# Patient Record
Sex: MALE | Race: WHITE | NOT HISPANIC OR LATINO | Employment: FULL TIME | URBAN - METROPOLITAN AREA
[De-identification: names, ages, dates, MRNs, and addresses within clinical notes are randomized per-mention and may not be internally consistent; named-entity substitution may affect disease eponyms.]

---

## 2023-02-25 ENCOUNTER — APPOINTMENT (INPATIENT)
Dept: MRI IMAGING | Facility: HOSPITAL | Age: 63
End: 2023-02-25

## 2023-02-25 ENCOUNTER — APPOINTMENT (EMERGENCY)
Dept: CT IMAGING | Facility: HOSPITAL | Age: 63
End: 2023-02-25

## 2023-02-25 ENCOUNTER — HOSPITAL ENCOUNTER (INPATIENT)
Facility: HOSPITAL | Age: 63
LOS: 9 days | End: 2023-03-06
Attending: ANESTHESIOLOGY | Admitting: ANESTHESIOLOGY

## 2023-02-25 ENCOUNTER — APPOINTMENT (INPATIENT)
Dept: RADIOLOGY | Facility: HOSPITAL | Age: 63
End: 2023-02-25

## 2023-02-25 ENCOUNTER — HOSPITAL ENCOUNTER (INPATIENT)
Facility: HOSPITAL | Age: 63
LOS: 1 days | End: 2023-02-25
Attending: EMERGENCY MEDICINE | Admitting: INTERNAL MEDICINE

## 2023-02-25 ENCOUNTER — APPOINTMENT (EMERGENCY)
Dept: RADIOLOGY | Facility: HOSPITAL | Age: 63
End: 2023-02-25

## 2023-02-25 VITALS
TEMPERATURE: 98.2 F | SYSTOLIC BLOOD PRESSURE: 184 MMHG | HEIGHT: 69 IN | RESPIRATION RATE: 17 BRPM | BODY MASS INDEX: 25.18 KG/M2 | WEIGHT: 170 LBS | HEART RATE: 85 BPM | OXYGEN SATURATION: 98 % | DIASTOLIC BLOOD PRESSURE: 86 MMHG

## 2023-02-25 DIAGNOSIS — I77.74 VERTEBRAL ARTERY DISSECTION (HCC): ICD-10-CM

## 2023-02-25 DIAGNOSIS — R42 DIZZINESS: Primary | ICD-10-CM

## 2023-02-25 DIAGNOSIS — U07.1 COVID-19: ICD-10-CM

## 2023-02-25 DIAGNOSIS — I63.9 CVA (CEREBRAL VASCULAR ACCIDENT) (HCC): ICD-10-CM

## 2023-02-25 DIAGNOSIS — N39.0 UTI (URINARY TRACT INFECTION): ICD-10-CM

## 2023-02-25 DIAGNOSIS — I77.74 VERTEBRAL ARTERY DISSECTION (HCC): Primary | ICD-10-CM

## 2023-02-25 DIAGNOSIS — R42 DIZZINESS: ICD-10-CM

## 2023-02-25 PROBLEM — N17.9 AKI (ACUTE KIDNEY INJURY) (HCC): Status: ACTIVE | Noted: 2023-02-25

## 2023-02-25 PROBLEM — I16.1 HYPERTENSIVE EMERGENCY: Status: ACTIVE | Noted: 2023-02-25

## 2023-02-25 PROBLEM — R13.10 DYSPHAGIA: Status: ACTIVE | Noted: 2023-02-25

## 2023-02-25 PROBLEM — I10 ACCELERATED HYPERTENSION: Status: ACTIVE | Noted: 2023-02-25

## 2023-02-25 PROBLEM — I65.02 STENOSIS OF LEFT VERTEBRAL ARTERY: Status: ACTIVE | Noted: 2023-02-25

## 2023-02-25 LAB
2HR DELTA HS TROPONIN: 4 NG/L
4HR DELTA HS TROPONIN: 1 NG/L
ABO GROUP BLD: NORMAL
ALBUMIN SERPL BCP-MCNC: 3.8 G/DL (ref 3.5–5)
ALP SERPL-CCNC: 55 U/L (ref 34–104)
ALT SERPL W P-5'-P-CCNC: 19 U/L (ref 7–52)
ANION GAP SERPL CALCULATED.3IONS-SCNC: 10 MMOL/L (ref 4–13)
ANION GAP SERPL CALCULATED.3IONS-SCNC: 8 MMOL/L (ref 4–13)
APTT PPP: 29 SECONDS (ref 23–37)
AST SERPL W P-5'-P-CCNC: 16 U/L (ref 13–39)
ATRIAL RATE: 70 BPM
BACTERIA UR QL AUTO: ABNORMAL /HPF
BASOPHILS # BLD AUTO: 0.03 THOUSANDS/ÂΜL (ref 0–0.1)
BASOPHILS NFR BLD AUTO: 0 % (ref 0–1)
BILIRUB SERPL-MCNC: 0.6 MG/DL (ref 0.2–1)
BILIRUB UR QL STRIP: NEGATIVE
BNP SERPL-MCNC: 191 PG/ML (ref 0–100)
BUN SERPL-MCNC: 18 MG/DL (ref 5–25)
BUN SERPL-MCNC: 23 MG/DL (ref 5–25)
CALCIUM SERPL-MCNC: 8.4 MG/DL (ref 8.4–10.2)
CALCIUM SERPL-MCNC: 8.8 MG/DL (ref 8.4–10.2)
CARDIAC TROPONIN I PNL SERPL HS: 37 NG/L
CARDIAC TROPONIN I PNL SERPL HS: 38 NG/L
CARDIAC TROPONIN I PNL SERPL HS: 41 NG/L
CHLORIDE SERPL-SCNC: 108 MMOL/L (ref 96–108)
CHLORIDE SERPL-SCNC: 109 MMOL/L (ref 96–108)
CHOLEST SERPL-MCNC: 163 MG/DL
CK SERPL-CCNC: 41 U/L (ref 39–308)
CLARITY UR: ABNORMAL
CO2 SERPL-SCNC: 24 MMOL/L (ref 21–32)
CO2 SERPL-SCNC: 24 MMOL/L (ref 21–32)
COLOR UR: ABNORMAL
CREAT SERPL-MCNC: 1.15 MG/DL (ref 0.6–1.3)
CREAT SERPL-MCNC: 1.32 MG/DL (ref 0.6–1.3)
CRP SERPL QL: 5.3 MG/L
D DIMER PPP FEU-MCNC: 1.44 UG/ML FEU
EOSINOPHIL # BLD AUTO: 0.03 THOUSAND/ÂΜL (ref 0–0.61)
EOSINOPHIL NFR BLD AUTO: 0 % (ref 0–6)
ERYTHROCYTE [DISTWIDTH] IN BLOOD BY AUTOMATED COUNT: 12.3 % (ref 11.6–15.1)
FLUAV RNA RESP QL NAA+PROBE: NEGATIVE
FLUBV RNA RESP QL NAA+PROBE: NEGATIVE
GFR SERPL CREATININE-BSD FRML MDRD: 57 ML/MIN/1.73SQ M
GFR SERPL CREATININE-BSD FRML MDRD: 67 ML/MIN/1.73SQ M
GLUCOSE SERPL-MCNC: 110 MG/DL (ref 65–140)
GLUCOSE SERPL-MCNC: 124 MG/DL (ref 65–140)
GLUCOSE UR STRIP-MCNC: NEGATIVE MG/DL
HCT VFR BLD AUTO: 44 % (ref 36.5–49.3)
HDLC SERPL-MCNC: 27 MG/DL
HGB BLD-MCNC: 14.4 G/DL (ref 12–17)
HGB UR QL STRIP.AUTO: NEGATIVE
HIV 1+2 AB+HIV1 P24 AG SERPL QL IA: NORMAL
HIV1 P24 AG SER QL: NORMAL
IMM GRANULOCYTES # BLD AUTO: 0.03 THOUSAND/UL (ref 0–0.2)
IMM GRANULOCYTES NFR BLD AUTO: 0 % (ref 0–2)
INR PPP: 0.98 (ref 0.84–1.19)
KETONES UR STRIP-MCNC: ABNORMAL MG/DL
LACTATE SERPL-SCNC: 1.2 MMOL/L (ref 0.5–2)
LDLC SERPL CALC-MCNC: 114 MG/DL (ref 0–100)
LEUKOCYTE ESTERASE UR QL STRIP: ABNORMAL
LYMPHOCYTES # BLD AUTO: 0.79 THOUSANDS/ÂΜL (ref 0.6–4.47)
LYMPHOCYTES NFR BLD AUTO: 8 % (ref 14–44)
MCH RBC QN AUTO: 27.7 PG (ref 26.8–34.3)
MCHC RBC AUTO-ENTMCNC: 32.7 G/DL (ref 31.4–37.4)
MCV RBC AUTO: 85 FL (ref 82–98)
MONOCYTES # BLD AUTO: 0.42 THOUSAND/ÂΜL (ref 0.17–1.22)
MONOCYTES NFR BLD AUTO: 4 % (ref 4–12)
NEUTROPHILS # BLD AUTO: 8.69 THOUSANDS/ÂΜL (ref 1.85–7.62)
NEUTS SEG NFR BLD AUTO: 88 % (ref 43–75)
NITRITE UR QL STRIP: POSITIVE
NON-SQ EPI CELLS URNS QL MICRO: ABNORMAL /HPF
NONHDLC SERPL-MCNC: 136 MG/DL
NRBC BLD AUTO-RTO: 0 /100 WBCS
OTHER STN SPEC: ABNORMAL
P AXIS: 68 DEGREES
PH UR STRIP.AUTO: 6.5 [PH]
PLATELET # BLD AUTO: 326 THOUSANDS/UL (ref 149–390)
PMV BLD AUTO: 9.1 FL (ref 8.9–12.7)
POTASSIUM SERPL-SCNC: 3.3 MMOL/L (ref 3.5–5.3)
POTASSIUM SERPL-SCNC: 3.5 MMOL/L (ref 3.5–5.3)
PR INTERVAL: 162 MS
PROCALCITONIN SERPL-MCNC: <0.05 NG/ML
PROT SERPL-MCNC: 7.3 G/DL (ref 6.4–8.4)
PROT UR STRIP-MCNC: ABNORMAL MG/DL
PROTHROMBIN TIME: 13.7 SECONDS (ref 11.6–14.5)
QRS AXIS: 0 DEGREES
QRSD INTERVAL: 88 MS
QT INTERVAL: 464 MS
QTC INTERVAL: 501 MS
RBC # BLD AUTO: 5.19 MILLION/UL (ref 3.88–5.62)
RBC #/AREA URNS AUTO: ABNORMAL /HPF
RH BLD: POSITIVE
RSV RNA RESP QL NAA+PROBE: NEGATIVE
SARS-COV-2 RNA RESP QL NAA+PROBE: POSITIVE
SODIUM SERPL-SCNC: 141 MMOL/L (ref 135–147)
SODIUM SERPL-SCNC: 142 MMOL/L (ref 135–147)
SP GR UR STRIP.AUTO: <1.005 (ref 1–1.03)
T WAVE AXIS: -69 DEGREES
TRIGL SERPL-MCNC: 109 MG/DL
UROBILINOGEN UR STRIP-ACNC: <2 MG/DL
VENTRICULAR RATE: 70 BPM
WBC # BLD AUTO: 9.99 THOUSAND/UL (ref 4.31–10.16)
WBC #/AREA URNS AUTO: ABNORMAL /HPF

## 2023-02-25 PROCEDURE — 4A133J1 MONITORING OF ARTERIAL PULSE, PERIPHERAL, PERCUTANEOUS APPROACH: ICD-10-PCS | Performed by: INTERNAL MEDICINE

## 2023-02-25 PROCEDURE — 03HY32Z INSERTION OF MONITORING DEVICE INTO UPPER ARTERY, PERCUTANEOUS APPROACH: ICD-10-PCS | Performed by: INTERNAL MEDICINE

## 2023-02-25 PROCEDURE — 4A133B1 MONITORING OF ARTERIAL PRESSURE, PERIPHERAL, PERCUTANEOUS APPROACH: ICD-10-PCS | Performed by: INTERNAL MEDICINE

## 2023-02-25 RX ORDER — LORAZEPAM 2 MG/ML
0.5 INJECTION INTRAMUSCULAR ONCE
Status: COMPLETED | OUTPATIENT
Start: 2023-02-25 | End: 2023-02-25

## 2023-02-25 RX ORDER — SODIUM CHLORIDE 9 MG/ML
150 INJECTION, SOLUTION INTRAVENOUS CONTINUOUS
Status: CANCELLED | OUTPATIENT
Start: 2023-02-25

## 2023-02-25 RX ORDER — CEFTRIAXONE 1 G/50ML
1000 INJECTION, SOLUTION INTRAVENOUS EVERY 24 HOURS
Status: DISCONTINUED | OUTPATIENT
Start: 2023-02-26 | End: 2023-02-25 | Stop reason: HOSPADM

## 2023-02-25 RX ORDER — CHLORHEXIDINE GLUCONATE 0.12 MG/ML
15 RINSE ORAL EVERY 12 HOURS SCHEDULED
Status: DISCONTINUED | OUTPATIENT
Start: 2023-02-26 | End: 2023-03-03

## 2023-02-25 RX ORDER — ASPIRIN 81 MG/1
81 TABLET ORAL DAILY
Status: DISCONTINUED | OUTPATIENT
Start: 2023-02-26 | End: 2023-02-25 | Stop reason: HOSPADM

## 2023-02-25 RX ORDER — LIDOCAINE HYDROCHLORIDE 10 MG/ML
INJECTION, SOLUTION EPIDURAL; INFILTRATION; INTRACAUDAL; PERINEURAL
Status: COMPLETED
Start: 2023-02-25 | End: 2023-02-25

## 2023-02-25 RX ORDER — ASPIRIN 325 MG
325 TABLET ORAL ONCE
Status: COMPLETED | OUTPATIENT
Start: 2023-02-25 | End: 2023-02-25

## 2023-02-25 RX ORDER — CEFTRIAXONE 1 G/50ML
1000 INJECTION, SOLUTION INTRAVENOUS ONCE
Status: COMPLETED | OUTPATIENT
Start: 2023-02-25 | End: 2023-02-25

## 2023-02-25 RX ORDER — SODIUM CHLORIDE 9 MG/ML
150 INJECTION, SOLUTION INTRAVENOUS CONTINUOUS
Status: DISCONTINUED | OUTPATIENT
Start: 2023-02-25 | End: 2023-02-25 | Stop reason: HOSPADM

## 2023-02-25 RX ORDER — LABETALOL HYDROCHLORIDE 5 MG/ML
10 INJECTION, SOLUTION INTRAVENOUS ONCE
Status: COMPLETED | OUTPATIENT
Start: 2023-02-25 | End: 2023-02-25

## 2023-02-25 RX ORDER — ASPIRIN 81 MG/1
81 TABLET ORAL DAILY
Status: DISCONTINUED | OUTPATIENT
Start: 2023-02-26 | End: 2023-02-26

## 2023-02-25 RX ORDER — CLOPIDOGREL BISULFATE 75 MG/1
300 TABLET ORAL ONCE
Status: COMPLETED | OUTPATIENT
Start: 2023-02-25 | End: 2023-02-25

## 2023-02-25 RX ORDER — CHLORHEXIDINE GLUCONATE 0.12 MG/ML
15 RINSE ORAL EVERY 12 HOURS SCHEDULED
Status: DISCONTINUED | OUTPATIENT
Start: 2023-02-25 | End: 2023-02-25 | Stop reason: HOSPADM

## 2023-02-25 RX ORDER — CLOPIDOGREL BISULFATE 75 MG/1
75 TABLET ORAL DAILY
Status: CANCELLED | OUTPATIENT
Start: 2023-02-26

## 2023-02-25 RX ORDER — ASPIRIN 81 MG/1
81 TABLET ORAL DAILY
Status: CANCELLED | OUTPATIENT
Start: 2023-02-26

## 2023-02-25 RX ORDER — CLOPIDOGREL BISULFATE 75 MG/1
75 TABLET ORAL DAILY
Status: DISCONTINUED | OUTPATIENT
Start: 2023-02-26 | End: 2023-02-25 | Stop reason: HOSPADM

## 2023-02-25 RX ORDER — HEPARIN SODIUM 10000 [USP'U]/100ML
3-20 INJECTION, SOLUTION INTRAVENOUS
Status: DISCONTINUED | OUTPATIENT
Start: 2023-02-26 | End: 2023-02-26

## 2023-02-25 RX ORDER — CHLORHEXIDINE GLUCONATE 0.12 MG/ML
15 RINSE ORAL EVERY 12 HOURS SCHEDULED
Status: CANCELLED | OUTPATIENT
Start: 2023-02-26

## 2023-02-25 RX ORDER — CLOPIDOGREL BISULFATE 75 MG/1
75 TABLET ORAL DAILY
Status: DISCONTINUED | OUTPATIENT
Start: 2023-02-26 | End: 2023-02-26

## 2023-02-25 RX ORDER — POTASSIUM CHLORIDE 14.9 MG/ML
20 INJECTION INTRAVENOUS
Status: DISPENSED | OUTPATIENT
Start: 2023-02-25 | End: 2023-02-25

## 2023-02-25 RX ORDER — CEFTRIAXONE 1 G/50ML
1000 INJECTION, SOLUTION INTRAVENOUS EVERY 24 HOURS
Status: CANCELLED | OUTPATIENT
Start: 2023-02-26

## 2023-02-25 RX ORDER — LIDOCAINE HYDROCHLORIDE 10 MG/ML
10 INJECTION, SOLUTION EPIDURAL; INFILTRATION; INTRACAUDAL; PERINEURAL ONCE
Status: COMPLETED | OUTPATIENT
Start: 2023-02-25 | End: 2023-02-25

## 2023-02-25 RX ORDER — SODIUM CHLORIDE 9 MG/ML
75 INJECTION, SOLUTION INTRAVENOUS CONTINUOUS
Status: DISCONTINUED | OUTPATIENT
Start: 2023-02-26 | End: 2023-02-27

## 2023-02-25 RX ADMIN — LORAZEPAM 0.5 MG: 2 INJECTION INTRAMUSCULAR; INTRAVENOUS at 17:55

## 2023-02-25 RX ADMIN — LABETALOL HYDROCHLORIDE 10 MG: 5 INJECTION, SOLUTION INTRAVENOUS at 13:04

## 2023-02-25 RX ADMIN — LORAZEPAM 0.5 MG: 2 INJECTION INTRAMUSCULAR; INTRAVENOUS at 17:30

## 2023-02-25 RX ADMIN — CEFTRIAXONE 1000 MG: 1 INJECTION, SOLUTION INTRAVENOUS at 12:51

## 2023-02-25 RX ADMIN — SODIUM CHLORIDE 5 MG/HR: 0.9 INJECTION, SOLUTION INTRAVENOUS at 14:06

## 2023-02-25 RX ADMIN — LIDOCAINE HYDROCHLORIDE 10 ML: 10 INJECTION, SOLUTION EPIDURAL; INFILTRATION; INTRACAUDAL; PERINEURAL at 23:01

## 2023-02-25 RX ADMIN — SODIUM CHLORIDE 150 ML/HR: 0.9 INJECTION, SOLUTION INTRAVENOUS at 13:23

## 2023-02-25 RX ADMIN — LORAZEPAM 0.5 MG: 2 INJECTION INTRAMUSCULAR; INTRAVENOUS at 13:04

## 2023-02-25 RX ADMIN — POTASSIUM CHLORIDE 20 MEQ: 14.9 INJECTION, SOLUTION INTRAVENOUS at 20:44

## 2023-02-25 RX ADMIN — SODIUM CHLORIDE 1000 ML: 0.9 INJECTION, SOLUTION INTRAVENOUS at 09:42

## 2023-02-25 RX ADMIN — CHLORHEXIDINE GLUCONATE 15 ML: 1.2 RINSE ORAL at 20:43

## 2023-02-25 RX ADMIN — IOHEXOL 90 ML: 350 INJECTION, SOLUTION INTRAVENOUS at 10:50

## 2023-02-25 RX ADMIN — ASPIRIN 325 MG ORAL TABLET 325 MG: 325 PILL ORAL at 13:02

## 2023-02-25 RX ADMIN — CLOPIDOGREL BISULFATE 300 MG: 75 TABLET ORAL at 13:02

## 2023-02-25 NOTE — ASSESSMENT & PLAN NOTE
· Incidentally COVID + in ER 2/25  · On RA -- does not qualify for treatment protocol   · Send baseline COVID labs   · 2/25 CXR -- no large ptx, infiltrates or effusions   · Plan to initiate steroids if requires O2

## 2023-02-25 NOTE — ASSESSMENT & PLAN NOTE
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

## 2023-02-25 NOTE — ASSESSMENT & PLAN NOTE
· POA AEB cerebellar infarct, vertebral artery stenosis, vertebral artery dissection   · BP max 252/115 while in ER   · In the setting of cerebellar infarct, acute vs subacute vertebral artery dissection and stenosis   · Labetalol 10mg IV x1 in ER with minimal effect   · Initiate Cardene gtt   · Initial SBP goal 200 over the next 4-6 hours, then SBP goal 180-190 until tomorrow afternoon  · Unclear if baseline hypertension

## 2023-02-25 NOTE — ASSESSMENT & PLAN NOTE
· POA with UA + nitrites, + leuks and innumm bacteria   · No past culture data on record   · Ctx x1 in ER   · UC pending   · Noted accompanying BRAULIO, which is likely 2/2 accelerated HTN   · If worsening BRAULIO or new pain, will perform CT AP to r/o stone vs pyelo

## 2023-02-25 NOTE — QUICK NOTE
TT from medicine to see patient in consult for vertebral artery dissection and recommendations on need for transfer  Discussed CT findings with vascular fellow Dr Srinivasa Hinton, who stated neurosurgery more appropriate for consult/management  Message relayed to [facility] provider

## 2023-02-25 NOTE — ASSESSMENT & PLAN NOTE
· 2/25 CTH -- focal R posterior cerebellum infarct -- acute vs subacute   · CVA pathway   · BP control as below   · DAPT as per Neurology, loaded with ASA and plavix 2/25 in ER   · Will continue ASA 81mg qd, plavix 75mg qd   · Serial neuro exams   · Admit to ICU for BP control, serial neuro exams   · Appreciate neuro input

## 2023-02-25 NOTE — ASSESSMENT & PLAN NOTE
· Noted on CTA head/neck 2/25 2/2 dizziness, nausea x2-3 days   · Per Neurology Dr Milena Logan conversation with Dr Neal Alcantar, patient does not require transfer to facility with Vascular or Neurosurgery at this time  · Control of BP as above   · Serial neuro exams, CVA pathway   · With any worsening, plan for stat reimaging, contact to Vascular Surgery

## 2023-02-25 NOTE — ASSESSMENT & PLAN NOTE
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

## 2023-02-25 NOTE — ASSESSMENT & PLAN NOTE
· No baseline creat on file   · BRAULIO POA with creat 1 32  · Likely 2/2 accelerated HTN as above   · Noted concomitant UTI   · 2/25 dye load for CTA and MRI  · Gentle IVF   · Avoid hypotension, overcorrection of BP, nephrotoxic agents   · Trend UO and creat closely

## 2023-02-25 NOTE — ASSESSMENT & PLAN NOTE
· POA AEB cerebellar infarct, vertebral artery stenosis, vertebral artery dissection   · BP max 252/115 while in ER   · In the setting of cerebellar infarct, acute vs subacute vertebral artery dissection and stenosis   · Labetalol 10mg IV x1 in ER with minimal effect   · Continue Cardene gtt with goal -180  · Unclear if baseline hypertension

## 2023-02-25 NOTE — ED PROVIDER NOTES
History  Chief Complaint   Patient presents with   • Weakness - Generalized     Patient presents to the ED with c/o generalized weakness and not feeling well x2 days, states wife recently had noro virus  Patient is a 59 y/o M with unremarkable PMH that presents to the ED with weakness, dizziness, and nausea for 2 days  Patient states he came home from work 2 days ago and felt very dizzy, he vomited the next morning  Wife states patient has trouble ambulating due to the dizziness  Patient is not able to describe the dizziness further  He denies diarrhea  Wife and son were recently sick with N/V/D  He denies abdominal pain  No fevers, but has chills  Wife states patient does not see a doctor so she is unsure if his blood pressure is normally high  Patient has never been on medication for blood pressure  He does admit to headaches  No vision changes  History provided by:  Patient and spouse  History limited by: poor historian  Dizziness  Quality:  Unable to specify  Severity:  Moderate  Onset quality:  Gradual  Duration:  2 days  Timing:  Constant  Progression:  Worsening  Chronicity:  New  Relieved by:  Nothing  Exacerbated by: ambulation  Ineffective treatments:  None tried  Associated symptoms: headaches, nausea, vomiting and weakness    Associated symptoms: no blood in stool, no chest pain, no diarrhea, no palpitations, no shortness of breath, no tinnitus and no vision changes    Risk factors: no heart disease, no hx of stroke, no multiple medications and no new medications        None       History reviewed  No pertinent past medical history  History reviewed  No pertinent surgical history  History reviewed  No pertinent family history  I have reviewed and agree with the history as documented      E-Cigarette/Vaping     E-Cigarette/Vaping Substances     Social History     Tobacco Use   • Smoking status: Never   • Smokeless tobacco: Never   Substance Use Topics   • Alcohol use: Not Currently   • Drug use: Not Currently       Review of Systems   Constitutional: Positive for activity change, appetite change and chills  Negative for fever  HENT: Negative for congestion and tinnitus  Eyes: Negative for visual disturbance  Respiratory: Negative for cough and shortness of breath  Cardiovascular: Negative for chest pain, palpitations and leg swelling  Gastrointestinal: Positive for nausea and vomiting  Negative for abdominal pain, blood in stool and diarrhea  Genitourinary: Negative for dysuria and frequency  Musculoskeletal: Negative for back pain  Skin: Negative for color change, pallor and rash  Neurological: Positive for dizziness, weakness, light-headedness and headaches  Negative for syncope, facial asymmetry, speech difficulty and numbness  Psychiatric/Behavioral: Negative for confusion and decreased concentration  All other systems reviewed and are negative  Physical Exam  Physical Exam  Vitals and nursing note reviewed  Constitutional:       General: He is in acute distress  Appearance: Normal appearance  He is well-developed, well-groomed and normal weight  He is ill-appearing  He is not diaphoretic  HENT:      Head: Normocephalic and atraumatic  Right Ear: Hearing normal       Left Ear: Hearing normal       Nose: Nose normal       Mouth/Throat:      Mouth: Mucous membranes are dry  Pharynx: Oropharynx is clear  Eyes:      Extraocular Movements: Extraocular movements intact  Conjunctiva/sclera: Conjunctivae normal       Pupils: Pupils are equal, round, and reactive to light  Cardiovascular:      Rate and Rhythm: Normal rate and regular rhythm  Heart sounds: Normal heart sounds  Pulmonary:      Effort: Pulmonary effort is normal       Breath sounds: Normal breath sounds  No wheezing, rhonchi or rales  Abdominal:      General: Abdomen is flat  Bowel sounds are normal       Tenderness: There is no abdominal tenderness  Musculoskeletal:         General: No tenderness  Normal range of motion  Cervical back: Normal range of motion and neck supple  No spinous process tenderness or muscular tenderness  Right lower leg: No edema  Left lower leg: No edema  Skin:     General: Skin is warm and dry  Coloration: Skin is pale  Findings: No rash  Neurological:      Mental Status: He is alert and oriented to person, place, and time  GCS: GCS eye subscore is 4  GCS verbal subscore is 5  GCS motor subscore is 6  Cranial Nerves: Cranial nerves 2-12 are intact  No facial asymmetry  Sensory: Sensation is intact  Motor: Motor function is intact  No tremor, abnormal muscle tone or pronator drift  Coordination: Coordination is intact  Finger-Nose-Finger Test normal    Psychiatric:         Mood and Affect: Mood normal          Speech: Speech normal          Behavior: Behavior is cooperative  Vital Signs  ED Triage Vitals   Temperature Pulse Respirations Blood Pressure SpO2   02/25/23 0911 02/25/23 0918 02/25/23 0918 02/25/23 0918 02/25/23 0918   (!) 97 2 °F (36 2 °C) 64 16 (!) 232/102 99 %      Temp Source Heart Rate Source Patient Position - Orthostatic VS BP Location FiO2 (%)   02/25/23 0911 02/25/23 0918 02/25/23 0918 02/25/23 0918 --   Temporal Monitor Lying Left arm       Pain Score       02/25/23 0911       No Pain           Vitals:    02/25/23 1400 02/25/23 1430 02/25/23 1445 02/25/23 1450   BP: (!) 224/112 (!) 198/91 (!) 189/92 (!) 222/98   Pulse: 74 74 74 85   Patient Position - Orthostatic VS:  Lying           Visual Acuity  Visual Acuity    Flowsheet Row Most Recent Value   Visual acuity R eye is 20/50   Visual acuity Left eye is 20/50   Visual acuity in both eyes is 20/30   Visual acuity corrected normally uses glasses but did not have them on for test   Wearing corrective eyewear/lenses?  No          ED Medications  Medications   sodium chloride 0 9 % infusion (150 mL/hr Intravenous New Bag 2/25/23 1323)   clopidogrel (PLAVIX) tablet 75 mg (has no administration in time range)   chlorhexidine (PERIDEX) 0 12 % oral rinse 15 mL (has no administration in time range)   aspirin (ECOTRIN LOW STRENGTH) EC tablet 81 mg (has no administration in time range)   niCARdipine (CARDENE) 25 mg (STANDARD CONCENTRATION) in sodium chloride 0 9% 250 mL (3 mg/hr Intravenous Rate/Dose Change 2/25/23 1455)   cefTRIAXone (ROCEPHIN) IVPB (premix in dextrose) 1,000 mg 50 mL (has no administration in time range)   LORazepam (ATIVAN) injection 0 5 mg (has no administration in time range)   sodium chloride 0 9 % bolus 1,000 mL (0 mL Intravenous Stopped 2/25/23 1156)   iohexol (OMNIPAQUE) 350 MG/ML injection (SINGLE-DOSE) 90 mL (90 mL Intravenous Given 2/25/23 1050)   cefTRIAXone (ROCEPHIN) IVPB (premix in dextrose) 1,000 mg 50 mL (0 mg Intravenous Stopped 2/25/23 1310)   aspirin tablet 325 mg (325 mg Oral Given 2/25/23 1302)   clopidogrel (PLAVIX) tablet 300 mg (300 mg Oral Given 2/25/23 1302)   LORazepam (ATIVAN) injection 0 5 mg (0 5 mg Intravenous Given 2/25/23 1304)   labetalol (NORMODYNE) injection 10 mg (10 mg Intravenous Given 2/25/23 1304)       Diagnostic Studies  Results Reviewed     Procedure Component Value Units Date/Time    HS Troponin I 4hr [[id number]]  (Normal) Collected: 02/25/23 1323    Lab Status: Final result Specimen: Blood from Arm, Left Updated: 02/25/23 1356     hs TnI 4hr 38 ng/L      Delta 4hr hsTnI 1 ng/L     Procalcitonin [[id number]]     Lab Status: No result Specimen: Blood     C-reactive protein [[id number]]     Lab Status: No result Specimen: Blood     D-dimer, quantitative [[id number]]     Lab Status: No result Specimen: Blood     B-Type Natriuretic Peptide(BNP) [[id number]]     Lab Status: No result Specimen: Blood     CK (with reflex to MB) [[id number]]     Lab Status: No result Specimen: Blood     Chronic Hepatitis Panel [[id number]]     Lab Status: No result Specimen: Blood Rapid HIV 1/2 AB-AG combo for 15years old and above [[id number]]     Lab Status: No result Specimen: Blood     Lactic acid, plasma [[id number]]     Lab Status: No result Specimen: Blood     Blood culture #1 [[id number]]     Lab Status: No result Specimen: Blood     Blood culture #2 [[id number]]     Lab Status: No result Specimen: Blood     Lipid panel [[id number]]  (Abnormal) Collected: 02/25/23 1304    Lab Status: Final result Specimen: Blood from Arm, Left Updated: 02/25/23 1333     Cholesterol 163 mg/dL      Triglycerides 109 mg/dL      HDL, Direct 27 mg/dL      LDL Calculated 114 mg/dL      Non-HDL-Chol (CHOL-HDL) 136 mg/dl     Hemoglobin A1C [[id number]] Collected: 02/25/23 1304    Lab Status: In process Specimen: Blood from Arm, Left Updated: 02/25/23 1317    Urine Microscopic [[id number]]  (Abnormal) Collected: 02/25/23 1155    Lab Status: Final result Specimen: Urine, Clean Catch Updated: 02/25/23 1243     RBC, UA 0-1 /hpf      WBC, UA 20-30 /hpf      Epithelial Cells Occasional /hpf      Bacteria, UA Innumerable /hpf      OTHER OBSERVATIONS WBCs Clumped    Urine culture [[id number]] Collected: 02/25/23 1155    Lab Status:  In process Specimen: Urine, Clean Catch Updated: 02/25/23 1243    HS Troponin I 2hr [[id number]]  (Normal) Collected: 02/25/23 1156    Lab Status: Final result Specimen: Blood from Arm, Left Updated: 02/25/23 1231     hs TnI 2hr 41 ng/L      Delta 2hr hsTnI 4 ng/L     UA w Reflex to Microscopic w Reflex to Culture [[id number]]  (Abnormal) Collected: 02/25/23 1155    Lab Status: Final result Specimen: Urine, Clean Catch Updated: 02/25/23 1227     Color, UA Light Yellow     Clarity, UA Cloudy     Specific Gravity, UA <1 005     pH, UA 6 5     Leukocytes, UA Large     Nitrite, UA Positive     Protein, UA Trace mg/dl      Glucose, UA Negative mg/dl      Ketones, UA 10 (1+) mg/dl      Urobilinogen, UA <2 0 mg/dl      Bilirubin, UA Negative     Occult Blood, UA Negative    FLU/RSV/COVID - if FLU/RSV clinically relevant [[id number]]  (Abnormal) Collected: 02/25/23 0940    Lab Status: Final result Specimen: Nares from Nose Updated: 02/25/23 1048     SARS-CoV-2 Positive     INFLUENZA A PCR Negative     INFLUENZA B PCR Negative     RSV PCR Negative    Narrative:      FOR PEDIATRIC PATIENTS - copy/paste COVID Guidelines URL to browser: [email or web address]    SARS-CoV-2 assay is a Nucleic Acid Amplification assay intended for the  qualitative detection of nucleic acid from SARS-CoV-2 in nasopharyngeal  swabs  Results are for the presumptive identification of SARS-CoV-2 RNA  Positive results are indicative of infection with SARS-CoV-2, the virus  causing COVID-19, but do not rule out bacterial infection or co-infection  with other viruses  Laboratories within the [place] and its  territories are required to report all positive results to the appropriate  public health authorities  Negative results do not preclude SARS-CoV-2  infection and should not be used as the sole basis for treatment or other  patient management decisions  Negative results must be combined with  clinical observations, patient history, and epidemiological information  This test has not been FDA cleared or approved  This test has been authorized by FDA under an Emergency Use Authorization  (EUA)  This test is only authorized for the duration of time the  declaration that circumstances exist justifying the authorization of the  emergency use of an in vitro diagnostic tests for detection of SARS-CoV-2  virus and/or diagnosis of COVID-19 infection under section 564(b)(1) of  the Act, 21 U  S C  560HLU-7(Y)(8), unless the authorization is terminated  or revoked sooner  The test has been validated but independent review by FDA  and CLIA is pending  Test performed using [employer]: This RT-PCR assay targets N2,  a region unique to SARS-CoV-2   A conserved region in the E-gene was chosen  for pan-Sarbecovirus detection which includes SARS-CoV-2  According to CMS-2020-01-R, this platform meets the definition of high-throughput technology      Protime-INR [[id number]]  (Normal) Collected: 02/25/23 0940    Lab Status: Final result Specimen: Blood from Arm, Left Updated: 02/25/23 1040     Protime 13 7 seconds      INR 0 98    APTT [[id number]]  (Normal) Collected: 02/25/23 0940    Lab Status: Final result Specimen: Blood from Arm, Left Updated: 02/25/23 1040     PTT 29 seconds     HS Troponin 0hr (reflex protocol) [[id number]]  (Normal) Collected: 02/25/23 0940    Lab Status: Final result Specimen: Blood from Arm, Left Updated: 02/25/23 1034     hs TnI 0hr 37 ng/L     Comprehensive metabolic panel [[id number]]  (Abnormal) Collected: 02/25/23 0940    Lab Status: Final result Specimen: Blood from Arm, Left Updated: 02/25/23 1029     Sodium 142 mmol/L      Potassium 3 5 mmol/L      Chloride 108 mmol/L      CO2 24 mmol/L      ANION GAP 10 mmol/L      BUN 23 mg/dL      Creatinine 1 32 mg/dL      Glucose 124 mg/dL      Calcium 8 8 mg/dL      AST 16 U/L      ALT 19 U/L      Alkaline Phosphatase 55 U/L      Total Protein 7 3 g/dL      Albumin 3 8 g/dL      Total Bilirubin 0 60 mg/dL      eGFR 57 ml/min/1 73sq m     Narrative:      [facility] guidelines for Chronic Kidney Disease (CKD):   •  Stage 1 with normal or high GFR (GFR > 90 mL/min/1 73 square meters)  •  Stage 2 Mild CKD (GFR = 60-89 mL/min/1 73 square meters)  •  Stage 3A Moderate CKD (GFR = 45-59 mL/min/1 73 square meters)  •  Stage 3B Moderate CKD (GFR = 30-44 mL/min/1 73 square meters)  •  Stage 4 Severe CKD (GFR = 15-29 mL/min/1 73 square meters)  •  Stage 5 End Stage CKD (GFR <15 mL/min/1 73 square meters)  Note: GFR calculation is accurate only with a steady state creatinine    CBC and differential [[id number]]  (Abnormal) Collected: 02/25/23 0940    Lab Status: Final result Specimen: Blood from Arm, Left Updated: 02/25/23 1012     WBC 9 99 Thousand/uL      RBC 5 19 Million/uL      Hemoglobin 14 4 g/dL      Hematocrit 44 0 %      MCV 85 fL      MCH 27 7 pg      MCHC 32 7 g/dL      RDW 12 3 %      MPV 9 1 fL      Platelets 849 Thousands/uL      nRBC 0 /100 WBCs      Neutrophils Relative 88 %      Immat GRANS % 0 %      Lymphocytes Relative 8 %      Monocytes Relative 4 %      Eosinophils Relative 0 %      Basophils Relative 0 %      Neutrophils Absolute 8 69 Thousands/µL      Immature Grans Absolute 0 03 Thousand/uL      Lymphocytes Absolute 0 79 Thousands/µL      Monocytes Absolute 0 42 Thousand/µL      Eosinophils Absolute 0 03 Thousand/µL      Basophils Absolute 0 03 Thousands/µL                  CTA head and neck with and without contrast   Final Result by Johnathon De Dios MD (02/25 1236)      No acute intracranial hemorrhage  Focal infarcts in the right posterior cerebellum, possibly acute to subacute  Follow-up MRI imaging is recommended  Marked tapering and occlusion of the distal right cervical vertebral artery  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  Proximal right intradural vertebral artery is also occluded with faint visualization of the post-PICA segment, which may be    filling in a retrograde fashion  Severe left vertebral artery origin stenosis  Left vertebral artery may terminate in a posterior for cerebellar artery branch as the post-PICA segment is not identified  Small basilar artery with fetal right PCA circulation  Left proximal PCA is patent but small  Mid to distal left PCA is not well visualized               I personally discussed this study with Sherren Neri on 2/25/2023 at 12:21 PM                               Workstation performed: [id number]         XR chest 1 view portable    (Results Pending)   MRA head wo contrast    (Results Pending)   XR follow up    (Results Pending)              Procedures  ECG 12 Lead Documentation Only    Date/Time: 2/25/2023 9:13 AM  Performed by: Robert Carmona PA-C  Authorized by: Robert Carmona PA-C     Indications / Diagnosis:  Weakness  ECG reviewed by me, the ED Provider: yes    Patient location:  ED  Previous ECG:     Previous ECG:  Unavailable  Rate:     ECG rate:  70  Rhythm:     Rhythm: sinus rhythm    ST segments:     ST segments:  Abnormal    Depression:  II, III, aVF, V5 and V6  T waves:     T waves: inverted      Inverted:  II, III, aVF, V6 and V5  Other findings:     Other findings: prolonged qTc interval      CriticalCare Time  Performed by: Robert Carmona PA-C  Authorized by: Robert Carmona PA-C     Critical care provider statement:     Critical care time (minutes):  32    Critical care time was exclusive of:  Separately billable procedures and treating other patients    Critical care was necessary to treat or prevent imminent or life-threatening deterioration of the following conditions:  CNS failure or compromise    Critical care was time spent personally by me on the following activities:  Blood draw for specimens, obtaining history from patient or surrogate, development of treatment plan with patient or surrogate, discussions with consultants, evaluation of patient's response to treatment, examination of patient, ordering and performing treatments and interventions, ordering and review of laboratory studies, ordering and review of radiographic studies and re-evaluation of patient's condition    I assumed direction of critical care for this patient from another provider in my specialty: no               ED Course  ED Course as of 02/25/23 1632   Sat Feb 25, 2023   1249 Paged neuro and slim for admission  1307 Neuro would like patient to remain normotensive, give aspirin and plavix  1 Dr Anastasia Norris would like me to d/w critical care  4500 [facility] paged  1310 Patient's BP remains elevated, will give meds and reassess     1328 Critical care discussing case with neuro, awaiting response  [address] [facility] Dr Sandy Reynolds accepts patient on ICU  Will start cardene since patient remains Hypertensive  1345 BP did not improve with labetalol, will start cardene and monitor closely  Stroke Assessment     Row Name 02/25/23 1226             NIH Stroke Scale    Interval Baseline      Level of Consciousness (1a ) 0      LOC Questions (1b ) 0      LOC Commands (1c ) 0      Best Gaze (2 ) 0      Visual (3 ) 0      Facial Palsy (4 ) 0      Motor Arm, Left (5a ) 0      Motor Arm, Right (5b ) 0      Motor Leg, Left (6a ) 0      Motor Leg, Right (6b ) 0      Limb Ataxia (7 ) 0      Sensory (8 ) 0      Best Language (9 ) 0      Dysarthria (10 ) 0      Extinction and Inattention (11 ) (Formerly Neglect) 0      Total 0              Flowsheet Row Most Recent Value   Thrombolytic Decision Options    Thrombolytic Decision Patient not a candidate  Patient is not a candidate options Unclear time of onset outside appropriate time window  Medical Decision Making  Patient with vague symptoms, poor historian therefore additional historian obtained by wife  Patient with dizziness, concern for CVA, given symptoms over 2 days, no stroke alert called  Will check labs to r/o anemia, dehydration, electrolyte abnormality, covid/flu/rsv, UTI  Will order CTA to r/o infarct due to dizziness and feeling off balance  Patient found to have covid, and a UTI  CT showing infarcts, d/w neuro, SLIM and Critical care, will admit patient on aspirin and plavix, IV abx  Covid stable at this time, no SOB, pulse ox stable at 97-98% RA, no need for IV steroids  COVID-19: acute illness or injury  CVA (cerebral vascular accident) [facility]): acute illness or injury  UTI (urinary tract infection): self-limited or minor problem  Amount and/or Complexity of Data Reviewed  Independent Historian:      Details: wife supplied history due to patient being poor historian  Labs: ordered    Radiology: ordered  ECG/medicine tests: ordered and independent interpretation performed  Risk  OTC drugs  Prescription drug management  Decision regarding hospitalization  Disposition  Final diagnoses:   COVID-19   UTI (urinary tract infection)   [facility]     Time reflects when diagnosis was documented in both MDM as applicable and the Disposition within this note     Time User Action Codes Description Comment    2/25/2023 12:44 PM Reji  Add [U07 1] COVID-19     2/25/2023 12:44 PM Reji  Add [N39 0] UTI (urinary tract infection)     2/25/2023 12:44 PM Reji  Add [I63 9] CVA (cerebral vascular accident) ([facility] )     2/25/2023  1:37 PM Lindsay Sofia Add [R42] Dizziness       ED Disposition     ED Disposition   Admit    Condition   Stable    Date/Time   Sat Feb 25, 2023  1:32 PM    Comment   Case was discussed with Dewayne Rashid and the patient's admission status was agreed to be Admission Status: inpatient status to the service of Dr Raymon Tapia   Follow-up Information    None         There are no discharge medications for this patient  No discharge procedures on file      PDMP Review     None          ED Provider  Electronically Signed by           Perry Antonio PA-C  02/25/23 5391

## 2023-02-25 NOTE — Clinical Note
Case was discussed with Dr Juanita Angelucci and the patient's admission status was agreed to be Admission Status: inpatient status to the service of Dr Marlene High

## 2023-02-25 NOTE — ASSESSMENT & PLAN NOTE
· Pt with 2-day hx of dizziness  · 2/25 CTH -- No acute intracranial hemorrhage  Focal infarcts in the right posterior cerebellum, possibly acute to subacute  Marked tapering and occlusion of the distal right cervical vertebral artery, imaging findings are suggestive of dissection  Proximal right intradural vertebral artery is also occluded with faint visualization of the post-PICA segment  Severe left vertebral artery origin stenosis  Small basilar artery with fetal right PCA circulation    Left proximal PCA is patent but small  · Specific plans as below   · CVA pathway, BP control as below   · Serial neuro exams   · Fall precautions, PT/OT  · Appreciate Neuro guidance

## 2023-02-25 NOTE — SPEECH THERAPY NOTE
Speech Language/Pathology    Speech-Language Pathology Bedside Swallow Evaluation      Patient Name: Candace Rodriguez    APKPI'R Date: 2/25/2023     Problem List  Principal Problem:    Dizziness  Active Problems:    BRAULIO (acute kidney injury) ([facility] [address] )    COVID    Cerebellar infarct ([facility] [address] )    Right Vertebral artery dissection (HCC)    Stenosis of left vertebral artery    Hypertensive emergency    UTI (urinary tract infection)      Past Medical History  History reviewed  No pertinent past medical history  Past Surgical History  History reviewed  No pertinent surgical history  Summary   Pt presented with s/s suggestive of at least mild oropharyngeal dysphagia junior by mild prolonged mastication and oral organization, suspected delayed swallow initiation w/ reduced rise to palpation, and suspected reduced airway protection for the swallow  Pt able to effectively break down all consistencies administered, no significant oral residue upon transfer  Immediate and significant strong cough response to all trials of thin liquids associated w/ drop in SPO2  No overt s/s aspiration w/ solid material or nectar thick liquids  Education provided regarding current s/s oropharyngeal dysphagia/aspiration, risks of aspiration, and rationale for potential dysphagia diet modifications at this time  Pt and pt's wife demonstrated understanding, agreeable to modifications  Education provided on strategies to optimize swallow safety, including frequent/thorough oral care and s/s aspiration to notify medical team of should they arise, understanding demonstrated       Risk/s for Aspiration: Mild-mod     Recommended Diet: soft/level 3 diet and nectar thick liquids   Recommended Form of Meds: whole with puree   Aspiration precautions and swallowing strategies: upright posture, only feed when fully alert, slow rate of feeding and small bites/sips  Other Recommendations: Continue frequent oral care, likely MBS in coming days if s/s aspiration continue Current Medical Status  Pt is a 60 y o  male who presented to [address] [place] [address] with unremarkable PMH that presents to the ED with weakness, dizziness, and nausea for 2 days  Patient states he came home from work 2 days ago and felt very dizzy, he vomited the next morning  Wife states patient has trouble ambulating due to the dizziness  Patient is not able to describe the dizziness further  He denies diarrhea  Wife and son were recently sick with N/V/D  He denies abdominal pain  No fevers, but has chills  Wife states patient does not see a doctor so she is unsure if his blood pressure is normally high  Patient has never been on medication for blood pressure  He does admit to headaches  No vision changes       Current Precautions:  Fall    Contact  Airborne    Allergies:  No known food allergies    Past medical history:  Please see H&P for details    Special Studies:  CXR 2/25: pending read    CTA head and neck 2/25: No acute intracranial hemorrhage      Focal infarcts in the right posterior cerebellum, possibly acute to subacute  Follow-up MRI imaging is recommended      Marked tapering and occlusion of the distal right cervical vertebral artery  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  Proximal right intradural vertebral artery is also occluded with faint visualization of the post-PICA segment, which may be   filling in a retrograde fashion      Severe left vertebral artery origin stenosis  Left vertebral artery may terminate in a posterior for cerebellar artery branch as the post-PICA segment is not identified      Small basilar artery with fetal right PCA circulation  Left proximal PCA is patent but small    Mid to distal left PCA is not well visualized          Social/Education/Vocational Hx:  Pt lives home w/ spouse    Swallow Information   Current Risks for Dysphagia & Aspiration: CVA  Current Symptoms/Concerns: pt has been kept NPO  Current Diet: NPO   Baseline Diet: regular diet and thin liquids      Baseline Assessment   Behavior/Cognition: alert  Speech/Language Status: able to participate in conversation and able to follow commands  Patient Positioning: upright in bed  Pain Status/Interventions/Response to Interventions:   No report of or nonverbal indications of pain  Swallow Mechanism Exam  Facial: symmetrical  Labial: WFL  Lingual: WFL  Velum: symmetrical  Mandible: adequate ROM  Dentition: adequate  Vocal quality:clear/adequate   Volitional Cough: strong/productive   Respiratory Status: on RA     Consistencies Assessed and Performance   Consistencies Administered: thin liquids, nectar thick, puree, soft solids and hard solids    Oral Stage: mild  Mastication was mild prolonged/reduced  Bolus formation and transfer were functional with no significant oral residue noted  No overt s/s reduced oral control  Pharyngeal Stage: suspected at least mild  Swallow Mechanics:  Swallowing initiation appeared prompt  Laryngeal rise was palpated and judged to be reduced/ Immediate, significant coughing and drop in SPO2 w/ all trials of thin liquids  No overt s/s aspiration w/ solids or cup/straw sips of nectar thick liquids  Esophageal Concerns: none reported    Strategies and Efficacy: -     Summary and Recommendations (see above)    Results Reviewed with: patient, RN, CRNP and family     Treatment Recommended: Yes     Frequency of treatment: As able/appropriate     Patient Stated Goal: "It's just a lot"     Dysphagia LTG  -Patient will demonstrate safe and effective oral intake (without overt s/s significant oral/pharyngeal dysphagia including s/s penetration or aspiration) for the highest appropriate diet level       Short Term Goals:  -Pt will tolerate Dysphagia 3/advanced (dental soft) diet and nectar thick liquid with no significant s/s oral or pharyngeal dysphagia across 1-3 diagnostic session/s     -Patient will tolerate trials of upgraded food and/or liquid texture with no significant s/s of oral or pharyngeal dysphagia including aspiration across 1-3 diagnostic sessions     -Patient will comply with a Video/Modified Barium Swallow study for more complete assessment of swallowing anatomy/physiology/aspiration risk and to assess efficacy of treatment techniques so as to best guide treatment plan      Speech Therapy Prognosis   Prognosis: fair    Prognosis Considerations: age, medical status, prior medical history, cognitive status and therapeutic potential

## 2023-02-25 NOTE — QUICK NOTE
I was contacted about Mr  Layla Calle yesterday afternoon by the ED at [address]  He was not made a stroke alert (due to several days of sx and current NIHSS of 0, not candidate for thrombolysis)  He presented with sx of dizziness and ataxic gait that began on Thurs 2/23/23  It was reported to me by ED that he had NIHSS score of 0, able to ambulate, no focal deficit  NCHCT showed subacute appearing small R cerebellar strokes, as well as small L vermian stroke  Small age-indeterminate L thalamic hypodensity as well  CTA head and neck showed R vertebral artery occlusion vs dissection  L vertebral artery possibly terminating in PICA (nl variant) vs distal occlusion  Basilar artery and P1/2 segments open  Found to SARS-CoV2 infection with likely hypercoagulability  Also with mild BRAULIO and pyuria and bacteriuria on UA c/w UTI, started on ceftriaxone  REC:   - Neurochecks with vital signs  - Telemetry  - No need for permissive hypertension as strokes and sx (by clinical hx) already subacute  Would not lower BP suddenly and would avoid hypotension given known intracranial occlusions, can allow for SBPs 180s-200   - No hypotonic fluids     - Load with ASA 325mg x1 and clopidogrel 300mg x1 today, followed by ASA 81mg and clopidogrel 75mg daily starting tomorrow  Should be safe for DAPT given small volume of strokes on NCHCT performed several days after symptom onset  - Start atorvastatin    - Check HgbA1c and AM lipid panel   - MRI brain +Gd today - based upon 2-3 days of sx and only small hypodensities on NCHCT, do no expect large volume cerebellar stroke at risk for significant edema, also ordered for MRA head and neck and I spoke with radiology to perform with T1 fat sat sequences / dissection protocol (could help clarify dissection vs occlusion and if L vertebral artery has thrombus)   - TTE with bubble study (ordered)   - Vascular surgery was contacted by ED, but no role for vascular surgery intervention at present to an occluded vertebral artery x 2-3 days of symptoms  - PT/OT/ST  - DVT ppx  - Will need to establish care with a PCP    - F/u in neurology clinic after d/c  - D/w ED     Alfonso Marcelino    -----------------------------  ADDENDUM 2/26am:  MRI brain and MRA head and neck completed yesterday evening, with report of possible progression of the R vertebral artery thrombus, and possible involvement of the basilar artery as well, and neurology on-call and neurosurgery / [facility] were appropriately contacted and patient was transferred to [facility], for CTA/CTP, and eventually started on heparin gtt   Formal neurology consult per [facility] team    Alfonso Marcelino    CTA showing level of R VA occlusion:        NCT;

## 2023-02-25 NOTE — ASSESSMENT & PLAN NOTE
· Noted on CTA head/neck 2/25 2/2 dizziness, nausea x2-3 days   · Plan for DAPT, loaded with asa and plavix in ER 2/25  · Will continue with ASA 81 and plavix 75 daily tomorrow   · Neuro exams, BP control as above

## 2023-02-25 NOTE — H&P
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 MRN: [id number]  Unit/Bed#: -01 Encounter: [id number]  Primary Care Provider: No primary care provider on file  Date and time admitted to hospital: 2/25/2023  9:08 AM    * Dizziness  Assessment & Plan  · Pt with 2-day hx of dizziness  · 2/25 CTH -- No acute intracranial hemorrhage  Focal infarcts in the right posterior cerebellum, possibly acute to subacute  Marked tapering and occlusion of the distal right cervical vertebral artery, imaging findings are suggestive of dissection  Proximal right intradural vertebral artery is also occluded with faint visualization of the post-PICA segment  Severe left vertebral artery origin stenosis  Small basilar artery with fetal right PCA circulation    Left proximal PCA is patent but small  · Specific plans as below   · CVA pathway, BP control as below   · Serial neuro exams   · Fall precautions, PT/OT  · Appreciate Neuro guidance     Right Vertebral artery dissection [facility])  Assessment & Plan  · Noted on CTA head/neck 2/25 2/2 dizziness, nausea x2-3 days   · Per Neurology Dr Queen Javier conversation with Dr Ailyn Louis, patient does not require transfer to facility with Vascular or Neurosurgery at this time  · Control of BP as above   · Serial neuro exams, CVA pathway   · With any worsening, plan for stat reimaging, contact to Vascular Surgery     Hypertensive emergency  Assessment & Plan  · POA AEB cerebellar infarct, vertebral artery stenosis, vertebral artery dissection   · BP max 252/115 while in ER   · In the setting of cerebellar infarct, acute vs subacute vertebral artery dissection and stenosis   · Labetalol 10mg IV x1 in ER with minimal effect   · Initiate Cardene gtt   · Initial SBP goal 200 over the next 4-6 hours, then SBP goal 180-190 until tomorrow afternoon  · Unclear if baseline hypertension     Cerebellar infarct [facility])  Assessment & Plan  · 2/25 CTH -- focal R posterior cerebellum infarct -- acute vs subacute   · CVA pathway   · BP control as below   · DAPT as per Neurology, loaded with ASA and plavix 2/25 in ER   · Will continue ASA 81mg qd, plavix 75mg qd   · Serial neuro exams   · Admit to ICU for BP control, serial neuro exams   · Appreciate neuro input     Stenosis of left vertebral artery  Assessment & Plan  · Noted on CTA head/neck 2/25 2/2 dizziness, nausea x2-3 days   · Plan for DAPT, loaded with asa and plavix in ER 2/25  · Will continue with ASA 81 and plavix 75 daily tomorrow   · Neuro exams, BP control as above     UTI (urinary tract infection)  Assessment & Plan  · POA with UA + nitrites, + leuks and innumm bacteria   · No past culture data on record   · Ctx x1 in ER   · UC pending   · Noted accompanying BRAULIO, which is likely 2/2 accelerated HTN   · If worsening BRAULIO or new pain, will perform CT AP to r/o stone vs pyelo   · Abx D1: Ctx D1    COVID  Assessment & Plan  · Incidentally COVID + in ER 2/25  · On RA -- does not qualify for treatment protocol   · Send baseline COVID labs   · 2/25 CXR -- no large ptx, infiltrates or effusions   · Plan to initiate steroids if requires O2    BRAULIO (acute kidney injury) ([facility] [address] )  Assessment & Plan  · No baseline creat on file   · BRAULIO POA with creat 1 32  · Likely 2/2 accelerated HTN as above   · Noted concomitant UTI   · 2/25 dye load for CTA and MRI  · Gentle IVF   · Avoid hypotension, overcorrection of BP, nephrotoxic agents   · Trend UO and creat closely     -------------------------------------------------------------------------------------------------------------  Chief Complaint: Dizziness x2 days, nausea    History of Present Illness   HX and PE limited by: RABIA   Ryan Blue is a 60 y o  male with no significant past medical history as he does not follow with a PCP presents today to the ER with a chief complaint of dizziness for 2 days    He states that his dizziness is accompanied by nausea, and has been ongoing since the evening of 2/23  He also shares that he has had intermittent headaches for most of his life, is a previous smoker who quit 20+ years ago, continues to work as a   In the ER, he is markedly hypertensive with max blood pressure 252/115 not improved by IV labetalol  CTA head and neck shows right posterior cerebellum infarct, acute versus subacute, marked tapering and occlusion of the distal right cervical vertebral artery suggestive of dissection, proximal right intradural vertebral artery occlusion, severe left vertebral artery stenosis  Neurology was consulted by the ER, who suggests blood pressure control, aspirin and Plavix load, serial neuro exams, MRI soon as possible, echo  He is started on Cardene for blood pressure control  On exam, the patient is awake alert and oriented and in no acute distress  He admits to some numbness and tingling of his bilateral upper lip, denies headache, shortness of breath, cough, fevers at home, abdominal pain, flank pain  Incidentally in the ER, he is also noted to be COVID-positive, active UTI, and an BRAULIO on admission  He is admitted to the ICU for serial neuro exams, Cardene administration with strict blood pressure parameters  History obtained from spouse, chart review and the patient   -------------------------------------------------------------------------------------------------------------  Dispo: Admit to Critical Care     Code Status: Level 1 - Full Code  --------------------------------------------------------------------------------------------------------------  Review of Systems   Constitutional: Positive for appetite change (decreased)  Negative for activity change, fatigue and fever  HENT: Negative for sore throat and trouble swallowing  Eyes: Negative  Respiratory: Negative for cough, chest tightness, shortness of breath and wheezing  Cardiovascular: Negative for chest pain, palpitations and leg swelling     Gastrointestinal: Positive for nausea (x2 days)  Negative for abdominal pain, constipation, diarrhea and vomiting  Genitourinary: Positive for frequency  Negative for decreased urine volume, difficulty urinating, dysuria and flank pain  Musculoskeletal: Negative  Skin: Negative  Neurological: Positive for dizziness (x2 days)  A 12-point, complete review of systems was reviewed and negative except as stated above     Physical Exam  Vitals and nursing note reviewed  Constitutional:       General: He is awake  He is not in acute distress  Appearance: He is well-developed and normal weight  He is not ill-appearing  HENT:      Head: Normocephalic and atraumatic  Cardiovascular:      Rate and Rhythm: Normal rate and regular rhythm  Pulmonary:      Effort: Pulmonary effort is normal       Breath sounds: Normal breath sounds  Abdominal:      General: Abdomen is flat  There is no distension  Palpations: Abdomen is soft  Tenderness: There is no abdominal tenderness  Genitourinary:     Comments: Voiding independently   Musculoskeletal:      Right lower leg: No edema  Left lower leg: No edema  Skin:     General: Skin is warm  Capillary Refill: Capillary refill takes less than 2 seconds  Coloration: Skin is not pale  Neurological:      General: No focal deficit present  Mental Status: He is alert and oriented to person, place, and time  GCS: GCS eye subscore is 4  GCS verbal subscore is 5  GCS motor subscore is 6  Psychiatric:         Mood and Affect: Mood normal  Affect is flat  Speech: Speech normal          Behavior: Behavior is cooperative         Neurologic Physical Exam:    Mental Status  Orientation to: time, date, person, place  GCS: Eye Spontaneous = 4, Verbal Oriented = 5, Motor Obeys commands = 6, n/a  Follows commands x2: Follows commands in upper extremities, Follows commands in lower extremities and Can follow 2 step commands  Speech: minimally slurred and slowed    Cranial Nerves  Pupils: 2 mm bilaterally  EOM: full and intact  Facial Symmetry: facial symmetry equal  Visual fields: 0 = No visual field loss    Motor  RUE strength: 5/5: Full ROM against gravity and full resistance  RLE strength: 5/5: Full ROM against gravity and full resistance    LUE strength: 5/5: Full ROM against gravity and full resistance  LLE strength: 5/5: Full ROM against gravity and full resistance    Sensation  RUE sensation: normal  RLE sensation: normal    LUE sensation: normal  LLE sensation: normal    Coordination  Finger-to-nose: bilaterally intact    --------------------------------------------------------------------------------------------------------------  Vitals:   Vitals:    02/25/23 1200 02/25/23 1300 02/25/23 1330 02/25/23 1400   BP: (!) 252/115 (!) 229/110 (!) 216/105 (!) 224/112   BP Location:       Pulse: 85 71 69 74   Resp: 15 16 15 15   Temp:       TempSrc:       SpO2: 98% 98% 97% 98%   Weight:       Height:         Temp  Min: 97 2 °F (36 2 °C)  Max: 97 2 °F (36 2 °C)  IBW (Ideal Body Weight): 70 7 kg  Height: 5' 9" (175 3 cm)  Body mass index is 25 1 kg/m²  Laboratory and Diagnostics:  Results from last 7 days   Lab Units 02/25/23  0940   WBC Thousand/uL 9 99   HEMOGLOBIN g/dL 14 4   HEMATOCRIT % 44 0   PLATELETS Thousands/uL 326   NEUTROS PCT % 88*   MONOS PCT % 4     Results from last 7 days   Lab Units 02/25/23  0940   SODIUM mmol/L 142   POTASSIUM mmol/L 3 5   CHLORIDE mmol/L 108   CO2 mmol/L 24   ANION GAP mmol/L 10   BUN mg/dL 23   CREATININE mg/dL 1 32*   CALCIUM mg/dL 8 8   GLUCOSE RANDOM mg/dL 124   ALT U/L 19   AST U/L 16   ALK PHOS U/L 55   ALBUMIN g/dL 3 8   TOTAL BILIRUBIN mg/dL 0 60          Results from last 7 days   Lab Units 02/25/23  0940   INR  0 98   PTT seconds 29              ABG:    VBG:          Micro:        EKG: NSR on tele  Imaging: I have personally reviewed pertinent reports  Historical Information   History reviewed   No pertinent past medical history  History reviewed  No pertinent surgical history  Social History   Social History     Substance and Sexual Activity   Alcohol Use Not Currently     Social History     Substance and Sexual Activity   Drug Use Not Currently     Social History     Tobacco Use   Smoking Status Never   Smokeless Tobacco Never     Family History:   History reviewed  No pertinent family history  I have reviewed this patient's family history and commented on sigificant items within the HPI      Medications:  Current Facility-Administered Medications   Medication Dose Route Frequency   • [START ON 2/26/2023] aspirin (ECOTRIN LOW STRENGTH) EC tablet 81 mg  81 mg Oral Daily   • [START ON 2/26/2023] cefTRIAXone (ROCEPHIN) IVPB (premix in dextrose) 1,000 mg 50 mL  1,000 mg Intravenous Q24H   • chlorhexidine (PERIDEX) 0 12 % oral rinse 15 mL  15 mL Mouth/Throat Q12H [facility]   • [START ON 2/26/2023] clopidogrel (PLAVIX) tablet 75 mg  75 mg Oral Daily   • niCARdipine (CARDENE) 25 mg (STANDARD CONCENTRATION) in sodium chloride 0 9% 250 mL  1-15 mg/hr Intravenous Titrated   • sodium chloride 0 9 % infusion  150 mL/hr Intravenous Continuous     Home medications:  None     Allergies:  No Known Allergies    ------------------------------------------------------------------------------------------------------------  Advance Directive and Living Will:      Power of :    POLST:    ------------------------------------------------------------------------------------------------------------  Anticipated Length of Stay is > 2 midnights    Care Time Delivered:   No Critical Care time spent       Faby Au, CRNP        Portions of the record may have been created with voice recognition software  Occasional wrong word or "sound a like" substitutions may have occurred due to the inherent limitations of voice recognition software    Read the chart carefully and recognize, using context, where substitutions have occurred

## 2023-02-25 NOTE — ASSESSMENT & PLAN NOTE
· POA with UA + nitrites, + leuks and innumm bacteria   · No past culture data on record   · Ctx x1 in ER   · UC pending   · Noted accompanying BRAULIO, which is likely 2/2 accelerated HTN   · If worsening BRAULIO or new pain, will perform CT AP to r/o stone vs pyelo   · Abx D1: Ctx D1

## 2023-02-26 ENCOUNTER — APPOINTMENT (INPATIENT)
Dept: RADIOLOGY | Facility: HOSPITAL | Age: 63
End: 2023-02-26

## 2023-02-26 ENCOUNTER — ANESTHESIA EVENT (INPATIENT)
Dept: RADIOLOGY | Facility: HOSPITAL | Age: 63
End: 2023-02-26

## 2023-02-26 ENCOUNTER — APPOINTMENT (INPATIENT)
Dept: RADIOLOGY | Facility: HOSPITAL | Age: 63
End: 2023-02-26
Attending: NEUROLOGICAL SURGERY

## 2023-02-26 ENCOUNTER — APPOINTMENT (OUTPATIENT)
Dept: RADIOLOGY | Facility: HOSPITAL | Age: 63
End: 2023-02-26

## 2023-02-26 ENCOUNTER — ANESTHESIA (INPATIENT)
Dept: RADIOLOGY | Facility: HOSPITAL | Age: 63
End: 2023-02-26

## 2023-02-26 LAB
ALBUMIN SERPL BCP-MCNC: 3.2 G/DL (ref 3.5–5)
ALP SERPL-CCNC: 63 U/L (ref 46–116)
ALT SERPL W P-5'-P-CCNC: 24 U/L (ref 12–78)
ANION GAP SERPL CALCULATED.3IONS-SCNC: 7 MMOL/L (ref 4–13)
APTT PPP: 31 SECONDS (ref 23–37)
APTT PPP: 33 SECONDS (ref 23–37)
APTT PPP: 69 SECONDS (ref 23–37)
AST SERPL W P-5'-P-CCNC: 13 U/L (ref 5–45)
BASOPHILS # BLD AUTO: 0.02 THOUSANDS/ÂΜL (ref 0–0.1)
BASOPHILS NFR BLD AUTO: 0 % (ref 0–1)
BILIRUB SERPL-MCNC: 0.68 MG/DL (ref 0.2–1)
BUN SERPL-MCNC: 17 MG/DL (ref 5–25)
CALCIUM ALBUM COR SERPL-MCNC: 9.4 MG/DL (ref 8.3–10.1)
CALCIUM SERPL-MCNC: 8.8 MG/DL (ref 8.3–10.1)
CHLORIDE SERPL-SCNC: 115 MMOL/L (ref 96–108)
CHOLEST SERPL-MCNC: 163 MG/DL
CO2 SERPL-SCNC: 21 MMOL/L (ref 21–32)
CREAT SERPL-MCNC: 1.09 MG/DL (ref 0.6–1.3)
EOSINOPHIL # BLD AUTO: 0 THOUSAND/ÂΜL (ref 0–0.61)
EOSINOPHIL NFR BLD AUTO: 0 % (ref 0–6)
ERYTHROCYTE [DISTWIDTH] IN BLOOD BY AUTOMATED COUNT: 12.5 % (ref 11.6–15.1)
ERYTHROCYTE [DISTWIDTH] IN BLOOD BY AUTOMATED COUNT: 12.6 % (ref 11.6–15.1)
EST. AVERAGE GLUCOSE BLD GHB EST-MCNC: 117 MG/DL
GFR SERPL CREATININE-BSD FRML MDRD: 71 ML/MIN/1.73SQ M
GLUCOSE SERPL-MCNC: 127 MG/DL (ref 65–140)
HBA1C MFR BLD: 5.7 %
HBV CORE AB SER QL: NORMAL
HBV CORE IGM SER QL: NORMAL
HBV SURFACE AG SER QL: NORMAL
HCT VFR BLD AUTO: 40.2 % (ref 36.5–49.3)
HCT VFR BLD AUTO: 40.8 % (ref 36.5–49.3)
HCV AB SER QL: NORMAL
HDLC SERPL-MCNC: 32 MG/DL
HGB BLD-MCNC: 13.3 G/DL (ref 12–17)
HGB BLD-MCNC: 13.6 G/DL (ref 12–17)
IMM GRANULOCYTES # BLD AUTO: 0.06 THOUSAND/UL (ref 0–0.2)
IMM GRANULOCYTES NFR BLD AUTO: 1 % (ref 0–2)
INR PPP: 1.06 (ref 0.84–1.19)
LDLC SERPL CALC-MCNC: 112 MG/DL (ref 0–100)
LYMPHOCYTES # BLD AUTO: 0.81 THOUSANDS/ÂΜL (ref 0.6–4.47)
LYMPHOCYTES NFR BLD AUTO: 6 % (ref 14–44)
MCH RBC QN AUTO: 27.4 PG (ref 26.8–34.3)
MCH RBC QN AUTO: 27.9 PG (ref 26.8–34.3)
MCHC RBC AUTO-ENTMCNC: 33.1 G/DL (ref 31.4–37.4)
MCHC RBC AUTO-ENTMCNC: 33.3 G/DL (ref 31.4–37.4)
MCV RBC AUTO: 82 FL (ref 82–98)
MCV RBC AUTO: 85 FL (ref 82–98)
MONOCYTES # BLD AUTO: 0.46 THOUSAND/ÂΜL (ref 0.17–1.22)
MONOCYTES NFR BLD AUTO: 4 % (ref 4–12)
NEUTROPHILS # BLD AUTO: 11.92 THOUSANDS/ÂΜL (ref 1.85–7.62)
NEUTS SEG NFR BLD AUTO: 89 % (ref 43–75)
NRBC BLD AUTO-RTO: 0 /100 WBCS
PA ADP BLD-ACNC: 208 PRU (ref 194–418)
PLATELET # BLD AUTO: 344 THOUSANDS/UL (ref 149–390)
PLATELET # BLD AUTO: 349 THOUSANDS/UL (ref 149–390)
PMV BLD AUTO: 9.1 FL (ref 8.9–12.7)
PMV BLD AUTO: 9.6 FL (ref 8.9–12.7)
POTASSIUM SERPL-SCNC: 3.4 MMOL/L (ref 3.5–5.3)
PROCALCITONIN SERPL-MCNC: 0.08 NG/ML
PROT SERPL-MCNC: 7 G/DL (ref 6.4–8.4)
PROTHROMBIN TIME: 14 SECONDS (ref 11.6–14.5)
RBC # BLD AUTO: 4.76 MILLION/UL (ref 3.88–5.62)
RBC # BLD AUTO: 4.96 MILLION/UL (ref 3.88–5.62)
SODIUM SERPL-SCNC: 143 MMOL/L (ref 135–147)
TRIGL SERPL-MCNC: 94 MG/DL
WBC # BLD AUTO: 13.27 THOUSAND/UL (ref 4.31–10.16)
WBC # BLD AUTO: 17.07 THOUSAND/UL (ref 4.31–10.16)

## 2023-02-26 PROCEDURE — 03CP3Z7 EXTIRPATION OF MATTER FROM RIGHT VERTEBRAL ARTERY USING STENT RETRIEVER, PERCUTANEOUS APPROACH: ICD-10-PCS | Performed by: NEUROLOGICAL SURGERY

## 2023-02-26 PROCEDURE — B31HYZZ FLUOROSCOPY OF RIGHT UPPER EXTREMITY ARTERIES USING OTHER CONTRAST: ICD-10-PCS | Performed by: NEUROLOGICAL SURGERY

## 2023-02-26 PROCEDURE — B30DZZZ PLAIN RADIOGRAPHY OF RIGHT VERTEBRAL ARTERY: ICD-10-PCS | Performed by: NEUROLOGICAL SURGERY

## 2023-02-26 PROCEDURE — 037G3DZ DILATION OF INTRACRANIAL ARTERY WITH INTRALUMINAL DEVICE, PERCUTANEOUS APPROACH: ICD-10-PCS | Performed by: NEUROLOGICAL SURGERY

## 2023-02-26 PROCEDURE — B311YZZ FLUOROSCOPY OF RIGHT BRACHIOCEPHALIC-SUBCLAVIAN ARTERY USING OTHER CONTRAST: ICD-10-PCS | Performed by: NEUROLOGICAL SURGERY

## 2023-02-26 RX ORDER — EPTIFIBATIDE 2 MG/ML
INJECTION, SOLUTION INTRAVENOUS
Status: COMPLETED | OUTPATIENT
Start: 2023-02-26 | End: 2023-02-26

## 2023-02-26 RX ORDER — HEPARIN SODIUM 10000 [USP'U]/100ML
3-30 INJECTION, SOLUTION INTRAVENOUS
Status: DISCONTINUED | OUTPATIENT
Start: 2023-02-26 | End: 2023-02-26

## 2023-02-26 RX ORDER — HYDRALAZINE HYDROCHLORIDE 20 MG/ML
10 INJECTION INTRAMUSCULAR; INTRAVENOUS EVERY 6 HOURS PRN
Status: DISCONTINUED | OUTPATIENT
Start: 2023-02-26 | End: 2023-02-28

## 2023-02-26 RX ORDER — EPTIFIBATIDE 0.75 MG/ML
1 INJECTION, SOLUTION INTRAVENOUS CONTINUOUS
Status: DISCONTINUED | OUTPATIENT
Start: 2023-02-26 | End: 2023-02-27

## 2023-02-26 RX ORDER — HEPARIN SODIUM 1000 [USP'U]/ML
3000 INJECTION, SOLUTION INTRAVENOUS; SUBCUTANEOUS EVERY 6 HOURS PRN
Status: DISCONTINUED | OUTPATIENT
Start: 2023-02-26 | End: 2023-02-26

## 2023-02-26 RX ORDER — HEPARIN SODIUM 1000 [USP'U]/ML
6000 INJECTION, SOLUTION INTRAVENOUS; SUBCUTANEOUS ONCE
Status: COMPLETED | OUTPATIENT
Start: 2023-02-26 | End: 2023-02-26

## 2023-02-26 RX ORDER — IODIXANOL 320 MG/ML
300 INJECTION, SOLUTION INTRAVASCULAR
Status: COMPLETED | OUTPATIENT
Start: 2023-02-26 | End: 2023-02-26

## 2023-02-26 RX ORDER — ROCURONIUM BROMIDE 10 MG/ML
INJECTION, SOLUTION INTRAVENOUS AS NEEDED
Status: DISCONTINUED | OUTPATIENT
Start: 2023-02-26 | End: 2023-02-26

## 2023-02-26 RX ORDER — PROPOFOL 10 MG/ML
INJECTION, EMULSION INTRAVENOUS CONTINUOUS PRN
Status: DISCONTINUED | OUTPATIENT
Start: 2023-02-26 | End: 2023-02-26

## 2023-02-26 RX ORDER — HEPARIN SODIUM 1000 [USP'U]/ML
6000 INJECTION, SOLUTION INTRAVENOUS; SUBCUTANEOUS EVERY 6 HOURS PRN
Status: DISCONTINUED | OUTPATIENT
Start: 2023-02-26 | End: 2023-02-26

## 2023-02-26 RX ORDER — CEFUROXIME AXETIL 250 MG/1
500 TABLET ORAL EVERY 12 HOURS SCHEDULED
Status: DISCONTINUED | OUTPATIENT
Start: 2023-02-26 | End: 2023-02-26

## 2023-02-26 RX ORDER — FENTANYL CITRATE 50 UG/ML
INJECTION, SOLUTION INTRAMUSCULAR; INTRAVENOUS AS NEEDED
Status: DISCONTINUED | OUTPATIENT
Start: 2023-02-26 | End: 2023-02-26

## 2023-02-26 RX ORDER — SODIUM CHLORIDE, SODIUM LACTATE, POTASSIUM CHLORIDE, CALCIUM CHLORIDE 600; 310; 30; 20 MG/100ML; MG/100ML; MG/100ML; MG/100ML
INJECTION, SOLUTION INTRAVENOUS CONTINUOUS PRN
Status: DISCONTINUED | OUTPATIENT
Start: 2023-02-26 | End: 2023-02-26

## 2023-02-26 RX ORDER — HEPARIN SODIUM 1000 [USP'U]/ML
INJECTION, SOLUTION INTRAVENOUS; SUBCUTANEOUS AS NEEDED
Status: DISCONTINUED | OUTPATIENT
Start: 2023-02-26 | End: 2023-02-26

## 2023-02-26 RX ORDER — EPTIFIBATIDE 0.75 MG/ML
INJECTION, SOLUTION INTRAVENOUS
Status: COMPLETED | OUTPATIENT
Start: 2023-02-26 | End: 2023-02-26

## 2023-02-26 RX ORDER — SUCCINYLCHOLINE/SOD CL,ISO/PF 100 MG/5ML
SYRINGE (ML) INTRAVENOUS AS NEEDED
Status: DISCONTINUED | OUTPATIENT
Start: 2023-02-26 | End: 2023-02-26

## 2023-02-26 RX ORDER — ASPIRIN 325 MG
325 TABLET ORAL DAILY
Status: DISCONTINUED | OUTPATIENT
Start: 2023-02-26 | End: 2023-02-26

## 2023-02-26 RX ORDER — ASPIRIN 81 MG/1
81 TABLET ORAL DAILY
Status: DISCONTINUED | OUTPATIENT
Start: 2023-02-27 | End: 2023-02-27

## 2023-02-26 RX ORDER — IODIXANOL 320 MG/ML
100 INJECTION, SOLUTION INTRAVASCULAR
Status: COMPLETED | OUTPATIENT
Start: 2023-02-26 | End: 2023-02-26

## 2023-02-26 RX ORDER — VERAPAMIL HYDROCHLORIDE 2.5 MG/ML
INJECTION, SOLUTION INTRAVENOUS AS NEEDED
Status: COMPLETED | OUTPATIENT
Start: 2023-02-26 | End: 2023-02-26

## 2023-02-26 RX ORDER — HEPARIN SODIUM 10000 [USP'U]/100ML
3-20 INJECTION, SOLUTION INTRAVENOUS
Status: DISCONTINUED | OUTPATIENT
Start: 2023-02-26 | End: 2023-02-26

## 2023-02-26 RX ORDER — PROPOFOL 10 MG/ML
INJECTION, EMULSION INTRAVENOUS AS NEEDED
Status: DISCONTINUED | OUTPATIENT
Start: 2023-02-26 | End: 2023-02-26

## 2023-02-26 RX ORDER — NITROGLYCERIN 20 MG/100ML
INJECTION INTRAVENOUS AS NEEDED
Status: COMPLETED | OUTPATIENT
Start: 2023-02-26 | End: 2023-02-26

## 2023-02-26 RX ORDER — PROPOFOL 10 MG/ML
5-50 INJECTION, EMULSION INTRAVENOUS
Status: DISCONTINUED | OUTPATIENT
Start: 2023-02-26 | End: 2023-02-26

## 2023-02-26 RX ADMIN — HYDRALAZINE HYDROCHLORIDE 10 MG: 20 INJECTION, SOLUTION INTRAMUSCULAR; INTRAVENOUS at 08:06

## 2023-02-26 RX ADMIN — ROCURONIUM BROMIDE 20 MG: 50 INJECTION INTRAVENOUS at 15:52

## 2023-02-26 RX ADMIN — HEPARIN SODIUM 6000 UNITS: 1000 INJECTION INTRAVENOUS; SUBCUTANEOUS at 04:59

## 2023-02-26 RX ADMIN — Medication 100 MG: at 13:04

## 2023-02-26 RX ADMIN — PROPOFOL 150 MG: 10 INJECTION, EMULSION INTRAVENOUS at 13:04

## 2023-02-26 RX ADMIN — NICARDIPINE HYDROCHLORIDE 7.5 MG/HR: 2.5 INJECTION, SOLUTION INTRAVENOUS at 23:15

## 2023-02-26 RX ADMIN — FENTANYL CITRATE 50 MCG: 50 INJECTION INTRAMUSCULAR; INTRAVENOUS at 13:03

## 2023-02-26 RX ADMIN — ROCURONIUM BROMIDE 50 MG: 50 INJECTION INTRAVENOUS at 13:14

## 2023-02-26 RX ADMIN — SODIUM CHLORIDE 75 ML/HR: 0.9 INJECTION, SOLUTION INTRAVENOUS at 00:00

## 2023-02-26 RX ADMIN — CEFTRIAXONE SODIUM 1000 MG: 10 INJECTION, POWDER, FOR SOLUTION INTRAVENOUS at 15:21

## 2023-02-26 RX ADMIN — PHENYLEPHRINE HYDROCHLORIDE 50 MCG/MIN: 10 INJECTION INTRAVENOUS at 13:20

## 2023-02-26 RX ADMIN — CHLORHEXIDINE GLUCONATE 0.12% ORAL RINSE 15 ML: 1.2 LIQUID ORAL at 08:23

## 2023-02-26 RX ADMIN — EPTIFIBATIDE 6939 MCG: 2 INJECTION INTRAVENOUS at 13:55

## 2023-02-26 RX ADMIN — NICARDIPINE HYDROCHLORIDE 7.5 MG/HR: 2.5 INJECTION, SOLUTION INTRAVENOUS at 18:20

## 2023-02-26 RX ADMIN — VERAPAMIL HYDROCHLORIDE 5 MG: 2.5 INJECTION INTRAVENOUS at 13:18

## 2023-02-26 RX ADMIN — EPTIFIBATIDE 1 MCG/KG/MIN: 0.75 INJECTION INTRAVENOUS at 23:15

## 2023-02-26 RX ADMIN — ROCURONIUM BROMIDE 20 MG: 50 INJECTION INTRAVENOUS at 14:15

## 2023-02-26 RX ADMIN — FENTANYL CITRATE 50 MCG: 50 INJECTION INTRAMUSCULAR; INTRAVENOUS at 12:55

## 2023-02-26 RX ADMIN — IODIXANOL 165 ML: 320 INJECTION, SOLUTION INTRAVASCULAR at 00:31

## 2023-02-26 RX ADMIN — NICARDIPINE HYDROCHLORIDE 4 MG/HR: 2.5 INJECTION, SOLUTION INTRAVENOUS at 01:02

## 2023-02-26 RX ADMIN — NITROGLYCERIN 400 MCG: 20 INJECTION INTRAVENOUS at 13:18

## 2023-02-26 RX ADMIN — HEPARIN SODIUM 5000 UNITS: 1000 INJECTION INTRAVENOUS; SUBCUTANEOUS at 15:24

## 2023-02-26 RX ADMIN — SODIUM CHLORIDE, SODIUM LACTATE, POTASSIUM CHLORIDE, AND CALCIUM CHLORIDE: .6; .31; .03; .02 INJECTION, SOLUTION INTRAVENOUS at 13:08

## 2023-02-26 RX ADMIN — PROPOFOL 75 MCG/KG/MIN: 10 INJECTION, EMULSION INTRAVENOUS at 15:42

## 2023-02-26 RX ADMIN — EPTIFIBATIDE 1 MCG/KG/MIN: 0.75 INJECTION INTRAVENOUS at 14:01

## 2023-02-26 RX ADMIN — HEPARIN SODIUM 18 UNITS/KG/HR: 10000 INJECTION, SOLUTION INTRAVENOUS at 04:54

## 2023-02-26 RX ADMIN — HEPARIN SODIUM 12 UNITS/KG/HR: 10000 INJECTION, SOLUTION INTRAVENOUS at 01:02

## 2023-02-26 RX ADMIN — IODIXANOL 160 ML: 320 INJECTION, SOLUTION INTRAVASCULAR at 15:50

## 2023-02-26 RX ADMIN — ASPIRIN 325 MG ORAL TABLET 325 MG: 325 PILL ORAL at 08:23

## 2023-02-26 NOTE — SPEECH THERAPY NOTE
Speech-Language Pathology Bedside Swallow Evaluation    Patient Name: Nehal GHOSHOXY'X Date: 2/26/2023     Problem List  Principal Problem:    Right Vertebral artery dissection [facility])  Active Problems:    Cerebellar infarct [facility])    Stenosis of left vertebral artery    Hypertensive emergency    UTI (urinary tract infection)    Past Medical History  No past medical history on file  Past Surgical History  No past surgical history on file  Summary  Pt presented with s/s suggestive of moderate-severe oropharyngeal dysphagia  Symptoms or concerns included decreased bolus propulsion, decreased mastication, decreased bolus formation, suspected decreased control of all materials and oral residue with solids (R pocketing), as well as pharyngeal swallow delay, decreased hyolaryngeal elevation upon palpation and effortful swallow  Immediate cough response noted with trial of ice chips, delayed cough noted with NTL trials, and immediate cough occurred with trial of HTL via straw  Small sips of HTL via spoon and puree (apple sauce) were tolerated without s/s aspiration  Discussed with pt and spouse rec to initiate conservative diet at this time  ST will f/u for potential to advance  Risk/s for Aspiration: high     Recommended Diet: puree/level 1 diet and honey thick liquids via tsp  Recommended Form of Meds: crushed with puree   Aspiration precautions and swallowing strategies: upright posture, only feed when fully alert, slow rate of feeding, small bites/sips and alternating bites and sips  Other Recommendations: Continue frequent oral care, assist with feeding      Current Medical Status per H&P 2/26/23  62M with no known PMH initially presented to [address] [place] [address] with nausea and vomiting for 2 days  On morning of 2/25, developed slurred speech which prompted ED visit  NIH 1  Imaging revealed occlusion of right vertebral artery, poor basilar flow  Seen by neuro and NSGY   ASA/plavix load given, started on cardene for SBP goal 160-200, transferred to [facility] for further management  Special Studies:  MRI brain 2/25/23 IMPRESSION:  Acute early subacute infarcts throughout the right greater than left cerebellar hemispheres and anterior vermis, consistent with findings on the head CT  No hemorrhagic conversion or mass effect  CTA head and neck 2/26/23 IMPRESSION:  Overall, there is no significant interval change when comparing to the recent MRI brain, and CTA head neck study  Stable small cerebellar infarctions as noted on the recent MRI study  Stable findings of distal right cervical and proximal intradural vertebral artery occlusion with probable retrograde flow in its distal intradural segment  Stable left vertebral artery origin stenosis and presumed occlusion of the distal left intradural vertebral segment  Small basilar artery with focal atherosclerotic disease in its proximal segment  Patent fetal right PCA circulation  Left PCA branch is small but patent, and unchanged in appearance  Stable poor visualization of the proximal left superior cerebellar   artery  Above-mentioned findings are in keeping with the preliminary report provided by [facility] radiology services without significant discrepancy in the report  Social/Education/Vocational Hx:  Pt lives with spouse    Swallow Information   Current Risks for Dysphagia & Aspiration: CVA  Current Symptoms/Concerns: coughing with po  Current Diet: NPO   Baseline Diet: soft/level 3 diet and nectar thick liquids following yesterday's evaluation      Baseline Assessment   Behavior/Cognition: alert  Speech/Language Status: able to follow commands and limited verbal output  Patient Positioning: upright in bed  Pain Status/Interventions/Response to Interventions: No report of or nonverbal indications of pain         Swallow Mechanism Exam  Facial: right facial droop  Labial: decreased ROM right side  Lingual: right sided tongue deviation  Velum: unable to visualize  Mandible: adequate ROM  Dentition: adequate  Vocal quality:weak   Volitional Cough: weak   Respiratory Status: on RA      Consistencies Assessed and Performance   Consistencies Administered: ice chips, thin liquids, nectar thick, honey thick, puree and cailin cracker    Oral Stage: moderate-severe  Bolus retrieval was weak with incomplete labial seal  AP transfer was delayed  Oral control appeared reduced with suspected premature spillage over the base of the tongue  Pharyngeal Stage: moderate-severe  Swallow Mechanics: Swallowing initiation appeared delayed  Laryngeal rise was palpated and judged to be within reduced  Immediate cough response noted with trial of ice chips, delayed cough noted with NTL trials, and immediate cough occurred with trial of HTL via straw  Small sips of HTL via spoon and puree (apple sauce) were tolerated without s/s aspiration  Esophageal Concerns: none reported      Summary and Recommendations (see above)    Results Reviewed with: patient, RN, MD and family     Treatment Recommended: yes     Frequency of treatment: 2-3x/week    Dysphagia LTG  -Patient will demonstrate safe and effective oral intake (without overt s/s significant oral/pharyngeal dysphagia including s/s penetration or aspiration) for the highest appropriate diet level       Short Term Goals:  -Pt will tolerate Dysphagia 1/pureed diet and honey thick liquid with no significant s/s oral or pharyngeal dysphagia across 1-3 diagnostic sessions     -Patient will tolerate trials of upgraded food and/or liquid texture with no significant s/s of oral or pharyngeal dysphagia including aspiration across 1-3 diagnostic sessions     -Patient will comply with a Video/Modified Barium Swallow study for more complete assessment of swallowing anatomy/physiology/aspiration risk and to assess efficacy of treatment techniques so as to best guide treatment plan

## 2023-02-26 NOTE — ANESTHESIA POSTPROCEDURE EVALUATION
Post-Op Assessment Note    CV Status:  Stable    Pain management: adequate     Mental Status:  Unresponsive   Hydration Status:  Euvolemic and stable   PONV Controlled:  Controlled   Airway Patency:  Patent  Airway: intubated      Post Op Vitals Reviewed: Yes      Staff: Anesthesiologist, CRNA   Comments: vss, remains on a propofol infusion, report given to critical care team-all questiions answered        No notable events documented      BP  134/66   Temp      Pulse  62   Resp   10/10   SpO2   100

## 2023-02-26 NOTE — TELEMEDICINE
Called for concern of right v3/4 thrombus  Symptoms of dizziness and nausea 2 days  NIHSS currently 1  Per report wide awake and no focal deficits  CTA with right V3/4 thrombus and multifocal dwi changes on mri  mra with poor vert/basilar flow  -220  Was loaded with plavix 300 and [id number]  COVID+ and +UTI with higher risk of hypercoagulable state  Would recommend transfer to [facility] with repeat CTA and CTP  I suspect there will be a perfusion mismatch, however, in the context of nihss1 would cont medical management  Call if any neurological deterioration for plan to proceed to intervention  Cont sbp 160-200  Would start hep gtt with ptt goal 60-90 and cont asa  Obtain p2y12 to assess plavix load  Discussed with dr Ken Bateman  Spent 20 min in care coordination and chart review

## 2023-02-26 NOTE — DISCHARGE SUMMARY
[facility]  [facility]- Mayra Lewis 1960, 60 y o  male MRN: [id number]  Unit/Bed#: -01 Encounter: [id number]  Primary Care Provider: No primary care provider on file  Date and time admitted to hospital: 2/25/2023  9:08 AM    * Dizziness  Assessment & Plan  · Pt with 2-day hx of dizziness  · 2/25 CTH -- No acute intracranial hemorrhage  Focal infarcts in the right posterior cerebellum, possibly acute to subacute  Marked tapering and occlusion of the distal right cervical vertebral artery, imaging findings are suggestive of dissection  Proximal right intradural vertebral artery is also occluded with faint visualization of the post-PICA segment  Severe left vertebral artery origin stenosis  Small basilar artery with fetal right PCA circulation    Left proximal PCA is patent but small  · MRI/MRA with progression of R vertebral artery dissection and possible R vertebral artery thrombus  · Transfer to [facility] service  · CVA pathway, BP control as below   · Serial neuro exams   · Fall precautions, PT/OT  · Appreciate Neuro guidance     Right Vertebral artery dissection [facility])  Assessment & Plan  · Noted on CTA head/neck 2/25 2/2 dizziness, nausea x2-3 days  · MRI MRA with concern for progression of R vertebral artery dissection and possible R vertebral artery thrombus  · After discussion with Neurology and endovascular neurosurgery, the decision was made to transfer the patient to [facility] service at [facility]  · Start Heparin gtt with goal PTT 60-90  · SBP goal 160-180  · Continue ASA and Plavix  · Serial neuro exams, CVA pathway     Hypertensive emergency  Assessment & Plan  · POA AEB cerebellar infarct, vertebral artery stenosis, vertebral artery dissection   · BP max 252/115 while in ER   · In the setting of cerebellar infarct, acute vs subacute vertebral artery dissection and stenosis   · Labetalol 10mg IV x1 in ER with minimal effect   · Continue Cardene gtt with goal SBP 160-180  · Unclear if baseline hypertension     Cerebellar infarct [facility])  Assessment & Plan  · 2/25 CTH -- focal R posterior cerebellum infarct -- acute vs subacute   · CVA pathway   · BP control as below   · DAPT as per Neurology, loaded with ASA and plavix 2/25 in ER   · Will continue ASA 81mg qd, plavix 75mg qd   · Serial neuro exams   · Admit to ICU for BP control, serial neuro exams   · Appreciate neuro input     Stenosis of left vertebral artery  Assessment & Plan  · Noted on CTA head/neck 2/25 2/2 dizziness, nausea x2-3 days   · Plan for DAPT, loaded with asa and plavix in ER 2/25  · Will continue with ASA 81 and plavix 75 daily tomorrow   · Neuro exams, BP control as above     UTI (urinary tract infection)  Assessment & Plan  · POA with UA + nitrites, + leuks and innumm bacteria   · No past culture data on record   · Ctx x1 in ER   · UC pending   · Noted accompanying BRAULIO, which is likely 2/2 accelerated HTN   · If worsening BRAULIO or new pain, will perform CT AP to r/o stone vs pyelo   · Abx D1: Ctx D1    COVID  Assessment & Plan  · Incidentally COVID + in ER 2/25  · On RA -- does not qualify for treatment protocol   · Send baseline COVID labs   · 2/25 CXR -- no large ptx, infiltrates or effusions   · Plan to initiate steroids if requires O2    BRAULIO (acute kidney injury) ([facility] [address] )  Assessment & Plan  · No baseline creat on file   · BRAULIO POA with creat 1 32  · Likely 2/2 accelerated HTN as above   · Noted concomitant UTI   · 2/25 dye load for CTA and MRI  · Gentle IVF   · Avoid hypotension, overcorrection of BP, nephrotoxic agents   · Trend UO and creat closely               Medical Problems     Resolved Problems  Date Reviewed: 2/25/2023   None         Admission Date:   Admission Orders (From admission, onward)     Ordered        02/25/23 1333  INPATIENT ADMISSION  Once                        Admitting Diagnosis: Dizziness [R42]  UTI (urinary tract infection) [N39 0]  Weakness [R53 1]  CVA (cerebral vascular accident) [facility]) [I63 9]  COVID-19 [U07 1]    HPI:   As per YURI Elizabeth in H&P: "Nat Cardona is a 60 y o  male with no significant past medical history as he does not follow with a PCP presents today to the ER with a chief complaint of dizziness for 2 days  He states that his dizziness is accompanied by nausea, and has been ongoing since the evening of 2/23  He also shares that he has had intermittent headaches for most of his life, is a previous smoker who quit 20+ years ago, continues to work as a   In the ER, he is markedly hypertensive with max blood pressure 252/115 not improved by IV labetalol  CTA head and neck shows right posterior cerebellum infarct, acute versus subacute, marked tapering and occlusion of the distal right cervical vertebral artery suggestive of dissection, proximal right intradural vertebral artery occlusion, severe left vertebral artery stenosis  Neurology was consulted by the ER, who suggests blood pressure control, aspirin and Plavix load, serial neuro exams, MRI soon as possible, echo  He is started on Cardene for blood pressure control  On exam, the patient is awake alert and oriented and in no acute distress  He admits to some numbness and tingling of his bilateral upper lip, denies headache, shortness of breath, cough, fevers at home, abdominal pain, flank pain  Incidentally in the ER, he is also noted to be COVID-positive, active UTI, and an BRAULIO on admission  He is admitted to the ICU for serial neuro exams, Cardene administration with strict blood pressure parameters "    Procedures Performed:   Orders Placed This Encounter   Procedures   • Critical Care   • ED ECG Documentation Only       Summary of Hospital Course:   Patient was admitted to critical care service and maintained of Cardene infusion  He underwent MRI/MRA which was concerning for 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   After discussion with Neurology and NSGY team, the decision was made to transfer patient to [facility] service to monitor for worsening neuro exam and need for potential thrombectomy  Significant Findings, Care, Treatment and Services Provided:   CTA head and neck with and without contrast    Result Date: 2/25/2023  Impression: No acute intracranial hemorrhage  Focal infarcts in the right posterior cerebellum, possibly acute to subacute  Follow-up MRI imaging is recommended  Marked tapering and occlusion of the distal right cervical vertebral artery  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  Proximal right intradural vertebral artery is also occluded with faint visualization of the post-PICA segment, which may be filling in a retrograde fashion  Severe left vertebral artery origin stenosis  Left vertebral artery may terminate in a posterior for cerebellar artery branch as the post-PICA segment is not identified  Small basilar artery with fetal right PCA circulation  Left proximal PCA is patent but small  Mid to distal left PCA is not well visualized  I personally discussed this study with Rodriguez Cull on 2/25/2023 at 12:21 PM  Workstation performed: [id number]     XR chest 1 view portable    Result Date: 2/25/2023  Impression: No acute abnormality however there is an opacity in the left lung base that may represent focal pleural thickening  Neoplasm cannot be excluded  Nonemergent follow-up PA and lateral views of the chest or CT chest suggested unless there are prior studies available for comparison  Findings for this inpatient marked for immediate notification in Epic  Workstation performed: [id number]     MRA head wo contrast    Result Date: 2/25/2023  Impression: Abscess of flow related signal in the V4 segments of the bilateral vertebral arteries, throughout the basilar artery and left posterior cerebral artery, concerning for progression of dissection and/or thrombus    Recommend emergent interventional radiology consultation  I personally discussed this study with Richie Retana   on 2/25/2023 at 8:15 PM   Workstation performed: [id number]     MRA carotids wo contrast    Result Date: 2/25/2023  Impression: 1  There complete absence of flow related signal along the cervical segment of the right vertebral artery concerning for progression of dissection and/or retrograde flow  2   Absence of flow related signal in the V4 segments of the bilateral vertebral arteries and throughout the basilar artery, also concerning for progression of dissection  I personally discussed this study with Richie Retana on 2/25/2023 at 8:24 PM  Workstation performed: [id number]     MRI brain wo contrast    Result Date: 2/25/2023  Impression: Acute early subacute infarcts throughout the right greater than left cerebellar hemispheres and anterior vermis, consistent with findings on the head CT  No hemorrhagic conversion or mass effect  Arthrotomy 8 Workstation performed: [id number]     XR follow up    Result Date: 2/25/2023  Impression: No radiopaque orbital foreign body  Workstation performed: [id number]       Complications: N/A    Condition at Discharge: stable         Discharge instructions/Information to patient and family:   See after visit summary for information provided to patient and family  Provisions for Follow-Up Care:  See after visit summary for information related to follow-up care and any pertinent home health orders  PCP: No primary care provider on file  Disposition: Transfer to [facility]    Planned Readmission: Yes [facility] service    Discharge Statement   I spent 20 minutes discharging the patient  This time was spent on the day of discharge  I had direct contact with the patient on the day of discharge  Additional documentation is required if more than 30 minutes were spent on discharge  Discharge Medications:  See after visit summary for reconciled discharge medications provided to patient and family

## 2023-02-26 NOTE — PLAN OF CARE
Problem: Prexisting or High Potential for Compromised Skin Integrity  Goal: Skin integrity is maintained or improved  Description: INTERVENTIONS:  - Identify patients at risk for skin breakdown  - Assess and monitor skin integrity  - Assess and monitor nutrition and hydration status  - Monitor labs   - Assess for incontinence   - Turn and reposition patient  - Assist with mobility/ambulation  - Relieve pressure over bony prominences  - Avoid friction and shearing  - Provide appropriate hygiene as needed including keeping skin clean and dry  - Evaluate need for skin moisturizer/barrier cream  - Collaborate with interdisciplinary team   - Patient/family teaching  - Consider wound care consult   Outcome: Progressing     Problem: MOBILITY - ADULT  Goal: Maintain or return to baseline ADL function  Description: INTERVENTIONS:  -  Assess patient's ability to carry out ADLs; assess patient's baseline for ADL function and identify physical deficits which impact ability to perform ADLs (bathing, care of mouth/teeth, toileting, grooming, dressing, etc )  - Assess/evaluate cause of self-care deficits   - Assess range of motion  - Assess patient's mobility; develop plan if impaired  - Assess patient's need for assistive devices and provide as appropriate  - Encourage maximum independence but intervene and supervise when necessary  - Involve family in performance of ADLs  - Assess for home care needs following discharge   - Consider OT consult to assist with ADL evaluation and planning for discharge  - Provide patient education as appropriate  Outcome: Progressing  Goal: Maintains/Returns to pre admission functional level  Description: INTERVENTIONS:  - Perform BMAT or MOVE assessment daily    - Set and communicate daily mobility goal to care team and patient/family/caregiver  - Collaborate with rehabilitation services on mobility goals if consulted  - Perform Range of Motion  times a day    - Reposition patient every hours   - Dangle patient  times a day  - Stand patient  times a day  - Ambulate patient  times a day  - Out of bed to chair  times a day   - Out of bed for meals  times a day  - Out of bed for toileting  - Record patient progress and toleration of activity level   Outcome: Progressing     Problem: PAIN - ADULT  Goal: Verbalizes/displays adequate comfort level or baseline comfort level  Description: Interventions:  - Encourage patient to monitor pain and request assistance  - Assess pain using appropriate pain scale  - Administer analgesics based on type and severity of pain and evaluate response  - Implement non-pharmacological measures as appropriate and evaluate response  - Consider cultural and social influences on pain and pain management  - Notify physician/advanced practitioner if interventions unsuccessful or patient reports new pain  Outcome: Progressing     Problem: INFECTION - ADULT  Goal: Absence or prevention of progression during hospitalization  Description: INTERVENTIONS:  - Assess and monitor for signs and symptoms of infection  - Monitor lab/diagnostic results  - Monitor all insertion sites, i e  indwelling lines, tubes, and drains  - Monitor endotracheal if appropriate and nasal secretions for changes in amount and color  - [place] appropriate cooling/warming therapies per order  - Administer medications as ordered  - Instruct and encourage patient and family to use good hand hygiene technique  - Identify and instruct in appropriate isolation precautions for identified infection/condition  Outcome: Progressing  Goal: Absence of fever/infection during neutropenic period  Description: INTERVENTIONS:  - Monitor WBC    Outcome: Progressing     Problem: SAFETY ADULT  Goal: Maintain or return to baseline ADL function  Description: INTERVENTIONS:  -  Assess patient's ability to carry out ADLs; assess patient's baseline for ADL function and identify physical deficits which impact ability to perform ADLs (bathing, care of mouth/teeth, toileting, grooming, dressing, etc )  - Assess/evaluate cause of self-care deficits   - Assess range of motion  - Assess patient's mobility; develop plan if impaired  - Assess patient's need for assistive devices and provide as appropriate  - Encourage maximum independence but intervene and supervise when necessary  - Involve family in performance of ADLs  - Assess for home care needs following discharge   - Consider OT consult to assist with ADL evaluation and planning for discharge  - Provide patient education as appropriate  Outcome: Progressing  Goal: Maintains/Returns to pre admission functional level  Description: INTERVENTIONS:  - Perform BMAT or MOVE assessment daily    - Set and communicate daily mobility goal to care team and patient/family/caregiver  - Collaborate with rehabilitation services on mobility goals if consulted  - Perform Range of Motion  times a day  - Reposition patient every  hours    - Dangle patient  times a day  - Stand patient  times a day  - Ambulate patien times a day  - Out of bed to chair  times a day   - Out of bed for meals  times a day  - Out of bed for toileting  - Record patient progress and toleration of activity level   Outcome: Progressing  Goal: Patient will remain free of falls  Description: INTERVENTIONS:  - Educate patient/family on patient safety including physical limitations  - Instruct patient to call for assistance with activity   - Consult OT/PT to assist with strengthening/mobility   - Keep Call bell within reach  - Keep bed low and locked with side rails adjusted as appropriate  - Keep care items and personal belongings within reach  - Initiate and maintain comfort rounds  - Make Fall Risk Sign visible to staff  - Offer Toileting every  Hours, in advance of need  - Initiate/Maintain alarm  - Obtain necessary fall risk management equipment:   - Apply yellow socks and bracelet for high fall risk patients  - Consider moving patient to room near nurses station  Outcome: Progressing     Problem: DISCHARGE PLANNING  Goal: Discharge to home or other facility with appropriate resources  Description: INTERVENTIONS:  - Identify barriers to discharge w/patient and caregiver  - Arrange for needed discharge resources and transportation as appropriate  - Identify discharge learning needs (meds, wound care, etc )  - Arrange for interpretive services to assist at discharge as needed  - Refer to Case Management Department for coordinating discharge planning if the patient needs post-hospital services based on physician/advanced practitioner order or complex needs related to functional status, cognitive ability, or social support system  Outcome: Progressing     Problem: Knowledge Deficit  Goal: Patient/family/caregiver demonstrates understanding of disease process, treatment plan, medications, and discharge instructions  Description: Complete learning assessment and assess knowledge base    Interventions:  - Provide teaching at level of understanding  - Provide teaching via preferred learning methods  Outcome: Progressing     Problem: NEUROSENSORY - ADULT  Goal: Achieves stable or improved neurological status  Description: INTERVENTIONS  - Monitor and report changes in neurological status  - Monitor vital signs such as temperature, blood pressure, glucose, and any other labs ordered   - Initiate measures to prevent increased intracranial pressure  - Monitor for seizure activity and implement precautions if appropriate      Outcome: Progressing  Goal: Remains free of injury related to seizures activity  Description: INTERVENTIONS  - Maintain airway, patient safety  and administer oxygen as ordered  - Monitor patient for seizure activity, document and report duration and description of seizure to physician/advanced practitioner  - If seizure occurs,  ensure patient safety during seizure  - Reorient patient post seizure  - Seizure pads on all 4 side rails  - Instruct patient/family to notify RN of any seizure activity including if an aura is experienced  - Instruct patient/family to call for assistance with activity based on nursing assessment  - Administer anti-seizure medications if ordered    Outcome: Progressing  Goal: Achieves maximal functionality and self care  Description: INTERVENTIONS  - Monitor swallowing and airway patency with patient fatigue and changes in neurological status  - Encourage and assist patient to increase activity and self care     - Encourage visually impaired, hearing impaired and aphasic patients to use assistive/communication devices  Outcome: Progressing     Problem: CARDIOVASCULAR - ADULT  Goal: Maintains optimal cardiac output and hemodynamic stability  Description: INTERVENTIONS:  - Monitor I/O, vital signs and rhythm  - Monitor for S/S and trends of decreased cardiac output  - Administer and titrate ordered vasoactive medications to optimize hemodynamic stability  - Assess quality of pulses, skin color and temperature  - Assess for signs of decreased coronary artery perfusion  - Instruct patient to report change in severity of symptoms  Outcome: Progressing  Goal: Absence of cardiac dysrhythmias or at baseline rhythm  Description: INTERVENTIONS:  - Continuous cardiac monitoring, vital signs, obtain 12 lead EKG if ordered  - Administer antiarrhythmic and heart rate control medications as ordered  - Monitor electrolytes and administer replacement therapy as ordered  Outcome: Progressing     Problem: RESPIRATORY - ADULT  Goal: Achieves optimal ventilation and oxygenation  Description: INTERVENTIONS:  - Assess for changes in respiratory status  - Assess for changes in mentation and behavior  - Position to facilitate oxygenation and minimize respiratory effort  - Oxygen administered by appropriate delivery if ordered  - Initiate smoking cessation education as indicated  - Encourage broncho-pulmonary hygiene including cough, deep breathe, Incentive Spirometry  - Assess the need for suctioning and aspirate as needed  - Assess and instruct to report SOB or any respiratory difficulty  - Respiratory Therapy support as indicated  Outcome: Progressing     Problem: GENITOURINARY - ADULT  Goal: Maintains or returns to baseline urinary function  Description: INTERVENTIONS:  - Assess urinary function  - Encourage oral fluids to ensure adequate hydration if ordered  - Administer IV fluids as ordered to ensure adequate hydration  - Administer ordered medications as needed  - Offer frequent toileting  - Follow urinary retention protocol if ordered  Outcome: Progressing  Goal: Absence of urinary retention  Description: INTERVENTIONS:  - Assess patient’s ability to void and empty bladder  - Monitor I/O  - Bladder scan as needed  - Discuss with physician/AP medications to alleviate retention as needed  - Discuss catheterization for long term situations as appropriate  Outcome: Progressing  Goal: Urinary catheter remains patent  Description: INTERVENTIONS:  - Assess patency of urinary catheter  - If patient has a chronic barnett, consider changing catheter if non-functioning  - Follow guidelines for intermittent irrigation of non-functioning urinary catheter  Outcome: Progressing     Problem: METABOLIC, FLUID AND ELECTROLYTES - ADULT  Goal: Electrolytes maintained within normal limits  Description: INTERVENTIONS:  - Monitor labs and assess patient for signs and symptoms of electrolyte imbalances  - Administer electrolyte replacement as ordered  - Monitor response to electrolyte replacements, including repeat lab results as appropriate  - Instruct patient on fluid and nutrition as appropriate  Outcome: Progressing  Goal: Fluid balance maintained  Description: INTERVENTIONS:  - Monitor labs   - Monitor I/O and WT  - Instruct patient on fluid and nutrition as appropriate  - Assess for signs & symptoms of volume excess or deficit  Outcome: Progressing  Goal: Glucose maintained within target range  Description: INTERVENTIONS:  - Monitor Blood Glucose as ordered  - Assess for signs and symptoms of hyperglycemia and hypoglycemia  - Administer ordered medications to maintain glucose within target range  - Assess nutritional intake and initiate nutrition service referral as needed  Outcome: Progressing

## 2023-02-26 NOTE — SEDATION DOCUMENTATION
Cerebral angiogram completed by Dr Anny Garvey  Patient remained intubated and transferred to CT post procedure  Report given to Diamond Neal, stat RN  TR band in place maintaining patent hemostasis       In department 1250  In Room 1250  Access/Puncture 1317  1st Pass 1332  2nd Pass 1339  TICI post 2B

## 2023-02-26 NOTE — H&P
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  Patient presented to [address] [place] [address] ED due to 2 days of dizziness and nausea  Workup revealed hypertensive emergency and acute/subacute right posterior cerebellar stroke with right cervical vertebral artery dissection and left vertebral artery stenosis  NIHSS 1  Additionally, patient with UTI and incidental finding of Covid  Patient admitted to [address] [place] [address] ICU and initiated on Cardene infusion  Also started on ASA/Plavix  MRI obtained revealed concern for right V3/4 thrombus and multifocal DWI changes  MRA revealed poor vertebral and basilar flow  Patient transferred to [facility] for neurosurgical evaluation and CTP and repeat CTA H&N  History obtained from chart review and the patient   -------------------------------------------------------------------------------------------------------------  Assessment and Plan:    Neuro:   • Diagnosis: 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  o Plan:   - CTA H&N 2/25: No acute intracranial hemorrhage  Focal infarcts in the right posterior cerebellum, possibly acute to subacute  Marked tapering and occlusion of the distal right cervical vertebral artery  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  Proximal right intradural vertebral artery is also occluded with faint visualization of the post-PICA segment, which may be filling in a retrograde fashion  Severe left vertebral artery origin stenosis  Left vertebral artery may terminate in a posterior for cerebellar artery branch as the post-PICA segment is not identified  Small basilar artery with fetal right PCA circulation  Left proximal PCA is patent but small  Mid to distal left PCA is not well visualized  - MRI 2/25: Acute early subacute infarcts throughout the right greater than left cerebellar hemispheres and anterior vermis, consistent with findings on the head CT  No hemorrhagic conversion or mass effect   - MRA 2/25: Abscess of flow related signal in the V4 segments of the bilateral vertebral arteries, throughout the basilar artery and left posterior cerebral artery, concerning for progression of dissection and/or thrombus  - MRA carotids 2/25: There complete absence of flow related signal along the cervical segment of the right vertebral artery concerning for progression of dissection and/or retrograde flow  Absence of flow related signal in the V4 segments of the bilateral vertebral arteries and throughout the basilar artery, also concerning for progression of dissection   - Repeat CTA H&N pending   - CTP pending   - NIHSS 1 due to dysarthria   - Neurosurgery following   - Neurology consulted   - Continue stroke protocol   • Continue q1hr neuro exams   • Continue ASA  • Initiate heparin infusion   o Goal PTT 60-90   • P2Y12 level pending   • SBP goal 160-220   o Continue Cardene infusion   • Echo pending   • Lipid panel/HgbA1C pending   • PT/OT/PRM consulted  - STAT CTH if GCS drops>2pts in <1hr       CV:   • Diagnosis: HTN Emergency   o Plan:   - Continue Cardene infusion   - SBP goal 160-220   - Echo pending       Pulm:  • Diagnosis: No acute issues       GI:   • Diagnosis: Nausea, improved   o Plan:   - Zofran PRN       :   • Diagnosis: BRAULIO, resolved   o Plan:   - No baseline creatinine   - Creatinine on admission 1 32  - Continue to trend BUN/Cr  - Strict I/O  - Urinary retention protocol    F/E/N:   • Plan:   o Fluids: Continue maintenance fluids   o Electrolytes:  Will replete as necessary to maintain potassium > 4 0, magnesium > 2 0, and phosphrous > 3 0    o Nutrition: NPO       Heme/Onc:   • Diagnosis: Elevated D-dimer   o Plan:   - Due to above  - Continue heparin gtt   - SCDs      Endo:   • Diagnosis: No acute issues   o Plan:   - HgbA1C 5 7  - Continue to monitor blood glucose   • Goal 140-180   - Initiate SSI if indicated       ID:   • Diagnosis: Covid   o Plan:   - Stable on room air  - No covid medications ordered at this time   • Diagnosis: UTI   o Plan:   - Culture pending   - Continue ceftriaxone through 2/27   - Blood cultures pending       MSK/Skin:   • Diagnosis: No acute issues         Disposition: Admit to Critical Care   Code Status: Level 1 - Full Code  --------------------------------------------------------------------------------------------------------------  Review of Systems   Eyes: Negative for photophobia and visual disturbance  Gastrointestinal: Negative for nausea and vomiting  Neurological: Positive for speech difficulty  Negative for dizziness, weakness and headaches  Psychiatric/Behavioral: Negative for confusion  A 12-point, complete review of systems was reviewed and negative except as stated above     Physical Exam  Constitutional:       General: He is not in acute distress  Appearance: He is not ill-appearing  HENT:      Head: Normocephalic and atraumatic  Nose: Nose normal       Mouth/Throat:      Mouth: Mucous membranes are moist       Pharynx: Oropharynx is clear  Eyes:      General: No scleral icterus  Conjunctiva/sclera: Conjunctivae normal       Pupils: Pupils are equal, round, and reactive to light  Pulmonary:      Effort: Pulmonary effort is normal  No respiratory distress  Musculoskeletal:         General: Normal range of motion  Cervical back: Normal range of motion and neck supple  No rigidity  Right lower leg: No edema  Left lower leg: No edema  Skin:     General: Skin is warm and dry  Coloration: Skin is pale  Neurological:      General: No focal deficit present  Mental Status: He is alert and oriented to person, place, and time        Motor: No weakness  Neurologic Physical Exam:    Mental Status  Orientation to: time, date, person, place  GCS: Eye Spontaneous = 4, Verbal Oriented = 5, Motor Obeys commands = 6, n/a  Follows commands x2: Follows commands in upper extremities  Speech: coherent speech, slowed, minimal conversation and poor articulation    Cranial Nerves  Pupils: 2 mm bilaterally  EOM: full and intact  Facial Symmetry: facial symmetry equal  Visual fields: 0 = No visual field loss    Motor  RUE strength: 5/5: Full ROM against gravity and full resistance  RLE strength: 5/5: Full ROM against gravity and full resistance    LUE strength: 5/5: Full ROM against gravity and full resistance  LLE strength: 5/5: Full ROM against gravity and full resistance    --------------------------------------------------------------------------------------------------------------  Vitals: There were no vitals filed for this visit  Temp  Min: 97 2 °F (36 2 °C)  Max: 98 4 °F (36 9 °C)        There is no height or weight on file to calculate BMI    N/A    Laboratory and Diagnostics:  Results from last 7 days   Lab Units 02/25/23  0940   WBC Thousand/uL 9 99   HEMOGLOBIN g/dL 14 4   HEMATOCRIT % 44 0   PLATELETS Thousands/uL 326   NEUTROS PCT % 88*   MONOS PCT % 4     Results from last 7 days   Lab Units 02/25/23 2044 02/25/23  0940   SODIUM mmol/L 141 142   POTASSIUM mmol/L 3 3* 3 5   CHLORIDE mmol/L 109* 108   CO2 mmol/L 24 24   ANION GAP mmol/L 8 10   BUN mg/dL 18 23   CREATININE mg/dL 1 15 1 32*   CALCIUM mg/dL 8 4 8 8   GLUCOSE RANDOM mg/dL 110 124   ALT U/L  --  19   AST U/L  --  16   ALK PHOS U/L  --  55   ALBUMIN g/dL  --  3 8   TOTAL BILIRUBIN mg/dL  --  0 60          Results from last 7 days   Lab Units 02/25/23  0940   INR  0 98   PTT seconds 29          Results from last 7 days   Lab Units 02/25/23  2044   LACTIC ACID mmol/L 1 2     ABG:    VBG:    Results from last 7 days   Lab Units 02/25/23 2044   PROCALCITONIN ng/ml <0 05       Micro: EKG: Reviewed       Imaging: I have personally reviewed pertinent films in PACS    Historical Information   No past medical history on file  No past surgical history on file  Social History   Social History     Substance and Sexual Activity   Alcohol Use Not Currently     Social History     Substance and Sexual Activity   Drug Use Not Currently     Social History     Tobacco Use   Smoking Status Never   Smokeless Tobacco Never       Family History:   No family history on file    I have reviewed this patient's family history and commented on sigificant items within the HPI      Medications:  Current Facility-Administered Medications   Medication Dose Route Frequency   • aspirin (ECOTRIN LOW STRENGTH) EC tablet 81 mg  81 mg Oral Daily   • cefTRIAXone (ROCEPHIN) 1,000 mg in dextrose 5 % 50 mL IVPB  1,000 mg Intravenous Q24H   • chlorhexidine (PERIDEX) 0 12 % oral rinse 15 mL  15 mL Mouth/Throat Q12H KATIE   • clopidogrel (PLAVIX) tablet 75 mg  75 mg Oral Daily   • heparin (ACS LOW)   Intravenous Once   • heparin (porcine) 25,000 units in 0 45% NaCl 250 mL infusion (premix)  3-20 Units/kg/hr (Order-Specific) Intravenous Titrated   • niCARdipine (CARDENE) 25 mg (STANDARD CONCENTRATION) in sodium chloride 0 9% 250 mL  1-15 mg/hr Intravenous Titrated   • sodium chloride 0 9 % infusion  150 mL/hr Intravenous Continuous     Home medications:  None     Allergies:  No Known Allergies  ------------------------------------------------------------------------------------------------------------  Advance Directive and Living Will:      Power of :    POLST:    ------------------------------------------------------------------------------------------------------------  Anticipated Length of Stay is > 2 midnights    Care Time Delivered:   Upon my evaluation, this patient had a high probability of imminent or life-threatening deterioration due to vert artery dissection, which required my direct attention, intervention, and personal management  I have personally provided 35 minutes (0000 to 0035) of critical care time, exclusive of procedures, teaching, family meetings, and any prior time recorded by providers other than myself         Hailey Pedraza PA-C

## 2023-02-26 NOTE — CASE MANAGEMENT
Case Management Progress Note    Patient name Nehal Landeros  Location ICU 09/ICU 09 MRN [id number]  : 1960 Date 2023       LOS (days): 1  Geometric Mean LOS (GMLOS) (days):   Days to GMLOS:        OBJECTIVE:        Current admission status: Inpatient  Preferred Pharmacy: No Pharmacies Listed  Primary Care Provider: No primary care provider on file  Primary Insurance: [employer]  Secondary Insurance:     PROGRESS NOTE: TC to Candace rain to collect assessment information    Left  to call this CM back

## 2023-02-26 NOTE — PROCEDURES
Arterial Line Insertion    Date/Time: 2/25/2023 10:56 PM  Performed by: Monae Patel PA-C  Authorized by: Monae Patel PA-C     Patient location:  ICU  Other Assisting Provider: No    Consent:     Consent obtained:  Verbal    Consent given by:  Patient and spouse    Risks discussed:  Bleeding, infection, ischemia, pain and repeat procedure  Universal protocol:     Site/side marked: yes      Immediately prior to procedure a time out was called: yes      Patient identity confirmed:  Verbally with patient, arm band and hospital-assigned identification number  Indications:     Indications: continuous blood pressure monitoring    Pre-procedure details:     Skin preparation:  Chlorhexidine    Preparation: Patient was prepped and draped in sterile fashion    Anesthesia (see MAR for exact dosages): Anesthesia method:  Local infiltration    Local anesthetic:  Lidocaine 1% w/o epi  Procedure details:     Location / Tip of Catheter:  Radial    Laterality:  Left    Adarsh's test performed: yes      Adarsh's test abnormal: no      Needle gauge:  20 G    Placement technique:  Percutaneous    Number of attempts:  1    Successful placement: yes      Transducer: waveform confirmed    Post-procedure details:     Post-procedure:  Sterile dressing applied, sutured and wrist guard applied    CMS:  Unchanged    Patient tolerance of procedure:   Tolerated well, no immediate complications

## 2023-02-26 NOTE — TELEMEDICINE
CTA stable with r V3/4 occlusion  CTP as expected with whole posterior fossa showing decreased flow  Exam unchanged per report with stable dysarthria and dizziness  This has been stable for two days  -200, though on cardene  Heparin initiated  Will give bolus  Cont [id number]  If there is any change or concern for deterioration will proceed to intervention  In light of stable physical exam for 48 hours I am hesitant to proceed at this juncture given the risk of the procedure for neurological decline secondary to anesthetic induction, risk of showering emboli, or vessel injury

## 2023-02-26 NOTE — EMTALA/ACUTE CARE TRANSFER
[facility] INTENSIVE CARE UNIT  [address] [facility] [address]  Dept: [phone]      ACUTE CARE TRANSFER CONSENT    NAME Tammy SALCEDO 1960                              MRN [id number]    I have been informed of my rights regarding examination, treatment, and transfer   by Dr Jareth Solares MD    Benefits:      Risks:        Consent for Transfer:  I acknowledge that my medical condition has been evaluated and explained to me by the treating physician or other qualified medical person and/or my attending physician, who has recommended that I be transferred to the service of    at    The above potential benefits of such transfer, the potential risks associated with such transfer, and the probable risks of not being transferred have been explained to me, and I fully understand them  The doctor has explained that, in my case, the benefits of transfer outweigh the risks  I agree to be transferred  I authorize the performance of emergency medical procedures and treatments upon me in both transit and upon arrival at the receiving facility  Additionally, I authorize the release of any and all medical records to the receiving facility and request they be transported with me, if possible  I understand that the safest mode of transportation during a medical emergency is an ambulance and that the Hospital advocates the use of this mode of transport  Risks of traveling to the receiving facility by car, including absence of medical control, life sustaining equipment, such as oxygen, and medical personnel has been explained to me and I fully understand them  (WENDI CORRECT BOX BELOW)  [  ]  I consent to the stated transfer and to be transported by ambulance/helicopter  [  ]  I consent to the stated transfer, but refuse transportation by ambulance and accept full responsibility for my transportation by car    I understand the risks of non-ambulance transfers and I exonerate the Hospital and its staff from any deterioration in my condition that results from this refusal     X___________________________________________    DATE  23  TIME________  Signature of patient or legally responsible individual signing on patient behalf           RELATIONSHIP TO PATIENT_________________________          Provider Certification    NAME Ryan Blue                                         1960                              MRN [id number]    A medical screening exam was performed on the above named patient  Based on the examination:    Condition Necessitating Transfer R vertebral artery thrombosis    Patient Condition:      Reason for Transfer:      Transfer Requirements: Facility     · Space available and qualified personnel available for treatment as acknowledged by    · Agreed to accept transfer and to provide appropriate medical treatment as acknowledged by          · Appropriate medical records of the examination and treatment of the patient are provided at the time of transfer   [address] [facility] [address] _______  · Transfer will be performed by qualified personnel from    and appropriate transfer equipment as required, including the use of necessary and appropriate life support measures      Provider Certification: I have examined the patient and explained the following risks and benefits of being transferred/refusing transfer to the patient/family:         Based on these reasonable risks and benefits to the patient and/or the unborn child(naga), and based upon the information available at the time of the patient’s examination, I certify that the medical benefits reasonably to be expected from the provision of appropriate medical treatments at another medical facility outweigh the increasing risks, if any, to the individual’s medical condition, and in the case of labor to the unborn child, from effecting the transfer      X____________________________________________ DATE 02/25/23        TIME_______      ORIGINAL - SEND TO MEDICAL RECORDS   COPY - SEND WITH PATIENT DURING TRANSFER

## 2023-02-26 NOTE — CONSULTS
Consultation - Neurology   Kin Lockhart 60 y o  male MRN: [id number]  Unit/Bed#: ICU 09 Encounter: [id number]      Assessment/Plan     Cerebellar infarct [facility])  Assessment & Plan  23-year-old male with no reported significant past medical history (though patient does not follow with a physician) who presented to [address] [place] [address] on 2/25/2023 with complaint of several days of weakness, dizziness, and nausea/vomiting  Patient was transferred to [place] [address] for closer monitoring with consideration for endovascular intervention  Patient was found to have subacute appearing cerebellar infarcts on CT head on presentation and thus was not a TNK candidate  -CTA head/neck on presentation demonstrated right vertebral artery occlusion/dissection and left vertebral artery distal occlusion  -MRA intra/extracranial demonstrated possible extension of the vertebral artery dissection into the basilar artery  -Repeat CTA head/neck confirmed earlier CTA's findings with no significant change  -MRI brain demonstrated several small acute/subacute bicerebellar infarcts without evidence of hemorrhage  -, A1c 5 7   -COVID (+)  Patient deemed not to be an interventional candidate due to risk of procedure and clinical stability  He is dysarthric and has bilateral limb ataxia on exam     Plan:  -Continue frequent neurochecks   -Patient initially started on DAPT but as per Neurosurgery recommended to be on full dose aspirin and heparin drip (Plavix discontinued)  -Continue statin  -2D echocardiogram with bubble study   -Telemetry  -PT/OT/speech therapy   -Allow permissive hypertension, up to 554 systolic   -Continue to monitor and notify with changes  Recommendations for outpatient neurological follow up have yet to be determined  History of Present Illness     Reason for Consult / Principal Problem: Cerebellar strokes  Hx and PE limited by: N/A  HPI: Kin Lockhart is a 60 y o  male with no reported significant past medical history (though patient does not follow with a physician) who presented to [address] [place] [address] on 2/25/2023 with complaint of several days of weakness, dizziness, and nausea/vomiting  He was found to have subacute appearing small right cerebellar strokes as well as small left vermian stroke on CT head, and right vertebral artery occlusion/dissection, and left vertebral artery distal occlusion on CTA head/neck  He was also found to be COVID-positive  Blood pressure on presentation was 232/102  He underwent MRI brain, MRA intra and extracranial   The MRI brain demonstrated several small bicerebellar infarcts and the MRA raised question of the right vertebral artery dissection extending into the basilar artery  Neurosurgery was consulted and recommend patient be transferred to [facility] for repeat CTA and CT perfusion study  Ultimately, intervention was not pursued as patient remained clinically stable, alert and described as dysarthric with bilateral extremity ataxia  It was recommended that he be managed on full dose aspirin and heparin drip and permissive hypertension  On examination on 2/26/2023, patient notably dysarthric and with bilateral upper and lower extremity ataxia  Inpatient consult to Neurology  Consult performed by: Evgeny Roy PA-C  Consult ordered by: Fer Duque PA-C          Review of Systems   Constitutional: Negative  HENT: Negative  Eyes: Negative  Respiratory: Negative  Cardiovascular: Negative  Gastrointestinal: Negative  Endocrine: Negative  Genitourinary: Negative  Musculoskeletal: Positive for gait problem  Skin: Negative for rash  Allergic/Immunologic: Negative  Neurological: Positive for speech difficulty  As above  Hematological: Negative  Psychiatric/Behavioral: Negative  Historical Information   No past medical history on file    No past surgical history on file   Social History   Social History     Substance and Sexual Activity   Alcohol Use Not Currently     Social History     Substance and Sexual Activity   Drug Use Not Currently     E-Cigarette/Vaping     E-Cigarette/Vaping Substances     Social History     Tobacco Use   Smoking Status Never   Smokeless Tobacco Never     Family History: No family history on file  Review of previous medical records was completed  Meds/Allergies   all current active meds have been reviewed    No Known Allergies    Objective   Vitals:Blood pressure (!) 163/105, pulse 64, temperature 97 5 °F (36 4 °C), temperature source Oral, resp  rate 12, SpO2 98 %  ,There is no height or weight on file to calculate BMI  Intake/Output Summary (Last 24 hours) at 2/26/2023 1041  Last data filed at 2/26/2023 0800  Gross per 24 hour   Intake 720 32 ml   Output 1400 ml   Net -679 68 ml       Invasive Devices: Invasive Devices     Peripheral Intravenous Line  Duration           Peripheral IV Distal;Left;Upper;Ventral (anterior) Arm -- days    Peripheral IV 02/20/23 Dorsal (posterior); Right Forearm 5 days          Arterial Line  Duration           Arterial Line 02/25/23 Radial <1 day          Drain  Duration           External Urinary Catheter <1 day                Physical Exam PA acted as a scribe for attending physician  General:  Patient is well-developed, well-nourished, and in no acute distress  HEENT:  Head normocephalic  Eyes anicteric  Cardiovascular:  With regular rhythm  Lungs:  Normal effort  Nonlabored breathing  Extremities:  With no significant edema  Skin: No rashes  Neurologic Exam  Neurologic:  Patient is alert  Speech is dysarthric but patient not aphasic  Gait deferred for safety  Cranial Nerves:   II: Visual fields full to confrontation  Cannot visualize optic fundi  V: Sensation in the V1 through V3 distributions intact to light touch bilaterally  VII: Face is symmetric with no weakness noted     XII: Tongue midline with no atrophy or fasciculations with appropriate movement  Coordination: Ataxic with bilateral finger-to-nose and heel-to-shin maneuvers  Motor testing with no upper or lower extremity drift  Perhaps some subtle right upper extremity weakness reported  Sensory testing grossly intact to light touch throughout  Lab Results:   CBC:   Results from last 7 days   Lab Units 02/26/23  0506 02/26/23  0021 02/25/23  0940   WBC Thousand/uL 13 27* 17 07* 9 99   RBC Million/uL 4 76 4 96 5 19   HEMOGLOBIN g/dL 13 3 13 6 14 4   HEMATOCRIT % 40 2 40 8 44 0   MCV fL 85 82 85   PLATELETS Thousands/uL 349 344 326   , BMP/CMP:   Results from last 7 days   Lab Units 02/26/23  0506 02/25/23  2044 02/25/23  0940   SODIUM mmol/L 143 141 142   POTASSIUM mmol/L 3 4* 3 3* 3 5   CHLORIDE mmol/L 115* 109* 108   CO2 mmol/L 21 24 24   BUN mg/dL 17 18 23   CREATININE mg/dL 1 09 1 15 1 32*   CALCIUM mg/dL 8 8 8 4 8 8   AST U/L 13  --  16   ALT U/L 24  --  19   ALK PHOS U/L 63  --  55   EGFR ml/min/1 73sq m 71 67 57   , HgBA1C:   Results from last 7 days   Lab Units 02/25/23  1304   HEMOGLOBIN A1C % 5 7*   , Coagulation:   Results from last 7 days   Lab Units 02/26/23  0021   INR  1 06   , Lipid Profile:   Results from last 7 days   Lab Units 02/26/23  0506   HDL mg/dL 32*   LDL CALC mg/dL 112*   TRIGLYCERIDES mg/dL 94     Imaging Studies: I have personally reviewed pertinent reports  and I have personally reviewed pertinent films in PACS MRI brain, MRA head/neck, CTA head/neck x2, CT perfusion  EKG, Pathology, and Other Studies: I have personally reviewed pertinent reports      VTE Prophylaxis: Heparin    Code Status: Level 1 - Full Code  Advance Directive and Living Will:      Power of :    POLST:

## 2023-02-26 NOTE — QUICK NOTE
Notified by nursing staff that the patient seemed to have worsening dysarthria  On subsequent reexamination dysarthria appeared stable  But, he did have new right-sided weakness  Discussed with neurosurgery team at bedside  Stroke alert called and stat CT head without contrast ordered  Attempted to call wife Keila Ramirez without answer  Will attempt to call and update family      Karen Erickson MD

## 2023-02-26 NOTE — ASSESSMENT & PLAN NOTE
[facility] GEN & VASC SURGERY  [address]  [address]  [facility] [address]  [phone]    02/06/19    Regarding Patient:  Jerry Vilchis  [address] [place] [address]    To Whom It Concerns:    This is to certify Jerry Vilchis was evaluated at [facility] SURGERY on 1/9/19 and on 2/4/19.  He was provided with the following work recommendations:      He was to remain out of work due to continued medical issues, pain, drainage, and difficulty sitting for long periods of time until his appointment with Dr. Marcus Parker on 2/4/19. He is able to go back to work as of today. He will have a follow up procedure in four weeks for this same medical issue.     Please call us at [phone] or toll free at [phone] if you have any questions.  We will be happy to assist you.        CHRIS George     60 y o  male with no reported significant past medical history (although patient does not follow with a physician) who presented to [facility] on 2/25/2023 with complaint of several days of weakness, dizziness, and nausea/vomiting  · CT head revealed acute/subacute infarcts in the right posterior cerebellum  · CTA head/neck revealed 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 and left vertebral artery origin stenosis with presumed occlusion of the distal left intradural vertebral segment  · MRA head/carotids demonstrated possible extension of the vertebral artery dissection into the basilar artery  · MRI brain revealed several small acute/early subacute bi-cerebellar infarcts (R>L) without evidence of hemorrhage  · , Cholesterol 163, A1C 5 7  · COVID positive    Patient was transferred to [facility] for closer monitoring with consideration for endovascular intervention  Initially given stable exam, intervention was felt too risky and patient was on a heparin gtt with full dose aspirin  However on 2/26, patient had worsened dysarthria and right sided weakness  Stroke alert activated:  · CT head negative for intracranial hemorrhage and stable bilateral cerebellar infarcts  · Patient was taken to IR for emergent R vertebral/basilar thrombectomy with R V3/4 stenting with TICI 2B revascularization  · Patient was started on Integrilin gtt and repeat CT head negative for hemorrhage  · MRI brain 2/26: Increased infarct burden  Bilateral cerebellar infarcts with larger right cerebellar infarct and new acute infarcts in the right medulla, bilateral midbrain, and left occipital lobe  · Echo: EF 65%, normal wall motion, normal atria size, no PFO    On exam, patient has dysarthria, right facial droop, and right hemiparesis    Etiology for acute infarcts in the setting of bilateral vertebral artery occlusions remains unclear, however likely due to dissection vs chronic diffuse atherosclerotic disease  Possible contribution from underlying COVID infection and hypercoagulability  No recent head trauma, strenuous physical activity, neck manipulation, coughing/sneezing, etc     Plan:  - Stroke pathway  • Transitioned from Integrilin gtt to DAPT on 2/27 with aspirin 81 mg daily and Brilinta 90 mg BID (180 mg load once); will check P2Y12 on 3/3  • Heparin gtt discontinued and transitioned to Eliquis 2 5 mg BID on 2/28  • Atorvastatin 40 mg daily (statin naive)  • Normotensive goal per neurosurgery with SBP <160; avoid hypotension  • Euglycemic, normothermic goal  • Continue telemetry  • PT/OT/ST  • Stroke education  • Frequent neuro checks  Continue to monitor and notify neurology with any changes  • STAT CT head for any acute change in neuro exam  - Continue Baclofen 10 mg BID (started on 2/28 with improvement in right LE spasms/myoclonus; can only do BID dosing due to renal function)  - Patient is tearful throughout the exam, but does not want to start an antidepressant at this time  Continue to closely monitor mood, and if patient agreeable, would start SSRI  - Medical management and supportive care per primary team  Correction of any metabolic or infectious disturbances

## 2023-02-26 NOTE — ASSESSMENT & PLAN NOTE
Patient presented with dysarthria and dizziness, found to have bilateral cerebellar infarcts  · Imaging also significant for R V3/4 thrombus, L vertebral artery stenosis with possible occlusion within intradural segment  · On admission, NIHSS 0  Given low score and symptoms which started on 2/23/23, he was not a candidate for TNK  · Also found to be COVID + and have UTI  · Upon my exam at 11:30am, patient with dysarthria, left facial droop not corrected with smile, RUE/RLE flaccidity  LNK at least 1 hour prior upon discussion with his nurse, neurology, and [facility]    Imaging:  · CTA head and neck w/wo, 2/26/23: Stable small cerebellar infarctions as noted on the recent MRI study  Stable findings of distal right cervical and proximal intradural vertebral artery occlusion with probable retrograde flow in its distal intradural segment  Stable left vertebral artery origin stenosis and presumed occlusion of the distal left intradural vertebral segment  Small basilar artery with focal atherosclerotic disease in its proximal segment  Patent fetal right PCA circulation  Left PCA branch is small but patent, and unchanged in appearance      Plan:  · Given acute neuro deficits, stroke alert called  · Plan for [facility] to r/o hemorrhage then IR for angiogram and possible intervention  · Continue with permissive HTN, SBP < 220  · ASA 325mg and heparin gtt  · Loaded with Plavix 300mg, P2Y12 208  · Continue with frequent with neuro checks   · Neurosurgery will continue to follow

## 2023-02-26 NOTE — PROGRESS NOTES
Daily Progress Note - Critical Care   Christine Grissom 60 y o  male MRN: [id number]  Unit/Bed#: ICU 09 Encounter: [id number]        ----------------------------------------------------------------------------------------  HPI/24hr events:   Admission events noted with initial presentation to [address] on February 25 with several days of weakness dizziness and nausea vomiting  CT Imaging there was remarkable for subacute small right cerebellar stroke  CTA consistent with distal right cervical vertebral artery dissection and proximal right intradural vertebral artery occlusion  There is also severe left vertebral origin stenosis  MRI/MRA was obtained showing early subacute infarcts right greater than left cerebellar hemispheres and anterior vermis  Absence of flow in V4 segments of bilateral vertebral arteries, basilar artery and PCA concerning for progression of dissection and/or thrombus  The patient was transferred to [place] for CTP and consultation with the neuro interventional service  Given stable exam, intervention was felt to be risk prohibitive  Exam remained stable until around 12:30 PM at which point worsening dysarthria was identified with right-sided weakness  This was discussed with the neurosurgical team, neurology teams and a stroke alert was called  CT scan without contrast did not demonstrate any intraventricular hemorrhage and the patient was taken to IR for intervention  Stenting of v3 and V4 performed with TICI post 2B flow  Currently on Integrilin with goal blood pressure range 120-1 60  Repeat CT scan of the head did not demonstrate intracranial hemorrhage  He arrived to the ICU following the procedure intubated and sedated      Anesthesia record shows neuromuscular blockade was not reversed, last admin 1552         ---------------------------------------------------------------------------------------  SUBJECTIVE  Returns to the ICU intubated following neuro IR intervention  Review of Systems   Unable to perform ROS: Intubated       ---------------------------------------------------------------------------------------  Assessment and Plan:    Neuro:   • Diagnosis: 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 in context of worsening neurologic exam on 2/26  o Plan: Serial neurologic exams, continue Integrilin, maintain blood pressure 120-160, MRI,   o Expect transition to dual antiplatelet therapy tomorrow with aspirin and Brilinta  • Diagnosis: Sedation for ETT tolerance  o Plan: Wean propofol to off      CV:   o Plan: Maintain high normal blood pressure with blood pressure target range 120-1 60   • Diagnosis: Dyslipidemia  o Plan: High intensity statin      Pulm:  • Diagnosis: Intubation following procedure  o Plan: Continue lung protective ventilation, valve precautions, wean to extubate      GI:   o Diagnosis:no active issues      :   • Diagnosis: No active issues   Plan: monitor UOP     F/E/N:   • Plan: Continue IVF      Heme/Onc:   • Diagnosis: Leukocytosis  o Plan: Continue to monitor  Likely reactive in the setting of vertebral dissection and cystitis     Endo:   Diagnosis:no active issues    ID:   • Diagnosis: Incidentally COVID-positive  o Plan: Continue supportive care  If unable to liberate from mechanical ventilation can consider initiation of remdesivir  • Diagnosis: Cystitis  o Plan: De-escalate antibiotic regimen to cefazolin      Patient appropriate for transfer out of the ICU today?: No  Disposition: Continue Critical Care   Code Status: Level 1 - Full Code  ---------------------------------------------------------------------------------------  ICU CORE MEASURES    Prophylaxis   VTE Pharmacologic Prophylaxis: Heparin  VTE Mechanical Prophylaxis: sequential compression device  Stress Ulcer Prophylaxis: Famotidine IV     ABCDE Protocol (if indicated)  Plan to perform spontaneous awakening trial today? Yes  Plan to perform spontaneous breathing trial today? Yes  Obvious barriers to extubation? No  CAM-ICU: Negative    Invasive Devices Review  Invasive Devices     Peripheral Intravenous Line  Duration           Peripheral IV 23 Dorsal (posterior); Right Forearm 5 days    Peripheral IV 23 Left;Ventral (anterior) Forearm <1 day          Arterial Line  Duration           Arterial Line 23 Radial <1 day          Drain  Duration           External Urinary Catheter <1 day          Airway  Duration           ETT  Hi-Lo; Cuffed;Oral 8 mm <1 day              Can any invasive devices be discontinued today? No  ---------------------------------------------------------------------------------------  OBJECTIVE    Vitals   Vitals:    23 1743 23 1744 23 1745 23 1746   BP:       BP Location:       Pulse: 66 64 66 66   Resp: (!) 29 (!) 28 (!) 28 22   Temp:       TempSrc:       SpO2: 100% 100% 100% 100%     Temp (24hrs), Av 8 °F (36 6 °C), Min:97 5 °F (36 4 °C), Max:98 2 °F (36 8 °C)  Current: Temperature: 97 8 °F (36 6 °C)  HR: 65  BP: 160/90  RR: 18- PSV  SpO2: 100%    Respiratory:  SpO2: SpO2: 100 %, SpO2 Activity: SpO2 Activity: At Rest, SpO2 Device: O2 Device: None (Room air)       Invasive/non-invasive ventilation settings   Respiratory    Lab Data (Last 4 hours)    None         O2/Vent Data (Last 4 hours)    None                Physical Exam  Vitals and nursing note reviewed  Constitutional:       General: He is in acute distress  Appearance: He is obese  He is ill-appearing  HENT:      Mouth/Throat:      Mouth: Mucous membranes are dry  Eyes:      Pupils: Pupils are equal, round, and reactive to light  Cardiovascular:      Rate and Rhythm: Normal rate and regular rhythm  Pulses: Normal pulses  Heart sounds: Normal heart sounds  Pulmonary:      Breath sounds: Normal breath sounds  Abdominal:      General: Abdomen is flat  Palpations: Abdomen is soft  Skin:     General: Skin is warm and dry  Capillary Refill: Capillary refill takes less than 2 seconds         Neurologic Physical Exam:    Mental Status  Orientation to: unable to assess due to intubation  GCS: Eye To pain = 2, Verbal None = 1, Motor None = 1, Patient intubated  Follows commands x2: sedated  Speech: unable to assess due to intubation    Cranial Nerves  Pupils: direct pupil reaction to light bilateral  EOM: poor tracking  Facial Symmetry: facial symmetry equal  Visual fields: unable to assess    Motor  RUE strength: 0/5: No evidence of contraction (flaccid)  RLE strength: 0/5: No evidence of contraction (flaccid)    LUE strength: 0/5: No evidence of contraction (flaccid)  LLE strength: 0/5: No evidence of contraction (flaccid)    Sensation  RUE sensation: unable to assess  RLE sensation: unable to assess    LUE sensation: unable to assess  LLE sensation: unable to assess    Coordination  Finger-to-nose: not assessed    Propofol sedation being weaned         Laboratory and Diagnostics:  Results from last 7 days   Lab Units 02/26/23  0506 02/26/23  0021 02/25/23  0940   WBC Thousand/uL 13 27* 17 07* 9 99   HEMOGLOBIN g/dL 13 3 13 6 14 4   HEMATOCRIT % 40 2 40 8 44 0   PLATELETS Thousands/uL 349 344 326   NEUTROS PCT % 89*  --  88*   MONOS PCT % 4  --  4     Results from last 7 days   Lab Units 02/26/23  0506 02/25/23  2044 02/25/23  0940   SODIUM mmol/L 143 141 142   POTASSIUM mmol/L 3 4* 3 3* 3 5   CHLORIDE mmol/L 115* 109* 108   CO2 mmol/L 21 24 24   ANION GAP mmol/L 7 8 10   BUN mg/dL 17 18 23   CREATININE mg/dL 1 09 1 15 1 32*   CALCIUM mg/dL 8 8 8 4 8 8   GLUCOSE RANDOM mg/dL 127 110 124   ALT U/L 24  --  19   AST U/L 13  --  16   ALK PHOS U/L 63  --  55   ALBUMIN g/dL 3 2*  --  3 8   TOTAL BILIRUBIN mg/dL 0 68  --  0 60          Results from last 7 days   Lab Units 02/26/23  1133 02/26/23  0506 02/26/23  0021 02/25/23  0940   INR   --   --  1 06 0 98   PTT seconds 69* 33 31 29 Results from last 7 days   Lab Units 02/25/23 2044   LACTIC ACID mmol/L 1 2     ABG:    VBG:    Results from last 7 days   Lab Units 02/26/23  0506 02/25/23 2044   PROCALCITONIN ng/ml 0 08 <0 05       Micro  Results from last 7 days   Lab Units 02/25/23 2044   BLOOD CULTURE  Received in Microbiology Lab  Culture in Progress  Received in Microbiology Lab  Culture in Progress  EKG: sr  Imaging: AS ABOVE I have personally reviewed pertinent reports  and I have personally reviewed pertinent films in PACS    Intake and Output  I/O       02/24 0701 02/25 0700 02/25 0701 02/26 0700 02/26 0701 02/27 0700    I V   538 3 582    Total Intake  538 3 582    Urine  1050 800    Blood   450    Total Output  1050 1250    Net  -511 7 -668           Unmeasured Urine Occurrence  1 x         Height and Weights         There is no height or weight on file to calculate BMI  Weight (last 2 days)     None            Nutrition       Diet Orders   (From admission, onward)             Start     Ordered    02/25/23 2350  Diet NPO  Diet effective now        References:    Nutrtion Support Algorithm Enteral vs  Parenteral   Question Answer Comment   Diet Type NPO    RD to adjust diet per protocol?  Yes        02/25/23 2349                     Active Medications  Scheduled Meds:  Current Facility-Administered Medications   Medication Dose Route Frequency Provider Last Rate   • cefTRIAXone  1,000 mg Intravenous Q24H Debbe Nyhan, MD     • chlorhexidine  15 mL Mouth/Throat Q12H [facility] Gisel Cabrera PA-C     • heparin (porcine)  3-30 Units/kg/hr (Order-Specific) Intravenous Titrated Gisel Niin, PA-C 18 Units/kg/hr (02/26/23 0700)   • heparin (porcine)  3,000 Units Intravenous Q6H PRN Gisel Cabrera PA-C     • heparin (porcine)  6,000 Units Intravenous Q6H PRN Gisel Cabrera PA-C     • hydrALAZINE  10 mg Intravenous Q6H PRN Debbe Nyhan, MD     • niCARdipine  1-15 mg/hr Intravenous Titrated Maria D Wilkins PA-C Stopped (02/26/23 1558)   • sodium chloride  75 mL/hr Intravenous Continuous Maria D Wilkins PA-C 75 mL/hr (02/26/23 1256)     Continuous Infusions:  heparin (porcine), 3-30 Units/kg/hr (Order-Specific), Last Rate: 18 Units/kg/hr (02/26/23 0700)  niCARdipine, 1-15 mg/hr, Last Rate: Stopped (02/26/23 1558)  sodium chloride, 75 mL/hr, Last Rate: 75 mL/hr (02/26/23 1256)      PRN Meds:   heparin (porcine), 3,000 Units, Q6H PRN  heparin (porcine), 6,000 Units, Q6H PRN  hydrALAZINE, 10 mg, Q6H PRN        Allergies   No Known Allergies  ---------------------------------------------------------------------------------------  Advance Directive and Living Will:      Power of :    POLST:    ---------------------------------------------------------------------------------------  Care Time Delivered:   Upon my evaluation, this patient had a high probability of imminent or life-threatening deterioration due to Vertebral artery dissection, which required my direct attention, intervention, and personal management  I have personally provided 60 minutes (1230p to 1730p) of critical care time, exclusive of procedures, teaching, family meetings, and any prior time recorded by providers other than myself  Janey Fernandez DO      Portions of the record may have been created with voice recognition software  Occasional wrong word or "sound a like" substitutions may have occurred due to the inherent limitations of voice recognition software    Read the chart carefully and recognize, using context, where substitutions have occurred

## 2023-02-26 NOTE — ANESTHESIA PREPROCEDURE EVALUATION
Procedure:  IR STROKE ALERT    Relevant Problems   CARDIO   (+) Hypertensive emergency      GI/HEPATIC   (+) Dysphagia      /RENAL   (+) BRAULIO (acute kidney injury) ([facility] [address] )        Physical Exam    Airway    Mallampati score: unable to assess         Dental       Cardiovascular      Pulmonary      Other Findings        Anesthesia Plan  ASA Score- 4 Emergent    Anesthesia Type- general with ASA Monitors  Additional Monitors: arterial line  Airway Plan: ETT  Comment: Patient seen and examined  History reviewed  Patient to be done under general anesthesia with ETT and routine monitors  Risks discussed with the patient  Consent obtained          Plan Factors-Exercise tolerance (METS): >4 METS  Chart reviewed  Existing labs reviewed  Patient summary reviewed  Induction- intravenous  Postoperative Plan-     Informed Consent- Anesthetic plan and risks discussed with patient  I personally reviewed this patient with the CRNA  Discussed and agreed on the Anesthesia Plan with the CRNA  Keely Lee

## 2023-02-26 NOTE — QUICK NOTE
Due to miscommunication, patient's wife thought she would be permitted to stay over in patient's room 24/7  I reviewed with her our campus's visitation policy  She agreed to leave when her ride arrived  She has declined the overnight family room at this time  Due to policy, patient's wife is requesting transfer to a different facility  I informed her of the transfer process, in that it is not for a higher level of care, and an accepting provider would need to be located  She is aware of insurance risk and I informed her that this is unlikely to happen in the middle of the night  All questions answered

## 2023-02-26 NOTE — Clinical Note
Notified by neuroradiology of possible extension of right vertebral dissection through the basilar artery

## 2023-02-26 NOTE — PLAN OF CARE
Problem: MOBILITY - ADULT  Goal: Maintain or return to baseline ADL function  Description: INTERVENTIONS:  -  Assess patient's ability to carry out ADLs; assess patient's baseline for ADL function and identify physical deficits which impact ability to perform ADLs (bathing, care of mouth/teeth, toileting, grooming, dressing, etc )  - Assess/evaluate cause of self-care deficits   - Assess range of motion  - Assess patient's mobility; develop plan if impaired  - Assess patient's need for assistive devices and provide as appropriate  - Encourage maximum independence but intervene and supervise when necessary  - Involve family in performance of ADLs  - Assess for home care needs following discharge   - Consider OT consult to assist with ADL evaluation and planning for discharge  - Provide patient education as appropriate  Outcome: Progressing  Goal: Maintains/Returns to pre admission functional level  Description: INTERVENTIONS:  - Perform BMAT or MOVE assessment daily    - Set and communicate daily mobility goal to care team and patient/family/caregiver     - Collaborate with rehabilitation services on mobility goals if consulted  - Out of bed for toileting  - Record patient progress and toleration of activity level   Outcome: Progressing     Problem: PAIN - ADULT  Goal: Verbalizes/displays adequate comfort level or baseline comfort level  Description: Interventions:  - Encourage patient to monitor pain and request assistance  - Assess pain using appropriate pain scale  - Administer analgesics based on type and severity of pain and evaluate response  - Implement non-pharmacological measures as appropriate and evaluate response  - Consider cultural and social influences on pain and pain management  - Notify physician/advanced practitioner if interventions unsuccessful or patient reports new pain  Outcome: Progressing     Problem: INFECTION - ADULT  Goal: Absence or prevention of progression during hospitalization  Description: INTERVENTIONS:  - Assess and monitor for signs and symptoms of infection  - Monitor lab/diagnostic results  - Monitor all insertion sites, i e  indwelling lines, tubes, and drains  - Monitor endotracheal if appropriate and nasal secretions for changes in amount and color  - [place] appropriate cooling/warming therapies per order  - Administer medications as ordered  - Instruct and encourage patient and family to use good hand hygiene technique  - Identify and instruct in appropriate isolation precautions for identified infection/condition  Outcome: Progressing  Goal: Absence of fever/infection during neutropenic period  Description: INTERVENTIONS:  - Monitor WBC    Outcome: Progressing     Problem: SAFETY ADULT  Goal: Maintain or return to baseline ADL function  Description: INTERVENTIONS:  -  Assess patient's ability to carry out ADLs; assess patient's baseline for ADL function and identify physical deficits which impact ability to perform ADLs (bathing, care of mouth/teeth, toileting, grooming, dressing, etc )  - Assess/evaluate cause of self-care deficits   - Assess range of motion  - Assess patient's mobility; develop plan if impaired  - Assess patient's need for assistive devices and provide as appropriate  - Encourage maximum independence but intervene and supervise when necessary  - Involve family in performance of ADLs  - Assess for home care needs following discharge   - Consider OT consult to assist with ADL evaluation and planning for discharge  - Provide patient education as appropriate  Outcome: Progressing  Goal: Maintains/Returns to pre admission functional level  Description: INTERVENTIONS:  - Perform BMAT or MOVE assessment daily    - Set and communicate daily mobility goal to care team and patient/family/caregiver     - Collaborate with rehabilitation services on mobility goals if consulted  - Out of bed for toileting  - Record patient progress and toleration of activity level   Outcome: Progressing  Goal: Patient will remain free of falls  Description: INTERVENTIONS:  - Educate patient/family on patient safety including physical limitations  - Instruct patient to call for assistance with activity   - Consult OT/PT to assist with strengthening/mobility   - Keep Call bell within reach  - Keep bed low and locked with side rails adjusted as appropriate  - Keep care items and personal belongings within reach  - Initiate and maintain comfort rounds  - Make Fall Risk Sign visible to staff  - Apply yellow socks and bracelet for high fall risk patients  - Consider moving patient to room near nurses station  Outcome: Progressing     Problem: DISCHARGE PLANNING  Goal: Discharge to home or other facility with appropriate resources  Description: INTERVENTIONS:  - Identify barriers to discharge w/patient and caregiver  - Arrange for needed discharge resources and transportation as appropriate  - Identify discharge learning needs (meds, wound care, etc )  - Arrange for interpretive services to assist at discharge as needed  - Refer to Case Management Department for coordinating discharge planning if the patient needs post-hospital services based on physician/advanced practitioner order or complex needs related to functional status, cognitive ability, or social support system  Outcome: Progressing     Problem: Knowledge Deficit  Goal: Patient/family/caregiver demonstrates understanding of disease process, treatment plan, medications, and discharge instructions  Description: Complete learning assessment and assess knowledge base    Interventions:  - Provide teaching at level of understanding  - Provide teaching via preferred learning methods  Outcome: Progressing     Problem: NEUROSENSORY - ADULT  Goal: Achieves stable or improved neurological status  Description: INTERVENTIONS  - Monitor and report changes in neurological status  - Monitor vital signs such as temperature, blood pressure, glucose, and any other labs ordered   - Initiate measures to prevent increased intracranial pressure  - Monitor for seizure activity and implement precautions if appropriate      Outcome: Progressing  Goal: Achieves maximal functionality and self care  Description: INTERVENTIONS  - Monitor swallowing and airway patency with patient fatigue and changes in neurological status  - Encourage and assist patient to increase activity and self care     - Encourage visually impaired, hearing impaired and aphasic patients to use assistive/communication devices  Outcome: Progressing     Problem: CARDIOVASCULAR - ADULT  Goal: Maintains optimal cardiac output and hemodynamic stability  Description: INTERVENTIONS:  - Monitor I/O, vital signs and rhythm  - Monitor for S/S and trends of decreased cardiac output  - Administer and titrate ordered vasoactive medications to optimize hemodynamic stability  - Assess quality of pulses, skin color and temperature  - Assess for signs of decreased coronary artery perfusion  - Instruct patient to report change in severity of symptoms  Outcome: Progressing     Problem: METABOLIC, FLUID AND ELECTROLYTES - ADULT  Goal: Electrolytes maintained within normal limits  Description: INTERVENTIONS:  - Monitor labs and assess patient for signs and symptoms of electrolyte imbalances  - Administer electrolyte replacement as ordered  - Monitor response to electrolyte replacements, including repeat lab results as appropriate  - Instruct patient on fluid and nutrition as appropriate  Outcome: Progressing     Problem: HEMATOLOGIC - ADULT  Goal: Maintains hematologic stability  Description: INTERVENTIONS  - Assess for signs and symptoms of bleeding or hemorrhage  - Monitor labs  - Administer supportive blood products/factors as ordered and appropriate  Outcome: Progressing     Problem: MUSCULOSKELETAL - ADULT  Goal: Maintain or return mobility to safest level of function  Description: INTERVENTIONS:  - Assess patient's ability to carry out ADLs; assess patient's baseline for ADL function and identify physical deficits which impact ability to perform ADLs (bathing, care of mouth/teeth, toileting, grooming, dressing, etc )  - Assess/evaluate cause of self-care deficits   - Assess range of motion  - Assess patient's mobility  - Assess patient's need for assistive devices and provide as appropriate  - Encourage maximum independence but intervene and supervise when necessary  - Involve family in performance of ADLs  - Assess for home care needs following discharge   - Consider OT consult to assist with ADL evaluation and planning for discharge  - Provide patient education as appropriate  Outcome: Progressing

## 2023-02-26 NOTE — NURSING NOTE
Nurse attempted to attain repeat Blood cultures ordered, patient's wife refused to let the nurse draw blood for them because blood cultures had been obtained at previous facility  Nurse tried to explain that it was hospital protocol and patient has been showing signs of infection, but they were still refused

## 2023-02-27 ENCOUNTER — APPOINTMENT (INPATIENT)
Dept: NON INVASIVE DIAGNOSTICS | Facility: HOSPITAL | Age: 63
End: 2023-02-27

## 2023-02-27 LAB
ANION GAP SERPL CALCULATED.3IONS-SCNC: 6 MMOL/L (ref 4–13)
APICAL FOUR CHAMBER EJECTION FRACTION: 63 %
APTT PPP: 31 SECONDS (ref 23–37)
APTT PPP: 32 SECONDS (ref 23–37)
APTT PPP: 40 SECONDS (ref 23–37)
BUN SERPL-MCNC: 15 MG/DL (ref 5–25)
CA-I BLD-SCNC: 1.02 MMOL/L (ref 1.12–1.32)
CALCIUM SERPL-MCNC: 8.1 MG/DL (ref 8.3–10.1)
CHLORIDE SERPL-SCNC: 120 MMOL/L (ref 96–108)
CO2 SERPL-SCNC: 21 MMOL/L (ref 21–32)
CREAT SERPL-MCNC: 1.05 MG/DL (ref 0.6–1.3)
E WAVE DECELERATION TIME: 198 MS
ERYTHROCYTE [DISTWIDTH] IN BLOOD BY AUTOMATED COUNT: 13 % (ref 11.6–15.1)
GFR SERPL CREATININE-BSD FRML MDRD: 75 ML/MIN/1.73SQ M
GLUCOSE SERPL-MCNC: 114 MG/DL (ref 65–140)
HCT VFR BLD AUTO: 33.3 % (ref 36.5–49.3)
HGB BLD-MCNC: 10.8 G/DL (ref 12–17)
INR PPP: 1.05 (ref 0.84–1.19)
MAGNESIUM SERPL-MCNC: 2.1 MG/DL (ref 1.6–2.6)
MCH RBC QN AUTO: 28 PG (ref 26.8–34.3)
MCHC RBC AUTO-ENTMCNC: 32.4 G/DL (ref 31.4–37.4)
MCV RBC AUTO: 86 FL (ref 82–98)
MV E'TISSUE VEL-SEP: 7 CM/S
MV PEAK A VEL: 0.92 M/S
MV PEAK E VEL: 74 CM/S
MV STENOSIS PRESSURE HALF TIME: 57 MS
MV VALVE AREA P 1/2 METHOD: 3.86
PLATELET # BLD AUTO: 268 THOUSANDS/UL (ref 149–390)
PMV BLD AUTO: 9.4 FL (ref 8.9–12.7)
POTASSIUM SERPL-SCNC: 3.1 MMOL/L (ref 3.5–5.3)
PROTHROMBIN TIME: 14 SECONDS (ref 11.6–14.5)
RBC # BLD AUTO: 3.86 MILLION/UL (ref 3.88–5.62)
SL CV LV EF: 65
SODIUM SERPL-SCNC: 147 MMOL/L (ref 135–147)
TRICUSPID ANNULAR PLANE SYSTOLIC EXCURSION: 2.5 CM
WBC # BLD AUTO: 10.26 THOUSAND/UL (ref 4.31–10.16)

## 2023-02-27 RX ORDER — POTASSIUM CHLORIDE 14.9 MG/ML
20 INJECTION INTRAVENOUS 2 TIMES DAILY
Status: COMPLETED | OUTPATIENT
Start: 2023-02-27 | End: 2023-02-27

## 2023-02-27 RX ORDER — ASPIRIN 81 MG/1
81 TABLET, CHEWABLE ORAL DAILY
Status: DISCONTINUED | OUTPATIENT
Start: 2023-02-27 | End: 2023-03-06 | Stop reason: HOSPADM

## 2023-02-27 RX ORDER — POTASSIUM CHLORIDE 20 MEQ/1
60 TABLET, EXTENDED RELEASE ORAL ONCE
Status: COMPLETED | OUTPATIENT
Start: 2023-02-27 | End: 2023-02-27

## 2023-02-27 RX ORDER — HEPARIN SODIUM 10000 [USP'U]/100ML
3-24 INJECTION, SOLUTION INTRAVENOUS
Status: DISCONTINUED | OUTPATIENT
Start: 2023-02-27 | End: 2023-02-28

## 2023-02-27 RX ORDER — NICARDIPINE HYDROCHLORIDE 2.5 MG/ML
INJECTION INTRAVENOUS
Status: COMPLETED
Start: 2023-02-27 | End: 2023-02-27

## 2023-02-27 RX ORDER — AMLODIPINE BESYLATE 5 MG/1
5 TABLET ORAL DAILY
Status: DISCONTINUED | OUTPATIENT
Start: 2023-02-27 | End: 2023-02-28

## 2023-02-27 RX ORDER — POTASSIUM CHLORIDE 14.9 MG/ML
20 INJECTION INTRAVENOUS 2 TIMES DAILY
Status: DISCONTINUED | OUTPATIENT
Start: 2023-02-27 | End: 2023-02-27

## 2023-02-27 RX ORDER — ATORVASTATIN CALCIUM 40 MG/1
40 TABLET, FILM COATED ORAL
Status: DISCONTINUED | OUTPATIENT
Start: 2023-02-27 | End: 2023-03-06 | Stop reason: HOSPADM

## 2023-02-27 RX ADMIN — CHLORHEXIDINE GLUCONATE 0.12% ORAL RINSE 15 ML: 1.2 LIQUID ORAL at 09:23

## 2023-02-27 RX ADMIN — NICARDIPINE HYDROCHLORIDE 25 MG: 2.5 INJECTION, SOLUTION INTRAVENOUS at 16:46

## 2023-02-27 RX ADMIN — HEPARIN SODIUM 15 UNITS/KG/HR: 10000 INJECTION, SOLUTION INTRAVENOUS at 09:29

## 2023-02-27 RX ADMIN — CEFTRIAXONE SODIUM 1000 MG: 10 INJECTION, POWDER, FOR SOLUTION INTRAVENOUS at 13:25

## 2023-02-27 RX ADMIN — TICAGRELOR 90 MG: 90 TABLET ORAL at 20:52

## 2023-02-27 RX ADMIN — NICARDIPINE HYDROCHLORIDE 5 MG/HR: 2.5 INJECTION, SOLUTION INTRAVENOUS at 22:18

## 2023-02-27 RX ADMIN — ATORVASTATIN CALCIUM 40 MG: 40 TABLET, FILM COATED ORAL at 15:49

## 2023-02-27 RX ADMIN — AMLODIPINE BESYLATE 5 MG: 5 TABLET ORAL at 11:54

## 2023-02-27 RX ADMIN — NICARDIPINE HYDROCHLORIDE 7.5 MG/HR: 2.5 INJECTION, SOLUTION INTRAVENOUS at 18:39

## 2023-02-27 RX ADMIN — TICAGRELOR 180 MG: 90 TABLET ORAL at 10:28

## 2023-02-27 RX ADMIN — HYDRALAZINE HYDROCHLORIDE 10 MG: 20 INJECTION, SOLUTION INTRAMUSCULAR; INTRAVENOUS at 21:08

## 2023-02-27 RX ADMIN — POTASSIUM CHLORIDE 60 MEQ: 1500 TABLET, EXTENDED RELEASE ORAL at 11:54

## 2023-02-27 RX ADMIN — EPTIFIBATIDE 1 MCG/KG/MIN: 0.75 INJECTION INTRAVENOUS at 08:15

## 2023-02-27 RX ADMIN — CHLORHEXIDINE GLUCONATE 0.12% ORAL RINSE 15 ML: 1.2 LIQUID ORAL at 21:00

## 2023-02-27 RX ADMIN — POTASSIUM CHLORIDE 20 MEQ: 14.9 INJECTION, SOLUTION INTRAVENOUS at 06:56

## 2023-02-27 RX ADMIN — NICARDIPINE HYDROCHLORIDE 7.5 MG/HR: 2.5 INJECTION, SOLUTION INTRAVENOUS at 04:30

## 2023-02-27 RX ADMIN — NICARDIPINE HYDROCHLORIDE 7.5 MG/HR: 2.5 INJECTION, SOLUTION INTRAVENOUS at 08:15

## 2023-02-27 RX ADMIN — POTASSIUM CHLORIDE 20 MEQ: 14.9 INJECTION, SOLUTION INTRAVENOUS at 20:51

## 2023-02-27 RX ADMIN — NICARDIPINE HYDROCHLORIDE 25 MG: 2.5 INJECTION, SOLUTION INTRAVENOUS at 11:54

## 2023-02-27 RX ADMIN — ASPIRIN 81 MG CHEWABLE TABLET 81 MG: 81 TABLET CHEWABLE at 10:28

## 2023-02-27 NOTE — PROGRESS NOTES
Patient was successfully extubated earlier this evening  Patient maintained on Integrilin infusion at 1 mg with plan for transition to dual antiplatelet therapy in the morning  Goal blood pressures to be maintained at systolic [phone]  Cardene infusion for blood pressure control  On 2/27/2023 at 10:55 PM, on exam patient has flattening of the right base with attempted smile but does have movement of his right face, no hand  elicited with the right upper extremity, patient does have proximal movements with the right lower extremity he is able to internally and externally rotate his leg he is not wiggling his toes to verbal request   Left upper and lower extremity 5 out of 5 strength  He is able to gaze in all directions and tracks easily  He does not his head appropriately to questions and does verbalize simple 1-2 word responses  MRI completed tonight which shows cerebellar strokes with right greater than left stroke burden  Additional hypodensities on DWI appreciated left midbrain/pontine area and right lower medullary/spinal cord junction  Awaiting final MRI read  Continue with current supportive care  Maintain on Integrilin  Assess for initiation of dual antiplatelet therapy in a m  Critical care time 43 minutes, critical care time does not include procedures or family update

## 2023-02-27 NOTE — UTILIZATION REVIEW
NOTIFICATION OF ADMISSION DISCHARGE   This is a Notification of Discharge from [address]  Please be advised that this patient has been discharge from our facility  Below you will find the admission and discharge date and time including the patient’s disposition  UTILIZATION REVIEW CONTACT:  Altagracia Ward  Utilization   Network Utilization Review Department  Phone: [phone] carefully listen to the prompts  All voicemails are confidential   Email: [email or web address]     ADMISSION INFORMATION  PRESENTATION DATE: 2/25/2023  9:08 AM  OBERVATION ADMISSION DATE:   INPATIENT ADMISSION DATE: 2/25/23  1:33 PM   DISCHARGE DATE: 2/25/2023 11:17 PM   DISPOSITION:PRA - Acute Care    IMPORTANT INFORMATION:  Send all requests for admission clinical reviews, approved or denied determinations and any other requests to dedicated fax number below belonging to the campus where the patient is receiving treatment   List of dedicated fax numbers:  [address] DENIALS (Administrative/Medical Necessity) [phone]   [address] (Maternity/NICU/Pediatrics) [phone]   [facility] [phone]   [facility] [phone]   [address] [phone]   220 [facility] [phone]   90 [facility] [phone]   [address] [place] [address] 119 [phone]   [facility]  [phone]   405 [facility] [phone]   [address] [place] [address] [address] [facility] [phone]

## 2023-02-27 NOTE — PLAN OF CARE
Problem: PHYSICAL THERAPY ADULT  Goal: Performs mobility at highest level of function for planned discharge setting  See evaluation for individualized goals  Description: Treatment/Interventions: ADL retraining, Functional transfer training, LE strengthening/ROM, Elevations, Therapeutic exercise, Endurance training, Cognitive reorientation, Patient/family training, Equipment eval/education, Bed mobility, Gait training, Compensatory technique education, Continued evaluation, Spoke to nursing, Spoke to case management, Spoke to advanced practitioner, OT, Family  Equipment Recommended:  (TBD)       See flowsheet documentation for full assessment, interventions and recommendations  Note: Prognosis: Fair  Problem List: Decreased strength, Decreased range of motion, Decreased endurance, Impaired balance, Decreased mobility, Decreased coordination, Impaired judgement, Decreased safety awareness, Impaired sensation, Impaired tone  Assessment: Pt is 60 y o  male seen for PT evaluation s/p admit to [facility] on 2/25/2023  Pt presenting w/ dizziness and nausea and ultimately dx of Vertebral artery dissection ([facility] [address] )  Pt presents to ED with c/o generalized weakness, dizziness and nausea for 2 days, pt was found to have a cerebellar infarct and is now PPD 1 from intracranial and extracranial vertebral artery stenting  Pt now with dysarthria, right facial droop, and right hemiplegia  Pt was also found to be COVID +  Comorbidities affecting pt's functional performance at time of assessment include: UTI, Hypertensive emergency, stenosis of left vertebral artery       Due to acute medical issues, ongoing medical management in ICU; R hemiparesis; , fall risk, decreased activity tolerance compared to baseline, increased assistance needed from caregiver at current time, continuous telemetry/ O2  monitoring, multiple lines, decline in overall functional mobility status; health management issues; note unstable clinical picture (high complexity)   Prior to admission, pt was fully indep; working FT and not using AD for mobility  Lives w/ supportive spouse and adult kids in a [facility] ER w/ [facility]    Currently pt  is requiring modAx A for bed skills; maxAx2 for functional transfers; mod> max to maintain seated balance at EOB 2* R hemiparesis   Pt presents functioning below baseline and currently w/ overall mobility deficits 2* to: R sided strength AROM + coordination deficits;  limited flexibility;  generalized weakness/ deconditioning; decreased endurance; decreased activity tolerance; decreased coordination; impaired balance; sensory deficits; decreased safety awareness; SOB/POLANCO; fatigue; impaired safety and judgement; limited insight into current deficits; bed/ chair alarms; multiple lines; Pt currently at risk for falls  (Please find additional objective findings from PT assessment regarding body systems outlined above ) Pt will continue to benefit from skilled PT interventions to address stated impairments; to maximize functional potential; for ongoing pt/ family training; and DME needs  PT is currently recommending PMR consult and d/c to rehab to address deficits and maximize functional recovery  Pt/ family agreeable to plan and goals as stated on evaluation  PT Discharge Recommendation: Post acute rehabilitation services (PMR consult)    See flowsheet documentation for full assessment

## 2023-02-27 NOTE — NURSING NOTE
Around 1030 pt had increased dysarthria, became emotional and was hard to understand, face appeared asymmetrical but seemed to have corrected when asked to smile, tongue was midline, CCM resident notified, came to bedside to evaluate pt  Pt wife was at bedside, according to wife pt speech did not seem to have change  I asked the pt's wife to step out, I bathed the pt without assistance, the pt was able to help turn in bed  During this time pt was able to lift BLE off bed and sensation remained intact to all extremities, pt was able to stand with x1 assistance to transfer to the recliner, during the 1100 hr I was getting PTT lab for pt and Neuro PA was at bedside performing neuro assessment when new onset of RLE/RUE flaccidity and facial assymetry was appreciated  Stroke alert called by neuro pa   Pt transferred for Stat CT and to IR

## 2023-02-27 NOTE — CASE MANAGEMENT
Case Management Assessment    Patient name Mayito Del Castillo  Location ICU 09/ICU 09 MRN [id number]  : 1960 Date 2023       Current Admission Date: 2023  Current Admission Diagnosis:Right Vertebral artery dissection [facility])   Patient Active Problem List    Diagnosis Date Noted   • BRAULIO (acute kidney injury) ([facility] [address] ) 2023   • COVID 2023   • Cerebrovascular accident (CVA) ([facility] [address] ) 2023   • [facility]   • Stenosis of left vertebral artery 2023   • Hypertensive emergency 2023   • Dizziness 2023   • UTI (urinary tract infection) 2023   • Dysphagia 2023      LOS (days): 2  Geometric Mean LOS (GMLOS) (days):   Days to GMLOS:     OBJECTIVE:    Risk of Unplanned Readmission Score: 11         Current admission status: Inpatient       Preferred Pharmacy: No Pharmacies Listed  Primary Care Provider: No primary care provider on file  Primary Insurance: [employer]  Secondary Insurance:     ASSESSMENT:  Active Health Care Proxies    There are no active Health Care Proxies on file  Patient Information  Admitted from[de-identified] Home  Mental Status: Alert  During Assessment patient was accompanied by: Not accompanied during assessment  Assessment information provided by[de-identified] Spouse (pt is covid +)  Primary Caregiver: Self  Support Systems: Spouse/significant other  Home entry access options   Select all that apply : Stairs  Number of steps to enter home : 1  Type of Current Residence: [address] [place] home  Upon entering residence, is there a bedroom on the main floor (no further steps)?: Yes  Upon entering residence, is there a bathroom on the main floor (no further steps)?: Yes  Homeless/housing insecurity resource given?: N/A  Living Arrangements: Lives w/ Spouse/significant other  Activities of Daily Living Prior to Admission  Functional Status: Independent  Completes ADLs independently?: Yes  Ambulates independently?: Yes  Does patient use assisted devices?: No  Does patient currently own DME?: No (wife owns a sc,commode, walker, cane)  Does patient have a history of Outpatient Therapy (PT/OT)?: No  Does the patient have a history of Short-Term Rehab?: No  Does patient have a history of HHC?: No  Does patient currently have [place] [address]?: No  Patient Information Continued  Income Source: Employed  Food insecurity resource given?: N/A  Does patient receive dialysis treatments?: No  Does patient have a history of substance abuse?: No  Does patient have a history of Mental Health Diagnosis?: No  Means of Transportation  Means of Transport to [facility] [place][de-identified] Drives Self  Was application for public transport provided?: N/A        Pt is covid +

## 2023-02-27 NOTE — ASSESSMENT & PLAN NOTE
PPD 1 from intracranial and extracranial vertebral artery stenting (Dr Adelina Pickens 2/26/2023)   · TICI 2B revascularization   · Patient presented with dysarthria and dizziness  · found to have bilateral cerebellar infarct with significant R V3/4 thrombus, L vertebral artery stenosis with possible occlusion within intradural segment  · NIHSS 0 with symptom onset 2/23 progressed to dysarthria, left facial droop not corrected with smile, R side flaccid    Imaging:  · MRI brain 2/26/2023: Acute posterior circulation infarcts within the right cerebellar hemisphere with new brainstem infarcts  Overall infarct burden slightly increased from prior study although new infarcts noted in posterior medulla on the right right midbrain and left occipital lobe  No hydrocephalus or parenchymal hemorrhage  Abnormal left vertebral artery flow void at the lateral mass of C1 related to slow flow or vascular occlusion  Plan:  · Continue to closely monitor symptoms   · Integrilin 1mcg/kg/hr  · Plan to transition to DAPT once cleared by speech therapy   · ASA 81mg   · Brilinta 180mg load with 90mg BID starting with evening dose  · Plan to staop Integrilin 1 hour after brilinta load  · Hep gtt  · Stopped at 1630 2/26  · Restarted today- neuro protocol without bolus  · Plan to transition to eliquis 2 5mg BID tomorrow   · STAT CTH for decline or change in neurological status  · Ongoing medical management   · Continue BP goal as ordered   · Neurology following for ongoing stroke care  · Neurosurgery will continue to follow closely

## 2023-02-27 NOTE — SPEECH THERAPY NOTE
Speech Language/Pathology    Speech/Language Pathology Progress Note    Patient Name: Silver Carrillo  NZGVM'U Date: 2/27/2023     Problem List  Principal Problem:    Right Vertebral artery dissection [facility])  Active Problems:    Cerebellar infarct [facility])    Stenosis of left vertebral artery    Hypertensive emergency    UTI (urinary tract infection)       Past Medical History  History reviewed  No pertinent past medical history  Past Surgical History  History reviewed  No pertinent surgical history  Subjective:  "Better than yesterday"  Pt upright in bed, states he is dry  Current Diet: NPO   Objective:  Patient  underwent a right vertebral/basilar thrombectomy with stenting 2/26/23, intubated for procedure and extubated shortly after  Today his speech is mild dysarthric and slow to respond but he is able to follow commands and answer ?s  Pt seen for ongoing dx dysphagia tx, trialed w/ ice,  puree, soft solids, nectar thick and thin liquids (4 oz of each)  Improved retrieval, adequate containment of all material w/o anterior loss  Some mild oral holding of the puree/liquids, solids  P        Assessment:  Pt w/ improved mentation, still w/ mild dysarthria  Improved oral manipulation and transfers w/ mild/mod suspected delay in swallow and reduced airway protection during the swallow w/ thin  Noted mild oral holding and piecemeal transfers at times  Plan/Recommendations:  Begin level 3 w/ nectar thick  Make sure oral cavity is clear following po   Assist w/ feeding  Will follow

## 2023-02-27 NOTE — UTILIZATION REVIEW
Initial Clinical Review    Admission: Date/Time/Statement:   Admission Orders (From admission, onward)     Ordered        02/25/23 1333  INPATIENT ADMISSION  Once                      Orders Placed This Encounter   Procedures   • INPATIENT ADMISSION     Standing Status:   Standing     Number of Occurrences:   1     Order Specific Question:   Level of Care     Answer:   Critical Care [15]     Order Specific Question:   Estimated length of stay     Answer:   More than 2 Midnights     Order Specific Question:   Certification     Answer:   I certify that inpatient services are medically necessary for this patient for a duration of greater than two midnights  See H&P and MD Progress Notes for additional information about the patient's course of treatment  ED Arrival Information     Expected   -    Arrival   2/25/2023 09:03    Acuity   Urgent            Means of arrival   Wheelchair    Escorted by   Family Member    Service   Critical Care/ICU    Admission type   Emergency            Arrival complaint   weakness            Chief Complaint   Patient presents with   • Weakness - Generalized     Patient presents to the ED with c/o generalized weakness and not feeling well x2 days, states wife recently had noro virus  Initial Presentation: 60 y o  male from home to ED admitted inpatient due to hypertensive emergency/acute, subacute right posterior cerebellar stroke with possible dissection of right cervical vertebral artery/Left vertebral artery stenosis/Pyria, ? UTI/BRAULIO/COVID 19  Presented due to weakness, dizziness and nausea starting 2 days prior to arrival, difficulty with ambulation, episode of vomiting   + headaches  On exam appears ill  Mucous membranes dry  Hypertensive  Visual acuity 20/50, did not have glasses  NIHSS 0  SARS-COV-2 positive  Bun 23, creatinine 1 32    Cta head and neck showed infarcts right posterior cerebellum  Tapering and occlusion of distal right cervical vertebral artery  Severe left vertebral artery stenosis  Not a thrombolytic candidate  After discussion with neurology, started on asa and Plavix  In the ED given IVF bolus, started on ceftriaxone, Labetalol IV given, started on IVF and Cardene gtt  Plan is  Continue Cardene gtt with goal sbp decrease to 200 in 6 hours  Neuro checks every hour  MRI brain  Continue asa and Plavix  Continued ceftriaxone and follow cultures  IVF and trend BMP  No treatment COVID 19, monitor for hypoxia  Neurology consulted  2/25/23 per vascular - Patient with possible vertebral artery dissection and recommend neurosurgery consult  2/25/23 per neurology - patient with "NCHCT showed subacute appearing small R cerebellar strokes, as well as small L vermian stroke  Small age-indeterminate L thalamic hypodensity as well  CTA head and neck showed R vertebral artery occlusion vs dissection  L vertebral artery possibly terminating in PICA (nl variant) vs distal occlusion  Basilar artery and P1/2 segments open  "  plan is neuro checks, telemetry, avoid hypotension,  - 200  Load with asa and Plavix, then daily  Start atrovastatin  MRI brain  Echo       2/25/23 per neurosurgery - patient with concern of right v3/4 thrombus  Recommend transfer to B with repeat Cta and ctp  Cont sbp 160-200  Would start hep gtt with ptt goal 60-90 and cont asa  Obtain p2y12 to assess plavix load  2/25/23 discharged 2317 to [facility] as higher level of care due to need neurosurgery evaluation   MRI brain and MRA with possible progression of the R vertebral artery thrombus, and possible involvement of the basilar artery       ED Triage Vitals   Temperature Pulse Respirations Blood Pressure SpO2   02/25/23 0911 02/25/23 0918 02/25/23 0918 02/25/23 0918 02/25/23 0918   (!) 97 2 °F (36 2 °C) 64 16 (!) 232/102 99 %      Temp Source Heart Rate Source Patient Position - Orthostatic VS BP Location FiO2 (%)   02/25/23 0911 02/25/23 0918 02/25/23 7755 02/25/23 0918 --   Temporal Monitor Lying Left arm       Pain Score       02/25/23 0911       No Pain          Wt Readings from Last 1 Encounters:   02/27/23 78 9 kg (173 lb 15 1 oz)     Additional Vital Signs:   02/25/23 2300 -- 85 17 184/86 Abnormal  123 203/71 109 mmHg -- -- --   02/25/23 2200 -- 81 19 199/93 Abnormal  134 -- -- 99 % -- --   02/25/23 2100 -- 79 17 201/94 Abnormal  135 -- -- 99 % -- Lying   02/25/23 2000 98 2 °F (36 8 °C) 70 13 177/84 Abnormal  120 -- -- 95 % -- --   02/25/23 1900 -- 78 18 217/103 Abnormal  148 -- -- 99 % -- --   02/25/23 1800 -- 79 -- 198/89 Abnormal  115 -- -- 99 % None (Room air) Lying   02/25/23 1700 -- 75 20 196/89 Abnormal  128 -- -- 97 % -- --   02/25/23 1600 -- 78 24 Abnormal  187/88 Abnormal  126 -- -- -- -- --   02/25/23 1500 -- 79 24 Abnormal  218/136 Abnormal  165 -- -- 97 % -- --   02/25/23 1430 98 4 °F (36 9 °C) 74 16 198/91 Abnormal  136 -- -- 98 % None (Room air) Lying   02/25/23 1400 -- 74 15 224/112 Abnormal  160 -- -- 98 % -- --   02/25/23 1300 -- 71 16 229/110 Abnormal  158 -- -- 98 % -- --   02/25/23 1200 -- 85 15 252/115 Abnormal  165 -- -- 98 % -- --   02/25/23 1100 -- 68 19 226/108 Abnormal  155 -- -- 99 % None (Room air) --   02/25/23 1000 -- 63 22 218/103 Abnormal  148 -- -- 96 %       Pertinent Labs/Diagnostic Test Results:   MRA head wo contrast   Final Result by Aby Hassan MD (02/25 2025)      Abscess of flow related signal in the V4 segments of the bilateral vertebral arteries, throughout the basilar artery and left posterior cerebral artery, concerning for progression of dissection and/or thrombus  Recommend emergent interventional    radiology consultation               I personally discussed this study with Jaye Go   on 2/25/2023 at 8:15 PM                    Workstation performed: [id number]         MRI brain wo contrast   Final Result by Aby Hassan MD (02/25 1957)      Acute early subacute infarcts throughout the right greater than left cerebellar hemispheres and anterior vermis, consistent with findings on the head CT  No hemorrhagic conversion or mass effect  Arthrotomy 8      Workstation performed: [id number]         MRA carotids wo contrast   Final Result by Adilene Montenegro MD (02/25 2025)         1  There complete absence of flow related signal along the cervical segment of the right vertebral artery concerning for progression of dissection and/or retrograde flow  2   Absence of flow related signal in the V4 segments of the bilateral vertebral arteries and throughout the basilar artery, also concerning for progression of dissection  I personally discussed this study with Kameron Giordano on 2/25/2023 at 8:24 PM                Workstation performed: [id number]         XR follow up   Final Result by Brandon Cullen MD (02/25 2064)      No radiopaque orbital foreign body  Workstation performed: [id number]         CTA head and neck with and without contrast   Final Result by Gwenn Gottron, MD (02/25 1236)      No acute intracranial hemorrhage  Focal infarcts in the right posterior cerebellum, possibly acute to subacute  Follow-up MRI imaging is recommended  Marked tapering and occlusion of the distal right cervical vertebral artery  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  Proximal right intradural vertebral artery is also occluded with faint visualization of the post-PICA segment, which may be    filling in a retrograde fashion  Severe left vertebral artery origin stenosis  Left vertebral artery may terminate in a posterior for cerebellar artery branch as the post-PICA segment is not identified  Small basilar artery with fetal right PCA circulation  Left proximal PCA is patent but small  Mid to distal left PCA is not well visualized               I personally discussed this study with Anna Winn on 2/25/2023 at 12:21 PM  Workstation performed: [id number]         XR chest 1 view portable   Final Result by Edin Bhat MD (02/25 1956)      No acute abnormality however there is an opacity in the left lung base that may represent focal pleural thickening  Neoplasm cannot be excluded  Nonemergent follow-up PA and lateral views of the chest or CT chest suggested unless there are prior    studies available for comparison  Findings for this inpatient marked for immediate notification in Epic                    Workstation performed: [id number]           2/25/23 ecg Previous ECG:  Unavailable   Rate:     ECG rate:  70   Rhythm:     Rhythm: sinus rhythm     ST segments:     ST segments:  Abnormal     Depression:  II, III, aVF, V5 and V6   T waves:     T waves: inverted       Inverted:  II, III, aVF, V6 and V5   Other findings:     Other findings: prolonged qTc interval   Results from last 7 days   Lab Units 02/25/23  0940   SARS-COV-2  Positive*     Results from last 7 days   Lab Units 02/26/23  0506 02/26/23  0021 02/25/23  0940   WBC Thousand/uL 13 27* 17 07* 9 99   HEMOGLOBIN g/dL 13 3 13 6 14 4   HEMATOCRIT % 40 2 40 8 44 0   PLATELETS Thousands/uL 349 344 326   NEUTROS ABS Thousands/µL 11 92*  --  8 69*     Results from last 7 days   Lab Units 02/27/23  0429 02/26/23  0506 02/25/23  2044 02/25/23  0940   SODIUM mmol/L 147 143 141 142   POTASSIUM mmol/L 3 1* 3 4* 3 3* 3 5   CHLORIDE mmol/L 120* 115* 109* 108   CO2 mmol/L 21 21 24 24   ANION GAP mmol/L 6 7 8 10   BUN mg/dL 15 17 18 23   CREATININE mg/dL 1 05 1 09 1 15 1 32*   EGFR ml/min/1 73sq m 75 71 67 57   CALCIUM mg/dL 8 1* 8 8 8 4 8 8   CALCIUM, IONIZED mmol/L 1 02*  --   --   --    MAGNESIUM mg/dL 2 1  --   --   --      Results from last 7 days   Lab Units 02/26/23  0506 02/25/23  0940   AST U/L 13 16   ALT U/L 24 19   ALK PHOS U/L 63 55   TOTAL PROTEIN g/dL 7 0 7 3   ALBUMIN g/dL 3 2* 3 8   TOTAL BILIRUBIN mg/dL 0 68 0 60     Results from last 7 days Lab Units 02/27/23  0429 02/26/23  0506 02/25/23  2044 02/25/23  0940   GLUCOSE RANDOM mg/dL 114 127 110 124     Results from last 7 days   Lab Units 02/25/23  1304   HEMOGLOBIN A1C % 5 7*   EAG mg/dl 117     Results from last 7 days   Lab Units 02/25/23  2044   CK TOTAL U/L 41     Results from last 7 days   Lab Units 02/25/23  1323 02/25/23  1156 02/25/23  0940   HS TNI 0HR ng/L  --   --  37   HS TNI 2HR ng/L  --  41  --    HSTNI D2 ng/L  --  4  --    HS TNI 4HR ng/L 38  --   --    HSTNI D4 ng/L 1  --   --      Results from last 7 days   Lab Units 02/25/23  2044   D-DIMER QUANTITATIVE ug/ml FEU 1 44*     Results from last 7 days   Lab Units 02/26/23  1133 02/26/23  0506 02/26/23  0021 02/25/23  0940   PROTIME seconds  --   --  14 0 13 7   INR   --   --  1 06 0 98   PTT seconds 69* 33 31 29     Results from last 7 days   Lab Units 02/26/23  0506 02/25/23  2044   PROCALCITONIN ng/ml 0 08 <0 05     Results from last 7 days   Lab Units 02/25/23  2044   LACTIC ACID mmol/L 1 2     Results from last 7 days   Lab Units 02/25/23  2044   BNP pg/mL 191*     Results from last 7 days   Lab Units 02/25/23  2044   HEP B S AG  Non-reactive   HEP C AB  Non-reactive   HEP B C IGM  Non-reactive   HEP B C TOTAL AB  Non-reactive     Results from last 7 days   Lab Units 02/25/23  2044   CRP mg/L 5 3*     Results from last 7 days   Lab Units 02/25/23  1155   CLARITY UA  Cloudy   COLOR UA  Light Yellow   SPEC GRAV UA  <1 005*   PH UA  6 5   GLUCOSE UA mg/dl Negative   KETONES UA mg/dl 10 (1+)*   BLOOD UA  Negative   PROTEIN UA mg/dl Trace*   NITRITE UA  Positive*   BILIRUBIN UA  Negative   UROBILINOGEN UA (BE) mg/dl <2 0   LEUKOCYTES UA  Large*   WBC UA /hpf 20-30*   RBC UA /hpf 0-1   BACTERIA UA /hpf Innumerable*   EPITHELIAL CELLS WET PREP /hpf Occasional     Results from last 7 days   Lab Units 02/25/23  0940   INFLUENZA A PCR  Negative   INFLUENZA B PCR  Negative   RSV PCR  Negative     Results from last 7 days   Lab Units 02/25/23 2044   BLOOD CULTURE  Received in Microbiology Lab  Culture in Progress  Received in Microbiology Lab  Culture in Progress  ED Treatment:   Medication Administration from 02/25/2023 0903 to 02/25/2023 1423       Date/Time Order Dose Route Action Comments     02/25/2023 0942 EST sodium chloride 0 9 % bolus 1,000 mL 1,000 mL Intravenous New Bag --     02/25/2023 1251 EST cefTRIAXone (ROCEPHIN) IVPB (premix in dextrose) 1,000 mg 50 mL 1,000 mg Intravenous New Bag --     02/25/2023 1302 EST aspirin tablet 325 mg 325 mg Oral Given --     02/25/2023 1302 EST clopidogrel (PLAVIX) tablet 300 mg 300 mg Oral Given --     02/25/2023 1304 EST LORazepam (ATIVAN) injection 0 5 mg 0 5 mg Intravenous Given --     02/25/2023 1304 EST labetalol (NORMODYNE) injection 10 mg 10 mg Intravenous Given --     02/25/2023 1323 EST sodium chloride 0 9 % infusion 150 mL/hr Intravenous New Bag --     02/25/2023 1406 EST niCARdipine (CARDENE) 25 mg (STANDARD CONCENTRATION) in sodium chloride 0 9% 250 mL 5 mg/hr Intravenous New Bag --        History reviewed  No pertinent past medical history    Present on Admission:  • BRAULIO (acute kidney injury) ([facility] )  • COVID  • Cerebellar infarct [facility])  • Right Vertebral artery dissection (HCC)  • Stenosis of left vertebral artery  • Hypertensive emergency  • Dizziness  • UTI (urinary tract infection)      Admitting Diagnosis: Dizziness [R42]  UTI (urinary tract infection) [N39 0]  Weakness [R53 1]  CVA (cerebral vascular accident) ([facility] ) [I63 9]  COVID-19 [U07 1]  Age/Sex: 60 y o  male  Admission Orders: 2/25/23 1333 inpatient   Scheduled Medications:  Medication   • niCARdipine (CARDENE) 25 mg (STANDARD CONCENTRATION) in sodium chloride 0 9% 250 mL Rate:  mL/hr Dose: 1-15 mg/hr  Freq: Titrated Route: IV   • aspirin (ECOTRIN LOW STRENGTH) EC tablet 81 mg po daily    • chlorhexidine (PERIDEX) 0 12 % oral rinse 15 mL Freq: Every 12 hours scheduled Route: MT   • clopidogrel (PLAVIX) tablet 75 mg  Freq: Daily Route: PO   • sodium chloride 0 9 % infusion Rate: 150 mL/hr Dose: 150 mL/hr  Freq: Continuous Route: IV     LORazepam (ATIVAN) injection 0 5 mg at 1304, 730, 1755  Dose: 0 5 mg  Freq: Once Route: IV    potassium chloride 20 mEq IVPB (premix)  Dose: 20 mEq  Freq: Every 2 hours Route: IV  Last Dose: 20 mEq (02/25/23 2044)  Start: 02/25/23 1830 End: 02/25/23 2244    Network Utilization Review Department  ATTENTION: Please call with any questions or concerns to [phone] and carefully listen to the prompts so that you are directed to the right person  All voicemails are confidential   Cal Queen all requests for admission clinical reviews, approved or denied determinations and any other requests to dedicated fax number below belonging to the campus where the patient is receiving treatment   List of dedicated fax numbers for the Facilities:  [address] DENIALS (Administrative/Medical Necessity) [phone]   [address] (Maternity/NICU/Pediatrics) [phone]   915 Kathia Brink [phone]   [facility] [phone]   [address] Abhis[place]k [address] Corwin Thomas 28 [phone]   [address] [place] Haider Golden [place] 134 815 [facility] [phone]

## 2023-02-27 NOTE — OCCUPATIONAL THERAPY NOTE
Occupational Therapy Evaluation     Patient Name: Jeancarlos Jarrell  QLXLV'L Date: 2/27/2023  Problem List  Principal Problem:    Right Vertebral artery dissection [facility])  Active Problems:    Cerebrovascular accident (CVA) ([address] )    Stenosis of left vertebral artery    Hypertensive emergency    UTI (urinary tract infection)    Past Medical History  History reviewed  No pertinent past medical history  Past Surgical History  History reviewed  No pertinent surgical history  02/27/23 1025   OT Last Visit   OT Visit Date 02/27/23   Note Type   Note type Evaluation   Pain Assessment   Pain Score No Pain   Restrictions/Precautions   Weight Bearing Precautions Per Order No   Other Precautions Contact/isolation; Airborne/isolation; Chair Alarm;Multiple lines;Telemetry; Fall Risk   Home Living   Type of [address] [place] [address] Two level;1/2 bath on main level;Bed/bath upstairs;Stairs to enter with rails   Bathroom Shower/Tub Tub/shower unit   Bathroom Toilet Standard   Bathroom Accessibility Accessible   Additional Comments Pt lives in a 2 story home with 1 SHANNON, FFOS to second floor where bed/bath are located however does have 1/2 bath on main level  Denies DME PTA   Prior Function   Level of [place] Independent with ADLs; Independent with functional mobility; Independent with IADLS   Lives With Spouse; Family   Receives Help From Family   IADLs Independent with driving; Independent with meal prep; Independent with medication management   Falls in the last 6 months 0   Vocational Full time employment   Lifestyle   Autonomy I with ADLS and IADLS +    Reciprocal Relationships supportive wife and 2 adult children   Service to Others Works full time as an    [employer] [address] "I work 6 days a week, I dont have time for anyhting else"   Subjective   Subjective "I can't move that side"   ADL   Where Assessed Chair   Eating Assistance 4  Minimal Assistance   Grooming Assistance 4  Minimal Assistance   UB Bathing Assistance 3  Moderate Assistance   LB Bathing Assistance 2  Maximal Assistance   UB Dressing Assistance 3  Moderate Assistance   LB Dressing Assistance 2  Maximal Assistance   Toileting Assistance  2  Maximal Assistance   Bed Mobility   Supine to Sit 3  Moderate assistance   Additional items Assist x 1   Additional Comments Sat EOB with overall Mod A- OOB at end of sesssion   Transfers   Sit to Stand 2  Maximal assistance   Additional items Assist x 2   Stand to Sit 2  Maximal assistance   Additional items Assist x 2   Stand pivot 2  Maximal assistance   Additional items Assist x 2   Additional Comments HHA   Functional Mobility   Functional Mobility 2  Maximal assistance   Additional Comments Ax2 step toward chair   Additional items Hand hold assistance   Balance   Static Sitting Poor   Dynamic Sitting Poor -   Static Standing Poor -   Dynamic Standing Poor -   Ambulatory Zero   Activity Tolerance   Activity Tolerance Patient limited by fatigue   Medical Staff Made Aware DPT, NSG aware   Nurse Made Aware yes   RUE Assessment   RUE Assessment X  (0/5 shoulder, 1/5 elbow, unable to grasp)   LUE Assessment   LUE Assessment WFL   Hand Function   Gross Motor Coordination Impaired   Fine Motor Coordination Impaired   Hand Function Comments RUE hemiplegia   Sensation   Light Touch Partial deficits in the RUE   Sharp/Dull Partial deficits in the RUE   Additional Comments reports "tingling" in RUE, RLU and R side of face   Proprioception   Proprioception Partial deficits in the RLE   Vision-Basic Assessment   Current Vision Wears glasses all the time   Vision - Complex Assessment   Ocular Range of Motion Intact   Tracking Intact   Psychosocial   Psychosocial (WDL) X   Patient Behaviors/Mood Flat affect; Sad   Cognition   Overall Cognitive Status WFL   Arousal/Participation Alert; Responsive; Cooperative   Attention Attends with cues to redirect   Orientation Level Oriented X4   Memory Within functional limits   Following Commands Follows one step commands without difficulty   Comments Overall pleasant and cooperative, flat affect and emotionally labile at times, becomes tearful with increased emotional support provided   Assessment   Limitation Decreased ADL status; Decreased UE ROM; Decreased UE strength;Decreased Safe judgement during ADL;Decreased endurance;Decreased sensation;Decreased fine motor control;Decreased self-care trans;Decreased high-level ADLs   Prognosis Good   Assessment Pt is a 60 y o  male seen for OT evaluation s/p admit to [facility] on 2/25/2023 w/ Vertebral artery dissection ([facility] )  Pt presents to ED with c/o generalized weakness, dizziness and nausea for 2 days, pt was found to have a cerebellar infarct and is now PPD 1 from intracranial and extracranial vertebral artery stenting  Pt now with dysarthria, right facial droop, and right hemiplegia  Pt was also found to be COVID +  Comorbidities affecting pt's functional performance at time of assessment include: UTI, Hypertensive emergency, stenosis of left vertebral artery  Personal factors affecting pt at time of IE include:steps to enter environment, limited home support, difficulty performing ADLS, difficulty performing IADLS , decreased initiation and engagement  and health management   Prior to admission, pt was I with all ADLS and IADLS  Upon evaluation: Pt presents supine and is agreeable to OTIE  All vitals WNL  Pt requires overall Max A x 2, 2* the following deficits impacting occupational performance: weakness, decreased ROM, decreased strength, decreased balance, decreased tolerance, impaired GMC, impaired [address], impaired sensation, impaired initiation, impaired problem solving, decreased safety awareness and decreased coping skills  Pt resting in chair at end of session with all needs in reach, alarm on, all lines in place and SCD's on   Pt to benefit from continued skilled OT tx while in the hospital to address deficits as defined above and maximize level of functional independence w ADL's and functional mobility  Occupational Performance areas to address include: eating, grooming, bathing/shower, toilet hygiene, dressing, health maintenance, functional mobility, community mobility, clothing management and social participation  The patient's raw score on the AM-PAC Daily Activity inpatient short form is 13, standardized score is 32 03, less than 39 4  Patients at this level are likely to benefit from discharge to post-acute rehabilitation services  Please refer to the recommendation of the Occupational Therapist for safe discharge planning  Goals   Patient Goals To get better   Plan   Treatment Interventions ADL retraining;Functional transfer training;UE strengthening/ROM; Endurance training;Patient/family training;Neuromuscular reeducation; Fine motor coordination activities; Compensatory technique education;Continued evaluation; Energy conservation; Activityengagement   Goal Expiration Date 03/13/23   OT Frequency 3-5x/wk   Recommendation   Recommendation (S)    (PM&R)   OT Discharge Recommendation Post acute rehabilitation services   [facility] Daily Activity Inpatient   Lower Body Dressing 2   Bathing 2   Toileting 2   Upper Body Dressing 2   Grooming 2   Eating 3   Daily Activity Raw Score 13   Daily Activity Standardized Score (Calc for Raw Score >=11) 32 03   AM-PAC Applied Cognition Inpatient   Following a Speech/Presentation 3   Understanding Ordinary Conversation 4   Taking Medications 4   Remembering Where Things Are Placed or Put Away 4   Remembering List of 4-5 Errands 3   Taking Care of Complicated Tasks 3   Applied Cognition Raw Score 21   Applied Cognition Standardized Score 44 3     OT goals to be addressed in the next 14 days:    Pt will increase activity tolerance to G for 30 min txment sessions    Pt will complete UB/LB self care w/ mod I using adaptive device and DME as needed    Pt will complete toileting w/ mod I w/ G hygiene/thoroughness using DME as needed    Pt will improve functional transfers to Mod I on/off all surfaces using DME as needed w/ G balance/safety     Pt will improve functional mobility during ADL/IADL/leisure tasks to Mod I using DME as needed w/ G balance/safety     Pt will participate in simulated IADL management task with DME as needed to increase independence to  w/ G safety and endurance    Pt will demonstrate G carryover of pt/caregiver education and training as appropriate  Pt will independently identify and utilize 2-3 coping strategies to increase positive affect and promote overall well-being

## 2023-02-27 NOTE — OP NOTE
OPERATIVE REPORT  PATIENT NAME: Kin Lockhart    :  1960  MRN:  [id number]   Pt Location: Interventional radiology     SURGERY DATE: 23      Preop Diagnosis:  1  Stroke, and v3/4 large Vessel Occlusion  2  Hypertension        Postop Diagnosis  1  Stroke, and v3/4 large Vessel Occlusion  2  Hypertension        Procedure:  1  Right vertebral artery arteriogram   2  Right vertebral/basilar thrombectomy with stent retriever and aspiration catheter   3  Right vertebral artery and basilar angioplasty with 2 25 mm balloon   4  Right V4 angioplasty and stenting with drug-eluting Michael [place] balloon 2 5 mm x 18 mm   5  V3 stenting of dissection with a 4 mm by enterprises stent   6  Intraprocedural and postprocedural arteriography   7   right subclavian arteriogram   8 Limited Right radial arteriogram       Surgeon:   Nikki Vasques MD     Specimen(s):  None     Estimated Blood Loss:   None     Drains:  None     Anesthesia Type:   General anesthesia     Complications:  None     Operative Indications:  Kin Lockhart is a very unfortunate male who presented with 2 days of progressive dysarthria, dizziness, and imbalance  He was found to have a right vertebral artery dissection and occlusion in the context of incidental COVID  Initially his NIHSS was 1  However over the course of the day he was found to have a deteriorating exam with new right-sided weakness  After extensive conversation with his wife and family regarding the risks associated with attempted revascularization including bleeding, worsening stroke symptoms and death they like to proceed  Procedure Details:  After obtaining written informed consent, the patient was brought into the operating room and moved to the OR table in supine fashion  Right radial artery was prepped in the usual sterile fashion  Using ultrasound to guide and gain access to the right radial artery needle was placed and a 6 [place] introducer sheath was placed  Next a cocktail of verapamil and nitroglycerin was administered into the right radial artery  The short 6 [place] sheath was then exchanged for an 80 cm ballast sheath  Next, the Zoom 071 aspiration catheter was advanced through the sheath and over a marksman with a Synchro 2 wire into the right vertebral artery  Right vertebral artery was then catheterized and arteriogram was performed  This demonstrated complete occlusion of the right vertebral artery at the level of C1  I then attempted to cross the occlusion which I was successfully able to do with the microcatheter  I was unable to pass the intermediate aspiration catheter and distal to this occlusion however an arteriogram through the microcatheter was performed  Next a 6 x 40 mm Solitaire stent retriever was placed and allowed to sit in place for 5 minutes  Under constant aspiration this was then removed and repeat arteriogram was performed    There remains continued occlusion in the vertebral basilar circulation  This process was then repeated and while the stent retriever was up arteriogram was performed  This demonstrated some opening of the proximal vessel and mild distal flow  There were significant areas of stenosis and likely dissection along the distal V3 and proximal V4 segment  Under constant aspiration the stent retriever was removed  Repeat arteriogram was performed and once again demonstrated complete occlusion  At this juncture I elected to attempt an angioplasty  A 2 25 [place] balloon was then advanced  I then sequentially performed angioplasty of the V4 and V3 segments  Described to bradycardia without any evidence of hypotension  After completing this a repeat arteriogram was performed  This was unsatisfactory for restoration of flow  Given multiple failures of being able to establish continued flow I elected to proceed with placement of intracranial stenting    The patient was then bolused Integrilin of 90 mcg/kg and started on the drip of 1 mcg/kg/min  Next a Michael frontier 2 5 x 18 mm balloon mounted stent was advanced into the V4 segment proximal to PICA  This was inflated to nominal pressure  Once deployed the delivery catheter was removed  An arteriogram was performed  This juncture there was evidence of restoration of flow however the V3 flap was still stenotic and flow-limiting  Given the ability of the prior stent retriever to restore flow I elected to place a 4 mm x 23 mm [place] stent along this area of dissection/occlusion  This was successfully placed  Arteriography was performed which demonstrated restoration of flow with a clear area of stenosis  Though I entertained repeat balloon mounted stenting given the amount of metal/stent in the segment I elected to continue antiplatelet therapy and monitor him at this juncture  Delayed arteriography was performed and showed continued flow  The sheath was then withdrawn under fluoroscopy and a right subclavian arteriogram was performed  The sheath was then removed and a TR band was placed for hemostasis  The patient was then brought to CT scan in stable but critical condition  INTERPRETATION OF ANGIOGRAPHIC FINDINGS:   1 right vertebral artery and cessation of flow along the horizontal segment of C1  There is no evidence of intracranial flow  2   First-pass thrombectomy of the V3 and V4 segments demonstrate no restoration of flow  Continued TICI 0    3  Arteriogram performed prior to removal of stent retriever with a stent retriever opened along the V3 and V4 segments demonstrates restoration of flow with an area of significant stenosis along the V4 segment, proximal to PICA  There also appears to be a flap/dissection in the horizontal segment of V3     4   Post second pass thrombectomy demonstrates continue cessation of flow along the V3 segment  There is no evidence of intracranial flow      5   Post [place] angioplasty arteriogram demonstrates continued occlusion at the V3 segment without any intracranial flow  6   Post placement of [place] frontier drug-eluting stent in the V4 segment proximal to PICA demonstrates restoration of flow with significant stenosis and flow limitation along the area of V3     7   Postplacement of enterprises stent in the midportion of V3 and V4 demonstrates restoration of flow along the area of maximal stenosis in the horizontal segment of C1  There is still significant waist/stenosis in the midportion of the stent and greater than 50% of the diameter  However significant restoration of intracranial flow is noted  8   Delayed angiogram of 10 minutes demonstrates continued patency of stents and intracranial flow  9   Right subclavian arteriogram demonstrates no injury to the V1 or V2 segments of the right     Impression:  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  Recan 2b 1513    Patient Disposition:  Critical Care Unit     SIGNATURE: Oren Stapleton MD  DATE: 02/26/23    TIME: 8:23 PM

## 2023-02-27 NOTE — UTILIZATION REVIEW
Initial Clinical Review    Pt initially presented to [facility]  Pt was transferred by EMS to [address] [place] [address] for a higher level of care  Admission: Date/Time/Statement:   Admission Orders (From admission, onward)     Ordered        02/26/23 0226  Inpatient Admission  Once                      Orders Placed This Encounter   Procedures   • Inpatient Admission     Standing Status:   Standing     Number of Occurrences:   1     Order Specific Question:   Level of Care     Answer:   Critical Care [15]     Order Specific Question:   Estimated length of stay     Answer:   More than 2 Midnights     Order Specific Question:   Certification     Answer:   I certify that inpatient services are medically necessary for this patient for a duration of greater than two midnights  See H&P and MD Progress Notes for additional information about the patient's course of treatment  Initial Presentation: 60 y o  male who presented from [facility] by EMS to [address] [place] [address] ED  Inpatient admission for evaluation and treatment of R vertebral artery dissection  Presented w/ Right vertebral art dissection with possible thrombus seen on imaging at prior campus  On exam, GCS 15, pallor  COVID-19 positive  Plan: q1h neuro checks, ASA, heparin gtt, check P2Y12, -220, cardene gtt, Trend labs, replete electrolytes as needed; NPO, IV ABX, follow blood cultures  Neurology, Neurosurgery consulted  Neurology: Pt w/ cerebellar infarcts in setting of b/l vertebral artery occlusions  Permissive HTN, heparin gtt, ASA, echo  Neurosurgery: Pt developed worsening symptoms w/ R facial droop and R sided flaccidity in late morning  Neuro checks, repeat CTH, then IR for angiogram and possible intervention  Permissive HTN  ASA, heparin gtt  IV ABX      2/26 Procedure  Procedure:  1  Right vertebral artery arteriogram   2    Right vertebral/basilar thrombectomy with stent retriever and aspiration catheter   3  Right vertebral artery and basilar angioplasty with 2 25 mm balloon   4  Right V4 angioplasty and stenting with drug-eluting Michael [place]hinny balloon 2 5 mm x 18 mm   5  V3 stenting of dissection with a 4 mm [employer] stent   6  Intraprocedural and postprocedural arteriography   7   right subclavian arteriogram   8 Limited Right radial arteriogram  Indication: Stroke, and v3/4 large Vessel Occlusion  Anesthesia: General  ASA Score: 4 Emergent  Findings: Likely dissection compounded by significant intracranial atherosclerotic disease of the V3 and V4 segments treated with intracranial stenting and extracranial stenting  Restoration of flow consistent with TICI 2B revascularization  Date: 02/27/23   Day 2: Pt extubated post-operatively  Remains w/ R sided weakness but does have some subtle movements  Slowed slurred speech without expressive difficulties  Continue Integrilin gtt w/ plan for dual APT of ASA/[place]il[place], goal -160, cardene gtt  Resume heparin gtt w/ plan for transition to [facility] tomorrow  IV ABX for UTI  Supportive care  Neuro checks          Wt Readings from Last 1 Encounters:   02/27/23 78 5 kg (173 lb)     Additional Vital Signs:   Date/Time Temp Pulse Resp BP MAP (mmHg) Arterial Line BP MAP SpO2 O2 Device Patient Position - Orthostatic VS   02/27/23 1154 -- -- -- 163/66 -- -- -- -- -- --   02/27/23 1000 -- 68 15 154/68 99 124/88 104 mmHg 99 % -- --   02/27/23 0904 -- 73 -- 148/78 -- -- -- -- -- --   02/27/23 0900 -- 74 18 148/78 104 126/88 104 mmHg 100 % -- --   02/27/23 0800 98 6 °F (37 °C) 70 14 148/78 111 106/76 90 mmHg 99 % None (Room air) Lying   02/27/23 0700 -- 70 13 -- -- 112/74 88 mmHg 99 % -- --   02/27/23 0605 -- 68 25 Abnormal  148/64 93 160/128 136 mmHg 99 % -- --   02/27/23 0600 -- 70 23 Abnormal  -- -- 168/136 144 mmHg -- -- --   02/27/23 0501 -- 72 15 152/64 90 -- -- 100 % -- --   02/27/23 0500 -- 72 27 Abnormal  -- -- 138/58 86 mmHg -- -- --   02/27/23 0400 -- 72 19 -- -- 136/64 94 mmHg -- -- --   02/27/23 0300 -- 66 12 -- -- 134/62 86 mmHg 99 % -- --   02/27/23 0200 -- 82 18 -- -- 116/54 78 mmHg 99 % -- --   02/27/23 0100 -- 78 17 -- -- 158/52 84 mmHg 100 % -- --   02/27/23 0015 -- 76 16 -- -- 166/56 88 mmHg 99 % -- --   02/26/23 2300 97 9 °F (36 6 °C) 76 16 -- -- 146/48 80 mmHg -- -- --   02/26/23 2230 97 9 °F (36 6 °C) 76 17 -- -- 130/46 76 mmHg -- -- --   02/26/23 2218 98 2 °F (36 8 °C) 78 20 -- -- 132/50 80 mmHg 98 % -- --   02/26/23 2200 98 2 °F (36 8 °C) 78 18 -- -- 150/60 88 mmHg -- -- --   02/26/23 2130 98 6 °F (37 °C) 74 20 -- -- 156/54 84 mmHg -- -- --   02/26/23 2119 98 6 °F (37 °C) 72 20 -- -- 168/64 98 mmHg 100 % -- --   02/26/23 2100 -- 78 19 -- -- 160/60 94 mmHg -- -- --   02/26/23 2030 -- 72 23 Abnormal  -- -- 162/62 96 mmHg -- -- --   02/26/23 2000 -- 70 18 -- -- 156/58 92 mmHg -- -- --   02/26/23 1940 -- -- -- -- -- -- -- 100 % -- --   02/26/23 1919 -- 64 23 Abnormal  -- -- 140/50 78 mmHg 100 % -- --   02/26/23 1900 -- 70 23 Abnormal  -- -- 150/54 86 mmHg 100 % -- --   02/26/23 1850 -- 70 29 Abnormal  -- -- 160/60 94 mmHg 100 % -- --   02/26/23 1835 -- 72 26 Abnormal  -- -- 184/72 112 mmHg 100 % -- --   02/26/23 1820 -- 70 17 -- -- 188/76 118 mmHg 100 % -- --   02/26/23 1805 -- 68 32 Abnormal  -- -- 182/72 112 mmHg 100 % -- --   02/26/23 1746 -- 66 22 -- -- 190/74 116 mmHg 100 % -- --   02/26/23 1745 -- 66 28 Abnormal  -- -- 192/76 118 mmHg 100 % -- --   02/26/23 1744 -- 64 28 Abnormal  -- -- 194/76 118 mmHg 100 % -- --   02/26/23 1743 -- 66 29 Abnormal  -- -- 200/80 124 mmHg 100 % -- --   02/26/23 1742 -- 66 26 Abnormal  -- -- 200/80 124 mmHg 100 % -- --   02/26/23 1741 -- 66 23 Abnormal  -- -- 202/80 124 mmHg 100 % -- --   02/26/23 1740 -- 68 20 -- -- 206/82 128 mmHg 100 % -- --   02/26/23 1739 -- 68 22 -- -- 208/80 130 mmHg 100 % -- --   02/26/23 1738 -- 70 20 -- -- 208/84 132 mmHg 100 % -- --   02/26/23 1737 -- 66 19 -- -- 206/84 130 mmHg 100 % -- --   02/26/23 1736 -- 64 20 -- -- 204/80 128 mmHg 100 % -- --   02/26/23 1735 -- 64 19 -- -- 204/80 126 mmHg 100 % -- --   02/26/23 1734 -- 64 20 -- -- 202/82 128 mmHg 100 % -- --   02/26/23 1733 -- 64 18 -- -- 200/78 126 mmHg 100 % -- --   02/26/23 1732 -- 64 17 -- -- 200/80 126 mmHg 100 % -- --   02/26/23 1731 -- 64 15 -- -- 194/82 124 mmHg 100 % -- --   02/26/23 1730 -- 62 16 -- -- 192/78 122 mmHg 100 % -- --   02/26/23 1729 -- 62 16 -- -- 190/80 122 mmHg 100 % -- --   02/26/23 1728 -- 64 16 -- -- 192/72 120 mmHg 100 % -- --   02/26/23 1727 -- 64 15 -- -- 190/80 122 mmHg 100 % -- --   02/26/23 1726 -- 64 16 -- -- 188/78 120 mmHg 100 % -- --   02/26/23 1725 -- 64 15 -- -- 186/78 120 mmHg 100 % -- --   02/26/23 1724 -- 64 16 -- -- -- -- 100 % -- --   02/26/23 1723 -- 70 16 -- -- 186/80 120 mmHg 100 % -- --   02/26/23 1722 -- 64 16 -- -- 184/80 120 mmHg 100 % -- --   02/26/23 1721 -- 64 16 -- -- 184/80 120 mmHg 100 % -- --   02/26/23 1720 -- 64 16 -- -- 190/80 122 mmHg 100 % -- --   02/26/23 1719 -- 64 15 -- -- 190/82 122 mmHg 100 % -- --   02/26/23 1718 -- 66 17 -- -- 190/88 126 mmHg 100 % -- --   02/26/23 1717 -- 64 19 -- -- 180/82 120 mmHg 100 % -- --   02/26/23 1716 -- 58 18 -- -- 156/70 98 mmHg 100 % -- --   02/26/23 1715 -- 58 15 -- -- 154/66 94 mmHg 100 % -- --   02/26/23 1714 -- 60 16 -- -- 150/64 92 mmHg 100 % -- --   02/26/23 1713 -- 58 15 -- -- 144/62 88 mmHg 100 % -- --   02/26/23 1712 -- 60 16 -- -- 142/62 88 mmHg 100 % -- --   02/26/23 1711 -- 56 16 -- -- 142/62 86 mmHg 100 % -- --   02/26/23 1710 -- 56 16 -- -- 140/60 86 mmHg 100 % -- --   02/26/23 1709 -- 58 15 -- -- 140/62 86 mmHg 100 % -- --   02/26/23 1708 -- 60 16 -- -- 128/60 80 mmHg 100 % -- --   02/26/23 1707 -- 60 16 -- -- 128/60 80 mmHg 100 % -- --   02/26/23 1706 -- 60 15 -- -- 120/58 78 mmHg 100 % -- --   02/26/23 1705 -- 60 15 -- -- 120/58 76 mmHg 100 % -- --   02/26/23 1704 -- 58 16 -- -- 116/56 74 mmHg 100 % -- --   02/26/23 1703 -- 56 16 -- -- 120/58 76 mmHg 100 % -- --   02/26/23 1702 -- 58 15 -- -- 122/58 78 mmHg 100 % -- --   02/26/23 1701 -- 62 15 -- -- 122/60 78 mmHg 100 % -- --   02/26/23 1700 -- 66 16 -- -- 116/60 76 mmHg 100 % -- --   02/26/23 1659 -- 62 15 -- -- 104/52 68 mmHg 100 % -- --   02/26/23 1658 -- 62 15 -- -- 100/52 66 mmHg 100 % -- --   02/26/23 1657 -- 62 15 -- -- 100/50 66 mmHg 100 % -- --   02/26/23 1656 -- 62 15 -- -- 100/52 66 mmHg 100 % -- --   02/26/23 1655 -- 60 16 -- -- 100/50 64 mmHg Abnormal  100 % -- --   02/26/23 1654 -- 60 16 -- -- 100/50 66 mmHg 100 % -- --   02/26/23 1653 -- 62 15 -- -- 100/50 66 mmHg 100 % -- --   02/26/23 1652 -- 58 15 -- -- 102/50 66 mmHg 100 % -- --   02/26/23 1651 -- 58 16 -- -- 104/52 68 mmHg 100 % -- --   02/26/23 1650 -- 56 16 -- -- 98/48 62 mmHg Abnormal  100 % -- --   02/26/23 1649 -- 58 15 -- -- 100/50 64 mmHg Abnormal  100 % -- --   02/26/23 1648 -- 60 16 -- -- 102/50 66 mmHg 100 % -- --   02/26/23 1647 -- 62 15 -- -- 106/52 68 mmHg 100 % -- --   02/26/23 1646 -- 64 16 -- -- 110/52 70 mmHg 100 % -- --   02/26/23 1645 -- 62 16 -- -- 110/54 70 mmHg 100 % -- --   02/26/23 1644 -- 64 20 -- -- 116/54 74 mmHg 100 % -- --   02/26/23 1643 -- 64 16 -- -- 116/56 74 mmHg 100 % -- --   02/26/23 1642 -- 64 16 -- -- 124/58 78 mmHg 100 % -- --   02/26/23 1641 -- 66 16 -- -- 132/60 84 mmHg 100 % -- --   02/26/23 1640 -- 68 16 -- -- -- -- -- -- --   02/26/23 1639 -- 70 22 -- -- -- -- -- -- --   02/26/23 1638 -- 66 17 -- -- -- -- -- -- --   02/26/23 1637 -- 66 16 -- -- -- -- 100 % -- --   02/26/23 1636 -- 66 16 -- -- -- -- 100 % -- --   02/26/23 1635 -- 66 20 -- -- 154/72 98 mmHg 100 % -- --   02/26/23 1634 -- 66 -- -- -- 150/68 94 mmHg -- -- --   02/26/23 1633 -- 68 -- -- -- 152/70 96 mmHg 100 % -- --   02/26/23 1632 -- 66 -- -- -- 146/68 92 mmHg 100 % -- --   02/26/23 1631 -- 64 -- -- -- 140/64 88 mmHg 100 % -- --   02/26/23 1630 98 6 °F (37 °C) 66 20 -- -- 136/62 84 mmHg 100 % -- --   02/26/23 1230 -- 82 17 -- -- 170/72 106 mmHg -- -- --   02/26/23 1218 -- 80 21 180/90 Abnormal  142 162/68 100 mmHg -- -- --   02/26/23 1200 -- 84 22 -- -- 168/68 102 mmHg -- -- --   02/26/23 1100 -- 88 23 Abnormal  172/85 Abnormal  108 144/58 90 mmHg 100 % -- --   02/26/23 1000 -- 84 19 181/81 Abnormal  120 164/66 100 mmHg 100 % None (Room air) --   02/26/23 0900 -- 66 13 175/78 Abnormal  110 156/60 92 mmHg 100 % -- --   02/26/23 0800 97 8 °F (36 6 °C) 76 18 -- -- 214/88 136 mmHg 99 % -- --   02/26/23 0700 -- 94 42 Abnormal  -- -- 230/112 162 mmHg 99 % -- --   02/26/23 0600 -- 64 12 -- -- 178/68 102 mmHg 98 % -- --   02/26/23 0500 -- 68 17 163/105 Abnormal  127 172/74 106 mmHg 97 % -- --   02/26/23 0400 97 5 °F (36 4 °C) 66 26 Abnormal  162/77 105 148/58 88 mmHg 94 % None (Room air) Lying   02/26/23 0300 -- 74 18 173/83 Abnormal  114 158/64 94 mmHg 99 % -- --   02/26/23 0200 -- 80 20 173/80 Abnormal  102 150/64 94 mmHg 99 % -- --   02/26/23 0100 -- 88 -- 166/83 118 170/64 94 mmHg 100 % -- --   02/26/23 0000 -- 92 17 159/85 112 -- -- 99 % -- --     Goodwin Coma Scale  Date and Time Eye Opening Best Verbal Response Best Motor Response Goodwin Coma Scale Score   02/27/23 1000 4 5 6 15   02/27/23 0900 4 5 6 15   02/27/23 0800 4 5 6 15   02/27/23 0700 4 5 6 15   02/27/23 0600 4 5 6 15   02/27/23 0500 4 5 6 15   02/27/23 0400 4 5 6 15   02/27/23 0300 4 5 6 15   02/27/23 0200 4 5 6 15   02/27/23 0100 4 5 6 15   02/27/23 0015 4 5 6 15   02/26/23 2300 4 5 6 15   02/26/23 2230 4 5 6 15   02/26/23 2200 4 5 6 15   02/26/23 2130 4 5 6 15   02/26/23 2100 4 5 6 15   02/26/23 2030 4 5 6 15   02/26/23 2000 4 5 6 15   02/26/23 1930 3 1 6 10   02/26/23 1900 3 1 6 10   02/26/23 1830 3 1 1 5   02/26/23 1815 2 1 1 4   02/26/23 1800 2 1 1 4   02/26/23 1745 1 1 1 3   02/26/23 1730 1 1 1 3   02/26/23 1715 1 1 1 3   02/26/23 1700 1 1 1 3   02/26/23 1645 1 1 1 3   02/26/23 1630 1 1 1 3   02/26/23 1200 4 5 6 15   02/26/23 1100 4 5 6 15   02/26/23 1000 4 5 6 15   02/26/23 0900 4 5 6 15   02/26/23 0800 4 5 6 15   02/26/23 0700 4 5 6 15   02/26/23 0600 4 5 6 15   02/26/23 0400 4 5 6 15   02/26/23 0300 4 5 6 15   02/26/23 0200 4 5 6 15   02/26/23 0100 4 5 6 15   02/25/23 2345 4 5 6 15       Pertinent Labs/Diagnostic Test Results:   MRI brain wo contrast   Final Result by Norris Willson MD (02/27 0820)      1  Crescending, acute/recent posterior circulation infarctions with increasing right cerebellar and new brainstem infarctions as described  Overall infarct burden is slightly increased from the prior study one day earlier although new small infarcts    are noted in the posterior medulla on the right, right midbrain and left occipital lobe  No hydrocephalus or large parenchymal hemorrhage  2   Abnormal left vertebral artery flow void at the lateral mass of C1, possibly related to slow flow and/or vascular occlusion  3   Very mild, chronic microangiopathy and chronic left thalamic lacunar infarction         Workstation performed: [id number]         CT head wo contrast   Final Result by Sharri Plummer MD (02/26 1920)      Stable evolving bilateral cerebellar infarctions without significant interval change  Workstation performed: [id number]         CT head wo contrast   Final Result by Dante Sarabia MD (02/26 1329)      No new parenchymal findings when comparing to the recent CT and MRI brain studies  Stable late acute to early subacute bilateral cerebellar infarctions without hemorrhagic transformation  Workstation performed: [id number]         CTA head and neck w wo contrast   Final Result by Dante Sarabia MD (02/26 0715)      Overall, there is no significant interval change when comparing to the recent MRI brain, and CTA head neck study  Stable small cerebellar infarctions as noted on the recent MRI study        Stable findings of distal right cervical and proximal intradural vertebral artery occlusion with probable retrograde flow in its distal intradural segment  Stable left vertebral artery origin stenosis and presumed occlusion of the distal left intradural vertebral segment  Small basilar artery with focal atherosclerotic disease in its proximal segment  Patent fetal right PCA circulation  Left PCA branch is small but patent, and unchanged in appearance  Stable poor visualization of the proximal left superior cerebellar    artery  Above-mentioned findings are in keeping with the preliminary report provided by [facility] radiology services without significant discrepancy in the report  Workstation performed: [id number]         CT cerebral perfusion   Final Result by Gwenn Gottron, MD (02/26 8903)      CT perfusion performed  Data available on PACS              Workstation performed: [id number]           Results from last 7 days   Lab Units 02/25/23  0940   SARS-COV-2  Positive*     Results from last 7 days   Lab Units 02/27/23  1003 02/26/23  0506 02/26/23  0021 02/25/23  0940   WBC Thousand/uL 10 26* 13 27* 17 07* 9 99   HEMOGLOBIN g/dL 10 8* 13 3 13 6 14 4   HEMATOCRIT % 33 3* 40 2 40 8 44 0   PLATELETS Thousands/uL 268 349 344 326   NEUTROS ABS Thousands/µL  --  11 92*  --  8 69*         Results from last 7 days   Lab Units 02/27/23  0429 02/26/23  0506 02/25/23  2044 02/25/23  0940   SODIUM mmol/L 147 143 141 142   POTASSIUM mmol/L 3 1* 3 4* 3 3* 3 5   CHLORIDE mmol/L 120* 115* 109* 108   CO2 mmol/L 21 21 24 24   ANION GAP mmol/L 6 7 8 10   BUN mg/dL 15 17 18 23   CREATININE mg/dL 1 05 1 09 1 15 1 32*   EGFR ml/min/1 73sq m 75 71 67 57   CALCIUM mg/dL 8 1* 8 8 8 4 8 8   CALCIUM, IONIZED mmol/L 1 02*  --   --   --    MAGNESIUM mg/dL 2 1  --   --   --      Results from last 7 days   Lab Units 02/26/23  0506 02/25/23  0940   AST U/L 13 16   ALT U/L 24 19   ALK PHOS U/L 63 55   TOTAL PROTEIN g/dL 7 0 7 3   ALBUMIN g/dL 3 2* 3 8   TOTAL BILIRUBIN mg/dL 0 68 0 60         Results from last 7 days   Lab Units 02/27/23  0429 02/26/23  0506 02/25/23  2044 02/25/23  0940   GLUCOSE RANDOM mg/dL 114 127 110 124         Results from last 7 days   Lab Units 02/25/23  1304   HEMOGLOBIN A1C % 5 7*   EAG mg/dl 117       Results from last 7 days   Lab Units 02/25/23  2044   CK TOTAL U/L 41     Results from last 7 days   Lab Units 02/25/23  1323 02/25/23  1156 02/25/23  0940   HS TNI 0HR ng/L  --   --  37   HS TNI 2HR ng/L  --  41  --    HSTNI D2 ng/L  --  4  --    HS TNI 4HR ng/L 38  --   --    HSTNI D4 ng/L 1  --   --      Results from last 7 days   Lab Units 02/25/23  2044   D-DIMER QUANTITATIVE ug/ml FEU 1 44*     Results from last 7 days   Lab Units 02/27/23  1003 02/26/23  1133 02/26/23  0506 02/26/23  0021 02/25/23  0940   PROTIME seconds 14 0  --   --  14 0 13 7   INR  1 05  --   --  1 06 0 98   PTT seconds 31 69* 33 31 29         Results from last 7 days   Lab Units 02/26/23  0506 02/25/23  2044   PROCALCITONIN ng/ml 0 08 <0 05     Results from last 7 days   Lab Units 02/25/23  2044   LACTIC ACID mmol/L 1 2             Results from last 7 days   Lab Units 02/25/23  2044   BNP pg/mL 191*         Results from last 7 days   Lab Units 02/25/23  2044   HEP B S AG  Non-reactive   HEP C AB  Non-reactive   HEP B C IGM  Non-reactive   HEP B C TOTAL AB  Non-reactive         Results from last 7 days   Lab Units 02/25/23  2044   CRP mg/L 5 3*             Results from last 7 days   Lab Units 02/25/23  1155   CLARITY UA  Cloudy   COLOR UA  Light Yellow   SPEC GRAV UA  <1 005*   PH UA  6 5   GLUCOSE UA mg/dl Negative   KETONES UA mg/dl 10 (1+)*   BLOOD UA  Negative   PROTEIN UA mg/dl Trace*   NITRITE UA  Positive*   BILIRUBIN UA  Negative   UROBILINOGEN UA (BE) mg/dl <2 0   LEUKOCYTES UA  Large*   WBC UA /hpf 20-30*   RBC UA /hpf 0-1   BACTERIA UA /hpf Innumerable*   EPITHELIAL CELLS WET PREP /hpf Occasional     Results from last 7 days   Lab Units 02/25/23  0940   INFLUENZA A PCR Negative   INFLUENZA B PCR  Negative   RSV PCR  Negative       Results from last 7 days   Lab Units 02/25/23 2044 02/25/23  1155   BLOOD CULTURE  No Growth at 24 hrs  No Growth at 24 hrs   --    URINE CULTURE   --  >100,000 cfu/ml Gram Negative Luis Enrique Enteric Like*         Present on Admission:  • Right Vertebral artery dissection (HCC)  • Hypertensive emergency  • Cerebellar infarct [facility])  • UTI (urinary tract infection)  • Stenosis of left vertebral artery      Admitting Diagnosis: Dizziness [R42]  Age/Sex: 60 y o  male  Admission Orders:  NPO  Cardio-Pulm monitoring  DW  I&O  SCDs  Neuro checks  Scheduled Medications:  amLODIPine, 5 mg, Oral, Daily  aspirin, 81 mg, Oral, Daily  atorvastatin, 40 mg, Oral, Daily With Dinner  cefTRIAXone, 1,000 mg, Intravenous, Q24H  chlorhexidine, 15 mL, Mouth/Throat, Q12H KATIE  potassium chloride, 60 mEq, Oral, Once  potassium chloride, 20 mEq, Intravenous, BID  ticagrelor, 90 mg, Oral, Q12H KATIE    Continuous IV Infusions:  eptifibatide, 1 mcg/kg/min, Intravenous, Continuous; Start: 02/26/23 1715 End: 02/27/23 1112  heparin (porcine), 3-24 Units/kg/hr (Order-Specific), Intravenous, Titrated  niCARdipine, 1-15 mg/hr, Intravenous, Titrated  sodium chloride, 75 mL/hr, Intravenous, Continuous    PRN Meds:  hydrALAZINE, 10 mg, Intravenous, Q6H PRN; 2/26 x1        IP CONSULT TO NEUROLOGY  IP CONSULT TO CASE MANAGEMENT  IP CONSULT TO NUTRITION SERVICES  IP CONSULT TO NEUROSURGERY    Network Utilization Review Department  ATTENTION: Please call with any questions or concerns to [phone] and carefully listen to the prompts so that you are directed to the right person  All voicemails are confidential   Andrea Thibodeaux all requests for admission clinical reviews, approved or denied determinations and any other requests to dedicated fax number below belonging to the campus where the patient is receiving treatment   List of dedicated fax numbers for the Facilities:  [facility] [place] ADMISSION DENIALS (Administrative/Medical Necessity) [phone]   [address] (Maternity/NICU/Pediatrics) Mayra Kennedy [address] Keena Escobar  [phone]   [address] [facility] [address] [place] [address] [facility] [address] [facility] [place] [address] 8[address] [facility] [phone]

## 2023-02-27 NOTE — PROGRESS NOTES
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 and left vertebral artery origin stenosis with presumed occlusion of the distal left intradural vertebral segment  · MRA head/carotids demonstrated possible extension of the vertebral artery dissection into the basilar artery  · MRI brain revealed several small acute/early subacute bi-cerebellar infarcts (R>L) without evidence of hemorrhage  · , Cholesterol 163, A1C 5 7  · COVID positive    Patient was transferred to [facility] for closer monitoring with consideration for endovascular intervention  Initially given stable exam, intervention was felt too risky and patient was a heparin gtt with full dose aspirin  However on 2/26, patient had worsened dysarthria and right sided weakness  Stroke alert activated:  · CT head negative for intracranial hemorrhage and stable bilateral cerebellar infarcts  · Patient was taken to IR for emergent R vertebral/basilar thrombectomy with R V3/4 stenting with TICI 2B revascularization  · Patient was started on Integrilin gtt and repeat CT head negative for hemorrhage  · MRI brain 2/26: Increased infarct burden    Bilateral cerebellar infarcts with larger right cerebellar infarct and new acute infarcts in the right medulla, bilateral midbrain, and left occipital lobe    On exam, patient has dysarthria, right facial droop, and right hemiplegia except is able to internally/externally rotate the proximal right LE  Etiology for acute infarcts in the setting of bilateral vertebral artery occlusions remain unclear, however likely due to dissection vs chronic diffuse atherosclerotic disease  Possible contribution from underlying COVID infection  No recent head trauma, strenuous physical activity, neck manipulation, coughing/sneezing, etc     Plan:  - Stroke pathway  • Echo pending  • Transitioned from Integrilin gtt to DAPT today (2/27) with aspirin 81 mg daily and Brilinta 90 mg BID (180 mg load once)  • Heparin gtt per Neurosurgery recommendations; plan to transition to Eliquis 2 5 mg BID tomorrow (2/28)  • Recommend starting Atorvastatin 40 mg daily  • SBP goal 120-160 per neurosurgery   o Currently on Cardene infusion  • Euglycemic, normothermic goal  • Continue telemetry  • PT/OT/ST  • Stroke education  • Frequent neuro checks  Continue to monitor and notify neurology with any changes  • STAT CT head for any acute change in neuro exam  - Medical management and supportive care per primary team  Correction of any metabolic or infectious disturbances  Kin Lockhart will need follow up in in 6 weeks with neurovascular attending or AP  He will not require outpatient neurological testing  Subjective:   Patient seen and examined at bedside  Patient continues to have weakness in the right side of his body and dysarthria  Also has numbness on the right side  He no longer has any dizziness  History reviewed  No pertinent past medical history  History reviewed  No pertinent surgical history  History reviewed  No pertinent family history    Social History     Socioeconomic History   • Marital status: /Civil Union     Spouse name: None   • Number of children: None   • Years of education: None   • Highest education level: None   Occupational History   • None   Tobacco Use   • Smoking status: Never   • Smokeless tobacco: Never   Substance and Sexual Activity   • Alcohol use: Not Currently   • Drug use: Not Currently   • Sexual activity: None   Other Topics Concern   • None   Social History Narrative   • None     Social Determinants of Health     Financial Resource Strain: Not on file   Food Insecurity: Not on file   Transportation Needs: Not on file   Physical Activity: Not on file   Stress: Not on file   Social Connections: Not on file   Intimate Partner Violence: Not on file   Housing Stability: Not on file     E-Cigarette/Vaping     E-Cigarette/Vaping Substances         Medications: All current active meds have been reviewed and current meds:  Scheduled Meds:  Current Facility-Administered Medications   Medication Dose Route Frequency Provider Last Rate   • cefTRIAXone  1,000 mg Intravenous Q24H Amaris Flores MD     • chlorhexidine  15 mL Mouth/Throat Q12H [address], PA-C     • eptifibatide  1 mcg/kg/min Intravenous Continuous Amaris Florse MD 1 mcg/kg/min (02/26/23 2315)   • hydrALAZINE  10 mg Intravenous Q6H PRN Amaris Flores MD     • niCARdipine  1-15 mg/hr Intravenous Titrated Skippy ELVI Looney 7 5 mg/hr (02/27/23 0430)   • potassium chloride  20 mEq Intravenous BID Agapito Duque MD 20 mEq (02/27/23 2152)   • sodium chloride  75 mL/hr Intravenous Continuous Skieloy ELVI Looney 75 mL/hr (02/26/23 1256)     Continuous Infusions:eptifibatide, 1 mcg/kg/min, Last Rate: 1 mcg/kg/min (02/26/23 2315)  niCARdipine, 1-15 mg/hr, Last Rate: 7 5 mg/hr (02/27/23 0430)  sodium chloride, 75 mL/hr, Last Rate: 75 mL/hr (02/26/23 1256)      PRN Meds: •  hydrALAZINE       ROS:   Review of Systems   Neurological: Positive for weakness and numbness  Negative for dizziness and headaches  Vitals:   /64   Pulse 70   Temp 97 9 °F (36 6 °C) (Bladder)   Resp 13   Wt 78 9 kg (173 lb 15 1 oz)   SpO2 99%   BMI 25 69 kg/m²     Physical Exam:   Physical Exam  Vitals and nursing note reviewed  Constitutional:       Appearance: Normal appearance  Comments: Patient sitting comfortably in bedside chair in no acute distress   HENT:      Head: Normocephalic and atraumatic  Mouth/Throat:      Mouth: Mucous membranes are dry  Comments: Tongue coated  Eyes:      Extraocular Movements: Extraocular movements intact  Conjunctiva/sclera: Conjunctivae normal       Pupils: Pupils are equal, round, and reactive to light  Pulmonary:      Effort: Pulmonary effort is normal    Musculoskeletal:      Comments: Decreased strength in right UE/LE   Skin:     General: Skin is warm and dry  Neurological:      Mental Status: He is alert and oriented to person, place, and time  Comments: See full neuro exam below       Neurologic Exam     Mental Status   Oriented to person, place, and time  Patient awake and alert  Oriented to person, place, month, and year  Mild to moderate dysarthria, but no aphasia  Able to follow simple midline and appendicular commands  Slight difficulty with crossover commands with left right confusion  Cranial Nerves     CN III, IV, VI   Pupils are equal, round, and reactive to light  Pupils 3 mm, round, reactive to light bilaterally  EOMs intact without nystagmus  No visual field deficits  Facial sensation to light touch diminished in the right V1 to V3 distribution  Right central facial weakness  Tongue midline  Soft palate raises symmetrically  Difficulty shrugging the right shoulder  Able to turn head left and right  Motor Exam   Decreased tone in right UE/LE   0/5 strength right upper extremity  Able to slightly internally and externally rotate the right hip  1/5 strength right iliopsoas muscle, 1/5 right knee extension, 0/5 right knee flexion, 0/5 right dorsiflexion/plantarflexion  Unable to wiggle toes on the right   5/5 strength left UE/LE  Sensory Exam   Sensation to light touch diminished in the right arm and right leg       Gait, Coordination, and Reflexes     Gait  Gait: (Deferred for patient safety)  No ataxia with left finger-to-nose  Unable to assess coordination on the right due to weakness  No resting tremor  No clinical seizure on exam   A few beats of nonsustained clonus on the right ankle  Labs: I have personally reviewed pertinent reports  Recent Results (from the past 24 hour(s))   APTT    Collection Time: 02/26/23 11:33 AM   Result Value Ref Range    PTT 69 (H) 23 - 37 seconds   Basic metabolic panel    Collection Time: 02/27/23  4:29 AM   Result Value Ref Range    Sodium 147 135 - 147 mmol/L    Potassium 3 1 (L) 3 5 - 5 3 mmol/L    Chloride 120 (H) 96 - 108 mmol/L    CO2 21 21 - 32 mmol/L    ANION GAP 6 4 - 13 mmol/L    BUN 15 5 - 25 mg/dL    Creatinine 1 05 0 60 - 1 30 mg/dL    Glucose 114 65 - 140 mg/dL    Calcium 8 1 (L) 8 3 - 10 1 mg/dL    eGFR 75 ml/min/1 73sq m   Magnesium    Collection Time: 02/27/23  4:29 AM   Result Value Ref Range    Magnesium 2 1 1 6 - 2 6 mg/dL   Calcium, ionized    Collection Time: 02/27/23  4:29 AM   Result Value Ref Range    Calcium, Ionized 1 02 (L) 1 12 - 1 32 mmol/L       Imaging: I have personally reviewed pertinent imaging in PACS, including CTA head/neck, repeat CT head, MRI brain, repeat MRI brain,  and I have personally reviewed PACS reports  EKG, Pathology, and Other Studies: I have personally reviewed pertinent reports  VTE Prophylaxis: Sequential compression device (Venodyne)  and Heparin      Counseling / Coordination of Care  I have spent a total time of 35 minutes on 02/27/23 in caring for this patient including Diagnostic results, Risks and benefits of tx options, Instructions for management, Patient and family education, Importance of tx compliance, Risk factor reductions, Impressions, Counseling / Coordination of care, Documenting in the medical record, Reviewing / ordering tests, medicine, procedures  , Obtaining or reviewing history   and Communicating with other healthcare professionals

## 2023-02-27 NOTE — PHYSICAL THERAPY NOTE
PHYSICAL THERAPY EVALUATION  NAME:  Kong Hathaway  DATE: 02/27/23    AGE:   60 y o  Mrn:   [id number]  ADMIT DX:  Dizziness [R42]    History reviewed  No pertinent past medical history  History reviewed  No pertinent surgical history  Length Of Stay: 2  PHYSICAL THERAPY EVALUATION :    02/27/23 1026   Note Type   Note type Evaluation   Pain Assessment   Pain Assessment Tool 0-10   Pain Score No Pain   Restrictions/Precautions   Weight Bearing Precautions Per Order No   Other Precautions Airborne/isolation;Droplet precautions;Contact/isolation; Chair Alarm; Bed Alarm;Cognitive; Impulsive;Multiple lines;Telemetry; Fall Risk  (COVID 19+)   Home Living   Type of [address] [place] [address] Two level;Stairs to enter with rails;1/2 bath on main level;Bed/bath upstairs   Bathroom Accessibility Accessible   Home Equipment   (NONE pta)   Additional Comments pt lives w/ spouse and 2 adult kids in a [facility] w/ 1 SHANNON and FF to main bed and bath located on 2nd floor- pt was I w/o AD PTA; drives and was working [employer] as a   Prior Function   Level of [place] Independent with ADLs; Independent with functional mobility; Independent with IADLS   Lives With Spouse; Family   Receives Help From Family   IADLs Independent with driving; Independent with meal prep; Independent with medication management   Falls in the last 6 months 0   Vocational Full time employment  ()   Cognition   Overall Cognitive Status WFL   Attention Attends with cues to redirect   Orientation Level Oriented X4   Memory Within functional limits   Following Commands Follows one step commands without difficulty   Comments pleasant and cooperative- emotionally labile when speaking of his current condition     RUE Assessment   RUE Assessment X  (0/5 shoulder, 1/5 elbow, unable to grasp)   LUE Assessment   LUE Assessment WFL   RLE Assessment   RLE Assessment X   LLE Assessment   LLE Assessment   (0/5 throughout)   Vision-Basic Assessment   Current Vision Wears glasses all the time   Coordination   Movements are Fluid and Coordinated 0   Coordination and Movement Description R deficits   Sensation X  (R sided numbness and tinglingin reported in R UE and LE + face  )   Light Touch   RLE Light Touch Impaired   LLE Light Touch Grossly intact   Bed Mobility   Supine to Sit 3  Moderate assistance   Additional items Assist x 1   Additional Comments sits EOB w/ modA x1 and VC + TC for upright posture and to maintain midline   Transfers   Sit to Stand 2  Maximal assistance   Additional items Assist x 2   Stand to Sit 2  Maximal assistance   Additional items Assist x 2   Stand pivot 2  Maximal assistance   Additional items Assist x 2  (w/ K knee block )   Additional Comments SPV to R drop arm chair w/ R knee block - pt able to shift weight R<>L w/ R knee block + cues for upright   Ambulation/Elevation   Gait pattern Not appropriate  (NT)   Balance   Static Sitting Poor   Dynamic Sitting Poor -   Static Standing Poor -   Dynamic Standing Poor -   Ambulatory Zero   Activity Tolerance   Activity Tolerance Patient limited by fatigue   Medical Staff Made Aware OT Nsg + care coordination w/ CM   Nurse Made Aware yes   Assessment   Prognosis Fair   Problem List Decreased strength;Decreased range of motion;Decreased endurance; Impaired balance;Decreased mobility; Decreased coordination; Impaired judgement;Decreased safety awareness; Impaired sensation; Impaired tone   Assessment Pt is 60 y o  male seen for PT evaluation s/p admit to [facility] on 2/25/2023  Pt presenting w/ dizziness and nausea and ultimately dx of Vertebral artery dissection ([facility] [address] )  Pt presents to ED with c/o generalized weakness, dizziness and nausea for 2 days, pt was found to have a cerebellar infarct and is now PPD 1 from intracranial and extracranial vertebral artery stenting  Pt now with dysarthria, right facial droop, and right hemiplegia  Pt was also found to be COVID +   Comorbidities affecting pt's functional performance at time of assessment include: UTI, Hypertensive emergency, stenosis of left vertebral artery       Due to acute medical issues, ongoing medical management in ICU; R hemiparesis; , fall risk, decreased activity tolerance compared to baseline, increased assistance needed from caregiver at current time, continuous telemetry/ O2  monitoring, multiple lines, decline in overall functional mobility status; health management issues; note unstable clinical picture (high complexity)   Prior to admission, pt was fully indep; working FT and not using AD for mobility  Lives w/ supportive spouse and adult kids in a [facility] w/ SHANNON    Currently pt  is requiring modAx A for bed skills; maxAx2 for functional transfers; mod> max to maintain seated balance at EOB 2* R hemiparesis   Pt presents functioning below baseline and currently w/ overall mobility deficits 2* to: R sided strength AROM + coordination deficits;  limited flexibility;  generalized weakness/ deconditioning; decreased endurance; decreased activity tolerance; decreased coordination; impaired balance; sensory deficits; decreased safety awareness; SOB/POLANCO; fatigue; impaired safety and judgement; limited insight into current deficits; bed/ chair alarms; multiple lines; Pt currently at risk for falls  (Please find additional objective findings from PT assessment regarding body systems outlined above ) Pt will continue to benefit from skilled PT interventions to address stated impairments; to maximize functional potential; for ongoing pt/ family training; and DME needs  PT is currently recommending PMR consult and d/c to rehab to address deficits and maximize functional recovery  Pt/ family agreeable to plan and goals as stated on evaluation     Goals   Patient Goals to get better and go home to family   STG Expiration Date 03/13/23   Short Term Goal #1 In 14 days pt will complete: 1) Bed mobility skills with minAx1 to facilitate safe return to previous living environment and decrease burden on caregivers  2) Functional transfers with minax1  to facilitate safe return to previous living environment  3) PT to see for gait assessment and STG updates w/ progress'  4)  Improve seated static + dyn balance by 1 grade in order to decrease fall risk  5 ) Improve RLE strength grades by 1 to increase independence w/ all functional mobility, transfers and gait  6) PT for ongoing pt and family education; DME needs and D/C planning to promote highest level of function in least restrictive environment  PT Treatment Day 0   Plan   Treatment/Interventions ADL retraining;Functional transfer training;LE strengthening/ROM; Elevations; Therapeutic exercise; Endurance training;Cognitive reorientation;Patient/family training;Equipment eval/education; Bed mobility;Gait training; Compensatory technique education;Continued evaluation;Spoke to nursing;Spoke to case management;Spoke to advanced practitioner;OT;Family   PT Frequency 3-5x/wk   Recommendation   PT Discharge Recommendation Post acute rehabilitation services  (PMR consult)   Equipment Recommended   (TBD)   AM-PAC Basic Mobility Inpatient   Turning in Flat Bed Without Bedrails 2   Lying on Back to Sitting on Edge of Flat Bed Without Bedrails 2   Moving Bed to Chair 2   Standing Up From Chair Using Arms 2   Walk in Room 1   Climb 3-5 Stairs With Railing 1   Basic Mobility Inpatient Raw Score 10   Turning Head Towards Sound 4   Follow Simple Instructions 3   Low Function Basic Mobility Raw Score 17   Low Function Basic Mobility Standardized Score 27 46   Highest Level Of Mobility   JH-HLM Goal 4: Move to chair/commode   JH-HLM Achieved 4: Move to chair/commode   End of Consult   Patient Position at End of Consult Bedside chair;Bed/Chair alarm activated; All needs within reach   End of Consult Comments spouse coming in to visit     The patient's AM-PAC Basic Mobility Inpatient Short Form Raw Score is 10   A Raw score of less than or equal to 16 suggests the patient may benefit from discharge to post-acute rehabilitation services  Please also refer to the recommendation of the Physical Therapist for safe discharge planning

## 2023-02-27 NOTE — PLAN OF CARE
Problem: OCCUPATIONAL THERAPY ADULT  Goal: Performs self-care activities at highest level of function for planned discharge setting  See evaluation for individualized goals  Description: Treatment Interventions: ADL retraining, Functional transfer training, UE strengthening/ROM, Endurance training, Patient/family training, Neuromuscular reeducation, Fine motor coordination activities, Compensatory technique education, Continued evaluation, Energy conservation, Activityengagement          See flowsheet documentation for full assessment, interventions and recommendations  Note: Limitation: Decreased ADL status, Decreased UE ROM, Decreased UE strength, Decreased Safe judgement during ADL, Decreased endurance, Decreased sensation, Decreased fine motor control, Decreased self-care trans, Decreased high-level ADLs  Prognosis: Good  Assessment: Pt is a 60 y o  male seen for OT evaluation s/p admit to [facility] on 2/25/2023 w/ Vertebral artery dissection ([facility] [address] )  Pt presents to ED with c/o generalized weakness, dizziness and nausea for 2 days, pt was found to have a cerebellar infarct and is now PPD 1 from intracranial and extracranial vertebral artery stenting  Pt now with dysarthria, right facial droop, and right hemiplegia  Pt was also found to be COVID +  Comorbidities affecting pt's functional performance at time of assessment include: UTI, Hypertensive emergency, stenosis of left vertebral artery  Personal factors affecting pt at time of IE include:steps to enter environment, limited home support, difficulty performing ADLS, difficulty performing IADLS , decreased initiation and engagement  and health management   Prior to admission, pt was I with all ADLS and IADLS  Upon evaluation: Pt presents supine and is agreeable to OTIE  All vitals WNL   Pt requires overall Max A x 2, 2* the following deficits impacting occupational performance: weakness, decreased ROM, decreased strength, decreased balance, decreased tolerance, impaired [address], impaired [address], impaired sensation, impaired initiation, impaired problem solving, decreased safety awareness and decreased coping skills  Pt resting in chair at end of session with all needs in reach, alarm on, all lines in place and SCD's on  Pt to benefit from continued skilled OT tx while in the hospital to address deficits as defined above and maximize level of functional independence w ADL's and functional mobility  Occupational Performance areas to address include: eating, grooming, bathing/shower, toilet hygiene, dressing, health maintenance, functional mobility, community mobility, clothing management and social participation  The patient's raw score on the AM-PAC Daily Activity inpatient short form is 13, standardized score is 32 03, less than 39 4  Patients at this level are likely to benefit from discharge to post-acute rehabilitation services  Please refer to the recommendation of the Occupational Therapist for safe discharge planning    Recommendation: (S)  (PM&R)  OT Discharge Recommendation: Post acute rehabilitation services

## 2023-02-27 NOTE — PLAN OF CARE
Problem: Prexisting or High Potential for Compromised Skin Integrity  Goal: Skin integrity is maintained or improved  Description: INTERVENTIONS:  - Identify patients at risk for skin breakdown  - Assess and monitor skin integrity  - Assess and monitor nutrition and hydration status  - Monitor labs   - Assess for incontinence   - Turn and reposition patient  - Assist with mobility/ambulation  - Relieve pressure over bony prominences  - Avoid friction and shearing  - Provide appropriate hygiene as needed including keeping skin clean and dry  - Evaluate need for skin moisturizer/barrier cream  - Collaborate with interdisciplinary team   - Patient/family teaching  - Consider wound care consult   Outcome: Progressing     Problem: MOBILITY - ADULT  Goal: Maintain or return to baseline ADL function  Description: INTERVENTIONS:  -  Assess patient's ability to carry out ADLs; assess patient's baseline for ADL function and identify physical deficits which impact ability to perform ADLs (bathing, care of mouth/teeth, toileting, grooming, dressing, etc )  - Assess/evaluate cause of self-care deficits   - Assess range of motion  - Assess patient's mobility; develop plan if impaired  - Assess patient's need for assistive devices and provide as appropriate  - Encourage maximum independence but intervene and supervise when necessary  - Involve family in performance of ADLs  - Assess for home care needs following discharge   - Consider OT consult to assist with ADL evaluation and planning for discharge  - Provide patient education as appropriate  Outcome: Progressing  Goal: Maintains/Returns to pre admission functional level  Description: INTERVENTIONS:  - Perform BMAT or MOVE assessment daily    - Set and communicate daily mobility goal to care team and patient/family/caregiver  - Collaborate with rehabilitation services on mobility goals if consulted  - Perform Range of Motion  times a day    - Reposition patient every hours   - Dangle patient  times a day  - Stand patient  times a day  - Ambulate dexter times a day  - Out of bed to chair  times a day   - Out of bed for meals  times a day  - Out of bed for toileting  - Record patient progress and toleration of activity level   Outcome: Progressing     Problem: PAIN - ADULT  Goal: Verbalizes/displays adequate comfort level or baseline comfort level  Description: Interventions:  - Encourage patient to monitor pain and request assistance  - Assess pain using appropriate pain scale  - Administer analgesics based on type and severity of pain and evaluate response  - Implement non-pharmacological measures as appropriate and evaluate response  - Consider cultural and social influences on pain and pain management  - Notify physician/advanced practitioner if interventions unsuccessful or patient reports new pain  Outcome: Progressing     Problem: INFECTION - ADULT  Goal: Absence or prevention of progression during hospitalization  Description: INTERVENTIONS:  - Assess and monitor for signs and symptoms of infection  - Monitor lab/diagnostic results  - Monitor all insertion sites, i e  indwelling lines, tubes, and drains  - Monitor endotracheal if appropriate and nasal secretions for changes in amount and color  - [place] appropriate cooling/warming therapies per order  - Administer medications as ordered  - Instruct and encourage patient and family to use good hand hygiene technique  - Identify and instruct in appropriate isolation precautions for identified infection/condition  Outcome: Progressing  Goal: Absence of fever/infection during neutropenic period  Description: INTERVENTIONS:  - Monitor WBC    Outcome: Progressing     Problem: SAFETY ADULT  Goal: Maintain or return to baseline ADL function  Description: INTERVENTIONS:  -  Assess patient's ability to carry out ADLs; assess patient's baseline for ADL function and identify physical deficits which impact ability to perform ADLs (bathing, care of mouth/teeth, toileting, grooming, dressing, etc )  - Assess/evaluate cause of self-care deficits   - Assess range of motion  - Assess patient's mobility; develop plan if impaired  - Assess patient's need for assistive devices and provide as appropriate  - Encourage maximum independence but intervene and supervise when necessary  - Involve family in performance of ADLs  - Assess for home care needs following discharge   - Consider OT consult to assist with ADL evaluation and planning for discharge  - Provide patient education as appropriate  Outcome: Progressing  Goal: Maintains/Returns to pre admission functional level  Description: INTERVENTIONS:  - Perform BMAT or MOVE assessment daily    - Set and communicate daily mobility goal to care team and patient/family/caregiver  - Collaborate with rehabilitation services on mobility goals if consulted  - Perform Range of Motion  times a day  - Reposition patient every  hours    - Dangle patient  times a day  - Stand patient  times a day  - Ambulate patient  times a day  - Out of bed to chair  times a day   - Out of bed for meals  times a day  - Out of bed for toileting  - Record patient progress and toleration of activity level   Outcome: Progressing  Goal: Patient will remain free of falls  Description: INTERVENTIONS:  - Educate patient/family on patient safety including physical limitations  - Instruct patient to call for assistance with activity   - Consult OT/PT to assist with strengthening/mobility   - Keep Call bell within reach  - Keep bed low and locked with side rails adjusted as appropriate  - Keep care items and personal belongings within reach  - Initiate and maintain comfort rounds  - Make Fall Risk Sign visible to staff  - Offer Toileting every  Hours, in advance of need  - Initiate/Maintain alarm  - Obtain necessary fall risk management equipment:   - Apply yellow socks and bracelet for high fall risk patients  - Consider moving patient to room near nurses station  Outcome: Progressing     Problem: DISCHARGE PLANNING  Goal: Discharge to home or other facility with appropriate resources  Description: INTERVENTIONS:  - Identify barriers to discharge w/patient and caregiver  - Arrange for needed discharge resources and transportation as appropriate  - Identify discharge learning needs (meds, wound care, etc )  - Arrange for interpretive services to assist at discharge as needed  - Refer to Case Management Department for coordinating discharge planning if the patient needs post-hospital services based on physician/advanced practitioner order or complex needs related to functional status, cognitive ability, or social support system  Outcome: Progressing     Problem: Knowledge Deficit  Goal: Patient/family/caregiver demonstrates understanding of disease process, treatment plan, medications, and discharge instructions  Description: Complete learning assessment and assess knowledge base    Interventions:  - Provide teaching at level of understanding  - Provide teaching via preferred learning methods  Outcome: Progressing     Problem: NEUROSENSORY - ADULT  Goal: Achieves stable or improved neurological status  Description: INTERVENTIONS  - Monitor and report changes in neurological status  - Monitor vital signs such as temperature, blood pressure, glucose, and any other labs ordered   - Initiate measures to prevent increased intracranial pressure  - Monitor for seizure activity and implement precautions if appropriate      Outcome: Progressing  Goal: Remains free of injury related to seizures activity  Description: INTERVENTIONS  - Maintain airway, patient safety  and administer oxygen as ordered  - Monitor patient for seizure activity, document and report duration and description of seizure to physician/advanced practitioner  - If seizure occurs,  ensure patient safety during seizure  - Reorient patient post seizure  - Seizure pads on all 4 side rails  - Instruct patient/family to notify RN of any seizure activity including if an aura is experienced  - Instruct patient/family to call for assistance with activity based on nursing assessment  - Administer anti-seizure medications if ordered    Outcome: Progressing  Goal: Achieves maximal functionality and self care  Description: INTERVENTIONS  - Monitor swallowing and airway patency with patient fatigue and changes in neurological status  - Encourage and assist patient to increase activity and self care     - Encourage visually impaired, hearing impaired and aphasic patients to use assistive/communication devices  Outcome: Progressing     Problem: CARDIOVASCULAR - ADULT  Goal: Maintains optimal cardiac output and hemodynamic stability  Description: INTERVENTIONS:  - Monitor I/O, vital signs and rhythm  - Monitor for S/S and trends of decreased cardiac output  - Administer and titrate ordered vasoactive medications to optimize hemodynamic stability  - Assess quality of pulses, skin color and temperature  - Assess for signs of decreased coronary artery perfusion  - Instruct patient to report change in severity of symptoms  Outcome: Progressing  Goal: Absence of cardiac dysrhythmias or at baseline rhythm  Description: INTERVENTIONS:  - Continuous cardiac monitoring, vital signs, obtain 12 lead EKG if ordered  - Administer antiarrhythmic and heart rate control medications as ordered  - Monitor electrolytes and administer replacement therapy as ordered  Outcome: Progressing     Problem: RESPIRATORY - ADULT  Goal: Achieves optimal ventilation and oxygenation  Description: INTERVENTIONS:  - Assess for changes in respiratory status  - Assess for changes in mentation and behavior  - Position to facilitate oxygenation and minimize respiratory effort  - Oxygen administered by appropriate delivery if ordered  - Initiate smoking cessation education as indicated  - Encourage broncho-pulmonary hygiene including cough, deep breathe, Incentive Spirometry  - Assess the need for suctioning and aspirate as needed  - Assess and instruct to report SOB or any respiratory difficulty  - Respiratory Therapy support as indicated  Outcome: Progressing     Problem: GENITOURINARY - ADULT  Goal: Maintains or returns to baseline urinary function  Description: INTERVENTIONS:  - Assess urinary function  - Encourage oral fluids to ensure adequate hydration if ordered  - Administer IV fluids as ordered to ensure adequate hydration  - Administer ordered medications as needed  - Offer frequent toileting  - Follow urinary retention protocol if ordered  Outcome: Progressing  Goal: Absence of urinary retention  Description: INTERVENTIONS:  - Assess patient’s ability to void and empty bladder  - Monitor I/O  - Bladder scan as needed  - Discuss with physician/AP medications to alleviate retention as needed  - Discuss catheterization for long term situations as appropriate  Outcome: Progressing  Goal: Urinary catheter remains patent  Description: INTERVENTIONS:  - Assess patency of urinary catheter  - If patient has a chronic barnett, consider changing catheter if non-functioning  - Follow guidelines for intermittent irrigation of non-functioning urinary catheter  Outcome: Progressing     Problem: METABOLIC, FLUID AND ELECTROLYTES - ADULT  Goal: Electrolytes maintained within normal limits  Description: INTERVENTIONS:  - Monitor labs and assess patient for signs and symptoms of electrolyte imbalances  - Administer electrolyte replacement as ordered  - Monitor response to electrolyte replacements, including repeat lab results as appropriate  - Instruct patient on fluid and nutrition as appropriate  Outcome: Progressing  Goal: Fluid balance maintained  Description: INTERVENTIONS:  - Monitor labs   - Monitor I/O and WT  - Instruct patient on fluid and nutrition as appropriate  - Assess for signs & symptoms of volume excess or deficit  Outcome: Progressing  Goal: Glucose maintained within target range  Description: INTERVENTIONS:  - Monitor Blood Glucose as ordered  - Assess for signs and symptoms of hyperglycemia and hypoglycemia  - Administer ordered medications to maintain glucose within target range  - Assess nutritional intake and initiate nutrition service referral as needed  Outcome: Progressing     Problem: Nutrition/Hydration-ADULT  Goal: Nutrient/Hydration intake appropriate for improving, restoring or maintaining nutritional needs  Description: Monitor and assess patient's nutrition/hydration status for malnutrition  Collaborate with interdisciplinary team and initiate plan and interventions as ordered  Monitor patient's weight and dietary intake as ordered or per policy  Utilize nutrition screening tool and intervene as necessary  Determine patient's food preferences and provide high-protein, high-caloric foods as appropriate       INTERVENTIONS:  - Monitor oral intake, urinary output, labs, and treatment plans  - Assess nutrition and hydration status and recommend course of action  - Evaluate amount of meals eaten  - Assist patient with eating if necessary   - Allow adequate time for meals  - Recommend/ encourage appropriate diets, oral nutritional supplements, and vitamin/mineral supplements  - Order, calculate, and assess calorie counts as needed  - Recommend, monitor, and adjust tube feedings and TPN/PPN based on assessed needs  - Assess need for intravenous fluids  - Provide specific nutrition/hydration education as appropriate  - Include patient/family/caregiver in decisions related to nutrition  Outcome: Progressing

## 2023-02-27 NOTE — PLAN OF CARE
Problem: Prexisting or High Potential for Compromised Skin Integrity  Goal: Skin integrity is maintained or improved  Description: INTERVENTIONS:  - Identify patients at risk for skin breakdown  - Assess and monitor skin integrity  - Assess and monitor nutrition and hydration status  - Monitor labs   - Assess for incontinence   - Turn and reposition patient  - Assist with mobility/ambulation  - Relieve pressure over bony prominences  - Avoid friction and shearing  - Provide appropriate hygiene as needed including keeping skin clean and dry  - Evaluate need for skin moisturizer/barrier cream  - Collaborate with interdisciplinary team   - Patient/family teaching  - Consider wound care consult   Outcome: Progressing     Problem: MOBILITY - ADULT  Goal: Maintain or return to baseline ADL function  Description: INTERVENTIONS:  -  Assess patient's ability to carry out ADLs; assess patient's baseline for ADL function and identify physical deficits which impact ability to perform ADLs (bathing, care of mouth/teeth, toileting, grooming, dressing, etc )  - Assess/evaluate cause of self-care deficits   - Assess range of motion  - Assess patient's mobility; develop plan if impaired  - Assess patient's need for assistive devices and provide as appropriate  - Encourage maximum independence but intervene and supervise when necessary  - Involve family in performance of ADLs  - Assess for home care needs following discharge   - Consider OT consult to assist with ADL evaluation and planning for discharge  - Provide patient education as appropriate  Outcome: Progressing  Goal: Maintains/Returns to pre admission functional level  Description: INTERVENTIONS:  - Perform BMAT or MOVE assessment daily    - Set and communicate daily mobility goal to care team and patient/family/caregiver  - Collaborate with rehabilitation services on mobility goals if consulted  - Perform Range of Motion  times a day    - Reposition patient every hours   - Dangle patient  times a day  - Stand patient  times a day  - Ambulate patient  times a day  - Out of bed to chair  times a day   - Out of bed for meals  times a day  - Out of bed for toileting  - Record patient progress and toleration of activity level   Outcome: Progressing     Problem: PAIN - ADULT  Goal: Verbalizes/displays adequate comfort level or baseline comfort level  Description: Interventions:  - Encourage patient to monitor pain and request assistance  - Assess pain using appropriate pain scale  - Administer analgesics based on type and severity of pain and evaluate response  - Implement non-pharmacological measures as appropriate and evaluate response  - Consider cultural and social influences on pain and pain management  - Notify physician/advanced practitioner if interventions unsuccessful or patient reports new pain  Outcome: Progressing     Problem: INFECTION - ADULT  Goal: Absence or prevention of progression during hospitalization  Description: INTERVENTIONS:  - Assess and monitor for signs and symptoms of infection  - Monitor lab/diagnostic results  - Monitor all insertion sites, i e  indwelling lines, tubes, and drains  - Monitor endotracheal if appropriate and nasal secretions for changes in amount and color  - Mikana appropriate cooling/warming therapies per order  - Administer medications as ordered  - Instruct and encourage patient and family to use good hand hygiene technique  - Identify and instruct in appropriate isolation precautions for identified infection/condition  Outcome: Progressing  Goal: Absence of fever/infection during neutropenic period  Description: INTERVENTIONS:  - Monitor WBC    Outcome: Progressing     Problem: SAFETY ADULT  Goal: Maintain or return to baseline ADL function  Description: INTERVENTIONS:  -  Assess patient's ability to carry out ADLs; assess patient's baseline for ADL function and identify physical deficits which impact ability to perform ADLs (bathing, care of mouth/teeth, toileting, grooming, dressing, etc )  - Assess/evaluate cause of self-care deficits   - Assess range of motion  - Assess patient's mobility; develop plan if impaired  - Assess patient's need for assistive devices and provide as appropriate  - Encourage maximum independence but intervene and supervise when necessary  - Involve family in performance of ADLs  - Assess for home care needs following discharge   - Consider OT consult to assist with ADL evaluation and planning for discharge  - Provide patient education as appropriate  Outcome: Progressing  Goal: Maintains/Returns to pre admission functional level  Description: INTERVENTIONS:  - Perform BMAT or MOVE assessment daily    - Set and communicate daily mobility goal to care team and patient/family/caregiver  - Collaborate with rehabilitation services on mobility goals if consulted  - Perform Range of Motion  times a day  - Reposition patient every  hours    - Dangle patient  times a day  - Stand patient  times a day  - Ambulate patient  times a day  - Out of bed to chair  times a day   - Out of bed for meals  times a day  - Out of bed for toileting  - Record patient progress and toleration of activity level   Outcome: Progressing  Goal: Patient will remain free of falls  Description: INTERVENTIONS:  - Educate patient/family on patient safety including physical limitations  - Instruct patient to call for assistance with activity   - Consult OT/PT to assist with strengthening/mobility   - Keep Call bell within reach  - Keep bed low and locked with side rails adjusted as appropriate  - Keep care items and personal belongings within reach  - Initiate and maintain comfort rounds  - Make Fall Risk Sign visible to staff  - Offer Toileting every  Hours, in advance of need  - Initiate/Maintain alarm  - Obtain necessary fall risk management equipment:   - Apply yellow socks and bracelet for high fall risk patients  - Consider moving patient to room near nurses station  Outcome: Progressing     Problem: DISCHARGE PLANNING  Goal: Discharge to home or other facility with appropriate resources  Description: INTERVENTIONS:  - Identify barriers to discharge w/patient and caregiver  - Arrange for needed discharge resources and transportation as appropriate  - Identify discharge learning needs (meds, wound care, etc )  - Arrange for interpretive services to assist at discharge as needed  - Refer to Case Management Department for coordinating discharge planning if the patient needs post-hospital services based on physician/advanced practitioner order or complex needs related to functional status, cognitive ability, or social support system  Outcome: Progressing     Problem: Knowledge Deficit  Goal: Patient/family/caregiver demonstrates understanding of disease process, treatment plan, medications, and discharge instructions  Description: Complete learning assessment and assess knowledge base    Interventions:  - Provide teaching at level of understanding  - Provide teaching via preferred learning methods  Outcome: Progressing     Problem: NEUROSENSORY - ADULT  Goal: Achieves stable or improved neurological status  Description: INTERVENTIONS  - Monitor and report changes in neurological status  - Monitor vital signs such as temperature, blood pressure, glucose, and any other labs ordered   - Initiate measures to prevent increased intracranial pressure  - Monitor for seizure activity and implement precautions if appropriate      Outcome: Progressing  Goal: Remains free of injury related to seizures activity  Description: INTERVENTIONS  - Maintain airway, patient safety  and administer oxygen as ordered  - Monitor patient for seizure activity, document and report duration and description of seizure to physician/advanced practitioner  - If seizure occurs,  ensure patient safety during seizure  - Reorient patient post seizure  - Seizure pads on all 4 side rails  - Instruct patient/family to notify RN of any seizure activity including if an aura is experienced  - Instruct patient/family to call for assistance with activity based on nursing assessment  - Administer anti-seizure medications if ordered    Outcome: Progressing  Goal: Achieves maximal functionality and self care  Description: INTERVENTIONS  - Monitor swallowing and airway patency with patient fatigue and changes in neurological status  - Encourage and assist patient to increase activity and self care     - Encourage visually impaired, hearing impaired and aphasic patients to use assistive/communication devices  Outcome: Progressing     Problem: CARDIOVASCULAR - ADULT  Goal: Maintains optimal cardiac output and hemodynamic stability  Description: INTERVENTIONS:  - Monitor I/O, vital signs and rhythm  - Monitor for S/S and trends of decreased cardiac output  - Administer and titrate ordered vasoactive medications to optimize hemodynamic stability  - Assess quality of pulses, skin color and temperature  - Assess for signs of decreased coronary artery perfusion  - Instruct patient to report change in severity of symptoms  Outcome: Progressing  Goal: Absence of cardiac dysrhythmias or at baseline rhythm  Description: INTERVENTIONS:  - Continuous cardiac monitoring, vital signs, obtain 12 lead EKG if ordered  - Administer antiarrhythmic and heart rate control medications as ordered  - Monitor electrolytes and administer replacement therapy as ordered  Outcome: Progressing     Problem: GENITOURINARY - ADULT  Goal: Maintains or returns to baseline urinary function  Description: INTERVENTIONS:  - Assess urinary function  - Encourage oral fluids to ensure adequate hydration if ordered  - Administer IV fluids as ordered to ensure adequate hydration  - Administer ordered medications as needed  - Offer frequent toileting  - Follow urinary retention protocol if ordered  Outcome: Progressing  Goal: Absence of urinary retention  Description: INTERVENTIONS:  - Assess patient’s ability to void and empty bladder  - Monitor I/O  - Bladder scan as needed  - Discuss with physician/AP medications to alleviate retention as needed  - Discuss catheterization for long term situations as appropriate  Outcome: Progressing  Goal: Urinary catheter remains patent  Description: INTERVENTIONS:  - Assess patency of urinary catheter  - If patient has a chronic barnett, consider changing catheter if non-functioning  - Follow guidelines for intermittent irrigation of non-functioning urinary catheter  Outcome: Progressing     Problem: METABOLIC, FLUID AND ELECTROLYTES - ADULT  Goal: Electrolytes maintained within normal limits  Description: INTERVENTIONS:  - Monitor labs and assess patient for signs and symptoms of electrolyte imbalances  - Administer electrolyte replacement as ordered  - Monitor response to electrolyte replacements, including repeat lab results as appropriate  - Instruct patient on fluid and nutrition as appropriate  Outcome: Progressing  Goal: Fluid balance maintained  Description: INTERVENTIONS:  - Monitor labs   - Monitor I/O and WT  - Instruct patient on fluid and nutrition as appropriate  - Assess for signs & symptoms of volume excess or deficit  Outcome: Progressing  Goal: Glucose maintained within target range  Description: INTERVENTIONS:  - Monitor Blood Glucose as ordered  - Assess for signs and symptoms of hyperglycemia and hypoglycemia  - Administer ordered medications to maintain glucose within target range  - Assess nutritional intake and initiate nutrition service referral as needed  Outcome: Progressing     Problem: Nutrition/Hydration-ADULT  Goal: Nutrient/Hydration intake appropriate for improving, restoring or maintaining nutritional needs  Description: Monitor and assess patient's nutrition/hydration status for malnutrition  Collaborate with interdisciplinary team and initiate plan and interventions as ordered  Monitor patient's weight and dietary intake as ordered or per policy  Utilize nutrition screening tool and intervene as necessary  Determine patient's food preferences and provide high-protein, high-caloric foods as appropriate  INTERVENTIONS:  - Monitor oral intake, urinary output, labs, and treatment plans  - Assess nutrition and hydration status and recommend course of action  - Evaluate amount of meals eaten  - Assist patient with eating if necessary   - Allow adequate time for meals  - Recommend/ encourage appropriate diets, oral nutritional supplements, and vitamin/mineral supplements  - Order, calculate, and assess calorie counts as needed  - Recommend, monitor, and adjust tube feedings and TPN/PPN based on assessed needs  - Assess need for intravenous fluids  - Provide specific nutrition/hydration education as appropriate  - Include patient/family/caregiver in decisions related to nutrition  Outcome: Progressing     Problem: Neurological Deficit  Goal: Neurological status is stable or improving  Description: Interventions:  - Monitor and assess patient's level of consciousness, motor function, sensory function, and level of assistance needed for ADLs  - Monitor and report changes from baseline  Collaborate with interdisciplinary team to initiate plan and implement interventions as ordered  - Provide and maintain a safe environment  - Consider seizure precautions  - Consider fall precautions  - Consider aspiration precautions  - Consider bleeding precautions  Outcome: Progressing     Problem: Activity Intolerance/Impaired Mobility  Goal: Mobility/activity is maintained at optimum level for patient  Description: Interventions:  - Assess and monitor patient  barriers to mobility and need for assistive/adaptive devices  - Assess patient's emotional response to limitations  - Collaborate with interdisciplinary team and initiate plans and interventions as ordered    - Encourage independent activity per ability   - Maintain proper body alignment  - Perform active/passive rom as tolerated/ordered  - Plan activities to conserve energy   - Turn patient as appropriate  Outcome: Progressing     Problem: Communication Impairment  Goal: Ability to express needs and understand communication  Description: Assess patient's communication skills and ability to understand information  Patient will demonstrate use of effective communication techniques, alternative methods of communication and understanding even if not able to speak  - Encourage communication and provide alternate methods of communication as needed  - Collaborate with case management/ for discharge needs  - Include patient/family/caregiver in decisions related to communication  Outcome: Progressing     Problem: Potential for Aspiration  Goal: Non-ventilated patient's risk of aspiration is minimized  Description: Assess and monitor vital signs, respiratory status, and labs (WBC)  Monitor for signs of aspiration (tachypnea, cough, rales, wheezing, cyanosis, fever)  - Assess and monitor patient's ability to swallow  - Place patient up in chair to eat if possible  - HOB up at 90 degrees to eat if unable to get patient up into chair   - Supervise patient during oral intake  - Instruct patient/ family to take small bites  - Instruct patient/ family to take small single sips when taking liquids  - Follow patient-specific strategies generated by speech pathologist   Outcome: Progressing  Goal: Ventilated patient's risk of aspiration is minimized  Description: Assess and monitor vital signs, respiratory status, airway cuff pressure, and labs (WBC)  Monitor for signs of aspiration (tachypnea, cough, rales, wheezing, cyanosis, fever)  - Elevate head of bed 30 degrees if patient has tube feeding   - Monitor tube feeding    Outcome: Progressing     Problem: Nutrition  Goal: Nutrition/Hydration status is improving  Description: Monitor and assess patient's nutrition/hydration status for malnutrition (ex- brittle hair, bruises, dry skin, pale skin and conjunctiva, muscle wasting, smooth red tongue, and disorientation)  Collaborate with interdisciplinary team and initiate plan and interventions as ordered  Monitor patient's weight and dietary intake as ordered or per policy  Utilize nutrition screening tool and intervene per policy  Determine patient's food preferences and provide high-protein, high-caloric foods as appropriate  - Assist patient with eating   - Allow adequate time for meals   - Encourage patient to take dietary supplement as ordered  - Collaborate with clinical nutritionist   - Include patient/family/caregiver in decisions related to nutrition    Outcome: Progressing

## 2023-02-27 NOTE — PROGRESS NOTES
[address] [facility]  Progress Note - Cameron Goods 1960, 60 y o  male MRN: [id number]  Unit/Bed#: ICU 09 Encounter: [id number]  Primary Care Provider: No primary care provider on file  Date and time admitted to hospital: 2/25/2023 11:47 PM    Cerebellar infarct [facility])  Assessment & Plan  PPD 1 from intracranial and extracranial vertebral artery stenting (Dr Plascencia Learn 2/26/2023)   · TICI 2B revascularization   · Patient presented with dysarthria and dizziness  · found to have bilateral cerebellar infarct with significant R V3/4 thrombus, L vertebral artery stenosis with possible occlusion within intradural segment  · NIHSS 0 with symptom onset 2/23 progressed to dysarthria, left facial droop not corrected with smile, R side flaccid    Imaging:  · MRI brain 2/26/2023: Acute posterior circulation infarcts within the right cerebellar hemisphere with new brainstem infarcts  Overall infarct burden slightly increased from prior study although new infarcts noted in posterior medulla on the right right midbrain and left occipital lobe  No hydrocephalus or parenchymal hemorrhage  Abnormal left vertebral artery flow void at the lateral mass of C1 related to slow flow or vascular occlusion  Plan:  · Continue to closely monitor symptoms   · Integrilin 1mcg/kg/hr  · Plan to transition to DAPT once cleared by speech therapy   · ASA 81mg   · Brilinta 180mg load with 90mg BID starting with evening dose  · Plan to staop Integrilin 1 hour after brilinta load  · Hep gtt  · Stopped at 1630 2/26  · Restarted today- neuro protocol without bolus  · Plan to transition to eliquis 2 5mg BID tomorrow   · STAT CTH for decline or change in neurological status  · Ongoing medical management   · Continue BP goal as ordered   · Neurology following for ongoing stroke care  · Neurosurgery will continue to follow closely       UTI (urinary tract infection)  Assessment & Plan  · Noted on admission · Continue rocephin  · Care per medical team     Hypertensive emergency  Assessment & Plan  · BP >271 systolic   · On Cardene for control    Stenosis of left vertebral artery  Assessment & Plan  · Plan as above    * Right Vertebral artery dissection (HCC)  Assessment & Plan  · Plan as above      Subjective/Objective   Chief Complaint: None     Subjective: Patient is appropriately emotional  Still with R sided weakness although patient with some subtle movements  Patients wife at bedside  Patient appears to comprehend  Slowed slurred speech without expressive difficulties  Objective: laying in bed in NAD    I/O       02/25 0701 02/26 0700 02/26 0701 02/27 0700 02/27 0701 02/28 0700    I V  (mL/kg) 538 3 3426 2 (43 4)     IV Piggyback  50     Total Intake(mL/kg) 538 3 3476 2 (44 1)     Urine (mL/kg/hr) 1050 3050 (1 6) 250 (1 3)    Blood  450     Total Output 1050 3500 250    Net -511 7 -23 8 -250           Unmeasured Urine Occurrence 1 x            Invasive Devices     Peripheral Intravenous Line  Duration           Peripheral IV 02/20/23 Dorsal (posterior); Right Forearm 6 days    Peripheral IV 02/26/23 Left;Ventral (anterior) Forearm <1 day          Arterial Line  Duration           Arterial Line 02/25/23 Radial 1 day          Drain  Duration           Urethral Catheter Temperature probe <1 day          Airway  Duration           ETT  Hi-Lo; Cuffed;Oral 8 mm <1 day                Physical Exam:  Vitals: Blood pressure 148/78, pulse 73, temperature 97 9 °F (36 6 °C), temperature source Bladder, resp  rate 14, height 5' 9" (1 753 m), weight 78 5 kg (173 lb), SpO2 99 %  ,Body mass index is 25 55 kg/m²      Hemodynamic Monitoring: MAP: Arterial Line MAP (mmHg): 90 mmHg     General appearance: alert, appears stated age, cooperative   Head: Normocephalic, without obvious abnormality  Eyes: EOMI, PERRL  Neck: supple, symmetrical, trachea midline  Back: no kyphosis present  Lungs: non labored breathing  Heart: regular heart rate  Neurologic:   Mental status: Alert, oriented x3, thought content appropriate  Cranial nerves: grossly intact (Cranial nerves II-XII) except mild L facial droop  Sensory: normal to light touch  Subjective tingling to LT on the right   Motor: 5/5 on the left  No  or finger wiggle  R sided weakness  Some trace EF when supported 2/5  KF 3/5, KE 4/5  Reflexes: no castañeda's     Lab Results:  Results from last 7 days   Lab Units 02/26/23  0506 02/26/23  0021 02/25/23  0940   WBC Thousand/uL 13 27* 17 07* 9 99   HEMOGLOBIN g/dL 13 3 13 6 14 4   HEMATOCRIT % 40 2 40 8 44 0   PLATELETS Thousands/uL 349 344 326   NEUTROS PCT % 89*  --  88*   MONOS PCT % 4  --  4     Results from last 7 days   Lab Units 02/27/23  0429 02/26/23  0506 02/25/23  2044 02/25/23  0940   POTASSIUM mmol/L 3 1* 3 4* 3 3* 3 5   CHLORIDE mmol/L 120* 115* 109* 108   CO2 mmol/L 21 21 24 24   BUN mg/dL 15 17 18 23   CREATININE mg/dL 1 05 1 09 1 15 1 32*   CALCIUM mg/dL 8 1* 8 8 8 4 8 8   ALK PHOS U/L  --  63  --  55   ALT U/L  --  24  --  19   AST U/L  --  13  --  16     Results from last 7 days   Lab Units 02/27/23  0429   MAGNESIUM mg/dL 2 1         Results from last 7 days   Lab Units 02/26/23  1133 02/26/23  0506 02/26/23  0021 02/25/23  0940   INR   --   --  1 06 0 98   PTT seconds 69* 33 31 29     No results found for: TROPONINT  ABG:No results found for: PHART, MPE8HTR, PO2ART, XOA4LAN, C8KNTPWQ, BEART, SOURCE    Imaging Studies: I have personally reviewed pertinent reports  and I have personally reviewed pertinent films in PACS  CTA head and neck w wo contrast    Result Date: 2/26/2023  Impression: Overall, there is no significant interval change when comparing to the recent MRI brain, and CTA head neck study  Stable small cerebellar infarctions as noted on the recent MRI study   Stable findings of distal right cervical and proximal intradural vertebral artery occlusion with probable retrograde flow in its distal intradural segment  Stable left vertebral artery origin stenosis and presumed occlusion of the distal left intradural vertebral segment  Small basilar artery with focal atherosclerotic disease in its proximal segment  Patent fetal right PCA circulation  Left PCA branch is small but patent, and unchanged in appearance  Stable poor visualization of the proximal left superior cerebellar artery  Above-mentioned findings are in keeping with the preliminary report provided by [facility] radiology services without significant discrepancy in the report  Workstation performed: [id number]     CTA head and neck with and without contrast    Result Date: 2/25/2023  Impression: No acute intracranial hemorrhage  Focal infarcts in the right posterior cerebellum, possibly acute to subacute  Follow-up MRI imaging is recommended  Marked tapering and occlusion of the distal right cervical vertebral artery  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  Proximal right intradural vertebral artery is also occluded with faint visualization of the post-PICA segment, which may be filling in a retrograde fashion  Severe left vertebral artery origin stenosis  Left vertebral artery may terminate in a posterior for cerebellar artery branch as the post-PICA segment is not identified  Small basilar artery with fetal right PCA circulation  Left proximal PCA is patent but small  Mid to distal left PCA is not well visualized  I personally discussed this study with Patricio Lacey on 2/25/2023 at 12:21 PM  Workstation performed: [id number]     XR chest 1 view portable    Result Date: 2/25/2023  Impression: No acute abnormality however there is an opacity in the left lung base that may represent focal pleural thickening  Neoplasm cannot be excluded  Nonemergent follow-up PA and lateral views of the chest or CT chest suggested unless there are prior studies available for comparison   Findings for this inpatient marked for immediate notification in Epic  Workstation performed: [id number]     CT head wo contrast    Result Date: 2/26/2023  Impression: Stable evolving bilateral cerebellar infarctions without significant interval change  Workstation performed: [id number]     CT head wo contrast    Result Date: 2/26/2023  Impression: No new parenchymal findings when comparing to the recent CT and MRI brain studies  Stable late acute to early subacute bilateral cerebellar infarctions without hemorrhagic transformation  Workstation performed: [id number]     MRA head wo contrast    Result Date: 2/25/2023  Impression: Abscess of flow related signal in the V4 segments of the bilateral vertebral arteries, throughout the basilar artery and left posterior cerebral artery, concerning for progression of dissection and/or thrombus  Recommend emergent interventional radiology consultation  I personally discussed this study with Aspen Cronin   on 2/25/2023 at 8:15 PM   Workstation performed: [id number]     MRA carotids wo contrast    Result Date: 2/25/2023  Impression: 1  There complete absence of flow related signal along the cervical segment of the right vertebral artery concerning for progression of dissection and/or retrograde flow  2   Absence of flow related signal in the V4 segments of the bilateral vertebral arteries and throughout the basilar artery, also concerning for progression of dissection  I personally discussed this study with Aspen Cronin on 2/25/2023 at 8:24 PM  Workstation performed: [id number]     MRI brain wo contrast    Result Date: 2/27/2023  Impression: 1  Crescending, acute/recent posterior circulation infarctions with increasing right cerebellar and new brainstem infarctions as described  Overall infarct burden is slightly increased from the prior study one day earlier although new small infarcts are noted in the posterior medulla on the right, right midbrain and left occipital lobe  No hydrocephalus or large parenchymal hemorrhage   2  Abnormal left vertebral artery flow void at the lateral mass of C1, possibly related to slow flow and/or vascular occlusion  3   Very mild, chronic microangiopathy and chronic left thalamic lacunar infarction    Workstation performed: [id number]     MRI brain wo contrast    Result Date: 2/25/2023  Impression: Acute early subacute infarcts throughout the right greater than left cerebellar hemispheres and anterior vermis, consistent with findings on the head CT  No hemorrhagic conversion or mass effect  Arthrotomy 8 Workstation performed: [id number]     XR follow up    Result Date: 2/25/2023  Impression: No radiopaque orbital foreign body  Workstation performed: [id number]     CT cerebral perfusion    Result Date: 2/26/2023  Impression: CT perfusion performed  Data available on PACS  Workstation performed: [id number]       EKG, Pathology, and Other Studies: I have personally reviewed pertinent reports        VTE Pharmacologic Prophylaxis: Heparin gtt to be restarted this morning     VTE Mechanical Prophylaxis: sequential compression device

## 2023-02-27 NOTE — PROGRESS NOTES
Daily Progress Note - Critical Care   Annia oCats 60 y o  male MRN: [id number]  Unit/Bed#: ICU 09 Encounter: [id number]    ----------------------------------------------------------------------------------------  HPI: Annia Coats is a 60 y o  male with no known PMH who presented to [facility] ED 2/25 with 2-day history of dizziness and nausea  Patient found to have hypertensive emergency, CT Imaging there was remarkable for subacute small right cerebellar stroke  CTA consistent with distal right cervical vertebral artery dissection and proximal right intradural vertebral artery occlusion  There is also severe left vertebral origin stenosis  MRI/MRA was obtained showing early subacute infarcts right greater than left cerebellar hemispheres and anterior vermis  Absence of flow in V4 segments of bilateral vertebral arteries, basilar artery and PCA concerning for progression of dissection and/or thrombus  Initial NIHSS 1  Pt also with UTI, COVID positive although asx  Pt initially admitted to [address] [place] [address] ICU and initiated on cardene infusion, loaded with ASA and plavix  Pt transferred to [facility] for further neurosurgical evaluation  Exam was stable until around 12:30 on 2/26 at which time pt developed worsening dysarthria and R-sided weakness  A stroke alert was called  Repeat CT without any intraventricular hemorrhage  Patient subsequently underwent a right vertebral/basilar thrombectomy with stenting of the V3 and V4 with TICI post 2B flow  Pt subsequently placed on Integrilin with goal -169  Follow up CT without evidence of hemorrhage  24h events: Pt returned from OR intubated and sedated  Pt  Subsequently extubated yesterday evening, maintained on Integrilin infusion  Repeat MRI demonstrated cerebellar strokes with R greater than L stroke burden  Additional hypodensities on DWI left midbrain/pontine area and right lower medullary/spinal cord junction on wet read per Dr Kimani Field      Patient appropriate for transfer out of the ICU today?: No  Disposition: Continue Critical Care   Code Status: Level 1 - Full Code  ---------------------------------------------------------------------------------------  SUBJECTIVE  Pt continues to have R sided weakness, no change since yesterday  He denies any SOB, pain, fevers, chills or any other new acute concerns  He is tearful  Drips: eptifibatide, 1 mcg/kg/min, Last Rate: 1 mcg/kg/min (02/27/23 0815)  heparin (porcine), 3-24 Units/kg/hr (Order-Specific), Last Rate: 15 Units/kg/hr (02/27/23 0929)  niCARdipine, 1-15 mg/hr, Last Rate: 10 mg/hr (02/27/23 0934)  sodium chloride, 75 mL/hr, Last Rate: Stopped (02/27/23 1000)      Lines:   Arterial line, 1 day  2 peripheral IVs    Review of systems was reviewed and negative unless stated above in HPI/24-hour events   ---------------------------------------------------------------------------------------  OBJECTIVE    Vitals   Temp:  [97 9 °F (36 6 °C)-98 6 °F (37 °C)] 98 6 °F (37 °C)  HR:  [56-84] 68  Resp:  [12-32] 15  BP: (148-180)/(64-90) 154/68  SpO2:  [98 %-100 %] 99 %      Drips  eptifibatide, 1 mcg/kg/min, Last Rate: 1 mcg/kg/min (02/27/23 0815)  heparin (porcine), 3-24 Units/kg/hr (Order-Specific), Last Rate: 15 Units/kg/hr (02/27/23 0929)  niCARdipine, 1-15 mg/hr, Last Rate: 10 mg/hr (02/27/23 0934)  sodium chloride, 75 mL/hr, Last Rate: Stopped (02/27/23 1000)        In/Out:   I/O last 24 hours: In: 4035 1 [I V :3885 1; IV Piggyback:150]  Out: 4807 [Urine:3600; Blood:450]      Respiratory:    Invasive/non-invasive ventilation settings   Respiratory    Lab Data (Last 4 hours)    None         O2/Vent Data (Last 4 hours)    None                Physical Exam  Vitals and nursing note reviewed  Constitutional:       General: He is not in acute distress  Appearance: He is well-developed  HENT:      Head: Normocephalic and atraumatic     Eyes:      Conjunctiva/sclera: Conjunctivae normal    Cardiovascular:      Rate and Rhythm: Normal rate and regular rhythm  Pulmonary:      Effort: Pulmonary effort is normal  No respiratory distress  Breath sounds: Normal breath sounds  Abdominal:      General: Abdomen is flat  There is no distension  Musculoskeletal:         General: No swelling  Cervical back: Neck supple  Right lower leg: No edema  Left lower leg: No edema  Skin:     General: Skin is warm and dry  Capillary Refill: Capillary refill takes less than 2 seconds  Neurological:      Mental Status: He is alert and oriented to person, place, and time  Comments: Weakness and decreased sensation RUE, RLE  L facial droop  Answers questions and follows all commands appropriately      Psychiatric:      Comments: tearful       Laboratory and Diagnostics:  Results from last 7 days   Lab Units 02/27/23  1003 02/26/23  0506 02/26/23  0021 02/25/23  0940   WBC Thousand/uL 10 26* 13 27* 17 07* 9 99   HEMOGLOBIN g/dL 10 8* 13 3 13 6 14 4   HEMATOCRIT % 33 3* 40 2 40 8 44 0   PLATELETS Thousands/uL 268 349 344 326   NEUTROS PCT %  --  89*  --  88*   MONOS PCT %  --  4  --  4     Results from last 7 days   Lab Units 02/27/23  0429 02/26/23  0506 02/25/23  2044 02/25/23  0940   SODIUM mmol/L 147 143 141 142   POTASSIUM mmol/L 3 1* 3 4* 3 3* 3 5   CHLORIDE mmol/L 120* 115* 109* 108   CO2 mmol/L 21 21 24 24   ANION GAP mmol/L 6 7 8 10   BUN mg/dL 15 17 18 23   CREATININE mg/dL 1 05 1 09 1 15 1 32*   CALCIUM mg/dL 8 1* 8 8 8 4 8 8   GLUCOSE RANDOM mg/dL 114 127 110 124   ALT U/L  --  24  --  19   AST U/L  --  13  --  16   ALK PHOS U/L  --  63  --  55   ALBUMIN g/dL  --  3 2*  --  3 8   TOTAL BILIRUBIN mg/dL  --  0 68  --  0 60     Results from last 7 days   Lab Units 02/27/23  0429   MAGNESIUM mg/dL 2 1      Results from last 7 days   Lab Units 02/26/23  1133 02/26/23  0506 02/26/23  0021 02/25/23  0940   INR   --   --  1 06 0 98   PTT seconds 69* 33 31 29          Results from last 7 days   Lab Units 02/25/23 2044   LACTIC ACID mmol/L 1 2     ABG:    VBG:    Results from last 7 days   Lab Units 02/26/23  0506 02/25/23 2044   PROCALCITONIN ng/ml 0 08 <0 05       Micro:   Results from last 7 days   Lab Units 02/25/23 2044 02/25/23  1155   BLOOD CULTURE  No Growth at 24 hrs  No Growth at 24 hrs   --    URINE CULTURE   --  >100,000 cfu/ml Gram Negative Luis Enrique Enteric Like*       EKG: no EKG in the past 24 hours  Imaging: I have personally reviewed pertinent reports  Invasive Devices     Peripheral Intravenous Line  Duration           Peripheral IV 02/20/23 Dorsal (posterior); Right Forearm 6 days    Peripheral IV 02/26/23 Left;Ventral (anterior) Forearm <1 day          Arterial Line  Duration           Arterial Line 02/25/23 Radial 1 day          Drain  Duration           Urethral Catheter Temperature probe <1 day          Airway  Duration           ETT  Hi-Lo; Cuffed;Oral 8 mm <1 day                Height and Weights:    Height: 5' 9" (175 3 cm)  IBW (Ideal Body Weight): 70 7 kg  Body mass index is 25 55 kg/m²  Weight (last 2 days)     Date/Time Weight    02/27/23 0904 78 5 (173)    02/27/23 0600 78 9 (173 94)            Nutrition:        Diet Orders   (From admission, onward)             Start     Ordered    02/25/23 2350  Diet NPO  Diet effective now        References:    Nutrtion Support Algorithm Enteral vs  Parenteral   Question Answer Comment   Diet Type NPO    RD to adjust diet per protocol?  Yes        02/25/23 2349                Active Medications:  Scheduled Meds:  Current Facility-Administered Medications   Medication Dose Route Frequency Provider Last Rate   • aspirin  81 mg Oral Daily Pervis Boxer, MD     • cefTRIAXone  1,000 mg Intravenous Q24H Curtis Gtz MD     • chlorhexidine  15 mL Mouth/Throat Q12H [address] Pippa Branham PA-C     • eptifibatide  1 mcg/kg/min Intravenous Continuous Curtis Gtz MD 1 mcg/kg/min (02/27/23 0815)   • heparin (porcine)  3-24 Units/kg/hr (Order-Specific) Intravenous Titrated Joseph Sotelo MD 15 Units/kg/hr (02/27/23 1288)   • hydrALAZINE  10 mg Intravenous Q6H PRN Saba Salazar MD     • niCARdipine  1-15 mg/hr Intravenous Titrated Babara Ryan, PA-C 10 mg/hr (02/27/23 0934)   • potassium chloride  20 mEq Intravenous BID Hayde Mujica MD Stopped (02/27/23 0900)   • sodium chloride  75 mL/hr Intravenous Continuous Babara Ryan, PA-C Stopped (02/27/23 1000)   • ticagrelor  90 mg Oral Q12H Allen Miguel MD       PRN Meds:   hydrALAZINE, 10 mg, Q6H PRN          Problem List:   Patient Active Problem List   Diagnosis   • BRAULIO (acute kidney injury) ([facility] [address] )   • COVID   • Cerebellar infarct [facility])   • Right Vertebral artery dissection ([facility] [address] )   • Stenosis of left vertebral artery   • Hypertensive emergency   • Dizziness   • UTI (urinary tract infection)   • Dysphagia       Assessment/Plan:     Neuro:   • Diagnosis: 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 in context of worsening neurologic exam on 2/26   ? Plan:  ? Pt s/p thrombectomy and stent placement V3 and V4 on 2/26  ? Continue q2hr neuro exams   ? discontinue Integrilin  ? Start ASA 81 mg qd, Brilinta 180 mg this AM followed by 90 mg BID starting this evening  ? Start heparin  ? Repeat CT once ptt at goal to assess for hemorrhage  ? Follow up MRI 2/26 final read  ?  Start DAPT with ASA and Brilinta today        CV:   • Diagnosis: BP management, recent hypertensive emergency  o Plan: maintain systolic blood pressure [phone]  o Start norvasc 5 today in attempt to wean off cardene drip    o Continue cardene to maintain BP goal   • Diagnosis: Hyperlipidemia  o Plan: Lipid panel , , HDL 27,   o Start atorvastatin 40 mg qd given recent stroke  Diagnosis: Sinus tachycardia  · Pt with episode of sinus tachycardia on tele  · K 3 1, replete to goal K >4  · Continue tele  · Low threshold for beta blocker if ST persists    Pulm:  o Diagnosis: Pt saturating well on RA, no acute concerns      :   • Diagnosis: UTI  o Plan: noted on admission  o Urine cx with enteric like GNR, follow up urine cx sensitivities  o Continue CTX for a total of 5 days    F/E/N:   • Plan:   o F: continue IVF 75 cc/hr  Discontinue once start diet, pending speech recs today    o E: replete electrolytes as necessary  K today 3 1  Will replete with IV potassium 20 meq x2 today and kdur 60 this AM    o N: start diet per speech recommendations today      Heme/Onc:   • Diagnosis: leukocytosis  o Plan: improved today, likely in setting of recent dissection and UTI  o Continue to follow WBC count, monitor fever curve    Endo:   o Diagnosis: elevated hgb a1c 5 7  o Follow BG  o Diabetic diet  o Recommend lifestyle and diet modifications      ID:   • Diagnosis: Incidentally noted COVID+ status  o Plan: pt remains asx  o Continue supportive care  o Currently tolerating RA   • Diagnosis: cystitis   o Plan: continue CTX for a total 5 day course      MSK/Skin:   o PT/OT consult      Disposition: Continue Critical Care   Code Status: Level 1 - Full Code  ---------------------------------------------------------------------------------------  Advance Directive and Living Will:      Power of :    POLST:    ---------------------------------------------------------------------------------------  Care Time Delivered:   Please see attending's attestation  Naomi Mantilla MD      Portions of the record may have been created with voice recognition software  Occasional wrong word or "sound a like" substitutions may have occurred due to the inherent limitations of voice recognition software    Read the chart carefully and recognize, using context, where substitutions have occurred

## 2023-02-27 NOTE — QUICK NOTE
Patient's wife, Dionicio Beltran, at bedside  Updated regarding ongoing treatment plan and any patient updates  All questions answered  Con Lacy MD  Internal Medicine Residency PGY-1  [place]  Available on [email or web address]  [email or web address]

## 2023-02-28 ENCOUNTER — APPOINTMENT (INPATIENT)
Dept: RADIOLOGY | Facility: HOSPITAL | Age: 63
End: 2023-02-28

## 2023-02-28 LAB
ANION GAP SERPL CALCULATED.3IONS-SCNC: 7 MMOL/L (ref 4–13)
APTT PPP: 78 SECONDS (ref 23–37)
ATRIAL RATE: 70 BPM
BACTERIA UR CULT: ABNORMAL
BUN SERPL-MCNC: 18 MG/DL (ref 5–25)
CA-I BLD-SCNC: 1.14 MMOL/L (ref 1.12–1.32)
CALCIUM SERPL-MCNC: 8.7 MG/DL (ref 8.3–10.1)
CHLORIDE SERPL-SCNC: 118 MMOL/L (ref 96–108)
CO2 SERPL-SCNC: 22 MMOL/L (ref 21–32)
CREAT SERPL-MCNC: 1.27 MG/DL (ref 0.6–1.3)
ERYTHROCYTE [DISTWIDTH] IN BLOOD BY AUTOMATED COUNT: 13.2 % (ref 11.6–15.1)
GFR SERPL CREATININE-BSD FRML MDRD: 59 ML/MIN/1.73SQ M
GLUCOSE SERPL-MCNC: 124 MG/DL (ref 65–140)
HCT VFR BLD AUTO: 35.1 % (ref 36.5–49.3)
HGB BLD-MCNC: 11 G/DL (ref 12–17)
MAGNESIUM SERPL-MCNC: 2.3 MG/DL (ref 1.6–2.6)
MCH RBC QN AUTO: 27.5 PG (ref 26.8–34.3)
MCHC RBC AUTO-ENTMCNC: 31.3 G/DL (ref 31.4–37.4)
MCV RBC AUTO: 88 FL (ref 82–98)
P AXIS: 68 DEGREES
PHOSPHATE SERPL-MCNC: 2.3 MG/DL (ref 2.3–4.1)
PLATELET # BLD AUTO: 263 THOUSANDS/UL (ref 149–390)
PMV BLD AUTO: 9.5 FL (ref 8.9–12.7)
POTASSIUM SERPL-SCNC: 3.5 MMOL/L (ref 3.5–5.3)
PR INTERVAL: 162 MS
QRS AXIS: 0 DEGREES
QRSD INTERVAL: 88 MS
QT INTERVAL: 464 MS
QTC INTERVAL: 501 MS
RBC # BLD AUTO: 4 MILLION/UL (ref 3.88–5.62)
SODIUM SERPL-SCNC: 147 MMOL/L (ref 135–147)
T WAVE AXIS: -69 DEGREES
VENTRICULAR RATE: 70 BPM
WBC # BLD AUTO: 12.5 THOUSAND/UL (ref 4.31–10.16)

## 2023-02-28 RX ORDER — HYDRALAZINE HYDROCHLORIDE 20 MG/ML
10 INJECTION INTRAMUSCULAR; INTRAVENOUS EVERY 6 HOURS PRN
Status: DISCONTINUED | OUTPATIENT
Start: 2023-02-28 | End: 2023-02-28

## 2023-02-28 RX ORDER — CARVEDILOL 6.25 MG/1
6.25 TABLET ORAL 2 TIMES DAILY WITH MEALS
Status: DISCONTINUED | OUTPATIENT
Start: 2023-02-28 | End: 2023-03-06 | Stop reason: HOSPADM

## 2023-02-28 RX ORDER — BACLOFEN 10 MG/1
10 TABLET ORAL 3 TIMES DAILY
Status: DISCONTINUED | OUTPATIENT
Start: 2023-02-28 | End: 2023-02-28

## 2023-02-28 RX ORDER — LISINOPRIL 5 MG/1
5 TABLET ORAL DAILY
Status: DISCONTINUED | OUTPATIENT
Start: 2023-02-28 | End: 2023-03-01

## 2023-02-28 RX ORDER — BACLOFEN 10 MG/1
10 TABLET ORAL 3 TIMES DAILY
Status: DISCONTINUED | OUTPATIENT
Start: 2023-02-28 | End: 2023-03-01

## 2023-02-28 RX ORDER — HYDRALAZINE HYDROCHLORIDE 20 MG/ML
10 INJECTION INTRAMUSCULAR; INTRAVENOUS ONCE
Status: COMPLETED | OUTPATIENT
Start: 2023-02-28 | End: 2023-02-28

## 2023-02-28 RX ORDER — BACLOFEN 10 MG/1
10 TABLET ORAL 2 TIMES DAILY
Status: DISCONTINUED | OUTPATIENT
Start: 2023-02-28 | End: 2023-02-28

## 2023-02-28 RX ORDER — AMLODIPINE BESYLATE 10 MG/1
10 TABLET ORAL DAILY
Status: DISCONTINUED | OUTPATIENT
Start: 2023-02-28 | End: 2023-03-06 | Stop reason: HOSPADM

## 2023-02-28 RX ORDER — POTASSIUM CHLORIDE 20 MEQ/1
40 TABLET, EXTENDED RELEASE ORAL 2 TIMES DAILY
Status: COMPLETED | OUTPATIENT
Start: 2023-02-28 | End: 2023-02-28

## 2023-02-28 RX ORDER — HYDRALAZINE HYDROCHLORIDE 20 MG/ML
10 INJECTION INTRAMUSCULAR; INTRAVENOUS EVERY 4 HOURS PRN
Status: DISCONTINUED | OUTPATIENT
Start: 2023-02-28 | End: 2023-03-06 | Stop reason: HOSPADM

## 2023-02-28 RX ADMIN — CHLORHEXIDINE GLUCONATE 0.12% ORAL RINSE 15 ML: 1.2 LIQUID ORAL at 08:18

## 2023-02-28 RX ADMIN — HYDRALAZINE HYDROCHLORIDE 10 MG: 20 INJECTION, SOLUTION INTRAMUSCULAR; INTRAVENOUS at 07:56

## 2023-02-28 RX ADMIN — APIXABAN 2.5 MG: 2.5 TABLET, FILM COATED ORAL at 12:46

## 2023-02-28 RX ADMIN — NICARDIPINE HYDROCHLORIDE 5 MG/HR: 2.5 INJECTION, SOLUTION INTRAVENOUS at 13:28

## 2023-02-28 RX ADMIN — HYDRALAZINE HYDROCHLORIDE 10 MG: 20 INJECTION, SOLUTION INTRAMUSCULAR; INTRAVENOUS at 05:04

## 2023-02-28 RX ADMIN — HEPARIN SODIUM 19 UNITS/KG/HR: 10000 INJECTION, SOLUTION INTRAVENOUS at 04:33

## 2023-02-28 RX ADMIN — BACLOFEN 10 MG: 10 TABLET ORAL at 21:07

## 2023-02-28 RX ADMIN — ASPIRIN 81 MG CHEWABLE TABLET 81 MG: 81 TABLET CHEWABLE at 08:17

## 2023-02-28 RX ADMIN — NICARDIPINE HYDROCHLORIDE 10 MG/HR: 2.5 INJECTION, SOLUTION INTRAVENOUS at 19:23

## 2023-02-28 RX ADMIN — TICAGRELOR 90 MG: 90 TABLET ORAL at 21:07

## 2023-02-28 RX ADMIN — POTASSIUM CHLORIDE 40 MEQ: 1500 TABLET, EXTENDED RELEASE ORAL at 17:11

## 2023-02-28 RX ADMIN — CHLORHEXIDINE GLUCONATE 0.12% ORAL RINSE 15 ML: 1.2 LIQUID ORAL at 21:08

## 2023-02-28 RX ADMIN — POTASSIUM CHLORIDE 40 MEQ: 1500 TABLET, EXTENDED RELEASE ORAL at 08:17

## 2023-02-28 RX ADMIN — TICAGRELOR 90 MG: 90 TABLET ORAL at 08:18

## 2023-02-28 RX ADMIN — APIXABAN 2.5 MG: 2.5 TABLET, FILM COATED ORAL at 17:11

## 2023-02-28 RX ADMIN — BACLOFEN 10 MG: 10 TABLET ORAL at 11:37

## 2023-02-28 RX ADMIN — ATORVASTATIN CALCIUM 40 MG: 40 TABLET, FILM COATED ORAL at 16:01

## 2023-02-28 RX ADMIN — HYDRALAZINE HYDROCHLORIDE 10 MG: 20 INJECTION, SOLUTION INTRAMUSCULAR; INTRAVENOUS at 16:01

## 2023-02-28 RX ADMIN — NICARDIPINE HYDROCHLORIDE 7.5 MG/HR: 2.5 INJECTION, SOLUTION INTRAVENOUS at 22:02

## 2023-02-28 RX ADMIN — BACLOFEN 10 MG: 10 TABLET ORAL at 16:01

## 2023-02-28 RX ADMIN — CARVEDILOL 6.25 MG: 6.25 TABLET, FILM COATED ORAL at 18:09

## 2023-02-28 RX ADMIN — LISINOPRIL 5 MG: 5 TABLET ORAL at 11:37

## 2023-02-28 RX ADMIN — AMLODIPINE BESYLATE 10 MG: 10 TABLET ORAL at 08:17

## 2023-02-28 NOTE — CASE MANAGEMENT
Case Management Discharge Planning Note    Patient name Brian Koenig  Location ICU 09/ICU 09 MRN [id number]  : 1960 Date 2023       Current Admission Date: 2023  Current Admission Diagnosis:Right Vertebral artery dissection [facility])   Patient Active Problem List    Diagnosis Date Noted   • BRAULIO (acute kidney injury) ([facility] [address] ) 2023   • COVID 2023   • Cerebrovascular accident (CVA) ([facility] [address] ) 2023   • Right Vertebral artery dissection ([facility] [address] ) 2023   • Stenosis of left vertebral artery 2023   • Hypertensive emergency 2023   • Dizziness 2023   • UTI (urinary tract infection) 2023   • Dysphagia 2023      LOS (days): 3  Geometric Mean LOS (GMLOS) (days):   Days to GMLOS:     OBJECTIVE:  Risk of Unplanned Readmission Score: 10 56         Current admission status: Inpatient   Preferred Pharmacy: No Pharmacies Listed  Primary Care Provider: No primary care provider on file  Primary Insurance: Anna Marie Maurer  Secondary Insurance:     DISCHARGE DETAILS:    Discharge planning discussed with[de-identified] TC to patient's wifeSrinivasa of Choice: Yes  Comments - Freedom of Choice: therapy is recommending STR  Discussed acute vs subacute rehab with patient's wife  She would like referral sent to [address] [place] [address] and [facility]  She wants to discuss with adult children her options but she has a preference for [facility] for continuity of care  Referral sent via Aiding  CM contacted family/caregiver?: Yes  Were Treatment Team discharge recommendations reviewed with patient/caregiver?: Yes  Did patient/caregiver verbalize understanding of patient care needs?: Yes  Were patient/caregiver advised of the risks associated with not following Treatment Team discharge recommendations?: Yes      Treatment Team Recommendation: Short Term Rehab  Discharge Destination Plan[de-identified] Acute Rehab        **addendum [id number]  Received message from wifeMike   Pt/family's first choice for acute rehab is [facility]

## 2023-02-28 NOTE — UTILIZATION REVIEW
Continued Stay Review    Date: 02/28/23                          Current Patient Class: IP  Current Level of Care: Critical Care    HPI:62 y o  male initially admitted on 2/25 for R vertebral artery dissection  Assessment/Plan: Reports feeling overall okay; denies change in R-sided weakness  On exam, L sided facial droop, decreased sensation in R face and R extremities, weakness 0/5 in RUE & R ankle flexion, 3/5 in R hip, 2/5 in R ankle extension  Plan: dental soft diet, q2h neuro checks, continue ASA/Brinlinta, transition heparin gtt to Eliquis, increase Norvasc to 10 mg, wean off cardene gtt, continue statin, telemetry, IV ABX, nectar thick liquids, supportive care, Trend labs, replete electrolytes as needed  PT/OT recommending post acute rehab once medically cleared       Vital Signs:   Date/Time Temp Pulse Resp BP MAP (mmHg) Arterial Line BP MAP SpO2 O2 Device   02/28/23 1200 98 °F (36 7 °C) 80 20 159/65 92 -- -- 98 % None (Room air)   02/28/23 1137 -- -- -- 159/65 -- -- -- -- --   02/28/23 1100 -- 86 25 Abnormal  159/61 87 -- -- 98 % --   02/28/23 1000 -- 78 18 164/70 110 -- -- 99 % --   02/28/23 0900 -- 78 21 186/69 Abnormal  102 -- -- 99 % --   02/28/23 0817 -- -- -- 188/73 Abnormal  -- -- -- -- --   02/28/23 0800 97 6 °F (36 4 °C) 72 23 Abnormal  188/73 Abnormal  112 -- -- 99 % None (Room air)   02/28/23 0756 -- -- -- 170/71 -- -- -- -- --   02/28/23 0600 -- 64 20 156/76 97 -- -- 100 % --   02/28/23 0500 -- 66 22 180/74 Abnormal  115 -- -- 99 % --   02/28/23 0400 -- 60 18 161/68 95 -- -- 99 % --   02/28/23 0321 -- 60 14 155/69 101 -- -- 100 % --   02/28/23 0300 -- 60 15 169/67 105 -- -- 100 % --   02/28/23 0230 -- 62 15 139/65 94 -- -- 100 % --   02/28/23 0200 -- 64 13 154/64 92 -- -- 100 % --   02/28/23 0100 -- 62 15 158/65 95 -- -- 100 % --   02/28/23 0000 -- 64 20 137/62 87 -- -- 100 % None (Room air)   02/27/23 2300 -- 72 13 159/66 100 -- -- 99 % --   02/27/23 2200 -- 80 16 149/62 81 -- -- 99 % -- 02/27/23 2100 -- 82 23 Abnormal  157/67 107 -- -- 99 % --   02/27/23 2000 98 4 °F (36 9 °C) 76 18 158/63 93 -- -- 99 % None (Room air)   02/27/23 1900 -- 74 27 Abnormal  -- -- -- -- 99 % --   02/27/23 1800 -- 76 25 Abnormal  151/68 97 -- -- 99 % --   02/27/23 1700 -- 80 20 151/82 97 -- -- 99 % --   02/27/23 1600 98 3 °F (36 8 °C) 74 15 155/71 107 -- -- 99 % None (Room air)   02/27/23 1400 -- 72 20 139/63 90 -- -- 99 % --   02/27/23 1300 -- 74 19 149/66 104 126/74 94 mmHg 99 % --   02/27/23 1200 98 5 °F (36 9 °C) 72 20 152/75 109 138/76 100 mmHg 99 % None (Room air)   02/27/23 1154 -- -- -- 163/66 -- -- -- -- --   02/27/23 1100 -- 66 23 Abnormal  149/66 89 104/72 86 mmHg 99 % --       Pertinent Labs/Diagnostic Results:   Results from last 7 days   Lab Units 02/25/23  0940   SARS-COV-2  Positive*     Results from last 7 days   Lab Units 02/28/23  0209 02/27/23  1003 02/26/23  0506 02/26/23  0021 02/25/23  0940   WBC Thousand/uL 12 50* 10 26* 13 27* 17 07* 9 99   HEMOGLOBIN g/dL 11 0* 10 8* 13 3 13 6 14 4   HEMATOCRIT % 35 1* 33 3* 40 2 40 8 44 0   PLATELETS Thousands/uL 263 268 349 344 326   NEUTROS ABS Thousands/µL  --   --  11 92*  --  8 69*         Results from last 7 days   Lab Units 02/28/23  0209 02/27/23  0429 02/26/23  0506 02/25/23  2044 02/25/23  0940   SODIUM mmol/L 147 147 143 141 142   POTASSIUM mmol/L 3 5 3 1* 3 4* 3 3* 3 5   CHLORIDE mmol/L 118* 120* 115* 109* 108   CO2 mmol/L 22 21 21 24 24   ANION GAP mmol/L 7 6 7 8 10   BUN mg/dL 18 15 17 18 23   CREATININE mg/dL 1 27 1 05 1 09 1 15 1 32*   EGFR ml/min/1 73sq m 59 75 71 67 57   CALCIUM mg/dL 8 7 8 1* 8 8 8 4 8 8   CALCIUM, IONIZED mmol/L 1 14 1 02*  --   --   --    MAGNESIUM mg/dL 2 3 2 1  --   --   --    PHOSPHORUS mg/dL 2 3  --   --   --   --      Results from last 7 days   Lab Units 02/26/23  0506 02/25/23  0940   AST U/L 13 16   ALT U/L 24 19   ALK PHOS U/L 63 55   TOTAL PROTEIN g/dL 7 0 7 3   ALBUMIN g/dL 3 2* 3 8   TOTAL BILIRUBIN mg/dL 0 68 0 60         Results from last 7 days   Lab Units 02/28/23  0209 02/27/23  0429 02/26/23  0506 02/25/23  2044 02/25/23  0940   GLUCOSE RANDOM mg/dL 124 114 127 110 124         Results from last 7 days   Lab Units 02/25/23  1304   HEMOGLOBIN A1C % 5 7*   EAG mg/dl 117     Results from last 7 days   Lab Units 02/25/23  2044   CK TOTAL U/L 41     Results from last 7 days   Lab Units 02/25/23  1323 02/25/23  1156 02/25/23  0940   HS TNI 0HR ng/L  --   --  37   HS TNI 2HR ng/L  --  41  --    HSTNI D2 ng/L  --  4  --    HS TNI 4HR ng/L 38  --   --    HSTNI D4 ng/L 1  --   --      Results from last 7 days   Lab Units 02/25/23  2044   D-DIMER QUANTITATIVE ug/ml FEU 1 44*     Results from last 7 days   Lab Units 02/28/23  0208 02/27/23  2100 02/27/23  1548 02/27/23  1003 02/26/23  0506 02/26/23  0021 02/25/23  0940   PROTIME seconds  --   --   --  14 0  --  14 0 13 7   INR   --   --   --  1 05  --  1 06 0 98   PTT seconds 78* 40* 32 31   < > 31 29    < > = values in this interval not displayed           Results from last 7 days   Lab Units 02/26/23  0506 02/25/23  2044   PROCALCITONIN ng/ml 0 08 <0 05     Results from last 7 days   Lab Units 02/25/23  2044   LACTIC ACID mmol/L 1 2             Results from last 7 days   Lab Units 02/25/23  2044   BNP pg/mL 191*         Results from last 7 days   Lab Units 02/25/23  2044   HEP B S AG  Non-reactive   HEP C AB  Non-reactive   HEP B C IGM  Non-reactive   HEP B C TOTAL AB  Non-reactive         Results from last 7 days   Lab Units 02/25/23  2044   CRP mg/L 5 3*             Results from last 7 days   Lab Units 02/25/23  1155   CLARITY UA  Cloudy   COLOR UA  Light Yellow   SPEC GRAV UA  <1 005*   PH UA  6 5   GLUCOSE UA mg/dl Negative   KETONES UA mg/dl 10 (1+)*   BLOOD UA  Negative   PROTEIN UA mg/dl Trace*   NITRITE UA  Positive*   BILIRUBIN UA  Negative   UROBILINOGEN UA (BE) mg/dl <2 0   LEUKOCYTES UA  Large*   WBC UA /hpf 20-30*   RBC UA /hpf 0-1   BACTERIA UA /hpf Innumerable* EPITHELIAL CELLS WET PREP /hpf Occasional     Results from last 7 days   Lab Units 02/25/23  0940   INFLUENZA A PCR  Negative   INFLUENZA B PCR  Negative   RSV PCR  Negative       Results from last 7 days   Lab Units 02/27/23  1002 02/25/23  2044 02/25/23  1155   BLOOD CULTURE  No Growth at 24 hrs  No Growth at 24 hrs  No Growth at 48 hrs  No Growth at 48 hrs  --    URINE CULTURE   --   --  >100,000 cfu/ml Escherichia coli*       Medications:   Scheduled Medications:  amLODIPine, 10 mg, Oral, Daily  apixaban, 2 5 mg, Oral, BID  aspirin, 81 mg, Oral, Daily  atorvastatin, 40 mg, Oral, Daily With Dinner  baclofen, 10 mg, Oral, BID  chlorhexidine, 15 mL, Mouth/Throat, Q12H KATIE  lisinopril, 5 mg, Oral, Daily  potassium chloride, 40 mEq, Oral, BID  ticagrelor, 90 mg, Oral, Q12H KATIE      Continuous IV Infusions:  niCARdipine, 1-15 mg/hr, Intravenous, Titrated      PRN Meds:  hydrALAZINE, 10 mg, Intravenous, Q4H PRN; 2/28 x2        Discharge Plan: D    Network Utilization Review Department  ATTENTION: Please call with any questions or concerns to [phone] and carefully listen to the prompts so that you are directed to the right person  All voicemails are confidential   Beka Solomon all requests for admission clinical reviews, approved or denied determinations and any other requests to dedicated fax number below belonging to the campus where the patient is receiving treatment   List of dedicated fax numbers for the Facilities:  [address] DENIALS (Administrative/Medical Necessity) [phone]   [address] (Maternity/NICU/Pediatrics) [phone]   [address] [phone]   [facility] [phone]   [facility] [phone]   [address] Kevin  41  [phone]   [address] [place] [address] [phone]   [address] [phone]

## 2023-02-28 NOTE — ASSESSMENT & PLAN NOTE
PPD 2 from intracranial and extracranial vertebral artery stenting (Dr Guzman Jointer 2/26/2023)   · TICI 2B revascularization   · Patient presented with dysarthria and dizziness  · found to have bilateral cerebellar infarct with significant R V3/4 thrombus, L vertebral artery stenosis with possible occlusion within intradural segment  · NIHSS 0 with symptom onset 2/23 progressed to dysarthria, left facial droop not corrected with smile, R side flaccid    Imaging:  · MRI brain 2/26/2023: Acute posterior circulation infarcts within the right cerebellar hemisphere with new brainstem infarcts  Overall infarct burden slightly increased from prior study although new infarcts noted in posterior medulla on the right right midbrain and left occipital lobe  No hydrocephalus or parenchymal hemorrhage  Abnormal left vertebral artery flow void at the lateral mass of C1 related to slow flow or vascular occlusion  Plan:  · Continue to closely monitor symptoms   · DAPT   · ASA 81mg   · Brilinta 90mg BID  · P2Y12 Friday 3/3  Order placed  · Hep gtt dc'ed   · Starting Eliquis 2 5mg BID   · STAT CTH for decline or change in neurological status  · Ongoing medical management   · Continue BP goal as ordered   · Neurology following for ongoing stroke care  · Neurosurgery will follow from the periphery and await P2Y12 Friday   Call with questions or concerns

## 2023-02-28 NOTE — QUICK NOTE
Spoke with patient's wife, Dionicio Beltran, updating them about ongoing treatment plan and any patient updates  All questions answered  Con Lacy MD  Internal Medicine Residency PGY-1  [facility]  Available on [email or web address]  [email or web address]

## 2023-02-28 NOTE — PROGRESS NOTES
[facility]  Progress Note - Chinedu Sitter 1960, 60 y o  male MRN: [id number]  Unit/Bed#: ICU 09 Encounter: [id number]  Primary Care Provider: No primary care provider on file  Date and time admitted to hospital: 2/25/2023 11:47 PM    Cerebrovascular accident (CVA) [facility])  Assessment & Plan  PPD 2 from intracranial and extracranial vertebral artery stenting (Dr Katherine Orr 2/26/2023)   · TICI 2B revascularization   · Patient presented with dysarthria and dizziness  · found to have bilateral cerebellar infarct with significant R V3/4 thrombus, L vertebral artery stenosis with possible occlusion within intradural segment  · NIHSS 0 with symptom onset 2/23 progressed to dysarthria, left facial droop not corrected with smile, R side flaccid    Imaging:  · MRI brain 2/26/2023: Acute posterior circulation infarcts within the right cerebellar hemisphere with new brainstem infarcts  Overall infarct burden slightly increased from prior study although new infarcts noted in posterior medulla on the right right midbrain and left occipital lobe  No hydrocephalus or parenchymal hemorrhage  Abnormal left vertebral artery flow void at the lateral mass of C1 related to slow flow or vascular occlusion  Plan:  · Continue to closely monitor symptoms   · DAPT   · ASA 81mg   · Brilinta 90mg BID  · P2Y12 Friday 3/3  Order placed  · Hep gtt dc'ed   · Starting Eliquis 2 5mg BID   · STAT CTH for decline or change in neurological status  · Ongoing medical management   · Continue BP goal as ordered   · Neurology following for ongoing stroke care  · Neurosurgery will follow from the periphery and await P2Y12 Friday   Call with questions or concerns     UTI (urinary tract infection)  Assessment & Plan  · Noted on admission   · Continue rocephin  · Care per medical team     Hypertensive emergency  Assessment & Plan  · BP >001 systolic   · On Cardene for control  · Continue to uptitrate oral therapy to wean IV medication as tolerated     Stenosis of left vertebral artery  Assessment & Plan  · Plan as above    * Right Vertebral artery dissection [facility])  Assessment & Plan  · Plan as above      Subjective/Objective   Chief Complaint: None     Subjective: Patient seen today  He is in better spirits joking with nursing staff at bedside  Family updated  Stop heparin and will transition to oral eliquis     Objective: laying in bed     I/O       02/26 0701 02/27 0700 02/27 0701 02/28 0700 02/28 0701 03/01 0700    P  O   240     I V  (mL/kg) 3426 2 (43 4) 1998 3 (25 5)     IV Piggyback 50 250     Total Intake(mL/kg) 3476 2 (44 1) 2488 3 (31 7)     Urine (mL/kg/hr) 3050 (1 6) 2575 (1 4)     Blood 450      Total Output 3500 2575     Net -23 8 -86 7                  Invasive Devices     Peripheral Intravenous Line  Duration           Peripheral IV 02/26/23 Left;Ventral (anterior) Forearm 1 day    Peripheral IV 02/27/23 Right;Upper;Ventral (anterior) Arm <1 day          Drain  Duration           Urethral Catheter Temperature probe 1 day                Physical Exam:  Vitals: Blood pressure (!) 188/73, pulse 64, temperature 98 4 °F (36 9 °C), temperature source Oral, resp  rate 20, height 5' 9" (1 753 m), weight 78 5 kg (173 lb), SpO2 100 %  ,Body mass index is 25 55 kg/m²  General appearance: alert, appears stated age, cooperative  Head: Normocephalic, without obvious abnormality  Eyes: EOMI  Neck: supple, symmetrical, trachea midline   Back: no kyphosis present  Lungs: non labored breathing  Heart: regular heart rate  Neurologic:   Mental status: Alert, oriented x3, thought content appropriate  Grossly moving L side   R sided hemiparesis     Lab Results:  Results from last 7 days   Lab Units 02/28/23  0209 02/27/23  1003 02/26/23  0506 02/26/23  0021 02/25/23  0940   WBC Thousand/uL 12 50* 10 26* 13 27*   < > 9 99   HEMOGLOBIN g/dL 11 0* 10 8* 13 3   < > 14 4   HEMATOCRIT % 35 1* 33 3* 40 2   < > 44 0   PLATELETS Thousands/uL 263 268 349   < > 326   NEUTROS PCT %  --   --  89*  --  88*   MONOS PCT %  --   --  4  --  4    < > = values in this interval not displayed  Results from last 7 days   Lab Units 02/28/23  0209 02/27/23  0429 02/26/23  0506 02/25/23  2044 02/25/23  0940   POTASSIUM mmol/L 3 5 3 1* 3 4*   < > 3 5   CHLORIDE mmol/L 118* 120* 115*   < > 108   CO2 mmol/L 22 21 21   < > 24   BUN mg/dL 18 15 17   < > 23   CREATININE mg/dL 1 27 1 05 1 09   < > 1 32*   CALCIUM mg/dL 8 7 8 1* 8 8   < > 8 8   ALK PHOS U/L  --   --  63  --  55   ALT U/L  --   --  24  --  19   AST U/L  --   --  13  --  16    < > = values in this interval not displayed  Results from last 7 days   Lab Units 02/28/23  0209 02/27/23  0429   MAGNESIUM mg/dL 2 3 2 1     Results from last 7 days   Lab Units 02/28/23  0209   PHOSPHORUS mg/dL 2 3     Results from last 7 days   Lab Units 02/28/23  0208 02/27/23  2100 02/27/23  1548 02/27/23  1003 02/26/23  0506 02/26/23  0021 02/25/23  0940   INR   --   --   --  1 05  --  1 06 0 98   PTT seconds 78* 40* 32 31   < > 31 29    < > = values in this interval not displayed  No results found for: TROPONINT  ABG:No results found for: PHART, MMJ1SOE, PO2ART, HMQ8TKL, A3SJGLJO, BEART, SOURCE    Imaging Studies: I have personally reviewed pertinent reports  and I have personally reviewed pertinent films in PACS  CTA head and neck w wo contrast    Result Date: 2/26/2023  Impression: Overall, there is no significant interval change when comparing to the recent MRI brain, and CTA head neck study  Stable small cerebellar infarctions as noted on the recent MRI study  Stable findings of distal right cervical and proximal intradural vertebral artery occlusion with probable retrograde flow in its distal intradural segment  Stable left vertebral artery origin stenosis and presumed occlusion of the distal left intradural vertebral segment   Small basilar artery with focal atherosclerotic disease in its proximal segment  Patent fetal right PCA circulation  Left PCA branch is small but patent, and unchanged in appearance  Stable poor visualization of the proximal left superior cerebellar artery  Above-mentioned findings are in keeping with the preliminary report provided by [facility] radiology services without significant discrepancy in the report  Workstation performed: [id number]     CTA head and neck with and without contrast    Result Date: 2/25/2023  Impression: No acute intracranial hemorrhage  Focal infarcts in the right posterior cerebellum, possibly acute to subacute  Follow-up MRI imaging is recommended  Marked tapering and occlusion of the distal right cervical vertebral artery  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  Proximal right intradural vertebral artery is also occluded with faint visualization of the post-PICA segment, which may be filling in a retrograde fashion  Severe left vertebral artery origin stenosis  Left vertebral artery may terminate in a posterior for cerebellar artery branch as the post-PICA segment is not identified  Small basilar artery with fetal right PCA circulation  Left proximal PCA is patent but small  Mid to distal left PCA is not well visualized  I personally discussed this study with Marcella Tucker on 2/25/2023 at 12:21 PM  Workstation performed: [id number]     XR chest 1 view portable    Result Date: 2/25/2023  Impression: No acute abnormality however there is an opacity in the left lung base that may represent focal pleural thickening  Neoplasm cannot be excluded  Nonemergent follow-up PA and lateral views of the chest or CT chest suggested unless there are prior studies available for comparison  Findings for this inpatient marked for immediate notification in Epic   Workstation performed: [id number]     CT head wo contrast    Result Date: 2/28/2023  Impression: Evolving acute infarcts in the brainstem (worse in left midbrain/left upper tatiana) and bilateral cerebellum (right worse than left) status post right distal vertebral artery stenting  No acute intracranial hemorrhage  Workstation performed: [id number]     CT head wo contrast    Result Date: 2/26/2023  Impression: Stable evolving bilateral cerebellar infarctions without significant interval change  Workstation performed: [id number]     CT head wo contrast    Result Date: 2/26/2023  Impression: No new parenchymal findings when comparing to the recent CT and MRI brain studies  Stable late acute to early subacute bilateral cerebellar infarctions without hemorrhagic transformation  Workstation performed: [id number]     MRA head wo contrast    Result Date: 2/25/2023  Impression: Abscess of flow related signal in the V4 segments of the bilateral vertebral arteries, throughout the basilar artery and left posterior cerebral artery, concerning for progression of dissection and/or thrombus  Recommend emergent interventional radiology consultation  I personally discussed this study with Fernando Schroeder   on 2/25/2023 at 8:15 PM   Workstation performed: [id number]     MRA carotids wo contrast    Result Date: 2/25/2023  Impression: 1  There complete absence of flow related signal along the cervical segment of the right vertebral artery concerning for progression of dissection and/or retrograde flow  2   Absence of flow related signal in the V4 segments of the bilateral vertebral arteries and throughout the basilar artery, also concerning for progression of dissection  I personally discussed this study with Fernando Schroeder on 2/25/2023 at 8:24 PM  Workstation performed: [id number]     MRI brain wo contrast    Result Date: 2/27/2023  Impression: 1  Crescending, acute/recent posterior circulation infarctions with increasing right cerebellar and new brainstem infarctions as described    Overall infarct burden is slightly increased from the prior study one day earlier although new small infarcts are noted in the posterior medulla on the right, right midbrain and left occipital lobe  No hydrocephalus or large parenchymal hemorrhage  2   Abnormal left vertebral artery flow void at the lateral mass of C1, possibly related to slow flow and/or vascular occlusion  3   Very mild, chronic microangiopathy and chronic left thalamic lacunar infarction    Workstation performed: [id number]     MRI brain wo contrast    Result Date: 2/25/2023  Impression: Acute early subacute infarcts throughout the right greater than left cerebellar hemispheres and anterior vermis, consistent with findings on the head CT  No hemorrhagic conversion or mass effect  Arthrotomy 8 Workstation performed: [id number]     XR follow up    Result Date: 2/25/2023  Impression: No radiopaque orbital foreign body  Workstation performed: [id number]     CT cerebral perfusion    Result Date: 2/26/2023  Impression: CT perfusion performed  Data available on PACS  Workstation performed: [id number]       EKG, Pathology, and Other Studies: I have personally reviewed pertinent reports        VTE Pharmacologic Prophylaxis: transition from hep gtt to eliquis     VTE Mechanical Prophylaxis: sequential compression device

## 2023-02-28 NOTE — PROGRESS NOTES
Progress Note - Neurology   Dominguez Javier 60 y o  male [id number]  Unit/Bed#: ICU 09/ICU 09    Assessment/Plan:    Cerebrovascular accident (CVA) 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 and left vertebral artery origin stenosis with presumed occlusion of the distal left intradural vertebral segment  · MRA head/carotids demonstrated possible extension of the vertebral artery dissection into the basilar artery  · MRI brain revealed several small acute/early subacute bi-cerebellar infarcts (R>L) without evidence of hemorrhage  · , Cholesterol 163, A1C 5 7  · COVID positive    Patient was transferred to [facility] for closer monitoring with consideration for endovascular intervention  Initially given stable exam, intervention was felt too risky and patient was a heparin gtt with full dose aspirin  However on 2/26, patient had worsened dysarthria and right sided weakness  Stroke alert activated:  · CT head negative for intracranial hemorrhage and stable bilateral cerebellar infarcts  · Patient was taken to IR for emergent R vertebral/basilar thrombectomy with R V3/4 stenting with TICI 2B revascularization  · Patient was started on Integrilin gtt and repeat CT head negative for hemorrhage  · MRI brain 2/26: Increased infarct burden    Bilateral cerebellar infarcts with larger right cerebellar infarct and new acute infarcts in the right medulla, bilateral midbrain, and left occipital lobe  · Echo: EF 65%, normal wall motion, normal atria size, no PFO    On exam, patient has dysarthria, right facial droop, and right hemiplegia except is able to internally/externally rotate the proximal right LE  Etiology for acute infarcts in the setting of bilateral vertebral artery occlusions remain unclear, however likely due to dissection vs chronic diffuse atherosclerotic disease  Possible contribution from underlying COVID infection  No recent head trauma, strenuous physical activity, neck manipulation, coughing/sneezing, etc     Plan:  - Stroke pathway  • Transitioned from Integrilin gtt to DAPT on 2/27 with aspirin 81 mg daily and Brilinta 90 mg BID (180 mg load once)  • Heparin gtt per Neurosurgery recommendations; plan to transition to Eliquis 2 5 mg BID today (2/28)  • Atorvastatin 40 mg daily (statin naive)  • Normotensive goal per neurosurgery; avoid hypotension  • Euglycemic, normothermic goal  • Continue telemetry  • PT/OT/ST  • Stroke education  • Frequent neuro checks  Continue to monitor and notify neurology with any changes  • STAT CT head for any acute change in neuro exam  - Given spacticity and concern for myoclonus/muscle spasm in the proximal right LE, will start the patient on Baclofen 10 mg TID  - Patient is tearful throughout the exam, but does not want to start an antidepressant at this time  Continue to monitor mood closely  - Medical management and supportive care per primary team  Correction of any metabolic or infectious disturbances  Nehal Landeros will need follow up in in 6 weeks with neurovascular attending or AP  He will not require outpatient neurological testing  Subjective:   Patient is off of Cardene, but has required a few doses of PRN hydralazine  On exam, patient is lying comfortably in bed  He is tearful throughout the exam, but states he does not want to start an antidepressant yet  This morning he started noticing abnormal involuntary movements in the right quadriceps (the quadriceps will contract for a few seconds every 15-30 seconds)  Denies any leg pain    Continues to have dysarthria and weakness on the right side (face, arm, and leg)  Also has numbness in the right side  History reviewed  No pertinent past medical history  History reviewed  No pertinent surgical history  History reviewed  No pertinent family history  Social History     Socioeconomic History   • Marital status: /Civil Union     Spouse name: None   • Number of children: None   • Years of education: None   • Highest education level: None   Occupational History   • None   Tobacco Use   • Smoking status: Never   • Smokeless tobacco: Never   Substance and Sexual Activity   • Alcohol use: Not Currently   • Drug use: Not Currently   • Sexual activity: None   Other Topics Concern   • None   Social History Narrative   • None     Social Determinants of Health     Financial Resource Strain: Not on file   Food Insecurity: Not on file   Transportation Needs: Not on file   Physical Activity: Not on file   Stress: Not on file   Social Connections: Not on file   Intimate Partner Violence: Not on file   Housing Stability: Not on file     E-Cigarette/Vaping     E-Cigarette/Vaping Substances         Medications:   All current active meds have been reviewed and current meds:  Scheduled Meds:  Current Facility-Administered Medications   Medication Dose Route Frequency Provider Last Rate   • amLODIPine  10 mg Oral Daily Ag Terry MD     • aspirin  81 mg Oral Daily Breezy Carrillo MD     • atorvastatin  40 mg Oral Daily With DO Efrem     • cefTRIAXone  1,000 mg Intravenous Q24H Stacy Armenta DO Stopped (02/27/23 1400)   • chlorhexidine  15 mL Mouth/Throat Q12H [address] [place] [address]     • heparin (porcine)  3-24 Units/kg/hr (Order-Specific) Intravenous Titrated Joseph Faye MD 19 Units/kg/hr (02/28/23 9550)   • hydrALAZINE  10 mg Intravenous Q4H PRN Ag Terry MD     • hydrALAZINE  10 mg Intravenous Once Ag Terry MD     • niCARdipine  1-15 mg/hr Intravenous Titrated Victoriano Isidro Natty De La Paz PA-C Stopped (02/27/23 2249)   • potassium chloride  40 mEq Oral BID Trey Mcardle, MD     • ticagrelor  90 mg Oral Q12H Doris Loza MD       Continuous Infusions:heparin (porcine), 3-24 Units/kg/hr (Order-Specific), Last Rate: 19 Units/kg/hr (02/28/23 0433)  niCARdipine, 1-15 mg/hr, Last Rate: Stopped (02/27/23 2249)      PRN Meds: •  hydrALAZINE       ROS:   Review of Systems   Neurological: Positive for speech difficulty, weakness and numbness  Abnormal muscle contraction of the proximal right LE             Vitals:   /76   Pulse 64   Temp 98 4 °F (36 9 °C) (Oral)   Resp 20   Ht 5' 9" (1 753 m)   Wt 78 5 kg (173 lb)   SpO2 100%   BMI 25 55 kg/m²     Physical Exam:   Physical Exam  Vitals and nursing note reviewed  Constitutional:       Appearance: Normal appearance  Comments: Patient lying in bed  Frequently tearful throughout the exam   HENT:      Head: Normocephalic and atraumatic  Mouth/Throat:      Mouth: Mucous membranes are moist       Pharynx: Oropharynx is clear  Eyes:      Extraocular Movements: Extraocular movements intact  Conjunctiva/sclera: Conjunctivae normal       Pupils: Pupils are equal, round, and reactive to light  Pulmonary:      Effort: Pulmonary effort is normal    Musculoskeletal:      Comments: Right sided weakness   Skin:     General: Skin is warm and dry  Neurological:      Mental Status: He is alert and oriented to person, place, and time  Cranial Nerves: Cranial nerve deficit present  Sensory: Sensory deficit present  Motor: Weakness present  Deep Tendon Reflexes: Reflexes abnormal       Reflex Scores:       Bicep reflexes are 3+ on the right side and 3+ on the left side  Brachioradialis reflexes are 3+ on the right side and 3+ on the left side  Patellar reflexes are 3+ (cross adductor reflex present) on the right side and 3+ (cross adductor reflex present) on the left side  Achilles reflexes are 2+ on the right side and 2+ on the left side  Comments: See full neuro exam below       Neurologic Exam     Mental Status   Oriented to person, place, and time  Patient awake and alert  Oriented to person, place, month, and year  Mild dysarthria, but speech is intelligible   No aphasia  Able to follow simple midline and appendicular commands  Cranial Nerves     CN III, IV, VI   Pupils are equal, round, and reactive to light  Pupils 3 mm, round, reactive to light bilaterally  EOMs intact without nystagmus  No visual field deficits  Facial sensation to light touch and temperature diminished on the right V1-V3  Right central facial weakness     Motor Exam   Muscle bulk: normal  Increased tone in the right UE/LE  0/5 right deltoid  1/5 right biceps/triceps  Unable to wiggle fingers on the right  Able to lift the right leg antigravity (3/5 right iliopsoas muscle)  2/5 right knee flexion  Able to slightly wiggle the toes on the right foot  5/5 strength left UE/LE     Sensory Exam   Sensation to light touch diminished in the right UE/LE  Sensation to temperature decreased in the left UE/LE  Gait, Coordination, and Reflexes     Gait  Gait: (deferred for patient's safety)    Reflexes   Right brachioradialis: 3+  Left brachioradialis: 3+  Right biceps: 3+  Left biceps: 3+  Right patellar: 3+ (cross adductor reflex present)  Left patellar: 3+ (cross adductor reflex present)  Right achilles: 2+  Left achilles: 2+  Deferred coordination testing today due to patient becoming tearful  Every 15-30 seconds, the right quadriceps muscle contracts for a few seconds and then relaxes  No tremulous movements noted and no other abnormal limb movements  No ankle clonus bilaterally, but slightly difficult to assess the right ankle due to increased tone  Upgoing right toe  Downgoing left toe  Brisk reflexes throughout           Labs: I have personally reviewed pertinent reports     Recent Results (from the past 24 hour(s))   Echo follow up/limited w/ contrast if indicated    Collection Time: 02/27/23  9:04 AM   Result Value Ref Range    A4C EF 63 %    MV E' Tissue Velocity Septal 7 cm/s    E wave deceleration time 198 ms    MV Peak E Vargas 74 cm/s    MV Peak A Vargas 0 92 m/s    Tricuspid annular plane systolic excursion 7 39 cm    MV stenosis pressure 1/2 time 57 ms    MV valve area p 1/2 method 3 86     LV EF 65    Blood culture    Collection Time: 02/27/23 10:02 AM    Specimen: Arm, Right; Blood   Result Value Ref Range    Blood Culture Received in Microbiology Lab  Culture in Progress  Blood culture    Collection Time: 02/27/23 10:02 AM    Specimen: Hand, Right; Blood   Result Value Ref Range    Blood Culture Received in Microbiology Lab  Culture in Progress      APTT    Collection Time: 02/27/23 10:03 AM   Result Value Ref Range    PTT 31 23 - 37 seconds   Protime-INR    Collection Time: 02/27/23 10:03 AM   Result Value Ref Range    Protime 14 0 11 6 - 14 5 seconds    INR 1 05 0 84 - 1 19   CBC    Collection Time: 02/27/23 10:03 AM   Result Value Ref Range    WBC 10 26 (H) 4 31 - 10 16 Thousand/uL    RBC 3 86 (L) 3 88 - 5 62 Million/uL    Hemoglobin 10 8 (L) 12 0 - 17 0 g/dL    Hematocrit 33 3 (L) 36 5 - 49 3 %    MCV 86 82 - 98 fL    MCH 28 0 26 8 - 34 3 pg    MCHC 32 4 31 4 - 37 4 g/dL    RDW 13 0 11 6 - 15 1 %    Platelets 604 021 - 635 Thousands/uL    MPV 9 4 8 9 - 12 7 fL   APTT    Collection Time: 02/27/23  3:48 PM   Result Value Ref Range    PTT 32 23 - 37 seconds   APTT    Collection Time: 02/27/23  9:00 PM   Result Value Ref Range    PTT 40 (H) 23 - 37 seconds   APTT    Collection Time: 02/28/23  2:08 AM   Result Value Ref Range    PTT 78 (H) 23 - 37 seconds   Basic metabolic panel    Collection Time: 02/28/23  2:09 AM   Result Value Ref Range    Sodium 147 135 - 147 mmol/L    Potassium 3 5 3 5 - 5 3 mmol/L    Chloride 118 (H) 96 - 108 mmol/L    CO2 22 21 - 32 mmol/L    ANION GAP 7 4 - 13 mmol/L    BUN 18 5 - 25 mg/dL    Creatinine 1 27 0 60 - 1 30 mg/dL    Glucose 124 65 - 140 mg/dL    Calcium 8 7 8 3 - 10 1 mg/dL    eGFR 59 ml/min/1 73sq m   Calcium, ionized    Collection Time: 02/28/23  2:09 AM   Result Value Ref Range    Calcium, Ionized 1 14 1 12 - 1 32 mmol/L   CBC    Collection Time: 02/28/23  2:09 AM   Result Value Ref Range    WBC 12 50 (H) 4 31 - 10 16 Thousand/uL    RBC 4 00 3 88 - 5 62 Million/uL    Hemoglobin 11 0 (L) 12 0 - 17 0 g/dL    Hematocrit 35 1 (L) 36 5 - 49 3 %    MCV 88 82 - 98 fL    MCH 27 5 26 8 - 34 3 pg    MCHC 31 3 (L) 31 4 - 37 4 g/dL    RDW 13 2 11 6 - 15 1 %    Platelets 909 452 - 814 Thousands/uL    MPV 9 5 8 9 - 12 7 fL   Magnesium    Collection Time: 02/28/23  2:09 AM   Result Value Ref Range    Magnesium 2 3 1 6 - 2 6 mg/dL   Phosphorus    Collection Time: 02/28/23  2:09 AM   Result Value Ref Range    Phosphorus 2 3 2 3 - 4 1 mg/dL       Imaging: I have personally reviewed pertinent imaging in PACS, including CT head, CTA head/neck, MRI brain, repeat MRI brain, Echo, repeat CT head,  and I have personally reviewed PACS reports  EKG, Pathology, and Other Studies: I have personally reviewed pertinent reports  VTE Prophylaxis: Sequential compression device (Venodyne)  and Heparin      Counseling / Coordination of Care  I have spent a total time of 45 minutes on 02/28/23 in caring for this patient including Diagnostic results, Prognosis, Risks and benefits of tx options, Instructions for management, Patient and family education, Importance of tx compliance, Counseling / Coordination of care, Documenting in the medical record, Reviewing / ordering tests, medicine, procedures  , Obtaining or reviewing history   and Communicating with other healthcare professionals

## 2023-02-28 NOTE — QUICK NOTE
Called patient's wife, unable to reach them, left a voice message with call back information  Neal Olson MD  Internal Medicine Residency PGY-1  [facility]  Available on [email or web address]  [email or web address]

## 2023-02-28 NOTE — ARC ADMISSION
Referral received for ARC  Pending review with [facility] physician

## 2023-02-28 NOTE — PROGRESS NOTES
Daily Progress Note - Critical Care   Cipriano Davis 60 y o  male MRN: [id number]  Unit/Bed#: ICU 09 Encounter: [id number]    ----------------------------------------------------------------------------------------  HPI: Cipriano Davis is a 60 y o  male with no known past medical history, originally presented to [address] ED on 2/25 with 2-day history of dizziness and nausea, found to have small right cerebellar stroke, right cervical vertebral artery dissection and proximal right intradural vertebral artery occlusion  Patient also noted to have UTI and asymptomatic COVID  Symptoms worsened in ICU after admission with new right-sided weakness and worsening dysarthria  He was taken emergently to IR and underwent a right vertebral/basilar thrombectomy  Patient had a right V4 angioplasty and stenting with WILLIAM and V3 stenting of dissection on 2/26/2023     24h events: Patient now off Cardene, required as needed hydralazine 3x total since cardene discontinued  Pt went for CT head this morning  No acute events overnight  Patient appropriate for transfer out of the ICU today?: No  Disposition: Continue Critical Care   Code Status: Level 1 - Full Code  ---------------------------------------------------------------------------------------  SUBJECTIVE  Patient states he feels okay overall  He denies any change in his right-sided weakness or any change in sensation  He ate and drink without difficulty last night  He denies any fevers, chills, abdominal pain, pain, nausea, vomiting, chest pain or any other concerns      Drips: heparin (porcine), 3-24 Units/kg/hr (Order-Specific), Last Rate: 19 Units/kg/hr (02/28/23 0433)  niCARdipine, 1-15 mg/hr, Last Rate: Stopped (02/27/23 1355)      Lines: Peripheral IV x2    Review of systems was reviewed and negative unless stated above in HPI/24-hour events   ---------------------------------------------------------------------------------------  OBJECTIVE    Vitals Temp:  [98 3 °F (36 8 °C)-98 6 °F (37 °C)] 98 4 °F (36 9 °C)  HR:  [60-82] 64  Resp:  [13-27] 20  BP: (137-180)/(62-82) 156/76  SpO2:  [99 %-100 %] 100 %      Drips  heparin (porcine), 3-24 Units/kg/hr (Order-Specific), Last Rate: 19 Units/kg/hr (02/28/23 0433)  niCARdipine, 1-15 mg/hr, Last Rate: Stopped (02/27/23 2249)        In/Out:   I/O last 24 hours: In: 2488 3 [P O :240; I V :1998 3; IV Piggyback:250]  Out: 9308 [Urine:2575]      Intake/Output Summary (Last 24 hours) at 2/28/2023 0748  Last data filed at 2/28/2023 0617  Gross per 24 hour   Intake 2488  32 ml   Output 2325 ml   Net 163 32 ml       Respiratory:    Invasive/non-invasive ventilation settings   Respiratory    Lab Data (Last 4 hours)    None         O2/Vent Data (Last 4 hours)    None                Physical Exam  Vitals and nursing note reviewed  Constitutional:       General: He is not in acute distress  Appearance: He is well-developed  HENT:      Head: Normocephalic and atraumatic  Eyes:      General: No scleral icterus  Extraocular Movements: Extraocular movements intact  Conjunctiva/sclera: Conjunctivae normal       Pupils: Pupils are equal, round, and reactive to light  Cardiovascular:      Rate and Rhythm: Normal rate and regular rhythm  Pulmonary:      Effort: Pulmonary effort is normal  No respiratory distress  Breath sounds: Normal breath sounds  Abdominal:      General: Abdomen is flat  There is no distension  Musculoskeletal:         General: No swelling  Cervical back: Neck supple  Right lower leg: No edema  Left lower leg: No edema  Skin:     General: Skin is warm and dry  Capillary Refill: Capillary refill takes less than 2 seconds  Neurological:      Mental Status: He is alert and oriented to person, place, and time  GCS: GCS eye subscore is 4  GCS verbal subscore is 5  GCS motor subscore is 6  Cranial Nerves: Facial asymmetry (L sided facial droop) present  Sensory: Sensory deficit (decreased sensation R V2-V3 face, RUE, RLE) present  Motor: Weakness (RUE 0/5, R hip flexion 3/5, R knee extension/flexion 2/5, R ankle extension and flexion 0/5  Full stregnth L side) present  Comments: Answers questions and follows all commands appropriately      Psychiatric:         Mood and Affect: Mood and affect normal          Laboratory and Diagnostics:  Results from last 7 days   Lab Units 02/28/23  0209 02/27/23  1003 02/26/23  0506 02/26/23  0021 02/25/23  0940   WBC Thousand/uL 12 50* 10 26* 13 27* 17 07* 9 99   HEMOGLOBIN g/dL 11 0* 10 8* 13 3 13 6 14 4   HEMATOCRIT % 35 1* 33 3* 40 2 40 8 44 0   PLATELETS Thousands/uL 263 268 349 344 326   NEUTROS PCT %  --   --  89*  --  88*   MONOS PCT %  --   --  4  --  4     Results from last 7 days   Lab Units 02/28/23  0209 02/27/23  0429 02/26/23  0506 02/25/23  2044 02/25/23  0940   SODIUM mmol/L 147 147 143 141 142   POTASSIUM mmol/L 3 5 3 1* 3 4* 3 3* 3 5   CHLORIDE mmol/L 118* 120* 115* 109* 108   CO2 mmol/L 22 21 21 24 24   ANION GAP mmol/L 7 6 7 8 10   BUN mg/dL 18 15 17 18 23   CREATININE mg/dL 1 27 1 05 1 09 1 15 1 32*   CALCIUM mg/dL 8 7 8 1* 8 8 8 4 8 8   GLUCOSE RANDOM mg/dL 124 114 127 110 124   ALT U/L  --   --  24  --  19   AST U/L  --   --  13  --  16   ALK PHOS U/L  --   --  63  --  55   ALBUMIN g/dL  --   --  3 2*  --  3 8   TOTAL BILIRUBIN mg/dL  --   --  0 68  --  0 60     Results from last 7 days   Lab Units 02/28/23  0209 02/27/23  0429   MAGNESIUM mg/dL 2 3 2 1   PHOSPHORUS mg/dL 2 3  --       Results from last 7 days   Lab Units 02/28/23  0208 02/27/23  2100 02/27/23  1548 02/27/23  1003 02/26/23  1133 02/26/23  0506 02/26/23  0021 02/25/23  0940   INR   --   --   --  1 05  --   --  1 06 0 98   PTT seconds 78* 40* 32 31 69* 33 31 29          Results from last 7 days   Lab Units 02/25/23 2044   LACTIC ACID mmol/L 1 2     ABG:    VBG:    Results from last 7 days   Lab Units 02/26/23  0506 02/25/23 2044 PROCALCITONIN ng/ml 0 08 <0 05       Micro:   Results from last 7 days   Lab Units 02/27/23  1002 02/25/23 2044 02/25/23  1155   BLOOD CULTURE  Received in Microbiology Lab  Culture in Progress  Received in Microbiology Lab  Culture in Progress  No Growth at 24 hrs  No Growth at 24 hrs   --    URINE CULTURE   --   --  >100,000 cfu/ml Escherichia coli*       EKG: No new EKG in the past 24 hours  Imaging: I have personally reviewed pertinent reports  TTE 2/27: LVEF 21%, normal systolic function, normal diastolic function  No PFO, no intracardiac thrombus  [facility] 2/28    Invasive Devices     Peripheral Intravenous Line  Duration           Peripheral IV 02/26/23 Left;Ventral (anterior) Forearm 1 day    Peripheral IV 02/27/23 Right;Upper;Ventral (anterior) Arm <1 day          Drain  Duration           Urethral Catheter Temperature probe 1 day                Height and Weights:    Height: 5' 9" (175 3 cm)  IBW (Ideal Body Weight): 70 7 kg  Body mass index is 25 55 kg/m²  Weight (last 2 days)     Date/Time Weight    02/27/23 0904 78 5 (173)    02/27/23 0600 78 9 (173 94)            Nutrition:        Diet Orders   (From admission, onward)             Start     Ordered    02/27/23 1310  Diet Dysphagia/Modified Consistency; Dysphagia 3-Dental Soft; Nectar Thick Liquid  Diet effective now        References:    Nutrtion Support Algorithm Enteral vs  Parenteral   Question Answer Comment   Diet Type Dysphagia/Modified Consistency    Dysphagia/Modified Consistency Dysphagia 3-Dental Soft    Liquid Modifier Nectar Thick Liquid    RD to adjust diet per protocol?  Yes        02/27/23 1310                Active Medications:  Scheduled Meds:  Current Facility-Administered Medications   Medication Dose Route Frequency Provider Last Rate   • amLODIPine  10 mg Oral Daily Ag Terry MD     • aspirin  81 mg Oral Daily Breezy Carrillo MD     • atorvastatin  40 mg Oral Daily With DO Efrem     • cefTRIAXone 1,000 mg Intravenous Q24H Stacy GreenwoodjinDO Stopped (02/27/23 1400)   • chlorhexidine  15 mL Mouth/Throat Q12H [facility] Kathy Leach PA-C     • heparin (porcine)  3-24 Units/kg/hr (Order-Specific) Intravenous Titrated Joseph Sotelo MD 19 Units/kg/hr (02/28/23 6621)   • hydrALAZINE  10 mg Intravenous Q4H PRN Yolanda Jeffers MD     • hydrALAZINE  10 mg Intravenous Once Yolanda Jeffers MD     • niCARdipine  1-15 mg/hr Intravenous Titrated Kathy Leach PA-C Stopped (02/27/23 6123)   • potassium chloride  40 mEq Oral BID Yolanda Jeffers MD     • ticagrelor  90 mg Oral Q12H Galina Morales MD       PRN Meds:   hydrALAZINE, 10 mg, Q4H PRN          Problem List:   Patient Active Problem List   Diagnosis   • BRAULIO (acute kidney injury) ([facility] [address] )   • COVID   • Cerebrovascular accident (CVA) ([facility] [address] )   • Right Vertebral artery dissection ([facility] [address] )   • Stenosis of left vertebral artery   • Hypertensive emergency   • Dizziness   • UTI (urinary tract infection)   • Dysphagia       Assessment/Plan:     Neuro:   • Diagnosis: 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 in context of worsening neurologic exam on 2/26   ? Plan:  ? MRI 2/26-  acute/recent posterior circulation infarctions with increasing right cerebellar and new brainstem infarctions  ? 2/26 right vertebral/basilar thrombectomy with stenting of the V3 and V4 with TICI post 2B flow  ? Continue q2hr neuro exams    ? Continue ASA 81 mg qd, Brilinta 90 mg BID  ? Follow up CT this AM  ? Transition heparin to Eliquis 2 5 BID today pending CT, ptt at goal        CV:   • Diagnosis: BP management, recent hypertensive emergency  ? Plan: maintain systolic blood pressure [id number]  ? Norvasc 5 started yesterday, BP remains elevated  Increase Norvasc to 10 mg qd  ? cardene now off   ? Continue PRN hydralazine  • Diagnosis: Hyperlipidemia  ? Plan: Lipid panel , , HDL 27,   ?  Continue atorvastatin 40 mg qd  Diagnosis: Sinus tachycardia  ? Pt with episode of sinus tachycardia on tele overnight 2/26-2/27  ? Has not recurred since  ? Continue tele     Pulm:  ? Diagnosis: Pt saturating well on RA, no acute concerns        :   • Diagnosis: UTI  ? Plan: noted on admission  ? Urine cx e coli, follow up urine cx sensitivities  ? Continue CTX   Day 4/5     F/E/N:   • Plan:   ? F: no IVF  ? E: replete electrolytes as necessary to maintain K >4, Mg >2, phos >3   K today 3 5  Will replete with Kdur 40 meq x2 today  ? N:  Continue dysphagia 3, nectar thick liquid diet        Heme/Onc:   • Diagnosis: leukocytosis  ? Plan: slightly elevated, increased to 12 5 from 10 26 day prior  Afebrile, no worsening s/sxs of infection at this time  Suspect likely reactive in setting of recent stroke  ? Continue to follow WBC count, monitor fever curve     Endo:   ? Diagnosis: elevated hgb a1c 5 7, preDM  ? Follow BG  ? Recommend lifestyle and diet modifications        ID:   • Diagnosis: Incidentally noted COVID+ status  ? Plan: pt remains asx  ? Continue supportive care  ? No tx indicated  ? Currently tolerating RA   ? Contact and airborne precautions  • Diagnosis: E Coli UTI   ? Plan: continue CTX for a total 5 day course  Day 4/5  ? Follow up urine cx susceptibilities        MSK/Skin:   ? PT/OT consult- post acute rehab recommended with PMR consult  Disposition: Continue Critical Care   Code Status: Level 1 - Full Code  ---------------------------------------------------------------------------------------  Advance Directive and Living Will:      Power of :    POLST:    ---------------------------------------------------------------------------------------  Care Time Delivered:   Please see attending's attestation  Emelina Roberto MD      Portions of the record may have been created with voice recognition software    Occasional wrong word or "sound a like" substitutions may have occurred due to the inherent limitations of voice recognition software    Read the chart carefully and recognize, using context, where substitutions have occurred

## 2023-02-28 NOTE — CONSULTS
PHYSICAL MEDICINE AND REHABILITATION CONSULT NOTE  Cheli Dudley 60 y o  male MRN: [id number]  Unit/Bed#: ICU 09 Encounter: [id number]    Requested by (Physician/Service): Liberty Cheek MD  Reason for Consultation:  Assessment of rehabilitation needs    Assessment:  Rehabilitation Diagnosis:   • Bilateral cerebellar infarcts   • Right cervical vertebral artery dissection  • Proximal right intradural vertebral artery occlusion   • S/p right vertebral/basilar thrombectomy with V4 angioplasty and stenting with WILLIAM and V3 stenting of dissection   • Right hemiparesis   • Impaired mobility and self care     Recommendations:  Rehabilitation Plan:  • Continue PT/OT (SLP) while on acute care  • The patient will likely be a candidate for acute inpatient rehabilitation once medically stable  He currently remains in the ICU  Will follow at this time  • Covid-19 Testing: [facility] inpatient rehabilitation units require testing within 48 hours of all potential admissions at this time  *Re-testing is NOT required for patients recovering from COVID-19 infection if isolation has been discontinued per CDC criteria  Medical Co-morbidities Plan:  · Hypertension now off of Cardene  · COVID 19 infection   · UTI  · BRAULIO  · Leukocytosis   · Sinus tachycardia   · DVT ppx: Eliquis and SCD    Thank you for this consultation  Do not hesitate to contact service with further questions  YURI Braxton  PM&R    I have spent a total time of 30 minutes on 02/28/23 in caring for this patient including Prognosis, Patient and family education, Impressions, Counseling / Coordination of care, Documenting in the medical record and Obtaining or reviewing history    History of Present Illness:  Cheli Dudley is a 60 y o  male with no know PMH who presented to the [address] on 2/25/23 with a two day history of dizziness and nausea  He was hypertensive with /115   CTA showed a right posterior cerebellum infarct, acute vs subacute, marked tapering and occlusion of the distal right cervical vertebral artery suggestive of dissection, proximal right intradural vertebral artery occlusion, severe left vertebral artery stenosis  He was placed on Cardene  Additionally he was found to be positive for COVID and UTI with BRAULIO  He has worsening right sided weakness and dysarthria in the ICU and he was taken for right vertebral/basilar thrombectomy with right V4 angioplasty and stenting with WILLIAM and V3 stenting of dissection on 2/26/2023  He has been weaned off of cardene  MRI showed crescending, acute/recent posterior circulation infarctions with increasing right cerebellar and new brainstem infarctions  Overall infarct burden is slightly increased from the prior study one day earlier although new small infarcts are noted in the posterior medulla on the right, right midbrain and left occipital lobe  No hydrocephalus or large parenchymal hemorrhage  Abnormal left vertebral artery flow void at the lateral mass of C1, possibly related to slow flow and/or vascular occlusion  Very mild, chronic microangiopathy and chronic left thalamic lacunar infarction  PM&R are consulted for rehabilitation recommendations  The patient was seen in the ICU with spouse at bedside  Per patient and spouse they have noted improvement in his right sided weakness  He is very tearful and emotional at times  He currently denies any COVID symptoms such as fever, SOB, cough or chest pain  Per spouse the family did all have GI symptoms recently  He does have decreased sensation to touch RUE/RLE  Review of Systems: 10 point ROS negative except for what is noted in HPI    Function:  Prior level of function and living situation: The patient lives with his spouse and two adult kids in a 2 story home with 1 SHANNON and FF to main bed/bath  He was independent  Family is very supportive and can assist as needed   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      Current level of function:  Physical Therapy: Moderate assist for bed mobility, maximal assist for transfers   Occupational Therapy: Minimal assist for eating, grooming, moderate assist for UB bathing/dressing and maximal assist for LB bathing/dressing     Physical Exam:  /65 (BP Location: Left arm)   Pulse 80   Temp 97 5 °F (36 4 °C) (Oral)   Resp 20   Ht 5' 9" (1 753 m)   Wt 78 5 kg (173 lb)   SpO2 98%   BMI 25 55 kg/m²        Intake/Output Summary (Last 24 hours) at 2/28/2023 1245  Last data filed at 2/28/2023 1200  Gross per 24 hour   Intake 1964 43 ml   Output 2825 ml   Net -860 57 ml       Body mass index is 25 55 kg/m²  Physical Exam  Constitutional:       General: He is not in acute distress  Appearance: He is not toxic-appearing  HENT:      Head: Normocephalic and atraumatic  Right Ear: External ear normal       Left Ear: External ear normal       Nose: Nose normal       Mouth/Throat:      Mouth: Mucous membranes are moist       Pharynx: Oropharynx is clear  Cardiovascular:      Pulses: Normal pulses  Pulmonary:      Effort: Pulmonary effort is normal  No respiratory distress  Musculoskeletal:      Comments: RUE: SAB 1/5, EF 2/5, EE 2-/5, FF 1/5  RLE: HF 3+/5, KF 3/5, DF/PF 4-/5   Skin:     General: Skin is warm and dry  Neurological:      Mental Status: He is alert and oriented to person, place, and time  Comments: Expressive aphasia and dysarthria, right hemiparesis, decreased sensation to tough RUE/RLE   Psychiatric:         Mood and Affect: Affect is tearful          Social History:    Social History     Socioeconomic History   • Marital status: /Civil Union     Spouse name: None   • Number of children: None   • Years of education: None   • Highest education level: None   Occupational History   • None   Tobacco Use   • Smoking status: Never   • Smokeless tobacco: Never   Substance and Sexual Activity   • Alcohol use: Not Currently   • Drug use: Not Currently   • Sexual activity: None   Other Topics Concern   • None   Social History Narrative   • None     Social Determinants of Health     Financial Resource Strain: Not on file   Food Insecurity: Not on file   Transportation Needs: Not on file   Physical Activity: Not on file   Stress: Not on file   Social Connections: Not on file   Intimate Partner Violence: Not on file   Housing Stability: Not on file        Family History:    History reviewed  No pertinent family history  Medications:     Current Facility-Administered Medications:   •  amLODIPine (NORVASC) tablet 10 mg, 10 mg, Oral, Daily, Chapito Chavez MD, 10 mg at 02/28/23 7466  •  apixaban (ELIQUIS) tablet 2 5 mg, 2 5 mg, Oral, BID, Herberth Mtz PA-C  •  aspirin chewable tablet 81 mg, 81 mg, Oral, Daily, Mame Burrows MD, 81 mg at 02/28/23 6116  •  atorvastatin (LIPITOR) tablet 40 mg, 40 mg, Oral, Daily With Dinner, [facility], , 40 mg at 02/27/23 1549  •  baclofen tablet 10 mg, 10 mg, Oral, BID, Joseph Sotelo MD, 10 mg at 02/28/23 1137  •  chlorhexidine (PERIDEX) 0 12 % oral rinse 15 mL, 15 mL, Mouth/Throat, Q12H [address], Peter Mcclain PA-C, 15 mL at 02/28/23 0818  •  hydrALAZINE (APRESOLINE) injection 10 mg, 10 mg, Intravenous, Q4H PRN, Chapito Chavez MD  •  lisinopril (ZESTRIL) tablet 5 mg, 5 mg, Oral, Daily, Joseph Sotelo MD, 5 mg at 02/28/23 1137  •  niCARdipine (CARDENE) 25 mg (STANDARD CONCENTRATION) in sodium chloride 0 9% 250 mL, 1-15 mg/hr, Intravenous, Titrated, Peter Mcclain PA-C, Last Rate: 50 mL/hr at 02/28/23 0914, 5 mg/hr at 02/28/23 0914  •  potassium chloride (K-DUR,KLOR-CON) CR tablet 40 mEq, 40 mEq, Oral, BID, Chapito Chavez MD, 40 mEq at 02/28/23 0775  •  ticagrelor (BRILINTA) tablet 90 mg, 90 mg, Oral, Q12H [address], Mame Burrows MD, 90 mg at 02/28/23 0818    Past Medical History:     History reviewed  No pertinent past medical history  Past Surgical History:     History reviewed   No pertinent surgical history  Allergies:     No Known Allergies        LABORATORY RESULTS:      Lab Results   Component Value Date    HGB 11 0 (L) 02/28/2023    HCT 35 1 (L) 02/28/2023    WBC 12 50 (H) 02/28/2023     Lab Results   Component Value Date    BUN 18 02/28/2023    K 3 5 02/28/2023     (H) 02/28/2023    CREATININE 1 27 02/28/2023     Lab Results   Component Value Date    PROTIME 14 0 02/27/2023    INR 1 05 02/27/2023        DIAGNOSTIC STUDIES: Reviewed  CTA head and neck w wo contrast    Result Date: 2/26/2023  Impression: Overall, there is no significant interval change when comparing to the recent MRI brain, and CTA head neck study  Stable small cerebellar infarctions as noted on the recent MRI study  Stable findings of distal right cervical and proximal intradural vertebral artery occlusion with probable retrograde flow in its distal intradural segment  Stable left vertebral artery origin stenosis and presumed occlusion of the distal left intradural vertebral segment  Small basilar artery with focal atherosclerotic disease in its proximal segment  Patent fetal right PCA circulation  Left PCA branch is small but patent, and unchanged in appearance  Stable poor visualization of the proximal left superior cerebellar artery  Above-mentioned findings are in keeping with the preliminary report provided by [facility] radiology services without significant discrepancy in the report  Workstation performed: [id number]     CTA head and neck with and without contrast    Result Date: 2/25/2023  Impression: No acute intracranial hemorrhage  Focal infarcts in the right posterior cerebellum, possibly acute to subacute  Follow-up MRI imaging is recommended  Marked tapering and occlusion of the distal right cervical vertebral artery  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    Proximal right intradural vertebral artery is also occluded with faint visualization of the post-PICA segment, which may be filling in a retrograde fashion  Severe left vertebral artery origin stenosis  Left vertebral artery may terminate in a posterior for cerebellar artery branch as the post-PICA segment is not identified  Small basilar artery with fetal right PCA circulation  Left proximal PCA is patent but small  Mid to distal left PCA is not well visualized  I personally discussed this study with Bhargav Beckford on 2/25/2023 at 12:21 PM  Workstation performed: [id number]     XR chest 1 view portable    Result Date: 2/25/2023  Impression: No acute abnormality however there is an opacity in the left lung base that may represent focal pleural thickening  Neoplasm cannot be excluded  Nonemergent follow-up PA and lateral views of the chest or CT chest suggested unless there are prior studies available for comparison  Findings for this inpatient marked for immediate notification in Epic  Workstation performed: [id number]     CT head wo contrast    Result Date: 2/28/2023  Impression: Evolving acute infarcts in the brainstem (worse in left midbrain/left upper tatiana) and bilateral cerebellum (right worse than left) status post right distal vertebral artery stenting  No acute intracranial hemorrhage  Workstation performed: [id number]     CT head wo contrast    Result Date: 2/26/2023  Impression: Stable evolving bilateral cerebellar infarctions without significant interval change  Workstation performed: [id number]     CT head wo contrast    Result Date: 2/26/2023  Impression: No new parenchymal findings when comparing to the recent CT and MRI brain studies  Stable late acute to early subacute bilateral cerebellar infarctions without hemorrhagic transformation    Workstation performed: [id number]     MRA head wo contrast    Result Date: 2/25/2023  Impression: Abscess of flow related signal in the V4 segments of the bilateral vertebral arteries, throughout the basilar artery and left posterior cerebral artery, concerning for progression of dissection and/or thrombus  Recommend emergent interventional radiology consultation  I personally discussed this study with Phoenix Kaur   on 2/25/2023 at 8:15 PM   Workstation performed: [id number]     MRA carotids wo contrast    Result Date: 2/25/2023  Impression: 1  There complete absence of flow related signal along the cervical segment of the right vertebral artery concerning for progression of dissection and/or retrograde flow  2   Absence of flow related signal in the V4 segments of the bilateral vertebral arteries and throughout the basilar artery, also concerning for progression of dissection  I personally discussed this study with Phoenix Kaur on 2/25/2023 at 8:24 PM  Workstation performed: [id number]     MRI brain wo contrast    Result Date: 2/27/2023  Impression: 1  Crescending, acute/recent posterior circulation infarctions with increasing right cerebellar and new brainstem infarctions as described  Overall infarct burden is slightly increased from the prior study one day earlier although new small infarcts are noted in the posterior medulla on the right, right midbrain and left occipital lobe  No hydrocephalus or large parenchymal hemorrhage  2   Abnormal left vertebral artery flow void at the lateral mass of C1, possibly related to slow flow and/or vascular occlusion  3   Very mild, chronic microangiopathy and chronic left thalamic lacunar infarction    Workstation performed: [id number]     MRI brain wo contrast    Result Date: 2/25/2023  Impression: Acute early subacute infarcts throughout the right greater than left cerebellar hemispheres and anterior vermis, consistent with findings on the head CT  No hemorrhagic conversion or mass effect  Arthrotomy 8 Workstation performed: [id number]     XR follow up    Result Date: 2/25/2023  Impression: No radiopaque orbital foreign body   Workstation performed: [id number]     CT cerebral perfusion    Result Date: 2/26/2023  Impression: CT perfusion performed  Data available on PACS   Workstation performed: [id number]

## 2023-02-28 NOTE — ASSESSMENT & PLAN NOTE
· BP >822 systolic   · On Cardene for control  · Continue to uptitrate oral therapy to wean IV medication as tolerated

## 2023-02-28 NOTE — PLAN OF CARE
Problem: Prexisting or High Potential for Compromised Skin Integrity  Goal: Skin integrity is maintained or improved  Description: INTERVENTIONS:  - Identify patients at risk for skin breakdown  - Assess and monitor skin integrity  - Assess and monitor nutrition and hydration status  - Monitor labs   - Assess for incontinence   - Turn and reposition patient  - Assist with mobility/ambulation  - Relieve pressure over bony prominences  - Avoid friction and shearing  - Provide appropriate hygiene as needed including keeping skin clean and dry  - Evaluate need for skin moisturizer/barrier cream  - Collaborate with interdisciplinary team   - Patient/family teaching  - Consider wound care consult   Outcome: Progressing     Problem: MOBILITY - ADULT  Goal: Maintain or return to baseline ADL function  Description: INTERVENTIONS:  -  Assess patient's ability to carry out ADLs; assess patient's baseline for ADL function and identify physical deficits which impact ability to perform ADLs (bathing, care of mouth/teeth, toileting, grooming, dressing, etc )  - Assess/evaluate cause of self-care deficits   - Assess range of motion  - Assess patient's mobility; develop plan if impaired  - Assess patient's need for assistive devices and provide as appropriate  - Encourage maximum independence but intervene and supervise when necessary  - Involve family in performance of ADLs  - Assess for home care needs following discharge   - Consider OT consult to assist with ADL evaluation and planning for discharge  - Provide patient education as appropriate  Outcome: Progressing  Goal: Maintains/Returns to pre admission functional level  Description: INTERVENTIONS:  - Perform BMAT or MOVE assessment daily    - Set and communicate daily mobility goal to care team and patient/family/caregiver     - Collaborate with rehabilitation services on mobility goals if consulted  - Record patient progress and toleration of activity level   Outcome: Progressing     Problem: PAIN - ADULT  Goal: Verbalizes/displays adequate comfort level or baseline comfort level  Description: Interventions:  - Encourage patient to monitor pain and request assistance  - Assess pain using appropriate pain scale  - Administer analgesics based on type and severity of pain and evaluate response  - Implement non-pharmacological measures as appropriate and evaluate response  - Consider cultural and social influences on pain and pain management  - Notify physician/advanced practitioner if interventions unsuccessful or patient reports new pain  Outcome: Progressing     Problem: INFECTION - ADULT  Goal: Absence or prevention of progression during hospitalization  Description: INTERVENTIONS:  - Assess and monitor for signs and symptoms of infection  - Monitor lab/diagnostic results  - Monitor all insertion sites, i e  indwelling lines, tubes, and drains  - Monitor endotracheal if appropriate and nasal secretions for changes in amount and color  - [place] appropriate cooling/warming therapies per order  - Administer medications as ordered  - Instruct and encourage patient and family to use good hand hygiene technique  - Identify and instruct in appropriate isolation precautions for identified infection/condition  Outcome: Progressing  Goal: Absence of fever/infection during neutropenic period  Description: INTERVENTIONS:  - Monitor WBC    Outcome: Progressing     Problem: SAFETY ADULT  Goal: Maintain or return to baseline ADL function  Description: INTERVENTIONS:  -  Assess patient's ability to carry out ADLs; assess patient's baseline for ADL function and identify physical deficits which impact ability to perform ADLs (bathing, care of mouth/teeth, toileting, grooming, dressing, etc )  - Assess/evaluate cause of self-care deficits   - Assess range of motion  - Assess patient's mobility; develop plan if impaired  - Assess patient's need for assistive devices and provide as appropriate  - Encourage maximum independence but intervene and supervise when necessary  - Involve family in performance of ADLs  - Assess for home care needs following discharge   - Consider OT consult to assist with ADL evaluation and planning for discharge  - Provide patient education as appropriate  Outcome: Progressing  Goal: Maintains/Returns to pre admission functional level  Description: INTERVENTIONS:  - Perform BMAT or MOVE assessment daily    - Set and communicate daily mobility goal to care team and patient/family/caregiver     - Collaborate with rehabilitation services on mobility goals if consulte  - Out of bed for toileting  - Record patient progress and toleration of activity level   Outcome: Progressing  Goal: Patient will remain free of falls  Description: INTERVENTIONS:  - Educate patient/family on patient safety including physical limitations  - Instruct patient to call for assistance with activity   - Consult OT/PT to assist with strengthening/mobility   - Keep Call bell within reach  - Keep bed low and locked with side rails adjusted as appropriate  - Keep care items and personal belongings within reach  - Initiate and maintain comfort rounds  - Make Fall Risk Sign visible to staff  - Apply yellow socks and bracelet for high fall risk patients  - Consider moving patient to room near nurses station  Outcome: Progressing     Problem: DISCHARGE PLANNING  Goal: Discharge to home or other facility with appropriate resources  Description: INTERVENTIONS:  - Identify barriers to discharge w/patient and caregiver  - Arrange for needed discharge resources and transportation as appropriate  - Identify discharge learning needs (meds, wound care, etc )  - Arrange for interpretive services to assist at discharge as needed  - Refer to Case Management Department for coordinating discharge planning if the patient needs post-hospital services based on physician/advanced practitioner order or complex needs related to functional status, cognitive ability, or social support system  Outcome: Progressing     Problem: Knowledge Deficit  Goal: Patient/family/caregiver demonstrates understanding of disease process, treatment plan, medications, and discharge instructions  Description: Complete learning assessment and assess knowledge base  Interventions:  - Provide teaching at level of understanding  - Provide teaching via preferred learning methods  Outcome: Progressing     Problem: NEUROSENSORY - ADULT  Goal: Achieves stable or improved neurological status  Description: INTERVENTIONS  - Monitor and report changes in neurological status  - Monitor vital signs such as temperature, blood pressure, glucose, and any other labs ordered   - Initiate measures to prevent increased intracranial pressure  - Monitor for seizure activity and implement precautions if appropriate      Outcome: Progressing  Goal: Remains free of injury related to seizures activity  Description: INTERVENTIONS  - Maintain airway, patient safety  and administer oxygen as ordered  - Monitor patient for seizure activity, document and report duration and description of seizure to physician/advanced practitioner  - If seizure occurs,  ensure patient safety during seizure  - Reorient patient post seizure  - Seizure pads on all 4 side rails  - Instruct patient/family to notify RN of any seizure activity including if an aura is experienced  - Instruct patient/family to call for assistance with activity based on nursing assessment  - Administer anti-seizure medications if ordered    Outcome: Progressing  Goal: Achieves maximal functionality and self care  Description: INTERVENTIONS  - Monitor swallowing and airway patency with patient fatigue and changes in neurological status  - Encourage and assist patient to increase activity and self care     - Encourage visually impaired, hearing impaired and aphasic patients to use assistive/communication devices  Outcome: Progressing     Problem: CARDIOVASCULAR - ADULT  Goal: Maintains optimal cardiac output and hemodynamic stability  Description: INTERVENTIONS:  - Monitor I/O, vital signs and rhythm  - Monitor for S/S and trends of decreased cardiac output  - Administer and titrate ordered vasoactive medications to optimize hemodynamic stability  - Assess quality of pulses, skin color and temperature  - Assess for signs of decreased coronary artery perfusion  - Instruct patient to report change in severity of symptoms  Outcome: Progressing  Goal: Absence of cardiac dysrhythmias or at baseline rhythm  Description: INTERVENTIONS:  - Continuous cardiac monitoring, vital signs, obtain 12 lead EKG if ordered  - Administer antiarrhythmic and heart rate control medications as ordered  - Monitor electrolytes and administer replacement therapy as ordered  Outcome: Progressing     Problem: RESPIRATORY - ADULT  Goal: Achieves optimal ventilation and oxygenation  Description: INTERVENTIONS:  - Assess for changes in respiratory status  - Assess for changes in mentation and behavior  - Position to facilitate oxygenation and minimize respiratory effort  - Oxygen administered by appropriate delivery if ordered  - Initiate smoking cessation education as indicated  - Encourage broncho-pulmonary hygiene including cough, deep breathe, Incentive Spirometry  - Assess the need for suctioning and aspirate as needed  - Assess and instruct to report SOB or any respiratory difficulty  - Respiratory Therapy support as indicated  Outcome: Progressing     Problem: GENITOURINARY - ADULT  Goal: Maintains or returns to baseline urinary function  Description: INTERVENTIONS:  - Assess urinary function  - Encourage oral fluids to ensure adequate hydration if ordered  - Administer IV fluids as ordered to ensure adequate hydration  - Administer ordered medications as needed  - Offer frequent toileting  - Follow urinary retention protocol if ordered  Outcome: Progressing  Goal: Absence of urinary retention  Description: INTERVENTIONS:  - Assess patient’s ability to void and empty bladder  - Monitor I/O  - Bladder scan as needed  - Discuss with physician/AP medications to alleviate retention as needed  - Discuss catheterization for long term situations as appropriate  Outcome: Progressing  Goal: Urinary catheter remains patent  Description: INTERVENTIONS:  - Assess patency of urinary catheter  - If patient has a chronic barnett, consider changing catheter if non-functioning  - Follow guidelines for intermittent irrigation of non-functioning urinary catheter  Outcome: Progressing     Problem: METABOLIC, FLUID AND ELECTROLYTES - ADULT  Goal: Electrolytes maintained within normal limits  Description: INTERVENTIONS:  - Monitor labs and assess patient for signs and symptoms of electrolyte imbalances  - Administer electrolyte replacement as ordered  - Monitor response to electrolyte replacements, including repeat lab results as appropriate  - Instruct patient on fluid and nutrition as appropriate  Outcome: Progressing  Goal: Fluid balance maintained  Description: INTERVENTIONS:  - Monitor labs   - Monitor I/O and WT  - Instruct patient on fluid and nutrition as appropriate  - Assess for signs & symptoms of volume excess or deficit  Outcome: Progressing  Goal: Glucose maintained within target range  Description: INTERVENTIONS:  - Monitor Blood Glucose as ordered  - Assess for signs and symptoms of hyperglycemia and hypoglycemia  - Administer ordered medications to maintain glucose within target range  - Assess nutritional intake and initiate nutrition service referral as needed  Outcome: Progressing     Problem: Nutrition/Hydration-ADULT  Goal: Nutrient/Hydration intake appropriate for improving, restoring or maintaining nutritional needs  Description: Monitor and assess patient's nutrition/hydration status for malnutrition   Collaborate with interdisciplinary team and initiate plan and interventions as ordered  Monitor patient's weight and dietary intake as ordered or per policy  Utilize nutrition screening tool and intervene as necessary  Determine patient's food preferences and provide high-protein, high-caloric foods as appropriate  INTERVENTIONS:  - Monitor oral intake, urinary output, labs, and treatment plans  - Assess nutrition and hydration status and recommend course of action  - Evaluate amount of meals eaten  - Assist patient with eating if necessary   - Allow adequate time for meals  - Recommend/ encourage appropriate diets, oral nutritional supplements, and vitamin/mineral supplements  - Order, calculate, and assess calorie counts as needed  - Recommend, monitor, and adjust tube feedings and TPN/PPN based on assessed needs  - Assess need for intravenous fluids  - Provide specific nutrition/hydration education as appropriate  - Include patient/family/caregiver in decisions related to nutrition  Outcome: Progressing     Problem: Neurological Deficit  Goal: Neurological status is stable or improving  Description: Interventions:  - Monitor and assess patient's level of consciousness, motor function, sensory function, and level of assistance needed for ADLs  - Monitor and report changes from baseline  Collaborate with interdisciplinary team to initiate plan and implement interventions as ordered  - Provide and maintain a safe environment  - Consider seizure precautions  - Consider fall precautions  - Consider aspiration precautions  - Consider bleeding precautions  Outcome: Progressing     Problem: Activity Intolerance/Impaired Mobility  Goal: Mobility/activity is maintained at optimum level for patient  Description: Interventions:  - Assess and monitor patient  barriers to mobility and need for assistive/adaptive devices  - Assess patient's emotional response to limitations  - Collaborate with interdisciplinary team and initiate plans and interventions as ordered    - Encourage independent activity per ability   - Maintain proper body alignment  - Perform active/passive rom as tolerated/ordered  - Plan activities to conserve energy   - Turn patient as appropriate  Outcome: Progressing     Problem: Communication Impairment  Goal: Ability to express needs and understand communication  Description: Assess patient's communication skills and ability to understand information  Patient will demonstrate use of effective communication techniques, alternative methods of communication and understanding even if not able to speak  - Encourage communication and provide alternate methods of communication as needed  - Collaborate with case management/ for discharge needs  - Include patient/family/caregiver in decisions related to communication  Outcome: Progressing     Problem: Potential for Aspiration  Goal: Non-ventilated patient's risk of aspiration is minimized  Description: Assess and monitor vital signs, respiratory status, and labs (WBC)  Monitor for signs of aspiration (tachypnea, cough, rales, wheezing, cyanosis, fever)  - Assess and monitor patient's ability to swallow  - Place patient up in chair to eat if possible  - HOB up at 90 degrees to eat if unable to get patient up into chair   - Supervise patient during oral intake  - Instruct patient/ family to take small bites  - Instruct patient/ family to take small single sips when taking liquids  - Follow patient-specific strategies generated by speech pathologist   Outcome: Progressing  Goal: Ventilated patient's risk of aspiration is minimized  Description: Assess and monitor vital signs, respiratory status, airway cuff pressure, and labs (WBC)  Monitor for signs of aspiration (tachypnea, cough, rales, wheezing, cyanosis, fever)  - Elevate head of bed 30 degrees if patient has tube feeding   - Monitor tube feeding    Outcome: Progressing     Problem: Nutrition  Goal: Nutrition/Hydration status is improving  Description: Monitor and assess patient's nutrition/hydration status for malnutrition (ex- brittle hair, bruises, dry skin, pale skin and conjunctiva, muscle wasting, smooth red tongue, and disorientation)  Collaborate with interdisciplinary team and initiate plan and interventions as ordered  Monitor patient's weight and dietary intake as ordered or per policy  Utilize nutrition screening tool and intervene per policy  Determine patient's food preferences and provide high-protein, high-caloric foods as appropriate  - Assist patient with eating   - Allow adequate time for meals   - Encourage patient to take dietary supplement as ordered  - Collaborate with clinical nutritionist   - Include patient/family/caregiver in decisions related to nutrition    Outcome: Progressing

## 2023-03-01 PROBLEM — R25.2 SPASTICITY: Status: ACTIVE | Noted: 2023-03-01

## 2023-03-01 PROBLEM — R73.03 PREDIABETES: Status: ACTIVE | Noted: 2023-03-01

## 2023-03-01 PROBLEM — D72.829 LEUKOCYTOSIS: Status: ACTIVE | Noted: 2023-03-01

## 2023-03-01 PROBLEM — R79.89 ELEVATED SERUM CREATININE: Status: ACTIVE | Noted: 2023-03-01

## 2023-03-01 PROBLEM — R00.0 SINUS TACHYCARDIA: Status: ACTIVE | Noted: 2023-03-01

## 2023-03-01 PROBLEM — R45.89 TEARFULNESS: Status: ACTIVE | Noted: 2023-03-01

## 2023-03-01 LAB
ANION GAP SERPL CALCULATED.3IONS-SCNC: 6 MMOL/L (ref 4–13)
BUN SERPL-MCNC: 23 MG/DL (ref 5–25)
CALCIUM SERPL-MCNC: 8.5 MG/DL (ref 8.3–10.1)
CHLORIDE SERPL-SCNC: 116 MMOL/L (ref 96–108)
CO2 SERPL-SCNC: 23 MMOL/L (ref 21–32)
CREAT SERPL-MCNC: 1.23 MG/DL (ref 0.6–1.3)
ERYTHROCYTE [DISTWIDTH] IN BLOOD BY AUTOMATED COUNT: 13 % (ref 11.6–15.1)
GFR SERPL CREATININE-BSD FRML MDRD: 62 ML/MIN/1.73SQ M
GLUCOSE SERPL-MCNC: 111 MG/DL (ref 65–140)
HCT VFR BLD AUTO: 32.4 % (ref 36.5–49.3)
HGB BLD-MCNC: 10.5 G/DL (ref 12–17)
MCH RBC QN AUTO: 28.4 PG (ref 26.8–34.3)
MCHC RBC AUTO-ENTMCNC: 32.4 G/DL (ref 31.4–37.4)
MCV RBC AUTO: 88 FL (ref 82–98)
PLATELET # BLD AUTO: 277 THOUSANDS/UL (ref 149–390)
PMV BLD AUTO: 9.5 FL (ref 8.9–12.7)
POTASSIUM SERPL-SCNC: 3.7 MMOL/L (ref 3.5–5.3)
RBC # BLD AUTO: 3.7 MILLION/UL (ref 3.88–5.62)
SODIUM SERPL-SCNC: 145 MMOL/L (ref 135–147)
WBC # BLD AUTO: 12.16 THOUSAND/UL (ref 4.31–10.16)

## 2023-03-01 RX ORDER — BACLOFEN 10 MG/1
10 TABLET ORAL 2 TIMES DAILY
Status: DISCONTINUED | OUTPATIENT
Start: 2023-03-01 | End: 2023-03-06 | Stop reason: HOSPADM

## 2023-03-01 RX ORDER — CARVEDILOL 6.25 MG/1
6.25 TABLET ORAL ONCE
Status: COMPLETED | OUTPATIENT
Start: 2023-03-01 | End: 2023-03-01

## 2023-03-01 RX ORDER — OLANZAPINE 10 MG/1
5 INJECTION, POWDER, LYOPHILIZED, FOR SOLUTION INTRAMUSCULAR EVERY 4 HOURS PRN
Status: DISCONTINUED | OUTPATIENT
Start: 2023-03-01 | End: 2023-03-01

## 2023-03-01 RX ORDER — OLANZAPINE 10 MG/1
5 INJECTION, POWDER, LYOPHILIZED, FOR SOLUTION INTRAMUSCULAR
Status: DISCONTINUED | OUTPATIENT
Start: 2023-03-01 | End: 2023-03-01

## 2023-03-01 RX ORDER — AMOXICILLIN 250 MG
1 CAPSULE ORAL
Status: DISCONTINUED | OUTPATIENT
Start: 2023-03-01 | End: 2023-03-06 | Stop reason: HOSPADM

## 2023-03-01 RX ORDER — ATORVASTATIN CALCIUM 40 MG/1
40 TABLET, FILM COATED ORAL ONCE
Status: COMPLETED | OUTPATIENT
Start: 2023-03-01 | End: 2023-03-01

## 2023-03-01 RX ORDER — POLYETHYLENE GLYCOL 3350 17 G/17G
17 POWDER, FOR SOLUTION ORAL DAILY
Status: DISCONTINUED | OUTPATIENT
Start: 2023-03-01 | End: 2023-03-06 | Stop reason: HOSPADM

## 2023-03-01 RX ORDER — LISINOPRIL 10 MG/1
10 TABLET ORAL DAILY
Status: DISCONTINUED | OUTPATIENT
Start: 2023-03-02 | End: 2023-03-02

## 2023-03-01 RX ORDER — POTASSIUM CHLORIDE 20 MEQ/1
40 TABLET, EXTENDED RELEASE ORAL ONCE
Status: COMPLETED | OUTPATIENT
Start: 2023-03-01 | End: 2023-03-01

## 2023-03-01 RX ORDER — LISINOPRIL 5 MG/1
5 TABLET ORAL ONCE
Status: COMPLETED | OUTPATIENT
Start: 2023-03-01 | End: 2023-03-01

## 2023-03-01 RX ORDER — LIDOCAINE HYDROCHLORIDE 20 MG/ML
15 SOLUTION OROPHARYNGEAL ONCE
Status: COMPLETED | OUTPATIENT
Start: 2023-03-01 | End: 2023-03-01

## 2023-03-01 RX ADMIN — CARVEDILOL 6.25 MG: 6.25 TABLET, FILM COATED ORAL at 08:34

## 2023-03-01 RX ADMIN — NICARDIPINE HYDROCHLORIDE 5 MG/HR: 2.5 INJECTION, SOLUTION INTRAVENOUS at 08:41

## 2023-03-01 RX ADMIN — TICAGRELOR 90 MG: 90 TABLET ORAL at 08:34

## 2023-03-01 RX ADMIN — LISINOPRIL 5 MG: 5 TABLET ORAL at 08:34

## 2023-03-01 RX ADMIN — LISINOPRIL 5 MG: 5 TABLET ORAL at 13:05

## 2023-03-01 RX ADMIN — SENNOSIDES AND DOCUSATE SODIUM 1 TABLET: 8.6; 5 TABLET ORAL at 22:47

## 2023-03-01 RX ADMIN — APIXABAN 2.5 MG: 2.5 TABLET, FILM COATED ORAL at 08:38

## 2023-03-01 RX ADMIN — POTASSIUM CHLORIDE 40 MEQ: 1500 TABLET, EXTENDED RELEASE ORAL at 06:41

## 2023-03-01 RX ADMIN — BACLOFEN 10 MG: 10 TABLET ORAL at 17:57

## 2023-03-01 RX ADMIN — NICARDIPINE HYDROCHLORIDE 7.5 MG/HR: 2.5 INJECTION, SOLUTION INTRAVENOUS at 04:57

## 2023-03-01 RX ADMIN — TICAGRELOR 90 MG: 90 TABLET ORAL at 22:47

## 2023-03-01 RX ADMIN — APIXABAN 2.5 MG: 2.5 TABLET, FILM COATED ORAL at 17:57

## 2023-03-01 RX ADMIN — HYDRALAZINE HYDROCHLORIDE 10 MG: 20 INJECTION, SOLUTION INTRAMUSCULAR; INTRAVENOUS at 15:10

## 2023-03-01 RX ADMIN — ATORVASTATIN CALCIUM 40 MG: 40 TABLET, FILM COATED ORAL at 17:56

## 2023-03-01 RX ADMIN — CHLORHEXIDINE GLUCONATE 0.12% ORAL RINSE 15 ML: 1.2 LIQUID ORAL at 22:47

## 2023-03-01 RX ADMIN — CHLORHEXIDINE GLUCONATE 0.12% ORAL RINSE 15 ML: 1.2 LIQUID ORAL at 08:42

## 2023-03-01 RX ADMIN — ASPIRIN 81 MG CHEWABLE TABLET 81 MG: 81 TABLET CHEWABLE at 08:34

## 2023-03-01 RX ADMIN — BACLOFEN 10 MG: 10 TABLET ORAL at 08:34

## 2023-03-01 RX ADMIN — LIDOCAINE HYDROCHLORIDE 15 ML: 20 SOLUTION ORAL; TOPICAL at 22:47

## 2023-03-01 RX ADMIN — CARVEDILOL 6.25 MG: 6.25 TABLET, FILM COATED ORAL at 17:57

## 2023-03-01 RX ADMIN — NICARDIPINE HYDROCHLORIDE 7.5 MG/HR: 2.5 INJECTION, SOLUTION INTRAVENOUS at 01:32

## 2023-03-01 RX ADMIN — AMLODIPINE BESYLATE 10 MG: 10 TABLET ORAL at 08:34

## 2023-03-01 NOTE — ASSESSMENT & PLAN NOTE
Pt presented with elevated Cr 1 32 on arrival  No known baseline  Stage 2 CKD  Suspect secondary to hypertensive nephrosclerosis      · BMP in 1 week  · Avoid nephrotoxic agents  · Avoid relative hypotension

## 2023-03-01 NOTE — ASSESSMENT & PLAN NOTE
Pt with slightly elevated WBC, stable from day prior  Afebrile, no worsening s/sxs of infection at this time  Suspect likely reactive in setting of recent stroke vs UTI vs COVID  ?  Continue to follow WBC count, monitor fever curve

## 2023-03-01 NOTE — ASSESSMENT & PLAN NOTE
Pt with slightly elevated WBC, stable from day prior  Afebrile, no worsening s/sxs of infection at this time  Suspect likely reactive in setting of recent stroke vs UTI vs COVID  ?  Resolved

## 2023-03-01 NOTE — ASSESSMENT & PLAN NOTE
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 and left vertebral artery origin stenosis with presumed occlusion of the distal left intradural vertebral segment  • MRA head/carotids demonstrated possible extension of the vertebral artery dissection into the basilar artery  • MRI brain revealed several small acute/early subacute bi-cerebellar infarcts (R>L) without evidence of hemorrhage     Patient was transferred to [facility] for closer monitoring with consideration for endovascular intervention  Initially given stable exam, intervention was felt too risky and patient was a heparin gtt with full dose aspirin  However, pt developed worsened dysarthria on 2/26 and R sided weakness  · Repeat CT 2/26 stable, however patient was taken to IR for emergent R vertebral/basilar thrombectomy with R V3/4 stenting with TICI 2B revascularization  • MRI brain 2/26: Increased infarct burden  Bilateral cerebellar infarcts with larger right cerebellar infarct and new acute infarcts in the right medulla, bilateral midbrain, and left occipital lobe      Plan:   · Neurosurgery consulted, recs appreciated  · Neurology consulted, recs appreciated  · Echo: EF 65%, normal wall motion, normal atria size, no PFO   · Lipid panel , , HDL 27,   · A1c 5 7  · q4hr neuro checks at night, q2hr during the day  · Pt transitioned from Integrilin to DAPT on 2/27 with ASA and Brilinta 180 mg load   Continue ASA and Brilinta 90 mg BID  · Check P2Y12 3/3 AM  · Repeat CT following initiation of heparin stable, pt transitioned to Eliquis 2 5 BID on 2/28  · Continue atorvastatin  · SBP goal <160

## 2023-03-01 NOTE — ASSESSMENT & PLAN NOTE
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  • MRI brain revealed several small acute/early subacute bi-cerebellar infarcts (R>L) without evidence of hemorrhage     Patient was transferred to [facility] for closer monitoring with consideration for endovascular intervention   Initially given stable exam, intervention was felt too risky and patient was a heparin gtt with full dose aspirin  However, pt developed worsened dysarthria on 2/26 and R sided weakness     • Repeat CT 2/26 stable, however patient was taken to IR for emergent R vertebral/basilar thrombectomy with R V3/4 stenting with TICI 2B revascularization  • MRI brain 2/26: Increased infarct burden   Bilateral cerebellar infarcts with larger right cerebellar infarct and new acute infarcts in the right medulla, bilateral midbrain, and left occipital lobe    Lipid panel , , HDL 27,   A1c 5 7  3/3: P2y12 102  Echo: EF 65%, normal wall motion, normal atria size, no PFO     Plan:   • Follow-up with neurosurgery and neurology outpatient  • Recommend repeat CTA H/N 2 weeks around 3/17  • Continue ASA and Brilinta 90 mg BID  • Continue eliquis 2 5 mg bid  • Continue atorvastatin  • Continue antihypertensives with goal of normotension

## 2023-03-01 NOTE — PROGRESS NOTES
Progress Note - Neurology   Candace Rodriguez 60 y o  male [id number]  Unit/Bed#: ICU 09/ICU 09    Assessment/Plan:    Acute Posterior Circulation Stroke  Assessment & Plan  60 y o  male with no reported significant past medical history (although patient does not follow with a physician) who presented to [facility] on 2/25/2023 with complaint of several days of weakness, dizziness, and nausea/vomiting  · CT head revealed acute/subacute infarcts in the right posterior cerebellum  · CTA head/neck revealed 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 and left vertebral artery origin stenosis with presumed occlusion of the distal left intradural vertebral segment  · MRA head/carotids demonstrated possible extension of the vertebral artery dissection into the basilar artery  · MRI brain revealed several small acute/early subacute bi-cerebellar infarcts (R>L) without evidence of hemorrhage  · , Cholesterol 163, A1C 5 7  · COVID positive    Patient was transferred to [facility] for closer monitoring with consideration for endovascular intervention  Initially given stable exam, intervention was felt too risky and patient was on a heparin gtt with full dose aspirin  However on 2/26, patient had worsened dysarthria and right sided weakness  Stroke alert activated:  · CT head negative for intracranial hemorrhage and stable bilateral cerebellar infarcts  · Patient was taken to IR for emergent R vertebral/basilar thrombectomy with R V3/4 stenting with TICI 2B revascularization  · Patient was started on Integrilin gtt and repeat CT head negative for hemorrhage  · MRI brain 2/26: Increased infarct burden    Bilateral cerebellar infarcts with larger right cerebellar infarct and new acute infarcts in the right medulla, bilateral midbrain, and left occipital lobe  · Echo: EF 65%, normal wall motion, normal atria size, no PFO    On exam, patient has dysarthria, right facial droop, and right hemiparesis  Etiology for acute infarcts in the setting of bilateral vertebral artery occlusions remain unclear, however likely due to dissection vs chronic diffuse atherosclerotic disease  Possible contribution from underlying COVID infection and hypercoagulability  No recent head trauma, strenuous physical activity, neck manipulation, coughing/sneezing, etc     Plan:  - Stroke pathway  • Transitioned from Integrilin gtt to DAPT on 2/27 with aspirin 81 mg daily and Brilinta 90 mg BID (180 mg load once)  • Heparin gtt discontinued and transitioned to Eliquis 2 5 mg BID on 2/28  • Atorvastatin 40 mg daily (statin naive)  • Normotensive goal per neurosurgery with SBP <160; avoid hypotension  o Currently on Cardene gtt  • Euglycemic, normothermic goal  • Continue telemetry  • PT/OT/ST  • Stroke education  • Frequent neuro checks  Continue to monitor and notify neurology with any changes  • STAT CT head for any acute change in neuro exam  - Continue Baclofen 10 mg BID (started on 2/28 with improvement in right LE spasms/myoclonus; can only do BID dosing due to renal function)  - Patient is tearful throughout the exam, but does not want to start an antidepressant at this time  Continue to closely monitor mood  - Medical management and supportive care per primary team  Correction of any metabolic or infectious disturbances  Janeencandida Dudley will need follow up in in 6 weeks with neurovascular attending or AP  He will not require outpatient neurological testing  Subjective:   Patient seen and examined at bedside  Patient continues to be tearful throughout the exam   He is upset regarding his deficits, but states that he does not want to try and medications at this time  Continues to have intermittent muscle spasms in the proximal right LE, but significantly better since starting baclofen  History reviewed  No pertinent past medical history  History reviewed  No pertinent surgical history    History reviewed  No pertinent family history  Social History     Socioeconomic History   • Marital status: /Civil Union     Spouse name: None   • Number of children: None   • Years of education: None   • Highest education level: None   Occupational History   • None   Tobacco Use   • Smoking status: Never   • Smokeless tobacco: Never   Substance and Sexual Activity   • Alcohol use: Not Currently   • Drug use: Not Currently   • Sexual activity: None   Other Topics Concern   • None   Social History Narrative   • None     Social Determinants of Health     Financial Resource Strain: Not on file   Food Insecurity: Not on file   Transportation Needs: Not on file   Physical Activity: Not on file   Stress: Not on file   Social Connections: Not on file   Intimate Partner Violence: Not on file   Housing Stability: Not on file     E-Cigarette/Vaping     E-Cigarette/Vaping Substances         Medications:   All current active meds have been reviewed and current meds:  Scheduled Meds:  Current Facility-Administered Medications   Medication Dose Route Frequency Provider Last Rate   • amLODIPine  10 mg Oral Daily Ag Terry MD     • apixaban  2 5 mg Oral BID Luan Herrera PA-C     • aspirin  81 mg Oral Daily Breezy Carrillo MD     • atorvastatin  40 mg Oral Daily With DO Efrem     • baclofen  10 mg Oral TID Kade Caruso PA-C     • carvedilol  6 25 mg Oral BID With Meals Ag Terry MD     • chlorhexidine  15 mL Mouth/Throat Q12H 10 [facility] PADavidaC     • hydrALAZINE  10 mg Intravenous Q4H PRN Ag Terry MD     • lisinopril  5 mg Oral Daily Joseph Sotelo MD     • niCARdipine  1-15 mg/hr Intravenous Titrated Addie Mera PA-C 7 5 mg/hr (03/01/23 4226)   • polyethylene glycol  17 g Oral Daily Ag Terry MD     • senna-docusate sodium  1 tablet Oral HS Ag Terry MD     • ticagrelor  90 mg Oral Q12H Jennifer Dugan MD       Continuous Infusions:niCARdipine, 1-15 mg/hr, Last Rate: 7 5 mg/hr (03/01/23 7361)      PRN Meds: •  hydrALAZINE       ROS:   Review of Systems   Neurological: Positive for speech difficulty, weakness and numbness  Negative for headaches  Psychiatric/Behavioral: Positive for dysphoric mood  Vitals:   /62   Pulse 66   Temp 97 6 °F (36 4 °C) (Oral)   Resp 19   Ht 5' 9" (1 753 m)   Wt 86 7 kg (191 lb 2 2 oz)   SpO2 98%   BMI 28 23 kg/m²     Physical Exam:   Physical Exam  Vitals and nursing note reviewed  Constitutional:       Appearance: Normal appearance  Comments: Patient lying comfortably in bed  Frequently tearful throughout the exam   HENT:      Head: Normocephalic and atraumatic  Mouth/Throat:      Mouth: Mucous membranes are moist       Pharynx: Oropharynx is clear  Eyes:      Extraocular Movements: Extraocular movements intact  Conjunctiva/sclera: Conjunctivae normal       Pupils: Pupils are equal, round, and reactive to light  Pulmonary:      Effort: Pulmonary effort is normal    Musculoskeletal:      Comments: Right sided hemiparesis (UE is weaker compared to LE)   Skin:     General: Skin is warm and dry  Neurological:      Mental Status: He is alert and oriented to person, place, and time  Deep Tendon Reflexes:      Reflex Scores:       Bicep reflexes are 3+ on the right side and 3+ on the left side  Brachioradialis reflexes are 3+ on the right side and 3+ on the left side  Patellar reflexes are 3+ (cross adductor and suprapatellar reflex present) on the right side and 3+ (cross adductor and suprapatellar reflex present) on the left side  Achilles reflexes are 4+ on the right side and 3+ on the left side  Comments: See full neuro exam below       Neurologic Exam     Mental Status   Oriented to person, place, and time  Patient awake and alert  Oriented to person, place, month, and year  Mild dysarthria, but continues to improve    Speech is intelligible  No aphasia  Able to follow simple midline and appendicular commands  Cranial Nerves     CN III, IV, VI   Pupils are equal, round, and reactive to light  Pupils 3 mm, round, reactive to light bilaterally  EOMs intact without nystagmus  No visual field deficits  Facial sensation to light touch diminished in the right V1-V3 distribution  Right central facial weakness     Motor Exam   Muscle bulk: normal  Low tone in the right UE  Increased tone/spasticity in the right LE  0/5 right deltoid  2/5 right biceps  0/5 right triceps  Flicker of movement in the right thumb  Antigravity in the right LE (3/5 right iliopsoas)  2-3/5 right knee flexion  2/5 right knee extension  Flicker of movement in the right great toe   0/5 right dorsiflexion/plantarflexion  5/5 left UE/LE     Sensory Exam   Sensation to light touch decreased in the right UE/LE  Sensation to temperature diminished in the left UE/LE     Gait, Coordination, and Reflexes     Gait  Gait: (deferred for patient's safety)    Reflexes   Right brachioradialis: 3+  Left brachioradialis: 3+  Right biceps: 3+  Left biceps: 3+  Right patellar: 3+ (cross adductor and suprapatellar reflex present)  Left patellar: 3+ (cross adductor and suprapatellar reflex present)  Right achilles: 4+  Left achilles: 3+  Unable to assess coordination in the right UE due to weakness  No resting or action tremor  Several beats of clonus in the right ankle  No clonus in the left ankle  Upgoing right toe  Downgoing left toe  Labs: I have personally reviewed pertinent reports     Recent Results (from the past 24 hour(s))   CBC and Platelet    Collection Time: 03/01/23  4:40 AM   Result Value Ref Range    WBC 12 16 (H) 4 31 - 10 16 Thousand/uL    RBC 3 70 (L) 3 88 - 5 62 Million/uL    Hemoglobin 10 5 (L) 12 0 - 17 0 g/dL    Hematocrit 32 4 (L) 36 5 - 49 3 %    MCV 88 82 - 98 fL    MCH 28 4 26 8 - 34 3 pg    MCHC 32 4 31 4 - 37 4 g/dL    RDW 13 0 11 6 - 15 1 % Platelets 758 028 - 538 Thousands/uL    MPV 9 5 8 9 - 12 7 fL   Basic metabolic panel    Collection Time: 03/01/23  4:40 AM   Result Value Ref Range    Sodium 145 135 - 147 mmol/L    Potassium 3 7 3 5 - 5 3 mmol/L    Chloride 116 (H) 96 - 108 mmol/L    CO2 23 21 - 32 mmol/L    ANION GAP 6 4 - 13 mmol/L    BUN 23 5 - 25 mg/dL    Creatinine 1 23 0 60 - 1 30 mg/dL    Glucose 111 65 - 140 mg/dL    Calcium 8 5 8 3 - 10 1 mg/dL    eGFR 62 ml/min/1 73sq m       Imaging: I have personally reviewed pertinent imaging in PACS, including MRI brain, CT head, Echo, CTA head/neck,  and I have personally reviewed PACS reports  EKG, Pathology, and Other Studies: I have personally reviewed pertinent reports  VTE Prophylaxis: Sequential compression device (Venodyne) and Eliquis      Counseling / Coordination of Care  I have spent a total time of 38 minutes on 03/01/23 in caring for this patient including Diagnostic results, Prognosis, Risks and benefits of tx options, Instructions for management, Patient and family education, Importance of tx compliance, Risk factor reductions, Impressions, Counseling / Coordination of care, Documenting in the medical record, Reviewing / ordering tests, medicine, procedures  , Obtaining or reviewing history   and Communicating with other healthcare professionals

## 2023-03-01 NOTE — ASSESSMENT & PLAN NOTE
Pt with new RLE spasticity and myoclonus   Unlikely focal seizures per neurology       • Continue baclofen 10 mg BID, frequency adjusted per pharmacy given renal function

## 2023-03-01 NOTE — ASSESSMENT & PLAN NOTE
Urine cx with pan-susceptible E coli  Pt completed 4-day course of CTX as of 2/28       Plan:  · Monitor off abx

## 2023-03-01 NOTE — ASSESSMENT & PLAN NOTE
Pt with persistent tearfulness, depressed mood following recent stroke  Pt does not want any intervention for this at this time  Continue to monitor mood     Consider outpatient counseling, SSRI if pt willing

## 2023-03-01 NOTE — SPEECH THERAPY NOTE
Speech Language/Pathology  Doctors speaking w/ pt in his room, will return as able  Ideally will follow w/ VBS when COVID precautions lifted  Continue diet for now

## 2023-03-01 NOTE — PROGRESS NOTES
Transfer Note - Critical Care   David Lugo 60 y o  male MRN: [id number]  Unit/Bed#: ICU 09 Encounter: [id number]    Code Status: Level 1 - Full Code  POA:    POLST:      Reason for ICU admission:   Acute posterior circulation stroke with R cervical vertebral occlusion/dissection    Active problems:   Principal Problem:    Right Vertebral artery dissection (HCC)  Active Problems:    Acute Posterior Circulation Stroke    Stenosis of left vertebral artery    Hypertensive emergency    COVID    UTI (urinary tract infection)    Dysphagia    Spasticity    Sinus tachycardia    Elevated serum creatinine    Leukocytosis    Prediabetes    Tearfulness  Resolved Problems:    * No resolved hospital problems  *    Acute Posterior Circulation Stroke  Assessment & Plan  60 y o  male with no known prior hx, did not follow with a physician  Presented to [address] [place] [address] Road 2/25/2023 of several days of weakness, dizziness, and nausea/vomiting  CT head revealed acute/subacute infarcts in the right posterior cerebellum with follow up CTA head/neck demonstrating 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 and left vertebral artery origin stenosis with presumed occlusion of the distal left intradural vertebral segment  • MRA head/carotids demonstrated possible extension of the vertebral artery dissection into the basilar artery  • MRI brain revealed several small acute/early subacute bi-cerebellar infarcts (R>L) without evidence of hemorrhage     [facility] closer monitoring with consideration for endovascular intervention  Initially given stable exam, intervention was felt too risky and patient was a heparin gtt with full dose aspirin  However, pt developed worsened dysarthria on 2/26 and R sided weakness     · Repeat CT 2/26 stable, however patient was taken to IR for emergent R vertebral/basilar thrombectomy with R V3/4 stenting with TICI 2B revascularization  • MRI brain 2/26: Increased infarct burden  Bilateral cerebellar infarcts with larger right cerebellar infarct and new acute infarcts in the right medulla, bilateral midbrain, and left occipital lobe      Plan:   · Neurosurgery consulted, recs appreciated  · Neurology consulted, recs appreciated  · Echo: EF 65%, normal wall motion, normal atria size, no PFO   · Lipid panel , , HDL 27,   · A1c 5 7  · q4hr neuro checks at night, q2hr during the day  · Pt transitioned from Integrilin to DAPT on 2/27 with ASA and Brilinta 180 mg load  Continue ASA and Brilinta 90 mg BID  · Check P2Y12 3/3 AM  · Repeat CT following initiation of heparin stable, pt transitioned to Eliquis 2 5 BID on 2/28  · Continue atorvastatin  · SBP goal <160    * Right Vertebral artery dissection [facility])  Assessment & Plan  See acute posterior circulation plan    Stenosis of left vertebral artery  Assessment & Plan  See acute posterior circulation stroke plan    Hypertensive emergency  Assessment & Plan  Pt initially presented with hypertensive emergency in setting of stroke as mentioned above  Plan:  · Pt on cardene drip in the ICU  · Continue Amlodipine 10 mg qd, Coreg 6 25 BID  Increase lisinopril today from 5 mg to 10 mg now off cardene  · SBP goal <160  · Continue PRNs antihypertensive with PRN labetalol    COVID  Assessment & Plan  Pt asx, remained on room air during hospitalization  · Continue airborne and contact precautions    UTI (urinary tract infection)  Assessment & Plan  Urine cx with pan-susceptible E coli  Pt completed 4-day course of CTX as of 2/28  Plan:  · Monitor off abx    Tearfulness  Assessment & Plan  Pt with persistent tearfulness, depressed mood following recent stroke  Pt does not want any intervention for this at this time  Continue to monitor mood  Consider outpatient counseling, SSRI if pt willing    Prediabetes  Assessment & Plan  ? a1c 5 7  ? Glucose 110-127  ?  Diet and lifestyle modifications    Leukocytosis  Assessment & Plan  Pt with slightly elevated WBC, stable from day prior  Afebrile, no worsening s/sxs of infection at this time  Suspect likely reactive in setting of recent stroke vs UTI vs COVID  ? Continue to follow WBC count, monitor fever curve    Elevated serum creatinine  Assessment & Plan  Pt presented with elevated Cr 1 32 on arrival, now improved  No known baseline  ? Continue to follow BMP  ? No significant increase in Cr with lisinopril  ? Avoid nephrotoxic agents    Sinus tachycardia  Assessment & Plan   pt with intermittent episodes of brief sinus tachycardia  ? Continue potassium goal >4  ? Continue tele    Spasticity  Assessment & Plan  Pt with new RLE spasticity and myoclonus  Unlikely focal seizures per neurology  · Continue baclofen 10 mg BID, frequency adjusted per pharmacy given renal function    Dysphagia  Assessment & Plan  Pt with new dysphagia s/p stroke  ? Continue dysphagia 3 diet, nectar thick liquids per speech eval          Consultants:   Neurology, neurosurgery, PMR    HPI and clinical course:   Michelle Raza is a 60 y o  male with no known PMH who presented to [facility] ED 2/25 with 2-day history of dizziness and nausea  Patient found to have hypertensive emergency, CT subacute small right cerebellar stroke  CTA consistent with distal right cervical vertebral artery dissection and proximal right intradural vertebral artery occlusion  Pt also noted to have pansusceptible E coli UTI now s/p abx and asymptomatic COVID  Pt initially transferred to [facility] ICU for potential endovascular intervention, however given risk of procedure pt was instead placed on a heparin drip with full dose aspirin  Pt subsequently developed worsening R sided weakness and dysarthria   A stroke alert was called, repeat CTH stable, however given new sxs pt was taken to the OR for emergenct right vertebral/basilar thrombectomy with stenting of the V3 and V4 with TICI post 2B revascularization  Pt required cardene for BP management while in the ICU  Pt was resumed on heparin and subsequently transitioned to Eliquis 2 5 BID on 2/28 following stable head imaging  Pt was evaluated by PT/OT who recommended post acute rehab, PMR consult  Referrals placed to [facility] per CM  Of note, pt noted to be particular tearful regarding his new deficits, currently denying any intervention for depressed mood  Please see above assessment and plan for further information  Recent or scheduled procedures:   none     Outstanding/pending diagnostics:   AM labs, P2Y12 on 3/3     Cultures:   Blood cx with NGTD  Urine cx with pansusceptible UTI       Mobilization Plan:   Post acute rehab, OOB as tolerated    Nutrition Plan:   Dysphagia 3 dental soft diet     VTE Pharmacologic Prophylaxis: Apixaban (Eliquis)  VTE Mechanical Prophylaxis: sequential compression device    Discharge Plan:   Patient should be ready for discharge to post acute rehab once medically stable, pending continued BP control on oral regimen    Initial Physical Therapy Recommendations: post acute rehab, PMR consult  Initial Occupational Therapy Recommendations: post acute rehab  Initial /Plan: post acute rehab, referrals placed to [address] [place] [address] Drive medications that are not reordered and reason why:   None      Spoke with [facility] admitting resident on call, Dr Reich  regarding transfer  Please TT ICU resident on call with any questions or concerns  Portions of the record may have been created with voice recognition software  Occasional wrong word or "sound a like" substitutions may have occurred due to the inherent limitations of voice recognition software  Read the chart carefully and recognize, using context, where substitutions have occurred

## 2023-03-01 NOTE — ASSESSMENT & PLAN NOTE
pt with intermittent episodes of brief sinus tachycardia  ? Continue potassium goal >4  ?  Continue tele

## 2023-03-01 NOTE — ASSESSMENT & PLAN NOTE
Pt initially presented with hypertensive emergency in setting of stroke as mentioned above  Pt on cardene drip in the ICU, now discontinued on 3/1      Blood Pressure: 155/71      Plan:  • Continue Amlodipine 10 mg qd  • Continue Coreg 6 25 BID  • Continue Lisinopril 20 mg daily  • Start chlorthalidone 12 5 mg daily  • SBP goal normotension   • Continue PRNs antihypertensive with PRN hydralazine   • BMP in 1 week

## 2023-03-01 NOTE — ASSESSMENT & PLAN NOTE
Urine cx with pan-susceptible E coli   Pt completed 4-day course of CTX as of 2/28       Plan:  • S/p treatment

## 2023-03-01 NOTE — ASSESSMENT & PLAN NOTE
Pt initially presented with hypertensive emergency in setting of stroke as mentioned above  Plan:  · Pt on cardene drip in the ICU  · Continue Amlodipine 10 mg qd, Coreg 6 25 BID  Increase lisinopril today from 5 mg to 10 mg now off cardene     · SBP goal <160  · Continue PRNs antihypertensive with PRN labetalol

## 2023-03-01 NOTE — ASSESSMENT & PLAN NOTE
Pt with persistent tearfulness, depressed mood following recent stroke  3/3 discussed starting SSRI with patient, he is agreeable to trying  Expressed understanding this may take several weeks to notice any benefit  Continue Prozac 20 mg daily - discussed with neurology  Continue to monitor mood  Will need to establish with PCP at discharge

## 2023-03-01 NOTE — PLAN OF CARE
Problem: Prexisting or High Potential for Compromised Skin Integrity  Goal: Skin integrity is maintained or improved  Description: INTERVENTIONS:  - Identify patients at risk for skin breakdown  - Assess and monitor skin integrity  - Assess and monitor nutrition and hydration status  - Monitor labs   - Assess for incontinence   - Turn and reposition patient  - Assist with mobility/ambulation  - Relieve pressure over bony prominences  - Avoid friction and shearing  - Provide appropriate hygiene as needed including keeping skin clean and dry  - Evaluate need for skin moisturizer/barrier cream  - Collaborate with interdisciplinary team   - Patient/family teaching  - Consider wound care consult   Outcome: Progressing     Problem: MOBILITY - ADULT  Goal: Maintain or return to baseline ADL function  Description: INTERVENTIONS:  -  Assess patient's ability to carry out ADLs; assess patient's baseline for ADL function and identify physical deficits which impact ability to perform ADLs (bathing, care of mouth/teeth, toileting, grooming, dressing, etc )  - Assess/evaluate cause of self-care deficits   - Assess range of motion  - Assess patient's mobility; develop plan if impaired  - Assess patient's need for assistive devices and provide as appropriate  - Encourage maximum independence but intervene and supervise when necessary  - Involve family in performance of ADLs  - Assess for home care needs following discharge   - Consider OT consult to assist with ADL evaluation and planning for discharge  - Provide patient education as appropriate  Outcome: Progressing  Goal: Maintains/Returns to pre admission functional level  Description: INTERVENTIONS:  - Perform BMAT or MOVE assessment daily    - Set and communicate daily mobility goal to care team and patient/family/caregiver     - Collaborate with rehabilitation services on mobility goals if consulted  Problem: SAFETY ADULT  Goal: Maintain or return to baseline ADL function  Description: INTERVENTIONS:  -  Assess patient's ability to carry out ADLs; assess patient's baseline for ADL function and identify physical deficits which impact ability to perform ADLs (bathing, care of mouth/teeth, toileting, grooming, dressing, etc )  - Assess/evaluate cause of self-care deficits   - Assess range of motion  - Assess patient's mobility; develop plan if impaired  - Assess patient's need for assistive devices and provide as appropriate  - Encourage maximum independence but intervene and supervise when necessary  - Involve family in performance of ADLs  - Assess for home care needs following discharge   - Consider OT consult to assist with ADL evaluation and planning for discharge  - Provide patient education as appropriate  Outcome: Progressing  Goal: Maintains/Returns to pre admission functional level  Description: INTERVENTIONS:  - Perform BMAT or MOVE assessment daily    - Set and communicate daily mobility goal to care team and patient/family/caregiver     - Collaborate with rehabilitation services on mobility goals if consulted  - Out of bed for toileting  - Record patient progress and toleration of activity level   Outcome: Progressing     - Out of bed for toileting  - Record patient progress and toleration of activity level   Outcome: Progressing

## 2023-03-01 NOTE — PROGRESS NOTES
Daily Progress Note - Critical Care   Kong Hathaway 60 y o  male MRN: [id number]  Unit/Bed#: ICU 09 Encounter: [id number]    ----------------------------------------------------------------------------------------  HPI: Kong Hathaway is a 60 y o  male with no known past medical history, originally presented to [address] ED on 2/25 with 2-day history of dizziness and nausea, found to have small right cerebellar stroke, right cervical vertebral artery dissection and proximal right intradural vertebral artery occlusion  Patient also noted to have UTI and asymptomatic COVID  Symptoms worsened in ICU after admission with new right-sided weakness and worsening dysarthria  He was taken emergently to  and underwent a right vertebral/basilar thrombectomy  Patient had a right V4 angioplasty and stenting with WILLIAM and V3 stenting of dissection on 2/26/2023     24h events: Pt was on cardene 7 5 throughout the night with SBPs largely in the 120s-130s  Patient appropriate for transfer out of the ICU today?: No Anticipate pt to be appropriate once cardene drip discontinued  Disposition: Continue Critical Care   Code Status: Level 1 - Full Code  ---------------------------------------------------------------------------------------  SUBJECTIVE  Pt states he feels as though his speech might be a little better  He states R leg spasticity and jerking movement improved with baclofen  He denies any change in his weakness or sensation  He states he has been eating and drinking more since yesterday  He has not had a BM since being hospitalized  He denies any headache, change in vision, abdominal pain, abd bloating, N/V, f/c, pain or any other concerns  Drips: niCARdipine, 1-15 mg/hr, Last Rate: 7 5 mg/hr (03/01/23 0457)      Lines: peripheral IV x2, barnett catheter x2 days    Review of Systems   Constitutional: Negative for chills and fever  Eyes: Negative for pain and visual disturbance     Respiratory: Negative for cough and shortness of breath  Cardiovascular: Negative for chest pain and palpitations  Gastrointestinal: Negative for abdominal pain, nausea and vomiting  Musculoskeletal: Negative for arthralgias and back pain  Skin: Negative for color change and rash  Neurological: Positive for speech difficulty, weakness and numbness  Negative for headaches  All other systems reviewed and are negative  Review of systems was reviewed and negative unless stated above in HPI/24-hour events   ---------------------------------------------------------------------------------------  OBJECTIVE    Vitals   Temp:  [97 5 °F (36 4 °C)-98 6 °F (37 °C)] 97 6 °F (36 4 °C)  HR:  [62-86] 66  Resp:  [10-29] 19  BP: (117-188)/(52-73) 145/62  SpO2:  [98 %-100 %] 98 %      Drips  niCARdipine, 1-15 mg/hr, Last Rate: 7 5 mg/hr (03/01/23 0457)        In/Out:   I/O last 24 hours: In: 2368 [P O :920; I V :1448]  Out: 2540 [Urine:2540]      Intake/Output Summary (Last 24 hours) at 3/1/2023 0714  Last data filed at 3/1/2023 0600  Gross per 24 hour   Intake 2368 01 ml   Output 2540 ml   Net -171 99 ml         Respiratory:    Invasive/non-invasive ventilation settings   Respiratory    Lab Data (Last 4 hours)    None         O2/Vent Data (Last 4 hours)    None                Physical Exam  Vitals and nursing note reviewed  Constitutional:       General: He is not in acute distress  Appearance: He is well-developed  HENT:      Head: Normocephalic and atraumatic  Eyes:      General: No scleral icterus  Extraocular Movements: Extraocular movements intact  Conjunctiva/sclera: Conjunctivae normal       Pupils: Pupils are equal, round, and reactive to light  Cardiovascular:      Rate and Rhythm: Normal rate and regular rhythm  Pulmonary:      Effort: Pulmonary effort is normal  No respiratory distress  Breath sounds: Normal breath sounds  Abdominal:      General: Abdomen is flat  There is no distension  Musculoskeletal:         General: No swelling  Cervical back: Neck supple  Right lower leg: No edema  Left lower leg: No edema  Skin:     General: Skin is warm and dry  Capillary Refill: Capillary refill takes less than 2 seconds  Neurological:      Mental Status: He is alert and oriented to person, place, and time  GCS: GCS eye subscore is 4  GCS verbal subscore is 5  GCS motor subscore is 6  Cranial Nerves: Dysarthria (mild) and facial asymmetry (L sided facial droop) present  Sensory: Sensory deficit (R face, RUE, RLE) present  Motor: Weakness (RUE 0/5, R hip flexion 2/5, R knee extension/flexion 2/5, R ankle extension and flexion 0/5  Full stregnth L side) present  Comments: Answers questions and follows all commands appropriately      Psychiatric:         Mood and Affect: Mood and affect normal          Laboratory and Diagnostics:  Results from last 7 days   Lab Units 03/01/23  0440 02/28/23  0209 02/27/23  1003 02/26/23  0506 02/26/23  0021 02/25/23  0940   WBC Thousand/uL 12 16* 12 50* 10 26* 13 27* 17 07* 9 99   HEMOGLOBIN g/dL 10 5* 11 0* 10 8* 13 3 13 6 14 4   HEMATOCRIT % 32 4* 35 1* 33 3* 40 2 40 8 44 0   PLATELETS Thousands/uL 277 263 268 349 344 326   NEUTROS PCT %  --   --   --  89*  --  88*   MONOS PCT %  --   --   --  4  --  4     Results from last 7 days   Lab Units 03/01/23  0440 02/28/23  0209 02/27/23  0429 02/26/23  0506 02/25/23  2044 02/25/23  0940   SODIUM mmol/L 145 147 147 143 141 142   POTASSIUM mmol/L 3 7 3 5 3 1* 3 4* 3 3* 3 5   CHLORIDE mmol/L 116* 118* 120* 115* 109* 108   CO2 mmol/L 23 22 21 21 24 24   ANION GAP mmol/L 6 7 6 7 8 10   BUN mg/dL 23 18 15 17 18 23   CREATININE mg/dL 1 23 1 27 1 05 1 09 1 15 1 32*   CALCIUM mg/dL 8 5 8 7 8 1* 8 8 8 4 8 8   GLUCOSE RANDOM mg/dL 111 124 114 127 110 124   ALT U/L  --   --   --  24  --  19   AST U/L  --   --   --  13  --  16   ALK PHOS U/L  --   --   --  63  --  55   ALBUMIN g/dL  --   -- --  3 2*  --  3 8   TOTAL BILIRUBIN mg/dL  --   --   --  0 68  --  0 60     Results from last 7 days   Lab Units 02/28/23  0209 02/27/23  0429   MAGNESIUM mg/dL 2 3 2 1   PHOSPHORUS mg/dL 2 3  --       Results from last 7 days   Lab Units 02/28/23  0208 02/27/23  2100 02/27/23  1548 02/27/23  1003 02/26/23  1133 02/26/23  0506 02/26/23  0021 02/25/23  0940   INR   --   --   --  1 05  --   --  1 06 0 98   PTT seconds 78* 40* 32 31 69* 33 31 29          Results from last 7 days   Lab Units 02/25/23  2044   LACTIC ACID mmol/L 1 2     ABG:    VBG:    Results from last 7 days   Lab Units 02/26/23  0506 02/25/23  2044   PROCALCITONIN ng/ml 0 08 <0 05       Micro:   Results from last 7 days   Lab Units 02/27/23  1002 02/25/23  2044 02/25/23  1155   BLOOD CULTURE  No Growth at 24 hrs  No Growth at 24 hrs  No Growth at 48 hrs  No Growth at 48 hrs  --    URINE CULTURE   --   --  >100,000 cfu/ml Escherichia coli*       EKG: no new EKG in the past 24 hours  Imaging: I have personally reviewed pertinent reports  Invasive Devices     Peripheral Intravenous Line  Duration           Peripheral IV 02/26/23 Left;Ventral (anterior) Forearm 2 days    Peripheral IV 02/27/23 Right;Upper;Ventral (anterior) Arm 1 day          Drain  Duration           Urethral Catheter Temperature probe 2 days                Height and Weights:    Height: 5' 9" (175 3 cm)  IBW (Ideal Body Weight): 70 7 kg  Body mass index is 28 23 kg/m²  Weight (last 2 days)     Date/Time Weight    03/01/23 0600 86 7 (191 14)    02/27/23 0904 78 5 (173)    02/27/23 0600 78 9 (173 94)            Nutrition:        Diet Orders   (From admission, onward)             Start     Ordered    02/27/23 1310  Diet Dysphagia/Modified Consistency;  Dysphagia 3-Dental Soft; Nectar Thick Liquid  Diet effective now        References:    Nutrtion Support Algorithm Enteral vs  Parenteral   Question Answer Comment   Diet Type Dysphagia/Modified Consistency    Dysphagia/Modified Consistency Dysphagia 3-Dental Soft    Liquid Modifier Nectar Thick Liquid    RD to adjust diet per protocol? Yes        02/27/23 1310                Active Medications:  Scheduled Meds:  Current Facility-Administered Medications   Medication Dose Route Frequency Provider Last Rate   • amLODIPine  10 mg Oral Daily Jenifer Juan MD     • apixaban  2 5 mg Oral BID Bebe Chery PA-C     • aspirin  81 mg Oral Daily Bo Seaman MD     • atorvastatin  40 mg Oral Daily With DO Efrem     • baclofen  10 mg Oral TID Kiana Giron PA-C     • carvedilol  6 25 mg Oral BID With Meals Jenifer Juan MD     • chlorhexidine  15 mL Mouth/Throat Q12H [address]     • hydrALAZINE  10 mg Intravenous Q4H PRN Jenifer Juan MD     • lisinopril  5 mg Oral Daily Joseph Sotelo MD     • niCARdipine  1-15 mg/hr Intravenous Titrated He Patel PA-C 7 5 mg/hr (03/01/23 1856)   • polyethylene glycol  17 g Oral Daily Jenifer Juan MD     • senna-docusate sodium  1 tablet Oral HS Jenifer Juan MD     • ticagrelor  90 mg Oral Q12H Mariely Franklin MD       PRN Meds:   hydrALAZINE, 10 mg, Q4H PRN          Problem List:   Patient Active Problem List   Diagnosis   • BRAULIO (acute kidney injury) ([facility] [address] )   • COVID   • Acute Posterior Circulation Stroke   • Right Vertebral artery dissection ([facility] [address] )   • Stenosis of left vertebral artery   • Hypertensive emergency   • Dizziness   • UTI (urinary tract infection)   • Dysphagia   • Spasticity       Assessment/Plan:     Neuro:   • Diagnosis: Acute posterior circulation stroke R/L cerebellum/braintem/left occipital lobe in setting of vertebral artery dissection  o Plan:  ? Neurosurgery and neurology consulted, recs appreciated  ? MRI 2/26-  acute/recent posterior circulation infarctions with increasing right cerebellar and new brainstem infarctions  ?  2/26 pt underwent right vertebral/basilar thrombectomy with stenting of the V3 and V4 with TICI post 2B flow  ? Echo: EF 65%, normal wall motion, normal atria size, no PFO   ? Lipid panel , , HDL 27,   ? q2hr neuro exams during the day, q4hr at night  ? Continue ASA 81 mg qd, Brilinta 90 mg BID  ? Check P2Y12 3/3 AM  · Repeat CT following initiation of heparin stable, pt transitioned from heparin gtt to Eliquis 2 5 BID on 2/28  · Continue atorvastatin  · SBP goal <160  • Diagnosis: R vertebral artery dissection, stenosis of left vertebral artery  o Plan: NRS consulted, recs appreciated  See above plan  CV:   • Diagnosis: Hypertensive emergency, HTN  o Plan:   · Pt continues on cardene drip, attempt to wean today  · Continue Amlodipine 10 mg qd, Lisinopril 5 mg qd, Coreg 6 25 BID  · SBP goal <160  · Continue PRN hydralazine, add PRN labetalol if HR permits  • Diagnosis: Hyperlipidemia  ? Plan: Lipid panel , , HDL 27,   ? Continue atorvastatin 40 mg qd  • Diagnosis: sinus tachycardia  o Plan: pt with intermittent episodes of brief sinus tachycardia  o Continue potassium goal >4  o Continue tele      Pulm:  • Diagnosis: saturating well on RA, see COVID plan below  No other acute concerns  GI:   • Dysphagia  o Continue dysphagia 3 diet, nectar thick liquids per speech eval  • Bowel regimen  o Plan: pt without BM since admission  Start bowel regimen with senokot, miralax      :   • Diagnosis: UTI  o Plan: Urine cx with pan-susceptible E coli  Pt completed 4-day course of CTX as of 2/28    o Monitor off abx  • Diagnosis: Elevated Cr in setting of CKD2  o Plan: pt presented with elevated Cr 1 32 on arrival, now improved  No known baseline  o Continue to follow BMP  · Cornelius catheter in place    F/E/N:   • Plan:   ? F: not on IVF  ? E: replete electrolytes as necessary to maintain K >4, Mg >2, phos >3   K today 3 7   Will replete with Kdur 40 meq x1 today     ? N:  Continue dysphagia 3, nectar thick liquid diet       Heme/Onc: • Diagnosis: leukocytosis  ? Plan: slightly elevated, stable from day prior  Afebrile, no worsening s/sxs of infection at this time  Suspect likely reactive in setting of recent stroke vs UTI vs COVID  ? Continue to follow WBC count, monitor fever curve      Endo:   • Diagnosis: Prediabetes  o Plan: a1c 5 7  o Glucose 110-127  o Diet and lifestyle modifications      ID:   • Diagnosis: Incidentally noted COVID+ status  ? Plan: pt remains asx  ? Continue supportive care  ? No tx indicated  ? Currently tolerating RA   ? Contact and airborne precautions  • Diagnosis: E Coli UTI   ? Plan: completed CTX as of 2/28  ? Monitor off abx      MSK/Skin:   • Diagnosis: spasticity, RLE myoclonus  o Plan:   - Continue baclofen 10 BID, renal adjusted dose  • Diagnosis: ambulatory dysfunction 2/2 recent stroke  o Plan: PT/OT recommended post acute rehab  o PMR consulted, pt likely a candidate for acute inpatient rehab once medically stable  o CM sent referrals, family's first choice is [facility], currently pending review by [facility] physician      Disposition: Continue Critical Care   Code Status: Level 1 - Full Code  ---------------------------------------------------------------------------------------  Advance Directive and Living Will:      Power of :    POLST:    ---------------------------------------------------------------------------------------  Care Time Delivered:   Please see attending's attestation  Naomi Mantilla MD      Portions of the record may have been created with voice recognition software  Occasional wrong word or "sound a like" substitutions may have occurred due to the inherent limitations of voice recognition software    Read the chart carefully and recognize, using context, where substitutions have occurred

## 2023-03-01 NOTE — PROGRESS NOTES
INTERNAL MEDICINE RESIDENCY TRANSFER ACCEPTANCE NOTE     Name: Samson Ramirez   Age & Sex: 60 y o  male   MRN: [id number]  Unit/Bed#: ICU 09   Encounter: [id number]  Hospital Stay Days: 4    Accepting team: SOD Team B   Code Status: Level 1 - Full Code  Disposition: Patient requires Level 2 Step Down     ASSESSMENT/PLAN     Principal Problem:    Right Vertebral artery dissection (HCC)  Active Problems:    Acute Posterior Circulation Stroke    COVID    Stenosis of left vertebral artery    Hypertensive emergency    UTI (urinary tract infection)    Dysphagia    Spasticity    Sinus tachycardia    Elevated serum creatinine    Leukocytosis    Prediabetes    Tearfulness      Acute Posterior Circulation Stroke  Assessment & Plan  60 y o  male with no known prior hx, did not follow with a physician  Presented to [address] [place] [address] Road 2/25/2023 of several days of weakness, dizziness, and nausea/vomiting  CT head revealed acute/subacute infarcts in the right posterior cerebellum with follow up CTA head/neck demonstrating 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 and left vertebral artery origin stenosis with presumed occlusion of the distal left intradural vertebral segment  • MRA head/carotids demonstrated possible extension of the vertebral artery dissection into the basilar artery  • MRI brain revealed several small acute/early subacute bi-cerebellar infarcts (R>L) without evidence of hemorrhage     Patient was transferred to \[facility]\"" for closer monitoring with consideration for endovascular intervention   Initially given stable exam, intervention was felt too risky and patient was a heparin gtt with full dose aspirin  However, pt developed worsened dysarthria on 2/26 and R sided weakness     • Repeat CT 2/26 stable, however patient was taken to IR for emergent R vertebral/basilar thrombectomy with R V3/4 stenting with TICI 2B revascularization  • MRI brain 2/26: Increased infarct burden   Bilateral cerebellar infarcts with larger right cerebellar infarct and new acute infarcts in the right medulla, bilateral midbrain, and left occipital lobe        Plan:   • Neurosurgery consulted, recs appreciated  • Neurology consulted, recs appreciated  • Echo: EF 65%, normal wall motion, normal atria size, no PFO   • Lipid panel , , HDL 27,   • A1c 5 7  • q4hr neuro checks at night, q2hr during the day  • Pt transitioned from Integrilin to DAPT on 2/27 with ASA and Brilinta 180 mg load  Continue ASA and Brilinta 90 mg BID  • Check P2Y12 3/3 AM  • Repeat CT following initiation of heparin stable, pt transitioned to Eliquis 2 5 BID on 2/28  • Continue atorvastatin  • SBP goal <160    Tearfulness  Assessment & Plan  Pt with persistent tearfulness, depressed mood following recent stroke  Pt does not want any intervention for this at this time       Continue to monitor mood  Consider outpatient counseling, SSRI if pt willing    Prediabetes  Assessment & Plan  ? a1c 5 7  ? Glucose 110-127  ? Diet and lifestyle modifications    Leukocytosis  Assessment & Plan  Pt with slightly elevated WBC, stable from day prior  Afebrile, no worsening s/sxs of infection at this time  Suspect likely reactive in setting of recent stroke vs UTI vs COVID  ? Continue to follow WBC count, monitor fever curve    Elevated serum creatinine  Assessment & Plan  Pt presented with elevated Cr 1 32 on arrival, now improved  No known baseline  ? Continue to follow BMP  ? No significant increase in Cr with lisinopril  ? Avoid nephrotoxic agents    Sinus tachycardia  Assessment & Plan   pt with intermittent episodes of brief sinus tachycardia  ? Continue potassium goal >4  ? Continue tele    Spasticity  Assessment & Plan  Pt with new RLE spasticity and myoclonus   Unlikely focal seizures per neurology       • Continue baclofen 10 mg BID, frequency adjusted per pharmacy given renal function    Dysphagia  Assessment & Plan  Pt with new dysphagia s/p stroke  ? Continue dysphagia 3 diet, nectar thick liquids per speech eval    UTI (urinary tract infection)  Assessment & Plan  Urine cx with pan-susceptible E coli  Pt completed 4-day course of CTX as of 2/28       Plan:  • Monitor off abx    Hypertensive emergency  Assessment & Plan  Pt initially presented with hypertensive emergency in setting of stroke as mentioned above       Plan:  • Pt on cardene drip in the ICU, now discontinued today  • Continue Amlodipine 10 mg qd, Coreg 6 25 BID  Increase lisinopril today from 5 mg to 10 mg now off cardene  • SBP goal <160  • Continue PRNs antihypertensive with PRN labetalol    Stenosis of left vertebral artery  Assessment & Plan  See acute posterior circulation stroke plan    COVID  Assessment & Plan  Pt asx, remained on room air during hospitalization       • Continue airborne and contact precautions    * Right Vertebral artery dissection [facility])  Assessment & Plan  See acute posterior circulation plan      VTE Pharmacologic Prophylaxis: Reason for no pharmacologic prophylaxis Eliquis  VTE Mechanical Prophylaxis: sequential compression device    HOSPITAL COURSE     Per Dr Carmelita Chatterjee,   " Tiana Lopez a 60 y  o  male with no known past medical history, originally presented to [address] ED on 2/25 with 2-day history of dizziness and nausea, found to have small right cerebellar stroke, right cervical vertebral artery dissection and proximal right intradural vertebral artery occlusion   Patient also noted to have UTI and asymptomatic COVID   Symptoms worsened in ICU after admission with new right-sided weakness and worsening dysarthria   He was taken emergently to IR and underwent a right vertebral/basilar thrombectomy   Patient had a right V4 angioplasty and stenting with WILLIAM and V3 stenting of dissection on 2/26/2023 "     Patient was on cardene drip and now off  SUBJECTIVE     Patient seen and examined at bedside   Admits to tingling sensation on entire R side of body but no pain, reduced sensation on R side compared to L  Passing gas but has not had bowel movement since admission, tolerating oral  diet well  Denies headache, dizziness, vision changes, fever or chills, N/V, abd pain, urinary issues  OBJECTIVE     Vitals:    03/01/23 0834 03/01/23 0900 03/01/23 1000 03/01/23 1305   BP: 148/67 148/62 135/58 144/67   BP Location:       Pulse: 74 70 64    Resp:  20 16    Temp:       TempSrc:       SpO2:  98% 98%    Weight:       Height:         I/O last 24 hours: In: 2626 [P O :920; I V :1706]  Out: 3040 [Urine:3040]    Physical Exam  Vitals reviewed  Constitutional:       General: He is not in acute distress  HENT:      Head: Normocephalic and atraumatic  Eyes:      Extraocular Movements: Extraocular movements intact  Cardiovascular:      Rate and Rhythm: Normal rate and regular rhythm  Pulses: Normal pulses  Heart sounds: Normal heart sounds  Pulmonary:      Effort: Pulmonary effort is normal       Breath sounds: Normal breath sounds  Abdominal:      Palpations: Abdomen is soft  Tenderness: There is no abdominal tenderness  Musculoskeletal:      Right lower leg: No edema  Left lower leg: No edema  Neurological:      Mental Status: He is alert and oriented to person, place, and time  Sensory: Sensory deficit (intact, decreased R side) present  Motor: Weakness (RUE 1/5, RLE 3/5) present  Comments: Mild dysarthria and facial droop       LABORATORY DATA     Labs: I have personally reviewed pertinent reports      Results from last 7 days   Lab Units 03/01/23  0440 02/28/23  0209 02/27/23  1003 02/26/23  0506 02/26/23  0021 02/25/23  0940   WBC Thousand/uL 12 16* 12 50* 10 26* 13 27*   < > 9 99   HEMOGLOBIN g/dL 10 5* 11 0* 10 8* 13 3   < > 14 4   HEMATOCRIT % 32 4* 35 1* 33 3* 40 2   < > 44 0   PLATELETS Thousands/uL 277 263 268 349   < > 326   NEUTROS PCT %  --   --   --  89*  --  88*   MONOS PCT %  --   --   --  4 --  4    < > = values in this interval not displayed  Results from last 7 days   Lab Units 03/01/23  0440 02/28/23  0209 02/27/23  0429 02/26/23  0506 02/25/23  2044 02/25/23  0940   POTASSIUM mmol/L 3 7 3 5 3 1* 3 4*   < > 3 5   CHLORIDE mmol/L 116* 118* 120* 115*   < > 108   CO2 mmol/L 23 22 21 21   < > 24   BUN mg/dL 23 18 15 17   < > 23   CREATININE mg/dL 1 23 1 27 1 05 1 09   < > 1 32*   CALCIUM mg/dL 8 5 8 7 8 1* 8 8   < > 8 8   ALK PHOS U/L  --   --   --  63  --  55   ALT U/L  --   --   --  24  --  19   AST U/L  --   --   --  13  --  16    < > = values in this interval not displayed  Results from last 7 days   Lab Units 02/28/23  0209 02/27/23  0429   MAGNESIUM mg/dL 2 3 2 1     Results from last 7 days   Lab Units 02/28/23  0209   PHOSPHORUS mg/dL 2 3      Results from last 7 days   Lab Units 02/28/23  0208 02/27/23  2100 02/27/23  1548 02/27/23  1003 02/26/23  0506 02/26/23  0021 02/25/23  0940   INR   --   --   --  1 05  --  1 06 0 98   PTT seconds 78* 40* 32 31   < > 31 29    < > = values in this interval not displayed  Results from last 7 days   Lab Units 02/25/23  2044   LACTIC ACID mmol/L 1 2         Micro:  Lab Results   Component Value Date    BLOODCX No Growth at 48 hrs  02/27/2023    BLOODCX No Growth at 48 hrs  02/27/2023    BLOODCX No Growth at 72 hrs  02/25/2023    BLOODCX No Growth at 72 hrs  02/25/2023    URINECX >100,000 cfu/ml Escherichia coli (A) 02/25/2023     IMAGING & DIAGNOSTIC TESTING     Imaging: I have personally reviewed pertinent reports  CTA head and neck w wo contrast    Result Date: 2/26/2023  Impression: Overall, there is no significant interval change when comparing to the recent MRI brain, and CTA head neck study  Stable small cerebellar infarctions as noted on the recent MRI study  Stable findings of distal right cervical and proximal intradural vertebral artery occlusion with probable retrograde flow in its distal intradural segment   Stable left vertebral artery origin stenosis and presumed occlusion of the distal left intradural vertebral segment  Small basilar artery with focal atherosclerotic disease in its proximal segment  Patent fetal right PCA circulation  Left PCA branch is small but patent, and unchanged in appearance  Stable poor visualization of the proximal left superior cerebellar artery  Above-mentioned findings are in keeping with the preliminary report provided by [facility] radiology services without significant discrepancy in the report  Workstation performed: [id number]     CTA head and neck with and without contrast    Result Date: 2/25/2023  Impression: No acute intracranial hemorrhage  Focal infarcts in the right posterior cerebellum, possibly acute to subacute  Follow-up MRI imaging is recommended  Marked tapering and occlusion of the distal right cervical vertebral artery  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  Proximal right intradural vertebral artery is also occluded with faint visualization of the post-PICA segment, which may be filling in a retrograde fashion  Severe left vertebral artery origin stenosis  Left vertebral artery may terminate in a posterior for cerebellar artery branch as the post-PICA segment is not identified  Small basilar artery with fetal right PCA circulation  Left proximal PCA is patent but small  Mid to distal left PCA is not well visualized  I personally discussed this study with Ronald Kitchen on 2/25/2023 at 12:21 PM  Workstation performed: [id number]     XR chest 1 view portable    Result Date: 2/25/2023  Impression: No acute abnormality however there is an opacity in the left lung base that may represent focal pleural thickening  Neoplasm cannot be excluded  Nonemergent follow-up PA and lateral views of the chest or CT chest suggested unless there are prior studies available for comparison  Findings for this inpatient marked for immediate notification in Epic   Workstation performed: [id number]     CT head wo contrast    Result Date: 2/28/2023  Impression: Evolving acute infarcts in the brainstem (worse in left midbrain/left upper tatinaa) and bilateral cerebellum (right worse than left) status post right distal vertebral artery stenting  No acute intracranial hemorrhage  Workstation performed: [id number]     CT head wo contrast    Result Date: 2/26/2023  Impression: Stable evolving bilateral cerebellar infarctions without significant interval change  Workstation performed: [id number]     CT head wo contrast    Result Date: 2/26/2023  Impression: No new parenchymal findings when comparing to the recent CT and MRI brain studies  Stable late acute to early subacute bilateral cerebellar infarctions without hemorrhagic transformation  Workstation performed: [id number]     MRA head wo contrast    Result Date: 2/25/2023  Impression: Abscess of flow related signal in the V4 segments of the bilateral vertebral arteries, throughout the basilar artery and left posterior cerebral artery, concerning for progression of dissection and/or thrombus  Recommend emergent interventional radiology consultation  I personally discussed this study with George Ford   on 2/25/2023 at 8:15 PM   Workstation performed: [id number]     MRA carotids wo contrast    Result Date: 2/25/2023  Impression: 1  There complete absence of flow related signal along the cervical segment of the right vertebral artery concerning for progression of dissection and/or retrograde flow  2   Absence of flow related signal in the V4 segments of the bilateral vertebral arteries and throughout the basilar artery, also concerning for progression of dissection  I personally discussed this study with George Ford on 2/25/2023 at 8:24 PM  Workstation performed: [id number]     MRI brain wo contrast    Result Date: 2/27/2023  Impression: 1    Crescending, acute/recent posterior circulation infarctions with increasing right cerebellar and new brainstem infarctions as described  Overall infarct burden is slightly increased from the prior study one day earlier although new small infarcts are noted in the posterior medulla on the right, right midbrain and left occipital lobe  No hydrocephalus or large parenchymal hemorrhage  2   Abnormal left vertebral artery flow void at the lateral mass of C1, possibly related to slow flow and/or vascular occlusion  3   Very mild, chronic microangiopathy and chronic left thalamic lacunar infarction    Workstation performed: [id number]     MRI brain wo contrast    Result Date: 2/25/2023  Impression: Acute early subacute infarcts throughout the right greater than left cerebellar hemispheres and anterior vermis, consistent with findings on the head CT  No hemorrhagic conversion or mass effect  Arthrotomy 8 Workstation performed: [id number]     XR follow up    Result Date: 2/25/2023  Impression: No radiopaque orbital foreign body  Workstation performed: [id number]     CT cerebral perfusion    Result Date: 2/26/2023  Impression: CT perfusion performed  Data available on PACS  Workstation performed: [id number]     EKG, Pathology, and Other Studies: I have personally reviewed pertinent reports       ALLERGIES   No Known Allergies  MEDICATIONS     Current Facility-Administered Medications   Medication Dose Route Frequency Provider Last Rate   • amLODIPine  10 mg Oral Daily Tracey Rivera MD     • apixaban  2 5 mg Oral BID Jackson Lee PA-C     • aspirin  81 mg Oral Daily Rigo Campbell MD     • atorvastatin  40 mg Oral Daily With DO Efrem     • baclofen  10 mg Oral BID Rigo Campbell MD     • carvedilol  6 25 mg Oral BID With Meals Tracey Rivera MD     • chlorhexidine  15 mL Mouth/Throat Q12H [facility] Brendan Frances PA-C     • hydrALAZINE  10 mg Intravenous Q4H PRN Tracey Rivera MD     • [START ON 3/2/2023] lisinopril  10 mg Oral Daily Rigo Campbell MD     • polyethylene glycol  17 g Oral Daily Everett Alvarez MD     • senna-docusate sodium  1 tablet Oral HS Everett Alvarez MD     • ticagrelor  90 mg Oral Q12H [place] PitchMD heydi          hydrALAZINE, 10 mg, Q4H PRN        Portions of the record may have been created with voice recognition software  Occasional wrong word or "sound a like" substitutions may have occurred due to the inherent limitations of voice recognition software    Read the chart carefully and recognize, using context, where substitutions have occurred     ==  Clau Chavez MD  [facility]  Internal Medicine Residency PGY-1

## 2023-03-01 NOTE — ASSESSMENT & PLAN NOTE
Pt asx, remained on room air during hospitalization    No shortness of breath      • Off precautions on 3/7/2023

## 2023-03-01 NOTE — PLAN OF CARE
Problem: Prexisting or High Potential for Compromised Skin Integrity  Goal: Skin integrity is maintained or improved  Description: INTERVENTIONS:  - Identify patients at risk for skin breakdown  - Assess and monitor skin integrity  - Assess and monitor nutrition and hydration status  - Monitor labs   - Assess for incontinence   - Turn and reposition patient  - Assist with mobility/ambulation  - Relieve pressure over bony prominences  - Avoid friction and shearing  - Provide appropriate hygiene as needed including keeping skin clean and dry  - Evaluate need for skin moisturizer/barrier cream  - Collaborate with interdisciplinary team   - Patient/family teaching  - Consider wound care consult   Outcome: Progressing     Problem: MOBILITY - ADULT  Goal: Maintain or return to baseline ADL function  Description: INTERVENTIONS:  -  Assess patient's ability to carry out ADLs; assess patient's baseline for ADL function and identify physical deficits which impact ability to perform ADLs (bathing, care of mouth/teeth, toileting, grooming, dressing, etc )  - Assess/evaluate cause of self-care deficits   - Assess range of motion  - Assess patient's mobility; develop plan if impaired  - Assess patient's need for assistive devices and provide as appropriate  - Encourage maximum independence but intervene and supervise when necessary  - Involve family in performance of ADLs  - Assess for home care needs following discharge   - Consider OT consult to assist with ADL evaluation and planning for discharge  - Provide patient education as appropriate  Outcome: Progressing  Goal: Maintains/Returns to pre admission functional level  Description: INTERVENTIONS:  - Perform BMAT or MOVE assessment daily    - Set and communicate daily mobility goal to care team and patient/family/caregiver  - Collaborate with rehabilitation services on mobility goals if consulted  - Perform Range of Motion  times a day    - Reposition patient every hours   - Dangle patient  times a day  - Stand patient  times a day  - Ambulate patient  times a day  - Out of bed to chair  times a day   - Out of bed for meals  times a day  - Out of bed for toileting  - Record patient progress and toleration of activity level   Outcome: Progressing     Problem: PAIN - ADULT  Goal: Verbalizes/displays adequate comfort level or baseline comfort level  Description: Interventions:  - Encourage patient to monitor pain and request assistance  - Assess pain using appropriate pain scale  - Administer analgesics based on type and severity of pain and evaluate response  - Implement non-pharmacological measures as appropriate and evaluate response  - Consider cultural and social influences on pain and pain management  - Notify physician/advanced practitioner if interventions unsuccessful or patient reports new pain  Outcome: Progressing     Problem: INFECTION - ADULT  Goal: Absence or prevention of progression during hospitalization  Description: INTERVENTIONS:  - Assess and monitor for signs and symptoms of infection  - Monitor lab/diagnostic results  - Monitor all insertion sites, i e  indwelling lines, tubes, and drains  - Monitor endotracheal if appropriate and nasal secretions for changes in amount and color  - [place] appropriate cooling/warming therapies per order  - Administer medications as ordered  - Instruct and encourage patient and family to use good hand hygiene technique  - Identify and instruct in appropriate isolation precautions for identified infection/condition  Outcome: Progressing  Goal: Absence of fever/infection during neutropenic period  Description: INTERVENTIONS:  - Monitor WBC    Outcome: Progressing     Problem: SAFETY ADULT  Goal: Maintain or return to baseline ADL function  Description: INTERVENTIONS:  -  Assess patient's ability to carry out ADLs; assess patient's baseline for ADL function and identify physical deficits which impact ability to perform ADLs (bathing, care of mouth/teeth, toileting, grooming, dressing, etc )  - Assess/evaluate cause of self-care deficits   - Assess range of motion  - Assess patient's mobility; develop plan if impaired  - Assess patient's need for assistive devices and provide as appropriate  - Encourage maximum independence but intervene and supervise when necessary  - Involve family in performance of ADLs  - Assess for home care needs following discharge   - Consider OT consult to assist with ADL evaluation and planning for discharge  - Provide patient education as appropriate  Outcome: Progressing  Goal: Maintains/Returns to pre admission functional level  Description: INTERVENTIONS:  - Perform BMAT or MOVE assessment daily    - Set and communicate daily mobility goal to care team and patient/family/caregiver  - Collaborate with rehabilitation services on mobility goals if consulted  - Perform Range of Motion  times a day  - Reposition patient every  hours    - Dangle patient  times a day  - Stand patient  times a day  - Ambulate patient  times a day  - Out of bed to chair  times a day   - Out of bed for meals  times a day  - Out of bed for toileting  - Record patient progress and toleration of activity level   Outcome: Progressing  Goal: Patient will remain free of falls  Description: INTERVENTIONS:  - Educate patient/family on patient safety including physical limitations  - Instruct patient to call for assistance with activity   - Consult OT/PT to assist with strengthening/mobility   - Keep Call bell within reach  - Keep bed low and locked with side rails adjusted as appropriate  - Keep care items and personal belongings within reach  - Initiate and maintain comfort rounds  - Make Fall Risk Sign visible to staff  - Offer Toileting every 2 Hours, in advance of need  - Initiate/Maintain bed alarm  - Obtain necessary fall risk management equipment: bed alarm  - Apply yellow socks and bracelet for high fall risk patients  - Consider moving patient to room near nurses station  Outcome: Progressing     Problem: DISCHARGE PLANNING  Goal: Discharge to home or other facility with appropriate resources  Description: INTERVENTIONS:  - Identify barriers to discharge w/patient and caregiver  - Arrange for needed discharge resources and transportation as appropriate  - Identify discharge learning needs (meds, wound care, etc )  - Arrange for interpretive services to assist at discharge as needed  - Refer to Case Management Department for coordinating discharge planning if the patient needs post-hospital services based on physician/advanced practitioner order or complex needs related to functional status, cognitive ability, or social support system  Outcome: Progressing     Problem: Knowledge Deficit  Goal: Patient/family/caregiver demonstrates understanding of disease process, treatment plan, medications, and discharge instructions  Description: Complete learning assessment and assess knowledge base    Interventions:  - Provide teaching at level of understanding  - Provide teaching via preferred learning methods  Outcome: Progressing     Problem: NEUROSENSORY - ADULT  Goal: Achieves stable or improved neurological status  Description: INTERVENTIONS  - Monitor and report changes in neurological status  - Monitor vital signs such as temperature, blood pressure, glucose, and any other labs ordered   - Initiate measures to prevent increased intracranial pressure  - Monitor for seizure activity and implement precautions if appropriate      Outcome: Progressing  Goal: Remains free of injury related to seizures activity  Description: INTERVENTIONS  - Maintain airway, patient safety  and administer oxygen as ordered  - Monitor patient for seizure activity, document and report duration and description of seizure to physician/advanced practitioner  - If seizure occurs,  ensure patient safety during seizure  - Reorient patient post seizure  - Seizure pads on all 4 side rails  - Instruct patient/family to notify RN of any seizure activity including if an aura is experienced  - Instruct patient/family to call for assistance with activity based on nursing assessment  - Administer anti-seizure medications if ordered    Outcome: Progressing  Goal: Achieves maximal functionality and self care  Description: INTERVENTIONS  - Monitor swallowing and airway patency with patient fatigue and changes in neurological status  - Encourage and assist patient to increase activity and self care     - Encourage visually impaired, hearing impaired and aphasic patients to use assistive/communication devices  Outcome: Progressing     Problem: CARDIOVASCULAR - ADULT  Goal: Maintains optimal cardiac output and hemodynamic stability  Description: INTERVENTIONS:  - Monitor I/O, vital signs and rhythm  - Monitor for S/S and trends of decreased cardiac output  - Administer and titrate ordered vasoactive medications to optimize hemodynamic stability  - Assess quality of pulses, skin color and temperature  - Assess for signs of decreased coronary artery perfusion  - Instruct patient to report change in severity of symptoms  Outcome: Progressing  Goal: Absence of cardiac dysrhythmias or at baseline rhythm  Description: INTERVENTIONS:  - Continuous cardiac monitoring, vital signs, obtain 12 lead EKG if ordered  - Administer antiarrhythmic and heart rate control medications as ordered  - Monitor electrolytes and administer replacement therapy as ordered  Outcome: Progressing     Problem: RESPIRATORY - ADULT  Goal: Achieves optimal ventilation and oxygenation  Description: INTERVENTIONS:  - Assess for changes in respiratory status  - Assess for changes in mentation and behavior  - Position to facilitate oxygenation and minimize respiratory effort  - Oxygen administered by appropriate delivery if ordered  - Initiate smoking cessation education as indicated  - Encourage broncho-pulmonary hygiene including cough, deep breathe, Incentive Spirometry  - Assess the need for suctioning and aspirate as needed  - Assess and instruct to report SOB or any respiratory difficulty  - Respiratory Therapy support as indicated  Outcome: Progressing     Problem: GENITOURINARY - ADULT  Goal: Maintains or returns to baseline urinary function  Description: INTERVENTIONS:  - Assess urinary function  - Encourage oral fluids to ensure adequate hydration if ordered  - Administer IV fluids as ordered to ensure adequate hydration  - Administer ordered medications as needed  - Offer frequent toileting  - Follow urinary retention protocol if ordered  Outcome: Progressing  Goal: Absence of urinary retention  Description: INTERVENTIONS:  - Assess patient’s ability to void and empty bladder  - Monitor I/O  - Bladder scan as needed  - Discuss with physician/AP medications to alleviate retention as needed  - Discuss catheterization for long term situations as appropriate  Outcome: Progressing  Goal: Urinary catheter remains patent  Description: INTERVENTIONS:  - Assess patency of urinary catheter  - If patient has a chronic barnett, consider changing catheter if non-functioning  - Follow guidelines for intermittent irrigation of non-functioning urinary catheter  Outcome: Progressing     Problem: METABOLIC, FLUID AND ELECTROLYTES - ADULT  Goal: Electrolytes maintained within normal limits  Description: INTERVENTIONS:  - Monitor labs and assess patient for signs and symptoms of electrolyte imbalances  - Administer electrolyte replacement as ordered  - Monitor response to electrolyte replacements, including repeat lab results as appropriate  - Instruct patient on fluid and nutrition as appropriate  Outcome: Progressing  Goal: Fluid balance maintained  Description: INTERVENTIONS:  - Monitor labs   - Monitor I/O and WT  - Instruct patient on fluid and nutrition as appropriate  - Assess for signs & symptoms of volume excess or deficit  Outcome: Progressing  Goal: Glucose maintained within target range  Description: INTERVENTIONS:  - Monitor Blood Glucose as ordered  - Assess for signs and symptoms of hyperglycemia and hypoglycemia  - Administer ordered medications to maintain glucose within target range  - Assess nutritional intake and initiate nutrition service referral as needed  Outcome: Progressing     Problem: Nutrition/Hydration-ADULT  Goal: Nutrient/Hydration intake appropriate for improving, restoring or maintaining nutritional needs  Description: Monitor and assess patient's nutrition/hydration status for malnutrition  Collaborate with interdisciplinary team and initiate plan and interventions as ordered  Monitor patient's weight and dietary intake as ordered or per policy  Utilize nutrition screening tool and intervene as necessary  Determine patient's food preferences and provide high-protein, high-caloric foods as appropriate  INTERVENTIONS:  - Monitor oral intake, urinary output, labs, and treatment plans  - Assess nutrition and hydration status and recommend course of action  - Evaluate amount of meals eaten  - Assist patient with eating if necessary   - Allow adequate time for meals  - Recommend/ encourage appropriate diets, oral nutritional supplements, and vitamin/mineral supplements  - Order, calculate, and assess calorie counts as needed  - Recommend, monitor, and adjust tube feedings and TPN/PPN based on assessed needs  - Assess need for intravenous fluids  - Provide specific nutrition/hydration education as appropriate  - Include patient/family/caregiver in decisions related to nutrition  Outcome: Progressing     Problem: Neurological Deficit  Goal: Neurological status is stable or improving  Description: Interventions:  - Monitor and assess patient's level of consciousness, motor function, sensory function, and level of assistance needed for ADLs  - Monitor and report changes from baseline   Collaborate with interdisciplinary team to initiate plan and implement interventions as ordered  - Provide and maintain a safe environment  - Consider seizure precautions  - Consider fall precautions  - Consider aspiration precautions  - Consider bleeding precautions  Outcome: Progressing     Problem: Activity Intolerance/Impaired Mobility  Goal: Mobility/activity is maintained at optimum level for patient  Description: Interventions:  - Assess and monitor patient  barriers to mobility and need for assistive/adaptive devices  - Assess patient's emotional response to limitations  - Collaborate with interdisciplinary team and initiate plans and interventions as ordered  - Encourage independent activity per ability   - Maintain proper body alignment  - Perform active/passive rom as tolerated/ordered  - Plan activities to conserve energy   - Turn patient as appropriate  Outcome: Progressing     Problem: Communication Impairment  Goal: Ability to express needs and understand communication  Description: Assess patient's communication skills and ability to understand information  Patient will demonstrate use of effective communication techniques, alternative methods of communication and understanding even if not able to speak  - Encourage communication and provide alternate methods of communication as needed  - Collaborate with case management/ for discharge needs  - Include patient/family/caregiver in decisions related to communication  Outcome: Progressing     Problem: Potential for Aspiration  Goal: Non-ventilated patient's risk of aspiration is minimized  Description: Assess and monitor vital signs, respiratory status, and labs (WBC)  Monitor for signs of aspiration (tachypnea, cough, rales, wheezing, cyanosis, fever)  - Assess and monitor patient's ability to swallow  - Place patient up in chair to eat if possible  - HOB up at 90 degrees to eat if unable to get patient up into chair   - Supervise patient during oral intake     - Instruct patient/ family to take small bites  - Instruct patient/ family to take small single sips when taking liquids  - Follow patient-specific strategies generated by speech pathologist   Outcome: Progressing  Goal: Ventilated patient's risk of aspiration is minimized  Description: Assess and monitor vital signs, respiratory status, airway cuff pressure, and labs (WBC)  Monitor for signs of aspiration (tachypnea, cough, rales, wheezing, cyanosis, fever)  - Elevate head of bed 30 degrees if patient has tube feeding   - Monitor tube feeding  Outcome: Progressing     Problem: Nutrition  Goal: Nutrition/Hydration status is improving  Description: Monitor and assess patient's nutrition/hydration status for malnutrition (ex- brittle hair, bruises, dry skin, pale skin and conjunctiva, muscle wasting, smooth red tongue, and disorientation)  Collaborate with interdisciplinary team and initiate plan and interventions as ordered  Monitor patient's weight and dietary intake as ordered or per policy  Utilize nutrition screening tool and intervene per policy  Determine patient's food preferences and provide high-protein, high-caloric foods as appropriate  - Assist patient with eating   - Allow adequate time for meals   - Encourage patient to take dietary supplement as ordered  - Collaborate with clinical nutritionist   - Include patient/family/caregiver in decisions related to nutrition    Outcome: Progressing

## 2023-03-01 NOTE — ASSESSMENT & PLAN NOTE
Pt asx, remained on room air during hospitalization       · Continue airborne and contact precautions

## 2023-03-01 NOTE — ASSESSMENT & PLAN NOTE
Pt with new dysphagia s/p stroke  ?  Continue dysphagia 3 diet, nectar thick liquids per speech eval

## 2023-03-01 NOTE — ASSESSMENT & PLAN NOTE
Pt with new RLE spasticity and myoclonus  Unlikely focal seizures per neurology       · Continue baclofen 10 mg BID, frequency adjusted per pharmacy given renal function

## 2023-03-01 NOTE — ASSESSMENT & PLAN NOTE
Pt presented with elevated Cr 1 32 on arrival, now improved  No known baseline  ? Continue to follow BMP  ? No significant increase in Cr with lisinopril  ?  Avoid nephrotoxic agents

## 2023-03-02 LAB
ANION GAP SERPL CALCULATED.3IONS-SCNC: 2 MMOL/L (ref 4–13)
BUN SERPL-MCNC: 29 MG/DL (ref 5–25)
CALCIUM SERPL-MCNC: 8.7 MG/DL (ref 8.3–10.1)
CHLORIDE SERPL-SCNC: 115 MMOL/L (ref 96–108)
CO2 SERPL-SCNC: 25 MMOL/L (ref 21–32)
CREAT SERPL-MCNC: 1.37 MG/DL (ref 0.6–1.3)
ERYTHROCYTE [DISTWIDTH] IN BLOOD BY AUTOMATED COUNT: 13 % (ref 11.6–15.1)
GFR SERPL CREATININE-BSD FRML MDRD: 54 ML/MIN/1.73SQ M
GLUCOSE SERPL-MCNC: 111 MG/DL (ref 65–140)
HCT VFR BLD AUTO: 32.3 % (ref 36.5–49.3)
HGB BLD-MCNC: 10.4 G/DL (ref 12–17)
MAGNESIUM SERPL-MCNC: 2.5 MG/DL (ref 1.6–2.6)
MCH RBC QN AUTO: 28.5 PG (ref 26.8–34.3)
MCHC RBC AUTO-ENTMCNC: 32.2 G/DL (ref 31.4–37.4)
MCV RBC AUTO: 89 FL (ref 82–98)
PHOSPHATE SERPL-MCNC: 4.7 MG/DL (ref 2.3–4.1)
PLATELET # BLD AUTO: 309 THOUSANDS/UL (ref 149–390)
PMV BLD AUTO: 10.1 FL (ref 8.9–12.7)
POTASSIUM SERPL-SCNC: 4 MMOL/L (ref 3.5–5.3)
RBC # BLD AUTO: 3.65 MILLION/UL (ref 3.88–5.62)
SODIUM SERPL-SCNC: 142 MMOL/L (ref 135–147)
WBC # BLD AUTO: 11.85 THOUSAND/UL (ref 4.31–10.16)

## 2023-03-02 RX ORDER — LISINOPRIL 10 MG/1
10 TABLET ORAL ONCE
Status: COMPLETED | OUTPATIENT
Start: 2023-03-02 | End: 2023-03-02

## 2023-03-02 RX ORDER — LISINOPRIL 20 MG/1
20 TABLET ORAL DAILY
Status: DISCONTINUED | OUTPATIENT
Start: 2023-03-03 | End: 2023-03-06 | Stop reason: HOSPADM

## 2023-03-02 RX ADMIN — APIXABAN 2.5 MG: 2.5 TABLET, FILM COATED ORAL at 08:25

## 2023-03-02 RX ADMIN — BACLOFEN 10 MG: 10 TABLET ORAL at 08:26

## 2023-03-02 RX ADMIN — HYDRALAZINE HYDROCHLORIDE 10 MG: 20 INJECTION, SOLUTION INTRAMUSCULAR; INTRAVENOUS at 12:40

## 2023-03-02 RX ADMIN — TICAGRELOR 90 MG: 90 TABLET ORAL at 21:18

## 2023-03-02 RX ADMIN — POLYETHYLENE GLYCOL 3350 17 G: 17 POWDER, FOR SOLUTION ORAL at 08:26

## 2023-03-02 RX ADMIN — TICAGRELOR 90 MG: 90 TABLET ORAL at 08:25

## 2023-03-02 RX ADMIN — HYDRALAZINE HYDROCHLORIDE 10 MG: 20 INJECTION, SOLUTION INTRAMUSCULAR; INTRAVENOUS at 21:26

## 2023-03-02 RX ADMIN — ASPIRIN 81 MG CHEWABLE TABLET 81 MG: 81 TABLET CHEWABLE at 08:25

## 2023-03-02 RX ADMIN — AMLODIPINE BESYLATE 10 MG: 10 TABLET ORAL at 08:25

## 2023-03-02 RX ADMIN — BACLOFEN 10 MG: 10 TABLET ORAL at 17:25

## 2023-03-02 RX ADMIN — LISINOPRIL 10 MG: 10 TABLET ORAL at 17:25

## 2023-03-02 RX ADMIN — ATORVASTATIN CALCIUM 40 MG: 40 TABLET, FILM COATED ORAL at 17:25

## 2023-03-02 RX ADMIN — CHLORHEXIDINE GLUCONATE 0.12% ORAL RINSE 15 ML: 1.2 LIQUID ORAL at 08:25

## 2023-03-02 RX ADMIN — CARVEDILOL 6.25 MG: 6.25 TABLET, FILM COATED ORAL at 17:25

## 2023-03-02 RX ADMIN — CARVEDILOL 6.25 MG: 6.25 TABLET, FILM COATED ORAL at 08:25

## 2023-03-02 RX ADMIN — LISINOPRIL 10 MG: 10 TABLET ORAL at 08:25

## 2023-03-02 RX ADMIN — APIXABAN 2.5 MG: 2.5 TABLET, FILM COATED ORAL at 17:25

## 2023-03-02 RX ADMIN — CHLORHEXIDINE GLUCONATE 0.12% ORAL RINSE 15 ML: 1.2 LIQUID ORAL at 21:17

## 2023-03-02 NOTE — PROGRESS NOTES
23 1900   Clinical Encounter Type   Visited With Health care provider   Routine Visit Introduction   Referral From Nurse      Pastoral Care Progress Note    3/1/2023  Patient: Dore Buerger : 1960  Admission Date & Time: 2023  MRN: [id number] CSN: [id number]     response to a referral  Patient and family made aware I was calling in but he did not have a phone in his room  Patient said home  will be contacting him tomorrow and they were fine with that  If anything urgent arises, please contact spiritual care

## 2023-03-02 NOTE — OCCUPATIONAL THERAPY NOTE
Occupational Therapy Progress Note     Patient Name: Cipriano Davis  GIVHP'E Date: 3/2/2023  Problem List  Principal Problem:    Acute Posterior Circulation Stroke  Active Problems:    COVID    Right Vertebral artery dissection ([address] )    Stenosis of left vertebral artery    Hypertensive emergency    UTI (urinary tract infection)    Dysphagia    Spasticity    Sinus tachycardia    Elevated serum creatinine    Leukocytosis    Prediabetes    Tearfulness     03/02/23 1000   OT Last Visit   OT Visit Date 03/02/23   Note Type   Note Type Treatment for insurance authorization   Pain Assessment   Pain Assessment Tool 0-10   Pain Score No Pain   Restrictions/Precautions   Weight Bearing Precautions Per Order No   Other Precautions Contact/isolation; Airborne/isolation;Telemetry;Cognitive; Chair Alarm; Bed Alarm  (+right hemiparesis UE>LE, +COVID-19 positive, RUE sling with mobility tasks )   Lifestyle   Autonomy I with ADLS and IADLS +    Reciprocal Relationships supportive wife and 2 adult children   Service to Others Works full time as an    Intrinsic Gratification States "I work 6 days a week, I dont have time for anything else"   ADL   Eating Assistance 5  Supervision/Setup   Eating Deficit Beverage management;Setup   Grooming Assistance 3  Moderate Assistance   Grooming Deficit Wash/dry hands; Wash/dry face  (pt able to wash face with LUE with set-up, mod a to wash hands 2* right UE hemiparesis)   UB Bathing Assistance 3  Moderate Assistance   UB Bathing Deficit Increased time to complete;Left arm;Setup;Steadying   LB Bathing Assistance 2  Maximal Assistance   LB Bathing Deficit Right lower leg including foot; Left lower leg including foot; Buttocks; Perineal area   UB Dressing Assistance 2  Maximal Assistance   UB Dressing Deficit (to don hospital gown s/p education on compensatory strategies)   LB Dressing Assistance 2  Maximal Assistance   LB Dressing Deficit Don/doff R sock; Don/doff L sock  (educated pt on one handed techniques able to assist with pulling up left sock over heel with forward functional reach and CG/min a for dynamic sitting balance  Opal Diaz )   Toileting Assistance  2  Maximal Assistance   Bed Mobility   Supine to Sit 3  Moderate assistance   Additional items Assist x 1   Sit to Supine Unable to assess   Additional Comments pt remained seated in chair with all needs met and alarm engaged   Transfers   Sit to Stand 3  Moderate assistance   Additional items Assist x 2   Stand to Sit 3  Moderate assistance   Additional items Assist x 2   Additional Comments +B/L HHA and sling donned to right UE for right shoulder protection strategy, pt progressing throughout session to mod a x 1 with pulling up on sink with left UE  Functional Mobility   Functional Mobility 3  Moderate assistance   Additional Comments mod a x 2 with right anterior approach and sling donned and left HHA 2 steps to chair  Additional items Hand hold assistance   Therapeutic Exercise - ROM   UE-ROM Yes   ROM- Right Upper Extremities   R Shoulder AAROM; Flexion   R Elbow PROM;Elbow flexion;Elbow extension   R Wrist Wrist flexion;Wrist extension;PROM   R Position Seated   R Weight/Reps/Sets 3x   RUE ROM Comment Initated SROM education with pt/spouse present, issued handout to reference exercises  pt with trace shoulder, elbow and thumb movement  No subluxation noted to RUE during session , however does have low tone and  mild winging at scapula (superior angle)  Pt and family educated on protecting glenohumeral joint from subluxation, with bed mobility/transfers/mobility tasks, placed hand out on white board for proper positioning for family/staff for increased Right UE attention, scapular protraction, symmetrical sitting and edema management     Neuromuscular Education   Weight Bearing Technique Yes   RUE Weight Bearing Forearm seated  (min a to maintain left UE position on bed with functional tasks )   Cognition   Arousal/Participation Alert; Responsive   Attention Attends with cues to redirect   Orientation Level Oriented X4   Memory Within functional limits   Following Commands Follows one step commands with increased time or repetition   Comments pt pleasant and coopearative, mild dysarthria, cues for multi-step commands, appeared to comprehend all education provided in session with good recall and carryover  Continue to assess  Vision   Vision Comments pt vision appeared [facility] , able to track therapists within room  Activity Tolerance   Activity Tolerance Patient limited by fatigue;Patient tolerated treatment well   Medical Staff Made Aware PT Lenard Sides   Assessment   Assessment Patient participated in Skilled OT session this date with interventions consisting of self care tasks, functional transfers/functional mobility, right UE SROM HEP with spouse present and participating in education   Patient agreeable to OT treatment session, upon arrival patient was found supine in bed  In comparison to previous session, patient with improvements in sit to stand transfers  Patient requiring verbal cues for safety, verbal cues for correct technique, one step directives and frequent rest periods  Patient continues to be functioning below baseline level, occupational performance remains limited secondary to factors listed above and increased risk for falls and injury  From OT standpoint, recommendation at time of d/c would be Short Term Rehab  The patient's raw score on the AM-PAC Daily Activity Inpatient Short Form is 13  A raw score of less than 19 suggests the patient may benefit from discharge to post-acute rehabilitation services  Please refer to the recommendation of the Occupational Therapist for safe discharge planning  Patient to benefit from continued Occupational Therapy treatment while in the hospital to address deficits as defined above and maximize level of functional independence with ADLs and functional mobility     Plan Treatment Interventions ADL retraining;Functional transfer training;UE strengthening/ROM; Endurance training;Cognitive reorientation;Patient/family training;Equipment evaluation/education; Compensatory technique education; Energy conservation; Activityengagement   Goal Expiration Date 03/13/23   OT Treatment Day 1   OT Frequency 3-5x/wk   Recommendation   OT Discharge Recommendation Post acute rehabilitation services   AM-PAC Daily Activity Inpatient   Lower Body Dressing 2   Bathing 2   Toileting 2   Upper Body Dressing 2   Grooming 2   Eating 3   Daily Activity Raw Score 13   Daily Activity Standardized Score (Calc for Raw Score >=11) 32 03   AM-PAC Applied Cognition Inpatient   Following a Speech/Presentation 3   Understanding Ordinary Conversation 4   Taking Medications 4   Remembering Where Things Are Placed or Put Away 4   Remembering List of 4-5 Errands 3   Taking Care of Complicated Tasks 3   Applied Cognition Raw Score 21   Applied Cognition Standardized Score 44 3   End of Consult   Patient Position at End of Consult Bedside chair;Bed/Chair alarm activated; All needs within reach   Nurse Communication Nurse aware of consult      Tisha Jones MOT, OTR/L

## 2023-03-02 NOTE — PLAN OF CARE
Problem: OCCUPATIONAL THERAPY ADULT  Goal: Performs self-care activities at highest level of function for planned discharge setting  See evaluation for individualized goals  Description: Treatment Interventions: ADL retraining, Functional transfer training, UE strengthening/ROM, Endurance training, Patient/family training, Neuromuscular reeducation, Fine motor coordination activities, Compensatory technique education, Continued evaluation, Energy conservation, Activityengagement          See flowsheet documentation for full assessment, interventions and recommendations  Note: Limitation: Decreased ADL status, Decreased UE ROM, Decreased UE strength, Decreased Safe judgement during ADL, Decreased endurance, Decreased sensation, Decreased fine motor control, Decreased self-care trans, Decreased high-level ADLs  Prognosis: Good  Assessment: Patient participated in Skilled OT session this date with interventions consisting of self care tasks, functional transfers/functional mobility, right UE SROM HEP with spouse present and participating in education   Patient agreeable to OT treatment session, upon arrival patient was found supine in bed  In comparison to previous session, patient with improvements in sit to stand transfers  Patient requiring verbal cues for safety, verbal cues for correct technique, one step directives and frequent rest periods  Patient continues to be functioning below baseline level, occupational performance remains limited secondary to factors listed above and increased risk for falls and injury  From OT standpoint, recommendation at time of d/c would be Short Term Rehab  The patient's raw score on the AM-PAC Daily Activity Inpatient Short Form is 13  A raw score of less than 19 suggests the patient may benefit from discharge to post-acute rehabilitation services  Please refer to the recommendation of the Occupational Therapist for safe discharge planning     Patient to benefit from continued Occupational Therapy treatment while in the hospital to address deficits as defined above and maximize level of functional independence with ADLs and functional mobility    Recommendation: (S)  (PM&R)  OT Discharge Recommendation: Post acute rehabilitation services

## 2023-03-02 NOTE — PLAN OF CARE
Problem: Prexisting or High Potential for Compromised Skin Integrity  Goal: Skin integrity is maintained or improved  Description: INTERVENTIONS:  - Identify patients at risk for skin breakdown  - Assess and monitor skin integrity  - Assess and monitor nutrition and hydration status  - Monitor labs   - Assess for incontinence   - Turn and reposition patient  - Assist with mobility/ambulation  - Relieve pressure over bony prominences  - Avoid friction and shearing  - Provide appropriate hygiene as needed including keeping skin clean and dry  - Evaluate need for skin moisturizer/barrier cream  - Collaborate with interdisciplinary team   - Patient/family teaching  - Consider wound care consult   Outcome: Progressing     Problem: MOBILITY - ADULT  Goal: Maintain or return to baseline ADL function  Description: INTERVENTIONS:  -  Assess patient's ability to carry out ADLs; assess patient's baseline for ADL function and identify physical deficits which impact ability to perform ADLs (bathing, care of mouth/teeth, toileting, grooming, dressing, etc )  - Assess/evaluate cause of self-care deficits   - Assess range of motion  - Assess patient's mobility; develop plan if impaired  - Assess patient's need for assistive devices and provide as appropriate  - Encourage maximum independence but intervene and supervise when necessary  - Involve family in performance of ADLs  - Assess for home care needs following discharge   - Consider OT consult to assist with ADL evaluation and planning for discharge  - Provide patient education as appropriate  Outcome: Progressing  Goal: Maintains/Returns to pre admission functional level  Description: INTERVENTIONS:  - Perform BMAT or MOVE assessment daily    - Set and communicate daily mobility goal to care team and patient/family/caregiver  - Collaborate with rehabilitation services on mobility goals if consulted  - Perform Range of Motion 2 times a day    - Reposition patient every 2 hours   - Dangle patient 2 times a day  - Stand patient 2 times a day  - Ambulate patient 2 times a day  - Out of bed to chair 2 times a day   - Out of bed for meals 2 times a day  - Out of bed for toileting  - Record patient progress and toleration of activity level   Outcome: Progressing     Problem: PAIN - ADULT  Goal: Verbalizes/displays adequate comfort level or baseline comfort level  Description: Interventions:  - Encourage patient to monitor pain and request assistance  - Assess pain using appropriate pain scale  - Administer analgesics based on type and severity of pain and evaluate response  - Implement non-pharmacological measures as appropriate and evaluate response  - Consider cultural and social influences on pain and pain management  - Notify physician/advanced practitioner if interventions unsuccessful or patient reports new pain  Outcome: Progressing     Problem: INFECTION - ADULT  Goal: Absence or prevention of progression during hospitalization  Description: INTERVENTIONS:  - Assess and monitor for signs and symptoms of infection  - Monitor lab/diagnostic results  - Monitor all insertion sites, i e  indwelling lines, tubes, and drains  - Monitor endotracheal if appropriate and nasal secretions for changes in amount and color  - [place] appropriate cooling/warming therapies per order  - Administer medications as ordered  - Instruct and encourage patient and family to use good hand hygiene technique  - Identify and instruct in appropriate isolation precautions for identified infection/condition  Outcome: Progressing  Goal: Absence of fever/infection during neutropenic period  Description: INTERVENTIONS:  - Monitor WBC    Outcome: Progressing     Problem: SAFETY ADULT  Goal: Maintain or return to baseline ADL function  Description: INTERVENTIONS:  -  Assess patient's ability to carry out ADLs; assess patient's baseline for ADL function and identify physical deficits which impact ability to perform ADLs (bathing, care of mouth/teeth, toileting, grooming, dressing, etc )  - Assess/evaluate cause of self-care deficits   - Assess range of motion  - Assess patient's mobility; develop plan if impaired  - Assess patient's need for assistive devices and provide as appropriate  - Encourage maximum independence but intervene and supervise when necessary  - Involve family in performance of ADLs  - Assess for home care needs following discharge   - Consider OT consult to assist with ADL evaluation and planning for discharge  - Provide patient education as appropriate  Outcome: Progressing  Goal: Maintains/Returns to pre admission functional level  Description: INTERVENTIONS:  - Perform BMAT or MOVE assessment daily    - Set and communicate daily mobility goal to care team and patient/family/caregiver  - Collaborate with rehabilitation services on mobility goals if consulted  - Perform Range of Motion 2 times a day  - Reposition patient every 2 hours    - Dangle patient 2 times a day  - Stand patient 2 times a day  - Ambulate patient 2 times a day  - Out of bed to chair 2 times a day   - Out of bed for meals 2 times a day  - Out of bed for toileting  - Record patient progress and toleration of activity level   Outcome: Progressing  Goal: Patient will remain free of falls  Description: INTERVENTIONS:  - Educate patient/family on patient safety including physical limitations  - Instruct patient to call for assistance with activity   - Consult OT/PT to assist with strengthening/mobility   - Keep Call bell within reach  - Keep bed low and locked with side rails adjusted as appropriate  - Keep care items and personal belongings within reach  - Initiate and maintain comfort rounds  - Make Fall Risk Sign visible to staff  - Offer Toileting every 2 Hours, in advance of need  - Apply yellow socks and bracelet for high fall risk patients  - Consider moving patient to room near nurses station  Outcome: Progressing     Problem: DISCHARGE PLANNING  Goal: Discharge to home or other facility with appropriate resources  Description: INTERVENTIONS:  - Identify barriers to discharge w/patient and caregiver  - Arrange for needed discharge resources and transportation as appropriate  - Identify discharge learning needs (meds, wound care, etc )  - Arrange for interpretive services to assist at discharge as needed  - Refer to Case Management Department for coordinating discharge planning if the patient needs post-hospital services based on physician/advanced practitioner order or complex needs related to functional status, cognitive ability, or social support system  Outcome: Progressing     Problem: Knowledge Deficit  Goal: Patient/family/caregiver demonstrates understanding of disease process, treatment plan, medications, and discharge instructions  Description: Complete learning assessment and assess knowledge base    Interventions:  - Provide teaching at level of understanding  - Provide teaching via preferred learning methods  Outcome: Progressing     Problem: NEUROSENSORY - ADULT  Goal: Achieves stable or improved neurological status  Description: INTERVENTIONS  - Monitor and report changes in neurological status  - Monitor vital signs such as temperature, blood pressure, glucose, and any other labs ordered   - Initiate measures to prevent increased intracranial pressure  - Monitor for seizure activity and implement precautions if appropriate      Outcome: Progressing  Goal: Remains free of injury related to seizures activity  Description: INTERVENTIONS  - Maintain airway, patient safety  and administer oxygen as ordered  - Monitor patient for seizure activity, document and report duration and description of seizure to physician/advanced practitioner  - If seizure occurs,  ensure patient safety during seizure  - Reorient patient post seizure  - Seizure pads on all 4 side rails  - Instruct patient/family to notify RN of any seizure activity including if an aura is experienced  - Instruct patient/family to call for assistance with activity based on nursing assessment  - Administer anti-seizure medications if ordered    Outcome: Progressing  Goal: Achieves maximal functionality and self care  Description: INTERVENTIONS  - Monitor swallowing and airway patency with patient fatigue and changes in neurological status  - Encourage and assist patient to increase activity and self care     - Encourage visually impaired, hearing impaired and aphasic patients to use assistive/communication devices  Outcome: Progressing

## 2023-03-02 NOTE — SPEECH THERAPY NOTE
Speech Language/Pathology    Speech/Language Pathology Progress Note    Patient Name: Tammy Washington  KQPTM'Z Date: 3/2/2023     Problem List  Principal Problem:    Acute Posterior Circulation Stroke  Active Problems:    COVID    Right Vertebral artery dissection ([address] )    Stenosis of left vertebral artery    Hypertensive emergency    UTI (urinary tract infection)    Dysphagia    Spasticity    Sinus tachycardia    Elevated serum creatinine    Leukocytosis    Prediabetes    Tearfulness       Past Medical History  History reviewed  No pertinent past medical history  Past Surgical History  History reviewed  No pertinent surgical history  Subjective:  Pt awake and alert  Pt has been requesting ice chips   "It's hard to drink that thick stuff "    Current Diet:  Dysphagia 3/nectar thick liquids    Objective:  Pt seen for dx dysphagia f/u tx  Pt seen w/ dysphagia 3 materials (chopped crab cakes, carrots), nectar thick liquids, and trials for upgrade of hard solids (cookies) and thin liquids (water and ice chips)  Complete labial seal for retrieval and containment across all materials assessed  Min-mid prolonged mastication though bolus formation appears cohesive w/ timely transfer  Suspected fair hyolaryngeal elevation to palpation  S/s suggestive of aspiration including cough x2 w/ thin liquids via straw, x1 via cup sip  No overt s/s of aspiration w/ solids, nectar or ice chips  Education provided to pt on options, risks/benefits of diet/liquid modifications and recommendation for VBS prior to upgrade, pt expressed preference for continuation of softer/chopped diet w/ nectar thick liquids  Education provided on option for intermittent ice chips following oral hygiene, pt eager for allowance of ice chips   Education reviewed on strategies to optimize swallow safety including frequent/throough oral care and to notify medical staff if s/s of aspiration arise - pt demonstrated understanding and denied questions at this time      Assessment:  Pt able to tolerate current diet and trials of ice chips  Ongoing s/s oropharyngeal dysphagia/aspiration w/ upgraded material (hard solids/thin liquids)       Plan/Recommendations:  Dysphagia 3/nectar thick liquids  Frequent/thorough oral care  ST to f/u as able and appropriate    MBS when able/appropriate ((?COVID precautions/as schedule allows)

## 2023-03-02 NOTE — PROGRESS NOTES
Progress Note - Neurology   Ryan Blue 60 y o  male [id number]  Unit/Bed#: [facility] 713/[facility] 0-36    Assessment/Plan:    * Acute Posterior Circulation Stroke  Assessment & Plan  60 y o  male with no reported significant past medical history (although patient does not follow with a physician) who presented to [facility] on 2/25/2023 with complaint of several days of weakness, dizziness, and nausea/vomiting  · CT head revealed acute/subacute infarcts in the right posterior cerebellum  · CTA head/neck revealed 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 and left vertebral artery origin stenosis with presumed occlusion of the distal left intradural vertebral segment  · MRA head/carotids demonstrated possible extension of the vertebral artery dissection into the basilar artery  · MRI brain revealed several small acute/early subacute bi-cerebellar infarcts (R>L) without evidence of hemorrhage  · , Cholesterol 163, A1C 5 7  · COVID positive    Patient was transferred to [facility] for closer monitoring with consideration for endovascular intervention  Initially given stable exam, intervention was felt too risky and patient was on a heparin gtt with full dose aspirin  However on 2/26, patient had worsened dysarthria and right sided weakness  Stroke alert activated:  · CT head negative for intracranial hemorrhage and stable bilateral cerebellar infarcts  · Patient was taken to IR for emergent R vertebral/basilar thrombectomy with R V3/4 stenting with TICI 2B revascularization  · Patient was started on Integrilin gtt and repeat CT head negative for hemorrhage  · MRI brain 2/26: Increased infarct burden    Bilateral cerebellar infarcts with larger right cerebellar infarct and new acute infarcts in the right medulla, bilateral midbrain, and left occipital lobe  · Echo: EF 65%, normal wall motion, normal atria size, no PFO    On exam, patient has dysarthria, right facial droop, and right hemiparesis  Etiology for acute infarcts in the setting of bilateral vertebral artery occlusions remains unclear, however likely due to dissection vs chronic diffuse atherosclerotic disease  Possible contribution from underlying COVID infection and hypercoagulability  No recent head trauma, strenuous physical activity, neck manipulation, coughing/sneezing, etc     Plan:  - Stroke pathway  • Transitioned from Integrilin gtt to DAPT on 2/27 with aspirin 81 mg daily and Brilinta 90 mg BID (180 mg load once); will check P2Y12 on 3/3  • Heparin gtt discontinued and transitioned to Eliquis 2 5 mg BID on 2/28  • Atorvastatin 40 mg daily (statin naive)  • Normotensive goal per neurosurgery with SBP <160; avoid hypotension  • Euglycemic, normothermic goal  • Continue telemetry  • PT/OT/ST  • Stroke education  • Frequent neuro checks  Continue to monitor and notify neurology with any changes  • STAT CT head for any acute change in neuro exam  - Continue Baclofen 10 mg BID (started on 2/28 with improvement in right LE spasms/myoclonus; can only do BID dosing due to renal function)  - Patient is tearful throughout the exam, but does not want to start an antidepressant at this time  Continue to closely monitor mood, and if patient agreeable, would start SSRI  - Medical management and supportive care per primary team  Correction of any metabolic or infectious disturbances  Cipriano Davis will need follow up in in 6 weeks with neurovascular attending or AP  He will not require outpatient neurological testing  Subjective:   Patient seen and examined at bedside  Patient continues to be tearful throughout the exam   He is frustrated that he cannot move his right arm  He continues to have sensory deficits on the right side as well as mild dysarthria  Denies any headache, new visual disturbances, chest pain, shortness of breath, or abdominal pain      Discussed the plan moving forward with the patient and his wife at bedside  All questions and concerns answered to their satisfaction  History reviewed  No pertinent past medical history  History reviewed  No pertinent surgical history  History reviewed  No pertinent family history  Social History     Socioeconomic History   • Marital status: /Civil Union     Spouse name: None   • Number of children: None   • Years of education: None   • Highest education level: None   Occupational History   • None   Tobacco Use   • Smoking status: Never   • Smokeless tobacco: Never   Substance and Sexual Activity   • Alcohol use: Not Currently   • Drug use: Not Currently   • Sexual activity: None   Other Topics Concern   • None   Social History Narrative   • None     Social Determinants of Health     Financial Resource Strain: Not on file   Food Insecurity: Not on file   Transportation Needs: Not on file   Physical Activity: Not on file   Stress: Not on file   Social Connections: Not on file   Intimate Partner Violence: Not on file   Housing Stability: Not on file     E-Cigarette/Vaping     E-Cigarette/Vaping Substances         Medications:   All current active meds have been reviewed and current meds:  Scheduled Meds:  Current Facility-Administered Medications   Medication Dose Route Frequency Provider Last Rate   • amLODIPine  10 mg Oral Daily Jones Tejada MD     • apixaban  2 5 mg Oral BID Jones Tejada MD     • aspirin  81 mg Oral Daily Jones Tejada MD     • atorvastatin  40 mg Oral Daily With Mare Robertson MD     • baclofen  10 mg Oral BID Jones Tejada MD     • carvedilol  6 25 mg Oral BID With Meals Jones Tejada MD     • chlorhexidine  15 mL Mouth/Throat Q12H [facility] Jones Tejada MD     • hydrALAZINE  10 mg Intravenous Q4H PRN Jones Tejada MD     • lisinopril  10 mg Oral Daily Jones Tejada MD     • polyethylene glycol  17 g Oral Daily Jones Tejada MD     • senna-docusate sodium  1 tablet Oral HS Jones Tejada MD     • ticagrelor  90 mg Oral Q12H [address] Ag Terry MD       Continuous Infusions:   PRN Meds: •  hydrALAZINE       ROS:   Review of Systems   Neurological: Positive for speech difficulty, weakness and numbness  Vitals:   /79   Pulse 68   Temp (!) 97 4 °F (36 3 °C)   Resp 18   Ht 5' 9" (1 753 m)   Wt 86 5 kg (190 lb 11 2 oz)   SpO2 98%   BMI 28 16 kg/m²     Physical Exam:   Physical Exam  Vitals and nursing note reviewed  Constitutional:       Appearance: Normal appearance  Comments: Patient lying comfortably in bed in no acute distress  Occasionally tearful throughout the exam, especially when he is having difficulty with moving his right upper extremity   HENT:      Head: Normocephalic and atraumatic  Mouth/Throat:      Mouth: Mucous membranes are moist       Pharynx: Oropharynx is clear  Eyes:      Extraocular Movements: Extraocular movements intact  Conjunctiva/sclera: Conjunctivae normal       Pupils: Pupils are equal, round, and reactive to light  Pulmonary:      Effort: Pulmonary effort is normal    Musculoskeletal:      Comments: Right hemiparesis   Skin:     General: Skin is warm and dry  Neurological:      Mental Status: He is alert and oriented to person, place, and time  Deep Tendon Reflexes:      Reflex Scores:       Bicep reflexes are 3+ on the right side and 3+ on the left side  Brachioradialis reflexes are 3+ on the right side and 3+ on the left side  Patellar reflexes are 3+ on the right side and 3+ on the left side  Achilles reflexes are 4+ on the right side and 2+ on the left side  Comments: See full neuro exam below       Neurologic Exam     Mental Status   Oriented to person, place, and time  Patient awake and alert  Oriented to person, place, month, and year  Mild dysarthria, but speech is intelligible  No aphasia  Able to follow simple midline and appendicular commands       Cranial Nerves     CN III, IV, VI   Pupils are equal, round, and reactive to light  Pupils 3 mm and round  EOMs intact without nystagmus  No visual field deficits  Facial sensation to light touch diminished in the right V2 and V3 distribution  Right central facial weakness  Tongue midline  Motor Exam   Decreased tone in right upper extremity  Increased tone in right lower extremity  0/5 right deltoid, 2/5 right biceps, 0/5 right triceps  Flicker of movement in the right thumb, but no other movement noted in the fingers on the right hand  3/5 right iliopsoas, 3/5 right knee flexion, 0/5 right dorsiflexion and plantarflexion  Unable to wiggle toes on the right   5/5 strength left UE/LE     Sensory Exam   Sensation to light touch diminished in the right UE/LE  Decree sensation to temperature in the left UE/LE  Gait, Coordination, and Reflexes     Gait  Gait: (Deferred for patient's safety)    Reflexes   Right brachioradialis: 3+  Left brachioradialis: 3+  Right biceps: 3+  Left biceps: 3+  Right patellar: 3+  Left patellar: 3+  Right achilles: 4+  Left achilles: 2+  No resting or action tremor  No abnormal muscle spasms on the right leg noted today  Clonus in the right ankle with upgoing right toe  No clonus in the left ankle and downgoing left toe  Labs: I have personally reviewed pertinent reports     Recent Results (from the past 24 hour(s))   Magnesium    Collection Time: 03/02/23  4:55 AM   Result Value Ref Range    Magnesium 2 5 1 6 - 2 6 mg/dL   Basic metabolic panel    Collection Time: 03/02/23  4:55 AM   Result Value Ref Range    Sodium 142 135 - 147 mmol/L    Potassium 4 0 3 5 - 5 3 mmol/L    Chloride 115 (H) 96 - 108 mmol/L    CO2 25 21 - 32 mmol/L    ANION GAP 2 (L) 4 - 13 mmol/L    BUN 29 (H) 5 - 25 mg/dL    Creatinine 1 37 (H) 0 60 - 1 30 mg/dL    Glucose 111 65 - 140 mg/dL    Calcium 8 7 8 3 - 10 1 mg/dL    eGFR 54 ml/min/1 73sq m   Phosphorus    Collection Time: 03/02/23  4:55 AM   Result Value Ref Range Phosphorus 4 7 (H) 2 3 - 4 1 mg/dL   CBC and Platelet    Collection Time: 03/02/23  4:55 AM   Result Value Ref Range    WBC 11 85 (H) 4 31 - 10 16 Thousand/uL    RBC 3 65 (L) 3 88 - 5 62 Million/uL    Hemoglobin 10 4 (L) 12 0 - 17 0 g/dL    Hematocrit 32 3 (L) 36 5 - 49 3 %    MCV 89 82 - 98 fL    MCH 28 5 26 8 - 34 3 pg    MCHC 32 2 31 4 - 37 4 g/dL    RDW 13 0 11 6 - 15 1 %    Platelets 832 758 - 652 Thousands/uL    MPV 10 1 8 9 - 12 7 fL       Imaging: I have personally reviewed pertinent imaging in PACS, including MRI brain, CT head, CTA head/neck, echo,  and I have personally reviewed PACS reports  EKG, Pathology, and Other Studies: I have personally reviewed pertinent reports  VTE Prophylaxis: Sequential compression device (Venodyne) and Eliquis      Counseling / Coordination of Care  I have spent a total time of 36 minutes on 03/02/23 in caring for this patient including Diagnostic results, Prognosis, Risks and benefits of tx options, Instructions for management, Patient and family education, Importance of tx compliance, Risk factor reductions, Counseling / Coordination of care, Documenting in the medical record, Reviewing / ordering tests, medicine, procedures  , Obtaining or reviewing history   and Communicating with other healthcare professionals

## 2023-03-02 NOTE — PLAN OF CARE
Problem: Prexisting or High Potential for Compromised Skin Integrity  Goal: Skin integrity is maintained or improved  Description: INTERVENTIONS:  - Identify patients at risk for skin breakdown  - Assess and monitor skin integrity  - Assess and monitor nutrition and hydration status  - Monitor labs   - Assess for incontinence   - Turn and reposition patient  - Assist with mobility/ambulation  - Relieve pressure over bony prominences  - Avoid friction and shearing  - Provide appropriate hygiene as needed including keeping skin clean and dry  - Evaluate need for skin moisturizer/barrier cream  - Collaborate with interdisciplinary team   - Patient/family teaching  - Consider wound care consult   Outcome: Progressing     Problem: MOBILITY - ADULT  Goal: Maintain or return to baseline ADL function  Description: INTERVENTIONS:  -  Assess patient's ability to carry out ADLs; assess patient's baseline for ADL function and identify physical deficits which impact ability to perform ADLs (bathing, care of mouth/teeth, toileting, grooming, dressing, etc )  - Assess/evaluate cause of self-care deficits   - Assess range of motion  - Assess patient's mobility; develop plan if impaired  - Assess patient's need for assistive devices and provide as appropriate  - Encourage maximum independence but intervene and supervise when necessary  - Involve family in performance of ADLs  - Assess for home care needs following discharge   - Consider OT consult to assist with ADL evaluation and planning for discharge  - Provide patient education as appropriate  Outcome: Progressing  Goal: Maintains/Returns to pre admission functional level  Description: INTERVENTIONS:  - Perform BMAT or MOVE assessment daily    - Set and communicate daily mobility goal to care team and patient/family/caregiver  - Collaborate with rehabilitation services on mobility goals if consulted  - Perform Range of Motion 3 times a day    - Reposition patient every 2 hours   - Dangle patient 3 times a day  - Stand patient 3 times a day  - Out of bed to chair 3 times a day   - Out of bed for meals 3 times a day  - Out of bed for toileting  - Record patient progress and toleration of activity level   Outcome: Progressing     Problem: PAIN - ADULT  Goal: Verbalizes/displays adequate comfort level or baseline comfort level  Description: Interventions:  - Encourage patient to monitor pain and request assistance  - Assess pain using appropriate pain scale  - Administer analgesics based on type and severity of pain and evaluate response  - Implement non-pharmacological measures as appropriate and evaluate response  - Consider cultural and social influences on pain and pain management  - Notify physician/advanced practitioner if interventions unsuccessful or patient reports new pain  Outcome: Progressing     Problem: INFECTION - ADULT  Goal: Absence or prevention of progression during hospitalization  Description: INTERVENTIONS:  - Assess and monitor for signs and symptoms of infection  - Monitor lab/diagnostic results  - Monitor all insertion sites, i e  indwelling lines, tubes, and drains  - Monitor endotracheal if appropriate and nasal secretions for changes in amount and color  - [place] appropriate cooling/warming therapies per order  - Administer medications as ordered  - Instruct and encourage patient and family to use good hand hygiene technique  - Identify and instruct in appropriate isolation precautions for identified infection/condition  Outcome: Progressing  Goal: Absence of fever/infection during neutropenic period  Description: INTERVENTIONS:  - Monitor WBC    Outcome: Progressing     Problem: SAFETY ADULT  Goal: Maintain or return to baseline ADL function  Description: INTERVENTIONS:  -  Assess patient's ability to carry out ADLs; assess patient's baseline for ADL function and identify physical deficits which impact ability to perform ADLs (bathing, care of mouth/teeth, toileting, grooming, dressing, etc )  - Assess/evaluate cause of self-care deficits   - Assess range of motion  - Assess patient's mobility; develop plan if impaired  - Assess patient's need for assistive devices and provide as appropriate  - Encourage maximum independence but intervene and supervise when necessary  - Involve family in performance of ADLs  - Assess for home care needs following discharge   - Consider OT consult to assist with ADL evaluation and planning for discharge  - Provide patient education as appropriate  Outcome: Progressing  Goal: Maintains/Returns to pre admission functional level  Description: INTERVENTIONS:  - Perform BMAT or MOVE assessment daily    - Set and communicate daily mobility goal to care team and patient/family/caregiver  - Collaborate with rehabilitation services on mobility goals if consulted  - Perform Range of Motion 3 times a day  - Reposition patient every 2 hours    - Dangle patient 3 times a day  - Stand patient 3 times a day  - Out of bed to chair 3 times a day   - Out of bed for meals 3 times a day  - Out of bed for toileting  - Record patient progress and toleration of activity level   Outcome: Progressing  Goal: Patient will remain free of falls  Description: INTERVENTIONS:  - Educate patient/family on patient safety including physical limitations  - Instruct patient to call for assistance with activity   - Consult OT/PT to assist with strengthening/mobility   - Keep Call bell within reach  - Keep bed low and locked with side rails adjusted as appropriate  - Keep care items and personal belongings within reach  - Initiate and maintain comfort rounds  - Make Fall Risk Sign visible to staff    Problem: DISCHARGE PLANNING  Goal: Discharge to home or other facility with appropriate resources  Description: INTERVENTIONS:  - Identify barriers to discharge w/patient and caregiver  - Arrange for needed discharge resources and transportation as appropriate  - Identify discharge learning needs (meds, wound care, etc )  - Arrange for interpretive services to assist at discharge as needed  - Refer to Case Management Department for coordinating discharge planning if the patient needs post-hospital services based on physician/advanced practitioner order or complex needs related to functional status, cognitive ability, or social support system  Outcome: Progressing     Problem: Knowledge Deficit  Goal: Patient/family/caregiver demonstrates understanding of disease process, treatment plan, medications, and discharge instructions  Description: Complete learning assessment and assess knowledge base  Interventions:  - Provide teaching at level of understanding  - Provide teaching via preferred learning methods  Outcome: Progressing     Problem: NEUROSENSORY - ADULT  Goal: Achieves stable or improved neurological status  Description: INTERVENTIONS  - Monitor and report changes in neurological status  - Monitor vital signs such as temperature, blood pressure, glucose, and any other labs ordered   - Initiate measures to prevent increased intracranial pressure  - Monitor for seizure activity and implement precautions if appropriate      Outcome: Progressing  Goal: Achieves maximal functionality and self care  Description: INTERVENTIONS  - Monitor swallowing and airway patency with patient fatigue and changes in neurological status  - Encourage and assist patient to increase activity and self care     - Encourage visually impaired, hearing impaired and aphasic patients to use assistive/communication devices  Outcome: Progressing     Problem: CARDIOVASCULAR - ADULT  Goal: Maintains optimal cardiac output and hemodynamic stability  Description: INTERVENTIONS:  - Monitor I/O, vital signs and rhythm  - Monitor for S/S and trends of decreased cardiac output  - Administer and titrate ordered vasoactive medications to optimize hemodynamic stability  - Assess quality of pulses, skin color and temperature  - Assess for signs of decreased coronary artery perfusion  - Instruct patient to report change in severity of symptoms  Outcome: Progressing  Goal: Absence of cardiac dysrhythmias or at baseline rhythm  Description: INTERVENTIONS:  - Continuous cardiac monitoring, vital signs, obtain 12 lead EKG if ordered  - Administer antiarrhythmic and heart rate control medications as ordered  - Monitor electrolytes and administer replacement therapy as ordered  Outcome: Progressing     Problem: RESPIRATORY - ADULT  Goal: Achieves optimal ventilation and oxygenation  Description: INTERVENTIONS:  - Assess for changes in respiratory status  - Assess for changes in mentation and behavior  - Position to facilitate oxygenation and minimize respiratory effort  - Oxygen administered by appropriate delivery if ordered  - Encourage broncho-pulmonary hygiene including cough, deep breathe, Incentive Spirometry  - Assess the need for suctioning and aspirate as needed  - Assess and instruct to report SOB or any respiratory difficulty  - Respiratory Therapy support as indicated  Outcome: Progressing     Problem: GENITOURINARY - ADULT  Goal: Maintains or returns to baseline urinary function  Description: INTERVENTIONS:  - Assess urinary function  - Encourage oral fluids to ensure adequate hydration if ordered  - Administer IV fluids as ordered to ensure adequate hydration  - Administer ordered medications as needed  - Offer frequent toileting  - Follow urinary retention protocol if ordered  Outcome: Progressing  Goal: Absence of urinary retention  Description: INTERVENTIONS:  - Assess patient's ability to void and empty bladder  - Monitor I/O  - Bladder scan as needed  - Discuss with physician/AP medications to alleviate retention as needed    Outcome: Progressing  Goal: Urinary catheter remains patent  Description: INTERVENTIONS:  - Assess patency of urinary catheter  - If patient has a chronic barnett, consider changing catheter if non-functioning  - Follow guidelines for intermittent irrigation of non-functioning urinary catheter  Outcome: Progressing     Problem: METABOLIC, FLUID AND ELECTROLYTES - ADULT  Goal: Electrolytes maintained within normal limits  Description: INTERVENTIONS:  - Monitor labs and assess patient for signs and symptoms of electrolyte imbalances  - Administer electrolyte replacement as ordered  - Monitor response to electrolyte replacements, including repeat lab results as appropriate  - Instruct patient on fluid and nutrition as appropriate  Outcome: Progressing  Goal: Fluid balance maintained  Description: INTERVENTIONS:  - Monitor labs   - Monitor I/O and WT  - Instruct patient on fluid and nutrition as appropriate  - Assess for signs & symptoms of volume excess or deficit  Outcome: Progressing  Goal: Glucose maintained within target range  Description: INTERVENTIONS:  - Monitor Blood Glucose as ordered  - Assess for signs and symptoms of hyperglycemia and hypoglycemia  - Administer ordered medications to maintain glucose within target range  - Assess nutritional intake and initiate nutrition service referral as needed  Outcome: Progressing     Problem: Nutrition/Hydration-ADULT  Goal: Nutrient/Hydration intake appropriate for improving, restoring or maintaining nutritional needs  Description: Monitor and assess patient's nutrition/hydration status for malnutrition  Collaborate with interdisciplinary team and initiate plan and interventions as ordered  Monitor patient's weight and dietary intake as ordered or per policy  Utilize nutrition screening tool and intervene as necessary  Determine patient's food preferences and provide high-protein, high-caloric foods as appropriate       INTERVENTIONS:  - Monitor oral intake, urinary output, labs, and treatment plans  - Assess nutrition and hydration status and recommend course of action  - Evaluate amount of meals eaten  - Assist patient with eating if necessary   - Allow adequate time for meals  - Recommend/ encourage appropriate diets, oral nutritional supplements, and vitamin/mineral supplements  - Order, calculate, and assess calorie counts as needed  - Recommend, monitor, and adjust tube feedings and TPN/PPN based on assessed needs  - Assess need for intravenous fluids  - Provide specific nutrition/hydration education as appropriate  - Include patient/family/caregiver in decisions related to nutrition  Outcome: Progressing     Problem: Neurological Deficit  Goal: Neurological status is stable or improving  Description: Interventions:  - Monitor and assess patient's level of consciousness, motor function, sensory function, and level of assistance needed for ADLs  - Monitor and report changes from baseline  Collaborate with interdisciplinary team to initiate plan and implement interventions as ordered  - Provide and maintain a safe environment  - Consider seizure precautions  - Consider fall precautions  - Consider aspiration precautions  - Consider bleeding precautions  Outcome: Progressing     Problem: Activity Intolerance/Impaired Mobility  Goal: Mobility/activity is maintained at optimum level for patient  Description: Interventions:  - Assess and monitor patient  barriers to mobility and need for assistive/adaptive devices  - Assess patient's emotional response to limitations  - Collaborate with interdisciplinary team and initiate plans and interventions as ordered  - Encourage independent activity per ability   - Maintain proper body alignment  - Perform active/passive rom as tolerated/ordered  - Plan activities to conserve energy   - Turn patient as appropriate  Outcome: Progressing     Problem: Communication Impairment  Goal: Ability to express needs and understand communication  Description: Assess patient's communication skills and ability to understand information    Patient will demonstrate use of effective communication techniques, alternative methods of communication and understanding even if not able to speak  - Encourage communication and provide alternate methods of communication as needed  - Collaborate with case management/ for discharge needs  - Include patient/family/caregiver in decisions related to communication  Outcome: Progressing     Problem: Potential for Aspiration  Goal: Non-ventilated patient's risk of aspiration is minimized  Description: Assess and monitor vital signs, respiratory status, and labs (WBC)  Monitor for signs of aspiration (tachypnea, cough, rales, wheezing, cyanosis, fever)  - Assess and monitor patient's ability to swallow  - Place patient up in chair to eat if possible  - HOB up at 90 degrees to eat if unable to get patient up into chair   - Supervise patient during oral intake  - Instruct patient/ family to take small bites  - Instruct patient/ family to take small single sips when taking liquids    - Follow patient-specific strategies generated by speech pathologist   Outcome: Progressing   - Apply yellow socks and bracelet for high fall risk patients  - Consider moving patient to room near nurses station  Outcome: Progressing

## 2023-03-02 NOTE — PROGRESS NOTES
INTERNAL MEDICINE RESIDENCY PROGRESS NOTE     Name: Rupesh Xiao   Age & Sex: 60 y o  male   MRN: [id number]  Unit/Bed#: [facility]PHP 0-36   Encounter: [id number]  Team: SOD Team B     PATIENT INFORMATION     Name: Rupesh Xiao   Age & Sex: 60 y o  male   MRN: [id number]  Hospital Stay Days: 5    ASSESSMENT/PLAN     Principal Problem:    Acute Posterior Circulation Stroke  Active Problems:    COVID    Right Vertebral artery dissection (HCC)    Stenosis of left vertebral artery    Hypertensive emergency    UTI (urinary tract infection)    Dysphagia    Spasticity    Sinus tachycardia    Elevated serum creatinine    Leukocytosis    Prediabetes    Tearfulness      * Acute Posterior Circulation Stroke  Assessment & Plan  60 y o  male with no known prior hx, did not follow with a physician  Presented to [address] [place] [address] 2/25/2023 of several days of weakness, dizziness, and nausea/vomiting  CT head revealed acute/subacute infarcts in the right posterior cerebellum with follow up CTA head/neck demonstrating 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 and left vertebral artery origin stenosis with presumed occlusion of the distal left intradural vertebral segment  • MRA head/carotids demonstrated possible extension of the vertebral artery dissection into the basilar artery  • MRI brain revealed several small acute/early subacute bi-cerebellar infarcts (R>L) without evidence of hemorrhage     Patient was transferred to [facility] for closer monitoring with consideration for endovascular intervention   Initially given stable exam, intervention was felt too risky and patient was a heparin gtt with full dose aspirin  However, pt developed worsened dysarthria on 2/26 and R sided weakness     • Repeat CT 2/26 stable, however patient was taken to IR for emergent R vertebral/basilar thrombectomy with R V3/4 stenting with TICI 2B revascularization  • MRI brain 2/26: Increased infarct burden   Bilateral cerebellar infarcts with larger right cerebellar infarct and new acute infarcts in the right medulla, bilateral midbrain, and left occipital lobe        Plan:   • Neurosurgery consulted, recs appreciated  • Neurology consulted, recs appreciated  • Echo: EF 65%, normal wall motion, normal atria size, no PFO   • Lipid panel , , HDL 27,   • A1c 5 7  • q4hr neuro checks at night, q2hr during the day  • Pt transitioned from Integrilin to DAPT on 2/27 with ASA and Brilinta 180 mg load  Continue ASA and Brilinta 90 mg BID  • Check P2Y12 3/3 AM  •  transitioned to Eliquis 2 5 BID from heparin on 2/28  • Continue atorvastatin  • SBP goal <160    Tearfulness  Assessment & Plan  Pt with persistent tearfulness, depressed mood following recent stroke  Pt does not want any intervention for this at this time       Continue to monitor mood  Consider outpatient counseling, SSRI if pt willing    Prediabetes  Assessment & Plan  ? a1c 5 7  ? Glucose 110-127  ? Diet and lifestyle modifications    Leukocytosis  Assessment & Plan  Pt with slightly elevated WBC, stable from day prior  Afebrile, no worsening s/sxs of infection at this time  Suspect likely reactive in setting of recent stroke vs UTI vs COVID  ? Continue to follow WBC count, monitor fever curve    Elevated serum creatinine  Assessment & Plan  Pt presented with elevated Cr 1 32 on arrival, now improved  No known baseline  ? Continue to follow BMP  ? No significant increase in Cr with lisinopril  ? Avoid nephrotoxic agents    Sinus tachycardia  Assessment & Plan   pt with intermittent episodes of brief sinus tachycardia  ? Continue potassium goal >4  ? Continue tele    Spasticity  Assessment & Plan  Pt with new RLE spasticity and myoclonus  Unlikely focal seizures per neurology       • Continue baclofen 10 mg BID, frequency adjusted per pharmacy given renal function    Dysphagia  Assessment & Plan  Pt with new dysphagia s/p stroke  ?  Continue dysphagia 3 diet, nectar thick liquids per speech eval    UTI (urinary tract infection)  Assessment & Plan  Urine cx with pan-susceptible E coli  Pt completed 4-day course of CTX as of        Plan:  • Monitor off abx    Hypertensive emergency  Assessment & Plan  Pt initially presented with hypertensive emergency in setting of stroke as mentioned above       Plan:  • Pt on cardene drip in the ICU, now discontinued today  • Continue Amlodipine 10 mg qd, Coreg 6 25 BID  Increase lisinopril today from 5 mg to 10 mg now off cardene  • SBP goal <160  • Continue PRNs antihypertensive with PRN labetalol    Stenosis of left vertebral artery  Assessment & Plan  See acute posterior circulation stroke plan    Right Vertebral artery dissection [facility])  Assessment & Plan  See acute posterior circulation plan    COVID  Assessment & Plan  Pt asx, remained on room air during hospitalization       • Continue airborne and contact precautions      Disposition: remain inpatient for medical management and evaluation for post acute rehab    SUBJECTIVE     Patient seen and examined  No acute events overnight  Patient states that he is doing okay besides having the hiccups that are annoying to him  Patient declined to have me call and update any of his family because he said he is updating them    OBJECTIVE     Vitals:    23 1543 23 1757 23 2256 23 0600   BP: 141/67 152/70 151/71    BP Location: Left arm  Left arm    Pulse: 66 72 61    Resp: 12  15    Temp: 98 °F (36 7 °C) 98 2 °F (36 8 °C) 97 8 °F (36 6 °C)    TempSrc: Oral  Oral    SpO2: 98%  97%    Weight:    86 5 kg (190 lb 11 2 oz)   Height:          Temperature:   Temp (24hrs), Av 9 °F (36 6 °C), Min:97 6 °F (36 4 °C), Max:98 2 °F (36 8 °C)    Temperature: 97 8 °F (36 6 °C)  Intake & Output:  I/O        0701   0700  0701   0700  0701   0700    P  O  920 600     I V  (mL/kg) 1448 (16 7) 258 (3)     IV Piggyback       Total Intake(mL/kg) 2368 (27 3) 858 (9 9)     Urine (mL/kg/hr) 2540 (1 2) 500 (0 2)     Total Output 2540 500     Net -172 +358                Weights:   IBW (Ideal Body Weight): 70 7 kg    Body mass index is 28 16 kg/m²  Weight (last 2 days)     Date/Time Weight    03/02/23 0600 86 5 (190 7)    03/01/23 0600 86 7 (191 14)        Physical Exam  Vitals and nursing note reviewed  Constitutional:       General: He is not in acute distress  Appearance: He is well-developed  HENT:      Head: Normocephalic and atraumatic  Right Ear: External ear normal       Left Ear: External ear normal       Nose: Congestion present  No rhinorrhea  Mouth/Throat:      Mouth: Mucous membranes are moist       Pharynx: Oropharynx is clear  Eyes:      Conjunctiva/sclera: Conjunctivae normal    Cardiovascular:      Rate and Rhythm: Normal rate and regular rhythm  Heart sounds: No murmur heard  Pulmonary:      Effort: Pulmonary effort is normal  No respiratory distress  Breath sounds: Normal breath sounds  Abdominal:      Palpations: Abdomen is soft  Tenderness: There is no abdominal tenderness  Musculoskeletal:         General: No swelling  Cervical back: Neck supple  Skin:     General: Skin is warm and dry  Capillary Refill: Capillary refill takes less than 2 seconds  Neurological:      Mental Status: He is alert and oriented to person, place, and time  Cranial Nerves: Dysarthria and facial asymmetry present  Sensory: Sensory deficit (right sided decreased light touch sensation ) present  Motor: Weakness (right sided hemiparesis ) present  Psychiatric:         Mood and Affect: Mood normal        LABORATORY DATA     Labs: I have personally reviewed pertinent reports    Results from last 7 days   Lab Units 03/02/23  0455 03/01/23  0440 02/28/23  0209 02/27/23  1003 02/26/23  0506 02/26/23  0021 02/25/23  0940   WBC Thousand/uL 11 85* 12 16* 12 50*   < > 13 27*   < > 9 99   HEMOGLOBIN g/dL 10 4* 10 5* 11 0* < > 13 3   < > 14 4   HEMATOCRIT % 32 3* 32 4* 35 1*   < > 40 2   < > 44 0   PLATELETS Thousands/uL 309 277 263   < > 349   < > 326   NEUTROS PCT %  --   --   --   --  89*  --  88*   MONOS PCT %  --   --   --   --  4  --  4    < > = values in this interval not displayed  Results from last 7 days   Lab Units 03/02/23 0455 03/01/23  0440 02/28/23  0209 02/27/23  0429 02/26/23  0506 02/25/23 2044 02/25/23  0940   POTASSIUM mmol/L 4 0 3 7 3 5   < > 3 4*   < > 3 5   CHLORIDE mmol/L 115* 116* 118*   < > 115*   < > 108   CO2 mmol/L 25 23 22   < > 21   < > 24   BUN mg/dL 29* 23 18   < > 17   < > 23   CREATININE mg/dL 1 37* 1 23 1 27   < > 1 09   < > 1 32*   CALCIUM mg/dL 8 7 8 5 8 7   < > 8 8   < > 8 8   ALK PHOS U/L  --   --   --   --  63  --  55   ALT U/L  --   --   --   --  24  --  19   AST U/L  --   --   --   --  13  --  16    < > = values in this interval not displayed  Results from last 7 days   Lab Units 03/02/23 0455 02/28/23 0209 02/27/23  0429   MAGNESIUM mg/dL 2 5 2 3 2 1     Results from last 7 days   Lab Units 03/02/23 0455 02/28/23  0209   PHOSPHORUS mg/dL 4 7* 2 3      Results from last 7 days   Lab Units 02/28/23  0208 02/27/23  2100 02/27/23  1548 02/27/23  1003 02/26/23  0506 02/26/23  0021 02/25/23  0940   INR   --   --   --  1 05  --  1 06 0 98   PTT seconds 78* 40* 32 31   < > 31 29    < > = values in this interval not displayed  Results from last 7 days   Lab Units 02/25/23  2044   LACTIC ACID mmol/L 1 2           IMAGING & DIAGNOSTIC TESTING     Radiology Results: I have personally reviewed pertinent reports  CTA head and neck w wo contrast    Result Date: 2/26/2023  Impression: Overall, there is no significant interval change when comparing to the recent MRI brain, and CTA head neck study  Stable small cerebellar infarctions as noted on the recent MRI study   Stable findings of distal right cervical and proximal intradural vertebral artery occlusion with probable retrograde flow in its distal intradural segment  Stable left vertebral artery origin stenosis and presumed occlusion of the distal left intradural vertebral segment  Small basilar artery with focal atherosclerotic disease in its proximal segment  Patent fetal right PCA circulation  Left PCA branch is small but patent, and unchanged in appearance  Stable poor visualization of the proximal left superior cerebellar artery  Above-mentioned findings are in keeping with the preliminary report provided by [facility] radiology services without significant discrepancy in the report  Workstation performed: [id number]     CTA head and neck with and without contrast    Result Date: 2/25/2023  Impression: No acute intracranial hemorrhage  Focal infarcts in the right posterior cerebellum, possibly acute to subacute  Follow-up MRI imaging is recommended  Marked tapering and occlusion of the distal right cervical vertebral artery  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  Proximal right intradural vertebral artery is also occluded with faint visualization of the post-PICA segment, which may be filling in a retrograde fashion  Severe left vertebral artery origin stenosis  Left vertebral artery may terminate in a posterior for cerebellar artery branch as the post-PICA segment is not identified  Small basilar artery with fetal right PCA circulation  Left proximal PCA is patent but small  Mid to distal left PCA is not well visualized  I personally discussed this study with Winnie Aldridge on 2/25/2023 at 12:21 PM  Workstation performed: [id number]     XR chest 1 view portable    Result Date: 2/25/2023  Impression: No acute abnormality however there is an opacity in the left lung base that may represent focal pleural thickening  Neoplasm cannot be excluded  Nonemergent follow-up PA and lateral views of the chest or CT chest suggested unless there are prior studies available for comparison   Findings for this inpatient marked for immediate notification in Epic  Workstation performed: [id number]     CT head wo contrast    Result Date: 2/28/2023  Impression: Evolving acute infarcts in the brainstem (worse in left midbrain/left upper tatiana) and bilateral cerebellum (right worse than left) status post right distal vertebral artery stenting  No acute intracranial hemorrhage  Workstation performed: [id number]     CT head wo contrast    Result Date: 2/26/2023  Impression: Stable evolving bilateral cerebellar infarctions without significant interval change  Workstation performed: [id number]     CT head wo contrast    Result Date: 2/26/2023  Impression: No new parenchymal findings when comparing to the recent CT and MRI brain studies  Stable late acute to early subacute bilateral cerebellar infarctions without hemorrhagic transformation  Workstation performed: [id number]     MRA head wo contrast    Result Date: 2/25/2023  Impression: Abscess of flow related signal in the V4 segments of the bilateral vertebral arteries, throughout the basilar artery and left posterior cerebral artery, concerning for progression of dissection and/or thrombus  Recommend emergent interventional radiology consultation  I personally discussed this study with Sadia Thomas   on 2/25/2023 at 8:15 PM   Workstation performed: [id number]     MRA carotids wo contrast    Result Date: 2/25/2023  Impression: 1  There complete absence of flow related signal along the cervical segment of the right vertebral artery concerning for progression of dissection and/or retrograde flow  2   Absence of flow related signal in the V4 segments of the bilateral vertebral arteries and throughout the basilar artery, also concerning for progression of dissection  I personally discussed this study with Sadia Thomas on 2/25/2023 at 8:24 PM  Workstation performed: [id number]     MRI brain wo contrast    Result Date: 2/27/2023  Impression: 1    Crescending, acute/recent posterior circulation infarctions with increasing right cerebellar and new brainstem infarctions as described  Overall infarct burden is slightly increased from the prior study one day earlier although new small infarcts are noted in the posterior medulla on the right, right midbrain and left occipital lobe  No hydrocephalus or large parenchymal hemorrhage  2   Abnormal left vertebral artery flow void at the lateral mass of C1, possibly related to slow flow and/or vascular occlusion  3   Very mild, chronic microangiopathy and chronic left thalamic lacunar infarction    Workstation performed: [id number]     MRI brain wo contrast    Result Date: 2/25/2023  Impression: Acute early subacute infarcts throughout the right greater than left cerebellar hemispheres and anterior vermis, consistent with findings on the head CT  No hemorrhagic conversion or mass effect  Arthrotomy 8 Workstation performed: [id number]     XR follow up    Result Date: 2/25/2023  Impression: No radiopaque orbital foreign body  Workstation performed: [id number]     CT cerebral perfusion    Result Date: 2/26/2023  Impression: CT perfusion performed  Data available on PACS  Workstation performed: [id number]     Other Diagnostic Testing: I have personally reviewed pertinent reports      ACTIVE MEDICATIONS     Current Facility-Administered Medications   Medication Dose Route Frequency   • amLODIPine (NORVASC) tablet 10 mg  10 mg Oral Daily   • apixaban (ELIQUIS) tablet 2 5 mg  2 5 mg Oral BID   • aspirin chewable tablet 81 mg  81 mg Oral Daily   • atorvastatin (LIPITOR) tablet 40 mg  40 mg Oral Daily With Dinner   • baclofen tablet 10 mg  10 mg Oral BID   • carvedilol (COREG) tablet 6 25 mg  6 25 mg Oral BID With Meals   • chlorhexidine (PERIDEX) 0 12 % oral rinse 15 mL  15 mL Mouth/Throat Q12H KATIE   • hydrALAZINE (APRESOLINE) injection 10 mg  10 mg Intravenous Q4H PRN   • lisinopril (ZESTRIL) tablet 10 mg  10 mg Oral Daily   • polyethylene glycol (MIRALAX) packet 17 g  17 g Oral Daily   • senna-docusate sodium (SENOKOT S) 8 6-50 mg per tablet 1 tablet  1 tablet Oral HS   • ticagrelor (BRILINTA) tablet 90 mg  90 mg Oral Q12H [facility]       VTE Pharmacologic Prophylaxis: Sequential compression device (Venodyne)   VTE Mechanical Prophylaxis: sequential compression device    Portions of the record may have been created with voice recognition software  Occasional wrong word or "sound a like" substitutions may have occurred due to the inherent limitations of voice recognition software    Read the chart carefully and recognize, using context, where substitutions have occurred   ==  Margy Alexander, [address]  Internal Medicine Residency PGY-1

## 2023-03-02 NOTE — PHYSICAL THERAPY NOTE
Physical Therapy Treatment Note    Patient's Name: Faby Johnston  : 23 1030   PT Last Visit   PT Visit Date 23   Note Type   Note Type Treatment for insurance authorization   Pain Assessment   Pain Assessment Tool 0-10   Pain Score No Pain   Restrictions/Precautions   Weight Bearing Precautions Per Order No   Other Precautions Airborne/isolation; Chair Alarm; Bed Alarm;Telemetry; Fall Risk  (R hemiparesis)   General   Chart Reviewed Yes   Additional Pertinent History /66  Improving strength in RLE noted (2-/5 quads, hamstrings, hip flexors + anterior tibialis)   Response to Previous Treatment Patient with no complaints from previous session  Family/Caregiver Present Yes  (spouse)   Cognition   Overall Cognitive Status WFL   Arousal/Participation Alert   Attention Attends with cues to redirect   Orientation Level Oriented X4   Memory Within functional limits   Following Commands Follows one step commands with increased time or repetition   Comments pleasant + cooperative   Subjective   Subjective Pt agreeable to mobilize  Bed Mobility   Supine to Sit 3  Moderate assistance   Additional items Assist x 1; Increased time required;Verbal cues;LE management   Sit to Supine Unable to assess   Additional Comments Pt greeted in supine  Transfers   Sit to Stand 3  Moderate assistance   Additional items Assist x 2; Increased time required;Verbal cues   Stand to Sit 3  Moderate assistance   Additional items Assist x 2; Increased time required;Verbal cues   Stand pivot 3  Moderate assistance   Additional items Increased time required;Assist x 2;Verbal cues   Additional Comments sling donned to RUE prior to transfers for shoulder protection strategy, pt able to pull up on sink w/ ModAx1   Ambulation/Elevation   Gait pattern Not appropriate; Not tested   Balance   Static Sitting Fair -   Dynamic Sitting Poor +   Static Standing Poor   Dynamic Standing Poor -   Endurance Deficit   Endurance Deficit Yes   Activity Tolerance   Activity Tolerance Patient limited by fatigue   Medical Staff Made Aware OT Kya   Nurse Made Aware yes - cleared for therapy   Exercises   Heel Cord Stretch Supine;Right  (13 SH - educated pt's spouse in stretching gastroc for 15-30 31 Curtisana LujanBetsey)   Neuro re-ed Dynamic sitting balance reaching forward, laterally, + diagonally, w/ facilitation of crossing midline  Assessment   Prognosis Fair   Problem List Decreased strength;Decreased range of motion;Decreased endurance; Impaired balance;Decreased mobility; Decreased coordination; Impaired sensation; Impaired tone   Assessment Pt seen for PT treatment session w/ focus on HEP instruction, bed mobility training, t/f training, + static standing training  Pt demonstrated improvements in RLE strength, sitting balance, activity tolerance, + bed mobility/transfers  From a PT standpoint recommend acute medical rehab  Goals   Patient Goals get stronger   PT Treatment Day 1   Plan   Treatment/Interventions Functional transfer training;LE strengthening/ROM; Therapeutic exercise; Endurance training;Patient/family training;Equipment eval/education; Bed mobility; Compensatory technique education;Spoke to nursing;OT;Family  (balance training)   Progress Progressing toward goals   PT Frequency 3-5x/wk   Recommendation   PT Discharge Recommendation Post acute rehabilitation services  (acute medical rehab)   AM-PAC Basic Mobility Inpatient   Turning in Flat Bed Without Bedrails 3   Lying on Back to Sitting on Edge of Flat Bed Without Bedrails 2   Moving Bed to Chair 2   Standing Up From Chair Using Arms 2   Walk in Room 1   Climb 3-5 Stairs With Railing 1   Basic Mobility Inpatient Raw Score 11   Basic Mobility Standardized Score 30 25   Highest Level Of Mobility   JH-HLM Goal 4: Move to chair/commode   JH-HLM Achieved 5: Stand (1 or more minutes)   Education   Education Provided Mobility training;Home exercise program   Patient Demonstrates acceptance/verbal understanding;Reinforcement needed   End of Consult   Patient Position at End of Consult Bedside chair;Bed/Chair alarm activated; All needs within reach  (on waffle cushion, RUE elevated on pillow, BLE elevated)     Dimitrios Ny, PT, DPT

## 2023-03-02 NOTE — PLAN OF CARE
Problem: PHYSICAL THERAPY ADULT  Goal: Performs mobility at highest level of function for planned discharge setting  See evaluation for individualized goals  Description: Treatment/Interventions: ADL retraining, Functional transfer training, LE strengthening/ROM, Elevations, Therapeutic exercise, Endurance training, Cognitive reorientation, Patient/family training, Equipment eval/education, Bed mobility, Gait training, Compensatory technique education, Continued evaluation, Spoke to nursing, Spoke to case management, Spoke to advanced practitioner, OT, Family  Equipment Recommended:  (TBD)       See flowsheet documentation for full assessment, interventions and recommendations  Outcome: Progressing  Note: Prognosis: Fair  Problem List: Decreased strength, Decreased range of motion, Decreased endurance, Impaired balance, Decreased mobility, Decreased coordination, Impaired sensation, Impaired tone  Assessment: Pt seen for PT treatment session w/ focus on HEP instruction, bed mobility training, t/f training, + static standing training  Pt demonstrated improvements in RLE strength, sitting balance, activity tolerance, + bed mobility/transfers  From a PT standpoint recommend acute medical rehab  PT Discharge Recommendation: Post acute rehabilitation services (acute medical rehab)    See flowsheet documentation for full assessment

## 2023-03-03 ENCOUNTER — TELEPHONE (OUTPATIENT)
Dept: NEUROSURGERY | Facility: CLINIC | Age: 63
End: 2023-03-03

## 2023-03-03 LAB
ANION GAP SERPL CALCULATED.3IONS-SCNC: 4 MMOL/L (ref 4–13)
BACTERIA BLD CULT: NORMAL
BACTERIA BLD CULT: NORMAL
BUN SERPL-MCNC: 31 MG/DL (ref 5–25)
CALCIUM SERPL-MCNC: 8.8 MG/DL (ref 8.3–10.1)
CHLORIDE SERPL-SCNC: 109 MMOL/L (ref 96–108)
CO2 SERPL-SCNC: 27 MMOL/L (ref 21–32)
CREAT SERPL-MCNC: 1.29 MG/DL (ref 0.6–1.3)
ERYTHROCYTE [DISTWIDTH] IN BLOOD BY AUTOMATED COUNT: 13 % (ref 11.6–15.1)
GFR SERPL CREATININE-BSD FRML MDRD: 58 ML/MIN/1.73SQ M
GLUCOSE SERPL-MCNC: 97 MG/DL (ref 65–140)
HCT VFR BLD AUTO: 33.3 % (ref 36.5–49.3)
HGB BLD-MCNC: 11.6 G/DL (ref 12–17)
MCH RBC QN AUTO: 30.6 PG (ref 26.8–34.3)
MCHC RBC AUTO-ENTMCNC: 34.8 G/DL (ref 31.4–37.4)
MCV RBC AUTO: 88 FL (ref 82–98)
PA ADP BLD-ACNC: 102 PRU (ref 194–418)
PLATELET # BLD AUTO: 347 THOUSANDS/UL (ref 149–390)
PMV BLD AUTO: 10.1 FL (ref 8.9–12.7)
POTASSIUM SERPL-SCNC: 4.2 MMOL/L (ref 3.5–5.3)
RBC # BLD AUTO: 3.79 MILLION/UL (ref 3.88–5.62)
SODIUM SERPL-SCNC: 140 MMOL/L (ref 135–147)
WBC # BLD AUTO: 12.09 THOUSAND/UL (ref 4.31–10.16)

## 2023-03-03 RX ORDER — FLUOXETINE HYDROCHLORIDE 20 MG/1
20 CAPSULE ORAL DAILY
Status: DISCONTINUED | OUTPATIENT
Start: 2023-03-03 | End: 2023-03-06 | Stop reason: HOSPADM

## 2023-03-03 RX ADMIN — AMLODIPINE BESYLATE 10 MG: 10 TABLET ORAL at 08:26

## 2023-03-03 RX ADMIN — BACLOFEN 10 MG: 10 TABLET ORAL at 17:08

## 2023-03-03 RX ADMIN — FLUOXETINE 20 MG: 20 CAPSULE ORAL at 12:44

## 2023-03-03 RX ADMIN — ASPIRIN 81 MG CHEWABLE TABLET 81 MG: 81 TABLET CHEWABLE at 08:22

## 2023-03-03 RX ADMIN — LISINOPRIL 20 MG: 20 TABLET ORAL at 08:22

## 2023-03-03 RX ADMIN — POLYETHYLENE GLYCOL 3350 17 G: 17 POWDER, FOR SOLUTION ORAL at 08:23

## 2023-03-03 RX ADMIN — TICAGRELOR 90 MG: 90 TABLET ORAL at 08:26

## 2023-03-03 RX ADMIN — CARVEDILOL 6.25 MG: 6.25 TABLET, FILM COATED ORAL at 17:08

## 2023-03-03 RX ADMIN — CARVEDILOL 6.25 MG: 6.25 TABLET, FILM COATED ORAL at 08:22

## 2023-03-03 RX ADMIN — ATORVASTATIN CALCIUM 40 MG: 40 TABLET, FILM COATED ORAL at 17:08

## 2023-03-03 RX ADMIN — CHLORHEXIDINE GLUCONATE 0.12% ORAL RINSE 15 ML: 1.2 LIQUID ORAL at 08:23

## 2023-03-03 RX ADMIN — APIXABAN 2.5 MG: 2.5 TABLET, FILM COATED ORAL at 08:22

## 2023-03-03 RX ADMIN — APIXABAN 2.5 MG: 2.5 TABLET, FILM COATED ORAL at 17:08

## 2023-03-03 RX ADMIN — BACLOFEN 10 MG: 10 TABLET ORAL at 08:22

## 2023-03-03 RX ADMIN — TICAGRELOR 90 MG: 90 TABLET ORAL at 21:28

## 2023-03-03 NOTE — PROGRESS NOTES
[address] [facility]  Progress Note - Kayce Leigh 1960, 60 y o  male MRN: [id number]  Unit/Bed#: [facility] 713-01 Encounter: [id number]  Primary Care Provider: No primary care provider on file  Date and time admitted to hospital: 2/25/2023 11:47 PM    * Acute Posterior Circulation Stroke  Assessment & Plan  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 (Dr Pedro Jaquez 2/26/2023)   · TICI 2B revascularization   · Patient presented with dysarthria and dizziness  · found to have bilateral cerebellar infarct with significant R V3/4 thrombus, L vertebral artery stenosis with possible occlusion within intradural segment  · NIHSS 0 with symptom onset 2/23 progressed to dysarthria, left facial droop not corrected with smile, R side flaccid    Imaging:  · MRI brain 2/26/2023: Acute posterior circulation infarcts within the right cerebellar hemisphere with new brainstem infarcts  Overall infarct burden slightly increased from prior study although new infarcts noted in posterior medulla on the right right midbrain and left occipital lobe  No hydrocephalus or parenchymal hemorrhage  Abnormal left vertebral artery flow void at the lateral mass of C1 related to slow flow or vascular occlusion  · CT head w/o, 2/28/23: Evolving acute infarcts in the brainstem (worse in left midbrain/left upper tatiana) and bilateral cerebellum (right worse than left) status post right distal vertebral artery stenting    Plan:  · Continue to closely monitor symptoms   · DAPT / AC  · ASA 81mg   · Brilinta 90mg BID  · Eliquis 2 5mg BID  · P2Y12 today 102  · STAT CTH for decline or change in neurological status  · Ongoing medical management   · Continue BP goal as ordered   · Neurology following for ongoing stroke care  · PT/OT, patient accepted to [facility]    Neurosurgery will sign off at this time  Patient will follow up in 2 weeks with repeat CTA H/N  Please call with questions or concerns  Hypertensive emergency  Assessment & Plan  · BP >640 systolic   · On PO medications at this time  · Continue to uptitrate oral therapy given increase in SBP p24h    Stenosis of left vertebral artery  Assessment & Plan  · Plan as above    Right Vertebral artery dissection (HCC)  Assessment & Plan  · Plan as above                Subjective/Objective   Chief Complaint: none    Subjective: Patient with NAEO although SBP is slowly creeping above 170 on oral medication  Very tearful in the room  He is excited to start intensive rehab in [facility]  No other complaints at this time  Objective: Patient sitting in bed in NAD, tearful  Wife at bedside  Invasive Devices     Peripheral Intravenous Line  Duration           Peripheral IV 03/03/23 Left;Upper;Ventral (anterior) Arm <1 day                Vitals: Blood pressure 162/75, pulse 58, temperature 98 °F (36 7 °C), resp  rate 18, height 5' 9" (1 753 m), weight 86 5 kg (190 lb 11 2 oz), SpO2 98 %  ,Body mass index is 28 16 kg/m²  General appearance: alert, appears stated age, cooperative and no distress  Head: Normocephalic, without obvious abnormality, atraumatic  Eyes: EOMI, PERRL, conjugate gaze, no VF deficits  Neck: supple, symmetrical, trachea midline   Lungs: non labored breathing  Heart: regular heart rate  Neurologic:   Mental status: Alert, oriented x4, thought content appropriate, able to calculate change, able to repeat sentence and read, able to make jokes  Mild dysarthria  Cranial nerves: grossly intact (Cranial nerves II-XII) except mild left facial droop corrected with smile  Sensory: normal to LT x4  Motor: 5/5 left side  0/5 RUE  2/5 right KF and PF, 0 5 right DF and HF    Lab Results: I have personally reviewed pertinent results        Results from last 7 days   Lab Units 03/03/23  0000 03/02/23  0455 03/01/23  0440 02/27/23  1003 02/26/23  0506 02/26/23  0021 02/25/23  0940   WBC Thousand/uL 12 09* 11 85* 12 16*   < > 13 27*   < > 9 99 HEMOGLOBIN g/dL 11 6* 10 4* 10 5*   < > 13 3   < > 14 4   HEMATOCRIT % 33 3* 32 3* 32 4*   < > 40 2   < > 44 0   PLATELETS Thousands/uL 347 309 277   < > 349   < > 326   NEUTROS PCT %  --   --   --   --  89*  --  88*   MONOS PCT %  --   --   --   --  4  --  4    < > = values in this interval not displayed  Results from last 7 days   Lab Units 03/03/23  0000 03/02/23  0455 03/01/23  0440 02/27/23  0429 02/26/23  0506 02/25/23  2044 02/25/23  0940   POTASSIUM mmol/L 4 2 4 0 3 7   < > 3 4*   < > 3 5   CHLORIDE mmol/L 109* 115* 116*   < > 115*   < > 108   CO2 mmol/L 27 25 23   < > 21   < > 24   BUN mg/dL 31* 29* 23   < > 17   < > 23   CREATININE mg/dL 1 29 1 37* 1 23   < > 1 09   < > 1 32*   CALCIUM mg/dL 8 8 8 7 8 5   < > 8 8   < > 8 8   ALK PHOS U/L  --   --   --   --  63  --  55   ALT U/L  --   --   --   --  24  --  19   AST U/L  --   --   --   --  13  --  16    < > = values in this interval not displayed  Results from last 7 days   Lab Units 03/02/23  0455 02/28/23  0209 02/27/23  0429   MAGNESIUM mg/dL 2 5 2 3 2 1     Results from last 7 days   Lab Units 03/02/23  0455 02/28/23  0209   PHOSPHORUS mg/dL 4 7* 2 3     Results from last 7 days   Lab Units 02/28/23  0208 02/27/23  2100 02/27/23  1548 02/27/23  1003 02/26/23  0506 02/26/23  0021 02/25/23  0940   INR   --   --   --  1 05  --  1 06 0 98   PTT seconds 78* 40* 32 31   < > 31 29    < > = values in this interval not displayed  No results found for: TROPONINT  ABG:No results found for: PHART, BNZ7UAS, PO2ART, KHA4NSQ, X4ZTPIMT, BEART, SOURCE    Imaging Studies: I have personally reviewed pertinent reports  and I have personally reviewed pertinent films in PACS     CTA head and neck w wo contrast    Result Date: 2/26/2023  Narrative: CTA NECK AND BRAIN WITH AND WITHOUT CONTRAST INDICATION: Vertebral artery dissection, right V3/4 thrombus and multifocal diffusion-weighted imaging changes on recent MRI   COMPARISON:   Head CT and CT angiography of the head and neck from February 25, 2023 as well as follow-up MRI of the brain, MRA of the head, and MRA of the neck  TECHNIQUE:  Routine CT imaging of the Brain without contrast   Post contrast imaging was performed after administration of iodinated contrast through the neck and brain  Post contrast axial 0 625 mm images timed to opacify the arterial system  3D rendering was performed on an independent workstation  MIP reconstructions performed  Coronal reconstructions were performed of the noncontrast portion of the brain  Radiation dose length product (DLP) for this visit:  1490 67 mGy-cm   This examination, like all CT scans performed in the [facility], was performed utilizing techniques to minimize radiation dose exposure, including the use of iterative reconstruction and automated exposure control  IV Contrast:  165 mL of iodixanol (VISIPAQUE)  IMAGE QUALITY:   Diagnostic FINDINGS: NONCONTRAST BRAIN PARENCHYMA:  Multiple focal acute to early subacute cerebellar infarctions as noted on the recent MRI study  No hemorrhagic transformation or mass effect on the 4th ventricle  VENTRICLES AND EXTRA-AXIAL SPACES:  Normal for the patient's age  VISUALIZED ORBITS: Normal visualized orbits  PARANASAL SINUSES: Moderate mucosal thickening of the visualized the paranasal sinuses  CERVICAL VASCULATURE AORTIC ARCH AND GREAT VESSELS:  Normal aortic arch and great vessel origins  Normal visualized subclavian vessels  RIGHT VERTEBRAL ARTERY CERVICAL SEGMENT:  Normal origin  The vessel is normal in its proximal and mid cervical segments  There is persistent abrupt tapering and occlusion of the distal right V3 segment and stable occlusion of the right V4 segment  The right posterior inferior cerebellar artery branch is again not well visualized  Caliber throughout the neck   LEFT VERTEBRAL ARTERY CERVICAL SEGMENT:  Left vertebral artery origin is not well visualized and the previously noted for origin stenosis is suspected  The remaining left cervical vertebral artery is patent with a normal caliber  The left posterior inferior cerebellar artery branch is patent  RIGHT EXTRACRANIAL CAROTID SEGMENT:  Stable mild atherosclerotic stenosis at the right carotid bifurcation and ICA origin /bulb  No post bulbar tandem stenosis  There is less than 50% ICA origin stenosis by facet criteria  LEFT EXTRACRANIAL CAROTID SEGMENT:  Mild stenosis in the mid left common carotid artery  Left carotid bifurcation is unremarkable  NASCET criteria was used to determine the degree of internal carotid artery diameter stenosis  INTRACRANIAL VASCULATURE INTERNAL CAROTID ARTERIES:  Moderate stenosis within the cavernous segments of both internal carotid arteries  Supraclinoid ICA segments are patent bilaterally  Ophthalmic artery origins are grossly unremarkable  Carotid termini remain patent  ANTERIOR CIRCULATION:  Symmetric A1 segments and anterior cerebral arteries with normal enhancement  Normal anterior communicating artery  MIDDLE CEREBRAL ARTERY CIRCULATION:  M1 segment and middle cerebral artery branches demonstrate normal enhancement bilaterally  DISTAL VERTEBRAL ARTERIES:  Occluded right intradural vertebral artery in its proximal and mid segment  The right posterior inferior cerebellar artery branch is well seen  Patent left intradural vertebral artery with moderate to severe stenosis in its distal pre-PICA segment  The left posterior inferior cerebellar artery branch is patent  The post-PICA segment is not visualized and presumably occluded  Overall findings have not significantly changed in the interval  BASILAR ARTERY:  Basilar artery remains small in caliber with suggestion of underlying atherosclerotic disease in its proximal segment  Fetal right PCA circulation is again noted  The basilar artery appears to terminate into a small left posterior cerebral artery branch    The left superior cerebellar artery branch is not well defined on this examination and origin stenosis/occlusion is not excluded  POSTERIOR CEREBRAL ARTERIES: See above  Appearance is unchanged when compared to the recent CTA study  VENOUS STRUCTURES:  Normal  NON VASCULAR ANATOMY BONY STRUCTURES:  No acute osseous abnormality  Extensive periodontal disease, most notably within the mandible where there are large periapical lucencies associated with the left more than right posterior molars  SOFT TISSUES OF THE NECK:  Unremarkable  THORACIC INLET:  Normal      Impression: Overall, there is no significant interval change when comparing to the recent MRI brain, and CTA head neck study  Stable small cerebellar infarctions as noted on the recent MRI study  Stable findings of distal right cervical and proximal intradural vertebral artery occlusion with probable retrograde flow in its distal intradural segment  Stable left vertebral artery origin stenosis and presumed occlusion of the distal left intradural vertebral segment  Small basilar artery with focal atherosclerotic disease in its proximal segment  Patent fetal right PCA circulation  Left PCA branch is small but patent, and unchanged in appearance  Stable poor visualization of the proximal left superior cerebellar artery  Above-mentioned findings are in keeping with the preliminary report provided by [facility] radiology services without significant discrepancy in the report  Workstation performed: [id number]     CTA head and neck with and without contrast    Result Date: 2/25/2023  Narrative: CTA NECK AND BRAIN WITH AND WITHOUT CONTRAST INDICATION: Dizziness, off-balance  COMPARISON:   None  TECHNIQUE:  Routine CT imaging of the Brain without contrast   Post contrast imaging was performed after administration of iodinated contrast through the neck and brain  Post contrast axial 0 625 mm images timed to opacify the arterial system  3D rendering was performed on an independent workstation  MIP reconstructions performed  Coronal reconstructions were performed of the noncontrast portion of the brain  Radiation dose length product (DLP) for this visit:  1267 03 mGy-cm   This examination, like all CT scans performed in the [facility], was performed utilizing techniques to minimize radiation dose exposure, including the use of iterative reconstruction and automated exposure control  IV Contrast:  90 mL of iohexol (OMNIPAQUE)  IMAGE QUALITY:   Diagnostic FINDINGS: NONCONTRAST BRAIN PARENCHYMA: Multiple small age indeterminate infarctions within the right posterior cerebellum  No parenchymal hemorrhage  No supratentorial infarction  VENTRICLES AND EXTRA-AXIAL SPACES:  Normal for the patient's age  VISUALIZED ORBITS: Normal visualized orbits  PARANASAL SINUSES: Moderate mucosal thickening of the visualized the paranasal sinuses  CERVICAL VASCULATURE AORTIC ARCH AND GREAT VESSELS:  Normal aortic arch and great vessel origins  Normal visualized subclavian vessels  RIGHT VERTEBRAL ARTERY CERVICAL SEGMENT:  Normal origin  The vessel is normal in caliber throughout the proximal and mid neck  There is marked tapering and eventual occlusion of the distal V3 segment of the right vertebral artery suggestive of dissection (5:274)  An intimal flap is not identified  LEFT VERTEBRAL ARTERY CERVICAL SEGMENT:  Severe focal stenosis at the origin of the left vertebral artery without calcific plaque  Noncalcified plaque is suspected  The remaining left cervical vertebral artery is patent without tandem stenosis  RIGHT EXTRACRANIAL CAROTID SEGMENT:  Mild atherosclerotic disease of the distal common carotid artery and proximal cervical internal carotid artery without significant stenosis compared to the more distal ICA  Less than 50% ICA origin or carotid bulb stenosis  LEFT EXTRACRANIAL CAROTID SEGMENT:  Normal caliber common carotid artery    Normal bifurcation and cervical internal carotid artery  No stenosis or dissection  NASCET criteria was used to determine the degree of internal carotid artery diameter stenosis  INTRACRANIAL VASCULATURE INTERNAL CAROTID ARTERIES:  Focal moderate stenosis in the distal right petrous segment  Mild stenosis at the left duran cavernous ICA junction  Additional moderate stenosis in the bilateral cavernous ICA segments due to calcified and noncalcified plaque  Supraclinoid ICA segments are patent bilaterally  Ophthalmic artery origins are unremarkable  Carotid termini are also patent  ANTERIOR CIRCULATION:  Symmetric A1 segments and anterior cerebral arteries with normal enhancement  Normal anterior communicating artery  MIDDLE CEREBRAL ARTERY CIRCULATION:  M1 segment and middle cerebral artery branches demonstrate normal enhancement bilaterally  Early bifurcation of the left M1 segment on the left  The proximal M2 branches are patent  DISTAL VERTEBRAL ARTERIES:  The proximal right intradural vertebral artery segment is occluded  There is suggestion of a small opacified post-PICA right vertebral artery segment which may be retrograde filling  The right posterior inferior cerebellar artery branch is patent but small and only faintly visualized  The left intradural vertebral artery segment is patent in its pre-PICA segment  The post-PICA segment is not visualized  It is uncertain whether this is a developmental variation  BASILAR ARTERY:  Small but patent  POSTERIOR CEREBRAL ARTERIES: Left PCA originates from the basilar artery and is patent in its proximal segment  The vessel is small and difficult to define in its mid to distal segment  The right PCA appears to be fetal-type  VENOUS STRUCTURES:  Normal  NON VASCULAR ANATOMY BONY STRUCTURES:  No acute osseous abnormality  SOFT TISSUES OF THE NECK:  Unremarkable  THORACIC INLET:  Normal      Impression: No acute intracranial hemorrhage   Focal infarcts in the right posterior cerebellum, possibly acute to subacute  Follow-up MRI imaging is recommended  Marked tapering and occlusion of the distal right cervical vertebral artery  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  Proximal right intradural vertebral artery is also occluded with faint visualization of the post-PICA segment, which may be filling in a retrograde fashion  Severe left vertebral artery origin stenosis  Left vertebral artery may terminate in a posterior for cerebellar artery branch as the post-PICA segment is not identified  Small basilar artery with fetal right PCA circulation  Left proximal PCA is patent but small  Mid to distal left PCA is not well visualized  I personally discussed this study with Patricio Lacey on 2/25/2023 at 12:21 PM  Workstation performed: [id number]     XR chest 1 view portable    Result Date: 2/25/2023  Narrative: CHEST INDICATION:   dizzy  COMPARISON:  None EXAM PERFORMED/VIEWS:  XR CHEST PORTABLE FINDINGS: Cardiomediastinal silhouette appears unremarkable  There is a well-defined opacity in the periphery of the left lung base likely representing focal pleural thickening  The lungs otherwise clear  Multiple bilateral rib deformities consistent with old healed fractures  S-type scoliosis thoracic spine  Impression: No acute abnormality however there is an opacity in the left lung base that may represent focal pleural thickening  Neoplasm cannot be excluded  Nonemergent follow-up PA and lateral views of the chest or CT chest suggested unless there are prior studies available for comparison  Findings for this inpatient marked for immediate notification in Epic  Workstation performed: [id number]     CT head wo contrast    Result Date: 2/28/2023  Narrative: CT BRAIN - WITHOUT CONTRAST INDICATION:   Stroke, follow up recent stroke, follow up after starting heparin  COMPARISON: MRI brain without contrast February 26, 2023  IR stroke alert February 26, 2023  CT head without contrast February 26, 2023  TECHNIQUE:  CT examination of the brain was performed  In addition to axial images, sagittal and coronal 2D reformatted images were created and submitted for interpretation  Radiation dose length product (DLP) for this visit:  921 43 mGy-cm   This examination, like all CT scans performed in the [facility], was performed utilizing techniques to minimize radiation dose exposure, including the use of iterative  reconstruction and automated exposure control  IMAGE QUALITY:  Diagnostic  FINDINGS: PARENCHYMA: Postsurgical changes of right distal vertebral artery stenting from distal V3 to mid V4 segment Evolving acute infarcts in the brainstem (worse in left midbrain/left upper tatiana) and bilateral cerebellum (right worse than left)  Decreased attenuation is noted in periventricular and subcortical white matter demonstrating an appearance that is statistically most likely to represent mild microangiopathic change  No intracranial mass, mass effect or midline shift  No acute parenchymal hemorrhage  Arterial calcifications of carotid siphons  VENTRICLES AND EXTRA-AXIAL SPACES:  Normal for the patient's age  VISUALIZED ORBITS: Normal visualized orbits  PARANASAL SINUSES: Moderate-to-severe mucosal thickening of paranasal sinuses, worse in bilateral ethmoid sinuses  CALVARIUM AND EXTRACRANIAL SOFT TISSUES:  Normal      Impression: Evolving acute infarcts in the brainstem (worse in left midbrain/left upper tatiana) and bilateral cerebellum (right worse than left) status post right distal vertebral artery stenting  No acute intracranial hemorrhage  Workstation performed: [id number]     CT head wo contrast    Result Date: 2/26/2023  Narrative: CT BRAIN - WITHOUT CONTRAST INDICATION:   post IR cerebral angiogram  COMPARISON:  CT 2/26/2023, MRI 2/25/2023 TECHNIQUE:  CT examination of the brain was performed    In addition to axial images, sagittal and coronal 2D reformatted images were created and submitted for interpretation  Radiation dose length product (DLP) for this visit:  951 mGy-cm   This examination, like all CT scans performed in the [facility], was performed utilizing techniques to minimize radiation dose exposure, including the use of iterative reconstruction and automated exposure control  IMAGE QUALITY:  Diagnostic  FINDINGS: PARENCHYMA: Decreased attenuation is noted in periventricular and subcortical white matter demonstrating an appearance that is statistically most likely to represent mild microangiopathic change  Evolving bilateral cerebellar hemisphere infarcts are again seen without hemorrhage present     No intracranial mass, mass effect or midline shift  No acute parenchymal hemorrhage  VENTRICLES AND EXTRA-AXIAL SPACES:  Normal for the patient's age  VISUALIZED ORBITS: Normal visualized orbits  PARANASAL SINUSES: Pansinus disease is present  CALVARIUM AND EXTRACRANIAL SOFT TISSUES:  Normal      Impression: Stable evolving bilateral cerebellar infarctions without significant interval change  Workstation performed: [id number]     CT head wo contrast    Result Date: 2/26/2023  Narrative: CT BRAIN - WITHOUT CONTRAST INDICATION: New neurological deficits, suspected stroke  History of vertebral artery occlusion and acute bilateral cerebellar infarctions  COMPARISON:  Head CT and CT angiography of the head and neck from earlier the same day  CT perfusion imaging of the brain from earlier the same day  Head CT, MRI brain, CTA of the head and neck and MRI of the head and neck from February 25, 2023  TECHNIQUE:  CT examination of the brain was performed  In addition to axial images, sagittal and coronal 2D reformatted images were created and submitted for interpretation  Radiation dose length product (DLP) for this visit:  895 mGy-cm     This examination, like all CT scans performed in the [facility], was performed utilizing techniques to minimize radiation dose exposure, including the use of iterative reconstruction and automated exposure control  IMAGE QUALITY:  Diagnostic  FINDINGS: PARENCHYMA:  No interval change  Stable focal hypodensities within the right more than left cerebellum consistent with late acute to early subacute infarction  No hemorrhagic transformation  No new areas of cerebral or cerebellar infarction  VENTRICLES AND EXTRA-AXIAL SPACES:  Normal for the patient's age  VISUALIZED ORBITS: Normal visualized orbits  PARANASAL SINUSES: Moderate ethmoid and maxillary paranasal sinus disease  Mild frontal and sphenoid inflammatory sinus disease  CALVARIUM AND EXTRACRANIAL SOFT TISSUES:  Normal      Impression: No new parenchymal findings when comparing to the recent CT and MRI brain studies  Stable late acute to early subacute bilateral cerebellar infarctions without hemorrhagic transformation  Workstation performed: [id number]     MRA head wo contrast    Result Date: 2/25/2023  Narrative: MRA BRAIN WITHOUT CONTRAST INDICATION: Vertebral artery dissection  COMPARISON:  2/25/2023  TECHNIQUE:  Axial 3-D time-of-flight imaging with 3-D reconstructions performed without contrast    IV Contrast:  Not administered  FINDINGS: IMAGE QUALITY:  Diagnostic  ANATOMY INTERNAL CAROTID ARTERIES:  Mild to moderate stenosis in the distal left cavernous segment  ANTERIOR CIRCULATION:  Normal A1 segments  Normal anterior communicating artery  Normal flow-related signal of the anterior cerebral arteries  MIDDLE CEREBRAL ARTERY CIRCULATION:  Proximal middle cerebral arteries are patent and normal in caliber  DISTAL VERTEBRAL ARTERIES:  No flow related enhancement in the bilateral V4 segments  BASILAR ARTERY:  No flow related signal in the basilar artery  POSTERIOR CEREBRAL ARTERIES: Flow related signal in the fetal type right posterior cerebral artery without stenosis  No signal indicating flow in the left posterior cerebral artery    Flow related signal in the left posterior inferior cerebellar artery  Impression: Abscess of flow related signal in the V4 segments of the bilateral vertebral arteries, throughout the basilar artery and left posterior cerebral artery, concerning for progression of dissection and/or thrombus  Recommend emergent interventional radiology consultation  I personally discussed this study with Phoenix Kaur   on 2/25/2023 at 8:15 PM   Workstation performed: [id number]     MRA carotids wo contrast    Result Date: 2/25/2023  Narrative: MRA NECK WITHOUT CONTRAST INDICATION: cerebellar infarct, vertebral artery dissection, cerebellar infarct, vertebral artery dissection COMPARISON:  None  TECHNIQUE:  Time of Flight angiography with 3D reconstructions  IMAGE QUALITY:  Diagnostic  FINDINGS: AORTIC ARCH:  Normal  VISUALIZED SUBCLAVIAN ARTERIES:  Not well-visualized  VERTEBRAL ARTERIES:  Flow-related signal is present in the cervical segment of the left vertebral artery to the C2 level  Loss of signal at the C1 level may be due to in plane signal suppression artifact  Signal present at the craniocervical junction to the level of the left PICA takeoff, which also demonstrates flow related signal   No signal in the left V4 segment  Minimal severely attenuated signal in the cervical segment of the right vertebral artery  No signal along the intracranial segment  No signal in the visualized portions of the basilar artery  Zahira Cortes RIGHT CAROTID ARTERY:  Normal common carotid artery, cervical internal carotid artery and external carotid artery  No stenosis at the bifurcation  LEFT CAROTID ARTERY:  Normal common carotid artery, cervical internal carotid artery and external carotid artery  No stenosis at the bifurcation  VISUALIZED INTRACRANIAL VASCULATURE:  As described NASCET criteria was used to determine the degree of internal carotid artery diameter stenosis  Impression: 1    There complete absence of flow related signal along the cervical segment of the right vertebral artery concerning for progression of dissection and/or retrograde flow  2   Absence of flow related signal in the V4 segments of the bilateral vertebral arteries and throughout the basilar artery, also concerning for progression of dissection  I personally discussed this study with Kaylan Hardwick on 2/25/2023 at 8:24 PM  Workstation performed: [id number]     MRI brain wo contrast    Result Date: 2/27/2023  Narrative: MRI BRAIN WITHOUT CONTRAST INDICATION: mri brain stroke  COMPARISON:   2/26/2023 TECHNIQUE:  Multiplanar, multisequence imaging of the brain was performed  IMAGE QUALITY:  Diagnostic  FINDINGS: BRAIN PARENCHYMA:  Coalescent, increasing diffusion restriction in the right cerebellum noted indicative of progressive posterior circulation infarction  Additionally there is a new focus of diffusion restriction in the posterior aspect of the medulla to the right of midline series 3 image 5  Elsewhere multifocal bilateral cerebellar foci of diffusion restriction are increased slightly as well  A new small punctate focus of diffusion restriction is noted in the right cerebral peduncle on series 3 image 13  Left cerebral peduncle diffusion abnormality on the same image is stable from the MRI performed the day earlier  A few foci of diffusion restriction in the left occipital lobe noted as well, one of which is new on series 3 image 12  Some of the diffusion abnormalities have associated FLAIR hyperintensities  Chronic lacunar infarction in the left thalamus noted  No acute intracranial hemorrhage  No extra-axial fluid collection  Cerebellar tonsils  a few scattered periventricular FLAIR hyperintensities noted  VENTRICLES:  Normal for the patient's age  SELLA AND PITUITARY GLAND:  Normal  ORBITS:  Normal  PARANASAL SINUSES:  Moderate to advanced bilateral ethmoidal sinus signal abnormality  Moderate bilateral maxillary sinus signal abnormality  Moderate left frontal sinus signal abnormality    Right frontal sinus is hypoplastic  Mild bilateral sphenoid sinus signal abnormality  VASCULATURE:  The V3 segment of the left vertebral artery demonstrates high signal on series 6, image 2 suggestive of diminished or absent flow  The right vertebral artery appears dominant  CALVARIUM AND SKULL BASE:  Normal  EXTRACRANIAL SOFT TISSUES:  Normal      Impression: 1  Crescending, acute/recent posterior circulation infarctions with increasing right cerebellar and new brainstem infarctions as described  Overall infarct burden is slightly increased from the prior study one day earlier although new small infarcts are noted in the posterior medulla on the right, right midbrain and left occipital lobe  No hydrocephalus or large parenchymal hemorrhage  2   Abnormal left vertebral artery flow void at the lateral mass of C1, possibly related to slow flow and/or vascular occlusion  3   Very mild, chronic microangiopathy and chronic left thalamic lacunar infarction    Workstation performed: [id number]     MRI brain wo contrast    Result Date: 2/25/2023  Narrative: MRI BRAIN WITHOUT CONTRAST INDICATION: cerebellar infarct, vertebral artery dissection  COMPARISON:   2/25/2023  TECHNIQUE:  Multiplanar, multisequence imaging of the brain was performed  IMAGE QUALITY:  Diagnostic  FINDINGS: BRAIN PARENCHYMA: Punctate foci of restricted effusion and T-2/flair hyperintensity in the right cerebellar hemisphere  Few small foci in the superior aspect of the left cerebellar hemisphere and single focus in the anterior vermis  No clear evidence of hemorrhagic conversion  No mass effect  Few periventricular and subcortical T-2/FLAIR hyperintense foci suggesting microangiopathic disease  VENTRICLES:  No hydrocephalus or extra-axial collection  SELLA AND PITUITARY GLAND:  No sellar enlargement  ORBITS:  Intact globes and orbits  PARANASAL SINUSES:  Mucosal thickening throughout the paranasal sinuses   VASCULATURE:  Hyperintensity in the V4 segment of the right artery and possibly in the left vertebral artery at the vertebrobasilar junction, compatible with thrombus  CALVARIUM AND SKULL BASE:  No osseous lesion  EXTRACRANIAL SOFT TISSUES:  No soft tissue mass  Impression: Acute early subacute infarcts throughout the right greater than left cerebellar hemispheres and anterior vermis, consistent with findings on the head CT  No hemorrhagic conversion or mass effect  Arthrotomy 8 Workstation performed: [id number]     XR follow up    Result Date: 2/25/2023  Narrative: ORBITS INDICATION:   pre-mri orbits cerebellar infarct, vertebral artery dissection  COMPARISON:  None VIEWS:  XR FOLLOW UP (NO CHARGE) FINDINGS: There is no evidence of radiopaque orbital foreign body  The paranasal sinuses are clear  Impression: No radiopaque orbital foreign body  Workstation performed: [id number]     CT cerebral perfusion    Result Date: 2/26/2023  Narrative: CT PERFUSION INDICATION:  right V3/4 thrombus and multifocal acute or early subacute infarctions noted on mri  COMPARISON:  Head CT, CT angiography of the head and neck, MRI of the brain and MRA of the head and neck performed on February 25, 2023  TECHNIQUE: CT perfusion study was performed  A total of 8 cm of brain tissue was evaluated in two different volumetric acquisitions  [employer] RAPID software was utilized to calculate cerebral blood flow, cerebral blood volume, mean transit time, and  Tmax  Radiation dose length product (DLP) for this visit:  1931  95 mGy-cm   This examination, like all CT scans performed in the [facility], was performed utilizing techniques to minimize radiation dose exposure, including the use of iterative reconstruction and automated exposure control  IV Contrast:  165 mL of iodixanol (VISIPAQUE)  IMAGE QUALITY:  Diagnostic  FINDINGS: Rapid data processing failed  Automated software was unable to locate the arterial input function   Hemisphere affected:  Bilateral cerebellar hemispheres  Total CBF<30% volume:  0 mL Total Tmax>6 0s volume:  70 mL Total Mismatch difference:  70 mL Total Mismatch ratio:  Infinite Hypoperfusion Index (Tmax>10s/Tmax  6s): [HIR] Additional comments:  Provided color maps suggest variable degrees of increased prolongation in Tmax within the right more than the left cerebellar hemispheres as well as the left PCA territory  Overall findings again demonstrate abnormality in the posterior intracranial circulation, in keeping with the recent CT, MRI, and CTA/MRA studies  However, the reliability of the qualitative data is suspect given the lack of proper arterial input function  Impression: CT perfusion performed  Data available on PACS  Workstation performed: [id number]     Echo follow up/limited w/ contrast if indicated    Result Date: 2/27/2023  Narrative: •  Left Ventricle: Left ventricular cavity size is normal  Wall thickness is mildly increased  There is mild concentric hypertrophy  The left ventricular ejection fraction is 65%  Systolic function is normal  Wall motion is normal  Diastolic function is normal  •  Atrial Septum: No patent foramen ovale detected using Doppler and saline contrast injection at rest and with provocation  No diagnostic evidence of intracardiac thrombus or patent foramen ovale  Note: Transthoracic echocardiogram has limited sensitivity for PFO / LA appendage thrombus, and if there is high clinical suspicion for the same, transesophageal echocardiogram may still provide additional information  Clinical correlation is suggested  EKG, Pathology, and Other Studies: I have personally reviewed pertinent reports        VTE Pharmacologic Prophylaxis: Eliquis    VTE Mechanical Prophylaxis: sequential compression device

## 2023-03-03 NOTE — PROGRESS NOTES
INTERNAL MEDICINE RESIDENCY PROGRESS NOTE     Name: Kayce Leigh   Age & Sex: 60 y o  male   MRN: [id number]  Unit/Bed#: [facility] 0-36   Encounter: [id number]  Team: SOD Team B     PATIENT INFORMATION     Name: Kayce Leigh   Age & Sex: 60 y o  male   MRN: [id number]  Hospital Stay Days: 6    ASSESSMENT/PLAN     Principal Problem:    Acute Posterior Circulation Stroke  Active Problems:    COVID    Right Vertebral artery dissection (HCC)    Stenosis of left vertebral artery    Hypertensive emergency    UTI (urinary tract infection)    Dysphagia    Spasticity    Sinus tachycardia    Elevated serum creatinine    Leukocytosis    Prediabetes    Tearfulness      Tearfulness  Assessment & Plan  Pt with persistent tearfulness, depressed mood following recent stroke  3/3 discussed starting SSRI with patient, he is agreeable to trying  Expressed understanding this may take several weeks to notice any benefit  Start Prozac 20 mg daily - discussed with neurology  Continue to monitor mood  Will need to establish with PCP at discharge  Prediabetes  Assessment & Plan  ? a1c 5 7  ? Glucose 110-127  ? Diet and lifestyle modifications    Leukocytosis  Assessment & Plan  Pt with slightly elevated WBC, stable from day prior  Afebrile, no worsening s/sxs of infection at this time  Suspect likely reactive in setting of recent stroke vs UTI vs COVID  ? Continue to follow WBC count, monitor fever curve    Elevated serum creatinine  Assessment & Plan  Pt presented with elevated Cr 1 32 on arrival, now improved  No known baseline  ? Continue to follow BMP  ? No significant increase in Cr with lisinopril  ? Avoid nephrotoxic agents    Sinus tachycardia  Assessment & Plan   pt with intermittent episodes of brief sinus tachycardia  ? Continue potassium goal >4    Spasticity  Assessment & Plan  Pt with new RLE spasticity and myoclonus   Unlikely focal seizures per neurology       • Continue baclofen 10 mg BID, frequency adjusted per pharmacy given renal function    Dysphagia  Assessment & Plan  Pt with new dysphagia s/p stroke  ? Continue dysphagia 3 diet, nectar thick liquids per speech eval    UTI (urinary tract infection)  Assessment & Plan  Urine cx with pan-susceptible E coli  Pt completed 4-day course of CTX as of 2/28       Plan:  • Monitor off abx    Hypertensive emergency  Assessment & Plan  Pt initially presented with hypertensive emergency in setting of stroke as mentioned above  Pt on cardene drip in the ICU, now discontinued on 3/1  Blood Pressure: 162/75      Plan:  • Continue Amlodipine 10 mg qd  • Continue Coreg 6 25 BID  • Increased Lisinopril 20 mg daily  • Will consider addition of hydralazine if BP continues to be elevated   • SBP goal <160  • Continue PRNs antihypertensive with PRN labetalol    Stenosis of left vertebral artery  Assessment & Plan  See acute posterior circulation stroke plan    Right Vertebral artery dissection [facility])  Assessment & Plan  See acute posterior circulation plan    COVID  Assessment & Plan  Pt asx, remained on room air during hospitalization       • Continue airborne and contact precautions    * Acute Posterior Circulation Stroke  Assessment & Plan  60 y o  male with no known prior hx, did not follow with a physician  Presented to [address] [place] [address] 2/25/2023 of several days of weakness, dizziness, and nausea/vomiting  CT head revealed acute/subacute infarcts in the right posterior cerebellum with follow up CTA head/neck demonstrating 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 and left vertebral artery origin stenosis with presumed occlusion of the distal left intradural vertebral segment    • MRA head/carotids demonstrated possible extension of the vertebral artery dissection into the basilar artery  • MRI brain revealed several small acute/early subacute bi-cerebellar infarcts (R>L) without evidence of hemorrhage     Patient was transferred to [facility] for closer monitoring with consideration for endovascular intervention   Initially given stable exam, intervention was felt too risky and patient was a heparin gtt with full dose aspirin  However, pt developed worsened dysarthria on 2/26 and R sided weakness  • Repeat CT 2/26 stable, however patient was taken to IR for emergent R vertebral/basilar thrombectomy with R V3/4 stenting with TICI 2B revascularization  • MRI brain 2/26: Increased infarct burden   Bilateral cerebellar infarcts with larger right cerebellar infarct and new acute infarcts in the right medulla, bilateral midbrain, and left occipital lobe        Plan:   • Neurosurgery consulted, recs appreciated -- 3/3 sign off  • Recommend repeat CTA H/N 2 weeks   • Neurology consulted, recs appreciated  • Echo: EF 65%, normal wall motion, normal atria size, no PFO   • Lipid panel , , HDL 27,   • A1c 5 7  • q4hr neuro checks at night, q2hr during the day  • Continue ASA and Brilinta 90 mg BID  • 3/3: P2y12 102  • Continue eliquis 2 5 mg bid  • Continue atorvastatin  • SBP goal <160  • ARC eval pending      Disposition: Remain inpatient for medical management and evaluation for post acute rehab placement     SUBJECTIVE     Patient seen and examined  No acute events overnight  Mr Layla Calle is unchanged today from yesterday  He continues to be asymptomatic with covid  He denies noticing any new symptoms, including new weakness, chest pain, shortness of breath, headache, visual changes, leg pain, or leg swelling  We discussed starting an antidepressant (SSRI) during the visit today to improve mood and help with his motivation for rehab therapy  He became tearful during this discussion, expressing frustration with his situation, but was agreeable to trying this medication to see if it would help  It was explained that SSRI medications generally take several weeks to provide benefit and he was encouraged to give the medication a fair shot at helping him   He expressed understanding and was agreeable to this plan  OBJECTIVE     Vitals:    23 1728 23 1826 23 2130 23 0821   BP: (!) 171/74 163/74  162/75   Pulse: 64 65 65 58   Resp:       Temp:    98 °F (36 7 °C)   TempSrc:       SpO2: 98% 98% 97% 98%   Weight:       Height:          Temperature:   Temp (24hrs), Av °F (36 7 °C), Min:97 9 °F (36 6 °C), Max:98 2 °F (36 8 °C)    Temperature: 98 °F (36 7 °C)  Intake & Output:  I/O        07 07 07 07 07 0700    P  O  600      I V  (mL/kg) 258 (3)      Total Intake(mL/kg) 858 (9 9)      Urine (mL/kg/hr) 825 (0 4)      Total Output 825      Net +33             Unmeasured Stool Occurrence  2 x         Weights:   IBW (Ideal Body Weight): 70 7 kg    Body mass index is 28 16 kg/m²  Weight (last 2 days)     Date/Time Weight    23 06 86 5 (190 7)    23 0600 86 7 (191 14)        Physical Exam  Vitals reviewed  Constitutional:       General: He is not in acute distress  Appearance: Normal appearance  He is not ill-appearing  Comments: Body mass index is 28 16 kg/m²  HENT:      Head: Normocephalic and atraumatic  Right Ear: External ear normal       Left Ear: External ear normal       Nose: Nose normal       Mouth/Throat:      Mouth: Mucous membranes are moist       Pharynx: Oropharynx is clear  Eyes:      Extraocular Movements: Extraocular movements intact  Conjunctiva/sclera: Conjunctivae normal       Pupils: Pupils are equal, round, and reactive to light  Cardiovascular:      Rate and Rhythm: Normal rate and regular rhythm  Pulses: Normal pulses  Heart sounds: Normal heart sounds  Pulmonary:      Effort: Pulmonary effort is normal  No respiratory distress  Breath sounds: Normal breath sounds  No wheezing  Abdominal:      General: There is no distension  Palpations: Abdomen is soft  Musculoskeletal:      Right lower leg: No edema        Left lower leg: No edema  Skin:     General: Skin is warm and dry  Capillary Refill: Capillary refill takes less than 2 seconds  Neurological:      Mental Status: He is alert and oriented to person, place, and time  Sensory: Sensory deficit (decreased sensation right face, right upper extremity, right lower extremity) present  Motor: Weakness (mild facial weakness, 1/5 right upper extremity, 1-2/5 right lower extremity ) present  Comments: CN 2, 3, 4, 6, 8, 10-12 intact  Psychiatric:         Thought Content: Thought content normal       Comments: Tearful        LABORATORY DATA     Labs: I have personally reviewed pertinent reports  Results from last 7 days   Lab Units 03/03/23  0000 03/02/23  0455 03/01/23  0440 02/27/23  1003 02/26/23  0506 02/26/23  0021 02/25/23  0940   WBC Thousand/uL 12 09* 11 85* 12 16*   < > 13 27*   < > 9 99   HEMOGLOBIN g/dL 11 6* 10 4* 10 5*   < > 13 3   < > 14 4   HEMATOCRIT % 33 3* 32 3* 32 4*   < > 40 2   < > 44 0   PLATELETS Thousands/uL 347 309 277   < > 349   < > 326   NEUTROS PCT %  --   --   --   --  89*  --  88*   MONOS PCT %  --   --   --   --  4  --  4    < > = values in this interval not displayed  Results from last 7 days   Lab Units 03/03/23  0000 03/02/23  0455 03/01/23  0440 02/27/23  0429 02/26/23  0506 02/25/23  2044 02/25/23  0940   POTASSIUM mmol/L 4 2 4 0 3 7   < > 3 4*   < > 3 5   CHLORIDE mmol/L 109* 115* 116*   < > 115*   < > 108   CO2 mmol/L 27 25 23   < > 21   < > 24   BUN mg/dL 31* 29* 23   < > 17   < > 23   CREATININE mg/dL 1 29 1 37* 1 23   < > 1 09   < > 1 32*   CALCIUM mg/dL 8 8 8 7 8 5   < > 8 8   < > 8 8   ALK PHOS U/L  --   --   --   --  63  --  55   ALT U/L  --   --   --   --  24  --  19   AST U/L  --   --   --   --  13  --  16    < > = values in this interval not displayed       Results from last 7 days   Lab Units 03/02/23  0455 02/28/23  0209 02/27/23  0429   MAGNESIUM mg/dL 2 5 2 3 2 1     Results from last 7 days   Lab Units 03/02/23  0455 02/28/23  0209   PHOSPHORUS mg/dL 4 7* 2 3      Results from last 7 days   Lab Units 02/28/23  0208 02/27/23  2100 02/27/23  1548 02/27/23  1003 02/26/23  0506 02/26/23  0021 02/25/23  0940   INR   --   --   --  1 05  --  1 06 0 98   PTT seconds 78* 40* 32 31   < > 31 29    < > = values in this interval not displayed  Results from last 7 days   Lab Units 02/25/23  2044   LACTIC ACID mmol/L 1 2           IMAGING & DIAGNOSTIC TESTING     Radiology Results: I have personally reviewed pertinent reports  CTA head and neck w wo contrast    Result Date: 2/26/2023  Impression: Overall, there is no significant interval change when comparing to the recent MRI brain, and CTA head neck study  Stable small cerebellar infarctions as noted on the recent MRI study  Stable findings of distal right cervical and proximal intradural vertebral artery occlusion with probable retrograde flow in its distal intradural segment  Stable left vertebral artery origin stenosis and presumed occlusion of the distal left intradural vertebral segment  Small basilar artery with focal atherosclerotic disease in its proximal segment  Patent fetal right PCA circulation  Left PCA branch is small but patent, and unchanged in appearance  Stable poor visualization of the proximal left superior cerebellar artery  Above-mentioned findings are in keeping with the preliminary report provided by [facility] radiology services without significant discrepancy in the report  Workstation performed: [id number]     CTA head and neck with and without contrast    Result Date: 2/25/2023  Impression: No acute intracranial hemorrhage  Focal infarcts in the right posterior cerebellum, possibly acute to subacute  Follow-up MRI imaging is recommended  Marked tapering and occlusion of the distal right cervical vertebral artery  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    Proximal right intradural vertebral artery is also occluded with faint visualization of the post-PICA segment, which may be filling in a retrograde fashion  Severe left vertebral artery origin stenosis  Left vertebral artery may terminate in a posterior for cerebellar artery branch as the post-PICA segment is not identified  Small basilar artery with fetal right PCA circulation  Left proximal PCA is patent but small  Mid to distal left PCA is not well visualized  I personally discussed this study with Patricio Lacey on 2/25/2023 at 12:21 PM  Workstation performed: [id number]     XR chest 1 view portable    Result Date: 2/25/2023  Impression: No acute abnormality however there is an opacity in the left lung base that may represent focal pleural thickening  Neoplasm cannot be excluded  Nonemergent follow-up PA and lateral views of the chest or CT chest suggested unless there are prior studies available for comparison  Findings for this inpatient marked for immediate notification in Epic  Workstation performed: [id number]     CT head wo contrast    Result Date: 2/28/2023  Impression: Evolving acute infarcts in the brainstem (worse in left midbrain/left upper tatiana) and bilateral cerebellum (right worse than left) status post right distal vertebral artery stenting  No acute intracranial hemorrhage  Workstation performed: [id number]     CT head wo contrast    Result Date: 2/26/2023  Impression: Stable evolving bilateral cerebellar infarctions without significant interval change  Workstation performed: [id number]     CT head wo contrast    Result Date: 2/26/2023  Impression: No new parenchymal findings when comparing to the recent CT and MRI brain studies  Stable late acute to early subacute bilateral cerebellar infarctions without hemorrhagic transformation    Workstation performed: [id number]     MRA head wo contrast    Result Date: 2/25/2023  Impression: Abscess of flow related signal in the V4 segments of the bilateral vertebral arteries, throughout the basilar artery and left posterior cerebral artery, concerning for progression of dissection and/or thrombus  Recommend emergent interventional radiology consultation  I personally discussed this study with Christiana Pennington   on 2/25/2023 at 8:15 PM   Workstation performed: [id number]     MRA carotids wo contrast    Result Date: 2/25/2023  Impression: 1  There complete absence of flow related signal along the cervical segment of the right vertebral artery concerning for progression of dissection and/or retrograde flow  2   Absence of flow related signal in the V4 segments of the bilateral vertebral arteries and throughout the basilar artery, also concerning for progression of dissection  I personally discussed this study with Christiana Pennington on 2/25/2023 at 8:24 PM  Workstation performed: [id number]     MRI brain wo contrast    Result Date: 2/27/2023  Impression: 1  Crescending, acute/recent posterior circulation infarctions with increasing right cerebellar and new brainstem infarctions as described  Overall infarct burden is slightly increased from the prior study one day earlier although new small infarcts are noted in the posterior medulla on the right, right midbrain and left occipital lobe  No hydrocephalus or large parenchymal hemorrhage  2   Abnormal left vertebral artery flow void at the lateral mass of C1, possibly related to slow flow and/or vascular occlusion  3   Very mild, chronic microangiopathy and chronic left thalamic lacunar infarction    Workstation performed: [id number]     MRI brain wo contrast    Result Date: 2/25/2023  Impression: Acute early subacute infarcts throughout the right greater than left cerebellar hemispheres and anterior vermis, consistent with findings on the head CT  No hemorrhagic conversion or mass effect  Arthrotomy 8 Workstation performed: [id number]     XR follow up    Result Date: 2/25/2023  Impression: No radiopaque orbital foreign body   Workstation performed: [id number]     CT cerebral perfusion    Result Date: 2/26/2023  Impression: CT perfusion performed  Data available on PACS  Workstation performed: [id number]     Other Diagnostic Testing: I have personally reviewed pertinent reports  ACTIVE MEDICATIONS     Current Facility-Administered Medications   Medication Dose Route Frequency   • amLODIPine (NORVASC) tablet 10 mg  10 mg Oral Daily   • apixaban (ELIQUIS) tablet 2 5 mg  2 5 mg Oral BID   • aspirin chewable tablet 81 mg  81 mg Oral Daily   • atorvastatin (LIPITOR) tablet 40 mg  40 mg Oral Daily With Dinner   • baclofen tablet 10 mg  10 mg Oral BID   • carvedilol (COREG) tablet 6 25 mg  6 25 mg Oral BID With Meals   • chlorhexidine (PERIDEX) 0 12 % oral rinse 15 mL  15 mL Mouth/Throat Q12H KATIE   • hydrALAZINE (APRESOLINE) injection 10 mg  10 mg Intravenous Q4H PRN   • lisinopril (ZESTRIL) tablet 20 mg  20 mg Oral Daily   • polyethylene glycol (MIRALAX) packet 17 g  17 g Oral Daily   • senna-docusate sodium (SENOKOT S) 8 6-50 mg per tablet 1 tablet  1 tablet Oral HS   • ticagrelor (BRILINTA) tablet 90 mg  90 mg Oral Q12H [address]       VTE Pharmacologic Prophylaxis: Eliquis   VTE Mechanical Prophylaxis: sequential compression device    Portions of the record may have been created with voice recognition software  Occasional wrong word or "sound a like" substitutions may have occurred due to the inherent limitations of voice recognition software    Read the chart carefully and recognize, using context, where substitutions have occurred   ==      Patience Cuadra, [address] [facility]  Internal Medicine Residency PGY-3

## 2023-03-03 NOTE — UTILIZATION REVIEW
Continued Stay Review    Date: 3-3-23                          Current Patient Class:  Inpatient  Current Level of Care: med surg    HPI:62 y o  male initially admitted on 2-25-23   Acute Posterior Circulation Stroke    Assessment/Plan:     Lisinopril increased to 20 mg daily to manage persistent hypertension  Today blood pressure at SBP goal < 160  Accepted at  inpatient rehab pending authorization  Vital Signs:       Date/Time Temp Pulse Resp BP MAP (mmHg) SpO2   03/03/23 14:50:25 -- 64 -- 136/67 90 98 %   03/03/23 08:21:09 98 °F (36 7 °C) 58 -- 162/75 104 98 %   03/02/23 21:30:29 -- 65 -- -- -- 97 %   03/02/23 18:26:43 -- 65 -- 163/74 104 98 %   03/02/23 17:28:20 -- 64 -- 171/74 Abnormal  106 98 %   03/02/23 1725 -- 64 -- 171/74 Abnormal  -- --   03/02/23 15:00:44 98 2 °F (36 8 °C) 62 -- 178/73 Abnormal  108 98 %   03/02/23 13:29:53 -- 63 -- 127/66 86 98 %   03/02/23 12:37:11 -- 67 -- 163/81  108 99 %   03/02/23 12:09:54 97 9 °F (36 6 °C) 51   Abnormal  -- 175/78 Abnormal  110 99 %   03/02/23 08:33:04 97 4 °F (36 3 °C)   Abnormal  68 -- -- -- 98 %   03/02/23 0759 -- 65 18 165/79 113 --             Pertinent Labs/Diagnostic Results:   Results from last 7 days   Lab Units 02/25/23  0940   SARS-COV-2  Positive*     Results from last 7 days   Lab Units 03/03/23  0000 03/02/23  0455 03/01/23  0440 02/28/23  0209 02/27/23  1003 02/26/23  0506 02/26/23  0021 02/25/23  0940   WBC Thousand/uL 12 09* 11 85* 12 16* 12 50* 10 26* 13 27*   < > 9 99   HEMOGLOBIN g/dL 11 6* 10 4* 10 5* 11 0* 10 8* 13 3   < > 14 4   HEMATOCRIT % 33 3* 32 3* 32 4* 35 1* 33 3* 40 2   < > 44 0   PLATELETS Thousands/uL 347 309 277 263 268 349   < > 326   NEUTROS ABS Thousands/µL  --   --   --   --   --  11 92*  --  8 69*    < > = values in this interval not displayed           Results from last 7 days   Lab Units 03/03/23  0000 03/02/23  0455 03/01/23  0440 02/28/23  0209 02/27/23  0429   SODIUM mmol/L 140 142 145 147 147   POTASSIUM mmol/L 4 2 4 0 3 7 3 5 3 1*   CHLORIDE mmol/L 109* 115* 116* 118* 120*   CO2 mmol/L 27 25 23 22 21   ANION GAP mmol/L 4 2* 6 7 6   BUN mg/dL 31* 29* 23 18 15   CREATININE mg/dL 1 29 1 37* 1 23 1 27 1 05   EGFR ml/min/1 73sq m 58 54 62 59 75   CALCIUM mg/dL 8 8 8 7 8 5 8 7 8 1*   CALCIUM, IONIZED mmol/L  --   --   --  1 14 1 02*   MAGNESIUM mg/dL  --  2 5  --  2 3 2 1   PHOSPHORUS mg/dL  --  4 7*  --  2 3  --      Results from last 7 days   Lab Units 02/26/23  0506 02/25/23  0940   AST U/L 13 16   ALT U/L 24 19   ALK PHOS U/L 63 55   TOTAL PROTEIN g/dL 7 0 7 3   ALBUMIN g/dL 3 2* 3 8   TOTAL BILIRUBIN mg/dL 0 68 0 60         Results from last 7 days   Lab Units 03/03/23  0000 03/02/23  0455 03/01/23  0440 02/28/23  0209 02/27/23  0429 02/26/23  0506 02/25/23  2044 02/25/23  0940   GLUCOSE RANDOM mg/dL 97 111 111 124 114 127 110 124         Results from last 7 days   Lab Units 02/25/23  1304   HEMOGLOBIN A1C % 5 7*   EAG mg/dl 117       Results from last 7 days   Lab Units 02/25/23  2044   CK TOTAL U/L 41     Results from last 7 days   Lab Units 02/25/23  1323 02/25/23  1156 02/25/23  0940   HS TNI 0HR ng/L  --   --  37   HS TNI 2HR ng/L  --  41  --    HSTNI D2 ng/L  --  4  --    HS TNI 4HR ng/L 38  --   --    HSTNI D4 ng/L 1  --   --      Results from last 7 days   Lab Units 02/25/23  2044   D-DIMER QUANTITATIVE ug/ml FEU 1 44*     Results from last 7 days   Lab Units 02/28/23  0208 02/27/23  2100 02/27/23  1548 02/27/23  1003 02/26/23  0506 02/26/23  0021 02/25/23  0940   PROTIME seconds  --   --   --  14 0  --  14 0 13 7   INR   --   --   --  1 05  --  1 06 0 98   PTT seconds 78* 40* 32 31   < > 31 29    < > = values in this interval not displayed           Results from last 7 days   Lab Units 02/26/23  0506 02/25/23  2044   PROCALCITONIN ng/ml 0 08 <0 05     Results from last 7 days   Lab Units 02/25/23  2044   LACTIC ACID mmol/L 1 2             Results from last 7 days   Lab Units 02/25/23  2044   BNP pg/mL 191* Results from last 7 days   Lab Units 02/25/23 2044   HEP B S AG  Non-reactive   HEP C AB  Non-reactive   HEP B C IGM  Non-reactive   HEP B C TOTAL AB  Non-reactive         Results from last 7 days   Lab Units 02/25/23  2044   CRP mg/L 5 3*             Results from last 7 days   Lab Units 02/25/23  1155   CLARITY UA  Cloudy   COLOR UA  Light Yellow   SPEC GRAV UA  <1 005*   PH UA  6 5   GLUCOSE UA mg/dl Negative   KETONES UA mg/dl 10 (1+)*   BLOOD UA  Negative   PROTEIN UA mg/dl Trace*   NITRITE UA  Positive*   BILIRUBIN UA  Negative   UROBILINOGEN UA (BE) mg/dl <2 0   LEUKOCYTES UA  Large*   WBC UA /hpf 20-30*   RBC UA /hpf 0-1   BACTERIA UA /hpf Innumerable*   EPITHELIAL CELLS WET PREP /hpf Occasional     Results from last 7 days   Lab Units 02/25/23  0940   INFLUENZA A PCR  Negative   INFLUENZA B PCR  Negative   RSV PCR  Negative       Results from last 7 days   Lab Units 02/27/23  1002 02/25/23 2044 02/25/23  1155   BLOOD CULTURE  No Growth After 4 Days  No Growth After 4 Days  No Growth After 5 Days  No Growth After 5 Days  --    URINE CULTURE   --   --  >100,000 cfu/ml Escherichia coli*       Scheduled Medications:      amLODIPine, 10 mg, Oral, Daily  apixaban, 2 5 mg, Oral, BID  aspirin, 81 mg, Oral, Daily  atorvastatin, 40 mg, Oral, Daily With Dinner  baclofen, 10 mg, Oral, BID  carvedilol, 6 25 mg, Oral, BID With Meals  chlorhexidine, 15 mL, Mouth/Throat, Q12H KATIE  FLUoxetine, 20 mg, Oral, Daily  lisinopril, 20 mg, Oral, Daily  polyethylene glycol, 17 g, Oral, Daily  senna-docusate sodium, 1 tablet, Oral, HS  ticagrelor, 90 mg, Oral, Q12H [address]      Continuous IV Infusions:     PRN Meds:  hydrALAZINE, 10 mg, Intravenous, Q4H PRN        Discharge Plan: to be determined     Network Utilization Review Department  ATTENTION: Please call with any questions or concerns to [phone] and carefully listen to the prompts so that you are directed to the right person   All voicemails are aicha Dolan all requests for admission clinical reviews, approved or denied determinations and any other requests to dedicated fax number below belonging to the campus where the patient is receiving treatment   List of dedicated fax numbers for the Facilities:  [address] DENIALS (Administrative/Medical Necessity) [phone]   [address] (Maternity/NICU/Pediatrics) [phone]   911 Kathia Brink [phone]   [facility] 77 [phone]   [id number] [place] [address] 28 [phone]   1557 [facility] 134 815 [facility] [phone]

## 2023-03-03 NOTE — TELEPHONE ENCOUNTER
03/03/2023-PT Mike 25 03/22/2023 APT Marlin Watts W/CTA HEAD & NECK    Dessiree Neil Anders PA-C  Please schedule 2 week HFU with KIERRA Handy H/N prior

## 2023-03-03 NOTE — ASSESSMENT & PLAN NOTE
· BP >637 systolic   · On PO medications at this time  · Continue to uptitrate oral therapy given increase in SBP p24h

## 2023-03-03 NOTE — PROGRESS NOTES
PHYSICAL MEDICINE AND REHABILITATION   PREADMISSION ASSESSMENT     Projected [facility] Diagnoses:  Impairment of mobility, safety and Activities of Daily Living (ADLs) due to Stroke:  01 2  Right Body Involvement (Left Brain)  Etiologic: Acute Posterior Circulation Stroke  Date of Onset: 2/25/23   Date of surgery: 2/26/23 R vertebral/basilar thrombectomy with R V3/4 stenting with TICI 2B revascularization    PATIENT INFORMATION  Name: Michelle Raza Phone #: There are no phone numbers on file  Address: [address] [address]  YOB: 1960 Age: 60 y o  SS#   Marital Status: /Civil Union  Ethnicity: White  Employment Status: Works full time as an   Extended Emergency Contact Information  Primary Emergency Contact: Tim Janie Barrigaana  Mobile Phone: [phone]  Relation: Spouse  Secondary Emergency Contact: Queta Mello  Mobile Phone: [phone]  Relation: Daughter  Advance Directive:level 1 - full code (no ACP docs)    INSURANCE/COVERAGE:     Primary Payor: Gregorio Trevino / Plan: Gregorio Trevino PPO / Product Type: PPO /  [id number] Secondary Payer: Self Pay   Payer Contact: Nasim Santana RN Payer Contact:   Contact Phone: [phone] Contact Phone:     Authorization #: [id number]  Coverage Dates:03/03/3023 - 03/07/2023  LCD: 3/7/23 Updates sent ti fax [phone]  MEDICARE #: Medicare Days:   Medical Record #: [id number]    REFERRAL SOURCE:   Referring provider: Mateo Oliver MD  Referring facility: [facility]   Room: [address] [place] [address] 71/[facility] [id number]  PCP: No primary care provider on file  PCP phone number: None    MEDICAL INFORMATION  HPI:  Michelle Raza is a 69-year-old male who originally presented to [address] [place] [address] on 2/25/23 with chief c/o dizziness accompanied with nausea x 2 days  In the ER, he was hypertensive with max blood pressure 252/115 which was  not improved by IV labetalol   CTA head and neck showed right posterior cerebellum infarct, acute versus subacute, marked tapering and occlusion of the distal right cervical vertebral artery suggestive of dissection, proximal right intradural vertebral artery occlusion, severe left vertebral artery stenosis  MRI and MRA were also performed, which confirmed bilateral cerebellar infarcts and concern for intracranial extension of R vert dissection  He was started on Cardene for blood pressure control  Incidentally in the ER, he was also noted to be COVID-positive, had an active UTI, and BRAULIO  He was transferred to [facility] for potential neurointerventional support should he worsen given his underlying vasculature and placed on ASA/heparin  On 2/26, patient had worsened dysarthria and right sided weakness  CT head negative for intracranial hemorrhage and stable bilateral cerebellar infarcts  Was taken for emergent R vertebral/basilar thrombectomy with R V3/4 stenting with TICI 2B revascularization  Patient was started on Integrilin gtt and repeat CT head negative for hemorrhage  MRI brain 2/26: Increased infarct burden  Bilateral cerebellar infarcts with larger right cerebellar infarct and new acute infarcts in the right medulla, bilateral midbrain, and left occipital lobe  Echo: EF 65%, normal wall motion, normal atria size, no PFO    Etiology for acute infarcts in the setting of bilateral vertebral artery occlusions remain unclear, however likely due to dissection vs chronic diffuse atherosclerotic disease  Possible contribution from underlying COVID infection and hypercoagulability  He was transitioned from Integrilin gtt to DAPT on 2/27 with aspirin 81 mg daily and Brilinta 90 mg BID (180 mg load once)  Heparin gtt discontinued and transitioned to Eliquis 2 5 mg BID on 2/28    Given spasticity and concern for myoclonus/muscle spasm in the proximal right LE, he was started on Baclofen 10 mg TID      Patient also with tearfulness throughout his hospitalization and was started on Prozac 20 mg daily  Patient with leukocytosis, however, afebrile and no s/s of infection  Suspect likely reactive the the setting of recent CVA vs  UTI vs COVID  Patient with dysarthria and dysphagia as per Speech Therapy progress note  ST plan is to nectar thick liquids, frequent thorough oral care and when appropriate a MBS test to be conducted  Upon reviewing patient's case and correspondences with PT/OT/ST and [facility] physician, it is felt that patient will benefit and is a good candidate for inpatient acute rehab at this time  He is now medically stable and ready for discharge to the [facility]  Past Medical History:   Past Surgical History: Allergies:     History reviewed  No pertinent past medical history  History reviewed  No pertinent surgical history  No Known Allergies      Medical/functional conditions requiring inpatient rehabilitation: Acute posterior Circulation Stroke , R sided weakness, Dizziness, Right Vertebral artery dissection, Cerebellar infarct, stenosis of Left vertebral artery, UTI, COVID, BRAULIO, dysphagia, dysarthria, tearfulness, leukocytosis, hypertensive urgency  Risk for medical/clinical complications: Risk for falls and injury related to falls, risk for extension of CVA, risk for bleeding, risk for skin breakdown, risk for DVT/PE, risk for aspiration        Comorbidities/Surgeries in the last 100 days: 2/26/23 R vertebral/basilar thrombectomy with R V3/4 stenting with TICI 2B revascularization    CURRENT VITAL SIGNS:   Temp:  [98 °F (36 7 °C)] 98 °F (36 7 °C)  HR:  [58-65] 64  BP: (136-171)/(67-75) 136/67 No intake or output data in the 24 hours ending 03/03/23 1647     LABORATORY RESULTS:      Lab Results   Component Value Date    HGB 11 6 (L) 03/03/2023    HCT 33 3 (L) 03/03/2023    WBC 12 09 (H) 03/03/2023     Lab Results   Component Value Date    BUN 31 (H) 03/03/2023    K 4 2 03/03/2023     (H) 03/03/2023    CREATININE 1 29 03/03/2023     Lab Results Component Value Date    PROTIME 14 0 02/27/2023    INR 1 05 02/27/2023        DIAGNOSTIC STUDIES:  CTA head and neck w wo contrast    Result Date: 2/26/2023  Impression: Overall, there is no significant interval change when comparing to the recent MRI brain, and CTA head neck study  Stable small cerebellar infarctions as noted on the recent MRI study  Stable findings of distal right cervical and proximal intradural vertebral artery occlusion with probable retrograde flow in its distal intradural segment  Stable left vertebral artery origin stenosis and presumed occlusion of the distal left intradural vertebral segment  Small basilar artery with focal atherosclerotic disease in its proximal segment  Patent fetal right PCA circulation  Left PCA branch is small but patent, and unchanged in appearance  Stable poor visualization of the proximal left superior cerebellar artery  Above-mentioned findings are in keeping with the preliminary report provided by [facility] radiology services without significant discrepancy in the report  Workstation performed: [id number]     CTA head and neck with and without contrast    Result Date: 2/25/2023  Impression: No acute intracranial hemorrhage  Focal infarcts in the right posterior cerebellum, possibly acute to subacute  Follow-up MRI imaging is recommended  Marked tapering and occlusion of the distal right cervical vertebral artery  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  Proximal right intradural vertebral artery is also occluded with faint visualization of the post-PICA segment, which may be filling in a retrograde fashion  Severe left vertebral artery origin stenosis  Left vertebral artery may terminate in a posterior for cerebellar artery branch as the post-PICA segment is not identified  Small basilar artery with fetal right PCA circulation  Left proximal PCA is patent but small  Mid to distal left PCA is not well visualized    I personally discussed this study with Zoraida Spaulding on 2/25/2023 at 12:21 PM  Workstation performed: [id number]     XR chest 1 view portable    Result Date: 2/25/2023  Impression: No acute abnormality however there is an opacity in the left lung base that may represent focal pleural thickening  Neoplasm cannot be excluded  Nonemergent follow-up PA and lateral views of the chest or CT chest suggested unless there are prior studies available for comparison  Findings for this inpatient marked for immediate notification in Epic  Workstation performed: [id number]     CT head wo contrast    Result Date: 2/28/2023  Impression: Evolving acute infarcts in the brainstem (worse in left midbrain/left upper tatiana) and bilateral cerebellum (right worse than left) status post right distal vertebral artery stenting  No acute intracranial hemorrhage  Workstation performed: [id number]     CT head wo contrast    Result Date: 2/26/2023  Impression: Stable evolving bilateral cerebellar infarctions without significant interval change  Workstation performed: [id number]     CT head wo contrast    Result Date: 2/26/2023  Impression: No new parenchymal findings when comparing to the recent CT and MRI brain studies  Stable late acute to early subacute bilateral cerebellar infarctions without hemorrhagic transformation  Workstation performed: [id number]     MRA head wo contrast    Result Date: 2/25/2023  Impression: Abscess of flow related signal in the V4 segments of the bilateral vertebral arteries, throughout the basilar artery and left posterior cerebral artery, concerning for progression of dissection and/or thrombus  Recommend emergent interventional radiology consultation  I personally discussed this study with Daily Mg   on 2/25/2023 at 8:15 PM   Workstation performed: [id number]     MRA carotids wo contrast    Result Date: 2/25/2023  Impression: 1    There complete absence of flow related signal along the cervical segment of the right vertebral artery concerning for progression of dissection and/or retrograde flow  2   Absence of flow related signal in the V4 segments of the bilateral vertebral arteries and throughout the basilar artery, also concerning for progression of dissection  I personally discussed this study with Gigi Talley on 2/25/2023 at 8:24 PM  Workstation performed: [id number]     MRI brain wo contrast    Result Date: 2/27/2023  Impression: 1  Crescending, acute/recent posterior circulation infarctions with increasing right cerebellar and new brainstem infarctions as described  Overall infarct burden is slightly increased from the prior study one day earlier although new small infarcts are noted in the posterior medulla on the right, right midbrain and left occipital lobe  No hydrocephalus or large parenchymal hemorrhage  2   Abnormal left vertebral artery flow void at the lateral mass of C1, possibly related to slow flow and/or vascular occlusion  3   Very mild, chronic microangiopathy and chronic left thalamic lacunar infarction    Workstation performed: [id number]     MRI brain wo contrast    Result Date: 2/25/2023  Impression: Acute early subacute infarcts throughout the right greater than left cerebellar hemispheres and anterior vermis, consistent with findings on the head CT  No hemorrhagic conversion or mass effect  Arthrotomy 8 Workstation performed: [id number]     XR follow up    Result Date: 2/25/2023  Impression: No radiopaque orbital foreign body  Workstation performed: [id number]     CT cerebral perfusion    Result Date: 2/26/2023  Impression: CT perfusion performed  Data available on PACS   Workstation performed: [id number]       PRECAUTIONS/SPECIAL NEEDS:  Anticoagulation:  {anticoagulants:15222}, Pain Management, Bladder Incontinence: # of accidents ***, Aspiration Risk/Precautions, Dietary Restrictions: ***, Visually Impaired, Language Preference: *** and ***    MEDICATIONS:     Current Facility-Administered Medications:   •  amLODIPine (NORVASC) tablet 10 mg, 10 mg, Oral, Daily, Ivana Abad MD, 10 mg at 03/03/23 8154  •  apixaban (ELIQUIS) tablet 2 5 mg, 2 5 mg, Oral, BID, Ivana Abad MD, 2 5 mg at 03/03/23 5447  •  aspirin chewable tablet 81 mg, 81 mg, Oral, Daily, Ivana Abad MD, 81 mg at 03/03/23 5194  •  atorvastatin (LIPITOR) tablet 40 mg, 40 mg, Oral, Daily With Jasper Martin MD, 40 mg at 03/02/23 1725  •  baclofen tablet 10 mg, 10 mg, Oral, BID, Ivana Abad MD, 10 mg at 03/03/23 5054  •  carvedilol (COREG) tablet 6 25 mg, 6 25 mg, Oral, BID With Meals, Ivana Abad MD, 6 25 mg at 03/03/23 7941  •  FLUoxetine (PROzac) capsule 20 mg, 20 mg, Oral, Daily, Ghada Castillo DO, 20 mg at 03/03/23 1244  •  hydrALAZINE (APRESOLINE) injection 10 mg, 10 mg, Intravenous, Q4H PRN, Ivana Abad MD, 10 mg at 03/02/23 2126  •  lisinopril (ZESTRIL) tablet 20 mg, 20 mg, Oral, Daily, Ghada Castillo DO, 20 mg at 03/03/23 8293  •  polyethylene glycol (MIRALAX) packet 17 g, 17 g, Oral, Daily, Ivana Abad MD, 17 g at 03/03/23 4019  •  senna-docusate sodium (SENOKOT S) 8 6-50 mg per tablet 1 tablet, 1 tablet, Oral, HS, Ivana Abad MD, 1 tablet at 03/01/23 2247  •  ticagrelor (BRILINTA) tablet 90 mg, 90 mg, Oral, Q12H [facility], Ivana Abad MD, 90 mg at 03/03/23 5334    SKIN INTEGRITY:   Skin intact    PRIOR LEVEL OF FUNCTION:  He lives in a(n) single family home  Brian Koenig is  and and two adult children     Self Care: Independent, Indoor Mobility: Independent, Stairs (in/outdoor): Independent and Cognition: Independent    FALLS IN THE LAST 6 MONTHS: 0    HOME ENVIRONMENT:  The living area: can live on one level  There are 1 step to enter the home  The patient will not have 24 hour supervision/physical assistance available upon discharge      PREVIOUS DME:  Equipment in home (previous DME): None    FUNCTIONAL STATUS:***  Physical Therapy Occupational Therapy Speech Therapy          CARE SCORES:  Self Care:  Eating: {ARC Pre Admission Screenin}  Oral hygiene: {ARC Pre Admission Screenin}  Toilet hygiene: {ARC Pre Admission Screenin}  Shower/bathing self: {ARC Pre Admission Screenin}  Upper body dressing: {ARC Pre Admission Screenin}  Lower body dressing: {ARC Pre Admission Screenin}  Putting on/taking off footwear: {ARC Pre Admission Screenin}  Transfers:  Roll left and right: {ARC Pre Admission Screenin}  Sit to lying: {ARC Pre Admission Screenin}  Lying to sitting on side of bed: {ARC Pre Admission Screenin}  Sit to stand: {ARC Pre Admission Screenin}  Chair/bed to chair transfer: {ARC Pre Admission Screenin}  Toilet transfer: {ARC Pre Admission Screenin}  Mobility:  Walk 10 ft: {ARC Pre Admission Screenin}  Walk 50 ft with two turns: {ARC Pre Admission Screenin}  Walk 150ft: {ARC Pre Admission Screenin}    CURRENT GAP IN FUNCTION  Prior to Admission: Functional Status: functional status: Patient was independent with mobility/ambulation, transfers,ADL's and IAD's  Estimated length of stay: 10 to 14 days    Anticipated Post-Discharge Disposition/Treatment  Disposition: Return to previous home  Outpatient Services: Physical Therapy and Occupational therapy  BARRIERS TO DISCHARGE  Decreased strength;range of motion; endurance; Impaired balance;Decreased mobility; coordination; Impaired sensation; Impaired tone  Decreased ADL status; Decreased UE ROM; UE strength;Decreased Safe judgement during ADL;Decreased fine motor control;Decreased self-care trans;Decreased high-level ADLs    INTERVENTIONS FOR DISCHARGE   ADL retraining;Functional transfer training;UE strengthening/ROM; Endurance training;Patient/family training;Neuromuscular reeducation; Fine motor coordination activities; Compensatory technique education; Energy conservation; Activity engagement  Functional transfer training;LE strengthening/ROM; Therapeutic exercise;Patient/family training;Equipment eval/education; Bed mobility; Compensatory technique education and balance training  REQUIRED THERAPY:  Patient will require PT and OT and ST 60 minutes per day, five days per week to achieve rehab goals  REQUIRED FUNCTIONAL AND MEDICAL MANAGEMENT FOR INPATIENT REHABILITATION:  He will need close monitoring of skin secondary to decreased mobility, Deep Vein Thrombosis (DVT) Prophylaxis, SCD's while in bed,PT,OT and ST interventions,patient/family education and training  Any Labs consults imaging and medication adjustments patient may need while in [facility]  RECOMMENDED LEVEL OF CARE:   Christine Grissom is a 76-year-old male who originally presented to [address] [place] [address] on 2/25/23 with chief c/o dizziness accompanied with nausea x 2 days  In the ER, he was hypertensive with max blood pressure 252/115 which was  not improved by IV labetalol  CTA head and neck showed right posterior cerebellum infarct, acute versus subacute, marked tapering and occlusion of the distal right cervical vertebral artery suggestive of dissection, proximal right intradural vertebral artery occlusion, severe left vertebral artery stenosis  MRI and MRA were also performed, which confirmed bilateral cerebellar infarcts and concern for intracranial extension of R vert dissection  He was started on Cardene for blood pressure control  Incidentally in the ER, he was also noted to be COVID-positive, had an active UTI, and BRAULIO  He was transferred to [facility] for potential neurointerventional support should he worsen given his underlying vasculature and placed on ASA/heparin  On 2/26, patient had worsened dysarthria and right sided weakness  CT head negative for intracranial hemorrhage and stable bilateral cerebellar infarcts    Was taken for emergent R vertebral/basilar thrombectomy with R V3/4 stenting with TICI 2B revascularization  Patient was started on Integrilin gtt and repeat CT head negative for hemorrhage  MRI brain 2/26: Increased infarct burden  Bilateral cerebellar infarcts with larger right cerebellar infarct and new acute infarcts in the right medulla, bilateral midbrain, and left occipital lobe  Echo: EF 65%, normal wall motion, normal atria size, no PFO    Etiology for acute infarcts in the setting of bilateral vertebral artery occlusions remain unclear, however likely due to dissection vs chronic diffuse atherosclerotic disease  Possible contribution from underlying COVID infection and hypercoagulability  He was transitioned from Integrilin gtt to DAPT on 2/27 with aspirin 81 mg daily and Brilinta 90 mg BID (180 mg load once)  Heparin gtt discontinued and transitioned to Eliquis 2 5 mg BID on 2/28    Given spasticity and concern for myoclonus/muscle spasm in the proximal right LE, he was started on Baclofen 10 mg TID  Patient also with tearfulness throughout his hospitalization and was started on Prozac 20 mg daily  Patient with leukocytosis, however, afebrile and no s/s of infection  Suspect likely reactive the the setting of recent CVA vs  UTI vs COVID  Patient with dysarthria and dysphagia as per Speech Therapy progress note  ST plan is to nectar thick liquids, frequent thorough oral care and when appropriate a MBS test to be conducted  Upon reviewing patient's case and correspondences with PT/OT/ST and [facility] physician, it is felt that patient will benefit and is a good candidate for inpatient acute rehab at this time  He is now medically stable and ready for discharge to the [facility]  Prior to admission patient was Independent with his ADL's,IADL's, Ambulation,transfers,bed mobility and  Driving  He currently is Mod A with his bed mobility, Max A with transfers and Bathing ,dressing,toileting  He is not ambulating at this time due to increased fatigue level and poor balance   He requires Min A for grooming and eating  Patient requires close oversight by a physician, PM&R management,24/7 rehabilitation nursing care and specialized interdisciplinary therapy services in preparation for discharge which can only be provided in the inpatient acute rehabilitation setting  Patient requires routine neuro checks as well as close monitoring of his VS,I/O,skin and his overall condition  Inpatient acute rehabilitation is recommended for this patient to maximize his overall strength,endurance,self care and mobility upon discharge to home with the support of his wife and his two adult children

## 2023-03-03 NOTE — PLAN OF CARE
Problem: Prexisting or High Potential for Compromised Skin Integrity  Goal: Skin integrity is maintained or improved  Description: INTERVENTIONS:  - Identify patients at risk for skin breakdown  - Assess and monitor skin integrity  - Assess and monitor nutrition and hydration status  - Monitor labs   - Assess for incontinence   - Turn and reposition patient  - Assist with mobility/ambulation  - Relieve pressure over bony prominences  - Avoid friction and shearing  - Provide appropriate hygiene as needed including keeping skin clean and dry  - Evaluate need for skin moisturizer/barrier cream  - Collaborate with interdisciplinary team   - Patient/family teaching  - Consider wound care consult   Outcome: Progressing     Problem: MOBILITY - ADULT  Goal: Maintain or return to baseline ADL function  Description: INTERVENTIONS:  -  Assess patient's ability to carry out ADLs; assess patient's baseline for ADL function and identify physical deficits which impact ability to perform ADLs (bathing, care of mouth/teeth, toileting, grooming, dressing, etc )  - Assess/evaluate cause of self-care deficits   - Assess range of motion  - Assess patient's mobility; develop plan if impaired  - Assess patient's need for assistive devices and provide as appropriate  - Encourage maximum independence but intervene and supervise when necessary  - Involve family in performance of ADLs  - Assess for home care needs following discharge   - Consider OT consult to assist with ADL evaluation and planning for discharge  - Provide patient education as appropriate  Outcome: Progressing  Goal: Maintains/Returns to pre admission functional level  Description: INTERVENTIONS:  - Perform BMAT or MOVE assessment daily    - Set and communicate daily mobility goal to care team and patient/family/caregiver  - Collaborate with rehabilitation services on mobility goals if consulted  - Perform Range of Motion 3 times a day    - Reposition patient every 2 hours   - Dangle patient 3 times a day  - Stand patient 3 times a day  - Out of bed to chair 3 times a day   - Out of bed for meals 3 times a day  - Out of bed for toileting  - Record patient progress and toleration of activity level   Outcome: Progressing     Problem: PAIN - ADULT  Goal: Verbalizes/displays adequate comfort level or baseline comfort level  Description: Interventions:  - Encourage patient to monitor pain and request assistance  - Assess pain using appropriate pain scale  - Administer analgesics based on type and severity of pain and evaluate response  - Implement non-pharmacological measures as appropriate and evaluate response  - Consider cultural and social influences on pain and pain management  - Notify physician/advanced practitioner if interventions unsuccessful or patient reports new pain  Outcome: Progressing     Problem: INFECTION - ADULT  Goal: Absence or prevention of progression during hospitalization  Description: INTERVENTIONS:  - Assess and monitor for signs and symptoms of infection  - Monitor lab/diagnostic results  - Monitor all insertion sites, i e  indwelling lines, tubes, and drains  - Monitor endotracheal if appropriate and nasal secretions for changes in amount and color  - Howe appropriate cooling/warming therapies per order  - Administer medications as ordered  - Instruct and encourage patient and family to use good hand hygiene technique  - Identify and instruct in appropriate isolation precautions for identified infection/condition  Outcome: Progressing  Goal: Absence of fever/infection during neutropenic period  Description: INTERVENTIONS:  - Monitor WBC    Outcome: Progressing     Problem: SAFETY ADULT  Goal: Maintain or return to baseline ADL function  Description: INTERVENTIONS:  -  Assess patient's ability to carry out ADLs; assess patient's baseline for ADL function and identify physical deficits which impact ability to perform ADLs (bathing, care of mouth/teeth, toileting, grooming, dressing, etc )  - Assess/evaluate cause of self-care deficits   - Assess range of motion  - Assess patient's mobility; develop plan if impaired  - Assess patient's need for assistive devices and provide as appropriate  - Encourage maximum independence but intervene and supervise when necessary  - Involve family in performance of ADLs  - Assess for home care needs following discharge   - Consider OT consult to assist with ADL evaluation and planning for discharge  - Provide patient education as appropriate  Outcome: Progressing  Goal: Maintains/Returns to pre admission functional level  Description: INTERVENTIONS:  - Perform BMAT or MOVE assessment daily    - Set and communicate daily mobility goal to care team and patient/family/caregiver  - Collaborate with rehabilitation services on mobility goals if consulted  - Perform Range of Motion 3 times a day  - Reposition patient every 2 hours    - Dangle patient 3 times a day  - Stand patient 3 times a day  - Out of bed to chair 3 times a day   - Out of bed for meals 3 times a day  - Out of bed for toileting  - Record patient progress and toleration of activity level   Outcome: Progressing  Goal: Patient will remain free of falls  Description: INTERVENTIONS:  - Educate patient/family on patient safety including physical limitations  - Instruct patient to call for assistance with activity   - Consult OT/PT to assist with strengthening/mobility   - Keep Call bell within reach  - Keep bed low and locked with side rails adjusted as appropriate  - Keep care items and personal belongings within reach  - Initiate and maintain comfort rounds  - Make Fall Risk Sign visible to staff  - Offer Toileting every Hours, in advance of need  - Initiate/Maintain alarm  - Obtain necessary fall risk management equipment  - Apply yellow socks and bracelet for high fall risk patients  - Consider moving patient to room near nurses station  Outcome: Progressing     Problem: DISCHARGE PLANNING  Goal: Discharge to home or other facility with appropriate resources  Description: INTERVENTIONS:  - Identify barriers to discharge w/patient and caregiver  - Arrange for needed discharge resources and transportation as appropriate  - Identify discharge learning needs (meds, wound care, etc )  - Arrange for interpretive services to assist at discharge as needed  - Refer to Case Management Department for coordinating discharge planning if the patient needs post-hospital services based on physician/advanced practitioner order or complex needs related to functional status, cognitive ability, or social support system  Outcome: Progressing     Problem: Knowledge Deficit  Goal: Patient/family/caregiver demonstrates understanding of disease process, treatment plan, medications, and discharge instructions  Description: Complete learning assessment and assess knowledge base  Interventions:  - Provide teaching at level of understanding  - Provide teaching via preferred learning methods  Outcome: Progressing     Problem: NEUROSENSORY - ADULT  Goal: Achieves stable or improved neurological status  Description: INTERVENTIONS  - Monitor and report changes in neurological status  - Monitor vital signs such as temperature, blood pressure, glucose, and any other labs ordered   - Initiate measures to prevent increased intracranial pressure  - Monitor for seizure activity and implement precautions if appropriate      Outcome: Progressing  Goal: Achieves maximal functionality and self care  Description: INTERVENTIONS  - Monitor swallowing and airway patency with patient fatigue and changes in neurological status  - Encourage and assist patient to increase activity and self care     - Encourage visually impaired, hearing impaired and aphasic patients to use assistive/communication devices  Outcome: Progressing     Problem: CARDIOVASCULAR - ADULT  Goal: Maintains optimal cardiac output and hemodynamic stability  Description: INTERVENTIONS:  - Monitor I/O, vital signs and rhythm  - Monitor for S/S and trends of decreased cardiac output  - Administer and titrate ordered vasoactive medications to optimize hemodynamic stability  - Assess quality of pulses, skin color and temperature  - Assess for signs of decreased coronary artery perfusion  - Instruct patient to report change in severity of symptoms  Outcome: Progressing  Goal: Absence of cardiac dysrhythmias or at baseline rhythm  Description: INTERVENTIONS:  - Continuous cardiac monitoring, vital signs, obtain 12 lead EKG if ordered  - Administer antiarrhythmic and heart rate control medications as ordered  - Monitor electrolytes and administer replacement therapy as ordered  Outcome: Progressing     Problem: RESPIRATORY - ADULT  Goal: Achieves optimal ventilation and oxygenation  Description: INTERVENTIONS:  - Assess for changes in respiratory status  - Assess for changes in mentation and behavior  - Position to facilitate oxygenation and minimize respiratory effort  - Oxygen administered by appropriate delivery if ordered  - Encourage broncho-pulmonary hygiene including cough, deep breathe, Incentive Spirometry  - Assess the need for suctioning and aspirate as needed  - Assess and instruct to report SOB or any respiratory difficulty  - Respiratory Therapy support as indicated  Outcome: Progressing     Problem: GENITOURINARY - ADULT  Goal: Maintains or returns to baseline urinary function  Description: INTERVENTIONS:  - Assess urinary function  - Encourage oral fluids to ensure adequate hydration if ordered  - Administer IV fluids as ordered to ensure adequate hydration  - Administer ordered medications as needed  - Offer frequent toileting  - Follow urinary retention protocol if ordered  Outcome: Progressing  Goal: Absence of urinary retention  Description: INTERVENTIONS:  - Assess patient's ability to void and empty bladder  - Monitor I/O  - Bladder scan as needed  - Discuss with physician/AP medications to alleviate retention as needed    Outcome: Progressing  Goal: Urinary catheter remains patent  Description: INTERVENTIONS:  - Assess patency of urinary catheter  - If patient has a chronic barnett, consider changing catheter if non-functioning  - Follow guidelines for intermittent irrigation of non-functioning urinary catheter  Outcome: Progressing     Problem: METABOLIC, FLUID AND ELECTROLYTES - ADULT  Goal: Electrolytes maintained within normal limits  Description: INTERVENTIONS:  - Monitor labs and assess patient for signs and symptoms of electrolyte imbalances  - Administer electrolyte replacement as ordered  - Monitor response to electrolyte replacements, including repeat lab results as appropriate  - Instruct patient on fluid and nutrition as appropriate  Outcome: Progressing  Goal: Fluid balance maintained  Description: INTERVENTIONS:  - Monitor labs   - Monitor I/O and WT  - Instruct patient on fluid and nutrition as appropriate  - Assess for signs & symptoms of volume excess or deficit  Outcome: Progressing  Goal: Glucose maintained within target range  Description: INTERVENTIONS:  - Monitor Blood Glucose as ordered  - Assess for signs and symptoms of hyperglycemia and hypoglycemia  - Administer ordered medications to maintain glucose within target range  - Assess nutritional intake and initiate nutrition service referral as needed  Outcome: Progressing     Problem: Nutrition/Hydration-ADULT  Goal: Nutrient/Hydration intake appropriate for improving, restoring or maintaining nutritional needs  Description: Monitor and assess patient's nutrition/hydration status for malnutrition  Collaborate with interdisciplinary team and initiate plan and interventions as ordered  Monitor patient's weight and dietary intake as ordered or per policy  Utilize nutrition screening tool and intervene as necessary   Determine patient's food preferences and provide high-protein, high-caloric foods as appropriate  INTERVENTIONS:  - Monitor oral intake, urinary output, labs, and treatment plans  - Assess nutrition and hydration status and recommend course of action  - Evaluate amount of meals eaten  - Assist patient with eating if necessary   - Allow adequate time for meals  - Recommend/ encourage appropriate diets, oral nutritional supplements, and vitamin/mineral supplements  - Order, calculate, and assess calorie counts as needed  - Recommend, monitor, and adjust tube feedings and TPN/PPN based on assessed needs  - Assess need for intravenous fluids  - Provide specific nutrition/hydration education as appropriate  - Include patient/family/caregiver in decisions related to nutrition  Outcome: Progressing     Problem: Neurological Deficit  Goal: Neurological status is stable or improving  Description: Interventions:  - Monitor and assess patient's level of consciousness, motor function, sensory function, and level of assistance needed for ADLs  - Monitor and report changes from baseline  Collaborate with interdisciplinary team to initiate plan and implement interventions as ordered  - Provide and maintain a safe environment  - Consider seizure precautions  - Consider fall precautions  - Consider aspiration precautions  - Consider bleeding precautions  Outcome: Progressing     Problem: Activity Intolerance/Impaired Mobility  Goal: Mobility/activity is maintained at optimum level for patient  Description: Interventions:  - Assess and monitor patient  barriers to mobility and need for assistive/adaptive devices  - Assess patient's emotional response to limitations  - Collaborate with interdisciplinary team and initiate plans and interventions as ordered  - Encourage independent activity per ability   - Maintain proper body alignment  - Perform active/passive rom as tolerated/ordered    - Plan activities to conserve energy   - Turn patient as appropriate  Outcome: Progressing     Problem: Communication Impairment  Goal: Ability to express needs and understand communication  Description: Assess patient's communication skills and ability to understand information  Patient will demonstrate use of effective communication techniques, alternative methods of communication and understanding even if not able to speak  - Encourage communication and provide alternate methods of communication as needed  - Collaborate with case management/ for discharge needs  - Include patient/family/caregiver in decisions related to communication  Outcome: Progressing     Problem: Potential for Aspiration  Goal: Non-ventilated patient's risk of aspiration is minimized  Description: Assess and monitor vital signs, respiratory status, and labs (WBC)  Monitor for signs of aspiration (tachypnea, cough, rales, wheezing, cyanosis, fever)  - Assess and monitor patient's ability to swallow  - Place patient up in chair to eat if possible  - HOB up at 90 degrees to eat if unable to get patient up into chair   - Supervise patient during oral intake  - Instruct patient/ family to take small bites  - Instruct patient/ family to take small single sips when taking liquids  - Follow patient-specific strategies generated by speech pathologist   Outcome: Progressing     Problem: Nutrition  Goal: Nutrition/Hydration status is improving  Description: Monitor and assess patient's nutrition/hydration status for malnutrition (ex- brittle hair, bruises, dry skin, pale skin and conjunctiva, muscle wasting, smooth red tongue, and disorientation)  Collaborate with interdisciplinary team and initiate plan and interventions as ordered  Monitor patient's weight and dietary intake as ordered or per policy  Utilize nutrition screening tool and intervene per policy  Determine patient's food preferences and provide high-protein, high-caloric foods as appropriate       - Assist patient with eating   - Allow adequate time for meals   - Encourage patient to take dietary supplement as ordered  - Collaborate with clinical nutritionist   - Include patient/family/caregiver in decisions related to nutrition    Outcome: Progressing

## 2023-03-03 NOTE — ASSESSMENT & PLAN NOTE
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, L vertebral artery stenosis with possible occlusion within intradural segment  · NIHSS 0 with symptom onset 2/23 progressed to dysarthria, left facial droop not corrected with smile, R side flaccid    Imaging:  · MRI brain 2/26/2023: Acute posterior circulation infarcts within the right cerebellar hemisphere with new brainstem infarcts  Overall infarct burden slightly increased from prior study although new infarcts noted in posterior medulla on the right right midbrain and left occipital lobe  No hydrocephalus or parenchymal hemorrhage  Abnormal left vertebral artery flow void at the lateral mass of C1 related to slow flow or vascular occlusion  · CT head w/o, 2/28/23: Evolving acute infarcts in the brainstem (worse in left midbrain/left upper tatiana) and bilateral cerebellum (right worse than left) status post right distal vertebral artery stenting    Plan:  · Continue to closely monitor symptoms   · DAPT / AC  · ASA 81mg   · Brilinta 90mg BID  · Eliquis 2 5mg BID  · P2Y12 today 102  · STAT CTH for decline or change in neurological status  · Ongoing medical management   · Continue BP goal as ordered   · Neurology following for ongoing stroke care  · PT/OT, patient accepted to [facility]    Neurosurgery will sign off at this time  Patient will follow up in 2 weeks with repeat CTA H/N  Please call with questions or concerns

## 2023-03-04 PROBLEM — R00.0 SINUS TACHYCARDIA: Status: RESOLVED | Noted: 2023-03-01 | Resolved: 2023-03-04

## 2023-03-04 LAB
ANION GAP SERPL CALCULATED.3IONS-SCNC: 4 MMOL/L (ref 4–13)
BACTERIA BLD CULT: NORMAL
BACTERIA BLD CULT: NORMAL
BASOPHILS # BLD AUTO: 0.06 THOUSANDS/ÂΜL (ref 0–0.1)
BASOPHILS NFR BLD AUTO: 1 % (ref 0–1)
BUN SERPL-MCNC: 37 MG/DL (ref 5–25)
CALCIUM SERPL-MCNC: 8.6 MG/DL (ref 8.3–10.1)
CHLORIDE SERPL-SCNC: 109 MMOL/L (ref 96–108)
CO2 SERPL-SCNC: 27 MMOL/L (ref 21–32)
CREAT SERPL-MCNC: 1.31 MG/DL (ref 0.6–1.3)
EOSINOPHIL # BLD AUTO: 0.27 THOUSAND/ÂΜL (ref 0–0.61)
EOSINOPHIL NFR BLD AUTO: 3 % (ref 0–6)
ERYTHROCYTE [DISTWIDTH] IN BLOOD BY AUTOMATED COUNT: 12.6 % (ref 11.6–15.1)
GFR SERPL CREATININE-BSD FRML MDRD: 57 ML/MIN/1.73SQ M
GLUCOSE SERPL-MCNC: 104 MG/DL (ref 65–140)
HCT VFR BLD AUTO: 32.8 % (ref 36.5–49.3)
HGB BLD-MCNC: 10.4 G/DL (ref 12–17)
IMM GRANULOCYTES # BLD AUTO: 0.08 THOUSAND/UL (ref 0–0.2)
IMM GRANULOCYTES NFR BLD AUTO: 1 % (ref 0–2)
LYMPHOCYTES # BLD AUTO: 1.58 THOUSANDS/ÂΜL (ref 0.6–4.47)
LYMPHOCYTES NFR BLD AUTO: 15 % (ref 14–44)
MCH RBC QN AUTO: 27.8 PG (ref 26.8–34.3)
MCHC RBC AUTO-ENTMCNC: 31.7 G/DL (ref 31.4–37.4)
MCV RBC AUTO: 88 FL (ref 82–98)
MONOCYTES # BLD AUTO: 0.67 THOUSAND/ÂΜL (ref 0.17–1.22)
MONOCYTES NFR BLD AUTO: 6 % (ref 4–12)
NEUTROPHILS # BLD AUTO: 7.89 THOUSANDS/ÂΜL (ref 1.85–7.62)
NEUTS SEG NFR BLD AUTO: 74 % (ref 43–75)
NRBC BLD AUTO-RTO: 0 /100 WBCS
PLATELET # BLD AUTO: 307 THOUSANDS/UL (ref 149–390)
PMV BLD AUTO: 10.2 FL (ref 8.9–12.7)
POTASSIUM SERPL-SCNC: 4.1 MMOL/L (ref 3.5–5.3)
RBC # BLD AUTO: 3.74 MILLION/UL (ref 3.88–5.62)
SODIUM SERPL-SCNC: 140 MMOL/L (ref 135–147)
WBC # BLD AUTO: 10.55 THOUSAND/UL (ref 4.31–10.16)

## 2023-03-04 RX ADMIN — ASPIRIN 81 MG CHEWABLE TABLET 81 MG: 81 TABLET CHEWABLE at 08:23

## 2023-03-04 RX ADMIN — APIXABAN 2.5 MG: 2.5 TABLET, FILM COATED ORAL at 17:50

## 2023-03-04 RX ADMIN — TICAGRELOR 90 MG: 90 TABLET ORAL at 23:13

## 2023-03-04 RX ADMIN — LISINOPRIL 20 MG: 20 TABLET ORAL at 08:23

## 2023-03-04 RX ADMIN — FLUOXETINE 20 MG: 20 CAPSULE ORAL at 08:23

## 2023-03-04 RX ADMIN — ATORVASTATIN CALCIUM 40 MG: 40 TABLET, FILM COATED ORAL at 16:23

## 2023-03-04 RX ADMIN — BACLOFEN 10 MG: 10 TABLET ORAL at 17:51

## 2023-03-04 RX ADMIN — CARVEDILOL 6.25 MG: 6.25 TABLET, FILM COATED ORAL at 08:23

## 2023-03-04 RX ADMIN — POLYETHYLENE GLYCOL 3350 17 G: 17 POWDER, FOR SOLUTION ORAL at 08:24

## 2023-03-04 RX ADMIN — AMLODIPINE BESYLATE 10 MG: 10 TABLET ORAL at 08:22

## 2023-03-04 RX ADMIN — SENNOSIDES AND DOCUSATE SODIUM 1 TABLET: 8.6; 5 TABLET ORAL at 23:13

## 2023-03-04 RX ADMIN — CARVEDILOL 6.25 MG: 6.25 TABLET, FILM COATED ORAL at 16:24

## 2023-03-04 RX ADMIN — HYDRALAZINE HYDROCHLORIDE 10 MG: 20 INJECTION, SOLUTION INTRAMUSCULAR; INTRAVENOUS at 05:43

## 2023-03-04 RX ADMIN — BACLOFEN 10 MG: 10 TABLET ORAL at 08:23

## 2023-03-04 RX ADMIN — APIXABAN 2.5 MG: 2.5 TABLET, FILM COATED ORAL at 08:23

## 2023-03-04 RX ADMIN — TICAGRELOR 90 MG: 90 TABLET ORAL at 08:24

## 2023-03-04 NOTE — PLAN OF CARE
Problem: OCCUPATIONAL THERAPY ADULT  Goal: Performs self-care activities at highest level of function for planned discharge setting  See evaluation for individualized goals  Description: Treatment Interventions: ADL retraining, Functional transfer training, UE strengthening/ROM, Endurance training, Patient/family training, Neuromuscular reeducation, Fine motor coordination activities, Compensatory technique education, Continued evaluation, Energy conservation, Activityengagement          See flowsheet documentation for full assessment, interventions and recommendations  Outcome: Progressing  Note: Limitation: Decreased ADL status, Decreased UE ROM, Decreased UE strength, Decreased Safe judgement during ADL, Decreased endurance, Decreased sensation, Decreased fine motor control, Decreased self-care trans, Decreased high-level ADLs  Prognosis: Good  Assessment: Pt seen on this date for OT session focusing on ADL retraining, body mechanics, transfer retraining, increasing activity tolerance/endurance and EOB sitting to increase ability to participate in ADL/functional tasks  Pt was found in bed and was left in chair w/ all needs within reach, chair alarm on  Pt completed bed mob w mod Ax1, sat EOB w S/min A  STS w mod Ax2 and stand pivot with max Ax2, b/l HHA and knee blocking  Pt stood at sink w mod Ax1 while engaging in grooming tasks, which he required mod A to complete  UB/LB bathing completed while pt sitting in chair w mod A/max A, respectively  Please see flowsheet for further detail  Provided edu to spouse regarding UE ROM engagement and when to cindy/doff sling, as well as proper positioning while in bed  Pt w/ improvements in activity tolerance, transfer ability, ADL Task completion and cognition, however is still limited 2* decreased ADL/High-level ADL status, decreased activity tolerance/endurance,  decreased self-care trans, decreased safety awareness and insight to condition   The patient's raw score on the AM-PAC Daily Activity Inpatient Short Form is 13  A raw score of less than 19 suggests the patient may benefit from discharge to post-acute rehabilitation services  Please refer to the recommendation of the Occupational Therapist for safe discharge planning  Recommending pt D/C to STR when medically stable  Pt will continue to benefit from acute OT services to meet goals    Recommendation: (S)  (PM&R)  OT Discharge Recommendation: Post acute rehabilitation services

## 2023-03-04 NOTE — PROGRESS NOTES
INTERNAL MEDICINE RESIDENCY PROGRESS NOTE     Name: Silver Carrillo   Age & Sex: 60 y o  male   MRN: [id number]  Unit/Bed#: [facility] 0-36   Encounter: [id number]  Team: SOD Team B     PATIENT INFORMATION     Name: Silver Carrillo   Age & Sex: 60 y o  male   MRN: [id number]  Hospital Stay Days: 7    ASSESSMENT/PLAN     Principal Problem:    Acute Posterior Circulation Stroke  Active Problems:    COVID    Right Vertebral artery dissection (HCC)    Stenosis of left vertebral artery    Hypertensive emergency    UTI (urinary tract infection)    Dysphagia    Spasticity    Elevated serum creatinine    Leukocytosis    Prediabetes    Tearfulness      Tearfulness  Assessment & Plan  Pt with persistent tearfulness, depressed mood following recent stroke  3/3 discussed starting SSRI with patient, he is agreeable to trying  Expressed understanding this may take several weeks to notice any benefit  Continue Prozac 20 mg daily - discussed with neurology  Continue to monitor mood  Will need to establish with PCP at discharge  Prediabetes  Assessment & Plan  ? a1c 5 7  ? Glucose 110-127  ? Diet and lifestyle modifications    Leukocytosis  Assessment & Plan  Pt with slightly elevated WBC, stable from day prior  Afebrile, no worsening s/sxs of infection at this time  Suspect likely reactive in setting of recent stroke vs UTI vs COVID  ? Continue to follow WBC count, monitor fever curve    Elevated serum creatinine  Assessment & Plan  Pt presented with elevated Cr 1 32 on arrival, now improved  No known baseline  ? Continue to follow BMP  ? No significant increase in Cr with lisinopril  ? Avoid nephrotoxic agents    Spasticity  Assessment & Plan  Pt with new RLE spasticity and myoclonus  Unlikely focal seizures per neurology       • Continue baclofen 10 mg BID, frequency adjusted per pharmacy given renal function    Dysphagia  Assessment & Plan  Pt with new dysphagia s/p stroke  ?  Continue dysphagia 3 diet, nectar thick liquids per speech eval    UTI (urinary tract infection)  Assessment & Plan  Urine cx with pan-susceptible E coli  Pt completed 4-day course of CTX as of 2/28       Plan:  • Monitor off abx    Hypertensive emergency  Assessment & Plan  Pt initially presented with hypertensive emergency in setting of stroke as mentioned above  Pt on cardene drip in the ICU, now discontinued on 3/1  Blood Pressure: 162/82      Plan:  • Continue Amlodipine 10 mg qd  • Continue Coreg 6 25 BID  • Continue Lisinopril 20 mg daily  • Will consider addition of hydralazine or thiazide if BP continues to be elevated   • SBP goal <160  • Continue PRNs antihypertensive with PRN hydralazine     Stenosis of left vertebral artery  Assessment & Plan  See acute posterior circulation stroke plan    Right Vertebral artery dissection [facility])  Assessment & Plan  See acute posterior circulation plan    COVID  Assessment & Plan  Pt asx, remained on room air during hospitalization       • Continue airborne and contact precautions    * Acute Posterior Circulation Stroke  Assessment & Plan  60 y o  male with no known prior hx, did not follow with a physician  Presented to [address] [place] [address] Road 2/25/2023 of several days of weakness, dizziness, and nausea/vomiting  CT head revealed acute/subacute infarcts in the right posterior cerebellum with follow up CTA head/neck demonstrating 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 and left vertebral artery origin stenosis with presumed occlusion of the distal left intradural vertebral segment    • MRA head/carotids demonstrated possible extension of the vertebral artery dissection into the basilar artery  • MRI brain revealed several small acute/early subacute bi-cerebellar infarcts (R>L) without evidence of hemorrhage     Patient was transferred to [facility] [place] for closer monitoring with consideration for endovascular intervention   Initially given stable exam, intervention was felt too risky and patient was a heparin gtt with full dose aspirin  However, pt developed worsened dysarthria on  and R sided weakness  • Repeat CT  stable, however patient was taken to IR for emergent R vertebral/basilar thrombectomy with R V3/4 stenting with TICI 2B revascularization  • MRI brain : Increased infarct burden   Bilateral cerebellar infarcts with larger right cerebellar infarct and new acute infarcts in the right medulla, bilateral midbrain, and left occipital lobe        Plan:   • Neurosurgery consulted, recs appreciated -- 3/3 sign off  • Recommend repeat CTA H/N 2 weeks   • Neurology consulted, recs appreciated  • Echo: EF 65%, normal wall motion, normal atria size, no PFO   • Lipid panel , , HDL 27,   • A1c 5 7  • q4hr neuro checks at night, q2hr during the day  • Continue ASA and Brilinta 90 mg BID  • 3/3: P2y12 102  • Continue eliquis 2 5 mg bid  • Continue atorvastatin  • SBP goal <160  • ARC dispo pending insurance     Sinus tachycardia-resolved as of 3/4/2023  Assessment & Plan   pt with intermittent episodes of brief sinus tachycardia  ? Continue potassium goal >4      Disposition: pending ARC auth     SUBJECTIVE     Patient seen and examined  No acute events overnight  No complaints  Reports he is able to move his right lower extremity slightly better  Denies any acute side effects from Prozac  Tolerating diet well  OBJECTIVE     Vitals:    23 1707 23 1759 23 0545 23 0821   BP: 157/73 105/71  162/82   Pulse: 67 70 56 64   Resp:       Temp: 99 8 °F (37 7 °C)   98 2 °F (36 8 °C)   TempSrc:       SpO2: 97% 96% 97% 98%   Weight:       Height:          Temperature:   Temp (24hrs), Av °F (37 2 °C), Min:98 2 °F (36 8 °C), Max:99 8 °F (37 7 °C)    Temperature: 98 2 °F (36 8 °C)  Intake & Output:  I/O       / 0701  / 0700 / 0701  / 0700 / 0701  03/ 0700    P  O   120     I V  (mL/kg)       Total Intake(mL/kg)  120 (1 4)     Urine (mL/kg/hr)  600 (0 3) Total Output  600     Net  -480            Unmeasured Stool Occurrence 2 x          Weights:   IBW (Ideal Body Weight): 70 7 kg    Body mass index is 28 16 kg/m²  Weight (last 2 days)     Date/Time Weight    03/02/23 0600 86 5 (190 7)        Physical Exam  Constitutional:       General: He is not in acute distress  Appearance: Normal appearance  He is not ill-appearing  HENT:      Head: Normocephalic and atraumatic  Eyes:      General: No scleral icterus  Extraocular Movements: Extraocular movements intact  Conjunctiva/sclera: Conjunctivae normal    Cardiovascular:      Rate and Rhythm: Normal rate and regular rhythm  Heart sounds: Normal heart sounds  No murmur heard  Pulmonary:      Effort: Pulmonary effort is normal  No respiratory distress  Breath sounds: Normal breath sounds  No stridor  No wheezing, rhonchi or rales  Abdominal:      General: Abdomen is flat  Bowel sounds are normal  There is no distension  Palpations: Abdomen is soft  There is no mass  Tenderness: There is no abdominal tenderness  There is no guarding  Hernia: No hernia is present  Musculoskeletal:      Cervical back: No muscular tenderness  Right lower leg: Edema (Trace, nonpitting) present  Left lower leg: No edema  Skin:     Capillary Refill: Capillary refill takes less than 2 seconds  Neurological:      General: No focal deficit present  Mental Status: He is alert and oriented to person, place, and time  Sensory: Sensory deficit (Decrease right face, right upper extremity and right lower extremity) present  Motor: Weakness (Right facial droop, left side 5/5, RUE 0/5, R hip 3/5, R knee flex 3/5, R ankle plantar 3+/5 dorsi 0/5) present  Psychiatric:         Mood and Affect: Mood normal          Thought Content: Thought content normal        LABORATORY DATA     Labs: I have personally reviewed pertinent reports    Results from last 7 days   Lab Units 03/04/23  0538 03/03/23  0000 03/02/23  0455 02/27/23  1003 02/26/23  0506   WBC Thousand/uL 10 55* 12 09* 11 85*   < > 13 27*   HEMOGLOBIN g/dL 10 4* 11 6* 10 4*   < > 13 3   HEMATOCRIT % 32 8* 33 3* 32 3*   < > 40 2   PLATELETS Thousands/uL 307 347 309   < > 349   NEUTROS PCT % 74  --   --   --  89*   MONOS PCT % 6  --   --   --  4    < > = values in this interval not displayed  Results from last 7 days   Lab Units 03/04/23  0538 03/03/23  0000 03/02/23  0455 02/27/23  0429 02/26/23  0506   POTASSIUM mmol/L 4 1 4 2 4 0   < > 3 4*   CHLORIDE mmol/L 109* 109* 115*   < > 115*   CO2 mmol/L 27 27 25   < > 21   BUN mg/dL 37* 31* 29*   < > 17   CREATININE mg/dL 1 31* 1 29 1 37*   < > 1 09   CALCIUM mg/dL 8 6 8 8 8 7   < > 8 8   ALK PHOS U/L  --   --   --   --  63   ALT U/L  --   --   --   --  24   AST U/L  --   --   --   --  13    < > = values in this interval not displayed  Results from last 7 days   Lab Units 03/02/23  0455 02/28/23  0209 02/27/23  0429   MAGNESIUM mg/dL 2 5 2 3 2 1     Results from last 7 days   Lab Units 03/02/23  0455 02/28/23  0209   PHOSPHORUS mg/dL 4 7* 2 3      Results from last 7 days   Lab Units 02/28/23  0208 02/27/23  2100 02/27/23  1548 02/27/23  1003 02/26/23  0506 02/26/23  0021   INR   --   --   --  1 05  --  1 06   PTT seconds 78* 40* 32 31   < > 31    < > = values in this interval not displayed  Results from last 7 days   Lab Units 02/25/23  2044   LACTIC ACID mmol/L 1 2           IMAGING & DIAGNOSTIC TESTING     Radiology Results: I have personally reviewed pertinent reports  CTA head and neck w wo contrast    Result Date: 2/26/2023  Impression: Overall, there is no significant interval change when comparing to the recent MRI brain, and CTA head neck study  Stable small cerebellar infarctions as noted on the recent MRI study   Stable findings of distal right cervical and proximal intradural vertebral artery occlusion with probable retrograde flow in its distal intradural segment  Stable left vertebral artery origin stenosis and presumed occlusion of the distal left intradural vertebral segment  Small basilar artery with focal atherosclerotic disease in its proximal segment  Patent fetal right PCA circulation  Left PCA branch is small but patent, and unchanged in appearance  Stable poor visualization of the proximal left superior cerebellar artery  Above-mentioned findings are in keeping with the preliminary report provided by [facility] radiology services without significant discrepancy in the report  Workstation performed: [id number]     CTA head and neck with and without contrast    Result Date: 2/25/2023  Impression: No acute intracranial hemorrhage  Focal infarcts in the right posterior cerebellum, possibly acute to subacute  Follow-up MRI imaging is recommended  Marked tapering and occlusion of the distal right cervical vertebral artery  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  Proximal right intradural vertebral artery is also occluded with faint visualization of the post-PICA segment, which may be filling in a retrograde fashion  Severe left vertebral artery origin stenosis  Left vertebral artery may terminate in a posterior for cerebellar artery branch as the post-PICA segment is not identified  Small basilar artery with fetal right PCA circulation  Left proximal PCA is patent but small  Mid to distal left PCA is not well visualized  I personally discussed this study with Anna Winn on 2/25/2023 at 12:21 PM  Workstation performed: [id number]     XR chest 1 view portable    Result Date: 2/25/2023  Impression: No acute abnormality however there is an opacity in the left lung base that may represent focal pleural thickening  Neoplasm cannot be excluded  Nonemergent follow-up PA and lateral views of the chest or CT chest suggested unless there are prior studies available for comparison   Findings for this inpatient marked for immediate notification in [address] [place] [address] Rd  Workstation performed: [id number]     CT head wo contrast    Result Date: 2/28/2023  Impression: Evolving acute infarcts in the brainstem (worse in left midbrain/left upper tatiana) and bilateral cerebellum (right worse than left) status post right distal vertebral artery stenting  No acute intracranial hemorrhage  Workstation performed: [id number]     CT head wo contrast    Result Date: 2/26/2023  Impression: Stable evolving bilateral cerebellar infarctions without significant interval change  Workstation performed: [id number]     CT head wo contrast    Result Date: 2/26/2023  Impression: No new parenchymal findings when comparing to the recent CT and MRI brain studies  Stable late acute to early subacute bilateral cerebellar infarctions without hemorrhagic transformation  Workstation performed: [id number]     MRA head wo contrast    Result Date: 2/25/2023  Impression: Abscess of flow related signal in the V4 segments of the bilateral vertebral arteries, throughout the basilar artery and left posterior cerebral artery, concerning for progression of dissection and/or thrombus  Recommend emergent interventional radiology consultation  I personally discussed this study with George Ford   on 2/25/2023 at 8:15 PM   Workstation performed: [id number]     MRA carotids wo contrast    Result Date: 2/25/2023  Impression: 1  There complete absence of flow related signal along the cervical segment of the right vertebral artery concerning for progression of dissection and/or retrograde flow  2   Absence of flow related signal in the V4 segments of the bilateral vertebral arteries and throughout the basilar artery, also concerning for progression of dissection  I personally discussed this study with George Ford on 2/25/2023 at 8:24 PM  Workstation performed: [id number]     MRI brain wo contrast    Result Date: 2/27/2023  Impression: 1    Crescending, acute/recent posterior circulation infarctions with increasing right cerebellar and new brainstem infarctions as described  Overall infarct burden is slightly increased from the prior study one day earlier although new small infarcts are noted in the posterior medulla on the right, right midbrain and left occipital lobe  No hydrocephalus or large parenchymal hemorrhage  2   Abnormal left vertebral artery flow void at the lateral mass of C1, possibly related to slow flow and/or vascular occlusion  3   Very mild, chronic microangiopathy and chronic left thalamic lacunar infarction    Workstation performed: [id number]     MRI brain wo contrast    Result Date: 2/25/2023  Impression: Acute early subacute infarcts throughout the right greater than left cerebellar hemispheres and anterior vermis, consistent with findings on the head CT  No hemorrhagic conversion or mass effect  Arthrotomy 8 Workstation performed: [id number]     XR follow up    Result Date: 2/25/2023  Impression: No radiopaque orbital foreign body  Workstation performed: [id number]     CT cerebral perfusion    Result Date: 2/26/2023  Impression: CT perfusion performed  Data available on PACS  Workstation performed: [id number]     Other Diagnostic Testing: I have personally reviewed pertinent reports      ACTIVE MEDICATIONS     Current Facility-Administered Medications   Medication Dose Route Frequency   • amLODIPine (NORVASC) tablet 10 mg  10 mg Oral Daily   • apixaban (ELIQUIS) tablet 2 5 mg  2 5 mg Oral BID   • aspirin chewable tablet 81 mg  81 mg Oral Daily   • atorvastatin (LIPITOR) tablet 40 mg  40 mg Oral Daily With Dinner   • baclofen tablet 10 mg  10 mg Oral BID   • carvedilol (COREG) tablet 6 25 mg  6 25 mg Oral BID With Meals   • FLUoxetine (PROzac) capsule 20 mg  20 mg Oral Daily   • hydrALAZINE (APRESOLINE) injection 10 mg  10 mg Intravenous Q4H PRN   • lisinopril (ZESTRIL) tablet 20 mg  20 mg Oral Daily   • polyethylene glycol (MIRALAX) packet 17 g  17 g Oral Daily   • senna-docusate sodium (SENOKOT S) 8 6-50 mg per tablet 1 tablet  1 tablet Oral HS   • ticagrelor (BRILINTA) tablet 90 mg  90 mg Oral Q12H [address]       VTE Pharmacologic Prophylaxis: Reason for no pharmacologic prophylaxis eliquis  VTE Mechanical Prophylaxis: sequential compression device    Portions of the record may have been created with voice recognition software  Occasional wrong word or "sound a like" substitutions may have occurred due to the inherent limitations of voice recognition software    Read the chart carefully and recognize, using context, where substitutions have occurred   ==  Delbert Tran, [address] [facility]  Internal Medicine Residency PGY-3

## 2023-03-04 NOTE — PHYSICAL THERAPY NOTE
PHYSICAL THERAPY NOTE          Patient Name: Jesse Lovett  PYDCB'G Date: 3/4/2023       03/04/23 2140   PT Last Visit   PT Visit Date 03/04/23   Note Type   Note Type Treatment for insurance authorization   Pain Assessment   Pain Assessment Tool 0-10   Pain Score No Pain   Restrictions/Precautions   Weight Bearing Precautions Per Order No   Braces or Orthoses Sling  (for transfers/mobility due to R hemparesis)   Other Precautions Contact/isolation; Airborne/isolation; Bed Alarm; Chair Alarm;Multiple lines;Telemetry; Fall Risk  (R hemipareis, SBP <160, +COVID)   General   Chart Reviewed Yes   Response to Previous Treatment Patient with no complaints from previous session  Family/Caregiver Present Yes  (wife)   Cognition   Overall Cognitive Status WFL   Arousal/Participation Cooperative   Attention Within functional limits   Orientation Level Oriented X4   Memory Within functional limits   Following Commands Follows one step commands without difficulty   Comments very pleasant   Subjective   Subjective agreeable to participate   Bed Mobility   Supine to Sit 3  Moderate assistance   Additional items Assist x 1;HOB elevated; Increased time required;Verbal cues;LE management   Sit to Supine Unable to assess   Additional Comments remained seated in chair with alarm upon conclusion   Transfers   Sit to Stand 3  Moderate assistance   Additional items Assist x 2; Increased time required;Verbal cues  (modAx1 at sink with R knee block and LUE pulling up on sink)   Stand to Sit 3  Moderate assistance   Additional items Assist x 1; Increased time required;Verbal cues   Stand pivot 2  Maximal assistance   Additional items Assist x 2; Increased time required;Verbal cues   Additional Comments L HHA + 2nd anterior with R knee block and RUE supported in sling   x4 STS throughout session   Ambulation/Elevation   Gait pattern Not appropriate   Balance   Static Sitting Poor +   Dynamic Sitting Poor +   Static Standing Poor   Dynamic Standing Poor -   Ambulatory Zero   Endurance Deficit   Endurance Deficit Yes   Endurance Deficit Description fatigue, R hemiparesis   Activity Tolerance   Activity Tolerance Patient tolerated treatment well   Medical Staff Made Aware co-tx with Ailyn THOMSON due to medical complexity   Nurse Made Aware yes-cleared to mobilize   Exercises   Balance training  static standing at sink x30 sec and x45 sec with modAx1   Assessment   Prognosis Good   Problem List Decreased strength;Decreased endurance; Impaired balance;Decreased mobility; Decreased coordination; Impaired tone   Assessment Pt agreeable to participate in PT session  Pt performed functional mobility as outlined above  Hip flexion and knee extension on RLE: 2/5, anterior tibialis 0/5  Able to stand at sink pulling up with LUE with modAx1 (R knee block and A to keep R hand on sink)  Able to activate R quads and glutes with VC for upright posture  Would benefit from pregait training in future sessions  Pt and wife educated on recovery process, mental imagery, and level so rehab  Pt left seated in chair with chair alarm, call bell, phone, and all personal needs within reach  Pt will continue to benefit from skilled acute care PT to further address their functional mobility limitations  D/C recommendations remain rehab  Believe pt would be a good acute rehab candidate as he is highly motivated, young, demonstrating progress during hospital stay, and has appropriate activity tolerance  Barriers to Discharge Decreased caregiver support; Inaccessible home environment   Goals   Patient Goals to improve   STG Expiration Date 03/13/23   PT Treatment Day 2   Plan   Treatment/Interventions LE strengthening/ROM; Functional transfer training; Therapeutic exercise; Endurance training;Cognitive reorientation;Patient/family training;Equipment eval/education; Bed mobility;Gait training;Spoke to nursing;Spoke to case management;OT   Progress Progressing toward goals   PT Frequency 3-5x/wk   Recommendation   PT Discharge Recommendation (S)  Post acute rehabilitation services  (PMR)   Equipment Recommended   (TBD)   AM-PAC Basic Mobility Inpatient   Turning in Flat Bed Without Bedrails 3   Lying on Back to Sitting on Edge of Flat Bed Without Bedrails 2   Moving Bed to Chair 1   Standing Up From Chair Using Arms 2   Walk in Room 1   Climb 3-5 Stairs With Railing 1   Basic Mobility Inpatient Raw Score 10   Turning Head Towards Sound 4   Follow Simple Instructions 4   Low Function Basic Mobility Raw Score 18   Low Function Basic Mobility Standardized Score 29 25   Highest Level Of Mobility   JH-HLM Goal 4: Move to chair/commode   JH-HLM Achieved 4: Move to chair/commode   José Seo, PT, DPT

## 2023-03-04 NOTE — PLAN OF CARE
Problem: PHYSICAL THERAPY ADULT  Goal: Performs mobility at highest level of function for planned discharge setting  See evaluation for individualized goals  Description: Treatment/Interventions: ADL retraining, Functional transfer training, LE strengthening/ROM, Elevations, Therapeutic exercise, Endurance training, Cognitive reorientation, Patient/family training, Equipment eval/education, Bed mobility, Gait training, Compensatory technique education, Continued evaluation, Spoke to nursing, Spoke to case management, Spoke to advanced practitioner, OT, Family  Equipment Recommended:  (TBD)       See flowsheet documentation for full assessment, interventions and recommendations  Outcome: Progressing  Note: Prognosis: Good  Problem List: Decreased strength, Decreased endurance, Impaired balance, Decreased mobility, Decreased coordination, Impaired tone  Assessment: Pt agreeable to participate in PT session  Pt performed functional mobility as outlined above  Hip flexion and knee extension on RLE: 2/5, anterior tibialis 0/5  Able to stand at sink pulling up with LUE with modAx1 (R knee block and A to keep R hand on sink)  Able to activate R quads and glutes with VC for upright posture  Would benefit from pregait training in future sessions  Pt and wife educated on recovery process, mental imagery, and level so rehab  Pt left seated in chair with chair alarm, call bell, phone, and all personal needs within reach  Pt will continue to benefit from skilled acute care PT to further address their functional mobility limitations  D/C recommendations remain rehab  Believe pt would be a good acute rehab candidate as he is highly motivated, young, demonstrating progress during hospital stay, and has appropriate activity tolerance    Barriers to Discharge: Decreased caregiver support, Inaccessible home environment     PT Discharge Recommendation: (S) Post acute rehabilitation services (PMR)    See flowsheet documentation for full assessment

## 2023-03-05 LAB
ANION GAP SERPL CALCULATED.3IONS-SCNC: 2 MMOL/L (ref 4–13)
BASOPHILS # BLD AUTO: 0.04 THOUSANDS/ÂΜL (ref 0–0.1)
BASOPHILS NFR BLD AUTO: 0 % (ref 0–1)
BUN SERPL-MCNC: 38 MG/DL (ref 5–25)
CALCIUM SERPL-MCNC: 8.6 MG/DL (ref 8.3–10.1)
CHLORIDE SERPL-SCNC: 107 MMOL/L (ref 96–108)
CO2 SERPL-SCNC: 27 MMOL/L (ref 21–32)
CREAT SERPL-MCNC: 1.29 MG/DL (ref 0.6–1.3)
EOSINOPHIL # BLD AUTO: 0.2 THOUSAND/ÂΜL (ref 0–0.61)
EOSINOPHIL NFR BLD AUTO: 2 % (ref 0–6)
ERYTHROCYTE [DISTWIDTH] IN BLOOD BY AUTOMATED COUNT: 12.7 % (ref 11.6–15.1)
GFR SERPL CREATININE-BSD FRML MDRD: 58 ML/MIN/1.73SQ M
GLUCOSE SERPL-MCNC: 103 MG/DL (ref 65–140)
HCT VFR BLD AUTO: 30.6 % (ref 36.5–49.3)
HGB BLD-MCNC: 10 G/DL (ref 12–17)
IMM GRANULOCYTES # BLD AUTO: 0.07 THOUSAND/UL (ref 0–0.2)
IMM GRANULOCYTES NFR BLD AUTO: 1 % (ref 0–2)
LYMPHOCYTES # BLD AUTO: 1.21 THOUSANDS/ÂΜL (ref 0.6–4.47)
LYMPHOCYTES NFR BLD AUTO: 12 % (ref 14–44)
MCH RBC QN AUTO: 28.3 PG (ref 26.8–34.3)
MCHC RBC AUTO-ENTMCNC: 32.7 G/DL (ref 31.4–37.4)
MCV RBC AUTO: 87 FL (ref 82–98)
MONOCYTES # BLD AUTO: 0.69 THOUSAND/ÂΜL (ref 0.17–1.22)
MONOCYTES NFR BLD AUTO: 7 % (ref 4–12)
NEUTROPHILS # BLD AUTO: 8.24 THOUSANDS/ÂΜL (ref 1.85–7.62)
NEUTS SEG NFR BLD AUTO: 78 % (ref 43–75)
NRBC BLD AUTO-RTO: 0 /100 WBCS
PLATELET # BLD AUTO: 323 THOUSANDS/UL (ref 149–390)
PMV BLD AUTO: 9.9 FL (ref 8.9–12.7)
POTASSIUM SERPL-SCNC: 4 MMOL/L (ref 3.5–5.3)
RBC # BLD AUTO: 3.53 MILLION/UL (ref 3.88–5.62)
SODIUM SERPL-SCNC: 136 MMOL/L (ref 135–147)
WBC # BLD AUTO: 10.45 THOUSAND/UL (ref 4.31–10.16)

## 2023-03-05 RX ADMIN — ATORVASTATIN CALCIUM 40 MG: 40 TABLET, FILM COATED ORAL at 17:20

## 2023-03-05 RX ADMIN — APIXABAN 2.5 MG: 2.5 TABLET, FILM COATED ORAL at 08:26

## 2023-03-05 RX ADMIN — CARVEDILOL 6.25 MG: 6.25 TABLET, FILM COATED ORAL at 08:25

## 2023-03-05 RX ADMIN — ASPIRIN 81 MG CHEWABLE TABLET 81 MG: 81 TABLET CHEWABLE at 08:26

## 2023-03-05 RX ADMIN — BACLOFEN 10 MG: 10 TABLET ORAL at 08:26

## 2023-03-05 RX ADMIN — TICAGRELOR 90 MG: 90 TABLET ORAL at 21:27

## 2023-03-05 RX ADMIN — FLUOXETINE 20 MG: 20 CAPSULE ORAL at 08:26

## 2023-03-05 RX ADMIN — AMLODIPINE BESYLATE 10 MG: 10 TABLET ORAL at 08:26

## 2023-03-05 RX ADMIN — APIXABAN 2.5 MG: 2.5 TABLET, FILM COATED ORAL at 17:20

## 2023-03-05 RX ADMIN — CARVEDILOL 6.25 MG: 6.25 TABLET, FILM COATED ORAL at 17:21

## 2023-03-05 RX ADMIN — LISINOPRIL 20 MG: 20 TABLET ORAL at 08:26

## 2023-03-05 RX ADMIN — TICAGRELOR 90 MG: 90 TABLET ORAL at 08:27

## 2023-03-05 RX ADMIN — BACLOFEN 10 MG: 10 TABLET ORAL at 17:21

## 2023-03-05 NOTE — PLAN OF CARE
Problem: MOBILITY - ADULT  Goal: Maintain or return to baseline ADL function  Description: INTERVENTIONS:  -  Assess patient's ability to carry out ADLs; assess patient's baseline for ADL function and identify physical deficits which impact ability to perform ADLs (bathing, care of mouth/teeth, toileting, grooming, dressing, etc )  - Assess/evaluate cause of self-care deficits   - Assess range of motion  - Assess patient's mobility; develop plan if impaired  - Assess patient's need for assistive devices and provide as appropriate  - Encourage maximum independence but intervene and supervise when necessary  - Involve family in performance of ADLs  - Assess for home care needs following discharge   - Consider OT consult to assist with ADL evaluation and planning for discharge  - Provide patient education as appropriate  Outcome: Progressing  Goal: Maintains/Returns to pre admission functional level  Description: INTERVENTIONS:  - Perform BMAT or MOVE assessment daily    - Set and communicate daily mobility goal to care team and patient/family/caregiver  - Collaborate with rehabilitation services on mobility goals if consulted  - Perform Range of Motion 3 times a day  - Reposition patient every 2 hours    - Dangle patient 3 times a day  - Stand patient 3 times a day  - Out of bed to chair 3 times a day   - Out of bed for meals 3 times a day  - Out of bed for toileting  - Record patient progress and toleration of activity level   Outcome: Progressing     Problem: SAFETY ADULT  Goal: Maintain or return to baseline ADL function  Description: INTERVENTIONS:  -  Assess patient's ability to carry out ADLs; assess patient's baseline for ADL function and identify physical deficits which impact ability to perform ADLs (bathing, care of mouth/teeth, toileting, grooming, dressing, etc )  - Assess/evaluate cause of self-care deficits   - Assess range of motion  - Assess patient's mobility; develop plan if impaired  - Assess patient's need for assistive devices and provide as appropriate  - Encourage maximum independence but intervene and supervise when necessary  - Involve family in performance of ADLs  - Assess for home care needs following discharge   - Consider OT consult to assist with ADL evaluation and planning for discharge  - Provide patient education as appropriate  Outcome: Progressing  Goal: Maintains/Returns to pre admission functional level  Description: INTERVENTIONS:  - Perform BMAT or MOVE assessment daily    - Set and communicate daily mobility goal to care team and patient/family/caregiver  - Collaborate with rehabilitation services on mobility goals if consulted  - Perform Range of Motion 3 times a day  - Reposition patient every 2 hours    - Dangle patient 3 times a day  - Stand patient 3 times a day  - Out of bed to chair 3 times a day   - Out of bed for meals 3 times a day  - Out of bed for toileting  - Record patient progress and toleration of activity level   Outcome: Progressing  Goal: Patient will remain free of falls  Description: INTERVENTIONS:  - Educate patient/family on patient safety including physical limitations  - Instruct patient to call for assistance with activity   - Consult OT/PT to assist with strengthening/mobility   - Keep Call bell within reach  - Keep bed low and locked with side rails adjusted as appropriate  - Keep care items and personal belongings within reach  - Initiate and maintain comfort rounds  - Make Fall Risk Sign visible to staff  - Apply yellow socks and bracelet for high fall risk patients  - Consider moving patient to room near nurses station  Outcome: Progressing     Problem: NEUROSENSORY - ADULT  Goal: Achieves stable or improved neurological status  Description: INTERVENTIONS  - Monitor and report changes in neurological status  - Monitor vital signs such as temperature, blood pressure, glucose, and any other labs ordered   - Initiate measures to prevent increased intracranial pressure  - Monitor for seizure activity and implement precautions if appropriate      Outcome: Progressing  Goal: Achieves maximal functionality and self care  Description: INTERVENTIONS  - Monitor swallowing and airway patency with patient fatigue and changes in neurological status  - Encourage and assist patient to increase activity and self care     - Encourage visually impaired, hearing impaired and aphasic patients to use assistive/communication devices  Outcome: Progressing

## 2023-03-05 NOTE — PLAN OF CARE
Problem: Prexisting or High Potential for Compromised Skin Integrity  Goal: Skin integrity is maintained or improved  Description: INTERVENTIONS:  - Identify patients at risk for skin breakdown  - Assess and monitor skin integrity  - Assess and monitor nutrition and hydration status  - Monitor labs   - Assess for incontinence   - Turn and reposition patient  - Assist with mobility/ambulation  - Relieve pressure over bony prominences  - Avoid friction and shearing  - Provide appropriate hygiene as needed including keeping skin clean and dry  - Evaluate need for skin moisturizer/barrier cream  - Collaborate with interdisciplinary team   - Patient/family teaching  - Consider wound care consult   Outcome: Progressing     Problem: MOBILITY - ADULT  Goal: Maintain or return to baseline ADL function  Description: INTERVENTIONS:  -  Assess patient's ability to carry out ADLs; assess patient's baseline for ADL function and identify physical deficits which impact ability to perform ADLs (bathing, care of mouth/teeth, toileting, grooming, dressing, etc )  - Assess/evaluate cause of self-care deficits   - Assess range of motion  - Assess patient's mobility; develop plan if impaired  - Assess patient's need for assistive devices and provide as appropriate  - Encourage maximum independence but intervene and supervise when necessary  - Involve family in performance of ADLs  - Assess for home care needs following discharge   - Consider OT consult to assist with ADL evaluation and planning for discharge  - Provide patient education as appropriate  Outcome: Progressing  Goal: Maintains/Returns to pre admission functional level  Description: INTERVENTIONS:  - Perform BMAT or MOVE assessment daily    - Set and communicate daily mobility goal to care team and patient/family/caregiver  - Collaborate with rehabilitation services on mobility goals if consulted  - Perform Range of Motion 3 times a day    - Reposition patient every 2 hours   - Dangle patient 3 times a day  - Stand patient 3 times a day  - Out of bed to chair 3 times a day   - Out of bed for meals 3 times a day  - Out of bed for toileting  - Record patient progress and toleration of activity level   Outcome: Progressing     Problem: PAIN - ADULT  Goal: Verbalizes/displays adequate comfort level or baseline comfort level  Description: Interventions:  - Encourage patient to monitor pain and request assistance  - Assess pain using appropriate pain scale  - Administer analgesics based on type and severity of pain and evaluate response  - Implement non-pharmacological measures as appropriate and evaluate response  - Consider cultural and social influences on pain and pain management  - Notify physician/advanced practitioner if interventions unsuccessful or patient reports new pain  Outcome: Progressing     Problem: INFECTION - ADULT  Goal: Absence or prevention of progression during hospitalization  Description: INTERVENTIONS:  - Assess and monitor for signs and symptoms of infection  - Monitor lab/diagnostic results  - Monitor all insertion sites, i e  indwelling lines, tubes, and drains  - Monitor endotracheal if appropriate and nasal secretions for changes in amount and color  - Center appropriate cooling/warming therapies per order  - Administer medications as ordered  - Instruct and encourage patient and family to use good hand hygiene technique  - Identify and instruct in appropriate isolation precautions for identified infection/condition  Outcome: Progressing  Goal: Absence of fever/infection during neutropenic period  Description: INTERVENTIONS:  - Monitor WBC    Outcome: Progressing     Problem: SAFETY ADULT  Goal: Maintain or return to baseline ADL function  Description: INTERVENTIONS:  -  Assess patient's ability to carry out ADLs; assess patient's baseline for ADL function and identify physical deficits which impact ability to perform ADLs (bathing, care of mouth/teeth, toileting, grooming, dressing, etc )  - Assess/evaluate cause of self-care deficits   - Assess range of motion  - Assess patient's mobility; develop plan if impaired  - Assess patient's need for assistive devices and provide as appropriate  - Encourage maximum independence but intervene and supervise when necessary  - Involve family in performance of ADLs  - Assess for home care needs following discharge   - Consider OT consult to assist with ADL evaluation and planning for discharge  - Provide patient education as appropriate  Outcome: Progressing  Goal: Maintains/Returns to pre admission functional level  Description: INTERVENTIONS:  - Perform BMAT or MOVE assessment daily    - Set and communicate daily mobility goal to care team and patient/family/caregiver  - Collaborate with rehabilitation services on mobility goals if consulted  - Perform Range of Motion 3 times a day  - Reposition patient every 2 hours    - Dangle patient 3 times a day  - Stand patient 3 times a day  - Out of bed to chair 3 times a day   - Out of bed for meals 3 times a day  - Out of bed for toileting  - Record patient progress and toleration of activity level   Outcome: Progressing  Goal: Patient will remain free of falls  Description: INTERVENTIONS:  - Educate patient/family on patient safety including physical limitations  - Instruct patient to call for assistance with activity   - Consult OT/PT to assist with strengthening/mobility   - Keep Call bell within reach  - Keep bed low and locked with side rails adjusted as appropriate  - Keep care items and personal belongings within reach  - Initiate and maintain comfort rounds  - Make Fall Risk Sign visible to staff  - Offer Toileting every Hours, in advance of need  - Initiate/Maintain alarm  - Obtain necessary fall risk management equipment  - Apply yellow socks and bracelet for high fall risk patients  - Consider moving patient to room near nurses station  Outcome: Progressing     Problem: DISCHARGE PLANNING  Goal: Discharge to home or other facility with appropriate resources  Description: INTERVENTIONS:  - Identify barriers to discharge w/patient and caregiver  - Arrange for needed discharge resources and transportation as appropriate  - Identify discharge learning needs (meds, wound care, etc )  - Arrange for interpretive services to assist at discharge as needed  - Refer to Case Management Department for coordinating discharge planning if the patient needs post-hospital services based on physician/advanced practitioner order or complex needs related to functional status, cognitive ability, or social support system  Outcome: Progressing     Problem: Knowledge Deficit  Goal: Patient/family/caregiver demonstrates understanding of disease process, treatment plan, medications, and discharge instructions  Description: Complete learning assessment and assess knowledge base  Interventions:  - Provide teaching at level of understanding  - Provide teaching via preferred learning methods  Outcome: Progressing     Problem: NEUROSENSORY - ADULT  Goal: Achieves stable or improved neurological status  Description: INTERVENTIONS  - Monitor and report changes in neurological status  - Monitor vital signs such as temperature, blood pressure, glucose, and any other labs ordered   - Initiate measures to prevent increased intracranial pressure  - Monitor for seizure activity and implement precautions if appropriate      Outcome: Progressing  Goal: Achieves maximal functionality and self care  Description: INTERVENTIONS  - Monitor swallowing and airway patency with patient fatigue and changes in neurological status  - Encourage and assist patient to increase activity and self care     - Encourage visually impaired, hearing impaired and aphasic patients to use assistive/communication devices  Outcome: Progressing     Problem: CARDIOVASCULAR - ADULT  Goal: Maintains optimal cardiac output and hemodynamic stability  Description: INTERVENTIONS:  - Monitor I/O, vital signs and rhythm  - Monitor for S/S and trends of decreased cardiac output  - Administer and titrate ordered vasoactive medications to optimize hemodynamic stability  - Assess quality of pulses, skin color and temperature  - Assess for signs of decreased coronary artery perfusion  - Instruct patient to report change in severity of symptoms  Outcome: Progressing  Goal: Absence of cardiac dysrhythmias or at baseline rhythm  Description: INTERVENTIONS:  - Continuous cardiac monitoring, vital signs, obtain 12 lead EKG if ordered  - Administer antiarrhythmic and heart rate control medications as ordered  - Monitor electrolytes and administer replacement therapy as ordered  Outcome: Progressing     Problem: RESPIRATORY - ADULT  Goal: Achieves optimal ventilation and oxygenation  Description: INTERVENTIONS:  - Assess for changes in respiratory status  - Assess for changes in mentation and behavior  - Position to facilitate oxygenation and minimize respiratory effort  - Oxygen administered by appropriate delivery if ordered  - Encourage broncho-pulmonary hygiene including cough, deep breathe, Incentive Spirometry  - Assess the need for suctioning and aspirate as needed  - Assess and instruct to report SOB or any respiratory difficulty  - Respiratory Therapy support as indicated  Outcome: Progressing     Problem: GENITOURINARY - ADULT  Goal: Maintains or returns to baseline urinary function  Description: INTERVENTIONS:  - Assess urinary function  - Encourage oral fluids to ensure adequate hydration if ordered  - Administer IV fluids as ordered to ensure adequate hydration  - Administer ordered medications as needed  - Offer frequent toileting  - Follow urinary retention protocol if ordered  Outcome: Progressing  Goal: Absence of urinary retention  Description: INTERVENTIONS:  - Assess patient's ability to void and empty bladder  - Monitor I/O  - Bladder scan as needed  - Discuss with physician/AP medications to alleviate retention as needed    Outcome: Progressing  Goal: Urinary catheter remains patent  Description: INTERVENTIONS:  - Assess patency of urinary catheter  - If patient has a chronic barnett, consider changing catheter if non-functioning  - Follow guidelines for intermittent irrigation of non-functioning urinary catheter  Outcome: Progressing     Problem: METABOLIC, FLUID AND ELECTROLYTES - ADULT  Goal: Electrolytes maintained within normal limits  Description: INTERVENTIONS:  - Monitor labs and assess patient for signs and symptoms of electrolyte imbalances  - Administer electrolyte replacement as ordered  - Monitor response to electrolyte replacements, including repeat lab results as appropriate  - Instruct patient on fluid and nutrition as appropriate  Outcome: Progressing  Goal: Fluid balance maintained  Description: INTERVENTIONS:  - Monitor labs   - Monitor I/O and WT  - Instruct patient on fluid and nutrition as appropriate  - Assess for signs & symptoms of volume excess or deficit  Outcome: Progressing  Goal: Glucose maintained within target range  Description: INTERVENTIONS:  - Monitor Blood Glucose as ordered  - Assess for signs and symptoms of hyperglycemia and hypoglycemia  - Administer ordered medications to maintain glucose within target range  - Assess nutritional intake and initiate nutrition service referral as needed  Outcome: Progressing     Problem: Nutrition/Hydration-ADULT  Goal: Nutrient/Hydration intake appropriate for improving, restoring or maintaining nutritional needs  Description: Monitor and assess patient's nutrition/hydration status for malnutrition  Collaborate with interdisciplinary team and initiate plan and interventions as ordered  Monitor patient's weight and dietary intake as ordered or per policy  Utilize nutrition screening tool and intervene as necessary   Determine patient's food preferences and provide high-protein, high-caloric foods as appropriate  INTERVENTIONS:  - Monitor oral intake, urinary output, labs, and treatment plans  - Assess nutrition and hydration status and recommend course of action  - Evaluate amount of meals eaten  - Assist patient with eating if necessary   - Allow adequate time for meals  - Recommend/ encourage appropriate diets, oral nutritional supplements, and vitamin/mineral supplements  - Order, calculate, and assess calorie counts as needed  - Recommend, monitor, and adjust tube feedings and TPN/PPN based on assessed needs  - Assess need for intravenous fluids  - Provide specific nutrition/hydration education as appropriate  - Include patient/family/caregiver in decisions related to nutrition  Outcome: Progressing     Problem: Neurological Deficit  Goal: Neurological status is stable or improving  Description: Interventions:  - Monitor and assess patient's level of consciousness, motor function, sensory function, and level of assistance needed for ADLs  - Monitor and report changes from baseline  Collaborate with interdisciplinary team to initiate plan and implement interventions as ordered  - Provide and maintain a safe environment  - Consider seizure precautions  - Consider fall precautions  - Consider aspiration precautions  - Consider bleeding precautions  Outcome: Progressing     Problem: Activity Intolerance/Impaired Mobility  Goal: Mobility/activity is maintained at optimum level for patient  Description: Interventions:  - Assess and monitor patient  barriers to mobility and need for assistive/adaptive devices  - Assess patient's emotional response to limitations  - Collaborate with interdisciplinary team and initiate plans and interventions as ordered  - Encourage independent activity per ability   - Maintain proper body alignment  - Perform active/passive rom as tolerated/ordered    - Plan activities to conserve energy   - Turn patient as appropriate  Outcome: Progressing     Problem: Communication Impairment  Goal: Ability to express needs and understand communication  Description: Assess patient's communication skills and ability to understand information  Patient will demonstrate use of effective communication techniques, alternative methods of communication and understanding even if not able to speak  - Encourage communication and provide alternate methods of communication as needed  - Collaborate with case management/ for discharge needs  - Include patient/family/caregiver in decisions related to communication  Outcome: Progressing     Problem: Potential for Aspiration  Goal: Non-ventilated patient's risk of aspiration is minimized  Description: Assess and monitor vital signs, respiratory status, and labs (WBC)  Monitor for signs of aspiration (tachypnea, cough, rales, wheezing, cyanosis, fever)  - Assess and monitor patient's ability to swallow  - Place patient up in chair to eat if possible  - HOB up at 90 degrees to eat if unable to get patient up into chair   - Supervise patient during oral intake  - Instruct patient/ family to take small bites  - Instruct patient/ family to take small single sips when taking liquids  - Follow patient-specific strategies generated by speech pathologist   Outcome: Progressing     Problem: Nutrition  Goal: Nutrition/Hydration status is improving  Description: Monitor and assess patient's nutrition/hydration status for malnutrition (ex- brittle hair, bruises, dry skin, pale skin and conjunctiva, muscle wasting, smooth red tongue, and disorientation)  Collaborate with interdisciplinary team and initiate plan and interventions as ordered  Monitor patient's weight and dietary intake as ordered or per policy  Utilize nutrition screening tool and intervene per policy  Determine patient's food preferences and provide high-protein, high-caloric foods as appropriate       - Assist patient with eating   - Allow adequate time for meals   - Encourage patient to take dietary supplement as ordered  - Collaborate with clinical nutritionist   - Include patient/family/caregiver in decisions related to nutrition    Outcome: Progressing

## 2023-03-05 NOTE — PROGRESS NOTES
INTERNAL MEDICINE RESIDENCY PROGRESS NOTE     Name: Owen Dougherty   Age & Sex: 60 y o  male   MRN: [id number]  Unit/Bed#: [facility] 0-36   Encounter: [id number]  Team: SOD Team B     PATIENT INFORMATION     Name: Owen Dougherty   Age & Sex: 60 y o  male   MRN: [id number]  Hospital Stay Days: 8    ASSESSMENT/PLAN     Principal Problem:    Acute Posterior Circulation Stroke  Active Problems:    COVID    Right Vertebral artery dissection (HCC)    Stenosis of left vertebral artery    Hypertensive emergency    UTI (urinary tract infection)    Dysphagia    Spasticity    Elevated serum creatinine    Leukocytosis    Prediabetes    Tearfulness      Tearfulness  Assessment & Plan  Pt with persistent tearfulness, depressed mood following recent stroke  3/3 discussed starting SSRI with patient, he is agreeable to trying  Expressed understanding this may take several weeks to notice any benefit  Continue Prozac 20 mg daily - discussed with neurology  Continue to monitor mood  Will need to establish with PCP at discharge  Prediabetes  Assessment & Plan  ? a1c 5 7  ? Glucose 110-127  ? Diet and lifestyle modifications    Leukocytosis  Assessment & Plan  Pt with slightly elevated WBC, stable from day prior  Afebrile, no worsening s/sxs of infection at this time  Suspect likely reactive in setting of recent stroke vs UTI vs COVID  ? Continue to follow WBC count, monitor fever curve    Elevated serum creatinine  Assessment & Plan  Pt presented with elevated Cr 1 32 on arrival, now improved  No known baseline  ? Continue to follow BMP  ? No significant increase in Cr with lisinopril  ? Avoid nephrotoxic agents    Spasticity  Assessment & Plan  Pt with new RLE spasticity and myoclonus  Unlikely focal seizures per neurology       • Continue baclofen 10 mg BID, frequency adjusted per pharmacy given renal function    Dysphagia  Assessment & Plan  Pt with new dysphagia s/p stroke  ?  Continue dysphagia 3 diet, nectar thick liquids per speech eval    UTI (urinary tract infection)  Assessment & Plan  Urine cx with pan-susceptible E coli  Pt completed 4-day course of CTX as of 2/28       Plan:  • Monitor off abx    Hypertensive emergency  Assessment & Plan  Pt initially presented with hypertensive emergency in setting of stroke as mentioned above  Pt on cardene drip in the ICU, now discontinued on 3/1  Blood Pressure: 162/82      Plan:  • Continue Amlodipine 10 mg qd  • Continue Coreg 6 25 BID  • Continue Lisinopril 20 mg daily  • Will consider addition of hydralazine or thiazide if BP continues to be elevated   • SBP goal <160  • Continue PRNs antihypertensive with PRN hydralazine     Stenosis of left vertebral artery  Assessment & Plan  See acute posterior circulation stroke plan    Right Vertebral artery dissection [facility])  Assessment & Plan  See acute posterior circulation plan    COVID  Assessment & Plan  Pt asx, remained on room air during hospitalization       • Continue airborne and contact precautions    * Acute Posterior Circulation Stroke  Assessment & Plan  60 y o  male with no known prior hx, did not follow with a physician  Presented to [address] [place] [address] Road 2/25/2023 of several days of weakness, dizziness, and nausea/vomiting  CT head revealed acute/subacute infarcts in the right posterior cerebellum with follow up CTA head/neck demonstrating 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 and left vertebral artery origin stenosis with presumed occlusion of the distal left intradural vertebral segment    • MRA head/carotids demonstrated possible extension of the vertebral artery dissection into the basilar artery  • MRI brain revealed several small acute/early subacute bi-cerebellar infarcts (R>L) without evidence of hemorrhage     Patient was transferred to [facility] for closer monitoring with consideration for endovascular intervention   Initially given stable exam, intervention was felt too risky and patient was a heparin gtt with full dose aspirin  However, pt developed worsened dysarthria on  and R sided weakness  • Repeat CT  stable, however patient was taken to IR for emergent R vertebral/basilar thrombectomy with R V3/4 stenting with TICI 2B revascularization  • MRI brain : Increased infarct burden   Bilateral cerebellar infarcts with larger right cerebellar infarct and new acute infarcts in the right medulla, bilateral midbrain, and left occipital lobe        Plan:   • Neurosurgery consulted, recs appreciated -- 3/3 sign off  • Recommend repeat CTA H/N 2 weeks   • Neurology consulted, recs appreciated  • Echo: EF 65%, normal wall motion, normal atria size, no PFO   • Lipid panel , , HDL 27,   • A1c 5 7  • q4hr neuro checks at night, q2hr during the day  • Continue ASA and Brilinta 90 mg BID  • 3/3: P2y12 102  • Continue eliquis 2 5 mg bid  • Continue atorvastatin  • SBP goal <160  • ARC dispo pending insurance     Sinus tachycardia-resolved as of 3/4/2023  Assessment & Plan   pt with intermittent episodes of brief sinus tachycardia  ? Continue potassium goal >4        Disposition: Pending authorization to [address] [place] [address]     Patient seen and examined  No acute events overnight  No acute complaints  In good spirits and all questions to satisfaction    OBJECTIVE     Vitals:    23 1623 23 2319 23 0600 23 0800   BP: 150/77 146/77  148/78   Pulse: 68 65  64   Resp:  18     Temp:  98 1 °F (36 7 °C)     TempSrc:  Oral     SpO2: 98% 97%  98%   Weight:   86 8 kg (191 lb 5 8 oz)    Height:          Temperature:   Temp (24hrs), Av 3 °F (36 8 °C), Min:98 1 °F (36 7 °C), Max:98 4 °F (36 9 °C)    Temperature: 98 1 °F (36 7 °C)  Intake & Output:  I/O        0701  / 07/ 0701   07/ 07/ 0700    P  O  120      Total Intake(mL/kg) 120 (1 4)      Urine (mL/kg/hr) 600 (0 3)  600 (1 9)    Total Output 600  600    Net -480  -600           Unmeasured Stool Occurrence  1 x         Weights:   IBW (Ideal Body Weight): 70 7 kg    Body mass index is 28 26 kg/m²  Weight (last 2 days)     Date/Time Weight    03/05/23 0600 86 8 (191 36)        Physical Exam  HENT:      Mouth/Throat:      Mouth: Mucous membranes are moist    Cardiovascular:      Rate and Rhythm: Normal rate  Pulmonary:      Effort: Pulmonary effort is normal    Musculoskeletal:      Right lower leg: No edema  Left lower leg: No edema  Skin:     General: Skin is warm  Neurological:      Mental Status: He is alert and oriented to person, place, and time  Psychiatric:         Mood and Affect: Mood normal          Behavior: Behavior normal        LABORATORY DATA     Labs: I have personally reviewed pertinent reports  Results from last 7 days   Lab Units 03/05/23  0501 03/04/23  0538 03/03/23  0000   WBC Thousand/uL 10 45* 10 55* 12 09*   HEMOGLOBIN g/dL 10 0* 10 4* 11 6*   HEMATOCRIT % 30 6* 32 8* 33 3*   PLATELETS Thousands/uL 323 307 347   NEUTROS PCT % 78* 74  --    MONOS PCT % 7 6  --       Results from last 7 days   Lab Units 03/05/23  0501 03/04/23  0538 03/03/23  0000   POTASSIUM mmol/L 4 0 4 1 4 2   CHLORIDE mmol/L 107 109* 109*   CO2 mmol/L 27 27 27   BUN mg/dL 38* 37* 31*   CREATININE mg/dL 1 29 1 31* 1 29   CALCIUM mg/dL 8 6 8 6 8 8     Results from last 7 days   Lab Units 03/02/23  0455 02/28/23  0209 02/27/23  0429   MAGNESIUM mg/dL 2 5 2 3 2 1     Results from last 7 days   Lab Units 03/02/23  0455 02/28/23  0209   PHOSPHORUS mg/dL 4 7* 2 3      Results from last 7 days   Lab Units 02/28/23  0208 02/27/23  2100 02/27/23  1548 02/27/23  1003   INR   --   --   --  1 05   PTT seconds 78* 40* 32 31               IMAGING & DIAGNOSTIC TESTING     Radiology Results: I have personally reviewed pertinent reports    CTA head and neck w wo contrast    Result Date: 2/26/2023  Impression: Overall, there is no significant interval change when comparing to the recent MRI brain, and CTA head neck study    Stable small cerebellar infarctions as noted on the recent MRI study  Stable findings of distal right cervical and proximal intradural vertebral artery occlusion with probable retrograde flow in its distal intradural segment  Stable left vertebral artery origin stenosis and presumed occlusion of the distal left intradural vertebral segment  Small basilar artery with focal atherosclerotic disease in its proximal segment  Patent fetal right PCA circulation  Left PCA branch is small but patent, and unchanged in appearance  Stable poor visualization of the proximal left superior cerebellar artery  Above-mentioned findings are in keeping with the preliminary report provided by [facility] radiology services without significant discrepancy in the report  Workstation performed: [id number]     CTA head and neck with and without contrast    Result Date: 2/25/2023  Impression: No acute intracranial hemorrhage  Focal infarcts in the right posterior cerebellum, possibly acute to subacute  Follow-up MRI imaging is recommended  Marked tapering and occlusion of the distal right cervical vertebral artery  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  Proximal right intradural vertebral artery is also occluded with faint visualization of the post-PICA segment, which may be filling in a retrograde fashion  Severe left vertebral artery origin stenosis  Left vertebral artery may terminate in a posterior for cerebellar artery branch as the post-PICA segment is not identified  Small basilar artery with fetal right PCA circulation  Left proximal PCA is patent but small  Mid to distal left PCA is not well visualized  I personally discussed this study with Robert Arndt on 2/25/2023 at 12:21 PM  Workstation performed: [id number]     XR chest 1 view portable    Result Date: 2/25/2023  Impression: No acute abnormality however there is an opacity in the left lung base that may represent focal pleural thickening  Neoplasm cannot be excluded  Nonemergent follow-up PA and lateral views of the chest or CT chest suggested unless there are prior studies available for comparison  Findings for this inpatient marked for immediate notification in Epic  Workstation performed: [id number]     CT head wo contrast    Result Date: 2/28/2023  Impression: Evolving acute infarcts in the brainstem (worse in left midbrain/left upper tatiana) and bilateral cerebellum (right worse than left) status post right distal vertebral artery stenting  No acute intracranial hemorrhage  Workstation performed: [id number]     CT head wo contrast    Result Date: 2/26/2023  Impression: Stable evolving bilateral cerebellar infarctions without significant interval change  Workstation performed: [id number]     CT head wo contrast    Result Date: 2/26/2023  Impression: No new parenchymal findings when comparing to the recent CT and MRI brain studies  Stable late acute to early subacute bilateral cerebellar infarctions without hemorrhagic transformation  Workstation performed: [id number]     MRA head wo contrast    Result Date: 2/25/2023  Impression: Abscess of flow related signal in the V4 segments of the bilateral vertebral arteries, throughout the basilar artery and left posterior cerebral artery, concerning for progression of dissection and/or thrombus  Recommend emergent interventional radiology consultation  I personally discussed this study with Phoenix Kaur   on 2/25/2023 at 8:15 PM   Workstation performed: [id number]     MRA carotids wo contrast    Result Date: 2/25/2023  Impression: 1  There complete absence of flow related signal along the cervical segment of the right vertebral artery concerning for progression of dissection and/or retrograde flow  2   Absence of flow related signal in the V4 segments of the bilateral vertebral arteries and throughout the basilar artery, also concerning for progression of dissection    I personally discussed this study with Fernando Schroeder on 2/25/2023 at 8:24 PM  Workstation performed: [id number]     MRI brain wo contrast    Result Date: 2/27/2023  Impression: 1  Crescending, acute/recent posterior circulation infarctions with increasing right cerebellar and new brainstem infarctions as described  Overall infarct burden is slightly increased from the prior study one day earlier although new small infarcts are noted in the posterior medulla on the right, right midbrain and left occipital lobe  No hydrocephalus or large parenchymal hemorrhage  2   Abnormal left vertebral artery flow void at the lateral mass of C1, possibly related to slow flow and/or vascular occlusion  3   Very mild, chronic microangiopathy and chronic left thalamic lacunar infarction    Workstation performed: [id number]     MRI brain wo contrast    Result Date: 2/25/2023  Impression: Acute early subacute infarcts throughout the right greater than left cerebellar hemispheres and anterior vermis, consistent with findings on the head CT  No hemorrhagic conversion or mass effect  Arthrotomy 8 Workstation performed: [id number]     XR follow up    Result Date: 2/25/2023  Impression: No radiopaque orbital foreign body  Workstation performed: [id number]     CT cerebral perfusion    Result Date: 2/26/2023  Impression: CT perfusion performed  Data available on PACS  Workstation performed: [id number]     Other Diagnostic Testing: I have personally reviewed pertinent reports      ACTIVE MEDICATIONS     Current Facility-Administered Medications   Medication Dose Route Frequency   • amLODIPine (NORVASC) tablet 10 mg  10 mg Oral Daily   • apixaban (ELIQUIS) tablet 2 5 mg  2 5 mg Oral BID   • aspirin chewable tablet 81 mg  81 mg Oral Daily   • atorvastatin (LIPITOR) tablet 40 mg  40 mg Oral Daily With Dinner   • baclofen tablet 10 mg  10 mg Oral BID   • carvedilol (COREG) tablet 6 25 mg  6 25 mg Oral BID With Meals   • FLUoxetine (PROzac) capsule 20 mg  20 mg Oral Daily   • hydrALAZINE (APRESOLINE) injection 10 mg  10 mg Intravenous Q4H PRN   • lisinopril (ZESTRIL) tablet 20 mg  20 mg Oral Daily   • polyethylene glycol (MIRALAX) packet 17 g  17 g Oral Daily   • senna-docusate sodium (SENOKOT S) 8 6-50 mg per tablet 1 tablet  1 tablet Oral HS   • ticagrelor (BRILINTA) tablet 90 mg  90 mg Oral Q12H [facility]       VTE Pharmacologic Prophylaxis: Reason for no pharmacologic prophylaxis Systemically anticoagulated with apixaban  VTE Mechanical Prophylaxis: sequential compression device    Portions of the record may have been created with voice recognition software  Occasional wrong word or "sound a like" substitutions may have occurred due to the inherent limitations of voice recognition software    Read the chart carefully and recognize, using context, where substitutions have occurred   ==  Guillaume Rutherford, [facility]  Internal Medicine Residency PGY-3

## 2023-03-06 ENCOUNTER — HOSPITAL ENCOUNTER (INPATIENT)
Facility: HOSPITAL | Age: 63
LOS: 25 days | Discharge: HOME | End: 2023-03-31
Attending: PHYSICAL MEDICINE & REHABILITATION | Admitting: PHYSICAL MEDICINE & REHABILITATION

## 2023-03-06 VITALS
BODY MASS INDEX: 27 KG/M2 | HEIGHT: 69 IN | RESPIRATION RATE: 17 BRPM | SYSTOLIC BLOOD PRESSURE: 152 MMHG | OXYGEN SATURATION: 98 % | WEIGHT: 182.32 LBS | TEMPERATURE: 98.3 F | DIASTOLIC BLOOD PRESSURE: 65 MMHG | HEART RATE: 64 BPM

## 2023-03-06 DIAGNOSIS — I63.9 CEREBROVASCULAR ACCIDENT (CVA) (HCC): Primary | ICD-10-CM

## 2023-03-06 DIAGNOSIS — N17.9 AKI (ACUTE KIDNEY INJURY) (HCC): ICD-10-CM

## 2023-03-06 DIAGNOSIS — I10 PRIMARY HYPERTENSION: ICD-10-CM

## 2023-03-06 DIAGNOSIS — I77.74 VERTEBRAL ARTERY DISSECTION (HCC): ICD-10-CM

## 2023-03-06 DIAGNOSIS — F43.21 ADJUSTMENT DISORDER WITH DEPRESSED MOOD: ICD-10-CM

## 2023-03-06 DIAGNOSIS — D64.9 ANEMIA, UNSPECIFIED TYPE: ICD-10-CM

## 2023-03-06 DIAGNOSIS — R25.2 SPASTICITY: ICD-10-CM

## 2023-03-06 DIAGNOSIS — I65.02 STENOSIS OF LEFT VERTEBRAL ARTERY: ICD-10-CM

## 2023-03-06 DIAGNOSIS — I77.1 CELIAC ARTERY STENOSIS (HCC): ICD-10-CM

## 2023-03-06 DIAGNOSIS — K59.2 NEUROGENIC BOWEL: ICD-10-CM

## 2023-03-06 PROBLEM — D72.829 LEUKOCYTOSIS: Status: RESOLVED | Noted: 2023-03-01 | Resolved: 2023-03-06

## 2023-03-06 PROBLEM — N39.0 UTI (URINARY TRACT INFECTION): Status: RESOLVED | Noted: 2023-02-25 | Resolved: 2023-03-06

## 2023-03-06 PROBLEM — N18.9 CKD (CHRONIC KIDNEY DISEASE): Status: ACTIVE | Noted: 2023-03-01

## 2023-03-06 LAB
ANION GAP SERPL CALCULATED.3IONS-SCNC: 4 MMOL/L (ref 4–13)
BASOPHILS # BLD AUTO: 0.03 THOUSANDS/ÂΜL (ref 0–0.1)
BASOPHILS NFR BLD AUTO: 0 % (ref 0–1)
BASOPHILS NFR BLD MANUAL: 1 % (ref 0–1)
BUN SERPL-MCNC: 34 MG/DL (ref 5–25)
CALCIUM SERPL-MCNC: 8.7 MG/DL (ref 8.3–10.1)
CHLORIDE SERPL-SCNC: 108 MMOL/L (ref 96–108)
CO2 SERPL-SCNC: 26 MMOL/L (ref 21–32)
CREAT SERPL-MCNC: 1.26 MG/DL (ref 0.6–1.3)
EOSINOPHIL # BLD AUTO: 0.16 THOUSAND/ÂΜL (ref 0–0.61)
EOSINOPHIL NFR BLD AUTO: 2 % (ref 0–6)
ERYTHROCYTE [DISTWIDTH] IN BLOOD BY AUTOMATED COUNT: 12.5 % (ref 11.6–15.1)
FERRITIN SERPL-MCNC: 232 NG/ML (ref 8–388)
FOLATE SERPL-MCNC: 6.5 NG/ML (ref 3.1–17.5)
GFR SERPL CREATININE-BSD FRML MDRD: 60 ML/MIN/1.73SQ M
GLUCOSE SERPL-MCNC: 108 MG/DL (ref 65–140)
HCT VFR BLD AUTO: 31.4 % (ref 36.5–49.3)
HGB BLD-MCNC: 10.1 G/DL (ref 12–17)
IMM EOSINOPHIL NFR BLD MANUAL: 1 % (ref 0–6)
IMM GRANULOCYTES # BLD AUTO: 0.05 THOUSAND/UL (ref 0–0.2)
IMM GRANULOCYTES NFR BLD AUTO: 1 % (ref 0–2)
IRON SATN MFR SERPL: 10 % (ref 20–50)
IRON SERPL-MCNC: 26 UG/DL (ref 65–175)
LG PLATELETS BLD QL SMEAR: PRESENT
LYMPHOCYTES # BLD AUTO: 1.15 THOUSANDS/ÂΜL (ref 0.6–4.47)
LYMPHOCYTES NFR BLD AUTO: 13 % (ref 14–44)
LYMPHOCYTES NFR BLD: 12 % (ref 14–44)
MCH RBC QN AUTO: 28.1 PG (ref 26.8–34.3)
MCHC RBC AUTO-ENTMCNC: 32.2 G/DL (ref 31.4–37.4)
MCV RBC AUTO: 88 FL (ref 82–98)
MICROCYTES BLD QL AUTO: PRESENT
MONOCYTES # BLD AUTO: 0.68 THOUSAND/ÂΜL (ref 0.17–1.22)
MONOCYTES NFR BLD AUTO: 2 % (ref 4–12)
MONOCYTES NFR BLD AUTO: 8 % (ref 4–12)
NEUTROPHILS # BLD AUTO: 6.75 THOUSANDS/ÂΜL (ref 1.85–7.62)
NEUTS BAND NFR BLD MANUAL: 7 THOUSAND/UL
NEUTS SEG NFR BLD AUTO: 72 % (ref 45–77)
NEUTS SEG NFR BLD AUTO: 76 % (ref 43–75)
NRBC BLD AUTO-RTO: 0 /100 WBCS
PLATELET # BLD AUTO: 302 THOUSANDS/UL (ref 149–390)
PLATELET BLD QL SMEAR: ADEQUATE
PMV BLD AUTO: 9.9 FL (ref 8.9–12.7)
POTASSIUM SERPL-SCNC: 4.1 MMOL/L (ref 3.5–5.3)
RBC # BLD AUTO: 3.59 MILLION/UL (ref 3.88–5.62)
RETICS # AUTO: NORMAL 10*3/UL (ref 14356–105094)
RETICS # CALC: 1.26 % (ref 0.37–1.87)
SODIUM SERPL-SCNC: 138 MMOL/L (ref 135–147)
TIBC SERPL-MCNC: 264 UG/DL (ref 250–450)
TOTAL CELLS COUNTED SPEC: 100
VARIANT LYMPHS BLD QL SMEAR: 5 % (ref 0–0)
VIT B12 SERPL-MCNC: 317 PG/ML (ref 100–900)
WBC # BLD AUTO: 8.82 THOUSAND/UL (ref 4.31–10.16)

## 2023-03-06 RX ORDER — POLYETHYLENE GLYCOL 3350 17 G/17G
17 POWDER, FOR SOLUTION ORAL DAILY
Status: CANCELLED | OUTPATIENT
Start: 2023-03-07

## 2023-03-06 RX ORDER — BACLOFEN 10 MG/1
10 TABLET ORAL 2 TIMES DAILY
Status: CANCELLED | OUTPATIENT
Start: 2023-03-06

## 2023-03-06 RX ORDER — LISINOPRIL 20 MG/1
20 TABLET ORAL DAILY
Status: DISCONTINUED | OUTPATIENT
Start: 2023-03-07 | End: 2023-03-15

## 2023-03-06 RX ORDER — ASPIRIN 81 MG/1
81 TABLET, CHEWABLE ORAL DAILY
Status: CANCELLED | OUTPATIENT
Start: 2023-03-07

## 2023-03-06 RX ORDER — AMLODIPINE BESYLATE 10 MG/1
10 TABLET ORAL DAILY
Status: DISCONTINUED | OUTPATIENT
Start: 2023-03-07 | End: 2023-03-31 | Stop reason: HOSPADM

## 2023-03-06 RX ORDER — CARVEDILOL 6.25 MG/1
6.25 TABLET ORAL 2 TIMES DAILY WITH MEALS
Status: CANCELLED | OUTPATIENT
Start: 2023-03-06

## 2023-03-06 RX ORDER — FLUOXETINE HYDROCHLORIDE 20 MG/1
20 CAPSULE ORAL DAILY
Status: CANCELLED | OUTPATIENT
Start: 2023-03-07

## 2023-03-06 RX ORDER — CARVEDILOL 6.25 MG/1
6.25 TABLET ORAL 2 TIMES DAILY WITH MEALS
Status: DISCONTINUED | OUTPATIENT
Start: 2023-03-06 | End: 2023-03-07

## 2023-03-06 RX ORDER — ATORVASTATIN CALCIUM 40 MG/1
40 TABLET, FILM COATED ORAL
Status: CANCELLED | OUTPATIENT
Start: 2023-03-06

## 2023-03-06 RX ORDER — AMOXICILLIN 250 MG
1 CAPSULE ORAL
Status: CANCELLED | OUTPATIENT
Start: 2023-03-06

## 2023-03-06 RX ORDER — HYDRALAZINE HYDROCHLORIDE 20 MG/ML
10 INJECTION INTRAMUSCULAR; INTRAVENOUS EVERY 4 HOURS PRN
Status: CANCELLED | OUTPATIENT
Start: 2023-03-06

## 2023-03-06 RX ORDER — POLYETHYLENE GLYCOL 3350 17 G/17G
17 POWDER, FOR SOLUTION ORAL DAILY
Status: DISCONTINUED | OUTPATIENT
Start: 2023-03-07 | End: 2023-03-07

## 2023-03-06 RX ORDER — BACLOFEN 10 MG/1
10 TABLET ORAL 2 TIMES DAILY
Status: DISCONTINUED | OUTPATIENT
Start: 2023-03-06 | End: 2023-03-08

## 2023-03-06 RX ORDER — ATORVASTATIN CALCIUM 40 MG/1
40 TABLET, FILM COATED ORAL
Status: DISCONTINUED | OUTPATIENT
Start: 2023-03-06 | End: 2023-03-31 | Stop reason: HOSPADM

## 2023-03-06 RX ORDER — FLUOXETINE HYDROCHLORIDE 20 MG/1
20 CAPSULE ORAL DAILY
Status: DISCONTINUED | OUTPATIENT
Start: 2023-03-07 | End: 2023-03-31 | Stop reason: HOSPADM

## 2023-03-06 RX ORDER — CHLORTHALIDONE 25 MG/1
12.5 TABLET ORAL DAILY
Status: DISCONTINUED | OUTPATIENT
Start: 2023-03-07 | End: 2023-03-07

## 2023-03-06 RX ORDER — AMOXICILLIN 250 MG
1 CAPSULE ORAL
Status: DISCONTINUED | OUTPATIENT
Start: 2023-03-06 | End: 2023-03-07

## 2023-03-06 RX ORDER — LISINOPRIL 20 MG/1
20 TABLET ORAL DAILY
Status: CANCELLED | OUTPATIENT
Start: 2023-03-07

## 2023-03-06 RX ORDER — CHLORTHALIDONE 25 MG/1
12.5 TABLET ORAL DAILY
Status: CANCELLED | OUTPATIENT
Start: 2023-03-07

## 2023-03-06 RX ORDER — CHLORTHALIDONE 25 MG/1
12.5 TABLET ORAL DAILY
Status: DISCONTINUED | OUTPATIENT
Start: 2023-03-06 | End: 2023-03-06 | Stop reason: HOSPADM

## 2023-03-06 RX ORDER — ASPIRIN 81 MG/1
81 TABLET, CHEWABLE ORAL DAILY
Status: DISCONTINUED | OUTPATIENT
Start: 2023-03-07 | End: 2023-03-31 | Stop reason: HOSPADM

## 2023-03-06 RX ORDER — AMLODIPINE BESYLATE 10 MG/1
10 TABLET ORAL DAILY
Status: CANCELLED | OUTPATIENT
Start: 2023-03-07

## 2023-03-06 RX ADMIN — APIXABAN 2.5 MG: 2.5 TABLET, FILM COATED ORAL at 19:13

## 2023-03-06 RX ADMIN — CARVEDILOL 6.25 MG: 6.25 TABLET, FILM COATED ORAL at 09:56

## 2023-03-06 RX ADMIN — TICAGRELOR 90 MG: 90 TABLET ORAL at 09:53

## 2023-03-06 RX ADMIN — BACLOFEN 10 MG: 10 TABLET ORAL at 19:13

## 2023-03-06 RX ADMIN — FLUOXETINE 20 MG: 20 CAPSULE ORAL at 09:50

## 2023-03-06 RX ADMIN — LISINOPRIL 20 MG: 20 TABLET ORAL at 09:50

## 2023-03-06 RX ADMIN — BACLOFEN 10 MG: 10 TABLET ORAL at 09:50

## 2023-03-06 RX ADMIN — CARVEDILOL 6.25 MG: 6.25 TABLET, FILM COATED ORAL at 19:12

## 2023-03-06 RX ADMIN — CHLORTHALIDONE 12.5 MG: 25 TABLET ORAL at 14:15

## 2023-03-06 RX ADMIN — TICAGRELOR 90 MG: 90 TABLET ORAL at 21:56

## 2023-03-06 RX ADMIN — AMLODIPINE BESYLATE 10 MG: 10 TABLET ORAL at 09:50

## 2023-03-06 RX ADMIN — ASPIRIN 81 MG CHEWABLE TABLET 81 MG: 81 TABLET CHEWABLE at 09:50

## 2023-03-06 RX ADMIN — ATORVASTATIN CALCIUM 40 MG: 40 TABLET, FILM COATED ORAL at 19:11

## 2023-03-06 RX ADMIN — CYANOCOBALAMIN TAB 500 MCG 1000 MCG: 500 TAB at 14:15

## 2023-03-06 RX ADMIN — APIXABAN 2.5 MG: 2.5 TABLET, FILM COATED ORAL at 09:50

## 2023-03-06 NOTE — DISCHARGE SUMMARY
INTERNAL MEDICINE RESIDENCY DISCHARGE SUMMARY     Edward Daniels   60 y o  male  MRN: [id number]  Room/Bed: [facility] 713/[facility] 0-36     [address] [place] [address]   Encounter: [id number]    Principal Problem:    Acute Posterior Circulation Stroke  Active Problems:    Hypertensive emergency    COVID    Right Vertebral artery dissection (HCC)    Stenosis of left vertebral artery    Dysphagia    Spasticity    CKD (chronic kidney disease)    Prediabetes    Tearfulness    Anemia      * Acute Posterior Circulation Stroke  Assessment & Plan  60 y o  male with no known prior hx, did not follow with a physician  Presented to [address] [place] [address] 2/25/2023 of several days of weakness, dizziness, and nausea/vomiting  CT head revealed acute/subacute infarcts in the right posterior cerebellum with follow up CTA head/neck demonstrating 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 and left vertebral artery origin stenosis with presumed occlusion of the distal left intradural vertebral segment  • MRA head/carotids demonstrated possible extension of the vertebral artery dissection into the basilar artery  • MRI brain revealed several small acute/early subacute bi-cerebellar infarcts (R>L) without evidence of hemorrhage     Patient was transferred to [facility] for closer monitoring with consideration for endovascular intervention   Initially given stable exam, intervention was felt too risky and patient was a heparin gtt with full dose aspirin  However, pt developed worsened dysarthria on 2/26 and R sided weakness     • Repeat CT 2/26 stable, however patient was taken to IR for emergent R vertebral/basilar thrombectomy with R V3/4 stenting with TICI 2B revascularization  • MRI brain 2/26: Increased infarct burden   Bilateral cerebellar infarcts with larger right cerebellar infarct and new acute infarcts in the right medulla, bilateral midbrain, and left occipital lobe    Lipid panel , , HDL 27,   A1c 5 7  3/3: P2y12 102  Echo: EF 65%, normal wall motion, normal atria size, no PFO     Plan:   • Follow-up with neurosurgery and neurology outpatient  • Recommend repeat CTA H/N 2 weeks around 3/17  • Continue ASA and Brilinta 90 mg BID  • Continue eliquis 2 5 mg bid  • Continue atorvastatin  • Continue antihypertensives with goal of normotension    Hypertensive emergency  Assessment & Plan  Pt initially presented with hypertensive emergency in setting of stroke as mentioned above  Pt on cardene drip in the ICU, now discontinued on 3/1  Blood Pressure: 155/71      Plan:  • Continue Amlodipine 10 mg qd  • Continue Coreg 6 25 BID  • Continue Lisinopril 20 mg daily  • Start chlorthalidone 12 5 mg daily  • SBP goal normotension   • Continue PRNs antihypertensive with PRN hydralazine   • BMP in 1 week    Tearfulness  Assessment & Plan  Pt with persistent tearfulness, depressed mood following recent stroke  3/3 discussed starting SSRI with patient, he is agreeable to trying  Expressed understanding this may take several weeks to notice any benefit  Continue Prozac 20 mg daily - discussed with neurology  Continue to monitor mood  Will need to establish with PCP at discharge  Prediabetes  Assessment & Plan  ? a1c 5 7  ? Glucose 110-127  ? Diet and lifestyle modifications    CKD (chronic kidney disease)  Assessment & Plan  Pt presented with elevated Cr 1 32 on arrival  No known baseline  Stage 2 CKD  Suspect secondary to hypertensive nephrosclerosis  · BMP in 1 week  · Avoid nephrotoxic agents  · Avoid relative hypotension    Spasticity  Assessment & Plan  Pt with new RLE spasticity and myoclonus  Unlikely focal seizures per neurology       • Continue baclofen 10 mg BID, frequency adjusted per pharmacy given renal function    Dysphagia  Assessment & Plan  Pt with new dysphagia s/p stroke  ?  Continue dysphagia 3 diet, nectar thick liquids per speech eval    Stenosis of left vertebral artery  Assessment & Plan  See acute posterior circulation stroke plan    Right Vertebral artery dissection [facility])  Assessment & Plan  See acute posterior circulation plan    COVID  Assessment & Plan  Pt asx, remained on room air during hospitalization  No shortness of breath      • Off precautions on 3/7/2023    Leukocytosis-resolved as of 3/6/2023  Assessment & Plan  Pt with slightly elevated WBC, stable from day prior  Afebrile, no worsening s/sxs of infection at this time  Suspect likely reactive in setting of recent stroke vs UTI vs COVID  ? Resolved    Sinus tachycardia-resolved as of 3/4/2023  Assessment & Plan   pt with intermittent episodes of brief sinus tachycardia  ? Continue potassium goal >4    UTI (urinary tract infection)-resolved as of 3/6/2023  Assessment & Plan  Urine cx with pan-susceptible E coli  Pt completed 4-day course of CTX as of 2/28       Plan:  • S/p treatment      [address] Keena Koenig is a 43-year-old male with no past medical history who [facility] with 2-day history of dizziness, nausea, vomiting and slurred speech  Patient was found to be in hypertensive emergency with CT showing subacute small right cerebellar stroke  CTA consistent with distal right cervical vertebral artery dissection and proximal right intradural vertebral artery occlusion  Patient was initially transferred to [facility] for potential endovascular intervention however given risk of procedure patient was instead placed on heparin drip with full dose aspirin  Patient subsequently developed worsening right-sided weakness and dysarthria  Stroke alert was called with repeat CTH/CTA stable however given new symptoms, patient was taken to the OR for emergent right vertebral/basilar thrombectomy with stenting of the V3 and V4  Patient was monitored in the ICU requiring IV Cardene for blood pressure management    Patient was started on aspirin, Brilinta and Eliquis 2 5 mg twice daily  Patient was transitioned off Cardene and started on amlodipine, Coreg, chlorthalidone and lisinopril for blood pressure management  Recommend repeat BMP 1 week after discharge  Recommend follow-up with PCP for further titration  Recommend follow-up with neurosurgery and neurology outpatient  PT/OT recommends postacute rehab and patient will be discharged to [facility]  Patient tested positive for COVID-19 on presentation however remained on room air and asymptomatic during hospital stay  On presentation, patient also tested positive for E  coli UTI status post IV antibiotics  Patient was noted to have anemia with stable hemoglobin's with blood work showing borderline low B12 levels; supplement started  She was also started on Prozac given depressed mood secondary to current hospitalization  Recommend close follow-up with PCP after discharge for further adjustments to his antidepressants      Discharge instructions:  · Follow-up with neurosurgery, neurology and PCP after discharge  · Repeat CTA H/N around 3/17/2023  · BMP in 1 week follow-up on kidney function with recent addition of lisinopril and chlorthalidone    DISCHARGE INFORMATION     PCP at Discharge: Huy Genao, plans to follow-up with Dr Michael Leigh    Admitting Provider: Mckinley Duron MD  Admission Date: 2/25/2023    Discharge Provider: Vahid Bender MD  Discharge Date: 3/6/2023    Discharge Disposition: [address]  Discharge Condition: stable  Discharge with Lines: no    Discharge Diet: dysphagia 3 dental soft with nectar thick liquis  Activity Restrictions: none  Test Results Pending at Discharge: peripheral smear, CTA H/N around 3/17    Discharge Diagnoses:  Principal Problem:    Acute Posterior Circulation Stroke  Active Problems:    Hypertensive emergency    COVID    Right Vertebral artery dissection ([address] )    Stenosis of left vertebral artery    Dysphagia    Spasticity    CKD (chronic kidney disease)    Prediabetes    Tearfulness    Anemia  Resolved Problems:    UTI (urinary tract infection)    Sinus tachycardia    Leukocytosis      Consulting Providers:      Diagnostic & Therapeutic Procedures Performed:  CTA head and neck w wo contrast    Result Date: 2/26/2023  Impression: Overall, there is no significant interval change when comparing to the recent MRI brain, and CTA head neck study  Stable small cerebellar infarctions as noted on the recent MRI study  Stable findings of distal right cervical and proximal intradural vertebral artery occlusion with probable retrograde flow in its distal intradural segment  Stable left vertebral artery origin stenosis and presumed occlusion of the distal left intradural vertebral segment  Small basilar artery with focal atherosclerotic disease in its proximal segment  Patent fetal right PCA circulation  Left PCA branch is small but patent, and unchanged in appearance  Stable poor visualization of the proximal left superior cerebellar artery  Above-mentioned findings are in keeping with the preliminary report provided by [facility] radiology services without significant discrepancy in the report  Workstation performed: [id number]     CTA head and neck with and without contrast    Result Date: 2/25/2023  Impression: No acute intracranial hemorrhage  Focal infarcts in the right posterior cerebellum, possibly acute to subacute  Follow-up MRI imaging is recommended  Marked tapering and occlusion of the distal right cervical vertebral artery  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  Proximal right intradural vertebral artery is also occluded with faint visualization of the post-PICA segment, which may be filling in a retrograde fashion  Severe left vertebral artery origin stenosis  Left vertebral artery may terminate in a posterior for cerebellar artery branch as the post-PICA segment is not identified   Small basilar artery with fetal right PCA circulation  Left proximal PCA is patent but small  Mid to distal left PCA is not well visualized  I personally discussed this study with Shawnee Buerger on 2/25/2023 at 12:21 PM  Workstation performed: [id number]     XR chest 1 view portable    Result Date: 2/25/2023  Impression: No acute abnormality however there is an opacity in the left lung base that may represent focal pleural thickening  Neoplasm cannot be excluded  Nonemergent follow-up PA and lateral views of the chest or CT chest suggested unless there are prior studies available for comparison  Findings for this inpatient marked for immediate notification in Epic  Workstation performed: [id number]     CT head wo contrast    Result Date: 2/28/2023  Impression: Evolving acute infarcts in the brainstem (worse in left midbrain/left upper tatiana) and bilateral cerebellum (right worse than left) status post right distal vertebral artery stenting  No acute intracranial hemorrhage  Workstation performed: [id number]     CT head wo contrast    Result Date: 2/26/2023  Impression: Stable evolving bilateral cerebellar infarctions without significant interval change  Workstation performed: [id number]     CT head wo contrast    Result Date: 2/26/2023  Impression: No new parenchymal findings when comparing to the recent CT and MRI brain studies  Stable late acute to early subacute bilateral cerebellar infarctions without hemorrhagic transformation  Workstation performed: [id number]     MRA head wo contrast    Result Date: 2/25/2023  Impression: Abscess of flow related signal in the V4 segments of the bilateral vertebral arteries, throughout the basilar artery and left posterior cerebral artery, concerning for progression of dissection and/or thrombus  Recommend emergent interventional radiology consultation    I personally discussed this study with Clarisse Albarado   on 2/25/2023 at 8:15 PM   Workstation performed: [id number]     MRA carotids wo contrast    Result Date: 2/25/2023  Impression: 1  There complete absence of flow related signal along the cervical segment of the right vertebral artery concerning for progression of dissection and/or retrograde flow  2   Absence of flow related signal in the V4 segments of the bilateral vertebral arteries and throughout the basilar artery, also concerning for progression of dissection  I personally discussed this study with Chantelana Lalito on 2/25/2023 at 8:24 PM  Workstation performed: [id number]     MRI brain wo contrast    Result Date: 2/27/2023  Impression: 1  Crescending, acute/recent posterior circulation infarctions with increasing right cerebellar and new brainstem infarctions as described  Overall infarct burden is slightly increased from the prior study one day earlier although new small infarcts are noted in the posterior medulla on the right, right midbrain and left occipital lobe  No hydrocephalus or large parenchymal hemorrhage  2   Abnormal left vertebral artery flow void at the lateral mass of C1, possibly related to slow flow and/or vascular occlusion  3   Very mild, chronic microangiopathy and chronic left thalamic lacunar infarction    Workstation performed: [id number]     MRI brain wo contrast    Result Date: 2/25/2023  Impression: Acute early subacute infarcts throughout the right greater than left cerebellar hemispheres and anterior vermis, consistent with findings on the head CT  No hemorrhagic conversion or mass effect  Arthrotomy 8 Workstation performed: [id number]     XR follow up    Result Date: 2/25/2023  Impression: No radiopaque orbital foreign body  Workstation performed: [id number]     CT cerebral perfusion    Result Date: 2/26/2023  Impression: CT perfusion performed  Data available on PACS  Workstation performed: [id number]       Code Status: Level 1 - Full Code  Advance Directive & Living Will: <no information>  Power of :    POLST:      Medications:   There are no discharge medications for this patient  There are no discharge medications for this patient  There are no discharge medications for this patient  Allergies:  No Known Allergies    FOLLOW-UP     PCP Outpatient Follow-up:  Plans to follow-up with Dr Magdaleno Bermeo Providers Follow-up:  Neurology and neurosurgery outpatient     Active Issues Requiring Follow-up:   will need follow-up CTA around 3/17, will need follow-up with PCP for BP follow-up    Discharge Statement:   I spent 30 minutes minutes discharging the patient  This time was spent on the day of discharge  I had direct contact with the patient on the day of discharge  Additional documentation is required if more than 30 minutes were spent on discharge  Portions of the record may have been created with voice recognition software  Occasional wrong word or "sound a like" substitutions may have occurred due to the inherent limitations of voice recognition software    Read the chart carefully and recognize, using context, where substitutions have occurred     ==  Carlos Bellamy, [facility]  Internal Medicine Resident PGY-3

## 2023-03-06 NOTE — ASSESSMENT & PLAN NOTE
Now s/p R V3/4 stenting with TICI 2B revascularization on 2/26 for R Vert stenosis  Continue ASA/Brilinta  Statin  CTA HEAD AND NECK 3/17  - Dissection flap in right vertebral artery V3 segment which is patent and improved in caliber status post thrombectomy       Outpatient f/u with Neurosurgery/Dr Neptali Lester

## 2023-03-06 NOTE — H&P
PHYSICAL MEDICINE AND REHABILITATION H&P/ADMISSION NOTE  Juan Ramos 60 y o  male MRN: [id number]  Unit/Bed#: -01 Encounter: [id number]      Rehab Diagnosis: Impairment of mobility, safety, Activities of Daily Living (ADLs), and cognitive/communication skills due to Stroke:  01 3  Bilateral involvement    History of Present Illness:   Juan Ramos is a 60 y o  right handed male with medical history of HTN who presented to [address] [place] [address] Road on 2/25 with nausea/dizziness  Found to have hypertensive emergency with acute/subcaute R posterior cerebellar CVA with 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, mild BRAULIO, and incidentally found + COVID (without evidence of respiratory illness/hypoxia/CXR findings)  Placed on cardene gtt, stroke pathway, ASA/Plavix, IV hydration for BRAULIO, and CTX for UTI  NIHSS 2  Symptoms began 2 days prior  Not tPA/TNK candidate  MRI/MRA with 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  NSx recommended transfer to [facility] and starting on heparin gtt and stopped Plavix  Repeat CTA on 2/26 stable with with R V3/4 occlusion  Not a candidate for interventional procedure due to clinical stability and risk  Speech consulted and noted mod-severe oropharyngeal dysphagia recommended pureed/HTL  Unfortunately later on 2/26, he clinically deteriorated with increased R sided weakness, and was taken to IR for stenting of V3 and V4 with TICI 2B revascularization  CTH without ICH  He was admitted back to the ICU intubated - extubated 2/27  MRI showed cerebellar CVA and R > L stroke burden, with additional hypodensities on DWI appreciated L midbrain, pontine area and R lower medullary/spinal cord junction  He was transitioned to triple therapy with DAPT (given recent stent) and A/C with eliquis 2 5mg BID due to COVID  Diet advanced to Level 3/NTL on 2/27  Noted to have spasticity in RLE - started on baclofen by Neurology   He was able to have cardene discontinued on 3/2, and they have been making adjustments to his oral regimen  He was started on Prozac for his mood  NSx has signed off  CTA H/N recommended around 3/17  Length of time on eliquis unclear  Admitted to [facility] on 3/6  Subjective:    He has been very tearful and depressed  Denies any new CP, SOB, fevers, chills, N/V, abdominal pain  He is incontinent of bowel on admission  He has some word finding difficulties and dysphagia  He has aphasia, but speech is improving  He feels the strength in his RLE is improving, but remains with decreased tone and strength in his right upper extremity  He denies any pain anywhere  Review of Systems: A 10-point review of systems was performed  Negative except as listed above  Plan:     * Acute Posterior Circulation Stroke  Assessment & Plan  Presented with dizziness for 2 days and nausea  - Now with R sided weakness, aphasia, dysphagia, R mild spasticity  Several small acute/early subacute bi-cerebellar infarcts without hemorrhage  Multiple infarcts involving R cerebellum and brainstem in particular (posterior medulla on the R, R midbrain, and L occipital lobe)  L vertebral artery severe stenosis and R vertebral artery occlusion and dissection    - Now s/p stenting on 2/26 with TICI 2b revascularization    PPx: ASA/brilinta/Eliquis (length of time on eliquis as per Neurology), Statin  Maintain normotension  Education on prediabetes and diet  Follow-up with Neurovascular/Neurosurgery  PT/OT/SLP - for swallow, speech, R sided weakness/tone, will need a good visual exam      Anemia  Assessment & Plan  Normocytic anemia noted through stay  B12 borderline low  Folate normal  Iron Panel: Low iron saturation and iron with normal TIBC and Ferritin  Was started in the hospital on B12, but not iron  Monitor Hgb, transfuse as appropriate  Will discuss with IM  Consulted       Adjustment disorder with depressed mood  Assessment & Plan  Has been tearful and labile during hospitalization  Started on Prozac 20mgdaily  Consulted rehab psychology for support  Prediabetes  Assessment & Plan  A1C 5 7  Consult nutrition for education on diabetic diet  Glucose 110-127  Diet/lifestyle modifications  Follow-up with PCP  CKD (chronic kidney disease)  Assessment & Plan  Lab Results   Component Value Date    EGFR 60 03/06/2023    EGFR 58 03/05/2023    EGFR 57 03/04/2023    CREATININE 1 26 03/06/2023    CREATININE 1 29 03/05/2023    CREATININE 1 31 (H) 03/04/2023     Presented with Crea 1 32  Given gentle IVF  Unclear baseline  Per hospital team - suspect CKD Stage 2 2/2 hypertension  Will monitor BMP while here  Avoid nephrotoxic meds and relative hypotension  Recommend outpatient f/u with PCP    Spasticity  Assessment & Plan  Intrinsic tonic tone in R quad which is mild  Has some hiccups too  Would opt to avoid treating R quad for now, as tone there may help stabilize at the knee  Can continue Baclofen 10mg BID  Range of motion  Monitor as tone evolves  Outpatient f/u with PMR  Dysphagia  Assessment & Plan  Mod-sever oropharyngeal  On Level 3/NTL  Aspiration precautions  Good oral hygiene   SLP consulted    Primary hypertension  Assessment & Plan  Home: None  Here: Amlodipine 10mg daily, Coreg 6 25mg BID, Chlorthalidone 12 5mg daily, Lisinopril 20mg daily    Had hypertensive urgency in hospital requiring cardene gtt  Monitor and adjust as appropriate  IM consulted to assist with management  Stenosis of left vertebral artery  Assessment & Plan  Severe L vertebral artery origin stenosis  Repeat CTA H/N 2 weeks around 3/17  ASA/Brilinta  Atorvastatin  SBP goals 120-160  Follow-up with Neurovascular  Right Vertebral artery dissection [facility])  Assessment & Plan  Now s/p R V3/4 stenting with TICI 2B revascularization on 2/26  Continue ASA/Brilinta  Statin  Outpatient f/u with Neurovascular      COVID  Assessment & Plan  Incidentally found to have COVID on 2/25  Asymptomatic from respiratory standpoint  Per Neuro concern for hypercoagulability related to COVID  Currently on Eliquis 2 5mg BID - will see how long they would like him to be on this regimen  Continue precautions through 3/7, then discontinue  Health Maintenance  #Delirium/Sleep: At risk  Optimize bowel/bladder, sleep-wake cycle, mood and pain management  #Pain: Baclofen 10mg BID, Tylenol PRN  #Bowel: Last BM 3/5, on miralax daily and senna-colace 1 tab QHS  Added PRN bisacodyl suppository  #Bladder: Voiding and continent  #Skin/Pressure Injury Prevention: Turn Q2hr in bed, with weight shifts [id number] in wheelchair  Float heels in bed  #DVT Prophylaxis: On triple therapy, SCDs  #GI Prophylaxis: None  #Code Status:  Full Code  #FEN: Level 3/NTL  #Dispo:  ELOS 3-4 weeks  May require more assistance at home and primary wheelchair level mobility given his ataxia  75 minutes or greater spent for this encounter which included a combination of face-to-face time with the patient and non-face-to-face time which in part specifically includes management of his current neurologic deficits, rehab course, spasticity, mood, and comorbidities as documented above     Face-to-face time included extended discussion with patient regarding current condition, medical history, mood, medical/rehabilitation management, and disposition  Non face-to-face time included coordination of care with patient's co-managing AP and/or physician(s) thru communication and review of their recent documentation as well as reviewing vitals, bowel/bladder function, recent labs, diagnostic imaging, and notes from therapy, CM, and nursing       Drug regimen reviewed, all potential adverse effects identified and addressed:    Scheduled Meds:  Current Facility-Administered Medications   Medication Dose Route Frequency Provider Last Rate   • [START ON 3/7/2023] amLODIPine  10 mg Oral Daily Michael Nguyen MD     • apixaban  2 5 mg Oral BID Michael Nguyen MD     • [START ON 3/7/2023] aspirin  81 mg Oral Daily Stephanie Madden MD     • atorvastatin  40 mg Oral Daily With Shara Manjarrez MD     • baclofen  10 mg Oral BID Stephanie Madden MD     • carvedilol  6 25 mg Oral BID With Meals Stephanie Madden MD     • [START ON 3/7/2023] chlorthalidone  12 5 mg Oral Daily Stephanie Madden MD     • [START ON 3/7/2023] vitamin B-12  1,000 mcg Oral Daily Stephanie Madden MD     • [START ON 3/7/2023] FLUoxetine  20 mg Oral Daily Stephanie Mdaden MD     • [START ON 3/7/2023] lisinopril  20 mg Oral Daily Stephanie Madden MD     • [START ON 3/7/2023] polyethylene glycol  17 g Oral Daily Stephanie Madden MD     • senna-docusate sodium  1 tablet Oral HS Stephanie Madden MD     • ticagrelor  90 mg Oral Q12H [address] Stephanie Madden MD          Restrictions include: Fall precautions      Functional History/Home Set-up - Prior to Admission:    Lives with his spouse  Has 2 supportive adult children  [facility] with 1/2 bath on main level  Bed/bath upstairs  1 SHANNON with rails  No DME PTA  Previously independent with ADLs, functional mobility, and IADLs  Worked full time as an   Functional Status Upon Admission to [facility]:  Mobility: modA bed mobiity, not appropriate for ambulation  Transfers: modA x2  ADLs: modA grooming, modA UB bathing, maxA LB bathing, modA UB dressing, total A LB dressing  Physical Exam:  Temp:  [97 2 °F (36 2 °C)-98 3 °F (36 8 °C)] 98 3 °F (36 8 °C)  HR:  [61-70] 64  Resp:  [16-17] 17  BP: (142-159)/(65-93) 152/65  SpO2:  [95 %-98 %] 98 %    Gen: No acute distress, Well-nourished, well-appearing  HEENT: Moist mucus membranes, Normocephalic/Atraumatic  Cardiovascular: Regular rate, rhythm, S1/S2  Distal pulses palpable  Heme/Extr: No edema  Pulmonary: Non-labored breathing  Lungs CTAB  : No barnett  GI: Soft, non-tender, non-distended  BS+  MSK: PROM is WFL in all extremities  No effusions or deformities  Bulk is symmetric  See below for MMT scores  "  Integumentary: Skin is warm, dry  Heels intact, toes intact, and sacrum intact - evidence of previous pilonidal cyst    Neuro: AAOx3, CN 2-12 intact - except for R facial sensory impairment, R central facial weakness, tongu midline, SCM fine, Trap weak on the R  Sensation diminished light touch on the R  Speech with expressive aphasia, word finding, some paraphasias, some neologisms  Appropriate to questioning  Tone with MAS 1+ in ankle and MAS 1 in R Quad  He has sustained clonus in RLE (ankle)  Babinski present on the R       MMT:   Strength:   Right  Left  Site  Right  Left  Site    1 5  S Ab: Shoulder Abductors  3  5  HF: Hip Flexors    1 5  EF: Elbow Flexors  3  5 KF: Knee Flexors    1  5  EE: Elbow Extensors  4  5  KE: Knee Extensors    1  5  WE: Wrist Extensors  0  5  DR: Dorsi Flexors    1  5  FF: Finger Flexors  1  5  PF: Plantar Flexors    0  5  HI: Hand Intrinsics  1  5  EHL: Extensor Hallucis Longus   Psych: Congruent mood and affect  Cooperative  Tearful at times  Laboratory:    Results from last 7 days   Lab Units 03/06/23  0627 03/05/23  0501 03/04/23  0538   HEMOGLOBIN g/dL 10 1* 10 0* 10 4*   HEMATOCRIT % 31 4* 30 6* 32 8*   WBC Thousand/uL 8 82 10 45* 10 55*     Results from last 7 days   Lab Units 03/06/23  0627 03/05/23  0501 03/04/23  0538   BUN mg/dL 34* 38* 37*   SODIUM mmol/L 138 136 140   POTASSIUM mmol/L 4 1 4 0 4 1   CHLORIDE mmol/L 108 107 109*   CREATININE mg/dL 1 26 1 29 1 31*            Wt Readings from Last 1 Encounters:   03/06/23 82 7 kg (182 lb 5 1 oz)     Estimated body mass index is 26 92 kg/m² as calculated from the following:    Height as of 2/27/23: 5' 9\" (1 753 m)  Weight as of an earlier encounter on 3/6/23: 82 7 kg (182 lb 5 1 oz)  Imaging: reviewed   Imaging: reviewed   2/25 CXR:  No acute abnormality however there is an opacity in the left lung base that may represent focal pleural thickening  Neoplasm cannot be excluded    Nonemergent follow-up PA and " lateral views of the chest or CT chest suggested unless there are prior   studies available for comparison  2/25 CTA H/N:  No acute intracranial hemorrhage  Focal infarcts in the right posterior cerebellum, possibly acute to subacute  Follow-up MRI imaging is recommended  Marked tapering and occlusion of the distal right cervical vertebral artery  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  Proximal right intradural vertebral artery is also occluded with faint visualization of the post-PICA segment, which may be   filling in a retrograde fashion  Severe left vertebral artery origin stenosis  Left vertebral artery may terminate in a posterior for cerebellar artery branch as the post-PICA segment is not identified  Small basilar artery with fetal right PCA circulation  Left proximal PCA is patent but small  Mid to distal left PCA is not well visualized  2/25 MRA Head:  Abscence of flow related signal in the V4 segments of the bilateral vertebral arteries, throughout the basilar artery and left posterior cerebral artery, concerning for progression of dissection and/or thrombus  Recommend emergent interventional   radiology consultation  2/25 MRI Brain:  Acute early subacute infarcts throughout the right greater than left cerebellar hemispheres and anterior vermis, consistent with findings on the head CT  No hemorrhagic conversion or mass effect  2/25 MRA Carotids:  1  There complete absence of flow related signal along the cervical segment of the right vertebral artery concerning for progression of dissection and/or retrograde flow  2   Absence of flow related signal in the V4 segments of the bilateral vertebral arteries and throughout the basilar artery, also concerning for progression of dissection  2/26 CTA H/n:  Overall, there is no significant interval change when comparing to the recent MRI brain, and CTA head neck study          Stable small cerebellar infarctions as noted on the recent MRI study  Stable findings of distal right cervical and proximal intradural vertebral artery occlusion with probable retrograde flow in its distal intradural segment  Stable left vertebral artery origin stenosis and presumed occlusion of the distal left intradural vertebral segment  Small basilar artery with focal atherosclerotic disease in its proximal segment  Patent fetal right PCA circulation  Left PCA branch is small but patent, and unchanged in appearance  Stable poor visualization of the proximal left superior cerebellar   artery  2/26 CTH:  No new parenchymal findings when comparing to the recent CT and MRI brain studies  Stable late acute to early subacute bilateral cerebellar infarctions without hemorrhagic transformation  2/25 CTH:  Stable evolving bilateral cerebellar infarctions without significant interval change  2/26 MRI Brain:  1  Crescending, acute/recent posterior circulation infarctions with increasing right cerebellar and new brainstem infarctions as described  Overall infarct burden is slightly increased from the prior study one day earlier although new small infarcts   are noted in the posterior medulla on the right, right midbrain and left occipital lobe  No hydrocephalus or large parenchymal hemorrhage  2   Abnormal left vertebral artery flow void at the lateral mass of C1, possibly related to slow flow and/or vascular occlusion  3   Very mild, chronic microangiopathy and chronic left thalamic lacunar infarction       2/28 CTH:  Evolving acute infarcts in the brainstem (worse in left midbrain/left upper tatiana) and bilateral cerebellum (right worse than left) status post right distal vertebral artery stenting  No acute intracranial hemorrhage  Rehabilitation Prognosis: good     Tolerance for three hours of therapy a day: good     Family/Patient Goals:  Patient/family's goals: Return to previous home/apartment      Patient will receive PT, OT, and ST 60 minutes each per day, five days per week to achieve rehab goals or participate in 900 minutes of therapy within a 7 day week period  Mobility Goals: Depends on his symptoms  Possibly primary wheelchair mobility with propulsion in julieta wheelchair using hands/feet  Can try some ambulation, but would likely require assistance  Transfer Goals: Sup-Eusebio  Activities of Daily Living (ADLs) Goals: Sup-Eusebio    Discharge Planning:  Rehabilitation and discharge goals discussed with the patient and/or family  Case Managment and Social Work to review patient/family resources and to coordinate Discharge Planning  Estimated length of stay: 3 - 4 weeks    Patient and Family Education and Training:  Rehabilitation and discharge goals discussed with the patient and/or family  Patient/family education/training needs to be discussed in weekly team meeting  Equipment/DME needs: Therapy teams to assess and evaluate for additional equipment/DME needs throughout rehabilitation stay  Past Medical History:   Past Surgical History:   Family History:   Social history:   No past medical history on file  No past surgical history on file  No family history on file     Social History     Socioeconomic History   • Marital status: /Civil Union     Spouse name: Not on file   • Number of children: Not on file   • Years of education: Not on file   • Highest education level: Not on file   Occupational History   • Not on file   Tobacco Use   • Smoking status: Never   • Smokeless tobacco: Never   Substance and Sexual Activity   • Alcohol use: Not Currently   • Drug use: Not Currently   • Sexual activity: Not on file   Other Topics Concern   • Not on file   Social History Narrative   • Not on file     Social Determinants of Health     Financial Resource Strain: Not on file   Food Insecurity: Not on file   Transportation Needs: Not on file   Physical Activity: Not on file   Stress: Not on file   Social Connections: Not on file   Intimate Partner Violence: Not on file   Housing Stability: Not on file          Current Medical Diagnosis Allergies   Patient Active Problem List   Diagnosis   • BRAULIO (acute kidney injury) ([facility] [address] )   • COVID   • Acute Posterior Circulation Stroke   • Right Vertebral artery dissection (HCC)   • Stenosis of left vertebral artery   • Hypertensive emergency   • Dizziness   • Dysphagia   • Spasticity   • CKD (chronic kidney disease)   • Prediabetes   • Tearfulness   • Anemia    No Known Allergies        Medical Necessity Criteria for [facility] Admission: He is of High Risk due to the following comorbid conditions: R sided weakness, dizziness, bilateral ataxia, R vertebral artery dissection, L vertebral artery stenosis, UTI, COVID, BRAULIO, Dysphagia, Dysarthria, Mood lability, leukocytosis, hypertension, prediabetes, spasticity, sinus tachycardia, increased risk of falls, delirium, VTE, skin breakdown, bowel/bladder dysfunction  In addition, the preadmission screen, post-admission physical evaluation, overall plan of care and admissions order demonstrate a reasonable expectation that the following criteria were met at the time of admission to the [facility]  1  The patient requires active and ongoing therapeutic intervention of multiple therapy disciplines (physical therapy, occupational therapy, speech-language pathology, or prosthetics/orthotics), one of which is physical or occupational therapy  2  Patient requires an intensive rehabilitation therapy program, as defined in Chapter 1, section 110 2 2 of the CMS Medicare Policy Manual  This intensive rehabilitation therapy program will consist of at least 3 hours of therapy per day at least 5 days per week or at least 15 hours of intensive rehabilitation therapy within a 7 consecutive day period, beginning with the date of admission to the [facility]      3  The patient is reasonably expected to actively participate in, and benefit significantly from, the intensive rehabilitation therapy program as defined in Chapter 1, section 110 2 2 of the CMS Medicare Policy Manual at this time of admission to the [facility]  He can reasonably be expected to make measurable improvement (that will be of practical value to improve the patient’s functional capacity or adaptation to impairments) as a result of the rehabilitation treatment, as defined in section 110 3, and such improvement can be expected to be made within the prescribed period of time  As noted in the CMS Medicare Policy Manual, the patient need not be expected to achieve complete independence in the domain of self-care nor be expected to return to his or her prior level of functioning in order to meet this standard  4  The patient must require physician supervision by a rehabilitation physician  As such, a rehabilitation physician will conduct face-to-face visits with the patient at least 3 days per week throughout the patient’s stay in the [facility] to assess the patient both medically and functionally, as well as to modify the course of treatment as needed to maximize the patient’s capacity to benefit from the rehabilitation process  5  The patient requires an intensive and coordinated interdisciplinary approach to providing rehabilitation, as defined in Chapter 1, section 110 2 5 of the CMS Medicare Policy Manual  This will be achieved through periodic team conferences, conducted at least once in a 7-day period, and comprising of an interdisciplinary team of medical professionals consisting of: a rehabilitation physician, registered nurse,  and/or , and a licensed/certified therapist from each therapy discipline involved in treating the patient  Kaelyn Campos MD  Physical Medicine and Rehabilitation    ** Please Note: Fluency Direct voice to text software may have been used in the creation of this document   **

## 2023-03-06 NOTE — TREATMENT PLAN
Individualized Plan of [address] [facility] [address] 60 y o  male MRN: [id number]  Unit/Bed#: -01 Encounter: [id number]     PATIENT INFORMATION  ADMISSION DATE: 3/6/2023  4:26 PM ALEE CATEGORY:Stroke:  01 3  Bilateral involvement   ADMISSION DIAGNOSIS: Cerebellar infarct ([facility] [address] ) [I63 [id number]]  EXPECTED LOS: 3 - 4 weeks     MEDICAL/FUNCTIONAL PROGNOSIS  Based on my assessment of the patient's medical conditions and current functional status, the prognosis for attaining medical and functional goals or the IRF stay is:  Fair    Medical Goals: Patient will be medically stable for discharge to [facility] restrictive envrionment upon completion of rehab program       1  Adequate pain control  2  Prevention of VTE  3  Adequate secretion management, and monitoring of respiratory status  4  Bowel/bladder management  5  Maintain appropriate nutrition and hydration for healing and hemodynamic stability  6  Emotional adaptation to impairment  7  Monitor for polypharmacy  8  Fall prevention  9  Patient and family caregiver training/education  10  Skin protection and prevention of pressure injury  11  Appropriate BP management   12  Prevention of delirium  13  Appropriate management of sequelae of his bilateral posterior circulation CVA  ANTICIPATED DISCHARGE DISPOSITION AND SERVICES  COMMUNITY SETTING: Home - Assistance/Sup  Wife not currently working, can potentially provide assistance  Has two adult daughters, both working  May need to be alone intermittently  ANTICIPATED FOLLOW-UP SERVICE:   Outpatient Therapy Services: PT, OT and SLP      - possible Neuro Day Program candidate       DISCIPLINE SPECIFIC PLANS:  Required Disciplines & Services: Rehabillitation Nursing and Psychology    REQUIRED THERAPY:  Therapy Hours per Day Days per Week Total Days   Physical Therapy 1 5-6 5-6   Occupational Therapy 1 5-6 5-6   Speech/Language Therapy 1 3-5 3-5   NOTE: Additional therapy time(s) or changes to allocation of therapies as appropriate to meet patient needs and to achieve functional goals  Patient will participate in above therapy regimen consisting of PT, OT and SLP due to the following medical procedure/condition:Stroke:  01 3  Bilateral involvement    ANTICIPATED FUNCTIONAL OUTCOMES:  ADL:   Sup-Eusebio with some ADLs   Bladder/Bowel:  premorbid  Transfers:  Sup-Eusebio   Locomotion:  either modified Ind at wheelchair level to some assist with wheelchair/ambulation  Cognitive:  eval his current cognition  May need assistance with management of his care       DISCHARGE PLANNING NEEDS  Equipment needs: Discharge needs to be reviewed with team      REHAB ANTICIPATED PARTICIPATION RESTRICTIONS:  None

## 2023-03-06 NOTE — ASSESSMENT & PLAN NOTE
Lab Results   Component Value Date    EGFR 61 03/31/2023    EGFR 57 03/29/2023    EGFR 43 03/28/2023    CREATININE 1 24 03/31/2023    CREATININE 1 31 (H) 03/29/2023    CREATININE 1 66 (H) 03/28/2023     Per hospital team - suspect CKD Stage 2 2/2 hypertension  3/27 Crea bumped to 1 49   - Patient was to increase his PO intake prior to trying IVF   - IM holding LIsinopril  3/28 Crea up to 1 66   - Giving IVF   - Check PVR - these were fine  3/29 Crea down to 1 31 after 1L over 10 hours  - Encourage adequate hydration while here  3/31 Crea back to 1 24  Ordered recheck BMP next week     Avoid nephrotoxic meds and relative hypotension  Will need to hydrate well prior to repeat CTA H/N  Recommend outpatient f/u with PCP

## 2023-03-06 NOTE — ASSESSMENT & PLAN NOTE
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    - Now s/p stenting on 2/26 with TICI 2b revascularization    PPx: ASA/brilinta/Eliquis, Statin   - Discussed with Neurology, they defer to NSx on length of time on eliquis  - Reached out to Neurosurgery - they would like it on for another 6 weeks and will f/u on 5/1    - Repeat CTA H/N -  No new intracranial abnormalities, expected evolution of known CVA  See dissection and stenosis for more details  3/9 Had nocturnal oximetry to screen for nocturnal hypoxia - only 26 seconds total of mild desaturation  Maintain normotension  Education on prediabetes and diet  Follow-up with Neurovascular/Neurosurgery    - Dr Yeni Greene arranged f/u for 5/1  He will get repeat CTA prior to that appointment     - I reached out to Neuro clerical team to arrange f/u with me and Neurology    Function improving slowly   Continue PT/OT/SLP - for swallow, speech, R sided weakness/tone, will need a good visual exam

## 2023-03-06 NOTE — PROGRESS NOTES
PHYSICAL MEDICINE AND REHABILITATION   PREADMISSION ASSESSMENT     Projected [facility] Diagnoses:  Impairment of mobility, safety and Activities of Daily Living (ADLs) due to Stroke:  01 2  Right Body Involvement (Left Brain)  Etiologic Dx: bilateral cerebellar infarcts  Date of Onset: 2/25/23   Date of surgery: 2/26/23 R vertebral/basilar thrombectomy with R V3/4 stenting with TICI 2B revascularization    PATIENT INFORMATION  Name: Ketan Curtis Phone #: There are no phone numbers on file  Address: [address] [address]  YOB: 1960 Age: 60 y o  SS#   Marital Status: /Civil Union  Ethnicity: White  Employment Status: Works full time as an   Extended Emergency Contact Information  Primary Emergency Contact: Tim Columbus Linusana  Mobile Phone: [phone]  Relation: Spouse  Secondary Emergency Contact: Queta Mello  Mobile Phone: [phone]  Relation: Daughter  Advance Directive:level 1 - full code (no ACP docs)    INSURANCE/COVERAGE:     Primary Payor: Brent Kaur / Plan: Brent Kaur PPO / Product Type: PPO /  [id number] Secondary Payer: Self Pay   Payer Contact: Fabi Ag RN Payer Contact:   Contact Phone: [phone] Contact Phone:     Authorization #: [id number]  Coverage Dates:3/6/23 to 3/8/23  LCD: 3/8/23 Updates sent to fax [phone]  MEDICARE #: Medicare Days:   Medical Record #: [id number]    REFERRAL SOURCE:   Referring provider: Yogesh Mann MD  Referring facility: [facility]   Room: [address] [place] [address] 71/[facility] [id number]  PCP: No primary care provider on file  PCP phone number: None    MEDICAL INFORMATION  HPI: Ketan Curtis is a 54-year-old male who originally presented to [address] [place] [address] on 2/25/23 with chief c/o dizziness accompanied with nausea x 2 days  In the ER, he was hypertensive with max blood pressure 252/115 which was not improved by IV labetalol    CTA head and neck showed right posterior cerebellum infarct, acute versus subacute, marked tapering and occlusion of the distal right cervical vertebral artery suggestive of dissection, proximal right intradural vertebral artery occlusion and severe left vertebral artery stenosis  MRI and MRA were also performed, which confirmed bilateral cerebellar infarcts and concern for intracranial extension of R vertebral dissection  He was started on Cardene for blood pressure control  Incidentally in the ER, he was also noted to be COVID-positive, had an active UTI, and BRAULIO  He was transferred to [facility] for potential neurointerventional support should he worsen given his underlying vasculature and placed on ASA/heparin  On 2/26, patient had worsened dysarthria and right sided weakness  CT head negative for intracranial hemorrhage and stable bilateral cerebellar infarcts  He was taken for emergent R vertebral/basilar thrombectomy with R V3/4 stenting with TICI 2B revascularization  He was started on Integrilin gtt and repeat CT head negative for hemorrhage  MRI brain 2/26: Increased infarct burden  Bilateral cerebellar infarcts with larger right cerebellar infarct and new acute infarcts in the right medulla, bilateral midbrain, and left occipital lobe  Echo: EF 65%, normal wall motion, normal atria size, no PFO  Etiology for acute infarcts in the setting of bilateral vertebral artery occlusions remain unclear, however likely due to dissection vs chronic diffuse atherosclerotic disease  Possible contribution from underlying COVID infection and hypercoagulability  He was transitioned from Integrilin gtt to DAPT on 2/27 with aspirin 81 mg daily and Brilinta 90 mg BID (180 mg load once)  Heparin gtt discontinued and transitioned to Eliquis 2 5 mg BID on 2/28  Given spasticity and concern for myoclonus/muscle spasm in the proximal right LE, he was started on Baclofen 10 mg TID    Patient also with tearfulness throughout his hospitalization and was started on Prozac 20 mg daily  Patient with leukocytosis, however, afebrile  Suspect likely reactive the setting of recent CVA vs  UTI vs COVID  Leukocytosis has now resolved  Patient's urine culture with pan-susceptible E  Coil and he completed a 4 day course of antibiotics as of 2/28  For his HTN, he will be continued on amlodipine, coreg, lisinopril  Patient with dysarthria and dysphagia  He is ordered a dental soft diet with nectar thickened liquids  Patient worked with therapy and recommended for rehab following his hospital stay  He was evaluated by PM&R and found to be an appropriate ARC candidate  He is now medically cleared for discharge to the [facility]  Past Medical History:   Past Surgical History: Allergies:     History reviewed  No pertinent past medical history  History reviewed  No pertinent surgical history    No Known Allergies      Medical/functional conditions requiring inpatient rehabilitation: bilateral cerebellar infarcts, R sided weakness, Dizziness, Right Vertebral artery dissection, stenosis of left vertebral artery, UTI, COVID, BRAULIO, dysphagia, dysarthria, tearfulness, leukocytosis, hypertensive urgency, prediabetes, spasticity, sinus tachycardia    Risk for medical/clinical complications: Risk for falls and injury related to falls, risk for extension of CVA, risk for bleeding, risk for skin breakdown, risk for DVT/PE, risk for aspiration    Comorbidities/Surgeries in the last 100 days: 2/26/23 R vertebral/basilar thrombectomy with R V3/4 stenting with TICI 2B revascularization    CURRENT VITAL SIGNS:   Temp:  [97 2 °F (36 2 °C)-97 4 °F (36 3 °C)] 97 4 °F (36 3 °C)  HR:  [61-70] 62  Resp:  [16-17] 17  BP: (142-159)/(71-93) 155/71   Intake/Output Summary (Last 24 hours) at 3/6/2023 1311  Last data filed at 3/5/2023 1401  Gross per 24 hour   Intake --   Output 200 ml   Net -200 ml        LABORATORY RESULTS:      Lab Results   Component Value Date    HGB 10 1 (L) 03/06/2023    HCT 31 4 (L) 03/06/2023    WBC 8 82 03/06/2023     Lab Results   Component Value Date    BUN 34 (H) 03/06/2023    K 4 1 03/06/2023     03/06/2023    CREATININE 1 26 03/06/2023     Lab Results   Component Value Date    PROTIME 14 0 02/27/2023    INR 1 05 02/27/2023        DIAGNOSTIC STUDIES:  CTA head and neck w wo contrast    Result Date: 2/26/2023  Impression: Overall, there is no significant interval change when comparing to the recent MRI brain, and CTA head neck study  Stable small cerebellar infarctions as noted on the recent MRI study  Stable findings of distal right cervical and proximal intradural vertebral artery occlusion with probable retrograde flow in its distal intradural segment  Stable left vertebral artery origin stenosis and presumed occlusion of the distal left intradural vertebral segment  Small basilar artery with focal atherosclerotic disease in its proximal segment  Patent fetal right PCA circulation  Left PCA branch is small but patent, and unchanged in appearance  Stable poor visualization of the proximal left superior cerebellar artery  Above-mentioned findings are in keeping with the preliminary report provided by [facility] radiology services without significant discrepancy in the report  Workstation performed: [id number]     CTA head and neck with and without contrast    Result Date: 2/25/2023  Impression: No acute intracranial hemorrhage  Focal infarcts in the right posterior cerebellum, possibly acute to subacute  Follow-up MRI imaging is recommended  Marked tapering and occlusion of the distal right cervical vertebral artery  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  Proximal right intradural vertebral artery is also occluded with faint visualization of the post-PICA segment, which may be filling in a retrograde fashion  Severe left vertebral artery origin stenosis    Left vertebral artery may terminate in a posterior for cerebellar artery branch as the post-PICA segment is not identified  Small basilar artery with fetal right PCA circulation  Left proximal PCA is patent but small  Mid to distal left PCA is not well visualized  I personally discussed this study with Bhargav Beckford on 2/25/2023 at 12:21 PM  Workstation performed: [id number]     XR chest 1 view portable    Result Date: 2/25/2023  Impression: No acute abnormality however there is an opacity in the left lung base that may represent focal pleural thickening  Neoplasm cannot be excluded  Nonemergent follow-up PA and lateral views of the chest or CT chest suggested unless there are prior studies available for comparison  Findings for this inpatient marked for immediate notification in Epic  Workstation performed: [id number]     CT head wo contrast    Result Date: 2/28/2023  Impression: Evolving acute infarcts in the brainstem (worse in left midbrain/left upper tatiana) and bilateral cerebellum (right worse than left) status post right distal vertebral artery stenting  No acute intracranial hemorrhage  Workstation performed: [id number]     CT head wo contrast    Result Date: 2/26/2023  Impression: Stable evolving bilateral cerebellar infarctions without significant interval change  Workstation performed: [id number]     CT head wo contrast    Result Date: 2/26/2023  Impression: No new parenchymal findings when comparing to the recent CT and MRI brain studies  Stable late acute to early subacute bilateral cerebellar infarctions without hemorrhagic transformation  Workstation performed: [id number]     MRA head wo contrast    Result Date: 2/25/2023  Impression: Abscess of flow related signal in the V4 segments of the bilateral vertebral arteries, throughout the basilar artery and left posterior cerebral artery, concerning for progression of dissection and/or thrombus  Recommend emergent interventional radiology consultation    I personally discussed this study with Gracy Gonzalez   on 2/25/2023 at 8:15 PM   Workstation performed: [id number]     MRA carotids wo contrast    Result Date: 2/25/2023  Impression: 1  There complete absence of flow related signal along the cervical segment of the right vertebral artery concerning for progression of dissection and/or retrograde flow  2   Absence of flow related signal in the V4 segments of the bilateral vertebral arteries and throughout the basilar artery, also concerning for progression of dissection  I personally discussed this study with Kaur Jelani on 2/25/2023 at 8:24 PM  Workstation performed: [id number]     MRI brain wo contrast    Result Date: 2/27/2023  Impression: 1  Crescending, acute/recent posterior circulation infarctions with increasing right cerebellar and new brainstem infarctions as described  Overall infarct burden is slightly increased from the prior study one day earlier although new small infarcts are noted in the posterior medulla on the right, right midbrain and left occipital lobe  No hydrocephalus or large parenchymal hemorrhage  2   Abnormal left vertebral artery flow void at the lateral mass of C1, possibly related to slow flow and/or vascular occlusion  3   Very mild, chronic microangiopathy and chronic left thalamic lacunar infarction    Workstation performed: [id number]     MRI brain wo contrast    Result Date: 2/25/2023  Impression: Acute early subacute infarcts throughout the right greater than left cerebellar hemispheres and anterior vermis, consistent with findings on the head CT  No hemorrhagic conversion or mass effect  Arthrotomy 8 Workstation performed: [id number]     XR follow up    Result Date: 2/25/2023  Impression: No radiopaque orbital foreign body  Workstation performed: [id number]     CT cerebral perfusion    Result Date: 2/26/2023  Impression: CT perfusion performed  Data available on PACS   Workstation performed: [id number]       PRECAUTIONS/SPECIAL NEEDS:  Isolation Precautions:  Contact and Airborne, Anticoagulation:  aspirin 81 mg orally every day and Eliquis and Brilinta, Pain Management, Bladder Incontinence: # of accidents 1, Aspiration Risk/Precautions, Dietary Restrictions: Dental soft diet with nectar thickened liquids, Visually Impaired, Language Preference: English and fall precautions, sling for RUE    MEDICATIONS:     Current Facility-Administered Medications:   •  amLODIPine (NORVASC) tablet 10 mg, 10 mg, Oral, Daily, Bg Lowe MD, 10 mg at 03/06/23 5905  •  apixaban (ELIQUIS) tablet 2 5 mg, 2 5 mg, Oral, BID, Bg Lowe MD, 2 5 mg at 03/06/23 5627  •  aspirin chewable tablet 81 mg, 81 mg, Oral, Daily, Bg Lowe MD, 81 mg at 03/06/23 9467  •  atorvastatin (LIPITOR) tablet 40 mg, 40 mg, Oral, Daily With Jovana Gupta MD, 40 mg at 03/05/23 1720  •  baclofen tablet 10 mg, 10 mg, Oral, BID, Bg Lowe MD, 10 mg at 03/06/23 7627  •  carvedilol (COREG) tablet 6 25 mg, 6 25 mg, Oral, BID With Meals, Bg Lowe MD, 6 25 mg at 03/06/23 8108  •  chlorthalidone tablet 12 5 mg, 12 5 mg, Oral, Daily, Ghada Castillo DO  •  cyanocobalamin (VITAMIN B-12) tablet 1,000 mcg, 1,000 mcg, Oral, Daily, Ghada Castillo DO  •  FLUoxetine (PROzac) capsule 20 mg, 20 mg, Oral, Daily, Ghada Castillo DO, 20 mg at 03/06/23 0950  •  hydrALAZINE (APRESOLINE) injection 10 mg, 10 mg, Intravenous, Q4H PRN, Bg Lowe MD, 10 mg at 03/04/23 0543  •  lisinopril (ZESTRIL) tablet 20 mg, 20 mg, Oral, Daily, Ghada Castillo DO, 20 mg at 03/06/23 0950  •  polyethylene glycol (MIRALAX) packet 17 g, 17 g, Oral, Daily, Bg Lowe MD, 17 g at 03/04/23 2113  •  senna-docusate sodium (SENOKOT S) 8 6-50 mg per tablet 1 tablet, 1 tablet, Oral, HS, Bg Lowe MD, 1 tablet at 03/04/23 5615  •  ticagrelor (BRILINTA) tablet 90 mg, 90 mg, Oral, Q12H [address], Bg Lowe MD, 90 mg at 03/06/23 0904    SKIN INTEGRITY:   Skin intact    PRIOR LEVEL OF FUNCTION:  He lives in a(n) single family home  Tavo De Santiago is  and and two adult children     Self Care: Independent, Indoor Mobility: Independent, Stairs (in/outdoor): Independent and Cognition: Independent    FALLS IN THE LAST 6 MONTHS: 0    HOME ENVIRONMENT:  The living area: can live on one level  There are 1 step to enter the home  The patient will not have 24 hour supervision/physical assistance available upon discharge  PREVIOUS DME:  Equipment in home (previous DME): None    FUNCTIONAL STATUS:  Physical Therapy Occupational Therapy Speech Therapy   03/04/23 0927    PT Last Visit   PT Visit Date 03/04/23   Note Type   Note Type Treatment for insurance authorization   Pain Assessment   Pain Assessment Tool 0-10   Pain Score No Pain   Restrictions/Precautions   Weight Bearing Precautions Per Order No   Braces or Orthoses Sling  (for transfers/mobility due to R hemparesis)   Other Precautions Contact/isolation; Airborne/isolation; Bed Alarm; Chair Alarm;Multiple lines;Telemetry; Fall Risk  (R hemipareis, SBP <160, +COVID)   General   Chart Reviewed Yes   Response to Previous Treatment Patient with no complaints from previous session  Family/Caregiver Present Yes  (wife)   Cognition   Overall Cognitive Status WFL   Arousal/Participation Cooperative   Attention Within functional limits   Orientation Level Oriented X4   Memory Within functional limits   Following Commands Follows one step commands without difficulty   Comments very pleasant   Subjective   Subjective agreeable to participate   Bed Mobility   Supine to Sit 3  Moderate assistance   Additional items Assist x 1;HOB elevated; Increased time required;Verbal cues;LE management   Sit to Supine Unable to assess   Additional Comments remained seated in chair with alarm upon conclusion   Transfers   Sit to Stand 3  Moderate assistance   Additional items Assist x 2; Increased time required;Verbal cues  (modAx1 at sink with R knee block and LUE pulling up on sink)   Stand to Sit 3  Moderate assistance   Additional items Assist x 1; Increased time required;Verbal cues   Stand pivot 2  Maximal assistance   Additional items Assist x 2; Increased time required;Verbal cues   Additional Comments L HHA + 2nd anterior with R knee block and RUE supported in sling  x4 STS throughout session   Ambulation/Elevation   Gait pattern Not appropriate   Balance   Static Sitting Poor +   Dynamic Sitting Poor +   Static Standing Poor   Dynamic Standing Poor -   Ambulatory Zero   Endurance Deficit   Endurance Deficit Yes   Endurance Deficit Description fatigue, R hemiparesis   Activity Tolerance   Activity Tolerance Patient tolerated treatment well   Medical Staff Made Aware co-tx with Nasir THOMSON due to medical complexity   Nurse Made Aware yes-cleared to mobilize   Exercises   Balance training  static standing at sink x30 sec and x45 sec with modAx1   Assessment   Prognosis Good   Problem List Decreased strength;Decreased endurance; Impaired balance;Decreased mobility; Decreased coordination; Impaired tone   Assessment Pt agreeable to participate in PT session  Pt performed functional mobility as outlined above  Hip flexion and knee extension on RLE: 2/5, anterior tibialis 0/5  Able to stand at sink pulling up with LUE with modAx1 (R knee block and A to keep R hand on sink)  Able to activate R quads and glutes with VC for upright posture  Would benefit from pregait training in future sessions  Pt and wife educated on recovery process, mental imagery, and level so rehab  Pt left seated in chair with chair alarm, call bell, phone, and all personal needs within reach  Pt will continue to benefit from skilled acute care PT to further address their functional mobility limitations  D/C recommendations remain rehab  Believe pt would be a good acute rehab candidate as he is highly motivated, young, demonstrating progress during hospital stay, and has appropriate activity tolerance      03/04/23 8018    OT Last Visit   OT Visit Date 03/04/23   Note Type   Note Type Treatment for insurance authorization   Pain Assessment   Pain Assessment Tool 0-10   Pain Score No Pain   Restrictions/Precautions   Weight Bearing Precautions Per Order No   Braces or Orthoses Sling  (RUE for mobility )   Other Precautions Contact/isolation; Airborne/isolation;Droplet precautions; Fall Risk; Chair Alarm; Bed Alarm  (RUE flaccid )   ADL   Where Assessed Standing at sink   Grooming Assistance 3  Moderate Assistance   Grooming Deficit Teeth care; Increased time to complete;Supervision/safety   Grooming Comments pt stood at sink with mod Ax1, with RUE support on sinktop (stabilization at elbow and Inupiat placement), knee blocking on R, while engaging in grooming task of brushing teeth  Inupiat A used to assist pt w holding toothpaste, while using LUE to manipulate top  Pt reached across midline to cindy toothpaste onto toothbrush, used LUE to brush teeth  UB Bathing Assistance 3  Moderate Assistance   UB Bathing Deficit Increased time to complete; Chest;Right arm; Abdomen;Left arm;Verbal cueing;Supervision/safety   UB Bathing Comments Inupiat on R for washing L side of body  A from therapist for lifting RUE for washing R armpit  LB Bathing Assistance 2  Maximal Assistance   LB Bathing Deficit Perineal area; Buttocks;Right upper leg;Left upper leg;Right lower leg including foot; Left lower leg including foot   LB Bathing Comments pt able to manager upper LE's with L hand, requires a For lower LE and perineal area  UB Dressing Assistance 3  Moderate Assistance   UB Dressing Deficit Increased time to complete; Thread RUE; Thread LUE;Pull down in back; Setup;Supervision/safety   UB Dressing Comments edu on one handed dressing techniques, pt w G carryover and execution  requires A for lifting over L shoulder and situating in back  LB Dressing Assistance 1  Total Assistance   LB Dressing Deficit Don/doff R sock; Don/doff L sock   Functional Standing Tolerance   Time 4 standing trials, tolerated stance for about 45 seconds each time  These occured while pt was standing at sink, RUE support on sinktop  Bed Mobility   Supine to Sit 3  Moderate assistance   Additional items Assist x 1; Increased time required;Verbal cues;LE management   Additional Comments found supine in bed, left in chair w all needs in reach and alarm on  Transfers   Sit to Stand 3  Moderate assistance   Additional items Assist x 2; Increased time required;Verbal cues   Stand to Sit 3  Moderate assistance   Additional items Assist x 2; Increased time required;Verbal cues   Stand pivot 2  Maximal assistance   Additional items Assist x 2; Increased time required;Verbal cues   Additional Comments sling donned for transfers  Inital STS mod Ax2 w b/l HHA and knee blocking  stand pivot w same support  subsequent STS trails were completed at sink w mod Ax1  Therapeutic Exercise - ROM   UE-ROM Yes   ROM- Right Upper Extremities   R Shoulder AAROM; Flexion;ABduction   R Elbow PROM;Elbow flexion;Elbow extension   R Wrist Wrist flexion;Wrist extension;PROM   R Hand PROM; Thumb; Index finger; Long finger;Ring finger;Little finger   R Position Seated   R Weight/Reps/Sets 2x10   RUE ROM Comment provided edu on importance of engaging in ROM, use of LUE to assist  pt's wife present, also provvided ROM edu to her to execute when she visits  Neuromuscular Education   Weight Bearing Technique Yes   RUE Weight Bearing Extended arm standing  (while standing at sink; stabilization at elbow and [employer] on R hand)   Subjective   Subjective "I think I'm getting a little something back"   Cognition   Overall Cognitive Status [facility]   Arousal/Participation Alert; Responsive; Cooperative   Attention Within functional limits   Orientation Level Oriented X4   Memory Within functional limits   Following Commands Follows one step commands without difficulty   Comments very pleasant, cooperative and motivated at this time   follows commands well and does not appear to have dec processing speeds at this time  Activity Tolerance   Activity Tolerance Patient tolerated treatment well   Medical Staff Made Aware DPT Veda Rodriguez   Assessment   Assessment Pt seen on this date for OT session focusing on ADL retraining, body mechanics, transfer retraining, increasing activity tolerance/endurance and EOB sitting to increase ability to participate in ADL/functional tasks  Pt was found in bed and was left in chair w/ all needs within reach, chair alarm on  Pt completed bed mob w mod Ax1, sat EOB w S/min A  STS w mod Ax2 and stand pivot with max Ax2, b/l HHA and knee blocking  Pt stood at sink w mod Ax1 while engaging in grooming tasks, which he required mod A to complete  UB/LB bathing completed while pt sitting in chair w mod A/max A, respectively  Please see flowsheet for further detail  Provided edu to spouse regarding UE ROM engagement and when to cindy/doff sling, as well as proper positioning while in bed  Pt w/ improvements in activity tolerance, transfer ability, ADL Task completion and cognition, however is still limited 2* decreased ADL/High-level ADL status, decreased activity tolerance/endurance,  decreased self-care trans, decreased safety awareness and insight to condition  The patient's raw score on the AM-PAC Daily Activity Inpatient Short Form is 13  A raw score of less than 19 suggests the patient may benefit from discharge to post-acute rehabilitation services  Please refer to the recommendation of the Occupational Therapist for safe discharge planning  Recommending pt D/C to [facility] when medically stable  Pt will continue to benefit from acute OT services to meet goals      Speech Language/Pathology     Speech/Language Pathology Progress Note     Patient Name: David Lugo  YZCII'O Date: 3/2/2023     Problem List  Principal Problem:    Acute Posterior Circulation Stroke  Active Problems:    COVID    Right Vertebral artery dissection ([address] )    Stenosis of left vertebral artery Hypertensive emergency    UTI (urinary tract infection)    Dysphagia    Spasticity    Sinus tachycardia    Elevated serum creatinine    Leukocytosis    Prediabetes    Tearfulness        Past Medical History  Medical History   History reviewed  No pertinent past medical history         Past Surgical History  Surgical History   History reviewed  No pertinent surgical history            Subjective:  Pt awake and alert  Pt has been requesting ice chips   "It's hard to drink that thick stuff "     Current Diet:  Dysphagia 3/nectar thick liquids     Objective:  Pt seen for dx dysphagia f/u tx  Pt seen w/ dysphagia 3 materials (chopped crab cakes, carrots), nectar thick liquids, and trials for upgrade of hard solids (cookies) and thin liquids (water and ice chips)  Complete labial seal for retrieval and containment across all materials assessed  Min-mid prolonged mastication though bolus formation appears cohesive w/ timely transfer  Suspected fair hyolaryngeal elevation to palpation  S/s suggestive of aspiration including cough x2 w/ thin liquids via straw, x1 via cup sip  No overt s/s of aspiration w/ solids, nectar or ice chips  Education provided to pt on options, risks/benefits of diet/liquid modifications and recommendation for VBS prior to upgrade, pt expressed preference for continuation of softer/chopped diet w/ nectar thick liquids  Education provided on option for intermittent ice chips following oral hygiene, pt eager for allowance of ice chips  Education reviewed on strategies to optimize swallow safety including frequent/throough oral care and to notify medical staff if s/s of aspiration arise - pt demonstrated understanding and denied questions at this time       Assessment:  Pt able to tolerate current diet and trials of ice chips   Ongoing s/s oropharyngeal dysphagia/aspiration w/ upgraded material (hard solids/thin liquids)       Plan/Recommendations:  Dysphagia 3/nectar thick liquids  Frequent/thorough oral care  ST to f/u as able and appropriate    MBS when able/appropriate ((?COVID precautions/as schedule allows)      CARE SCORES:  Self Care:  Eatin: Supervision or touching  assistance  Oral hygiene: 03: Partial/moderate assistance  Toilet hygiene: 02: Substantial/maximal assistance  Shower/bathing self: 02: Substantial/maximal assistance  Upper body dressin: Partial/moderate assistance  Lower body dressin: Dependent  Putting on/taking off footwear: 01: Dependent  Transfers:  Roll left and right: 09: Not applicable  Sit to lyin: Not applicable  Lying to sitting on side of bed: 03: Partial/moderate assistance  Sit to stand: 03: Partial/moderate assistance (mod x 2)  Chair/bed to chair transfer: 02: Substantial/maximal assistance (max x 2)  Toilet transfer: 09: Not applicable  Mobility:  Walk 10 ft: 09: Not applicable  Walk 50 ft with two turns: 09: Not applicable  Walk 043KT: 09: Not applicable    CURRENT GAP IN FUNCTION  Prior to Admission: Functional Status: functional status: Patient was independent with mobility/ambulation, transfers,ADL's and IAD's  Estimated length of stay: 10 to 14 days    Anticipated Post-Discharge Disposition/Treatment  Disposition: Return to previous home  Outpatient Services: Physical Therapy, Occupational Therapy and Speech Therapy  BARRIERS TO DISCHARGE  Decreased strength; Decreased range of motion; Decreased endurance; Impaired balance; Decreased mobility; Impaired coordination; Impaired sensation; Impaired tone; Decreased ADL status; Decreased Safe judgement during ADL; Decreased fine motor control; Decreased self-care trans; Decreased high-level ADLs    INTERVENTIONS FOR DISCHARGE   ADL retraining; Functional transfer training; UE strengthening/ROM; Endurance training; Patient/family training; Neuromuscular reeducation; Fine motor coordination activities; Compensatory technique education; Energy conservation;  Activity engagement; LE strengthening/ROM; Therapeutic exercise; Equipment eval/education; Bed mobility; Balance training    REQUIRED THERAPY:  Patient will require PT and OT and ST 60 minutes per day, five days per week to achieve rehab goals  REQUIRED FUNCTIONAL AND MEDICAL MANAGEMENT FOR INPATIENT REHABILITATION:  He will need close monitoring of skin for any breakdown secondary to decreased mobility, Deep Vein Thrombosis (DVT) Prophylaxis, SCD's while in bed, PT,OT and ST interventions, patient/family education and training and zny labs, consults, imaging and medication adjustments patient may need while in [address] [place] [address] [place]:   Kong Hathaway is a 69-year-old male who originally presented to [address] [place] [address] on 2/25/23 with chief c/o dizziness accompanied with nausea x 2 days  In the ER, he was hypertensive with max blood pressure 252/115 which was not improved by IV labetalol  CTA head and neck showed right posterior cerebellum infarct, acute versus subacute, marked tapering and occlusion of the distal right cervical vertebral artery suggestive of dissection, proximal right intradural vertebral artery occlusion and severe left vertebral artery stenosis  MRI and MRA were also performed, which confirmed bilateral cerebellar infarcts and concern for intracranial extension of R vertebral dissection  He was started on Cardene for blood pressure control  Incidentally in the ER, he was also noted to be COVID-positive, had an active UTI, and BRAULIO  He was transferred to [facility] for potential neurointerventional support should he worsen given his underlying vasculature and placed on ASA/heparin  On 2/26, patient had worsened dysarthria and right sided weakness  CT head negative for intracranial hemorrhage and stable bilateral cerebellar infarcts  He was taken for emergent R vertebral/basilar thrombectomy with R V3/4 stenting with TICI 2B revascularization  He was started on Integrilin gtt and repeat CT head negative for hemorrhage  MRI brain 2/26: Increased infarct burden  Bilateral cerebellar infarcts with larger right cerebellar infarct and new acute infarcts in the right medulla, bilateral midbrain, and left occipital lobe  Echo: EF 65%, normal wall motion, normal atria size, no PFO  Etiology for acute infarcts in the setting of bilateral vertebral artery occlusions remain unclear, however likely due to dissection vs chronic diffuse atherosclerotic disease  Possible contribution from underlying COVID infection and hypercoagulability  He was transitioned from Integrilin gtt to DAPT on 2/27 with aspirin 81 mg daily and Brilinta 90 mg BID (180 mg load once)  Heparin gtt discontinued and transitioned to Eliquis 2 5 mg BID on 2/28  Given spasticity and concern for myoclonus/muscle spasm in the proximal right LE, he was started on Baclofen 10 mg TID  Patient also with tearfulness throughout his hospitalization and was started on Prozac 20 mg daily  Patient with leukocytosis, however, afebrile  Suspect likely reactive the setting of recent CVA vs  UTI vs COVID  Leukocytosis has now resolved  Patient's urine culture with pan-susceptible E  Coil and he completed a 4 day course of antibiotics as of 2/28  For his HTN, he will be continued on amlodipine, coreg, lisinopril  Patient with dysarthria and dysphagia  He is ordered a dental soft diet with nectar thickened liquids  Patient worked with therapy and recommended for rehab following his hospital stay  He was evaluated by PM&R and found to be an appropriate ARC candidate  He is now medically cleared for discharge to the [facility]  Prior to admission patient was independent with his ADLs, IADLs, and functional mobility  Patient was driving and working full time  He is now functioning below his baseline, currently requiring mod to total assistance for his ADLs  With PT, he is currently requiring mod x 1 assistance for supine to sit bed mobility    For transfers, patient is requiring mod x 2 assistance for sit to stand transfers, mod x 1 assistance for stand to sit transfers and max x 2 assistance for stand pivot transfers  He required left HHA and 2nd person anterior with right knee block and RUE supported in sling  Patient requires close oversight by a physician, PM&R management, 24/7 rehabilitation nursing care and specialized interdisciplinary therapy services in preparation for discharge which can only be provided in the inpatient acute rehabilitation setting  Patient requires routine neuro checks as well as close monitoring for any s/s of aspiration and close monitoring of his VS, I/O, skin and his overall condition  Inpatient acute rehabilitation is recommended for this patient to maximize his overall strength, endurance, self care and mobility with a goal of returning to his baseline and discharging home with the support of his wife and his two adult children

## 2023-03-06 NOTE — ASSESSMENT & PLAN NOTE
Tearful at times, but coping  Mood has been improving  Has been tearful and labile during hospitalization    Started on Prozac 20mg daily  Consulted rehab psychology for support during stay  Outpatient f/u with PCP

## 2023-03-06 NOTE — ASSESSMENT & PLAN NOTE
Improved  Admitted with mod-severe oropharyngeal  Has massively improved    3/8 Advanced to Level 3/Thins  3/10 Advanced to Reg/Thins  Aspiration precautions  Good oral hygiene   SLP consulted declines

## 2023-03-06 NOTE — ASSESSMENT & PLAN NOTE
Intrinsic tonic tone in R quad which is MAS 2, Mas 1 in his ankle with intrinsic phasic tone in the form of clonus  RUE with tone in SA, EF, WF (MAS 1) and pronator (MAS 1+)  Hiccups resolved  Would opt to avoid treating R quad for now, as tone there may help stabilize at the knee  R ankle multipodus, stretches  Baclofen 5mg TID - monitor and adjust as appropriate  Range of motion  Monitor as tone evolves  May benefit from bracing in the future  Outpatient f/u with PMR

## 2023-03-06 NOTE — ASSESSMENT & PLAN NOTE
A1C 5 7  Consult nutrition for education on diabetic diet  Diet/lifestyle modifications  Follow-up with PCP

## 2023-03-06 NOTE — ASSESSMENT & PLAN NOTE
Stable 3/27 at 10 2   Normocytic anemia noted through stay  B12 borderline low - started on daily oral supplement  Folate normal  Iron Panel: Low iron saturation and iron with normal TIBC and Ferritin  Was started in the hospital on B12, but not iron  Per IM - 2 days of IV Venofer (last day 3/8)  Then started oral iron    Outpatient f/u with PCP

## 2023-03-06 NOTE — ASSESSMENT & PLAN NOTE
Resolved  Incidentally found to have COVID on 2/25  Asymptomatic from respiratory standpoint  Per Neurosurgery concern for hypercoagulability related to COVID  Currently on Eliquis 2 5mg BID - per Neuro 3-4 weeks probably reasonable, but for them ultimately up to NSx as this was their initial recommendation to start this medication  3/8 Discontinued precautions after 10 days isolation

## 2023-03-06 NOTE — CONSULTS
Internal Medicine Consultation Note    Patient: Marta Adrian  Age/sex: 60 y o  male  Medical Record #: [id number]      ASSESSMENT PLAN    Posterior circulation stroke  · S/p intracranial and extracranial vertebral artery stenting/TICI 2B revascularization  · Continue ASA, Brilinta, Eliquis and atorvastatin  · Repeat CTA head and neck around 3/17/23  · Prozac for depressed mood following CVA  Recommend Neuropsych consult  · Dysphagia 3 diet with nectar thick liquids  · Continue Baclofen 10mg 2x daily  · Therapy per primary service  · Outpt follow-up with Neurology and Neurosurgery  HTN  · Home: No medications  · Here: amlodipine 10mg daily/Coreg 6 25mg 2x daily/lisinopril 20mg daily/chlorthalidone 12 5mg daily  · Goal normotension  · Monitor BP and adjust medications as needed  CKD stage 2  · Baseline Cr 1 2 - 1 3   · Monitor with the addition of chlorthalidone  COVID  · Continue precautions through 3/7/23  · Pt has not had respiratory symptoms  Prediabetes  · HA1C 5 7  · Recommend dietary modifications  Subjective/ HPI: Patient seen and examined  Marta Adrian is a 60 y o  male with no known medical history who presented to the [address] on 2/25/23 with a two day history of dizziness and nausea  He was hypertensive with /115  CTA showed a right posterior cerebellum infarct, acute vs subacute, marked tapering and occlusion of the distal right cervical vertebral artery suggestive of dissection, proximal right intradural vertebral artery occlusion, severe left vertebral artery stenosis  MRA head revealed abcense of flow related signal in the V4 segments of the bilateral vertebral arteries, throughout the basilar artery and left posterior cerebral artery, concerning for progression of dissection and/or thrombus   MRI brain revealed acute early subacute infarcts throughout the right greater than left cerebellar hemispheres and anterior vermis, consistent with findings on the head CT  No hemorrhagic conversion or mass effect  MRA carotids revealed complete absence of flow related signal along the cervical segment of the right vertebral artery concerning for progression of dissection and/or retrograde flow  Absence of flow related signal in the V4 segments of the bilateral vertebral arteries and throughout the basilar artery, also concerning for progression of dissection  Additionally he was found to be positive for COVID as well as a UTI  He was transferred [place]  to [facility] as he was at high risk for decompensation  He was loaded with ASA and Plavix  He developed worsening right sided weakness and dysarthria in the ICU and he was taken for right vertebral/basilar thrombectomy with right V4 angioplasty and stenting with WILLIAM and V3 stenting of dissection on 2/26/2023  MRI showed crescending, acute/recent posterior circulation infarctions with increasing right cerebellar and new brainstem infarctions  Overall infarct burden is slightly increased from the prior study one day earlier although new small infarcts are noted in the posterior medulla on the right, right midbrain and left occipital lobe  No hydrocephalus or large parenchymal hemorrhage  Abnormal left vertebral artery flow void at the lateral mass of C1, possibly related to slow flow and/or vascular occlusion  Very mild, chronic microangiopathy and chronic left thalamic lacunar infarction  He was started on ASA, Brillinta, Eliquis and atorvastatin  Pt was tearful with depressed mood and Prozac was started  He was determined to be stable for transfer to the [facility] 3/6/23  IM consulted for management of HTN     ROS:   A 10 point ROS was performed; negative except as noted above       Social History:    Substance Use History:   Social History     Substance and Sexual Activity   Alcohol Use Not Currently     Social History     Tobacco Use   Smoking Status Never   Smokeless Tobacco Never     Social History     Substance and Sexual Activity   Drug Use Not Currently       Family History:    No family history on file  Review of Scheduled Meds:  Current Facility-Administered Medications   Medication Dose Route Frequency Provider Last Rate   • [START ON 3/7/2023] amLODIPine  10 mg Oral Daily Tisha Ramachandran MD     • apixaban  2 5 mg Oral BID Tisha Ramachandran MD     • [START ON 3/7/2023] aspirin  81 mg Oral Daily Tisha Ramachandran MD     • atorvastatin  40 mg Oral Daily With Parish Fenton MD     • baclofen  10 mg Oral BID Tisha Ramachandran MD     • carvedilol  6 25 mg Oral BID With Meals Tisha Ramachandran MD     • [START ON 3/7/2023] chlorthalidone  12 5 mg Oral Daily Tisha Ramachandran MD     • [START ON 3/7/2023] vitamin B-12  1,000 mcg Oral Daily Tisha Ramachandran MD     • [START ON 3/7/2023] FLUoxetine  20 mg Oral Daily Tisha Ramachandran MD     • [START ON 3/7/2023] lisinopril  20 mg Oral Daily Tisha Ramachandran MD     • [START ON 3/7/2023] polyethylene glycol  17 g Oral Daily Tisha Ramachandran MD     • senna-docusate sodium  1 tablet Oral HS Tisha Ramachandran MD     • ticagrelor  90 mg Oral Q12H Ramos Colby MD         Labs:     Results from last 7 days   Lab Units 03/06/23  0627 03/05/23  0501   WBC Thousand/uL 8 82 10 45*   HEMOGLOBIN g/dL 10 1* 10 0*   HEMATOCRIT % 31 4* 30 6*   PLATELETS Thousands/uL 302 323     Results from last 7 days   Lab Units 03/06/23  0627 03/05/23  0501   SODIUM mmol/L 138 136   POTASSIUM mmol/L 4 1 4 0   CHLORIDE mmol/L 108 107   CO2 mmol/L 26 27   BUN mg/dL 34* 38*   CREATININE mg/dL 1 26 1 29   CALCIUM mg/dL 8 7 8 6                      Lab Results   Component Value Date    BLOODCX No Growth After 5 Days  02/27/2023    BLOODCX No Growth After 5 Days  02/27/2023    BLOODCX No Growth After 5 Days  02/25/2023    BLOODCX No Growth After 5 Days   02/25/2023    URINECX >100,000 cfu/ml Escherichia coli (A) 02/25/2023       Input and Output Summary (last 24 hours):     No intake or output data in the 24 hours ending 23 1748    Imaging:     No orders to display       *Labs /Radiology studiesLabs reviewed  *Medications reviewed and reconciled as needed  *Please refer to order section for additional ordered labs studies  *Case discussed with primary attending during morning huddle case rounds    Vitals:   Temp (24hrs), Av 9 °F (36 6 °C), Min:97 4 °F (36 3 °C), Max:98 5 °F (36 9 °C)    Temp:  [97 4 °F (36 3 °C)-98 5 °F (36 9 °C)] 98 5 °F (36 9 °C)  HR:  [61-66] 64  Resp:  [17] 17  BP: (142-179)/(64-93) 179/64  SpO2:  [95 %-98 %] 98 %  Body mass index is 30 28 kg/m²  Physical Exam:   HEENT:  Head: Normocephalic, no lesions, without obvious abnormality  CARDIAC:  regular rate and rhythm, S1, S2 normal, no murmur, click, rub or gallop  LUNGS:  normal air entry, lungs clear to auscultation  ABDOMEN:  soft, non-tender  Bowel sounds normal  No masses, no organomegaly  EXTREMITIES:  extremities normal, warm and well-perfused; no cyanosis, clubbing, or edema  NEURO:   Alert  Oriented  Rt UE > LE weakness  PSYCH:  Alert and oriented, appropriate affect  Invasive Devices     Peripheral Intravenous Line  Duration           Peripheral IV 23 Left;Upper;Ventral (anterior) Arm 3 days                 VTE Pharmacologic Prophylaxis: Sequential compression device (Venodyne) Eliquis  Code Status: Level 1 - Full Code  Current Length of Stay: 0 day(s)    Total floor / unit time spent today 1 hour with more than 50% spent counseling/coordinating care  Counseling includes discussion with patient re: progress  and discussion with patient of his/her current medical state/information  Coordination of patient's care was performed in conjunction with primary service  Time invested included review of patient's labs, vitals, and management of their comorbidities with continued monitoring  In addition, this patient was discussed with medical team including physician and advanced extenders   The care of the patient was extensively discussed and appropriate treatment plan was formulated unique for this patient by supervising physician unless stated otherwise in their attestation statement  ** Please Note: voice to text software may have been used in the creation of this document   Audio transcription errors may occur**

## 2023-03-06 NOTE — ASSESSMENT & PLAN NOTE
Severe L vertebral artery origin stenosis  3/17 CTA H/N:   CTA HEAD AND NECK  - Right vertebral artery V4 stent appears to have in-stent occlusion  Right vertebral artery V4 segment is patent before and after the V4 stent  - Unchanged left vertebral artery post-PICA V4 segment occlusion   - Dissection flap in right vertebral artery V3 segment which is patent and improved in caliber status post thrombectomy  - Moderate stenosis in proximal-to- mid basilar artery due to atherosclerotic disease status post mechanical thrombectomy  - Per Neurosurgery unlikely in stent occlusion  No changes  Plan for outpatient f/u 5/1 with repeat CTA H/N completed   ASA/Brilinta  Atorvastatin  SBP goals 120-160  Follow-up with Neurovascular

## 2023-03-06 NOTE — PROGRESS NOTES
INTERNAL MEDICINE RESIDENCY PROGRESS NOTE     Name: oKng Hathaway   Age & Sex: 60 y o  male   MRN: [id number]  Unit/Bed#: [facility] 0-36   Encounter: [id number]  Team: SOD Team B     PATIENT INFORMATION     Name: Kong Hathaway   Age & Sex: 60 y o  male   MRN: [id number]  Hospital Stay Days: 9    ASSESSMENT/PLAN     Principal Problem:    Acute Posterior Circulation Stroke  Active Problems:    Hypertensive emergency    COVID    Right Vertebral artery dissection (HCC)    Stenosis of left vertebral artery    Dysphagia    Spasticity    CKD (chronic kidney disease)    Leukocytosis    Prediabetes    Tearfulness      * Acute Posterior Circulation Stroke  Assessment & Plan  60 y o  male with no known prior hx, did not follow with a physician  Presented to [address] [place] [address] Road 2/25/2023 of several days of weakness, dizziness, and nausea/vomiting  CT head revealed acute/subacute infarcts in the right posterior cerebellum with follow up CTA head/neck demonstrating 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 and left vertebral artery origin stenosis with presumed occlusion of the distal left intradural vertebral segment  • MRA head/carotids demonstrated possible extension of the vertebral artery dissection into the basilar artery  • MRI brain revealed several small acute/early subacute bi-cerebellar infarcts (R>L) without evidence of hemorrhage     Patient was transferred to \[facility]"" for closer monitoring with consideration for endovascular intervention   Initially given stable exam, intervention was felt too risky and patient was a heparin gtt with full dose aspirin  However, pt developed worsened dysarthria on 2/26 and R sided weakness     • Repeat CT 2/26 stable, however patient was taken to IR for emergent R vertebral/basilar thrombectomy with R V3/4 stenting with TICI 2B revascularization  • MRI brain 2/26: Increased infarct burden   Bilateral cerebellar infarcts with larger right cerebellar infarct and new acute infarcts in the right medulla, bilateral midbrain, and left occipital lobe        Plan:   • Neurosurgery consulted, recs appreciated -- 3/3 sign off  • Recommend repeat CTA H/N 2 weeks around 3/17  • Neurology consulted, recs appreciated  • Echo: EF 65%, normal wall motion, normal atria size, no PFO   • Lipid panel , , HDL 27,   • A1c 5 7  • q4hr neuro checks at night, q2hr during the day  • Continue ASA and Brilinta 90 mg BID  • 3/3: P2y12 102  • Continue eliquis 2 5 mg bid  • Continue atorvastatin  • SBP goal <160  • ARC dispo pending insurance     Hypertensive emergency  Assessment & Plan  Pt initially presented with hypertensive emergency in setting of stroke as mentioned above  Pt on cardene drip in the ICU, now discontinued on 3/1  Blood Pressure: 155/71      Plan:  • Continue Amlodipine 10 mg qd  • Continue Coreg 6 25 BID  • Continue Lisinopril 20 mg daily  • Start chlorthalidone 12 5 mg daily  • SBP goal normotension   • Continue PRNs antihypertensive with PRN hydralazine     Tearfulness  Assessment & Plan  Pt with persistent tearfulness, depressed mood following recent stroke  3/3 discussed starting SSRI with patient, he is agreeable to trying  Expressed understanding this may take several weeks to notice any benefit  Continue Prozac 20 mg daily - discussed with neurology  Continue to monitor mood  Will need to establish with PCP at discharge  Prediabetes  Assessment & Plan  ? a1c 5 7  ? Glucose 110-127  ? Diet and lifestyle modifications    Leukocytosis  Assessment & Plan  Pt with slightly elevated WBC, stable from day prior  Afebrile, no worsening s/sxs of infection at this time  Suspect likely reactive in setting of recent stroke vs UTI vs COVID  ? Continue to follow WBC count, monitor fever curve    CKD (chronic kidney disease)  Assessment & Plan  Pt presented with elevated Cr 1 32 on arrival  No known baseline  Stage 2 CKD   Suspect secondary to hypertensive nephrosclerosis  · Continue to follow BMP  · Avoid nephrotoxic agents  · Avoid relative hypotension    Spasticity  Assessment & Plan  Pt with new RLE spasticity and myoclonus  Unlikely focal seizures per neurology       • Continue baclofen 10 mg BID, frequency adjusted per pharmacy given renal function    Dysphagia  Assessment & Plan  Pt with new dysphagia s/p stroke  ? Continue dysphagia 3 diet, nectar thick liquids per speech eval    Stenosis of left vertebral artery  Assessment & Plan  See acute posterior circulation stroke plan    Right Vertebral artery dissection [facility])  Assessment & Plan  See acute posterior circulation plan    COVID  Assessment & Plan  Pt asx, remained on room air during hospitalization       • Continue airborne and contact precautions    Sinus tachycardia-resolved as of 3/4/2023  Assessment & Plan   pt with intermittent episodes of brief sinus tachycardia  ? Continue potassium goal >4    UTI (urinary tract infection)-resolved as of 3/6/2023  Assessment & Plan  Urine cx with pan-susceptible E coli  Pt completed 4-day course of CTX as of        Plan:  • S/p treatment      Disposition: pending insurance auth for [facility]     SUBJECTIVE     Patient seen and examined  No acute events overnight  No complaints  Denies any chest pain, shortness of breath, abdominal pain, nausea, vomiting, fevers, chills, new weakness  Reports right lower extremity strength is improving  Able to contract some muscles in the right upper extremity       OBJECTIVE     Vitals:    23 2131 23 0600 23 0923 23 0924   BP: 142/74  159/93 155/71   Pulse: 66  61 62   Resp:   17 17   Temp:   (!) 97 4 °F (36 3 °C) (!) 97 4 °F (36 3 °C)   TempSrc:       SpO2: 95%  98% 97%   Weight:  82 7 kg (182 lb 5 1 oz)     Height:          Temperature:   Temp (24hrs), Av 3 °F (36 3 °C), Min:97 2 °F (36 2 °C), Max:97 4 °F (36 3 °C)    Temperature: (!) 97 4 °F (36 3 °C)  Intake & Output:  I/O        0701  03/05 0700 03/05 0701  03/06 0700 03/06 0701  03/07 0700    P  O  Total Intake(mL/kg)       Urine (mL/kg/hr)  800 (0 4)     Stool  0     Total Output  800     Net  -800            Unmeasured Stool Occurrence 1 x 1 x         Weights:   IBW (Ideal Body Weight): 70 7 kg    Body mass index is 26 92 kg/m²  Weight (last 2 days)     Date/Time Weight    03/06/23 0600 82 7 (182 32)    03/05/23 0600 86 8 (191 36)        Physical Exam  Vitals reviewed  Constitutional:       Appearance: Normal appearance  HENT:      Head: Normocephalic and atraumatic  Eyes:      General: No scleral icterus  Conjunctiva/sclera: Conjunctivae normal    Cardiovascular:      Rate and Rhythm: Normal rate and regular rhythm  Heart sounds: Normal heart sounds  No murmur heard  Pulmonary:      Effort: Pulmonary effort is normal  No respiratory distress  Breath sounds: Normal breath sounds  No wheezing or rales  Abdominal:      General: Bowel sounds are normal       Palpations: Abdomen is soft  Tenderness: There is no abdominal tenderness  Musculoskeletal:      Right lower leg: No edema  Left lower leg: No edema  Skin:     General: Skin is warm and dry  Neurological:      Mental Status: He is alert and oriented to person, place, and time  Mental status is at baseline  Cranial Nerves: Cranial nerve deficit (mild left facial droop) present  Motor: Weakness (RUE 1/5, R hip F 3/5, R knee E 3/5 F 2/5, R ankle DF 0/5 PF 3/5, Left side 5/5 throughout ) present  Psychiatric:         Mood and Affect: Mood normal          Behavior: Behavior normal        LABORATORY DATA     Labs: I have personally reviewed pertinent reports    Results from last 7 days   Lab Units 03/06/23  0627 03/05/23  0501 03/04/23  0538   WBC Thousand/uL 8 82 10 45* 10 55*   HEMOGLOBIN g/dL 10 1* 10 0* 10 4*   HEMATOCRIT % 31 4* 30 6* 32 8*   PLATELETS Thousands/uL 302 323 307   NEUTROS PCT % 76* 78* 74   MONOS PCT % 8 7 6 Results from last 7 days   Lab Units 03/06/23  0627 03/05/23  0501 03/04/23  0538   POTASSIUM mmol/L 4 1 4 0 4 1   CHLORIDE mmol/L 108 107 109*   CO2 mmol/L 26 27 27   BUN mg/dL 34* 38* 37*   CREATININE mg/dL 1 26 1 29 1 31*   CALCIUM mg/dL 8 7 8 6 8 6     Results from last 7 days   Lab Units 03/02/23  0455 02/28/23  0209   MAGNESIUM mg/dL 2 5 2 3     Results from last 7 days   Lab Units 03/02/23  0455 02/28/23  0209   PHOSPHORUS mg/dL 4 7* 2 3      Results from last 7 days   Lab Units 02/28/23  0208 02/27/23  2100 02/27/23  1548   PTT seconds 78* 40* 32               IMAGING & DIAGNOSTIC TESTING     Radiology Results: I have personally reviewed pertinent reports  CTA head and neck w wo contrast    Result Date: 2/26/2023  Impression: Overall, there is no significant interval change when comparing to the recent MRI brain, and CTA head neck study  Stable small cerebellar infarctions as noted on the recent MRI study  Stable findings of distal right cervical and proximal intradural vertebral artery occlusion with probable retrograde flow in its distal intradural segment  Stable left vertebral artery origin stenosis and presumed occlusion of the distal left intradural vertebral segment  Small basilar artery with focal atherosclerotic disease in its proximal segment  Patent fetal right PCA circulation  Left PCA branch is small but patent, and unchanged in appearance  Stable poor visualization of the proximal left superior cerebellar artery  Above-mentioned findings are in keeping with the preliminary report provided by [facility] radiology services without significant discrepancy in the report  Workstation performed: [id number]     CTA head and neck with and without contrast    Result Date: 2/25/2023  Impression: No acute intracranial hemorrhage  Focal infarcts in the right posterior cerebellum, possibly acute to subacute  Follow-up MRI imaging is recommended   Marked tapering and occlusion of the distal right cervical vertebral artery  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  Proximal right intradural vertebral artery is also occluded with faint visualization of the post-PICA segment, which may be filling in a retrograde fashion  Severe left vertebral artery origin stenosis  Left vertebral artery may terminate in a posterior for cerebellar artery branch as the post-PICA segment is not identified  Small basilar artery with fetal right PCA circulation  Left proximal PCA is patent but small  Mid to distal left PCA is not well visualized  I personally discussed this study with Bella Torrez on 2/25/2023 at 12:21 PM  Workstation performed: [id number]     XR chest 1 view portable    Result Date: 2/25/2023  Impression: No acute abnormality however there is an opacity in the left lung base that may represent focal pleural thickening  Neoplasm cannot be excluded  Nonemergent follow-up PA and lateral views of the chest or CT chest suggested unless there are prior studies available for comparison  Findings for this inpatient marked for immediate notification in Epic  Workstation performed: [id number]     CT head wo contrast    Result Date: 2/28/2023  Impression: Evolving acute infarcts in the brainstem (worse in left midbrain/left upper tatiana) and bilateral cerebellum (right worse than left) status post right distal vertebral artery stenting  No acute intracranial hemorrhage  Workstation performed: [id number]     CT head wo contrast    Result Date: 2/26/2023  Impression: Stable evolving bilateral cerebellar infarctions without significant interval change  Workstation performed: [id number]     CT head wo contrast    Result Date: 2/26/2023  Impression: No new parenchymal findings when comparing to the recent CT and MRI brain studies  Stable late acute to early subacute bilateral cerebellar infarctions without hemorrhagic transformation    Workstation performed: [id number]     MRA head wo contrast    Result Date: 2/25/2023  Impression: Abscess of flow related signal in the V4 segments of the bilateral vertebral arteries, throughout the basilar artery and left posterior cerebral artery, concerning for progression of dissection and/or thrombus  Recommend emergent interventional radiology consultation  I personally discussed this study with [facility]   on 2/25/2023 at 8:15 PM   Workstation performed: [id number]     MRA carotids wo contrast    Result Date: 2/25/2023  Impression: 1  There complete absence of flow related signal along the cervical segment of the right vertebral artery concerning for progression of dissection and/or retrograde flow  2   Absence of flow related signal in the V4 segments of the bilateral vertebral arteries and throughout the basilar artery, also concerning for progression of dissection  I personally discussed this study with [facility] on 2/25/2023 at 8:24 PM  Workstation performed: [id number]     MRI brain wo contrast    Result Date: 2/27/2023  Impression: 1  Crescending, acute/recent posterior circulation infarctions with increasing right cerebellar and new brainstem infarctions as described  Overall infarct burden is slightly increased from the prior study one day earlier although new small infarcts are noted in the posterior medulla on the right, right midbrain and left occipital lobe  No hydrocephalus or large parenchymal hemorrhage  2   Abnormal left vertebral artery flow void at the lateral mass of C1, possibly related to slow flow and/or vascular occlusion  3   Very mild, chronic microangiopathy and chronic left thalamic lacunar infarction    Workstation performed: [id number]     MRI brain wo contrast    Result Date: 2/25/2023  Impression: Acute early subacute infarcts throughout the right greater than left cerebellar hemispheres and anterior vermis, consistent with findings on the head CT  No hemorrhagic conversion or mass effect   Arthrotomy 8 Workstation performed: [id number] XR follow up    Result Date: 2/25/2023  Impression: No radiopaque orbital foreign body  Workstation performed: [id number]     CT cerebral perfusion    Result Date: 2/26/2023  Impression: CT perfusion performed  Data available on PACS  Workstation performed: [id number]     Other Diagnostic Testing: I have personally reviewed pertinent reports  ACTIVE MEDICATIONS     Current Facility-Administered Medications   Medication Dose Route Frequency   • amLODIPine (NORVASC) tablet 10 mg  10 mg Oral Daily   • apixaban (ELIQUIS) tablet 2 5 mg  2 5 mg Oral BID   • aspirin chewable tablet 81 mg  81 mg Oral Daily   • atorvastatin (LIPITOR) tablet 40 mg  40 mg Oral Daily With Dinner   • baclofen tablet 10 mg  10 mg Oral BID   • carvedilol (COREG) tablet 6 25 mg  6 25 mg Oral BID With Meals   • chlorthalidone tablet 12 5 mg  12 5 mg Oral Daily   • cyanocobalamin (VITAMIN B-12) tablet 1,000 mcg  1,000 mcg Oral Daily   • FLUoxetine (PROzac) capsule 20 mg  20 mg Oral Daily   • hydrALAZINE (APRESOLINE) injection 10 mg  10 mg Intravenous Q4H PRN   • lisinopril (ZESTRIL) tablet 20 mg  20 mg Oral Daily   • polyethylene glycol (MIRALAX) packet 17 g  17 g Oral Daily   • senna-docusate sodium (SENOKOT S) 8 6-50 mg per tablet 1 tablet  1 tablet Oral HS   • ticagrelor (BRILINTA) tablet 90 mg  90 mg Oral Q12H [address]       VTE Pharmacologic Prophylaxis: Reason for no pharmacologic prophylaxis on eliquis  VTE Mechanical Prophylaxis: sequential compression device    Portions of the record may have been created with voice recognition software  Occasional wrong word or "sound a like" substitutions may have occurred due to the inherent limitations of voice recognition software    Read the chart carefully and recognize, using context, where substitutions have occurred   ==  Shital Carrero, [address] [facility]  Internal Medicine Residency PGY-3

## 2023-03-06 NOTE — PLAN OF CARE
Problem: Prexisting or High Potential for Compromised Skin Integrity  Goal: Skin integrity is maintained or improved  Description: INTERVENTIONS:  - Identify patients at risk for skin breakdown  - Assess and monitor skin integrity  - Assess and monitor nutrition and hydration status  - Monitor labs   - Assess for incontinence   - Turn and reposition patient  - Assist with mobility/ambulation  - Relieve pressure over bony prominences  - Avoid friction and shearing  - Provide appropriate hygiene as needed including keeping skin clean and dry  - Evaluate need for skin moisturizer/barrier cream  - Collaborate with interdisciplinary team   - Patient/family teaching  - Consider wound care consult   Outcome: Progressing     Problem: MOBILITY - ADULT  Goal: Maintain or return to baseline ADL function  Description: INTERVENTIONS:  -  Assess patient's ability to carry out ADLs; assess patient's baseline for ADL function and identify physical deficits which impact ability to perform ADLs (bathing, care of mouth/teeth, toileting, grooming, dressing, etc )  - Assess/evaluate cause of self-care deficits   - Assess range of motion  - Assess patient's mobility; develop plan if impaired  - Assess patient's need for assistive devices and provide as appropriate  - Encourage maximum independence but intervene and supervise when necessary  - Involve family in performance of ADLs  - Assess for home care needs following discharge   - Consider OT consult to assist with ADL evaluation and planning for discharge  - Provide patient education as appropriate  Outcome: Progressing  Goal: Maintains/Returns to pre admission functional level  Description: INTERVENTIONS:  - Perform BMAT or MOVE assessment daily    - Set and communicate daily mobility goal to care team and patient/family/caregiver  - Collaborate with rehabilitation services on mobility goals if consulted  - Perform Range of Motion 3 times a day    - Reposition patient every 2 hours   - Dangle patient 3 times a day  - Stand patient 3 times a day  - Out of bed to chair 3 times a day   - Out of bed for meals 3 times a day  - Out of bed for toileting  - Record patient progress and toleration of activity level   Outcome: Progressing     Problem: PAIN - ADULT  Goal: Verbalizes/displays adequate comfort level or baseline comfort level  Description: Interventions:  - Encourage patient to monitor pain and request assistance  - Assess pain using appropriate pain scale  - Administer analgesics based on type and severity of pain and evaluate response  - Implement non-pharmacological measures as appropriate and evaluate response  - Consider cultural and social influences on pain and pain management  - Notify physician/advanced practitioner if interventions unsuccessful or patient reports new pain  Outcome: Progressing     Problem: INFECTION - ADULT  Goal: Absence or prevention of progression during hospitalization  Description: INTERVENTIONS:  - Assess and monitor for signs and symptoms of infection  - Monitor lab/diagnostic results  - Monitor all insertion sites, i e  indwelling lines, tubes, and drains  - Monitor endotracheal if appropriate and nasal secretions for changes in amount and color  - [place] appropriate cooling/warming therapies per order  - Administer medications as ordered  - Instruct and encourage patient and family to use good hand hygiene technique  - Identify and instruct in appropriate isolation precautions for identified infection/condition  Outcome: Progressing  Goal: Absence of fever/infection during neutropenic period  Description: INTERVENTIONS:  - Monitor WBC    Outcome: Progressing     Problem: SAFETY ADULT  Goal: Maintain or return to baseline ADL function  Description: INTERVENTIONS:  -  Assess patient's ability to carry out ADLs; assess patient's baseline for ADL function and identify physical deficits which impact ability to perform ADLs (bathing, care of mouth/teeth, toileting, grooming, dressing, etc )  - Assess/evaluate cause of self-care deficits   - Assess range of motion  - Assess patient's mobility; develop plan if impaired  - Assess patient's need for assistive devices and provide as appropriate  - Encourage maximum independence but intervene and supervise when necessary  - Involve family in performance of ADLs  - Assess for home care needs following discharge   - Consider OT consult to assist with ADL evaluation and planning for discharge  - Provide patient education as appropriate  Outcome: Progressing  Goal: Maintains/Returns to pre admission functional level  Description: INTERVENTIONS:  - Perform BMAT or MOVE assessment daily    - Set and communicate daily mobility goal to care team and patient/family/caregiver  - Collaborate with rehabilitation services on mobility goals if consulted  - Perform Range of Motion 3 times a day  - Reposition patient every 2 hours    - Dangle patient 3 times a day  - Stand patient 3 times a day  - Out of bed to chair 3 times a day   - Out of bed for meals 3 times a day  - Out of bed for toileting  - Record patient progress and toleration of activity level   Outcome: Progressing  Goal: Patient will remain free of falls  Description: INTERVENTIONS:  - Educate patient/family on patient safety including physical limitations  - Instruct patient to call for assistance with activity   - Consult OT/PT to assist with strengthening/mobility   - Keep Call bell within reach  - Keep bed low and locked with side rails adjusted as appropriate  - Keep care items and personal belongings within reach  - Initiate and maintain comfort rounds  - Make Fall Risk Sign visible to staff  - Offer Toileting every 2 Hours, in advance of need  - Initiate/Maintain bed alarm  - Obtain necessary fall risk management equipment:   - Apply yellow socks and bracelet for high fall risk patients  - Consider moving patient to room near nurses station  Outcome: Progressing     Problem: DISCHARGE PLANNING  Goal: Discharge to home or other facility with appropriate resources  Description: INTERVENTIONS:  - Identify barriers to discharge w/patient and caregiver  - Arrange for needed discharge resources and transportation as appropriate  - Identify discharge learning needs (meds, wound care, etc )  - Arrange for interpretive services to assist at discharge as needed  - Refer to Case Management Department for coordinating discharge planning if the patient needs post-hospital services based on physician/advanced practitioner order or complex needs related to functional status, cognitive ability, or social support system  Outcome: Progressing     Problem: Knowledge Deficit  Goal: Patient/family/caregiver demonstrates understanding of disease process, treatment plan, medications, and discharge instructions  Description: Complete learning assessment and assess knowledge base  Interventions:  - Provide teaching at level of understanding  - Provide teaching via preferred learning methods  Outcome: Progressing     Problem: NEUROSENSORY - ADULT  Goal: Achieves stable or improved neurological status  Description: INTERVENTIONS  - Monitor and report changes in neurological status  - Monitor vital signs such as temperature, blood pressure, glucose, and any other labs ordered   - Initiate measures to prevent increased intracranial pressure  - Monitor for seizure activity and implement precautions if appropriate      Outcome: Progressing  Goal: Achieves maximal functionality and self care  Description: INTERVENTIONS  - Monitor swallowing and airway patency with patient fatigue and changes in neurological status  - Encourage and assist patient to increase activity and self care     - Encourage visually impaired, hearing impaired and aphasic patients to use assistive/communication devices  Outcome: Progressing     Problem: CARDIOVASCULAR - ADULT  Goal: Maintains optimal cardiac output and hemodynamic stability  Description: INTERVENTIONS:  - Monitor I/O, vital signs and rhythm  - Monitor for S/S and trends of decreased cardiac output  - Administer and titrate ordered vasoactive medications to optimize hemodynamic stability  - Assess quality of pulses, skin color and temperature  - Assess for signs of decreased coronary artery perfusion  - Instruct patient to report change in severity of symptoms  Outcome: Progressing  Goal: Absence of cardiac dysrhythmias or at baseline rhythm  Description: INTERVENTIONS:  - Continuous cardiac monitoring, vital signs, obtain 12 lead EKG if ordered  - Administer antiarrhythmic and heart rate control medications as ordered  - Monitor electrolytes and administer replacement therapy as ordered  Outcome: Progressing     Problem: RESPIRATORY - ADULT  Goal: Achieves optimal ventilation and oxygenation  Description: INTERVENTIONS:  - Assess for changes in respiratory status  - Assess for changes in mentation and behavior  - Position to facilitate oxygenation and minimize respiratory effort  - Oxygen administered by appropriate delivery if ordered  - Encourage broncho-pulmonary hygiene including cough, deep breathe, Incentive Spirometry  - Assess the need for suctioning and aspirate as needed  - Assess and instruct to report SOB or any respiratory difficulty  - Respiratory Therapy support as indicated  Outcome: Progressing     Problem: GENITOURINARY - ADULT  Goal: Maintains or returns to baseline urinary function  Description: INTERVENTIONS:  - Assess urinary function  - Encourage oral fluids to ensure adequate hydration if ordered  - Administer IV fluids as ordered to ensure adequate hydration  - Administer ordered medications as needed  - Offer frequent toileting  - Follow urinary retention protocol if ordered  Outcome: Progressing  Goal: Absence of urinary retention  Description: INTERVENTIONS:  - Assess patient's ability to void and empty bladder  - Monitor I/O  - Bladder scan as needed  - Discuss with physician/AP medications to alleviate retention as needed    Outcome: Progressing  Goal: Urinary catheter remains patent  Description: INTERVENTIONS:  - Assess patency of urinary catheter  - If patient has a chronic barnett, consider changing catheter if non-functioning  - Follow guidelines for intermittent irrigation of non-functioning urinary catheter  Outcome: Progressing     Problem: METABOLIC, FLUID AND ELECTROLYTES - ADULT  Goal: Electrolytes maintained within normal limits  Description: INTERVENTIONS:  - Monitor labs and assess patient for signs and symptoms of electrolyte imbalances  - Administer electrolyte replacement as ordered  - Monitor response to electrolyte replacements, including repeat lab results as appropriate  - Instruct patient on fluid and nutrition as appropriate  Outcome: Progressing  Goal: Fluid balance maintained  Description: INTERVENTIONS:  - Monitor labs   - Monitor I/O and WT  - Instruct patient on fluid and nutrition as appropriate  - Assess for signs & symptoms of volume excess or deficit  Outcome: Progressing  Goal: Glucose maintained within target range  Description: INTERVENTIONS:  - Monitor Blood Glucose as ordered  - Assess for signs and symptoms of hyperglycemia and hypoglycemia  - Administer ordered medications to maintain glucose within target range  - Assess nutritional intake and initiate nutrition service referral as needed  Outcome: Progressing     Problem: Nutrition/Hydration-ADULT  Goal: Nutrient/Hydration intake appropriate for improving, restoring or maintaining nutritional needs  Description: Monitor and assess patient's nutrition/hydration status for malnutrition  Collaborate with interdisciplinary team and initiate plan and interventions as ordered  Monitor patient's weight and dietary intake as ordered or per policy  Utilize nutrition screening tool and intervene as necessary   Determine patient's food preferences and provide high-protein, high-caloric foods as appropriate  INTERVENTIONS:  - Monitor oral intake, urinary output, labs, and treatment plans  - Assess nutrition and hydration status and recommend course of action  - Evaluate amount of meals eaten  - Assist patient with eating if necessary   - Allow adequate time for meals  - Recommend/ encourage appropriate diets, oral nutritional supplements, and vitamin/mineral supplements  - Order, calculate, and assess calorie counts as needed  - Recommend, monitor, and adjust tube feedings and TPN/PPN based on assessed needs  - Assess need for intravenous fluids  - Provide specific nutrition/hydration education as appropriate  - Include patient/family/caregiver in decisions related to nutrition  Outcome: Progressing     Problem: Neurological Deficit  Goal: Neurological status is stable or improving  Description: Interventions:  - Monitor and assess patient's level of consciousness, motor function, sensory function, and level of assistance needed for ADLs  - Monitor and report changes from baseline  Collaborate with interdisciplinary team to initiate plan and implement interventions as ordered  - Provide and maintain a safe environment  - Consider seizure precautions  - Consider fall precautions  - Consider aspiration precautions  - Consider bleeding precautions  Outcome: Progressing     Problem: Activity Intolerance/Impaired Mobility  Goal: Mobility/activity is maintained at optimum level for patient  Description: Interventions:  - Assess and monitor patient  barriers to mobility and need for assistive/adaptive devices  - Assess patient's emotional response to limitations  - Collaborate with interdisciplinary team and initiate plans and interventions as ordered  - Encourage independent activity per ability   - Maintain proper body alignment  - Perform active/passive rom as tolerated/ordered    - Plan activities to conserve energy   - Turn patient as appropriate  Outcome: Progressing     Problem: Communication Impairment  Goal: Ability to express needs and understand communication  Description: Assess patient's communication skills and ability to understand information  Patient will demonstrate use of effective communication techniques, alternative methods of communication and understanding even if not able to speak  - Encourage communication and provide alternate methods of communication as needed  - Collaborate with case management/ for discharge needs  - Include patient/family/caregiver in decisions related to communication  Outcome: Progressing     Problem: Potential for Aspiration  Goal: Non-ventilated patient's risk of aspiration is minimized  Description: Assess and monitor vital signs, respiratory status, and labs (WBC)  Monitor for signs of aspiration (tachypnea, cough, rales, wheezing, cyanosis, fever)  - Assess and monitor patient's ability to swallow  - Place patient up in chair to eat if possible  - HOB up at 90 degrees to eat if unable to get patient up into chair   - Supervise patient during oral intake  - Instruct patient/ family to take small bites  - Instruct patient/ family to take small single sips when taking liquids  - Follow patient-specific strategies generated by speech pathologist   Outcome: Progressing     Problem: Nutrition  Goal: Nutrition/Hydration status is improving  Description: Monitor and assess patient's nutrition/hydration status for malnutrition (ex- brittle hair, bruises, dry skin, pale skin and conjunctiva, muscle wasting, smooth red tongue, and disorientation)  Collaborate with interdisciplinary team and initiate plan and interventions as ordered  Monitor patient's weight and dietary intake as ordered or per policy  Utilize nutrition screening tool and intervene per policy  Determine patient's food preferences and provide high-protein, high-caloric foods as appropriate       - Assist patient with eating   - Allow adequate time for meals   - Encourage patient to take dietary supplement as ordered  - Collaborate with clinical nutritionist   - Include patient/family/caregiver in decisions related to nutrition    Outcome: Progressing

## 2023-03-07 RX ORDER — CARVEDILOL 12.5 MG/1
12.5 TABLET ORAL 2 TIMES DAILY WITH MEALS
Status: DISCONTINUED | OUTPATIENT
Start: 2023-03-07 | End: 2023-03-11

## 2023-03-07 RX ORDER — POLYETHYLENE GLYCOL 3350 17 G/17G
17 POWDER, FOR SOLUTION ORAL DAILY PRN
Status: DISCONTINUED | OUTPATIENT
Start: 2023-03-07 | End: 2023-03-31

## 2023-03-07 RX ORDER — FERROUS SULFATE 325(65) MG
325 TABLET ORAL
Status: DISCONTINUED | OUTPATIENT
Start: 2023-03-10 | End: 2023-03-31 | Stop reason: HOSPADM

## 2023-03-07 RX ADMIN — TICAGRELOR 90 MG: 90 TABLET ORAL at 21:53

## 2023-03-07 RX ADMIN — CHLORTHALIDONE 12.5 MG: 25 TABLET ORAL at 09:07

## 2023-03-07 RX ADMIN — AMLODIPINE BESYLATE 10 MG: 10 TABLET ORAL at 09:16

## 2023-03-07 RX ADMIN — CARVEDILOL 6.25 MG: 6.25 TABLET, FILM COATED ORAL at 09:16

## 2023-03-07 RX ADMIN — BACLOFEN 10 MG: 10 TABLET ORAL at 09:06

## 2023-03-07 RX ADMIN — APIXABAN 2.5 MG: 2.5 TABLET, FILM COATED ORAL at 17:24

## 2023-03-07 RX ADMIN — IRON SUCROSE 200 MG: 20 INJECTION, SOLUTION INTRAVENOUS at 12:00

## 2023-03-07 RX ADMIN — FLUOXETINE 20 MG: 20 CAPSULE ORAL at 09:06

## 2023-03-07 RX ADMIN — CYANOCOBALAMIN TAB 500 MCG 1000 MCG: 500 TAB at 09:06

## 2023-03-07 RX ADMIN — LISINOPRIL 20 MG: 20 TABLET ORAL at 09:16

## 2023-03-07 RX ADMIN — APIXABAN 2.5 MG: 2.5 TABLET, FILM COATED ORAL at 09:06

## 2023-03-07 RX ADMIN — ATORVASTATIN CALCIUM 40 MG: 40 TABLET, FILM COATED ORAL at 16:26

## 2023-03-07 RX ADMIN — CARVEDILOL 12.5 MG: 12.5 TABLET, FILM COATED ORAL at 16:26

## 2023-03-07 RX ADMIN — ASPIRIN 81 MG CHEWABLE TABLET 81 MG: 81 TABLET CHEWABLE at 09:06

## 2023-03-07 RX ADMIN — BACLOFEN 10 MG: 10 TABLET ORAL at 17:24

## 2023-03-07 RX ADMIN — TICAGRELOR 90 MG: 90 TABLET ORAL at 09:07

## 2023-03-07 NOTE — PCC SPEECH THERAPY
Orders received for skilled SLP assessment  Results and recommendations pending evaluation completion on 3/7/2023  Update from week 3/14/2023: Pt currently being followed for cognitive/language tx sessions now and was also briefly seen for dysphagia tx sessions  In regards to dysphagia, pt was initially on dysphagia level 3 diet w/ NT liquids  However, able to advance liquids quickly to thins, but still continued to remain on level 3 diet due to increased R sided pocketing/residual, initially requiring verbal prompts/cues to clear  As dysphagia tx sessions progressed in addition to completing trials of regular texture items, which pt had demonstrated more independence in use of swallow strategies, primarily clearing R sided residual/pocketing via lingual/finger sweeps and increased time  Diet was able to be advanced to regular w/ thin liquids to where brief f/u continued to where pt did not present w/ increased or overt signs/sxs of aspiration during meals, to where ongoing recommendations remain for continued diet, regular/thins at this time, continuing aspiration precautions  No further dysphagia tx sessions warranted at this time, but reconsult if any deficits arise  As for cognitive linguistic skills, pt did complete CLQT+ on initial evaluation with a Composite Severity Rating score of 3 4 out of 4 0, correlating to overall MILD cognitive linguistic impairments at time of evaluation and in comparison to age matched peers ranging from 22-74 y/o  It is noted that pt does have a higher level of independence prior to admission, but also there is new learning given medications due to stroke, as well as new learning given tasks/mobility due to stroke currently  As for language skills, pt completed the Bedside WAB, in which overall bedside aphasia score deems pt to demonstrate mildly impaired language deficits   Additionally, pt also demonstrates Anomic type aphasia as per Bedside Aphasia Classification Criteria, when comparing the pt's Fluency, Auditory Verbal Comprehension, Repetition scores  Pt's expressive language deficits mild junior by mild word finding difficulty, intermittent vague speech, concise/ often incomplete descriptions and dec fluency  Pt expressive deficits more prevalent during structured tx tasks and reading aloud, spontaneous speech is more fluent w/ only min instances of word retrieval difficulties and min amount of slurring in longer utterances  Of note, pt's spouse, Ruth Da Silva has been present for sessions, in which increased review was already presented given medications, reasons for taking medications and determining a plan for management moving forward, which spouse stated that a pill box would be most feasible moving forward  Further education had been provided to spouse about swallow function and language skills at this time  Barrier which present at this time include expressive> receptive language deficits, increased processing time, decreased ST/working memory, decreased executive function skills (problem solving, reasoning, organization, sequencing) which impacts cognitive linguistic and functional mobility  Pt is consistently functioning at min A level for comprehension, expression, executive functions, memory and mod I level for social interaction  Currently, pt will benefit from ongoing skilled SLP services targeting language and cognitive skills in attempts to decrease overall caregiver burden over time  Update from week of 3/20/2023: Pt conts to be followed for skilled SLP services targeting speech/cognitive skills  Pt is making good progress towards his goals, currently functioning at min A problem solving and memory, and supervision comprehension and expression  Pt conts with deficits in executive function skills, ST/working memory, planning, reasoning, problem solving, safety, and insight to deficits   Pt's overall word finding and thought organization skills cont to improve and per family report, are nearly baseline at this time, however are present as pt fatigues and with higher level tasks  Plan is for discharge home with 24h S/A and cont'd rehab services  Pt will cont to benefit from skilled SLP services targeting cognitive and speech needs for increased independence and decreased burden of care upon safe discharge home with family support/assistance  Update 3/28/23: Pt continues to be followed for skilled SLP services targeting cognitive linguistic and communication skills  Pt is making good and steady progress towards goals and is currently functioning at supervision level for expression, comprehension and social interaction, min assist to supervision for memory and min assist for problem solving  While pt is working towards goals, he remains with overall cognitive linguistic deficits, characterized by barriers in ST/working memory, executive functions, higher level reasoning/planning, higher level comprehension and expression skills, processing, and safety/reasoning  Pt's auditory comprehension and verbal expression appear greater than pt's reading comprehension and written expression at this time  Pt is making notable progress across all areas, but does at times still fatigue, which impacts comprehension, verbal expression and recall  Family education has been completed with pt's wife with discharge planned for 3/31/2023  At this time, pt is recommended for further skilled SLP services targeting overall cognitive linguistic and higher level language skills in order to improve level of independence on discharge  Additionally, pt is recommended for outpatient skilled SLP services on discharge

## 2023-03-07 NOTE — UTILIZATION REVIEW
NOTIFICATION OF ADMISSION DISCHARGE   This is a Notification of Discharge from [address]  Please be advised that this patient has been discharge from our facility  Below you will find the admission and discharge date and time including the patient’s disposition  UTILIZATION REVIEW CONTACT:  Amanuel Ruggiero  Utilization   Network Utilization Review Department  Phone: [phone] x carefully listen to the prompts  All voicemails are confidential   Email: [email or web address]     ADMISSION INFORMATION  PRESENTATION DATE: 2/25/2023 11:47 PM  OBERVATION ADMISSION DATE:  INPATIENT ADMISSION DATE: 2/25/23 11:47 PM   DISCHARGE DATE: 3/6/2023  4:25 PM   [facility]    IMPORTANT INFORMATION:  Send all requests for admission clinical reviews, approved or denied determinations and any other requests to dedicated fax number below belonging to the campus where the patient is receiving treatment   List of dedicated fax numbers:  [address] DENIALS (Administrative/Medical Necessity) [phone]   [address] (Maternity/NICU/Pediatrics) [phone]   [facility] [phone]   [facility] [phone]   [address] [phone]   [address] [facility] [phone] 90 [facility] [phone]   [address] [place], [place] [address] 119 [phone]   [facility]  [phone]   [address] [facility] [phone]   [address] [facility] [address] [facility] [phone]

## 2023-03-07 NOTE — PROGRESS NOTES
Physical Medicine and Rehabilitation Progress Note  Angella Collins 60 y o  male MRN: [id number]  Unit/Bed#: -17 Encounter: [id number]    HPI: Angella Collins is a 60 y o  right handed male with medical history of HTN who presented to [address] [place] [address] Road on 2/25 with nausea/dizziness  Found to have hypertensive emergency with acute/subcaute R posterior cerebellar CVA with 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, mild BRAULIO, and incidentally found + COVID (without evidence of respiratory illness/hypoxia/CXR findings)  Placed on cardene gtt, stroke pathway, ASA/Plavix, IV hydration for BRAULIO, and CTX for UTI  NIHSS 2  Symptoms began 2 days prior  Not tPA/TNK candidate  MRI/MRA with 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  NSx recommended transfer to [facility] and starting on heparin gtt and stopped Plavix  Repeat CTA on 2/26 stable with with R V3/4 occlusion  Not a candidate for interventional procedure due to clinical stability and risk  Speech consulted and noted mod-severe oropharyngeal dysphagia recommended pureed/HTL  Unfortunately later on 2/26, he clinically deteriorated with increased R sided weakness, and was taken to IR for stenting of V3 and V4 with TICI 2B revascularization  CTH without ICH  He was admitted back to the ICU intubated - extubated 2/27  MRI showed cerebellar CVA and R > L stroke burden, with additional hypodensities on DWI appreciated L midbrain, pontine area and R lower medullary/spinal cord junction  He was transitioned to triple therapy with DAPT (given recent stent) and A/C with eliquis 2 5mg BID due to COVID  Diet advanced to Level 3/NTL on 2/27  Noted to have spasticity in RLE - started on baclofen by Neurology  He was able to have cardene discontinued on 3/2, and they have been making adjustments to his oral regimen  He was started on Prozac for his mood  NSx has signed off  CTA H/N recommended around 3/17  Length of time on eliquis unclear   Admitted to [facility] on 3/6  Chief Complaint: No new issues today  Interval History/Subjective:  Sleep was fine  R side weak, incontinent of bowels  He feels he may be able to tell when it's coming, but has struggled so far  Stools have been loose  Denies any N/V, abdominal pain, fevers, chills  Last BM 3/6  ROS:  A 10 point review of systems was negative except for what is noted in the HPI  Today's Changes:  1  Unclear time on eliquis, presumably for hypercoag related to COVID - Discussed with Dr Sally Prasad, he would say maybe 3-4 weeks, but that it is up to Neurosurgery - who will follow-up with him on 3/17 with repeat imaging  Can have them clarify at that point how much longer he should be on eliquis  2  IM discontinuing chlorthalidone and increasing coreg  3  May need timed bowel/bladder regimen  4  Team Conference, see separate note  Total time spent:  50 minutes, with more than 50% spent counseling/coordinating care  Counseling includes discussion with patient re: progress in therapies, functional issues observed by therapy staff, and discussion with patient his/her current medical state/wellbeing  Coordination of patient's care was performed in conjunction with Internal Medicine service to monitor patient's labs, vitals, and management of their comorbidities  In addition, this patient was discussed by the interdisciplinary team in weekly case conference today  The care of the patient was extensively discussed with all care providers and an appropriate rehabilitation plan was formulated unique for this patient  Barriers were identified preventing progression of therapy and appropriate interventions were discussed with each discipline  Please see the team note for input from all disciplines regarding barriers, intervention, and discharge planning  Assessment/Plan:    * Acute Posterior Circulation Stroke  Assessment & Plan  Presented with dizziness for 2 days and nausea     - Now with R sided weakness, aphasia, dysphagia, R mild spasticity  Several small acute/early subacute bi-cerebellar infarcts without hemorrhage  Multiple infarcts involving R cerebellum and brainstem in particular (posterior medulla on the R, R midbrain, and L occipital lobe)  L vertebral artery severe stenosis and R vertebral artery occlusion and dissection    - Now s/p stenting on 2/26 with TICI 2b revascularization    PPx: ASA/brilinta/Eliquis, Statin   - Discussed with Neurology, they defer to NSx on length of time on eliquis  Potentially 3-4 weeks if felt to be related to COVID  Maintain normotension  Education on prediabetes and diet  Follow-up with Neurovascular/Neurosurgery  PT/OT/SLP - for swallow, speech, R sided weakness/tone, will need a good visual exam      Anemia  Assessment & Plan  Normocytic anemia noted through stay  B12 borderline low  Folate normal  Iron Panel: Low iron saturation and iron with normal TIBC and Ferritin  Was started in the hospital on B12, but not iron  Monitor Hgb, transfuse as appropriate  Will discuss with IM  Consulted  Adjustment disorder with depressed mood  Assessment & Plan  Has been tearful and labile during hospitalization  Started on Prozac 20mgdaily  Consulted rehab psychology for support  Prediabetes  Assessment & Plan  A1C 5 7  Consult nutrition for education on diabetic diet  Glucose 110-127  Diet/lifestyle modifications  Follow-up with PCP  CKD (chronic kidney disease)  Assessment & Plan  Lab Results   Component Value Date    EGFR 60 03/06/2023    EGFR 58 03/05/2023    EGFR 57 03/04/2023    CREATININE 1 26 03/06/2023    CREATININE 1 29 03/05/2023    CREATININE 1 31 (H) 03/04/2023     Presented with Crea 1 32  Given gentle IVF  Unclear baseline    Per hospital team - suspect CKD Stage 2 2/2 hypertension  Will monitor BMP while here  Avoid nephrotoxic meds and relative hypotension  Recommend outpatient f/u with PCP    Spasticity  Assessment & Plan  Intrinsic tonic tone in R quad which is mild  Has some hiccups too  Would opt to avoid treating R quad for now, as tone there may help stabilize at the knee  Can continue Baclofen 10mg BID  Range of motion  Monitor as tone evolves  Outpatient f/u with PMR  Dysphagia  Assessment & Plan  Mod-sever oropharyngeal  On Level 3/NTL  Aspiration precautions  Good oral hygiene   SLP consulted    Primary hypertension  Assessment & Plan  Home: None  Here: Amlodipine 10mg daily, Coreg 12 5mg BID, Lisinopril 20mg daily   - IM stopped chlorthalidone and increased coreg  Had hypertensive urgency in hospital requiring cardene gtt  Monitor and adjust as appropriate  IM consulted to assist with management  Stenosis of left vertebral artery  Assessment & Plan  Severe L vertebral artery origin stenosis  Repeat CTA H/N 2 weeks around 3/17  ASA/Brilinta  Atorvastatin  SBP goals 120-160  Follow-up with Neurovascular  Right Vertebral artery dissection [facility])  Assessment & Plan  Now s/p R V3/4 stenting with TICI 2B revascularization on 2/26 for R Vert stenosis  Continue ASA/Brilinta  Statin  Neurosurg f/u 3/17 with repeat CTA H/N        COVID  Assessment & Plan  Incidentally found to have COVID on 2/25  Asymptomatic from respiratory standpoint  Per Neuro concern for hypercoagulability related to COVID  Currently on Eliquis 2 5mg BID - will see how long they would like him to be on this regimen  Continue precautions through 3/7, then discontinue  Health Maintenance  #Delirium/Sleep: At risk  Optimize bowel/bladder, sleep-wake cycle, mood and pain management  #Pain: Baclofen 10mg BID, Tylenol PRN  #Bowel: Last BM 3/6, on miralax daily and senna-colace 1 tab QHS  Added PRN bisacodyl suppository  #Bladder: Voiding and continent  #Skin/Pressure Injury Prevention: Turn Q2hr in bed, with weight shifts [id number] in wheelchair  Float heels in bed  #DVT Prophylaxis: On triple therapy, SCDs  #GI Prophylaxis: None     #Code Status:  Full Code  #FEN: Level 3/NTL  #Dispo:  Team 3/7  ELOS 4-6 weeks  May require more assistance at home and primary wheelchair level mobility given his ataxia  Barrier: R sided weakness, ataxia, truncal weakness, dysphagia, aphasia  Goals: See above       Objective:    Functional Update: pending  PT  OT  SLP    Allergies per EMR    Physical Exam:  Temp:  [97 4 °F (36 3 °C)-98 6 °F (37 °C)] 97 8 °F (36 6 °C)  HR:  [58-64] 58  Resp:  [17-18] 18  BP: (135-179)/(60-93) 150/60  Oxygen Therapy  SpO2: 97 %    Gen: No acute distress, Well-nourished, well-appearing  HEENT: Moist mucus membranes  Cardiovascular: Regular rate, rhythm, S1/S2  Distal pulses palpable  Heme/Extr: No edema  Pulmonary: Non-labored breathing  : No barnett  GI: Soft, non-tender, non-distended  MSK: PROM is full in all extremities  No effusions or deformities  Bulk is symmetric  Integumentary: Skin is warm, dry  Neuro: AAOx3, Aphasic but intelligible speech  Appropriately answering questions  R sided weakness UE > LE  Decreased tone RUE, mildly increased tone RLE  Psych: Normal mood and affect       Diagnostic Studies: Reviewed, no new imaging    Laboratory:  Reviewed   Results from last 7 days   Lab Units 03/06/23 0627 03/05/23  0501 03/04/23  0538   HEMOGLOBIN g/dL 10 1* 10 0* 10 4*   HEMATOCRIT % 31 4* 30 6* 32 8*   WBC Thousand/uL 8 82 10 45* 10 55*     Results from last 7 days   Lab Units 03/06/23  0627 03/05/23  0501 03/04/23  0538   BUN mg/dL 34* 38* 37*   POTASSIUM mmol/L 4 1 4 0 4 1   CHLORIDE mmol/L 108 107 109*   CREATININE mg/dL 1 26 1 29 1 31*            Patient Active Problem List   Diagnosis   • BRAULIO (acute kidney injury) ([facility] [address] )   • COVID   • Acute Posterior Circulation Stroke   • Right Vertebral artery dissection (HCC)   • Stenosis of left vertebral artery   • Primary hypertension   • Dizziness   • Dysphagia   • Spasticity   • CKD (chronic kidney disease)   • Prediabetes   • Adjustment disorder with depressed mood   • Anemia Medications  Current Facility-Administered Medications   Medication Dose Route Frequency Provider Last Rate   • amLODIPine  10 mg Oral Daily Kaelyn Campos MD     • apixaban  2 5 mg Oral BID Kaelyn Campos MD     • aspirin  81 mg Oral Daily Kaelyn Campos MD     • atorvastatin  40 mg Oral Daily With Everardo Forde MD     • baclofen  10 mg Oral BID Kaelyn Campos MD     • carvedilol  6 25 mg Oral BID With Meals Kaelyn Campos MD     • chlorthalidone  12 5 mg Oral Daily Kaelyn Campos MD     • vitamin B-12  1,000 mcg Oral Daily Kaelyn Campos MD     • FLUoxetine  20 mg Oral Daily Kaelyn Campos MD     • lisinopril  20 mg Oral Daily Kaelyn Campos MD     • polyethylene glycol  17 g Oral Daily PRN Kaelyn Campos MD     • ticagrelor  90 mg Oral Q12H [address] Kaelyn Campos MD            ** Please Note: Fluency Direct voice to text software may have been used in the creation of this document   **

## 2023-03-07 NOTE — PROGRESS NOTES
OT LTGs     03/07/23 0700   Rehab Team Goals   ADL Team Goal Patient will require assist with ADLs with least restrictive device upon completion of rehab program   Rehab Team Interventions   OT Interventions Self Care;Home Management; Therapeutic Exercise;Cognitive Reintegration;Cognitive Retraining;Energy Conservation;Patient/Family Education   Eating Goal   Eating Goal 05  Setup or clean-up assistance - [place] SETS UP or CLEANS UP, patient completes activity  [place] assists only prior to or following the activity  Meal Complete All meals   Status Target goal - four weeks   Interventions Optimal Position; Neuromuscular Education;Dysphagia Education; Compensation Strategies;Assistive Device   Grooming Goal   Oral Hygiene Goal 04  Supervision or touching assistance- [place] provides VERBAL CUES or supervision throughout activity  Task Wash/Dry Face;Wash/Dry Hands;Brush Teeth;Comb Hair; Shave; Initiate Task;Complete Groom   Environment Seated in Chair;Seated at [employer] sit   Status Target goal - four weeks   Intervention Assistive Device;Balance Work;Neuromuscular Education; Therapeutic Exercise; Tolerance Work   Tub/Shower Transfer Goal   Method Tub Shower  (GOAL: Min A)   Assist Device Seat with Back;Tub Bench;Grab Bar;Hand Held Shower   Status Target goal - four weeks   Interventions ADL Training;Assistive Device; Neuromuscular Education   Bathing Goal   Shower/bathe self Goal 03  Partial/moderate assistance - [place] does less than half the effort  [place] lifts or holds trunk or limbs and provides more than half the effort  Environment Seated;Standing;Sponge [employer] Equipment Hand Held Shower;Grab Bar;Transfer bench;Seat with back; Long Handle Sponge   Status Target goal - four weeks   Intervention ADL Training;Assistive Device; Therapeutic Exercise;Neuromuscular Education   Upper Body Dressing Goal   Upper body dressing Goal 05   Setup or clean-up assistance - [place] SETS UP or CLEANS UP, patient completes activity  [place] assists only prior to or following the activity  Task Upper Body;Arms in/out; Over Head   Environment Seated   Status Target goal - four weeks   Intervention Assistive Device;Balance Work;Neuromuscular Education; Therapeutic Exercise; Tolerance Work   Lower Body Dressing Goal   Lower body dressing Goal 04  TOUCHING/ STEADYING assistance as patient completes activity  Putting on/taking off footwear Goal 03  Partial/moderate assistance - [place] does less than half the effort  [place] lifts or holds trunk or limbs and provides more than half the effort  Task Lower Body;Socks;Shoe/Slipper;Pants; Undergarment; Fasteners   Adaptive Equipment [place] Laces;Dressing Stick; [place];Sock Aide;Reacher   Environment Seated;Standing   Status Target goal - four weeks   Intervention Assistive Device;Balance Work;Neuromuscular Education; Therapeutic Exercise; Tolerance Work   Toileting Transfer Goal   Toilet transfer Goal 04  Supervision or touching assistance- [place] provides VERBAL CUES or supervision throughout activity  Assistive Device Grab Bar;Single [place]; Wheelchair;Bedside Commode;Drop Arm Commode;Raised Toilet Seat;Emiliano W  R  [place]   Status Target goal - four weeks   Intervention ADL Training;Balance Work;Assistive Device   Toileting Goal   Toileting hygiene Goal 04  TOUCHING/ STEADYING assistance as patient completes activity     Task Pants Up;Pants Down;Hygiene   Safety Use a Bedside Commode during day;Balance;Use a Bedside Commode at Night   Status Target goal - four weeks   Intervention ADL Training;Assistive Device;Balance Work   Meal Prep and Kitchen Mobility   Assist Level Minimum Assist   Status Target goal - four weeks   Medication Management   Assist Level Minimum Assist   Status Target goal - four weeks     Stacy Rios MS, OTR/L

## 2023-03-07 NOTE — PCC NURSING
Pt admitted S/P Posterior circulation stroke S/p intracranial and extracranial vertebral artery stenting/TICI 2B revascularization Continue ASA, Brilinta, Eliquis and atorvastatin  Repeat CTA head and neck around 3/17/23  Prozac for depressed mood following CVA  Recommend Neuropsych consult  Dysphagia 3 diet with nectar thick liquids  Continue Baclofen 10mg 2x daily  HTN-  Home: No medications  Here: amlodipine 10mg daily/Coreg 6 25mg 2x daily/lisinopril 20mg daily/chlorthalidone 12 5mg daily  Goal normotension  Monitor BP and adjust medications as needed  CKD stage 2 Baseline Cr 1 2 - 1 3  Monitor with the addition of chlorthalidone  COVID- Continue precautions through 3/7/23  Pt has not had respiratory symptoms  Prediabetes-HA1C 5  7  Pt is inc at times of bowel and bladder  Requires alarms for safety  This week we will encourage independence with ADLs  We will monitor labs and vital signs  We will educate pt  about repositioning to prevent skin breakdown  We will perform routine skin checks  We will monitor for constipation and medicate as ordered  We will monitor for adequate pain control  We will increase safety awareness with transfers and keep pt free from falls

## 2023-03-07 NOTE — PROGRESS NOTES
[facility] PT Initial Evaluation   03/07/23 1310   Patient Data   Rehab Impairment Impairment of mobility, safety and Activities of Daily Living (ADLs) due to stroke: 01 2 Right Body Involvement (Left Brain)   Etiologic Diagnosis bilateral cerebellar infarcts   Date of Onset 02/25/23   Home Setup   Type of Home Multi Level   Method of Entry Curb   Number of Stairs 2  (1+1 curb type SHANNON)   Number of Stairs in Home 15   In [facility] Full   First Floor Setup Available Yes   Home Modifications Necessary?   (TBD)   Available Equipment Roller Walker; Shower Chair;Bedside Commode  (from wife's previous hospitalization)   Baseline Information   Transportation    Prior Device(s) Used   (no AD)   Prior Level of Function   Indoor-Mobility (Ambulation) 3  Independent - Patient completed the activities by him/herself, with or without an assistive device, with no assistance from a helper  Stairs 3  Independent - Patient completed the activities by him/herself, with or without an assistive device, with no assistance from a helper  Prior Device Used Z  None of the above   Falls in the Last Year   Number of falls in the past 12 months 0   Patient Preference   Nickname (Patient Preference) Franca Colón   Psychosocial   Psychosocial (WDL) WDL   Patient Behaviors/Mood Cooperative;Calm;Pleasant   Restrictions/Precautions   Precautions Bed/chair alarms; Aphasia; Fall Risk;Contact/isolation;Supervision on toilet/commode; Airborne/isolation;Cognitive, Aspiration   Pain Assessment   Pain Assessment Tool 0-10   Pain Score No Pain   Transfer Bed/Chair/Wheelchair   Positioning Concerns Hemiplegia  (slight R side inattention - educated pt and wife in positioning in room)   Limitations Noted In Balance; Endurance;Problem Solving;Sensation;UE Strength;LE Strength   Adaptive Equipment None   Findings sit pivots x 5 reps with mod-max of 1 with CGA of 2nd person , blocking R knee   Type of Assistance Needed Physical assistance;Verbal cues   Physical Assistance Level Total assistance   Chair/Bed-to-Chair Transfer CARE Score 1   Roll Left and Right   Type of Assistance Needed Physical assistance;Verbal cues   Physical Assistance Level 26%-50%   Comment HOB flat without rail, assist to flex R knee in position  rolling to L side   Roll Left and Right CARE Score 3   Sit to Lying   Type of Assistance Needed Physical assistance;Verbal cues   Physical Assistance Level 26%-50%   Comment assist with R LE only, provided external cues   Sit to Lying CARE Score 3   Lying to Sitting on Side of Bed   Type of Assistance Needed Physical assistance;Verbal cues   Physical Assistance Level 26%-50%   Comment HOB flat, no rail, external cues and extra time to complete , min A on R LE and guarding as he scooted fwd till feet flat on floor   Lying to Sitting on Side of Bed CARE Score 3   Sit to Stand   Type of Assistance Needed Physical assistance;Verbal cues   Physical Assistance Level Total assistance   Comment mod-max of 1 with CG/min of 2nd person using window sill x 2 reps with tolerance ~ 2-3 mins, R knee blocking with VC to tuck buttocks in, engage R quads, stand upright   also assist to keep R hand in place   Sit to Stand CARE Score 1   Picking Up Object   Reason if not Attempted Safety concerns   Picking Up Object CARE Score 88   Car Transfer   Comment airborne/isolation due to covid   Reason if not Attempted Medical concerns   Car Transfer CARE Score 88   Ambulation   Primary Mode of Locomotion Prior to Admission Walk   Walk 10 Feet   Comment ongoing weakness, fatigue - will assess tomorrow with rail or // bar but anticipate will be a good BWSS candidate   Reason if not Attempted Safety concerns   Walk 10 Feet CARE Score 88   Walk 50 Feet with Two Turns   Reason if not Attempted Safety concerns   Walk 50 Feet with Two Turns CARE Score 88   Walk 150 Feet   Reason if not Attempted Safety concerns   Walk 150 Feet CARE Score 88   Walking 10 Feet on Uneven Surfaces   Comment isolation/ airborne precaution due to covid   Reason if not Attempted Medical concerns   Walking 10 Feet on Uneven Surfaces CARE Score 88   Wheel 50 Feet with Two Turns   Comment will assess next session  possible alternative mode of mobility at d/c   Reason if not Attempted Safety concerns   Wheel 50 Feet with Two Turns CARE Score 88   Wheel 150 Feet   Reason if not Attempted Safety concerns   Wheel 150 Feet CARE Score 88   Curb or Single Stair   Reason if not Attempted Safety concerns   1 Step (Curb) CARE Score 88   4 Steps   Comment R hemiparesis, fatigue , dec standing balance , R side inattention   Reason if not Attempted Safety concerns   4 Steps CARE Score 88   12 Steps   Reason if not Attempted Safety concerns   12 Steps CARE Score 88   Comprehension   QI: Comprehension 4  Undestands: Clear comprehension without cues or repetitions   Expression   QI: Expression 4  Express complex messages without difficulty and with speech that is clear and easy to [place]   RLE Assessment   RLE Assessment X  (tight gastroc no clonus demonstrated  wife previous educated on stretching technique by acute PT and have been doing it with pt)   Strength RLE   R Hip Flexion 2+/5   R Hip ABduction 2-/5   R Hip ADduction 2+/5   R Knee Flexion 2-/5   R Knee Extension 3-/5   R Ankle Dorsiflexion 1/5   R Ankle Plantar Flexion 1/5   LLE Assessment   LLE Assessment WFL   Strength LLE   LLE Overall Strength 5/5   Coordination   Movements are Fluid and Coordinated 0   Sensation   Light Touch Partial deficits in the RLE   Propioception Partial deficits in the RLE   Additional Comments VC at times to maintain sitting balance   Cognition   Overall Cognitive Status [facility]   Arousal/Participation Alert; Cooperative   Attention Attends with cues to redirect   Orientation Level Oriented X4   Memory Decreased short term memory;Decreased recall of recent events;Decreased recall of "precautions   Following Commands Follows one step commands with increased time or repetition   Perception   Inattention/Neglect Cues to attend to right side of body   Objective Measure   PT Measure(s) Initiated pt and wife Edu on stroke recovery with basic HEP including performing R hand active movement ( thumb abduction,extension, R DF/PF)  Also educated both on ELOS and anticipated goals at this time  Therapeutic Exercise   Therapeutic Exercise/Activity provided wife with home set up form for measurement and photos   Discharge Information   Vocational Plan Return to work  ()   Barriers to Return to [employer] Access;Skill Set Limitation;Transportation Issues;Strength; Endurance   Patient's Discharge Plan return home with family support   Patient's Rehab Expectations \" to move my hand and leg so I can walk\"   Barriers to Discharge Home Limited Family Support; Unsafe Home Setup; Decreased Cognitive Function;Decreased Strength;Decreased Endurance; Safety Considerations   Impressions Pt is a 60year old male s/p Acute Posterior Circulation Stroke  Pt is seen for high complexity eval with medical co-morbidities affecting functional progress include anemia, adjustment disorder with depressed mood, prediabetes, CKD, spasticity, dysphagia, primary hypertension,stenosis of left vertebral artery,right vertebral artery dissection, COVID and bowel incontinence  Please see medical chart for more comprehensive list  Personal factors affecting Pt at time of IE include environmental barrier of 2 SHANNON and FF to access full bed/bath, limited family availability to provide physical assistance at d/c, inability to complete functional household and community distance mobilities  Pt lives at home with family and was fully indep at baseline without AD on even and uneven surfaces including stairs negotiation   On eval pt presentation impacting functional performance include R hemiparesis, impaired standing " balance/tolerance, poor righting reaction, impaired coordination, impaired sensation/proprioception/kinesthesia, dec endurance, gait dysfunction, dec cognition  Due to above mentioned medical co-morbidities and impairments/deficits pt is at high risk for falls, inc risk for hypo/hypertensive episode, at risk for dec skin integrity, at risk for infection, at risk for PE & DVT and increase caregiver burden so unsafe to return home at this time  Pt requires assist to complete functional activities safely requiring use of AD, see above info for details of PT evaluation  Cont to demo intermittent hiccups but was not tearful this session  Pt is a good rehab candidate with anticipated S-min goals using Least restrictive AD with ELOS of 4 weeks  Skilled PT will work on therapeutic exercises, therapeutic activities, NMR, w/c mobility and gait training to improve overall functional indep in order for pt to return home safely with reduce risk for falls     PT Therapy Minutes   PT Time In 1310   PT Time Out 1440   PT Total Time (minutes) 90   PT Mode of treatment - Individual (minutes) 90   PT Mode of treatment - Concurrent (minutes) 0   PT Mode of treatment - Group (minutes) 0   PT Mode of treatment - Co-treat (minutes) 0   PT Mode of Treatment - Total time(minutes) 90 minutes   PT Cumulative Minutes 90   Cumulative Minutes   Cumulative therapy minutes 180

## 2023-03-07 NOTE — TELEPHONE ENCOUNTER
3/10/23- [facility]    3/9/23- [facility]    3/8/23- [facility]    3/7/23- [facility]

## 2023-03-07 NOTE — TEAM CONFERENCE
Acute RehabilitationTeam Conference Note  Date: 3/7/2023   Time: 11:11 AM       Patient Name:  Jennifer Calle Record Number: [id number]   YOB: 1960  Sex: Male          Room/Bed:  [facility] 459/[facility] 459-01  Payor Info:  Payor: Colie Pill / Plan: Colie Pill PPO / Product Type: PPO /      Admitting Diagnosis: Cerebellar infarct ([facility] ) [I63 9]   Admit Date/Time:  3/6/2023  4:26 PM  Admission Comments: No comment available     Primary Diagnosis:  Cerebrovascular accident (CVA) ([facility] )  Principal Problem: Cerebrovascular accident (CVA) ([facility] )    Patient Active Problem List    Diagnosis Date Noted   • Anemia 03/06/2023   • Spasticity 03/01/2023   • CKD (chronic kidney disease) 03/01/2023   • Prediabetes 03/01/2023   • Adjustment disorder with depressed mood 03/01/2023   • BRAULIO (acute kidney injury) ([facility] ) 02/25/2023   • COVID 02/25/2023   • Acute Posterior Circulation Stroke 02/25/2023   • Right Vertebral artery dissection ([facility] ) 02/25/2023   • Stenosis of left vertebral artery 02/25/2023   • Primary hypertension 02/25/2023   • Dizziness 02/25/2023   • Dysphagia 02/25/2023       Physical Therapy:         PT eval to be completed at 1pm        Occupational Therapy:          OT eval to be completed  Speech Therapy:           Orders received for skilled SLP assessment  Results and recommendations pending evaluation completion on 3/7/2023  Nursing Notes:     Diet Type: Dysphagia III, Nectar thick liquids                                                                        Pain Score: 0                                  Pt admitted S/P Posterior circulation stroke S/p intracranial and extracranial vertebral artery stenting/TICI 2B revascularization Continue ASA, Brilinta, Eliquis and atorvastatin  Repeat CTA head and neck around 3/17/23  Prozac for depressed mood following CVA  Recommend Neuropsych consult  Dysphagia 3 diet with nectar thick liquids  Continue Baclofen 10mg 2x daily  HTN-  Home: No medications  Here: amlodipine 10mg daily/Coreg 6 25mg 2x daily/lisinopril 20mg daily/chlorthalidone 12 5mg daily  Goal normotension  Monitor BP and adjust medications as needed  CKD stage 2 Baseline Cr 1 2 - 1 3  Monitor with the addition of chlorthalidone  COVID- Continue precautions through 3/7/23  Pt has not had respiratory symptoms  Prediabetes-HA1C 5  7  Pt is inc at times of bowel and bladder  Requires alarms for safety  This week we will encourage independence with ADLs  We will monitor labs and vital signs  We will educate pt about repositioning to prevent skin breakdown  We will perform routine skin checks  We will monitor for constipation and medicate as ordered  We will monitor for adequate pain control  We will increase safety awareness with transfers and keep pt free from falls  Case Management:     Discharge Planning  Living Arrangements: Lives w/ Spouse/significant other  Support Systems: Spouse/significant other  Assistance Needed: NA  Type of Current Residence: Private residence (2 story home)  Current Home Care Services: No  3/7- Cm to assess  Is the patient actively participating in therapies? yes  List any modifications to the treatment plan: None    Barriers Interventions   Right side weakness/ hemiperesis  Compensatory strategies, neuro siddhartha   Aphagia/dysphagia SLP Services   Mood Med management   Bowel incontinence Toileting program   Impaired cog eval/bed side swallow SLP Services to evaluate   Decreased coping Neuro psych consult   Standing balance Strengthening          Is the patient making expected progress toward goals?  yes  List any update or changes to goals: None    Medical Goals: Patient will be medically stable for discharge to [facility] upon completion of rehab program and Patient will be able to manage medical conditions and comorbid conditions with medications and follow up upon completion of rehab program    Weekly Team Goals:        Discussion: Pt participating in rehab program and PT OT to evaluate  OT to max to total assist for ADL IADLs  Team recommending further work up and participating in therapies to focus on functional and ambulation goals  Anticipated Discharge Date:  reteam [facility] Team Members Present: The following team members are supervising care for this patient and were present during this Weekly Team Conference      Physician: Dr Neisha Obrien MD   : SOFÍA Barajas  Registered Nurse: Esther Covington RN  Physical Therapist: Samantha Perera DPT  Occupational Therapist: Abby Duffy MS, OTR/L, CBIS  Speech Therapist: Mari Kanner, [address], [address] [facility]

## 2023-03-07 NOTE — CASE MANAGEMENT
Case Management/Team Update:  Cm met with pt and pt's wife Sheyla Salinas in pt's room to introduce role and complete cm open:    Pt lives with spouse Sheyla Salinas in 2 story home 1 [address]  Prior to admission, pt was independent and works as an automechanic  Currently, pt's children live with pt and spouse but one is moving out shortly to house they are building [place] pt's [place]  Pt reports no DME in home and no hx of HHC, OP, or STR  Pt reports not having PCP established but has been looking into PCPs around this area and will choose one prior to dc and schedule  Preferred pharmacy is [facility] in [place]  Cm reviewed team schedule and cm to update pt and family as team recommendations are made  Cm also educated pt and family on  due 3/8 to pt's [employer]  The patient was educated on the rehabilitation process including therapy program, the interdisciplinary team, and weekly team meetings  Estimated length of stay was reviewed with the patient as well as expectations of discussions of discharge planning  The role of the  was reviewed including providing care coordination, discharge planning and discharge facilitation  IMM was reviewed with the patient and a copy was provided for their reference  The patient verbalized understanding of the information provided and denied any further questions at this time  CM will continue to follow and assist the patient throughout their rehabilitation stay

## 2023-03-07 NOTE — PROGRESS NOTES
03/07/23 1140   Pain Assessment   Pain Assessment Tool 0-10   Pain Score No Pain   Restrictions/Precautions   Precautions Aspiration;Bed/chair alarms;Cognitive; Fall Risk; Airborne/isolation;Contact/isolation;Supervision on toilet/commode   Cognitive Linguisitic Assessments   Cognitive Linquistic Quick Test (CLQT) mpleted the CLQT+ on initial evaluation with a Composite Severity Rating score of 3 4 out of 4 0, correlating to overall MILD cognitive linguistic impairments at time of evaluation and in comparison to age matched peers ranging from 22-74 y/o  Pt scored at or above criterion cut score for 7 out of 10 tasks completed  Pt's Cognitive Domain Scores are as follows:      Cognitive Domain: Score:  Severity Rating:   Attention   81 MODERATE   Memory 163 WNL       Executive Functions 26 WNL       Language 31 WNL      Visuospatial Skills  65 MILD      Clock Drawing Screen    13 WNL   Overall Composite Severity Rating Score 3 4 out of 4 0 MILD      Pt completing all subtest promptly, not requiring full time limit on any of the subtest administered  He does present with errors of commission on visual scanning tasks, indicative of generalized inattention to detail  Further, visual tracking, self-monitoring, and planning present as mildly impaired as pt could not successfully complete symbol trails subtest  Of note, pt utilized his non-dominant hand for all motor tasks as pt's R hand presents with weakness and reduced control needed for writing  Thus, small details that are seemingly impaired, such as on clock drawing and mazes subtest should be monitored in subsequent sessions and judged clinically as impaired v  due to use of non-dominant hand  Pt and spouse were educated on results of assessment with review of overall results and performance on each cognitive domain, as well as specific focus on weaker areas, which will primarily be targeted during therapy sessions   Pt and spouse were receptive to all information provided, along with recommendations for skilled SLP services during acute rehab stay to maximize overall cognitive linguistic skills  Comprehension   Assist Devices Glasses   Auditory Complex   Visual Basic   Findings Pt completing Cognitive and Bedside Dysphagia assessments  Will benefit from skilled ST  Refer to SLP Rehab note for full details  QI: Comprehension 4  Undestands: Clear comprehension without cues or repetitions   Comprehension (FIM) 6 - Has only MILD difficulty with complex/abstract info   Expression   Verbal Complex   Non-Verbal Complex   Intelligibility Sentence   Findings Pt completing Cognitive and Bedside Dysphagia assessments  Will benefit from skilled ST  Refer to SLP Rehab note for full details  QI: Expression 4  Express complex messages without difficulty and with speech that is clear and easy to [place]   Expression (FIM) 6 - Has only MILD difficulty with complex/abstract info   Social Interaction   Cooperation with staff;with family;with others   Participation Individual   Medications needed to control mood/behavior? No   Behaviors observed Appropriate   Findings Pt completing Cognitive and Bedside Dysphagia assessments  Will benefit from skilled ST  Refer to SLP Rehab note for full details  Social Interaction (FIM) 7 - Interacts appropriately without assistive device, medication or helper   Problem Solving   Complex Other;Manages medications;Manages return to work  (wife to manage finances, d/c planning)   Routine Manages call bell;Manges precautions   Findings Pt completing Cognitive and Bedside Dysphagia assessments  Will benefit from skilled ST  Refer to SLP Rehab note for full details  Problem solving (FIM) 6 - Solves complex problems BUT requires extra time   Memory   Recognize People Yes   Remember Routine Yes   Initiates Tasks Yes   Short-Term Intact   Long Term Intact   Recalls Precaution Yes   Findings Pt completing Cognitive and Bedside Dysphagia assessments   Will benefit from skilled ST  Refer to SLP Rehab note for full details  Memory (FIM) 7 - Remembers daily routines   Eating   Type of Assistance Needed Supervision   Physical Assistance Level 25% or less   Eating CARE Score 3   Swallow Assessment   Swallow Treatment Assessment Bedside Dysphagia Evaluation      Patient Name: Jefferson RIVERA Date: 3/7/2023     Problem List  Principal Problem:    Acute Posterior Circulation Stroke  Active Problems:    COVID    Right Vertebral artery dissection ([address] )    Stenosis of left vertebral artery    Primary hypertension    Dysphagia    Spasticity    CKD (chronic kidney disease)    Prediabetes    Adjustment disorder with depressed mood    Anemia      Past Medical History  No past medical history on file  Past Surgical History  No past surgical history on file  Summary   Pt presented with s/s suggestive of mild oral and suspected mild pharyngeal dysphagia  Symptoms or concerns included mild and decreased mastication mild and suspected pharyngeal swallow delay  Pt completing lunch meal level 3 diet with NT liquids, and trial regular items and thins  Pt pleasant and in agreement to eat lunch  SLP discussed prior SLP therapy in acute care, to which pt recalled and demonstrated understanding of purposes of continuing mod diet after SLP educated him and his spouse  OME unremarkable  Of note, however, food trials completed w/ prolonged mastication indicative of lingual musculature weakness  Pt warmed up his oral musculature with some ice chips, which were managed well  Pt opening containers and feeding self  For chopped and puree, pt with adequate oral initiation, lip seal around fork, and oral control and containment  No suction needed this meal or recommended for subsequent meals  He was observed to have min-mild prolonged mastication and transfer  For NT by cup sips and thins by consecutive straw sips and for cup sips WFL at oral stage   No anterior spillage noted across boluses  When prompted to phonate to assess for wet or gravely vocal quality, unremarkable across boluses  Regarding pharyngeal stage, pt with mildly delayed swallow promptness for chopped and NT, but prompt for thins  HLE adequate across boluses and there was no coughing, throat clearing, or changes in respiratory status  Belching noted x1 with chopped  Post meal trace residue observed  Oral care completed w/ swab followed by and mouthwash swish & spit  Recommendations:  Recommended Diet: soft/level 3 diet   Recommended Form of Meds: whole with puree   Aspiration precautions and swallowing strategies: upright posture, quiet environment (tv off, limit talking, door closed, etc ) and frequent oral care-swish and spit, swab as necessary; suction not necessary      DISTANT supervision w/ meals  Results reviewed with:  patient and family   Aspiration precautions posted  F/u ST tx: Pt will continue to benefit from ongoing skilled dysphagia tx sessions to establish safest least restrictive diet w/o increased oropharyngeal or aspiration sxs as well as monitor ability to carryover swallow strategies independently  Current Medical Status  Pt is a 60 y o  male who presented to [address] [place] [address] on 2/25/23 with chief c/o dizziness accompanied with nausea x 2 days  In the ER, he was hypertensive with max blood pressure 252/115 which was  not improved by IV labetalol  CTA head and neck showed right posterior cerebellum infarct, acute versus subacute, marked tapering and occlusion of the distal right cervical vertebral artery suggestive of dissection, proximal right intradural vertebral artery occlusion, severe left vertebral artery stenosis  MRI and MRA were also performed, which confirmed bilateral cerebellar infarcts and concern for intracranial extension of R vert dissection  He was started on Cardene for blood pressure control    Incidentally in the ER, he was also noted to be COVID-positive, had an active UTI, and BRAULIO  He was transferred [facility] for potential neurointerventional support should he worsen given his underlying vasculature and placed on ASA/heparin        On 2/26, patient had worsened dysarthria and right sided weakness  CT head negative for intracranial hemorrhage and stable bilateral cerebellar infarcts  Was taken for emergent R vertebral/basilar thrombectomy with R V3/4 stenting with TICI 2B revascularization  Patient was started on Integrilin gtt and repeat CT head negative for hemorrhage      MRI brain 2/26: Increased infarct burden  Bilateral cerebellar infarcts with larger right cerebellar infarct and new acute infarcts in the right medulla, bilateral midbrain, and left occipital lobe  Echo: EF 65%, normal wall motion, normal atria size, no PFO     Etiology for acute infarcts in the setting of bilateral vertebral artery occlusions remain unclear, however likely due to dissection vs chronic diffuse atherosclerotic disease  Possible contribution from underlying COVID infection and hypercoagulability  He was transitioned from Integrilin gtt to DAPT on 2/27 with aspirin 81 mg daily and Brilinta 90 mg BID (180 mg load once)  Heparin gtt discontinued and transitioned to Eliquis 2 5 mg BID on 2/28     Given spasticity and concern for myoclonus/muscle spasm in the proximal right LE, he was started on Baclofen 10 mg TID      Patient also with tearfulness throughout his hospitalization and was started on Prozac 20 mg daily      Patient with leukocytosis, however, afebrile and no s/s of infection  Suspect likely reactive the the setting of recent CVA vs  UTI vs COVID      Patient with dysarthria and dysphagia as per Speech Therapy progress note   ST plan is to nectar thick liquids, frequent thorough oral care and when appropriate a MBS test to be conducted       Upon reviewing patient's case and correspondences with PT/OT/ST and [facility] physician, it is felt that patient will benefit and is a good candidate for inpatient acute rehab at this time  He is now medically stable and ready for discharge to the [facility]  Allergies:  No known food allergies    Social/Education/Vocational Hx:  Pt lives with family and is an     Swallow Information   Current Risks for Dysphagia & Aspiration: CVA and dysarthria  Current Symptoms/Concerns: prolonged mastication and delayed swallow promptness  Current Diet: soft/level 3 diet and nectar thick liquids   Baseline Diet: regular diet and thin liquids    Baseline Assessment   Behavior/Cognition: alert  Speech/Language Status: able to participate in conversation and able to follow commands  Patient Positioning: upright in bed  Pain Status/Interventions/Response to Interventions: No report of or nonverbal indications of pain  Swallow Mechanism Exam  Facial: symmetrical  Labial: WFL  Lingual: bilateral decreased strength  Velum: symmetrical  Mandible: adequate ROM  Dentition: poor oral hygiene  Vocal quality:clear/adequate   Volitional Cough: weak   Respiratory Status: on RA  Tracheostomy: n/a      Consistencies Assessed and Total Amount Consumed:  Consistencies Administered: ice chips, thin liquids, nectar thick, puree and mechanical soft solids, hard solids  Materials administered included:  chopped pork and gravy (90%)  mashed potatoes (90%)  chopped broccoli (100%)  vanilla pudding (50%)  applesauce (50%)  cailin crackers (25%)  NT cranberry (60cc)  water (60cc)      Oral Stage: mild  Mastication was mildly prolonged with the materials administered today  Bolus formation and transfer were functional with trace oral residue noted  No overt s/s reduced oral control  Pharyngeal Stage: mild  Swallow Mechanics:  Swallowing initiation appeared mildly delayed  Laryngeal rise was palpated and judged to be within functional limits across boluses  No coughing, throat clearing, change in vocal quality or respiratory status noted today       Esophageal Concerns: belching x1 with chopped      Summary and Recommendations (see above)   SLP Therapy Minutes   SLP Time In 1140   SLP Time Out 1310   SLP Total Time (minutes) 90   SLP Mode of treatment - Individual (minutes) 90   SLP Mode of treatment - Concurrent (minutes) 0   SLP Mode of treatment - Group (minutes) 0   SLP Mode of treatment - Co-treat (minutes) 0   SLP Mode of Treatment - Total time(minutes) 90 minutes   SLP Cumulative Minutes 90   Therapy Time missed   Time missed?  No   Daily FIM Score   Eating (FIM) 6 - Patient required modified diet/thickened liquids

## 2023-03-07 NOTE — PLAN OF CARE
Problem: Prexisting or High Potential for Compromised Skin Integrity  Goal: Skin integrity is maintained or improved  Description: INTERVENTIONS:  - Identify patients at risk for skin breakdown  - Assess and monitor skin integrity  - Assess and monitor nutrition and hydration status  - Monitor labs   - Assess for incontinence   - Turn and reposition patient  - Assist with mobility/ambulation  - Relieve pressure over bony prominences  - Avoid friction and shearing  - Provide appropriate hygiene as needed including keeping skin clean and dry  - Evaluate need for skin moisturizer/barrier cream  - Collaborate with interdisciplinary team   - Patient/family teaching  - Consider wound care consult   Outcome: Progressing     Problem: MOBILITY - ADULT  Goal: Maintain or return to baseline ADL function  Description: INTERVENTIONS:  -  Assess patient's ability to carry out ADLs; assess patient's baseline for ADL function and identify physical deficits which impact ability to perform ADLs (bathing, care of mouth/teeth, toileting, grooming, dressing, etc )  - Assess/evaluate cause of self-care deficits   - Assess range of motion  - Assess patient's mobility; develop plan if impaired  - Assess patient's need for assistive devices and provide as appropriate  - Encourage maximum independence but intervene and supervise when necessary  - Involve family in performance of ADLs  - Assess for home care needs following discharge   - Consider OT consult to assist with ADL evaluation and planning for discharge  - Provide patient education as appropriate  Outcome: Progressing  Goal: Maintains/Returns to pre admission functional level  Description: INTERVENTIONS:  - Perform BMAT or MOVE assessment daily    - Set and communicate daily mobility goal to care team and patient/family/caregiver  - Collaborate with rehabilitation services on mobility goals if consulted  - Perform Range of Motion 3 times a day    - Reposition patient every 2 hours   - Dangle patient 3 times a day  - Stand patient 3 times a day  - Ambulate patient 3 times a day  - Out of bed to chair 3 times a day   - Out of bed for meals 3 times a day  - Out of bed for toileting  - Record patient progress and toleration of activity level   Outcome: Progressing     Problem: PAIN - ADULT  Goal: Verbalizes/displays adequate comfort level or baseline comfort level  Description: Interventions:  - Encourage patient to monitor pain and request assistance  - Assess pain using appropriate pain scale  - Administer analgesics based on type and severity of pain and evaluate response  - Implement non-pharmacological measures as appropriate and evaluate response  - Consider cultural and social influences on pain and pain management  - Notify physician/advanced practitioner if interventions unsuccessful or patient reports new pain  Outcome: Progressing     Problem: INFECTION - ADULT  Goal: Absence or prevention of progression during hospitalization  Description: INTERVENTIONS:  - Assess and monitor for signs and symptoms of infection  - Monitor lab/diagnostic results  - Monitor all insertion sites, i e  indwelling lines, tubes, and drains  - Monitor endotracheal if appropriate and nasal secretions for changes in amount and color  - Labadie appropriate cooling/warming therapies per order  - Administer medications as ordered  - Instruct and encourage patient and family to use good hand hygiene technique  - Identify and instruct in appropriate isolation precautions for identified infection/condition  Outcome: Progressing  Goal: Absence of fever/infection during neutropenic period  Description: INTERVENTIONS:  - Monitor WBC    Outcome: Progressing     Problem: SAFETY ADULT  Goal: Patient will remain free of falls  Description: INTERVENTIONS:  - Educate patient/family on patient safety including physical limitations  - Instruct patient to call for assistance with activity   - Consult OT/PT to assist with strengthening/mobility   - Keep Call bell within reach  - Keep bed low and locked with side rails adjusted as appropriate  - Keep care items and personal belongings within reach  - Initiate and maintain comfort rounds  - Make Fall Risk Sign visible to staff  - Offer Toileting every 2 Hours, in advance of need  - Initiate/Maintain bed/chair alarm  - Obtain necessary fall risk management equipment: nonskid socks  - Apply yellow socks and bracelet for high fall risk patients  - Consider moving patient to room near nurses station  Outcome: Progressing  Goal: Maintain or return to baseline ADL function  Description: INTERVENTIONS:  -  Assess patient's ability to carry out ADLs; assess patient's baseline for ADL function and identify physical deficits which impact ability to perform ADLs (bathing, care of mouth/teeth, toileting, grooming, dressing, etc )  - Assess/evaluate cause of self-care deficits   - Assess range of motion  - Assess patient's mobility; develop plan if impaired  - Assess patient's need for assistive devices and provide as appropriate  - Encourage maximum independence but intervene and supervise when necessary  - Involve family in performance of ADLs  - Assess for home care needs following discharge   - Consider OT consult to assist with ADL evaluation and planning for discharge  - Provide patient education as appropriate  Outcome: Progressing  Goal: Maintains/Returns to pre admission functional level  Description: INTERVENTIONS:  - Perform BMAT or MOVE assessment daily    - Set and communicate daily mobility goal to care team and patient/family/caregiver  - Collaborate with rehabilitation services on mobility goals if consulted  - Perform Range of Motion 3 times a day  - Reposition patient every 2 hours    - Dangle patient 3 times a day  - Stand patient 3 times a day  - Ambulate patient 3 times a day  - Out of bed to chair 3 times a day   - Out of bed for meals 3 times a day  - Out of bed for toileting  - Record patient progress and toleration of activity level   Outcome: Progressing     Problem: DISCHARGE PLANNING  Goal: Discharge to home or other facility with appropriate resources  Description: INTERVENTIONS:  - Identify barriers to discharge w/patient and caregiver  - Arrange for needed discharge resources and transportation as appropriate  - Identify discharge learning needs (meds, wound care, etc )  - Arrange for interpretive services to assist at discharge as needed  - Refer to Case Management Department for coordinating discharge planning if the patient needs post-hospital services based on physician/advanced practitioner order or complex needs related to functional status, cognitive ability, or social support system  Outcome: Progressing

## 2023-03-07 NOTE — PCC PHYSICAL THERAPY
Pt continues to demo consistent functional gains with w/c level mobilities completing transfers at CS level and mod indep for w/c propulsion including w/c brake management  Also improving household distance mobilities when using a HW 10-20' at a time completing task at [facility]  Also improving with functional mobility retraining without using an AD but due to ongoing R hemiparesis abhishek with distal LE/UE musculature, impaired LT, proprioception and kinesthetic awareness on R UE/LE, impaired standing balance with delayed righting reaction and gait dysfunction he still requires min-max A of 1 to complete  so pt will greatly benefit from additional skilled PT intervention with focus this week on improving indep with w/c level mobilities while PT to cont with NMR/NPP gait/balance /stair training to improve overall functions in order for pt to be safe ambulator outside of therapy and improve quality of life at d/c with reduce risk for falls  Family training completed over the weekend

## 2023-03-07 NOTE — PLAN OF CARE
Problem: Prexisting or High Potential for Compromised Skin Integrity  Goal: Skin integrity is maintained or improved  Description: INTERVENTIONS:  - Identify patients at risk for skin breakdown  - Assess and monitor skin integrity  - Assess and monitor nutrition and hydration status  - Monitor labs   - Assess for incontinence   - Turn and reposition patient  - Assist with mobility/ambulation  - Relieve pressure over bony prominences  - Avoid friction and shearing  - Provide appropriate hygiene as needed including keeping skin clean and dry  - Evaluate need for skin moisturizer/barrier cream  - Collaborate with interdisciplinary team   - Patient/family teaching  - Consider wound care consult   Outcome: Progressing     Problem: MOBILITY - ADULT  Goal: Maintain or return to baseline ADL function  Description: INTERVENTIONS:  -  Assess patient's ability to carry out ADLs; assess patient's baseline for ADL function and identify physical deficits which impact ability to perform ADLs (bathing, care of mouth/teeth, toileting, grooming, dressing, etc )  - Assess/evaluate cause of self-care deficits   - Assess range of motion  - Assess patient's mobility; develop plan if impaired  - Assess patient's need for assistive devices and provide as appropriate  - Encourage maximum independence but intervene and supervise when necessary  - Involve family in performance of ADLs  - Assess for home care needs following discharge   - Consider OT consult to assist with ADL evaluation and planning for discharge  - Provide patient education as appropriate  Outcome: Progressing  Goal: Maintains/Returns to pre admission functional level  Description: INTERVENTIONS:  - Perform BMAT or MOVE assessment daily    - Set and communicate daily mobility goal to care team and patient/family/caregiver  - Collaborate with rehabilitation services on mobility goals if consulted  - Perform Range of Motion 3 times a day    - Reposition patient every Q2 hours   - Dangle patient 3 times a day  - Stand patient 3 times a day  - Ambulate patient 3 times a day  - Out of bed to chair 3 times a day   - Out of bed for meals 3 times a day  - Out of bed for toileting  - Record patient progress and toleration of activity level   Outcome: Progressing     Problem: PAIN - ADULT  Goal: Verbalizes/displays adequate comfort level or baseline comfort level  Description: Interventions:  - Encourage patient to monitor pain and request assistance  - Assess pain using appropriate pain scale  - Administer analgesics based on type and severity of pain and evaluate response  - Implement non-pharmacological measures as appropriate and evaluate response  - Consider cultural and social influences on pain and pain management  - Notify physician/advanced practitioner if interventions unsuccessful or patient reports new pain  Outcome: Progressing     Problem: INFECTION - ADULT  Goal: Absence or prevention of progression during hospitalization  Description: INTERVENTIONS:  - Assess and monitor for signs and symptoms of infection  - Monitor lab/diagnostic results  - Monitor all insertion sites, i e  indwelling lines, tubes, and drains  - Monitor endotracheal if appropriate and nasal secretions for changes in amount and color  - [place] appropriate cooling/warming therapies per order  - Administer medications as ordered  - Instruct and encourage patient and family to use good hand hygiene technique  - Identify and instruct in appropriate isolation precautions for identified infection/condition  Outcome: Progressing  Goal: Absence of fever/infection during neutropenic period  Description: INTERVENTIONS:  - Monitor WBC    Outcome: Progressing     Problem: SAFETY ADULT  Goal: Patient will remain free of falls  Description: INTERVENTIONS:  - Educate patient/family on patient safety including physical limitations  - Instruct patient to call for assistance with activity   - Consult OT/PT to assist with strengthening/mobility   - Keep Call bell within reach  - Keep bed low and locked with side rails adjusted as appropriate  - Keep care items and personal belongings within reach  - Initiate and maintain comfort rounds  - Make Fall Risk Sign visible to staff  - Offer Toileting every Q2 Hours, in advance of need  - Initiate/Maintain alarm  - Obtain necessary fall risk management equipment:   - Apply yellow socks and bracelet for high fall risk patients  - Consider moving patient to room near nurses station  Outcome: Progressing  Goal: Maintain or return to baseline ADL function  Description: INTERVENTIONS:  -  Assess patient's ability to carry out ADLs; assess patient's baseline for ADL function and identify physical deficits which impact ability to perform ADLs (bathing, care of mouth/teeth, toileting, grooming, dressing, etc )  - Assess/evaluate cause of self-care deficits   - Assess range of motion  - Assess patient's mobility; develop plan if impaired  - Assess patient's need for assistive devices and provide as appropriate  - Encourage maximum independence but intervene and supervise when necessary  - Involve family in performance of ADLs  - Assess for home care needs following discharge   - Consider OT consult to assist with ADL evaluation and planning for discharge  - Provide patient education as appropriate  Outcome: Progressing  Goal: Maintains/Returns to pre admission functional level  Description: INTERVENTIONS:  - Perform BMAT or MOVE assessment daily    - Set and communicate daily mobility goal to care team and patient/family/caregiver  - Collaborate with rehabilitation services on mobility goals if consulted  - Perform Range of Motion 3 times a day  - Reposition patient every Q2 hours    - Dangle patient 3 times a day  - Stand patient 3 times a day  - Ambulate patient 3 times a day  - Out of bed to chair 3 times a day   - Out of bed for meals 3 times a day  - Out of bed for toileting  - Record patient progress and toleration of activity level   Outcome: Progressing     Problem: DISCHARGE PLANNING  Goal: Discharge to home or other facility with appropriate resources  Description: INTERVENTIONS:  - Identify barriers to discharge w/patient and caregiver  - Arrange for needed discharge resources and transportation as appropriate  - Identify discharge learning needs (meds, wound care, etc )  - Arrange for interpretive services to assist at discharge as needed  - Refer to Case Management Department for coordinating discharge planning if the patient needs post-hospital services based on physician/advanced practitioner order or complex needs related to functional status, cognitive ability, or social support system  Outcome: Progressing

## 2023-03-07 NOTE — PROGRESS NOTES
Internal Medicine Progress Note  Patient: Rakesh Barger  Age/sex: 60 y o  male  Medical Record #: [id number]      ASSESSMENT/PLAN: (Interval History)  Rakesh Barger is seen and examined and management for following issues:    Posterior circulation stroke  • S/p intracranial and extracranial vertebral artery stenting/TICI 2B revascularization  • Continue ASA, Brilinta, Eliquis and atorvastatin  • Repeat CTA head and neck around 3/17/23  • Prozac for depressed mood following CVA   • Recommend Neuropsych consult  • Dysphagia 3 diet with nectar thick liquids  • Continue Baclofen 10mg 2x daily  • Therapy per primary service  • Outpt follow-up with Neurology and Neurosurgery     HTN  • Home: No medications  • Here: amlodipine 10mg daily/Coreg 12 5mg 2x daily/lisinopril 20mg daily  • Goal normotension  • dc'd chlorthalidone d/t elevated BUN  • Increased coreg to 12 5mg bid as above  • Stable    Fe deficiency anemia  · Likely multifactoral  · Will give IV venofer x 2 doses  · Then add daily ferrous sulfate  · Cbc mondays     CKD stage 2  • Baseline Cr 1 2 - 1 3  • Chlorthalidone dc'd  • Repeat bmp monday     COVID  • Continue precautions through 3/7/23  • Pt has not had respiratory symptoms  • Remains asymptomatic     Prediabetes  • HA1C 5 7  • Recommend dietary modifications     DC planning:  TBD  The above assessment and plan was reviewed and updated as determined by my evaluation of the patient on 3/7/2023      Labs:   Results from last 7 days   Lab Units 03/06/23  0627 03/05/23  0501   WBC Thousand/uL 8 82 10 45*   HEMOGLOBIN g/dL 10 1* 10 0*   HEMATOCRIT % 31 4* 30 6*   PLATELETS Thousands/uL 302 323     Results from last 7 days   Lab Units 03/06/23  0627 03/05/23  0501   SODIUM mmol/L 138 136   POTASSIUM mmol/L 4 1 4 0   CHLORIDE mmol/L 108 107   CO2 mmol/L 26 27   BUN mg/dL 34* 38*   CREATININE mg/dL 1 26 1 29   CALCIUM mg/dL 8 7 8 6                   Review of Scheduled Meds:  Current Facility-Administered Medications   Medication Dose Route Frequency Provider Last Rate   • amLODIPine  10 mg Oral Daily Makenna Huerta MD     • apixaban  2 5 mg Oral BID Makenna Huerta MD     • aspirin  81 mg Oral Daily Makenna Huerta MD     • atorvastatin  40 mg Oral Daily With Babita Peterson MD     • baclofen  10 mg Oral BID Makenna Huerta MD     • carvedilol  12 5 mg Oral BID With Meals YURI Pineda     • vitamin B-12  1,000 mcg Oral Daily Makenna Huerta MD     • [START ON 3/10/2023] ferrous sulfate  325 mg Oral Daily With Breakfast YURI Gallagher     • FLUoxetine  20 mg Oral Daily Makenna Huerta MD     • iron sucrose  200 mg Intravenous Daily YURI Pineda     • lisinopril  20 mg Oral Daily Makenna Huerta MD     • polyethylene glycol  17 g Oral Daily PRN Makenna Huerta MD     • ticagrelor  90 mg Oral Q12H Leilani Vinson MD         Subjective/ HPI: Patient seen and examined  Patients overnight issues or events were reviewed with nursing or staff during rounds or morning huddle session  New or overnight issues include the following:     Pt seen and examined  No complaints overnight, still having some hiccups on and off  Tolerating the thickened liquids but not his favorite  ROS:   A 10 point ROS was performed; negative except as noted above         Imaging:     No orders to display       *Labs /Radiology studies Reviewed  *Medications  reviewed and reconciled as needed  *Please refer to order section for additional ordered labs studies  *Case discussed with primary attending during morning huddle case rounds    Physical Examination:  Vitals:   Vitals:    03/06/23 2100 03/07/23 0526 03/07/23 0600 03/07/23 0916   BP: 150/60   147/73   BP Location: Left arm   Right arm   Pulse: 61 58  67   Resp: 17 18  18   Temp: 98 6 °F (37 °C) 97 8 °F (36 6 °C)     TempSrc: Oral Oral     SpO2: 97%      Weight:   91 1 kg (200 lb 13 4 oz)    Height:           GEN: No apparent distress, interactive, pleasant  NEURO: Alert and oriented x3; dysarthria  HEENT: Pupils are equal and reactive, EOMI, mucous membranes are moist, face asymmetrical  CV: S1 S2 regular, no MRG, no peripheral edema noted  RESP: Lungs are clear bilaterally, no wheezes, rales or rhonchi noted, on room air, respirations easy and non labored  GI: Flat, soft non tender, non distended; +BS x4  : Voiding without difficulty  MUSC: Moves all extremities; right hemiparesis RUE>RLE  SKIN: pink, warm and dry, normal turgor, no rashes, lesions      The above physical exam was reviewed and updated as determined by my evaluation of the patient on 3/7/2023  Invasive Devices     Peripheral Intravenous Line  Duration           Peripheral IV 03/03/23 Left;Upper;Ventral (anterior) Arm 4 days                   VTE Pharmacologic Prophylaxis: Eliquis  Code Status: Level 1 - Full Code  Current Length of Stay: 1 day(s)      Total time spent:  30 minutes with more than 50% spent counseling/coordinating care  Counseling includes discussion with patient re: progress  and discussion with patient of his/her current medical state/information  Coordination of patient's care was performed in conjunction with primary service  Time invested included review of patient's labs, vitals, and management of their comorbidities with continued monitoring  In addition, this patient was discussed with medical team including physician and advanced extenders  The care of the patient was extensively discussed and appropriate treatment plan was formulated unique for this patient  Medical decision making for the day was made by supervising physician unless otherwise noted in their attestation statement  ** Please Note:  voice to text software may have been used in the creation of this document   Although proof errors in transcription or interpretation are a potential of such software**

## 2023-03-07 NOTE — PCC OCCUPATIONAL THERAPY
Sally Jasso is making good progress towards goals  He has gone from total assist to min assist for overall ADL routine  With goals by end of this week to be min assist/supervision level  Multiple family training sessions have occurred  Continue to benefit from skilled inpatient OT services to maximize pt's independence and safety with transfers and ADL routine  On track for d/c Friday with assist/supervision for ADLS/transfers and continued outpatient neuro OT

## 2023-03-07 NOTE — PROGRESS NOTES
Occupational Therapy Initial Evaluation     03/07/23 0700   Patient Data   Rehab Impairment Impairment of mobility, safety and Activities of Daily Living (ADLs) due to stroke: 01 2 Right Body Involvement (Left Brain)   Etiologic Diagnosis bilateral cerebellar infarcts   Date of Onset 02/25/23   Support System   Name Pt w/ supportive wife, Eduardo Mayorga, who is able to provide 24 hour S/A at d/c  Add'lly w/ 2 supportive adult children who can A at times  Able to provide 24 hour supervision Yes   Able to provide physical help? Yes   Home Setup   Type of Home Multi Level   Method of Entry Curb   Number of Stairs 2  (1+1)   Number of Stairs in Home 15   In Home Hand Rail Right   First Floor Bathroom Half   Second Floor Bathroom Full;Tub;Combo   First Floor Setup Available Yes   Home Modifications Necessary?   (TBD)   Available Equipment Roller Walker; Shower Chair;Bedside Commode   Baseline Information   Vocation Work Full Time  ()   Transportation    Prior Device(s) Used   (none)   Prior IADL Participation   Money Management Identify Money;Estimate Costs;Estimate Change;Combine Bills   Meal Preparation Partial Participation  (shares w/ wife)   Laundry   (shares w/ wife)   Home Cleaning   (shares w/ wife)   Prior Level of Function   Self-Care 3  Independent - Patient completed the activities by him/herself, with or without an assistive device, with no assistance from a helper  Indoor-Mobility (Ambulation) 3  Independent - Patient completed the activities by him/herself, with or without an assistive device, with no assistance from a helper  Stairs 3  Independent - Patient completed the activities by him/herself, with or without an assistive device, with no assistance from a helper  Functional Cognition 3  Independent - Patient completed the activities by him/herself, with or without an assistive device, with no assistance from a helper  Prior Device Used Z   None of the above   Falls in the Last Year   Number of falls in the past 12 months 0   Patient Preference   Nickname (Patient Preference) Sharlene Perry   Psychosocial   Psychosocial (WDL) X   Patient Behaviors/Mood Appropriate for age; Appropriate for situation;Brightens with approach;Calm; Cooperative;Labile;Pleasant; Tearful   Restrictions/Precautions   Precautions Aspiration;Bed/chair alarms;Cognitive; Fall Risk;Contact/isolation; Airborne/isolation;Supervision on toilet/commode   Weight Bearing Restrictions No   ROM Restrictions No   Braces or Orthoses Sling  (for RUE during mobility)   Pain Assessment   Pain Assessment Tool 0-10   Pain Score No Pain   Eating Assessment   Type of Assistance Needed Set-up / clean-up   Physical Assistance Level No physical assistance   Eating CARE Score 5   Oral Hygiene   Type of Assistance Needed Physical assistance   Physical Assistance Level Total assistance   Comment would req Ax2 if completed in stance to simulate baseline  while seated req Mod A, R [place] to open toothpaste  With L hand, set-up and S  Oral Hygiene CARE Score 1   Tub/Shower Transfer   Reason Not Assessed Sponge Bath;Safety; Endurance;Balance   Shower/Bathe Self   Type of Assistance Needed Physical assistance   Physical Assistance Level Total assistance   Comment pt reports standing for shower at baseline, would req Ax2 to simulate baseline  Due to R hemiparesis and fatigue, LB bathing while supine w/ A to bathe lower legs, pt able to bathe sadiq and upper legs  sidelying w/ A to bathe buttocks  While seated EOB w/ S and set-up, pt able to bathe ABD and RUE, [place] to bathe LUE A to thoroughly bathe LUE  Shower/Bathe Self CARE Score 1   Dressing/Undressing Clothing   Type of Assistance Needed Physical assistance   Physical Assistance Level Total assistance   Comment reports completing in stance at baseline, would req Ax2 in stance  While seated EOB w/ S and set-up   Edu provided on julieta-dressing and A to fully thread RUE, pt able to partially pull OH and pt able to thread LUE  A to fully manage down back  Upper Body Dressing CARE Score 1   Type of Assistance Needed Physical assistance;Verbal cues; Set-up / clean-up   Physical Assistance Level Total assistance   Comment A to thread BLE while seated EOB, Mod A to steady in stance w/ anterior guard and R knee block, 2nd person for clothing management  Lower Body Dressing CARE Score 1   Putting On/Taking Off Footwear   Type of Assistance Needed Physical assistance   Physical Assistance Level Total assistance   Comment A to don/doff socks  Putting On/Taking Off Footwear CARE Score 1   Toileting Hygiene   Type of Assistance Needed Physical assistance   Physical Assistance Level Total assistance   Comment utilized urinal 2* urgency to void  Mod A to seady in stance w/ anterior guard and R knee block, 2nd person for hygiene and clothing management  Toileting Hygiene CARE Score 1   Toilet Transfer   Comment limited by time constraints, plan to assess next session  Plan to trial Ax2 swap out while in stance at bed rail vs Ax2 sit pivot to drop-arm BSC  Reason if not Attempted Safety concerns   Toilet Transfer CARE Score 88   Transfer Bed/Chair/Wheelchair   Type of Assistance Needed Physical assistance; Adaptive equipment;Set-up / clean-up; Verbal cues   Physical Assistance Level Total assistance   Comment Mod Ax2 slideboard xfer EOB<> drop-arm recliner  With repetition x3, pt then Mod Ax1, SBA of 2nd person for safety  Chair/Bed-to-Chair Transfer CARE Score 1   Lying to Sitting on Side of Bed   Type of Assistance Needed Physical assistance   Physical Assistance Level 26%-50%   Lying to Sitting on Side of Bed CARE Score 3   Sit to Stand   Type of Assistance Needed Physical assistance   Physical Assistance Level Total assistance   Comment Mod-Max Ax1 w/ anterior guard and R knee block  SBA of 2nd person for safety on pt's R  Noted w/ R retrolean, pt able to correct w/ cues     Sit to Stand CARE Score 1   Comprehension QI: Comprehension 3  Usually Understands: Understands most conversations, but misses some part/intent of message  Requires cues at times to understand  Expression   QI: Expression 3  Exhibits some difficulty with expressing needs and ideas (e g , some words or finishing thoughts) or speech is not clear   RUE Assessment   RUE Assessment X  (R-hand dominant, no h/o of injury or sx)   Strength - RUE   R Shoulder Flexion 1/5   R Shoulder Extension 1/5   R Shoulder ABduction 1/5   R Elbow Flexion 1/5   R Elbow Extension 1/5   R Wrist Flexion 1/5   R Wrist Extension 1/5   R Wrist ABduction 1/5   R Wrist ADduction 1/5   R Digit Flexion 1/5   R Digit Extension 1/5   LUE Assessment   LUE Assessment WFL   Coordination   Movements are Fluid and Coordinated 0   Coordination and Movement Description R hemiparesis, R retrolean   Sensation   Light Touch Partial deficits in the RUE;Partial deficits in the RLE   Propioception Partial deficits in the RUE;Partial deficits in the RLE   Cognition   Overall Cognitive Status WFL  (basic)   Arousal/Participation Alert; Responsive; Cooperative   Attention Attends with cues to redirect   Orientation Level Oriented X4;Oriented to person;Oriented to place;Oriented to time;Oriented to situation   Memory Decreased short term memory;Decreased recall of precautions   Following Commands Follows one step commands without difficulty   Comments Noted w/ emotional lability, tearful several times t/o session  Will benefit from formalized cognitive assessment later in rehab course, will cont to assess fxnl cognition during OT  Vision   Vision Comments Pt reports glasses at baseline, pt denied visual change since admission  Perception   Inattention/Neglect Cues to maintain midline in sitting;Cues to maintain midline in standing;Cues to attend to right side of body   Discharge Information   Vocational Plan Return to work; Full Time   Barriers to Return to [place] "Limitation;Transportation Issues;Strength; Endurance;Communication   Patient's Discharge Plan home w/ family support to A w/ ADL/IADLs   Patient's Rehab Expectations \"To do more on my own  Get better  \"   Barriers to Discharge Home Limited Family Support; Unsafe Home Setup; Decreased Strength;Decreased Endurance; Safety Considerations   Impressions Pt is a 61 y/o male presenting to 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  Noted w/ clinical deterioration and req stenting of V3 and V4 with TICI 2B revascularization on 2/26  MRI showed cerebellar CVA and R > L stroke burden, w/ additional hypodensities on DWI appreciated L midbrain, pontine area and R lower medullary/spinal cord junction  PMH includes HTN  Pt reports completing ADLs/IADLs and fxnl mobility Independently without AD, + working full-time, (+) driving  Pt is currently functioning at overall TA for ADLs and RA for fxnl xfers  Pt is limited by dec strength, dec endurance, impaired balance, ataxia, impaired coordination, dec core strength, R hemiparesis, impaired sensation to R hemibody, expressive aphasia, unsafe home set-up w/ SHANNON, and ADL dysfunction  Pt will benefit from skilled OT services w/ focus on above barriers, assess need for DME, provide pt/family edu, and maximize fxnl indep to return to [facility] in 4 week [facility] to meet S to Min A ADL goals     OT Therapy Minutes   OT Time In 0700   OT Time Out 0840   OT Total Time (minutes) 100   OT Mode of treatment - Individual (minutes) 100   OT Mode of treatment - Concurrent (minutes) 0   OT Mode of treatment - Group (minutes) 0   OT Mode of treatment - Co-treat (minutes) 0   OT Mode of Treatment - Total time(minutes) 100 minutes   OT " Cumulative Minutes 100   Cumulative Minutes   Cumulative therapy minutes 100     Stacy Rios MS, OTR/L

## 2023-03-07 NOTE — PROGRESS NOTES
TAA     03/07/23 1140   Patient Data   Rehab Impairment Impairment of mobility, safety and Activities of Daily Living (ADLs) due to stroke: 01 2 Right Body Involvement (Left Brain)   Etiologic Diagnosis bilateral cerebellar infarcts   Date of Onset 02/25/23   Prior Level of Function   Self-Care 3  Independent - Patient completed the activities by him/herself, with or without an assistive device, with no assistance from a helper  Indoor-Mobility (Ambulation) 3  Independent - Patient completed the activities by him/herself, with or without an assistive device, with no assistance from a helper  Stairs 3  Independent - Patient completed the activities by him/herself, with or without an assistive device, with no assistance from a helper  Functional Cognition 3  Independent - Patient completed the activities by him/herself, with or without an assistive device, with no assistance from a helper  Prior Device Used Z  None of the above   Restrictions/Precautions   Precautions Aspiration;Bed/chair alarms;Cognitive; Fall Risk; Airborne/isolation;Contact/isolation;Supervision on toilet/commode   Pain Assessment   Pain Assessment Tool 0-10   Pain Score No Pain   Eating Assessment   Swallow Precautions Yes   Bedside Swallow Results Yes   VBS Study Results No   Food To Mouth Yes   Able To Cut Other  (not assessed)   Positioning Upright   Meal Assessed Lunch   QI: Swallowing/Nutritional Status Mechanical Diet   Current Diet Dysphia III;Nectar Thick   Intake Mode PO;Self   Finishes Timely Yes   Opens Packages Yes   Findings Pt presenting w/ mild oropharyngeal dyspahgia at this time  Refer to SLP Rehb note for full details     Type of Assistance Needed Supervision   Physical Assistance Level 25% or less   Eating CARE Score 3   Oral Hygiene   Type of Assistance Needed Independent   Physical Assistance Level 25% or less   Oral Hygiene CARE Score -   Comprehension   Assist Devices Glasses   Auditory Complex   Visual Basic   Findings Pt completing Cognitive and Bedside Dysphagia assessments  Will benefit from skilled ST  Refer to SLP Rehab note for full details  QI: Comprehension 4  Undestands: Clear comprehension without cues or repetitions   Comprehension (FIM) 6 - Has only MILD difficulty with complex/abstract info   Expression   Verbal Complex   Non-Verbal Complex   Intelligibility Sentence   Findings Pt completing Cognitive and Bedside Dysphagia assessments  Will benefit from skilled ST  Refer to SLP Rehab note for full details  QI: Expression 4  Express complex messages without difficulty and with speech that is clear and easy to [place]   Expression (FIM) 6 - Has only MILD difficulty with complex/abstract info   Social Interaction   Cooperation with staff;with family;with others   Participation Individual   Medications needed to control mood/behavior? No   Behaviors observed Appropriate   Findings Pt completing Cognitive and Bedside Dysphagia assessments  Will benefit from skilled ST  Refer to SLP Rehab note for full details  Social Interaction (FIM) 7 - Interacts appropriately without assistive device, medication or helper   Problem Solving   Complex Other;Manages medications;Manages return to work  (wife to manage finances, d/c planning)   Routine Manages call bell;Manges precautions   Findings Pt completing Cognitive and Bedside Dysphagia assessments  Will benefit from skilled ST  Refer to SLP Rehab note for full details  Problem solving (FIM) 6 - Solves complex problems BUT requires extra time   Memory   Recognize People Yes   Remember Routine Yes   Initiates Tasks Yes   Short-Term Intact   Long Term Intact   Recalls Precaution Yes   Findings Pt completing Cognitive and Bedside Dysphagia assessments  Will benefit from skilled ST  Refer to SLP Rehab note for full details  Memory (FIM) 7 - Remembers daily routines   Cognition   Overall Cognitive Status WFL   Arousal/Participation Alert; Responsive; Cooperative   Attention "Difficulty attending to directions   Orientation Level Oriented X4   Memory Within functional limits   Following Commands Follows multistep commands without difficulty   Discharge Information   Vocational Plan Return to work   Barriers to Return to [employer] Access;Skill Set Limitation;Transportation Issues;Strength; Endurance   Patient's Discharge Plan return home with family support   Patient's Rehab Expectations \"eat normal food\"   Barriers to Discharge [place]; Decreased Strength;Decreased Endurance; Safety Considerations   Impressions Pt is a 60year old male s/p Acute Posterior Circulation Stroke  Pt is seen for high complexity eval with medical co-morbidities affecting functional progress include anemia, adjustment disorder with depressed mood, prediabetes, CKD, spasticity, dysphagia, primary hypertension,stenosis of left vertebral artery,right vertebral artery dissection, COVID and bowel incontinence  Pt is a good rehab candidate to achieve supervision level cognitive linguistic skills and independence for establishing safest least restrictive diet while on the acute rehab center w/ anticipated discharge home w/ family support/supervision  Current barriers which present include, mild oropharyngeal dysphagia, aspiration risk, mild dysarthria, decreased attention, and decreased visuospatial skills  At this time, pt will benefit from skilled SLP services targeting cognitive linguistic skills as well as establishment of least restricitive diet w/o increasing pt's overall risk of aspiration in attempts to decrease overall caregiver burden at time of anticipated discharge home w/ family support/supervision     SLP Therapy Minutes   SLP Time In 1140   SLP Time Out 1310   SLP Total Time (minutes) 90   SLP Mode of treatment - Individual (minutes) 90   SLP Mode of treatment - Concurrent (minutes) 0   SLP Mode of treatment - Group (minutes) 0   SLP Mode of treatment - Co-treat (minutes) 0   SLP " Mode of Treatment - Total time(minutes) 90 minutes   SLP Cumulative Minutes 90   Cumulative Minutes   Cumulative therapy minutes 90

## 2023-03-07 NOTE — PROGRESS NOTES
03/07/23 1140   Patient Data   Rehab Impairment Impairment of mobility, safety and Activities of Daily Living (ADLs) due to stroke: 01 2 Right Body Involvement (Left Brain)      Etiologic Diagnosis bilateral cerebellar infarcts      Date of Onset 02/25/23   Prior Level of Function   Self-Care 3  Independent - Patient completed the activities by him/herself, with or without an assistive device, with no assistance from a helper  Indoor-Mobility (Ambulation) 3  Independent - Patient completed the activities by him/herself, with or without an assistive device, with no assistance from a helper  Stairs 3  Independent - Patient completed the activities by him/herself, with or without an assistive device, with no assistance from a helper  Functional Cognition 3  Independent - Patient completed the activities by him/herself, with or without an assistive device, with no assistance from a helper  Prior Device Used Z  None of the above      Restrictions/Precautions   Precautions Aspiration;Bed/chair alarms;Cognitive; Fall Risk; Airborne/isolation;Contact/isolation;Supervision on toilet/commode      Pain Assessment   Pain Assessment Tool 0-10   Pain Score No Pain   Eating Assessment   Swallow Precautions Yes   Bedside Swallow Results Yes   VBS Study Results No   Food To Mouth Yes   Able To Cut Other  (not assessed)   Positioning Upright   Meal Assessed Lunch   QI: Swallowing/Nutritional Status Mechanical Diet   Current Diet Dysphia III;Nectar Thick   Intake Mode PO;Self   Finishes Timely Yes   Opens Packages Yes   Findings Pt presenting w/ mild oropharyngeal dyspahgia at this time  Refer to SLP Rehb note for full details     Type of Assistance Needed Supervision   Physical Assistance Level 25% or less   Eating CARE Score 3   Oral Hygiene   Type of Assistance Needed Independent   Physical Assistance Level 25% or less   Oral Hygiene CARE Score    Comprehension   Assist Devices Glasses   Auditory Complex   Visual Basic Findings Pt completing Cognitive and Bedside Dysphagia assessments  Will benefit from skilled ST  Refer to SLP Rehab note for full details  QI: Comprehension 4  Undestands: Clear comprehension without cues or repetitions   Comprehension (FIM) 6 - Has only MILD difficulty with complex/abstract info   Expression   Verbal Complex   Non-Verbal Complex   Intelligibility Sentence   Findings Pt completing Cognitive and Bedside Dysphagia assessments  Will benefit from skilled ST  Refer to SLP Rehab note for full details  QI: Expression 4  Express complex messages without difficulty and with speech that is clear and easy to understand   Expression (FIM) 6 - Has only MILD difficulty with complex/abstract info   Social Interaction   Cooperation with staff;with family;with others   Participation Individual   Medications needed to control mood/behavior? No   Behaviors observed Appropriate   Findings Pt completing Cognitive and Bedside Dysphagia assessments  Will benefit from skilled ST  Refer to SLP Rehab note for full details  Social Interaction (FIM) 7 - Interacts appropriately without assistive device, medication or helper   Problem Solving   Complex Other;Manages medications;Manages return to work  (wife to manage finances, d/c planning)   Routine Manages call bell;Manges precautions   Findings Pt completing Cognitive and Bedside Dysphagia assessments  Will benefit from skilled ST  Refer to SLP Rehab note for full details  Problem solving (FIM) 6 - Solves complex problems BUT requires extra time   Memory   Recognize People Yes   Remember Routine Yes   Initiates Tasks Yes   Short-Term Intact   Long Term Intact   Recalls Precaution Yes   Findings Pt completing Cognitive and Bedside Dysphagia assessments  Will benefit from skilled ST  Refer to SLP Rehab note for full details     Memory (FIM) 7 - Remembers daily routines   SLP Therapy Minutes   SLP Time In 1140   SLP Time Out 1310   SLP Total Time (minutes) 90   SLP Mode of treatment - Individual (minutes) 90   SLP Mode of treatment - Concurrent (minutes) 0   SLP Mode of treatment - Group (minutes) 0   SLP Mode of treatment - Co-treat (minutes) 0   SLP Mode of Treatment - Total time(minutes) 90 minutes   SLP Cumulative Minutes 90 minutes   Cumulative Minutes   Cumulative therapy minutes 90

## 2023-03-08 PROBLEM — K59.2 NEUROGENIC BOWEL: Status: ACTIVE | Noted: 2023-03-08

## 2023-03-08 RX ORDER — BACLOFEN 10 MG/1
5 TABLET ORAL 4 TIMES DAILY
Status: DISCONTINUED | OUTPATIENT
Start: 2023-03-08 | End: 2023-03-20

## 2023-03-08 RX ORDER — HYDRALAZINE HYDROCHLORIDE 25 MG/1
25 TABLET, FILM COATED ORAL EVERY 8 HOURS PRN
Status: DISCONTINUED | OUTPATIENT
Start: 2023-03-08 | End: 2023-03-31

## 2023-03-08 RX ADMIN — APIXABAN 2.5 MG: 2.5 TABLET, FILM COATED ORAL at 17:06

## 2023-03-08 RX ADMIN — AMLODIPINE BESYLATE 10 MG: 10 TABLET ORAL at 08:02

## 2023-03-08 RX ADMIN — TICAGRELOR 90 MG: 90 TABLET ORAL at 08:02

## 2023-03-08 RX ADMIN — BACLOFEN 10 MG: 10 TABLET ORAL at 08:02

## 2023-03-08 RX ADMIN — BACLOFEN 5 MG: 10 TABLET ORAL at 12:19

## 2023-03-08 RX ADMIN — APIXABAN 2.5 MG: 2.5 TABLET, FILM COATED ORAL at 08:01

## 2023-03-08 RX ADMIN — CARVEDILOL 12.5 MG: 12.5 TABLET, FILM COATED ORAL at 17:06

## 2023-03-08 RX ADMIN — CARVEDILOL 12.5 MG: 12.5 TABLET, FILM COATED ORAL at 07:55

## 2023-03-08 RX ADMIN — BACLOFEN 5 MG: 10 TABLET ORAL at 17:06

## 2023-03-08 RX ADMIN — LISINOPRIL 20 MG: 20 TABLET ORAL at 08:01

## 2023-03-08 RX ADMIN — CYANOCOBALAMIN TAB 500 MCG 1000 MCG: 500 TAB at 08:01

## 2023-03-08 RX ADMIN — ASPIRIN 81 MG CHEWABLE TABLET 81 MG: 81 TABLET CHEWABLE at 08:02

## 2023-03-08 RX ADMIN — FLUOXETINE 20 MG: 20 CAPSULE ORAL at 08:02

## 2023-03-08 RX ADMIN — TICAGRELOR 90 MG: 90 TABLET ORAL at 21:20

## 2023-03-08 RX ADMIN — ATORVASTATIN CALCIUM 40 MG: 40 TABLET, FILM COATED ORAL at 17:06

## 2023-03-08 RX ADMIN — IRON SUCROSE 200 MG: 20 INJECTION, SOLUTION INTRAVENOUS at 08:05

## 2023-03-08 RX ADMIN — BACLOFEN 5 MG: 10 TABLET ORAL at 21:19

## 2023-03-08 NOTE — RESTORATIVE TECHNICIAN NOTE
"       Restorative Technician Note      Patient Name: Luna Velasquez     Note Type: Bracing, Initial consult  Patient Position Upon Consult: Supine  Brace Applied: Multipodous Boot (RLE)  Additional Brace Ordered: No  Patient Position When Brace Applied: Supine  Bracing Recommendations: None  Education Provided: Yes  Patient Position at End of Consult: Supine; All needs within reach  Nurse Communication: Nurse aware of consult, application of brace      Please call Mobility Coordinator at ext  [phone] or on Tiger text \" [id number]-Restorative Tech\" role in regards to bracing instruction and/or adjustment    Javier Ann Restorative Technician, [facility]      "

## 2023-03-08 NOTE — PROGRESS NOTES
NEUROPSYCHOLOGY INITIAL CONSULT  NOTE  Jose L Richards 60 y o  :1960 male MRN: [id number]  DOS:23  Unit/Bed#: -01 Encounter: [id number]      Requested by (Physician/Service): Paulo Bhakta MD  Reason for Consultation: Evaluate and treat impact of mood and coping on progress in rehabilitation  HPI: Jose L Richards is a 60 y o  right handed male with medical history of HTN who presented to [address] [place] [address] on 23 with nausea/dizziness  Found to have hypertensive emergency with acute/subcaute R posterior cerebellar CVA with 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, mild BRAULIO, and incidentally found + COVID (without evidence of respiratory illness/hypoxia/CXR findings)  Placed on cardene gtt, stroke pathway, ASA/Plavix, IV hydration for BRAULIO, and CTX for UTI  NIHSS 2  Symptoms began 2 days prior  Not tPA/TNK candidate  MRI/MRA with 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  NSx recommended transfer to [facility] and starting on heparin gtt and stopped Plavix  Repeat CTA on  stable with with R V3/4 occlusion  Not a candidate for interventional procedure due to clinical stability and risk  Speech consulted and noted mod-severe oropharyngeal dysphagia recommended pureed/HTL  Unfortunately later on , he clinically deteriorated with increased R sided weakness, and was taken to IR for stenting of V3 and V4 with TICI 2B revascularization  CTH without ICH  He was admitted back to the ICU intubated - extubated   MRI showed cerebellar CVA and R > L stroke burden, with additional hypodensities on DWI appreciated L midbrain, pontine area and R lower medullary/spinal cord junction  He was transitioned to triple therapy with DAPT (given recent stent) and A/C with eliquis 2 5mg BID due to COVID  Diet advanced to Level 3/NTL on   Noted to have spasticity in RLE - started on baclofen by Neurology   He was able to have cardene discontinued on 3/2, and they have been making adjustments to his oral regimen  He was started on Prozac for his mood  NSx has signed off  CTA H/N recommended around 3/17  Length of time on eliquis unclear  He was [facility] on 3/6/[facility]  Since admission, pt has been very tearful and depressed  Denies any new CP, SOB, fevers, chills, N/V, abdominal pain  He is incontinent of bowel on admission  He has some word finding difficulties and dysphagia  He has aphasia, but speech is improving  He feels the strength in his RLE is improving, but remains with decreased tone and strength in his right upper extremity  He denies any pain anywhere         HISTORY:     Patient Active Problem List    Diagnosis Date Noted   • Neurogenic bowel 03/08/2023   • Anemia 03/06/2023   • Spasticity 03/01/2023   • CKD (chronic kidney disease) 03/01/2023   • Prediabetes 03/01/2023   • Adjustment disorder with depressed mood 03/01/2023   • BRAULIO (acute kidney injury) ([facility] [address] ) 02/25/2023   • COVID 02/25/2023   • Acute Posterior Circulation Stroke 02/25/2023   • Right Vertebral artery dissection ([facility] [address] ) 02/25/2023   • Stenosis of left vertebral artery 02/25/2023   • Primary hypertension 02/25/2023   • Dizziness 02/25/2023   • Dysphagia 02/25/2023       Body mass index is 27 8 kg/m²  Past Medical History:     No past medical history on file  Past Surgical History:     No past surgical history on file        Allergies:     No Known Allergies      Social History:    Social History     Socioeconomic History   • Marital status: /Civil Union     Spouse name: Not on file   • Number of children: Not on file   • Years of education: Not on file   • Highest education level: Not on file   Occupational History   • Not on file   Tobacco Use   • Smoking status: Never   • Smokeless tobacco: Never   Substance and Sexual Activity   • Alcohol use: Not Currently   • Drug use: Not Currently   • Sexual activity: Not on file   Other Topics Concern   • Not on file   Social History Narrative   • Not on file     Social Determinants of Health     Financial Resource Strain: Not on file   Food Insecurity: Not on file   Transportation Needs: Not on file   Physical Activity: Not on file   Stress: Not on file   Social Connections: Not on file   Intimate Partner Violence: Not on file   Housing Stability: Not on file        Family History:    No family history on file      Medications:     Current Facility-Administered Medications:   •  amLODIPine (NORVASC) tablet 10 mg, 10 mg, Oral, Daily, Leonarda Case MD, 10 mg at 03/08/23 0802  •  apixaban (ELIQUIS) tablet 2 5 mg, 2 5 mg, Oral, BID, Leonarda Case MD, 2 5 mg at 03/08/23 1706  •  aspirin chewable tablet 81 mg, 81 mg, Oral, Daily, Leonarda Case MD, 81 mg at 03/08/23 0802  •  atorvastatin (LIPITOR) tablet 40 mg, 40 mg, Oral, Daily With Elvia Devine MD, 40 mg at 03/08/23 1706  •  baclofen tablet 5 mg, 5 mg, Oral, 4x Daily, Leonarda Case MD, 5 mg at 03/08/23 1706  •  carvedilol (COREG) tablet 12 5 mg, 12 5 mg, Oral, BID With Meals, Pinkie Manges, CRNP, 12 5 mg at 03/08/23 1706  •  cyanocobalamin (VITAMIN B-12) tablet 1,000 mcg, 1,000 mcg, Oral, Daily, Leonarda Case MD, 1,000 mcg at 03/08/23 0801  •  [START ON 3/10/2023] ferrous sulfate tablet 325 mg, 325 mg, Oral, Daily With Breakfast, Pinkie Manges, CRNP  •  FLUoxetine (PROzac) capsule 20 mg, 20 mg, Oral, Daily, Leonarda Case MD, 20 mg at 03/08/23 0802  •  hydrALAZINE (APRESOLINE) tablet 25 mg, 25 mg, Oral, Q8H PRN, Pinkie Manges, CRNP  •  lisinopril (ZESTRIL) tablet 20 mg, 20 mg, Oral, Daily, Leonarda Case MD, 20 mg at 03/08/23 0801  •  polyethylene glycol (MIRALAX) packet 17 g, 17 g, Oral, Daily PRN, Leonarda Case MD  •  ticagrelor (BRILINTA) tablet 90 mg, 90 mg, Oral, Q12H [address], Cyscarlett Case MD, 90 mg at 03/08/23 0802    ASSESSMENT:   Alda Fonseca is a very pleasant 60year old  male who was admitted to [facility] 3/6/23 to regain strength and functioning s/p acute posterior circulation stroke  Pt reports to prior history of stroke or stroke risk, to his awareness  Pt and spouse report their family had Norovirus  Pt felt nauseated and sleepy and they presumed he also contracted Norovirus, as he slept and stayed in bed for 2 full days  By day 3 he was not easily awakened and the family brought him to the ED  Pt's spouse was present during interview and contributed to history  They have been  for 34 years and have 2 children  Pt works 3 jobs on a regular basis  He works full-time as a   He works as a farmer, 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  He also works as the Assistant  for the [employer]  He typically comes home late in the evening and eats dinner by 10:00 pm   He survives on 4 - 5 hours of sleep nightly  During this 4 - 5 hours, he reports he does not sleep well and usually wakes up feeling tired  He gets up to urinate frequently and also is reported to snore  Pt's brothers have all been diagnosed with sleep apnea  Pt is a very strong person who self identifies as a provider and is self sufficient  It is very difficult for him to even acknowledge that he feels tired or needs help and therefore he does not ask for assistance  He is very stoic  Pt does not use tobacco and consumes alcohol moderately  Stress, lifestyle, work schedules, sleep deprivation and possible apnea, and learning to ask for help are all risk factors to be addressed  Pt  maintained good eye contact and engaged appropriately throughout the interview  Pt was seen in his room at bedside, presented as pleasant,  cooperative and established rapport without difficulty  Mood ranged from tearful to positive and was appropriate to context  Pt is having difficulty adjusting to the fact that he had a stroke and is not functioning the way he previously did    Paying attention to to his body signals is a not something he is accustomed to doing and he is in the process of learning how to listen to himself and ask for help -  although this is not comfortable for him  Support and encouragement in this area will help him to normalize  There is no psychiatric history  Pt denies suicidal/homicidal ideation, intent or plan  There is no evidence of  euphoria or emotional lability  Pt denies disturbances in appetite  No evidence of poor boundaries was present  Pt  denies incidents of losing time or flash backs  No pressured speech, repetitive or perseverative behavior is present  No obsessive-compulsive or associated rituals  Pt's conversational speech was fluent but slowed with no evidence of word finding difficulty  Pt may benefit by supportive psychotherapy during rehab to help adjust to the stroke and begin to make changes to his lifestyle upon discharge, as well as improve mood and coping  CBT and DBT techniques as well as involving spouse in sessions will be utilized  DIAGNOSIS:  Adjustment Disorder with Depression      RECOMMENDATIONS:   I will follow Mr Bisi Boudreaux during his stay to provide the following interventions:    · Supportive psychotherapy, utilizing CBT and mindfulness strategies  · DBT distress tolerance techniques  to improve coping and mood  · Meditation and relaxation training as tolerated          Thank you for the opportunity to participate in Mr Ling Lowry silverio Marlow, Ph D   Licensed Psychologist

## 2023-03-08 NOTE — QUICK NOTE
Contacted by IM, patient was in body weight support system and developed lightheadedness/dizziness  He didn't tell the team, and then had a syncopal event  No injuries  VS stable and BP normal  Afterward some staring episode, but therapy notes they were able to sternal rub him and get him to attend to them before dropping back into a staring episode  No post-ictal residual symptoms or weakness  No convulsive activity  He feels back to his baseline now  Neuro exam is at baseline without new or worsening focality  Strokes in brainstem and bi-cerebellar - unlikely seizure  Most likely vasovagal  I discussed case with Neuro and they are in agreement  No repeat CTH or EEG for now  Monitor  Ok to participate in therapy as tolerated  Check orthostatics, TEDS/Abdominal binder PRN      Kumar Reich MD  Physical Medicine and Rehabilitation

## 2023-03-08 NOTE — PLAN OF CARE
Problem: Prexisting or High Potential for Compromised Skin Integrity  Goal: Skin integrity is maintained or improved  Description: INTERVENTIONS:  - Identify patients at risk for skin breakdown  - Assess and monitor skin integrity  - Assess and monitor nutrition and hydration status  - Monitor labs   - Assess for incontinence   - Turn and reposition patient  - Assist with mobility/ambulation  - Relieve pressure over bony prominences  - Avoid friction and shearing  - Provide appropriate hygiene as needed including keeping skin clean and dry  - Evaluate need for skin moisturizer/barrier cream  - Collaborate with interdisciplinary team   - Patient/family teaching  - Consider wound care consult   Outcome: Progressing     Problem: MOBILITY - ADULT  Goal: Maintain or return to baseline ADL function  Description: INTERVENTIONS:  -  Assess patient's ability to carry out ADLs; assess patient's baseline for ADL function and identify physical deficits which impact ability to perform ADLs (bathing, care of mouth/teeth, toileting, grooming, dressing, etc )  - Assess/evaluate cause of self-care deficits   - Assess range of motion  - Assess patient's mobility; develop plan if impaired  - Assess patient's need for assistive devices and provide as appropriate  - Encourage maximum independence but intervene and supervise when necessary  - Involve family in performance of ADLs  - Assess for home care needs following discharge   - Consider OT consult to assist with ADL evaluation and planning for discharge  - Provide patient education as appropriate  Outcome: Progressing  Goal: Maintains/Returns to pre admission functional level  Description: INTERVENTIONS:  - Perform BMAT or MOVE assessment daily    - Set and communicate daily mobility goal to care team and patient/family/caregiver  - Collaborate with rehabilitation services on mobility goals if consulted  - Perform Range of Motion 3 times a day    - Reposition patient every 2 hours   - Dangle patient 3 times a day  - Stand patient 3 times a day  - Ambulate patient 3 times a day  - Out of bed to chair 3 times a day   - Out of bed for meals 3 times a day  - Out of bed for toileting  - Record patient progress and toleration of activity level   Outcome: Progressing     Problem: PAIN - ADULT  Goal: Verbalizes/displays adequate comfort level or baseline comfort level  Description: Interventions:  - Encourage patient to monitor pain and request assistance  - Assess pain using appropriate pain scale  - Administer analgesics based on type and severity of pain and evaluate response  - Implement non-pharmacological measures as appropriate and evaluate response  - Consider cultural and social influences on pain and pain management  - Notify physician/advanced practitioner if interventions unsuccessful or patient reports new pain  Outcome: Progressing     Problem: INFECTION - ADULT  Goal: Absence or prevention of progression during hospitalization  Description: INTERVENTIONS:  - Assess and monitor for signs and symptoms of infection  - Monitor lab/diagnostic results  - Monitor all insertion sites, i e  indwelling lines, tubes, and drains  - Monitor endotracheal if appropriate and nasal secretions for changes in amount and color  - Bard appropriate cooling/warming therapies per order  - Administer medications as ordered  - Instruct and encourage patient and family to use good hand hygiene technique  - Identify and instruct in appropriate isolation precautions for identified infection/condition  Outcome: Progressing  Goal: Absence of fever/infection during neutropenic period  Description: INTERVENTIONS:  - Monitor WBC    Outcome: Progressing     Problem: SAFETY ADULT  Goal: Patient will remain free of falls  Description: INTERVENTIONS:  - Educate patient/family on patient safety including physical limitations  - Instruct patient to call for assistance with activity   - Consult OT/PT to assist with strengthening/mobility   - Keep Call bell within reach  - Keep bed low and locked with side rails adjusted as appropriate  - Keep care items and personal belongings within reach  - Initiate and maintain comfort rounds  - Make Fall Risk Sign visible to staff  - Offer Toileting every 2 Hours, in advance of need  - Initiate/Maintain bed  chair alarm  - Obtain necessary fall risk management equipment: nonskid socks  - Apply yellow socks and bracelet for high fall risk patients  - Consider moving patient to room near nurses station  Outcome: Progressing  Goal: Maintain or return to baseline ADL function  Description: INTERVENTIONS:  -  Assess patient's ability to carry out ADLs; assess patient's baseline for ADL function and identify physical deficits which impact ability to perform ADLs (bathing, care of mouth/teeth, toileting, grooming, dressing, etc )  - Assess/evaluate cause of self-care deficits   - Assess range of motion  - Assess patient's mobility; develop plan if impaired  - Assess patient's need for assistive devices and provide as appropriate  - Encourage maximum independence but intervene and supervise when necessary  - Involve family in performance of ADLs  - Assess for home care needs following discharge   - Consider OT consult to assist with ADL evaluation and planning for discharge  - Provide patient education as appropriate  Outcome: Progressing  Goal: Maintains/Returns to pre admission functional level  Description: INTERVENTIONS:  - Perform BMAT or MOVE assessment daily    - Set and communicate daily mobility goal to care team and patient/family/caregiver  - Collaborate with rehabilitation services on mobility goals if consulted  - Perform Range of Motion 3 times a day  - Reposition patient every 2 hours    - Dangle patient 3 times a day  - Stand patient 3 times a day  - Ambulate patient 3 times a day  - Out of bed to chair 3 times a day   - Out of bed for meals 3 times a day  - Out of bed for toileting  - Record patient progress and toleration of activity level   Outcome: Progressing     Problem: DISCHARGE PLANNING  Goal: Discharge to home or other facility with appropriate resources  Description: INTERVENTIONS:  - Identify barriers to discharge w/patient and caregiver  - Arrange for needed discharge resources and transportation as appropriate  - Identify discharge learning needs (meds, wound care, etc )  - Arrange for interpretive services to assist at discharge as needed  - Refer to Case Management Department for coordinating discharge planning if the patient needs post-hospital services based on physician/advanced practitioner order or complex needs related to functional status, cognitive ability, or social support system  Outcome: Progressing     Problem: Nutrition/Hydration-ADULT  Goal: Nutrient/Hydration intake appropriate for improving, restoring or maintaining nutritional needs  Description: Monitor and assess patient's nutrition/hydration status for malnutrition  Collaborate with interdisciplinary team and initiate plan and interventions as ordered  Monitor patient's weight and dietary intake as ordered or per policy  Utilize nutrition screening tool and intervene as necessary  Determine patient's food preferences and provide high-protein, high-caloric foods as appropriate       INTERVENTIONS:  - Monitor oral intake, urinary output, labs, and treatment plans  - Assess nutrition and hydration status and recommend course of action  - Evaluate amount of meals eaten  - Assist patient with eating if necessary   - Allow adequate time for meals  - Recommend/ encourage appropriate diets, oral nutritional supplements, and vitamin/mineral supplements  - Order, calculate, and assess calorie counts as needed  - Recommend, monitor, and adjust tube feedings and TPN/PPN based on assessed needs  - Assess need for intravenous fluids  - Provide specific nutrition/hydration education as appropriate  - Include patient/family/caregiver in decisions related to nutrition  Outcome: Progressing

## 2023-03-08 NOTE — PROGRESS NOTES
03/08/23 0900   Pain Assessment   Pain Assessment Tool 0-10   Pain Score No Pain   Restrictions/Precautions   Precautions Fall Risk;Bed/chair alarms; Aphasia; Aspiration;Supervision on toilet/commode   Cognition   Overall Cognitive Status WFL   Arousal/Participation Alert; Cooperative   Attention Attends with cues to redirect   Orientation Level Oriented X4   Subjective   Subjective pt had no c/o and ready for PT  manual BP taken at 124/72 on R UE   Roll Left and Right   Type of Assistance Needed Physical assistance;Verbal cues   Physical Assistance Level 26%-50%   Roll Left and Right CARE Score 3   Sit to Lying   Type of Assistance Needed Verbal cues   Physical Assistance Level 26%-50%   Sit to Lying CARE Score 3   Lying to Sitting on Side of Bed   Type of Assistance Needed Physical assistance;Verbal cues   Physical Assistance Level 26%-50%   Lying to Sitting on Side of Bed CARE Score 3   Sit to Stand   Type of Assistance Needed Physical assistance;Verbal cues   Physical Assistance Level Total assistance   Sit to Stand CARE Score 1   Bed-Chair Transfer   Type of Assistance Needed Physical assistance;Verbal cues   Physical Assistance Level Total assistance   Comment max A x 2 SPT without AD, mod A with CGA of 2nd person sit pivot   Chair/Bed-to-Chair Transfer CARE Score 1   Car Transfer   Comment to assess next session   Walk 10 Feet   Type of Assistance Needed Physical assistance;Verbal cues; Adaptive equipment   Physical Assistance Level Total assistance   Walk 10 Feet CARE Score 1   Walk 50 Feet with Two Turns   Type of Assistance Needed Physical assistance;Verbal cues; Adaptive equipment   Physical Assistance Level Total assistance   Walk 50 Feet with Two Turns CARE Score 1   Walk 150 Feet   Type of Assistance Needed Physical assistance;Verbal cues; Adaptive equipment   Physical Assistance Level Total assistance   Walk 150 Feet CARE Score 1   Walking 10 Feet on Uneven Surfaces   Comment to assess when appropriate Reason if not Attempted Safety concerns   Walking 10 Feet on Uneven Surfaces CARE Score 88   Therapeutic Interventions   Neuromuscular Re-Education BWSS with bilat HHA + mirror 90', 160' with PT on a stool assisting pt advance R LE with noted quads hypertonicity while 2nd person providing L HHA and 3rd person at the back to steady BWSS , SLS with mirror x 5SH   Assessment   Treatment Assessment skilled PT focused on OOB activities including BWSS for gait and balance training  Rapid response was called due to pt being unresponsive (staring episode) as he was being assisted to sit back down on the w/c  pt became responsive for a few seconds with sternal rub before reverting back to staring episode again  RR was cancelled eventually for pt recovered after w/c tipped back + sternal rub once we got pt back down on the w/c with leg rest on  Pt then assisted back to bed and was found to have bowel incontinence so participated with repeated bed rolling to facilitate hygiene and CM to complete task  Rehab MD and CORINA WELCH came into pt's room to assess after RR  During BWSS training pt demo'd significant R side leaning but due to impaired sensation/proprioception/kinesthesia and R hemiparesis unable to self correct even with use of mirror  PT to assess w/c propulsion, car transfer training tomorrow as well as continue standing/gait/balance training as tolerated  per MD Depadua to use TEDS during OOB therapy training at this time  Family/Caregiver Present no   Barriers to Discharge Inaccessible home environment;Decreased caregiver support   PT Barriers   Functional Limitation Car transfers; Ramp negotiation;Stair negotiation;Standing;Transfers; Walking   Plan   Treatment/Interventions Functional transfer training; Therapeutic exercise; Endurance training;Gait training;Bed mobility;Spoke to MD;Spoke to nursing;Spoke to advanced practitioner;Spoke to case management;OT   Progress Progressing toward goals   Recommendation   PT Discharge Recommendation   (TBD)   PT Therapy Minutes   PT Time In 0900   PT Time Out 1023   PT Total Time (minutes) 83   PT Mode of treatment - Individual (minutes) 83   PT Mode of treatment - Concurrent (minutes) 0   PT Mode of treatment - Group (minutes) 0   PT Mode of treatment - Co-treat (minutes) 0   PT Mode of Treatment - Total time(minutes) 83 minutes   PT Cumulative Minutes 173

## 2023-03-08 NOTE — PROGRESS NOTES
Internal Medicine Progress Note  Patient: Tano Daniel  Age/sex: 60 y o  male  Medical Record #: [id number]      ASSESSMENT/PLAN: (Interval History)  Tano Daniel is seen and examined and management for following issues:    Posterior circulation stroke  • S/p intracranial and extracranial vertebral artery stenting/TICI 2B revascularization  • Continue ASA, Brilinta, Eliquis and atorvastatin  • Repeat CTA head and neck around 3/17/23  • Prozac for depressed mood following CVA   • Recommend Neuropsych consult  • Dysphagia 3 diet with nectar thick liquids  • Continue Baclofen 10mg 2x daily  • Therapy per primary service  • Outpt follow-up with Neurology and Neurosurgery    Rapid response  · Likely d/t vasovagal syncope  · Felt dizzy/weak prior to episode then was staring blankly initially when he came back around  · Eventually returned to his baseline  · PMR to reach out to neurology as well     HTN  • Home: No medications  • Here: amlodipine 10mg daily/Coreg 12 5mg 2x daily/lisinopril 20mg daily  • Goal normotension  • dc'd chlorthalidone d/t elevated BUN  • Stable    Fe deficiency anemia  · Likely multifactoral  · s/p IV venofer x 2 doses  · Cont daily ferrous sulfate  · Cbc mondays     CKD stage 2  • Baseline Cr 1 2 - 1 3  • Chlorthalidone dc'd  • Repeat bmp monday     COVID  • Continue precautions through 3/7/23  • Pt has not had respiratory symptoms  • Remains asymptomatic     Prediabetes  • HA1C 5 7  • Recommend dietary modifications     DC planning:  TBD  The above assessment and plan was reviewed and updated as determined by my evaluation of the patient on 3/8/2023      Labs:   Results from last 7 days   Lab Units 03/06/23  0627 03/05/23  0501   WBC Thousand/uL 8 82 10 45*   HEMOGLOBIN g/dL 10 1* 10 0*   HEMATOCRIT % 31 4* 30 6*   PLATELETS Thousands/uL 302 323     Results from last 7 days   Lab Units 03/06/23  0627 03/05/23  0501   SODIUM mmol/L 138 136   POTASSIUM mmol/L 4 1 4 0   CHLORIDE mmol/L 108 107 CO2 mmol/L 26 27   BUN mg/dL 34* 38*   CREATININE mg/dL 1 26 1 29   CALCIUM mg/dL 8 7 8 6                   Review of Scheduled Meds:  Current Facility-Administered Medications   Medication Dose Route Frequency Provider Last Rate   • amLODIPine  10 mg Oral Daily Nazia Edward MD     • apixaban  2 5 mg Oral BID Nazia Edward MD     • aspirin  81 mg Oral Daily Nazia Edward MD     • atorvastatin  40 mg Oral Daily With Anthony Wellington MD     • baclofen  5 mg Oral 4x Daily Nazia Edward MD     • carvedilol  12 5 mg Oral BID With Meals YURI Souza     • vitamin B-12  1,000 mcg Oral Daily Nazia Edward MD     • [START ON 3/10/2023] ferrous sulfate  325 mg Oral Daily With Breakfast YURI Gallagher     • FLUoxetine  20 mg Oral Daily Nazia Edward MD     • hydrALAZINE  25 mg Oral Q8H PRN YURI Souza     • lisinopril  20 mg Oral Daily Nazia Edward MD     • polyethylene glycol  17 g Oral Daily PRN Nazia Edward MD     • ticagrelor  90 mg Oral Q12H [facility] Nazia Edward MD         Subjective/ HPI: Patient seen and examined  Patients overnight issues or events were reviewed with nursing or staff during rounds or morning huddle session  New or overnight issues include the following:     Pt seen and examined in his room then during and after rapid response  Slept well overnight was anxious to start therapy  During therapy he had a syncopal episode as above  ROS:   A 10 point ROS was performed; negative except as noted above         Imaging:     No orders to display       *Labs /Radiology studies Reviewed  *Medications  reviewed and reconciled as needed  *Please refer to order section for additional ordered labs studies  *Case discussed with primary attending during morning huddle case rounds    Physical Examination:  Vitals:   Vitals:    03/08/23 0755 03/08/23 0801 03/08/23 0922 03/08/23 1006   BP: (!) 180/92 (!) 176/86 124/72 119/86   BP Location: Right arm Right arm Right arm Left arm Pulse: 61   55   Resp: 18      Temp:       TempSrc:       SpO2:    98%   Weight:       Height:           GEN: No apparent distress, pleasant  NEURO: Alert and oriented x3; mild dysarthria  HEENT: Pupils are equal and reactive, EOMI, mucous membranes are moist, face asymmetrical  CV: S1 S2 regular, no MRG, no peripheral edema noted  RESP: Lungs are clear bilaterally, no wheezes, rales or rhonchi noted, on room air, respirations easy and non labored  GI: Flat, soft non tender, non distended; +BS x4  : Voiding without difficulty  MUSC: Moves all extremities; right hemiparesis RUE>RLE  SKIN: pink, warm and dry, normal turgor, no rashes, lesions      The above physical exam was reviewed and updated as determined by my evaluation of the patient on 3/8/2023  Invasive Devices     None                    VTE Pharmacologic Prophylaxis: Eliquis  Code Status: Level 1 - Full Code  Current Length of Stay: 2 day(s)      Total time spent:  30 minutes with more than 50% spent counseling/coordinating care  Counseling includes discussion with patient re: progress  and discussion with patient of his/her current medical state/information  Coordination of patient's care was performed in conjunction with primary service  Time invested included review of patient's labs, vitals, and management of their comorbidities with continued monitoring  In addition, this patient was discussed with medical team including physician and advanced extenders  The care of the patient was extensively discussed and appropriate treatment plan was formulated unique for this patient  Medical decision making for the day was made by supervising physician unless otherwise noted in their attestation statement  ** Please Note:  voice to text software may have been used in the creation of this document   Although proof errors in transcription or interpretation are a potential of such software**

## 2023-03-08 NOTE — PROGRESS NOTES
Pastoral Care Progress Note    3/8/2023  Patient: Robby Reardon : 1960  Admission Date & Time: 3/6/2023 1626  MRN: [id number] CSN: [id number]        Made pastoral introductory care visit with PT, who said he was good, no need for a

## 2023-03-08 NOTE — PROGRESS NOTES
Physical Medicine and Rehabilitation Progress Note  Jim Ortiz 60 y o  male MRN: [id number]  Unit/Bed#: -29 Encounter: [id number]    HPI: Jim Ortiz is a 60 y o  right handed male with medical history of HTN who presented to [address] [place] [address] Road on 2/25 with nausea/dizziness  Found to have hypertensive emergency with acute/subcaute R posterior cerebellar CVA with 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, mild BRAULIO, and incidentally found + COVID (without evidence of respiratory illness/hypoxia/CXR findings)  Placed on cardene gtt, stroke pathway, ASA/Plavix, IV hydration for BRAULIO, and CTX for UTI  NIHSS 2  Symptoms began 2 days prior  Not tPA/TNK candidate  MRI/MRA with 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  NSx recommended transfer to [facility] and starting on heparin gtt and stopped Plavix  Repeat CTA on 2/26 stable with with R V3/4 occlusion  Not a candidate for interventional procedure due to clinical stability and risk  Speech consulted and noted mod-severe oropharyngeal dysphagia recommended pureed/HTL  Unfortunately later on 2/26, he clinically deteriorated with increased R sided weakness, and was taken to IR for stenting of V3 and V4 with TICI 2B revascularization  CTH without ICH  He was admitted back to the ICU intubated - extubated 2/27  MRI showed cerebellar CVA and R > L stroke burden, with additional hypodensities on DWI appreciated L midbrain, pontine area and R lower medullary/spinal cord junction  He was transitioned to triple therapy with DAPT (given recent stent) and A/C with eliquis 2 5mg BID due to COVID  Diet advanced to Level 3/NTL on 2/27  Noted to have spasticity in RLE - started on baclofen by Neurology  He was able to have cardene discontinued on 3/2, and they have been making adjustments to his oral regimen  He was started on Prozac for his mood  NSx has signed off  CTA H/N recommended around 3/17  Length of time on eliquis unclear   Admitted to [facility] on 3/6  Chief Complaint: Bowel incontinence     Interval History/Subjective:  No acute events overnight  Sleep was fine  Denies any new CP, SOB, fevers, chills, N/V, abdominal pain  Last BM was 3/8 and incontinent  Discussed with nursing - true incontinence, patient was not aware he had a BM  He has no other new concerns today  ROS:  A 10 point review of systems was negative except for what is noted in the HPI  Today's Changes:  1  Likely disinhibited bowel + mobility  He is not on any bowel meds at this time  Depending on how he progresses, may consider timing BMs with suppository  May also benefit from something to help bulk stool  For now monitor and ensure good continence care  2  Continent of bladder  3  Discussed with therapies, advancing to Thin Liquids  4  Hiccups today  Adjusted dosing of Baclofen to 5mg QID  Monitoring kidney function  5  Discontinue COVID precautions  6  Last day of IV venofer - needed new IV  7  Multipodus for R ankle  Total visit time: 35 minutes, with more than 50% spent counseling/coordinating care  Counseling includes discussion with patient re: progress in therapies, functional issues observed by therapy staff, and discussion with patient regarding their current medical state and wellbeing  Coordination of patient's care was performed in conjunction with Internal Medicine service to monitor patient's labs, vitals, and management of their comorbidities  Assessment/Plan:    * Acute Posterior Circulation Stroke  Assessment & Plan  Presented with dizziness for 2 days and nausea  - Now with R sided weakness, aphasia, dysphagia, R mild spasticity  Several small acute/early subacute bi-cerebellar infarcts without hemorrhage   Multiple infarcts involving R cerebellum and brainstem in particular (posterior medulla on the R, R midbrain, and L occipital lobe)  L vertebral artery severe stenosis and R vertebral artery occlusion and dissection    - Now s/p stenting on 2/26 with TICI 2b revascularization    PPx: ASA/brilinta/Eliquis, Statin   - Discussed with Neurology, they defer to NSx on length of time on eliquis  Potentially 3-4 weeks if felt to be related to COVID  Maintain normotension  Education on prediabetes and diet  Follow-up with Neurovascular/Neurosurgery  PT/OT/SLP - for swallow, speech, R sided weakness/tone, will need a good visual exam      Neurogenic bowel  Assessment & Plan  Disinhibited bowel + mobility difficulties  Not on bowel meds right now  May need regimen with timed bowel movements with suppository to train  For now monitor, close continence care especially    Anemia  Assessment & Plan  Normocytic anemia noted through stay  B12 borderline low  Folate normal  Iron Panel: Low iron saturation and iron with normal TIBC and Ferritin  Was started in the hospital on B12, but not iron  Per IM - 2 days of IV Venofer (last day 3/8)  Then start oral iron  Monitor Hgb, transfuse as appropriate  Will discuss with IM  Consulted  Adjustment disorder with depressed mood  Assessment & Plan  Has been tearful and labile during hospitalization  Started on Prozac 20mgdaily  Consulted rehab psychology for support  Prediabetes  Assessment & Plan  A1C 5 7  Consult nutrition for education on diabetic diet  Glucose 110-127  Diet/lifestyle modifications  Follow-up with PCP  CKD (chronic kidney disease)  Assessment & Plan  Lab Results   Component Value Date    EGFR 60 03/06/2023    EGFR 58 03/05/2023    EGFR 57 03/04/2023    CREATININE 1 26 03/06/2023    CREATININE 1 29 03/05/2023    CREATININE 1 31 (H) 03/04/2023     Presented with Crea 1 32  Given gentle IVF  Unclear baseline    Per hospital team - suspect CKD Stage 2 2/2 hypertension  Will monitor BMP while here  Avoid nephrotoxic meds and relative hypotension  Recommend outpatient f/u with PCP    Spasticity  Assessment & Plan  Intrinsic tonic tone in R quad which is mild  Has some hiccups too  Would opt to avoid treating R quad for now, as tone there may help stabilize at the knee  3/8 Baclofen to 5mg QID  Range of motion  Monitor as tone evolves  Outpatient f/u with PMR  Dysphagia  Assessment & Plan  Mod-sever oropharyngeal  3/8 Advanced to Level 3/Thins  Most likely will need VFSS at some point during this stay  Aspiration precautions  Good oral hygiene   SLP consulted    Primary hypertension  Assessment & Plan  Home: None  Here: Amlodipine 10mg daily, Coreg 12 5mg BID, Lisinopril 20mg daily   - IM stopped chlorthalidone and increased coreg  Had hypertensive urgency in hospital requiring cardene gtt  Monitor and adjust as appropriate  IM consulted to assist with management  Stenosis of left vertebral artery  Assessment & Plan  Severe L vertebral artery origin stenosis  Repeat CTA H/N 2 weeks around 3/17  ASA/Brilinta  Atorvastatin  SBP goals 120-160  Follow-up with Neurovascular  Right Vertebral artery dissection [facility])  Assessment & Plan  Now s/p R V3/4 stenting with TICI 2B revascularization on 2/26 for R Vert stenosis  Continue ASA/Brilinta  Statin  Neurosurg f/u 3/17 with repeat CTA H/N        COVID  Assessment & Plan  Resolved  Incidentally found to have COVID on 2/25  Asymptomatic from respiratory standpoint  Per Neurosurgery concern for hypercoagulability related to COVID  Currently on Eliquis 2 5mg BID - per Neuro 3-4 weeks probably reasonable, but for them ultimately up to NSx as this was their initial recommendation to start this medication  3/8 Discontinued precautions after 10 days isolation  Health Maintenance  #Delirium/Sleep: At risk  Optimize bowel/bladder, sleep-wake cycle, mood and pain management  #Pain: Baclofen 10mg BID, Tylenol PRN  #Bowel: Last BM 3/8 Only PRN miralax  See Neurogenic bowel above  #Bladder: Voiding and continent  #Skin/Pressure Injury Prevention: Turn Q2hr in bed, with weight shifts [id number] in wheelchair  Float heels in bed    #DVT Prophylaxis: On triple therapy, SCDs  #GI Prophylaxis: None  #Code Status:  Full Code  #FEN: Level 3/NTL  #Dispo:  Team 3/7  ELOS 4-6 weeks  May require more assistance at home and primary wheelchair level mobility given his ataxia  Barrier: R sided weakness, ataxia, truncal weakness, dysphagia, aphasia  Goals: See above       Objective:    Functional Update:  PT:  Eusebio with bed mobility, mod-max A x2 with sit to stand  OT: total A with ADLs  SLP:  CLQT with mild cognitive linguistic impairments  Mildoropharyngeal dysphagia    Allergies per EMR    Physical Exam:  Temp:  [97 7 °F (36 5 °C)-98 4 °F (36 9 °C)] 97 9 °F (36 6 °C)  HR:  [59-67] 60  Resp:  [18] 18  BP: (134-158)/(59-74) 158/74  Oxygen Therapy  SpO2: 94 %    Gen: No acute distress, Well-nourished, well-appearing  HEENT: Moist mucus membranes, Normocephalic/Atraumatic  Cardiovascular: Regular rate, rhythm, S1/S2  Distal pulses palpable  Heme/Extr: No edema  Pulmonary: Non-labored breathing  Lungs CTAB  : No barnett  GI: Soft, non-tender, non-distended  BS+  MSK: PROM is WFL in all extremities  No effusions or deformities  Bulk is symmetric  See below for MMT scores  Integumentary: Skin is warm, dry  Neuro: AAOx3, Aphasia improved  Speech is appropriate and intelligible  R sided decreased tone weakness with 1/5 strength in the UE  3-4/5 in proximal RLE, tone is MAS 1+ in Quad  Ankle weaker        Diagnostic Studies: Reviewed, no new imaging    Laboratory:  Reviewed   Results from last 7 days   Lab Units 03/06/23 0627 03/05/23  0501 03/04/23  0538   HEMOGLOBIN g/dL 10 1* 10 0* 10 4*   HEMATOCRIT % 31 4* 30 6* 32 8*   WBC Thousand/uL 8 82 10 45* 10 55*     Results from last 7 days   Lab Units 03/06/23  0627 03/05/23  0501 03/04/23  0538   BUN mg/dL 34* 38* 37*   POTASSIUM mmol/L 4 1 4 0 4 1   CHLORIDE mmol/L 108 107 109*   CREATININE mg/dL 1 26 1 29 1 31*            Patient Active Problem List   Diagnosis   • BRAULIO (acute kidney injury) ([facility] [address] )   • COVID   • Acute Posterior Circulation Stroke   • Right Vertebral artery dissection (HCC)   • Stenosis of left vertebral artery   • Primary hypertension   • Dizziness   • Dysphagia   • Spasticity   • CKD (chronic kidney disease)   • Prediabetes   • Adjustment disorder with depressed mood   • Anemia         Medications  Current Facility-Administered Medications   Medication Dose Route Frequency Provider Last Rate   • amLODIPine  10 mg Oral Daily Brayan Constantino MD     • apixaban  2 5 mg Oral BID Brayan Constantino MD     • aspirin  81 mg Oral Daily Brayan Constantino MD     • atorvastatin  40 mg Oral Daily With Ericka Martinez MD     • baclofen  10 mg Oral BID Brayan Constantino MD     • carvedilol  12 5 mg Oral BID With Meals YURI Evans     • vitamin B-12  1,000 mcg Oral Daily Brayan Constantino MD     • [START ON 3/10/2023] ferrous sulfate  325 mg Oral Daily With Breakfast YURI Evans     • FLUoxetine  20 mg Oral Daily Brayan Constantino MD     • iron sucrose  200 mg Intravenous Daily YURI Evans 200 mg (03/07/23 1200)   • lisinopril  20 mg Oral Daily Brayan Constantino MD     • polyethylene glycol  17 g Oral Daily PRN Brayan Constantino MD     • ticagrelor  90 mg Oral Q12H [facility] Brayan Constantino MD            ** Please Note: Fluency Direct voice to text software may have been used in the creation of this document   **

## 2023-03-08 NOTE — ASSESSMENT & PLAN NOTE
Disinhibited bowel + mobility difficulties -somewhat loose as well  3/27 - has been improving, but maybe slightly constipated  - Gave miralax with BM    3/28 - with incontinent BM  Will add metamucil back but only 3x weekly  - Encouraged adequate hydration   Not on bowel meds right now  [facility] Bowel Training:   - 30-45 min after each meal will get patient onto commode to attempt bowel movement  - He wants to hold off on scheduled suppository for now (would schedule in AM to align with his previous bowel schedule at home)     For now monitor, close continence care especially    Outpatient f/u with PMR

## 2023-03-08 NOTE — PROGRESS NOTES
"   03/08/23 0800   Pain Assessment   Pain Assessment Tool 0-10   Pain Score No Pain   Restrictions/Precautions   Precautions Aphasia; Aspiration;Bed/chair alarms;Cognitive; Fall Risk;Supervision on toilet/commode   Comprehension   Comprehension (FIM) 4 - Understands basic info/conversation 75-90% of time   Expression   Expression (FIM) 4 - Expresses basic info/needs 75-90% of time   Social Interaction   Social Interaction (FIM) 5 - Interacts appropriately with others 90% of time   Problem Solving   Problem solving (FIM) 4 - Solves basic problems 75-89% of time   Memory   Memory (FIM) 4 - Recognizes/recalls/performs 75-89%   Speech/Language/Cognition Assessmetn   Treatment Assessment   Pt seen for cognitive linguistic tx session immediately following dysphagia breakfast tx session  Pt oriented x4 and w/ intact remote memory for recent events  Pt speech is mildly dysarthric, slurring at times, w/ vagueness of speech in unstructured contexts req natural cues to clarify intended meaning (bedside WAB to be completed in future ST session to further assess)  Pt stated prior to hospitalization he was working full time as a  (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 Fitz Carmen and Mason) whom all live w/ him  His wife managed the finances and they handled household tasks together  Pt rx he did not take any medication previously however is receptive to medication education and management tasks  D/t prior level of independence and results of cognitive assessment, session focused on introduction to medication management and functional money management  Pt completed medication label comprehension task w/ accuracy of 6/7 increasing to 7/7 w/ verbal cue and prescription label comprehension task w/ accuracy of 4/5 increasing to 5/5 w/ verbal cue  Pt errors consisted of method of administration in both tasks ( ie \"take medication w/ food\" and \"by tsp\")   Pt also " "engaged in money management task where pt provided w/ monetary amount then asked to determine how many of each denomination could be used to equal the amount ( ie \"10, 5, and 1 dollar bills and quarters, dimes,nickels and pennies)  Pt completed this task orally d/t pt c/o difficulty writing w/ non dominant hand w/ accuracy of 12/15 improving to 15/15 w/ verbal cues  Of note, pt initially perseverating on using only dimes and pennies to make up change amount w/ verbal cue x1 pt varied the remainder of his responses  At this time pt continues to benefit from skilled ST services targeting cognitive linguistic skills to minimize caregiver burden upon d/c      Eating   Type of Assistance Needed Set-up / clean-up;Supervision;Verbal cues   Physical Assistance Level No physical assistance   Eating CARE Score 4   Swallow Assessment   Swallow Treatment Assessment   Daily Dysphagia Tx Note     Patient Name: Trish Savage    Today's Date: 3/8/2023      Current Risks for Dysphagia & Aspiration: new nuero event and dysarthria     Current Symptoms/Concerns: pocketing food    Current diet:soft/level 3 diet and nectar thick liquids     Premorbid diet::regular diet and thin liquids     Voice/Speech: mild dysarthria, intelligible     Follows commands: +    Cognitive Status: awake, alert and participatory     Positioning: upright in bed    Items administered:Consistencies Administered: thin liquids and hard solids  Materials administered included scrambled eggs w/ cheese, sausage susana,diced fruit, coffee (180 cc), apple juice (120 cc), water (480 cc) and yogurt      Total amount of meal consumed:   75% of meal ( declined yogurt)   Approximately 250 cc of thin via cup/ straw       Oral stage:mild  Lip closure: wfl   Anterior spillage: none noted   Mastication: min prolonged w/ harder consistencies   Bolus formation: wfl   Bolus control: wfl   Transfer: wfl   Oral residue: none noted    Pocketing: min-mod R sided pocketing w/ solids, " effectively cleared w/ lingual sweep          Pharyngeal stage:WFL  Swallow promptness: prompt   Hyolaryngeal elevation:  wfl   Wet voice: none noted   Throat clear: none noted   Cough: none noted   Secondary swallows: none noted    Audible swallows: none noted        Esophageal stage:No s/s present         Summary:     Pt presenting with mild oral and WFL pharyngeal dysphagia today  Symptoms or concerns included decreased mastication and min-mod R-sided pocketing w/ solids, effectively cleared w/ lingual sweep No pharyngeal s/s present today  Pt seen for f/u dysphagia tx session w/ breakfast tray of regular and thin  Pt required tray set up and assist w/ opening containers as needed then able to self feed w/ L hand  Upon arrival nrsing present and administered medications to pt, where pt swallowed multiple pills whole at the same time w/ cup sip thin  Pt w/ adequate oral containment and manipulation of all consistencies  Pt rx loss of sensation of posterior R sided buccal cavity and min-mod pocketing observed in R sided buccal cavity throughout meal  Pt aware and able to effectively clear w/ lingual sweep, req verbal cue x1 then independently initiated lingual sweep to clear for remainder of meal  Mastication is min prolonged only w/ harder consistencies (sauage susana), but effective in breakdown  Swallow initiation was prompt and hyolaryngeal excursion wfl upon palpation  No coughing, throat clearing or pharyngeal s/s concerning of aspiration observed today  Pt education provided on rationale for recommended diet and strategies ie checking R cheek for pocketing throughout meal, lingual or finger sweep to clear R cheek, alternating bites/ sips, slow rate and small bites, pt agreeable to all recommendations at this time  ST to follow briefly to assess pt tolerance and use of strategies w/ upgraded diet of level 3/ thin and continue regular trials as appropriate         Recommendations:  Diet: Dysphagia level 3 and thin liquids   Meds: whole with liquid  Strategies: upright posture, only feed when fully alert, slow rate of feeding, small bites/sips, quiet environment (tv off, limit talking, door closed, etc ), alternating bites and sips and lingual or finger sweep to clear R cheek     DISTANT supervision w/ meals  Tray set up and assist w/ opening containers  Results reviewed with:  Patient, RN and MD   Aspiration precautions posted  F/u ST tx: Pt will continue to benefit from ongoing skilled dysphagia tx sessions to establish safest least restrictive diet w/o increased oropharyngeal or aspiration sxs as well as monitor ability to carryover swallow strategies independently  Plan: ST to f/u to assess continued tolerance of thin liquids/ use of strategies and continue regular trials w/ ST only  Swallow Assessment Prognosis   Prognosis Good   Prognosis Considerations Previous level of function; Cooperation; Age   SLP Therapy Minutes   SLP Time In 0800   SLP Time Out 0900   SLP Total Time (minutes) 60   SLP Mode of treatment - Individual (minutes) 60   SLP Mode of treatment - Concurrent (minutes) 0   SLP Mode of treatment - Group (minutes) 0   SLP Mode of treatment - Co-treat (minutes) 0   SLP Mode of Treatment - Total time(minutes) 60 minutes   SLP Cumulative Minutes 150   Therapy Time missed   Time missed?  No

## 2023-03-08 NOTE — PROGRESS NOTES
"OT Treatment Note       03/08/23 5870   Pain Assessment   Pain Assessment Tool 0-10   Pain Score No Pain   Restrictions/Precautions   Precautions Bed/chair alarms; Fall Risk;Aspiration;Supervision on toilet/commode   Braces or Orthoses Sling  (RUE during transfers/mobility)   Lifestyle   Autonomy \"I feel fine right now\"   Putting On/Taking Off Footwear   Type of Assistance Needed Physical assistance   Physical Assistance Level Total assistance   Comment OT donned b/l TEDs/ socks  Per PT Dr Cristal Maurice to put in order for TONY during day for BP  Putting On/Taking Off Footwear CARE Score 1   Sit to Lying   Type of Assistance Needed Physical assistance   Physical Assistance Level 26%-50%   Sit to Lying CARE Score 3   Sit to Stand   Type of Assistance Needed Physical assistance   Physical Assistance Level Total assistance   Comment Mod-Max A x1 in front no AD with R knee block  Min/Mod A of second in back with verbal and tactile cues to facilitate appropriate stance   Sit to Stand CARE Score 1   Toileting Hygiene   Type of Assistance Needed Physical assistance   Physical Assistance Level Total assistance   Comment Mod A x2 on drop arm platform commode by weight shifting, 1 in front with R knee block to A with trunk stability during weight shift  A of second for hygiene and clothing management  Toileting Hygiene CARE Score 1   Toilet Transfer   Type of Assistance Needed Physical assistance   Physical Assistance Level Total assistance   Comment Ax2 Slide board transfer from bed to platform drop arm commode  Mod A x1 in front with R knee block  A of second in back for equipment stabilization  Mod vc for hand placement on slide board and tactile cues to move RLE  Toilet Transfer CARE Score 1   Neuromuscular Education   Comments Pt engaged in RUE NMR to improve ROM and strength  Seated on EOB, pt performed R scapula elevation/depression, and protraction/retraction for 10 reps each   OT provided point of control at inferior " angle of scapula and distal UE to allow for isolated scapular movement without full weight of UE  Mirror utilized for visual feedback  Pt stated he could feel muscles trying to work but it was difficult  Supine in bed with RUE elevated on pillows, pt performed R digit extension for 10 repetitions  OT educated pt on performing digit flexion/extension for 10 reps each hours in room  Cognition   Arousal/Participation Alert; Cooperative   Attention Within functional limits   Orientation Level Oriented X4   Memory Decreased recall of precautions   Following Commands Follows one step commands with increased time or repetition   Comments cognition WFL for basic commands, will need to assess further for higher level cognition   Activity Tolerance   Activity Tolerance Patient tolerated treatment well   Assessment   Treatment Assessment Pt engaged in skilled OT tx session focused on functional transfers and RUE NRM  See above for further details on functional performance  Of note pts vitals were: BP: supine 98/55 O2 97 HR 60, sitting EOB with TEDs /64 O2 98 HR 59, seated s/p transfers /70, and seated s/p 1 min standing /72  Pt reported lightheadedness from supine to sit but resolved s/p 2-3 minutes  Pt tolerated RUE scapular mobilization well and would be a good candidate for e-stim and reogo  OT educated pt on stroke recovery and purpose of intensive rehab  Cont OT POC with focus on RUE NMR, standing tolerance/endurance, core/trunk control, functional transfers, ADL retraining, and activity tolerance  Pt could benefit from moist heat on upper trapezius to A with muscle tightness  Pt left supine in bed with alarm activated and all needs in reach  Prognosis Good   Problem List Decreased strength;Decreased range of motion;Decreased endurance; Impaired balance;Decreased mobility; Decreased coordination; Impaired sensation;Decreased cognition;Decreased safety awareness; Impaired tone   Barriers to Discharge Inaccessible home environment;Decreased caregiver support   Plan   Treatment/Interventions ADL retraining;Functional transfer training; Therapeutic exercise; Endurance training;Patient/family training;Equipment eval/education; Compensatory technique education;Cognitive reorientation   Progress Progressing toward goals   Recommendation   OT Discharge Recommendation   (pending progress)   OT Therapy Minutes   OT Time In 1230   OT Time Out 1330   OT Total Time (minutes) 60   OT Mode of treatment - Individual (minutes) 60   OT Mode of treatment - Concurrent (minutes) 0   OT Mode of treatment - Group (minutes) 0   OT Mode of treatment - Co-treat (minutes) 0   OT Mode of Treatment - Total time(minutes) 60 minutes   OT Cumulative Minutes 160   Therapy Time missed   Time missed?  No

## 2023-03-09 PROBLEM — R55 VASOVAGAL SYNCOPE: Status: ACTIVE | Noted: 2023-03-09

## 2023-03-09 RX ADMIN — BACLOFEN 5 MG: 10 TABLET ORAL at 21:21

## 2023-03-09 RX ADMIN — TICAGRELOR 90 MG: 90 TABLET ORAL at 21:21

## 2023-03-09 RX ADMIN — APIXABAN 2.5 MG: 2.5 TABLET, FILM COATED ORAL at 08:25

## 2023-03-09 RX ADMIN — ATORVASTATIN CALCIUM 40 MG: 40 TABLET, FILM COATED ORAL at 17:30

## 2023-03-09 RX ADMIN — TICAGRELOR 90 MG: 90 TABLET ORAL at 08:26

## 2023-03-09 RX ADMIN — FLUOXETINE 20 MG: 20 CAPSULE ORAL at 08:25

## 2023-03-09 RX ADMIN — APIXABAN 2.5 MG: 2.5 TABLET, FILM COATED ORAL at 17:30

## 2023-03-09 RX ADMIN — LISINOPRIL 20 MG: 20 TABLET ORAL at 08:25

## 2023-03-09 RX ADMIN — CYANOCOBALAMIN TAB 500 MCG 1000 MCG: 500 TAB at 08:25

## 2023-03-09 RX ADMIN — BACLOFEN 5 MG: 10 TABLET ORAL at 11:31

## 2023-03-09 RX ADMIN — AMLODIPINE BESYLATE 10 MG: 10 TABLET ORAL at 08:26

## 2023-03-09 RX ADMIN — CARVEDILOL 12.5 MG: 12.5 TABLET, FILM COATED ORAL at 07:02

## 2023-03-09 RX ADMIN — ASPIRIN 81 MG CHEWABLE TABLET 81 MG: 81 TABLET CHEWABLE at 08:25

## 2023-03-09 RX ADMIN — BACLOFEN 5 MG: 10 TABLET ORAL at 17:30

## 2023-03-09 RX ADMIN — CARVEDILOL 12.5 MG: 12.5 TABLET, FILM COATED ORAL at 17:30

## 2023-03-09 RX ADMIN — BACLOFEN 5 MG: 10 TABLET ORAL at 08:25

## 2023-03-09 NOTE — PROGRESS NOTES
03/09/23 1230   Pain Assessment   Pain Assessment Tool 0-10   Pain Score No Pain   Restrictions/Precautions   Precautions Aspiration; Aphasia;Bed/chair alarms;Cognitive; Fall Risk;Supervision on toilet/commode   Comprehension   Comprehension (FIM) 4 - Understands basic info/conversation 75-90% of time   Expression   Expression (FIM) 5 - Needs help/cues only RARELY (< 10% of the time)   Social Interaction   Social Interaction (FIM) 5 - Interacts appropriately with others 90% of time   Problem Solving   Problem solving (FIM) 4 - Solves basic problems 75-89% of time   Memory   Memory (FIM) 5 - Needs cueing reminders <10%   Speech/Language/Cognition Assessmetn   Treatment Assessment   Bedside Western Aphasia Battery        Subtest  Score   Spontaneous Speech: Content 9/10   Spontaneous Speech: Fluency 9/10   Auditory Verbal Comprehension: Yes/No Questions 10/10   Sequential Commands 10/10   Repetition 10/10   Object Naming 10/10   Bedside Aphasia Score mildly impaired    Bedside Aphasia Classification Anomic       Pt alert, awake and participatory in all tx activities, w/ pleasant disposition  Pt completed the Bedside WAB (refer to scores listed above) in which overall bedside aphasia score deems pt to demonstrate mildly impaired language deficits  Additionally, pt also demonstrates Anomic type aphasia as per Bedside Aphasia Classification Criteria, when comparing the pt's Fluency, Auditory Verbal Comprehension, Repetition scores  Pt's expressive language deficits mild junior by mild word finding difficulty, intermittent vague speech, concise/ often incomplete descriptions and dec fluency  Pt expressive deficits more prevalent during structured tx tasks and reading aloud, spontaneous speech is more fluent w/ only min instances of word retrieval difficulties and min amount of slurring in longer utterances   Following completion of the WAB pt engaged in orientation review, oriented x4 and w/ intact remote memory recalling events from yesterday and this AM  Pt also w/ awareness of current abilities, becoming tearful at times during pt interview when discussing his life prior to admission and plans after d/c  Pt completed verbal 4-step sequencing activity given a functional task w/ accuracy of 9/11 increasing to 11/11 w/ verbal cues  Pt demo retrospection and initiative during this task, self correcting and recounting the sequence of steps he took when he completed similar tasks previously  During higher level word deduction task pt w/ accuracy of 4/6 increasing to 6/6 w/ support ( increased wait time, encouragement and repetition)  In functional money management task, pt exhibited higher level problem solving and mental manipulation skills completing task w/ accuracy of 100% using mental math and self correcting errored responses  Of note, pt did require increased wait time and encouragement throughout task as pt displayed signs of frustration at times  At this time, pt continues to benefit from cognitive linguistic tx targeting higher level cognitive skills and IADLs to maximize pt independence upon d/c      Eating   Type of Assistance Needed Set-up / clean-up;Supervision   Physical Assistance Level No physical assistance   Eating CARE Score 4   Swallow Assessment   Swallow Treatment Assessment Daily Dysphagia Tx Note     Patient Name: Naima Peguero    Today's Date: 3/9/2023      Current Risks for Dysphagia & Aspiration: new nuero event and dysarthria      Current Symptoms/Concerns: pocketing food     Current diet:soft/level 3 diet and nectar thick liquids      Premorbid diet::regular diet and thin liquids      Voice/Speech: mild dysarthria, intelligible      Follows commands: +     Cognitive Status: awake, alert and participatory      Positioning: upright in recliner     Items administered:Consistencies Administered: thin liquids, soft solids and hard solids     Materials administered included crab cakes, cooked carrots, pasta salad, carrot cake, milk (120 cc) and apple juice (240 cc)    Total amount of meal consumed:   75% of meal   Approximately 300 cc of thin     Oral stage:minimal   Lip closure: wfl   Anterior spillage: none noted    Mastication: wfl (improved from yesterday w/ softer consistencies)  Bolus formation: wfl  Bolus control: wfl  Transfer: wfl  Oral residue: none noted   Pocketing: Min R sided pocketing    Pharyngeal stage:WFL   Swallow promptness:  Prompt   Hyolaryngeal elevation:  wfl   Wet voice:  None noted   Throat clear: none noted   Cough: none noted   Secondary swallows: none noted    Audible swallows:  None noted     Esophageal stage:No s/s present today  Summary   Pt presenting with minimal oral and WFL pharyngeal dysphagia today  Symptoms or concerns included min R sided pocketing w/ solids, not of concern as pt independently clears w/ lingual sweep No pharyngeal s/s present  Pt seen for f/u dysphagia tx session w/ lunch tray of regular and thin  Pt required tray set up and assist opening containers then able to self feed w/ L hand  Pt w/ improved mastication today, although of note consistency of solids (cooked carrots, carrot cake, pasta salad, crab cakes) provided was softer and more easily broken down  Pt also w/ improved R sided pocketing and use of strategies during meal  R sided pocketing decreased, only min amount w/ solids and pt independently using lingual sweep to clear when occurred  Pt oral containment and manipulation remains appropriate and swallow initiation was prompt w/ all consistencies  Pt hyolaryngeal excursion judged to be functional, with no coughing, throat clearing or pharyngeal s/s concerning of aspiration present   Student SLP reviewed the strategies previously introduced ( ie checking R cheek for pocketing throughout meal, lingual or finger sweep to clear R cheek, alternating bites/ sips, slow rate and small bites) and encouraged pt to continue to utilize these strategies during PO intake, pt receptive and agreeable to all information at this time  ST to follow briefly to assess pt tolerance and use of strategies w/ upgraded diet of level 3/ thin and continue regular trials as appropriate  Recommendations:  Diet: Dysphagia level 3 and thin liquids   Meds: whole with liquid  Strategies: upright posture, only feed when fully alert, slow rate of feeding, small bites/sips, quiet environment (tv off, limit talking, door closed, etc ), alternating bites and sips and lingual or finger sweep to clear R cheek      DISTANT supervision w/ meals  Tray set up and assist w/ opening containers  Results reviewed with:  Patient, RN and MD   Aspiration precautions posted  F/u ST tx: Pt will continue to benefit from ongoing skilled dysphagia tx sessions to establish safest least restrictive diet w/o increased oropharyngeal or aspiration sxs as well as monitor ability to carryover swallow strategies independently  Plan: ST to f/u to assess continued tolerance of thin liquids/ use of strategies and continue regular trials w/ ST only  Swallow Assessment Prognosis   Prognosis Good   Prognosis Considerations Age; Potential;Previous level of function;Ability to carry over; Cooperation   SLP Therapy Minutes   SLP Time In 5039   SLP Time Out 1688   SLP Total Time (minutes) 75   SLP Mode of treatment - Individual (minutes) 75   SLP Mode of treatment - Concurrent (minutes) 0   SLP Mode of treatment - Group (minutes) 0   SLP Mode of treatment - Co-treat (minutes) 0   SLP Mode of Treatment - Total time(minutes) 75 minutes   SLP Cumulative Minutes 225   Therapy Time missed   Time missed?  No

## 2023-03-09 NOTE — PROGRESS NOTES
Physical Medicine and Rehabilitation Progress Note  Rivera Feliciano 60 y o  male MRN: [id number]  Unit/Bed#: [facility] 433-40 Encounter: [id number]    HPI: Rivera Feliciano is a 60 y o  right handed male with medical history of HTN who presented to [address] [place] [address] Road on 2/25 with nausea/dizziness  Found to have hypertensive emergency with acute/subcaute R posterior cerebellar CVA with 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, mild BRAULIO, and incidentally found + COVID (without evidence of respiratory illness/hypoxia/CXR findings)  Placed on cardene gtt, stroke pathway, ASA/Plavix, IV hydration for BRAULIO, and CTX for UTI  NIHSS 2  Symptoms began 2 days prior  Not tPA/TNK candidate  MRI/MRA with 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  NSx recommended transfer to [facility] and starting on heparin gtt and stopped Plavix  Repeat CTA on 2/26 stable with with R V3/4 occlusion  Not a candidate for interventional procedure due to clinical stability and risk  Speech consulted and noted mod-severe oropharyngeal dysphagia recommended pureed/HTL  Unfortunately later on 2/26, he clinically deteriorated with increased R sided weakness, and was taken to IR for stenting of V3 and V4 with TICI 2B revascularization  CTH without ICH  He was admitted back to the ICU intubated - extubated 2/27  MRI showed cerebellar CVA and R > L stroke burden, with additional hypodensities on DWI appreciated L midbrain, pontine area and R lower medullary/spinal cord junction  He was transitioned to triple therapy with DAPT (given recent stent) and A/C with eliquis 2 5mg BID due to COVID  Diet advanced to Level 3/NTL on 2/27  Noted to have spasticity in RLE - started on baclofen by Neurology  He was able to have cardene discontinued on 3/2, and they have been making adjustments to his oral regimen  He was started on Prozac for his mood  NSx has signed off  CTA H/N recommended around 3/17  Length of time on eliquis unclear   Admitted to [facility] on 3/6  Chief Complaint: No new issues today  Interval History/Subjective:  No acute events overnight  Last BM 3/8 and still incontinent  No further episodes  No new weakness/numbness/tingling  Denies any new CP, SOB, fevers, chills, N/V, abdominal pain  In his discussion last night with Dr Marlene Farley, they talked about some things that may be concerning for sleep apnea  He works hard and is very motivated  ROS:  A 10 point review of systems was negative except for what is noted in the HPI  Today's Changes:  1  Continence care  2  Ordered nocturnal pulse ox to screen for possible nocturnal hypoxia  3  Of note, orthostatics negative  4  Last day of venofer  Now on oral iron  Total visit time: 35 minutes, with more than 50% spent counseling/coordinating care  Counseling includes discussion with patient re: progress in therapies, functional issues observed by therapy staff, and discussion with patient regarding their current medical state and wellbeing  Coordination of patient's care was performed in conjunction with Internal Medicine service to monitor patient's labs, vitals, and management of their comorbidities  Assessment/Plan:    * Acute Posterior Circulation Stroke  Assessment & Plan  Presented with dizziness for 2 days and nausea  - Now with R sided weakness, aphasia, dysphagia, R mild spasticity  Several small acute/early subacute bi-cerebellar infarcts without hemorrhage  Multiple infarcts involving R cerebellum and brainstem in particular (posterior medulla on the R, R midbrain, and L occipital lobe)  L vertebral artery severe stenosis and R vertebral artery occlusion and dissection    - Now s/p stenting on 2/26 with TICI 2b revascularization    PPx: ASA/brilinta/Eliquis, Statin   - Discussed with Neurology, they defer to NSx on length of time on eliquis  Potentially 3-4 weeks if felt to be related to COVID  Maintain normotension  Education on prediabetes and diet     Follow-up with Neurovascular/Neurosurgery  PT/OT/SLP - for swallow, speech, R sided weakness/tone, will need a good visual exam      Vasovagal syncope  Assessment & Plan  3/8 Had episode of vasovagal syncope  - No convulsions  On body weight support system, started to feel dizzy  - Staring episode after, but able to sternal rubbed out of it  Very brief   - No post-ictal weakness   - Neurologically stable and back to baseline   - Orthostatics negative  Discussed with Neurology - unlikely seizure given distribution of stroke and presentation  No further testing recommended at this time  Monitor  Neurogenic bowel  Assessment & Plan  Disinhibited bowel + mobility difficulties  Not on bowel meds right now  May need regimen with timed bowel movements with suppository to train  For now monitor, close continence care especially    Anemia  Assessment & Plan  Normocytic anemia noted through stay  B12 borderline low  Folate normal  Iron Panel: Low iron saturation and iron with normal TIBC and Ferritin  Was started in the hospital on B12, but not iron  Per IM - 2 days of IV Venofer (last day 3/8)  Then start oral iron  Monitor Hgb, transfuse as appropriate  Will discuss with IM  Consulted  Adjustment disorder with depressed mood  Assessment & Plan  Has been tearful and labile during hospitalization  Started on Prozac 20mgdaily  Consulted rehab psychology for support  Prediabetes  Assessment & Plan  A1C 5 7  Consult nutrition for education on diabetic diet  Glucose 110-127  Diet/lifestyle modifications  Follow-up with PCP  CKD (chronic kidney disease)  Assessment & Plan  Lab Results   Component Value Date    EGFR 60 03/06/2023    EGFR 58 03/05/2023    EGFR 57 03/04/2023    CREATININE 1 26 03/06/2023    CREATININE 1 29 03/05/2023    CREATININE 1 31 (H) 03/04/2023     Presented with Crea 1 32  Given gentle IVF  Unclear baseline    Per hospital team - suspect CKD Stage 2 2/2 hypertension  Will monitor BMP while here  Avoid nephrotoxic meds and relative hypotension  Recommend outpatient f/u with PCP    Spasticity  Assessment & Plan  Intrinsic tonic tone in R quad which is mild  RUE still decreased tone  Has some hiccups too  Would opt to avoid treating R quad for now, as tone there may help stabilize at the knee  R ankle multipodus  3/8 Baclofen to 5mg QID  Range of motion  Monitor as tone evolves  Outpatient f/u with PMR  Dysphagia  Assessment & Plan  Mod-sever oropharyngeal  3/8 Advanced to Level 3/Thins  Most likely will need VFSS at some point during this stay  Aspiration precautions  Good oral hygiene   SLP consulted    Primary hypertension  Assessment & Plan  Home: None  Here: Amlodipine 10mg daily, Coreg 12 5mg BID, Lisinopril 20mg daily   - IM stopped chlorthalidone and increased coreg  Had hypertensive urgency in hospital requiring cardene gtt  Monitor and adjust as appropriate  IM consulted to assist with management  Stenosis of left vertebral artery  Assessment & Plan  Severe L vertebral artery origin stenosis  Repeat CTA H/N 2 weeks around 3/17  ASA/Brilinta  Atorvastatin  SBP goals 120-160  Follow-up with Neurovascular  Right Vertebral artery dissection [facility])  Assessment & Plan  Now s/p R V3/4 stenting with TICI 2B revascularization on 2/26 for R Vert stenosis  Continue ASA/Brilinta  Statin  Neurosurg f/u 3/17 with repeat CTA H/N        COVID  Assessment & Plan  Resolved  Incidentally found to have COVID on 2/25  Asymptomatic from respiratory standpoint  Per Neurosurgery concern for hypercoagulability related to COVID  Currently on Eliquis 2 5mg BID - per Neuro 3-4 weeks probably reasonable, but for them ultimately up to NSx as this was their initial recommendation to start this medication  3/8 Discontinued precautions after 10 days isolation  Health Maintenance  #Delirium/Sleep: At risk  Optimize bowel/bladder, sleep-wake cycle, mood and pain management     #Pain: Baclofen 10mg BID, Tylenol PRN  #Bowel: Last BM 3/8 Only PRN miralax  See Neurogenic bowel above  #Bladder: Voiding and continent  #Skin/Pressure Injury Prevention: Turn Q2hr in bed, with weight shifts [id number] in wheelchair  Float heels in bed  #DVT Prophylaxis: On triple therapy, SCDs  #GI Prophylaxis: None  #Code Status:  Full Code  #FEN: Level 3/NTL  #Dispo:  Team 3/7  ELOS 4-6 weeks  May require more assistance at home and primary wheelchair level mobility given his ataxia  Barrier: R sided weakness, ataxia, truncal weakness, dysphagia, aphasia  Goals: See above       Objective:    Functional Update:  PT:  Eusebio with bed mobility, mod-max A x2 with sit to stand  OT: total A with ADLs  SLP:  CLQT with mild cognitive linguistic impairments  Mildoropharyngeal dysphagia    Allergies per EMR    Physical Exam:  Temp:  [97 5 °F (36 4 °C)-98 4 °F (36 9 °C)] 97 7 °F (36 5 °C)  HR:  [53-60] 60  Resp:  [18-19] 18  BP: ()/(52-87) 160/87  Oxygen Therapy  SpO2: 97 %    Gen: No acute distress, Well-nourished, well-appearing  HEENT: Moist mucus membranes, Normocephalic/Atraumatic  Cardiovascular: Regular rate, rhythm, S1/S2  Distal pulses palpable  Heme/Extr: No edema  Pulmonary: Non-labored breathing  Lungs CTAB  : No barnett  GI: Soft, non-tender, non-distended  BS+  MSK: PROM is WFL in all extremities  No effusions or deformities  Bulk is symmetric  See below for MMT scores  Integumentary: Skin is warm, dry  Neuro: AAOx3, Speech with some word finding difficulties but appropriate  R decreased tone  RLE with very mild quad tone     MMT:   Strength:   Right  Left  Site  Right  Left  Site    1 5  S Ab: Shoulder Abductors  3 5  HF: Hip Flexors    1+ 5  EF: Elbow Flexors  3  5 KF: Knee Flexors     1+ 5  EE: Elbow Extensors  3  5  KE: Knee Extensors    1+ 5  WE: Wrist Extensors  0  5  DR: Dorsi Flexors    1+ 5  FF: Finger Flexors  1  5  PF: Plantar Flexors    0 5  HI: Hand Intrinsics  1  5  EHL: Extensor Hallucis Longus Psych: Congruent mood and affect  Diagnostic Studies: Reviewed, no new imaging  Laboratory:  Reviewed   Results from last 7 days   Lab Units 03/06/23  0627 03/05/23  0501 03/04/23  0538   HEMOGLOBIN g/dL 10 1* 10 0* 10 4*   HEMATOCRIT % 31 4* 30 6* 32 8*   WBC Thousand/uL 8 82 10 45* 10 55*     Results from last 7 days   Lab Units 03/06/23  0627 03/05/23  0501 03/04/23  0538   BUN mg/dL 34* 38* 37*   POTASSIUM mmol/L 4 1 4 0 4 1   CHLORIDE mmol/L 108 107 109*   CREATININE mg/dL 1 26 1 29 1 31*            Patient Active Problem List   Diagnosis   • BRAULIO (acute kidney injury) ([facility] [address] )   • COVID   • Acute Posterior Circulation Stroke   • Right Vertebral artery dissection (HCC)   • Stenosis of left vertebral artery   • Primary hypertension   • Dizziness   • Dysphagia   • Spasticity   • CKD (chronic kidney disease)   • Prediabetes   • Adjustment disorder with depressed mood   • Anemia   • Neurogenic bowel         Medications  Current Facility-Administered Medications   Medication Dose Route Frequency Provider Last Rate   • amLODIPine  10 mg Oral Daily Jenny Rashid MD     • apixaban  2 5 mg Oral BID Jenny Rashid MD     • aspirin  81 mg Oral Daily Jenny Rashid MD     • atorvastatin  40 mg Oral Daily With Ludie Jeans, MD     • baclofen  5 mg Oral 4x Daily Jenny aRshid MD     • carvedilol  12 5 mg Oral BID With Meals YURI Jalloh     • vitamin B-12  1,000 mcg Oral Daily Jenny Rashid MD     • [START ON 3/10/2023] ferrous sulfate  325 mg Oral Daily With Breakfast YURI Gallagher     • FLUoxetine  20 mg Oral Daily Jenny Rashid MD     • hydrALAZINE  25 mg Oral Q8H PRN YURI Jalloh     • lisinopril  20 mg Oral Daily Jenny Rashid MD     • polyethylene glycol  17 g Oral Daily PRN Jenny Rashid MD     • ticagrelor  90 mg Oral Q12H [address] Jenny Rashid MD            ** Please Note: Fluency Direct voice to text software may have been used in the creation of this document   **

## 2023-03-09 NOTE — PROGRESS NOTES
"Occupational Therapy Treatment Note         03/09/23 2458   Pain Assessment   Pain Assessment Tool 0-10   Pain Score No Pain   Restrictions/Precautions   Precautions Aspiration;Bed/chair alarms;Cognitive; Fall Risk;Supervision on toilet/commode   Braces or Orthoses   (R UE sling for transfers/mobility  TEDs donned per verbally was to complete yesterday )   Lifestyle   Autonomy \"this is hard\"   Putting On/Taking Off Footwear   Type of Assistance Needed Physical assistance   Physical Assistance Level Total assistance   Putting On/Taking Off Footwear CARE Score 1   Lying to Sitting on Side of Bed   Type of Assistance Needed Physical assistance   Physical Assistance Level 25% or less   Comment + bed rail   Lying to Sitting on Side of Bed CARE Score 3   Sit to Stand   Type of Assistance Needed Physical assistance   Physical Assistance Level Total assistance   Comment mod assist x2, 1 in front and 1 in back, R knee block   Sit to Stand CARE Score 1   Bed-Chair Transfer   Type of Assistance Needed Physical assistance; Adaptive equipment   Physical Assistance Level Total assistance   Comment Sliding board transfer to R - mod assist x1 in front and mod assist x1 in back, pt leaned backwards and began to slip off sliding board, requiring assist to correct  Educated pt on need for head to lean forward during sliding board transfers  Going to L side, pt was mod assist x1 back to bed while using L UE support on bed rail and assist of 2nd to stabilize DME  Trialed stand pivot and sit pivot transfers to/from reogo chair, do not recommend stand pivots at this time as pt requires max assist x2  Sit pivot to L is mod assist x1 in front and mod assist x1 in back  Continue to recommend sliding board transfers in room with all staff  In therapy, can begin to focus on sit pivot transfers over the next week, this goes well going to L side but not R at this time     Chair/Bed-to-Chair Transfer CARE Score 1   Neuromuscular Education " "  Functional Movement Patterns To promote R UE neuromuscular re-education and increase functional use during ADLs/functional transfers, OT applied NMES anibal stim unit - channel 1 -  to pt's R UE while pt seated  Applied to: wrist/digit extensors and triceps while OT provided AAROM to facilitate R wrist/digit extension and tricep extension  Pt tolerates 10 minutes each  Pt tolerated well, will continue to benefit from NMES to promote R UE functional use/ROM  Moist heat applied to R shoulder/neck x10 minutes during distal UE neuromuscular re-education as pt reports tightness, skin checks performed and no redness noted  Next, pt engaged in ReoGo-Assisted Therapy for motor learning and mass repetition to increase (R) UE functional use during ADLs/IADLS/functional transfers, as well as to facilitate and promote normal movement patterns  Pt assisted into chair with trunk and shoulder supports in place to decrease compensatory trunk movement patterns  Pt engaged in fit process using arm trough  Pt fit under \"[address]\"  Educated pt on visual imagery and guided thought to increase neuromuscular recovery and promote muscle facilitation while engaging in Reo-Go exercises  Completed the following: R forward thrust - 10 guided; 10 initiated; 20 initiated  Pt initially required tactile cues at triceps to initiate movement for 5 reps, but then he was able to carryover and complete initiated motion without tactile cues  Cognition   Arousal/Participation Alert   Attention Within functional limits   Orientation Level Oriented to person;Oriented to place;Oriented to situation   Following Commands Follows one step commands without difficulty   Comments [facility] for basic tasks   will benefit from [facility] using motor deficit conversion next week to assess higher level cognition   Activity Tolerance   Activity Tolerance Patient tolerated treatment well   Medical Staff Made Aware MOISÉS Piedra present, IV of L wrist bumped during transfer, but " nursing removed  Assessment   Treatment Assessment Pt participated in skilled OT tx session  See above for further details on functional performance  Pt demonstrates good potential for R UE neuromuscular recovery - plan to continue use of Reogo and NMES  Continue to recommend sliding board transfers be used outside of therapy  Within next week, will focus in therapy on sit pivot transfers versus sliding board transfers  Pt will continue to benefit from skilled OT intervention to address to maximize functional independence in ADLS, functional mobility/transfers and IADLS, while decreasing burden of care  Pt left positioned in bed with call bell in reach and bed alarm on  Next week, will plan for [facility] using motor deficit score conversion  BP taken manually L UE supine: 124/70 no teds, sitting at EOB with teds 122/78  Initial dizziness with supine to sit but resolves within 1-2 minutes  Prognosis Good   Problem List Decreased strength;Decreased range of motion;Decreased endurance; Impaired balance;Decreased mobility; Decreased cognition;Decreased coordination; Impaired sensation; Impaired tone   Barriers to Discharge Decreased caregiver support; Inaccessible home environment   Plan   Treatment/Interventions Functional transfer training;ADL retraining; Therapeutic exercise; Endurance training;Cognitive reorientation;Patient/family training;Equipment eval/education; Bed mobility; Compensatory technique education   Progress Progressing toward goals   Recommendation   OT Discharge Recommendation   (pending progress)   OT Therapy Minutes   OT Time In 0830   OT Time Out 1000   OT Total Time (minutes) 90   OT Mode of treatment - Individual (minutes) 90   OT Mode of treatment - Concurrent (minutes) 0   OT Mode of treatment - Group (minutes) 0   OT Mode of treatment - Co-treat (minutes) 0   OT Mode of Treatment - Total time(minutes) 90 minutes   OT Cumulative Minutes 250   Therapy Time missed   Time missed?  No

## 2023-03-09 NOTE — PLAN OF CARE
Problem: Prexisting or High Potential for Compromised Skin Integrity  Goal: Skin integrity is maintained or improved  Description: INTERVENTIONS:  - Identify patients at risk for skin breakdown  - Assess and monitor skin integrity  - Assess and monitor nutrition and hydration status  - Monitor labs   - Assess for incontinence   - Turn and reposition patient  - Assist with mobility/ambulation  - Relieve pressure over bony prominences  - Avoid friction and shearing  - Provide appropriate hygiene as needed including keeping skin clean and dry  - Evaluate need for skin moisturizer/barrier cream  - Collaborate with interdisciplinary team   - Patient/family teaching  - Consider wound care consult   Outcome: Progressing     Problem: MOBILITY - ADULT  Goal: Maintain or return to baseline ADL function  Description: INTERVENTIONS:  -  Assess patient's ability to carry out ADLs; assess patient's baseline for ADL function and identify physical deficits which impact ability to perform ADLs (bathing, care of mouth/teeth, toileting, grooming, dressing, etc )  - Assess/evaluate cause of self-care deficits   - Assess range of motion  - Assess patient's mobility; develop plan if impaired  - Assess patient's need for assistive devices and provide as appropriate  - Encourage maximum independence but intervene and supervise when necessary  - Involve family in performance of ADLs  - Assess for home care needs following discharge   - Consider OT consult to assist with ADL evaluation and planning for discharge  - Provide patient education as appropriate  Outcome: Progressing  Goal: Maintains/Returns to pre admission functional level  Description: INTERVENTIONS:  - Perform BMAT or MOVE assessment daily    - Set and communicate daily mobility goal to care team and patient/family/caregiver  - Collaborate with rehabilitation services on mobility goals if consulted  - Perform Range of Motion 3 times a day    - Reposition patient every 2 hours   - Dangle patient 3 times a day  - Stand patient 3 times a day  - Ambulate patient 3 times a day  - Out of bed to chair 3 times a day   - Out of bed for meals 3 times a day  - Out of bed for toileting  - Record patient progress and toleration of activity level   Outcome: Progressing     Problem: PAIN - ADULT  Goal: Verbalizes/displays adequate comfort level or baseline comfort level  Description: Interventions:  - Encourage patient to monitor pain and request assistance  - Assess pain using appropriate pain scale  - Administer analgesics based on type and severity of pain and evaluate response  - Implement non-pharmacological measures as appropriate and evaluate response  - Consider cultural and social influences on pain and pain management  - Notify physician/advanced practitioner if interventions unsuccessful or patient reports new pain  Outcome: Progressing     Problem: INFECTION - ADULT  Goal: Absence or prevention of progression during hospitalization  Description: INTERVENTIONS:  - Assess and monitor for signs and symptoms of infection  - Monitor lab/diagnostic results  - Monitor all insertion sites, i e  indwelling lines, tubes, and drains  - Monitor endotracheal if appropriate and nasal secretions for changes in amount and color  - [place] appropriate cooling/warming therapies per order  - Administer medications as ordered  - Instruct and encourage patient and family to use good hand hygiene technique  - Identify and instruct in appropriate isolation precautions for identified infection/condition  Outcome: Progressing  Goal: Absence of fever/infection during neutropenic period  Description: INTERVENTIONS:  - Monitor WBC    Outcome: Progressing     Problem: SAFETY ADULT  Goal: Patient will remain free of falls  Description: INTERVENTIONS:  - Educate patient/family on patient safety including physical limitations  - Instruct patient to call for assistance with activity   - Consult OT/PT to assist with strengthening/mobility   - Keep Call bell within reach  - Keep bed low and locked with side rails adjusted as appropriate  - Keep care items and personal belongings within reach  - Initiate and maintain comfort rounds  - Make Fall Risk Sign visible to staff  - Offer Toileting every 2 Hours, in advance of need  - Initiate/Maintain bed  chair alarm  - Obtain necessary fall risk management equipment: nonskid socks  - Apply yellow socks and bracelet for high fall risk patients  - Consider moving patient to room near nurses station  Outcome: Progressing  Goal: Maintain or return to baseline ADL function  Description: INTERVENTIONS:  -  Assess patient's ability to carry out ADLs; assess patient's baseline for ADL function and identify physical deficits which impact ability to perform ADLs (bathing, care of mouth/teeth, toileting, grooming, dressing, etc )  - Assess/evaluate cause of self-care deficits   - Assess range of motion  - Assess patient's mobility; develop plan if impaired  - Assess patient's need for assistive devices and provide as appropriate  - Encourage maximum independence but intervene and supervise when necessary  - Involve family in performance of ADLs  - Assess for home care needs following discharge   - Consider OT consult to assist with ADL evaluation and planning for discharge  - Provide patient education as appropriate  Outcome: Progressing  Goal: Maintains/Returns to pre admission functional level  Description: INTERVENTIONS:  - Perform BMAT or MOVE assessment daily    - Set and communicate daily mobility goal to care team and patient/family/caregiver  - Collaborate with rehabilitation services on mobility goals if consulted  - Perform Range of Motion 3 times a day  - Reposition patient every 2 hours    - Dangle patient 3 times a day  - Stand patient 3 times a day  - Ambulate patient 3 times a day  - Out of bed to chair 3 times a day   - Out of bed for meals 3 times a day  - Out of bed for toileting  - Record patient progress and toleration of activity level   Outcome: Progressing     Problem: DISCHARGE PLANNING  Goal: Discharge to home or other facility with appropriate resources  Description: INTERVENTIONS:  - Identify barriers to discharge w/patient and caregiver  - Arrange for needed discharge resources and transportation as appropriate  - Identify discharge learning needs (meds, wound care, etc )  - Arrange for interpretive services to assist at discharge as needed  - Refer to Case Management Department for coordinating discharge planning if the patient needs post-hospital services based on physician/advanced practitioner order or complex needs related to functional status, cognitive ability, or social support system  Outcome: Progressing     Problem: Nutrition/Hydration-ADULT  Goal: Nutrient/Hydration intake appropriate for improving, restoring or maintaining nutritional needs  Description: Monitor and assess patient's nutrition/hydration status for malnutrition  Collaborate with interdisciplinary team and initiate plan and interventions as ordered  Monitor patient's weight and dietary intake as ordered or per policy  Utilize nutrition screening tool and intervene as necessary  Determine patient's food preferences and provide high-protein, high-caloric foods as appropriate       INTERVENTIONS:  - Monitor oral intake, urinary output, labs, and treatment plans  - Assess nutrition and hydration status and recommend course of action  - Evaluate amount of meals eaten  - Assist patient with eating if necessary   - Allow adequate time for meals  - Recommend/ encourage appropriate diets, oral nutritional supplements, and vitamin/mineral supplements  - Order, calculate, and assess calorie counts as needed  - Recommend, monitor, and adjust tube feedings and TPN/PPN based on assessed needs  - Assess need for intravenous fluids  - Provide specific nutrition/hydration education as appropriate  - Include patient/family/caregiver in decisions related to nutrition  Outcome: Progressing

## 2023-03-09 NOTE — PLAN OF CARE
Problem: Prexisting or High Potential for Compromised Skin Integrity  Goal: Skin integrity is maintained or improved  Description: INTERVENTIONS:  - Identify patients at risk for skin breakdown  - Assess and monitor skin integrity  - Assess and monitor nutrition and hydration status  - Monitor labs   - Assess for incontinence   - Turn and reposition patient  - Assist with mobility/ambulation  - Relieve pressure over bony prominences  - Avoid friction and shearing  - Provide appropriate hygiene as needed including keeping skin clean and dry  - Evaluate need for skin moisturizer/barrier cream  - Collaborate with interdisciplinary team   - Patient/family teaching  - Consider wound care consult   Outcome: Progressing     Problem: MOBILITY - ADULT  Goal: Maintain or return to baseline ADL function  Description: INTERVENTIONS:  -  Assess patient's ability to carry out ADLs; assess patient's baseline for ADL function and identify physical deficits which impact ability to perform ADLs (bathing, care of mouth/teeth, toileting, grooming, dressing, etc )  - Assess/evaluate cause of self-care deficits   - Assess range of motion  - Assess patient's mobility; develop plan if impaired  - Assess patient's need for assistive devices and provide as appropriate  - Encourage maximum independence but intervene and supervise when necessary  - Involve family in performance of ADLs  - Assess for home care needs following discharge   - Consider OT consult to assist with ADL evaluation and planning for discharge  - Provide patient education as appropriate  Outcome: Progressing  Goal: Maintains/Returns to pre admission functional level  Description: INTERVENTIONS:  - Perform BMAT or MOVE assessment daily    - Set and communicate daily mobility goal to care team and patient/family/caregiver  - Collaborate with rehabilitation services on mobility goals if consulted  - Perform Range of Motion  times a day    - Reposition patient every hours   - Dangle patient times a day  - Stand patient  times a day  - Ambulate patient  times a day  - Out of bed to chair  times a day   - Out of bed for meals  times a day  - Out of bed for toileting  - Record patient progress and toleration of activity level   Outcome: Progressing     Problem: PAIN - ADULT  Goal: Verbalizes/displays adequate comfort level or baseline comfort level  Description: Interventions:  - Encourage patient to monitor pain and request assistance  - Assess pain using appropriate pain scale  - Administer analgesics based on type and severity of pain and evaluate response  - Implement non-pharmacological measures as appropriate and evaluate response  - Consider cultural and social influences on pain and pain management  - Notify physician/advanced practitioner if interventions unsuccessful or patient reports new pain  Outcome: Progressing     Problem: INFECTION - ADULT  Goal: Absence or prevention of progression during hospitalization  Description: INTERVENTIONS:  - Assess and monitor for signs and symptoms of infection  - Monitor lab/diagnostic results  - Monitor all insertion sites, i e  indwelling lines, tubes, and drains  - Monitor endotracheal if appropriate and nasal secretions for changes in amount and color  - [place] appropriate cooling/warming therapies per order  - Administer medications as ordered  - Instruct and encourage patient and family to use good hand hygiene technique  - Identify and instruct in appropriate isolation precautions for identified infection/condition  Outcome: Progressing  Goal: Absence of fever/infection during neutropenic period  Description: INTERVENTIONS:  - Monitor WBC    Outcome: Progressing     Problem: SAFETY ADULT  Goal: Patient will remain free of falls  Description: INTERVENTIONS:  - Educate patient/family on patient safety including physical limitations  - Instruct patient to call for assistance with activity   - Consult OT/PT to assist with strengthening/mobility   - Keep Call bell within reach  - Keep bed low and locked with side rails adjusted as appropriate  - Keep care items and personal belongings within reach  - Initiate and maintain comfort rounds  - Make Fall Risk Sign visible to staff  - Offer Toileting every  Hours, in advance of need  - Initiate/Maintain alarm  - Obtain necessary fall risk management equipment:   - Apply yellow socks and bracelet for high fall risk patients  - Consider moving patient to room near nurses station  Outcome: Progressing  Goal: Maintain or return to baseline ADL function  Description: INTERVENTIONS:  -  Assess patient's ability to carry out ADLs; assess patient's baseline for ADL function and identify physical deficits which impact ability to perform ADLs (bathing, care of mouth/teeth, toileting, grooming, dressing, etc )  - Assess/evaluate cause of self-care deficits   - Assess range of motion  - Assess patient's mobility; develop plan if impaired  - Assess patient's need for assistive devices and provide as appropriate  - Encourage maximum independence but intervene and supervise when necessary  - Involve family in performance of ADLs  - Assess for home care needs following discharge   - Consider OT consult to assist with ADL evaluation and planning for discharge  - Provide patient education as appropriate  Outcome: Progressing  Goal: Maintains/Returns to pre admission functional level  Description: INTERVENTIONS:  - Perform BMAT or MOVE assessment daily    - Set and communicate daily mobility goal to care team and patient/family/caregiver  - Collaborate with rehabilitation services on mobility goals if consulted  - Perform Range of Motion  times a day  - Reposition patient every hours    - Dangle patient  times a day  - Stand patient  times a day  - Ambulate patient  times a day  - Out of bed to chair  times a day   - Out of bed for meals  times a day  - Out of bed for toileting  - Record patient progress and toleration of activity level   Outcome: Progressing     Problem: DISCHARGE PLANNING  Goal: Discharge to home or other facility with appropriate resources  Description: INTERVENTIONS:  - Identify barriers to discharge w/patient and caregiver  - Arrange for needed discharge resources and transportation as appropriate  - Identify discharge learning needs (meds, wound care, etc )  - Arrange for interpretive services to assist at discharge as needed  - Refer to Case Management Department for coordinating discharge planning if the patient needs post-hospital services based on physician/advanced practitioner order or complex needs related to functional status, cognitive ability, or social support system  Outcome: Progressing     Problem: Nutrition/Hydration-ADULT  Goal: Nutrient/Hydration intake appropriate for improving, restoring or maintaining nutritional needs  Description: Monitor and assess patient's nutrition/hydration status for malnutrition  Collaborate with interdisciplinary team and initiate plan and interventions as ordered  Monitor patient's weight and dietary intake as ordered or per policy  Utilize nutrition screening tool and intervene as necessary  Determine patient's food preferences and provide high-protein, high-caloric foods as appropriate       INTERVENTIONS:  - Monitor oral intake, urinary output, labs, and treatment plans  - Assess nutrition and hydration status and recommend course of action  - Evaluate amount of meals eaten  - Assist patient with eating if necessary   - Allow adequate time for meals  - Recommend/ encourage appropriate diets, oral nutritional supplements, and vitamin/mineral supplements  - Order, calculate, and assess calorie counts as needed  - Recommend, monitor, and adjust tube feedings and TPN/PPN based on assessed needs  - Assess need for intravenous fluids  - Provide specific nutrition/hydration education as appropriate  - Include patient/family/caregiver in decisions related to nutrition  Outcome: Progressing

## 2023-03-09 NOTE — CASE MANAGEMENT
Case Management Update:  Faxed clinicals to [facility] [phone] for continued stay review, requesting additional week of coverage to focus on functional and mobility goals  Awaiting determination

## 2023-03-09 NOTE — PROGRESS NOTES
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, mild BRAULIO, and incidentally found + COVID (without evidence of respiratory illness/hypoxia/CXR findings)  Placed on cardene gtt, stroke pathway, ASA/Plavix, IV hydration for BRAULIO, and CTX for UTI  NIHSS 2  Symptoms began 2 days prior  Not tPA/TNK candidate  MRI/MRA with 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  NSx recommended transfer to [facility] and starting on heparin gtt and stopped Plavix  Repeat CTA on  stable with with R V3/4 occlusion  Not a candidate for interventional procedure due to clinical stability and risk  Speech consulted and noted mod-severe oropharyngeal dysphagia recommended pureed/HTL  Unfortunately later on , he clinically deteriorated with increased R sided weakness, and was taken to IR for stenting of V3 and V4 with TICI 2B revascularization  CTH without ICH  He was admitted back to the ICU intubated - extubated   MRI showed cerebellar CVA and R > L stroke burden, with additional hypodensities on DWI appreciated L midbrain, pontine area and R lower medullary/spinal cord junction  He was transitioned to triple therapy with DAPT (given recent stent) and A/C with eliquis 2 5mg BID due to COVID  Diet advanced to Level 3/NTL on   Noted to have spasticity in RLE - started on baclofen by Neurology  He was able to have cardene discontinued on 3/2, and they have been making adjustments to his oral regimen  He was started on Prozac for his mood   NSx has signed off  CTA H/N recommended around 3/17  Length of time on eliquis unclear  He was admitted to [facility] on 3/6/23  Since admission, pt has been very tearful and depressed  Denies any new CP, SOB, fevers, chills, N/V, abdominal pain  He is incontinent of bowel on admission  He has some word finding difficulties and dysphagia  He has aphasia, but speech is improving  He feels the strength in his RLE is improving, but remains with decreased tone and strength in his right upper extremity  He denies any pain anywhere  No past medical history on file  Patient Active Problem List    Diagnosis Date Noted   • Neurogenic bowel 03/08/2023   • Anemia 03/06/2023   • Spasticity 03/01/2023   • CKD (chronic kidney disease) 03/01/2023   • Prediabetes 03/01/2023   • Adjustment disorder with depressed mood 03/01/2023   • BRAULIO (acute kidney injury) ([facility] [address] ) 02/25/2023   • COVID 02/25/2023   • Acute Posterior Circulation Stroke 02/25/2023   • Right Vertebral artery dissection ([facility] [address] ) 02/25/2023   • Stenosis of left vertebral artery 02/25/2023   • Primary hypertension 02/25/2023   • Dizziness 02/25/2023   • Dysphagia 02/25/2023       Body mass index is 27 87 kg/m²  Past Medical History:     No past medical history on file  Past Surgical History:     No past surgical history on file        Allergies:     No Known Allergies      Family and Social Support:   No data recorded    Social History:    Social History     Socioeconomic History   • Marital status: /Civil Union     Spouse name: Not on file   • Number of children: Not on file   • Years of education: Not on file   • Highest education level: Not on file   Occupational History   • Not on file   Tobacco Use   • Smoking status: Never   • Smokeless tobacco: Never   Substance and Sexual Activity   • Alcohol use: Not Currently   • Drug use: Not Currently   • Sexual activity: Not on file   Other Topics Concern   • Not on file   Social History Narrative   • Not on file     Social Determinants of Health     Financial Resource Strain: Not on file   Food Insecurity: Not on file   Transportation Needs: Not on file   Physical Activity: Not on file   Stress: Not on file   Social Connections: Not on file   Intimate Partner Violence: Not on file   Housing Stability: Not on file        Family History:    No family history on file      Medications:     Current Facility-Administered Medications:   •  amLODIPine (NORVASC) tablet 10 mg, 10 mg, Oral, Daily, Mimi Gonzalez MD, 10 mg at 03/09/23 5178  •  apixaban (ELIQUIS) tablet 2 5 mg, 2 5 mg, Oral, BID, Mimi Gonzalez MD, 2 5 mg at 03/09/23 0825  •  aspirin chewable tablet 81 mg, 81 mg, Oral, Daily, Mimi Gonzalez MD, 81 mg at 03/09/23 0825  •  atorvastatin (LIPITOR) tablet 40 mg, 40 mg, Oral, Daily With Sarah Beth Manning MD, 40 mg at 03/08/23 1706  •  baclofen tablet 5 mg, 5 mg, Oral, 4x Daily, Mimi Gonzalez MD, 5 mg at 03/09/23 1131  •  carvedilol (COREG) tablet 12 5 mg, 12 5 mg, Oral, BID With Meals, YURI Valverde, 12 5 mg at 03/09/23 0702  •  cyanocobalamin (VITAMIN B-12) tablet 1,000 mcg, 1,000 mcg, Oral, Daily, Mimi Gonzalez MD, 1,000 mcg at 03/09/23 0825  •  [START ON 3/10/2023] ferrous sulfate tablet 325 mg, 325 mg, Oral, Daily With Breakfast, YURI Valverde  •  FLUoxetine (PROzac) capsule 20 mg, 20 mg, Oral, Daily, Mimi Gonzalez MD, 20 mg at 03/09/23 0825  •  hydrALAZINE (APRESOLINE) tablet 25 mg, 25 mg, Oral, Q8H PRN, YURI Valverde  •  lisinopril (ZESTRIL) tablet 20 mg, 20 mg, Oral, Daily, Mimi Gonzalez MD, 20 mg at 03/09/23 0825  •  polyethylene glycol (MIRALAX) packet 17 g, 17 g, Oral, Daily PRN, Mimi Gonzalez MD  •  ticagrelor (BRILINTA) tablet 90 mg, 90 mg, Oral, Q12H [address], Mimi Gonzalez MD, 90 mg at 03/09/23 1208        OBSERVATIONS:     Appearance  __x__Neat ____Disheveled ____Overweight ____Underweight ____Frail    Speech  __x__Fluid, Articulate ___x_Spontaneous ____Circumlocutions ____Word Finding difficulties ____Dysarthria ____Circumstantial ____Paucity ____Perseverative ____Pressured ____ Tangential     Thought Process/Content  __x__Coherent  ____Preoccupations____Ruminations____Obsessions____Hallucinations ____Delusions ____Flight of Ideas ____Distortions ____Distracted ____Suicidal Ideation ____Homicidal Ideation ____ Memory Issues ____ Perseveration ____ Tangential    Interaction  __x__Engaged__x__Cooperative____Superficial____Detached____Fearful____Guarded____Suspicious ____Poor Boundaries ____Aggressive    Affect  ____Neutral___x_Positive_x___Anxiety__x__Worried____Irritable____Angry____Depressed/Dysphoric ____Euthymic __x___ Celeste Bergamo ____ Fatigued     Behavior  _x__Spontaneous __x__Purposeful ____Slowed Initiation ____Apathetic ____Impulsive ____Dyscontrolled ____Hypoactive ____Hyperactive ____Repetitive/Perseverative      Overall Progress:    ____Very Declined ____Declined __x__Stable ____Improved ____Very Improved    Motivation and Participation:    ____Very Poor ____Poor ____Fair __x__Good ____Very Good                    ASSESSMENT & RECOMMENDATIONS:   Pt reports therapy today went well and he was grateful to take a break  States his sleep last night was better than previously as well  Affect is tearful and pt is struggling to reconcile his image of self as provider and strong with current functional and medical deficits  He feels guilty and defeated and worries about his family  Problem solved options for how to make lifestyle changes upon discharge  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  Currently, 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  It appears that in addition to the adjustment disorder pt also exhibits post stroke depression  Appropriate medication to improve and stablize mood may be helpful  He may benefit by medication to improve restorative sleep  In addition, sleep study to rule out potential apnea should be considered at some point  Pt  is making good progress in psychotherapy and  reports feeling  motivated to continue working towards rehab goals  Provided CBT and mindfulness based interventions, as well as supportive psychotherapy  Addressed coping, adjustment, and motivation  I will continue to evaluate pt's emotional functioning and status and provide supportive and mindfulness interventions during pt's stay  Effective coping strategies, distress tolerance, breathing exercises will be key interventions  Total face to face time with pt - 25 minutes  Continued Psychological intervention is medically necessary to:  __x__ Maintain current progress  __X_   Achieve Therapy Goals   __X__Prevent relapse       DIAGNOSIS:  Adjustment Disorder with Anxiety and Depression  Post Stroke Depression      RECOMMENDATIONS:   I will follow Mr Bisi Boudreaux during his stay to provide the following interventions:    · Supportive psychotherapy, utilizing CBT and mindfulness strategies  · DBT distress tolerance techniques  to improve coping and mood  · Meditation and relaxation training as tolerated          Thank you for the opportunity to participate in Mr Ling Lowry care  Gibran Marlow, Ph D   Licensed Psychologist

## 2023-03-09 NOTE — PROGRESS NOTES
03/09/23 1030   Pain Assessment   Pain Assessment Tool 0-10   Pain Score No Pain   Restrictions/Precautions   Precautions Bed/chair alarms;Cognitive; Fall Risk;Aspiration;Supervision on toilet/commode   Braces or Orthoses   (julieta sling in PT, regular sling during transfers)   Cognition   Overall Cognitive Status [facility]   Arousal/Participation Alert; Cooperative   Subjective   Subjective pt reported fatigue from lack of sleep but was agreeable to have PT   Roll Left and Right   Type of Assistance Needed Verbal cues; Physical assistance   Physical Assistance Level 26%-50%   Comment HOB flat no rail   Roll Left and Right CARE Score 3   Sit to Lying   Type of Assistance Needed Physical assistance;Verbal cues   Physical Assistance Level 26%-50%   Comment HOB flat no rail on left side of the bed to simulate home set up   Sit to Lying CARE Score 3   Lying to Sitting on Side of Bed   Type of Assistance Needed Physical assistance;Verbal cues   Physical Assistance Level 26%-50%   Comment HOB flat no rail , VC to avoid using bedrail   Lying to Sitting on Side of Bed CARE Score 3   Sit to Stand   Type of Assistance Needed Physical assistance;Verbal cues   Physical Assistance Level Total assistance   Comment mod of 2 no AD, mod-max of 1 inside // bars   Sit to Stand CARE Score 1   Bed-Chair Transfer   Type of Assistance Needed Physical assistance;Verbal cues   Physical Assistance Level Total assistance   Comment repeated sit/stand pivot transfer without AD mod -min of 2 person   Chair/Bed-to-Chair Transfer CARE Score 1   Car Transfer   Type of Assistance Needed Physical assistance;Verbal cues   Physical Assistance Level Total assistance   Comment mod -min of 2 Stand/sit pivot   Car Transfer CARE Score 1   Walk 10 Feet   Type of Assistance Needed Physical assistance;Verbal cues; Adaptive equipment   Physical Assistance Level Total assistance   Walk 10 Feet CARE Score 1   Walk 50 Feet with Two Turns   Reason if not Attempted Safety "concerns   Walk 50 Feet with Two Turns CARE Score 88   Walk 150 Feet   Reason if not Attempted Safety concerns   Walk 150 Feet CARE Score 88   Walking 10 Feet on Uneven Surfaces   Reason if not Attempted Safety concerns   Walking 10 Feet on Uneven Surfaces CARE Score 88   Wheel 50 Feet with Two Turns   Type of Assistance Needed Physical assistance;Verbal cues   Physical Assistance Level 26%-50%   Comment L UE/LE only, VC for technique and w/c brake management x 50'  - will replace pt w/c tomorrow with full arm rest  PT also arrange furniture in room and provided pt with regular size recliner for better comfort and to facilitate staff transfer with the pt    Wheel 50 Feet with Two Turns CARE Score 3   Wheel 150 Feet   Comment fatigue   Reason if not Attempted Safety concerns   Wheel 150 Feet CARE Score 88   Curb or Single Stair   Reason if not Attempted Safety concerns   1 Step (Curb) CARE Score 88   4 Steps   Reason if not Attempted Safety concerns   4 Steps CARE Score 88   12 Steps   Reason if not Attempted Safety concerns   12 Steps CARE Score 88   Therapeutic Interventions   Neuromuscular Re-Education gait training fwd/bwd x  3 reps standing balance training inside the // bars with emphasis on inc R UE/LE weightbearing/shifting training without-min use of L UE with feet on floor then progressed to L LE on 4\" block to facilitate inc Wbing on R LE, mirror for feedback to facilitate self correction of posture  repeated step off/on recliner foot rest with R LE, active DF seated in recliner, R toe taps on small foam roller   Assessment   Treatment Assessment Pt tolerated skilled PT without any issues, see vitals for ortho BPs taken at start of tx session  Pt limited by fatigue but actively participated in therapy tasks with inc focus on NPP/NMR training to improve propriceptive input and motor output on R UE/LE   Pt able to advance R LE better even during retrowalking with assist for placement due to inc tone with " tendency to adduct  PT will cont to focus on NPP/NMR, w/c propulsion training and functional mobility training to inc overall indep and dec caregiver burden  Family/Caregiver Present no   Problem List Decreased strength;Decreased endurance;Decreased mobility; Impaired balance;Decreased coordination;Decreased safety awareness; Impaired sensation; Impaired tone   Barriers to Discharge Decreased caregiver support; Inaccessible home environment   Plan   Treatment/Interventions Functional transfer training; Therapeutic exercise;LE strengthening/ROM; Endurance training;Bed mobility;Gait training;Spoke to nursing;OT   Progress Progressing toward goals   PT Therapy Minutes   PT Time In 1030   PT Time Out 1200   PT Total Time (minutes) 90   PT Mode of treatment - Individual (minutes) 90   PT Mode of treatment - Concurrent (minutes) 0   PT Mode of treatment - Group (minutes) 0   PT Mode of treatment - Co-treat (minutes) 0   PT Mode of Treatment - Total time(minutes) 90 minutes   PT Cumulative Minutes 263

## 2023-03-09 NOTE — PROGRESS NOTES
Internal Medicine Progress Note  Patient: Yeimi Garay  Age/sex: 60 y o  male  Medical Record #: [id number]      ASSESSMENT/PLAN: (Interval History)  Yeimi Garay is seen and examined and management for following issues:    Posterior circulation stroke  • S/p intracranial and extracranial vertebral artery stenting/TICI 2B revascularization  • Continue ASA, Brilinta, Eliquis and atorvastatin  • Repeat CTA head and neck around 3/17/23  • Prozac for depressed mood following CVA   • Recommend Neuropsych consult  • Dysphagia 3 diet with nectar thick liquids  • Continue Baclofen 10mg 2x daily  • Therapy per primary service  • Outpt follow-up with Neurology and Neurosurgery    Rapid response  · Likely d/t vasovagal syncope  Neurology agrees that event was unlikely seizure  · Felt dizzy/weak prior to episode then was staring blankly initially when he came back around  · Eventually returned to his baseline  · No further events      HTN  • Home: No medications  • Here: amlodipine 10mg daily/Coreg 12 5mg 2x daily/lisinopril 20mg daily  • Goal normotension  • dc'd chlorthalidone d/t elevated BUN  • Stable    Fe deficiency anemia  · Likely multifactoral  · s/p IV venofer x 2 doses  · Cont daily ferrous sulfate  · Cbc mondays     CKD stage 2  • Baseline Cr 1 2 - 1 3  • Chlorthalidone dc'd  • Repeat bmp monday     COVID  • Continue precautions through 3/7/23  • Pt has not had respiratory symptoms  • Remains asymptomatic     Prediabetes  • HA1C 5 7  • Recommend dietary modifications     DC planning:  TBD  The above assessment and plan was reviewed and updated as determined by my evaluation of the patient on 3/9/2023      Labs:   Results from last 7 days   Lab Units 03/06/23  0627 03/05/23  0501   WBC Thousand/uL 8 82 10 45*   HEMOGLOBIN g/dL 10 1* 10 0*   HEMATOCRIT % 31 4* 30 6*   PLATELETS Thousands/uL 302 323     Results from last 7 days   Lab Units 03/06/23  0627 03/05/23  0501   SODIUM mmol/L 138 136   POTASSIUM mmol/L 4 1 4  0   CHLORIDE mmol/L 108 107   CO2 mmol/L 26 27   BUN mg/dL 34* 38*   CREATININE mg/dL 1 26 1 29   CALCIUM mg/dL 8 7 8 6                   Review of Scheduled Meds:  Current Facility-Administered Medications   Medication Dose Route Frequency Provider Last Rate   • amLODIPine  10 mg Oral Daily Toribio Woodard MD     • apixaban  2 5 mg Oral BID Toribio Woodard MD     • aspirin  81 mg Oral Daily Toribio Woodard MD     • atorvastatin  40 mg Oral Daily With Roosevelt Mcintosh MD     • baclofen  5 mg Oral 4x Daily Toribio Woodard MD     • carvedilol  12 5 mg Oral BID With Meals YURI Vazquez     • vitamin B-12  1,000 mcg Oral Daily Toribio Woodard MD     • [START ON 3/10/2023] ferrous sulfate  325 mg Oral Daily With Breakfast YURI Gallagher     • FLUoxetine  20 mg Oral Daily Toribio Woodard MD     • hydrALAZINE  25 mg Oral Q8H PRN YURI Vazquez     • lisinopril  20 mg Oral Daily Toribio Woodard MD     • polyethylene glycol  17 g Oral Daily PRN Toribio Woodard MD     • ticagrelor  90 mg Oral Q12H [facility] Toribio Woodard MD         Subjective/ HPI: Patient seen and examined  Patients overnight issues or events were reviewed with nursing or staff during rounds or morning huddle session  New or overnight issues include the following:     Pt seen in his room  He denies any dizziness today  He slept well overnight  He denies any current complaints  ROS:   A 10 point ROS was performed; negative except as noted above         Imaging:     No orders to display       *Labs /Radiology studies Reviewed  *Medications  reviewed and reconciled as needed  *Please refer to order section for additional ordered labs studies  *Case discussed with primary attending during morning huddle case rounds    Physical Examination:  Vitals:   Vitals:    03/08/23 1937 03/09/23 0548 03/09/23 0702 03/09/23 0825   BP: 100/52 150/73 160/87 111/57   BP Location: Right arm Right arm     Pulse: 60 (!) 53 60    Resp: 18 18     Temp: 98 4 °F (36 9 °C) 97 7 °F (36 5 °C)     TempSrc: Oral Oral     SpO2: 93% 97%     Weight:  85 6 kg (188 lb 11 4 oz)     Height:           GEN: No apparent distress, pleasant  NEURO: Alert and oriented x3; mild dysarthria  HEENT: Pupils are equal and reactive, EOMI, mucous membranes are moist, face asymmetrical  CV: S1 S2 regular, no MRG, no peripheral edema noted  RESP: Lungs are clear bilaterally, no wheezes, rales or rhonchi noted, on room air, respirations easy and non labored  GI: Flat, soft non tender, non distended; +BS x4  : Voiding without difficulty  MUSC: Moves all extremities; Rt hemiparesis UE > LE  SKIN: pink, warm and dry, normal turgor, no rashes, lesions      The above physical exam was reviewed and updated as determined by my evaluation of the patient on 3/9/2023  Invasive Devices     Peripheral Intravenous Line  Duration           Peripheral IV Left;Ventral (anterior) Forearm -- days                   VTE Pharmacologic Prophylaxis: Eliquis  Code Status: Level 1 - Full Code  Current Length of Stay: 3 day(s)      Total time spent:  30 minutes with more than 50% spent counseling/coordinating care  Counseling includes discussion with patient re: progress  and discussion with patient of his/her current medical state/information  Coordination of patient's care was performed in conjunction with primary service  Time invested included review of patient's labs, vitals, and management of their comorbidities with continued monitoring  In addition, this patient was discussed with medical team including physician and advanced extenders  The care of the patient was extensively discussed and appropriate treatment plan was formulated unique for this patient  Medical decision making for the day was made by supervising physician unless otherwise noted in their attestation statement  ** Please Note:  voice to text software may have been used in the creation of this document   Although proof errors in transcription or interpretation are a potential of such software**

## 2023-03-10 PROBLEM — U07.1 COVID: Status: RESOLVED | Noted: 2023-02-25 | Resolved: 2023-03-10

## 2023-03-10 RX ADMIN — AMLODIPINE BESYLATE 10 MG: 10 TABLET ORAL at 09:26

## 2023-03-10 RX ADMIN — ASPIRIN 81 MG CHEWABLE TABLET 81 MG: 81 TABLET CHEWABLE at 09:26

## 2023-03-10 RX ADMIN — ATORVASTATIN CALCIUM 40 MG: 40 TABLET, FILM COATED ORAL at 18:03

## 2023-03-10 RX ADMIN — APIXABAN 2.5 MG: 2.5 TABLET, FILM COATED ORAL at 09:26

## 2023-03-10 RX ADMIN — LISINOPRIL 20 MG: 20 TABLET ORAL at 09:26

## 2023-03-10 RX ADMIN — BACLOFEN 5 MG: 10 TABLET ORAL at 18:02

## 2023-03-10 RX ADMIN — BACLOFEN 5 MG: 10 TABLET ORAL at 09:26

## 2023-03-10 RX ADMIN — CARVEDILOL 12.5 MG: 12.5 TABLET, FILM COATED ORAL at 18:03

## 2023-03-10 RX ADMIN — BACLOFEN 5 MG: 10 TABLET ORAL at 11:46

## 2023-03-10 RX ADMIN — CYANOCOBALAMIN TAB 500 MCG 1000 MCG: 500 TAB at 09:26

## 2023-03-10 RX ADMIN — BACLOFEN 5 MG: 10 TABLET ORAL at 22:07

## 2023-03-10 RX ADMIN — CARVEDILOL 12.5 MG: 12.5 TABLET, FILM COATED ORAL at 09:26

## 2023-03-10 RX ADMIN — TICAGRELOR 90 MG: 90 TABLET ORAL at 09:30

## 2023-03-10 RX ADMIN — APIXABAN 2.5 MG: 2.5 TABLET, FILM COATED ORAL at 18:03

## 2023-03-10 RX ADMIN — TICAGRELOR 90 MG: 90 TABLET ORAL at 22:07

## 2023-03-10 RX ADMIN — FLUOXETINE 20 MG: 20 CAPSULE ORAL at 09:26

## 2023-03-10 RX ADMIN — FERROUS SULFATE TAB 325 MG (65 MG ELEMENTAL FE) 325 MG: 325 (65 FE) TAB at 09:26

## 2023-03-10 NOTE — ASSESSMENT & PLAN NOTE
No further episodes  3/8 Had episode of vasovagal syncope  - No convulsions  On body weight support system, started to feel dizzy  - Staring episode after, but able to sternal rubbed out of it  Very brief   - No post-ictal weakness   - Neurologically stable and back to baseline   - Orthostatics negative  Discussed with Neurology - unlikely seizure given distribution of stroke and presentation  No further testing recommended at this time  Monitor

## 2023-03-10 NOTE — PROGRESS NOTES
Occupational Therapy Treatment Note         03/10/23 1340   Pain Assessment   Pain Assessment Tool 0-10   Pain Score No Pain   Restrictions/Precautions   Precautions Aspiration;Bed/chair alarms;Cognitive; Fall Risk;Supervision on toilet/commode   Braces or Orthoses   (R UE sling transfers/mobility with PT)   Assessment   Treatment Assessment 25 minute skilled OT session focused on beginning of stroke education - stroke 101  See below for details  Plan   Treatment/Interventions ADL retraining;Functional transfer training; Therapeutic exercise; Endurance training;Cognitive reorientation;Patient/family training;Equipment eval/education; Bed mobility; Compensatory technique education   Progress Progressing toward goals   OT Therapy Minutes   OT Time In 1340   OT Time Out 1405   OT Total Time (minutes) 25   OT Mode of treatment - Individual (minutes) 25   OT Mode of treatment - Concurrent (minutes) 0   OT Mode of treatment - Group (minutes) 0   OT Mode of treatment - Co-treat (minutes) 0   OT Mode of Treatment - Total time(minutes) 25 minutes   OT Cumulative Minutes 365   Therapy Time missed   Time missed? No         Stroke Education Series    Pt participated in skilled Stroke Education Series in an individual setting to address the topic of Stroke 101: Understanding the Basics of Stroke in both verbal and written formats  Education within this session reviewed the basic structural and functional components of the brain and included information on the causes of stroke, related signs/symptoms, risk factors, and the process of stroke rehabilitation  The goal of this education was to provide the patient with general understanding of how the brain functions and how a stroke can impact his/her functional mobility and independence  In addition, the intention of this education is to provide the patient with the information to reduce the risk of a second stroke   Following education, pt's response to education is: verbalizes understanding  To individualize the education, the following topics were included based upon the patients' past and current medical history: hyperlipidemia, hypertension, and prediabietes   Additional topics that were covered include decreased coordination, cognitive changes, depression, and prevention of new conditions and/or worsening condition

## 2023-03-10 NOTE — PROGRESS NOTES
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, mild BRAULIO, and incidentally found + COVID (without evidence of respiratory illness/hypoxia/CXR findings)  Placed on cardene gtt, stroke pathway, ASA/Plavix, IV hydration for BRAULIO, and CTX for UTI  NIHSS 2  Symptoms began 2 days prior  Not tPA/TNK candidate  MRI/MRA with 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  NSx recommended transfer to hospitals and starting on heparin gtt and stopped Plavix  Repeat CTA on 2/26 stable with with R V3/4 occlusion  Not a candidate for interventional procedure due to clinical stability and risk  Speech consulted and noted mod-severe oropharyngeal dysphagia recommended pureed/HTL  Unfortunately later on 2/26, he clinically deteriorated with increased R sided weakness, and was taken to IR for stenting of V3 and V4 with TICI 2B revascularization  CTH without ICH  He was admitted back to the ICU intubated - extubated 2/27  MRI showed cerebellar CVA and R > L stroke burden, with additional hypodensities on DWI appreciated L midbrain, pontine area and R lower medullary/spinal cord junction  He was transitioned to triple therapy with DAPT (given recent stent) and A/C with eliquis 2 5mg BID due to COVID  Diet advanced to Level 3/NTL on 2/27  Noted to have spasticity in RLE - started on baclofen by Neurology  He was able to have cardene discontinued on 3/2, and they have been making adjustments to his oral regimen  He was started on Prozac for his mood  NSx has signed off  CTA H/N recommended around 3/17  Length of time on eliquis unclear   Admitted to [facility] on 3/6  Chief Complaint: Improved strength in RUE  Bowel incontinence     Interval History/Subjective:  No acute events overnight  Sleep is ok  Still gets tearful sometimes  OT has noticed improved activation in RUE  He continues to have bowel incontinence  States at home he usually goes in the AM  Last BM 3/10  Nocturnal ox completed and with only seconds of desaturation  No new CP, SOB, fevers, chills, N/V, abdominal pain  Continent of bladder  No further syncopal episodes  ROS:  A 10 point review of systems was negative except for what is noted in the HPI  Today's Changes:  1  Reviewed with team bowel regimen: Will get patient up 30-45 minutes after every meal to use gastrocolic reflex and put on commode (is a slide board transfer) to try to have a BM  - I discussed timing a BM 30 min after breakfast with a suppository, but he wants to hold off for now and revisit this discussion on Monday  2  Nocturnal ox reviewed - 26 seconds of desaturation only  Nothing to do at this time  3  Discussed with SLP - ok to advance to reg/thins  Total visit time: 35 minutes, with more than 50% spent counseling/coordinating care  Counseling includes discussion with patient re: progress in therapies, functional issues observed by therapy staff, and discussion with patient regarding their current medical state and wellbeing  Coordination of patient's care was performed in conjunction with Internal Medicine service to monitor patient's labs, vitals, and management of their comorbidities  Assessment/Plan:    * Acute Posterior Circulation Stroke  Assessment & Plan  Presented with dizziness for 2 days and nausea  - Now with R sided weakness, aphasia, dysphagia, R mild spasticity  Several small acute/early subacute bi-cerebellar infarcts without hemorrhage   Multiple infarcts involving R cerebellum and brainstem in particular (posterior medulla on the R, R midbrain, and L occipital lobe)  L vertebral artery severe stenosis and R vertebral artery occlusion and dissection    - Now s/p stenting on 2/26 with TICI 2b revascularization    PPx: ASA/brilinta/Eliquis, Statin   - Discussed with Neurology, they defer to NSx on length of time on eliquis  Potentially 3-4 weeks if felt to be related to COVID   3/9 Had nocturnal oximetry to screen for nocturnal hypoxia - only 26 seconds total of mild desaturation  Maintain normotension  Education on prediabetes and diet  Follow-up with Neurovascular/Neurosurgery  PT/OT/SLP - for swallow, speech, R sided weakness/tone, will need a good visual exam      Vasovagal syncope  Assessment & Plan  3/8 Had episode of vasovagal syncope  - No convulsions  On body weight support system, started to feel dizzy  - Staring episode after, but able to sternal rubbed out of it  Very brief   - No post-ictal weakness   - Neurologically stable and back to baseline   - Orthostatics negative  Discussed with Neurology - unlikely seizure given distribution of stroke and presentation  No further testing recommended at this time  Monitor  Neurogenic bowel  Assessment & Plan  Disinhibited bowel + mobility difficulties  Not on bowel meds right now  [facility] Bladder Training:   - 30-45 min after each meal will get patient onto commode to attempt bowel movement  - He wants to hold off on scheduled suppository for now (would schedule in AM to align with his previous bowel schedule at home), but can discuss on Monday depending on how we do with this intervention first    For now monitor, close continence care especially    Anemia  Assessment & Plan  Normocytic anemia noted through stay  B12 borderline low  Folate normal  Iron Panel: Low iron saturation and iron with normal TIBC and Ferritin  Was started in the hospital on B12, but not iron  Per IM - 2 days of IV Venofer (last day 3/8)  Then start oral iron  Monitor Hgb, transfuse as appropriate  Will discuss with IM  Consulted       Adjustment disorder with depressed mood  Assessment & Plan  Has been tearful and labile during hospitalization  Started on Prozac 20mgdaily  Consulted rehab psychology for support  Prediabetes  Assessment & Plan  A1C 5 7  Consult nutrition for education on diabetic diet  Diet/lifestyle modifications  Follow-up with PCP  CKD (chronic kidney disease)  Assessment & Plan  Lab Results   Component Value Date    EGFR 60 03/06/2023    EGFR 58 03/05/2023    EGFR 57 03/04/2023    CREATININE 1 26 03/06/2023    CREATININE 1 29 03/05/2023    CREATININE 1 31 (H) 03/04/2023     Presented with Crea 1 32  Given gentle IVF  Unclear baseline  Per hospital team - suspect CKD Stage 2 2/2 hypertension  Will monitor BMP while here  Avoid nephrotoxic meds and relative hypotension  Recommend outpatient f/u with PCP    Spasticity  Assessment & Plan  Intrinsic tonic tone in R quad which is mild  RUE still overall decreased tone, but starting to develop very mild tone in elbow flexor  Has some hiccups too  Would opt to avoid treating R quad for now, as tone there may help stabilize at the knee  R ankle multipodus  3/8 Baclofen to 5mg QID to help with hiccups  Range of motion  Monitor as tone evolves  Outpatient f/u with PMR  Dysphagia  Assessment & Plan  Admitted with mod-severe oropharyngeal  Has massively improved  3/8 Advanced to Level 3/Thins  3/10 Advanced to Reg/Thins  Aspiration precautions  Good oral hygiene   SLP consulted    Primary hypertension  Assessment & Plan  Home: None  Here: Amlodipine 10mg daily, Coreg 12 5mg BID, Lisinopril 20mg daily, PRN hydralazine   - IM stopped chlorthalidone and increased coreg  Had hypertensive urgency in hospital requiring cardene gtt  Monitor and adjust as appropriate  IM consulted to assist with management  Stenosis of left vertebral artery  Assessment & Plan  Severe L vertebral artery origin stenosis    Repeat CTA H/N 2 weeks around 3/17  ASA/Brilinta  Atorvastatin  SBP goals 120-160  Follow-up with Neurovascular  Right Vertebral artery dissection [facility])  Assessment & Plan  Now s/p R V3/4 stenting with TICI 2B revascularization on 2/26 for R Vert stenosis  Continue ASA/Brilinta  Statin  Neurosurg f/u 3/17 with repeat CTA H/N        COVID-resolved as of 3/10/2023  Assessment & Plan  Resolved  Incidentally found to have COVID on 2/25  Asymptomatic from respiratory standpoint  Per Neurosurgery concern for hypercoagulability related to COVID  Currently on Eliquis 2 5mg BID - per Neuro 3-4 weeks probably reasonable, but for them ultimately up to NSx as this was their initial recommendation to start this medication  3/8 Discontinued precautions after 10 days isolation  Health Maintenance  #Delirium/Sleep: At risk  Optimize bowel/bladder, sleep-wake cycle, mood and pain management  #Pain: Baclofen 10mg BID, Tylenol PRN  #Bowel: Last BM 3/10  Only PRN miralax  See Neurogenic bowel above  #Bladder: Voiding and continent  #Skin/Pressure Injury Prevention: Turn Q2hr in bed, with weight shifts [id number] in wheelchair  Float heels in bed  #DVT Prophylaxis: On triple therapy, SCDs  #GI Prophylaxis: None  #Code Status:  Full Code  #FEN: Level 3/NTL  #Dispo:  Team 3/7  ELOS 4-6 weeks  May require more assistance at home and primary wheelchair level mobility given his ataxia  Barrier: R sided weakness, ataxia, truncal weakness, dysphagia, aphasia  Goals: See above       Objective:    Functional Update:  PT:  Eusebio with bed mobility, mod-max A x2 with sit to stand  OT: total A with ADLs  SLP:  CLQT with mild cognitive linguistic impairments  Mildoropharyngeal dysphagia    Allergies per EMR    Physical Exam:  Temp:  [97 6 °F (36 4 °C)-98 3 °F (36 8 °C)] 98 3 °F (36 8 °C)  HR:  [53-87] 87  Resp:  [16-18] 16  BP: (102-143)/(57-72) 102/58  Oxygen Therapy  SpO2: 97 %    Gen: No acute distress, Well-nourished, well-appearing    HEENT: Moist mucus membranes, Normocephalic/Atraumatic  Cardiovascular: Regular rate, rhythm, S1/S2  Distal pulses palpable  Heme/Extr: No edema  Pulmonary: Non-labored breathing  Lungs CTAB  : No barnett  GI: Soft, non-tender, non-distended  BS+  MSK: PROM is WFL in all extremities  No effusions or deformities  Bulk is symmetric  See below for MMT scores  Integumentary: Skin is warm, dry  Neuro: AAOx3, Speech mildly aphasic, but intelligible and answering questions appropriately  R arm with increasing, but not full AROM with gravity eliminated in EF/EE/WE  FF at least a 3, now with HI - 1/5  Has some mild, easily ranged MAS 1 tone in elbow flexors  But overall still with decreased tone in that RUE  Mild tone persists in RLE  Psych: Congruent mood and affect  At times tearful       Diagnostic Studies: Reviewed, no new imaging    Laboratory:  Reviewed   Results from last 7 days   Lab Units 03/06/23  0627 03/05/23  0501 03/04/23  0538   HEMOGLOBIN g/dL 10 1* 10 0* 10 4*   HEMATOCRIT % 31 4* 30 6* 32 8*   WBC Thousand/uL 8 82 10 45* 10 55*     Results from last 7 days   Lab Units 03/06/23  0627 03/05/23  0501 03/04/23  0538   BUN mg/dL 34* 38* 37*   POTASSIUM mmol/L 4 1 4 0 4 1   CHLORIDE mmol/L 108 107 109*   CREATININE mg/dL 1 26 1 29 1 31*            Patient Active Problem List   Diagnosis   • BRAULIO (acute kidney injury) ([facility] [address] )   • COVID   • Acute Posterior Circulation Stroke   • Right Vertebral artery dissection (HCC)   • Stenosis of left vertebral artery   • Primary hypertension   • Dizziness   • Dysphagia   • Spasticity   • CKD (chronic kidney disease)   • Prediabetes   • Adjustment disorder with depressed mood   • Anemia   • Neurogenic bowel   • Vasovagal syncope         Medications  Current Facility-Administered Medications   Medication Dose Route Frequency Provider Last Rate   • amLODIPine  10 mg Oral Daily Toribio Woodard MD     • apixaban  2 5 mg Oral BID Toribio Woodard MD     • aspirin  81 mg Oral Daily Toribio Woodard MD     • atorvastatin  40 mg Oral Daily With Roosevelt Mcintosh MD     • baclofen  5 mg Oral 4x Daily Toribio Woodard MD     • carvedilol  12 5 mg Oral BID With Meals YURI Vazquez     • vitamin B-12  1,000 mcg Oral Daily Toribio Woodard MD     • ferrous sulfate  325 mg Oral Daily With Breakfast YURI Vazquez     • FLUoxetine  20 mg Oral Daily Toribio Woodard MD     • hydrALAZINE  25 mg Oral Q8H PRN YURI Vazquez     • lisinopril  20 mg Oral Daily Toribio Woodard MD     • polyethylene glycol  17 g Oral Daily PRN Toribio Woodard MD     • ticagrelor  90 mg Oral Q12H [address] Toribio Woodadr MD            ** Please Note: Fluency Direct voice to text software may have been used in the creation of this document   **

## 2023-03-10 NOTE — PLAN OF CARE
Problem: Prexisting or High Potential for Compromised Skin Integrity  Goal: Skin integrity is maintained or improved  Description: INTERVENTIONS:  - Identify patients at risk for skin breakdown  - Assess and monitor skin integrity  - Assess and monitor nutrition and hydration status  - Monitor labs   - Assess for incontinence   - Turn and reposition patient  - Assist with mobility/ambulation  - Relieve pressure over bony prominences  - Avoid friction and shearing  - Provide appropriate hygiene as needed including keeping skin clean and dry  - Evaluate need for skin moisturizer/barrier cream  - Collaborate with interdisciplinary team   - Patient/family teaching  - Consider wound care consult   Outcome: Progressing     Problem: MOBILITY - ADULT  Goal: Maintain or return to baseline ADL function  Description: INTERVENTIONS:  -  Assess patient's ability to carry out ADLs; assess patient's baseline for ADL function and identify physical deficits which impact ability to perform ADLs (bathing, care of mouth/teeth, toileting, grooming, dressing, etc )  - Assess/evaluate cause of self-care deficits   - Assess range of motion  - Assess patient's mobility; develop plan if impaired  - Assess patient's need for assistive devices and provide as appropriate  - Encourage maximum independence but intervene and supervise when necessary  - Involve family in performance of ADLs  - Assess for home care needs following discharge   - Consider OT consult to assist with ADL evaluation and planning for discharge  - Provide patient education as appropriate  Outcome: Progressing  Goal: Maintains/Returns to pre admission functional level  Description: INTERVENTIONS:  - Perform BMAT or MOVE assessment daily    - Set and communicate daily mobility goal to care team and patient/family/caregiver  - Collaborate with rehabilitation services on mobility goals if consulted  - Perform Range of Motion 3 times a day    - Reposition patient every 2 hours   - Dangle patient 3 times a day  - Stand patient 3 times a day  - Ambulate patient 3 times a day  - Out of bed to chair 3 times a day   - Out of bed for meals 3 times a day  - Out of bed for toileting  - Record patient progress and toleration of activity level   Outcome: Progressing     Problem: PAIN - ADULT  Goal: Verbalizes/displays adequate comfort level or baseline comfort level  Description: Interventions:  - Encourage patient to monitor pain and request assistance  - Assess pain using appropriate pain scale  - Administer analgesics based on type and severity of pain and evaluate response  - Implement non-pharmacological measures as appropriate and evaluate response  - Consider cultural and social influences on pain and pain management  - Notify physician/advanced practitioner if interventions unsuccessful or patient reports new pain  Outcome: Progressing

## 2023-03-10 NOTE — PROGRESS NOTES
03/10/23 1430   Pain Assessment   Pain Assessment Tool 0-10   Pain Score No Pain   Restrictions/Precautions   Precautions Aspiration;Bed/chair alarms; Fall Risk;Supervision on toilet/commode;Cognitive   General   Change In Medical/Functional Status see vitals for manual ortho BPs without TEDS  ok to resume therapies without wearing TEDS   Cognition   Arousal/Participation Alert; Cooperative   Subjective   Subjective pt had no c/o and ready for PT   Sit to Lying   Comment stayed in recliner with alarm on and all needs within reach at end of tx   Lying to Sitting on Side of Bed   Type of Assistance Needed Physical assistance;Verbal cues   Physical Assistance Level 26%-50%   Lying to Sitting on Side of Bed CARE Score 3   Sit to Stand   Type of Assistance Needed Physical assistance;Verbal cues   Physical Assistance Level 51%-75%   Comment max to min depending on surface height during repeated training on hi-lo mat without UE support   Sit to Stand CARE Score 2   Bed-Chair Transfer   Type of Assistance Needed Physical assistance;Verbal cues   Physical Assistance Level 51%-75%   Comment mod of 1 repeated sit pivot with L UE support   Chair/Bed-to-Chair Transfer CARE Score 2   Walk 10 Feet   Type of Assistance Needed Physical assistance;Verbal cues; Adaptive equipment   Physical Assistance Level Total assistance   Walk 10 Feet CARE Score 1   Ambulation   Primary Mode of Locomotion Prior to Admission Walk   Distance Walked (feet) 10 ft  (x 2 fwd/bwd with mirror in front)   Assist Device Parallel Bars   Gait Pattern Inconsistant Mae; Slow Mae;Decreased foot clearance;R foot drag;Improper weight shift;Decreased R stance   Limitations Noted In Endurance;Balance; Heel Strike;Posture; Safety; Sensation;Speed;Strength; Sequencing;Swing   Provided Assistance with: Balance;Limb Stabilization;Limb Advancement;Trunk Support;Weight Shift   Walk Assist Level Moderate Assist;Chair Follow   Findings demo inc difficulty advancing R LE due to added shoe weight, assist provided more during retro stepping, 2nd person assist in advancing /stabilizing R hand on // bar   Wheel 50 Feet with Two Turns   Type of Assistance Needed Supervision;Verbal cues   Wheel 50 Feet with Two Turns CARE Score 4   Wheel 150 Feet   Type of Assistance Needed Supervision;Verbal cues   Comment using L UE/LE only wearing sneakers on   Wheel 150 Feet CARE Score 4   Therapeutic Interventions   Neuromuscular Re-Education hi-lo mat repeated sit to stand transfer training with L LE fwd and block by 2nd person with L UE over R hand on R thigh to prevent compensatory movement then progress task with reaching task using L UE with cones place on top of mirror (use to facilitate self correction of posture), static standing inside // bar with R UE and bilat LE to inc proprioceptive input to R UE/LE, facilitate lateral weight shifting to promote midline upright posture, assisted reaching with R UE as active rest break exercise   Assessment   Treatment Assessment manual ortho BPs taken initially without TEDS yielding WNL results, see vitals section  MD cleared for pt to proceed with therapy without wearing TEDS stocking  Pt tolerated tx with less R lateral leaning noted in standing and during gait training although still needs VC/TC to engage R quads/glutes in stance  improving sit pivot and sit to stand transfers as well requiring less assistance  pt will benefit from continued skilled PT intervention with focus on NMR/NPP training to promote motor and functional gains with R UE/LE  Family/Caregiver Present no   Barriers to Discharge Inaccessible home environment;Decreased caregiver support   Plan   Treatment/Interventions Functional transfer training; Therapeutic exercise; Endurance training;Bed mobility;Gait training;Equipment eval/education;Spoke to MD;Spoke to nursing;Continued evaluation;Spoke to case management;Spoke to advanced practitioner;OT;ST   Progress Progressing toward goals   PT Therapy Minutes   PT Time In 1430   PT Time Out 1530   PT Total Time (minutes) 60   PT Mode of treatment - Individual (minutes) 60   PT Mode of treatment - Concurrent (minutes) 0   PT Mode of treatment - Group (minutes) 0   PT Mode of treatment - Co-treat (minutes) 0   PT Mode of Treatment - Total time(minutes) 60 minutes   PT Cumulative Minutes 323

## 2023-03-10 NOTE — PROGRESS NOTES
Internal Medicine Progress Note  Patient: Sofy Aragon  Age/sex: 60 y o  male  Medical Record #: [id number]      ASSESSMENT/PLAN: (Interval History)  Sofy Aragon is seen and examined and management for following issues:    Posterior circulation stroke  • S/p intracranial and extracranial vertebral artery stenting/TICI 2B revascularization  • Continue ASA, Brilinta, Eliquis and atorvastatin  • Repeat CTA head and neck around 3/17/23  • Prozac for depressed mood following CVA   • Recommend Neuropsych consult  • Dysphagia 3 diet with nectar thick liquids  • Continue Baclofen 10mg 2x daily  • Therapy per primary service  • Outpt follow-up with Neurology and Neurosurgery    Rapid response  · Likely d/t vasovagal syncope  Neurology agrees that event was unlikely seizure  · Felt dizzy/weak prior to episode then was staring blankly initially when he came back around  · Eventually returned to his baseline  · No further events      HTN  • Home: No medications  • Here: amlodipine 10mg daily/Coreg 12 5mg 2x daily/lisinopril 20mg daily  • Goal normotension  • dc'd chlorthalidone d/t elevated BUN  • Stable    Fe deficiency anemia  · Likely multifactoral  · s/p IV venofer x 2 doses  · Cont daily ferrous sulfate  · Cbc mondays     CKD stage 2  • Baseline Cr 1 2 - 1 3  • Chlorthalidone dc'd  • Repeat bmp monday     COVID  • Precautions lifted  • Pt was asymptomatic     Prediabetes  • HA1C 5 7  • Recommend dietary modifications     DC planning:  TBD  The above assessment and plan was reviewed and updated as determined by my evaluation of the patient on 3/10/2023      Labs:   Results from last 7 days   Lab Units 03/06/23  0627 03/05/23  0501   WBC Thousand/uL 8 82 10 45*   HEMOGLOBIN g/dL 10 1* 10 0*   HEMATOCRIT % 31 4* 30 6*   PLATELETS Thousands/uL 302 323     Results from last 7 days   Lab Units 03/06/23  0627 03/05/23  0501   SODIUM mmol/L 138 136   POTASSIUM mmol/L 4 1 4 0   CHLORIDE mmol/L 108 107   CO2 mmol/L 26 27   BUN mg/dL 34* 38*   CREATININE mg/dL 1 26 1 29   CALCIUM mg/dL 8 7 8 6                   Review of Scheduled Meds:  Current Facility-Administered Medications   Medication Dose Route Frequency Provider Last Rate   • amLODIPine  10 mg Oral Daily Nimo Wanye MD     • apixaban  2 5 mg Oral BID Nimo Wayne MD     • aspirin  81 mg Oral Daily Nimo Wayne MD     • atorvastatin  40 mg Oral Daily With Rachel Vega MD     • baclofen  5 mg Oral 4x Daily Nimo Wayne MD     • carvedilol  12 5 mg Oral BID With Meals YURI Lim     • vitamin B-12  1,000 mcg Oral Daily Nimo Wayne MD     • ferrous sulfate  325 mg Oral Daily With Breakfast YURI Lim     • FLUoxetine  20 mg Oral Daily Nimo Wayne MD     • hydrALAZINE  25 mg Oral Q8H PRN YURI Lim     • lisinopril  20 mg Oral Daily Nimo Wayne MD     • polyethylene glycol  17 g Oral Daily PRN Nimo Wayne MD     • ticagrelor  90 mg Oral Q12H [facility] Nimo Wayne MD         Subjective/ HPI: Patient seen and examined  Patients overnight issues or events were reviewed with nursing or staff during rounds or morning huddle session  New or overnight issues include the following:     Pt seen in his room  He denies any current complaints  ROS:   A 10 point ROS was performed; negative except as noted above         Imaging:     No orders to display       *Labs /Radiology studies Reviewed  *Medications  reviewed and reconciled as needed  *Please refer to order section for additional ordered labs studies  *Case discussed with primary attending during morning huddle case rounds    Physical Examination:  Vitals:   Vitals:    03/09/23 2145 03/09/23 2207 03/10/23 0500 03/10/23 0600   BP: 140/63  102/58    BP Location: Left arm  Left arm    Pulse: (!) 53  87    Resp: 16  16    Temp: 97 6 °F (36 4 °C)  98 3 °F (36 8 °C)    TempSrc: Oral  Oral    SpO2: 97% 97% 97%    Weight:    84 5 kg (186 lb 4 6 oz)   Height:           GEN: No apparent distress, pleasant  NEURO: Alert and oriented x3; Mild dysarthria  HEENT: Pupils are equal and reactive, EOMI, mucous membranes are moist, face asymmetrical  CV: S1 S2 regular, no MRG, no peripheral edema noted  RESP: Lungs are clear bilaterally, no wheezes, rales or rhonchi noted, on room air, respirations easy and non labored  GI: Flat, soft non tender, non distended; +BS x4  : Voiding without difficulty  MUSC: Moves all extremities; Rt hemiparesis UE > LE  SKIN: pink, warm and dry, normal turgor, no rashes, lesions      The above physical exam was reviewed and updated as determined by my evaluation of the patient on 3/10/2023  Invasive Devices     None                    VTE Pharmacologic Prophylaxis: Eliquis  Code Status: Level 1 - Full Code  Current Length of Stay: 4 day(s)      Total time spent:  30 minutes with more than 50% spent counseling/coordinating care  Counseling includes discussion with patient re: progress  and discussion with patient of his/her current medical state/information  Coordination of patient's care was performed in conjunction with primary service  Time invested included review of patient's labs, vitals, and management of their comorbidities with continued monitoring  In addition, this patient was discussed with medical team including physician and advanced extenders  The care of the patient was extensively discussed and appropriate treatment plan was formulated unique for this patient  Medical decision making for the day was made by supervising physician unless otherwise noted in their attestation statement  ** Please Note:  voice to text software may have been used in the creation of this document   Although proof errors in transcription or interpretation are a potential of such software**

## 2023-03-10 NOTE — PROGRESS NOTES
"Occupational Therapy Treatment Note       03/10/23 5100   Pain Assessment   Pain Assessment Tool 0-10   Pain Score No Pain   Restrictions/Precautions   Precautions Aspiration;Bed/chair alarms;Cognitive; Fall Risk;Supervision on toilet/commode   Braces or Orthoses   (R UE sling for transfers/mobility with PT)   Lifestyle   Autonomy \"I will keep trying\"   Sit to Lying   Type of Assistance Needed Physical assistance   Physical Assistance Level 26%-50%   Sit to Lying CARE Score 3   Bed-Chair Transfer   Type of Assistance Needed Physical assistance; Adaptive equipment   Physical Assistance Level Total assistance   Comment mod assist x1 sliding board transfer with min assist in back for stabilization of DME/balance   Chair/Bed-to-Chair Transfer CARE Score 1   Neuromuscular Education   Functional Movement Patterns To promote R UE neuromuscular re-education and increase functional use during ADLs/functional transfers, OT applied NMES anibal stim unit using trigger mode - channel 1 -  to pt's R UE while pt seated  Applied to: wrist/digit extensors x10 minutes, triceps x5 minutes, biceps x5 minutes  During digit extension, provided pink foam block and had pt complete digit flexion with AAROM and then release foam block while using trigger mode on NMES x10 reps  Provided resistance at triceps initially to facilitate increased contraction and then AAROM to achieve full extension  Today after NMES pt able to initiate tricep extension and bicep flexion actively without assist  Pt reporting \"this is working\"  Pt tolerated well, will continue to benefit from NMES to promote R UE functional use/ROM  Moist heat applied to R shoulder/neck x10 minutes during distal UE neuromuscular re-education as pt reports tightness, skin checks performed and no redness noted   Next, pt engaged in ReoGo-Assisted Therapy for motor learning and mass repetition to increase (R) UE functional use during ADLs/IADLS/functional transfers, as well as to " "facilitate and promote normal movement patterns  Pt assisted into chair with trunk and shoulder supports in place to decrease compensatory trunk movement patterns  \"[address]\"  Educated pt on visual imagery and guided thought to increase neuromuscular recovery and promote muscle facilitation while engaging in Reo-Go exercises  Completed the following: R forward thrust - 20 guided, 10 initiated, 10 step initiated, 10 guided, 10 step initiated, 10 initiated, 10 step initiated  Pt progressed from yesterday, as today pt was able to complete in \"step initiated\" form where as yesterday pt could only complete in \"initiated\" form  Pt did not require any tactile cues to initiate movement and pt progressed to tolerate x80 total reps on reogo  Afterward pt reporting \"my arm is tired\" but no pain  Cognition   Arousal/Participation Alert; Cooperative   Following Commands Follows one step commands without difficulty   Comments WFL for basic tasks  will plan for motor deficit MOCA next week to assess higher level cognition   Activity Tolerance   Activity Tolerance Patient tolerated treatment well   Assessment   Treatment Assessment Pt participated in skilled OT tx session  See above for further details on functional performance  Pt presenting with improved R UE muscle contraction and participation during [address]  Pt tearful during session, provided with emotional support and encouragement as pt is demonstrating progress in R UE each day  TEDs applied prior to OT session  But pt has not reported dizziness yesterday or today  Dr Anirudh Morris had verbally stated to use TEDS a few days ago, however no order present  OT/PT to assess ortho BPs today without teds to assess - pt may only need for PT/with mobility at this time   Pt will continue to benefit from skilled OT intervention to address plan for shower using sit pivot or swap out technique; continued use of NMES on R UE and Reogo assisted therapy in order to maximize functional " independence in ADLS, functional mobility/transfers, while decreasing burden of care  Pt left positioned in bed with call bell in reach and bed alarm on  Prognosis Good   Problem List Decreased strength;Decreased range of motion;Decreased endurance; Impaired balance;Decreased mobility; Decreased coordination;Decreased cognition; Impaired sensation; Impaired tone   Barriers to Discharge Decreased caregiver support; Inaccessible home environment   Plan   Treatment/Interventions ADL retraining;Functional transfer training; Therapeutic exercise; Endurance training;Cognitive reorientation;Patient/family training;Equipment eval/education; Bed mobility; Compensatory technique education   Progress Progressing toward goals   Recommendation   OT Discharge Recommendation   (pending progress)   OT Therapy Minutes   OT Time In 0830   OT Time Out 1000   OT Total Time (minutes) 90   OT Mode of treatment - Individual (minutes) 90   OT Mode of treatment - Concurrent (minutes) 0   OT Mode of treatment - Group (minutes) 0   OT Mode of treatment - Co-treat (minutes) 0   OT Mode of Treatment - Total time(minutes) 90 minutes   OT Cumulative Minutes 340   Therapy Time missed   Time missed?  No

## 2023-03-10 NOTE — PROGRESS NOTES
03/10/23 1200   Pain Assessment   Pain Assessment Tool 0-10   Pain Score No Pain   Restrictions/Precautions   Precautions Aphasia; Aspiration;Bed/chair alarms;Cognitive; Fall Risk;Supervision on toilet/commode   Comprehension   Comprehension (FIM) 4 - Understands basic info/conversation 75-90% of time   Expression   Expression (FIM) 4 - Expresses basic info/needs 75-90% of time   Social Interaction   Social Interaction (FIM) 6 - Interacts appropriately with others BUT requires extra  time   Problem Solving   Problem solving (FIM) 3 - Solves basic problmes 50-74% of time   Memory   Memory (FIM) 3 - Recognizes, recalls/performs 50-74%   Speech/Language/Cognition Assessmetn   Treatment Assessment The other portion of the pt's tx session focused on initial review given medication  Per pt, he was not taking any medications prior to admission, to where now, all medications are new for pt  Pt's spouse was present for this portion of session, to which SLP encouraging spouse to engage in this education/initial review  When approaching this education, SLP asking pt if he has ANY knowledge of what medications would be for currently, which pt did state that he does not have any current knowledge given medications  SLP reviewing each medication, frequency and reason for taking the medication at length w/ pt and pt's spouse  There is a total of 11 oral medications which pt is prescribed  SLP reviewing the importance of 3 BP medications (amlodipine, coreg, lisinopril), 3 blood thinner medications (aspirin, eliquis, brilinta), 1 cholesterol medication (lipitor), vitamin B12, iron tablet, baclofin and prozac  Of note, SLP did state to both pt and pt's spouse about how the medications related to stroke prevention (BP, cholesterol and blood thinner) will likely be warranted at d/c  However, there is the potential for changes in dosage/frequency given these medications   SLP probing pt and spouse about how medications would be managed for pt at time of discharge, which spouse was willing to assist w/ this upon d/c as well as inquiring to SLP about if use of a pill box would be beneficial  SLP did state that a pill box will definitely be of benefit for pt at d/c and throughout pt's stay on the [facility], besides ongoing review of current medications will occur, but also completing tangible medication management task will occur in the future to determine the best type of pill box, plan for administration of medications via frequency, etc  Both pt and spouse highly agreeable and interested given this information and education  At this time, pt will continue to benefit from skilled SLP services at this time to maximize overall functional independence given cognitive linguistic skills to decrease caregiver burden over time     Eating   Type of Assistance Needed Set-up / clean-up;Supervision   Physical Assistance Level No physical assistance   Eating CARE Score 4   Swallow Assessment   Swallow Treatment Assessment   Daily Dysphagia Tx Note     Patient Name: Boogie Torres    Today's Date: 3/10/2023       Current Risks for Dysphagia & Aspiration: New neuro event; general debilitation; brain injury; positioning issues; mild word finding    Current Symptoms/Concerns: Cough; throat clear; w/ meals; pocketing food; difficulty chewing    Current diet:soft/level 3 diet and nectar thick liquids      Premorbid diet::regular diet and thin liquids     Positioning: pt in bed which was placed in chair mode for meal    Items administered:Consistencies Administered: thin liquids, hard solids and mixed consistency  Materials administered included : flatbread pizza, side salad, fresh fruit cup, juice by straw and coffee by cup    Total amount of meal consumed:   75% of meal and 300cc of thin liquids by cup/straw      Oral stage:WFL to minimal  Lip closure: fairly functional around fork, cup, straw  Anterior spillage: none  Mastication: minimally prolonged given solids during "meal but overall breakdown was effective and functional  Bolus formation: fairly functional given textures observed during meal  Bolus control: prompt and timely   Transfer: overall was noted to be timely given solids and thins  Oral residue: minimal to trace which pt independently cleared on own using lingual sweeps, increased time or use of liquid wash  Pocketing: no overt pocketing noted throughout meal         Pharyngeal stage:WFL to minimal  Swallow promptness: overall was observed to be prompt given solids/liquids  Hyolaryngeal elevation: present and functional upon palpation  Wet voice: none  Throat clear: x1 w/ fresh fruit cup  Cough: none  Secondary swallows: none   Audible swallows: none       Esophageal stage:No deficits observed given meal today        Summary:     Pt presenting with WFL to minimal oral and pharyngeal dysphagia today  Symptoms or concerns included mildly prolonged mastication given solids, minimal to trace R sided residual w/ solids, but pt able to clear indepenendent w/ strategies and throat clear x1 after fresh fruit but no further aspiration sxs observed           Today pt was in bed which was placed in chair mode for meal, which wife was initially present but did step out during dysphagia session to \"not interfere\" with session  SLP did state that she was more than welcome to stay for session, but she would be back later  SLP did require to setup tray for pt, but pt remains able to feed self w/o difficulty  Pt's lip seal around utensils and cup/straw was functional in addition to bolus retrieval given items and no anterior loss was observed  While mastication given solids today was minimally prolonged, overall effectiveness in breakdown was functional to where minimal to trace R sided residual was present  However, pt was independent in ability to use strategies to clear residual (lingual sweeps during meal, alternating solids/liquids, increased time to clear)   Overall " control/transfer of boluses was timely as well as initiation of swallow across meal  Hyolayngeal excursion was noted to be functional upon palpation  While pt had one occurrence of throat clearing given mixed consistency (fresh fruit cup), no further overt cough, throat clearing, no changes in voicing nor respiratory status have been observed throughout meal           Recommendations:  Diet: Upgrade diet to regular diet and thin liquids  Meds: as best tolerates  Strategies: upright posture, only feed when fully alert, slow rate of feeding, small bites/sips, quiet environment (tv off, limit talking, door closed, etc ), alternating bites and sips and lingual sweep/finger sweep to R buccal cavity to clear any residual/pocketing    DISTANT supervision w/ meals  Tray set up and assist w/ opening containers  Results reviewed with:  patient, pt's wife- Dayanna Landrum MD- Dr Randy Ramirez   Aspiration precautions posted  F/u ST tx: Pt will continue to benefit from ongoing skilled dysphagia tx sessions to establish safest least restrictive diet w/o increased oropharyngeal or aspiration sxs as well as monitor ability to carryover swallow strategies independently  Plan: Monitor diet upgrade to regular w/ thin liquids            Monitor independence given swallow strategies during meals           Likely brief f/u for monitoring diet at this time  Swallow Assessment Prognosis   Prognosis Good   Prognosis Considerations Age; Co-morbidities; Medical diagnosis; Medical prognosis; New learning ability   SLP Therapy Minutes   SLP Time In 1200   SLP Time Out 1300   SLP Total Time (minutes) 60   SLP Mode of treatment - Individual (minutes) 60   SLP Mode of treatment - Concurrent (minutes) 0   SLP Mode of treatment - Group (minutes) 0   SLP Mode of treatment - Co-treat (minutes) 0   SLP Mode of Treatment - Total time(minutes) 60 minutes   SLP Cumulative Minutes 285   Therapy Time missed   Time missed?  No

## 2023-03-11 RX ORDER — CARVEDILOL 25 MG/1
25 TABLET ORAL 2 TIMES DAILY WITH MEALS
Status: DISCONTINUED | OUTPATIENT
Start: 2023-03-11 | End: 2023-03-31 | Stop reason: HOSPADM

## 2023-03-11 RX ADMIN — BACLOFEN 5 MG: 10 TABLET ORAL at 12:03

## 2023-03-11 RX ADMIN — TICAGRELOR 90 MG: 90 TABLET ORAL at 09:46

## 2023-03-11 RX ADMIN — BACLOFEN 5 MG: 10 TABLET ORAL at 17:31

## 2023-03-11 RX ADMIN — AMLODIPINE BESYLATE 10 MG: 10 TABLET ORAL at 09:46

## 2023-03-11 RX ADMIN — CYANOCOBALAMIN TAB 500 MCG 1000 MCG: 500 TAB at 09:46

## 2023-03-11 RX ADMIN — LISINOPRIL 20 MG: 20 TABLET ORAL at 09:46

## 2023-03-11 RX ADMIN — APIXABAN 2.5 MG: 2.5 TABLET, FILM COATED ORAL at 09:46

## 2023-03-11 RX ADMIN — CARVEDILOL 12.5 MG: 12.5 TABLET, FILM COATED ORAL at 09:47

## 2023-03-11 RX ADMIN — ATORVASTATIN CALCIUM 40 MG: 40 TABLET, FILM COATED ORAL at 17:31

## 2023-03-11 RX ADMIN — FERROUS SULFATE TAB 325 MG (65 MG ELEMENTAL FE) 325 MG: 325 (65 FE) TAB at 09:47

## 2023-03-11 RX ADMIN — CARVEDILOL 25 MG: 25 TABLET, FILM COATED ORAL at 17:31

## 2023-03-11 RX ADMIN — APIXABAN 2.5 MG: 2.5 TABLET, FILM COATED ORAL at 17:31

## 2023-03-11 RX ADMIN — ASPIRIN 81 MG CHEWABLE TABLET 81 MG: 81 TABLET CHEWABLE at 09:46

## 2023-03-11 RX ADMIN — BACLOFEN 5 MG: 10 TABLET ORAL at 09:46

## 2023-03-11 RX ADMIN — FLUOXETINE 20 MG: 20 CAPSULE ORAL at 09:46

## 2023-03-11 RX ADMIN — TICAGRELOR 90 MG: 90 TABLET ORAL at 21:02

## 2023-03-11 RX ADMIN — BACLOFEN 5 MG: 10 TABLET ORAL at 21:02

## 2023-03-11 NOTE — PROGRESS NOTES
"   03/11/23 1300   Pain Assessment   Pain Assessment Tool 0-10   Pain Score No Pain   Restrictions/Precautions   Precautions Aspiration;Bed/chair alarms;Cognitive; Fall Risk;Supervision on toilet/commode   Comprehension   Comprehension (FIM) 4 - Understands basic info/conversation 75-90% of time   Expression   Expression (FIM) 4 - Expresses basic info/needs 75-90% of time   Social Interaction   Social Interaction (FIM) 6 - Interacts appropriately with others BUT requires extra  time   Problem Solving   Problem solving (FIM) 3 - Solves basic problmes 50-74% of time   Memory   Memory (FIM) 3 - Recognizes, recalls/performs 50-74%   Speech/Language/Cognition Assessment   Treatment Assessment Pt participated in skilled ST session focusing on cognitive linguistic and language skills  Began session reviewing medication management as pt is new to medications this admission  Pt spontaneously recalled the names of two medications that he currently takes, but did state that he knows there are many more  Presented with the name of each medication, pt recalled the reason/use for 6/11 medications and was provided with re-education on the use of remaining meds  Discussed creating a medication \"cheat sheet\" for pt to utilize and review in his own time  During discussion of meds, questions presented about multiple BP meds and blood thinners  SLP spoke with RN and relayed info regarding these topics to pt-pt without any additional questions  Pt's wife was also then present for end of session to which she demonstrated good awareness and recall of medications that were reviewed as she initiated discussion  Discussion also held regarding finding a PCP to which pt's wife is working on this and has some physicians in mind-plans to reach out to physicians   Lastly, when presented with a mock medication label simulating a scheduled medication, pt accurately answered 7/7 total questions, noting only one instance of rewording a question paired " with min verbal cues to provide a more complete/thorough answer, however, content of responses were overall accurate/correct  Lastly, to incorporate language, pt completed a word recognition task  When presented with [facility] written targets containing two mis-spelled words and one word spelled correctly, pt accurately identified words in 15/19 trials, benefiting from min to times of moderate verbal cuing to improve accuracy with task  Plan to cont to target language tasks in future sessions  At this time, pt is recommended for further skilled SLP services to maximize overall functional independence with cognitive linguistic skills, as well as to improve functional language/communication abilities in order to decrease caregiver burden over time     Eating   Type of Assistance Needed Set-up / clean-up   Physical Assistance Level No physical assistance   Eating CARE Score 5   Swallow Assessment   Swallow Treatment Assessment Daily Dysphagia Tx Note      Patient Name: Juan Ramos     Today's Date: 3/11/2023        Current Risks for Dysphagia & Aspiration: New neuro event; general debilitation; brain injury; positioning issues;      Current Symptoms/Concerns: Cough; throat clear; w/ meals; pocketing food; difficulty chewing     Current diet: regular diet and thin liquids      Premorbid diet::regular diet and thin liquids      Positioning: upright in recliner chair     Items administered:  Consistencies Administered: thin liquids, regular diet tray  Materials administered included: chicken pot pie, biscuit, green beans, fresh fruit cup and thin liquids by cup/straw     Total amount of meal consumed:   50% of meal and 240cc of thin liquids by cup/straw        Oral stage: WFL   Lip closure: complete; functional bolus retrieval via utensil, cup and straw  Anterior spillage: none  Mastication: minimally prolonged to timely with overall effective/complete bolus breakdown   Bolus formation: appeared functional, complete  Bolus "control: appeared WFL-no overt s/s of poor control  Transfer: prompt with both solids and liquids  Oral residue: trace to minimal but pt independently cleared using increased time, liquid wash and lingual sweeps  Pocketing: none observed        Pharyngeal stage: WFL  Swallow promptness: suspected to be overall prompt  Hyolaryngeal elevation: present to palpation   Wet voice: none  Throat clear: none  Cough: none  Secondary swallows: none   Audible swallows: very occasional        Esophageal stage:  No overt s/s observed by SLP or reported by patient  Summary:     Pt presenting with overall functional oral and pharyngeal swallow skills this meal  Pt with minimally prolonged mastication and trace to minimal oral retention between bites, specifically on his R side, however, pt is aware and is able to independently clear utilizing strategies of lingual sweeps and liquid wash  Transfers and swallows appear overall timely  No overt s/s aspiration observed this meal      Pt demonstrates appropriate rate of intake and bite sizes throughout meal  When reviewing tolerance of recent diet upgrade, pt denies any coughing, throat clearing, choking, wet vocal quality or items feeling \"stuck  \"       Recommendations:  Diet: regular diet and thin liquids  Meds: as best tolerates  Strategies: upright posture, only feed when fully alert, slow rate of feeding, small bites/sips, quiet environment (tv off, limit talking, door closed, etc ), alternating bites and sips and lingual sweep/finger sweep to R buccal cavity to clear any residual/pocketing     DISTANT supervision w/ meals  Tray set up and assist w/ opening containers  Results reviewed with:  patient, pt's wife-Romina, RN- Marisela   Aspiration precautions posted  F/u ST tx: At this time, pt is presenting with overall functional oral and pharyngeal swallow skills across baseline diet of regular solids and thin liquids, without overt s/s aspiration   Therefore, further " skilled SLP services targeting dysphagia therapy are not warranted to continue, however, should new concerns or difficulties arise, please re-consult SLP team        SLP Therapy Minutes   SLP Time In 1300   SLP Time Out 1400   SLP Total Time (minutes) 60   SLP Mode of treatment - Individual (minutes) 60   SLP Mode of treatment - Concurrent (minutes) 0   SLP Mode of treatment - Group (minutes) 0   SLP Mode of treatment - Co-treat (minutes) 0   SLP Mode of Treatment - Total time(minutes) 60 minutes   SLP Cumulative Minutes 345   Therapy Time missed   Time missed?  No

## 2023-03-11 NOTE — PROGRESS NOTES
"   03/11/23 0845   Pain Assessment   Pain Assessment Tool 0-10   Pain Score No Pain   Restrictions/Precautions   Precautions Aspiration;Bed/chair alarms;Cognitive; Fall Risk;Supervision on toilet/commode   Weight Bearing Restrictions No   ROM Restrictions No   Braces or Orthoses Sling  (R multipodus boot, RUE sling w/ mobility)   Lifestyle   Autonomy \"The progress is slower than I want  \"   Oral Hygiene   Comment declined despite encouragement and edu  Reason if not Attempted Refused to perform   Oral Hygiene CARE Score 7   Shower/Bathe Self   Type of Assistance Needed Physical assistance;Verbal cues; [place]lan [place] / Flor Alexis; Adaptive equipment   Physical Assistance Level Total assistance   Comment while seated in w/c, able to bathe RUE w/ cues for R shoulder ABD  [place] to sustain gross grasp of wash cloth to bathe LUE, A req for thoroughness  Mod A to steady w/ guard on pt's R while in stance at bed rail to bathe groin and buttocks  With L unilateral release req Min-Mod of 2nd person 2* retropulsion w/ improvement noted when provided w/ multimodal cues  While seated, pt able to bathe upper legs  A to throughly bathe distal LE  Shower/Bathe Self CARE Score 1   Tub/Shower Transfer   Reason Not Assessed Sponge Bath;Safety;Balance   Upper Body Dressing   Type of Assistance Needed Physical assistance   Physical Assistance Level 51%-75%   Comment while seated in w/c, reviewed hemidressing  Partial A to thread RUE w/ multimodal cues to maximize use of RUE  cues for sequencing w/ pt able to pull OH  Partial A to thread LUE  Pt able to fully pull down back w/ inc time and vc's  will benefit from repetition to maximize technique  Upper Body Dressing CARE Score 2   Lower Body Dressing   Type of Assistance Needed Physical assistance;Verbal cues; Adaptive equipment;Set-up / clean-up   Physical Assistance Level Total assistance   Comment While seated in w/c pt able to partially thread RLE, able to thread LLE   Mod A to steady in stance " at bed rail w/ guard on pt's R, Min A of 2nd person for safety w/ L unilateral release for clothing management  Encouraged R unilateral release req [place] to sustain gross grasp of pants ultimately req A for clothing management over R hip  Lower Body Dressing CARE Score 1   Putting On/Taking Off Footwear   Type of Assistance Needed Physical assistance   Physical Assistance Level 76% or more   Comment while seated in w/c, able to partially doff socks w/ dynamic fxnl reach to feet  OTR threaded socks over toes, pt then able to manage over L heel  OTR provided A to sustain RLE crossed leg technique, pt then able to fully pull sock over heel  Pt able to don L shoe w/ dynamic fxnl reach to feet when provided w/ set-up  A to don R shoe  Putting On/Taking Off Footwear CARE Score 2   Lying to Sitting on Side of Bed   Type of Assistance Needed Physical assistance   Physical Assistance Level 26%-50%   Comment inc time   Lying to Sitting on Side of Bed CARE Score 3   Sit to Stand   Type of Assistance Needed Physical assistance;Verbal cues; Set-up / clean-up   Physical Assistance Level 51%-75%   Sit to Stand CARE Score 2   Bed-Chair Transfer   Type of Assistance Needed Physical assistance;Verbal cues; [place]; Adaptive equipment   Physical Assistance Level 51%-75%   Comment Mod Ax1 sit pivot vs slideboard xfer w/c>recliner  req Mod vc's for sequencing and pacing  Chair/Bed-to-Chair Transfer CARE Score 2   Toileting Hygiene   Type of Assistance Needed Physical assistance;Verbal cues; [place] / [place]; Adaptive equipment   Physical Assistance Level Total assistance   Comment Mod Ax1 to steady in stance w/ unilateral support of bed rail w/ cues req to correct retropulsion and R lateral lean, SBA of 2nd person who A w/ clothing management  Pt unable to toilet despite sitting on BSC for several minutes  Toileting Hygiene CARE Score 1   Toilet Transfer   Type of Assistance Needed Physical assistance;Verbal cues; Set-up / clean-up; Adaptive equipment   Physical Assistance Level Total assistance   Comment Mod Ax1 slideboard xfer EOB> drop-arm BSC w/ cues for sequencing and pacing  SBA of 2nd person for safety and to steady DME  Provided w/ dycem, Would benefit from trial of sit pivots to drop-arm BSC w/ Ax2  Toilet Transfer CARE Score 1   Neuromuscular Education   Functional Movement Patterns Focused on facilitation of R cylidrical grasp/release using paper cup  OTR provided point of control and elbow and wrist to maximize facilitation of fxnl digit flexion/extension  Req edu and cues to dec compensatory strategies and improved noted w/ technique, req inc time for task and frequent rest breaks to manage RUE fatigue  With inc repetition, utilized target to facilitate horizontal shoulder ADD/ABD w/ sustained cylindrical grasp  x12 reps total    Cognition   Overall Cognitive Status WFL  (basic)   Assessment   Treatment Assessment Pt seen for skilled OT session focusing on self-care management and RUE NMR  Details on sponge bath ADL noted above, completed in w/c and fxnl standing at bed rail for LB ADLs  Pt cont to req multimodal cues to correct R lateral lean and retropulsion, especially w/ fatigue  Add'lly req ongoing encouragement and stroke edu to maximize participation, at times flat however w/ time and rapport building pt engages in activity  Would benefit from trialing Ax2 sit pivot to drop-arm BSC and standing at bed rail for hygiene/clothing management  Cont OT POC: endurance work, RUE NMR, core stability, dry swap-out shower xfer, fxnl standing tolerance, ADL retraining, toileting, ongoing stroke edu, and repetitive safety training  Pt agreeable to rest in recliner, all needs within reach and alarm activated  Prognosis Good   Problem List Decreased strength;Decreased range of motion;Decreased endurance; Impaired balance;Decreased mobility; Decreased coordination;Decreased cognition; Impaired sensation; Impaired tone   Plan Treatment/Interventions ADL retraining;Functional transfer training; Therapeutic exercise; Endurance training;Patient/family training   Progress Progressing toward goals   Recommendation   OT Discharge Recommendation   (pending progress)   OT Therapy Minutes   OT Time In 0845   OT Time Out 1015   OT Total Time (minutes) 90   OT Mode of treatment - Individual (minutes) 90   OT Mode of treatment - Concurrent (minutes) 0   OT Mode of treatment - Group (minutes) 0   OT Mode of treatment - Co-treat (minutes) 0   OT Mode of Treatment - Total time(minutes) 90 minutes   OT Cumulative Minutes 455   Therapy Time missed   Time missed?  No

## 2023-03-11 NOTE — PROGRESS NOTES
03/11/23 1400   Pain Assessment   Pain Assessment Tool 0-10   Pain Score No Pain   Restrictions/Precautions   Precautions Aspiration;Bed/chair alarms;Cognitive; Fall Risk;Supervision on toilet/commode   Weight Bearing Restrictions No   ROM Restrictions No   Braces or Orthoses Sling  (RUE sling for mobility, R multipodus boot for when in bed)   Cognition   Overall Cognitive Status [facility]   Arousal/Participation Alert; Cooperative   Attention Within functional limits   Orientation Level Oriented X4   Following Commands Follows one step commands without difficulty   Subjective   Subjective Patient reports he has had a busy day   Agreeable to PT session   Sit to Lying   Type of Assistance Needed Incidental touching;Verbal cues   Physical Assistance Level No physical assistance   Comment VC for positioning,   Sit to Lying CARE Score 4   Lying to Sitting on Side of Bed   Comment Patient seated in recliner upon PT arrival   Sit to Stand   Type of Assistance Needed Physical assistance   Physical Assistance Level 51%-75%   Comment Varied between [address] depending of fatigue level   Sit to Stand CARE Score 2   Bed-Chair Transfer   Type of Assistance Needed Physical assistance   Physical Assistance Level 51%-75%   Comment ModA of sit pivot and stand pivot transfer   Chair/Bed-to-Chair Transfer CARE Score 2   Walk 10 Feet   Type of Assistance Needed Physical assistance   Physical Assistance Level Total assistance   Walk 10 Feet CARE Score 1   Walk 50 Feet with Two Turns   Comment Fatigues easily and quickly   Reason if not Attempted Safety concerns   Walk 50 Feet with Two Turns CARE Score 88   Walk 150 Feet   Comment Fatigues easily and quickly   Reason if not Attempted Safety concerns   Walk 150 Feet CARE Score 88   Ambulation   Primary Mode of Locomotion Prior to Admission Walk   Distance Walked (feet) 10 ft  (x3 trails, 2x 30 ft at Handrail in hallway)   Assist Device Parallel Bars  (Left Handrail in hallway)   Gait Pattern Inconsistant Mae; Slow Mae;Decreased foot clearance; Improper weight shift; Ataxic;Decreased R stance   Limitations Noted In Balance; Endurance; Heel Strike;Midline Orientation;Posture; Sequencing;Speed;Strength;Swing   Provided Assistance with: Balance;Weight Shift;Limb Stabilization;Limb Advancement;Trunk Support   Walk Assist Level Moderate Assist;Chair Follow   Findings Patient ambulated 10 ft x 3 trials in parallel bar with ModA via physical assist from therapist for proper R foot placement and blocking of R knee as needed  Progressed to ambulating in hallway with support via handrail and WC follow for safety  Patient ambulated 2x 30 ft with L handrail, gait belt donned, and therapist providing assist for RLE placement and R knee block  Trialed 3 lb weight on RLE to see if it helped improve patient's placement of RLE  Improved midline posture/less R lateral lean   Wheel 50 Feet with Two Turns   Comment only propelled himself from the therapy gym back to his room   Wheelchair mobility   Does the patient use a wheelchair? 1  Yes   Type of Wheelchair Used 1  Manual   Method Left upper extremity; Left lower extremity   Distance Level Surface (feet) 35 ft   Therapeutic Interventions   Strengthening Instructed patient in the following therapeutic exercises to improve LE strength: seated R knee extension 5 lb 3x6 repetitions, Seated R hamstring curl 2x6, seated hip abduction with resistance band (light) 2x10   Neuromuscular Re-Education Static Forward Step with LLE to focus on engaging R quad/maintaining R knee extension at L handrail, Min-ModA from therapist   Equipment Use   [place] NuStep Level 3 for 5 minutes   Assessment   Treatment Assessment Patient seen for 90 minute skilled physical therapy session focusing on transfer training, gait training, and LE strengthening  Patient required Min-ModA for sit pivot, stand pivot, and sit to stand transfers depending on fatigue level   Patient was able to ambulate two bouts of 30ft with L handrail, ModA from therapist and WC for safety  He demonstrates improve midline orientation today and less R lateral lean noted  He tolerated the session well with no compliants of dizziness  Patient will continue to benefit from skilled PT services to address R side hemiparesis and impaired proprioception to improve safety and independence with all functional mobility   Problem List Decreased strength;Decreased endurance; Impaired balance;Decreased mobility; Decreased coordination; Impaired sensation; Impaired tone;Decreased cognition   Barriers to Discharge Inaccessible home environment;Decreased caregiver support   PT Barriers   Functional Limitation Car transfers; Ramp negotiation;Stair negotiation;Standing;Transfers; Walking   Plan   Treatment/Interventions ADL retraining;Functional transfer training;LE strengthening/ROM; Therapeutic exercise; Endurance training;Patient/family training;Bed mobility;Gait training   Progress Progressing toward goals   PT Therapy Minutes   PT Time In 1400   PT Time Out 1530   PT Total Time (minutes) 90   PT Mode of treatment - Individual (minutes) 90   PT Mode of treatment - Concurrent (minutes) 0   PT Mode of treatment - Group (minutes) 0   PT Mode of treatment - Co-treat (minutes) 0   PT Mode of Treatment - Total time(minutes) 90 minutes   PT Cumulative Minutes 413   Therapy Time missed   Time missed?  Nayeli Crespo, PT, DPT

## 2023-03-11 NOTE — PROGRESS NOTES
Internal Medicine Progress Note  Patient: Li Tam  Age/sex: 60 y o  male  Medical Record #: [id number]      ASSESSMENT/PLAN: (Interval History)  Li Tam is seen and examined and management for following issues:    Posterior circulation stroke  • S/p intracranial and extracranial vertebral artery stenting/TICI 2B revascularization  • Continue ASA, Brilinta, Eliquis and atorvastatin  • Repeat CTA head and neck around 3/17/23  • Prozac for depressed mood following CVA   • Recommend Neuropsych consult  • Dysphagia 3 diet with nectar thick liquids  • Continue Baclofen 10mg 2x daily  • Therapy per primary service  • Outpt follow-up with Neurology and Neurosurgery    Rapid response  · Likely d/t vasovagal syncope  Neurology agrees that event was unlikely seizure  · Felt dizzy/weak prior to episode then was staring blankly initially when he came back around  · Eventually returned to his baseline  · No further events      HTN  • Home: No medications  • Here: amlodipine 10mg daily/Coreg 25mg 2x daily/lisinopril 20mg daily  • Goal normotension  • Increase coreg to 25mg bid    Fe deficiency anemia  · Likely multifactoral  · s/p IV venofer x 2 doses  · Cont daily ferrous sulfate  · Cbc mondays     CKD stage 2  • Baseline Cr 1 2 - 1 3  • Chlorthalidone dc'd  • Repeat bmp monday     COVID  • Precautions lifted  • Pt was asymptomatic     Prediabetes  • HA1C 5 7  • Recommend dietary modifications     DC planning:  TBD  The above assessment and plan was reviewed and updated as determined by my evaluation of the patient on 3/11/2023      Labs:   Results from last 7 days   Lab Units 03/06/23  0627 03/05/23  0501   WBC Thousand/uL 8 82 10 45*   HEMOGLOBIN g/dL 10 1* 10 0*   HEMATOCRIT % 31 4* 30 6*   PLATELETS Thousands/uL 302 323     Results from last 7 days   Lab Units 03/06/23  0627 03/05/23  0501   SODIUM mmol/L 138 136   POTASSIUM mmol/L 4 1 4 0   CHLORIDE mmol/L 108 107   CO2 mmol/L 26 27   BUN mg/dL 34* 38* CREATININE mg/dL 1 26 1 29   CALCIUM mg/dL 8 7 8 6                   Review of Scheduled Meds:  Current Facility-Administered Medications   Medication Dose Route Frequency Provider Last Rate   • amLODIPine  10 mg Oral Daily Toribio Woodard MD     • apixaban  2 5 mg Oral BID Toribio Woodard MD     • aspirin  81 mg Oral Daily Toribio Woodard MD     • atorvastatin  40 mg Oral Daily With Roosevelt Mcintosh MD     • baclofen  5 mg Oral 4x Daily Toribio Woodard MD     • carvedilol  12 5 mg Oral BID With Meals YURI Vazquez     • vitamin B-12  1,000 mcg Oral Daily Toribio Woodard MD     • ferrous sulfate  325 mg Oral Daily With Breakfast YURI Vazquez     • FLUoxetine  20 mg Oral Daily Toribio Woodard MD     • hydrALAZINE  25 mg Oral Q8H PRN YURI Vazquez     • lisinopril  20 mg Oral Daily Toribio Woodard MD     • polyethylene glycol  17 g Oral Daily PRN Toribio Woodard MD     • ticagrelor  90 mg Oral Q12H [address] Toribio Woodard MD         Subjective/ HPI: Patient seen and examined  Patients overnight issues or events were reviewed with nursing or staff during rounds or morning huddle session  New or overnight issues include the following:     Pt seen in his room  He denies any current complaints  ROS:   A 10 point ROS was performed; negative except as noted above         Imaging:     No orders to display       *Labs /Radiology studies Reviewed  *Medications  reviewed and reconciled as needed  *Please refer to order section for additional ordered labs studies  *Case discussed with primary attending during morning huddle case rounds    Physical Examination:  Vitals:   Vitals:    03/10/23 1559 03/10/23 1803 03/10/23 2011 03/11/23 0612   BP: 114/52 163/71 141/67 155/73   BP Location: Left arm  Left arm Left arm   Pulse: 55 55 60 58   Resp: 16  20 18   Temp: 98 °F (36 7 °C)  98 °F (36 7 °C) 98 4 °F (36 9 °C)   TempSrc: Oral  Oral Oral   SpO2: 97%  97% 97%   Weight:    85 7 kg (188 lb 15 oz)   Height: GEN: No apparent distress, interactive  NEURO: Alert and oriented x3; Mild dysarthria  HEENT: Pupils are equal and reactive, EOMI, mucous membranes are moist, face asymmetrical  CV: S1 S2 regular, no MRG, no peripheral edema noted  RESP: Lungs are clear bilaterally, no wheezes, rales or rhonchi noted, on room air, respirations easy and non labored  GI: Flat, soft non tender, non distended; +BS x4  : Voiding without difficulty  MUSC: Moves all extremities; Rt hemiparesis UE > LE  SKIN: pink, warm and dry, normal turgor, no rashes, lesions      The above physical exam was reviewed and updated as determined by my evaluation of the patient on 3/11/2023  Invasive Devices     None                    VTE Pharmacologic Prophylaxis: Eliquis  Code Status: Level 1 - Full Code  Current Length of Stay: 5 day(s)      Total time spent:  30 minutes with more than 50% spent counseling/coordinating care  Counseling includes discussion with patient re: progress  and discussion with patient of his/her current medical state/information  Coordination of patient's care was performed in conjunction with primary service  Time invested included review of patient's labs, vitals, and management of their comorbidities with continued monitoring  In addition, this patient was discussed with medical team including physician and advanced extenders  The care of the patient was extensively discussed and appropriate treatment plan was formulated unique for this patient  Medical decision making for the day was made by supervising physician unless otherwise noted in their attestation statement  ** Please Note:  voice to text software may have been used in the creation of this document   Although proof errors in transcription or interpretation are a potential of such software**

## 2023-03-12 RX ORDER — ACETAMINOPHEN 325 MG/1
650 TABLET ORAL EVERY 6 HOURS PRN
Status: DISCONTINUED | OUTPATIENT
Start: 2023-03-12 | End: 2023-03-31

## 2023-03-12 RX ADMIN — TICAGRELOR 90 MG: 90 TABLET ORAL at 08:57

## 2023-03-12 RX ADMIN — AMLODIPINE BESYLATE 10 MG: 10 TABLET ORAL at 08:54

## 2023-03-12 RX ADMIN — APIXABAN 2.5 MG: 2.5 TABLET, FILM COATED ORAL at 08:54

## 2023-03-12 RX ADMIN — BACLOFEN 5 MG: 10 TABLET ORAL at 11:49

## 2023-03-12 RX ADMIN — CARVEDILOL 25 MG: 25 TABLET, FILM COATED ORAL at 16:30

## 2023-03-12 RX ADMIN — BACLOFEN 5 MG: 10 TABLET ORAL at 21:41

## 2023-03-12 RX ADMIN — BACLOFEN 5 MG: 10 TABLET ORAL at 08:54

## 2023-03-12 RX ADMIN — CYANOCOBALAMIN TAB 500 MCG 1000 MCG: 500 TAB at 08:54

## 2023-03-12 RX ADMIN — FLUOXETINE 20 MG: 20 CAPSULE ORAL at 08:54

## 2023-03-12 RX ADMIN — CARVEDILOL 25 MG: 25 TABLET, FILM COATED ORAL at 09:01

## 2023-03-12 RX ADMIN — TICAGRELOR 90 MG: 90 TABLET ORAL at 21:41

## 2023-03-12 RX ADMIN — BACLOFEN 5 MG: 10 TABLET ORAL at 18:48

## 2023-03-12 RX ADMIN — APIXABAN 2.5 MG: 2.5 TABLET, FILM COATED ORAL at 18:47

## 2023-03-12 RX ADMIN — ASPIRIN 81 MG CHEWABLE TABLET 81 MG: 81 TABLET CHEWABLE at 08:53

## 2023-03-12 RX ADMIN — FERROUS SULFATE TAB 325 MG (65 MG ELEMENTAL FE) 325 MG: 325 (65 FE) TAB at 09:01

## 2023-03-12 RX ADMIN — ACETAMINOPHEN 650 MG: 325 TABLET ORAL at 14:30

## 2023-03-12 RX ADMIN — ATORVASTATIN CALCIUM 40 MG: 40 TABLET, FILM COATED ORAL at 16:30

## 2023-03-12 RX ADMIN — LISINOPRIL 20 MG: 20 TABLET ORAL at 08:54

## 2023-03-12 NOTE — PROGRESS NOTES
03/12/23 1000   Pain Assessment   Pain Assessment Tool 0-10   Pain Score No Pain   Restrictions/Precautions   Precautions Aspiration;Bed/chair alarms;Cognitive; Fall Risk;Supervision on toilet/commode   Weight Bearing Restrictions No   ROM Restrictions No   Braces or Orthoses Sling  (RUE sling for mobility and R multipodus boot when in bed )   Cognition   Overall Cognitive Status [facility]   Arousal/Participation Alert; Cooperative   Attention Within functional limits   Orientation Level Oriented X4   Memory Decreased recall of precautions   Following Commands Follows one step commands without difficulty   Subjective   Subjective No c/p pain  Sit to Stand   Type of Assistance Needed Physical assistance   Physical Assistance Level 26%-50%   Comment Min Assist   Sit to Stand CARE Score 3   Bed-Chair Transfer   Type of Assistance Needed Physical assistance   Physical Assistance Level 51%-75%   Comment Mod Assist of 1 with sit/stand pivot transfers   Chair/Bed-to-Chair Transfer CARE Score 2   Walk 10 Feet   Type of Assistance Needed Physical assistance   Physical Assistance Level Total assistance   Walk 10 Feet CARE Score 1   Walk 50 Feet with Two Turns   Reason if not Attempted Safety concerns   Walk 50 Feet with Two Turns CARE Score 88   Walk 150 Feet   Reason if not Attempted Safety concerns   Walk 150 Feet CARE Score 88   Walking 10 Feet on Uneven Surfaces   Reason if not Attempted Safety concerns   Walking 10 Feet on Uneven Surfaces CARE Score 88   Ambulation   Primary Mode of Locomotion Prior to Admission Walk   Distance Walked (feet) 20 ft  (20 feet x 2 with Mod Assist of 1 to position RLE on the ground and to lock R knee using rail on hallway and with wheelchair follow )   Assist Device Other  (left handrail on hallway)   Gait Pattern Inconsistant Mae;Decreased foot clearance;R knee boone;Narrow GAYLA; Improper weight shift   Limitations Noted In Balance; Coordination; Endurance; Heel Strike;Midline "Orientation;Posture; Sequencing;Speed;Strength;Swing   Provided Assistance with: Balance;Limb Advancement;Limb Stabilization;Trunk Support;Weight Shift   Walk Assist Level Moderate Assist;Chair Follow   Does the patient walk? 2  Yes   Wheel 50 Feet with Two Turns   Type of Assistance Needed Supervision;Verbal cues   Physical Assistance Level No physical assistance   Wheel 50 Feet with Two Turns CARE Score 4   Wheelchair mobility   Does the patient use a wheelchair? 1  Yes   Type of Wheelchair Used 1  Manual   Method Left upper extremity; Left lower extremity   Distance Level Surface (feet) 50 ft   Curb or Single Stair   Reason if not Attempted Safety concerns   1 Step (Curb) CARE Score 88   4 Steps   Reason if not Attempted Safety concerns   4 Steps CARE Score 88   12 Steps   Reason if not Attempted Safety concerns   12 Steps CARE Score 88   Picking Up Object   Reason if not Attempted Safety concerns   Picking Up Object CARE Score 88   Therapeutic Interventions   Strengthening Seated ther ex using 3# ankle weights on LLE and 2# ankle weights on LLE for LAQ, hip flexion, hip abduction/adduction with knees extended, hip adduction with ball squeezes, ankle DF and hip abduction/adduction with black theraband for 3 x 10 reps each  Standing ther ex with the support of handrail in the hallway for RLE hip flexion, hip abduction and partial squats for 3 x 10 reps each  Seated rest breaks in between  Neuromuscular Re-Education Practiced standing R hip/knee flexion using 8\" foot stool with assistance from therapist to fully bring his RLE up and down the foot stool  Assessment   Treatment Assessment Patient was very pleasant and very cooperative during therapy session  He needed rest breaks in between due to fatigue  He ambulated 20 feet x 2 with Mod Assist of 1 to reposition RLE on the ground/floor and with wheelchair follow of another person  Min Assist with sit to stand transfers   Mod Assist of 1 with sit/stand pivot " transfers  He was able to self propel wheelchair for 50 feet using LUE and LLE with Supervision  He will benefit from continued skilled PT services to maximize level of function, to be able to return to his PLOF and assist with safe d/c planning  Family/Caregiver Present no   Problem List Decreased strength;Decreased range of motion;Decreased endurance; Impaired balance;Decreased mobility; Decreased coordination; Impaired sensation; Impaired tone   Barriers to Discharge Inaccessible home environment;Decreased caregiver support   PT Barriers   Functional Limitation Car transfers; Ramp negotiation;Stair negotiation;Standing;Transfers; Walking; Wheelchair management   Plan   Treatment/Interventions Functional transfer training;LE strengthening/ROM; Therapeutic exercise; Endurance training;Bed mobility;Gait training;OT   Progress Progressing toward goals   Recommendation   PT Discharge Recommendation   (TBD, pending progress)   PT Therapy Minutes   PT Time In 1000   PT Time Out 1130   PT Total Time (minutes) 90   PT Mode of treatment - Individual (minutes) 90   PT Mode of treatment - Concurrent (minutes) 0   PT Mode of treatment - Group (minutes) 0   PT Mode of treatment - Co-treat (minutes) 0   PT Mode of Treatment - Total time(minutes) 90 minutes   PT Cumulative Minutes 503   Therapy Time missed   Time missed?  No

## 2023-03-12 NOTE — PROGRESS NOTES
Internal Medicine Progress Note  Patient: Brielle Kowalski  Age/sex: 60 y o  male  Medical Record #: [id number]      ASSESSMENT/PLAN: (Interval History)  Brielle Kowalski is seen and examined and management for following issues:    Posterior circulation stroke  • S/p intracranial and extracranial vertebral artery stenting/TICI 2B revascularization  • Continue ASA, Brilinta, Eliquis and atorvastatin  • Repeat CTA head and neck around 3/17/23  • Prozac for depressed mood following CVA   • Neuropsych pending  • Dysphagia 3 diet with nectar thick liquids  • Continue Baclofen 10mg 2x daily  • Therapy per primary service  • Outpt follow-up with Neurology and Neurosurgery    Rapid response  · Likely d/t vasovagal syncope  Neurology agrees that event was unlikely seizure  · Felt dizzy/weak prior to episode then was staring blankly initially when he came back around  · Eventually returned to his baseline  · No further events      HTN  • Home: No medications  • Here: amlodipine 10mg daily/Coreg 25mg 2x daily/lisinopril 20mg daily  • Goal normotension    Fe deficiency anemia  · Likely multifactoral  · s/p IV venofer x 2 doses  · Cont daily ferrous sulfate  · Cbc mondays     CKD stage 2  • Baseline Cr 1 2 - 1 3  • Chlorthalidone dc'd  • Repeat bmp monday     COVID  • Precautions lifted  • Pt was asymptomatic     Prediabetes  • HA1C 5 7  • Recommend dietary modifications     DC planning:  TBD  The above assessment and plan was reviewed and updated as determined by my evaluation of the patient on 3/12/2023      Labs:   Results from last 7 days   Lab Units 03/06/23  0627   WBC Thousand/uL 8 82   HEMOGLOBIN g/dL 10 1*   HEMATOCRIT % 31 4*   PLATELETS Thousands/uL 302     Results from last 7 days   Lab Units 03/06/23  0627   SODIUM mmol/L 138   POTASSIUM mmol/L 4 1   CHLORIDE mmol/L 108   CO2 mmol/L 26   BUN mg/dL 34*   CREATININE mg/dL 1 26   CALCIUM mg/dL 8 7                   Review of Scheduled Meds:  Current Facility-Administered Medications   Medication Dose Route Frequency Provider Last Rate   • amLODIPine  10 mg Oral Daily Makenna Huerta MD     • apixaban  2 5 mg Oral BID Makenna Huerta MD     • aspirin  81 mg Oral Daily Makenna Huerta MD     • atorvastatin  40 mg Oral Daily With Babita Peterson MD     • baclofen  5 mg Oral 4x Daily Makenna Huerta MD     • carvedilol  25 mg Oral BID With Meals YURI Pineda     • vitamin B-12  1,000 mcg Oral Daily Makenna Huerta MD     • ferrous sulfate  325 mg Oral Daily With Breakfast YURI Pineda     • FLUoxetine  20 mg Oral Daily Makenna Huerta MD     • hydrALAZINE  25 mg Oral Q8H PRN YURI Pineda     • lisinopril  20 mg Oral Daily Makenna Huerta MD     • polyethylene glycol  17 g Oral Daily PRN Makenna Huerta MD     • ticagrelor  90 mg Oral Q12H Leilani Vinson MD         Subjective/ HPI: Patient seen and examined  Patients overnight issues or events were reviewed with nursing or staff during rounds or morning huddle session  New or overnight issues include the following:     Pt seen in his room  He denies any current complaints  ROS:   A 10 point ROS was performed; negative except as noted above         Imaging:     No orders to display       *Labs /Radiology studies Reviewed  *Medications  reviewed and reconciled as needed  *Please refer to order section for additional ordered labs studies  *Case discussed with primary attending during morning huddle case rounds    Physical Examination:  Vitals:   Vitals:    03/11/23 1415 03/11/23 2049 03/12/23 0555 03/12/23 0619   BP: 139/64 122/51  143/74   BP Location: Left arm Left arm  Right arm   Pulse: (!) 54 (!) 53  57   Resp: 17 17  17   Temp: 98 3 °F (36 8 °C) 98 3 °F (36 8 °C)  97 8 °F (36 6 °C)   TempSrc: Oral Oral  Oral   SpO2: 100% 96%  97%   Weight:   85 8 kg (189 lb 2 5 oz)    Height:           GEN: No apparent distress, pleasant  NEURO: Alert and oriented x3; Mild dysarthria  HEENT: Pupils are equal and reactive, EOMI, mucous membranes are moist, face asymmetrical  CV: S1 S2 regular, no MRG, no peripheral edema noted  RESP: Lungs are clear bilaterally, no wheezes, rales or rhonchi noted, on room air, respirations easy and non labored  GI: Flat, soft non tender, non distended; +BS x4  : Voiding without difficulty  MUSC: Moves all extremities; Rt hemiparesis UE > LE improving  SKIN: pink, warm and dry, normal turgor, no rashes, lesions      The above physical exam was reviewed and updated as determined by my evaluation of the patient on 3/12/2023  Invasive Devices     None                    VTE Pharmacologic Prophylaxis: Eliquis  Code Status: Level 1 - Full Code  Current Length of Stay: 6 day(s)      Total time spent:  30 minutes with more than 50% spent counseling/coordinating care  Counseling includes discussion with patient re: progress  and discussion with patient of his/her current medical state/information  Coordination of patient's care was performed in conjunction with primary service  Time invested included review of patient's labs, vitals, and management of their comorbidities with continued monitoring  In addition, this patient was discussed with medical team including physician and advanced extenders  The care of the patient was extensively discussed and appropriate treatment plan was formulated unique for this patient  Medical decision making for the day was made by supervising physician unless otherwise noted in their attestation statement  ** Please Note:  voice to text software may have been used in the creation of this document   Although proof errors in transcription or interpretation are a potential of such software**

## 2023-03-12 NOTE — PLAN OF CARE
Problem: Prexisting or High Potential for Compromised Skin Integrity  Goal: Skin integrity is maintained or improved  Description: INTERVENTIONS:  - Identify patients at risk for skin breakdown  - Assess and monitor skin integrity  - Assess and monitor nutrition and hydration status  - Monitor labs   - Assess for incontinence   - Turn and reposition patient  - Assist with mobility/ambulation  - Relieve pressure over bony prominences  - Avoid friction and shearing  - Provide appropriate hygiene as needed including keeping skin clean and dry  - Evaluate need for skin moisturizer/barrier cream  - Collaborate with interdisciplinary team   - Patient/family teaching  - Consider wound care consult   Outcome: Progressing     Problem: MOBILITY - ADULT  Goal: Maintain or return to baseline ADL function  Description: INTERVENTIONS:  -  Assess patient's ability to carry out ADLs; assess patient's baseline for ADL function and identify physical deficits which impact ability to perform ADLs (bathing, care of mouth/teeth, toileting, grooming, dressing, etc )  - Assess/evaluate cause of self-care deficits   - Assess range of motion  - Assess patient's mobility; develop plan if impaired  - Assess patient's need for assistive devices and provide as appropriate  - Encourage maximum independence but intervene and supervise when necessary  - Involve family in performance of ADLs  - Assess for home care needs following discharge   - Consider OT consult to assist with ADL evaluation and planning for discharge  - Provide patient education as appropriate  Outcome: Progressing  Goal: Maintains/Returns to pre admission functional level  Description: INTERVENTIONS:  - Perform BMAT or MOVE assessment daily    - Set and communicate daily mobility goal to care team and patient/family/caregiver  - Collaborate with rehabilitation services on mobility goals if consulted  - Perform Range of Motion times a day    - Reposition patient every hours   - Dangle patient  times a day  - Stand patient  times a day  - Ambulate patient  times a day  - Out of bed to chair times a day   - Out of bed for meals  times a day  - Out of bed for toileting  - Record patient progress and toleration of activity level   Outcome: Progressing     Problem: PAIN - ADULT  Goal: Verbalizes/displays adequate comfort level or baseline comfort level  Description: Interventions:  - Encourage patient to monitor pain and request assistance  - Assess pain using appropriate pain scale  - Administer analgesics based on type and severity of pain and evaluate response  - Implement non-pharmacological measures as appropriate and evaluate response  - Consider cultural and social influences on pain and pain management  - Notify physician/advanced practitioner if interventions unsuccessful or patient reports new pain  Outcome: Progressing     Problem: INFECTION - ADULT  Goal: Absence or prevention of progression during hospitalization  Description: INTERVENTIONS:  - Assess and monitor for signs and symptoms of infection  - Monitor lab/diagnostic results  - Monitor all insertion sites, i e  indwelling lines, tubes, and drains  - Monitor endotracheal if appropriate and nasal secretions for changes in amount and color  - [place] appropriate cooling/warming therapies per order  - Administer medications as ordered  - Instruct and encourage patient and family to use good hand hygiene technique  - Identify and instruct in appropriate isolation precautions for identified infection/condition  Outcome: Progressing  Goal: Absence of fever/infection during neutropenic period  Description: INTERVENTIONS:  - Monitor WBC    Outcome: Progressing     Problem: SAFETY ADULT  Goal: Patient will remain free of falls  Description: INTERVENTIONS:  - Educate patient/family on patient safety including physical limitations  - Instruct patient to call for assistance with activity   - Consult OT/PT to assist with strengthening/mobility   - Keep Call bell within reach  - Keep bed low and locked with side rails adjusted as appropriate  - Keep care items and personal belongings within reach  - Initiate and maintain comfort rounds  - Make Fall Risk Sign visible to staff  - Offer Toileting every  Hours, in advance of need  - Initiate/Maintain alarm  - Obtain necessary fall risk management equipment:   - Apply yellow socks and bracelet for high fall risk patients  - Consider moving patient to room near nurses station  Outcome: Progressing  Goal: Maintain or return to baseline ADL function  Description: INTERVENTIONS:  -  Assess patient's ability to carry out ADLs; assess patient's baseline for ADL function and identify physical deficits which impact ability to perform ADLs (bathing, care of mouth/teeth, toileting, grooming, dressing, etc )  - Assess/evaluate cause of self-care deficits   - Assess range of motion  - Assess patient's mobility; develop plan if impaired  - Assess patient's need for assistive devices and provide as appropriate  - Encourage maximum independence but intervene and supervise when necessary  - Involve family in performance of ADLs  - Assess for home care needs following discharge   - Consider OT consult to assist with ADL evaluation and planning for discharge  - Provide patient education as appropriate  Outcome: Progressing  Goal: Maintains/Returns to pre admission functional level  Description: INTERVENTIONS:  - Perform BMAT or MOVE assessment daily    - Set and communicate daily mobility goal to care team and patient/family/caregiver  - Collaborate with rehabilitation services on mobility goals if consulted  - Perform Range of Motion times a day  - Reposition patient every  hours    - Dangle patient  times a day  - Stand patient  times a day  - Ambulate patient  times a day  - Out of bed to chair  times a day   - Out of bed for meals  times a day  - Out of bed for toileting  - Record patient progress and toleration of activity level   Outcome: Progressing     Problem: DISCHARGE PLANNING  Goal: Discharge to home or other facility with appropriate resources  Description: INTERVENTIONS:  - Identify barriers to discharge w/patient and caregiver  - Arrange for needed discharge resources and transportation as appropriate  - Identify discharge learning needs (meds, wound care, etc )  - Arrange for interpretive services to assist at discharge as needed  - Refer to Case Management Department for coordinating discharge planning if the patient needs post-hospital services based on physician/advanced practitioner order or complex needs related to functional status, cognitive ability, or social support system  Outcome: Progressing     Problem: Nutrition/Hydration-ADULT  Goal: Nutrient/Hydration intake appropriate for improving, restoring or maintaining nutritional needs  Description: Monitor and assess patient's nutrition/hydration status for malnutrition  Collaborate with interdisciplinary team and initiate plan and interventions as ordered  Monitor patient's weight and dietary intake as ordered or per policy  Utilize nutrition screening tool and intervene as necessary  Determine patient's food preferences and provide high-protein, high-caloric foods as appropriate       INTERVENTIONS:  - Monitor oral intake, urinary output, labs, and treatment plans  - Assess nutrition and hydration status and recommend course of action  - Evaluate amount of meals eaten  - Assist patient with eating if necessary   - Allow adequate time for meals  - Recommend/ encourage appropriate diets, oral nutritional supplements, and vitamin/mineral supplements  - Order, calculate, and assess calorie counts as needed  - Recommend, monitor, and adjust tube feedings and TPN/PPN based on assessed needs  - Assess need for intravenous fluids  - Provide specific nutrition/hydration education as appropriate  - Include patient/family/caregiver in decisions related to nutrition  Outcome: Progressing

## 2023-03-12 NOTE — PROGRESS NOTES
03/12/23 0800   Pain Assessment   Pain Assessment Tool 0-10   Pain Score 2   Pain Location/Orientation Location: Head   Pain Onset/Description Descriptor: Headache   Hospital Pain Intervention(s) Rest  (pt declined SLP to notify RN at this time)   Restrictions/Precautions   Precautions Aspiration; Aphasia;Bed/chair alarms;Cognitive; Fall Risk;Supervision on toilet/commode   Comprehension   Comprehension (FIM) 4 - Understands basic info/conversation 75-90% of time   Expression   Expression (FIM) 4 - Expresses basic info/needs 75-90% of time   Social Interaction   Social Interaction (FIM) 6 - Interacts appropriately with others BUT requires extra  time   Problem Solving   Problem solving (FIM) 4 - Solves basic problems 75-89% of time   Memory   Memory (FIM) 3 - Recognizes, recalls/performs 50-74%   Speech/Language/Cognition Assessment   Treatment Assessment Pt participated in skilled ST session focusing on both cognitive linguistic skills and language skills  Focusing on language skills, pt completed a word identification task where he was presented with written Fo3, containing two miss spelled words and one word spelled correctly  Pt identified the correctly spelled word in 17/19 trials, demonstrating ability to correct errors without actual cuing once SLP prompted pt to review his responses  Pt also demo improvement in this task in comparison to yesterday's session  Additionally, targeting word retrieval, pt independently provided the name of a category of items when verbally presented with [facility] words for 17/20 items  While these three remaining trials were not necessarily errored, pt's responses were vague as he benefited from verbal prompting and follow up questions to provide more complete and descriptive responses   Medication management was then targeted again this session where pt engaged in a review of his current medication list  During this time, SLP created a spreadsheet as a medication cheat sheet, which contains the name of the medication, its use/reason for taking, dosage, frequency and time of day  At end of session, pt was provided with a paper copy of this handout to reference in his spare time  Throughout the review, pt accurately recalled the reason/use for 5/11 medications  Of note, while pt was off in recall of specific reasons, he did often provide rationales related to stroke prevention such as blood pressure, blood thinner and cholesterol  Of note, SLP then set up patient with his breakfast where he was observed to consume ~60cc thin liquids by straw/cup during this time and throughout session-no overt s/s aspiration noted  At this time, pt is recommended for further skilled SLP services to maximize overall functional independence with cognitive linguistic skills, as well as to improve functional language/communication abilities in order to decrease caregiver burden over time  SLP Therapy Minutes   SLP Time In 0800   SLP Time Out 0840   SLP Total Time (minutes) 40   SLP Mode of treatment - Individual (minutes) 40   SLP Mode of treatment - Concurrent (minutes) 0   SLP Mode of treatment - Group (minutes) 0   SLP Mode of treatment - Co-treat (minutes) 0   SLP Mode of Treatment - Total time(minutes) 40 minutes   SLP Cumulative Minutes 385   Therapy Time missed   Time missed?  No

## 2023-03-13 LAB
ANION GAP SERPL CALCULATED.3IONS-SCNC: 4 MMOL/L (ref 4–13)
BASOPHILS # BLD AUTO: 0.04 THOUSANDS/ÂΜL (ref 0–0.1)
BASOPHILS NFR BLD AUTO: 1 % (ref 0–1)
BUN SERPL-MCNC: 34 MG/DL (ref 5–25)
CALCIUM SERPL-MCNC: 9 MG/DL (ref 8.3–10.1)
CHLORIDE SERPL-SCNC: 109 MMOL/L (ref 96–108)
CO2 SERPL-SCNC: 25 MMOL/L (ref 21–32)
CREAT SERPL-MCNC: 1.35 MG/DL (ref 0.6–1.3)
EOSINOPHIL # BLD AUTO: 0.22 THOUSAND/ÂΜL (ref 0–0.61)
EOSINOPHIL NFR BLD AUTO: 3 % (ref 0–6)
ERYTHROCYTE [DISTWIDTH] IN BLOOD BY AUTOMATED COUNT: 12.8 % (ref 11.6–15.1)
GFR SERPL CREATININE-BSD FRML MDRD: 55 ML/MIN/1.73SQ M
GLUCOSE P FAST SERPL-MCNC: 107 MG/DL (ref 65–99)
GLUCOSE SERPL-MCNC: 107 MG/DL (ref 65–140)
HCT VFR BLD AUTO: 32.3 % (ref 36.5–49.3)
HGB BLD-MCNC: 10.1 G/DL (ref 12–17)
IMM GRANULOCYTES # BLD AUTO: 0.04 THOUSAND/UL (ref 0–0.2)
IMM GRANULOCYTES NFR BLD AUTO: 1 % (ref 0–2)
LYMPHOCYTES # BLD AUTO: 1.16 THOUSANDS/ÂΜL (ref 0.6–4.47)
LYMPHOCYTES NFR BLD AUTO: 15 % (ref 14–44)
MCH RBC QN AUTO: 27.7 PG (ref 26.8–34.3)
MCHC RBC AUTO-ENTMCNC: 31.3 G/DL (ref 31.4–37.4)
MCV RBC AUTO: 89 FL (ref 82–98)
MONOCYTES # BLD AUTO: 0.53 THOUSAND/ÂΜL (ref 0.17–1.22)
MONOCYTES NFR BLD AUTO: 7 % (ref 4–12)
NEUTROPHILS # BLD AUTO: 5.79 THOUSANDS/ÂΜL (ref 1.85–7.62)
NEUTS SEG NFR BLD AUTO: 73 % (ref 43–75)
NRBC BLD AUTO-RTO: 0 /100 WBCS
PLATELET # BLD AUTO: 288 THOUSANDS/UL (ref 149–390)
PMV BLD AUTO: 9.7 FL (ref 8.9–12.7)
POTASSIUM SERPL-SCNC: 3.9 MMOL/L (ref 3.5–5.3)
RBC # BLD AUTO: 3.64 MILLION/UL (ref 3.88–5.62)
SODIUM SERPL-SCNC: 138 MMOL/L (ref 135–147)
WBC # BLD AUTO: 7.78 THOUSAND/UL (ref 4.31–10.16)

## 2023-03-13 RX ADMIN — AMLODIPINE BESYLATE 10 MG: 10 TABLET ORAL at 09:57

## 2023-03-13 RX ADMIN — TICAGRELOR 90 MG: 90 TABLET ORAL at 09:59

## 2023-03-13 RX ADMIN — CYANOCOBALAMIN TAB 500 MCG 1000 MCG: 500 TAB at 09:57

## 2023-03-13 RX ADMIN — FERROUS SULFATE TAB 325 MG (65 MG ELEMENTAL FE) 325 MG: 325 (65 FE) TAB at 08:46

## 2023-03-13 RX ADMIN — ATORVASTATIN CALCIUM 40 MG: 40 TABLET, FILM COATED ORAL at 17:42

## 2023-03-13 RX ADMIN — LISINOPRIL 20 MG: 20 TABLET ORAL at 09:57

## 2023-03-13 RX ADMIN — BACLOFEN 5 MG: 10 TABLET ORAL at 21:00

## 2023-03-13 RX ADMIN — APIXABAN 2.5 MG: 2.5 TABLET, FILM COATED ORAL at 09:58

## 2023-03-13 RX ADMIN — ASPIRIN 81 MG CHEWABLE TABLET 81 MG: 81 TABLET CHEWABLE at 09:57

## 2023-03-13 RX ADMIN — FLUOXETINE 20 MG: 20 CAPSULE ORAL at 09:58

## 2023-03-13 RX ADMIN — APIXABAN 2.5 MG: 2.5 TABLET, FILM COATED ORAL at 17:43

## 2023-03-13 RX ADMIN — TICAGRELOR 90 MG: 90 TABLET ORAL at 20:59

## 2023-03-13 RX ADMIN — CARVEDILOL 25 MG: 25 TABLET, FILM COATED ORAL at 08:46

## 2023-03-13 RX ADMIN — BACLOFEN 5 MG: 10 TABLET ORAL at 09:58

## 2023-03-13 RX ADMIN — CARVEDILOL 25 MG: 25 TABLET, FILM COATED ORAL at 17:42

## 2023-03-13 RX ADMIN — BACLOFEN 5 MG: 10 TABLET ORAL at 12:40

## 2023-03-13 RX ADMIN — BACLOFEN 5 MG: 10 TABLET ORAL at 17:43

## 2023-03-13 NOTE — PROGRESS NOTES
03/13/23 0800   Pain Assessment   Pain Assessment Tool 0-10   Pain Score No Pain   Restrictions/Precautions   Precautions Aphasia; Aspiration;Bed/chair alarms;Cognitive; Fall Risk;Supervision on toilet/commode   Comprehension   Comprehension (FIM) 4 - Understands basic info/conversation 75-90% of time   Expression   Expression (FIM) 4 - Expresses basic info/needs 75-90% of time   Social Interaction   Social Interaction (FIM) 6 - Interacts appropriately with others BUT requires extra  time   Problem Solving   Problem solving (FIM) 4 - Solves basic problems 75-89% of time   Memory   Memory (FIM) 4 - Recognizes/recalls/performs 75-89%   Speech/Language/Cognition Assessment   Treatment Assessment Pt participated in skilled ST session focusing on cognitive linguistic skills  Pt awake, alert at beginning of session and recalled SLP from most recent sessions, along with some tasks completed  Targeting short term and working memory, pt completed word list retention tasks  Prior to tasks, pt was educated on different methods of information input, along with memory strategies to include verbal rehearsal, repetition, making associations and visualization  Verbally presented with Fo4 words and after ~5 second pause, pt then recalled words in order to answer follow up questions related to category inclusion with 14/18 accuracy  Provided with verbal cues, pt improved to 15/18 and then ultimately required an additional repetition of stimuli to achieve 18/18 accuracy  Similarly, when verbally presented with Fo4 words, pt then answered follow up questions related to category exclusion with 13/16 accuracy, again improving slightly when given verbal cues but mainly benefiting from single repetition of stimuli per trial to achieve 16/16 accuracy in recall  Lastly, pt engaged in a word knowledge and mental flexibility task in which he named an item which would fit into given attributes in 13/16 opportunities   Pt at times with more vague responses to which he benefited from follow up questions to clarify responses, as well as times of verbal cues to achieve 16/16 accuracy with task  At end of session, reviewed continued treatment plan to focus on both cognitive linguistic and language skills  SLP also mentioned review of his current med list to which pt reports that he has bedside and will share with his wife  At this time, pt is recommended for further skilled SLP services to maximize overall functional independence with cognitive linguistic skills, as well as to improve functional language/communication abilities in order to decrease caregiver burden over time  SLP Therapy Minutes   SLP Time In 0800   SLP Time Out 0830   SLP Total Time (minutes) 30   SLP Mode of treatment - Individual (minutes) 30   SLP Mode of treatment - Concurrent (minutes) 0   SLP Mode of treatment - Group (minutes) 0   SLP Mode of treatment - Co-treat (minutes) 0   SLP Mode of Treatment - Total time(minutes) 30 minutes   SLP Cumulative Minutes 415   Therapy Time missed   Time missed?  No

## 2023-03-13 NOTE — PROGRESS NOTES
03/13/23 1045   Assessment   Treatment Assessment Seen for OT session w/ focus on stroke edu: reviewed stroke edu and Purpose of rehab  Cont OT POC  Recommendation   OT Discharge Recommendation   (pending progress)   OT Therapy Minutes   OT Time In 1045   OT Time Out 1110   OT Total Time (minutes) 25   OT Mode of treatment - Individual (minutes) 25   OT Mode of treatment - Concurrent (minutes) 0   OT Mode of treatment - Group (minutes) 0   OT Mode of treatment - Co-treat (minutes) 0   OT Mode of Treatment - Total time(minutes) 25 minutes   OT Cumulative Minutes 480   Therapy Time missed   Time missed? No   Stroke Education Series    Pt participated in skilled Stroke Education Series in an individual setting to address the topic of Stroke 101: Understanding the Basics of Stroke in both verbal and written formats  Education within this session reviewed the basic structural and functional components of the brain and included information on the causes of stroke, related signs/symptoms, risk factors, and the process of stroke rehabilitation  The goal of this education was to provide the patient with general understanding of how the brain functions and how a stroke can impact his/her functional mobility and independence  In addition, the intention of this education is to provide the patient with the information to reduce the risk of a second stroke  Following education, pt's response to education is: verbalizes understanding and demonstrates understanding  To individualize the education, the following topics were included based upon the patients' past and current medical history: prediabetes, family history, and smoking  Additional topics that were covered include weakness, decreased coordination, depression, anxiety, nutrition, fall prevention, and prevention of new conditions and/or worsening condition       Start Time: 1045  End Time: 1055    Stroke Education Series    Pt participated in skilled Stroke Education Series in an individual setting to address the topic of Purpose of Rehab post stroke in both verbal and written formats  Education within this session included a review of the individual roles of the rehab team, functions of the acute rehab center, and neuro rehabilitation treatment strategies  The goal of this education session was to provide the patient with an understanding of the overall importance of the therapy process  This section reviews neuroplasticity principles as well that includes how patiens can incorporate specificity, intensity, repetition and salience into their home exercise program  This allows the patient to connect neuro rehabilitation treatment strategies to his or her individual therapy process  The patients are engaged in conversation to incorporate activities they like to do into therapy sessions  Following education, the patient's response to education is: verbalizes understanding and demonstrates understanding  Additional topics to individualize this section of stroke education include: adapting to stroke , cardiovascular components of rehab, involvement of therapy reducing risk of another stroke and how family can help in the rehab process       Start Time: 1055  End Time: 1110

## 2023-03-13 NOTE — PROGRESS NOTES
Physical Medicine and Rehabilitation Progress Note  Dora Roth 60 y o  male MRN: [id number]  Unit/Bed#: [facility] 266-42 Encounter: [id number]    HPI: Dora Roth is a 60 y o  right handed male with medical history of HTN who presented to [address] [place] [address] Road on 2/25 with nausea/dizziness  Found to have hypertensive emergency with acute/subcaute R posterior cerebellar CVA with 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, mild BRAULIO, and incidentally found + COVID (without evidence of respiratory illness/hypoxia/CXR findings)  Placed on cardene gtt, stroke pathway, ASA/Plavix, IV hydration for BRAULIO, and CTX for UTI  NIHSS 2  Symptoms began 2 days prior  Not tPA/TNK candidate  MRI/MRA with 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  NSx recommended transfer to [facility] and starting on heparin gtt and stopped Plavix  Repeat CTA on 2/26 stable with with R V3/4 occlusion  Not a candidate for interventional procedure due to clinical stability and risk  Speech consulted and noted mod-severe oropharyngeal dysphagia recommended pureed/HTL  Unfortunately later on 2/26, he clinically deteriorated with increased R sided weakness, and was taken to IR for stenting of V3 and V4 with TICI 2B revascularization  CTH without ICH  He was admitted back to the ICU intubated - extubated 2/27  MRI showed cerebellar CVA and R > L stroke burden, with additional hypodensities on DWI appreciated L midbrain, pontine area and R lower medullary/spinal cord junction  He was transitioned to triple therapy with DAPT (given recent stent) and A/C with eliquis 2 5mg BID due to COVID  Diet advanced to Level 3/NTL on 2/27  Noted to have spasticity in RLE - started on baclofen by Neurology  He was able to have cardene discontinued on 3/2, and they have been making adjustments to his oral regimen  He was started on Prozac for his mood  NSx has signed off  CTA H/N recommended around 3/17  Length of time on eliquis unclear   Admitted to [facility] on 3/6  Chief Complaint:Some continent bowel movements, Improved RUE strength  Interval History/Subjective:  No acute events over the weekend  Therapy is going well - no further dizzy spills  No new pain  Denies any new CP, SOB, fevers, chills, N/V, abdominal pain  Last BM was 3/12 and was continent and today had an incontinent bowel movement  Has noticed improving strength in his RUE      ROS:  A 10 point review of systems was negative except for what is noted in the HPI  Today's Changes:  1  Continue bowel regimen, consider adding suppository to help time  Patient is hesitant at this time  2  Reviewed labs, discussed crea with IM, suspect this may be near his baseline  Encouraged hydration, recheck later this week - 3/16  3  Hiccups have improved  Total visit time: 35 minutes, with more than 50% spent counseling/coordinating care  Counseling includes discussion with patient re: progress in therapies, functional issues observed by therapy staff, and discussion with patient regarding their current medical state and wellbeing  Coordination of patient's care was performed in conjunction with Internal Medicine service to monitor patient's labs, vitals, and management of their comorbidities  Assessment/Plan:    * Acute Posterior Circulation Stroke  Assessment & Plan  Presented with dizziness for 2 days and nausea  - Now with R sided weakness, aphasia, dysphagia, R mild spasticity  Several small acute/early subacute bi-cerebellar infarcts without hemorrhage  Multiple infarcts involving R cerebellum and brainstem in particular (posterior medulla on the R, R midbrain, and L occipital lobe)  L vertebral artery severe stenosis and R vertebral artery occlusion and dissection    - Now s/p stenting on 2/26 with TICI 2b revascularization    PPx: ASA/brilinta/Eliquis, Statin   - Discussed with Neurology, they defer to NSx on length of time on eliquis   Potentially 3-4 weeks if felt to be related to COVID   3/9 Had nocturnal oximetry to screen for nocturnal hypoxia - only 26 seconds total of mild desaturation  Maintain normotension  Education on prediabetes and diet  Follow-up with Neurovascular/Neurosurgery  PT/OT/SLP - for swallow, speech, R sided weakness/tone, will need a good visual exam      Vasovagal syncope  Assessment & Plan  No further episodes  3/8 Had episode of vasovagal syncope  - No convulsions  On body weight support system, started to feel dizzy  - Staring episode after, but able to sternal rubbed out of it  Very brief   - No post-ictal weakness   - Neurologically stable and back to baseline   - Orthostatics negative  Discussed with Neurology - unlikely seizure given distribution of stroke and presentation  No further testing recommended at this time  Monitor  Neurogenic bowel  Assessment & Plan  Disinhibited bowel + mobility difficulties  Not on bowel meds right now  ARC Bladder Training:   - 30-45 min after each meal will get patient onto commode to attempt bowel movement  - He wants to hold off on scheduled suppository for now (would schedule in AM to align with his previous bowel schedule at home)  For now monitor, close continence care especially    Anemia  Assessment & Plan  Normocytic anemia noted through stay  B12 borderline low  Folate normal  Iron Panel: Low iron saturation and iron with normal TIBC and Ferritin  Was started in the hospital on B12, but not iron  Per IM - 2 days of IV Venofer (last day 3/8)  Then start oral iron  Monitor Hgb, transfuse as appropriate  Will discuss with IM  Consulted  Adjustment disorder with depressed mood  Assessment & Plan  Has been tearful and labile during hospitalization  Started on Prozac 20mgdaily  Consulted rehab psychology for support  Prediabetes  Assessment & Plan  A1C 5 7  Consult nutrition for education on diabetic diet  Diet/lifestyle modifications  Follow-up with PCP      CKD (chronic kidney disease)  Assessment & Plan  Lab Results   Component Value Date    EGFR 55 03/13/2023    EGFR 60 03/06/2023    EGFR 58 03/05/2023    CREATININE 1 35 (H) 03/13/2023    CREATININE 1 26 03/06/2023    CREATININE 1 29 03/05/2023     Presented with Crea 1 32  Given gentle IVF  Unclear baseline  Per hospital team - suspect CKD Stage 2 2/2 hypertension  Will monitor BMP while here  Avoid nephrotoxic meds and relative hypotension  Recommend outpatient f/u with PCP    Spasticity  Assessment & Plan  Intrinsic tonic tone in R quad which is mild  RUE still overall decreased tone, but starting to develop very mild tone in elbow flexor  Has some hiccups too - this is improving  Would opt to avoid treating R quad for now, as tone there may help stabilize at the knee  R ankle multipodus  3/8 Baclofen to 5mg QID to help with hiccups  Range of motion  Monitor as tone evolves  Outpatient f/u with PMR  Dysphagia  Assessment & Plan  Admitted with mod-severe oropharyngeal  Has massively improved  3/8 Advanced to Level 3/Thins  3/10 Advanced to Reg/Thins  Aspiration precautions  Good oral hygiene   SLP consulted    Primary hypertension  Assessment & Plan  Home: None  Here: Amlodipine 10mg daily, Coreg 25mg BID, Lisinopril 20mg daily, PRN hydralazine   - IM stopped chlorthalidone and increased coreg  Had hypertensive urgency in hospital requiring cardene gtt  Monitor and adjust as appropriate  IM consulted to assist with management  Stenosis of left vertebral artery  Assessment & Plan  Severe L vertebral artery origin stenosis  Repeat CTA H/N 2 weeks around 3/17  ASA/Brilinta  Atorvastatin  SBP goals 120-160  Follow-up with Neurovascular      Right Vertebral artery dissection [facility])  Assessment & Plan  Now s/p R V3/4 stenting with TICI 2B revascularization on 2/26 for R Vert stenosis  Continue ASA/Brilinta  Statin  Neurosurg f/u 3/17 with repeat CTA H/N        COVID-resolved as of 3/10/2023  Assessment & Plan  Resolved  Incidentally found to have COVID on 2/25  Asymptomatic from respiratory standpoint  Per Neurosurgery concern for hypercoagulability related to COVID  Currently on Eliquis 2 5mg BID - per Neuro 3-4 weeks probably reasonable, but for them ultimately up to NSx as this was their initial recommendation to start this medication  3/8 Discontinued precautions after 10 days isolation  Health Maintenance  #Delirium/Sleep: At risk  Optimize bowel/bladder, sleep-wake cycle, mood and pain management  #Pain: Baclofen 10mg BID, Tylenol PRN  #Bowel: Last BM 3/12  Only PRN miralax  See Neurogenic bowel above  #Bladder: Voiding and continent  #Skin/Pressure Injury Prevention: Turn Q2hr in bed, with weight shifts [id number] in wheelchair  Float heels in bed  #DVT Prophylaxis: On triple therapy, SCDs  #GI Prophylaxis: None  #Code Status:  Full Code  #FEN: Level 3/NTL  #Dispo:  Team 3/7  ELOS 4-6 weeks  May require more assistance at home and primary wheelchair level mobility given his ataxia  Barrier: R sided weakness, ataxia, truncal weakness, dysphagia, aphasia  Goals: See above       Objective:    Functional Update:  PT:  Eusebio-modA with transfers, total A ambulation  OT: total A shower/bathe, modA UB dressing, total A LB dressing, fottwear max-total A, total A toileting hygiene and transfer with slide board to drop arm BSC  SLP:  CLCAROLAT with mild cognitive linguistic impairments  Mildoropharyngeal dysphagia  Eusebio comprehension, expression, problem solving, modA memory, supervision social interaction    Allergies per EMR    Physical Exam:  Temp:  [97 7 °F (36 5 °C)-98 3 °F (36 8 °C)] 98 3 °F (36 8 °C)  HR:  [56-60] 60  Resp:  [16-20] 16  BP: (115-153)/(53-72) 153/72  Oxygen Therapy  SpO2: 98 %    Gen: No acute distress, Well-nourished, well-appearing  HEENT: Moist mucus membranes, Normocephalic/Atraumatic  Cardiovascular: Regular rate, rhythm, S1/S2   Distal pulses palpable  Heme/Extr: No edema  Pulmonary: Non-labored breathing  Lungs CTAB  : No barnett  GI: Soft, non-tender, non-distended  BS+  Integumentary: Skin is warm, dry  Neuro: AAOx3,Speech is anomic  Appropriate to questioning  R EF with MAS 1 easily ranged out, and R quad with MAS 2-3  MMT:   Strength:   Right  Site  Right  Site    1 S Ab: Shoulder Abductors  3  HF: Hip Flexors    3- EF: Elbow Flexors  NT KF: Knee Flexors    2+/3-  EE: Elbow Extensors  4  KE: Knee Extensors    3- WE: Wrist Extensors  3-  DR: Dorsi Flexors    3  FF: Finger Flexors  3  PF: Plantar Flexors    2  HI: Hand Intrinsics  NT  EHL: Extensor Hallucis Longus   Psych: Normal mood and affect         Diagnostic Studies: Reviewed, no new imaging    Laboratory:  Reviewed   Results from last 7 days   Lab Units 03/13/23  0637   HEMOGLOBIN g/dL 10 1*   HEMATOCRIT % 32 3*   WBC Thousand/uL 7 78     Results from last 7 days   Lab Units 03/13/23  0637   BUN mg/dL 34*   POTASSIUM mmol/L 3 9   CHLORIDE mmol/L 109*   CREATININE mg/dL 1 35*            Patient Active Problem List   Diagnosis   • BRAULIO (acute kidney injury) ([facility] [address] )   • Acute Posterior Circulation Stroke   • Right Vertebral artery dissection (HCC)   • Stenosis of left vertebral artery   • Primary hypertension   • Dizziness   • Dysphagia   • Spasticity   • CKD (chronic kidney disease)   • Prediabetes   • Adjustment disorder with depressed mood   • Anemia   • Neurogenic bowel   • Vasovagal syncope         Medications  Current Facility-Administered Medications   Medication Dose Route Frequency Provider Last Rate   • acetaminophen  650 mg Oral Q6H PRN Maldonado [place], CRNP     • amLODIPine  10 mg Oral Daily Nory Degroot MD     • apixaban  2 5 mg Oral BID Nory Degroot MD     • aspirin  81 mg Oral Daily Nory Degroot MD     • atorvastatin  40 mg Oral Daily With Sarah Barron MD     • baclofen  5 mg Oral 4x Daily Noyr Degroot MD     • carvedilol  25 mg Oral BID With Meals Maldonado [place], CRNP     • vitamin B-12 1,000 mcg Oral Daily Massimo Zhang MD     • ferrous sulfate  325 mg Oral Daily With Breakfast YURI Stanford     • FLUoxetine  20 mg Oral Daily Massimo Zhang MD     • hydrALAZINE  25 mg Oral Q8H PRN YURI Stanford     • lisinopril  20 mg Oral Daily Massimo Zhang MD     • polyethylene glycol  17 g Oral Daily PRN Massimo Zhang MD     • ticagrelor  90 mg Oral Q12H [address] Massimo Zhang MD            ** Please Note: Fluency Direct voice to text software may have been used in the creation of this document   **

## 2023-03-13 NOTE — PROGRESS NOTES
Internal Medicine Progress Note  Patient: Angella Collins  Age/sex: 60 y o  male  Medical Record #: [id number]      ASSESSMENT/PLAN: (Interval History)  Angella Collins is seen and examined and management for following issues:    Posterior circulation stroke  • S/p intracranial and extracranial vertebral artery stenting/TICI 2B revascularization  • Continue ASA, Brilinta, Eliquis and atorvastatin  • Repeat CTA head and neck around 3/17/23  • Prozac for depressed mood following CVA   • Neuropsych pending  • Dysphagia 3 diet with nectar thick liquids  • Continue Baclofen 5mg 4x daily  • Therapy per primary service  • Outpt follow-up with Neurology and Neurosurgery    Rapid response  · Likely d/t vasovagal syncope  Neurology agrees that event was unlikely seizure  · Felt dizzy/weak prior to episode then was staring blankly initially when he came back around  · Eventually returned to his baseline  · No further events      HTN  • Home: No medications  • Here: amlodipine 10mg daily/Coreg 25mg 2x daily/lisinopril 20mg daily  • Goal normotension    Fe deficiency anemia  · Likely multifactoral  · s/p IV venofer x 2 doses  · Cont daily ferrous sulfate  · Cbc mondays     CKD stage 2  • Baseline Cr 1 2 - 1 3  • Chlorthalidone dc'd  • BMP stable  Encourage fluids  • Repeat BMP Thursday      COVID  • Precautions lifted  • Pt was asymptomatic     Prediabetes  • HA1C 5 7  • Recommend dietary modifications     DC planning:  TBD  The above assessment and plan was reviewed and updated as determined by my evaluation of the patient on 3/13/2023      Labs:   Results from last 7 days   Lab Units 03/13/23  0637   WBC Thousand/uL 7 78   HEMOGLOBIN g/dL 10 1*   HEMATOCRIT % 32 3*   PLATELETS Thousands/uL 288     Results from last 7 days   Lab Units 03/13/23  0637   SODIUM mmol/L 138   POTASSIUM mmol/L 3 9   CHLORIDE mmol/L 109*   CO2 mmol/L 25   BUN mg/dL 34*   CREATININE mg/dL 1 35*   CALCIUM mg/dL 9 0                   Review of Scheduled Meds:  Current Facility-Administered Medications   Medication Dose Route Frequency Provider Last Rate   • acetaminophen  650 mg Oral Q6H PRN YURI Portillo     • amLODIPine  10 mg Oral Daily David Arenas MD     • apixaban  2 5 mg Oral BID David Arenas MD     • aspirin  81 mg Oral Daily David Arenas MD     • atorvastatin  40 mg Oral Daily With Breonna Feliz MD     • baclofen  5 mg Oral 4x Daily David Arenas MD     • carvedilol  25 mg Oral BID With Meals YURI Portillo     • vitamin B-12  1,000 mcg Oral Daily David Arenas MD     • ferrous sulfate  325 mg Oral Daily With Breakfast YURI Portillo     • FLUoxetine  20 mg Oral Daily David Arenas MD     • hydrALAZINE  25 mg Oral Q8H PRN YURI Portillo     • lisinopril  20 mg Oral Daily David Arenas MD     • polyethylene glycol  17 g Oral Daily PRN David Arenas MD     • ticagrelor  90 mg Oral Q12H Ernestine Villa MD         Subjective/ HPI: Patient seen and examined  Patients overnight issues or events were reviewed with nursing or staff during rounds or morning huddle session  New or overnight issues include the following:     Pt seen in his room  He is able to wave with the Rt hand now  He denies any current complaints  ROS:   A 10 point ROS was performed; negative except as noted above         Imaging:     No orders to display       *Labs /Radiology studies Reviewed  *Medications  reviewed and reconciled as needed  *Please refer to order section for additional ordered labs studies  *Case discussed with primary attending during morning huddle case rounds    Physical Examination:  Vitals:   Vitals:    03/12/23 0619 03/12/23 1352 03/12/23 1946 03/13/23 0527   BP: 143/74 115/53 123/54 153/72   BP Location: Right arm Left arm Left arm Left arm   Pulse: 57 56 56 60   Resp: 17 17 20 16   Temp: 97 8 °F (36 6 °C) 97 7 °F (36 5 °C) 98 °F (36 7 °C) 98 3 °F (36 8 °C)   TempSrc: Oral Oral Oral Oral   SpO2: 97% 99% 96% 98% Weight:       Height:           GEN: No apparent distress, pleasant  NEURO: Alert and oriented x3; Mild dysarthria  HEENT: Pupils are equal and reactive, EOMI, mucous membranes are moist, face asymmetrical  CV: S1 S2 regular, no MRG, no peripheral edema noted  RESP: Lungs are clear bilaterally, no wheezes, rales or rhonchi noted, on room air, respirations easy and non labored  GI: Flat, soft non tender, non distended; +BS x4  : Voiding without difficulty   MUSC: Moves all extremities; Rt hemiparesis UE > LE improving  Able to wave with the Rt hand  SKIN: pink, warm and dry, normal turgor, no rashes, lesions      The above physical exam was reviewed and updated as determined by my evaluation of the patient on 3/13/2023  Invasive Devices     None                    VTE Pharmacologic Prophylaxis: Eliquis  Code Status: Level 1 - Full Code  Current Length of Stay: 7 day(s)      Total time spent:  30 minutes with more than 50% spent counseling/coordinating care  Counseling includes discussion with patient re: progress  and discussion with patient of his/her current medical state/information  Coordination of patient's care was performed in conjunction with primary service  Time invested included review of patient's labs, vitals, and management of their comorbidities with continued monitoring  In addition, this patient was discussed with medical team including physician and advanced extenders  The care of the patient was extensively discussed and appropriate treatment plan was formulated unique for this patient  Medical decision making for the day was made by supervising physician unless otherwise noted in their attestation statement  ** Please Note:  voice to text software may have been used in the creation of this document   Although proof errors in transcription or interpretation are a potential of such software**

## 2023-03-13 NOTE — PROGRESS NOTES
03/13/23 0830   Pain Assessment   Pain Assessment Tool 0-10   Restrictions/Precautions   Precautions Bed/chair alarms;Aspiration;Supervision on toilet/commode; Fall Risk   Braces or Orthoses   (julieta sling during PT)   General   Change In Medical/Functional Status see vitals for manual ortho BPs   Cognition   Overall Cognitive Status WFL   Arousal/Participation Alert; Cooperative   Subjective   Subjective pt in bed awake and ready for PT  2 meds given by RN at start of tx  Lying to Sitting on Side of Bed   Type of Assistance Needed Incidental touching;Verbal cues   Comment HOB flat without rail   Lying to Sitting on Side of Bed CARE Score 4   Sit to Stand   Type of Assistance Needed Physical assistance;Verbal cues; Adaptive equipment   Physical Assistance Level 26%-50%   Sit to Stand CARE Score 3   Bed-Chair Transfer   Type of Assistance Needed Physical assistance;Verbal cues   Physical Assistance Level 51%-75%   Comment mod of 1 sit pivot no AD   Chair/Bed-to-Chair Transfer CARE Score 2   Walk 10 Feet   Type of Assistance Needed Physical assistance;Verbal cues; Adaptive equipment   Physical Assistance Level Total assistance   Walk 10 Feet CARE Score 1   Walk 50 Feet with Two Turns   Type of Assistance Needed Physical assistance;Verbal cues; Adaptive equipment   Physical Assistance Level Total assistance   Walk 50 Feet with Two Turns CARE Score 1   Walk 150 Feet   Type of Assistance Needed Physical assistance;Verbal cues; Adaptive equipment   Physical Assistance Level Total assistance   Walk 150 Feet CARE Score 1   Wheel 50 Feet with Two Turns   Type of Assistance Needed Supervision;Verbal cues   Wheel 50 Feet with Two Turns CARE Score 4   Wheel 150 Feet   Type of Assistance Needed Supervision;Verbal cues   Comment using L UE/L LE only   Wheel 150 Feet CARE Score 4   Therapeutic Interventions   Flexibility bilat gastroc stretch x 20 SH x 5 reps   Neuromuscular Re-Education LR  LE SLS with L UE support initially then progress to R LE SLS with R UE support to [employer] and [address] x 10 SH with pt counting out loud x 10 SH with reps till fatigue x 2 bouts, static standing with R UE/LE weightbearing using hallway rail mirror in front to facilitate self correction of midline posture and R quads/glutes engagement till fatigue  repeated seated R foot taps on/off recliner leg rest  additionally A/A use of R UE to remove bedsheets or hold styrofoam cup, during active rest breaks had pt do seated R hip flexion, LAQ, R DF    Equipment Use   Body Weight Support System NPP gait training initially with mirror but pt does not utilize effectively so discontinued x 90 x 4 , 180' x 1 with rest breaks in between and encourage fluids   Other Comments   Comments due to higher crea also encouraged inc fluid intake and was able to finished a 12 oz water this session   Assessment   Treatment Assessment pt tolerated skilled PT focusing on NMR/NPP balance/gait taining although required frequent seated rest breaks abhishek during BWSS gait training, reported fatigue at end of tx but agreed to stay on the recliner vs bed  noted improving R UE active movement as well  Pt able to advance R LE for the most part during BWSS gait training however requires assistance for R LE placement due to hip adduction tendencies  R lateral lean more pronounced during gait training vs static standing which pt is able to correct better, provided using mirror for feedback  PT to cont NPP/NMR using BWSS in the next few days to promote gait automaticity and balance  Family/Caregiver Present no   Barriers to Discharge Inaccessible home environment;Decreased caregiver support   Plan   Treatment/Interventions Functional transfer training;LE strengthening/ROM; Therapeutic exercise; Endurance training;Bed mobility;Gait training;Spoke to nursing;Spoke to advanced practitioner;OT   Progress Progressing toward goals   PT Therapy Minutes   PT Time In 0830   PT Time Out 1000   PT Total Time (minutes) 90   PT Mode of treatment - Individual (minutes) 90   PT Mode of treatment - Concurrent (minutes) 0   PT Mode of treatment - Group (minutes) 0   PT Mode of treatment - Co-treat (minutes) 0   PT Mode of Treatment - Total time(minutes) 90 minutes   PT Cumulative Minutes 593

## 2023-03-13 NOTE — PLAN OF CARE
Problem: Prexisting or High Potential for Compromised Skin Integrity  Goal: Skin integrity is maintained or improved  Description: INTERVENTIONS:  - Identify patients at risk for skin breakdown  - Assess and monitor skin integrity  - Assess and monitor nutrition and hydration status  - Monitor labs   - Assess for incontinence   - Turn and reposition patient  - Assist with mobility/ambulation  - Relieve pressure over bony prominences  - Avoid friction and shearing  - Provide appropriate hygiene as needed including keeping skin clean and dry  - Evaluate need for skin moisturizer/barrier cream  - Collaborate with interdisciplinary team   - Patient/family teaching  - Consider wound care consult   Outcome: Progressing     Problem: MOBILITY - ADULT  Goal: Maintain or return to baseline ADL function  Description: INTERVENTIONS:  -  Assess patient's ability to carry out ADLs; assess patient's baseline for ADL function and identify physical deficits which impact ability to perform ADLs (bathing, care of mouth/teeth, toileting, grooming, dressing, etc )  - Assess/evaluate cause of self-care deficits   - Assess range of motion  - Assess patient's mobility; develop plan if impaired  - Assess patient's need for assistive devices and provide as appropriate  - Encourage maximum independence but intervene and supervise when necessary  - Involve family in performance of ADLs  - Assess for home care needs following discharge   - Consider OT consult to assist with ADL evaluation and planning for discharge  - Provide patient education as appropriate  Outcome: Progressing  Goal: Maintains/Returns to pre admission functional level  Description: INTERVENTIONS:  - Perform BMAT or MOVE assessment daily    - Set and communicate daily mobility goal to care team and patient/family/caregiver  - Collaborate with rehabilitation services on mobility goals if consulted  - Perform Range of Motion 3 times a day    - Reposition patient every 2 hours   - Dangle patient 3 times a day  - Stand patient 3 times a day  - Ambulate patient 3 times a day  - Out of bed to chair 3 times a day   - Out of bed for meals 3 times a day  - Out of bed for toileting  - Record patient progress and toleration of activity level   Outcome: Progressing     Problem: PAIN - ADULT  Goal: Verbalizes/displays adequate comfort level or baseline comfort level  Description: Interventions:  - Encourage patient to monitor pain and request assistance  - Assess pain using appropriate pain scale  - Administer analgesics based on type and severity of pain and evaluate response  - Implement non-pharmacological measures as appropriate and evaluate response  - Consider cultural and social influences on pain and pain management  - Notify physician/advanced practitioner if interventions unsuccessful or patient reports new pain  Outcome: Progressing     Problem: INFECTION - ADULT  Goal: Absence or prevention of progression during hospitalization  Description: INTERVENTIONS:  - Assess and monitor for signs and symptoms of infection  - Monitor lab/diagnostic results  - Monitor all insertion sites, i e  indwelling lines, tubes, and drains  - Monitor endotracheal if appropriate and nasal secretions for changes in amount and color  - [place] appropriate cooling/warming therapies per order  - Administer medications as ordered  - Instruct and encourage patient and family to use good hand hygiene technique  - Identify and instruct in appropriate isolation precautions for identified infection/condition  Outcome: Progressing  Goal: Absence of fever/infection during neutropenic period  Description: INTERVENTIONS:  - Monitor WBC    Outcome: Progressing     Problem: SAFETY ADULT  Goal: Patient will remain free of falls  Description: INTERVENTIONS:  - Educate patient/family on patient safety including physical limitations  - Instruct patient to call for assistance with activity   - Consult OT/PT to assist with strengthening/mobility   - Keep Call bell within reach  - Keep bed low and locked with side rails adjusted as appropriate  - Keep care items and personal belongings within reach  - Initiate and maintain comfort rounds  - Make Fall Risk Sign visible to staff  - Offer Toileting every 2 Hours, in advance of need  - Initiate/Maintain bed/chair alarm  - Obtain necessary fall risk management equipment: alarms  - Apply yellow socks and bracelet for high fall risk patients  - Consider moving patient to room near nurses station  Outcome: Progressing  Goal: Maintain or return to baseline ADL function  Description: INTERVENTIONS:  -  Assess patient's ability to carry out ADLs; assess patient's baseline for ADL function and identify physical deficits which impact ability to perform ADLs (bathing, care of mouth/teeth, toileting, grooming, dressing, etc )  - Assess/evaluate cause of self-care deficits   - Assess range of motion  - Assess patient's mobility; develop plan if impaired  - Assess patient's need for assistive devices and provide as appropriate  - Encourage maximum independence but intervene and supervise when necessary  - Involve family in performance of ADLs  - Assess for home care needs following discharge   - Consider OT consult to assist with ADL evaluation and planning for discharge  - Provide patient education as appropriate  Outcome: Progressing     Problem: DISCHARGE PLANNING  Goal: Discharge to home or other facility with appropriate resources  Description: INTERVENTIONS:  - Identify barriers to discharge w/patient and caregiver  - Arrange for needed discharge resources and transportation as appropriate  - Identify discharge learning needs (meds, wound care, etc )  - Arrange for interpretive services to assist at discharge as needed  - Refer to Case Management Department for coordinating discharge planning if the patient needs post-hospital services based on physician/advanced practitioner order or complex needs related to functional status, cognitive ability, or social support system  Outcome: Progressing     Problem: Nutrition/Hydration-ADULT  Goal: Nutrient/Hydration intake appropriate for improving, restoring or maintaining nutritional needs  Description: Monitor and assess patient's nutrition/hydration status for malnutrition  Collaborate with interdisciplinary team and initiate plan and interventions as ordered  Monitor patient's weight and dietary intake as ordered or per policy  Utilize nutrition screening tool and intervene as necessary  Determine patient's food preferences and provide high-protein, high-caloric foods as appropriate       INTERVENTIONS:  - Monitor oral intake, urinary output, labs, and treatment plans  - Assess nutrition and hydration status and recommend course of action  - Evaluate amount of meals eaten  - Assist patient with eating if necessary   - Allow adequate time for meals  - Recommend/ encourage appropriate diets, oral nutritional supplements, and vitamin/mineral supplements  - Order, calculate, and assess calorie counts as needed  - Recommend, monitor, and adjust tube feedings and TPN/PPN based on assessed needs  - Assess need for intravenous fluids  - Provide specific nutrition/hydration education as appropriate  - Include patient/family/caregiver in decisions related to nutrition  Outcome: Progressing

## 2023-03-13 NOTE — PROGRESS NOTES
"OT Treatment Note        03/13/23 3170   Pain Assessment   Pain Assessment Tool 0-10   Pain Score No Pain   Restrictions/Precautions   Precautions Bed/chair alarms;Cognitive; Fall Risk;Aspiration;Supervision on toilet/commode   Lifestyle   Autonomy \"I think its getting better\" regarding R UE   Sit to Stand   Type of Assistance Needed Physical assistance   Physical Assistance Level 26%-50%   Comment Mod A with gait belt and R knee block and holding onto bed rail with b/l UE in stance   Sit to Stand CARE Score 3   Bed-Chair Transfer   Type of Assistance Needed Physical assistance   Physical Assistance Level Total assistance   Comment Mod Ax1 Sit pivot no AD with R knee block to wheelchair  A of 2nd for equipment stabilization  Min vc to not stand during  transfer   Chair/Bed-to-Chair Transfer CARE Score 1   Toileting Hygiene   Type of Assistance Needed Physical assistance   Physical Assistance Level Total assistance   Comment Mod A x1 in stance with R knee block with b/l UE holding onto bed rail, A of second for hygiene and clothing management  Pt was incontinent of bowel  Toileting Hygiene CARE Score 1   Toilet Transfer   Type of Assistance Needed Physical assistance   Physical Assistance Level Total assistance   Comment Mod Ax1 sit pivot with R knee block to drop arm BSC  A of second for safety and equipment stabilization  Toilet Transfer CARE Score 1   Neuromuscular Education   Comments Seated, pt engaged in AAROM flexion and extension of RUE digits with therapist providing point of control at forearm and digits for 2 sets of 10 reps  On Reo-go to increase shoulder flexion/extension pt completed R UE forward thrust 20 step initiated; 20 initiated for break; 20 step initiated  Then to increase shoulder horizontal abduction, R forward reach 2D4 10 guided; 10 initiated; 4 step initiated (only completed 4 reps 2* difficulty getting to end range of functional reach for shoulder abduction with OT A); 10 initiated   " Then seated in w/c OT applied moist heat on R shoulder to decrease tightness for 10 minutes with skin check while working on distal UE  NMES AVIA STIM XP unit channel 1 was applied to pts R wrist/ digit extensors while OT provided AAOM to faciliate wrist digit extension for 30 reps  Point of control at forearm and digits  Then pink foam block added to for pt to increase digit flexion and with OT using trigger mode to facilitate digit extension to release the foam block  Point of control at forearm and wrist and second person A with block  Cognition   Arousal/Participation Alert; Cooperative   Attention Within functional limits   Orientation Level Oriented X4   Memory Decreased recall of precautions   Following Commands Follows one step commands without difficulty   Comments WFL for basic tasks  Plan for motor deficit MOCA this week to assess higher level cognition   Activity Tolerance   Activity Tolerance Patient tolerated treatment well   Assessment   Treatment Assessment Pt engaged in skilled OT tx session focused on toileting and RUE NMR  See above for further details on functional performance  Pt tolerated session well but was tearful at times regarding RUE, OT provided emotional support  Pt stated forward reach is more difficult to complete compared to forward thrust on Reo-Go, however pt is motivated to complete difficult tasks  OT educated pt and pts wife about rehab process and expected length of stay  Family training set for this Thursday at 1230 to go over initial home set up/planning, and also Monday 3/20  Tomorrow plan to trial Ax1 slide board transfer to [facility] for nursing  Cont OT POC with focus on sit pivot transfers, RUE NMR, activity tolerance, stroke education, ADL retraining, and functional standing tolerance  Plan to shower Wednesday  Pt left supine in bed with alarm activated and all needs in reach     Prognosis Good   Problem List Decreased strength;Decreased range of motion;Decreased endurance; Impaired balance;Decreased mobility; Decreased coordination; Impaired tone   Barriers to Discharge Inaccessible home environment;Decreased caregiver support   Plan   Treatment/Interventions ADL retraining;Functional transfer training; Therapeutic exercise; Endurance training;Cognitive reorientation;Patient/family training;Equipment eval/education; Compensatory technique education   Progress Progressing toward goals   Recommendation   OT Discharge Recommendation   (pending progress)   OT Therapy Minutes   OT Time In 1230   OT Time Out 1410   OT Total Time (minutes) 100   OT Mode of treatment - Individual (minutes) 100   OT Mode of treatment - Concurrent (minutes) 0   OT Mode of treatment - Group (minutes) 0   OT Mode of treatment - Co-treat (minutes) 0   OT Mode of Treatment - Total time(minutes) 100 minutes   OT Cumulative Minutes 580   Therapy Time missed   Time missed?  No

## 2023-03-14 RX ADMIN — BACLOFEN 5 MG: 10 TABLET ORAL at 21:11

## 2023-03-14 RX ADMIN — CYANOCOBALAMIN TAB 500 MCG 1000 MCG: 500 TAB at 08:37

## 2023-03-14 RX ADMIN — FLUOXETINE 20 MG: 20 CAPSULE ORAL at 08:37

## 2023-03-14 RX ADMIN — FERROUS SULFATE TAB 325 MG (65 MG ELEMENTAL FE) 325 MG: 325 (65 FE) TAB at 07:27

## 2023-03-14 RX ADMIN — CARVEDILOL 25 MG: 25 TABLET, FILM COATED ORAL at 17:11

## 2023-03-14 RX ADMIN — BACLOFEN 5 MG: 10 TABLET ORAL at 12:50

## 2023-03-14 RX ADMIN — APIXABAN 2.5 MG: 2.5 TABLET, FILM COATED ORAL at 08:37

## 2023-03-14 RX ADMIN — ASPIRIN 81 MG CHEWABLE TABLET 81 MG: 81 TABLET CHEWABLE at 08:37

## 2023-03-14 RX ADMIN — APIXABAN 2.5 MG: 2.5 TABLET, FILM COATED ORAL at 17:11

## 2023-03-14 RX ADMIN — LISINOPRIL 20 MG: 20 TABLET ORAL at 08:37

## 2023-03-14 RX ADMIN — ATORVASTATIN CALCIUM 40 MG: 40 TABLET, FILM COATED ORAL at 17:11

## 2023-03-14 RX ADMIN — BACLOFEN 5 MG: 10 TABLET ORAL at 08:37

## 2023-03-14 RX ADMIN — TICAGRELOR 90 MG: 90 TABLET ORAL at 08:38

## 2023-03-14 RX ADMIN — TICAGRELOR 90 MG: 90 TABLET ORAL at 21:11

## 2023-03-14 RX ADMIN — CARVEDILOL 25 MG: 25 TABLET, FILM COATED ORAL at 07:27

## 2023-03-14 RX ADMIN — AMLODIPINE BESYLATE 10 MG: 10 TABLET ORAL at 08:37

## 2023-03-14 RX ADMIN — BACLOFEN 5 MG: 10 TABLET ORAL at 17:11

## 2023-03-14 NOTE — PROGRESS NOTES
"OT Treatment Note       03/14/23 4210   Pain Assessment   Pain Assessment Tool 0-10   Pain Score No Pain   Restrictions/Precautions   Precautions Bed/chair alarms;Cognitive; Fall Risk;Supervision on toilet/commode;Aphasia; Aspiration   Lifestyle   Autonomy \"this is the first time I've been in this room\" regard the bathroom   Sit to Lying   Type of Assistance Needed Supervision   Physical Assistance Level No physical assistance   Sit to Lying CARE Score 4   Sit to Stand   Type of Assistance Needed Physical assistance   Physical Assistance Level 26%-50%   Comment Pt able to use L grab bar to assist with sit to stand with R knee block  Min vc to straighten R LE  Sit to Stand CARE Score 3   Bed-Chair Transfer   Type of Assistance Needed Physical assistance   Physical Assistance Level 26%-50%   Comment Sit pivot to bed with R knee block   Chair/Bed-to-Chair Transfer CARE Score 3   Toileting Hygiene   Type of Assistance Needed Physical assistance   Physical Assistance Level Total assistance   Comment Mod Ax1 In stance with R knee block at drop arm commode over toilet, A of second for rear hygiene and clothing management  Pt was incontinent of bowel  Toileting Hygiene CARE Score 1   Toilet Transfer   Type of Assistance Needed Physical assistance   Physical Assistance Level Total assistance   Comment Mod Ax1 sit pivot with R knee block to drop arm commode over toilet  Pt able to utilize L grab bar to A  A of second for safety and equipment stabilization  Toilet Transfer CARE Score 1   Neuromuscular Education   Comments Seated pt engaged in [address] digit flexion and extension with OT providing point of control at forearm and digits for 2 sets of 10 reps  Then AAOM of elbow flexion and extension with point of control at R shoulder and forearm for 15 reps  OT applied moist heat of R shoulder to decrease tightness for 10 minutes with skin check while working on distal UE   NMES AVIA STIM XP unit channel 1 was applied to " pts R wrist/digit extensors while OT provided AAROM with point of control at forearm and digits to facilitate wrist and digit extension for 30 reps  Then utilizing [facility] Arm support at tension 5 along with NMES applied to wrist/digit extensors, pt completed functional reach with OT providing point of control at forearm and shoulder  A of second controlling NMES trigger activation during extension and release of object and A with object placement  Pt reached for various cylindrical objects, pink foam sponge, and remote with focus on flexing shoulder forward, extending wrist, flexing digits to grasp object, and controlled extension to release object using NMES for extension  Pt then focused on AROM digit flexion to maintain grasp on items (dropped <50% of the time)  This was completed for about 30 reps  Cognition   Overall Cognitive Status Impaired   Arousal/Participation Alert; Cooperative   Attention Within functional limits   Orientation Level Oriented X4   Memory Decreased short term memory   Following Commands Follows one step commands without difficulty   Comments Completed MOCA version 8 1 with motor deficit conversion  scored 24/30  See below for details  Activity Tolerance   Activity Tolerance Patient tolerated treatment well   Assessment   Treatment Assessment Pt engaged in skilled OT tx session focused on toileting, MOCA administration and RUE NMR  See above for further details on functional performance  Pt continues to be functioning at Ax2 for toileting, however pt progressed to sit pivot transfers to drop arm commode over toilet  Pt scored 24/30 on motor deficit conversion MOCA - indicating at mild cognitive impairment  Trail making portion completed verbally following test  Pt unable to complete trail making portion correctly which can be from impaired executive functioning/difficulty following  multi step directions   Pt stated he has difficulty with short term memory, OT recommends for repetition of tasks/educaton to compensate  Pt continues to be motivated to engage in [id number] [facility]  Would benefit from Aqua K in room to decrease shoulder tightness - message sent to Dr Samira Tavarez for aqua K pad order  OT provided pts wife with list of items to take pictures of in house, pts wife stated that pt would possibly be able to shower at dtrs house upon d/c because it will be on the first level  OT requested pictures of [facility] first floor set up and entrance as well  Pt would benefit for Cont OT POC with focus on RUE NMR, ADL retraining, sit pivot transfers, activity tolerance, stroke education, and functional standing tolerance  Pt left supine in bed with alarm activated and call bell in reach  FT set to begin Thursday at 200 with pts wife  Prognosis Good   Problem List Decreased strength;Decreased range of motion;Decreased endurance; Impaired balance;Decreased mobility; Decreased coordination;Decreased cognition; Impaired sensation; Impaired tone   Barriers to Discharge Inaccessible home environment;Decreased caregiver support   Plan   Treatment/Interventions ADL retraining;Functional transfer training; Therapeutic exercise;Cognitive reorientation; Endurance training;Patient/family training;Equipment eval/education; Compensatory technique education  (c)   Progress Progressing toward goals  (x)   Recommendation   OT Discharge Recommendation   (pending progress)   OT Therapy Minutes   OT Time In 1230   OT Time Out 1430   OT Total Time (minutes) 120   OT Mode of treatment - Individual (minutes) 120   OT Mode of treatment - Concurrent (minutes) 0   OT Mode of treatment - Group (minutes) 0   OT Mode of treatment - Co-treat (minutes) 0   OT Mode of Treatment - Total time(minutes) 120 minutes   OT Cumulative Minutes 700   Therapy Time missed   Time missed? No       Pt completed [place] Cognitive Assessment (MOCA) version 8 1 with motor deficit  Pt scored overall 24/ 30  indicating a mild cognitive deficit  Visuospatial/executive: Not counted towards score 2* motor deficit version  However, trail making portion completed verbally afterwards  [place]/3 - Pt unable to name rhino  Memory:    (worth no points) - Pt unable to recall 1/5 words in first trial, was able to recall 5/5 words second trial    Attention:  6/ 6 -Pt able to repeat numbers in forward and backwards order, complete 5 correct serial 7 subtractions and only 1 error when asked to auditorily identify letter 'A' amongst a list of letter  Language: 2/ 3 - Pt unable to state more than 8 words beginning with 'F'  Abstraction: 1/ 2 - Pt unable to identify correct category for train and bicycle  Delayed recall: 3/ 5 - Pt able to recall 3/5 words s/p 5min delay  Pt able to  Recall 2/2 words with multiple choice cue  Memory index score 11/15  Orientation: 5/ 6 - Pt unable to accurately recall name of hospital      +1- point for only/less than high school education       Using Motor deficit conversion 2* R hemiparesis pts score of 20 converts to a total score 24/30, indicating a mild cognitive deficit

## 2023-03-14 NOTE — TEAM CONFERENCE
Acute RehabilitationTeam Conference Note  Date: 3/14/2023   Time: 10:59 AM       Patient Name:  Brielle Kowalski       Medical Record Number: [id number]   YOB: 1960  Sex: Male          Room/Bed:  [facility] 459/[facility] 459-01  Payor Info:  Payor: Tono Velazco / Plan: Tono Velazco PPO / Product Type: PPO /      Admitting Diagnosis: Cerebellar infarct ([place] [address] ) [I63 9]   Admit Date/Time:  3/6/2023  4:26 PM  Admission Comments: No comment available     Primary Diagnosis:  Cerebrovascular accident (CVA) ([place] [address] )  Principal Problem: Cerebrovascular accident (CVA) [facility])    Patient Active Problem List    Diagnosis Date Noted   • Vasovagal syncope 03/09/2023   • Neurogenic bowel 03/08/2023   • Anemia 03/06/2023   • Spasticity 03/01/2023   • CKD (chronic kidney disease) 03/01/2023   • Prediabetes 03/01/2023   • Adjustment disorder with depressed mood 03/01/2023   • BRAULIO (acute kidney injury) ([place] [address] ) 02/25/2023   • Acute Posterior Circulation Stroke 02/25/2023   • Right Vertebral artery dissection ([place] [address] ) 02/25/2023   • Stenosis of left vertebral artery 02/25/2023   • Primary hypertension 02/25/2023   • Dizziness 02/25/2023   • Dysphagia 02/25/2023       Physical Therapy:    Weight Bearing Status: Full Weight Bearing  Transfers: Moderate Assistance  Bed Mobility: Minimal Assistance  Amulation Distance (ft): 10 feet  Ambulation: Assist of 2  Wheelchair Mobility Distance: 150 ft  Wheelchair Mobility: Supervision  Stair Assistance:  (unsafe at this time)  Discharge Recommendations:  (TBD pending progress home vs SNF)    3/13/23  Pt is a 60year old male admitted to [facility] on 3/10/23 due to significant functional decline s/p acute posterior circulation stroke   Barriers to functional indep, home d/c and overall safety Include R hemiparesis with inattention, impaired LT, proprioception and kinesthetic awareness on R UE/LE, impaired standing balance with poor self correction of R lateral lean, gait dysfunction requiring mod A of 1-2 for transfers but 2 person assist for non functional amb at this time  Pt will greatly benefit from continued skilled PT intervention with goals for this week include to improve transfers with consistent mod A of 1 person assist, perform functional amb with 1 person assist using LRAD x at least 10', inc static standing tolerance x 5 mins and initiate stair training  Occupational Therapy:  Eating: Supervision  Grooming: Supervision  Bathing: Total Assistance, Assist of 2  Bathing: Total Assistance, Assist of 2  Upper Body Dressing: Maximum Assistance  Lower Body Dressing: Total Assistance, Assist of 2  Toileting: Total Assistance, Assist of 2  Toilet Transfer: Total Assistance, Assist of 2  Cognition: Exceptions to WNL  Cognition: Decreased Executive Functions  Orientation: Person, Place, Time, Situation  Discharge Recommendations: Other       Pt continues to present with impairments in activity tolerance, endurance, standing balance/tolerance, coping skills , and R UE ROM/strength/coordination/sensation   Additional functional barriers include fatigue, (R) hemiparesis, decreased caregiver support, risk for falls, and home environment  Pt demonstrates improvements in transfers and R UE ROM in response to neuromuscular rehabilitation  Pt will continue to benefit from skilled OT services to address above mentioned barriers and maximize functional independence in baseline areas of occupation  OT D/C recommendation is for reteam  Pt currently on week 1/4 CLAUDIA  Pt has made progress in being able to complete sit pivot transfers instead of only sliding board transfers  Goal for the next week: mod assist x1 sit pivot transfers; complete shower transfer with assist x2; utilize R UE as gross grasp to stabilize during transfers/for ADL supplies               Speech Therapy:  Speech/Language: Expressive Aphasia  Cognition: Exceptions to WNL  Cognition: Decreased Memory, Decreased Executive Functions, Decreased Attention, Decreased Comprehension  Orientation: Person, Place, Time, Situation  Swallowing: Within Defined Limits  Diet Recommendations: Regular Diet, Thin  Discharge Recommendations: Home with:  [address] with[de-identified] 24 Hour Supervision, 24 Hour Assisteance, Family Support, Home Speech Therapy, Outpatient Speech Therapy (pedning pt progress)  Orders received for skilled SLP assessment  Results and recommendations pending evaluation completion on 3/7/2023  Update from week 3/14/2023: Pt currently being followed for cognitive/language tx sessions now and was also briefly seen for dysphagia tx sessions  In regards to dysphagia, pt was initially on dysphagia level 3 diet w/ NT liquids  However, able to advance liquids quickly to thins, but still continued to remain on level 3 diet due to increased R sided pocketing/residual, initially requiring verbal prompts/cues to clear  As dysphagia tx sessions progressed in addition to completing trials of regular texture items, which pt had demonstrated more independence in use of swallow strategies, primarily clearing R sided residual/pocketing via lingual/finger sweeps and increased time  Diet was able to be advanced to regular w/ thin liquids to where brief f/u continued to where pt did not present w/ increased or overt signs/sxs of aspiration during meals, to where ongoing recommendations remain for continued diet, regular/thins at this time, continuing aspiration precautions  No further dysphagia tx sessions warranted at this time, but reconsult if any deficits arise  As for cognitive linguistic skills, pt did complete CLQT+ on initial evaluation with a Composite Severity Rating score of 3 4 out of 4 0, correlating to overall MILD cognitive linguistic impairments at time of evaluation and in comparison to age matched peers ranging from 22-74 y/o   It is noted that pt does have a higher level of independence prior to admission, but also there is new learning given medications due to stroke, as well as new learning given tasks/mobility due to stroke currently  As for language skills, pt completed the Bedside WAB, in which overall bedside aphasia score deems pt to demonstrate mildly impaired language deficits  Additionally, pt also demonstrates Anomic type aphasia as per Bedside Aphasia Classification Criteria, when comparing the pt's Fluency, Auditory Verbal Comprehension, Repetition scores  Pt's expressive language deficits mild junior by mild word finding difficulty, intermittent vague speech, concise/ often incomplete descriptions and dec fluency  Pt expressive deficits more prevalent during structured tx tasks and reading aloud, spontaneous speech is more fluent w/ only min instances of word retrieval difficulties and min amount of slurring in longer utterances  Of note, pt's spouse, Amarilis Ford has been present for sessions, in which increased review was already presented given medications, reasons for taking medications and determining a plan for management moving forward, which spouse stated that a pill box would be most feasible moving forward  Further education had been provided to spouse about swallow function and language skills at this time  Barrier which present at this time include expressive> receptive language deficits, increased processing time, decreased ST/working memory, decreased executive function skills (problem solving, reasoning, organization, sequencing) which impacts cognitive linguistic and functional mobility  Pt is consistently functioning at min A level for comprehension, expression, executive functions, memory and mod I level for social interaction  Currently, pt will benefit from ongoing skilled SLP services targeting language and cognitive skills in attempts to decrease overall caregiver burden over time         Nursing Notes:  Appetite: Good  Diet Type: Regular/House                                                                     Pain Location/Orientation: Orientation: Bilateral, Location: Head  Pain Score: 0                       Hospital Pain Intervention(s): Medication (See MAR)          Pt admitted S/P Posterior circulation stroke S/p intracranial and extracranial vertebral artery stenting/TICI 2B revascularization Continue ASA, Brilinta, Eliquis and atorvastatin  Repeat CTA head and neck around 3/17/23  Prozac for depressed mood following CVA  Recommend Neuropsych consult  Dysphagia 3 diet with nectar thick liquids  Continue Baclofen 10mg 2x daily  HTN-  Home: No medications  Here: amlodipine 10mg daily/Coreg 6 25mg 2x daily/lisinopril 20mg daily/chlorthalidone 12 5mg daily  Goal normotension  Monitor BP and adjust medications as needed  CKD stage 2 Baseline Cr 1 2 - 1 3  Monitor with the addition of chlorthalidone  COVID- Continue precautions through 3/7/23  Pt has not had respiratory symptoms  Prediabetes-HA1C 5  7  Pt is inc at times of bowel and bladder  Requires alarms for safety  This week we will encourage independence with ADLs  We will monitor labs and vital signs  We will educate pt  about repositioning to prevent skin breakdown  We will perform routine skin checks  We will monitor for constipation and medicate as ordered  We will monitor for adequate pain control  We will increase safety awareness with transfers and keep pt free from falls  Case Management:     Discharge Planning  Living Arrangements: Lives w/ Spouse/significant other  Support Systems: Spouse/significant other  Assistance Needed: NA  Type of Current Residence: Private residence (2 story home)  Current Home Care Services: No  3/14- Pt lives with spouse Rachana Barba in 2 story home [address]  Prior to admission, pt was independent and works as an automechanic  Currently, pt's children live with pt and spouse but one is moving out shortly to house they are building [place] pt's [place]  Pt reports no DME in home and no hx of HHC, OP, or STR   Pt reports not having PCP established but has been looking into PCPs around this area and will choose one prior to dc and schedule  Preferred pharmacy is [facility] in [facility] following for dc needs  Is the patient actively participating in therapies? yes  List any modifications to the treatment plan: None    Barriers Interventions   Right sided weakness/ hemiperesis Compensatory strategies, neuro siddhartha, body weight support   Expressive/receptive language SLP Services   Aphagia/Dysphagia SLP Services   Mood Med management   Impaired cog eval/bed side swallow SLP Services   Bowel Bowel training   Decreased coping Neuro psych consult/ meds   Impaired ROM Compensatory strategies   Decreased standing balance Strenghtening      Is the patient making expected progress toward goals? yes  List any update or changes to goals: None    Medical Goals: [facility] upon completion of rehab program and Patient will be able to manage medical conditions and comorbid conditions with medications and follow up upon completion of rehab program    Weekly Team Goals:   Rehab Team Goals  ADL Team Goal: Patient will require assist with ADLs with least restrictive device upon completion of rehab program  Transfer Team Goal: Patient will require supervision with transfers with least restrictive device upon completion of rehab program  Locomotion Team Goal: Patient will require supervision with locomotion with least restrictive device upon completion of rehab program (at least household distance)  Cognitive Team Goal: Patient will be independent for basic and complex tasks upon completion of rehab program    Discussion: Pt participating in therapies and rehab program  Pt functioning max assist for ADL IADLs, min assist for cog with SLP Services, max/total 2 person assist for ambulation  Team recommending reteam to focus on functional and mobility goals       Anticipated Discharge Date:  reteam - anticipating dc date of 3/39 pending insurance approval    [facility] Team Members Present: The following team members are supervising care for this patient and were present during this Weekly Team Conference      Physician: Dr Columba Posadas MD  : SOFÍA Vieira  Registered Nurse: Bebeto Garcia RN  Physical Therapist: Homero Bhat DPT  Occupational Therapist: Amena Benavides MS, OTR/L, CBIS  Speech Therapist: Anisha Michele, [facility] Lorrie Maloney, [facility]-SLP

## 2023-03-14 NOTE — PROGRESS NOTES
"   03/14/23 1030   Pain Assessment   Pain Assessment Tool 0-10   Pain Score No Pain   Restrictions/Precautions   Precautions Aspiration;Bed/chair alarms; Fall Risk;Supervision on toilet/commode;Aphasia   Comprehension   Comprehension (FIM) 5 - Understands basic directions and conversation   Expression   Expression (FIM) 4 - Expresses basic info/needs 75-90% of time   Social Interaction   Social Interaction (FIM) 5 - Interacts appropriately with others 90% of time   Problem Solving   Problem solving (FIM) 4 - Solves basic problems 75-89% of time   Memory   Memory (FIM) 5 - Needs cueing reminders <10%   Speech/Language/Cognition Assessmetn   Treatment Assessment Pt awake, alert w/ pleasant disposition seen sitting upright in recliner for the duration of today's cognitive linguistic tx session  Pt oriented x4 w/ intact remote recall of visitors, events of the weekend and breakfast  Pt also w/ intact prospective memory discussing his visitors coming tonight and his schedule for today  In order to familiarize pt w/ medications d/t medication management being new to pt, a medication review was completed where the pt was provided w/ the name of the medication and asked to explain the reason/use  Pt w/ accuracy of 9/11, only stating he did not remember the uses of Eliquis and Brilinta  Student SLP and pt also reviewed the frequency and time of day utilizing the medication \"cheat sheet\" provided to pt in previous session  Pt receptive to medication review and all information presented, stated he remembered the cheat sheet and pointed to where it was in the room  Student SLP then discussed the functional use of immediate, delayed memory, and reading comprehension in relation to situations he would encounter while working  Student SLP also provided pt w/ memory strategies to aid in retention ie visualization, verbal rephrasal and association, pt stated understanding of information provided   To target reading comprehension " "skills, pt used [employer], News then Articles and was presented w/ a paragraph length news article and asked comprehension questions  Pt completed this task w/ accuracy of 2/3 increasing to 3/3 w/ visual cue (highlighted text)  Pt then completed delayed memory recall task on [employer], cognition, memory, recent memory and then delayed memory level 3, presented w/ 3 words orally then introduced to an auditory or tactile stimulus pt was able to recall 10/10  In immediate memory recall ( [employer], cognition, immediate memory, picture recall level 5) pt was presented w/ 4 picture stimulus items then asked to recall them in a field of 12, w/ accuracy of 9/10  Pt then completed hospital map activity targeting problem solving and visuospatial skills w/ accuracy of 16/20 increasing to 20/20 w/ supports( repetition and verbal cues)  Of note, when asked to describe how to navigate to a desired location, pt often provided correct directions that were concise and lacking specificity ( \" go straight, then right\") suspect baseline language, able to increase details w/ verbal cues  Overall, pt continues to be aware of current deficits and demo improved memory skills, sustained attention and alternating attention during tx activities  At this time pt continues to benefit from skilled ST services targeting higher level cognitive linguistic skills to minimize caregiver burden upon d/c  SLP Therapy Minutes   SLP Time In 1030   SLP Time Out 1130   SLP Total Time (minutes) 60   SLP Mode of treatment - Individual (minutes) 60   SLP Mode of treatment - Concurrent (minutes) 0   SLP Mode of treatment - Group (minutes) 0   SLP Mode of treatment - Co-treat (minutes) 0   SLP Mode of Treatment - Total time(minutes) 60 minutes   SLP Cumulative Minutes 475   Therapy Time missed   Time missed?  No       "

## 2023-03-14 NOTE — PLAN OF CARE
Problem: Prexisting or High Potential for Compromised Skin Integrity  Goal: Skin integrity is maintained or improved  Description: INTERVENTIONS:  - Identify patients at risk for skin breakdown  - Assess and monitor skin integrity  - Assess and monitor nutrition and hydration status  - Monitor labs   - Assess for incontinence   - Turn and reposition patient  - Assist with mobility/ambulation  - Relieve pressure over bony prominences  - Avoid friction and shearing  - Provide appropriate hygiene as needed including keeping skin clean and dry  - Evaluate need for skin moisturizer/barrier cream  - Collaborate with interdisciplinary team   - Patient/family teaching  - Consider wound care consult   Outcome: Progressing     Problem: MOBILITY - ADULT  Goal: Maintain or return to baseline ADL function  Description: INTERVENTIONS:  -  Assess patient's ability to carry out ADLs; assess patient's baseline for ADL function and identify physical deficits which impact ability to perform ADLs (bathing, care of mouth/teeth, toileting, grooming, dressing, etc )  - Assess/evaluate cause of self-care deficits   - Assess range of motion  - Assess patient's mobility; develop plan if impaired  - Assess patient's need for assistive devices and provide as appropriate  - Encourage maximum independence but intervene and supervise when necessary  - Involve family in performance of ADLs  - Assess for home care needs following discharge   - Consider OT consult to assist with ADL evaluation and planning for discharge  - Provide patient education as appropriate  Outcome: Progressing  Goal: Maintains/Returns to pre admission functional level  Description: INTERVENTIONS:  - Perform BMAT or MOVE assessment daily    - Set and communicate daily mobility goal to care team and patient/family/caregiver  - Collaborate with rehabilitation services on mobility goals if consulted  - Perform Range of Motion 3 times a day    - Reposition patient every 2 hours   - Dangle patient 3 times a day  - Stand patient 3 times a day  - Ambulate patient 3 times a day  - Out of bed to chair 3 times a day   - Out of bed for meals 3 times a day  - Out of bed for toileting  - Record patient progress and toleration of activity level   Outcome: Progressing     Problem: PAIN - ADULT  Goal: Verbalizes/displays adequate comfort level or baseline comfort level  Description: Interventions:  - Encourage patient to monitor pain and request assistance  - Assess pain using appropriate pain scale  - Administer analgesics based on type and severity of pain and evaluate response  - Implement non-pharmacological measures as appropriate and evaluate response  - Consider cultural and social influences on pain and pain management  - Notify physician/advanced practitioner if interventions unsuccessful or patient reports new pain  Outcome: Progressing     Problem: INFECTION - ADULT  Goal: Absence or prevention of progression during hospitalization  Description: INTERVENTIONS:  - Assess and monitor for signs and symptoms of infection  - Monitor lab/diagnostic results  - Monitor all insertion sites, i e  indwelling lines, tubes, and drains  - Monitor endotracheal if appropriate and nasal secretions for changes in amount and color  - [place] appropriate cooling/warming therapies per order  - Administer medications as ordered  - Instruct and encourage patient and family to use good hand hygiene technique  - Identify and instruct in appropriate isolation precautions for identified infection/condition  Outcome: Progressing  Goal: Absence of fever/infection during neutropenic period  Description: INTERVENTIONS:  - Monitor WBC    Outcome: Progressing     Problem: SAFETY ADULT  Goal: Patient will remain free of falls  Description: INTERVENTIONS:  - Educate patient/family on patient safety including physical limitations  - Instruct patient to call for assistance with activity   - Consult OT/PT to assist with strengthening/mobility   - Keep Call bell within reach  - Keep bed low and locked with side rails adjusted as appropriate  - Keep care items and personal belongings within reach  - Initiate and maintain comfort rounds  - Make Fall Risk Sign visible to staff  - Offer Toileting every 2 Hours, in advance of need  - Initiate/Maintain bed  chair alarm  - Obtain necessary fall risk management equipment: nonskid socks  - Apply yellow socks and bracelet for high fall risk patients  - Consider moving patient to room near nurses station  Outcome: Progressing  Goal: Maintain or return to baseline ADL function  Description: INTERVENTIONS:  -  Assess patient's ability to carry out ADLs; assess patient's baseline for ADL function and identify physical deficits which impact ability to perform ADLs (bathing, care of mouth/teeth, toileting, grooming, dressing, etc )  - Assess/evaluate cause of self-care deficits   - Assess range of motion  - Assess patient's mobility; develop plan if impaired  - Assess patient's need for assistive devices and provide as appropriate  - Encourage maximum independence but intervene and supervise when necessary  - Involve family in performance of ADLs  - Assess for home care needs following discharge   - Consider OT consult to assist with ADL evaluation and planning for discharge  - Provide patient education as appropriate  Outcome: Progressing  Goal: Maintains/Returns to pre admission functional level  Description: INTERVENTIONS:  - Perform BMAT or MOVE assessment daily    - Set and communicate daily mobility goal to care team and patient/family/caregiver  - Collaborate with rehabilitation services on mobility goals if consulted  - Perform Range of Motion 3 times a day  - Reposition patient every 2 hours    - Dangle patient 3 times a day  - Stand patient 3 times a day  - Ambulate patient 3 times a day  - Out of bed to chair 3 times a day   - Out of bed for meals 3 times a day  - Out of bed for toileting  - Record patient progress and toleration of activity level   Outcome: Progressing     Problem: DISCHARGE PLANNING  Goal: Discharge to home or other facility with appropriate resources  Description: INTERVENTIONS:  - Identify barriers to discharge w/patient and caregiver  - Arrange for needed discharge resources and transportation as appropriate  - Identify discharge learning needs (meds, wound care, etc )  - Arrange for interpretive services to assist at discharge as needed  - Refer to Case Management Department for coordinating discharge planning if the patient needs post-hospital services based on physician/advanced practitioner order or complex needs related to functional status, cognitive ability, or social support system  Outcome: Progressing     Problem: Nutrition/Hydration-ADULT  Goal: Nutrient/Hydration intake appropriate for improving, restoring or maintaining nutritional needs  Description: Monitor and assess patient's nutrition/hydration status for malnutrition  Collaborate with interdisciplinary team and initiate plan and interventions as ordered  Monitor patient's weight and dietary intake as ordered or per policy  Utilize nutrition screening tool and intervene as necessary  Determine patient's food preferences and provide high-protein, high-caloric foods as appropriate       INTERVENTIONS:  - Monitor oral intake, urinary output, labs, and treatment plans  - Assess nutrition and hydration status and recommend course of action  - Evaluate amount of meals eaten  - Assist patient with eating if necessary   - Allow adequate time for meals  - Recommend/ encourage appropriate diets, oral nutritional supplements, and vitamin/mineral supplements  - Order, calculate, and assess calorie counts as needed  - Recommend, monitor, and adjust tube feedings and TPN/PPN based on assessed needs  - Assess need for intravenous fluids  - Provide specific nutrition/hydration education as appropriate  - Include patient/family/caregiver in decisions related to nutrition  Outcome: Progressing

## 2023-03-14 NOTE — PROGRESS NOTES
Internal Medicine Progress Note  Patient: Boogie Torres  Age/sex: 60 y o  male  Medical Record #: [id number]      ASSESSMENT/PLAN: (Interval History)  Boogie Torres is seen and examined and management for following issues:    Posterior circulation stroke  • S/p intracranial and extracranial vertebral artery stenting/TICI 2B revascularization  • Continue ASA, Brilinta, Eliquis and atorvastatin  • Repeat CTA head and neck around 3/17/23  • Prozac for depressed mood following CVA   • Neuropsych pending  • Dysphagia 3 diet with nectar thick liquids  • Continue Baclofen 5mg 4x daily  • Therapy per primary service  • Outpt follow-up with Neurology and Neurosurgery    Rapid response  · Likely d/t vasovagal syncope  Neurology agrees that event was unlikely seizure  · Felt dizzy/weak prior to episode then was staring blankly initially when he came back around  · Eventually returned to his baseline  · No further events      HTN  • Home: No medications  • Here: amlodipine 10mg daily/Coreg 25mg 2x daily/lisinopril 20mg daily  • Goal normotension    Fe deficiency anemia  · Likely multifactoral  · s/p IV venofer x 2 doses  · Cont daily ferrous sulfate  · Cbc mondays     CKD stage 2  • Baseline Cr 1 2 - 1 3  • Chlorthalidone dc'd  • BMP stable  • Encourage fluids  • Repeat BMP Thursday      COVID  • Precautions lifted  • Pt was asymptomatic     Prediabetes  • HA1C 5 7  • Recommend dietary modifications       DC planning:  TBD  The above assessment and plan was reviewed and updated as determined by my evaluation of the patient on 3/14/2023      Labs:   Results from last 7 days   Lab Units 03/13/23  0637   WBC Thousand/uL 7 78   HEMOGLOBIN g/dL 10 1*   HEMATOCRIT % 32 3*   PLATELETS Thousands/uL 288     Results from last 7 days   Lab Units 03/13/23  0637   SODIUM mmol/L 138   POTASSIUM mmol/L 3 9   CHLORIDE mmol/L 109*   CO2 mmol/L 25   BUN mg/dL 34*   CREATININE mg/dL 1 35*   CALCIUM mg/dL 9 0                   Review of Scheduled Meds:  Current Facility-Administered Medications   Medication Dose Route Frequency Provider Last Rate   • acetaminophen  650 mg Oral Q6H PRN LanYURI Andres     • amLODIPine  10 mg Oral Daily Alva Diaz MD     • apixaban  2 5 mg Oral BID Alva Diaz MD     • aspirin  81 mg Oral Daily Alva Diaz MD     • atorvastatin  40 mg Oral Daily With Robinson Gleason MD     • baclofen  5 mg Oral 4x Daily Alva Diaz MD     • carvedilol  25 mg Oral BID With Meals YURI Herndon     • vitamin B-12  1,000 mcg Oral Daily Alva Diaz MD     • ferrous sulfate  325 mg Oral Daily With Breakfast YURI Herndon     • FLUoxetine  20 mg Oral Daily Alva Diaz MD     • hydrALAZINE  25 mg Oral Q8H PRN YURI Herndon     • lisinopril  20 mg Oral Daily Alva Diaz MD     • polyethylene glycol  17 g Oral Daily PRN Alva Diaz MD     • ticagrelor  90 mg Oral Q12H [address] Alva Diaz MD         Subjective/ HPI: Patient seen and examined  Patients overnight issues or events were reviewed with nursing or staff during rounds or morning huddle session  New or overnight issues include the following:     Pt seen in therapy  RUE strength improving    ROS:   A 10 point ROS was performed; negative except as noted above         Imaging:     No orders to display       *Labs /Radiology studies Reviewed  *Medications  reviewed and reconciled as needed  *Please refer to order section for additional ordered labs studies  *Case discussed with primary attending during morning huddle case rounds    Physical Examination:  Vitals:   Vitals:    03/13/23 2033 03/14/23 0500 03/14/23 0727 03/14/23 0837   BP: 131/53 162/64 156/73 138/70   BP Location: Left arm Right arm  Right arm   Pulse: 56 60 57    Resp: 20 18     Temp: 98 2 °F (36 8 °C) 98 °F (36 7 °C)     TempSrc: Oral Oral     SpO2: 97% 97%     Weight:       Height:           GEN: No apparent distress, interactive  NEURO: Alert and oriented x3; Mild dysarthria  HEENT: Pupils are equal and reactive, EOMI, mucous membranes are moist, face asymmetrical  CV: S1 S2 regular, no MRG, no peripheral edema noted  RESP: Lungs are clear bilaterally, no wheezes, rales or rhonchi noted, on room air, respirations easy and non labored  GI: Flat, soft non tender, non distended; +BS x4  : Voiding without difficulty   MUSC: Moves all extremities; Rt hemiparesis UE > LE improving  SKIN: pink, warm and dry, normal turgor, no rashes, lesions      The above physical exam was reviewed and updated as determined by my evaluation of the patient on 3/14/2023  Invasive Devices     None                    VTE Pharmacologic Prophylaxis: Eliquis  Code Status: Level 1 - Full Code  Current Length of Stay: 8 day(s)      Total time spent:  30 minutes with more than 50% spent counseling/coordinating care  Counseling includes discussion with patient re: progress  and discussion with patient of his/her current medical state/information  Coordination of patient's care was performed in conjunction with primary service  Time invested included review of patient's labs, vitals, and management of their comorbidities with continued monitoring  In addition, this patient was discussed with medical team including physician and advanced extenders  The care of the patient was extensively discussed and appropriate treatment plan was formulated unique for this patient  Medical decision making for the day was made by supervising physician unless otherwise noted in their attestation statement  ** Please Note:  voice to text software may have been used in the creation of this document   Although proof errors in transcription or interpretation are a potential of such software**

## 2023-03-14 NOTE — PROGRESS NOTES
03/14/23 0830   Pain Assessment   Pain Assessment Tool 0-10   Pain Score No Pain   Restrictions/Precautions   Precautions Fall Risk;Bed/chair alarms;Supervision on toilet/commode;Aspiration   Cognition   Overall Cognitive Status WFL   Arousal/Participation Alert; Cooperative   Subjective   Subjective Pt has no c/o and ready for PT   Lying to Sitting on Side of Bed   Type of Assistance Needed Verbal cues; Incidental touching   Comment VC to avoid holding bedrail then used bilat UE to push off from the bed , HOB flat without rail   Lying to Sitting on Side of Bed CARE Score 4   Sit to Stand   Type of Assistance Needed Physical assistance;Verbal cues   Physical Assistance Level 26%-50%   Comment min- mod of 1, VC/assist to weightshift to R LE  Noted pt automatically places R UE on arm rest but requires assist to stabilize in place  Sit to Stand CARE Score 3   Bed-Chair Transfer   Type of Assistance Needed Physical assistance;Verbal cues   Physical Assistance Level 26%-50%   Comment min-mod of 1 sit pivot   Chair/Bed-to-Chair Transfer CARE Score 3   Walk 10 Feet   Type of Assistance Needed Physical assistance;Verbal cues; Adaptive equipment   Physical Assistance Level Total assistance   Walk 10 Feet CARE Score 1   Walk 50 Feet with Two Turns   Type of Assistance Needed Physical assistance;Verbal cues; Adaptive equipment   Physical Assistance Level Total assistance   Walk 50 Feet with Two Turns CARE Score 1   Wheel 50 Feet with Two Turns   Type of Assistance Needed Supervision   Wheel 50 Feet with Two Turns CARE Score 4   Wheel 150 Feet   Type of Assistance Needed Supervision   Comment using L UE/LE, pt also able to manage w/c brakes but still requires assist to manage leg rest x 200' x 2   Wheel 150 Feet CARE Score 4   Equipment Use   Body Weight Support System NPP gait training in all directions household distances only using R AFO and black strap with carabiner with PT providing assist at hips/pelvis for "weighshifting or tactile cues on hip mm and 2nd person assisting with R LE placement due to hip adduction tendencies  while in BWSS also had pt do L foot taps on 8\" stool while Wbing on R LE with blocking of R knee + tactile/verbal cues on R hip mm and knee mm activation  attempted to do L foot taps outside BBWS but unable to weightshift effectively on the R LE with Rail support on R UE so just worked on lateral R weightshifting to [address] [facility] weightbearing and posture correction while pt looking at the mirror  During active rest breaks also had pt perform seated active R hip flexion or foot taps on 8\" stool, LAQ and DF or had pt hold cup with R hand with assist everytime he takes a drink of water which pt able to finished ~12 oz this session  For HEP in room pt instructed to perform repeated quad sets counting out loud to prevent holding breath and he verbalized understanding   Assessment   Treatment Assessment pt engaged in skilled PT which cont to focus on NPP/NMR utilizing BWSS with R AFO and carabiner strap for balance, posture and gait training  He was able to actively engaged in therapy tasks but noted slightly withdrawn due to more people in the hallway where the BWSS is located  Pt continues to demo inc difficulty maintaining upright posture and sustained engagement of R LE extensors in stance due to ongoing strength deficit and impaired proprioception/kinesthetic sensation which is contributing to ongoing difficulty with self correction but pt tries with inc reliance on external verbal cues  Pt is demonstrating consistent improvement with w/c propulsion and w/c level transfers but still demos non functional gait with impaired righting reactions making pt very high risk for falls   So pt will greatly benefit from additional skilled PT intervention with inc focus on NMR/NPP to faciliate motor and functional gains to reduce overall caregiver burden and reduce risk for falls at d/c    Barriers to Discharge " Inaccessible home environment;Decreased caregiver support   PT Barriers   Functional Limitation Car transfers; Ramp negotiation;Stair negotiation;Standing;Transfers; Walking; Wheelchair management   Plan   Treatment/Interventions Functional transfer training; Therapeutic exercise;LE strengthening/ROM; Endurance training;Gait training;Bed mobility; Equipment eval/education;Spoke to nursing;OT   Progress Progressing toward goals   PT Therapy Minutes   PT Time In 0830   PT Time Out 1000   PT Total Time (minutes) 90   PT Mode of treatment - Individual (minutes) 90   PT Mode of treatment - Concurrent (minutes) 0   PT Mode of treatment - Group (minutes) 0   PT Mode of treatment - Co-treat (minutes) 0   PT Mode of Treatment - Total time(minutes) 90 minutes   PT Cumulative Minutes 683

## 2023-03-14 NOTE — PROGRESS NOTES
Physical Medicine and Rehabilitation Progress Note  Bartolo Perez 60 y o  male MRN: [id number]  Unit/Bed#: [facility] 421-33 Encounter: [id number]    HPI: Batrolo Perez is a 60 y o  right handed male with medical history of HTN who presented to [address] [place] [address] Road on 2/25 with nausea/dizziness  Found to have hypertensive emergency with acute/subcaute R posterior cerebellar CVA with 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, mild BRAULIO, and incidentally found + COVID (without evidence of respiratory illness/hypoxia/CXR findings)  Placed on cardene gtt, stroke pathway, ASA/Plavix, IV hydration for BRAULIO, and CTX for UTI  NIHSS 2  Symptoms began 2 days prior  Not tPA/TNK candidate  MRI/MRA with 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  NSx recommended transfer to [facility] [place] and starting on heparin gtt and stopped Plavix  Repeat CTA on 2/26 stable with with R V3/4 occlusion  Not a candidate for interventional procedure due to clinical stability and risk  Speech consulted and noted mod-severe oropharyngeal dysphagia recommended pureed/HTL  Unfortunately later on 2/26, he clinically deteriorated with increased R sided weakness, and was taken to IR for stenting of V3 and V4 with TICI 2B revascularization  CTH without ICH  He was admitted back to the ICU intubated - extubated 2/27  MRI showed cerebellar CVA and R > L stroke burden, with additional hypodensities on DWI appreciated L midbrain, pontine area and R lower medullary/spinal cord junction  He was transitioned to triple therapy with DAPT (given recent stent) and A/C with eliquis 2 5mg BID due to COVID  Diet advanced to Level 3/NTL on 2/27  Noted to have spasticity in RLE - started on baclofen by Neurology  He was able to have cardene discontinued on 3/2, and they have been making adjustments to his oral regimen  He was started on Prozac for his mood  NSx has signed off  CTA H/N recommended around 3/17  Length of time on eliquis unclear   Admitted to [facility] on 3/6  Chief Complaint: No new concerns  Interval History/Subjective:  No acute events overnight  Last BM was 3/13  Denies any new CP, SOB, fevers, chills, N/V, abdominal pain  Continues with R sided weakness, but improving  It is not translating functionally due to the extent of his proprioceptive, sensory, kinesthetic deficits, but he is making meaningful gains and family has actively been involved and will be actively involved into the next week for getting his home prepared for discharge  ROS:  A 10 point review of systems was negative except for what is noted in the HPI  Today's Changes:  1  Continue bowel regimen  2  Continue current BP regimen  3  BMP on Thursday  4  Team Conference, see separate note  Total time spent:  50 minutes, with more than 50% spent counseling/coordinating care  Counseling includes discussion with patient re: progress in therapies, functional issues observed by therapy staff, and discussion with patient his/her current medical state/wellbeing  Coordination of patient's care was performed in conjunction with Internal Medicine service to monitor patient's labs, vitals, and management of their comorbidities  In addition, this patient was discussed by the interdisciplinary team in weekly case conference today  The care of the patient was extensively discussed with all care providers and an appropriate rehabilitation plan was formulated unique for this patient  Barriers were identified preventing progression of therapy and appropriate interventions were discussed with each discipline  Please see the team note for input from all disciplines regarding barriers, intervention, and discharge planning  Assessment/Plan:    * Acute Posterior Circulation Stroke  Assessment & Plan  Presented with dizziness for 2 days and nausea     - Now with R sided weakness, aphasia, dysphagia, R mild spasticity  Several small acute/early subacute bi-cerebellar infarcts without hemorrhage  Multiple infarcts involving R cerebellum and brainstem in particular (posterior medulla on the R, R midbrain, and L occipital lobe)  L vertebral artery severe stenosis and R vertebral artery occlusion and dissection    - Now s/p stenting on 2/26 with TICI 2b revascularization    PPx: ASA/brilinta/Eliquis, Statin   - Discussed with Neurology, they defer to NSx on length of time on eliquis  Potentially 3-4 weeks if felt to be related to COVID   3/9 Had nocturnal oximetry to screen for nocturnal hypoxia - only 26 seconds total of mild desaturation  Maintain normotension  Education on prediabetes and diet  Follow-up with Neurovascular/Neurosurgery  PT/OT/SLP - for swallow, speech, R sided weakness/tone, will need a good visual exam      Vasovagal syncope  Assessment & Plan  No further episodes  3/8 Had episode of vasovagal syncope  - No convulsions  On body weight support system, started to feel dizzy  - Staring episode after, but able to sternal rubbed out of it  Very brief   - No post-ictal weakness   - Neurologically stable and back to baseline   - Orthostatics negative  Discussed with Neurology - unlikely seizure given distribution of stroke and presentation  No further testing recommended at this time  Monitor  Neurogenic bowel  Assessment & Plan  Disinhibited bowel + mobility difficulties  Not on bowel meds right now  ARC Bladder Training:   - 30-45 min after each meal will get patient onto commode to attempt bowel movement  - He wants to hold off on scheduled suppository for now (would schedule in AM to align with his previous bowel schedule at home)  For now monitor, close continence care especially    Anemia  Assessment & Plan  Normocytic anemia noted through stay  B12 borderline low  Folate normal  Iron Panel: Low iron saturation and iron with normal TIBC and Ferritin  Was started in the hospital on B12, but not iron  Per IM - 2 days of IV Venofer (last day 3/8)   Then start oral iron  Monitor Hgb, transfuse as appropriate  Will discuss with IM  Consulted  Adjustment disorder with depressed mood  Assessment & Plan  Has been tearful and labile during hospitalization  Started on Prozac 20mgdaily - may need to increase dose  Consulted rehab psychology for support  Prediabetes  Assessment & Plan  A1C 5 7  Consult nutrition for education on diabetic diet  Diet/lifestyle modifications  Follow-up with PCP  CKD (chronic kidney disease)  Assessment & Plan  Lab Results   Component Value Date    EGFR 55 03/13/2023    EGFR 60 03/06/2023    EGFR 58 03/05/2023    CREATININE 1 35 (H) 03/13/2023    CREATININE 1 26 03/06/2023    CREATININE 1 29 03/05/2023     Presented with Crea 1 32  Given gentle IVF  Unclear baseline  Per hospital team - suspect CKD Stage 2 2/2 hypertension  Will monitor BMP while here  Avoid nephrotoxic meds and relative hypotension  Recommend outpatient f/u with PCP    Spasticity  Assessment & Plan  Intrinsic tonic tone in R quad which is mild  RUE still overall decreased tone, but starting to develop very mild tone in elbow flexor  Has some hiccups too - this is improving  Would opt to avoid treating R quad for now, as tone there may help stabilize at the knee  R ankle multipodus  3/8 Baclofen to 5mg QID to help with hiccups  Range of motion  Monitor as tone evolves  Outpatient f/u with PMR  Dysphagia  Assessment & Plan  Admitted with mod-severe oropharyngeal  Has massively improved  3/8 Advanced to Level 3/Thins  3/10 Advanced to Reg/Thins  Aspiration precautions  Good oral hygiene   SLP consulted    Primary hypertension  Assessment & Plan  Home: None  Here: Amlodipine 10mg daily, Coreg 25mg BID, Lisinopril 20mg daily, PRN hydralazine   - IM stopped chlorthalidone and increased coreg  Had hypertensive urgency in hospital requiring cardene gtt  Monitor and adjust as appropriate  IM consulted to assist with management      Stenosis of left vertebral artery  Assessment & Plan  Severe L vertebral artery origin stenosis  Repeat CTA H/N 2 weeks around 3/17  ASA/Brilinta  Atorvastatin  SBP goals 120-160  Follow-up with Neurovascular  Right Vertebral artery dissection [facility])  Assessment & Plan  Now s/p R V3/4 stenting with TICI 2B revascularization on 2/26 for R Vert stenosis  Continue ASA/Brilinta  Statin  Neurosurg f/u 3/17 with repeat CTA H/N        COVID-resolved as of 3/10/2023  Assessment & Plan  Resolved  Incidentally found to have COVID on 2/25  Asymptomatic from respiratory standpoint  Per Neurosurgery concern for hypercoagulability related to COVID  Currently on Eliquis 2 5mg BID - per Neuro 3-4 weeks probably reasonable, but for them ultimately up to NSx as this was their initial recommendation to start this medication  3/8 Discontinued precautions after 10 days isolation  Health Maintenance  #Delirium/Sleep: At risk  Optimize bowel/bladder, sleep-wake cycle, mood and pain management  #Pain: Baclofen 5mg QID, Tylenol PRN  #Bowel: Last BM 3/13  Only PRN miralax  See Neurogenic bowel above  #Bladder: Voiding and continent  #Skin/Pressure Injury Prevention: Turn Q2hr in bed, with weight shifts [id number] in wheelchair  Float heels in bed  #DVT Prophylaxis: On triple therapy, SCDs  #GI Prophylaxis: None  #Code Status:  Full Code  #FEN: Level 3/NTL  #Dispo:  Team 3/14: Insurance updates  ELOS 3-4 weeks  Reteam  May require more assistance at home and primary wheelchair level mobility given his ataxia  Wife has a new job starting next week where she will be working from home M-F  Barrier: R sided weakness, proprioception, kinesthetic awareness, impaired sensation on the R, ataxia, truncal weakness, dysphagia, expressive > receptive aphasia, post-stroke depression, bowel incontinence/disinhibited  Goals: See above        Objective:    Functional Update:  PT: 2 person assist for ambulation   modA transfers, Eusebio bed mobility, Sup wheelchair 150'   OT: Sup eating, grooming, Total A x2 bathing, maxA UB dressing, Total A x2 LB dressing, Total A x2 toileting/toilet transfers  SLP: Eusebio medina across the board  Now reg/thins  Allergies per EMR    Physical Exam:  Temp:  [97 9 °F (36 6 °C)-98 2 °F (36 8 °C)] 98 °F (36 7 °C)  HR:  [56-82] 57  Resp:  [18-20] 18  BP: (110-162)/(53-73) 156/73  Oxygen Therapy  SpO2: 97 %    Gen: No acute distress, Well-nourished, well-appearing  HEENT: Moist mucus membranes, Normocephalic/Atraumatic  Cardiovascular: Regular rate, rhythm, S1/S2  Distal pulses palpable  Heme/Extr: No edema  Pulmonary: Non-labored breathing  Lungs CTAB  : No barnett  GI: Soft, non-tender, non-distended  BS+  Integumentary: Skin is warm, dry  Neuro: AAOx3, Speech is with mild aphasia, but appropriate to questioning  Stable R sided weakness and mild tone  RLE stronger with more tone than RUE  Psych: Normal mood and affect       Diagnostic Studies: Reviewed, no new imaging     Laboratory:  Reviewed  Results from last 7 days   Lab Units 03/13/23  0637   HEMOGLOBIN g/dL 10 1*   HEMATOCRIT % 32 3*   WBC Thousand/uL 7 78     Results from last 7 days   Lab Units 03/13/23  0637   BUN mg/dL 34*   POTASSIUM mmol/L 3 9   CHLORIDE mmol/L 109*   CREATININE mg/dL 1 35*            Patient Active Problem List   Diagnosis   • BRAULIO (acute kidney injury) ([facility] [address] )   • Acute Posterior Circulation Stroke   • Right Vertebral artery dissection (HCC)   • Stenosis of left vertebral artery   • Primary hypertension   • Dizziness   • Dysphagia   • Spasticity   • CKD (chronic kidney disease)   • Prediabetes   • Adjustment disorder with depressed mood   • Anemia   • Neurogenic bowel   • Vasovagal syncope         Medications  Current Facility-Administered Medications   Medication Dose Route Frequency Provider Last Rate   • acetaminophen  650 mg Oral Q6H PRN YURI Parker     • amLODIPine  10 mg Oral Daily Stephanie Madden MD     • apixaban  2 5 mg Oral BID Renetta Cabot Monster Cyr MD     • aspirin  81 mg Oral Daily Mimi Goznalez MD     • atorvastatin  40 mg Oral Daily With Jordan Verma MD     • baclofen  5 mg Oral 4x Daily Mimi Gonzalez MD     • carvedilol  25 mg Oral BID With Meals YURI Valverde     • vitamin B-12  1,000 mcg Oral Daily Mimi Gonzalez MD     • ferrous sulfate  325 mg Oral Daily With Breakfast YURI Valverde     • FLUoxetine  20 mg Oral Daily Mimi Gonzalez MD     • hydrALAZINE  25 mg Oral Q8H PRN YURI Valverde     • lisinopril  20 mg Oral Daily Mimi Gonzalez MD     • polyethylene glycol  17 g Oral Daily PRN Mimi Gonzalez MD     • ticagrelor  90 mg Oral Q12H [facility] Mimi Gonzalez MD            ** Please Note: Fluency Direct voice to text software may have been used in the creation of this document   **

## 2023-03-15 RX ORDER — LISINOPRIL 20 MG/1
40 TABLET ORAL DAILY
Status: DISCONTINUED | OUTPATIENT
Start: 2023-03-16 | End: 2023-03-27

## 2023-03-15 RX ADMIN — TICAGRELOR 90 MG: 90 TABLET ORAL at 09:28

## 2023-03-15 RX ADMIN — FERROUS SULFATE TAB 325 MG (65 MG ELEMENTAL FE) 325 MG: 325 (65 FE) TAB at 06:50

## 2023-03-15 RX ADMIN — ASPIRIN 81 MG CHEWABLE TABLET 81 MG: 81 TABLET CHEWABLE at 09:27

## 2023-03-15 RX ADMIN — TICAGRELOR 90 MG: 90 TABLET ORAL at 21:30

## 2023-03-15 RX ADMIN — ATORVASTATIN CALCIUM 40 MG: 40 TABLET, FILM COATED ORAL at 15:59

## 2023-03-15 RX ADMIN — BACLOFEN 5 MG: 10 TABLET ORAL at 11:30

## 2023-03-15 RX ADMIN — FLUOXETINE 20 MG: 20 CAPSULE ORAL at 09:27

## 2023-03-15 RX ADMIN — BACLOFEN 5 MG: 10 TABLET ORAL at 17:47

## 2023-03-15 RX ADMIN — BACLOFEN 5 MG: 10 TABLET ORAL at 21:30

## 2023-03-15 RX ADMIN — BACLOFEN 5 MG: 10 TABLET ORAL at 09:27

## 2023-03-15 RX ADMIN — CARVEDILOL 25 MG: 25 TABLET, FILM COATED ORAL at 06:50

## 2023-03-15 RX ADMIN — APIXABAN 2.5 MG: 2.5 TABLET, FILM COATED ORAL at 09:27

## 2023-03-15 RX ADMIN — CARVEDILOL 25 MG: 25 TABLET, FILM COATED ORAL at 15:59

## 2023-03-15 RX ADMIN — AMLODIPINE BESYLATE 10 MG: 10 TABLET ORAL at 09:27

## 2023-03-15 RX ADMIN — APIXABAN 2.5 MG: 2.5 TABLET, FILM COATED ORAL at 17:47

## 2023-03-15 RX ADMIN — CYANOCOBALAMIN TAB 500 MCG 1000 MCG: 500 TAB at 09:27

## 2023-03-15 NOTE — PLAN OF CARE
Problem: Prexisting or High Potential for Compromised Skin Integrity  Goal: Skin integrity is maintained or improved  Description: INTERVENTIONS:  - Identify patients at risk for skin breakdown  - Assess and monitor skin integrity  - Assess and monitor nutrition and hydration status  - Monitor labs   - Assess for incontinence   - Turn and reposition patient  - Assist with mobility/ambulation  - Relieve pressure over bony prominences  - Avoid friction and shearing  - Provide appropriate hygiene as needed including keeping skin clean and dry  - Evaluate need for skin moisturizer/barrier cream  - Collaborate with interdisciplinary team   - Patient/family teaching  - Consider wound care consult   Outcome: Progressing     Problem: MOBILITY - ADULT  Goal: Maintain or return to baseline ADL function  Description: INTERVENTIONS:  -  Assess patient's ability to carry out ADLs; assess patient's baseline for ADL function and identify physical deficits which impact ability to perform ADLs (bathing, care of mouth/teeth, toileting, grooming, dressing, etc )  - Assess/evaluate cause of self-care deficits   - Assess range of motion  - Assess patient's mobility; develop plan if impaired  - Assess patient's need for assistive devices and provide as appropriate  - Encourage maximum independence but intervene and supervise when necessary  - Involve family in performance of ADLs  - Assess for home care needs following discharge   - Consider OT consult to assist with ADL evaluation and planning for discharge  - Provide patient education as appropriate  Outcome: Progressing  Goal: Maintains/Returns to pre admission functional level  Description: INTERVENTIONS:  - Perform BMAT or MOVE assessment daily    - Set and communicate daily mobility goal to care team and patient/family/caregiver  - Collaborate with rehabilitation services on mobility goals if consulted  - Perform Range of Motion 3 times a day    - Reposition patient every 2 hours   - Dangle patient 3 times a day  - Stand patient 3 times a day  - Ambulate patient 3 times a day  - Out of bed to chair 3 times a day   - Out of bed for meals 3 times a day  - Out of bed for toileting  - Record patient progress and toleration of activity level   Outcome: Progressing     Problem: PAIN - ADULT  Goal: Verbalizes/displays adequate comfort level or baseline comfort level  Description: Interventions:  - Encourage patient to monitor pain and request assistance  - Assess pain using appropriate pain scale  - Administer analgesics based on type and severity of pain and evaluate response  - Implement non-pharmacological measures as appropriate and evaluate response  - Consider cultural and social influences on pain and pain management  - Notify physician/advanced practitioner if interventions unsuccessful or patient reports new pain  Outcome: Progressing     Problem: INFECTION - ADULT  Goal: Absence or prevention of progression during hospitalization  Description: INTERVENTIONS:  - Assess and monitor for signs and symptoms of infection  - Monitor lab/diagnostic results  - Monitor all insertion sites, i e  indwelling lines, tubes, and drains  - Monitor endotracheal if appropriate and nasal secretions for changes in amount and color  - Independence appropriate cooling/warming therapies per order  - Administer medications as ordered  - Instruct and encourage patient and family to use good hand hygiene technique  - Identify and instruct in appropriate isolation precautions for identified infection/condition  Outcome: Progressing  Goal: Absence of fever/infection during neutropenic period  Description: INTERVENTIONS:  - Monitor WBC    Outcome: Progressing     Problem: SAFETY ADULT  Goal: Patient will remain free of falls  Description: INTERVENTIONS:  - Educate patient/family on patient safety including physical limitations  - Instruct patient to call for assistance with activity   - Consult OT/PT to assist with strengthening/mobility   - Keep Call bell within reach  - Keep bed low and locked with side rails adjusted as appropriate  - Keep care items and personal belongings within reach  - Initiate and maintain comfort rounds  - Make Fall Risk Sign visible to staff  - Offer Toileting every 2 Hours, in advance of need  - Initiate/Maintain bed  chair alarm  - Obtain necessary fall risk management equipment: nonskid socks  - Apply yellow socks and bracelet for high fall risk patients  - Consider moving patient to room near nurses station  Outcome: Progressing  Goal: Maintain or return to baseline ADL function  Description: INTERVENTIONS:  -  Assess patient's ability to carry out ADLs; assess patient's baseline for ADL function and identify physical deficits which impact ability to perform ADLs (bathing, care of mouth/teeth, toileting, grooming, dressing, etc )  - Assess/evaluate cause of self-care deficits   - Assess range of motion  - Assess patient's mobility; develop plan if impaired  - Assess patient's need for assistive devices and provide as appropriate  - Encourage maximum independence but intervene and supervise when necessary  - Involve family in performance of ADLs  - Assess for home care needs following discharge   - Consider OT consult to assist with ADL evaluation and planning for discharge  - Provide patient education as appropriate  Outcome: Progressing  Goal: Maintains/Returns to pre admission functional level  Description: INTERVENTIONS:  - Perform BMAT or MOVE assessment daily    - Set and communicate daily mobility goal to care team and patient/family/caregiver  - Collaborate with rehabilitation services on mobility goals if consulted  - Perform Range of Motion 3 times a day  - Reposition patient every 2 hours    - Dangle patient 3 times a day  - Stand patient 3 times a day  - Ambulate patient 3 times a day  - Out of bed to chair 3 times a day   - Out of bed for meals 3 times a day  - Out of bed for toileting  - Record patient progress and toleration of activity level   Outcome: Progressing     Problem: DISCHARGE PLANNING  Goal: Discharge to home or other facility with appropriate resources  Description: INTERVENTIONS:  - Identify barriers to discharge w/patient and caregiver  - Arrange for needed discharge resources and transportation as appropriate  - Identify discharge learning needs (meds, wound care, etc )  - Arrange for interpretive services to assist at discharge as needed  - Refer to Case Management Department for coordinating discharge planning if the patient needs post-hospital services based on physician/advanced practitioner order or complex needs related to functional status, cognitive ability, or social support system  Outcome: Progressing     Problem: Nutrition/Hydration-ADULT  Goal: Nutrient/Hydration intake appropriate for improving, restoring or maintaining nutritional needs  Description: Monitor and assess patient's nutrition/hydration status for malnutrition  Collaborate with interdisciplinary team and initiate plan and interventions as ordered  Monitor patient's weight and dietary intake as ordered or per policy  Utilize nutrition screening tool and intervene as necessary  Determine patient's food preferences and provide high-protein, high-caloric foods as appropriate       INTERVENTIONS:  - Monitor oral intake, urinary output, labs, and treatment plans  - Assess nutrition and hydration status and recommend course of action  - Evaluate amount of meals eaten  - Assist patient with eating if necessary   - Allow adequate time for meals  - Recommend/ encourage appropriate diets, oral nutritional supplements, and vitamin/mineral supplements  - Order, calculate, and assess calorie counts as needed  - Recommend, monitor, and adjust tube feedings and TPN/PPN based on assessed needs  - Assess need for intravenous fluids  - Provide specific nutrition/hydration education as appropriate  - Include patient/family/caregiver in decisions related to nutrition  Outcome: Progressing

## 2023-03-15 NOTE — PROGRESS NOTES
Internal Medicine Progress Note  Patient: Robby Reardon  Age/sex: 60 y o  male  Medical Record #: [id number]      ASSESSMENT/PLAN: (Interval History)  Robby Reardon is seen and examined and management for following issues:    Posterior circulation stroke  • S/p intracranial and extracranial vertebral artery stenting/TICI 2B revascularization  • Continue ASA, Brilinta, Eliquis and atorvastatin  • Repeat CTA head and neck around 3/17/23  • Prozac for depressed mood following CVA   • Neuropsych pending  • Dysphagia 3 diet with nectar thick liquids  • Continue Baclofen 5mg 4x daily  • Therapy per primary service  • Outpt follow-up with Neurology and Neurosurgery    Rapid response  · Likely d/t vasovagal syncope  Neurology agrees that event was unlikely seizure  · Felt dizzy/weak prior to episode then was staring blankly initially when he came back around  · Eventually returned to his baseline  · No further events      HTN  • Home: No medications  • Here: amlodipine 10mg daily/Coreg 25mg 2x daily/lisinopril 40mg daily  • Goal normotension    Fe deficiency anemia  · Likely multifactoral  · s/p IV venofer x 2 doses  · Cont daily ferrous sulfate  · Cbc mondays     CKD stage 2  • Baseline Cr 1 2 - 1 3  • Chlorthalidone dc'd  • BMP stable  • Encourage fluids  • Repeat BMP Thursday      COVID  • Precautions lifted  • Pt was asymptomatic     Prediabetes  • HA1C 5 7  • Recommend dietary modifications       DC planning:  TBD  The above assessment and plan was reviewed and updated as determined by my evaluation of the patient on 3/15/2023      Labs:   Results from last 7 days   Lab Units 03/13/23  0637   WBC Thousand/uL 7 78   HEMOGLOBIN g/dL 10 1*   HEMATOCRIT % 32 3*   PLATELETS Thousands/uL 288     Results from last 7 days   Lab Units 03/13/23  0637   SODIUM mmol/L 138   POTASSIUM mmol/L 3 9   CHLORIDE mmol/L 109*   CO2 mmol/L 25   BUN mg/dL 34*   CREATININE mg/dL 1 35*   CALCIUM mg/dL 9 0                   Review of Scheduled Meds:  Current Facility-Administered Medications   Medication Dose Route Frequency Provider Last Rate   • acetaminophen  650 mg Oral Q6H PRN YURI Jalloh     • amLODIPine  10 mg Oral Daily Jenny Rashid MD     • apixaban  2 5 mg Oral BID Jenny Rashid MD     • aspirin  81 mg Oral Daily Jenny Rashid MD     • atorvastatin  40 mg Oral Daily With Ludie Jeans, MD     • baclofen  5 mg Oral 4x Daily Jenny Rashid MD     • carvedilol  25 mg Oral BID With Meals YURI Jalloh     • vitamin B-12  1,000 mcg Oral Daily Jenny Rashid MD     • ferrous sulfate  325 mg Oral Daily With Breakfast YURI Jalloh     • FLUoxetine  20 mg Oral Daily Jenny Rashid MD     • hydrALAZINE  25 mg Oral Q8H PRN YURI Jalloh     • lisinopril  20 mg Oral Daily Jenny Rashid MD     • polyethylene glycol  17 g Oral Daily PRN Jenny Rashid MD     • ticagrelor  90 mg Oral Q12H [address] Jenny Rashid MD         Subjective/ HPI: Patient seen and examined  Patients overnight issues or events were reviewed with nursing or staff during rounds or morning huddle session  New or overnight issues include the following:     Pt seen in bed  Overall feels like he is progressing in therapy  Has no overnight issues    ROS:   A 10 point ROS was performed; negative except as noted above         Imaging:     No orders to display       *Labs /Radiology studies Reviewed  *Medications  reviewed and reconciled as needed  *Please refer to order section for additional ordered labs studies  *Case discussed with primary attending during morning huddle case rounds    Physical Examination:  Vitals:   Vitals:    03/14/23 1711 03/14/23 2100 03/15/23 0543 03/15/23 0650   BP: 126/55 146/63 145/68 144/70   BP Location: Left arm Right arm Left arm    Pulse: 56 62 60 55   Resp:  18 18    Temp:  98 4 °F (36 9 °C) 98 3 °F (36 8 °C)    TempSrc:  Oral Oral    SpO2:  99% 97%    Weight:   86 9 kg (191 lb 9 3 oz)    Height:           GEN: No apparent distress, interactive  NEURO: Alert and oriented x3; Mild dysarthria  HEENT: Pupils are equal and reactive, EOMI, mucous membranes are moist, face asymmetrical  CV: S1 S2 regular, no MRG, no peripheral edema noted  RESP: Lungs are clear bilaterally, no wheezes, rales or rhonchi noted, on room air, respirations easy and non labored  GI: Flat, soft non tender, non distended; +BS x4  : Voiding without difficulty   MUSC: Moves all extremities; Rt hemiparesis UE > LE improving, able to lift arm and wave  SKIN: pink, warm and dry, normal turgor, no rashes, lesions      The above physical exam was reviewed and updated as determined by my evaluation of the patient on 3/15/2023  Invasive Devices     None                    VTE Pharmacologic Prophylaxis: Eliquis  Code Status: Level 1 - Full Code  Current Length of Stay: 9 day(s)      Total time spent:  30 minutes with more than 50% spent counseling/coordinating care  Counseling includes discussion with patient re: progress  and discussion with patient of his/her current medical state/information  Coordination of patient's care was performed in conjunction with primary service  Time invested included review of patient's labs, vitals, and management of their comorbidities with continued monitoring  In addition, this patient was discussed with medical team including physician and advanced extenders  The care of the patient was extensively discussed and appropriate treatment plan was formulated unique for this patient  Medical decision making for the day was made by supervising physician unless otherwise noted in their attestation statement  ** Please Note:  voice to text software may have been used in the creation of this document   Although proof errors in transcription or interpretation are a potential of such software**

## 2023-03-15 NOTE — PROGRESS NOTES
"OT Treatment Note       03/15/23 0700   Pain Assessment   Pain Assessment Tool 0-10   Pain Score No Pain   Restrictions/Precautions   Precautions Aphasia; Aspiration;Bed/chair alarms;Cognitive; Fall Risk;Supervision on toilet/commode   Lifestyle   Autonomy \"I'm using it (RUE) a little more now\"   Eating   Type of Assistance Needed Set-up / clean-up   Physical Assistance Level No physical assistance   Eating CARE Score 5   Oral Hygiene   Type of Assistance Needed Set-up / clean-up   Physical Assistance Level No physical assistance   Comment Seated in w/c at sink, hand over hand A to squeeze toothpaste  Oral Hygiene CARE Score 5   Shower/Bathe Self   Type of Assistance Needed Physical assistance   Physical Assistance Level Total assistance   Comment Seated on tub bench, pt able to bathe partial LUE, RUE, chest, abd, groin, tops of LE, and partial bottom of LE by reaching down  OT A with feet, LUE for thoroughness, and in stance with LUE on grab bar and R knee block with A of second for rear hygeine  Of note, pt began to utilize RUE as much as possible by using hand over hand technique on self  Shower/Bathe Self CARE Score 1   Bathing   Assessed Bath Style Shower   Tub/Shower Transfer   Adaptive Equipment Transfer Bench   Findings Mod Ax1 sit pivot to tub bench  A of second to stabilize equipment  Min vc to wait until OT is ready before initiating transfer  Upper Body Dressing   Type of Assistance Needed Physical assistance   Physical Assistance Level 25% or less   Comment Seated in w/c, pt able to thread RUE and pull shirt overhead with increased time, OT A with threading LUE, pt able to pull down  Upper Body Dressing CARE Score 3   Lower Body Dressing   Type of Assistance Needed Physical assistance   Physical Assistance Level Total assistance   Comment Seated in w/c, OT A threading brief, Pt able to thread BLE into pants without socks on  In stance with LUE on bed rail and R knee block   A of second to manage " brief  OT then able to manage pants without A of second  Lower Body Dressing CARE Score 1   Putting On/Taking Off Footwear   Type of Assistance Needed Physical assistance   Physical Assistance Level 26%-50%   Comment Seated, pt able to doff b/l socks, reaching down/crossed legged technique to L sock  OT A with initial threading of R sock, pt then able to manage rest of way  Pt uses RUE to A as much as possible  Anticipate A to don shoes  Putting On/Taking Off Footwear CARE Score 3   Sit to Stand   Type of Assistance Needed Physical assistance   Physical Assistance Level 26%-50%   Comment At drop arm commode over toilet and at bed rail, pt able to use LUE on grab bar to stand with R knee block at min A level  Anticipate Mod A without grab bar  Sit to Stand CARE Score 3   Bed-Chair Transfer   Type of Assistance Needed Physical assistance   Physical Assistance Level 26%-50%   Comment Sit pivot to bed/wheelchair with R knee block   Chair/Bed-to-Chair Transfer CARE Score 3   Toileting Hygiene   Type of Assistance Needed Physical assistance   Physical Assistance Level Total assistance   Comment Min A x1in stance with R knee block with pt using grab bar on L at drop arm commode over toilet A of second for rear hygiene/clothing management  Toileting Hygiene CARE Score 1   Toilet Transfer   Type of Assistance Needed Physical assistance   Physical Assistance Level Total assistance   Comment Mod Ax1 sit pivot to drop arm commode over toilet with pt utilizing L grab bar  A of second for stabilizing equipment  Toilet Transfer CARE Score 1   Cognition   Overall Cognitive Status Impaired   Arousal/Participation Alert; Cooperative   Attention Within functional limits   Orientation Level Oriented X4   Memory Decreased short term memory   Following Commands Follows one step commands without difficulty   Activity Tolerance   Activity Tolerance Patient tolerated treatment well   Assessment   Treatment Assessment Pt engaged in skilled OT tx session focused on ADL  See above for further details on functional performance  Pt demonstrating improvements in the following ADLs: bathing, dressing, and toileting  Althought pt continues to be Total Ax2 for bathing, toileting, and LB dressing, anticipate pt will progress to Ax1 next week considering A of second is only required in stance  Last week, pt was performing transfers at Ax2 with slideboard  This week, pt has progressed to Mod Ax1 for sit pivot bed-chair transfers  Improvements have also been noted in fucntional use of RUE during bathing and dressing  Last week pts RUE MMT was 1, currently pts RUE MMT is 2+  Cont OT POC with focus on RUE NMR, ADL retraining, sit pivot transfers, activity tolerance, stroke education, and functional standing tolerance  Pt is a good acute rehab candidate and is demonstrating good progress, will continue to benefit from intensive skilled OT services to maximize independence and functional recovery s/p stroke  Pt left in recliner with alarm activated and call bell in reach  FT set for tomorrow with pts wife at 200 to go over home set up and current LOF  Prognosis Good   Problem List Decreased strength;Decreased range of motion;Decreased endurance; Impaired balance;Decreased mobility; Decreased coordination;Decreased cognition; Impaired tone; Impaired sensation   Barriers to Discharge Inaccessible home environment;Decreased caregiver support   Plan   Treatment/Interventions ADL retraining;Functional transfer training; Therapeutic exercise; Endurance training;Cognitive reorientation;Equipment eval/education;Patient/family training; Compensatory technique education   Progress Progressing toward goals   Recommendation   OT Discharge Recommendation   (pending progress)   OT Therapy Minutes   OT Time In 0700   OT Time Out 0840   OT Total Time (minutes) 100   OT Mode of treatment - Individual (minutes) 100   OT Mode of treatment - Concurrent (minutes) 0   OT Mode of treatment - Group (minutes) 0   OT Mode of treatment - Co-treat (minutes) 0   OT Mode of Treatment - Total time(minutes) 100 minutes   OT Cumulative Minutes 800   Therapy Time missed   Time missed?  No

## 2023-03-15 NOTE — PLAN OF CARE
Problem: Prexisting or High Potential for Compromised Skin Integrity  Goal: Skin integrity is maintained or improved  Description: INTERVENTIONS:  - Identify patients at risk for skin breakdown  - Assess and monitor skin integrity  - Assess and monitor nutrition and hydration status  - Monitor labs   - Assess for incontinence   - Turn and reposition patient  - Assist with mobility/ambulation  - Relieve pressure over bony prominences  - Avoid friction and shearing  - Provide appropriate hygiene as needed including keeping skin clean and dry  - Evaluate need for skin moisturizer/barrier cream  - Collaborate with interdisciplinary team   - Patient/family teaching  - Consider wound care consult   Outcome: Progressing     Problem: MOBILITY - ADULT  Goal: Maintain or return to baseline ADL function  Description: INTERVENTIONS:  -  Assess patient's ability to carry out ADLs; assess patient's baseline for ADL function and identify physical deficits which impact ability to perform ADLs (bathing, care of mouth/teeth, toileting, grooming, dressing, etc )  - Assess/evaluate cause of self-care deficits   - Assess range of motion  - Assess patient's mobility; develop plan if impaired  - Assess patient's need for assistive devices and provide as appropriate  - Encourage maximum independence but intervene and supervise when necessary  - Involve family in performance of ADLs  - Assess for home care needs following discharge   - Consider OT consult to assist with ADL evaluation and planning for discharge  - Provide patient education as appropriate  Outcome: Progressing  Goal: Maintains/Returns to pre admission functional level  Description: INTERVENTIONS:  - Perform BMAT or MOVE assessment daily    - Set and communicate daily mobility goal to care team and patient/family/caregiver  - Collaborate with rehabilitation services on mobility goals if consulted  - Perform Range of Motion 3 times a day    - Reposition patient every 2 hours   - Dangle patient 3 times a day  - Stand patient 3 times a day  - Ambulate patient 3 times a day  - Out of bed to chair 3 times a day   - Out of bed for meals 3 times a day  - Out of bed for toileting  - Record patient progress and toleration of activity level   Outcome: Progressing     Problem: PAIN - ADULT  Goal: Verbalizes/displays adequate comfort level or baseline comfort level  Description: Interventions:  - Encourage patient to monitor pain and request assistance  - Assess pain using appropriate pain scale  - Administer analgesics based on type and severity of pain and evaluate response  - Implement non-pharmacological measures as appropriate and evaluate response  - Consider cultural and social influences on pain and pain management  - Notify physician/advanced practitioner if interventions unsuccessful or patient reports new pain  Outcome: Progressing     Problem: INFECTION - ADULT  Goal: Absence or prevention of progression during hospitalization  Description: INTERVENTIONS:  - Assess and monitor for signs and symptoms of infection  - Monitor lab/diagnostic results  - Monitor all insertion sites, i e  indwelling lines, tubes, and drains  - Monitor endotracheal if appropriate and nasal secretions for changes in amount and color  - [place] appropriate cooling/warming therapies per order  - Administer medications as ordered  - Instruct and encourage patient and family to use good hand hygiene technique  - Identify and instruct in appropriate isolation precautions for identified infection/condition  Outcome: Progressing  Goal: Absence of fever/infection during neutropenic period  Description: INTERVENTIONS:  - Monitor WBC    Outcome: Progressing     Problem: SAFETY ADULT  Goal: Patient will remain free of falls  Description: INTERVENTIONS:  - Educate patient/family on patient safety including physical limitations  - Instruct patient to call for assistance with activity   - Consult OT/PT to assist with strengthening/mobility   - Keep Call bell within reach  - Keep bed low and locked with side rails adjusted as appropriate  - Keep care items and personal belongings within reach  - Initiate and maintain comfort rounds  - Make Fall Risk Sign visible to staff  - Offer Toileting every 2 Hours, in advance of need  - Initiate/Maintain bed/chair alarm  - Obtain necessary fall risk management equipment: alarms  - Apply yellow socks and bracelet for high fall risk patients  - Consider moving patient to room near nurses station  Outcome: Progressing  Goal: Maintain or return to baseline ADL function  Description: INTERVENTIONS:  -  Assess patient's ability to carry out ADLs; assess patient's baseline for ADL function and identify physical deficits which impact ability to perform ADLs (bathing, care of mouth/teeth, toileting, grooming, dressing, etc )  - Assess/evaluate cause of self-care deficits   - Assess range of motion  - Assess patient's mobility; develop plan if impaired  - Assess patient's need for assistive devices and provide as appropriate  - Encourage maximum independence but intervene and supervise when necessary  - Involve family in performance of ADLs  - Assess for home care needs following discharge   - Consider OT consult to assist with ADL evaluation and planning for discharge  - Provide patient education as appropriate  Outcome: Progressing     Problem: DISCHARGE PLANNING  Goal: Discharge to home or other facility with appropriate resources  Description: INTERVENTIONS:  - Identify barriers to discharge w/patient and caregiver  - Arrange for needed discharge resources and transportation as appropriate  - Identify discharge learning needs (meds, wound care, etc )  - Arrange for interpretive services to assist at discharge as needed  - Refer to Case Management Department for coordinating discharge planning if the patient needs post-hospital services based on physician/advanced practitioner order or complex needs related to functional status, cognitive ability, or social support system  Outcome: Progressing     Problem: Nutrition/Hydration-ADULT  Goal: Nutrient/Hydration intake appropriate for improving, restoring or maintaining nutritional needs  Description: Monitor and assess patient's nutrition/hydration status for malnutrition  Collaborate with interdisciplinary team and initiate plan and interventions as ordered  Monitor patient's weight and dietary intake as ordered or per policy  Utilize nutrition screening tool and intervene as necessary  Determine patient's food preferences and provide high-protein, high-caloric foods as appropriate       INTERVENTIONS:  - Monitor oral intake, urinary output, labs, and treatment plans  - Assess nutrition and hydration status and recommend course of action  - Evaluate amount of meals eaten  - Assist patient with eating if necessary   - Allow adequate time for meals  - Recommend/ encourage appropriate diets, oral nutritional supplements, and vitamin/mineral supplements  - Order, calculate, and assess calorie counts as needed  - Recommend, monitor, and adjust tube feedings and TPN/PPN based on assessed needs  - Assess need for intravenous fluids  - Provide specific nutrition/hydration education as appropriate  - Include patient/family/caregiver in decisions related to nutrition  Outcome: Progressing

## 2023-03-15 NOTE — PROGRESS NOTES
"OT Treatment Note        03/15/23 1100   Pain Assessment   Pain Assessment Tool 0-10   Pain Score No Pain   Restrictions/Precautions   Precautions Aphasia; Aspiration;Bed/chair alarms;Cognitive; Fall Risk;Supervision on toilet/commode   Lifestyle   Autonomy \"Its (RUE) moving more now\"   Eating   Type of Assistance Needed Set-up / clean-up   Physical Assistance Level No physical assistance   Eating CARE Score 5   Bed-Chair Transfer   Type of Assistance Needed Physical assistance   Physical Assistance Level 26%-50%   Comment Sit pivot with R knee block   Chair/Bed-to-Chair Transfer CARE Score 3   Neuromuscular Education   Comments Seated, OT applied NMES AVIA STIM XP unit channel 1 to pts R middle deltoid and supraspinatus to increase pts shoulder elevation  OT provided point of control the pts R pectoral muscle to decrease compensatory movements  Pt benefited from mirror used as visual feedback  During elevation, pt held position for 3-5 seconds then slowly released  Pt intermittently used opposing UE to support RUE  Completed for 20 repetitions, last 5 reps also included shoulder retraction  Cognition   Overall Cognitive Status Impaired   Arousal/Participation Alert; Cooperative   Attention Within functional limits   Orientation Level Oriented X4   Memory Decreased short term memory   Following Commands Follows one step commands without difficulty   Activity Tolerance   Activity Tolerance Patient tolerated treatment well   Assessment   Treatment Assessment Pt engaged in skilled OT tx session focused on RUE NMR  See above for further details on functional performance  Pt reported tightness at R upper trapezius but tolerated tx well  Would benefit from Aqua K to decrease muscle tightness  Pt reported difficultly with shoulder elevation however performance is observable increasing compared to last week   Cont OT POC with focus on RUE NMR, ADL retraining, sit pivot transfers, activity tolerance, stroke education, and " functional standing tolerance  Pt left in recliner with alarm activated and call bell in reach  FT set for tomorrow with pts wife at 1230   Prognosis Good   Problem List Decreased strength;Decreased range of motion;Decreased endurance; Impaired balance;Decreased mobility; Decreased coordination;Decreased cognition; Impaired tone; Impaired sensation   Barriers to Discharge Inaccessible home environment;Decreased caregiver support   Plan   Treatment/Interventions ADL retraining;Functional transfer training; Therapeutic exercise; Endurance training;Cognitive reorientation;Patient/family training;Equipment eval/education; Compensatory technique education   Progress Progressing toward goals   Recommendation   OT Discharge Recommendation   (pending progress)   OT Therapy Minutes   OT Time In 1100   OT Time Out 1135   OT Total Time (minutes) 35   OT Mode of treatment - Individual (minutes) 15   OT Mode of treatment - Concurrent (minutes) 20   OT Mode of treatment - Group (minutes) 0   OT Mode of treatment - Co-treat (minutes) 0   OT Mode of Treatment - Total time(minutes) 35 minutes   OT Cumulative Minutes 835   Therapy Time missed   Time missed?  No

## 2023-03-15 NOTE — PROGRESS NOTES
"   03/15/23 9278   Pain Assessment   Pain Assessment Tool 0-10   Pain Score No Pain   Restrictions/Precautions   Precautions Aphasia; Aspiration;Bed/chair alarms; Fall Risk;Supervision on toilet/commode   Comprehension   Comprehension (FIM) 4 - Understands basic info/conversation 75-90% of time   Expression   Expression (FIM) 4 - Expresses basic info/needs 75-90% of time   Social Interaction   Social Interaction (FIM) 5 - Requires redirection but less than 10% of the time  Problem Solving   Problem solving (FIM) 4 - Solves basic problems 75-89% of time   Memory   Memory (FIM) 4 - Recognizes/recalls/performs 75-89%   Speech/Language/Cognition Assessmetn   Treatment Assessment Pt alert, awake w/ pleasant disposition seen sitting upright in recliner for the duration of today's cognitive linguistic tx session  Pt w/ noted improvement in word retrieval during spontaneous speech today, pt rx he feels his speech is at baseline, however pt still w/ min word finding difficulties during higher level structured activities  Pt oriented x4 and w/ intact remote memory discussing visitors/events from yesterday and recalling his meals from last night and this AM  Session focus was on familiarization of current medicatons, higher level cognitive linguistic skills and word retrieval  Pt first engaged in medication review,in which pt w/ improved recall of medication reason use  When provided w/ name of medication able to recall reason/ use of 11/11 current medications, and frequency of 8/11 medications  Pt stated he has been reviewing the medication cheat sheet provided to familiarize himself w/ current medications   Pt then engaged in high level written word deduction task to where pt was provided w/ a definition and asked to generate the word that best fits the definition w/ in a provided set of numbered spaces ( one for each letter of the word) then pt asked to fill in a \" secret message\" (with numbered boxes) using the numbered " "letters from the answers above  Pt req student SLP to write for him d/t impairment of dominant hand during this tx  Overall pt w/ accuracy of 8/11 req verbal cues ( \" what could you do to find this answer? \") to use the appropriate resources (google on cell phone) or to use assumed words in the \"secret message\" to help him determine the remaining answers  Pt  w/ difficulty generating less familiar vocabulary ie \"a long time period- ashley,\" also demo difficulty in higher level problem solving skills/ mental flexibility to determine appropriate resources to utilize when unable to generate answer independently  In paragraph retention task, pt presented w/ a paragraph orally then asked comprehension questions regarding details presented in the paragraph  Pt w/ accuracy of 9/12 increasing to 12/12 w/ semantic cues, of note his incorrect responses were consistent w/ implicit details w/ in the paragraph  To target word retrieval deficits pt then engaged in written task ( student SLP completed writing for pt d/t dominant hand impairment), where pt provided w/ 2 columns of words and asked to create compound words using a word from each column  Pt completed this task w/ accuracy of 11/15 improving to 15/15 w/ verbal cues  Pt exhibited dec judgement and min word finding difficulty initially generating illogical compound words for more difficult stimulus items for him ie \"underdoor\" and \"sunbird\" able to self correct w/ verbal cue  At this time pt continues to benefit from skilled ST services targeting cognitive linguistic skills to minimize caregiver burden upon d/c      SLP Therapy Minutes   SLP Time In 0930   SLP Time Out 1030   SLP Total Time (minutes) 60   SLP Mode of treatment - Individual (minutes) 30   SLP Mode of treatment - Concurrent (minutes) 30   SLP Mode of treatment - Group (minutes) 0   SLP Mode of treatment - Co-treat (minutes) 0   SLP Mode of Treatment - Total time(minutes) 60 minutes   SLP Cumulative Minutes 535 " Therapy Time missed   Time missed?  No

## 2023-03-15 NOTE — PROGRESS NOTES
03/15/23 1330   Pain Assessment   Pain Assessment Tool 0-10   Pain Score No Pain   Restrictions/Precautions   Precautions Aphasia; Aspiration;Bed/chair alarms;Cognitive; Fall Risk;Supervision on toilet/commode   Subjective   Subjective pt agreeable to perform skilled PT   Sit to Stand   Type of Assistance Needed Physical assistance   Physical Assistance Level 26%-50%   Sit to Stand CARE Score 3   Bed-Chair Transfer   Type of Assistance Needed Physical assistance   Physical Assistance Level 26%-50%   Chair/Bed-to-Chair Transfer CARE Score 3   Walk 10 Feet   Type of Assistance Needed Physical assistance   Physical Assistance Level Total assistance   Comment HHA x2 NPP ongoing right side weakness and leaning diffcutly weight shifting to his left side vc 's for NPP   Walk 10 Feet CARE Score 1   Walk 50 Feet with Two Turns   Type of Assistance Needed Physical assistance   Physical Assistance Level Total assistance   Comment HHA x2 NPP ongoing right side weakness and leaning diffcutly weight shifting to his left side vc 's for NPP   Walk 50 Feet with Two Turns CARE Score 1   Ambulation   Primary Mode of Locomotion Prior to Admission Walk   Distance Walked (feet) 130 ft  (x4)   Assist Device   (HHA X2 NPP WC follow)   Does the patient walk? 2  Yes   Therapeutic Interventions   Neuromuscular Re-Education 6 inch  steps step-thru 4 sets each side R and then L   R knee needs stability and vc to push thru step   HHA x2  WC follow NPP vc for step pattern and sequence , shorter step with right foot and longer step with left , barriers cont with left side weight shifting to advance RLE   Pt also leaning retropl/at times and RLE crossing medline during balance  Assessment   Treatment Assessment pt focus on NPP HHA walking and step thru to inc right side awareness leaning and inc motor functional with motor learning   Pt also improving during ambulation HHA with WC follow using gait belt and vc for for pt safety , Pt HR only 68 and 02 95 % , Pt will cont toward DC goals  , pt using sierra scale 17/ , monitor HR and 02  Pt return to his room with alarm on and all needs in recliner , wife with him  Cont POC   Problem List Decreased strength;Decreased range of motion;Decreased endurance; Impaired balance;Decreased mobility; Decreased coordination;Decreased cognition; Impaired tone; Impaired sensation   Barriers to Discharge Inaccessible home environment;Decreased caregiver support   Plan   Progress Progressing toward goals   PT Therapy Minutes   PT Time In 1330   PT Time Out 1430   PT Total Time (minutes) 60   PT Mode of treatment - Individual (minutes) 60   PT Mode of treatment - Concurrent (minutes) 0   PT Mode of treatment - Group (minutes) 0   PT Mode of treatment - Co-treat (minutes) 0   PT Mode of Treatment - Total time(minutes) 60 minutes   PT Cumulative Minutes 743   Therapy Time missed   Time missed?  No

## 2023-03-15 NOTE — PROGRESS NOTES
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, mild BRAULIO, and incidentally found + COVID (without evidence of respiratory illness/hypoxia/CXR findings)  Placed on cardene gtt, stroke pathway, ASA/Plavix, IV hydration for BRAULIO, and CTX for UTI  NIHSS 2  Symptoms began 2 days prior  Not tPA/TNK candidate  MRI/MRA with 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  NSx recommended transfer to [facility] and starting on heparin gtt and stopped Plavix  Repeat CTA on  stable with with R V3/4 occlusion  Not a candidate for interventional procedure due to clinical stability and risk  Speech consulted and noted mod-severe oropharyngeal dysphagia recommended pureed/HTL  Unfortunately later on , he clinically deteriorated with increased R sided weakness, and was taken to IR for stenting of V3 and V4 with TICI 2B revascularization  CTH without ICH  He was admitted back to the ICU intubated - extubated   MRI showed cerebellar CVA and R > L stroke burden, with additional hypodensities on DWI appreciated L midbrain, pontine area and R lower medullary/spinal cord junction  He was transitioned to triple therapy with DAPT (given recent stent) and A/C with eliquis 2 5mg BID due to COVID  Diet advanced to Level 3/NTL on   Noted to have spasticity in RLE - started on baclofen by Neurology  He was able to have cardene discontinued on 3/2, and they have been making adjustments to his oral regimen  He was started on Prozac for his mood   NSx has signed off  CTA H/N recommended around 3/17  Length of time on eliquis unclear  He was admitted to [facility] on 3/6/23  Since admission, pt has been very tearful and depressed  Denies any new CP, SOB, fevers, chills, N/V, abdominal pain  He is incontinent of bowel on admission  He has some word finding difficulties and dysphagia  He has aphasia, but speech is improving  He feels the strength in his RLE is improving, but remains with decreased tone and strength in his right upper extremity  He denies any pain anywhere  No past medical history on file  Patient Active Problem List    Diagnosis Date Noted   • Vasovagal syncope 03/09/2023   • Neurogenic bowel 03/08/2023   • Anemia 03/06/2023   • Spasticity 03/01/2023   • CKD (chronic kidney disease) 03/01/2023   • Prediabetes 03/01/2023   • Adjustment disorder with depressed mood 03/01/2023   • BRAULIO (acute kidney injury) ([facility] [address] ) 02/25/2023   • Acute Posterior Circulation Stroke 02/25/2023   • Right Vertebral artery dissection ([facility] [address] ) 02/25/2023   • Stenosis of left vertebral artery 02/25/2023   • Primary hypertension 02/25/2023   • Dizziness 02/25/2023   • Dysphagia 02/25/2023       Body mass index is 28 29 kg/m²  Past Medical History:     No past medical history on file  Past Surgical History:     No past surgical history on file        Allergies:     No Known Allergies      Family and Social Support:   No data recorded    Social History:    Social History     Socioeconomic History   • Marital status: /Civil Union     Spouse name: Not on file   • Number of children: Not on file   • Years of education: Not on file   • Highest education level: Not on file   Occupational History   • Not on file   Tobacco Use   • Smoking status: Never   • Smokeless tobacco: Never   Substance and Sexual Activity   • Alcohol use: Not Currently   • Drug use: Not Currently   • Sexual activity: Not on file   Other Topics Concern   • Not on file   Social History Narrative   • Not on file     Social Determinants of Health     Financial Resource Strain: Not on file   Food Insecurity: Not on file   Transportation Needs: Not on file   Physical Activity: Not on file   Stress: Not on file   Social Connections: Not on file   Intimate Partner Violence: Not on file   Housing Stability: Not on file        Family History:    No family history on file      Medications:     Current Facility-Administered Medications:   •  acetaminophen (TYLENOL) tablet 650 mg, 650 mg, Oral, Q6H PRN, YURI Valverde, 650 mg at 03/12/23 1430  •  amLODIPine (NORVASC) tablet 10 mg, 10 mg, Oral, Daily, Mimi Gonzalez MD, 10 mg at 03/15/23 9016  •  apixaban (ELIQUIS) tablet 2 5 mg, 2 5 mg, Oral, BID, Mimi Gonzalez MD, 2 5 mg at 03/15/23 3123  •  aspirin chewable tablet 81 mg, 81 mg, Oral, Daily, Mimi Gonzalez MD, 81 mg at 03/15/23 1886  •  atorvastatin (LIPITOR) tablet 40 mg, 40 mg, Oral, Daily With Jordan Verma MD, 40 mg at 03/15/23 1559  •  baclofen tablet 5 mg, 5 mg, Oral, 4x Daily, Mimi Gonzalez MD, 5 mg at 03/15/23 1130  •  carvedilol (COREG) tablet 25 mg, 25 mg, Oral, BID With Meals, YURI Valverde, 25 mg at 03/15/23 1559  •  cyanocobalamin (VITAMIN B-12) tablet 1,000 mcg, 1,000 mcg, Oral, Daily, Mimi Gonzalez MD, 1,000 mcg at 03/15/23 8213  •  ferrous sulfate tablet 325 mg, 325 mg, Oral, Daily With Breakfast, YRUI Valverde, 325 mg at 03/15/23 2020  •  FLUoxetine (PROzac) capsule 20 mg, 20 mg, Oral, Daily, Mimi Gonzalez MD, 20 mg at 03/15/23 0982  •  hydrALAZINE (APRESOLINE) tablet 25 mg, 25 mg, Oral, Q8H PRN, YURI Valverde  •  [START ON 3/16/2023] lisinopril (ZESTRIL) tablet 40 mg, 40 mg, Oral, Daily, YURI Gallagher  •  polyethylene glycol (MIRALAX) packet 17 g, 17 g, Oral, Daily PRN, Mimi Gonzalez MD  •  ticagrelor (BRILINTA) tablet 90 mg, 90 mg, Oral, Q12H [address], Mimi Gonzalez MD, 90 mg at 03/15/23 4160        OBSERVATIONS:     Appearance  __x__Neat ____Disheveled ____Overweight ____Underweight ____Frail    Speech  __x__Fluid, Articulate ___x_Spontaneous ____Circumlocutions ____Word Finding difficulties ____Dysarthria ____Circumstantial ____Paucity ____Perseverative ____Pressured ____ Tangential     Thought Process/Content  __x__Coherent  ____Preoccupations____Ruminations____Obsessions____Hallucinations ____Delusions ____Flight of Ideas ____Distortions ____Distracted ____Suicidal Ideation ____Homicidal Ideation ____ Memory Issues ____ Perseveration ____ Tangential    Interaction  __x__Engaged__x__Cooperative____Superficial____Detached____Fearful____Guarded____Suspicious ____Poor Boundaries ____Aggressive    Affect  ____Neutral___x_Positive____Anxiety__x__Worried____Irritable____Angry____Depressed/Dysphoric ____Euthymic _____ Tearful ____ Fatigued     Behavior  _x__Spontaneous __x__Purposeful ____Slowed Initiation ____Apathetic ____Impulsive ____Dyscontrolled ____Hypoactive ____Hyperactive ____Repetitive/Perseverative      Overall Progress:    ____Very Declined ____Declined __x__Stable ____Improved ____Very Improved    Motivation and Participation:    ____Very Poor ____Poor ____Fair __x__Good ____Very Good                    ASSESSMENT & RECOMMENDATIONS:   Pt reports feeling better and is starting to see progress  Spouse was present and participated in session  Both report improvement in mood, sleep and appetite over past week since the start of Prosac  No tearful episodes observed today  Pt would like to have a sleep study to rule out apnea - which he understands may need to be completed post discharge  Addressed lifestyle changes and options as they approach discharge  They will likely pursue outpatient therapy to continue to work on these issues post discharge  [address] [place] is close to where they live and they may contact them  Pt  is making good progress in psychotherapy and  reports feeling  motivated to continue working towards rehab goals  Provided CBT and mindfulness based interventions, as well as supportive psychotherapy  Addressed coping, adjustment, and motivation  I will continue to evaluate pt's emotional functioning and status and provide supportive and mindfulness interventions during pt's stay  Effective coping strategies, distress tolerance, breathing exercises will be key interventions  Total face to face time with pt - 25 minutes  Continued Psychological intervention is medically necessary to:  __x__ Maintain current progress  __X_   Achieve Therapy Goals   __X__Prevent relapse       DIAGNOSIS:  Adjustment Disorder with Anxiety and Depression  Post Stroke Depression      RECOMMENDATIONS:   I will follow Mr Paul Hoffmann during his stay to provide the following interventions:    · Supportive psychotherapy, utilizing CBT and mindfulness strategies  · DBT distress tolerance techniques  to improve coping and mood  · Meditation and relaxation training as tolerated          Thank you for the opportunity to participate in Mr Geoffrey Tovar silverio Kuhn, Ph D   Licensed Psychologist

## 2023-03-16 LAB
ANION GAP SERPL CALCULATED.3IONS-SCNC: 5 MMOL/L (ref 4–13)
BUN SERPL-MCNC: 37 MG/DL (ref 5–25)
CALCIUM SERPL-MCNC: 9 MG/DL (ref 8.3–10.1)
CHLORIDE SERPL-SCNC: 109 MMOL/L (ref 96–108)
CO2 SERPL-SCNC: 25 MMOL/L (ref 21–32)
CREAT SERPL-MCNC: 1.36 MG/DL (ref 0.6–1.3)
GFR SERPL CREATININE-BSD FRML MDRD: 54 ML/MIN/1.73SQ M
GLUCOSE SERPL-MCNC: 93 MG/DL (ref 65–140)
POTASSIUM SERPL-SCNC: 4 MMOL/L (ref 3.5–5.3)
SODIUM SERPL-SCNC: 139 MMOL/L (ref 135–147)

## 2023-03-16 RX ORDER — SODIUM CHLORIDE 9 MG/ML
75 INJECTION, SOLUTION INTRAVENOUS CONTINUOUS
Status: DISCONTINUED | OUTPATIENT
Start: 2023-03-16 | End: 2023-03-17

## 2023-03-16 RX ADMIN — CARVEDILOL 25 MG: 25 TABLET, FILM COATED ORAL at 17:08

## 2023-03-16 RX ADMIN — APIXABAN 2.5 MG: 2.5 TABLET, FILM COATED ORAL at 08:44

## 2023-03-16 RX ADMIN — BACLOFEN 5 MG: 10 TABLET ORAL at 21:16

## 2023-03-16 RX ADMIN — BACLOFEN 5 MG: 10 TABLET ORAL at 17:08

## 2023-03-16 RX ADMIN — FLUOXETINE 20 MG: 20 CAPSULE ORAL at 08:43

## 2023-03-16 RX ADMIN — ASPIRIN 81 MG CHEWABLE TABLET 81 MG: 81 TABLET CHEWABLE at 08:43

## 2023-03-16 RX ADMIN — SODIUM CHLORIDE 75 ML/HR: 0.9 INJECTION, SOLUTION INTRAVENOUS at 14:40

## 2023-03-16 RX ADMIN — ATORVASTATIN CALCIUM 40 MG: 40 TABLET, FILM COATED ORAL at 17:08

## 2023-03-16 RX ADMIN — APIXABAN 2.5 MG: 2.5 TABLET, FILM COATED ORAL at 17:08

## 2023-03-16 RX ADMIN — BACLOFEN 5 MG: 10 TABLET ORAL at 12:30

## 2023-03-16 RX ADMIN — LISINOPRIL 40 MG: 20 TABLET ORAL at 08:43

## 2023-03-16 RX ADMIN — CYANOCOBALAMIN TAB 500 MCG 1000 MCG: 500 TAB at 08:44

## 2023-03-16 RX ADMIN — FERROUS SULFATE TAB 325 MG (65 MG ELEMENTAL FE) 325 MG: 325 (65 FE) TAB at 07:50

## 2023-03-16 RX ADMIN — AMLODIPINE BESYLATE 10 MG: 10 TABLET ORAL at 08:43

## 2023-03-16 RX ADMIN — BACLOFEN 5 MG: 10 TABLET ORAL at 08:44

## 2023-03-16 RX ADMIN — CARVEDILOL 25 MG: 25 TABLET, FILM COATED ORAL at 07:50

## 2023-03-16 RX ADMIN — TICAGRELOR 90 MG: 90 TABLET ORAL at 08:47

## 2023-03-16 RX ADMIN — Medication 1 PACKET: at 17:12

## 2023-03-16 RX ADMIN — TICAGRELOR 90 MG: 90 TABLET ORAL at 21:16

## 2023-03-16 NOTE — PROGRESS NOTES
"Physical Medicine and Rehabilitation Progress Note  Li Tam 60 y o  male MRN: [id number]  Unit/Bed#: -01 Encounter: [id number]      Assessment & Plan:     Decline in ADLs and mobility: Functional assessment        Total assist toileting hygiene, bathing, lower body dressing  Moderate assist upper body dressing  General transfers moderate assist  Ambulation still 2 person assist    * Acute Posterior Circulation Stroke  Assessment & Plan  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    - Now s/p stenting on 2/26 with TICI 2b revascularization    PPx: ASA/brilinta/Eliquis, Statin   - Per Prior PMR attending \"Discussed with Neurology, they defer to NSx on length of time on eliquis  Potentially 3-4 weeks if felt to be related to COVID  \"   - Chart reviewed and last NSx note 3/3 - recommended follow-up with them in 2 weeks with repeat CTA H/N (has OP appt scheduled for 2/22 with Dr Sarah Davis - 2 weeks would be 3/17 - follow-up with NSx Friday   3/9 Had nocturnal oximetry to screen for nocturnal hypoxia - only 26 seconds total of mild desaturation  Maintain normotension  Education on prediabetes and diet  Follow-up with Neurovascular/Neurosurgery  Function improving slowly   Continue PT/OT/SLP - for swallow, speech, R sided weakness/tone, will need a good visual exam      Stenosis of left vertebral artery  Assessment & Plan  Severe L vertebral artery origin stenosis  Repeat CTA H/N 2 weeks around 3/17 and follow-up with neurosurgery  ASA/Brilinta  Atorvastatin  SBP goals 120-160  Follow-up with Neurovascular      Right Vertebral artery " dissection [facility])  Assessment & Plan  Now s/p R V3/4 stenting with TICI 2B revascularization on 2/26 for R Vert stenosis  Continue ASA/Brilinta  Statin  Neurosurg f/u 3/17 with repeat CTA H/N  Vasovagal syncope  Assessment & Plan  No further episodes  3/8 Had episode of vasovagal syncope  - No convulsions  On body weight support system, started to feel dizzy  - Staring episode after, but able to sternal rubbed out of it  Very brief   - No post-ictal weakness   - Neurologically stable and back to baseline   - Orthostatics negative  Discussed with Neurology - unlikely seizure given distribution of stroke and presentation  No further testing recommended at this time  Monitor  Neurogenic bowel  Assessment & Plan  Disinhibited bowel + mobility difficulties  Not on bowel meds right now  ARC Bladder Training:   - 30-45 min after each meal will get patient onto commode to attempt bowel movement  - He wants to hold off on scheduled suppository for now (would schedule in AM to align with his previous bowel schedule at home)  For now monitor, close continence care especially    Anemia  Assessment & Plan  Stable 3/13  Normocytic anemia noted through stay  B12 borderline low  Folate normal  Iron Panel: Low iron saturation and iron with normal TIBC and Ferritin  Was started in the hospital on B12, but not iron  Per IM - 2 days of IV Venofer (last day 3/8)  Then start oral iron  Monitor Hgb, transfuse as appropriate  Will discuss with IM  Consulted  Adjustment disorder with depressed mood  Assessment & Plan  Acceptable today   Has been tearful and labile during hospitalization  Started on Prozac 20mgdaily - may need to increase dose  Consulted rehab psychology for support  Prediabetes  Assessment & Plan  A1C 5 7  Consult nutrition for education on diabetic diet  Diet/lifestyle modifications  Follow-up with PCP      CKD (chronic kidney disease)  Assessment & Plan  Lab Results   Component Value Date    EGFR 55 03/13/2023    EGFR 60 03/06/2023    EGFR 58 03/05/2023    CREATININE 1 35 (H) 03/13/2023    CREATININE 1 26 03/06/2023    CREATININE 1 29 03/05/2023     BMP tomorrow  Presented with Crea 1 32  Given gentle IVF  Unclear baseline  Per hospital team - suspect CKD Stage 2 2/2 hypertension  Will monitor BMP while here  Avoid nephrotoxic meds and relative hypotension  Recommend outpatient f/u with PCP    Spasticity  Assessment & Plan  Intrinsic tonic tone in R quad which is mild  RUE still overall decreased tone, but starting to develop very mild tone in elbow flexor  Has some hiccups too - this is improving  Would opt to avoid treating R quad for now, as tone there may help stabilize at the knee  R ankle multipodus  3/8 Baclofen to 5mg QID to help with hiccups  Range of motion  Monitor as tone evolves  Outpatient f/u with PMR  Dysphagia  Assessment & Plan  Improved  Admitted with mod-severe oropharyngeal  Has massively improved  3/8 Advanced to Level 3/Thins  3/10 Advanced to Reg/Thins  Aspiration precautions  Good oral hygiene   SLP consulted    Primary hypertension  Assessment & Plan  Home: None  Here: Amlodipine 10mg daily, Coreg 25mg BID, Lisinopril 20mg daily, PRN hydralazine   - IM stopped chlorthalidone and increased coreg  Had hypertensive urgency in hospital requiring cardene gtt  Monitor and adjust as appropriate  IM consulted to assist with management  COVID-resolved as of 3/10/2023  Assessment & Plan  Resolved  Incidentally found to have COVID on 2/25  Asymptomatic from respiratory standpoint  Per Neurosurgery concern for hypercoagulability related to COVID  Currently on Eliquis 2 5mg BID - per Neuro 3-4 weeks probably reasonable, but for them ultimately up to NSx as this was their initial recommendation to start this medication  3/8 Discontinued precautions after 10 days isolation  Other Medical Issues:  •     Restrictions include:   Fall precautions    Objective: Allergies per EMR  Diagnostic Studies: Reviewed, no new imaging  No orders to display     See above as well    Laboratory: Labs reviewed  Results from last 7 days   Lab Units 03/13/23  0637   HEMOGLOBIN g/dL 10 1*   HEMATOCRIT % 32 3*   WBC Thousand/uL 7 78     Results from last 7 days   Lab Units 03/13/23  0637   BUN mg/dL 34*   SODIUM mmol/L 138   POTASSIUM mmol/L 3 9   CHLORIDE mmol/L 109*   CREATININE mg/dL 1 35*            Drug regimen reviewed, all potential adverse effects identified and addressed:    Scheduled Meds:  Current Facility-Administered Medications   Medication Dose Route Frequency Provider Last Rate   • acetaminophen  650 mg Oral Q6H PRN YURI Jalloh     • amLODIPine  10 mg Oral Daily Jenny Rashid MD     • apixaban  2 5 mg Oral BID Jenny Rashid MD     • aspirin  81 mg Oral Daily Jenny Rashid MD     • atorvastatin  40 mg Oral Daily With Ludie Jeans, MD     • baclofen  5 mg Oral 4x Daily Jenny Rashid MD     • carvedilol  25 mg Oral BID With Meals YURI Jalloh     • vitamin B-12  1,000 mcg Oral Daily Jenny Rashid MD     • ferrous sulfate  325 mg Oral Daily With Breakfast YURI Jalloh     • FLUoxetine  20 mg Oral Daily Jenny Rashid MD     • hydrALAZINE  25 mg Oral Q8H PRN YURI Jalloh     • lisinopril  40 mg Oral Daily YURI Jalloh     • polyethylene glycol  17 g Oral Daily PRN Jenny Rashid MD     • ticagrelor  90 mg Oral Q12H [address] Jenny Rashid MD         Chief Complaints: Right-sided weakness    Subjective: On eval patient notes stable right-sided weakness  He denies uncontrolled pain, fever, chills, sweats, worsening loose bowel movements, abdominal pain, or other new complaints  ROS: A 10 point ROS was performed; negative except as noted above         Physical Exam:  03/15/23 0543 98 3 °F (36 8 °C) 60 18 145/68 93 97 % None (Room air)     Vitals above reviewed on date of encounter    GEN:  Sitting in NAD   HEENT/NECK: Normocephalic, atraumatic, moist mucous membranes   CARDIAC: Regular rate rhythm, no murmers, no rubs, no gallops  LUNGS:  clear to auscultation, no wheezes, rales, or rhonchi  ABDOMEN: Soft, non-tender, non-distended, normal active bowel sounds  EXTREMITIES/SKIN:  no calf edema, no calf tenderness to palpation  NEURO:   MENTAL STATUS: Adequate wakefulness, able to follow simple commands and Strength/MMT:  Right upper extremity proximally 2+ out of 5, distally 1-2 minus, right lower extremity proximally 3+ out of 5, distally 1-2 out of 5, left-sided strength near 5 out of 5  PSYCH:  Affect:  Euthymic       ** Please Note: Fluency Direct voice to text software may have been used in the creation of this document  **    I personally performed the required components and examined the patient myself in person on 3/15/2023

## 2023-03-16 NOTE — PROGRESS NOTES
03/16/23 1330   Pain Assessment   Pain Assessment Tool 0-10   Pain Score No Pain   Restrictions/Precautions   Precautions Fall Risk;Cognitive;Bed/chair alarms;Supervision on toilet/commode   Cognition   Overall Cognitive Status Impaired   Arousal/Participation Alert; Cooperative   Subjective   Subjective pt and wife agreeable with family observation/education   Sit to Stand   Type of Assistance Needed Physical assistance;Verbal cues   Physical Assistance Level 26%-50%   Comment Vc for R hand placement   Sit to Stand CARE Score 3   Bed-Chair Transfer   Type of Assistance Needed Physical assistance;Verbal cues; Adaptive equipment   Physical Assistance Level Total assistance   Comment mod of 2 SPT with RW, ineffective R hand  on walker handle requiring assist to keep in place  min A of 1 w/c level sit pivots transfers x 4 reps, VC for sequencing  pt and wife educated on expected behavior for pt to recall sequencing and proper techniques without requiring external VC to inc indep and safety     Chair/Bed-to-Chair Transfer CARE Score 1   Walk 10 Feet   Type of Assistance Needed Physical assistance;Verbal cues   Physical Assistance Level Total assistance   Walk 10 Feet CARE Score 1   Walk 50 Feet with Two Turns   Type of Assistance Needed Physical assistance;Verbal cues   Physical Assistance Level Total assistance   Walk 50 Feet with Two Turns CARE Score 1   Walk 150 Feet   Type of Assistance Needed Physical assistance;Verbal cues   Physical Assistance Level Total assistance   Walk 150 Feet CARE Score 1   Walking 10 Feet on Uneven Surfaces   Comment to assess next session if appropriate   Wheel 50 Feet with Two Turns   Type of Assistance Needed Set-up / clean-up   Wheel 50 Feet with Two Turns CARE Score 5   Wheel 150 Feet   Type of Assistance Needed Supervision   Comment using bilat LE with occasional L UE assist  not safe to use R UE at this time due to impaired sensation/proprioception/kinesthesia, weakness and "impaired coordination  pt now able to effectively use R LE during w/c propulsion training  with extra time able to lock w/c brake using R UE   Wheel 150 Feet CARE Score 4   Curb or Single Stair   Style negotiated Single stair   Type of Assistance Needed Adaptive equipment;Physical assistance;Verbal cues   Physical Assistance Level Total assistance   1 Step (Curb) CARE Score 1   4 Steps   Type of Assistance Needed Physical assistance;Verbal cues; Adaptive equipment   Physical Assistance Level Total assistance   Comment 6\"  bilat HR x 4 steps ascent fwd/descent bwd non reciprocal pattern with assist to keep R hand on rail and VC/assist for R foot placement abhishek when descending due to adduction tendencies   4 Steps CARE Score 1   12 Steps   Reason if not Attempted Safety concerns   12 Steps CARE Score 88   Therapeutic Interventions   Strengthening pt and wife educated on HEP with handout provided include: seated marches, LAQ, heel toe raises and hip abduction  - pt demo'd good carry over of proper form and technique while performing exercise including counting out loud   Neuromuscular Re-Education NPP gait training x 150' no AD with mod-max of 2 hands on gait belt/pants guarding to promote arm swing for balance- cont to demo mod R LE adduction tendencies at initial contact and R side leaning with slight post leaning this session  because no HHA provided  6\"  with bilat HR and R LE step through with 3# ankle wts using 2 canes on floor for visual cues to facilitate proper R foot placement with reps till fatigue x 2 sets then step through with 3# on L LE to facilitate inc WBing/shifting to R LE in stance with reps till fatigue   Assessment   Treatment Assessment skilled PT initially focused on family education/training of current mobility recommendation at d/c, see note below for details  at this time anticipate pt to be w/c level primarily at d/c to optimize safety, pt and wife verbalized understanding   Pt is " "demonstrating improving R LE strength enough for pt to now propel w/c using bilat LE only  Trialed SPT with RW with pt difficulty holding walker with R hand effectively while maintaining balance and completing transfer safely requiring  mod-max A of 2 person so is not safe to utilize at this time  In the next few days PT will cont to focus on overground NMR/NPP training without using an AD to promote gait normalization/automaticity/efficiency, improve dynamic balance and righting reactions, facilitate additional motor and functional gains to reduce caregiver burden and dec risk for falls at d/c  Family/Caregiver Present yes   PT Family training done with: wife Dipika Patel present for family observation/education about d/c recommendations  pt and wife agreeable for pt to use w/c primarily at d/c and both receptive to have ramp installed for 2 curb type [place] home with ramp companies list provided although per wife son and ANGEL are both handy that they will be able to make a temporary ramp  Both made aware if will not have a ramp, recommendation for pt to have 2-3 person assist to do SPT to w/c on top of the step technique  pt and wife also educated on rationale of using SUV car vs truck for safe transfer at d/c and both in agreement  Wife then observed as pt performed repeated sit pivot w/c <> bed/recliner transfer, w/c propulsion and NMR gait  and  step through training  wife agreeable to return next week for additional family training  Barriers to Discharge Comments 6\" and 8\" curb type [place] home   PT Barriers   Functional Limitation Car transfers; Ramp negotiation;Stair negotiation;Standing;Transfers; Walking; Wheelchair management   Plan   Treatment/Interventions Functional transfer training; Therapeutic exercise;LE strengthening/ROM; Endurance training;Equipment eval/education;Patient/family training;Gait training;Spoke to nursing;OT   Progress Progressing toward goals   Recommendation   Equipment Recommended " Wheelchair   PT Therapy Minutes   PT Time In 1330   PT Time Out 1430   PT Total Time (minutes) 60   PT Mode of treatment - Individual (minutes) 60   PT Mode of treatment - Concurrent (minutes) 0   PT Mode of treatment - Group (minutes) 0   PT Mode of treatment - Co-treat (minutes) 0   PT Mode of Treatment - Total time(minutes) 60 minutes   PT Cumulative Minutes 863   Therapy Time missed   Time missed?  No

## 2023-03-16 NOTE — PROGRESS NOTES
03/16/23 0923   Pain Assessment   Pain Assessment Tool 0-10   Pain Score No Pain   Restrictions/Precautions   Precautions Bed/chair alarms; Fall Risk;Supervision on toilet/commode;Cognitive; Aphasia   Comprehension   Comprehension (FIM) 5 - Understands basic directions and conversation   Expression   Expression (FIM) 5 - Needs help/cues only RARELY (< 10% of the time)   Social Interaction   Social Interaction (FIM) 5 - Requires redirection but less than 10% of the time  Problem Solving   Problem solving (FIM) 4 - Solves basic problems 75-89% of time   Memory   Memory (FIM) 4 - Recognizes/recalls/performs 75-89%   Speech/Language/Cognition Assessmetn   Treatment Assessment Pt alert, awake and cooperative seen sitting upright in recliner for the duration of today's ST session  Pt w/ recall of LAYLA, date, location and remote memory recalling the contents of his breakfast and his daughter's visit yesterday  In order to continue pt familiarization w/ new medications, pt completed medication review  When provided w/ the name pt able to state the medication reason/ use w/ accuracy of 8/11 and medication frequency w/ accuracy of 7/11  Pt w/ dec recall of medication reason/ use today from previous session, errors primarily consisted of confusing blood thinners w/ blood pressure medications  Pt then completed complex written calendar deduction task where pt provided w/ 10 written clues and asked to determine a single date  Pt w/ difficulty completing this task, even w/ max verbal and visual cues pt unable to determine single date  Pt errors consistent w/ higher level language demands of clues, multiplication and division errors  Pt then completed auditory immediate memory and mental manipulation task where pt orally presented w/ four semantically related words and asked to repeat them in sequential order  Pt w/ accuracy of 3/5 increasing to 5/5 w/ repetition exhibiting dec immediate recall and mental flexibility   Student SLP also further inquired about the specific skills/ IADLs required for 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, primarily money management, problem solving, planning, sequencing and technological skills  Plan to complete similar functional tasks in future sessions, specifically addressing difficulty w/ multiplication and division as these are required skills for pt in computing billing, labor costs, inventory etc  Of note, pt w/ improved word retrieval, no occurrence of word finding difficulties during spontaneous speech or structured tasks today  At this time pt continues to benefit from skilled ST services targeting cognitive linguistic skills in order to minimize caregiver burden upon d/c  SLP Therapy Minutes   SLP Time In 0930   SLP Time Out 1000   SLP Total Time (minutes) 30   SLP Mode of treatment - Individual (minutes) 30   SLP Mode of treatment - Concurrent (minutes) 0   SLP Mode of treatment - Group (minutes) 0   SLP Mode of treatment - Co-treat (minutes) 0   SLP Mode of Treatment - Total time(minutes) 30 minutes   SLP Cumulative Minutes 565   Therapy Time missed   Time missed?  No

## 2023-03-16 NOTE — PROGRESS NOTES
"Physical Medicine and Rehabilitation Progress Note  Mayra Schmitt 60 y o  male MRN: [id number]  Unit/Bed#: -01 Encounter: [id number]      Assessment & Plan:     Decline in ADLs and mobility: Functional assessment -improving        Max assist toileting hygiene and lower body dressing  Moderate assist upper body dressing, bathing  General transfers and toileting transfers moderate assist  Ambulation still 2 person assist    * Acute Posterior Circulation Stroke  Assessment & Plan  3/16 neuro exam stable to somewhat improving with strength on right  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    - Now s/p stenting on 2/26 with TICI 2b revascularization    PPx: ASA/brilinta/Eliquis, Statin   - Per Prior PMR attending \"Discussed with Neurology, they defer to NSx on length of time on eliquis  Potentially 3-4 weeks if felt to be related to COVID  \"   - Chart reviewed and last NSx note 3/3 - recommended follow-up with them in 2 weeks with repeat CTA H/N (has OP appt scheduled for 2/22 with Dr Brielle Ventura - 2 weeks would be 3/17 - follow-up with NSx Friday -encourage fluids and repeat BMP in the morning -would need clearance for contrast by medicine  3/9 Had nocturnal oximetry to screen for nocturnal hypoxia - only 26 seconds total of mild desaturation  Maintain normotension  Education on prediabetes and diet  Follow-up with Neurovascular/Neurosurgery      Function improving slowly   Continue PT/OT/SLP - for swallow, speech, R sided weakness/tone, will need a good visual exam      Stenosis of left vertebral artery  Assessment & Plan  Severe L vertebral artery " origin stenosis  Repeat CTA H/N 2 weeks around 3/17 and follow-up with neurosurgery  ASA/Brilinta  Atorvastatin  SBP goals 120-160  Follow-up with Neurovascular  Right Vertebral artery dissection [facility])  Assessment & Plan  Now s/p R V3/4 stenting with TICI 2B revascularization on 2/26 for R Vert stenosis  Continue ASA/Brilinta  Statin  Neurosurg f/u 3/17 with repeat CTA H/N  Vasovagal syncope  Assessment & Plan  No further episodes  3/8 Had episode of vasovagal syncope  - No convulsions  On body weight support system, started to feel dizzy  - Staring episode after, but able to sternal rubbed out of it  Very brief   - No post-ictal weakness   - Neurologically stable and back to baseline   - Orthostatics negative  Discussed with Neurology - unlikely seizure given distribution of stroke and presentation  No further testing recommended at this time  Monitor  Neurogenic bowel  Assessment & Plan  Disinhibited bowel + mobility difficulties -somewhat loose as well  Trial Metamucil daily  Not on bowel meds right now  ARC Bladder Training:   - 30-45 min after each meal will get patient onto commode to attempt bowel movement  - He wants to hold off on scheduled suppository for now (would schedule in AM to align with his previous bowel schedule at home)  For now monitor, close continence care especially    Anemia  Assessment & Plan  Stable 3/13  Normocytic anemia noted through stay  B12 borderline low  Folate normal  Iron Panel: Low iron saturation and iron with normal TIBC and Ferritin  Was started in the hospital on B12, but not iron  Per IM - 2 days of IV Venofer (last day 3/8)  Then start oral iron  Monitor Hgb, transfuse as appropriate  Will discuss with IM  Consulted  Adjustment disorder with depressed mood  Assessment & Plan  Acceptable today   Has been tearful and labile during hospitalization  Started on Prozac 20mgdaily - may need to increase dose    Consulted rehab psychology for support  Prediabetes  Assessment & Plan  A1C 5 7  Consult nutrition for education on diabetic diet  Diet/lifestyle modifications  Follow-up with PCP  CKD (chronic kidney disease)  Assessment & Plan  Lab Results   Component Value Date    EGFR 54 03/16/2023    EGFR 55 03/13/2023    EGFR 60 03/06/2023    CREATININE 1 36 (H) 03/16/2023    CREATININE 1 35 (H) 03/13/2023    CREATININE 1 26 03/06/2023     Medicine feels like he is near baseline but with potential contrasted study coming up we will provide IV fluids and repeat BMP in the morning  Per hospital team - suspect CKD Stage 2 2/2 hypertension  Will monitor BMP while here  Avoid nephrotoxic meds and relative hypotension  Recommend outpatient f/u with PCP    Spasticity  Assessment & Plan  Intrinsic tonic tone in R quad which is mild  RUE still overall decreased tone, but starting to develop very mild tone in elbow flexor  Has some hiccups too - this is improving  Would opt to avoid treating R quad for now, as tone there may help stabilize at the knee  R ankle multipodus  3/8 Baclofen to 5mg QID to help with hiccups  Range of motion  Monitor as tone evolves  Outpatient f/u with PMR  Dysphagia  Assessment & Plan  Improved  Admitted with mod-severe oropharyngeal  Has massively improved  3/8 Advanced to Level 3/Thins  3/10 Advanced to Reg/Thins  Aspiration precautions  Good oral hygiene   SLP consulted    Primary hypertension  Assessment & Plan  Home: None  Here: Amlodipine 10mg daily, Coreg 25mg BID, Lisinopril 20mg daily, PRN hydralazine   - IM stopped chlorthalidone and increased coreg  Had hypertensive urgency in hospital requiring cardene gtt  Monitor and adjust as appropriate  IM consulted to assist with management      COVID-resolved as of 3/10/2023  Assessment & Plan  Resolved  Incidentally found to have COVID on 2/25  Asymptomatic from respiratory standpoint  Per Neurosurgery concern for hypercoagulability related to COVID  Currently on Eliquis 2 5mg BID - per Neuro 3-4 weeks probably reasonable, but for them ultimately up to NSx as this was their initial recommendation to start this medication  3/8 Discontinued precautions after 10 days isolation  Other Medical Issues:  •     Restrictions include: Fall precautions    Objective:     Allergies per EMR  Diagnostic Studies: Reviewed, no new imaging  No orders to display     See above as well    Laboratory: Labs reviewed  Results from last 7 days   Lab Units 03/13/23  0637   HEMOGLOBIN g/dL 10 1*   HEMATOCRIT % 32 3*   WBC Thousand/uL 7 78     Results from last 7 days   Lab Units 03/13/23  0637   BUN mg/dL 34*   SODIUM mmol/L 138   POTASSIUM mmol/L 3 9   CHLORIDE mmol/L 109*   CREATININE mg/dL 1 35*            Drug regimen reviewed, all potential adverse effects identified and addressed:    Current Facility-Administered Medications   Medication Dose Route Frequency Provider Last Rate   • acetaminophen  650 mg Oral Q6H PRN YURI Parker     • amLODIPine  10 mg Oral Daily Stephanie Madden MD     • apixaban  2 5 mg Oral BID Stephanie Madden MD     • aspirin  81 mg Oral Daily Stephanie Madden MD     • atorvastatin  40 mg Oral Daily With Shara Manjarrez MD     • baclofen  5 mg Oral 4x Daily Stephanie Madden MD     • carvedilol  25 mg Oral BID With Meals YURI Parker     • vitamin B-12  1,000 mcg Oral Daily Stephanie Madden MD     • ferrous sulfate  325 mg Oral Daily With Breakfast YURI Parker     • FLUoxetine  20 mg Oral Daily Stephanie Madden MD     • hydrALAZINE  25 mg Oral Q8H PRN YURI Parker     • lisinopril  40 mg Oral Daily YURI Parker     • polyethylene glycol  17 g Oral Daily PRN Stephanie Madden MD     • psyllium  1 packet Oral Daily Claudean Solan, MD     • sodium chloride  75 mL/hr Intravenous Continuous YURI Parker 75 mL/hr (03/16/23 1440)   • ticagrelor  90 mg Oral Q12H [address] Stephanie Madden MD         •  acetaminophen  •  hydrALAZINE  • polyethylene glycol      Chief Complaints: Stroke rehab follow-up    Subjective:     Patient notes still some loose bowel movements which are difficult to control at times but not worsening  He would like to trial something else to try to help this  He denies worsening strength, sensation, radiating pain, patient denies other new complaints  ROS: A 10 point ROS was performed; negative except as noted above  Physical Exam:  Vitals:    03/16/23 1342   BP: 139/64   Pulse: 58   Resp: 20   Temp: 98 2 °F (36 8 °C)   SpO2: 99%     GEN:  Sitting in NAD   HEENT/NECK: MMM  CARDIAC: Regular rate rhythm, no murmers, no rubs, no gallops  LUNGS:  clear to auscultation, no wheezes, rales, or rhonchi  ABDOMEN: Soft, non-tender, non-distended, normal active bowel sounds  EXTREMITIES/SKIN:  no calf edema, no calf tenderness to palpation  NEURO:   MENTAL STATUS: Adequate wakefulness, able to follow simple commands; and Strength/MMT:  Right upper extremity 2 to 3-, right lower extremity proximally 3+ out of 5, distally 1-2 out of 5, left-sided strength near 5 out of 5  PSYCH:  Affect:  Euthymic       ** Please Note: Fluency Direct voice to text software may have been used in the creation of this document   **

## 2023-03-16 NOTE — PROGRESS NOTES
"Occupational Therapy Treatment Note         03/16/23 7210   Pain Assessment   Pain Assessment Tool 0-10   Pain Score No Pain   Restrictions/Precautions   Precautions Bed/chair alarms; Fall Risk;Supervision on toilet/commode;Cognitive   Lifestyle   Autonomy \"thanks for having me come in\" - pt's wife   Upper Body Dressing   Type of Assistance Needed Supervision   Physical Assistance Level No physical assistance   Comment increased time  pt has improved functional use of R UE to assist   Upper Body Dressing CARE Score 4   Roll Left and Right   Type of Assistance Needed Supervision   Physical Assistance Level No physical assistance   Roll Left and Right CARE Score 4   Sit to Lying   Type of Assistance Needed Supervision   Physical Assistance Level No physical assistance   Sit to Lying CARE Score 4   Lying to Sitting on Side of Bed   Type of Assistance Needed Supervision   Physical Assistance Level No physical assistance   Comment HOB flat and no bed rails for all bed mobility   Lying to Sitting on Side of Bed CARE Score 4   Bed-Chair Transfer   Type of Assistance Needed Physical assistance   Physical Assistance Level 26%-50%   Comment sit pivot transfers, assist x1 in front  use of L UE to reach  Educated pt to keep R UE at stomach at this time to avoid sitting on it when transferring to R  Chair/Bed-to-Chair Transfer CARE Score 3   Toileting Hygiene   Type of Assistance Needed Physical assistance   Physical Assistance Level 76% or more   Comment pt toilted about 1 hour after lunch, already incontinent of bowel movement  Pt stood at drop arm [facility] over toilet using L UE on grab bar with assist x1 for hygiene/perineal care and to clothing management/to change brief  Toileting Hygiene CARE Score 2   Toilet Transfer   Type of Assistance Needed Physical assistance; Adaptive equipment   Physical Assistance Level 26%-50%   Comment sit pivot transfer to/from w/c and drop arm standard BSC over toilet   pt able to transfer using " "L UE support on BSC, not grab bar, this session   Toilet Transfer CARE Score 3   Cognition   Overall Cognitive Status Impaired   Arousal/Participation Alert; Cooperative   Orientation Level Oriented X4   Memory Decreased short term memory   Following Commands Follows multistep commands with increased time or repetition   Activity Tolerance   Activity Tolerance Patient tolerated treatment well   Assessment   Treatment Assessment Pt participated in skilled OT tx session  See above for further details on functional performance  Pt's wife Roselind Lanes present for initial family training  OT reviewed the following: at this time recommendation is for w/c level at home, however goal for continued 2 more weeks of acute rehab and therefore transfer/mobility status may change  Pt's wife showed pictures of house, pt will have 1+1 SHANNON and then 1st floor setup in living room, downstairs is open area with wide doorways for w/c  1/2 bathroom on 1st floor per wife is 30\" wide, pt's current w/c is 27\"  Based on pictures w/c would fit into bathroom but then pt would need to back out as he would not be able to turn around  Pt's dtr will be moving into a house in about 1 month that will have a 1st floor shower, pt's wife has access to a tub bench they could use in future  However, initial plan upon d/c from [facility] will be for sponge bathing while seated in w/c, likely at kitchen sink as there would be more space than bathroom  Demonstrated to pt's wife - UB dressing, sit pivot transfers, bed mobility, sit pivots on/off drop arm BSC  Pt's wife began to practice w/c brake and armrest management, but no further hands on training as anticipate pt's functional level will improve from his performance today  Family training will occur again Wednesday 3/22 at 12:30 and then again Thursday/Friday (before she starts her new job on the week of 27th)  Thursday will plan for ADL sponge bathing at w/c level   Pt will continue to benefit from skilled OT " intervention to address R UE neuromuscular education utilizing Reogo for shoulder, NMES for shoulder and distal R UE re-ed in order to maximize functional independence in ADLS, functional mobility/transfers, while decreasing burden of care  Pt in PT family training at end of session  Prognosis Good   Problem List Decreased strength;Decreased range of motion;Decreased endurance; Impaired balance;Decreased coordination;Decreased mobility; Decreased cognition; Impaired sensation; Impaired tone   Barriers to Discharge Inaccessible home environment   Plan   Treatment/Interventions Functional transfer training;ADL retraining; Therapeutic exercise;Cognitive reorientation; Endurance training;Patient/family training;Equipment eval/education; Bed mobility; Compensatory technique education   Progress Progressing toward goals   Recommendation   OT Discharge Recommendation   (pending progress)   OT Therapy Minutes   OT Time In 1230   OT Time Out 1330   OT Total Time (minutes) 60   OT Mode of treatment - Individual (minutes) 60   OT Mode of treatment - Concurrent (minutes) 0   OT Mode of treatment - Group (minutes) 0   OT Mode of treatment - Co-treat (minutes) 0   OT Mode of Treatment - Total time(minutes) 60 minutes   OT Cumulative Minutes 985   Therapy Time missed   Time missed?  No

## 2023-03-16 NOTE — PROGRESS NOTES
03/16/23 1000   Pain Assessment   Pain Assessment Tool 0-10   Pain Score No Pain   Restrictions/Precautions   Precautions Aphasia;Bed/chair alarms;Cognitive; Fall Risk;Supervision on toilet/commode   Weight Bearing Restrictions No   ROM Restrictions No   Cognition   Overall Cognitive Status Impaired   Arousal/Participation Alert; Cooperative   Attention Within functional limits   Orientation Level Oriented X4   Memory Decreased short term memory   Following Commands Follows one step commands without difficulty   Sit to Stand   Type of Assistance Needed Physical assistance;Verbal cues   Physical Assistance Level 26%-50%   Comment VC's for R hand placement   Sit to Stand CARE Score 3   Bed-Chair Transfer   Type of Assistance Needed Physical assistance;Verbal cues   Physical Assistance Level 26%-50%   Comment Sit pivot/stand pivot no AD, VC's for R hand and foot placement   Chair/Bed-to-Chair Transfer CARE Score 3   Transfer Bed/Chair/Wheelchair   Adaptive Equipment Hand Hold   Walk 10 Feet   Type of Assistance Needed Physical assistance;Verbal cues   Physical Assistance Level Total assistance   Comment initially [address] x2 then MODA x2 HHA with WC follow  Walk 10 Feet CARE Score 1   Walk 50 Feet with Two Turns   Type of Assistance Needed Physical assistance;Verbal cues   Physical Assistance Level Total assistance   Comment initially [address] x2 then MODA x2 HHA with WC follow  Walk 50 Feet with Two Turns CARE Score 1   Walk 150 Feet   Type of Assistance Needed Physical assistance;Verbal cues   Physical Assistance Level Total assistance   Comment initially [address] x2 then MODA x2 HHA with WC follow  Walk 150 Feet CARE Score 1   Walking 10 Feet on Uneven Surfaces   Reason if not Attempted Safety concerns   Walking 10 Feet on Uneven Surfaces CARE Score 88   Ambulation   Primary Mode of Locomotion Prior to Admission Walk   Distance Walked (feet) 150 ft  (x3)   Assist Device   (HHA x2)   Gait Pattern Inconsistant Mae; Slow Mae;Decreased foot clearance;R foot drag; Forward Flexion;R hemiparesis; Narrow GAYLA;Scissoring;Shuffle;Improper weight shift   Limitations Noted In Balance; Coordination; Heel Strike;Midline Orientation;Posture; Safety;Speed;Strength;Swing   Provided Assistance with: Balance;Direction;Weight Shift;Trunk Support   Walk Assist Level Moderate Assist;Maximum Assist;Chair Follow   Does the patient walk? 2  Yes   Wheel 50 Feet with Two Turns   Type of Assistance Needed Supervision;Verbal cues   Wheel 50 Feet with Two Turns CARE Score 4   Wheelchair mobility   Does the patient use a wheelchair? 1  Yes   Type of Wheelchair Used 1  Manual   Method Right lower extremity; Left lower extremity   Distance Level Surface (feet) 50 ft   Curb or Single Stair   Reason if not Attempted Safety concerns   1 Step (Curb) CARE Score 88   4 Steps   Reason if not Attempted Safety concerns   4 Steps CARE Score 88   12 Steps   Reason if not Attempted Safety concerns   12 Steps CARE Score 88   Picking Up Object   Reason if not Attempted Safety concerns   Picking Up Object CARE Score 88   Therapeutic Interventions   Neuromuscular Re-Education step thru's BLE x10 reps 3# to RLE MODA, HHA x3 x150, slight scissoring noted on RLE at times and increased R lean  With increased A to weight shift, pt pulled away more and increased R lean  Equipment Use   NuStep L1 x10 min with R hand attachment used for general conditioning  Assessment   Treatment Assessment Pt participated in skilled PT session with increased focus on NPP gait, increased ROM, coordination, increased balance, righting reactions and safety awareness  Pt noted improved RLE step with some scuffing but no toe drag  Pt also noted decreased A with last 2 walks  Pt will cont to work on increased balance, increased gait, increased balance, increased coordination, improved righting reactions and stair management  Cont POC as tolerated     Problem List Decreased strength;Decreased range of motion;Decreased endurance; Impaired balance;Decreased mobility; Decreased coordination;Decreased cognition;Decreased safety awareness; Impaired sensation; Impaired tone   Barriers to Discharge Decreased caregiver support; Inaccessible home environment   PT Barriers   Functional Limitation Car transfers; Ramp negotiation;Stair negotiation;Standing;Transfers; Walking; Wheelchair management   Plan   Treatment/Interventions Functional transfer training;LE strengthening/ROM; Therapeutic exercise; Endurance training;Patient/family training;Gait training;Cognitive reorientation   Progress Progressing toward goals   Recommendation   PT Discharge Recommendation   (TBD)   Equipment Recommended   (TBD)   PT Therapy Minutes   PT Time In 1000   PT Time Out 1100   PT Total Time (minutes) 60   PT Mode of treatment - Individual (minutes) 60   PT Mode of treatment - Concurrent (minutes) 0   PT Mode of treatment - Group (minutes) 0   PT Mode of treatment - Co-treat (minutes) 0   PT Mode of Treatment - Total time(minutes) 60 minutes   PT Cumulative Minutes 803   Therapy Time missed   Time missed?  No

## 2023-03-16 NOTE — CASE MANAGEMENT
Edmar called Ishmael at [facility] to inquire about update from prior clinical review  Left message on voice mail requesting return phone call  EDMAR faxed clinical review update to [facility]: [phone], awaiting call back with determination  CM called Ishmael made her aware new clinical sent to her via fax, with request for phone call with determination

## 2023-03-16 NOTE — PLAN OF CARE
Problem: Prexisting or High Potential for Compromised Skin Integrity  Goal: Skin integrity is maintained or improved  Description: INTERVENTIONS:  - Identify patients at risk for skin breakdown  - Assess and monitor skin integrity  - Assess and monitor nutrition and hydration status  - Monitor labs   - Assess for incontinence   - Turn and reposition patient  - Assist with mobility/ambulation  - Relieve pressure over bony prominences  - Avoid friction and shearing  - Provide appropriate hygiene as needed including keeping skin clean and dry  - Evaluate need for skin moisturizer/barrier cream  - Collaborate with interdisciplinary team   - Patient/family teaching  - Consider wound care consult   3/16/2023 0058 by Solomon Paz RN  Outcome: Progressing  3/15/2023 1202 by Solomon Paz RN  Outcome: Progressing     Problem: MOBILITY - ADULT  Goal: Maintain or return to baseline ADL function  Description: INTERVENTIONS:  -  Assess patient's ability to carry out ADLs; assess patient's baseline for ADL function and identify physical deficits which impact ability to perform ADLs (bathing, care of mouth/teeth, toileting, grooming, dressing, etc )  - Assess/evaluate cause of self-care deficits   - Assess range of motion  - Assess patient's mobility; develop plan if impaired  - Assess patient's need for assistive devices and provide as appropriate  - Encourage maximum independence but intervene and supervise when necessary  - Involve family in performance of ADLs  - Assess for home care needs following discharge   - Consider OT consult to assist with ADL evaluation and planning for discharge  - Provide patient education as appropriate  3/16/2023 0058 by Solomon Paz RN  Outcome: Progressing  3/15/2023 1202 by Solomon Paz RN  Outcome: Progressing  Goal: Maintains/Returns to pre admission functional level  Description: INTERVENTIONS:  - Perform BMAT or MOVE assessment daily    - Set and communicate daily mobility goal to care team and patient/family/caregiver  - Collaborate with rehabilitation services on mobility goals if consulted  - Perform Range of Motion 3 times a day  - Reposition patient every 2 hours    - Dangle patient 3 times a day  - Stand patient 3 times a day  - Ambulate patient 3 times a day  - Out of bed to chair 3 times a day   - Out of bed for meals 3 times a day  - Out of bed for toileting  - Record patient progress and toleration of activity level   3/16/2023 0058 by Lillie Ospina RN  Outcome: Progressing  3/15/2023 1202 by Lillie Ospina RN  Outcome: Progressing     Problem: PAIN - ADULT  Goal: Verbalizes/displays adequate comfort level or baseline comfort level  Description: Interventions:  - Encourage patient to monitor pain and request assistance  - Assess pain using appropriate pain scale  - Administer analgesics based on type and severity of pain and evaluate response  - Implement non-pharmacological measures as appropriate and evaluate response  - Consider cultural and social influences on pain and pain management  - Notify physician/advanced practitioner if interventions unsuccessful or patient reports new pain  3/16/2023 0058 by Lillie Ospina RN  Outcome: Progressing  3/15/2023 1202 by Lillie Ospina RN  Outcome: Progressing     Problem: INFECTION - ADULT  Goal: Absence or prevention of progression during hospitalization  Description: INTERVENTIONS:  - Assess and monitor for signs and symptoms of infection  - Monitor lab/diagnostic results  - Monitor all insertion sites, i e  indwelling lines, tubes, and drains  - Monitor endotracheal if appropriate and nasal secretions for changes in amount and color  - [place] appropriate cooling/warming therapies per order  - Administer medications as ordered  - Instruct and encourage patient and family to use good hand hygiene technique  - Identify and instruct in appropriate isolation precautions for identified infection/condition  3/16/2023 0058 by Lillie Ospina RN  Outcome: Progressing  3/15/2023 1202 by Rony Christianson RN  Outcome: Progressing  Goal: Absence of fever/infection during neutropenic period  Description: INTERVENTIONS:  - Monitor WBC    3/16/2023 0058 by Rony Christianson RN  Outcome: Progressing  3/15/2023 1202 by Rony Christinason RN  Outcome: Progressing     Problem: SAFETY ADULT  Goal: Patient will remain free of falls  Description: INTERVENTIONS:  - Educate patient/family on patient safety including physical limitations  - Instruct patient to call for assistance with activity   - Consult OT/PT to assist with strengthening/mobility   - Keep Call bell within reach  - Keep bed low and locked with side rails adjusted as appropriate  - Keep care items and personal belongings within reach  - Initiate and maintain comfort rounds  - Make Fall Risk Sign visible to staff  - Offer Toileting every 2 Hours, in advance of need  - Initiate/Maintain bed/chair alarm  - Obtain necessary fall risk management equipment: nonskid socks  - Apply yellow socks and bracelet for high fall risk patients  - Consider moving patient to room near nurses station  3/16/2023 0058 by Rony Christianson RN  Outcome: Progressing  3/15/2023 1202 by Rony Christianson RN  Outcome: Progressing  Goal: Maintain or return to baseline ADL function  Description: INTERVENTIONS:  -  Assess patient's ability to carry out ADLs; assess patient's baseline for ADL function and identify physical deficits which impact ability to perform ADLs (bathing, care of mouth/teeth, toileting, grooming, dressing, etc )  - Assess/evaluate cause of self-care deficits   - Assess range of motion  - Assess patient's mobility; develop plan if impaired  - Assess patient's need for assistive devices and provide as appropriate  - Encourage maximum independence but intervene and supervise when necessary  - Involve family in performance of ADLs  - Assess for home care needs following discharge   - Consider OT consult to assist with ADL evaluation and planning for discharge  - Provide patient education as appropriate  3/16/2023 0058 by Marie Riggins RN  Outcome: Progressing  3/15/2023 1202 by Marie Riggins RN  Outcome: Progressing  Goal: Maintains/Returns to pre admission functional level  Description: INTERVENTIONS:  - Perform BMAT or MOVE assessment daily    - Set and communicate daily mobility goal to care team and patient/family/caregiver  - Collaborate with rehabilitation services on mobility goals if consulted  - Perform Range of Motion 3 times a day  - Reposition patient every 2 hours  - Dangle patient 3 times a day  - Stand patient 3 times a day  - Ambulate patient 3 times a day  - Out of bed to chair 3 times a day   - Out of bed for meals 3 times a day  - Out of bed for toileting  - Record patient progress and toleration of activity level   3/16/2023 0058 by Marie Riggins RN  Outcome: Progressing  3/15/2023 1202 by Marie Riggins RN  Outcome: Progressing     Problem: DISCHARGE PLANNING  Goal: Discharge to home or other facility with appropriate resources  Description: INTERVENTIONS:  - Identify barriers to discharge w/patient and caregiver  - Arrange for needed discharge resources and transportation as appropriate  - Identify discharge learning needs (meds, wound care, etc )  - Arrange for interpretive services to assist at discharge as needed  - Refer to Case Management Department for coordinating discharge planning if the patient needs post-hospital services based on physician/advanced practitioner order or complex needs related to functional status, cognitive ability, or social support system  3/16/2023 0058 by Marie Riggins RN  Outcome: Progressing  3/15/2023 1202 by Marie Riggins RN  Outcome: Progressing     Problem: Nutrition/Hydration-ADULT  Goal: Nutrient/Hydration intake appropriate for improving, restoring or maintaining nutritional needs  Description: Monitor and assess patient's nutrition/hydration status for malnutrition   Collaborate with interdisciplinary team and initiate plan and interventions as ordered  Monitor patient's weight and dietary intake as ordered or per policy  Utilize nutrition screening tool and intervene as necessary  Determine patient's food preferences and provide high-protein, high-caloric foods as appropriate       INTERVENTIONS:  - Monitor oral intake, urinary output, labs, and treatment plans  - Assess nutrition and hydration status and recommend course of action  - Evaluate amount of meals eaten  - Assist patient with eating if necessary   - Allow adequate time for meals  - Recommend/ encourage appropriate diets, oral nutritional supplements, and vitamin/mineral supplements  - Order, calculate, and assess calorie counts as needed  - Recommend, monitor, and adjust tube feedings and TPN/PPN based on assessed needs  - Assess need for intravenous fluids  - Provide specific nutrition/hydration education as appropriate  - Include patient/family/caregiver in decisions related to nutrition  3/16/2023 0058 by Ginger Hardy RN  Outcome: Progressing  3/15/2023 1202 by Ginger Hardy RN  Outcome: Progressing

## 2023-03-16 NOTE — PLAN OF CARE
Reviewed    Problem: Prexisting or High Potential for Compromised Skin Integrity  Goal: Skin integrity is maintained or improved  Description: INTERVENTIONS:  - Identify patients at risk for skin breakdown  - Assess and monitor skin integrity  - Assess and monitor nutrition and hydration status  - Monitor labs   - Assess for incontinence   - Turn and reposition patient  - Assist with mobility/ambulation  - Relieve pressure over bony prominences  - Avoid friction and shearing  - Provide appropriate hygiene as needed including keeping skin clean and dry  - Evaluate need for skin moisturizer/barrier cream  - Collaborate with interdisciplinary team   - Patient/family teaching  - Consider wound care consult   Outcome: Progressing     Problem: MOBILITY - ADULT  Goal: Maintain or return to baseline ADL function  Description: INTERVENTIONS:  -  Assess patient's ability to carry out ADLs; assess patient's baseline for ADL function and identify physical deficits which impact ability to perform ADLs (bathing, care of mouth/teeth, toileting, grooming, dressing, etc )  - Assess/evaluate cause of self-care deficits   - Assess range of motion  - Assess patient's mobility; develop plan if impaired  - Assess patient's need for assistive devices and provide as appropriate  - Encourage maximum independence but intervene and supervise when necessary  - Involve family in performance of ADLs  - Assess for home care needs following discharge   - Consider OT consult to assist with ADL evaluation and planning for discharge  - Provide patient education as appropriate  Outcome: Progressing  Goal: Maintains/Returns to pre admission functional level  Description: INTERVENTIONS:  - Set and communicate daily mobility goal to care team and patient/family/caregiver     - Collaborate with rehabilitation services on mobility goals if consulted  - Out of bed for toileting  - Record patient progress and toleration of activity level   Outcome: Progressing     Problem: PAIN - ADULT  Goal: Verbalizes/displays adequate comfort level or baseline comfort level  Description: Interventions:  - Encourage patient to monitor pain and request assistance  - Assess pain using appropriate pain scale  - Administer analgesics based on type and severity of pain and evaluate response  - Implement non-pharmacological measures as appropriate and evaluate response  - Consider cultural and social influences on pain and pain management  - Notify physician/advanced practitioner if interventions unsuccessful or patient reports new pain  Outcome: Progressing     Problem: INFECTION - ADULT  Goal: Absence or prevention of progression during hospitalization  Description: INTERVENTIONS:  - Assess and monitor for signs and symptoms of infection  - Monitor lab/diagnostic results  - Monitor all insertion sites, i e  indwelling lines, tubes, and drains  - Monitor endotracheal if appropriate and nasal secretions for changes in amount and color  - [place] appropriate cooling/warming therapies per order  - Administer medications as ordered  - Instruct and encourage patient and family to use good hand hygiene technique  - Identify and instruct in appropriate isolation precautions for identified infection/condition  Outcome: Progressing  Goal: Absence of fever/infection during neutropenic period  Description: INTERVENTIONS:  - Monitor WBC    Outcome: Progressing     Problem: SAFETY ADULT  Goal: Patient will remain free of falls  Description: INTERVENTIONS:  - Educate patient/family on patient safety including physical limitations  - Instruct patient to call for assistance with activity   - Consult OT/PT to assist with strengthening/mobility   - Keep Call bell within reach  - Keep bed low and locked with side rails adjusted as appropriate  - Keep care items and personal belongings within reach  - Initiate and maintain comfort rounds  - Make Fall Risk Sign visible to staff  - Offer Toileting every 4 Hours, in advance of need  - Initiate/Maintain bed and chair alarm  - Apply yellow socks and bracelet for high fall risk patients  - Consider moving patient to room near nurses station  Outcome: Progressing  Goal: Maintain or return to baseline ADL function  Description: INTERVENTIONS:  -  Assess patient's ability to carry out ADLs; assess patient's baseline for ADL function and identify physical deficits which impact ability to perform ADLs (bathing, care of mouth/teeth, toileting, grooming, dressing, etc )  - Assess/evaluate cause of self-care deficits   - Assess range of motion  - Assess patient's mobility; develop plan if impaired  - Assess patient's need for assistive devices and provide as appropriate  - Encourage maximum independence but intervene and supervise when necessary  - Involve family in performance of ADLs  - Assess for home care needs following discharge   - Consider OT consult to assist with ADL evaluation and planning for discharge  - Provide patient education as appropriate  Outcome: Progressing  Goal: Maintains/Returns to pre admission functional level  Description: INTERVENTIONS:  - Set and communicate daily mobility goal to care team and patient/family/caregiver     - Collaborate with rehabilitation services on mobility goals if consulted  - Out of bed for toileting  - Record patient progress and toleration of activity level   Outcome: Progressing     Problem: DISCHARGE PLANNING  Goal: Discharge to home or other facility with appropriate resources  Description: INTERVENTIONS:  - Identify barriers to discharge w/patient and caregiver  - Arrange for needed discharge resources and transportation as appropriate  - Identify discharge learning needs (meds, wound care, etc )  - Arrange for interpretive services to assist at discharge as needed  - Refer to Case Management Department for coordinating discharge planning if the patient needs post-hospital services based on physician/advanced practitioner order or complex needs related to functional status, cognitive ability, or social support system  Outcome: Progressing     Problem: Nutrition/Hydration-ADULT  Goal: Nutrient/Hydration intake appropriate for improving, restoring or maintaining nutritional needs  Description: Monitor and assess patient's nutrition/hydration status for malnutrition  Collaborate with interdisciplinary team and initiate plan and interventions as ordered  Monitor patient's weight and dietary intake as ordered or per policy  Utilize nutrition screening tool and intervene as necessary  Determine patient's food preferences and provide high-protein, high-caloric foods as appropriate       INTERVENTIONS:  - Monitor oral intake, urinary output, labs, and treatment plans  - Assess nutrition and hydration status and recommend course of action  - Evaluate amount of meals eaten  - Assist patient with eating if necessary   - Allow adequate time for meals  - Recommend/ encourage appropriate diets, oral nutritional supplements, and vitamin/mineral supplements  - Order, calculate, and assess calorie counts as needed  - Recommend, monitor, and adjust tube feedings and TPN/PPN based on assessed needs  - Assess need for intravenous fluids  - Provide specific nutrition/hydration education as appropriate  - Include patient/family/caregiver in decisions related to nutrition  Outcome: Progressing

## 2023-03-16 NOTE — PROGRESS NOTES
Internal Medicine Progress Note  Patient: Bartolo Perez  Age/sex: 60 y o  male  Medical Record #: [id number]      ASSESSMENT/PLAN: (Interval History)  Bartolo Perez is seen and examined and management for following issues:    Posterior circulation stroke  • S/p intracranial and extracranial vertebral artery stenting/TICI 2B revascularization  • Continue ASA, Brilinta, Eliquis and atorvastatin  • Repeat CTA head and neck around 3/17/23  • Prozac for depressed mood following CVA   • Neuropsych pending  • Dysphagia 3 diet with nectar thick liquids  • Continue Baclofen 5mg 4x daily  • Therapy per primary service  • Outpt follow-up with Neurology and Neurosurgery    Rapid response  · Likely d/t vasovagal syncope  Neurology agrees that event was unlikely seizure  · Felt dizzy/weak prior to episode then was staring blankly initially when he came back around  · Eventually returned to his baseline  · No further events      HTN  • Home: No medications  • Here: amlodipine 10mg daily/Coreg 25mg 2x daily/lisinopril 40mg daily (increased 3/15)  • Goal normotension    Fe deficiency anemia  · Likely multifactoral  · s/p IV venofer x 2 doses  · Cont daily ferrous sulfate  · Cbc mondays     CKD stage 2  • Baseline Cr 1 2 - 1 3  • Chlorthalidone dc'd  • BMP stable  • Encourage fluids  • stable     COVID  • Precautions lifted  • Pt was asymptomatic     Prediabetes  • HA1C 5 7  • Recommend dietary modifications       DC planning:  TBD  The above assessment and plan was reviewed and updated as determined by my evaluation of the patient on 3/16/2023      Labs:   Results from last 7 days   Lab Units 03/13/23  0637   WBC Thousand/uL 7 78   HEMOGLOBIN g/dL 10 1*   HEMATOCRIT % 32 3*   PLATELETS Thousands/uL 288     Results from last 7 days   Lab Units 03/16/23  0613 03/13/23  0637   SODIUM mmol/L 139 138   POTASSIUM mmol/L 4 0 3 9   CHLORIDE mmol/L 109* 109*   CO2 mmol/L 25 25   BUN mg/dL 37* 34*   CREATININE mg/dL 1 36* 1 35*   CALCIUM mg/dL 9 0 9 0                   Review of Scheduled Meds:  Current Facility-Administered Medications   Medication Dose Route Frequency Provider Last Rate   • acetaminophen  650 mg Oral Q6H PRN YURI Bryson     • amLODIPine  10 mg Oral Daily Efrem Bearden MD     • apixaban  2 5 mg Oral BID Efrem Bearden MD     • aspirin  81 mg Oral Daily Efrem Bearden MD     • atorvastatin  40 mg Oral Daily With Dave Corona MD     • baclofen  5 mg Oral 4x Daily Efrem Bearden MD     • carvedilol  25 mg Oral BID With Meals YURI Bryson     • vitamin B-12  1,000 mcg Oral Daily Efrem Bearden MD     • ferrous sulfate  325 mg Oral Daily With Breakfast YURI Bryson     • FLUoxetine  20 mg Oral Daily Efrem Bearden MD     • hydrALAZINE  25 mg Oral Q8H PRN YURI Bryson     • lisinopril  40 mg Oral Daily YURI Bryson     • polyethylene glycol  17 g Oral Daily PRN Efrem Bearden MD     • ticagrelor  90 mg Oral Q12H [facility] Efrem Bearden MD         Subjective/ HPI: Patient seen and examined  Patients overnight issues or events were reviewed with nursing or staff during rounds or morning huddle session  New or overnight issues include the following:     Pt seen in bed  Had therapy already this am  No overnight issues    ROS:   A 10 point ROS was performed; negative except as noted above         Imaging:     No orders to display       *Labs /Radiology studies Reviewed  *Medications  reviewed and reconciled as needed  *Please refer to order section for additional ordered labs studies  *Case discussed with primary attending during morning huddle case rounds    Physical Examination:  Vitals:   Vitals:    03/15/23 0927 03/15/23 1455 03/16/23 0607 03/16/23 0750   BP: 135/58 128/66 161/71 158/78   BP Location:  Right arm Left arm    Pulse:  56 58    Resp:  17 18    Temp:  98 °F (36 7 °C) 98 2 °F (36 8 °C)    TempSrc:  Oral Oral    SpO2:  100% 98%    Weight:       Height:           GEN: No apparent distress, pleasant  NEURO: Alert and oriented x3; Mild dysarthria  HEENT: Pupils are equal and reactive, EOMI, mucous membranes are moist, face asymmetrical  CV: S1 S2 regular, no MRG, no peripheral edema noted  RESP: Lungs are clear bilaterally, no wheezes, rales or rhonchi noted, on room air, respirations easy and non labored  GI: Flat, soft non tender, non distended; +BS x4  : Voiding without difficulty   MUSC: Moves all extremities; Rt hemiparesis UE > LE improving  SKIN: pink, warm and dry, normal turgor, no rashes, lesions      The above physical exam was reviewed and updated as determined by my evaluation of the patient on 3/16/2023  Invasive Devices     None                    VTE Pharmacologic Prophylaxis: Eliquis  Code Status: Level 1 - Full Code  Current Length of Stay: 10 day(s)      Total time spent:  30 minutes with more than 50% spent counseling/coordinating care  Counseling includes discussion with patient re: progress  and discussion with patient of his/her current medical state/information  Coordination of patient's care was performed in conjunction with primary service  Time invested included review of patient's labs, vitals, and management of their comorbidities with continued monitoring  In addition, this patient was discussed with medical team including physician and advanced extenders  The care of the patient was extensively discussed and appropriate treatment plan was formulated unique for this patient  Medical decision making for the day was made by supervising physician unless otherwise noted in their attestation statement  ** Please Note:  voice to text software may have been used in the creation of this document   Although proof errors in transcription or interpretation are a potential of such software**

## 2023-03-16 NOTE — PROGRESS NOTES
"OT Treatment Note       03/16/23 0700   Pain Assessment   Pain Assessment Tool 0-10   Pain Score No Pain   Restrictions/Precautions   Precautions Aphasia; Aspiration;Bed/chair alarms;Cognitive; Fall Risk;Supervision on toilet/commode   Lifestyle   Autonomy \"its (Aqua K pad) nice\"   Eating   Type of Assistance Needed Set-up / clean-up   Physical Assistance Level No physical assistance   Eating CARE Score 5   Oral Hygiene   Type of Assistance Needed Supervision   Physical Assistance Level No physical assistance   Comment Seated in w/c at sink  Pt able to stabilize toothpaste with RUE and take cap off with LUE  Oral Hygiene CARE Score 4   Shower/Bathe Self   Type of Assistance Needed Physical assistance   Physical Assistance Level 26%-50%   Comment Seated in w/c pt able to bathe UB- utilizing RUE when possible, groin, and L LE by reaching down, OT A with holding RLE in crossed leg technique 2* pt not being able reach down extending period of time or hold LE up long enough  In stance with LUE on bed rail and R knee block, OT A with buttocks  Shower/Bathe Self CARE Score 3   Bathing   Assessed Bath Style Sponge Bath   Upper Body Dressing   Type of Assistance Needed Supervision   Physical Assistance Level No physical assistance   Comment seated in w/c to don/doff shirt   Upper Body Dressing CARE Score 4   Lower Body Dressing   Type of Assistance Needed Physical assistance   Physical Assistance Level 51%-75%   Comment Seated in w/c to thread BLE into pants without socks on  In stance with LUE on bed rail (with socks on), OT A with managing clothes over hips  Plan to trial donning with socks on from now on  OT A with brief  Lower Body Dressing CARE Score 2   Putting On/Taking Off Footwear   Type of Assistance Needed Physical assistance   Physical Assistance Level 25% or less   Comment Seated in w/c, pt able to doff b/l socks  Able to don L sock with LUE by reaching down   Unable to reach down extended period of time to " for RLE  OT A with holding RLE in crossed legged position, pt then able to don R sock  Putting On/Taking Off Footwear CARE Score 3   Sit to Stand   Type of Assistance Needed Physical assistance   Physical Assistance Level 26%-50%   Comment At drop arm commode over toilet and bed rail, pt uses LUE on grab bar to stand with R knee block at min A level  Anticipate Mod A without grab bar  Sit to Stand CARE Score 3   Bed-Chair Transfer   Type of Assistance Needed Physical assistance   Physical Assistance Level 26%-50%   Comment Sit pivot with R knee block   Chair/Bed-to-Chair Transfer CARE Score 3   Toileting Hygiene   Type of Assistance Needed Physical assistance   Physical Assistance Level 76% or more   Comment In stance, with R knee block and pt utilizing L grab bar  OT A with rear hygiene and clothing management  Toileting Hygiene CARE Score 2   Toilet Transfer   Type of Assistance Needed Physical assistance   Physical Assistance Level 26%-50%   Comment Mod Ax1 sit pivot to drop arm commode over toilet with pt utilizing L grab bar  Toilet Transfer CARE Score 3   Neuromuscular Education   Comments Seated, OT applied NMES AVIA STIM XP unit channel 1 to pts R middle deltoid and supraspinatus to increase shoulder elevation with use of mirror for visual feedback  With focus on 5-8 second hold during elevation and slow controlled release  VC to sit up straight  For 3 sets of 5 reps 2* fatigue  Then with OT provided point of control at elbow and forearm to  weight of arm and focus on shoulder elevatin, pt completed 3 sets of 5 reps  With same placement, pt completed shoulder abduction/adduction with OT AAROM with point of control at elbow and forearm  A of second to A controlling NMES unit and maintaining upright posture and scapular re-traction  Cognition   Overall Cognitive Status Impaired   Arousal/Participation Alert; Cooperative   Attention Within functional limits   Orientation Level Oriented X4 Memory Decreased short term memory   Following Commands Follows one step commands without difficulty   Activity Tolerance   Activity Tolerance Patient tolerated treatment well   Assessment   Treatment Assessment Pt engaged in skilled OT tx session focused on ADLs, and RUE NMR  See above for further details on functional performance  This was first session where pt was Ax1 for toileting, bathing, and dressing  However, continue to keep pt Ax2 for toileting with nursing incase of incontinence  Pt continuing to increase performance in dressing, as he was able to fully don/doff shirt with supervision  Pt stated Aqua K pad helps but moist heat during OT sessions work better to decrease muscle tightness  Cont OT POC with ADL retraining, RUE NMR, functional standing tolerance/endurance, sit pivot transfers, and stroke education  Pt left in recliner with alarm activated and all needs in reach  FT to occur later today with pts wife to go over home set up  Prognosis Good   Problem List Decreased strength;Decreased range of motion;Decreased endurance; Impaired balance;Decreased mobility; Decreased coordination;Decreased cognition;Decreased safety awareness; Impaired sensation; Impaired tone   Barriers to Discharge Decreased caregiver support; Inaccessible home environment   Plan   Treatment/Interventions ADL retraining;Functional transfer training; Therapeutic exercise; Endurance training;Cognitive reorientation;Patient/family training;Equipment eval/education; Compensatory technique education   Progress Progressing toward goals   Recommendation   OT Discharge Recommendation   (pending progress)   OT Therapy Minutes   OT Time In 0700   OT Time Out 0830   OT Total Time (minutes) 90   OT Mode of treatment - Individual (minutes) 90   OT Mode of treatment - Concurrent (minutes) 0   OT Mode of treatment - Group (minutes) 0   OT Mode of treatment - Co-treat (minutes) 0   OT Mode of Treatment - Total time(minutes) 90 minutes   OT Cumulative Minutes 925   Therapy Time missed   Time missed?  No

## 2023-03-16 NOTE — QUICK NOTE
Notified by PMR that patient is going for follow up CTA with contrast, will start NS @ 75 cc/hr for renal protection

## 2023-03-17 ENCOUNTER — APPOINTMENT (INPATIENT)
Dept: RADIOLOGY | Facility: HOSPITAL | Age: 63
End: 2023-03-17

## 2023-03-17 LAB
ANION GAP SERPL CALCULATED.3IONS-SCNC: 7 MMOL/L (ref 4–13)
BUN SERPL-MCNC: 29 MG/DL (ref 5–25)
CALCIUM SERPL-MCNC: 8.9 MG/DL (ref 8.3–10.1)
CHLORIDE SERPL-SCNC: 111 MMOL/L (ref 96–108)
CO2 SERPL-SCNC: 24 MMOL/L (ref 21–32)
CREAT SERPL-MCNC: 1.3 MG/DL (ref 0.6–1.3)
GFR SERPL CREATININE-BSD FRML MDRD: 58 ML/MIN/1.73SQ M
GLUCOSE SERPL-MCNC: 101 MG/DL (ref 65–140)
POTASSIUM SERPL-SCNC: 4.1 MMOL/L (ref 3.5–5.3)
SODIUM SERPL-SCNC: 142 MMOL/L (ref 135–147)

## 2023-03-17 RX ORDER — SODIUM CHLORIDE 9 MG/ML
75 INJECTION, SOLUTION INTRAVENOUS CONTINUOUS
Status: DISCONTINUED | OUTPATIENT
Start: 2023-03-17 | End: 2023-03-17

## 2023-03-17 RX ORDER — HYDRALAZINE HYDROCHLORIDE 25 MG/1
25 TABLET, FILM COATED ORAL EVERY 12 HOURS
Status: DISCONTINUED | OUTPATIENT
Start: 2023-03-17 | End: 2023-03-19

## 2023-03-17 RX ADMIN — BACLOFEN 5 MG: 10 TABLET ORAL at 22:03

## 2023-03-17 RX ADMIN — HYDRALAZINE HYDROCHLORIDE 25 MG: 25 TABLET, FILM COATED ORAL at 07:52

## 2023-03-17 RX ADMIN — CYANOCOBALAMIN TAB 500 MCG 1000 MCG: 500 TAB at 09:03

## 2023-03-17 RX ADMIN — IOHEXOL 85 ML: 350 INJECTION, SOLUTION INTRAVENOUS at 19:10

## 2023-03-17 RX ADMIN — SODIUM CHLORIDE 75 ML/HR: 0.9 INJECTION, SOLUTION INTRAVENOUS at 18:31

## 2023-03-17 RX ADMIN — TICAGRELOR 90 MG: 90 TABLET ORAL at 20:21

## 2023-03-17 RX ADMIN — AMLODIPINE BESYLATE 10 MG: 10 TABLET ORAL at 09:04

## 2023-03-17 RX ADMIN — SODIUM CHLORIDE 75 ML/HR: 0.9 INJECTION, SOLUTION INTRAVENOUS at 12:40

## 2023-03-17 RX ADMIN — BACLOFEN 5 MG: 10 TABLET ORAL at 09:04

## 2023-03-17 RX ADMIN — FERROUS SULFATE TAB 325 MG (65 MG ELEMENTAL FE) 325 MG: 325 (65 FE) TAB at 07:52

## 2023-03-17 RX ADMIN — Medication 1 PACKET: at 09:04

## 2023-03-17 RX ADMIN — ASPIRIN 81 MG CHEWABLE TABLET 81 MG: 81 TABLET CHEWABLE at 09:03

## 2023-03-17 RX ADMIN — APIXABAN 2.5 MG: 2.5 TABLET, FILM COATED ORAL at 09:04

## 2023-03-17 RX ADMIN — LISINOPRIL 40 MG: 20 TABLET ORAL at 09:03

## 2023-03-17 RX ADMIN — FLUOXETINE 20 MG: 20 CAPSULE ORAL at 09:03

## 2023-03-17 RX ADMIN — APIXABAN 2.5 MG: 2.5 TABLET, FILM COATED ORAL at 18:25

## 2023-03-17 RX ADMIN — CARVEDILOL 25 MG: 25 TABLET, FILM COATED ORAL at 18:27

## 2023-03-17 RX ADMIN — ACETAMINOPHEN 650 MG: 325 TABLET ORAL at 20:21

## 2023-03-17 RX ADMIN — CARVEDILOL 25 MG: 25 TABLET, FILM COATED ORAL at 07:51

## 2023-03-17 RX ADMIN — BACLOFEN 5 MG: 10 TABLET ORAL at 18:25

## 2023-03-17 RX ADMIN — ATORVASTATIN CALCIUM 40 MG: 40 TABLET, FILM COATED ORAL at 18:25

## 2023-03-17 RX ADMIN — SODIUM CHLORIDE 75 ML/HR: 0.9 INJECTION, SOLUTION INTRAVENOUS at 03:44

## 2023-03-17 RX ADMIN — TICAGRELOR 90 MG: 90 TABLET ORAL at 09:05

## 2023-03-17 RX ADMIN — BACLOFEN 5 MG: 10 TABLET ORAL at 12:39

## 2023-03-17 RX ADMIN — HYDRALAZINE HYDROCHLORIDE 25 MG: 25 TABLET, FILM COATED ORAL at 20:20

## 2023-03-17 NOTE — PROGRESS NOTES
"Physical Medicine and Rehabilitation Progress Note  Radha Leonard 60 y o  male MRN: [id number]  Unit/Bed#: -01 Encounter: [id number]      Assessment & Plan:     Decline in ADLs and mobility: Functional assessment -improving        Max assist toileting hygiene and lower body dressing  Moderate assist upper body dressing, bathing  General transfers and toileting transfers moderate assist  Ambulation still 2 person assist    * Acute Posterior Circulation Stroke  Assessment & Plan  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    - Now s/p stenting on 2/26 with TICI 2b revascularization    PPx: ASA/brilinta/Eliquis, Statin   - Per Prior PMR attending \"Discussed with Neurology, they defer to NSx on length of time on eliquis  Potentially 3-4 weeks if felt to be related to COVID  \"   - Repeat CTA H/N - discussed with Nsx; discussed with IM; Cr ok for CT scan at current GFR - nevertheless discussed at length potential risks of kidney injury which could be severe with pt/wife and at this time patient accepts risks; continue IVF for about 12 hours after   3/9 Had nocturnal oximetry to screen for nocturnal hypoxia - only 26 seconds total of mild desaturation  Maintain normotension  Education on prediabetes and diet  Follow-up with Neurovascular/Neurosurgery  Function improving slowly   Continue PT/OT/SLP - for swallow, speech, R sided weakness/tone, will need a good visual exam      Stenosis of left vertebral artery  Assessment & Plan  Severe L vertebral artery origin stenosis    Repeat CTA H/N 2 weeks around 3/17 and follow-up with " neurosurgery  ASA/Brilinta  Atorvastatin  SBP goals 120-160  Follow-up with Neurovascular  Right Vertebral artery dissection [facility])  Assessment & Plan  Now s/p R V3/4 stenting with TICI 2B revascularization on 2/26 for R Vert stenosis  Continue ASA/Brilinta  Statin  Neurosurg f/u 3/17 with repeat CTA H/N  Vasovagal syncope  Assessment & Plan  No further episodes  3/8 Had episode of vasovagal syncope  - No convulsions  On body weight support system, started to feel dizzy  - Staring episode after, but able to sternal rubbed out of it  Very brief   - No post-ictal weakness   - Neurologically stable and back to baseline   - Orthostatics negative  Discussed with Neurology - unlikely seizure given distribution of stroke and presentation  No further testing recommended at this time  Monitor  Neurogenic bowel  Assessment & Plan  Disinhibited bowel + mobility difficulties -somewhat loose as well  Trial Metamucil daily  Not on bowel meds right now  ARC Bladder Training:   - 30-45 min after each meal will get patient onto commode to attempt bowel movement  - He wants to hold off on scheduled suppository for now (would schedule in AM to align with his previous bowel schedule at home)  For now monitor, close continence care especially    Anemia  Assessment & Plan  Stable 3/13  Normocytic anemia noted through stay  B12 borderline low  Folate normal  Iron Panel: Low iron saturation and iron with normal TIBC and Ferritin  Was started in the hospital on B12, but not iron  Per IM - 2 days of IV Venofer (last day 3/8)  Then start oral iron  Monitor Hgb, transfuse as appropriate  Will discuss with IM  Consulted  Adjustment disorder with depressed mood  Assessment & Plan  Acceptable today   Has been tearful and labile during hospitalization  Started on Prozac 20mgdaily - may need to increase dose  Consulted rehab psychology for support      Prediabetes  Assessment & Plan  A1C 5 7  Consult nutrition for education on diabetic diet  Diet/lifestyle modifications  Follow-up with PCP  CKD (chronic kidney disease)  Assessment & Plan  Lab Results   Component Value Date    EGFR 54 03/16/2023    EGFR 55 03/13/2023    EGFR 60 03/06/2023    CREATININE 1 36 (H) 03/16/2023    CREATININE 1 35 (H) 03/13/2023    CREATININE 1 26 03/06/2023     Medicine feels like he is near baseline but with potential contrasted study coming up we will provide IV fluids and repeat BMP in the morning  Per hospital team - suspect CKD Stage 2 2/2 hypertension  Will monitor BMP while here  Avoid nephrotoxic meds and relative hypotension  Recommend outpatient f/u with PCP    Spasticity  Assessment & Plan  Intrinsic tonic tone in R quad which is mild  RUE still overall decreased tone, but starting to develop very mild tone in elbow flexor  Has some hiccups too - this is improving  Would opt to avoid treating R quad for now, as tone there may help stabilize at the knee  R ankle multipodus  3/8 Baclofen to 5mg QID to help with hiccups  Range of motion  Monitor as tone evolves  Outpatient f/u with PMR  Dysphagia  Assessment & Plan  Improved  Admitted with mod-severe oropharyngeal  Has massively improved  3/8 Advanced to Level 3/Thins  3/10 Advanced to Reg/Thins  Aspiration precautions  Good oral hygiene   SLP consulted    Primary hypertension  Assessment & Plan  Home: None  Here: Amlodipine 10mg daily, Coreg 25mg BID, Lisinopril 20mg daily, PRN hydralazine   - IM stopped chlorthalidone and increased coreg  Had hypertensive urgency in hospital requiring cardene gtt  Monitor and adjust as appropriate  IM consulted to assist with management      COVID-resolved as of 3/10/2023  Assessment & Plan  Resolved  Incidentally found to have COVID on 2/25  Asymptomatic from respiratory standpoint  Per Neurosurgery concern for hypercoagulability related to COVID  Currently on Eliquis 2 5mg BID - per Neuro 3-4 weeks probably reasonable, but for them ultimately up to NSx as this was their initial recommendation to start this medication  3/8 Discontinued precautions after 10 days isolation  Other Medical Issues:  •     Restrictions include: Fall precautions    Objective:     Allergies per EMR  Diagnostic Studies: Reviewed, no new imaging  CTA head and neck w wo contrast    (Results Pending)     See above as well    Laboratory: Labs reviewed  Results from last 7 days   Lab Units 03/13/23  0637   HEMOGLOBIN g/dL 10 1*   HEMATOCRIT % 32 3*   WBC Thousand/uL 7 78     Results from last 7 days   Lab Units 03/13/23  0637   BUN mg/dL 34*   SODIUM mmol/L 138   POTASSIUM mmol/L 3 9   CHLORIDE mmol/L 109*   CREATININE mg/dL 1 35*            Drug regimen reviewed, all potential adverse effects identified and addressed:    Current Facility-Administered Medications   Medication Dose Route Frequency Provider Last Rate   • acetaminophen  650 mg Oral Q6H PRN YURI David     • amLODIPine  10 mg Oral Daily Sharita Spaulding MD     • apixaban  2 5 mg Oral BID Sharita Spaulding MD     • aspirin  81 mg Oral Daily Sharita Spaulding MD     • atorvastatin  40 mg Oral Daily With Nelly Ch MD     • baclofen  5 mg Oral 4x Daily Sharita Spaulding MD     • carvedilol  25 mg Oral BID With Meals YURI David     • vitamin B-12  1,000 mcg Oral Daily Sharita Spaulding MD     • ferrous sulfate  325 mg Oral Daily With Breakfast YURI David     • FLUoxetine  20 mg Oral Daily Sharita Spaulding MD     • hydrALAZINE  25 mg Oral Q8H PRN YURI Dvaid     • hydrALAZINE  25 mg Oral Q12H YURI Gallagher     • lisinopril  40 mg Oral Daily YURI David     • polyethylene glycol  17 g Oral Daily PRN Sharita Spaulding MD     • psyllium  1 packet Oral Daily Luciano Strange MD     • sodium chloride  75 mL/hr Intravenous Continuous Luciano Strange MD 75 mL/hr (03/17/23 1240)   • ticagrelor  90 mg Oral Q12H [address] Sharita Spaulding MD         •  acetaminophen  • hydrALAZINE  •  polyethylene glycol      Chief Complaints: Stroke rehab follow-up    Subjective: On eval, patient reports doing well overall  He denies lightheadedness, SOB, calf pain, worsening strength or sensation  Extended time spent discussing CTA H/N with contrast and potential risks of contrast which include kidney injury which could be severe but low likelihood with patient and wife  At this time, patient agrees to procedure and risks and wife supports patient making this decision  ROS: A 10 point ROS was performed; negative except as noted above  Physical Exam:  Vitals:    03/17/23 1300   BP: 141/66   Pulse: 59   Resp: 17   Temp: 98 5 °F (36 9 °C)   SpO2: 98%     GEN:  Sitting in NAD   HEENT/NECK: MMM  CARDIAC: Regular rate rhythm, no murmers, no rubs, no gallops  LUNGS:  clear to auscultation, no wheezes, rales, or rhonchi  ABDOMEN: Soft, non-tender, non-distended, normal active bowel sounds  EXTREMITIES/SKIN:  no calf edema, no calf tenderness to palpation  NEURO:   MENTAL STATUS: Adequate wakefulness, able to follow simple commands; and Strength/MMT:  Right upper extremity 2 to 3-, right lower extremity proximally 3+ out of 5, distally 1-2 out of 5, left-sided strength near 5 out of 5 - stable  PSYCH:  Affect:  Euthymic       ** Please Note: Fluency Direct voice to text software may have been used in the creation of this document  **    I personally performed the required components and examined the patient myself in person on 3/17/23     50 minutes or greater spent for this encounter which included a combination of face-to-face time with Sofy Aragon and non-face-to-face time which in part specifically includes management of CVA  Face-to-face time included extended discussion with patient regarding current condition, medical history, mood, medical/rehabilitation management, and disposition    Non face-to-face time included coordination of care with patient's co-managing AP and/or physician(s) thru communication and review of their recent documentation as well as reviewing vitals, bowel/bladder function, recent labs, and notes from therapy, CM, and nursing

## 2023-03-17 NOTE — PLAN OF CARE
Reviewed    Problem: Prexisting or High Potential for Compromised Skin Integrity  Goal: Skin integrity is maintained or improved  Description: INTERVENTIONS:  - Identify patients at risk for skin breakdown  - Assess and monitor skin integrity  - Assess and monitor nutrition and hydration status  - Monitor labs   - Assess for incontinence   - Turn and reposition patient  - Assist with mobility/ambulation  - Relieve pressure over bony prominences  - Avoid friction and shearing  - Provide appropriate hygiene as needed including keeping skin clean and dry  - Evaluate need for skin moisturizer/barrier cream  - Collaborate with interdisciplinary team   - Patient/family teaching  - Consider wound care consult   3/16/2023 2335 by Lex Wellington RN  Outcome: Progressing  3/16/2023 1306 by Lex Wellington RN  Outcome: Progressing     Problem: MOBILITY - ADULT  Goal: Maintain or return to baseline ADL function  Description: INTERVENTIONS:  -  Assess patient's ability to carry out ADLs; assess patient's baseline for ADL function and identify physical deficits which impact ability to perform ADLs (bathing, care of mouth/teeth, toileting, grooming, dressing, etc )  - Assess/evaluate cause of self-care deficits   - Assess range of motion  - Assess patient's mobility; develop plan if impaired  - Assess patient's need for assistive devices and provide as appropriate  - Encourage maximum independence but intervene and supervise when necessary  - Involve family in performance of ADLs  - Assess for home care needs following discharge   - Consider OT consult to assist with ADL evaluation and planning for discharge  - Provide patient education as appropriate  3/16/2023 2335 by Lex Wellington RN  Outcome: Progressing  3/16/2023 1306 by Lex Wellington RN  Outcome: Progressing  Goal: Maintains/Returns to pre admission functional level  Description: INTERVENTIONS:  - Set and communicate daily mobility goal to care team and patient/family/caregiver     - Collaborate with rehabilitation services on mobility goals if consulted  - Out of bed for toileting  - Record patient progress and toleration of activity level   3/16/2023 2335 by Vincent Ly RN  Outcome: Progressing  3/16/2023 1306 by Vincent Ly RN  Outcome: Progressing     Problem: PAIN - ADULT  Goal: Verbalizes/displays adequate comfort level or baseline comfort level  Description: Interventions:  - Encourage patient to monitor pain and request assistance  - Assess pain using appropriate pain scale  - Administer analgesics based on type and severity of pain and evaluate response  - Implement non-pharmacological measures as appropriate and evaluate response  - Consider cultural and social influences on pain and pain management  - Notify physician/advanced practitioner if interventions unsuccessful or patient reports new pain  3/16/2023 2335 by Vincent Ly RN  Outcome: Progressing  3/16/2023 1306 by Vincent Ly RN  Outcome: Progressing     Problem: INFECTION - ADULT  Goal: Absence or prevention of progression during hospitalization  Description: INTERVENTIONS:  - Assess and monitor for signs and symptoms of infection  - Monitor lab/diagnostic results  - Monitor all insertion sites, i e  indwelling lines, tubes, and drains  - Monitor endotracheal if appropriate and nasal secretions for changes in amount and color  - [place] appropriate cooling/warming therapies per order  - Administer medications as ordered  - Instruct and encourage patient and family to use good hand hygiene technique  - Identify and instruct in appropriate isolation precautions for identified infection/condition  3/16/2023 2335 by Vincent Ly RN  Outcome: Progressing  3/16/2023 1306 by Vincent Ly RN  Outcome: Progressing  Goal: Absence of fever/infection during neutropenic period  Description: INTERVENTIONS:  - Monitor WBC    3/16/2023 2335 by Vincent Ly RN  Outcome: Progressing  3/16/2023 1306 by Farideh Lam RN  Outcome: Progressing     Problem: SAFETY ADULT  Goal: Patient will remain free of falls  Description: INTERVENTIONS:  - Educate patient/family on patient safety including physical limitations  - Instruct patient to call for assistance with activity   - Consult OT/PT to assist with strengthening/mobility   - Keep Call bell within reach  - Keep bed low and locked with side rails adjusted as appropriate  - Keep care items and personal belongings within reach  - Initiate and maintain comfort rounds  - Make Fall Risk Sign visible to staff  - Offer Toileting every 4 Hours, in advance of need  - Initiate/Maintain bed and chair alarm  - Apply yellow socks and bracelet for high fall risk patients  - Consider moving patient to room near nurses station  3/16/2023 2335 by Farideh Lam RN  Outcome: Progressing  3/16/2023 1306 by Farideh Lam RN  Outcome: Progressing  Goal: Maintain or return to baseline ADL function  Description: INTERVENTIONS:  -  Assess patient's ability to carry out ADLs; assess patient's baseline for ADL function and identify physical deficits which impact ability to perform ADLs (bathing, care of mouth/teeth, toileting, grooming, dressing, etc )  - Assess/evaluate cause of self-care deficits   - Assess range of motion  - Assess patient's mobility; develop plan if impaired  - Assess patient's need for assistive devices and provide as appropriate  - Encourage maximum independence but intervene and supervise when necessary  - Involve family in performance of ADLs  - Assess for home care needs following discharge   - Consider OT consult to assist with ADL evaluation and planning for discharge  - Provide patient education as appropriate  3/16/2023 2335 by Farideh Lam RN  Outcome: Progressing  3/16/2023 1306 by Farideh Lam RN  Outcome: Progressing  Goal: Maintains/Returns to pre admission functional level  Description: INTERVENTIONS:  - Set and communicate daily mobility goal to care team and patient/family/caregiver  - Collaborate with rehabilitation services on mobility goals if consulted  - Out of bed for toileting  - Record patient progress and toleration of activity level   3/16/2023 2335 by Ligia Sarah RN  Outcome: Progressing  3/16/2023 1306 by Ligia Sarah RN  Outcome: Progressing     Problem: DISCHARGE PLANNING  Goal: Discharge to home or other facility with appropriate resources  Description: INTERVENTIONS:  - Identify barriers to discharge w/patient and caregiver  - Arrange for needed discharge resources and transportation as appropriate  - Identify discharge learning needs (meds, wound care, etc )  - Arrange for interpretive services to assist at discharge as needed  - Refer to Case Management Department for coordinating discharge planning if the patient needs post-hospital services based on physician/advanced practitioner order or complex needs related to functional status, cognitive ability, or social support system  3/16/2023 2335 by Ligia Sarah RN  Outcome: Progressing  3/16/2023 1306 by Ligia Sarah RN  Outcome: Progressing     Problem: Nutrition/Hydration-ADULT  Goal: Nutrient/Hydration intake appropriate for improving, restoring or maintaining nutritional needs  Description: Monitor and assess patient's nutrition/hydration status for malnutrition  Collaborate with interdisciplinary team and initiate plan and interventions as ordered  Monitor patient's weight and dietary intake as ordered or per policy  Utilize nutrition screening tool and intervene as necessary  Determine patient's food preferences and provide high-protein, high-caloric foods as appropriate       INTERVENTIONS:  - Monitor oral intake, urinary output, labs, and treatment plans  - Assess nutrition and hydration status and recommend course of action  - Evaluate amount of meals eaten  - Assist patient with eating if necessary   - Allow adequate time for meals  - Recommend/ encourage appropriate diets, oral nutritional supplements, and vitamin/mineral supplements  - Order, calculate, and assess calorie counts as needed  - Recommend, monitor, and adjust tube feedings and TPN/PPN based on assessed needs  - Assess need for intravenous fluids  - Provide specific nutrition/hydration education as appropriate  - Include patient/family/caregiver in decisions related to nutrition  3/16/2023 8325 by Selena Toscano, RN  Outcome: Progressing  3/16/2023 1306 by Selena Toscano, RN  Outcome: Progressing

## 2023-03-17 NOTE — PROGRESS NOTES
"   03/17/23 1230   Pain Assessment   Pain Assessment Tool 0-10   Pain Score No Pain   Restrictions/Precautions   Precautions Aspiration;Bed/chair alarms;Cognitive; Fall Risk;Supervision on toilet/commode   Weight Bearing Restrictions No   ROM Restrictions No   Sit to Stand   Type of Assistance Needed Physical assistance   Physical Assistance Level 26%-50%   Comment Improved RUE positioning and carryover of cue to \"keep tucked at stomach  \"   Sit to Stand CARE Score 3   Bed-Chair Transfer   Type of Assistance Needed Physical assistance   Physical Assistance Level 26%-50%   Comment sit pivots   Chair/Bed-to-Chair Transfer CARE Score 3   Neuromuscular Education   Functional Movement Patterns Pt participated in [facility] 20 assisted movement therapy in order to promote proximal shoulder strength  Pt positioned on chair with trunk and shoulder straps in place to reduce compensation  Pt utilizes arm trough for RUE support  Pt tolerates all reps on initiated mode  Pt engaged in fwd thrust and reach 2D for 5 sets of 25 each  Pt overall tolerates session well  Very attentive throughout and actively engaged  Provided education on NPP's and stroke recovery to pt and wife  Pt does verbalize fatigue post exercises  Comments NMES Ligia Stim applied to pt's forearm extensors with focus on wrist and digit flexion/ext  During relaxation time pt educated to actively engaged in flexion  Pt was able to tolerate well for total of 20 minutes  Does report fatigue at end of task but no c/o pain  Unable to progress task with much function as unfortunately pt was actively hooked up to IV fluids which would occlude with majority of movements  Cognition   Overall Cognitive Status Impaired   Arousal/Participation Alert; Cooperative   Attention Within functional limits   Orientation Level Oriented X4   Memory Decreased short term memory   Following Commands Follows multistep commands with increased time or repetition   Activity Tolerance   Activity " Tolerance Patient tolerated treatment well   Assessment   Treatment Assessment Pt participated in skilled OT services with focus on RUE NMR, functional txfers, and family education  Pt's wife arrived during mid session  Very receptive to all education, eager to learn and supportive  She confirms they have hands on family training upcoming in future sessions  She requests to learn more about bed mobility, txfers, and toileting in next sessions  Limited this session by active IV fluids which were placed on RUE right near elbow which would occlude with even the most minimal movements, had clearance from physician to hold fluids initially long enough to complete reogo  Pt continues to make progress with RUE function  Remains limited by weakness  Pt will continue to benefit from skilled OT services with focus on RUE NMR, FT, functional txfers, and ADL retraining  Prognosis Good   Problem List Decreased strength;Decreased range of motion;Decreased endurance; Impaired balance;Decreased coordination;Decreased mobility; Decreased cognition; Impaired sensation; Impaired tone   Plan   Treatment/Interventions ADL retraining;Functional transfer training; Therapeutic exercise; Endurance training;Patient/family training;Equipment eval/education; Bed mobility; Compensatory technique education   Progress Progressing toward goals   Recommendation   OT Discharge Recommendation   (pending progress)   OT Therapy Minutes   OT Time In 1230   OT Time Out 1430   OT Total Time (minutes) 120   OT Mode of treatment - Individual (minutes) 120   OT Mode of treatment - Concurrent (minutes) 0   OT Mode of treatment - Group (minutes) 0   OT Mode of treatment - Co-treat (minutes) 0   OT Mode of Treatment - Total time(minutes) 120 minutes   OT Cumulative Minutes 1105   Therapy Time missed   Time missed?  No

## 2023-03-17 NOTE — CASE MANAGEMENT
Case Management Update:  [facility] additional [facility] of coverage is approved, NRD 3/21

## 2023-03-17 NOTE — PLAN OF CARE
Problem: INFECTION - ADULT  Goal: Absence or prevention of progression during hospitalization  Description: INTERVENTIONS:  - Assess and monitor for signs and symptoms of infection  - Monitor lab/diagnostic results  - Monitor all insertion sites, i e  indwelling lines, tubes, and drains  - Monitor endotracheal if appropriate and nasal secretions for changes in amount and color  - [place] appropriate cooling/warming therapies per order  - Administer medications as ordered  - Instruct and encourage patient and family to use good hand hygiene technique  - Identify and instruct in appropriate isolation precautions for identified infection/condition  Outcome: Progressing

## 2023-03-17 NOTE — PROGRESS NOTES
Internal Medicine Progress Note  Patient: Diamante Ibarra  Age/sex: 60 y o  male  Medical Record #: [id number]      ASSESSMENT/PLAN: (Interval History)  Diamante Ibarra is seen and examined and management for following issues:    Posterior circulation stroke  • S/p intracranial and extracranial vertebral artery stenting/TICI 2B revascularization  • Continue ASA, Brilinta, Eliquis and atorvastatin  • Repeat CTA head and neck around 3/17/23  • Prozac for depressed mood following CVA   • Neuropsych pending  • Dysphagia 3 diet with nectar thick liquids  • Continue Baclofen 5mg 4x daily  • Therapy per primary service  • Outpt follow-up with Neurology and Neurosurgery  • Awaiting decision regarding imaging  Pt currently on ivf for same  • BUN better this am with IVF await decision regarding CT scan  • Would continue to run IVF post imaging for 12 hours  • Repeat bmp in am    Rapid response  · Likely d/t vasovagal syncope  Neurology agrees that event was unlikely seizure  · Felt dizzy/weak prior to episode then was staring blankly initially when he came back around  · Eventually returned to his baseline  · No further events      HTN  • Home: No medications  • Here: amlodipine 10mg daily/Coreg 25mg 2x daily/lisinopril 40mg daily   • Add hydralazine 25mg  Bid can titrate as needed  • Goal normotension    Fe deficiency anemia  · Likely multifactoral  · s/p IV venofer x 2 doses  · Cont daily ferrous sulfate  · Cbc mondays     CKD stage 2  • Baseline Cr 1 2 - 1 3  • Chlorthalidone dc'd  • BMP stable on ivf 2/2 to possible imaging with dye  • Bun better     COVID  • Precautions lifted  • Pt was asymptomatic     Prediabetes  • HA1C 5 7  • Recommend dietary modifications       DC planning:  TBD  The above assessment and plan was reviewed and updated as determined by my evaluation of the patient on 3/17/2023      Labs:   Results from last 7 days   Lab Units 03/13/23  0637   WBC Thousand/uL 7 78   HEMOGLOBIN g/dL 10 1*   HEMATOCRIT % 32 3*   PLATELETS Thousands/uL 288     Results from last 7 days   Lab Units 03/17/23  0612 03/16/23  0613   SODIUM mmol/L 142 139   POTASSIUM mmol/L 4 1 4 0   CHLORIDE mmol/L 111* 109*   CO2 mmol/L 24 25   BUN mg/dL 29* 37*   CREATININE mg/dL 1 30 1 36*   CALCIUM mg/dL 8 9 9 0                   Review of Scheduled Meds:  Current Facility-Administered Medications   Medication Dose Route Frequency Provider Last Rate   • acetaminophen  650 mg Oral Q6H PRN YURI Kumar     • amLODIPine  10 mg Oral Daily Rito Ibarra MD     • apixaban  2 5 mg Oral BID Rito Ibarra MD     • aspirin  81 mg Oral Daily Rito Ibarra MD     • atorvastatin  40 mg Oral Daily With Philly Arnold MD     • baclofen  5 mg Oral 4x Daily Rito Ibarra MD     • carvedilol  25 mg Oral BID With Meals YURI Kumar     • vitamin B-12  1,000 mcg Oral Daily Rito Ibarra MD     • ferrous sulfate  325 mg Oral Daily With Breakfast YURI Kumar     • FLUoxetine  20 mg Oral Daily Rito Ibarra MD     • hydrALAZINE  25 mg Oral Q8H PRN YURI Kumar     • lisinopril  40 mg Oral Daily YURI Kumar     • polyethylene glycol  17 g Oral Daily PRN Rito Ibarra MD     • psyllium  1 packet Oral Daily Melanie Moreira MD     • sodium chloride  75 mL/hr Intravenous Continuous YURI Kumar 75 mL/hr (03/17/23 0344)   • ticagrelor  90 mg Oral Q12H [address] Rito Ibarra MD         Subjective/ HPI: Patient seen and examined  Patients overnight issues or events were reviewed with nursing or staff during rounds or morning huddle session  New or overnight issues include the following:     Pt seen in his room  No overnight issues      ROS:   A 10 point ROS was performed; negative except as noted above         Imaging:     No orders to display       *Labs /Radiology studies Reviewed  *Medications  reviewed and reconciled as needed  *Please refer to order section for additional ordered labs studies  *Case discussed with primary attending during morning huddle case rounds    Physical Examination:  Vitals:   Vitals:    03/16/23 1707 03/16/23 2100 03/17/23 0610 03/17/23 0630   BP: 158/66 148/66 (!) 172/77 162/70   BP Location: Left arm Left arm Left arm Left arm   Pulse: 60 98 55    Resp:   16    Temp:  97 8 °F (36 6 °C) 97 8 °F (36 6 °C)    TempSrc:  Oral Oral    SpO2:  98% 99%    Weight:       Height:           GEN: No apparent distress, interactive  NEURO: Alert and oriented x3; ongoing mild dysarthria  HEENT: Pupils are equal and reactive, EOMI, mucous membranes are moist, face asymmetrical  CV: S1 S2 regular, no MRG, no peripheral edema noted  RESP: Lungs are clear bilaterally, no wheezes, rales or rhonchi noted, on room air, respirations easy and non labored  GI: Flat, soft non tender, non distended; +BS x4; some bowel incontinence at times  : Voiding without difficulty  MUSC: Moves all extremities; Rt hemiparesis UE > LE improving  SKIN: pink, warm and dry, normal turgor, no rashes, lesions      The above physical exam was reviewed and updated as determined by my evaluation of the patient on 3/17/2023  Invasive Devices     Peripheral Intravenous Line  Duration           Peripheral IV 03/16/23 Right Antecubital <1 day                   VTE Pharmacologic Prophylaxis: Eliquis  Code Status: Level 1 - Full Code  Current Length of Stay: 11 day(s)      Total time spent:  30 minutes with more than 50% spent counseling/coordinating care  Counseling includes discussion with patient re: progress  and discussion with patient of his/her current medical state/information  Coordination of patient's care was performed in conjunction with primary service  Time invested included review of patient's labs, vitals, and management of their comorbidities with continued monitoring  In addition, this patient was discussed with medical team including physician and advanced extenders   The care of the patient was extensively discussed and appropriate treatment plan was formulated unique for this patient  Medical decision making for the day was made by supervising physician unless otherwise noted in their attestation statement  ** Please Note:  voice to text software may have been used in the creation of this document   Although proof errors in transcription or interpretation are a potential of such software**

## 2023-03-17 NOTE — PROGRESS NOTES
03/17/23 0830   Pain Assessment   Pain Assessment Tool 0-10   Pain Score No Pain   Restrictions/Precautions   Precautions Aspiration;Bed/chair alarms;Cognitive; Fall Risk;Supervision on toilet/commode   Subjective   Subjective pt agreeable to perform skilled PT   Sit to Stand   Type of Assistance Needed Physical assistance   Physical Assistance Level 26%-50%   Sit to Stand CARE Score 3   Bed-Chair Transfer   Type of Assistance Needed Physical assistance   Physical Assistance Level 26%-50%   Comment sit pivot   Chair/Bed-to-Chair Transfer CARE Score 3   Transfer Bed/Chair/Wheelchair   Adaptive Equipment Hand Hold   Car Transfer   Type of Assistance Needed Physical assistance   Physical Assistance Level 26%-50%   Comment A leg   Car Transfer CARE Score 3   Walk 10 Feet   Type of Assistance Needed Physical assistance   Physical Assistance Level Total assistance   Comment NPP HHA   Walk 10 Feet CARE Score 1   Walk 50 Feet with Two Turns   Type of Assistance Needed Physical assistance   Physical Assistance Level Total assistance   Comment NPP HHA   Walk 50 Feet with Two Turns CARE Score 1   Walk 150 Feet   Type of Assistance Needed Physical assistance   Physical Assistance Level Total assistance   Comment NPP HHA   Walk 150 Feet CARE Score 1   Ambulation   Primary Mode of Locomotion Prior to Admission Walk   Distance Walked (feet) 200 ft   Assist Device   (HHA x2  WCF 3 person)   Does the patient walk? 2  Yes   Wheel 50 Feet with Two Turns   Type of Assistance Needed Set-up / clean-up   Wheel 50 Feet with Two Turns CARE Score 5   Wheel 150 Feet   Type of Assistance Needed Supervision   Comment BL legs   Wheel 150 Feet CARE Score 4   Wheelchair mobility   Does the patient use a wheelchair? 1  Yes   Type of Wheelchair Used 1   Manual   Curb or Single Stair   Style negotiated Single stair   Type of Assistance Needed Physical assistance   Physical Assistance Level 51%-75%   Comment FF single HHA   1 Step (Curb) CARE Score 2 4 Steps   Type of Assistance Needed Physical assistance   Physical Assistance Level 51%-75%   Comment FF single HHA   4 Steps CARE Score 2   12 Steps   Type of Assistance Needed Physical assistance   Physical Assistance Level 76% or more   Comment FF single HHA   12 Steps CARE Score 2   Picking Up Object   Reason if not Attempted Safety concerns   Picking Up Object CARE Score 88   Therapeutic Interventions   Neuromuscular Re-Education NPP gait training HHA and Front and back of pt vc for gait pattern and sequence, FF stairs x12 x2 single HR and HHA, and  steps thru R and L x20 reps each   Other Comments   Comments (S)  BP  WFL and pt on beta blocker HR for FF stair 75 only use sierra scale   Assessment   Treatment Assessment pt focus on inc functional mobility during NPP motor control , functional outcome and overall motor function  Nsging hold IV thru out session to inc NPP and motor functional treatment for PT , pt also improving with motor planning but fatigue at the end on NPP task abhishek descend FF steps vc needs clear heel and foot during ascending   Pt cont to need skilled PT NPP and overall safety percaution and functional outcome for DC goals   Barriers to Discharge Inaccessible home environment   Plan   Progress Progressing toward goals   PT Therapy Minutes   PT Time In 0830   PT Time Out 1000   PT Total Time (minutes) 90   PT Mode of treatment - Individual (minutes) 90   PT Mode of treatment - Concurrent (minutes) 0   PT Mode of treatment - Group (minutes) 0   PT Mode of treatment - Co-treat (minutes) 0   PT Mode of Treatment - Total time(minutes) 90 minutes   PT Cumulative Minutes 953

## 2023-03-18 LAB
ANION GAP SERPL CALCULATED.3IONS-SCNC: 1 MMOL/L (ref 4–13)
BUN SERPL-MCNC: 25 MG/DL (ref 5–25)
CALCIUM SERPL-MCNC: 8.7 MG/DL (ref 8.3–10.1)
CHLORIDE SERPL-SCNC: 111 MMOL/L (ref 96–108)
CO2 SERPL-SCNC: 27 MMOL/L (ref 21–32)
CREAT SERPL-MCNC: 1.27 MG/DL (ref 0.6–1.3)
GFR SERPL CREATININE-BSD FRML MDRD: 59 ML/MIN/1.73SQ M
GLUCOSE SERPL-MCNC: 101 MG/DL (ref 65–140)
POTASSIUM SERPL-SCNC: 4.6 MMOL/L (ref 3.5–5.3)
SODIUM SERPL-SCNC: 139 MMOL/L (ref 135–147)

## 2023-03-18 RX ORDER — SODIUM CHLORIDE 9 MG/ML
75 INJECTION, SOLUTION INTRAVENOUS CONTINUOUS
Status: DISCONTINUED | OUTPATIENT
Start: 2023-03-18 | End: 2023-03-18

## 2023-03-18 RX ADMIN — AMLODIPINE BESYLATE 10 MG: 10 TABLET ORAL at 09:34

## 2023-03-18 RX ADMIN — FERROUS SULFATE TAB 325 MG (65 MG ELEMENTAL FE) 325 MG: 325 (65 FE) TAB at 07:42

## 2023-03-18 RX ADMIN — HYDRALAZINE HYDROCHLORIDE 25 MG: 25 TABLET, FILM COATED ORAL at 07:41

## 2023-03-18 RX ADMIN — FLUOXETINE 20 MG: 20 CAPSULE ORAL at 09:33

## 2023-03-18 RX ADMIN — APIXABAN 2.5 MG: 2.5 TABLET, FILM COATED ORAL at 09:34

## 2023-03-18 RX ADMIN — ASPIRIN 81 MG CHEWABLE TABLET 81 MG: 81 TABLET CHEWABLE at 09:33

## 2023-03-18 RX ADMIN — Medication 1 PACKET: at 09:32

## 2023-03-18 RX ADMIN — CARVEDILOL 25 MG: 25 TABLET, FILM COATED ORAL at 07:41

## 2023-03-18 RX ADMIN — LISINOPRIL 40 MG: 20 TABLET ORAL at 09:33

## 2023-03-18 RX ADMIN — TICAGRELOR 90 MG: 90 TABLET ORAL at 09:35

## 2023-03-18 RX ADMIN — BACLOFEN 5 MG: 10 TABLET ORAL at 17:45

## 2023-03-18 RX ADMIN — BACLOFEN 5 MG: 10 TABLET ORAL at 12:38

## 2023-03-18 RX ADMIN — TICAGRELOR 90 MG: 90 TABLET ORAL at 21:37

## 2023-03-18 RX ADMIN — APIXABAN 2.5 MG: 2.5 TABLET, FILM COATED ORAL at 17:46

## 2023-03-18 RX ADMIN — BACLOFEN 5 MG: 10 TABLET ORAL at 09:34

## 2023-03-18 RX ADMIN — BACLOFEN 5 MG: 10 TABLET ORAL at 21:37

## 2023-03-18 RX ADMIN — CARVEDILOL 25 MG: 25 TABLET, FILM COATED ORAL at 17:46

## 2023-03-18 RX ADMIN — ATORVASTATIN CALCIUM 40 MG: 40 TABLET, FILM COATED ORAL at 17:45

## 2023-03-18 RX ADMIN — SODIUM CHLORIDE 75 ML/HR: 0.9 INJECTION, SOLUTION INTRAVENOUS at 08:22

## 2023-03-18 RX ADMIN — HYDRALAZINE HYDROCHLORIDE 25 MG: 25 TABLET, FILM COATED ORAL at 17:46

## 2023-03-18 RX ADMIN — CYANOCOBALAMIN TAB 500 MCG 1000 MCG: 500 TAB at 09:33

## 2023-03-18 NOTE — PLAN OF CARE
Problem: MOBILITY - ADULT  Goal: Maintain or return to baseline ADL function  Description: INTERVENTIONS:  -  Assess patient's ability to carry out ADLs; assess patient's baseline for ADL function and identify physical deficits which impact ability to perform ADLs (bathing, care of mouth/teeth, toileting, grooming, dressing, etc )  - Assess/evaluate cause of self-care deficits   - Assess range of motion  - Assess patient's mobility; develop plan if impaired  - Assess patient's need for assistive devices and provide as appropriate  - Encourage maximum independence but intervene and supervise when necessary  - Involve family in performance of ADLs  - Assess for home care needs following discharge   - Consider OT consult to assist with ADL evaluation and planning for discharge  - Provide patient education as appropriate  Outcome: Progressing  Goal: Maintains/Returns to pre admission functional level  Description: INTERVENTIONS:  - Perform BMAT or MOVE assessment daily    - Set and communicate daily mobility goal to care team and patient/family/caregiver     - Collaborate with rehabilitation services on mobility goals if consulted  - Out of bed for toileting  - Record patient progress and toleration of activity level   Outcome: Progressing     Problem: SAFETY ADULT  Goal: Patient will remain free of falls  Description: INTERVENTIONS:  - Educate patient/family on patient safety including physical limitations  - Instruct patient to call for assistance with activity   - Consult OT/PT to assist with strengthening/mobility   - Keep Call bell within reach  - Keep bed low and locked with side rails adjusted as appropriate  - Keep care items and personal belongings within reach  - Initiate and maintain comfort rounds  - Make Fall Risk Sign visible to staff  - Apply yellow socks and bracelet for high fall risk patients  - Consider moving patient to room near nurses station  Outcome: Progressing  Goal: Maintain or return to baseline ADL function  Description: INTERVENTIONS:  -  Assess patient's ability to carry out ADLs; assess patient's baseline for ADL function and identify physical deficits which impact ability to perform ADLs (bathing, care of mouth/teeth, toileting, grooming, dressing, etc )  - Assess/evaluate cause of self-care deficits   - Assess range of motion  - Assess patient's mobility; develop plan if impaired  - Assess patient's need for assistive devices and provide as appropriate  - Encourage maximum independence but intervene and supervise when necessary  - Involve family in performance of ADLs  - Assess for home care needs following discharge   - Consider OT consult to assist with ADL evaluation and planning for discharge  - Provide patient education as appropriate  Outcome: Progressing  Goal: Maintains/Returns to pre admission functional level  Description: INTERVENTIONS:  - Perform BMAT or MOVE assessment daily    - Set and communicate daily mobility goal to care team and patient/family/caregiver     - Collaborate with rehabilitation services on mobility goals if consulted  - Out of bed for toileting  - Record patient progress and toleration of activity level   Outcome: Progressing

## 2023-03-18 NOTE — PROGRESS NOTES
03/18/23 0706   Assessment   Treatment Assessment Pt on therapy hold at this time 2* medical status  Therapy Time missed   Time missed?  Yes   Amount of time missed 90

## 2023-03-18 NOTE — PROGRESS NOTES
[facility]  Progress Note - Rivera Feliciano 1960, 60 y o  male MRN: [id number]  Unit/Bed#: [facility] 459-01 Encounter: [id number]  Primary Care Provider: No primary care provider on file  Date and time admitted to hospital: 3/6/2023  4:26 PM  Patient seen today for 2-week follow-up with repeat CTA    * Acute Posterior Circulation Stroke  Assessment & Plan  S/p intracranial and extracranial vertebral artery stenting (Dr Rick Adamson 2/26/2023)   · TICI 2B revascularization   · Patient presented with dysarthria and dizziness  · Found to have bilateral cerebellar infarct with significant R V3/4 thrombus, L vertebral artery stenosis with possible occlusion within intradural segment  · NIHSS 0 with symptom onset 2/23 progressed to dysarthria, left facial droop not corrected with smile, R side flaccid    Imaging reviewed with Dr Pena:    CTA head and neck w/wo 3/17/23:CT HEAD: Evolution of subacute infarcts in left midbrain involving cerebral peduncle, left tatiana, and bilateral cerebellum (right worse than left)  No new acute intracranial abnormality  CTA HEAD AND NECK: Right vertebral artery V4 stent appears to have in-stent occlusion  Right vertebral artery V4 segment is patent before and after the V4 stent  Unchanged left vertebral artery post-PICA V4 segment occlusion  Dissection flap in right vertebral artery V3 segment which is patent and improved in caliber status post thrombectomy  Moderate stenosis in proximal-to- mid basilar artery due to atherosclerotic disease status post mechanical thrombectomy      Plan:  · Continue to closely monitor symptoms   · Reviewed imaging with patient   · DAPT / AC  · ASA 81mg   · Brilinta 90mg BID  · Eliquis 2 5mg BID  · STAT CT/CTA head/neck for decline or change in neurological status  · Ongoing medical management   · Continue BP goal as ordered   · Neurology following for ongoing stroke care  · Patient currently in [facility]  · No further neurosurgical "intervention anticipated at this time  Continue medical management  Neurosurgery will sign off at this time  Patient will see us in the next few weeks for clinical follow-up once discharged  Please call with questions or concerns  Subjective/Objective     Chief Complaint: \"Hi\"    Subjective: Patient continues to endorse right-sided weakness  He denies any headaches, dizziness, blurry vision, chest pain, shortness of breath, abdominal pain, nausea, vomiting, diarrhea, no problems of bowel or bladder, no new weakness or numbness/tingling  Objective: Patient comfortably sitting out in chair, NAD  I/O       03/16 0701  03/17 0700 03/17 0701 03/18 0700 03/18 0701 03/19 0700    P  O  720 420     I V  (mL/kg) 978 8 (11 3) 437 5 (5) 368 8 (4 2)    Total Intake(mL/kg) 1698 8 (19 5) 857 5 (9 9) 368 8 (4 2)    Urine (mL/kg/hr) 1600 (0 8) 900 (0 4) 400 (1 1)    Stool 0 0 0    Total Output 1600 900 400    Net +98 8 -42 5 -31 3           Unmeasured Urine Occurrence 2 x  3 x    Unmeasured Stool Occurrence 5 x 1 x 2 x          Invasive Devices     Peripheral Intravenous Line  Duration           Peripheral IV 03/16/23 Right Antecubital 1 day                Physical Exam:  Vitals: Blood pressure 154/70, pulse 59, temperature 98 3 °F (36 8 °C), temperature source Oral, resp  rate 20, height 5' 9\" (1 753 m), weight 86 9 kg (191 lb 9 3 oz), SpO2 98 %  ,Body mass index is 28 29 kg/m²        General appearance: alert, appears stated age, cooperative and no distress  Head: Normocephalic, without obvious abnormality, atraumatic  Eyes: EOMI, PERRL, conjugate gaze  Neck: supple, symmetrical, trachea midline   Lungs: non labored breathing  Heart: regular heart rate  Neurologic:   Mental status: Alert, oriented x3, thought content appropriate, speech is clear, following commands  Cranial nerves: grossly intact (Cranial nerves II-XII)  Sensory: normal to light touch on left side with dullness to light touch on right including " face  Motor: moving all extremities, left-sided strength 5/5 throughout  Right lower extremity 4 -/5 throughout  Right upper extremity 3-4-/5  Reflexes: 2+ and symmetric, no Delgado's or clonus appreciated      Lab Results:  Results from last 7 days   Lab Units 03/13/23  0637   WBC Thousand/uL 7 78   HEMOGLOBIN g/dL 10 1*   HEMATOCRIT % 32 3*   PLATELETS Thousands/uL 288   NEUTROS PCT % 73   MONOS PCT % 7     Results from last 7 days   Lab Units 03/18/23  1122 03/17/23  0612 03/16/23  0613   POTASSIUM mmol/L 4 6 4 1 4 0   CHLORIDE mmol/L 111* 111* 109*   CO2 mmol/L 27 24 25   BUN mg/dL 25 29* 37*   CREATININE mg/dL 1 27 1 30 1 36*   CALCIUM mg/dL 8 7 8 9 9 0                 No results found for: TROPONINT  ABG:No results found for: PHART, CSC1WCT, PO2ART, BMJ3UQN, C5ELIYJU, BEART, SOURCE    Imaging Studies: I have personally reviewed pertinent reports  and I have personally reviewed pertinent films in PACS    No results found  EKG, Pathology, and Other Studies: I have personally reviewed pertinent reports          VTE Mechanical Prophylaxis: sequential compression device

## 2023-03-18 NOTE — PROGRESS NOTES
Non-billable Note    PM&R on call:    CTA head/neck completed and resulted showing a right vertebral artery V4 stent occlusion    Notified NSGY  Dr Shanna Vogel will review and correspond plan  [facility] awaiting evaluation and recommendations from [address] [place] [address]  Neuro exam and functional deficits Unchanged    Continue neuro checks    Reviewed plan with [facility] IM consultants additionally    Domenico Acuña MD <B R>PM&R

## 2023-03-18 NOTE — PROGRESS NOTES
Internal Medicine Progress Note  Patient: Naima Peguero  Age/sex: 60 y o  male  Medical Record #: [id number]      ASSESSMENT/PLAN: (Interval History)  Naima Peguero is seen and examined and management for following issues:    Posterior circulation stroke  • S/p intracranial and extracranial vertebral artery stenting/TICI 2B revascularization  • Continue ASA, Brilinta, Eliquis and atorvastatin  • Repeat CTA head and neck around 3/17/23  • Prozac for depressed mood following CVA   • Neuropsych pending  • Dysphagia 3 diet with nectar thick liquids  • Continue Baclofen 5mg 4x daily  • Therapy per primary service  • Outpt follow-up with Neurology and Neurosurgery  • CTA head and neck done 3/17/23  Concern for in stent occlusion in the Rt vertebral artery V4 stent  Neurosurgery to see pt today  • Would continue to run IVF post imaging for 12 hours  • BMP stable  Rapid response  · Likely d/t vasovagal syncope  Neurology agrees that event was unlikely seizure  · Felt dizzy/weak prior to episode then was staring blankly initially when he came back around  · Eventually returned to his baseline  · No further events      HTN  • Home: No medications  • Here: amlodipine 10mg daily/Coreg 25mg 2x daily/lisinopril 40mg daily/hydralazine 25mg 2x daily  • Goal normotension    Fe deficiency anemia  · Likely multifactoral  · s/p IV venofer x 2 doses  · Cont daily ferrous sulfate  · Cbc mondays     CKD stage 2  • Baseline Cr 1 2 - 1 3  • Chlorthalidone dc'd  • Cr 1 3  COVID  • Precautions lifted  • Pt was asymptomatic     Prediabetes  • HA1C 5 7  • Recommend dietary modifications       DC planning:  TBD  The above assessment and plan was reviewed and updated as determined by my evaluation of the patient on 3/18/2023      Labs:   Results from last 7 days   Lab Units 03/13/23  0637   WBC Thousand/uL 7 78   HEMOGLOBIN g/dL 10 1*   HEMATOCRIT % 32 3*   PLATELETS Thousands/uL 288     Results from last 7 days   Lab Units 03/17/23  0612 03/16/23  0613   SODIUM mmol/L 142 139   POTASSIUM mmol/L 4 1 4 0   CHLORIDE mmol/L 111* 109*   CO2 mmol/L 24 25   BUN mg/dL 29* 37*   CREATININE mg/dL 1 30 1 36*   CALCIUM mg/dL 8 9 9 0                   Review of Scheduled Meds:  Current Facility-Administered Medications   Medication Dose Route Frequency Provider Last Rate   • acetaminophen  650 mg Oral Q6H PRN YURI Evans     • amLODIPine  10 mg Oral Daily Brayan Constantino MD     • apixaban  2 5 mg Oral BID Brayan Constantino MD     • aspirin  81 mg Oral Daily Brayan Constantino MD     • atorvastatin  40 mg Oral Daily With Ericka Martinez MD     • baclofen  5 mg Oral 4x Daily Brayan Constantino MD     • carvedilol  25 mg Oral BID With Meals YURI Evans     • vitamin B-12  1,000 mcg Oral Daily Brayan Constantino MD     • ferrous sulfate  325 mg Oral Daily With Breakfast YURI Evans     • FLUoxetine  20 mg Oral Daily Brayan Constantino MD     • hydrALAZINE  25 mg Oral Q8H PRN YURI Evans     • hydrALAZINE  25 mg Oral Q12H YURI Gallagher     • lisinopril  40 mg Oral Daily YURI Evans     • polyethylene glycol  17 g Oral Daily PRN Brayan Constantino MD     • psyllium  1 packet Oral Daily Buffy Soria MD     • sodium chloride  75 mL/hr Intravenous Continuous YURI Evans 75 mL/hr (03/18/23 3418)   • ticagrelor  90 mg Oral Q12H [facility] Brayan Constantino MD         Subjective/ HPI: Patient seen and examined  Patients overnight issues or events were reviewed with nursing or staff during rounds or morning huddle session  New or overnight issues include the following:     Pt seen in his room  Discussed imaging results with pt  He is currently on a therapy hold until Neurosurgery evaluates  He states that he is doing well and denies any current complaints  ROS:   A 10 point ROS was performed; negative except as noted above         Imaging:     CTA head and neck w wo contrast   Final Result by Jillian Byers MD (03/18 2621)      CT HEAD   - Evolution of subacute infarcts in left midbrain involving cerebral peduncle, left tatiana, and bilateral cerebellum (right worse than left)  - No new acute intracranial abnormality  CTA HEAD AND NECK   - Right vertebral artery V4 stent appears to have in-stent occlusion  Right vertebral artery V4 segment is patent before and after the V4 stent  - Unchanged left vertebral artery post-PICA V4 segment occlusion    - Dissection flap in right vertebral artery V3 segment which is patent and improved in caliber status post thrombectomy  - Moderate stenosis in proximal-to- mid basilar artery due to atherosclerotic disease status post mechanical thrombectomy  Additional chronic/incidental findings as detailed above               I personally discussed this study with Delia Moon on 3/18/2023 at 5:46 AM                               Workstation performed: [id number]             *Labs /Radiology studies Reviewed  *Medications  reviewed and reconciled as needed  *Please refer to order section for additional ordered labs studies  *Case discussed with primary attending during morning huddle case rounds    Physical Examination:  Vitals:   Vitals:    03/17/23 2014 03/17/23 2119 03/18/23 0559 03/18/23 0741   BP: 130/60  163/83 168/74   BP Location: Left arm  Right arm    Pulse:  61 61 59   Resp:  20 20    Temp:  (!) 97 4 °F (36 3 °C) 98 3 °F (36 8 °C)    TempSrc:  Oral Oral    SpO2:  98% 98%    Weight:       Height:           GEN: No apparent distress, interactive  NEURO: Alert and oriented x3; ongoing mild dysarthria  HEENT: Pupils are equal and reactive, EOMI, mucous membranes are moist, face asymmetrical  CV: S1 S2 regular, no MRG, no peripheral edema noted  RESP: Lungs are clear bilaterally, no wheezes, rales or rhonchi noted, on room air, respirations easy and non labored  GI: Flat, soft non tender, non distended; +BS x4; some bowel incontinence at times  : Voiding without difficulty  MUSC: Moves all extremities; Rt hemiparesis UE > LE without change  SKIN: pink, warm and dry, normal turgor, no rashes, lesions      The above physical exam was reviewed and updated as determined by my evaluation of the patient on 3/18/2023  Invasive Devices     Peripheral Intravenous Line  Duration           Peripheral IV 03/16/23 Right Antecubital 1 day                   VTE Pharmacologic Prophylaxis: Eliquis  Code Status: Level 1 - Full Code  Current Length of Stay: 12 day(s)      Total time spent:  30 minutes with more than 50% spent counseling/coordinating care  Counseling includes discussion with patient re: progress  and discussion with patient of his/her current medical state/information  Coordination of patient's care was performed in conjunction with primary service  Time invested included review of patient's labs, vitals, and management of their comorbidities with continued monitoring  In addition, this patient was discussed with medical team including physician and advanced extenders  The care of the patient was extensively discussed and appropriate treatment plan was formulated unique for this patient  Medical decision making for the day was made by supervising physician unless otherwise noted in their attestation statement  ** Please Note:  voice to text software may have been used in the creation of this document   Although proof errors in transcription or interpretation are a potential of such software**

## 2023-03-18 NOTE — QUICK NOTE
Received a TT from Radiology re an urgent finding on CTA  Pt with new in stent occlusion in the Rt vertebral artery V4 segment  Spoke with pt's nurse and there is no change in his exam  Spoke with PMR who will discuss with Neurosurgery

## 2023-03-18 NOTE — PROGRESS NOTES
"PM&R Coverage Progress Note:    Rehabilitation Diagnosis: Rehab Diagnosis: Impairment of mobility, safety, Activities of Daily Living (ADLs), and cognitive/communication skills due to Stroke:  01 3  Bilateral involvement    ASSESSMENT: Stable      PLAN:    Rehabilitation  • Continue current rehabilitation plan of care to maximize function  • No funcitonal barriers identified  • CLEARED TO RESUME THERAPIES    Medical issues  • CTA head and neck follow-up results showing R V4 stent occlusion  Reviewed personally and by Milo Keys  No changes recommended at this time  Outpatient follow-up with Dr Brielle Ventura  Continue current regimen of aspirin, Brilinta, Eliquis  • Renal function stable  IV fluids 12 hours completed post testing  BMP in a m  again  • CVA: Continue secondary stroke prophylaxis with aspirin, Brilinta, Eliquis and atorvastatin  • Mood: Anxious in general   Continue Prozac and neuropsychology following  • Continue current medical plan of care  Appreciate IM consultants co-management  SUBJECTIVE: Patient seen face to face  Anxious but also motivated  Wife is at the bedside  Provided complete update  Neurosurgery also updated patient and family additionally  Neuro exam is stable and improving  ROS:  A ten point review of systems was completed on 03/18/23 and pertinent positives are listed in subjective section  All other systems reviewed were negative  OBJECTIVE:   /70   Pulse 59   Temp 98 3 °F (36 8 °C) (Oral)   Resp 20   Ht 5' 9\" (1 753 m)   Wt 86 9 kg (191 lb 9 3 oz)   SpO2 98%   BMI 28 29 kg/m²     Physical Exam  Vitals and nursing note reviewed  Constitutional:       General: He is not in acute distress  HENT:      Head: Normocephalic and atraumatic        Nose: Nose normal       Mouth/Throat:      Mouth: Mucous membranes are moist    Eyes:      Conjunctiva/sclera: Conjunctivae normal    Cardiovascular:      Rate and Rhythm: Normal rate and regular " rhythm  Pulses: Normal pulses  Pulmonary:      Effort: Pulmonary effort is normal       Breath sounds: Normal breath sounds  No wheezing or rales  Abdominal:      General: Bowel sounds are normal  There is no distension  Palpations: Abdomen is soft  Tenderness: There is no abdominal tenderness  Musculoskeletal:         General: No swelling  Cervical back: Neck supple  Skin:     General: Skin is warm  Neurological:      Mental Status: He is alert and oriented to person, place, and time  Sensory: Sensory deficit (right hemibody impaired sensation) present  Motor: Weakness present  Coordination: Coordination abnormal (right hemibody)        Comments: Motor: RUE: 2+/5 SAB, 3-/4 EF, 2+/5 EE, WE, FF, FA  RLE: 3-/5 HF, KE, KF, 2/5 DF, 3/5 PF   Psychiatric:         Mood and Affect: Mood normal             Personally reviewed on 03/18/23:   Lab Results   Component Value Date    WBC 7 78 03/13/2023    HGB 10 1 (L) 03/13/2023    HCT 32 3 (L) 03/13/2023    MCV 89 03/13/2023     03/13/2023     Lab Results   Component Value Date    SODIUM 139 03/18/2023    K 4 6 03/18/2023     (H) 03/18/2023    CO2 27 03/18/2023    BUN 25 03/18/2023    CREATININE 1 27 03/18/2023    GLUC 101 03/18/2023    CALCIUM 8 7 03/18/2023     Lab Results   Component Value Date    INR 1 05 02/27/2023    INR 1 06 02/26/2023    INR 0 98 02/25/2023    PROTIME 14 0 02/27/2023    PROTIME 14 0 02/26/2023    PROTIME 13 7 02/25/2023           Current Facility-Administered Medications:   •  acetaminophen (TYLENOL) tablet 650 mg, 650 mg, Oral, Q6H PRN, Jani Kanner, CRNP, 650 mg at 03/17/23 2021  •  amLODIPine (NORVASC) tablet 10 mg, 10 mg, Oral, Daily, Michael Nguyen MD, 10 mg at 03/18/23 4803  •  apixaban (ELIQUIS) tablet 2 5 mg, 2 5 mg, Oral, BID, Michael Nguyen MD, 2 5 mg at 03/18/23 2000  •  aspirin chewable tablet 81 mg, 81 mg, Oral, Daily, Michael Nguyen MD, 81 mg at 03/18/23 0974  •  atorvastatin (LIPITOR) tablet 40 mg, 40 mg, Oral, Daily With Jonatan Pizano MD, 40 mg at 03/17/23 1825  •  baclofen tablet 5 mg, 5 mg, Oral, 4x Daily, Alva Diaz MD, 5 mg at 03/18/23 1238  •  carvedilol (COREG) tablet 25 mg, 25 mg, Oral, BID With Meals, Lanice Distad, CRNP, 25 mg at 03/18/23 0741  •  cyanocobalamin (VITAMIN B-12) tablet 1,000 mcg, 1,000 mcg, Oral, Daily, Alva Diaz MD, 1,000 mcg at 03/18/23 4356  •  ferrous sulfate tablet 325 mg, 325 mg, Oral, Daily With Breakfast, Lanice Distad, CRNP, 325 mg at 03/18/23 7329  •  FLUoxetine (PROzac) capsule 20 mg, 20 mg, Oral, Daily, Alva Diaz MD, 20 mg at 03/18/23 5426  •  hydrALAZINE (APRESOLINE) tablet 25 mg, 25 mg, Oral, Q8H PRN, Lanice Distad, CRNP  •  hydrALAZINE (APRESOLINE) tablet 25 mg, 25 mg, Oral, Q12H, Violetta Devine, CRNP, 25 mg at 03/18/23 0741  •  lisinopril (ZESTRIL) tablet 40 mg, 40 mg, Oral, Daily, Lanice Distad, CRNP, 40 mg at 03/18/23 9562  •  polyethylene glycol (MIRALAX) packet 17 g, 17 g, Oral, Daily PRN, Alva Diaz MD  •  psyllium (METAMUCIL) 1 packet, 1 packet, Oral, Daily, Vic Alberts MD, 1 packet at 03/18/23 0932  •  sodium chloride 0 9 % infusion, 75 mL/hr, Intravenous, Continuous, Maria G Hayes PA-C, Stopped at 03/18/23 1238  •  ticagrelor (BRILINTA) tablet 90 mg, 90 mg, Oral, Q12H [address], Alva Diaz MD, 90 mg at 03/18/23 0935    No past medical history on file      Patient Active Problem List    Diagnosis Date Noted   • Vasovagal syncope 03/09/2023   • Neurogenic bowel 03/08/2023   • Anemia 03/06/2023   • Spasticity 03/01/2023   • CKD (chronic kidney disease) 03/01/2023   • Prediabetes 03/01/2023   • Adjustment disorder with depressed mood 03/01/2023   • [facility]acute [facility]) [facility] ) 02/25/2023   • Acute Posterior Circulation Stroke 02/25/2023   • Right Vertebral artery dissection ([facility] ) 02/25/2023   • Stenosis of left vertebral artery 02/25/2023   • Primary hypertension 02/25/2023   • Dizziness 02/25/2023 • Dysphagia 02/25/2023          Domenico Acuña MD  PM&R      I have spent a total time of 50 minutes on 03/18/23 in caring for this patient including Patient and family education, Impressions, Counseling / Coordination of care, Documenting in the medical record, Reviewing / ordering tests, medicine, procedures  , Obtaining or reviewing history  , Communicating with other healthcare professionals  and physical exam       ** Please Note:  voice to text software may have been used in the creation of this document   Although proof errors in transcription or interpretation are a potential of such software**

## 2023-03-18 NOTE — ASSESSMENT & PLAN NOTE
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 with significant R V3/4 thrombus, L vertebral artery stenosis with possible occlusion within intradural segment  · NIHSS 0 with symptom onset 2/23 progressed to dysarthria, left facial droop not corrected with smile, R side flaccid    Imaging reviewed with Dr Pena:    CTA head and neck w/wo 3/17/23:CT HEAD: Evolution of subacute infarcts in left midbrain involving cerebral peduncle, left tatiana, and bilateral cerebellum (right worse than left)  No new acute intracranial abnormality  CTA HEAD AND NECK: Right vertebral artery V4 stent appears to have in-stent occlusion  Right vertebral artery V4 segment is patent before and after the V4 stent  Unchanged left vertebral artery post-PICA V4 segment occlusion  Dissection flap in right vertebral artery V3 segment which is patent and improved in caliber status post thrombectomy  Moderate stenosis in proximal-to- mid basilar artery due to atherosclerotic disease status post mechanical thrombectomy  Plan:  · Continue to closely monitor symptoms   · Reviewed imaging with patient   · DAPT / AC  · ASA 81mg   · Brilinta 90mg BID  · Eliquis 2 5mg BID  · STAT CT/CTA head/neck for decline or change in neurological status  · Ongoing medical management   · Continue BP goal as ordered   · Neurology following for ongoing stroke care  · Patient currently in [facility]  · No further neurosurgical intervention anticipated at this time  Continue medical management  Neurosurgery will sign off at this time  Patient will see us in the next few weeks for clinical follow-up once discharged  Please call with questions or concerns

## 2023-03-18 NOTE — PROGRESS NOTES
03/18/23 1230   Pain Assessment   Pain Assessment Tool 0-10   Pain Score 5   Pain Location/Orientation Location: Head   Restrictions/Precautions   Precautions Aspiration;Bed/chair alarms;Cognitive; Fall Risk;Supervision on toilet/commode   Subjective   Subjective pt agreeable to perform skilled PT   Sit to Stand   Type of Assistance Needed Physical assistance   Physical Assistance Level 26%-50%   Sit to Stand CARE Score 3   Bed-Chair Transfer   Type of Assistance Needed Physical assistance   Physical Assistance Level 26%-50%   Comment SPT and sit pivot   Chair/Bed-to-Chair Transfer CARE Score 3   Transfer Bed/Chair/Wheelchair   Adaptive Equipment Hand Hold   Walk 10 Feet   Type of Assistance Needed Physical assistance   Physical Assistance Level Total assistance   Comment NPP HHA   Walk 10 Feet CARE Score 1   Walk 50 Feet with Two Turns   Type of Assistance Needed Physical assistance   Physical Assistance Level Total assistance   Comment NPP HHA   Walk 50 Feet with Two Turns CARE Score 1   Walk 150 Feet   Type of Assistance Needed Physical assistance   Physical Assistance Level Total assistance   Comment NPP HHA   Walk 150 Feet CARE Score 1   Ambulation   Primary Mode of Locomotion Prior to Admission Walk   Distance Walked (feet) 200 ft  (x2 behind hold and HHA x2)   Does the patient walk? 2  Yes   Wheel 50 Feet with Two Turns   Type of Assistance Needed Set-up / clean-up   Comment BL LEGS   Wheel 50 Feet with Two Turns CARE Score 5   Wheel 150 Feet   Type of Assistance Needed Supervision   Comment BL LEGS   Wheel 150 Feet CARE Score 4   Wheelchair mobility   Does the patient use a wheelchair? 1  Yes   Type of Wheelchair Used 1   Manual   Curb or Single Stair   Style negotiated Single stair   Type of Assistance Needed Physical assistance   Physical Assistance Level 51%-75%   Comment FF single HHA   1 Step (Curb) CARE Score 2   4 Steps   Type of Assistance Needed Physical assistance   Physical Assistance Level 51%-75%   Comment FF single HHA   4 Steps CARE Score 2   12 Steps   Type of Assistance Needed Physical assistance   Physical Assistance Level 51%-75%   Comment FF single HHA   12 Steps CARE Score 2   Picking Up Object   Reason if not Attempted Safety concerns   Picking Up Object CARE Score 88   Therapeutic Interventions   Neuromuscular Re-Education NPP  steps , ,FF steps ModA  and NPP ambulation X2   Equipment Use   NuStep lvl 2 for 10 min just BL LE and left arm only  Assessment   Treatment Assessment pt off hold percaution per Dr Katarzyna Astorga order see note  Pt able to perform NPP ambulation / walking behind and HHA x2 WC follow wife push WC   Pt clear RLE foot better  and less leaning to his right side but more NPP and skilled PT needs to meet DC goals FF stair single RHR and HHA left hand with vc for to clear step and progressing with better body positioning   Pt will cont to need skilled PT back to his recliner withall needs in reach and alarm  Cont POC   Barriers to Discharge Inaccessible home environment;Decreased caregiver support   Plan   Progress Progressing toward goals   PT Therapy Minutes   PT Time In 1230   PT Time Out 1430   PT Total Time (minutes) 120   PT Mode of treatment - Individual (minutes) 120   PT Mode of treatment - Concurrent (minutes) 0   PT Mode of treatment - Group (minutes) 0   PT Mode of treatment - Co-treat (minutes) 0   PT Mode of Treatment - Total time(minutes) 120 minutes   PT Cumulative Minutes 1073   Therapy Time missed   Time missed?  No

## 2023-03-19 RX ORDER — HYDRALAZINE HYDROCHLORIDE 50 MG/1
50 TABLET, FILM COATED ORAL EVERY 12 HOURS
Status: DISCONTINUED | OUTPATIENT
Start: 2023-03-19 | End: 2023-03-20

## 2023-03-19 RX ADMIN — HYDRALAZINE HYDROCHLORIDE 50 MG: 50 TABLET, FILM COATED ORAL at 21:54

## 2023-03-19 RX ADMIN — BACLOFEN 5 MG: 10 TABLET ORAL at 21:54

## 2023-03-19 RX ADMIN — BACLOFEN 5 MG: 10 TABLET ORAL at 18:26

## 2023-03-19 RX ADMIN — FLUOXETINE 20 MG: 20 CAPSULE ORAL at 08:23

## 2023-03-19 RX ADMIN — LISINOPRIL 40 MG: 20 TABLET ORAL at 08:23

## 2023-03-19 RX ADMIN — CARVEDILOL 25 MG: 25 TABLET, FILM COATED ORAL at 16:38

## 2023-03-19 RX ADMIN — APIXABAN 2.5 MG: 2.5 TABLET, FILM COATED ORAL at 08:23

## 2023-03-19 RX ADMIN — AMLODIPINE BESYLATE 10 MG: 10 TABLET ORAL at 08:23

## 2023-03-19 RX ADMIN — TICAGRELOR 90 MG: 90 TABLET ORAL at 21:54

## 2023-03-19 RX ADMIN — HYDRALAZINE HYDROCHLORIDE 25 MG: 25 TABLET, FILM COATED ORAL at 08:22

## 2023-03-19 RX ADMIN — BACLOFEN 5 MG: 10 TABLET ORAL at 12:34

## 2023-03-19 RX ADMIN — CARVEDILOL 25 MG: 25 TABLET, FILM COATED ORAL at 08:23

## 2023-03-19 RX ADMIN — CYANOCOBALAMIN TAB 500 MCG 1000 MCG: 500 TAB at 08:22

## 2023-03-19 RX ADMIN — Medication 1 PACKET: at 08:22

## 2023-03-19 RX ADMIN — TICAGRELOR 90 MG: 90 TABLET ORAL at 08:23

## 2023-03-19 RX ADMIN — BACLOFEN 5 MG: 10 TABLET ORAL at 08:23

## 2023-03-19 RX ADMIN — FERROUS SULFATE TAB 325 MG (65 MG ELEMENTAL FE) 325 MG: 325 (65 FE) TAB at 08:23

## 2023-03-19 RX ADMIN — ASPIRIN 81 MG CHEWABLE TABLET 81 MG: 81 TABLET CHEWABLE at 08:23

## 2023-03-19 RX ADMIN — ATORVASTATIN CALCIUM 40 MG: 40 TABLET, FILM COATED ORAL at 16:38

## 2023-03-19 RX ADMIN — APIXABAN 2.5 MG: 2.5 TABLET, FILM COATED ORAL at 18:26

## 2023-03-19 NOTE — PROGRESS NOTES
03/19/23 1230   Pain Assessment   Pain Assessment Tool 0-10   Pain Score No Pain   Restrictions/Precautions   Precautions Aspiration;Bed/chair alarms;Cognitive; Fall Risk;THR; Aphasia   General   Change In Medical/Functional Status (S)  please take pt's BP before PT with parameters 565-790 systolic  Cognition   Overall Cognitive Status Impaired   Arousal/Participation Alert; Cooperative   Attention Attends with cues to redirect   Subjective   Subjective pt had no c/o and ready for PT   Roll Left and Right   Type of Assistance Needed Supervision   Comment VC to readjust himself prior to rolling to the side   Roll Left and Right CARE Score 4   Sit to Lying   Type of Assistance Needed Supervision   Comment HOB flat no rail   Sit to Lying CARE Score 4   Lying to Sitting on Side of Bed   Comment in recliner at start of tx   Sit to Stand   Type of Assistance Needed Physical assistance;Verbal cues   Physical Assistance Level 26%-50%   Comment no AD, VC for R hand placement   Sit to Stand CARE Score 3   Bed-Chair Transfer   Type of Assistance Needed Verbal cues; Physical assistance   Physical Assistance Level 26%-50%   Comment VC for fwd weight shifting to facilitate ease of Sit pivot transfer, VC/assist to keep R hand on surfaceto increase utilization   Chair/Bed-to-Chair Transfer CARE Score 3   Walk 10 Feet   Type of Assistance Needed Physical assistance;Verbal cues   Physical Assistance Level Total assistance   Walk 10 Feet CARE Score 1   Walk 50 Feet with Two Turns   Type of Assistance Needed Physical assistance;Verbal cues   Physical Assistance Level Total assistance   Walk 50 Feet with Two Turns CARE Score 1   Walk 150 Feet   Type of Assistance Needed Physical assistance;Verbal cues   Physical Assistance Level Total assistance   Walk 150 Feet CARE Score 1   Wheel 50 Feet with Two Turns   Type of Assistance Needed Set-up / clean-up   Comment using Ebixly  x 100-200'   Wheel 50 Feet with Two Turns CARE Score 5   Wheel 150 Feet   Type of Assistance Needed Set-up / clean-up   Comment with extra time able to lock/unlock R brake   Wheel 150 Feet CARE Score 5   Therapeutic Interventions   Strengthening R SLR x 10 reps, supine R hip abd x 10 reps, sidelying A/A R hip abduction x 10 reps x 2 sets , sidelying clam shell x 5 reps x 2 sets, seated R LAQ x 10 reps, R hip flexion and heel toe raises x 10 reps  bilat hamstring and gastroc mm x 20 SH x 5 reps    Neuromuscular Re-Education NPP gait /balance training for first 1 hr of tx session with short seated rest breaks including trialing NBQC and R AFO for assessment of gait x 50' with noted inc L side weighshifting and fair coordination although pt expressed not liking it  so the rest of the tx session done with 2 person assist guarding pt & using gait belt +3rd person CFA while pt performed repeated amb in all directions ' at a time with combination of R HHA to L HHA only to bilat HHA to no HHA with/without R AFO  external cues via tape on floor or cones provided to facilitate wider GAYLA or external verbal cues to promote step through pattern and R LE clearance even with added 2# ankle wt on R LE at 1 point in training  seated ball kicking with R LE only   Equipment Use   NuStep lvl2 x 5 mins LE only, SPM >60 DE 52, spO2 97%, lvl 2 bilat LE and R UE x 7 mins SPM>90 with SpO2 96% , DE 68-71 rating task at somewhat hard Cici scale  Assessment   Treatment Assessment skilled PT focused on NMR/NPP training to promote gait normalization, balance and righting reactions, activity tolerance and overall functions, see note above for details of PT session   Pt cont to demo consistent gains with w/c level mobilities but still has non functional gait in the sense pt requiring 2 person assist even when pt using an AD increasing pt caregiver burden and risk for falls so will greatly benefit from additional skilled PT intervention to improve pt's functions and also cont with family training which PT anticipate will require repeated training due to pt's functional deficits  Family/Caregiver Present yes   PT Family training done with: daughter and ANGEL present throughout tx session and even assisted with CFA while wife arrived about last 25 mins of tx session  daughter and ANGEL educated on anticipated level of mobility and AD pt will utlized at d/c  ANGEL confirmed ability to make/install a ramp together with his PREETI for the pt to use at d/c    PT Barriers   Functional Limitation Car transfers; Ramp negotiation;Standing;Stair negotiation;Transfers; Walking   Plan   Treatment/Interventions Functional transfer training; Therapeutic exercise;LE strengthening/ROM; Bed mobility;Gait training;Spoke to nursing;OT   Progress Progressing toward goals   Recommendation   PT Discharge Recommendation Home with outpatient rehabilitation   PT Therapy Minutes   PT Time In 1230   PT Time Out 1425   PT Total Time (minutes) 115   PT Mode of treatment - Individual (minutes) 115   PT Mode of treatment - Concurrent (minutes) 0   PT Mode of treatment - Group (minutes) 0   PT Mode of treatment - Co-treat (minutes) 0   PT Mode of Treatment - Total time(minutes) 115 minutes   PT Cumulative Minutes 1188

## 2023-03-19 NOTE — PROGRESS NOTES
"OT Treatment Note       03/19/23 1430   Pain Assessment   Pain Assessment Tool 0-10   Pain Score No Pain   Restrictions/Precautions   Precautions Aphasia; Aspiration;Bed/chair alarms;Cognitive; Fall Risk;Supervision on toilet/commode   Lifestyle   Autonomy \"You guys are putting me to work today\"   Eating   Type of Assistance Needed Set-up / clean-up   Physical Assistance Level No physical assistance   Eating CARE Score 5   Putting On/Taking Off Footwear   Type of Assistance Needed Physical assistance   Physical Assistance Level 25% or less   Comment Seated in recliner, pt able to don L shoe  OT A with threading R foot into shoe and pt don rest of way  Pt able to doff shoes  Putting On/Taking Off Footwear CARE Score 3   Sit to Stand   Type of Assistance Needed Physical assistance   Physical Assistance Level 26%-50%   Comment Pt engaged in repetitive sit to stand transfers utilizing BUE to push up from w/c with OT A with R grasp  At this time do not recommend for pt to reach back with RUE to sit 2* increased risk for wrist/hand getting caught in w/c  Sit to Stand CARE Score 3   Bed-Chair Transfer   Type of Assistance Needed Physical assistance   Physical Assistance Level 25% or less   Comment Pt engaged in repetitive sit pivot transfers to/from w/c and wide chair with back in therapy gym  Pt began utilizing RUE to A with transfer by grasping arm of the chair  Pt then able to teach back to OT steps for transfer in anticipation of pt teaching wife steps  Chair/Bed-to-Chair Transfer CARE Score 3   Toileting Hygiene   Type of Assistance Needed Physical assistance   Physical Assistance Level 76% or more   Comment OT A with rear hygiene and clothing management with pt utilizing L grab bar     Toileting Hygiene CARE Score 2   Toilet Transfer   Type of Assistance Needed Physical assistance   Physical Assistance Level 25% or less   Comment Sit pivot to drop arm commode over toilet with pt utilizing L grab bar to go towards " toilet and RUE on w/c arm for transfer back  Toilet Transfer CARE Score 3   Functional Standing Tolerance   Comments See NMR section   Neuromuscular Education   Weight Bearing Technique Yes   RUE Weight Bearing Extended arm standing   Comments Seated in w/c, OT stretched pt for 3 reps of 30 seconds each to decrease muslce tightness at: chest/pectoral muscles with point of control at pts b/l shoulders and pressing into back to increase stretch; R shoulder abduction to 1/2 range with elbow and wrist extended with point of control above elbow and at forearm  Seated pt utilized RUE to roll large theraball on ground forward and back 5x  Included mirror for visual feedback, 5 reps each of AAROM scapula upward/downward rotation and elevation/depression, and protraction/retraction with point of control at scapula and proximal RUE,  wrist flexion/extension with point of control at forearm and digits, digit flexion/extension with point of control at digits and forearm, and  AAROM supination/pronation with point of control at elbow and forearm  This session completed without NMES  Then weight bearing RUE in stance with Ax1 on L for standing balance and A of second on R for point of control at R elbow to prevent buckling, and maintain R hand in wrist and digit extension while guarding in case of R knee buckle  Pt worked on standing balance/tolerance, RUE weight bearing, functional reach of LUE, and weight shifting onto RLE  Items placed to promote R LE weight shifting with elbow flexion and then R elbow extension to boost upright into stance  3 sets of grasp and release with LUE of 10 balls from 1 contain to another while weightbearing on to RUE  Of note, no knee buckling observed  Then seated, pt engaged in functional reach with RUE with 10 golf balls from 1 container to another  Pt verbalized fatigue at end of session  Cognition   Overall Cognitive Status Impaired   Arousal/Participation Alert; Cooperative   Attention Within functional limits   Orientation Level Oriented X4   Memory Decreased short term memory   Following Commands Follows one step commands without difficulty   Activity Tolerance   Activity Tolerance Patient tolerated treatment well   Assessment   Treatment Assessment Pt engaged in skilled OT tx session focused on RUE NMR, functional transfer training, and standing tolerance/balance  See above for further details on functional performance  BP taken throughout session: 160/80, 168/82 s/p 5mins (RN aware), 158/70 following RUE stretching, and 140/74 following functional transfer training  Pt able to complete all sit pivot transfers with Min A today and was able to use RUE to A when possible to reach and grasp for surface  Educated pt on counting to 3 prior to transferring in preparation of FT with pts wife  Pt reported tightness in RUE at digit extensors and noted with elbow flexor tone towards end range and would benefit from further stretching to decrease tone  Cont OT POC with focus on RUE NMR, functional transfer training with RUE assisting in transfer, standing tolerance/balance, and ADL retraining  Pt in recliner with alarm activated and all needs in reach  FT Wednesday with pts wife at 200 and then again Thursday/Friday  Plan to get wife more involved with current A needed during ADLs and transfers  Tomorrow will complete ADL for next insurance update on Tuesday  Prognosis Good   Problem List Decreased strength;Decreased range of motion;Decreased endurance; Impaired balance;Decreased mobility; Decreased coordination;Decreased cognition; Impaired sensation; Impaired tone   Barriers to Discharge Inaccessible home environment;Decreased caregiver support   Plan   Treatment/Interventions ADL retraining;Functional transfer training; Therapeutic exercise; Endurance training;Cognitive reorientation;Patient/family training;Equipment eval/education; Compensatory technique education   Progress Progressing toward goals Recommendation   OT Discharge Recommendation   (pending progress)   OT Therapy Minutes   OT Time In 0930   OT Time Out 1130   OT Total Time (minutes) 120   OT Mode of treatment - Individual (minutes) 120   OT Mode of treatment - Concurrent (minutes) 0   OT Mode of treatment - Group (minutes) 0   OT Mode of treatment - Co-treat (minutes) 0   OT Mode of Treatment - Total time(minutes) 120 minutes   OT Cumulative Minutes 1225   Therapy Time missed   Time missed?  No

## 2023-03-19 NOTE — PROGRESS NOTES
Internal Medicine Progress Note  Patient: Mayra Schmitt  Age/sex: 60 y o  male  Medical Record #: [id number]      ASSESSMENT/PLAN: (Interval History)  Mayra Schmitt is seen and examined and management for following issues:    Posterior circulation stroke  • S/p intracranial and extracranial vertebral artery stenting/TICI 2B revascularization  • Continue ASA, Brilinta, Eliquis and atorvastatin  • Prozac for depressed mood following CVA   • Neuropsych pending  • Dysphagia 3 diet with nectar thick liquids  • Continue Baclofen 5mg 4x daily  • Therapy per primary service  • Outpt follow-up with Neurology and Neurosurgery  • CTA head and neck done 3/17/23  Concern for in stent occlusion in the Rt vertebral artery V4 stent  Neurosurgery saw pt 3/18/23 and finding likely does not represent new occlusion as lumen within stents is difficult to image on CTA  • BMP Monday  Rapid response  · Likely d/t vasovagal syncope  Neurology agrees that event was unlikely seizure  · Felt dizzy/weak prior to episode then was staring blankly initially when he came back around  · Eventually returned to his baseline  · No further events      HTN  • Home: No medications  • Here: amlodipine 10mg daily/Coreg 25mg 2x daily/lisinopril 40mg daily/hydralazine 25mg 2x daily  • Given a prn dose of hydralazine last night and BP elevated over the past 24 hours  • Increase hydralazine to 50mg 2x daily  • Continue to monitor trend  Fe deficiency anemia  · Likely multifactoral  · s/p IV venofer x 2 doses  · Cont daily ferrous sulfate  · Cbc mondays     CKD stage 2  • Baseline Cr 1 2 - 1 3  • Chlorthalidone dc'd  • Cr 1 3  COVID  • Precautions lifted  • Pt was asymptomatic     Prediabetes  • HA1C 5 7  • Recommend dietary modifications       DC planning:  TBD  The above assessment and plan was reviewed and updated as determined by my evaluation of the patient on 3/19/2023      Labs:   Results from last 7 days   Lab Units 03/13/23  0637   WBC Thousand/uL 7 78   HEMOGLOBIN g/dL 10 1*   HEMATOCRIT % 32 3*   PLATELETS Thousands/uL 288     Results from last 7 days   Lab Units 03/18/23  1122 03/17/23  0612   SODIUM mmol/L 139 142   POTASSIUM mmol/L 4 6 4 1   CHLORIDE mmol/L 111* 111*   CO2 mmol/L 27 24   BUN mg/dL 25 29*   CREATININE mg/dL 1 27 1 30   CALCIUM mg/dL 8 7 8 9                   Review of Scheduled Meds:  Current Facility-Administered Medications   Medication Dose Route Frequency Provider Last Rate   • acetaminophen  650 mg Oral Q6H PRN YURI Woodard     • amLODIPine  10 mg Oral Daily Denton Gutierrez MD     • apixaban  2 5 mg Oral BID Denton Gutierrez MD     • aspirin  81 mg Oral Daily Denton Gutierrez MD     • atorvastatin  40 mg Oral Daily With Birgit Hatch MD     • baclofen  5 mg Oral 4x Daily Denton Gutierrez MD     • carvedilol  25 mg Oral BID With Meals YURI Woodard     • vitamin B-12  1,000 mcg Oral Daily Denton Gutierrez MD     • ferrous sulfate  325 mg Oral Daily With Breakfast YURI Woodard     • FLUoxetine  20 mg Oral Daily Denton Gutierrez MD     • hydrALAZINE  25 mg Oral Q8H PRN YURI Woodard     • hydrALAZINE  25 mg Oral Q12H YURI Gallagher     • lisinopril  40 mg Oral Daily YURI Woodard     • polyethylene glycol  17 g Oral Daily PRN Denton Gutierrez MD     • psyllium  1 packet Oral Daily Reynold Roe MD     • ticagrelor  90 mg Oral Q12H [address] Denton Gutierrez MD         Subjective/ HPI: Patient seen and examined  Patients overnight issues or events were reviewed with nursing or staff during rounds or morning huddle session  New or overnight issues include the following:     Pt seen in his room  Discussed imaging results with pt  He is currently on a therapy hold until Neurosurgery evaluates  He states that he is doing well and denies any current complaints  ROS:   A 10 point ROS was performed; negative except as noted above         Imaging:     CTA head and neck w wo contrast   Final Result by Phillip Buckner MD (03/18 4499)      CT HEAD   - Evolution of subacute infarcts in left midbrain involving cerebral peduncle, left tatiana, and bilateral cerebellum (right worse than left)  - No new acute intracranial abnormality  CTA HEAD AND NECK   - Right vertebral artery V4 stent appears to have in-stent occlusion  Right vertebral artery V4 segment is patent before and after the V4 stent  - Unchanged left vertebral artery post-PICA V4 segment occlusion    - Dissection flap in right vertebral artery V3 segment which is patent and improved in caliber status post thrombectomy  - Moderate stenosis in proximal-to- mid basilar artery due to atherosclerotic disease status post mechanical thrombectomy  Additional chronic/incidental findings as detailed above               I personally discussed this study with Arcelia William on 3/18/2023 at 5:46 AM                               Workstation performed: [id number]             *Labs /Radiology studies Reviewed  *Medications  reviewed and reconciled as needed  *Please refer to order section for additional ordered labs studies  *Case discussed with primary attending during morning huddle case rounds    Physical Examination:  Vitals:   Vitals:    03/18/23 0933 03/18/23 1620 03/18/23 2005 03/19/23 0509   BP: 154/70 164/65 158/73 (!) 171/70   BP Location:  Left arm Left arm Left arm   Pulse:  55 57 56   Resp:  16 16 20   Temp:  98 3 °F (36 8 °C) 97 6 °F (36 4 °C) 97 7 °F (36 5 °C)   TempSrc:  Oral Oral Oral   SpO2:  95% 98% 98%   Weight:       Height:           GEN: No apparent distress, interactive  NEURO: Alert and oriented x3; ongoing mild dysarthria  HEENT: Pupils are equal and reactive, EOMI, mucous membranes are moist, face asymmetrical  CV: S1 S2 regular, no MRG, no peripheral edema noted  RESP: Lungs are clear bilaterally, no wheezes, rales or rhonchi noted, on room air, respirations easy and non labored  GI: Flat, soft non tender, non distended; +BS x4; some bowel incontinence at times  : Voiding without difficulty  MUSC: Moves all extremities; Rt hemiparesis UE > LE without change  Increased mobility of Lt hand  SKIN: pink, warm and dry, normal turgor, no rashes, lesions      The above physical exam was reviewed and updated as determined by my evaluation of the patient on 3/19/2023  Invasive Devices     None                    VTE Pharmacologic Prophylaxis: Eliquis  Code Status: Level 1 - Full Code  Current Length of Stay: 13 day(s)      Total time spent:  30 minutes with more than 50% spent counseling/coordinating care  Counseling includes discussion with patient re: progress  and discussion with patient of his/her current medical state/information  Coordination of patient's care was performed in conjunction with primary service  Time invested included review of patient's labs, vitals, and management of their comorbidities with continued monitoring  In addition, this patient was discussed with medical team including physician and advanced extenders  The care of the patient was extensively discussed and appropriate treatment plan was formulated unique for this patient  Medical decision making for the day was made by supervising physician unless otherwise noted in their attestation statement  ** Please Note:  voice to text software may have been used in the creation of this document   Although proof errors in transcription or interpretation are a potential of such software**

## 2023-03-19 NOTE — PLAN OF CARE
Problem: Prexisting or High Potential for Compromised Skin Integrity  Goal: Skin integrity is maintained or improved  Description: INTERVENTIONS:  - Identify patients at risk for skin breakdown  - Assess and monitor skin integrity  - Assess and monitor nutrition and hydration status  - Monitor labs   - Assess for incontinence   - Turn and reposition patient  - Assist with mobility/ambulation  - Relieve pressure over bony prominences  - Avoid friction and shearing  - Provide appropriate hygiene as needed including keeping skin clean and dry  - Evaluate need for skin moisturizer/barrier cream  - Collaborate with interdisciplinary team   - Patient/family teaching  - Consider wound care consult   Outcome: Progressing     Problem: MOBILITY - ADULT  Goal: Maintain or return to baseline ADL function  Description: INTERVENTIONS:  -  Assess patient's ability to carry out ADLs; assess patient's baseline for ADL function and identify physical deficits which impact ability to perform ADLs (bathing, care of mouth/teeth, toileting, grooming, dressing, etc )  - Assess/evaluate cause of self-care deficits   - Assess range of motion  - Assess patient's mobility; develop plan if impaired  - Assess patient's need for assistive devices and provide as appropriate  - Encourage maximum independence but intervene and supervise when necessary  - Involve family in performance of ADLs  - Assess for home care needs following discharge   - Consider OT consult to assist with ADL evaluation and planning for discharge  - Provide patient education as appropriate  Outcome: Progressing  Goal: Maintains/Returns to pre admission functional level  Description: INTERVENTIONS:  - Perform BMAT or MOVE assessment daily    - Set and communicate daily mobility goal to care team and patient/family/caregiver  - Collaborate with rehabilitation services on mobility goals if consulted  - Perform Range of Motion times a day    - Reposition patient every hours   - Dangle patient  times a day  - Stand patient  times a day  - Ambulate patient  times a day  - Out of bed to chair  times a day   - Out of bed for meals  times a day  - Out of bed for toileting  - Record patient progress and toleration of activity level   Outcome: Progressing     Problem: PAIN - ADULT  Goal: Verbalizes/displays adequate comfort level or baseline comfort level  Description: Interventions:  - Encourage patient to monitor pain and request assistance  - Assess pain using appropriate pain scale  - Administer analgesics based on type and severity of pain and evaluate response  - Implement non-pharmacological measures as appropriate and evaluate response  - Consider cultural and social influences on pain and pain management  - Notify physician/advanced practitioner if interventions unsuccessful or patient reports new pain  Outcome: Progressing     Problem: INFECTION - ADULT  Goal: Absence or prevention of progression during hospitalization  Description: INTERVENTIONS:  - Assess and monitor for signs and symptoms of infection  - Monitor lab/diagnostic results  - Monitor all insertion sites, i e  indwelling lines, tubes, and drains  - Monitor endotracheal if appropriate and nasal secretions for changes in amount and color  - [place] appropriate cooling/warming therapies per order  - Administer medications as ordered  - Instruct and encourage patient and family to use good hand hygiene technique  - Identify and instruct in appropriate isolation precautions for identified infection/condition  Outcome: Progressing  Goal: Absence of fever/infection during neutropenic period  Description: INTERVENTIONS:  - Monitor WBC    Outcome: Progressing     Problem: SAFETY ADULT  Goal: Patient will remain free of falls  Description: INTERVENTIONS:  - Educate patient/family on patient safety including physical limitations  - Instruct patient to call for assistance with activity   - Consult OT/PT to assist with strengthening/mobility   - Keep Call bell within reach  - Keep bed low and locked with side rails adjusted as appropriate  - Keep care items and personal belongings within reach  - Initiate and maintain comfort rounds  - Make Fall Risk Sign visible to staff  - Offer Toileting every  Hours, in advance of need  - Initiate/Maintain alarm  - Obtain necessary fall risk management equipment:   - Apply yellow socks and bracelet for high fall risk patients  - Consider moving patient to room near nurses station  Outcome: Progressing  Goal: Maintain or return to baseline ADL function  Description: INTERVENTIONS:  -  Assess patient's ability to carry out ADLs; assess patient's baseline for ADL function and identify physical deficits which impact ability to perform ADLs (bathing, care of mouth/teeth, toileting, grooming, dressing, etc )  - Assess/evaluate cause of self-care deficits   - Assess range of motion  - Assess patient's mobility; develop plan if impaired  - Assess patient's need for assistive devices and provide as appropriate  - Encourage maximum independence but intervene and supervise when necessary  - Involve family in performance of ADLs  - Assess for home care needs following discharge   - Consider OT consult to assist with ADL evaluation and planning for discharge  - Provide patient education as appropriate  Outcome: Progressing  Goal: Maintains/Returns to pre admission functional level  Description: INTERVENTIONS:  - Perform BMAT or MOVE assessment daily    - Set and communicate daily mobility goal to care team and patient/family/caregiver  - Collaborate with rehabilitation services on mobility goals if consulted  - Perform Range of Motion  times a day  - Reposition patient every  hours    - Dangle patient  times a day  - Stand patient  times a day  - Ambulate patient  times a day  - Out of bed to chair times a day   - Out of bed for meals  times a day  - Out of bed for toileting  - Record patient progress and toleration of activity level   Outcome: Progressing     Problem: DISCHARGE PLANNING  Goal: Discharge to home or other facility with appropriate resources  Description: INTERVENTIONS:  - Identify barriers to discharge w/patient and caregiver  - Arrange for needed discharge resources and transportation as appropriate  - Identify discharge learning needs (meds, wound care, etc )  - Arrange for interpretive services to assist at discharge as needed  - Refer to Case Management Department for coordinating discharge planning if the patient needs post-hospital services based on physician/advanced practitioner order or complex needs related to functional status, cognitive ability, or social support system  Outcome: Progressing     Problem: Nutrition/Hydration-ADULT  Goal: Nutrient/Hydration intake appropriate for improving, restoring or maintaining nutritional needs  Description: Monitor and assess patient's nutrition/hydration status for malnutrition  Collaborate with interdisciplinary team and initiate plan and interventions as ordered  Monitor patient's weight and dietary intake as ordered or per policy  Utilize nutrition screening tool and intervene as necessary  Determine patient's food preferences and provide high-protein, high-caloric foods as appropriate       INTERVENTIONS:  - Monitor oral intake, urinary output, labs, and treatment plans  - Assess nutrition and hydration status and recommend course of action  - Evaluate amount of meals eaten  - Assist patient with eating if necessary   - Allow adequate time for meals  - Recommend/ encourage appropriate diets, oral nutritional supplements, and vitamin/mineral supplements  - Order, calculate, and assess calorie counts as needed  - Recommend, monitor, and adjust tube feedings and TPN/PPN based on assessed needs  - Assess need for intravenous fluids  - Provide specific nutrition/hydration education as appropriate  - Include patient/family/caregiver in decisions related to nutrition  Outcome: Progressing

## 2023-03-20 ENCOUNTER — APPOINTMENT (INPATIENT)
Dept: NON INVASIVE DIAGNOSTICS | Facility: HOSPITAL | Age: 63
End: 2023-03-20

## 2023-03-20 LAB
ANION GAP SERPL CALCULATED.3IONS-SCNC: 4 MMOL/L (ref 4–13)
BASOPHILS # BLD AUTO: 0.03 THOUSANDS/ÂΜL (ref 0–0.1)
BASOPHILS NFR BLD AUTO: 0 % (ref 0–1)
BUN SERPL-MCNC: 27 MG/DL (ref 5–25)
CALCIUM SERPL-MCNC: 8.9 MG/DL (ref 8.3–10.1)
CHLORIDE SERPL-SCNC: 110 MMOL/L (ref 96–108)
CO2 SERPL-SCNC: 26 MMOL/L (ref 21–32)
CREAT SERPL-MCNC: 1.25 MG/DL (ref 0.6–1.3)
EOSINOPHIL # BLD AUTO: 0.25 THOUSAND/ÂΜL (ref 0–0.61)
EOSINOPHIL NFR BLD AUTO: 3 % (ref 0–6)
ERYTHROCYTE [DISTWIDTH] IN BLOOD BY AUTOMATED COUNT: 13.3 % (ref 11.6–15.1)
GFR SERPL CREATININE-BSD FRML MDRD: 60 ML/MIN/1.73SQ M
GLUCOSE SERPL-MCNC: 96 MG/DL (ref 65–140)
HCT VFR BLD AUTO: 32.6 % (ref 36.5–49.3)
HGB BLD-MCNC: 10.2 G/DL (ref 12–17)
IMM GRANULOCYTES # BLD AUTO: 0.05 THOUSAND/UL (ref 0–0.2)
IMM GRANULOCYTES NFR BLD AUTO: 1 % (ref 0–2)
LYMPHOCYTES # BLD AUTO: 0.76 THOUSANDS/ÂΜL (ref 0.6–4.47)
LYMPHOCYTES NFR BLD AUTO: 10 % (ref 14–44)
MCH RBC QN AUTO: 27.5 PG (ref 26.8–34.3)
MCHC RBC AUTO-ENTMCNC: 31.3 G/DL (ref 31.4–37.4)
MCV RBC AUTO: 88 FL (ref 82–98)
MONOCYTES # BLD AUTO: 0.49 THOUSAND/ÂΜL (ref 0.17–1.22)
MONOCYTES NFR BLD AUTO: 7 % (ref 4–12)
NEUTROPHILS # BLD AUTO: 5.85 THOUSANDS/ÂΜL (ref 1.85–7.62)
NEUTS SEG NFR BLD AUTO: 79 % (ref 43–75)
NRBC BLD AUTO-RTO: 0 /100 WBCS
PLATELET # BLD AUTO: 216 THOUSANDS/UL (ref 149–390)
PMV BLD AUTO: 10 FL (ref 8.9–12.7)
POTASSIUM SERPL-SCNC: 4.1 MMOL/L (ref 3.5–5.3)
RBC # BLD AUTO: 3.71 MILLION/UL (ref 3.88–5.62)
SODIUM SERPL-SCNC: 140 MMOL/L (ref 135–147)
WBC # BLD AUTO: 7.43 THOUSAND/UL (ref 4.31–10.16)

## 2023-03-20 RX ORDER — BACLOFEN 10 MG/1
5 TABLET ORAL 3 TIMES DAILY
Status: DISCONTINUED | OUTPATIENT
Start: 2023-03-20 | End: 2023-03-31 | Stop reason: HOSPADM

## 2023-03-20 RX ORDER — HYDRALAZINE HYDROCHLORIDE 50 MG/1
50 TABLET, FILM COATED ORAL EVERY 8 HOURS SCHEDULED
Status: DISCONTINUED | OUTPATIENT
Start: 2023-03-20 | End: 2023-03-22

## 2023-03-20 RX ADMIN — HYDRALAZINE HYDROCHLORIDE 50 MG: 50 TABLET, FILM COATED ORAL at 21:57

## 2023-03-20 RX ADMIN — Medication 1 PACKET: at 08:54

## 2023-03-20 RX ADMIN — BACLOFEN 5 MG: 10 TABLET ORAL at 17:03

## 2023-03-20 RX ADMIN — TICAGRELOR 90 MG: 90 TABLET ORAL at 08:58

## 2023-03-20 RX ADMIN — BACLOFEN 5 MG: 10 TABLET ORAL at 21:57

## 2023-03-20 RX ADMIN — CARVEDILOL 25 MG: 25 TABLET, FILM COATED ORAL at 17:02

## 2023-03-20 RX ADMIN — LISINOPRIL 40 MG: 20 TABLET ORAL at 08:55

## 2023-03-20 RX ADMIN — CARVEDILOL 25 MG: 25 TABLET, FILM COATED ORAL at 08:56

## 2023-03-20 RX ADMIN — TICAGRELOR 90 MG: 90 TABLET ORAL at 21:58

## 2023-03-20 RX ADMIN — ATORVASTATIN CALCIUM 40 MG: 40 TABLET, FILM COATED ORAL at 17:02

## 2023-03-20 RX ADMIN — BACLOFEN 5 MG: 10 TABLET ORAL at 12:20

## 2023-03-20 RX ADMIN — APIXABAN 2.5 MG: 2.5 TABLET, FILM COATED ORAL at 08:57

## 2023-03-20 RX ADMIN — ASPIRIN 81 MG CHEWABLE TABLET 81 MG: 81 TABLET CHEWABLE at 08:56

## 2023-03-20 RX ADMIN — FERROUS SULFATE TAB 325 MG (65 MG ELEMENTAL FE) 325 MG: 325 (65 FE) TAB at 08:57

## 2023-03-20 RX ADMIN — CYANOCOBALAMIN TAB 500 MCG 1000 MCG: 500 TAB at 08:55

## 2023-03-20 RX ADMIN — HYDRALAZINE HYDROCHLORIDE 50 MG: 50 TABLET, FILM COATED ORAL at 15:03

## 2023-03-20 RX ADMIN — AMLODIPINE BESYLATE 10 MG: 10 TABLET ORAL at 08:56

## 2023-03-20 RX ADMIN — HYDRALAZINE HYDROCHLORIDE 25 MG: 25 TABLET, FILM COATED ORAL at 05:51

## 2023-03-20 RX ADMIN — APIXABAN 2.5 MG: 2.5 TABLET, FILM COATED ORAL at 18:03

## 2023-03-20 RX ADMIN — FLUOXETINE 20 MG: 20 CAPSULE ORAL at 08:56

## 2023-03-20 RX ADMIN — BACLOFEN 5 MG: 10 TABLET ORAL at 08:56

## 2023-03-20 NOTE — PROGRESS NOTES
Physical Medicine and Rehabilitation Progress Note  Rakesh Barger 60 y o  male MRN: [id number]  Unit/Bed#: [facility] 081-40 Encounter: [id number]    HPI: Rakesh Barger is a 60 y o  right handed male with medical history of HTN who presented to [address] [place] [address] Road on 2/25 with nausea/dizziness  Found to have hypertensive emergency with acute/subcaute R posterior cerebellar CVA with 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, mild BRAULIO, and incidentally found + COVID (without evidence of respiratory illness/hypoxia/CXR findings)  Placed on cardene gtt, stroke pathway, ASA/Plavix, IV hydration for BRAULIO, and CTX for UTI  NIHSS 2  Symptoms began 2 days prior  Not tPA/TNK candidate  MRI/MRA with 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  NSx recommended transfer to [facility] and starting on heparin gtt and stopped Plavix  Repeat CTA on 2/26 stable with with R V3/4 occlusion  Not a candidate for interventional procedure due to clinical stability and risk  Speech consulted and noted mod-severe oropharyngeal dysphagia recommended pureed/HTL  Unfortunately later on 2/26, he clinically deteriorated with increased R sided weakness, and was taken to IR for stenting of V3 and V4 with TICI 2B revascularization  CTH without ICH  He was admitted back to the ICU intubated - extubated 2/27  MRI showed cerebellar CVA and R > L stroke burden, with additional hypodensities on DWI appreciated L midbrain, pontine area and R lower medullary/spinal cord junction  He was transitioned to triple therapy with DAPT (given recent stent) and A/C with eliquis 2 5mg BID due to COVID  Diet advanced to Level 3/NTL on 2/27  Noted to have spasticity in RLE - started on baclofen by Neurology  He was able to have cardene discontinued on 3/2, and they have been making adjustments to his oral regimen  He was started on Prozac for his mood  NSx has signed off  CTA H/N recommended around 3/17  Length of time on eliquis unclear   Admitted to [facility] on 3/6  Chief Complaint: No new concerns    Interval History/Subjective:  No acute events over the weekend  Increasing strength, team still anticipates wheelchair mobility at discharge, but suspect he will continue to make gains afterward  He denies any new pain  Has started to develop some mild tone in his R arm  He denies any new numbness/tingling/weakness, difficulty sleeping, CP, SOB, fevers, chills, N/V, abdominal pain  Last BM 3/18 - and still incontinent at times  Started on metamucil by covering physician late last week  ROS:  A 10 point review of systems was negative except for what is noted in the HPI  Today's Changes:  1  Continue bowel regimen  2  Hydralazine dose adjusted by IM  3  Neurosurg reviewed CTA and felt god pre-post stented segment flow, and likely not a re-occlusion  Exam continues to improve and they recommended outpatient f/u  - Reached out to them today to clarify need/length of time on eliquis, and that it isn't actually therapeutically dosed in him  - She will reach out to her attending and let us know  4  Trial decreasing baclofen to TID    Total visit time: 35 minutes, with more than 50% spent counseling/coordinating care  Counseling includes discussion with patient re: progress in therapies, functional issues observed by therapy staff, and discussion with patient regarding their current medical state and wellbeing  Coordination of patient's care was performed in conjunction with Internal Medicine service to monitor patient's labs, vitals, and management of their comorbidities  Assessment/Plan:    * Acute Posterior Circulation Stroke  Assessment & Plan  Presented with dizziness for 2 days and nausea and has residual R sided weakness, aphasia, dysphagia, R mild spasticity  Several small acute/early subacute bi-cerebellar infarcts without hemorrhage   Multiple infarcts involving R cerebellum and brainstem in particular (posterior medulla on the R, R midbrain, and L occipital lobe)  L vertebral artery severe stenosis and R vertebral artery occlusion and dissection    - Now s/p stenting on 2/26 with TICI 2b revascularization    PPx: ASA/brilinta/Eliquis, Statin   - Discussed with Neurology, they defer to NSx on length of time on eliquis  Potentially 3-4 weeks if felt to be related to [address] out to Neurosurgery - they will discuss with their attending re: eliquis  - Repeat CTA H/N -  No new intracranial abnormalities, expected evolution of known CVA  See dissection and stenosis for more details  3/9 Had nocturnal oximetry to screen for nocturnal hypoxia - only 26 seconds total of mild desaturation  Maintain normotension  Education on prediabetes and diet  Follow-up with Neurovascular/Neurosurgery  Function improving slowly   Continue PT/OT/SLP - for swallow, speech, R sided weakness/tone, will need a good visual exam      Vasovagal syncope  Assessment & Plan  No further episodes  3/8 Had episode of vasovagal syncope  - No convulsions  On body weight support system, started to feel dizzy  - Staring episode after, but able to sternal rubbed out of it  Very brief   - No post-ictal weakness   - Neurologically stable and back to baseline   - Orthostatics negative  Discussed with Neurology - unlikely seizure given distribution of stroke and presentation  No further testing recommended at this time  Monitor  Neurogenic bowel  Assessment & Plan  Disinhibited bowel + mobility difficulties -somewhat loose as well  Trial Metamucil daily  Not on bowel meds right now  [facility] Bladder Training:   - 30-45 min after each meal will get patient onto commode to attempt bowel movement  - He wants to hold off on scheduled suppository for now (would schedule in AM to align with his previous bowel schedule at home)  For now monitor, close continence care especially    Anemia  Assessment & Plan  Stable 3/20  Normocytic anemia noted through stay    B12 borderline low  Folate normal  Iron Panel: Low iron saturation and iron with normal TIBC and Ferritin  Was started in the hospital on B12, but not iron  Per IM - 2 days of IV Venofer (last day 3/8)  Then start oral iron  Monitor Hgb, transfuse as appropriate  Will discuss with IM  Consulted  Adjustment disorder with depressed mood  Assessment & Plan  Acceptable today   Has been tearful and labile during hospitalization  Started on Prozac 20mgdaily - may need to increase dose  Consulted rehab psychology for support  Prediabetes  Assessment & Plan  A1C 5 7  Consult nutrition for education on diabetic diet  Diet/lifestyle modifications  Follow-up with PCP  CKD (chronic kidney disease)  Assessment & Plan  Lab Results   Component Value Date    EGFR 60 03/20/2023    EGFR 59 03/18/2023    EGFR 58 03/17/2023    CREATININE 1 25 03/20/2023    CREATININE 1 27 03/18/2023    CREATININE 1 30 03/17/2023     Per hospital team - suspect CKD Stage 2 2/2 hypertension   - stable today  Will monitor BMP while here  Avoid nephrotoxic meds and relative hypotension  Recommend outpatient f/u with PCP    Spasticity  Assessment & Plan  Intrinsic tonic tone in R quad which is mild  RUE with improving tone - mild MAS 1 at wrist flexors and elbow flexors, easily ranged out  Hiccups resolved  Would opt to avoid treating R quad for now, as tone there may help stabilize at the knee  R ankle multipodus  3/8 Baclofen to 5mg QID to help with hiccups   - Can decreased to TID and see how he does  Range of motion  Monitor as tone evolves  Outpatient f/u with PMR  Dysphagia  Assessment & Plan  Improved  Admitted with mod-severe oropharyngeal  Has massively improved  3/8 Advanced to Level 3/Thins  3/10 Advanced to Reg/Thins  Aspiration precautions  Good oral hygiene   SLP consulted    Primary hypertension  Assessment & Plan  Home: None  Here: Amlodipine 10mg daily, Coreg 25mg BID, Lisinopril 40mg daily, Hydralazine 50mg Q8hr   Has PRN hydralazine as well  Had hypertensive urgency in hospital requiring cardene gtt  Monitor and adjust as appropriate  IM consulted to assist with management  Stenosis of left vertebral artery  Assessment & Plan  Severe L vertebral artery origin stenosis  3/17 CTA H/N:   CTA HEAD AND NECK  - Right vertebral artery V4 stent appears to have in-stent occlusion  Right vertebral artery V4 segment is patent before and after the V4 stent  - Unchanged left vertebral artery post-PICA V4 segment occlusion   - Dissection flap in right vertebral artery V3 segment which is patent and improved in caliber status post thrombectomy  - Moderate stenosis in proximal-to- mid basilar artery due to atherosclerotic disease status post mechanical thrombectomy  - Per Neurosurgery unlikely in stent occlusion  No changes  Plan for outpatient f/u  ASA/Brilinta  Atorvastatin  SBP goals 120-160  Follow-up with Neurovascular  Right Vertebral artery dissection [facility])  Assessment & Plan  Now s/p R V3/4 stenting with TICI 2B revascularization on 2/26 for R Vert stenosis  Continue ASA/Brilinta  Statin  CTA HEAD AND NECK 3/17  - Dissection flap in right vertebral artery V3 segment which is patent and improved in caliber status post thrombectomy  Outpatient f/u with Neurosurgery/Dr Posada Tere      COVID-resolved as of 3/10/2023  Assessment & Plan  Resolved  Incidentally found to have COVID on 2/25  Asymptomatic from respiratory standpoint  Per Neurosurgery concern for hypercoagulability related to COVID  Currently on Eliquis 2 5mg BID - per Neuro 3-4 weeks probably reasonable, but for them ultimately up to NSx as this was their initial recommendation to start this medication  3/8 Discontinued precautions after 10 days isolation  Health Maintenance  #Delirium/Sleep: At risk  Optimize bowel/bladder, sleep-wake cycle, mood and pain management  #Pain: Baclofen 5mg QID, Tylenol PRN  #Bowel: Last BM 3/18   Only PRN miralax and metamucil  See Neurogenic bowel above  #Bladder: Voiding and continent  #Skin/Pressure Injury Prevention: Turn Q2hr in bed, with weight shifts [id number] in wheelchair  Float heels in bed  #DVT Prophylaxis: On triple therapy, SCDs  #GI Prophylaxis: None  #Code Status:  Full Code  #FEN: Level 3/NTL  #Dispo:  Team 3/14: Insurance updates  ELOS 3-4 weeks  Reteam  May require more assistance at home and primary wheelchair level mobility given his ataxia  Wife has a new job starting next week where she will be working from home M-F  Barrier: R sided weakness, proprioception, kinesthetic awareness, impaired sensation on the R, ataxia, truncal weakness, dysphagia, expressive > receptive aphasia, post-stroke depression, bowel incontinence/disinhibited  Goals: See above        Objective:    Functional Update:  PT: Sup bed mobility, Eusebio transfers, total A ambulation 100-200' , Set-up wheelchair mobility   OT: CGA-Eusebio with ADLs, save maxA toileting hygiene  SLP: Eusebio cog across the board  Now reg/thins  Allergies per EMR    Physical Exam:  Temp:  [97 7 °F (36 5 °C)-98 5 °F (36 9 °C)] 97 7 °F (36 5 °C)  HR:  [59-68] 65  Resp:  [16-18] 16  BP: (137-188)/(62-88) 170/88  Oxygen Therapy  SpO2: 98 %    Gen: No acute distress, Well-nourished, well-appearing  HEENT: Moist mucus membranes, Normocephalic/Atraumatic  Cardiovascular: Regular rate, rhythm, S1/S2  Distal pulses palpable  Heme/Extr: No edema  Pulmonary: Non-labored breathing  Lungs CTAB  : No barnett  GI: Soft, non-tender, non-distended  BS+  MSK: Full PROM is WFL in all extremities  No effusions or deformities  Bulk is symmetric  See below for MMT scores  Tight R heel cords, but able to get to neutral    Integumentary: Skin is warm, dry  Neuro: AAOx3, Speech mildly aphasic  Appropriate to questioning  MAS 1 in R wrist flexors and elbow flexor  2 in R quads   Strength markedly improved 3-4 proximal RUE, at least 2-3 in distal  RLE with at least 4/5 proximally, and 2-3/5 distally  Psych: Normal mood and affect  Diagnostic Studies: Reviewed CTA H/N from 3/17  See A/P       Laboratory:  Reviewed   Results from last 7 days   Lab Units 03/20/23  0546   HEMOGLOBIN g/dL 10 2*   HEMATOCRIT % 32 6*   WBC Thousand/uL 7 43     Results from last 7 days   Lab Units 03/20/23  0546 03/18/23  1122 03/17/23  0612   BUN mg/dL 27* 25 29*   POTASSIUM mmol/L 4 1 4 6 4 1   CHLORIDE mmol/L 110* 111* 111*   CREATININE mg/dL 1 25 1 27 1 30            Patient Active Problem List   Diagnosis   • BRAULIO (acute kidney injury) ([facility] [address] )   • Acute Posterior Circulation Stroke   • Right Vertebral artery dissection (HCC)   • Stenosis of left vertebral artery   • Primary hypertension   • Dizziness   • Dysphagia   • Spasticity   • CKD (chronic kidney disease)   • Prediabetes   • Adjustment disorder with depressed mood   • Anemia   • Neurogenic bowel   • Vasovagal syncope         Medications  Current Facility-Administered Medications   Medication Dose Route Frequency Provider Last Rate   • acetaminophen  650 mg Oral Q6H PRN YURI Stanford     • amLODIPine  10 mg Oral Daily Massimo Zhang MD     • apixaban  2 5 mg Oral BID Massimo Zhang MD     • aspirin  81 mg Oral Daily Massimo Zhang MD     • atorvastatin  40 mg Oral Daily With Lavonne Russo MD     • baclofen  5 mg Oral 4x Daily Massimo Zhang MD     • carvedilol  25 mg Oral BID With Meals YURI Stanford     • vitamin B-12  1,000 mcg Oral Daily Massimo Zhang MD     • ferrous sulfate  325 mg Oral Daily With Breakfast YURI Stanford     • FLUoxetine  20 mg Oral Daily Massimo Zhang MD     • hydrALAZINE  25 mg Oral Q8H PRN YURI Stanford     • hydrALAZINE  50 mg Oral Q12H Maria G Hayes PA-C     • lisinopril  40 mg Oral Daily YURI Stanford     • polyethylene glycol  17 g Oral Daily PRN Massimo Zhang MD     • psyllium  1 packet Oral Daily Diamond Closs, MD     • ticagrelor  90 mg Oral Q12H [address] Raheem Howard MD            ** Please Note: Fluency Direct voice to text software may have been used in the creation of this document   **

## 2023-03-20 NOTE — PROGRESS NOTES
"   03/20/23 5430   Pain Assessment   Pain Assessment Tool 0-10   Pain Score No Pain   Restrictions/Precautions   Precautions Aspiration;Bed/chair alarms;Cognitive; Fall Risk;Supervision on toilet/commode   Cognition   Overall Cognitive Status Impaired   Subjective   Subjective agreeable to therapy; wife present at bedside   Sit to Stand   Type of Assistance Needed Physical assistance   Physical Assistance Level 25% or less   Sit to Stand CARE Score 3   Bed-Chair Transfer   Type of Assistance Needed Physical assistance   Physical Assistance Level 26%-50%   Chair/Bed-to-Chair Transfer CARE Score 3   Transfer Bed/Chair/Wheelchair   Findings practiced SPT with wife transferring from w/c to recliner; she was asking about txing pt since they have privileges to go off the floor  spoke to [facility], and formal family training will be Wednesday and will discuss then if safe and good carryover   Walk 10 Feet   Type of Assistance Needed Physical assistance   Physical Assistance Level 51%-75%   Comment HHA on L   Walk 10 Feet CARE Score 2   Walk 50 Feet with Two Turns   Type of Assistance Needed Physical assistance   Physical Assistance Level 51%-75%   Comment HHA on L   Walk 50 Feet with Two Turns CARE Score 2   Walk 150 Feet   Type of Assistance Needed Physical assistance   Physical Assistance Level 51%-75%   Comment HHA on L   Walk 150 Feet CARE Score 2   Ambulation   Distance Walked (feet) 200 ft  (x4)   Assist Device Hand Hold   Gait Pattern Inconsistant Mae; Slow Mae;Decreased foot clearance; Improper weight shift   Limitations Noted In Balance; Coordination; Heel Strike; Sequencing;Speed;Swing   Provided Assistance with: Balance;Weight Shift   Findings decrease stride length on L, given verbal cues when walking for \"bigger step\" for the L to help with toe off on the R   manual faciliation to help with wt shifting    started session on handrail to help work on L to get longer stride   Curb or Single Stair   Style " "negotiated Single stair   Type of Assistance Needed Physical assistance; Adaptive equipment   Physical Assistance Level 51%-75%   Comment on FF   1 Step (Curb) CARE Score 2   4 Steps   Type of Assistance Needed Physical assistance; Adaptive equipment   Physical Assistance Level 51%-75%   4 Steps CARE Score 2   12 Steps   Type of Assistance Needed Physical assistance   Physical Assistance Level 51%-75%   12 Steps CARE Score 2   Stairs   # of Steps 20   Weight Bearing Precautions Fall Risk   Assist Devices Bilateral Rail;Single Rail   Findings performed FFx2 pt utilized both HR first time with recpirocal pattern, next attempt had pt using RUE only to focus on pushing through RLE on step with step through pattern   Therapeutic Interventions   Neuromuscular Re-Education step throughs on 6\" step with R foot starting further back on the floor to increas swing and toe off   performed two rounds until fatigued ~ 20   Equipment Use   NuStep L2 10mins LEs only   Assessment   Treatment Assessment pt tolerated tx well with cont focus on functional mobility with imrpoving stride length and toe off to normalize gait pattern and improve strenghtening on R side  pt does need manual and verbal cueing to accomplish those changes but able ot demonstrate good carryover  pt still below baseline and with fatigue, can rush through txs decreasing safety awareness  pt's wife present during session and started working on tx training beginning of session with good understanding and carryover of positioning nad hand placement  FT to officially start with OT and PT on wednesday  cont to work on neuro re-ed wiht NPP to maximize functional ind and strenghtening for pt to decrease burden of care at time of d/c  Family/Caregiver Present wife   Barriers to Discharge Inaccessible home environment;Decreased caregiver support   PT Barriers   Functional Limitation Car transfers; Ramp negotiation;Standing;Stair negotiation;Transfers; Walking   Plan " Progress Progressing toward goals   Recommendation   PT Discharge Recommendation Home with outpatient rehabilitation   Equipment Recommended Wheelchair   PT Therapy Minutes   PT Time In 1330   PT Time Out 1500   PT Total Time (minutes) 90   PT Mode of treatment - Individual (minutes) 90   PT Mode of treatment - Concurrent (minutes) 0   PT Mode of treatment - Group (minutes) 0   PT Mode of treatment - Co-treat (minutes) 0   PT Mode of Treatment - Total time(minutes) 90 minutes   PT Cumulative Minutes 1278   Therapy Time missed   Time missed?  No

## 2023-03-20 NOTE — PROGRESS NOTES
"OT Treatment Note       03/20/23 0900   Pain Assessment   Pain Assessment Tool 0-10   Pain Score No Pain   Restrictions/Precautions   Precautions Aspiration;Bed/chair alarms;Cognitive; Fall Risk;Supervision on toilet/commode;Aphasia   Lifestyle   Autonomy \" A shower would be nice\"   Eating   Type of Assistance Needed Set-up / clean-up   Physical Assistance Level No physical assistance   Eating CARE Score 5   Oral Hygiene   Type of Assistance Needed Supervision   Physical Assistance Level No physical assistance   Comment Seated in w/c at sink  Pt able to stabilize toothpaste with RUE and take cap off with LUE   Oral Hygiene CARE Score 4   Shower/Bathe Self   Type of Assistance Needed Physical assistance   Physical Assistance Level 25% or less   Comment Seated on tub bench, pt able to bathe UB using RUE when able, BLE with crossed legged technique, and groin  Min A In stance with LUE on grab bar, OT A with buttocks  Shower/Bathe Self CARE Score 3   Bathing   Assessed Bath Style Shower   Tub/Shower Transfer   Adaptive Equipment Transfer Bench   Findings Min Ax1 sit pivot to tub bench   Upper Body Dressing   Type of Assistance Needed Supervision   Physical Assistance Level No physical assistance   Comment seated in w/c pt able to don shirt utilizing julieta dressing technique  Upper Body Dressing CARE Score 4   Lower Body Dressing   Type of Assistance Needed Physical assistance   Physical Assistance Level 51%-75%   Comment Seated in w/c pt able to reach down to thread LEs into pants  Min VC to utilize julieta dressing tequnique  Min A in stance with LUE on bed rail, pt attmepted to manage clothing over hips with RUE  OT A to manage fully  Lower Body Dressing CARE Score 2   Putting On/Taking Off Footwear   Type of Assistance Needed Physical assistance   Physical Assistance Level 25% or less   Comment Seated, pt able to doff socks by reaching down   To don, pt utilizes crossed legged technique, OT A with holding RLE in " position  Putting On/Taking Off Footwear CARE Score 3   Sit to Stand   Type of Assistance Needed Physical assistance   Physical Assistance Level 26%-50%   Comment Min A with use of LUE on grab bar and bed rail  Anticipate Mod A without  Sit to Stand CARE Score 3   Bed-Chair Transfer   Type of Assistance Needed Physical assistance   Physical Assistance Level 25% or less   Comment Min A sit pivot  Pt able to instruct OT on proper w/c placement, setup, and steps needed  Pt demonstrated good carryover of UE placement from yesterday  Min VC to wait until OT is ready before initiating transfer  Chair/Bed-to-Chair Transfer CARE Score 3   Toileting Hygiene   Type of Assistance Needed Physical assistance   Physical Assistance Level 76% or more   Comment OT A with rear hygiene and clothing management with pt utilizing L grab bar  Toileting Hygiene CARE Score 2   Toilet Transfer   Type of Assistance Needed Physical assistance   Physical Assistance Level 25% or less   Comment Sit pivot to drop arm commode with pt utilizing L grab bar during transfer towards toilet and RUE on w/c arm for transfer back  Toilet Transfer CARE Score 3   Neuromuscular Education   Comments Seated in w/c OT stretch pts RUE for 3 sets of 30 seconds each to decrease muscle tightness for: R shoulder abduction to 1/2 range with elbow and wrist extended point of control at elbow and forearm  2 sets of 5 reps each of AAROM scapula upward/downward rotation, elevation/depression, and protraction/retraction with point of control at at scapula and proximal RUE, wrist flexion/extension with point of control at forearm and digits, and digit flexion/extension with point of control at elbow and forearm  OT educated pt about stretching in room for 5x/day for 20-30 seconds each for wrist flexion/extension, digit extension, and prayer pose with digits extended or laced together  Pt demonstrated stretches once and verbalized understanding     Cognition Overall Cognitive Status Impaired   Arousal/Participation Alert; Cooperative   Attention Attends with cues to redirect   Orientation Level Oriented X4   Memory Decreased short term memory   Following Commands Follows one step commands without difficulty   Additional Activities   Additional Activities Comments Pt engaged in Box and Blocks assessment, pt is R hand dominant and R side affected  Pt scored: R 16 and L 39  Norm for males 63-62 years old: R 71 3 and L 70 5  Pt is currently performing below norm  Activity Tolerance   Activity Tolerance Patient tolerated treatment well   Assessment   Treatment Assessment Pt engaged in skilled OT tx session focused on ADL routine  See above for futher details on functional performance  Pt demonstrated carryover of RUE hand placement during sit pivot transfers  Pt able to lock/unlock w/c brakes and verbalize proper steps needed for w/c placement/managment/safety prior to transferring to prepare for family training with pts wife as pt will be d/c at w/c level  Pt verbalizing tightness in RUE, OT educated pt on stretching wrist/digits/forearm for 5x/day for 20-30 seconds each and taught pt how to perform distal R UE stretches using his L UE in room and pt demonstrated carryover  Cont OT POC with focus on RUE NMR, functional transfer training with RUE assisting in transfer, w/c management safety to prerpare for d/c home w/c level, standing tolerance/balance, and ADL retraining  Pt in recliner with alarm activated and all needs in reach  FT Wednesday with pts wife at 200 with plan to get wife involved with current A needed during ADLs/transfers and again Thursday/Friday prior to pts wife starting work from home position the following week  Pt cont to demonstrate progress with intensive therapy and would benefit with cont intensive OT to increase independence with ADLs   Pt has progressed to requiring only Min Ax1 during sit pivot transfers and Ax1 for all ADLs compared to initial eval when pt required Ax2 for all ADLs  Anticipate d/c and end of next week to home with family support and outpatient neuro OT  Of note, at beginning of session pts /70 RN verbalized she has just given him medication, 156/78 s/p stretching, and 124/68 s/p shower  Prognosis Good   Problem List Decreased range of motion;Decreased strength;Decreased endurance; Impaired balance;Decreased mobility; Decreased coordination;Decreased cognition; Impaired sensation; Impaired tone   Barriers to Discharge Inaccessible home environment;Decreased caregiver support   Plan   Treatment/Interventions ADL retraining;Functional transfer training; Therapeutic exercise; Endurance training;Cognitive reorientation;Patient/family training;Equipment eval/education; Compensatory technique education   Progress Progressing toward goals   Recommendation   OT Discharge Recommendation Home with outpatient rehabilitation   OT Therapy Minutes   OT Time In 0900   OT Time Out 1115   OT Total Time (minutes) 135   OT Mode of treatment - Individual (minutes) 135   OT Mode of treatment - Concurrent (minutes) 0   OT Mode of treatment - Group (minutes) 0   OT Mode of treatment - Co-treat (minutes) 0   OT Mode of Treatment - Total time(minutes) 135 minutes   OT Cumulative Minutes 1360   Therapy Time missed   Time missed?  No

## 2023-03-20 NOTE — PROGRESS NOTES
Internal Medicine Progress Note  Patient: Jim Ortiz  Age/sex: 60 y o  male  Medical Record #: [id number]      ASSESSMENT/PLAN: (Interval History)  Jim Ortiz is seen and examined and management for following issues:    Posterior circulation stroke  • S/p intracranial and extracranial vertebral artery stenting/TICI 2B revascularization  • Continue ASA, Brilinta, Eliquis and atorvastatin  • PMR to reach out to NS to determine whether or not the eliquis is needed  • Prozac for depressed mood following CVA   • Neuropsych following  • Dysphagia 3 diet with nectar thick liquids  • Continue Baclofen 5mg 4x daily  • Therapy per primary service  • Outpt follow-up with Neurology and Neurosurgery  • CTA head and neck done 3/17/23  • Concern for in stent occlusion in the Rt vertebral artery V4 stent  • Neurosurgery saw pt 3/18/23 and finding likely does not represent new occlusion as lumen within stents is difficult to image on CTA  • BMP stable monitor weekly    Rapid response  · Likely d/t vasovagal syncope  · No further events      HTN  • Home: No medications  • Here: amlodipine 10mg daily/Coreg 25mg 2x daily/lisinopril 40mg daily/hydralazine 50mg 3x daily  • Continue to monitor trend  Fe deficiency anemia  · Likely multifactoral  · s/p IV venofer x 2 doses  · Cont daily ferrous sulfate  · Cbc stable     CKD stage 2  • Baseline Cr 1 2 - 1 3  • Chlorthalidone dc'd  • Cr 1 3  COVID  • Precautions lifted  • Pt was asymptomatic     Prediabetes  • HA1C 5 7  • Recommend dietary modifications       DC planning:  TBD  The above assessment and plan was reviewed and updated as determined by my evaluation of the patient on 3/20/2023      Labs:   Results from last 7 days   Lab Units 03/20/23  0546   WBC Thousand/uL 7 43   HEMOGLOBIN g/dL 10 2*   HEMATOCRIT % 32 6*   PLATELETS Thousands/uL 216     Results from last 7 days   Lab Units 03/20/23  0546 03/18/23  1122   SODIUM mmol/L 140 139   POTASSIUM mmol/L 4 1 4 6 CHLORIDE mmol/L 110* 111*   CO2 mmol/L 26 27   BUN mg/dL 27* 25   CREATININE mg/dL 1 25 1 27   CALCIUM mg/dL 8 9 8 7                   Review of Scheduled Meds:  Current Facility-Administered Medications   Medication Dose Route Frequency Provider Last Rate   • acetaminophen  650 mg Oral Q6H PRN YURI Portillo     • amLODIPine  10 mg Oral Daily David Arenas MD     • apixaban  2 5 mg Oral BID David Arenas MD     • aspirin  81 mg Oral Daily David Arenas MD     • atorvastatin  40 mg Oral Daily With Breonna Feliz MD     • baclofen  5 mg Oral 4x Daily David Arenas MD     • carvedilol  25 mg Oral BID With Meals YURI Portillo     • vitamin B-12  1,000 mcg Oral Daily David Arenas MD     • ferrous sulfate  325 mg Oral Daily With Breakfast YURI Portillo     • FLUoxetine  20 mg Oral Daily David Arenas MD     • hydrALAZINE  25 mg Oral Q8H PRN YURI Portillo     • hydrALAZINE  50 mg Oral Q8H [address] YURI Gallagher     • lisinopril  40 mg Oral Daily YURI Portillo     • polyethylene glycol  17 g Oral Daily PRN David Arenas MD     • psyllium  1 packet Oral Daily Mirlande Venegas MD     • ticagrelor  90 mg Oral Q12H Ernestine Villa MD         Subjective/ HPI: Patient seen and examined  Patients overnight issues or events were reviewed with nursing or staff during rounds or morning huddle session  New or overnight issues include the following:     Pt seen in his room  No overnight issues  Happy about his progress so far in therapy      ROS:   A 10 point ROS was performed; negative except as noted above  Imaging:     CTA head and neck w wo contrast   Final Result by Hans Acevedo MD (03/18 7265)      CT HEAD   - Evolution of subacute infarcts in left midbrain involving cerebral peduncle, left tatiana, and bilateral cerebellum (right worse than left)  - No new acute intracranial abnormality        CTA HEAD AND NECK   - Right vertebral artery V4 stent appears to have in-stent occlusion  Right vertebral artery V4 segment is patent before and after the V4 stent  - Unchanged left vertebral artery post-PICA V4 segment occlusion    - Dissection flap in right vertebral artery V3 segment which is patent and improved in caliber status post thrombectomy  - Moderate stenosis in proximal-to- mid basilar artery due to atherosclerotic disease status post mechanical thrombectomy  Additional chronic/incidental findings as detailed above  I personally discussed this study with Erma Patrickelizabeth on 3/18/2023 at 5:46 AM                               Workstation performed: [id number]             *Labs /Radiology studies Reviewed  *Medications  reviewed and reconciled as needed  *Please refer to order section for additional ordered labs studies  *Case discussed with primary attending during morning huddle case rounds    Physical Examination:  Vitals:   Vitals:    03/19/23 2015 03/20/23 0543 03/20/23 0551 03/20/23 0648   BP: 137/62 (!) 188/85 (!) 180/84 170/88   BP Location: Left arm Left arm Left arm Left arm   Pulse: 68 65     Resp: 16 16     Temp: 98 5 °F (36 9 °C) 97 7 °F (36 5 °C)     TempSrc: Oral Oral     SpO2: 97% 98%     Weight:       Height:           GEN: No apparent distress, interactive  NEURO: Alert and oriented x3; mild dysarthria  HEENT: Pupils are equal and reactive, EOMI, mucous membranes are moist, face asymmetrical  CV: S1 S2 regular, no MRG, no peripheral edema noted  RESP: Lungs are clear bilaterally, no wheezes, rales or rhonchi noted, on room air, respirations easy and non labored  GI: Flat, soft non tender, non distended; +BS x4; incontinent of BM at times  : Voiding without difficulty  MUSC: Moves all extremities; right hemiparesis ongoing but improved  SKIN: pink, warm and dry, normal turgor, no rashes, lesions      The above physical exam was reviewed and updated as determined by my evaluation of the patient on 3/20/2023      Invasive Devices None                    VTE Pharmacologic Prophylaxis: Eliquis  Code Status: Level 1 - Full Code  Current Length of Stay: 14 day(s)      Total time spent:  30 minutes with more than 50% spent counseling/coordinating care  Counseling includes discussion with patient re: progress  and discussion with patient of his/her current medical state/information  Coordination of patient's care was performed in conjunction with primary service  Time invested included review of patient's labs, vitals, and management of their comorbidities with continued monitoring  In addition, this patient was discussed with medical team including physician and advanced extenders  The care of the patient was extensively discussed and appropriate treatment plan was formulated unique for this patient  Medical decision making for the day was made by supervising physician unless otherwise noted in their attestation statement  ** Please Note:  voice to text software may have been used in the creation of this document   Although proof errors in transcription or interpretation are a potential of such software**

## 2023-03-20 NOTE — PROGRESS NOTES
03/20/23 1230   Pain Assessment   Pain Assessment Tool 0-10   Pain Score No Pain   Restrictions/Precautions   Precautions Aspiration;Bed/chair alarms;Cognitive; Fall Risk;Supervision on toilet/commode   Weight Bearing Restrictions No   ROM Restrictions No   Comprehension   Comprehension (FIM) 5 - Understands basic directions and conversation   Expression   Expression (FIM) 5 - Needs help/cues only RARELY (< 10% of the time)   Social Interaction   Social Interaction (FIM) 5 - Interacts appropriately with others 90% of time   Problem Solving   Problem solving (FIM) 4 - Solves basic problems 75-89% of time   Memory   Memory (FIM) 4 - Recognizes/recalls/performs 75-89%   Speech/Language/Cognition Assessmetn   Treatment Assessment Pt seen for skilled speech therapy session targeting cognitive linguistic communication skills  Pt alert and interactive- wife Lory Perez present for portion of session and able to provide functional update in regards to progress and this SLP new to pt  Both pt and wife reported much improvement in expressive speech needs, noted little word finding anymore  Focus of today's session towards cognitive skills  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  Pt completed functional reading task with [employer]  Pt was able to use the mock bill to answer comprehension questions with 5/5 acc  Due to pt's inability to write given deficits in dominant hand, unable to complete mock check ledger task therefore complete verbal progressive sequencing task- provided with 3-4 words, pt was able to sequence in correct order with 6/10acc increasing with repetition of words and visual finger placement in hand to aid in manipulation  Last, review briefly medication management  Pt unable to recall all names of medications however able to recall 4/6acc of the purpose for the medications   Pt did become slightly tearful when discussing stroke recovery- pt does have stroke education booklet in room and stated he has been completing education tasks  Pt provided emotional support and encouragement regarding his progress and cont'd room for gains  Plan to cont to address IADL tasks with higher level cognitive skills for increased planning, sequencing, recall, and executive functioning skills  Pt overall is improving with skilled SLP services and will cont to benefit to maximize overall cognitive linguistic communication abilities at this time  SLP Therapy Minutes   SLP Time In 6692   SLP Time Out 1300   SLP Total Time (minutes) 30   SLP Mode of treatment - Individual (minutes) 30   SLP Mode of treatment - Concurrent (minutes) 0   SLP Mode of treatment - Group (minutes) 0   SLP Mode of treatment - Co-treat (minutes) 0   SLP Mode of Treatment - Total time(minutes) 30 minutes   SLP Cumulative Minutes 595   Therapy Time missed   Time missed?  No

## 2023-03-21 ENCOUNTER — APPOINTMENT (INPATIENT)
Dept: NON INVASIVE DIAGNOSTICS | Facility: HOSPITAL | Age: 63
End: 2023-03-21

## 2023-03-21 RX ADMIN — BACLOFEN 5 MG: 10 TABLET ORAL at 21:39

## 2023-03-21 RX ADMIN — Medication 1 PACKET: at 11:59

## 2023-03-21 RX ADMIN — FLUOXETINE 20 MG: 20 CAPSULE ORAL at 11:55

## 2023-03-21 RX ADMIN — BACLOFEN 5 MG: 10 TABLET ORAL at 16:52

## 2023-03-21 RX ADMIN — TICAGRELOR 90 MG: 90 TABLET ORAL at 11:57

## 2023-03-21 RX ADMIN — TICAGRELOR 90 MG: 90 TABLET ORAL at 21:41

## 2023-03-21 RX ADMIN — APIXABAN 2.5 MG: 2.5 TABLET, FILM COATED ORAL at 11:55

## 2023-03-21 RX ADMIN — CYANOCOBALAMIN TAB 500 MCG 1000 MCG: 500 TAB at 11:54

## 2023-03-21 RX ADMIN — ASPIRIN 81 MG CHEWABLE TABLET 81 MG: 81 TABLET CHEWABLE at 11:55

## 2023-03-21 RX ADMIN — ATORVASTATIN CALCIUM 40 MG: 40 TABLET, FILM COATED ORAL at 16:52

## 2023-03-21 RX ADMIN — BACLOFEN 5 MG: 10 TABLET ORAL at 11:55

## 2023-03-21 RX ADMIN — CARVEDILOL 25 MG: 25 TABLET, FILM COATED ORAL at 16:52

## 2023-03-21 RX ADMIN — AMLODIPINE BESYLATE 10 MG: 10 TABLET ORAL at 11:56

## 2023-03-21 RX ADMIN — FERROUS SULFATE TAB 325 MG (65 MG ELEMENTAL FE) 325 MG: 325 (65 FE) TAB at 06:49

## 2023-03-21 RX ADMIN — APIXABAN 2.5 MG: 2.5 TABLET, FILM COATED ORAL at 18:42

## 2023-03-21 RX ADMIN — HYDRALAZINE HYDROCHLORIDE 50 MG: 50 TABLET, FILM COATED ORAL at 21:40

## 2023-03-21 RX ADMIN — HYDRALAZINE HYDROCHLORIDE 50 MG: 50 TABLET, FILM COATED ORAL at 06:50

## 2023-03-21 RX ADMIN — HYDRALAZINE HYDROCHLORIDE 50 MG: 50 TABLET, FILM COATED ORAL at 14:33

## 2023-03-21 RX ADMIN — LISINOPRIL 40 MG: 20 TABLET ORAL at 11:56

## 2023-03-21 RX ADMIN — CARVEDILOL 25 MG: 25 TABLET, FILM COATED ORAL at 06:50

## 2023-03-21 NOTE — CASE MANAGEMENT
Case Management Update:  Faxed clinicals to Emily Pérez [phone] requesting additional week of coverage to focus on functional and mobility goals, awaiting determination

## 2023-03-21 NOTE — PROGRESS NOTES
"   03/21/23 1100   Pain Assessment   Pain Assessment Tool 0-10   Pain Score No Pain   Restrictions/Precautions   Precautions Aspiration;Bed/chair alarms;Cognitive; Fall Risk;Supervision on toilet/commode   Weight Bearing Restrictions No   ROM Restrictions No   Lifestyle   Autonomy \"I would really like to get dressed\"   Upper Body Dressing   Type of Assistance Needed Supervision   Physical Assistance Level No physical assistance   Comment Pt seated on BSC over toilet for dressing today  Pt don overhead shirt with magnet closures on R side  Pt able to complete with supervision and inc time for orientation and management of magnetic closure  Upper Body Dressing CARE Score 4   Lower Body Dressing   Type of Assistance Needed Physical assistance   Physical Assistance Level 51%-75%   Comment Seated on BSC over toilet, Pt able to thread BLE into pants using julieta dressing and xleg technique  Pt then able to stand with mod A for balance and complete CM over hips  Mod A provided for tightening of brief while in stance  Lower Body Dressing CARE Score 2   Putting On/Taking Off Footwear   Type of Assistance Needed Physical assistance   Physical Assistance Level 25% or less   Comment Pt decline changing socks today  However, Pt demo ability to reach LE using xleg technique as evidenced by ability to don pants  Pt likely to require min A for holding xleg position for inc amount of time  Putting On/Taking Off Footwear CARE Score 3   Sit to Stand   Type of Assistance Needed Physical assistance   Physical Assistance Level 25% or less   Comment Pt req min A for balance/steadying with use of grab bar  Sit to Stand CARE Score 3   Bed-Chair Transfer   Type of Assistance Needed Physical assistance   Physical Assistance Level 25% or less   Comment Pt complete sit pivot from WC to recliner chair with min A for guiding of hips   Pt able to properly set up align chair, lock brakes, and demo good hand placement prior to initiating " transfer  Chair/Bed-to-Chair Transfer CARE Score 3   Toileting Hygiene   Type of Assistance Needed Physical assistance   Physical Assistance Level 76% or more   Comment Pt req max A from OT for rear hygiene and balance in stance  Pt attempt to complete with RUE, however, unable to adequately grasp wipe to produce thorough clean  Pt then able to assist in clothing management over hips  Toileting Hygiene CARE Score 2   Toilet Transfer   Type of Assistance Needed Physical assistance   Physical Assistance Level 25% or less   Comment Pt complete sit pivot from  to [facility] over toilet with use of grab bar with LUE  Pt able to complete with min A for guiding of hips  Pt demo proper hand placement on both trials  Toilet Transfer CARE Score 3   Functional Standing Tolerance   Time 7:32   Activity parquetry puzzle   Comments Pt complete parquetry puzzle in stance to focus on inc standing balance/tolerance, functional use of RUE, and functional reach  Pt able to complete entire puzzle in one standing attempt  Verbal cues provided for Pt to stand upright and use table as additional balance support if needed  Pt able to reach and grab puzzle pieces with RUE and place on board  Pt utilizing RUE as much as possible but ultimately requiring of of LUE for final placment on board  Pt demo inc difficulty towards end of activity 2* fatigue  Offered rest break, however, Pt wanting to complete activity and then rest    Cognition   Overall Cognitive Status Impaired   Arousal/Participation Alert; Cooperative   Attention Attends with cues to redirect   Orientation Level Oriented X4   Memory Decreased short term memory   Following Commands Follows one step commands without difficulty   Activity Tolerance   Activity Tolerance Patient tolerated treatment well   Assessment   Treatment Assessment Pt participated in 30 minute OT session with focus on ADL retraining, functional transfers, standing balance/tolerance, and functional use of RUE  Session cut short due to Pt having ultrasoud procedure at beginning of scheduled OT time  Pt tolerated treatment well and demo good carryover of dressing and transfer techniqus introduced in previous sessions  Pt would benefit from continued OT POC focused on ADL retraining, iADL retraining, standing balance/tolerance, fucntional transfers, RUE NMR, and safety to maximize fucntional performance and dec burden of care upon D/C  Prognosis Good   Problem List Decreased strength;Decreased range of motion;Decreased endurance; Impaired balance;Decreased mobility; Decreased coordination; Impaired judgement;Decreased safety awareness; Impaired sensation; Impaired tone   Barriers to Discharge Inaccessible home environment;Decreased caregiver support   Plan   Treatment/Interventions ADL retraining;Functional transfer training; Therapeutic exercise; Endurance training;Cognitive reorientation;Patient/family training;Equipment eval/education; Compensatory technique education   Progress Progressing toward goals   OT Therapy Minutes   OT Time In 1100   OT Time Out 1130   OT Total Time (minutes) 30   OT Mode of treatment - Individual (minutes) 30   OT Mode of treatment - Concurrent (minutes) 0   OT Mode of treatment - Group (minutes) 0   OT Mode of treatment - Co-treat (minutes) 0   OT Mode of Treatment - Total time(minutes) 30 minutes   OT Cumulative Minutes 1390   Therapy Time missed   Time missed?  Yes   Amount of time missed 60   Reason for time missed Medical procedure  (Pt having ultrasound completed at start of session)

## 2023-03-21 NOTE — PROGRESS NOTES
03/21/23 0935   Pain Assessment   Pain Assessment Tool 0-10   Pain Score No Pain   Restrictions/Precautions   Precautions Aspiration;Bed/chair alarms;Cognitive; Fall Risk;Supervision on toilet/commode   Comprehension   Comprehension (FIM) 5 - Understands basic directions and conversation   Expression   Expression (FIM) 4 - Expresses basic info/needs 75-90% of time   Social Interaction   Social Interaction (FIM) 6 - Interacts appropriately with others BUT requires extra  time   Problem Solving   Problem solving (FIM) 4 - Solves basic problems 75-89% of time   Memory   Memory (FIM) 4 - Recognizes/recalls/performs 75-89%   Speech/Language/Cognition Assessment   Treatment Assessment Pt participated in skilled ST session focusing on cognitive linguistic and language skills  At beginning of session, pt recalled plan for a medical test to occur today which would target his kidneys, stating that he thought that it was have to occurred already today  Internal Medicine CRNP Miesha then checked in and confirmed ultrasound of kidneys is still to occur this AM due to high blood pressure  When reviewing recent activities that pt completed during SLP therapy, pt was fairly accurate in his recall of his performance across recent sessions  Engaged in a category matrix task in which pt verbalized responses due to RUE deficits (pt's dominant hand), pt accurately provided words which fit into categories and belong with a specific letter for 18/24 opportunities  Pt benefited most consistently from min to mod assist to elicit responses for remaining trials, which consisted of verbal and semantic cues to achieve 23/24 accuracy with task  Pt then initiated a word list retention task in which he was verbally presented with Fo5 words  SLP acknowledged the complexity of this task but was being used as a trial and to challenge pt, noting that this is a difficult task outside of post stroke functioning   Presented with Fo5 words, pt then accurately recalled a target word based on a requested word position for the first trial  However, in the second trial, pt with increased difficulty in recall, despite being presented with targets multiple times paired with indirect verbal cues  At this time, staff then arrived to initiate ultrasound of the kidneys  SLP then reviewed current therapy plan today as adjustments will be made to his schedule  At this time, pt is recommended for further skilled SLP services to maximize overall functional independence with cognitive linguistic skills, as well as to improve functional language/communication abilities in order to decrease caregiver burden over time  SLP Therapy Minutes   SLP Time In 36   SLP Time Out 0955   SLP Total Time (minutes) 20   SLP Mode of treatment - Individual (minutes) 20   SLP Mode of treatment - Concurrent (minutes) 0   SLP Mode of treatment - Group (minutes) 0   SLP Mode of treatment - Co-treat (minutes) 0   SLP Mode of Treatment - Total time(minutes) 20 minutes   SLP Cumulative Minutes 615   Therapy Time missed   Time missed?  No

## 2023-03-21 NOTE — PLAN OF CARE
Reviewed    Problem: Prexisting or High Potential for Compromised Skin Integrity  Goal: Skin integrity is maintained or improved  Description: INTERVENTIONS:  - Identify patients at risk for skin breakdown  - Assess and monitor skin integrity  - Assess and monitor nutrition and hydration status  - Monitor labs   - Assess for incontinence   - Turn and reposition patient  - Assist with mobility/ambulation  - Relieve pressure over bony prominences  - Avoid friction and shearing  - Provide appropriate hygiene as needed including keeping skin clean and dry  - Evaluate need for skin moisturizer/barrier cream  - Collaborate with interdisciplinary team   - Patient/family teaching  - Consider wound care consult   Outcome: Progressing     Problem: MOBILITY - ADULT  Goal: Maintain or return to baseline ADL function  Description: INTERVENTIONS:  -  Assess patient's ability to carry out ADLs; assess patient's baseline for ADL function and identify physical deficits which impact ability to perform ADLs (bathing, care of mouth/teeth, toileting, grooming, dressing, etc )  - Assess/evaluate cause of self-care deficits   - Assess range of motion  - Assess patient's mobility; develop plan if impaired  - Assess patient's need for assistive devices and provide as appropriate  - Encourage maximum independence but intervene and supervise when necessary  - Involve family in performance of ADLs  - Assess for home care needs following discharge   - Consider OT consult to assist with ADL evaluation and planning for discharge  - Provide patient education as appropriate  Outcome: Progressing  Goal: Maintains/Returns to pre admission functional level  Description: INTERVENTIONS:  - Set and communicate daily mobility goal to care team and patient/family/caregiver     - Collaborate with rehabilitation services on mobility goals if consulted  - Out of bed for toileting  - Record patient progress and toleration of activity level   Outcome: Progressing     Problem: PAIN - ADULT  Goal: Verbalizes/displays adequate comfort level or baseline comfort level  Description: Interventions:  - Encourage patient to monitor pain and request assistance  - Assess pain using appropriate pain scale  - Administer analgesics based on type and severity of pain and evaluate response  - Implement non-pharmacological measures as appropriate and evaluate response  - Consider cultural and social influences on pain and pain management  - Notify physician/advanced practitioner if interventions unsuccessful or patient reports new pain  Outcome: Progressing     Problem: INFECTION - ADULT  Goal: Absence or prevention of progression during hospitalization  Description: INTERVENTIONS:  - Assess and monitor for signs and symptoms of infection  - Monitor lab/diagnostic results  - Monitor all insertion sites, i e  indwelling lines, tubes, and drains  - Monitor endotracheal if appropriate and nasal secretions for changes in amount and color  - [place] appropriate cooling/warming therapies per order  - Administer medications as ordered  - Instruct and encourage patient and family to use good hand hygiene technique  - Identify and instruct in appropriate isolation precautions for identified infection/condition  Outcome: Progressing  Goal: Absence of fever/infection during neutropenic period  Description: INTERVENTIONS:  - Monitor WBC    Outcome: Progressing     Problem: SAFETY ADULT  Goal: Patient will remain free of falls  Description: INTERVENTIONS:  - Educate patient/family on patient safety including physical limitations  - Instruct patient to call for assistance with activity   - Consult OT/PT to assist with strengthening/mobility   - Keep Call bell within reach  - Keep bed low and locked with side rails adjusted as appropriate  - Keep care items and personal belongings within reach  - Initiate and maintain comfort rounds  - Make Fall Risk Sign visible to staff  - Offer Toileting every 4 Hours, in advance of need  - Initiate/Maintain bed and chair alarm  - Apply yellow socks and bracelet for high fall risk patients  - Consider moving patient to room near nurses station  Outcome: Progressing  Goal: Maintain or return to baseline ADL function  Description: INTERVENTIONS:  -  Assess patient's ability to carry out ADLs; assess patient's baseline for ADL function and identify physical deficits which impact ability to perform ADLs (bathing, care of mouth/teeth, toileting, grooming, dressing, etc )  - Assess/evaluate cause of self-care deficits   - Assess range of motion  - Assess patient's mobility; develop plan if impaired  - Assess patient's need for assistive devices and provide as appropriate  - Encourage maximum independence but intervene and supervise when necessary  - Involve family in performance of ADLs  - Assess for home care needs following discharge   - Consider OT consult to assist with ADL evaluation and planning for discharge  - Provide patient education as appropriate  Outcome: Progressing  Goal: Maintains/Returns to pre admission functional level  Description: INTERVENTIONS:  - Set and communicate daily mobility goal to care team and patient/family/caregiver     - Collaborate with rehabilitation services on mobility goals if consulted  - Out of bed for toileting  - Record patient progress and toleration of activity level   Outcome: Progressing     Problem: DISCHARGE PLANNING  Goal: Discharge to home or other facility with appropriate resources  Description: INTERVENTIONS:  - Identify barriers to discharge w/patient and caregiver  - Arrange for needed discharge resources and transportation as appropriate  - Identify discharge learning needs (meds, wound care, etc )  - Arrange for interpretive services to assist at discharge as needed  - Refer to Case Management Department for coordinating discharge planning if the patient needs post-hospital services based on physician/advanced practitioner order or complex needs related to functional status, cognitive ability, or social support system  Outcome: Progressing     Problem: Nutrition/Hydration-ADULT  Goal: Nutrient/Hydration intake appropriate for improving, restoring or maintaining nutritional needs  Description: Monitor and assess patient's nutrition/hydration status for malnutrition  Collaborate with interdisciplinary team and initiate plan and interventions as ordered  Monitor patient's weight and dietary intake as ordered or per policy  Utilize nutrition screening tool and intervene as necessary  Determine patient's food preferences and provide high-protein, high-caloric foods as appropriate       INTERVENTIONS:  - Monitor oral intake, urinary output, labs, and treatment plans  - Assess nutrition and hydration status and recommend course of action  - Evaluate amount of meals eaten  - Assist patient with eating if necessary   - Allow adequate time for meals  - Recommend/ encourage appropriate diets, oral nutritional supplements, and vitamin/mineral supplements  - Order, calculate, and assess calorie counts as needed  - Recommend, monitor, and adjust tube feedings and TPN/PPN based on assessed needs  - Assess need for intravenous fluids  - Provide specific nutrition/hydration education as appropriate  - Include patient/family/caregiver in decisions related to nutrition  Outcome: Progressing

## 2023-03-21 NOTE — PROGRESS NOTES
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, mild BRAULIO, and incidentally found + COVID (without evidence of respiratory illness/hypoxia/CXR findings)  Placed on cardene gtt, stroke pathway, ASA/Plavix, IV hydration for BRAULIO, and CTX for UTI  NIHSS 2  Symptoms began 2 days prior  Not tPA/TNK candidate  MRI/MRA with 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  NSx recommended transfer to [facility] and starting on heparin gtt and stopped Plavix  Repeat CTA on  stable with with R V3/4 occlusion  Not a candidate for interventional procedure due to clinical stability and risk  Speech consulted and noted mod-severe oropharyngeal dysphagia recommended pureed/HTL  Unfortunately later on , he clinically deteriorated with increased R sided weakness, and was taken to IR for stenting of V3 and V4 with TICI 2B revascularization  CTH without ICH  He was admitted back to the ICU intubated - extubated   MRI showed cerebellar CVA and R > L stroke burden, with additional hypodensities on DWI appreciated L midbrain, pontine area and R lower medullary/spinal cord junction  He was transitioned to triple therapy with DAPT (given recent stent) and A/C with eliquis 2 5mg BID due to COVID  Diet advanced to Level 3/NTL on   Noted to have spasticity in RLE - started on baclofen by Neurology  He was able to have cardene discontinued on 3/2, and they have been making adjustments to his oral regimen  He was started on Prozac for his mood   NSx has signed off  CTA H/N recommended around 3/17  Length of time on eliquis unclear  He was [facility] to [facility] on 3/6/[facility]  Since admission, pt has been very tearful and depressed  Denies any new CP, SOB, fevers, chills, N/V, abdominal pain  He is incontinent of bowel on admission  He has some word finding difficulties and dysphagia  He has aphasia, but speech is improving  He feels the strength in his RLE is improving, but remains with decreased tone and strength in his right upper extremity  He denies any pain anywhere  No past medical history on file  Patient Active Problem List    Diagnosis Date Noted   • Vasovagal syncope 03/09/2023   • Neurogenic bowel 03/08/2023   • Anemia 03/06/2023   • Spasticity 03/01/2023   • CKD (chronic kidney disease) 03/01/2023   • Prediabetes 03/01/2023   • Adjustment disorder with depressed mood 03/01/2023   • BRAULIO (acute kidney injury) ([facility] [address] ) 02/25/2023   • Acute Posterior Circulation Stroke 02/25/2023   • Right Vertebral artery dissection ([facility] [address] ) 02/25/2023   • Stenosis of left vertebral artery 02/25/2023   • Primary hypertension 02/25/2023   • Dizziness 02/25/2023   • Dysphagia 02/25/2023       Body mass index is 28 29 kg/m²  Past Medical History:     No past medical history on file  Past Surgical History:     No past surgical history on file        Allergies:     No Known Allergies      Family and Social Support:   No data recorded    Social History:    Social History     Socioeconomic History   • Marital status: /Civil Union     Spouse name: Not on file   • Number of children: Not on file   • Years of education: Not on file   • Highest education level: Not on file   Occupational History   • Not on file   Tobacco Use   • Smoking status: Never   • Smokeless tobacco: Never   Substance and Sexual Activity   • Alcohol use: Not Currently   • Drug use: Not Currently   • Sexual activity: Not on file   Other Topics Concern   • Not on file   Social History Narrative   • Not on file     Social Determinants of Health     Financial Resource Strain: Not on file   Food Insecurity: Not on file   Transportation Needs: Not on file   Physical Activity: Not on file   Stress: Not on file   Social Connections: Not on file   Intimate Partner Violence: Not on file   Housing Stability: Not on file        Family History:    No family history on file      Medications:     Current Facility-Administered Medications:   •  acetaminophen (TYLENOL) tablet 650 mg, 650 mg, Oral, Q6H PRN, Jaunita Kalin, CRNP, 650 mg at 03/17/23 2021  •  amLODIPine (NORVASC) tablet 10 mg, 10 mg, Oral, Daily, Rito Ibarra MD, 10 mg at 03/21/23 1156  •  apixaban (ELIQUIS) tablet 2 5 mg, 2 5 mg, Oral, BID, Rito Ibarra MD, 2 5 mg at 03/21/23 1842  •  aspirin chewable tablet 81 mg, 81 mg, Oral, Daily, Rito Ibarra MD, 81 mg at 03/21/23 1155  •  atorvastatin (LIPITOR) tablet 40 mg, 40 mg, Oral, Daily With Philly Arnold MD, 40 mg at 03/21/23 1652  •  baclofen tablet 5 mg, 5 mg, Oral, TID, Rito Ibarra MD, 5 mg at 03/21/23 1652  •  carvedilol (COREG) tablet 25 mg, 25 mg, Oral, BID With Meals, Jaunita Kalin, CRNP, 25 mg at 03/21/23 1652  •  cyanocobalamin (VITAMIN B-12) tablet 1,000 mcg, 1,000 mcg, Oral, Daily, Rito Ibarra MD, 1,000 mcg at 03/21/23 1154  •  ferrous sulfate tablet 325 mg, 325 mg, Oral, Daily With Breakfast, Jaunita Kalin, CRNP, 325 mg at 03/21/23 0826  •  FLUoxetine (PROzac) capsule 20 mg, 20 mg, Oral, Daily, Rito Ibarra MD, 20 mg at 03/21/23 1155  •  hydrALAZINE (APRESOLINE) tablet 25 mg, 25 mg, Oral, Q8H PRN, Jaunita Kalin, CRNP, 25 mg at 03/20/23 0551  •  hydrALAZINE (APRESOLINE) tablet 50 mg, 50 mg, Oral, Q8H KATIE, YURI Gallagher, 50 mg at 03/21/23 1433  •  lisinopril (ZESTRIL) tablet 40 mg, 40 mg, Oral, Daily, YURI Gallagher, 40 mg at 03/21/23 1156  •  polyethylene glycol (MIRALAX) packet 17 g, 17 g, Oral, Daily PRN, Rito Ibarra MD  •  psyllium (METAMUCIL) 1 "packet, 1 packet, Oral, Daily, Nilsa Boogie MD, 1 packet at 03/21/23 1159  •  ticagrelor (BRILINTA) tablet 90 mg, 90 mg, Oral, Q12H [address], Ashok Arias MD, 90 mg at 03/21/23 1157        OBSERVATIONS:     Appearance  __x__Neat ____Disheveled ____Overweight ____Underweight ____Frail    Speech  __x__Fluid, Articulate ___x_Spontaneous ____Circumlocutions ____Word Finding difficulties ____Dysarthria ____Circumstantial ____Paucity ____Perseverative ____Pressured ____ Tangential     Thought Process/Content  __x__Coherent  ____Preoccupations____Ruminations____Obsessions____Hallucinations ____Delusions ____Flight of Ideas ____Distortions ____Distracted ____Suicidal Ideation ____Homicidal Ideation ____ Memory Issues ____ Perseveration ____ Tangential    Interaction  __x__Engaged__x__Cooperative____Superficial____Detached____Fearful____Guarded____Suspicious ____Poor Sharri Elders ____Aggressive    Affect  ____Neutral___x_Positive____Anxiety____Worried____Irritable____Angry____Depressed/Dysphoric ____Euthymic _____ Tearful ____ Fatigued     Behavior  _x__Spontaneous __x__Purposeful ____Slowed Initiation ____Apathetic ____Impulsive ____Dyscontrolled ____Hypoactive ____Hyperactive ____Repetitive/Perseverative      Overall Progress:    ____Very Declined ____Declined ____Stable __x__Improved ____Very Improved    Motivation and Participation:    ____Very Poor ____Poor ____Fair __x__Good ____Very Good                    ASSESSMENT & RECOMMENDATIONS:   Mood is improved and pt notes he is aware of his progress in rehab  Although he is not at premorbid functional status, pt is able to accept this progress without self denigration  Pt reports feeling more hopeful and able to see \"the light at the end of this (long) tunnel  \"   No tearful episodes observed today  Pt  is making good progress in psychotherapy and  reports feeling  motivated to continue working towards rehab goals        Provided CBT and mindfulness based " interventions, as well as supportive psychotherapy  Addressed coping, adjustment, and motivation  I will continue to evaluate pt's emotional functioning and status and provide supportive and mindfulness interventions during pt's stay  Effective coping strategies, distress tolerance, breathing exercises will be key interventions  Total face to face time with pt - 25 minutes  Continued Psychological intervention is medically necessary to:  __x__ Maintain current progress  __X_   Achieve Therapy Goals   __X__Prevent relapse       DIAGNOSIS:  Adjustment Disorder with Anxiety and Depression  Post Stroke Depression      RECOMMENDATIONS:   I will follow Mr Vicky Mohamud during his stay to provide the following interventions:    · Supportive psychotherapy, utilizing CBT and mindfulness strategies  · DBT distress tolerance techniques  to improve coping and mood  · Meditation and relaxation training as tolerated          Thank you for the opportunity to participate in Mr Golden sawyer  Rebeca Ovalle, Ph D   Licensed Psychologist

## 2023-03-21 NOTE — PROGRESS NOTES
"   03/21/23 1230   Pain Assessment   Pain Assessment Tool 0-10   Pain Score No Pain   Restrictions/Precautions   Precautions Aspiration;Bed/chair alarms;Cognitive; Fall Risk;Supervision on toilet/commode   Weight Bearing Restrictions No   ROM Restrictions No   Lifestyle   Autonomy \"I'm feeling okay so far\"   Sit to Stand   Type of Assistance Needed Physical assistance   Physical Assistance Level 25% or less   Comment Req min a for balance/steadying with use of grab bar   Sit to Stand CARE Score 3   Bed-Chair Transfer   Type of Assistance Needed Physical assistance   Physical Assistance Level 25% or less   Comment Pt completing sit pivot from [facility] to [facility] with min A for guiding of hips  Pt demo proper techniqu and hand placement throughout  Chair/Bed-to-Chair Transfer CARE Score 3   Toileting Hygiene   Type of Assistance Needed Physical assistance   Physical Assistance Level 51%-75%   Comment Pt require mod A from OT for standing balance during CM over hips  Pt unable to use bathroom during this session, however, would require inc max A for rear hygiene due to dec balance and ROM of RUE  Toileting Hygiene CARE Score 2   Toilet Transfer   Type of Assistance Needed Physical assistance   Physical Assistance Level 25% or less   Comment Pt complete sit pivot transfer from  to [facility] over toilet with use of grab bar from LUE  Pt able to complete with min A for guiding of hips throughout movement  Pt demo consistent technique/hand placement  Toilet Transfer CARE Score 3   Therapeutic Excerise-Strength   UE Strength Yes   Right Upper Extremity- Strength   Equipment Dumbbell   R Weight/Reps/Sets 3x10   RUE Strength Comment Pt complete UE therex for inc independence in transfers and fucntional activities  Pt complete with 4# weight on LUE and body weight with active assist for RUE  Pt complete bicep curl, chest press, abduction, shoulder flexion with no complaint of pain   min A provided for completion of exercises of RUE " due to dec strength/ROM  Left Upper Extremity-Strength   LUE Strength Comment see above   Cognition   Overall Cognitive Status Impaired   Arousal/Participation Alert; Cooperative   Attention Attends with cues to redirect   Orientation Level Oriented X4   Memory Decreased short term memory   Following Commands Follows one step commands without difficulty   Activity Tolerance   Activity Tolerance Patient tolerated treatment well   Assessment   Treatment Assessment Pt participated in 30 minute OT session with focus on ADL retraining, functional transfers, and therapeutic exercise  Pt tolerated treatment well and demo consistent technique/performance of transfers throughout session  Pt would benefit from contiued OT POC with focus on ADL retraining, iADL retrainining, fucntional transfers, balance/standing tolerance, endurance, and safety in order to increase functional performance and dec burden of care upon D/C  Prognosis Good   Problem List Decreased strength;Decreased range of motion;Decreased endurance; Impaired balance;Decreased mobility; Decreased coordination;Decreased cognition; Impaired judgement;Decreased safety awareness; Impaired tone   Barriers to Discharge Inaccessible home environment;Decreased caregiver support   Plan   Treatment/Interventions ADL retraining;Functional transfer training;Elevations; Therapeutic exercise; Endurance training;Cognitive reorientation;Patient/family training;Equipment eval/education; Compensatory technique education   Progress Progressing toward goals   Recommendation   OT Discharge Recommendation Home with outpatient rehabilitation   OT Therapy Minutes   OT Time In 1200   OT Time Out 1230   OT Total Time (minutes) 30   OT Mode of treatment - Individual (minutes) 0   OT Mode of treatment - Concurrent (minutes) 0   OT Mode of treatment - Group (minutes) 0   OT Mode of treatment - Co-treat (minutes) 0   OT Mode of Treatment - Total time(minutes) 0 minutes   OT Cumulative Minutes 3424 Joni Brink

## 2023-03-21 NOTE — PROGRESS NOTES
Internal Medicine Progress Note  Patient: Mari Driscoll  Age/sex: 60 y o  male  Medical Record #: [id number]      ASSESSMENT/PLAN: (Interval History)  Mari Driscoll is seen and examined and management for following issues:    Posterior circulation stroke  • S/p intracranial and extracranial vertebral artery stenting/TICI 2B revascularization  • Continue ASA, Brilinta, Eliquis and atorvastatin  • PMR to reach out to NS to determine whether or not the eliquis is needed  • Prozac for depressed mood following CVA   • Neuropsych following  • Dysphagia 3 diet with nectar thick liquids  • Continue Baclofen 5mg 4x daily  • Therapy per primary service  • Outpt follow-up with Neurology and Neurosurgery  • CTA head and neck done 3/17/23  • Concern for in stent occlusion in the Rt vertebral artery V4 stent  • Neurosurgery saw pt 3/18/23 and finding likely does not represent new occlusion as lumen within stents is difficult to image on CTA  • BMP stable monitor weekly    Rapid response  · Likely d/t vasovagal syncope  · No further events      HTN  • Home: No medications  • Here: amlodipine 10mg daily/Coreg 25mg 2x daily/lisinopril 40mg daily/hydralazine 50mg 3x daily  • Improved  • Awaiting renal artery ultrasound  • Continue to monitor trend  Fe deficiency anemia  · Likely multifactoral  · s/p IV venofer x 2 doses  · Cont daily ferrous sulfate  · Cbc stable     CKD stage 2  • Baseline Cr 1 2 - 1 3  • Chlorthalidone dc'd  • Cr 1 3  COVID  • Precautions lifted  • Pt was asymptomatic     Prediabetes  • HA1C 5 7  • Recommend dietary modifications       DC planning:  TBD  The above assessment and plan was reviewed and updated as determined by my evaluation of the patient on 3/21/2023      Labs:   Results from last 7 days   Lab Units 03/20/23  0546   WBC Thousand/uL 7 43   HEMOGLOBIN g/dL 10 2*   HEMATOCRIT % 32 6*   PLATELETS Thousands/uL 216     Results from last 7 days   Lab Units 03/20/23  0546 03/18/23  1122   SODIUM mmol/L 140 139   POTASSIUM mmol/L 4 1 4 6   CHLORIDE mmol/L 110* 111*   CO2 mmol/L 26 27   BUN mg/dL 27* 25   CREATININE mg/dL 1 25 1 27   CALCIUM mg/dL 8 9 8 7                   Review of Scheduled Meds:  Current Facility-Administered Medications   Medication Dose Route Frequency Provider Last Rate   • acetaminophen  650 mg Oral Q6H PRN YURI Juarez     • amLODIPine  10 mg Oral Daily Ashok Arias MD     • apixaban  2 5 mg Oral BID Ashok Arias MD     • aspirin  81 mg Oral Daily Ashok Arias MD     • atorvastatin  40 mg Oral Daily With Florencio Leal MD     • baclofen  5 mg Oral TID Ashok Arias MD     • carvedilol  25 mg Oral BID With Meals YURI Juarez     • vitamin B-12  1,000 mcg Oral Daily Ashok Arias MD     • ferrous sulfate  325 mg Oral Daily With Breakfast YURI Juarez     • FLUoxetine  20 mg Oral Daily Ashok Arias MD     • hydrALAZINE  25 mg Oral Q8H PRN YURI Juarez     • hydrALAZINE  50 mg Oral Q8H [address] YURI Gallagher     • lisinopril  40 mg Oral Daily YURI Juarez     • polyethylene glycol  17 g Oral Daily PRN Ashok Arias MD     • psyllium  1 packet Oral Daily Nilsa Boogie MD     • ticagrelor  90 mg Oral Q12H Ronal Kessler MD         Subjective/ HPI: Patient seen and examined  Patients overnight issues or events were reviewed with nursing or staff during rounds or morning huddle session  New or overnight issues include the following:     Pt seen in his room with ST  No issues overnight  Pt awaiting renal ultrasound and has been NPO overnight      ROS:   A 10 point ROS was performed; negative except as noted above  Imaging:     CTA head and neck w wo contrast   Final Result by Moustapha Jain MD (03/18 2502)      CT HEAD   - Evolution of subacute infarcts in left midbrain involving cerebral peduncle, left tatiana, and bilateral cerebellum (right worse than left)  - No new acute intracranial abnormality        CTA HEAD AND NECK   - Right vertebral artery V4 stent appears to have in-stent occlusion  Right vertebral artery V4 segment is patent before and after the V4 stent  - Unchanged left vertebral artery post-PICA V4 segment occlusion    - Dissection flap in right vertebral artery V3 segment which is patent and improved in caliber status post thrombectomy  - Moderate stenosis in proximal-to- mid basilar artery due to atherosclerotic disease status post mechanical thrombectomy  Additional chronic/incidental findings as detailed above               I personally discussed this study with Shantelle Eaton on 3/18/2023 at 5:46 AM                               Workstation performed: [id number]         VAS renal artery complete    (Results Pending)       *Labs /Radiology studies Reviewed  *Medications  reviewed and reconciled as needed  *Please refer to order section for additional ordered labs studies  *Case discussed with primary attending during morning huddle case rounds    Physical Examination:  Vitals:   Vitals:    03/20/23 1659 03/20/23 2139 03/21/23 0409 03/21/23 0650   BP: 156/59 140/62 153/78 (!) 175/75   BP Location: Left arm Left arm Left arm    Pulse: 61 61 65 59   Resp:  16 18    Temp:  97 6 °F (36 4 °C) 97 8 °F (36 6 °C)    TempSrc:  Oral Oral    SpO2:  98% 97%    Weight:       Height:           GEN: No apparent distress, pleasant  NEURO: Alert and oriented x3; mild dysarthria  HEENT: Pupils are equal and reactive, EOMI, mucous membranes are moist, face asymmetrical  CV: S1 S2 regular, no MRG, no peripheral edema noted  RESP: Lungs are clear bilaterally, no wheezes, rales or rhonchi noted, on room air, respirations easy and non labored  GI: Flat, soft non tender, non distended; +BS x4; incontinent of BM at times  : Voiding without difficulty  MUSC: Moves all extremities; right hemiparesis ongoing but improved; ongoing gait dysfunction  SKIN: pink, warm and dry, normal turgor, no rashes, lesions      The above physical exam was reviewed and updated as determined by my evaluation of the patient on 3/21/2023  Invasive Devices     None                    VTE Pharmacologic Prophylaxis: Eliquis  Code Status: Level 1 - Full Code  Current Length of Stay: 15 day(s)      Total time spent:  30 minutes with more than 50% spent counseling/coordinating care  Counseling includes discussion with patient re: progress  and discussion with patient of his/her current medical state/information  Coordination of patient's care was performed in conjunction with primary service  Time invested included review of patient's labs, vitals, and management of their comorbidities with continued monitoring  In addition, this patient was discussed with medical team including physician and advanced extenders  The care of the patient was extensively discussed and appropriate treatment plan was formulated unique for this patient  Medical decision making for the day was made by supervising physician unless otherwise noted in their attestation statement  ** Please Note:  voice to text software may have been used in the creation of this document   Although proof errors in transcription or interpretation are a potential of such software**

## 2023-03-21 NOTE — PROGRESS NOTES
03/21/23 1350   Pain Assessment   Pain Assessment Tool 0-10   Pain Score No Pain   Restrictions/Precautions   Precautions Bed/chair alarms;Aspiration;Cognitive; Fall Risk;Supervision on toilet/commode   Subjective   Subjective pt agreeable to perform skilled PT   Sit to Stand   Type of Assistance Needed Physical assistance   Physical Assistance Level 26%-50%   Sit to Stand CARE Score 3   Bed-Chair Transfer   Type of Assistance Needed Physical assistance   Physical Assistance Level 25% or less   Chair/Bed-to-Chair Transfer CARE Score 3   Walk 10 Feet   Type of Assistance Needed Physical assistance   Physical Assistance Level 51%-75%   Walk 10 Feet CARE Score 2   Walk 50 Feet with Two Turns   Type of Assistance Needed Physical assistance   Physical Assistance Level 51%-75%   Walk 50 Feet with Two Turns CARE Score 2   Walk 150 Feet   Type of Assistance Needed Physical assistance   Physical Assistance Level 51%-75%   Walk 150 Feet CARE Score 2   Curb or Single Stair   Style negotiated Single stair   Type of Assistance Needed Physical assistance   Physical Assistance Level 51%-75%   1 Step (Curb) CARE Score 2   4 Steps   Type of Assistance Needed Physical assistance   Physical Assistance Level 51%-75%   4 Steps CARE Score 2   12 Steps   Type of Assistance Needed Physical assistance   Physical Assistance Level 51%-75%   12 Steps CARE Score 2   Assessment   Treatment Assessment pt focus on inc endurance on nu step bike 15 min lvl 2 and rest for 5 min   Pt also perform 2 FF steps single HR and HHA   pt NPP with HHA and hold gait belt to inc motor function and learning with right side weakness    NPP cont with walking and ramp and car xfers with wife Cont POC   Plan   Progress Progressing toward goals   PT Therapy Minutes   PT Time In 1350   PT Time Out 1520   PT Total Time (minutes) 90   PT Mode of treatment - Individual (minutes) 80   PT Mode of treatment - Concurrent (minutes) 10   PT Mode of treatment - Group (minutes) 0   PT Mode of treatment - Co-treat (minutes) 0   PT Mode of Treatment - Total time(minutes) 90 minutes   PT Cumulative Minutes 0702   Therapy Time missed   Time missed?  No

## 2023-03-21 NOTE — CASE MANAGEMENT
Team Update:  Cm spoke with wife Chaitanya Steve regarding team recommendation of dc Friday 3/31 pending insurance approval  Wife agreeable to outpatient PT OT ST scheduled at [facility]  Wife Chaitanya Steve reports she was on her was to see pt and would relay the message  Chaitanya Steve had no further questions or concerns at time of discussion

## 2023-03-21 NOTE — PROGRESS NOTES
"   03/21/23 1300   Pain Assessment   Pain Assessment Tool 0-10   Pain Score No Pain   Restrictions/Precautions   Precautions Bed/chair alarms;Cognitive;Aspiration;Supervision on toilet/commode   Weight Bearing Restrictions No   ROM Restrictions No   Braces or Orthoses   (multipodus boot)   Lifestyle   Autonomy \"I didn't eat lunch yet  \"   Bed-Chair Transfer   Type of Assistance Needed Physical assistance;Set-up / clean-up   Physical Assistance Level 25% or less   Comment sit pivot w/c>recliner to pt's R  Chair/Bed-to-Chair Transfer CARE Score 3   Neuromuscular Education   Functional Movement Patterns Focused on facilitation of R cylindrical grasp to sustain grasp of paper cup, req A to fully grasp however then able to sustain and release w/ OTR stabilizing cup  However cues req to maximize digit flexion and extension and dec compensatory strategies  Paired w/ fxnl elbow flexion/extension w/ cue to bring cup to tap chin focusing on hand to target and fxnl coordination  2x12 reps  Overall improvement w/ task progression and rest breaks provided PRN to manage RUE fatigue  Cognition   Overall Cognitive Status Impaired   Assessment   Treatment Assessment Pt seen for skilled OT session focusing on RUE NMR and fxnl sit pivot xfer  Of note, pt was NPO this AM for US therefore did not eat morning or afternoon meal, pt reported calling for tray and encouragement provided to eat prior to PT session this PM  Pt's wife present and provided w/ ongoing stroke edu, interested in HEP and edu on plan to establish next week prior to d/c, briefly edu on process of FT and OT POC/goals  OT FT scheduled for tomorrow at 1230  Cont OT POC: RUE NMR, fxnl xfers, repetitive safety training, toileting, endurance work, and FT  Pt was left resting in recliner w/ all needs within reach and alarm activated, confirmed meal tray arrived shortly following session     Prognosis Good   Problem List Decreased strength;Decreased range of " motion;Decreased endurance; Impaired balance;Decreased mobility; Decreased coordination; Impaired judgement;Decreased safety awareness; Impaired sensation; Impaired tone   Plan   Treatment/Interventions ADL retraining;Functional transfer training; Therapeutic exercise; Endurance training;Patient/family training   Progress Progressing toward goals   Recommendation   OT Discharge Recommendation Home with outpatient rehabilitation  (OP neuro OT)   OT Therapy Minutes   OT Time In 1300   OT Time Out 1330   OT Total Time (minutes) 30   OT Mode of treatment - Individual (minutes) 30   OT Mode of treatment - Concurrent (minutes) 0   OT Mode of treatment - Group (minutes) 0   OT Mode of treatment - Co-treat (minutes) 0   OT Mode of Treatment - Total time(minutes) 30 minutes   OT Cumulative Minutes 1420   Therapy Time missed   Time missed?  No

## 2023-03-21 NOTE — PROGRESS NOTES
Physical Medicine and Rehabilitation Progress Note  Brielle Kowalski 60 y o  male MRN: [id number]  Unit/Bed#: -18 Encounter: [id number]    HPI: Brielle Kowalski is a 60 y o  right handed male with medical history of HTN who presented to [address] [place] [address] Road on 2/25 with nausea/dizziness  Found to have hypertensive emergency with acute/subcaute R posterior cerebellar CVA with 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, mild BRAULIO, and incidentally found + COVID (without evidence of respiratory illness/hypoxia/CXR findings)  Placed on cardene gtt, stroke pathway, ASA/Plavix, IV hydration for BRAULIO, and CTX for UTI  NIHSS 2  Symptoms began 2 days prior  Not tPA/TNK candidate  MRI/MRA with 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  NSx recommended transfer to [facility] and starting on heparin gtt and stopped Plavix  Repeat CTA on 2/26 stable with with R V3/4 occlusion  Not a candidate for interventional procedure due to clinical stability and risk  Speech consulted and noted mod-severe oropharyngeal dysphagia recommended pureed/HTL  Unfortunately later on 2/26, he clinically deteriorated with increased R sided weakness, and was taken to IR for stenting of V3 and V4 with TICI 2B revascularization  CTH without ICH  He was admitted back to the ICU intubated - extubated 2/27  MRI showed cerebellar CVA and R > L stroke burden, with additional hypodensities on DWI appreciated L midbrain, pontine area and R lower medullary/spinal cord junction  He was transitioned to triple therapy with DAPT (given recent stent) and A/C with eliquis 2 5mg BID due to COVID  Diet advanced to Level 3/NTL on 2/27  Noted to have spasticity in RLE - started on baclofen by Neurology  He was able to have cardene discontinued on 3/2, and they have been making adjustments to his oral regimen  He was started on Prozac for his mood  NSx has signed off  CTA H/N recommended around 3/17  Length of time on eliquis unclear   Admitted to [facility] on 3/6  Chief Complaint: No new issues today  Interval History/Subjective:  No acute events overnight  Sleep was fine  Denies any new pain  No new numbness/tingling/weakness  No new CP, SOB, fevers, chills, N/V, abdominal pain  Last BM was 3/20  Continent  More formed  ROS:  A 10 point review of systems was negative except for what is noted in the HPI  Today's Changes:  1  Continue current bowel regimen  2  Continue current Baclofen regimen  3  IM ordered renal ultrasound  Should technically be NPO prior  Meds held, can given them after ultrasound at 9-10am today  4  Team Conference, see separate note   5  Touch base with NSx tomorrow re: eliquis  Total time spent:  50 minutes, with more than 50% spent counseling/coordinating care  Counseling includes discussion with patient re: progress in therapies, functional issues observed by therapy staff, and discussion with patient his/her current medical state/wellbeing  Coordination of patient's care was performed in conjunction with Internal Medicine service to monitor patient's labs, vitals, and management of their comorbidities  In addition, this patient was discussed by the interdisciplinary team in weekly case conference today  The care of the patient was extensively discussed with all care providers and an appropriate rehabilitation plan was formulated unique for this patient  Barriers were identified preventing progression of therapy and appropriate interventions were discussed with each discipline  Please see the team note for input from all disciplines regarding barriers, intervention, and discharge planning  Assessment/Plan:    * Acute Posterior Circulation Stroke  Assessment & Plan  Presented with dizziness for 2 days and nausea and has residual R sided weakness, aphasia, dysphagia, R mild spasticity  Several small acute/early subacute bi-cerebellar infarcts without hemorrhage   Multiple infarcts involving R cerebellum and brainstem in particular (posterior medulla on the R, R midbrain, and L occipital lobe)  L vertebral artery severe stenosis and R vertebral artery occlusion and dissection    - Now s/p stenting on 2/26 with TICI 2b revascularization    PPx: ASA/brilinta/Eliquis, Statin   - Discussed with Neurology, they defer to NSx on length of time on eliquis  Potentially 3-4 weeks if felt to be related to [address] out to Neurosurgery - they will discuss with their attending re: eliquis  - Repeat CTA H/N -  No new intracranial abnormalities, expected evolution of known CVA  See dissection and stenosis for more details  3/9 Had nocturnal oximetry to screen for nocturnal hypoxia - only 26 seconds total of mild desaturation  Maintain normotension  Education on prediabetes and diet  Follow-up with Neurovascular/Neurosurgery  Function improving slowly   Continue PT/OT/SLP - for swallow, speech, R sided weakness/tone, will need a good visual exam      Vasovagal syncope  Assessment & Plan  No further episodes  3/8 Had episode of vasovagal syncope  - No convulsions  On body weight support system, started to feel dizzy  - Staring episode after, but able to sternal rubbed out of it  Very brief   - No post-ictal weakness   - Neurologically stable and back to baseline   - Orthostatics negative  Discussed with Neurology - unlikely seizure given distribution of stroke and presentation  No further testing recommended at this time  Monitor  Neurogenic bowel  Assessment & Plan  Disinhibited bowel + mobility difficulties -somewhat loose as well  Trial Metamucil daily - better formed  Monitor  Not on bowel meds right now  [facility] Bladder Training:   - 30-45 min after each meal will get patient onto commode to attempt bowel movement  - He wants to hold off on scheduled suppository for now (would schedule in AM to align with his previous bowel schedule at home)     For now monitor, close continence care especially    Anemia  Assessment & Plan  Stable 3/20  Normocytic anemia noted through stay  B12 borderline low  Folate normal  Iron Panel: Low iron saturation and iron with normal TIBC and Ferritin  Was started in the hospital on B12, but not iron  Per IM - 2 days of IV Venofer (last day 3/8)  Then start oral iron  Monitor Hgb, transfuse as appropriate  Will discuss with IM  Consulted  Adjustment disorder with depressed mood  Assessment & Plan  Tearful at times, but coping  Has been tearful and labile during hospitalization  Started on Prozac 20mgdaily - may need to increase dose  Consulted rehab psychology for support  Prediabetes  Assessment & Plan  A1C 5 7  Consult nutrition for education on diabetic diet  Diet/lifestyle modifications  Follow-up with PCP  CKD (chronic kidney disease)  Assessment & Plan  Lab Results   Component Value Date    EGFR 60 03/20/2023    EGFR 59 03/18/2023    EGFR 58 03/17/2023    CREATININE 1 25 03/20/2023    CREATININE 1 27 03/18/2023    CREATININE 1 30 03/17/2023     Per hospital team - suspect CKD Stage 2 2/2 hypertension   - stable today  Will monitor BMP while here  Avoid nephrotoxic meds and relative hypotension  Recommend outpatient f/u with PCP    Spasticity  Assessment & Plan  Intrinsic tonic tone in R quad which is mild  RUE with improving tone - mild MAS 1 at wrist flexors and elbow flexors, easily ranged out  Hiccups resolved  Would opt to avoid treating R quad for now, as tone there may help stabilize at the knee  R ankle multipodus  3/8 Baclofen to 5mg QID to help with hiccups   - Can decreased to TID and see how he does  Range of motion  Monitor as tone evolves  Outpatient f/u with PMR  Dysphagia  Assessment & Plan  Improved  Admitted with mod-severe oropharyngeal  Has massively improved    3/8 Advanced to Level 3/Thins  3/10 Advanced to Reg/Thins  Aspiration precautions  Good oral hygiene   SLP consulted    Primary hypertension  Assessment & Plan  Home: None  Here: Amlodipine 10mg daily, Coreg 25mg BID, Lisinopril 40mg daily, Hydralazine 50mg Q8hr  Has PRN hydralazine as well  Had hypertensive urgency in hospital requiring cardene gtt   3/21 IM ordered renal artery ultrasound  Monitor and adjust as appropriate  IM consulted to assist with management  Stenosis of left vertebral artery  Assessment & Plan  Severe L vertebral artery origin stenosis  3/17 CTA H/N:   CTA HEAD AND NECK  - Right vertebral artery V4 stent appears to have in-stent occlusion  Right vertebral artery V4 segment is patent before and after the V4 stent  - Unchanged left vertebral artery post-PICA V4 segment occlusion   - Dissection flap in right vertebral artery V3 segment which is patent and improved in caliber status post thrombectomy  - Moderate stenosis in proximal-to- mid basilar artery due to atherosclerotic disease status post mechanical thrombectomy  - Per Neurosurgery unlikely in stent occlusion  No changes  Plan for outpatient f/u  ASA/Brilinta  Atorvastatin  SBP goals 120-160  Follow-up with Neurovascular  Right Vertebral artery dissection [facility])  Assessment & Plan  Now s/p R V3/4 stenting with TICI 2B revascularization on 2/26 for R Vert stenosis  Continue ASA/Brilinta  Statin  CTA HEAD AND NECK 3/17  - Dissection flap in right vertebral artery V3 segment which is patent and improved in caliber status post thrombectomy  Outpatient f/u with Neurosurgery/Dr Ludy Waldrop      COVID-resolved as of 3/10/2023  Assessment & Plan  Resolved  Incidentally found to have COVID on 2/25  Asymptomatic from respiratory standpoint  Per Neurosurgery concern for hypercoagulability related to COVID  Currently on Eliquis 2 5mg BID - per Neuro 3-4 weeks probably reasonable, but for them ultimately up to NSx as this was their initial recommendation to start this medication  3/8 Discontinued precautions after 10 days isolation  Health Maintenance  #Delirium/Sleep: At risk  Optimize bowel/bladder, sleep-wake cycle, mood and pain management  #Pain: Baclofen 5mg TID, Tylenol PRN  #Bowel: Last BM 3/20  Only PRN miralax and metamucil  See Neurogenic bowel above  #Bladder: Voiding and continent  #Skin/Pressure Injury Prevention: Turn Q2hr in bed, with weight shifts [id number] in wheelchair  Float heels in bed  #DVT Prophylaxis: On triple therapy, SCDs  #GI Prophylaxis: None  #Code Status:  Full Code  #FEN: Reg/Thins  #Dispo:  Team 3/21:  Family training with the wife starts tomorrow  Goals for outpatient therapies PT/OT/SLP  Family's goal is for him to leave by ADD 3/31  Barrier: R sided weakness, proprioception, kinesthetic awareness, impaired sensation on the R, ataxia, truncal weakness, dysphagia, expressive > receptive aphasia, post-stroke depression, bowel incontinence/disinhibited, spasticity, decreased righting reaction, 25/30 MOCA  Goals: Sup wheelchair level mobility/set-up ADLs       Objective:    Functional Update:  PT: Eusebio transfers, bed mobility, maxA x2 ambulation (chair follow) - HHA, Dis Sup wheelchair 200', Ax2 for 20 stairs  OT: Sup eating, grooming, Eusebio grooming, Sup UB dressing, modA LB dressing, modA toileting, Eusebio tub/shower transfer, Eusebio toilet transfer  SLP: Eusebio problem solving/memory, Eusebio-Sup comprehension/expression  Executive function deficits  Fatigues with higher level tasks  On reg/thins for diet  Allergies per EMR    Physical Exam:  Temp:  [97 6 °F (36 4 °C)-97 8 °F (36 6 °C)] 97 8 °F (36 6 °C)  HR:  [59-65] 59  Resp:  [16-18] 18  BP: (124-175)/(58-78) 175/75  Oxygen Therapy  SpO2: 97 %    Gen: No acute distress, Well-nourished, well-appearing  HEENT: Moist mucus membranes, Normocephalic/Atraumatic  Cardiovascular: Regular rate, rhythm, S1/S2  Distal pulses palpable  Heme/Extr: No edema  Pulmonary: Non-labored breathing  Lungs CTAB  : No barnett  GI: Soft, non-tender, non-distended  BS+  MSK: PROM is WFL in all extremities   No effusions or deformities  Bulk is symmetric  Integumentary: Skin is warm, dry  Neuro: AAOx3, Speech appropriate to questioning and intelligible  Improving R sided weakness  Psych: Normal mood and affect       Diagnostic Studies: Reviewed, no new imaging    Laboratory:  Reviewed  Results from last 7 days   Lab Units 03/20/23  0546   HEMOGLOBIN g/dL 10 2*   HEMATOCRIT % 32 6*   WBC Thousand/uL 7 43     Results from last 7 days   Lab Units 03/20/23  0546 03/18/23  1122 03/17/23  0612   BUN mg/dL 27* 25 29*   POTASSIUM mmol/L 4 1 4 6 4 1   CHLORIDE mmol/L 110* 111* 111*   CREATININE mg/dL 1 25 1 27 1 30            Patient Active Problem List   Diagnosis   • BRAULIO (acute kidney injury) ([facility] [address] )   • Acute Posterior Circulation Stroke   • Right Vertebral artery dissection (HCC)   • Stenosis of left vertebral artery   • Primary hypertension   • Dizziness   • Dysphagia   • Spasticity   • CKD (chronic kidney disease)   • Prediabetes   • Adjustment disorder with depressed mood   • Anemia   • Neurogenic bowel   • Vasovagal syncope         Medications  Current Facility-Administered Medications   Medication Dose Route Frequency Provider Last Rate   • acetaminophen  650 mg Oral Q6H PRN YURI Schilling     • amLODIPine  10 mg Oral Daily Addy Garcia MD     • apixaban  2 5 mg Oral BID Addy Garcia MD     • aspirin  81 mg Oral Daily Addy Garcia MD     • atorvastatin  40 mg Oral Daily With Bobby Clifford MD     • baclofen  5 mg Oral TID Addy Garcia MD     • carvedilol  25 mg Oral BID With Meals YURI Schilling     • vitamin B-12  1,000 mcg Oral Daily Addy Garcia MD     • ferrous sulfate  325 mg Oral Daily With Breakfast YURI Schilling     • FLUoxetine  20 mg Oral Daily Addy Garcia MD     • hydrALAZINE  25 mg Oral Q8H PRN YURI Schilling     • hydrALAZINE  50 mg Oral Q8H [address] YURI Gallagher     • lisinopril  40 mg Oral Daily YURI Schilling     • polyethylene glycol  17 g Oral Daily PRN Purvi Donahue Donnamaria Klinefelter, MD     • psyllium  1 packet Oral Daily Lj Urbano MD     • ticagrelor  90 mg Oral Q12H [address] Paulo Bhakta MD            ** Please Note: Fluency Direct voice to text software may have been used in the creation of this document   **

## 2023-03-21 NOTE — PLAN OF CARE
Problem: Prexisting or High Potential for Compromised Skin Integrity  Goal: Skin integrity is maintained or improved  Description: INTERVENTIONS:  - Identify patients at risk for skin breakdown  - Assess and monitor skin integrity  - Assess and monitor nutrition and hydration status  - Monitor labs   - Assess for incontinence   - Turn and reposition patient  - Assist with mobility/ambulation  - Relieve pressure over bony prominences  - Avoid friction and shearing  - Provide appropriate hygiene as needed including keeping skin clean and dry  - Evaluate need for skin moisturizer/barrier cream  - Collaborate with interdisciplinary team   - Patient/family teaching  - Consider wound care consult   Outcome: Progressing     Problem: MOBILITY - ADULT  Goal: Maintain or return to baseline ADL function  Description: INTERVENTIONS:  -  Assess patient's ability to carry out ADLs; assess patient's baseline for ADL function and identify physical deficits which impact ability to perform ADLs (bathing, care of mouth/teeth, toileting, grooming, dressing, etc )  - Assess/evaluate cause of self-care deficits   - Assess range of motion  - Assess patient's mobility; develop plan if impaired  - Assess patient's need for assistive devices and provide as appropriate  - Encourage maximum independence but intervene and supervise when necessary  - Involve family in performance of ADLs  - Assess for home care needs following discharge   - Consider OT consult to assist with ADL evaluation and planning for discharge  - Provide patient education as appropriate  Outcome: Progressing  Goal: Maintains/Returns to pre admission functional level  Description: INTERVENTIONS:  - Perform BMAT or MOVE assessment daily    - Set and communicate daily mobility goal to care team and patient/family/caregiver  - Collaborate with rehabilitation services on mobility goals if consulted  - Perform Range of Motion 3 times a day    - Reposition patient every 2 hours   - Dangle patient 3 times a day  - Stand patient 3 times a day  - Ambulate patient 3 times a day  - Out of bed to chair 3 times a day   - Out of bed for meals 3 times a day  - Out of bed for toileting  - Record patient progress and toleration of activity level   Outcome: Progressing     Problem: PAIN - ADULT  Goal: Verbalizes/displays adequate comfort level or baseline comfort level  Description: Interventions:  - Encourage patient to monitor pain and request assistance  - Assess pain using appropriate pain scale  - Administer analgesics based on type and severity of pain and evaluate response  - Implement non-pharmacological measures as appropriate and evaluate response  - Consider cultural and social influences on pain and pain management  - Notify physician/advanced practitioner if interventions unsuccessful or patient reports new pain  Outcome: Progressing     Problem: INFECTION - ADULT  Goal: Absence or prevention of progression during hospitalization  Description: INTERVENTIONS:  - Assess and monitor for signs and symptoms of infection  - Monitor lab/diagnostic results  - Monitor all insertion sites, i e  indwelling lines, tubes, and drains  - Monitor endotracheal if appropriate and nasal secretions for changes in amount and color  - [place] appropriate cooling/warming therapies per order  - Administer medications as ordered  - Instruct and encourage patient and family to use good hand hygiene technique  - Identify and instruct in appropriate isolation precautions for identified infection/condition  Outcome: Progressing  Goal: Absence of fever/infection during neutropenic period  Description: INTERVENTIONS:  - Monitor WBC    Outcome: Progressing     Problem: SAFETY ADULT  Goal: Patient will remain free of falls  Description: INTERVENTIONS:  - Educate patient/family on patient safety including physical limitations  - Instruct patient to call for assistance with activity   - Consult OT/PT to assist with strengthening/mobility   - Keep Call bell within reach  - Keep bed low and locked with side rails adjusted as appropriate  - Keep care items and personal belongings within reach  - Initiate and maintain comfort rounds  - Make Fall Risk Sign visible to staff  - Offer Toileting every 2 Hours, in advance of need  - Initiate/Maintain bed/chair alarm  - Obtain necessary fall risk management equipment: nonskid socks  - Apply yellow socks and bracelet for high fall risk patients  - Consider moving patient to room near nurses station  Outcome: Progressing  Goal: Maintain or return to baseline ADL function  Description: INTERVENTIONS:  -  Assess patient's ability to carry out ADLs; assess patient's baseline for ADL function and identify physical deficits which impact ability to perform ADLs (bathing, care of mouth/teeth, toileting, grooming, dressing, etc )  - Assess/evaluate cause of self-care deficits   - Assess range of motion  - Assess patient's mobility; develop plan if impaired  - Assess patient's need for assistive devices and provide as appropriate  - Encourage maximum independence but intervene and supervise when necessary  - Involve family in performance of ADLs  - Assess for home care needs following discharge   - Consider OT consult to assist with ADL evaluation and planning for discharge  - Provide patient education as appropriate  Outcome: Progressing  Goal: Maintains/Returns to pre admission functional level  Description: INTERVENTIONS:  - Perform BMAT or MOVE assessment daily    - Set and communicate daily mobility goal to care team and patient/family/caregiver  - Collaborate with rehabilitation services on mobility goals if consulted  - Perform Range of Motion 3 times a day  - Reposition patient every 2 hours    - Dangle patient 3 times a day  - Stand patient 3 times a day  - Ambulate patient 3 times a day  - Out of bed to chair 3 times a day   - Out of bed for meals 3 times a day  - Out of bed for toileting  - Record patient progress and toleration of activity level   Outcome: Progressing     Problem: DISCHARGE PLANNING  Goal: Discharge to home or other facility with appropriate resources  Description: INTERVENTIONS:  - Identify barriers to discharge w/patient and caregiver  - Arrange for needed discharge resources and transportation as appropriate  - Identify discharge learning needs (meds, wound care, etc )  - Arrange for interpretive services to assist at discharge as needed  - Refer to Case Management Department for coordinating discharge planning if the patient needs post-hospital services based on physician/advanced practitioner order or complex needs related to functional status, cognitive ability, or social support system  Outcome: Progressing     Problem: Nutrition/Hydration-ADULT  Goal: Nutrient/Hydration intake appropriate for improving, restoring or maintaining nutritional needs  Description: Monitor and assess patient's nutrition/hydration status for malnutrition  Collaborate with interdisciplinary team and initiate plan and interventions as ordered  Monitor patient's weight and dietary intake as ordered or per policy  Utilize nutrition screening tool and intervene as necessary  Determine patient's food preferences and provide high-protein, high-caloric foods as appropriate       INTERVENTIONS:  - Monitor oral intake, urinary output, labs, and treatment plans  - Assess nutrition and hydration status and recommend course of action  - Evaluate amount of meals eaten  - Assist patient with eating if necessary   - Allow adequate time for meals  - Recommend/ encourage appropriate diets, oral nutritional supplements, and vitamin/mineral supplements  - Order, calculate, and assess calorie counts as needed  - Recommend, monitor, and adjust tube feedings and TPN/PPN based on assessed needs  - Assess need for intravenous fluids  - Provide specific nutrition/hydration education as appropriate  - Include patient/family/caregiver in decisions related to nutrition  Outcome: Progressing

## 2023-03-21 NOTE — TEAM CONFERENCE
Acute RehabilitationTeam Conference Note  Date: 3/21/2023   Time: 11:12 AM       Patient Name:  Mustapha Brasher Record Number: [id number]   YOB: 1960  Sex:  Male          Room/Bed:  [facility] 459/[facility] 459-01  Payor Info:  Payor: Ji Armen / Plan: [place] PPO / Product Type: PPO /      Admitting Diagnosis: Cerebellar infarct ([facility] ) [I63 9]   Admit Date/Time:  3/6/2023  4:26 PM  Admission Comments: No comment available     Primary Diagnosis:  Cerebrovascular accident (CVA) ([facility] )  Principal Problem: Cerebrovascular accident (CVA) ([facility] )    Patient Active Problem List    Diagnosis Date Noted   • Vasovagal syncope 03/09/2023   • Neurogenic bowel 03/08/2023   • Anemia 03/06/2023   • Spasticity 03/01/2023   • CKD (chronic kidney disease) 03/01/2023   • Prediabetes 03/01/2023   • Adjustment disorder with depressed mood 03/01/2023   • BRAULIO (acute kidney injury) ([facility] ) 02/25/2023   • Acute Posterior Circulation Stroke 02/25/2023   • Right Vertebral artery dissection ([facility] ) 02/25/2023   • Stenosis of left vertebral artery 02/25/2023   • Primary hypertension 02/25/2023   • Dizziness 02/25/2023   • Dysphagia 02/25/2023       Physical Therapy:    Weight Bearing Status: Full Weight Bearing  Transfers: Minimal Assistance  Bed Mobility: Minimal Assistance  Amulation Distance (ft): 200 feet  Ambulation: Maximum Assistance, Assist of 2 (CFA of 2nd person)  Assistive Device for Ambulation: Hand Hold Assistance  Wheelchair Mobility Distance: 200 ft  Wheelchair Mobility: Supervision  Number of Stairs: 20  Assistive Device for Stairs: Lehft Hand Rail  Stair Assistance: Assist of 2 (mod of 1 with SBA of 2nd person for safety)  Discharge Recommendations: Home with:  [address] with[de-identified] 24 Hour Assisteance, Family Support, Outpatient Physical Therapy    Pt continues to demo consistent functional gains in PT abhishek with w/c level mobilities  requiring min-CGA of 1 for transfers and set up for w/c propulsion with noted improve ability to utilize R LE during task  However due to ongoing R hemiparesis with inattention, impaired LT, proprioception and kinesthetic awareness on R UE/LE, impaired standing balance with poor self correction of R lateral lean, gait dysfunction pt cont to require 2-3 person assist during amb  PT have trialed RW  but due to R UE weakness unable to effectively use R hand on the walker during mobilities and with QC due to impaired righting reactions and R hemiparesis required mod of 2 person assist to complete mobilities safely so pt will greatly benefit from additional skilled PT intervention with focus this week on hands on FT with w/c level mobilities while PT to cont with NMR/NPP gait/balance /stair training to improve overall functions in order for pt to be safe ambulator outside of therapy and improve quality of life  Occupational Therapy:  Eating: Supervision  Grooming: Supervision  Bathing: Minimal Assistance  Bathing: Minimal Assistance  Upper Body Dressing: Supervision  Lower Body Dressing: Moderate Assistance  Toileting: Moderate Assistance  Tub/Shower Transfer: Minimal Assistance  Toilet Transfer: Minimal Assistance  Cognition: Within Defined Limits  Cognition: Decreased Memory  Orientation: Person, Place, Time, Situation  Discharge Recommendations: Home with:  [address] with[de-identified] Family Support, Outpatient Occupational Therapy       Pt continues to present with impairments in endurance, standing balance/tolerance, and R UE ROM/strength/coordination, short term memory   Additional functional barriers include fatigue, (R) hemiparesis, decreased caregiver support, risk for falls, and home environment  Pt demonstrates improvements in ADL routine, while using a wheelchair pt able to complete ADL routine with assist x1 and has progressed to min assist with sit pivot transfers  Also has had improvement in sit to stand transfers, able to complete with assist x1   Pt's wife has been present for family training and will be in at the end of this week for continued family training  Pt will continue to benefit from skilled OT services to address above mentioned barriers and maximize functional independence in baseline areas of occupation  OT D/C recommendation is for d/c home with continued outpatient neuro OT on 3/31  Pt benefits from continued intensive inpatient OT services to maximize pt's independence and functional recovery, with ultimate goal for setup assist transfers and w/c mobility and min assist for ADL routine  Speech Therapy:  Mode of Communication: Verbal  Speech/Language: Expressive Aphasia  Cognition: Exceptions to WNL  Cognition: Decreased Memory, Decreased Executive Functions, Decreased Attention, Decreased Safety  Orientation: Person, Place, Time, Situation  Swallowing: Within Defined Limits  Diet Recommendations: Regular Diet, Thin  Discharge Recommendations: Home with:  [address] [place] with[de-identified] Family Support, Home Speech Therapy, Outpatient Speech Therapy, 24 Hour Assisteance, 24 Hour Supervision  Orders received for skilled SLP assessment  Results and recommendations pending evaluation completion on 3/7/2023  Update from week 3/14/2023: Pt currently being followed for cognitive/language tx sessions now and was also briefly seen for dysphagia tx sessions  In regards to dysphagia, pt was initially on dysphagia level 3 diet w/ NT liquids  However, able to advance liquids quickly to thins, but still continued to remain on level 3 diet due to increased R sided pocketing/residual, initially requiring verbal prompts/cues to clear  As dysphagia tx sessions progressed in addition to completing trials of regular texture items, which pt had demonstrated more independence in use of swallow strategies, primarily clearing R sided residual/pocketing via lingual/finger sweeps and increased time   Diet was able to be advanced to regular w/ thin liquids to where brief f/u continued to where pt did not present w/ increased or overt signs/sxs of aspiration during meals, to where ongoing recommendations remain for continued diet, regular/thins at this time, continuing aspiration precautions  No further dysphagia tx sessions warranted at this time, but reconsult if any deficits arise  As for cognitive linguistic skills, pt did complete CLQT+ on initial evaluation with a Composite Severity Rating score of 3 4 out of 4 0, correlating to overall MILD cognitive linguistic impairments at time of evaluation and in comparison to age matched peers ranging from 22-72 y/o  It is noted that pt does have a higher level of independence prior to admission, but also there is new learning given medications due to stroke, as well as new learning given tasks/mobility due to stroke currently  As for language skills, pt completed the Bedside WAB, in which overall bedside aphasia score deems pt to demonstrate mildly impaired language deficits  Additionally, pt also demonstrates Anomic type aphasia as per Bedside Aphasia Classification Criteria, when comparing the pt's Fluency, Auditory Verbal Comprehension, Repetition scores  Pt's expressive language deficits mild junior by mild word finding difficulty, intermittent vague speech, concise/ often incomplete descriptions and dec fluency  Pt expressive deficits more prevalent during structured tx tasks and reading aloud, spontaneous speech is more fluent w/ only min instances of word retrieval difficulties and min amount of slurring in longer utterances  Of note, pt's spouse, Krupa Shields has been present for sessions, in which increased review was already presented given medications, reasons for taking medications and determining a plan for management moving forward, which spouse stated that a pill box would be most feasible moving forward  Further education had been provided to spouse about swallow function and language skills at this time   Barrier which present at this time include expressive> receptive language deficits, increased processing time, decreased ST/working memory, decreased executive function skills (problem solving, reasoning, organization, sequencing) which impacts cognitive linguistic and functional mobility  Pt is consistently functioning at min A level for comprehension, expression, executive functions, memory and mod I level for social interaction  Currently, pt will benefit from ongoing skilled SLP services targeting language and cognitive skills in attempts to decrease overall caregiver burden over time  Update from week of 3/20/2023: Pt conts to be followed for skilled SLP services targeting speech/cognitive skills  Pt is making good progress towards his goals, currently functioning at min A problem solving and memory, and supervision comprehension and expression  Pt conts with deficits in executive function skills, ST/working memory, planning, reasoning, problem solving, safety, and insight to deficits  Pt's overall word finding and thought organization skills cont to improve and per family report, are nearly baseline at this time, however are present as pt fatigues and with higher level tasks  Plan is for discharge home with 24h S/A and cont'd rehab services  Pt will cont to benefit from skilled SLP services targeting cognitive and speech needs for increased independence and decreased burden of care upon safe discharge home with family support/assistance  Nursing Notes:  Appetite: Good  Diet Type: NPO                                                            Bowel: 3 - Moderate Assistance        Pain Location/Orientation: Location: Head  Pain Score: 0                       Hospital Pain Intervention(s): Medication (See MAR), Repositioned, Rest          Pt admitted S/P Posterior circulation stroke S/p intracranial and extracranial vertebral artery stenting/TICI 2B revascularization Continue ASA, Brilinta, Eliquis and atorvastatin  Repeat CTA head and neck around 3/17/23   Prozac for depressed mood following CVA  Recommend Neuropsych consult  Dysphagia 3 diet with nectar thick liquids  Continue Baclofen 10mg 2x daily  HTN-  Home: No medications  Here: amlodipine 10mg daily/Coreg 6 25mg 2x daily/lisinopril 20mg daily/chlorthalidone 12 5mg daily  Goal normotension  Monitor BP and adjust medications as needed  CKD stage 2 Baseline Cr 1 2 - 1 3  Monitor with the addition of chlorthalidone  COVID- Continue precautions through 3/7/23  Pt has not had respiratory symptoms  Prediabetes-HA1C 5  7  Pt is inc at times of bowel and bladder  Requires alarms for safety  This week we will encourage independence with ADLs  We will monitor labs and vital signs  We will educate pt  about repositioning to prevent skin breakdown  We will perform routine skin checks  We will monitor for constipation and medicate as ordered  We will monitor for adequate pain control  We will increase safety awareness with transfers and keep pt free from falls  Case Management:     Discharge Planning  Living Arrangements: Lives w/ Spouse/significant other  Support Systems: Spouse/significant other  Assistance Needed: NA  Type of Current Residence: Private residence (2 story home)  Current Home Care Services: No  3/20- Team recommending dc of 3/31, follow up recs pending  Is the patient actively participating in therapies?  yes  List any modifications to the treatment plan: None    Barriers Interventions   Multi potis on R foot Compensatory strategies   Right sided weakness/hemiperis Neuro siddhartha, body weight support, ESTIM   Mood Rehab pysch   Anemia Stable   Bowel Metamucil, bowel training   Hypertension  Med adjustment, renal artery ultrasound   Expressive/receptive language, ST memory, word finding SLP Services, improving   Aphagia/Dyshpagia SLP Services   Decreased righting reaction Compensatory strategies   Decreased standing balance Strengthening    Impaired cog - new learning  Repetitive training    Impaired ROM Compensatory strategies      Is the patient making expected progress toward goals? yes  List any update or changes to goals: None    Medical Goals: Patient will be medically stable for discharge to [facility] upon completion of rehab program and Patient will be able to manage medical conditions and comorbid conditions with medications and follow up upon completion of rehab program    Weekly Team Goals:   Rehab Team Goals  ADL Team Goal: Patient will require assist with ADLs with least restrictive device upon completion of rehab program  Transfer Team Goal: Patient will require supervision with transfers with least restrictive device upon completion of rehab program  Locomotion Team Goal: Patient will require supervision with locomotion with least restrictive device upon completion of rehab program (at least household distance)  Cognitive Team Goal: Patient will be independent for basic and complex tasks upon completion of rehab program    Discussion: Pt distant supervision/wc propulsion, with ambulation  Pt functioning at min/superviison for comprehension/expression, min assist for memory  Pt overall min assist for ADLs  Team recommending dc date of Friday 3/2131/ OP PT OT ST, cm to schedule  Anticipated Discharge Date:  Dc Friday 3/31 OP PT OT ST  [facility] Team Members Present: The following team members are supervising care for this patient and were present during this Weekly Team Conference      Physician: Dr Jordan Sofia MD  : Colletta Berg, MSW  Registered Nurse: Nehemias Lopez RN  Physical Therapist: Haydee Rose DPT  Occupational Therapist: Teresa Rocha MS, OTR/L  Speech Therapist: Kanchan Agee [address], [id number] [facility]

## 2023-03-22 PROBLEM — I77.1 CELIAC ARTERY STENOSIS (HCC): Status: ACTIVE | Noted: 2023-03-22

## 2023-03-22 PROBLEM — R55 VASOVAGAL SYNCOPE: Status: RESOLVED | Noted: 2023-03-09 | Resolved: 2023-03-22

## 2023-03-22 PROBLEM — I77.4 CELIAC ARTERY STENOSIS (HCC): Status: ACTIVE | Noted: 2023-03-22

## 2023-03-22 RX ORDER — HYDRALAZINE HYDROCHLORIDE 50 MG/1
50 TABLET, FILM COATED ORAL EVERY 12 HOURS
Status: DISCONTINUED | OUTPATIENT
Start: 2023-03-22 | End: 2023-03-27

## 2023-03-22 RX ADMIN — CYANOCOBALAMIN TAB 500 MCG 1000 MCG: 500 TAB at 08:24

## 2023-03-22 RX ADMIN — CARVEDILOL 25 MG: 25 TABLET, FILM COATED ORAL at 16:43

## 2023-03-22 RX ADMIN — ATORVASTATIN CALCIUM 40 MG: 40 TABLET, FILM COATED ORAL at 16:43

## 2023-03-22 RX ADMIN — APIXABAN 2.5 MG: 2.5 TABLET, FILM COATED ORAL at 08:25

## 2023-03-22 RX ADMIN — BACLOFEN 5 MG: 10 TABLET ORAL at 16:43

## 2023-03-22 RX ADMIN — APIXABAN 2.5 MG: 2.5 TABLET, FILM COATED ORAL at 17:39

## 2023-03-22 RX ADMIN — HYDRALAZINE HYDROCHLORIDE 50 MG: 50 TABLET, FILM COATED ORAL at 06:40

## 2023-03-22 RX ADMIN — CARVEDILOL 25 MG: 25 TABLET, FILM COATED ORAL at 06:41

## 2023-03-22 RX ADMIN — FLUOXETINE 20 MG: 20 CAPSULE ORAL at 08:25

## 2023-03-22 RX ADMIN — TICAGRELOR 90 MG: 90 TABLET ORAL at 20:14

## 2023-03-22 RX ADMIN — LISINOPRIL 40 MG: 20 TABLET ORAL at 08:38

## 2023-03-22 RX ADMIN — TICAGRELOR 90 MG: 90 TABLET ORAL at 08:38

## 2023-03-22 RX ADMIN — BACLOFEN 5 MG: 10 TABLET ORAL at 20:14

## 2023-03-22 RX ADMIN — FERROUS SULFATE TAB 325 MG (65 MG ELEMENTAL FE) 325 MG: 325 (65 FE) TAB at 06:41

## 2023-03-22 RX ADMIN — BACLOFEN 5 MG: 10 TABLET ORAL at 08:25

## 2023-03-22 RX ADMIN — HYDRALAZINE HYDROCHLORIDE 50 MG: 50 TABLET, FILM COATED ORAL at 20:14

## 2023-03-22 RX ADMIN — Medication 1 PACKET: at 08:24

## 2023-03-22 RX ADMIN — ASPIRIN 81 MG CHEWABLE TABLET 81 MG: 81 TABLET CHEWABLE at 08:25

## 2023-03-22 NOTE — PROGRESS NOTES
03/22/23 0830   Pain Assessment   Pain Assessment Tool 0-10   Pain Score No Pain   Restrictions/Precautions   Precautions Bed/chair alarms;Aspiration;Cognitive; Fall Risk;Supervision on toilet/commode   General   Change In Medical/Functional Status /50 with nsging aware see vitals   Subjective   Subjective pt agreeable to perform skilled PT   Sit to Stand   Type of Assistance Needed Physical assistance   Physical Assistance Level 25% or less   Sit to Stand CARE Score 3   Bed-Chair Transfer   Type of Assistance Needed Physical assistance   Physical Assistance Level 25% or less   Comment trail Emiliano Walker SPT   Chair/Bed-to-Chair Transfer CARE Score 3   Transfer Bed/Chair/Wheelchair   Adaptive Equipment Emiliano Walker   Walk 10 Feet   Type of Assistance Needed Physical assistance   Physical Assistance Level 26%-50%   Comment gait belt only   Walk 10 Feet CARE Score 3   Walk 50 Feet with Two Turns   Type of Assistance Needed Physical assistance   Physical Assistance Level 51%-75%   Comment gait belt only   Walk 50 Feet with Two Turns CARE Score 2   Walk 150 Feet   Type of Assistance Needed Physical assistance   Physical Assistance Level 51%-75%   Comment gait belt only   Walk 150 Feet CARE Score 2   Ambulation   Primary Mode of Locomotion Prior to Admission Walk   Distance Walked (feet) 200 ft   Assist Device   (gait belt only)   Does the patient walk? 2  Yes   Wheel 50 Feet with Two Turns   Type of Assistance Needed Independent   Wheel 50 Feet with Two Turns CARE Score 6   Wheel 150 Feet   Type of Assistance Needed Independent   Comment only LE 's   Wheel 150 Feet CARE Score 6   Wheelchair mobility   Does the patient use a wheelchair? 1  Yes   Type of Wheelchair Used 1   Manual   Method Right lower extremity   Findings training for [facility] management for [facility] home DC   Curb or Single Stair   Style negotiated Single stair   Type of Assistance Needed Physical assistance   Physical Assistance Level Total assistance Comment gait belt much better today FF   1 Step (Curb) CARE Score 4   4 Steps   Type of Assistance Needed Physical assistance   Physical Assistance Level Total assistance   Comment FF single HR only and gait belt   4 Steps CARE Score 4   12 Steps   Type of Assistance Needed Physical assistance   Physical Assistance Level Total assistance   Comment FF   12 Steps CARE Score 4   Stairs   Type Stairs   # of Steps 20   Weight Bearing Precautions Fall Risk   Assist Devices Single Rail   Findings FF x2   Therapeutic Interventions   Flexibility right ankle manual stretch   Neuromuscular Re-Education NPP gait belt and FF x2 single HR   Other recommend WC lvl for home  DC , Emiliano walker for toitel xfers as needed   Equipment Use   NuStep lvl 3 for 10 min Left hand only and BLE's   Assessment   Treatment Assessment pt focus on NPP walking with gait belt only and trail Emiliano walker up to 60 feet with two turns   Pt also perform SPT with Emiliano walker  x5 with vc for seqeunce and positioning   Pt unsteady at times with [facility] and needs Da /CgA to correct but with fatigue comes inc risk to fall   Pt return to his room with all needs in reach and alarm on   Cont POC   Barriers to Discharge Inaccessible home environment;Decreased caregiver support   Plan   Progress Progressing toward goals   Recommendation   PT Discharge Recommendation Home with outpatient rehabilitation   PT Therapy Minutes   PT Time In 0830   PT Time Out 0930   PT Total Time (minutes) 60   PT Mode of treatment - Individual (minutes) 60   PT Mode of treatment - Concurrent (minutes) 0   PT Mode of treatment - Group (minutes) 0   PT Mode of treatment - Co-treat (minutes) 0   PT Mode of Treatment - Total time(minutes) 60 minutes   PT Cumulative Minutes 1428   Therapy Time missed   Time missed?  No

## 2023-03-22 NOTE — PROGRESS NOTES
03/22/23 0944   Pain Assessment   Pain Assessment Tool 0-10   Pain Score No Pain   Restrictions/Precautions   Precautions Bed/chair alarms;Cognitive; Fall Risk;Aspiration;Supervision on toilet/commode   Weight Bearing Restrictions No   ROM Restrictions No   Comprehension   Comprehension (FIM) 5 - Understands basic directions and conversation   Expression   Expression (FIM) 4 - Needs to repeat single words   Social Interaction   Social Interaction (FIM) 6 - Interacts appropriately with others BUT requires extra  time   Problem Solving   Problem solving (FIM) 4 - Solves basic problems 75-89% of time   Memory   Memory (FIM) 4 - Recognizes/recalls/performs 75-89%   Speech/Language/Cognition Assessmetn   Treatment Assessment Pt seen for skilled speech therapy session targeting cognitive linguistic communication skills  Pt alert and interactive- completed the following skilled therapy tasks  Provided pt with Memory Impairments in Brain Injury- Memory Tips Packet  Went over in detail the different types of memory, deficits present, as well as strategies to improve memory with examples as to how to implement in real life and at discharge  Pt receptive to education, reports memory is getting better  Completed visual memory task- provided with pictures and cues to study for 1-2minutes  Picture then removed and pt was able to answer questions to recall items from picture with 10/10acc on first trial, and 8/10acc on second trial with verbal cues for increased context to engage in better recall  Pt next completed working memory task with mental manipulation  Pt was provided with 3 words and then instructed to put in alphabetical order  Pt was able to complete with 11/15acc increasing with verbal cues including repetition of word set, visual placement cues on fingers and extended processing time  Last, pt completed word exclusion task in which pt was provided with 4 words to determine which word didn't belong   Pt was able to "complete concrete task with 14/15acc and abstract task with 12/15acc  Pt increased with cues for determining what the items had in common to better identify the \"odd one out\"  Plan to cont to target items for increased ST/working memory as well as higher level tasks for word associations and executive functioning skills  Pt overall is improving with skilled SLP services and will cont to benefit to maximize overall cognitive linguistic communication abilities at this time  SLP Therapy Minutes   SLP Time In 0930   SLP Time Out 1030   SLP Total Time (minutes) 60   SLP Mode of treatment - Individual (minutes) 60   SLP Mode of treatment - Concurrent (minutes) 0   SLP Mode of treatment - Group (minutes) 0   SLP Mode of treatment - Co-treat (minutes) 0   SLP Mode of Treatment - Total time(minutes) 60 minutes   SLP Cumulative Minutes 675   Therapy Time missed   Time missed?  No       "

## 2023-03-22 NOTE — PROGRESS NOTES
03/22/23 1330   Pain Assessment   Pain Assessment Tool 0-10   Pain Score No Pain   Restrictions/Precautions   Precautions Bed/chair alarms;Cognitive; Fall Risk;Aphasia; Supervision on toilet/commode   Cognition   Overall Cognitive Status Impaired   Arousal/Participation Alert; Cooperative   Memory Decreased short term memory   Following Commands Follows one step commands without difficulty   Subjective   Subjective Pt had no new c/o and ready for family training  Pt agreeable to therapy  Roll Left and Right   Type of Assistance Needed Independent   Comment HOB flat no bed rails   Roll Left and Right CARE Score 6   Sit to Lying   Type of Assistance Needed Set-up / clean-up   Comment HOB flat no bed rails; practiced getting in/out of bed on R side to simulate at home   Sit to Lying CARE Score 5   Lying to Sitting on Side of Bed   Type of Assistance Needed Set-up / clean-up   Comment HOB flat no bed rails; practiced getting in/out of bed on R side to simulate at home   Lying to Sitting on Side of Bed CARE Score 5   Sit to Stand   Type of Assistance Needed Physical assistance   Physical Assistance Level 25% or less   Comment Min-CG   Sit to Stand CARE Score 3   Bed-Chair Transfer   Type of Assistance Needed Physical assistance   Physical Assistance Level 25% or less   Comment Min A stand pivot bed to w/c; repeated training with/without AD   Chair/Bed-to-Chair Transfer CARE Score 3   Walk 10 Feet   Type of Assistance Needed Physical assistance; Adaptive equipment   Physical Assistance Level 51%-75%   Comment Mod A with julieta walker; 2x20 ft   Walk 10 Feet CARE Score 2   Walking 10 Feet on Uneven Surfaces   Type of Assistance Needed Physical assistance; Adaptive equipment   Physical Assistance Level 51%-75%   Comment Mod A with julieta walker x2; foam mat   Walking 10 Feet on Uneven Surfaces CARE Score 2   Ambulation   Does the patient walk? 2   Yes   Wheel 50 Feet with Two Turns   Type of Assistance Needed Independent   Wheel 48 Feet with Two Turns CARE Score 6   Wheel 150 Feet   Type of Assistance Needed Set-up / clean-up   Comment utilizing LUE and bilat LEs; wife demonstrated ability to manage wheelchair   Wheel 150 Feet CARE Score 5   Curb or Single Stair   Style negotiated Single stair   Type of Assistance Needed Physical assistance   Physical Assistance Level 51%-75%   Comment Mod with LUE on LHR   1 Step (Curb) CARE Score 2   4 Steps   Type of Assistance Needed Physical assistance   Physical Assistance Level 51%-75%   Comment Mod A with LUE on LHR fwd ascending bwd descending; step-to pattern   4 Steps CARE Score 2   Stairs   Type Curb   # of Steps 1   Assist Devices Emiliano Walker   Findings 4 in curb - required Mod A; will assess 6 in curb next session; decreased R foot clearance noted   Picking Up Object   Type of Assistance Needed Physical assistance; Adaptive equipment   Physical Assistance Level 25% or less   Comment emiliano walker for support, grabber to  pen on floor; able to use grabber with LUE however difficulty with grabber using RUE due to decreased  strength   Picking Up Object CARE Score 3   Toilet Transfer   Type of Assistance Needed Physical assistance   Physical Assistance Level 25% or less   Comment attempted but unable to have any bowel or bladder movement   Toilet Transfer CARE Score 3   Therapeutic Interventions   Strengthening Supine: ankle DF x10 hold 5 sec, heel slides 2x10 with VC to avoid hip abduction and control the leg as it straightens   Flexibility self-stretch for bilateral gastroc with gait belt x10 hold 5 sec, therapist gastroc stretch 4x20 sec, wife performed gastroc stretch 1x10 sec; given as HEP   Neuromuscular Re-Education pt ascending/descending FF utilizing step-to pattern with LHR ascending RHR descending to work on foot clearance and sequencing; utilizing RUE for drinking, clothing management, transfers, brake management; 10 meter walk test: 0 5 m/s with no AD and self-selected pace indicating pt is currently a limited community ambulator and will require interventions to reduce risk for falls  Other Comments   Comments Pt performed chair to floor and floor to chair transfer with Mod A x1; reviewed with pt and wife importance of safe post-fall assessment for injury  Assessment   Treatment Assessment Pt tolerated 115 min of skilled PT focusing on family training, therapeutic activities, and therapeutic exercise  Pt's wife participated in family training with education and demonstration on guarding/assistance for sit pivot transfers, stand pivot transfers with julieta walker, curb/step negotiation, 10 ft ambulation with julieta walker, equipment training for how to adjust/open/close w/c as well as donning/doffing leg rests, and education/demonstration of different techniques for entering house for safety (ramp vs using wheelchair-sit technique)  Pt and wife also educated on importance of pressure relief every hour and HEP compliance  Pt's wife demonstrated good carry over of appropriate technique with assisting pt after family training but requested additional session to improve confidence and independence  Wife reported pt's son and son-in-law will be working on building pt a ramp this weekend  Family training will continue as pt progresses with assistance level  Pt continues to require reminders on set-up for transfers, locking brakes, and sequencing for ambulation with AD/stairs for safety  PT to continue working on [employer], functional activities, therapeutic exercises, and family training to improve pt independence  Next session PT to continue family training with emphasis on stair negotiation and car transfer  Pt left supine in bed with bed alarm activated, pillow under RUE, and all needs within reach  Family/Caregiver Present Wife Raffi Jakub)   PT Family training done with: Wife (Romina)   Problem List Decreased strength;Decreased range of motion;Decreased endurance; Impaired balance;Decreased mobility; Decreased coordination   Barriers to Discharge Inaccessible home environment;Decreased caregiver support   PT Barriers   Physical Impairment Decreased strength;Decreased range of motion;Decreased endurance; Impaired balance;Decreased mobility; Decreased coordination   Plan   Treatment/Interventions Functional transfer training;LE strengthening/ROM; Therapeutic exercise; Endurance training;Patient/family training;Equipment eval/education; Bed mobility;Gait training; Compensatory technique education   Progress Progressing toward goals   Recommendation   PT Discharge Recommendation Home with outpatient rehabilitation   Equipment Recommended Wheelchair;Walker  (Emiliano walker; DME form completed, OT to submit)   PT Therapy Minutes   PT Time In 1330   PT Time Out 1525   PT Total Time (minutes) 115   PT Mode of treatment - Individual (minutes) 115   PT Mode of treatment - Concurrent (minutes) 0   PT Mode of treatment - Group (minutes) 0   PT Mode of treatment - Co-treat (minutes) 0   PT Mode of Treatment - Total time(minutes) 115 minutes   PT Cumulative Minutes 1543   Therapy Time missed   Time missed?  No

## 2023-03-22 NOTE — ASSESSMENT & PLAN NOTE
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

## 2023-03-22 NOTE — PLAN OF CARE
Problem: Prexisting or High Potential for Compromised Skin Integrity  Goal: Skin integrity is maintained or improved  Description: INTERVENTIONS:  - Identify patients at risk for skin breakdown  - Assess and monitor skin integrity  - Assess and monitor nutrition and hydration status  - Monitor labs   - Assess for incontinence   - Turn and reposition patient  - Assist with mobility/ambulation  - Relieve pressure over bony prominences  - Avoid friction and shearing  - Provide appropriate hygiene as needed including keeping skin clean and dry  - Evaluate need for skin moisturizer/barrier cream  - Collaborate with interdisciplinary team   - Patient/family teaching  - Consider wound care consult   Outcome: Progressing     Problem: MOBILITY - ADULT  Goal: Maintain or return to baseline ADL function  Description: INTERVENTIONS:  -  Assess patient's ability to carry out ADLs; assess patient's baseline for ADL function and identify physical deficits which impact ability to perform ADLs (bathing, care of mouth/teeth, toileting, grooming, dressing, etc )  - Assess/evaluate cause of self-care deficits   - Assess range of motion  - Assess patient's mobility; develop plan if impaired  - Assess patient's need for assistive devices and provide as appropriate  - Encourage maximum independence but intervene and supervise when necessary  - Involve family in performance of ADLs  - Assess for home care needs following discharge   - Consider OT consult to assist with ADL evaluation and planning for discharge  - Provide patient education as appropriate  Outcome: Progressing  Goal: Maintains/Returns to pre admission functional level  Description: INTERVENTIONS:  - Perform BMAT or MOVE assessment daily    - Set and communicate daily mobility goal to care team and patient/family/caregiver  - Collaborate with rehabilitation services on mobility goals if consulted  - Perform Range of Motion 3 times a day    - Reposition patient every 2 hours   - Dangle patient 3 times a day  - Stand patient 3 times a day  - Ambulate patient 3 times a day  - Out of bed to chair 3 times a day   - Out of bed for meals 3 times a day  - Out of bed for toileting  - Record patient progress and toleration of activity level   Outcome: Progressing     Problem: PAIN - ADULT  Goal: Verbalizes/displays adequate comfort level or baseline comfort level  Description: Interventions:  - Encourage patient to monitor pain and request assistance  - Assess pain using appropriate pain scale  - Administer analgesics based on type and severity of pain and evaluate response  - Implement non-pharmacological measures as appropriate and evaluate response  - Consider cultural and social influences on pain and pain management  - Notify physician/advanced practitioner if interventions unsuccessful or patient reports new pain  Outcome: Progressing     Problem: INFECTION - ADULT  Goal: Absence or prevention of progression during hospitalization  Description: INTERVENTIONS:  - Assess and monitor for signs and symptoms of infection  - Monitor lab/diagnostic results  - Monitor all insertion sites, i e  indwelling lines, tubes, and drains  - Monitor endotracheal if appropriate and nasal secretions for changes in amount and color  - [place] appropriate cooling/warming therapies per order  - Administer medications as ordered  - Instruct and encourage patient and family to use good hand hygiene technique  - Identify and instruct in appropriate isolation precautions for identified infection/condition  Outcome: Progressing  Goal: Absence of fever/infection during neutropenic period  Description: INTERVENTIONS:  - Monitor WBC    Outcome: Progressing     Problem: SAFETY ADULT  Goal: Patient will remain free of falls  Description: INTERVENTIONS:  - Educate patient/family on patient safety including physical limitations  - Instruct patient to call for assistance with activity   - Consult OT/PT to assist with strengthening/mobility   - Keep Call bell within reach  - Keep bed low and locked with side rails adjusted as appropriate  - Keep care items and personal belongings within reach  - Initiate and maintain comfort rounds  - Make Fall Risk Sign visible to staff  - Offer Toileting every 2 Hours, in advance of need  - Initiate/Maintain bed/chair alarm  - Obtain necessary fall risk management equipment: nonskid socks  - Apply yellow socks and bracelet for high fall risk patients  - Consider moving patient to room near nurses station  Outcome: Progressing  Goal: Maintain or return to baseline ADL function  Description: INTERVENTIONS:  -  Assess patient's ability to carry out ADLs; assess patient's baseline for ADL function and identify physical deficits which impact ability to perform ADLs (bathing, care of mouth/teeth, toileting, grooming, dressing, etc )  - Assess/evaluate cause of self-care deficits   - Assess range of motion  - Assess patient's mobility; develop plan if impaired  - Assess patient's need for assistive devices and provide as appropriate  - Encourage maximum independence but intervene and supervise when necessary  - Involve family in performance of ADLs  - Assess for home care needs following discharge   - Consider OT consult to assist with ADL evaluation and planning for discharge  - Provide patient education as appropriate  Outcome: Progressing  Goal: Maintains/Returns to pre admission functional level  Description: INTERVENTIONS:  - Perform BMAT or MOVE assessment daily    - Set and communicate daily mobility goal to care team and patient/family/caregiver  - Collaborate with rehabilitation services on mobility goals if consulted  - Perform Range of Motion 3 times a day  - Reposition patient every 2 hours    - Dangle patient 3 times a day  - Stand patient 3 times a day  - Ambulate patient 3 times a day  - Out of bed to chair 3 times a day   - Out of bed for meals 3 times a day  - Out of bed for toileting  - Record patient progress and toleration of activity level   Outcome: Progressing     Problem: DISCHARGE PLANNING  Goal: Discharge to home or other facility with appropriate resources  Description: INTERVENTIONS:  - Identify barriers to discharge w/patient and caregiver  - Arrange for needed discharge resources and transportation as appropriate  - Identify discharge learning needs (meds, wound care, etc )  - Arrange for interpretive services to assist at discharge as needed  - Refer to Case Management Department for coordinating discharge planning if the patient needs post-hospital services based on physician/advanced practitioner order or complex needs related to functional status, cognitive ability, or social support system  Outcome: Progressing     Problem: Nutrition/Hydration-ADULT  Goal: Nutrient/Hydration intake appropriate for improving, restoring or maintaining nutritional needs  Description: Monitor and assess patient's nutrition/hydration status for malnutrition  Collaborate with interdisciplinary team and initiate plan and interventions as ordered  Monitor patient's weight and dietary intake as ordered or per policy  Utilize nutrition screening tool and intervene as necessary  Determine patient's food preferences and provide high-protein, high-caloric foods as appropriate       INTERVENTIONS:  - Monitor oral intake, urinary output, labs, and treatment plans  - Assess nutrition and hydration status and recommend course of action  - Evaluate amount of meals eaten  - Assist patient with eating if necessary   - Allow adequate time for meals  - Recommend/ encourage appropriate diets, oral nutritional supplements, and vitamin/mineral supplements  - Order, calculate, and assess calorie counts as needed  - Recommend, monitor, and adjust tube feedings and TPN/PPN based on assessed needs  - Assess need for intravenous fluids  - Provide specific nutrition/hydration education as appropriate  - Include patient/family/caregiver in decisions related to nutrition  Outcome: Progressing

## 2023-03-22 NOTE — TREATMENT PLAN
Reviewed case and imaging with Dr Harini An  Plan is to continue aspirin, Brilinta, and Eliquis for 6-12 weeks  Updated patient and wife at bedside today  Also Dr Harini An went and saw patient  Plan to follow in the outpatient setting in 6 weeks with repeat CTA head and neck  Neurosurgery will sign off, call with any further questions or concerns

## 2023-03-22 NOTE — PROGRESS NOTES
Internal Medicine Progress Note  Patient: Radha Leonard  Age/sex: 60 y o  male  Medical Record #: [id number]      ASSESSMENT/PLAN: (Interval History)  Radha Leonard is seen and examined and management for following issues:    Posterior circulation stroke  • S/p intracranial and extracranial vertebral artery stenting/TICI 2B revascularization  • Continue ASA, Brilinta, Eliquis and atorvastatin  • PMR to reach out to NS to determine whether or not the eliquis is needed  • Prozac for depressed mood following CVA   • Neuropsych following  • Dysphagia 3 diet with nectar thick liquids  • Continue Baclofen 5mg 4x daily  • Therapy per primary service  • Outpt follow-up with Neurology and Neurosurgery  • CTA head and neck done 3/17/23  • Concern for in stent occlusion in the Rt vertebral artery V4 stent  • Neurosurgery saw pt 3/18/23 and finding likely does not represent new occlusion as lumen within stents is difficult to image on CTA  • BMP stable monitor weekly    Rapid response  · Likely d/t vasovagal syncope  · No further events      HTN  • Home: No medications  • Here: amlodipine 10mg daily/Coreg 25mg 2x daily/lisinopril 40mg daily/hydralazine 50mg 2x daily (decreased 3/22)  • Improved  • Renal artery ultrasound negative  • Continue to monitor trend  Fe deficiency anemia  · Likely multifactoral  · s/p IV venofer x 2 doses  · Cont daily ferrous sulfate  · Cbc stable     CKD stage 2  • Baseline Cr 1 2 - 1 3  • Chlorthalidone dc'd  • Cr 1 3  COVID  • Precautions lifted  • Pt was asymptomatic     Prediabetes  • HA1C 5 7  • Recommend dietary modifications       DC planning:  TBD  The above assessment and plan was reviewed and updated as determined by my evaluation of the patient on 3/22/2023      Labs:   Results from last 7 days   Lab Units 03/20/23  0546   WBC Thousand/uL 7 43   HEMOGLOBIN g/dL 10 2*   HEMATOCRIT % 32 6*   PLATELETS Thousands/uL 216     Results from last 7 days   Lab Units 03/20/23  0546 03/18/23  1122   SODIUM mmol/L 140 139   POTASSIUM mmol/L 4 1 4 6   CHLORIDE mmol/L 110* 111*   CO2 mmol/L 26 27   BUN mg/dL 27* 25   CREATININE mg/dL 1 25 1 27   CALCIUM mg/dL 8 9 8 7                   Review of Scheduled Meds:  Current Facility-Administered Medications   Medication Dose Route Frequency Provider Last Rate   • acetaminophen  650 mg Oral Q6H PRN Saúl Sewell CRNP     • amLODIPine  10 mg Oral Daily Fuad Gonzalez MD     • apixaban  2 5 mg Oral BID Fuad Gonzalez MD     • aspirin  81 mg Oral Daily Fuad Gnozalez MD     • atorvastatin  40 mg Oral Daily With Kya MD Ulices     • baclofen  5 mg Oral TID Fuad Gonzalez MD     • carvedilol  25 mg Oral BID With Meals YURI Leach     • vitamin B-12  1,000 mcg Oral Daily Fuad Gonzalez MD     • ferrous sulfate  325 mg Oral Daily With Breakfast YURI Leach     • FLUoxetine  20 mg Oral Daily Fuad Gonzalez MD     • hydrALAZINE  25 mg Oral Q8H PRN POLLO LeachNP     • hydrALAZINE  50 mg Oral Q12H YURI Gallagher     • lisinopril  40 mg Oral Daily YURI Leach     • polyethylene glycol  17 g Oral Daily PRN Fuad Gonzalez MD     • psyllium  1 packet Oral Daily Shane Bailey MD     • ticagrelor  90 mg Oral Q12H [address] Fuad Gonzalez MD         Subjective/ HPI: Patient seen and examined  Patients overnight issues or events were reviewed with nursing or staff during rounds or morning huddle session  New or overnight issues include the following:     Pt seen in therapy  BP today on lower side  Will monitor after decrease in hydralazine, may need to decrease further based on trend      ROS:   A 10 point ROS was performed; negative except as noted above         Imaging:     VAS renal artery complete   Final Result by Julio C Williamson MD (03/21 1801)      CTA head and neck w wo contrast   Final Result by Grace Roth MD (03/18 4632)      CT HEAD   - Evolution of subacute infarcts in left midbrain involving cerebral peduncle, left tatiana, and bilateral cerebellum (right worse than left)  - No new acute intracranial abnormality  CTA HEAD AND NECK   - Right vertebral artery V4 stent appears to have in-stent occlusion  Right vertebral artery V4 segment is patent before and after the V4 stent  - Unchanged left vertebral artery post-PICA V4 segment occlusion    - Dissection flap in right vertebral artery V3 segment which is patent and improved in caliber status post thrombectomy  - Moderate stenosis in proximal-to- mid basilar artery due to atherosclerotic disease status post mechanical thrombectomy  Additional chronic/incidental findings as detailed above               I personally discussed this study with Arcelia Thomas on 3/18/2023 at 5:46 AM                               Workstation performed: [id number]             *Labs /Radiology studies Reviewed  *Medications  reviewed and reconciled as needed  *Please refer to order section for additional ordered labs studies  *Case discussed with primary attending during morning huddle case rounds    Physical Examination:  Vitals:   Vitals:    03/21/23 1651 03/21/23 2107 03/22/23 0636 03/22/23 0838   BP: 115/62 145/67 133/67 101/50   BP Location:  Left arm Left arm    Pulse: 60 60 63    Resp:  18 18    Temp:  97 9 °F (36 6 °C) 98 2 °F (36 8 °C)    TempSrc:  Oral Oral    SpO2:  95% 98%    Weight:   86 3 kg (190 lb 4 1 oz)    Height:           GEN: No apparent distress, pleasant  NEURO: Alert and oriented x3; ongoing mild dysarthria  HEENT: Pupils are equal and reactive, EOMI, mucous membranes are moist, face asymmetrical  CV: S1 S2 regular, no MRG, no peripheral edema noted  RESP: Lungs are clear bilaterally, no wheezes, rales or rhonchi noted, on room air, respirations easy and non labored  GI: Flat, soft non tender, non distended; +BS x4; incontinent of BM at times  : Voiding without difficulty  MUSC: Moves all extremities; right hemiparesis ongoing but improved; ongoing gait dysfunction  SKIN: pink, warm and dry, normal turgor, no rashes, lesions      The above physical exam was reviewed and updated as determined by my evaluation of the patient on 3/22/2023  Invasive Devices     None                    VTE Pharmacologic Prophylaxis: Eliquis  Code Status: Level 1 - Full Code  Current Length of Stay: 16 day(s)      Total time spent:  30 minutes with more than 50% spent counseling/coordinating care  Counseling includes discussion with patient re: progress  and discussion with patient of his/her current medical state/information  Coordination of patient's care was performed in conjunction with primary service  Time invested included review of patient's labs, vitals, and management of their comorbidities with continued monitoring  In addition, this patient was discussed with medical team including physician and advanced extenders  The care of the patient was extensively discussed and appropriate treatment plan was formulated unique for this patient  Medical decision making for the day was made by supervising physician unless otherwise noted in their attestation statement  ** Please Note:  voice to text software may have been used in the creation of this document   Although proof errors in transcription or interpretation are a potential of such software**

## 2023-03-22 NOTE — PROGRESS NOTES
Physical Medicine and Rehabilitation Progress Note  Rakesh Barger 60 y o  male MRN: [id number]  Unit/Bed#: -45 Encounter: [id number]    HPI: Rakesh Barger is a 60 y o  right handed male with medical history of HTN who presented to [address] [place] [address] Road on 2/25 with nausea/dizziness  Found to have hypertensive emergency with acute/subcaute R posterior cerebellar CVA with 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, mild BRAULIO, and incidentally found + COVID (without evidence of respiratory illness/hypoxia/CXR findings)  Placed on cardene gtt, stroke pathway, ASA/Plavix, IV hydration for BRAULIO, and CTX for UTI  NIHSS 2  Symptoms began 2 days prior  Not tPA/TNK candidate  MRI/MRA with 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  NSx recommended transfer to [facility] and starting on heparin gtt and stopped Plavix  Repeat CTA on 2/26 stable with with R V3/4 occlusion  Not a candidate for interventional procedure due to clinical stability and risk  Speech consulted and noted mod-severe oropharyngeal dysphagia recommended pureed/HTL  Unfortunately later on 2/26, he clinically deteriorated with increased R sided weakness, and was taken to IR for stenting of V3 and V4 with TICI 2B revascularization  CTH without ICH  He was admitted back to the ICU intubated - extubated 2/27  MRI showed cerebellar CVA and R > L stroke burden, with additional hypodensities on DWI appreciated L midbrain, pontine area and R lower medullary/spinal cord junction  He was transitioned to triple therapy with DAPT (given recent stent) and A/C with eliquis 2 5mg BID due to COVID  Diet advanced to Level 3/NTL on 2/27  Noted to have spasticity in RLE - started on baclofen by Neurology  He was able to have cardene discontinued on 3/2, and they have been making adjustments to his oral regimen  He was started on Prozac for his mood  NSx has signed off  CTA H/N recommended around 3/17  Length of time on eliquis unclear   Admitted to [facility] on 3/6  Chief Complaint: No new issues today  Interval History/Subjective:  No acute events overnight  BP better this AM  Last BM 3/20  No new pain, CP, SOB, fevers, chills, N/V, abdominal pain  IM already reviewed results of renal ultrasound with him  ROS:  A 10 point review of systems was negative except for what is noted in the HPI  Today's Changes:  1  Called his wife and gave her updates - expected dispo, blood pressure, renal artery ultrasound  Reviewed finding of celiac stenosis - nothing acute to do, and patient currently without signs/symptoms of ischemic bowel  Recommended outpatient f/u    2  Reviewed that we will provide her a list of providers he will follow-up with and discharge instructions  3  Reviewed his current functional progress, and positive functional prognosis  4  IM adjusted hydralazine dosing  5  Continue Baclofen 5mg TID  Total visit time: 35 minutes, with more than 50% spent counseling/coordinating care  Counseling includes discussion with patient re: progress in therapies, functional issues observed by therapy staff, and discussion with patient regarding their current medical state and wellbeing  Coordination of patient's care was performed in conjunction with Internal Medicine service to monitor patient's labs, vitals, and management of their comorbidities  Assessment/Plan:    * Acute Posterior Circulation Stroke  Assessment & Plan  Presented with dizziness for 2 days and nausea and has residual R sided weakness, aphasia, dysphagia, R mild spasticity  Several small acute/early subacute bi-cerebellar infarcts without hemorrhage   Multiple infarcts involving R cerebellum and brainstem in particular (posterior medulla on the R, R midbrain, and L occipital lobe)  L vertebral artery severe stenosis and R vertebral artery occlusion and dissection    - Now s/p stenting on 2/26 with TICI 2b revascularization    PPx: ASA/brilinta/Eliquis, Statin   - Discussed with Neurology, they defer to NSx on length of time on eliquis  Potentially 3-4 weeks if felt to be related to [address] out to Neurosurgery - they will discuss with their attending re: eliquis  - Repeat CTA H/N -  No new intracranial abnormalities, expected evolution of known CVA  See dissection and stenosis for more details  3/9 Had nocturnal oximetry to screen for nocturnal hypoxia - only 26 seconds total of mild desaturation  Maintain normotension  Education on prediabetes and diet  Follow-up with Neurovascular/Neurosurgery  Function improving slowly   Continue PT/OT/SLP - for swallow, speech, R sided weakness/tone, will need a good visual exam      Celiac artery stenosis (HCC)  Assessment & Plan  Incidentally noted to have approximately 70% celiac artery stenosis on 3/21 Renal Artery Ultrasound  No s/s of ischemic bowel  Would recommend outpatient f/u with PCP  Neurogenic bowel  Assessment & Plan  Disinhibited bowel + mobility difficulties -somewhat loose as well  Trial Metamucil daily - better formed  Monitor  Not on bowel meds right now  ARC Bladder Training:   - 30-45 min after each meal will get patient onto commode to attempt bowel movement  - He wants to hold off on scheduled suppository for now (would schedule in AM to align with his previous bowel schedule at home)  For now monitor, close continence care especially    Anemia  Assessment & Plan  Stable 3/20  Normocytic anemia noted through stay  B12 borderline low  Folate normal  Iron Panel: Low iron saturation and iron with normal TIBC and Ferritin  Was started in the hospital on B12, but not iron  Per IM - 2 days of IV Venofer (last day 3/8)  Then start oral iron  Monitor Hgb, transfuse as appropriate  Will discuss with IM  Consulted  Adjustment disorder with depressed mood  Assessment & Plan  Tearful at times, but coping  Has been tearful and labile during hospitalization    Started on Prozac 20mgdaily - may need to increase dose  Consulted rehab psychology for support  Prediabetes  Assessment & Plan  A1C 5 7  Consult nutrition for education on diabetic diet  Diet/lifestyle modifications  Follow-up with PCP  CKD (chronic kidney disease)  Assessment & Plan  Lab Results   Component Value Date    EGFR 60 03/20/2023    EGFR 59 03/18/2023    EGFR 58 03/17/2023    CREATININE 1 25 03/20/2023    CREATININE 1 27 03/18/2023    CREATININE 1 30 03/17/2023     Per hospital team - suspect CKD Stage 2 2/2 hypertension   - stable today  Will monitor BMP while here  Avoid nephrotoxic meds and relative hypotension  Recommend outpatient f/u with PCP    Spasticity  Assessment & Plan  Intrinsic tonic tone in R quad which is mild  RUE with improving tone - mild MAS 1 at wrist flexors and elbow flexors, easily ranged out  Hiccups resolved  Would opt to avoid treating R quad for now, as tone there may help stabilize at the knee  R ankle multipodus  Baclofen 5mg TID - monitor and adjust as appropriate  Range of motion  Monitor as tone evolves  Outpatient f/u with PMR  Dysphagia  Assessment & Plan  Improved  Admitted with mod-severe oropharyngeal  Has massively improved  3/8 Advanced to Level 3/Thins  3/10 Advanced to Reg/Thins  Aspiration precautions  Good oral hygiene   SLP consulted    Primary hypertension  Assessment & Plan  Home: None  Here: Amlodipine 10mg daily, Coreg 25mg BID, Lisinopril 40mg daily, Hydralazine 50mg Q12hr  Has PRN hydralazine as well  Had hypertensive urgency in hospital requiring cardene gtt   3/21 IM ordered renal artery ultrasound  No significant PRIETO  Monitor and adjust as appropriate  IM consulted to assist with management  Stenosis of left vertebral artery  Assessment & Plan  Severe L vertebral artery origin stenosis  3/17 CTA H/N:   CTA HEAD AND NECK  - Right vertebral artery V4 stent appears to have in-stent occlusion    Right vertebral artery V4 segment is patent before and after the V4 stent   - Unchanged left vertebral artery post-PICA V4 segment occlusion   - Dissection flap in right vertebral artery V3 segment which is patent and improved in caliber status post thrombectomy  - Moderate stenosis in proximal-to- mid basilar artery due to atherosclerotic disease status post mechanical thrombectomy  - Per Neurosurgery unlikely in stent occlusion  No changes  Plan for outpatient f/u  ASA/Brilinta  Atorvastatin  SBP goals 120-160  Follow-up with Neurovascular  Right Vertebral artery dissection [facility])  Assessment & Plan  Now s/p R V3/4 stenting with TICI 2B revascularization on 2/26 for R Vert stenosis  Continue ASA/Brilinta  Statin  CTA HEAD AND NECK 3/17  - Dissection flap in right vertebral artery V3 segment which is patent and improved in caliber status post thrombectomy  Outpatient f/u with Neurosurgery/Dr Rafaela Burris      Vasovagal syncope-resolved as of 3/22/2023  Assessment & Plan  No further episodes  3/8 Had episode of vasovagal syncope  - No convulsions  On body weight support system, started to feel dizzy  - Staring episode after, but able to sternal rubbed out of it  Very brief   - No post-ictal weakness   - Neurologically stable and back to baseline   - Orthostatics negative  Discussed with Neurology - unlikely seizure given distribution of stroke and presentation  No further testing recommended at this time  Monitor  COVID-resolved as of 3/10/2023  Assessment & Plan  Resolved  Incidentally found to have COVID on 2/25  Asymptomatic from respiratory standpoint  Per Neurosurgery concern for hypercoagulability related to COVID  Currently on Eliquis 2 5mg BID - per Neuro 3-4 weeks probably reasonable, but for them ultimately up to NSx as this was their initial recommendation to start this medication  3/8 Discontinued precautions after 10 days isolation  Health Maintenance  #Delirium/Sleep: At risk   Optimize bowel/bladder, sleep-wake cycle, mood and pain management  #Pain: Baclofen 5mg TID, Tylenol PRN  #Bowel: Last BM 3/20  Only PRN miralax and metamucil  See Neurogenic bowel above  #Bladder: Voiding and continent  #Skin/Pressure Injury Prevention: Turn Q2hr in bed, with weight shifts [id number] in wheelchair  Float heels in bed  #DVT Prophylaxis: On triple therapy, SCDs  #GI Prophylaxis: None  #Code Status:  Full Code  #FEN: Reg/Thins  #Dispo:  Team 3/21:  Family training with the wife starts tomorrow  Goals for outpatient therapies PT/OT/SLP  Family's goal is for him to leave by ADD 3/31  Barrier: R sided weakness, proprioception, kinesthetic awareness, impaired sensation on the R, ataxia, truncal weakness, dysphagia, expressive > receptive aphasia, post-stroke depression, bowel incontinence/disinhibited, spasticity, decreased righting reaction, 25/30 MOCA  Goals: Sup wheelchair level mobility/set-up ADLs  Follow-up: Neurology, Neurosurgery, PCP       Objective:    Functional Update:  PT: Eusebio transfers, bed mobility, maxA x2 ambulation (chair follow) - HHA, Dis Sup wheelchair 200', Ax2 for 20 stairs  OT: Sup eating, grooming, Eusebio grooming, Sup UB dressing, modA LB dressing, modA toileting, Eusebio tub/shower transfer, Eusebio toilet transfer  SLP: Eusebio problem solving/memory, Eusebio-Sup comprehension/expression  Executive function deficits  Fatigues with higher level tasks  On reg/thins for diet  Allergies per EMR    Physical Exam:  Temp:  [97 9 °F (36 6 °C)-98 3 °F (36 8 °C)] 98 2 °F (36 8 °C)  HR:  [60-67] 63  Resp:  [18] 18  BP: (101-145)/(50-67) 101/50  Oxygen Therapy  SpO2: 98 %    Gen: No acute distress, Well-nourished, well-appearing  HEENT: Moist mucus membranes, Normocephalic/Atraumatic  Cardiovascular: Regular rate, rhythm, S1/S2  Distal pulses palpable  Heme/Extr: No edema  Pulmonary: Non-labored breathing  : No barnett  GI: Soft, non-tender, non-distended  Integumentary: Skin is warm, dry     Neuro: AAOx3, Speech is appropriate to questioning and intelligible  R sided weakness stable/improving with increasing but still minimal spasticity in RUE  Psych: Normal mood and affect  Diagnostic Studies: Reviewed  3/21 VAS Renal Artery:  RIGHT RENAL:  No evidence of significant arterial occlusive disease in the main renal artery  Patent renal vein  Mild parenchymal disease is noted with a renovascular resistive index of 0 7  Renal/Aorta Ratio: 1 47  The kidney measures approximately 10 68 cm X 4 85 cm  LEFT RENAL:  No evidence of significant arterial occlusive disease in the main renal artery  Patent renal vein  Mild parenchymal disease is noted with a renovascular resistive index of 0 78  Renal/Aorta Ratio:  1 83  The kidney measures approximately 10 47 cm X 5 92 cm  MESENTERIC:  Celiac and superior mesenteric arteries are patent  A >70% stenosis was identified at the proximal celiac artery, however, it was  poorly visualized      Laboratory:  Reviewed   Results from last 7 days   Lab Units 03/20/23  0546   HEMOGLOBIN g/dL 10 2*   HEMATOCRIT % 32 6*   WBC Thousand/uL 7 43     Results from last 7 days   Lab Units 03/20/23  0546 03/18/23  1122 03/17/23  0612   BUN mg/dL 27* 25 29*   POTASSIUM mmol/L 4 1 4 6 4 1   CHLORIDE mmol/L 110* 111* 111*   CREATININE mg/dL 1 25 1 27 1 30            Patient Active Problem List   Diagnosis   • BRAULIO (acute kidney injury) ([facility] [address] )   • Acute Posterior Circulation Stroke   • Right Vertebral artery dissection (HCC)   • Stenosis of left vertebral artery   • Primary hypertension   • Dizziness   • Dysphagia   • Spasticity   • CKD (chronic kidney disease)   • Prediabetes   • Adjustment disorder with depressed mood   • Anemia   • Neurogenic bowel   • Vasovagal syncope         Medications  Current Facility-Administered Medications   Medication Dose Route Frequency Provider Last Rate   • acetaminophen  650 mg Oral Q6H PRN YURI Kimball     • amLODIPine  10 mg Oral Daily Tisha Ramachandran MD     • apixaban 2 5 mg Oral BID Wilian Melendez MD     • aspirin  81 mg Oral Daily Wilian Melendez MD     • atorvastatin  40 mg Oral Daily With Susana Chin MD     • baclofen  5 mg Oral TID Wilian Melendez MD     • carvedilol  25 mg Oral BID With Meals YURI Mercado     • vitamin B-12  1,000 mcg Oral Daily Wilian Melendez MD     • ferrous sulfate  325 mg Oral Daily With Breakfast YURI Mercado     • FLUoxetine  20 mg Oral Daily Wilian Melendez MD     • hydrALAZINE  25 mg Oral Q8H PRN YURI Mercado     • hydrALAZINE  50 mg Oral Q12H YURI Gallagher     • lisinopril  40 mg Oral Daily YURI Mercado     • polyethylene glycol  17 g Oral Daily PRN Wilian Melendez MD     • psyllium  1 packet Oral Daily Felisha Busby MD     • ticagrelor  90 mg Oral Q12H [address] Wilian Mleendez MD            ** Please Note: Fluency Direct voice to text software may have been used in the creation of this document   **

## 2023-03-22 NOTE — PROGRESS NOTES
"OT Treatment Note       03/22/23 1245   Pain Assessment   Pain Assessment Tool 0-10   Pain Score No Pain   Restrictions/Precautions   Precautions Bed/chair alarms;Cognitive; Fall Risk;Supervision on toilet/commode;Aphasia; Aspiration   Lifestyle   Autonomy \"His arm is doing so much better\"- pts wife   Sit to Stand   Type of Assistance Needed Physical assistance   Physical Assistance Level 25% or less   Comment CGA/Min A in stance with either UE with julieta walker   Sit to Stand CARE Score 3   Bed-Chair Transfer   Type of Assistance Needed Physical assistance   Physical Assistance Level 25% or less   Comment Min A stand pivot transfer with LUE on julieta walker   Chair/Bed-to-Chair Transfer CARE Score 3   [address] clothing management with pt utilizing RUE on julieta walker in stance and LUE for clothing management  Plan to trial tomorrow  Cognition   Overall Cognitive Status Impaired   Arousal/Participation Alert; Cooperative   Attention Attends with cues to redirect   Orientation Level Oriented X4   Memory Decreased short term memory   Following Commands Follows one step commands without difficulty   Activity Tolerance   Activity Tolerance Patient tolerated treatment well   Assessment   Treatment Assessment Pt engaged in skilled OT tx session focused on family training  See above for further details on functional performance  Pts BP at start of session was 126/78  Pts wife brought in home BP machine, OT used it after manual to assess accuracy   Family training occurred with pts wife, with focus on: sit to stand to julieta walker with proper hand placement, proper positioning and hand placement/guarding for pts wife during transfers without AD and during sit to stand with julieta walker, only using julieta walker on RUE during clothing management and LUE for transfers, proper w/c placement/managment prior to transferring, using scheduled toileting routine following meals, and using briefs to A with bowel " incontinence  Following education and demonstration, pts wife demonstrated ability to A pt at CGA/Min A level during sit pivot and sit to stand transfers with julieta walker  Plan to assess transfer into shower utilizing tub bench and toileting hygiene/clothing management using LUE with RUE on julieta walker  FT to occur tomorrow at 10am with pts wife to go over ADL routine- sponge bath at w/c level  Additional family training potentially after tomorrow, will assess need after FT tomorrow  Cont OT POC with focus on ADL retraining, functional transfers- sit pivot and stand pivot with julieta walker, RUE NMR,  W/c management/safety, and functional standing tolerance  OT Family training done with: Kierra Caceres- pts wife   Prognosis Good   Problem List Decreased strength;Decreased range of motion;Decreased endurance; Impaired balance;Decreased mobility; Decreased coordination;Decreased cognition; Impaired sensation; Impaired tone   Barriers to Discharge Inaccessible home environment;Decreased caregiver support   Plan   Treatment/Interventions ADL retraining;Functional transfer training; Therapeutic exercise; Endurance training;Cognitive reorientation;Patient/family training;Equipment eval/education; Compensatory technique education   Progress Progressing toward goals   Recommendation   OT Discharge Recommendation Home with outpatient rehabilitation   OT Therapy Minutes   OT Time In 1245   OT Time Out 1330   OT Total Time (minutes) 45   OT Mode of treatment - Individual (minutes) 45   OT Mode of treatment - Concurrent (minutes) 0   OT Mode of treatment - Group (minutes) 0   OT Mode of treatment - Co-treat (minutes) 0   OT Mode of Treatment - Total time(minutes) 45 minutes   OT Cumulative Minutes 7562   Therapy Time missed   Time missed?  No

## 2023-03-23 LAB
DME PARACHUTE DELIVERY DATE REQUESTED: NORMAL
DME PARACHUTE DELIVERY DATE REQUESTED: NORMAL
DME PARACHUTE ITEM DESCRIPTION: NORMAL
DME PARACHUTE ORDER STATUS: NORMAL
DME PARACHUTE ORDER STATUS: NORMAL
DME PARACHUTE SUPPLIER NAME: NORMAL
DME PARACHUTE SUPPLIER NAME: NORMAL
DME PARACHUTE SUPPLIER PHONE: NORMAL
DME PARACHUTE SUPPLIER PHONE: NORMAL

## 2023-03-23 RX ADMIN — TICAGRELOR 90 MG: 90 TABLET ORAL at 20:25

## 2023-03-23 RX ADMIN — APIXABAN 2.5 MG: 2.5 TABLET, FILM COATED ORAL at 08:13

## 2023-03-23 RX ADMIN — LISINOPRIL 40 MG: 20 TABLET ORAL at 08:13

## 2023-03-23 RX ADMIN — APIXABAN 2.5 MG: 2.5 TABLET, FILM COATED ORAL at 17:13

## 2023-03-23 RX ADMIN — BACLOFEN 5 MG: 10 TABLET ORAL at 17:13

## 2023-03-23 RX ADMIN — Medication 1 PACKET: at 08:13

## 2023-03-23 RX ADMIN — CYANOCOBALAMIN TAB 500 MCG 1000 MCG: 500 TAB at 08:12

## 2023-03-23 RX ADMIN — CARVEDILOL 25 MG: 25 TABLET, FILM COATED ORAL at 17:13

## 2023-03-23 RX ADMIN — CARVEDILOL 25 MG: 25 TABLET, FILM COATED ORAL at 08:13

## 2023-03-23 RX ADMIN — HYDRALAZINE HYDROCHLORIDE 50 MG: 50 TABLET, FILM COATED ORAL at 06:21

## 2023-03-23 RX ADMIN — HYDRALAZINE HYDROCHLORIDE 50 MG: 50 TABLET, FILM COATED ORAL at 20:24

## 2023-03-23 RX ADMIN — FERROUS SULFATE TAB 325 MG (65 MG ELEMENTAL FE) 325 MG: 325 (65 FE) TAB at 08:12

## 2023-03-23 RX ADMIN — BACLOFEN 5 MG: 10 TABLET ORAL at 08:12

## 2023-03-23 RX ADMIN — AMLODIPINE BESYLATE 10 MG: 10 TABLET ORAL at 08:13

## 2023-03-23 RX ADMIN — TICAGRELOR 90 MG: 90 TABLET ORAL at 08:15

## 2023-03-23 RX ADMIN — BACLOFEN 5 MG: 10 TABLET ORAL at 20:24

## 2023-03-23 RX ADMIN — ATORVASTATIN CALCIUM 40 MG: 40 TABLET, FILM COATED ORAL at 17:13

## 2023-03-23 RX ADMIN — FLUOXETINE 20 MG: 20 CAPSULE ORAL at 08:12

## 2023-03-23 RX ADMIN — ASPIRIN 81 MG CHEWABLE TABLET 81 MG: 81 TABLET CHEWABLE at 08:12

## 2023-03-23 NOTE — PROGRESS NOTES
Internal Medicine Progress Note  Patient: Naima Peguero  Age/sex: 60 y o  male  Medical Record #: [id number]      ASSESSMENT/PLAN: (Interval History)  Naima Peguero is seen and examined and management for following issues:    Posterior circulation stroke  • S/p intracranial and extracranial vertebral artery stenting/TICI 2B revascularization  • Continue ASA, Brilinta, Eliquis and atorvastatin  • Prozac for depressed mood following CVA   • Neuropsych following  • Dysphagia 3 diet with nectar thick liquids  • Continue Baclofen 5mg 4x daily  • Therapy per primary service  • Outpt follow-up with Neurology and Neurosurgery  • CTA head and neck done 3/17/23  • Concern for in stent occlusion in the Rt vertebral artery V4 stent  • Neurosurgery saw pt 3/18/23 and 3/22/23 and finding likely does not represent new occlusion as lumen within stents is difficult to image on CTA  Pt will need a repeat CTA and NS follow-up in 6 weeks  • BMP stable monitor weekly    Rapid response  · Likely d/t vasovagal syncope  · No further events      HTN  • Home: No medications  • Here: amlodipine 10mg daily/Coreg 25mg 2x daily/lisinopril 40mg daily/hydralazine 50mg 2x daily  • Improved  • Renal artery ultrasound negative  • Continue to monitor trend  Fe deficiency anemia  · Likely multifactoral  · s/p IV venofer x 2 doses  · Cont daily ferrous sulfate  · Cbc stable     CKD stage 2  • Baseline Cr 1 2 - 1 3  • Chlorthalidone dc'd  • Cr 1 3  COVID  • Precautions lifted  • Pt was asymptomatic     Prediabetes  • HA1C 5 7  • Recommend dietary modifications       DC planning:  TBD  The above assessment and plan was reviewed and updated as determined by my evaluation of the patient on 3/23/2023      Labs:   Results from last 7 days   Lab Units 03/20/23  0546   WBC Thousand/uL 7 43   HEMOGLOBIN g/dL 10 2*   HEMATOCRIT % 32 6*   PLATELETS Thousands/uL 216     Results from last 7 days   Lab Units 03/20/23  0546 03/18/23  1122   SODIUM mmol/L 140 139   POTASSIUM mmol/L 4 1 4 6   CHLORIDE mmol/L 110* 111*   CO2 mmol/L 26 27   BUN mg/dL 27* 25   CREATININE mg/dL 1 25 1 27   CALCIUM mg/dL 8 9 8 7                   Review of Scheduled Meds:  Current Facility-Administered Medications   Medication Dose Route Frequency Provider Last Rate   • acetaminophen  650 mg Oral Q6H PRN YURI Schilling     • amLODIPine  10 mg Oral Daily Addy Garcia MD     • apixaban  2 5 mg Oral BID Addy Garcia MD     • aspirin  81 mg Oral Daily Addy Garcia MD     • atorvastatin  40 mg Oral Daily With Bobby Clifford MD     • baclofen  5 mg Oral TID Addy Garcia MD     • carvedilol  25 mg Oral BID With Meals YURI Schilling     • vitamin B-12  1,000 mcg Oral Daily Addy Garcia MD     • ferrous sulfate  325 mg Oral Daily With Breakfast YURI Schilling     • FLUoxetine  20 mg Oral Daily Addy Garcia MD     • hydrALAZINE  25 mg Oral Q8H PRN YURI Schilling     • hydrALAZINE  50 mg Oral Q12H YURI Gallagher     • lisinopril  40 mg Oral Daily YURI Schilling     • polyethylene glycol  17 g Oral Daily PRN Addy Garcia MD     • psyllium  1 packet Oral Daily Geovanna Rollins MD     • ticagrelor  90 mg Oral Q12H Leonardo Gonzalez MD         Subjective/ HPI: Patient seen and examined  Patients overnight issues or events were reviewed with nursing or staff during rounds or morning huddle session  New or overnight issues include the following:     Pt seen in his room  He states that he is doing well  Family training has been going well  He denies any current complaints  ROS:   A 10 point ROS was performed; negative except as noted above         Imaging:     VAS renal artery complete   Final Result by Chelsie Tipton MD (03/21 1341)      CTA head and neck w wo contrast   Final Result by Karoline Sanders MD (03/18 7563)      CT HEAD   - Evolution of subacute infarcts in left midbrain involving cerebral peduncle, left tatiana, and bilateral cerebellum (right worse than left)  - No new acute intracranial abnormality  CTA HEAD AND NECK   - Right vertebral artery V4 stent appears to have in-stent occlusion  Right vertebral artery V4 segment is patent before and after the V4 stent  - Unchanged left vertebral artery post-PICA V4 segment occlusion    - Dissection flap in right vertebral artery V3 segment which is patent and improved in caliber status post thrombectomy  - Moderate stenosis in proximal-to- mid basilar artery due to atherosclerotic disease status post mechanical thrombectomy  Additional chronic/incidental findings as detailed above               I personally discussed this study with Elijah Arciniega on 3/18/2023 at 5:46 AM                               Workstation performed: [id number]         CTA head and neck w wo contrast    (Results Pending)       *Labs /Radiology studies Reviewed  *Medications  reviewed and reconciled as needed  *Please refer to order section for additional ordered labs studies  *Case discussed with primary attending during morning huddle case rounds    Physical Examination:  Vitals:   Vitals:    03/22/23 1500 03/22/23 2013 03/23/23 0542 03/23/23 0813   BP: 137/63 126/57 163/72 140/59   BP Location: Left arm Left arm Left arm    Pulse: 59 65 61 59   Resp: 16 18 18    Temp: 98 °F (36 7 °C) 98 3 °F (36 8 °C) 97 8 °F (36 6 °C)    TempSrc: Oral Oral Oral    SpO2:  96% 98%    Weight:       Height:           GEN: No apparent distress, pleasant, conversive  NEURO: Alert and oriented x3; ongoing mild dysarthria  HEENT: Pupils are equal and reactive, EOMI, mucous membranes are moist, face asymmetrical  CV: S1 S2 regular, no MRG, no peripheral edema noted  RESP: Lungs are clear bilaterally, no wheezes, rales or rhonchi noted, on room air, respirations easy and non labored  GI: Flat, soft non tender, non distended; +BS x 4  : Voiding without difficulty  MUSC: Moves all extremities; right hemiparesis ongoing but improved; ongoing gait dysfunction  SKIN: pink, warm and dry, normal turgor, no rashes, lesions      The above physical exam was reviewed and updated as determined by my evaluation of the patient on 3/23/2023  Invasive Devices     None                    VTE Pharmacologic Prophylaxis: Eliquis  Code Status: Level 1 - Full Code  Current Length of Stay: 17 day(s)      Total time spent:  30 minutes with more than 50% spent counseling/coordinating care  Counseling includes discussion with patient re: progress  and discussion with patient of his/her current medical state/information  Coordination of patient's care was performed in conjunction with primary service  Time invested included review of patient's labs, vitals, and management of their comorbidities with continued monitoring  In addition, this patient was discussed with medical team including physician and advanced extenders  The care of the patient was extensively discussed and appropriate treatment plan was formulated unique for this patient  Medical decision making for the day was made by supervising physician unless otherwise noted in their attestation statement  ** Please Note:  voice to text software may have been used in the creation of this document   Although proof errors in transcription or interpretation are a potential of such software**

## 2023-03-23 NOTE — PROGRESS NOTES
Physical Medicine and Rehabilitation Progress Note  Boogie Torres 60 y o  male MRN: [id number]  Unit/Bed#: -76 Encounter: [id number]    HPI: Boogie Torres is a 60 y o  right handed male with medical history of HTN who presented to [address] [place] [address] Road on 2/25 with nausea/dizziness  Found to have hypertensive emergency with acute/subcaute R posterior cerebellar CVA with 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, mild BRAULIO, and incidentally found + COVID (without evidence of respiratory illness/hypoxia/CXR findings)  Placed on cardene gtt, stroke pathway, ASA/Plavix, IV hydration for BRAULIO, and CTX for UTI  NIHSS 2  Symptoms began 2 days prior  Not tPA/TNK candidate  MRI/MRA with 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  NSx recommended transfer to [facility] and starting on heparin gtt and stopped Plavix  Repeat CTA on 2/26 stable with with R V3/4 occlusion  Not a candidate for interventional procedure due to clinical stability and risk  Speech consulted and noted mod-severe oropharyngeal dysphagia recommended pureed/HTL  Unfortunately later on 2/26, he clinically deteriorated with increased R sided weakness, and was taken to IR for stenting of V3 and V4 with TICI 2B revascularization  CTH without ICH  He was admitted back to the ICU intubated - extubated 2/27  MRI showed cerebellar CVA and R > L stroke burden, with additional hypodensities on DWI appreciated L midbrain, pontine area and R lower medullary/spinal cord junction  He was transitioned to triple therapy with DAPT (given recent stent) and A/C with eliquis 2 5mg BID due to COVID  Diet advanced to Level 3/NTL on 2/27  Noted to have spasticity in RLE - started on baclofen by Neurology  He was able to have cardene discontinued on 3/2, and they have been making adjustments to his oral regimen  He was started on Prozac for his mood  NSx has signed off  CTA H/N recommended around 3/17  Length of time on eliquis unclear   Admitted to [facility] on 3/6  Chief Complaint: No new issues today  Wife here for training  Interval History/Subjective:  No acute events overnight  Sleep is fine  Denies any CP, SOB, fevers, chills, N/V, abdominal pain  Last BM was today - continent and formed  Got tearful again, but pushes through it  Wife is very supportive  Wife won't be able to pick him up until after work 5pm on 3/31  ROS:  A 10 point review of systems was negative except for what is noted in the HPI  Today's Changes:  1  Reviewed follow-up and updates with wife  Reviewed functional prognosis with wife and patient  2  He is doing more walking with therapies and making progress each day  3  Appreciate Neurosurg recs  Will give him enough anticoagulation to get to his next appointment with them  6-12 weeks of triple anticoagulation  4  Family training today  5  Developing pronator tone, reviewed supination exercises with patient  6  Has appointment 5/1 with Dr Lambert Christian  Reviewed with patient and wife they should get repeat imaging 3-5 days prior to that appointment  Total visit time: 35 minutes, with more than 50% spent counseling/coordinating care  Counseling includes discussion with patient re: progress in therapies, functional issues observed by therapy staff, and discussion with patient regarding their current medical state and wellbeing  Coordination of patient's care was performed in conjunction with Internal Medicine service to monitor patient's labs, vitals, and management of their comorbidities  Assessment/Plan:    * Acute Posterior Circulation Stroke  Assessment & Plan  Presented with dizziness for 2 days and nausea and has residual R sided weakness, aphasia, dysphagia, R mild spasticity  Several small acute/early subacute bi-cerebellar infarcts without hemorrhage   Multiple infarcts involving R cerebellum and brainstem in particular (posterior medulla on the R, R midbrain, and L occipital lobe)  L vertebral artery severe stenosis and R vertebral artery occlusion and dissection    - Now s/p stenting on 2/26 with TICI 2b revascularization    PPx: ASA/brilinta/Eliquis, Statin   - Discussed with Neurology, they defer to NSx on length of time on eliquis  Potentially 3-4 weeks if felt to be related to [address] out to Neurosurgery - they will discuss with their attending re: eliquis  - Repeat CTA H/N -  No new intracranial abnormalities, expected evolution of known CVA  See dissection and stenosis for more details  3/9 Had nocturnal oximetry to screen for nocturnal hypoxia - only 26 seconds total of mild desaturation  Maintain normotension  Education on prediabetes and diet  Follow-up with Neurovascular/Neurosurgery  Function improving slowly   Continue PT/OT/SLP - for swallow, speech, R sided weakness/tone, will need a good visual exam      Celiac artery stenosis (HCC)  Assessment & Plan  Incidentally noted to have approximately 70% celiac artery stenosis on 3/21 Renal Artery Ultrasound  No s/s of ischemic bowel  Would recommend outpatient f/u with PCP  Neurogenic bowel  Assessment & Plan  Disinhibited bowel + mobility difficulties -somewhat loose as well  Trial Metamucil daily - better formed  Monitor  Not on bowel meds right now  [facility] Bladder Training:   - 30-45 min after each meal will get patient onto commode to attempt bowel movement  - He wants to hold off on scheduled suppository for now (would schedule in AM to align with his previous bowel schedule at home)  For now monitor, close continence care especially    Anemia  Assessment & Plan  Stable 3/20  Normocytic anemia noted through stay  B12 borderline low  Folate normal  Iron Panel: Low iron saturation and iron with normal TIBC and Ferritin  Was started in the hospital on B12, but not iron  Per IM - 2 days of IV Venofer (last day 3/8)  Then start oral iron  Monitor Hgb, transfuse as appropriate  Will discuss with IM  Consulted       Adjustment disorder with depressed mood  Assessment & Plan  Tearful at times, but coping  Has been tearful and labile during hospitalization  Started on Prozac 20mgdaily - may need to increase dose  Consulted rehab psychology for support  Prediabetes  Assessment & Plan  A1C 5 7  Consult nutrition for education on diabetic diet  Diet/lifestyle modifications  Follow-up with PCP  CKD (chronic kidney disease)  Assessment & Plan  Lab Results   Component Value Date    EGFR 60 03/20/2023    EGFR 59 03/18/2023    EGFR 58 03/17/2023    CREATININE 1 25 03/20/2023    CREATININE 1 27 03/18/2023    CREATININE 1 30 03/17/2023     Per hospital team - suspect CKD Stage 2 2/2 hypertension   - stable today  Will monitor BMP while here  Avoid nephrotoxic meds and relative hypotension  Recommend outpatient f/u with PCP    Spasticity  Assessment & Plan  Intrinsic tonic tone in R quad which is mild  RUE with tone in EF, WF (MAS 1) and pronator (MAS 1+)  Hiccups resolved  Would opt to avoid treating R quad for now, as tone there may help stabilize at the knee  R ankle multipodus  Baclofen 5mg TID - monitor and adjust as appropriate  Range of motion  Monitor as tone evolves  Outpatient f/u with PMR  Dysphagia  Assessment & Plan  Improved  Admitted with mod-severe oropharyngeal  Has massively improved  3/8 Advanced to Level 3/Thins  3/10 Advanced to Reg/Thins  Aspiration precautions  Good oral hygiene   SLP consulted    Primary hypertension  Assessment & Plan  Home: None  Here: Amlodipine 10mg daily, Coreg 25mg BID, Lisinopril 40mg daily, Hydralazine 50mg Q12hr  Has PRN hydralazine as well  Had hypertensive urgency in hospital requiring cardene gtt   3/21 IM ordered renal artery ultrasound  No significant PRIETO  Monitor and adjust as appropriate  IM consulted to assist with management  Stenosis of left vertebral artery  Assessment & Plan  Severe L vertebral artery origin stenosis    3/17 CTA H/N:   CTA HEAD AND NECK  - Right vertebral artery V4 stent appears to have in-stent occlusion  Right vertebral artery V4 segment is patent before and after the V4 stent  - Unchanged left vertebral artery post-PICA V4 segment occlusion   - Dissection flap in right vertebral artery V3 segment which is patent and improved in caliber status post thrombectomy  - Moderate stenosis in proximal-to- mid basilar artery due to atherosclerotic disease status post mechanical thrombectomy  - Per Neurosurgery unlikely in stent occlusion  No changes  Plan for outpatient f/u  ASA/Brilinta  Atorvastatin  SBP goals 120-160  Follow-up with Neurovascular  Right Vertebral artery dissection [facility])  Assessment & Plan  Now s/p R V3/4 stenting with TICI 2B revascularization on 2/26 for R Vert stenosis  Continue ASA/Brilinta  Statin  CTA HEAD AND NECK 3/17  - Dissection flap in right vertebral artery V3 segment which is patent and improved in caliber status post thrombectomy  Outpatient f/u with Neurosurgery/Dr Huma Oshea      Vasovagal syncope-resolved as of 3/22/2023  Assessment & Plan  No further episodes  3/8 Had episode of vasovagal syncope  - No convulsions  On body weight support system, started to feel dizzy  - Staring episode after, but able to sternal rubbed out of it  Very brief   - No post-ictal weakness   - Neurologically stable and back to baseline   - Orthostatics negative  Discussed with Neurology - unlikely seizure given distribution of stroke and presentation  No further testing recommended at this time  Monitor       COVID-resolved as of 3/10/2023  Assessment & Plan  Resolved  Incidentally found to have COVID on 2/25  Asymptomatic from respiratory standpoint  Per Neurosurgery concern for hypercoagulability related to COVID  Currently on Eliquis 2 5mg BID - per Neuro 3-4 weeks probably reasonable, but for them ultimately up to NSx as this was their initial recommendation to start this medication  3/8 Discontinued precautions after 10 days isolation  Health Maintenance  #Delirium/Sleep: At risk  Optimize bowel/bladder, sleep-wake cycle, mood and pain management  #Pain: Baclofen 5mg TID, Tylenol PRN  #Bowel: Last BM 3/23  On metamucil  Otherwise, only PRN miralax  See Neurogenic bowel above  #Bladder: Voiding and continent  #Skin/Pressure Injury Prevention: Turn Q2hr in bed, with weight shifts [id number] in wheelchair  Float heels in bed  #DVT Prophylaxis: On triple therapy, SCDs  #GI Prophylaxis: None  #Code Status:  Full Code  #FEN: Reg/Thins  #Dispo:  Team 3/21:  Family training with the wife starts tomorrow  Goals for outpatient therapies PT/OT/SLP  Family's goal is for him to leave by ADD 3/31  Barrier: R sided weakness, proprioception, kinesthetic awareness, impaired sensation on the R, ataxia, truncal weakness, dysphagia, expressive > receptive aphasia, post-stroke depression, bowel incontinence/disinhibited, spasticity, decreased righting reaction, 25/30 MOCA  Goals: Sup wheelchair level mobility/set-up ADLs  Follow-up: Neurology, Neurosurgery, PCP       Objective:    Functional Update:  PT: Eusebio transfers, bed mobility, maxA x2 ambulation (chair follow) - HHA, Dis Sup wheelchair 200', Ax2 for 20 stairs  OT: Sup eating, grooming, Eusebio grooming, Sup UB dressing, modA LB dressing, modA toileting, Eusebio tub/shower transfer, Eusebio toilet transfer  SLP: Eusebio problem solving/memory, Eusebio-Sup comprehension/expression  Executive function deficits  Fatigues with higher level tasks  On reg/thins for diet  Allergies per EMR    Physical Exam:  Temp:  [97 8 °F (36 6 °C)-98 3 °F (36 8 °C)] 97 8 °F (36 6 °C)  HR:  [59-65] 61  Resp:  [16-18] 18  BP: (101-163)/(47-72) 163/72  Oxygen Therapy  SpO2: 98 %    Gen: No acute distress, Well-nourished, well-appearing  HEENT: Moist mucus membranes, Normocephalic/Atraumatic  Cardiovascular: Regular rate, rhythm, S1/S2   Distal pulses palpable  Heme/Extr: No edema  Pulmonary: Non-labored breathing  Lungs CTAB  : No barnett  GI: Soft, non-tender, non-distended  BS+  MSK: PROM is WFL in all extremities  No effusions or deformities  Bulk is symmetric  See below for MMT scores  Integumentary: Skin is warm, dry  Neuro: AAOx3, Strength in R arm proximally is 3-4/5, and distally is 2-3/5  RLE is 4/5 proximally and 3-4/5 distally  He has mild MAS 1 tone in his R elbow flexor, wrist flexor and 1+ in his pronator  He has MAS 2 tone in his R quad and 1+ in his ankle  Psych: Normal mood and affect       Diagnostic Studies: Reviewed, no new imaging    Laboratory:  Reviewed   Results from last 7 days   Lab Units 03/20/23  0546   HEMOGLOBIN g/dL 10 2*   HEMATOCRIT % 32 6*   WBC Thousand/uL 7 43     Results from last 7 days   Lab Units 03/20/23  0546 03/18/23  1122 03/17/23  0612   BUN mg/dL 27* 25 29*   POTASSIUM mmol/L 4 1 4 6 4 1   CHLORIDE mmol/L 110* 111* 111*   CREATININE mg/dL 1 25 1 27 1 30            Patient Active Problem List   Diagnosis   • BRAULIO (acute kidney injury) ([facility] [address] )   • Acute Posterior Circulation Stroke   • Right Vertebral artery dissection (HCC)   • Stenosis of left vertebral artery   • Primary hypertension   • Dizziness   • Dysphagia   • Spasticity   • CKD (chronic kidney disease)   • Prediabetes   • Adjustment disorder with depressed mood   • Anemia   • Neurogenic bowel   • Celiac artery stenosis (HCC)         Medications  Current Facility-Administered Medications   Medication Dose Route Frequency Provider Last Rate   • acetaminophen  650 mg Oral Q6H PRN YURI Bertrand     • amLODIPine  10 mg Oral Daily Raheem Howard MD     • apixaban  2 5 mg Oral BID Raheem Howard MD     • aspirin  81 mg Oral Daily Raheem Howard MD     • atorvastatin  40 mg Oral Daily With Pan Sargent MD     • baclofen  5 mg Oral TID Raheem Howard MD     • carvedilol  25 mg Oral BID With Meals YURI Bertrand     • vitamin B-12  1,000 mcg Oral Daily Raheem Howard MD     • ferrous sulfate  325 mg Oral Daily With Breakfast YURI Pino     • FLUoxetine  20 mg Oral Daily Guillermo Camargo MD     • hydrALAZINE  25 mg Oral Q8H PRN YURI Pino     • hydrALAZINE  50 mg Oral Q12H YURI Pino     • lisinopril  40 mg Oral Daily YURI Pino     • polyethylene glycol  17 g Oral Daily PRN Guillermo Camargo MD     • psyllium  1 packet Oral Daily Pacheco Cantu MD     • ticagrelor  90 mg Oral Q12H Herb Gramajo MD            ** Please Note: Fluency Direct voice to text software may have been used in the creation of this document   **

## 2023-03-23 NOTE — CASE MANAGEMENT
Case Management Update:  Cm received drop arm bsc order from carolyne, cm submitted via [place] to Suzan Fuller for fulfillment by [place] date of 3/31

## 2023-03-23 NOTE — PROGRESS NOTES
03/23/23 1500   Pain Assessment   Pain Assessment Tool 0-10   Pain Score No Pain   Restrictions/Precautions   Precautions Aphasia; Aspiration;Bed/chair alarms;Cognitive; Fall Risk;Supervision on toilet/commode   Weight Bearing Restrictions No   ROM Restrictions No   Comprehension   Comprehension (FIM) 5 - Understands basic directions and conversation   Expression   Expression (FIM) 5 - Needs help/cues only RARELY (< 10% of the time)   Social Interaction   Social Interaction (FIM) 6 - Interacts appropriately with others BUT requires extra  time   Problem Solving   Problem solving (FIM) 4 - Solves basic problems 75-89% of time   Memory   Memory (FIM) 4 - Recognizes/recalls/performs 75-89%   Speech/Language/Cognition Assessmetn   Treatment Assessment Family Training/Education  Pt seen for skilled speech therapy session targeting cognitive linguistic communication skills  Pt alert and interactive- wife Osbaldo Lopez present for session therefore shifted session to providing functional updates, recommendations and answering questions  Wife and pt aware of recommendations for assistance with all IADL tasks  Romina already manages most of household chores and finances  In regards to meds, educated that pt will required set up assistance  This week, blood pressure medications have changed per medical team therefore previous cheatsheet at bedside required updating  Wife also requesting updated list as she won't be present most of next week until end of week discharge  Educated that final list will be printed in discharge instructions to follow however can check and update list as needed as well  Small changes occurred from previous list, pt conts with some recollection of purposes of medications but doesn't recall all the names at this time  Pt currently is on 13 medications, 11 oral pills, 1 powder packet  Frequencies vary from 1x, 2x, and 3x per day as well as 2 medications are every 12 hours   Discussed with wife appropriate pill organizers that would be beneficial as well as where to purchase  Suggested getting two therefore will always have the next week rotation ready while filling in the previous one when able  Educated pt though too to remain involved in the process and provided suggestions on how to best establish a routine at home with keeping organizer in common place (kitchen, bathroom) as setting visual reminders in the house or alarms on phone  All in agreement  Discussed plan for future sessions targeting ST/working memory as well as higher level tasks for word associations and executive functioning skills  Pt to follow up with OP ST services at discharge (Friday 3/31)  Session cut short due to their home Tessa Landa coming to visit  Pt overall is improving with skilled SLP services and will cont to benefit to maximize overall cognitive linguistic communication abilities at this time  SLP Therapy Minutes   SLP Time In 1500   SLP Time Out 1545   SLP Total Time (minutes) 45   SLP Mode of treatment - Individual (minutes) 45   SLP Mode of treatment - Concurrent (minutes) 0   SLP Mode of treatment - Group (minutes) 0   SLP Mode of treatment - Co-treat (minutes) 0   SLP Mode of Treatment - Total time(minutes) 45 minutes   SLP Cumulative Minutes 720   Therapy Time missed   Time missed?  No

## 2023-03-23 NOTE — PLAN OF CARE
Problem: Prexisting or High Potential for Compromised Skin Integrity  Goal: Skin integrity is maintained or improved  Description: INTERVENTIONS:  - Identify patients at risk for skin breakdown  - Assess and monitor skin integrity  - Assess and monitor nutrition and hydration status  - Monitor labs   - Assess for incontinence   - Turn and reposition patient  - Assist with mobility/ambulation  - Relieve pressure over bony prominences  - Avoid friction and shearing  - Provide appropriate hygiene as needed including keeping skin clean and dry  - Evaluate need for skin moisturizer/barrier cream  - Collaborate with interdisciplinary team   - Patient/family teaching  - Consider wound care consult   Outcome: Progressing     Problem: MOBILITY - ADULT  Goal: Maintain or return to baseline ADL function  Description: INTERVENTIONS:  -  Assess patient's ability to carry out ADLs; assess patient's baseline for ADL function and identify physical deficits which impact ability to perform ADLs (bathing, care of mouth/teeth, toileting, grooming, dressing, etc )  - Assess/evaluate cause of self-care deficits   - Assess range of motion  - Assess patient's mobility; develop plan if impaired  - Assess patient's need for assistive devices and provide as appropriate  - Encourage maximum independence but intervene and supervise when necessary  - Involve family in performance of ADLs  - Assess for home care needs following discharge   - Consider OT consult to assist with ADL evaluation and planning for discharge  - Provide patient education as appropriate  Outcome: Progressing  Goal: Maintains/Returns to pre admission functional level  Description: INTERVENTIONS:  - Perform BMAT or MOVE assessment daily    - Set and communicate daily mobility goal to care team and patient/family/caregiver  - Collaborate with rehabilitation services on mobility goals if consulted  - Perform Range of Motion 3 times a day    - Reposition patient every 2 hours   - Dangle patient 3 times a day  - Stand patient 3 times a day  - Ambulate patient 3 times a day  - Out of bed to chair 3 times a day   - Out of bed for meals 3 times a day  - Out of bed for toileting  - Record patient progress and toleration of activity level   Outcome: Progressing     Problem: PAIN - ADULT  Goal: Verbalizes/displays adequate comfort level or baseline comfort level  Description: Interventions:  - Encourage patient to monitor pain and request assistance  - Assess pain using appropriate pain scale  - Administer analgesics based on type and severity of pain and evaluate response  - Implement non-pharmacological measures as appropriate and evaluate response  - Consider cultural and social influences on pain and pain management  - Notify physician/advanced practitioner if interventions unsuccessful or patient reports new pain  Outcome: Progressing     Problem: INFECTION - ADULT  Goal: Absence or prevention of progression during hospitalization  Description: INTERVENTIONS:  - Assess and monitor for signs and symptoms of infection  - Monitor lab/diagnostic results  - Monitor all insertion sites, i e  indwelling lines, tubes, and drains  - Monitor endotracheal if appropriate and nasal secretions for changes in amount and color  - Mcgrew appropriate cooling/warming therapies per order  - Administer medications as ordered  - Instruct and encourage patient and family to use good hand hygiene technique  - Identify and instruct in appropriate isolation precautions for identified infection/condition  Outcome: Progressing  Goal: Absence of fever/infection during neutropenic period  Description: INTERVENTIONS:  - Monitor WBC    Outcome: Progressing     Problem: SAFETY ADULT  Goal: Patient will remain free of falls  Description: INTERVENTIONS:  - Educate patient/family on patient safety including physical limitations  - Instruct patient to call for assistance with activity   - Consult OT/PT to assist with strengthening/mobility   - Keep Call bell within reach  - Keep bed low and locked with side rails adjusted as appropriate  - Keep care items and personal belongings within reach  - Initiate and maintain comfort rounds  - Make Fall Risk Sign visible to staff  - Offer Toileting every 2 Hours, in advance of need  - Initiate/Maintain bed/chair alarm  - Obtain necessary fall risk management equipment: nonskid socks  - Apply yellow socks and bracelet for high fall risk patients  - Consider moving patient to room near nurses station  Outcome: Progressing  Goal: Maintain or return to baseline ADL function  Description: INTERVENTIONS:  -  Assess patient's ability to carry out ADLs; assess patient's baseline for ADL function and identify physical deficits which impact ability to perform ADLs (bathing, care of mouth/teeth, toileting, grooming, dressing, etc )  - Assess/evaluate cause of self-care deficits   - Assess range of motion  - Assess patient's mobility; develop plan if impaired  - Assess patient's need for assistive devices and provide as appropriate  - Encourage maximum independence but intervene and supervise when necessary  - Involve family in performance of ADLs  - Assess for home care needs following discharge   - Consider OT consult to assist with ADL evaluation and planning for discharge  - Provide patient education as appropriate  Outcome: Progressing  Goal: Maintains/Returns to pre admission functional level  Description: INTERVENTIONS:  - Perform BMAT or MOVE assessment daily    - Set and communicate daily mobility goal to care team and patient/family/caregiver  - Collaborate with rehabilitation services on mobility goals if consulted  - Perform Range of Motion 3 times a day  - Reposition patient every 2 hours    - Dangle patient 3 times a day  - Stand patient 3 times a day  - Ambulate patient 3 times a day  - Out of bed to chair 3 times a day   - Out of bed for meals 3 times a day  - Out of bed for toileting  - Record patient progress and toleration of activity level   Outcome: Progressing     Problem: SAFETY ADULT  Goal: Patient will remain free of falls  Description: INTERVENTIONS:  - Educate patient/family on patient safety including physical limitations  - Instruct patient to call for assistance with activity   - Consult OT/PT to assist with strengthening/mobility   - Keep Call bell within reach  - Keep bed low and locked with side rails adjusted as appropriate  - Keep care items and personal belongings within reach  - Initiate and maintain comfort rounds  - Make Fall Risk Sign visible to staff  - Offer Toileting every 2 Hours, in advance of need  - Initiate/Maintain bed/chair alarm  - Obtain necessary fall risk management equipment: nonskid socks  - Apply yellow socks and bracelet for high fall risk patients  - Consider moving patient to room near nurses station  Outcome: Progressing  Goal: Maintain or return to baseline ADL function  Description: INTERVENTIONS:  -  Assess patient's ability to carry out ADLs; assess patient's baseline for ADL function and identify physical deficits which impact ability to perform ADLs (bathing, care of mouth/teeth, toileting, grooming, dressing, etc )  - Assess/evaluate cause of self-care deficits   - Assess range of motion  - Assess patient's mobility; develop plan if impaired  - Assess patient's need for assistive devices and provide as appropriate  - Encourage maximum independence but intervene and supervise when necessary  - Involve family in performance of ADLs  - Assess for home care needs following discharge   - Consider OT consult to assist with ADL evaluation and planning for discharge  - Provide patient education as appropriate  Outcome: Progressing  Goal: Maintains/Returns to pre admission functional level  Description: INTERVENTIONS:  - Perform BMAT or MOVE assessment daily    - Set and communicate daily mobility goal to care team and patient/family/caregiver  - Collaborate with rehabilitation services on mobility goals if consulted  - Perform Range of Motion 3 times a day  - Reposition patient every 2 hours  - Dangle patient 3 times a day  - Stand patient 3 times a day  - Ambulate patient 3 times a day  - Out of bed to chair 3 times a day   - Out of bed for meals 3 times a day  - Out of bed for toileting  - Record patient progress and toleration of activity level   Outcome: Progressing     Problem: DISCHARGE PLANNING  Goal: Discharge to home or other facility with appropriate resources  Description: INTERVENTIONS:  - Identify barriers to discharge w/patient and caregiver  - Arrange for needed discharge resources and transportation as appropriate  - Identify discharge learning needs (meds, wound care, etc )  - Arrange for interpretive services to assist at discharge as needed  - Refer to Case Management Department for coordinating discharge planning if the patient needs post-hospital services based on physician/advanced practitioner order or complex needs related to functional status, cognitive ability, or social support system  Outcome: Progressing     Problem: Nutrition/Hydration-ADULT  Goal: Nutrient/Hydration intake appropriate for improving, restoring or maintaining nutritional needs  Description: Monitor and assess patient's nutrition/hydration status for malnutrition  Collaborate with interdisciplinary team and initiate plan and interventions as ordered  Monitor patient's weight and dietary intake as ordered or per policy  Utilize nutrition screening tool and intervene as necessary  Determine patient's food preferences and provide high-protein, high-caloric foods as appropriate       INTERVENTIONS:  - Monitor oral intake, urinary output, labs, and treatment plans  - Assess nutrition and hydration status and recommend course of action  - Evaluate amount of meals eaten  - Assist patient with eating if necessary   - Allow adequate time for meals  - Recommend/ encourage appropriate diets, oral nutritional supplements, and vitamin/mineral supplements  - Order, calculate, and assess calorie counts as needed  - Recommend, monitor, and adjust tube feedings and TPN/PPN based on assessed needs  - Assess need for intravenous fluids  - Provide specific nutrition/hydration education as appropriate  - Include patient/family/caregiver in decisions related to nutrition  Outcome: Progressing

## 2023-03-23 NOTE — PROGRESS NOTES
"OT Treatment Note       03/23/23 1000   Pain Assessment   Pain Assessment Tool 0-10   Pain Score No Pain   Restrictions/Precautions   Precautions Bed/chair alarms;Cognitive; Fall Risk;Supervision on toilet/commode;Aphasia; Aspiration   Lifestyle   Autonomy \"I'm feeling a lot better with this\" -pts wife regarding family training   Eating   Type of Assistance Needed Set-up / clean-up   Physical Assistance Level No physical assistance   Eating CARE Score 5   Oral Hygiene   Type of Assistance Needed Supervision   Physical Assistance Level No physical assistance   Comment seated in w/c at sink   Oral Hygiene CARE Score 4   Shower/Bathe Self   Type of Assistance Needed Physical assistance   Physical Assistance Level 25% or less   Comment Seated in w/c at sink, pt able to bathe UB with Min VC for thoroughness, groin, and BLE  In stance at sink with CGA/Min A and RUE support on sink, pt able to use LUE to bathe buttocks  Shower/Bathe Self CARE Score 3   Bathing   Assessed Bath Style Sponge Bath   Anticipated D/C Bath Style Sponge Bath   Upper Body Dressing   Type of Assistance Needed Supervision   Physical Assistance Level No physical assistance   Comment Seated in w/c   Upper Body Dressing CARE Score 4   Lower Body Dressing   Type of Assistance Needed Physical assistance   Physical Assistance Level 25% or less   Comment Pt able to thread BLE, in stance with CGA/Min A and RUE on julieta walker  Pt able to manage clothes over hips with LUE  Lower Body Dressing CARE Score 3   Putting On/Taking Off Footwear   Type of Assistance Needed Physical assistance   Physical Assistance Level 25% or less   Comment Pt able to don L sock with crossed leg position and R sock with A from OT to hold LE in crossed leg position  Pt has previously demonstrated ability to don b/l shoes when wearing regular socks, however with  socks pt required A donning R     Putting On/Taking Off Footwear CARE Score 3   Sit to Stand   Type of Assistance " Needed Physical assistance   Physical Assistance Level 25% or less   Comment Min A/CGA to julieta walker   Sit to Stand CARE Score 3   Bed-Chair Transfer   Type of Assistance Needed Physical assistance   Physical Assistance Level 26%-50%   Comment Mod Ax1 with gait belt and julieta walker during short distance functional mobility to toilet/w/c  However, Min A with sit pivot  Chair/Bed-to-Chair Transfer CARE Score 3   Toileting Hygiene   Type of Assistance Needed Physical assistance   Physical Assistance Level 25% or less   Comment Min A in stance with RUE on julieta walker, pt able to perform rear hygiene and and clothing management   Toileting Hygiene CARE Score 3   Toilet Transfer   Type of Assistance Needed Physical assistance   Physical Assistance Level 26%-50%   Comment Min A x1 sit pivot with use of RUE on drop arm BSC handle  Mod A x1 with gait belt and julieta walker during short distance functional mobility to drop arm BSC over toilet   Toilet Transfer CARE Score 3   Cognition   Overall Cognitive Status Impaired   Arousal/Participation Alert; Cooperative   Attention Within functional limits   Orientation Level Oriented X4   Memory Decreased short term memory   Following Commands Follows one step commands without difficulty   Activity Tolerance   Activity Tolerance Patient tolerated treatment well   Assessment   Treatment Assessment Pt engaged in skilled OT tx session focused on family training with ADL routine  See above for further details on functional performance  Pts wife Amalia Hernandez present for family training, and engaged in A needed for ADL routine  Pt and wife completed sit pivot transfers to w/c and drop arm commode  Pts wife also A pt during toileting hygiene by providing min assist in stance with RUE on julieta walker, bathing routine at sink and A while in stance for rear hygiene, in stance during LB dressing (managing clothing over hips with LUE) with RUE on julieta walker   Pts wife also A with Mod A on R during short distance functional mobility with gait belt and julieta walker to/from [facility] over toilet  OT educated pt and wife on RUE stretches to be completed 20-30 seconds at a time 5x/hours, which pt demonstrated, reiterating that julieta walker only to be used on RUE in stance for toileting hygiene and clothing management, completing bathing routine at night to prepare for increased time compared to morning before wife works, performing sit pivots to [facility] if urgent and then utilizing julieta walker with wife on pts R for short distance to toilet if non urgent  Pts wife demonstrates ability to A pt with ADLs, more family training to occur tomorrow with pts wife for dry tub transfer with tub bench at 1PM  Cont OT POC with focus on RUE NMR, ADL retraining, functional transfers sit pivot and stand pivot with julieta walker, w/c management/safety, and static/dynamic standing tolerance/balance  OT Family training done with: Cristiano Roper- pts wife   Prognosis Good   Problem List Decreased strength;Decreased range of motion;Decreased endurance; Impaired balance;Decreased mobility; Decreased coordination;Decreased cognition; Impaired sensation; Impaired tone   Barriers to Discharge Inaccessible home environment;Decreased caregiver support   Plan   Treatment/Interventions ADL retraining;Functional transfer training; Therapeutic exercise; Endurance training;Cognitive reorientation;Patient/family training;Equipment eval/education; Compensatory technique education   Progress Progressing toward goals   Recommendation   OT Discharge Recommendation Home with outpatient rehabilitation   Equipment Recommended Bedside commode   Commode Type Standard w/ drop arm   Date ordered 03/23/23   OT Therapy Minutes   OT Time In 1000   OT Time Out 1130   OT Total Time (minutes) 90   OT Mode of treatment - Individual (minutes) 90   OT Mode of treatment - Concurrent (minutes) 0   OT Mode of treatment - Group (minutes) 0   OT Mode of treatment - Co-treat (minutes) 0   OT Mode of Treatment - Total time(minutes) 90 minutes   OT Cumulative Minutes 1555   Therapy Time missed   Time missed?  No

## 2023-03-23 NOTE — PROGRESS NOTES
03/23/23 1230   Pain Assessment   Pain Assessment Tool 0-10   Pain Score No Pain   Restrictions/Precautions   Precautions Fall Risk;Cognitive;Bed/chair alarms;Supervision on toilet/commode;Aphasia; Aspiration   Cognition   Overall Cognitive Status Impaired   Arousal/Participation Alert; Cooperative   Attention Attends with cues to redirect   Subjective   Subjective pt reported swelling on lateral aspect of R ankle, denies pain nor demos palpable inc in temp or swelling in the area  RN Vin Ribera and MD F[facility] 5 notified  pt educated on elevation of R LE at this time  Roll Left and Right   Type of Assistance Needed Independent   Roll Left and Right CARE Score 6   Lying to Sitting on Side of Bed   Type of Assistance Needed Set-up / clean-up   Lying to Sitting on Side of Bed CARE Score 5   Sit to Stand   Type of Assistance Needed Physical assistance;Verbal cues; Adaptive equipment   Physical Assistance Level 25% or less   Comment cont to require VC for R hand placement or scooting to edge of surface to facilitate ease of task   Sit to Stand CARE Score 3   Bed-Chair Transfer   Type of Assistance Needed Physical assistance;Verbal cues; Adaptive equipment   Physical Assistance Level 26%-50%   Comment min -mod A Stand pivot without AD or with HW using gait belt  min A with sit pivots however at this time pt and wife prefers to transfer with [facility] and wife to purchase gait belt and HW online  Chair/Bed-to-Chair Transfer CARE Score 3   Car Transfer   Type of Assistance Needed Physical assistance;Verbal cues; Adaptive equipment   Physical Assistance Level 26%-50%   Comment HW , able to bring LE in/out of car without assistance - FT with personal vehicle tomorrow   Car Transfer CARE Score 3   Walk 10 Feet   Type of Assistance Needed Physical assistance;Verbal cues; Adaptive equipment   Physical Assistance Level 26%-50%   Comment min-mod with HW x 20' x 5 reps   Walk 10 Feet CARE Score 3   Walk 50 Feet with Two Turns   Type of Assistance Needed Physical assistance;Verbal cues   Physical Assistance Level Total assistance   Comment no AD but with gait belt   Walk 50 Feet with Two Turns CARE Score 1   Walk 150 Feet   Type of Assistance Needed Physical assistance;Verbal cues   Physical Assistance Level Total assistance   Comment no AD but with gait belt   Walk 150 Feet CARE Score 1   Wheel 50 Feet with Two Turns   Type of Assistance Needed Independent   Wheel 50 Feet with Two Turns CARE Score 6   Wheel 150 Feet   Type of Assistance Needed Set-up / clean-up   Wheel 150 Feet CARE Score 5   Curb or Single Stair   Style negotiated Single stair   Type of Assistance Needed Physical assistance;Verbal cues; Adaptive equipment   Physical Assistance Level 26%-50%   1 Step (Curb) CARE Score 3   4 Steps   Type of Assistance Needed Physical assistance;Verbal cues; Adaptive equipment   Physical Assistance Level 26%-50%   Comment min- mod A on FF ascending fwd/descending bwd with L rail, non reciprocal pattern which pt re-ed will be safest technique to do with family at d/c    4 Steps CARE Score 3   12 Steps   Type of Assistance Needed Physical assistance;Verbal cues; Adaptive equipment   Physical Assistance Level 26%-50%   12 Steps CARE Score 3   Therapeutic Interventions   Neuromuscular Re-Education NPP gait training while holding marker on R hand without using an AD but using a gait belt x 200', repeated sit to stand without or little use of UE but at the same time reaching for post- it notes placed on top of the mirror with R hand with reps till fatigue, short rest break every after task provided   Assessment   Treatment Assessment skilled PT focused on hands on FT with pt and wife Osbaldo Lopez in preparation for home d/c, see note below for details  at this time pt cont to require VC for set up, technique and hand/foot placement so will benefit from additonal training to improve indep and dec overall caregiver burden   PT also worked on dual tasking training incorporating R UE to facilitate motor and functional gains abhishek automaticity of gait and movement  PT to focus family training on car transfer, steps negotiation and short distance amb with HW as well cont NMR/NPP training with emphasis on dual tasking  Family/Caregiver Present yes   PT Family training done with: wife Rosamaria Wyman participated with FT providing hands on guarding on the R side of the pt during repeated stand pivots without AD then w/c level sit pivots then Stand pivot with HW  at this time both verbalized preference of doing SPT with HW but both also verbalized understanding of application of other techniques depending on space and pt fatigue  wife demonstrated good carry over of guarding techniques and able to provide appropriate VC to the pt with technique, hand/foot placement with wife verbalizing confidence of her ability to assist pt with transfers by end of training  Wife also provided guarding to the pt using gait belt during short distance amb with HW to prepare both in case of situation wherein a w/c will not fit in space  wife will require additional training with gait training abhishek providing appropriate VC to pt to promote step through pattern  Pt performed FF negotiation with wife taking a video for other family members to review for at this time PT is recommending for ANGEL or pt son to provide assistance during stairs negotiation to optimize safety  ANGEL to come in for hands on FT on [email or web address] for stair training but pt's son can't come in even next week due to work obligation  at this time wife is cleared by PT to perform SPT with/without AD for bed/chair transfer only for wife will still require additional training for toilet transfer and toileting task by OT  Recommendation   PT Discharge Recommendation Home with outpatient rehabilitation   Equipment Recommended Wheelchair;[facility] and gait belt ordered online by wife   DME for w/c submitted to  today)   Wheelchair Package Recommended "Standard  (18x 18 with adjustable tension back, 2\" cushion, standard leg rest, anti-tipper, seat belt, full arm length)   Date ordered 03/23/23   PT Therapy Minutes   PT Time In 1230   PT Time Out 1400   PT Total Time (minutes) 90   PT Mode of treatment - Individual (minutes) 90   PT Mode of treatment - Concurrent (minutes) 0   PT Mode of treatment - Group (minutes) 0   PT Mode of treatment - Co-treat (minutes) 0   PT Mode of Treatment - Total time(minutes) 90 minutes   PT Cumulative Minutes 1633   Therapy Time missed   Time missed?  No     "

## 2023-03-24 RX ADMIN — FERROUS SULFATE TAB 325 MG (65 MG ELEMENTAL FE) 325 MG: 325 (65 FE) TAB at 09:41

## 2023-03-24 RX ADMIN — TICAGRELOR 90 MG: 90 TABLET ORAL at 09:39

## 2023-03-24 RX ADMIN — BACLOFEN 5 MG: 10 TABLET ORAL at 22:06

## 2023-03-24 RX ADMIN — ATORVASTATIN CALCIUM 40 MG: 40 TABLET, FILM COATED ORAL at 17:29

## 2023-03-24 RX ADMIN — CYANOCOBALAMIN TAB 500 MCG 1000 MCG: 500 TAB at 09:36

## 2023-03-24 RX ADMIN — CARVEDILOL 25 MG: 25 TABLET, FILM COATED ORAL at 09:41

## 2023-03-24 RX ADMIN — Medication 1 PACKET: at 11:21

## 2023-03-24 RX ADMIN — HYDRALAZINE HYDROCHLORIDE 50 MG: 50 TABLET, FILM COATED ORAL at 19:03

## 2023-03-24 RX ADMIN — APIXABAN 2.5 MG: 2.5 TABLET, FILM COATED ORAL at 17:29

## 2023-03-24 RX ADMIN — FLUOXETINE 20 MG: 20 CAPSULE ORAL at 09:37

## 2023-03-24 RX ADMIN — CARVEDILOL 25 MG: 25 TABLET, FILM COATED ORAL at 17:29

## 2023-03-24 RX ADMIN — APIXABAN 2.5 MG: 2.5 TABLET, FILM COATED ORAL at 09:36

## 2023-03-24 RX ADMIN — LISINOPRIL 40 MG: 20 TABLET ORAL at 09:36

## 2023-03-24 RX ADMIN — AMLODIPINE BESYLATE 10 MG: 10 TABLET ORAL at 09:35

## 2023-03-24 RX ADMIN — BACLOFEN 5 MG: 10 TABLET ORAL at 17:28

## 2023-03-24 RX ADMIN — HYDRALAZINE HYDROCHLORIDE 50 MG: 50 TABLET, FILM COATED ORAL at 06:34

## 2023-03-24 RX ADMIN — TICAGRELOR 90 MG: 90 TABLET ORAL at 22:07

## 2023-03-24 RX ADMIN — ASPIRIN 81 MG CHEWABLE TABLET 81 MG: 81 TABLET CHEWABLE at 09:36

## 2023-03-24 RX ADMIN — BACLOFEN 5 MG: 10 TABLET ORAL at 09:38

## 2023-03-24 NOTE — PROGRESS NOTES
03/24/23 1040   Pain Assessment   Pain Assessment Tool 0-10   Pain Score No Pain   Restrictions/Precautions   Precautions Fall Risk;Cognitive;Bed/chair alarms;Aspiration; Aphasia; Supervision on toilet/commode   Cognition   Overall Cognitive Status Impaired   Arousal/Participation Alert; Cooperative   Subjective   Subjective pt had no c/o and agreeable to use toilet for bowel movement program    Sit to Lying   Type of Assistance Needed Set-up / clean-up   Comment at end of tx, HOB flat without rail   Sit to Lying CARE Score 5   Lying to Sitting on Side of Bed   Comment pt in recliner at start of tx   Sit to Stand   Type of Assistance Needed Physical assistance;Verbal cues; Adaptive equipment   Physical Assistance Level 25% or less   Comment with/without AD  (demos controlled sitting even without UE support)   Sit to Stand CARE Score 3   Bed-Chair Transfer   Type of Assistance Needed Physical assistance;Verbal cues   Physical Assistance Level 25% or less   Comment min-CG with HW, min A without AD   Chair/Bed-to-Chair Transfer CARE Score 3   Walk 10 Feet   Type of Assistance Needed Physical assistance;Verbal cues; Adaptive equipment   Physical Assistance Level 25% or less   Comment min with HW x 10-20'   Walk 10 Feet CARE Score 3   Walk 50 Feet with Two Turns   Type of Assistance Needed Physical assistance;Verbal cues   Physical Assistance Level 51%-75%   Comment no AD   Walk 50 Feet with Two Turns CARE Score 2   Walk 150 Feet   Type of Assistance Needed Physical assistance;Verbal cues   Physical Assistance Level 51%-75%   Comment no AD   Walk 150 Feet CARE Score 2   Wheel 50 Feet with Two Turns   Type of Assistance Needed Independent   Wheel 50 Feet with Two Turns CARE Score 6   Wheel 150 Feet   Type of Assistance Needed Set-up / clean-up   Wheel 150 Feet CARE Score 5   Toilet Transfer   Type of Assistance Needed Physical assistance;Verbal cues; Adaptive equipment   Physical Assistance Level 26%-50%   Comment min A walk to/from bathroom with HW, attempted but unsucessful with Bowel movement but had bladder movement   Toilet Transfer CARE Score 3   Therapeutic Interventions   Neuromuscular Re-Education NPP gait training and balance training without AD: repeated walking 100-200' at a time while reading laminated signs of stroke pamphlet with both hands in all directions (fwd/bwd/sideways) and walking while taking a sip of waterfilled styrofoam cup holding with his left hand, provided short rest breaks with encourage fluid intake - SpO2 97-98% , NM 65-68   Other Comments   Comments no more swelling on  lateral aspect of R ankle with pt denying any pain  Assessment   Treatment Assessment skilled PT focused on NPP/NMR training with emphasis on dual tasking activities to promote gait automaticity and righting reactions  feedback provided pre/post task but not during task to promote gait automaticity  1 major LOB requiring mod A to stabilized in addition to intermittent minor unsteadiness requiring min A when amb fwd while reading  However inc unsteadiness noted when engaging in dual task with retro walking and sidestepping to the left side  overall pt did well and PT will cont to work on dual tasking activities  PT to perform additional hands on FT this PM    Barriers to Discharge Decreased caregiver support; Inaccessible home environment   Plan   Treatment/Interventions Functional transfer training;LE strengthening/ROM; Therapeutic exercise; Endurance training;Bed mobility;Gait training;Spoke to nursing;OT;Spoke to MD   Progress Progressing toward goals   Recommendation   PT Discharge Recommendation Home with outpatient rehabilitation   PT Therapy Minutes   PT Time In 0940   PT Time Out 1035   PT Total Time (minutes) 55   PT Mode of treatment - Individual (minutes) 55   PT Mode of treatment - Concurrent (minutes) 0   PT Mode of treatment - Group (minutes) 0   PT Mode of treatment - Co-treat (minutes) 0   PT Mode of Treatment - Total time(minutes) 55 minutes   PT Cumulative Minutes 2066

## 2023-03-24 NOTE — PROGRESS NOTES
"   03/24/23 0700   Pain Assessment   Pain Assessment Tool 0-10   Pain Score No Pain   Restrictions/Precautions   Precautions Aphasia;Bed/chair alarms;Cognitive; Fall Risk;Supervision on toilet/commode   Weight Bearing Restrictions No   ROM Restrictions No   Braces or Orthoses   (multipodus boot)   Lifestyle   Autonomy \"Things are getting there  \"   Eating   Type of Assistance Needed Independent   Physical Assistance Level No physical assistance   Eating CARE Score 6   Oral Hygiene   Type of Assistance Needed Supervision   Physical Assistance Level No physical assistance   Comment seated in w/c at sink   Oral Hygiene CARE Score 4   Shower/Bathe Self   Type of Assistance Needed Physical assistance   Physical Assistance Level 25% or less   Comment seated in w/c at sink, pt bathed BUE, groin and BLE w/ inc time  Min A in stance w/ LUE release to bathe buttocks w/ cues for thoroughness  Shower/Bathe Self CARE Score 3   Tub/Shower Transfer   Reason Not Assessed Sponge Bath   Upper Body Dressing   Type of Assistance Needed Set-up / clean-up   Physical Assistance Level No physical assistance   Comment seated in w/c   Upper Body Dressing CARE Score 5   Lower Body Dressing   Type of Assistance Needed Physical assistance;Verbal cues; Set-up / clean-up   Physical Assistance Level 25% or less   Comment Pt able to thread BLE while seated in w/c w/ inc time, CGA-Min A to steady in stance for clothing management, encouraged use of RUE  Lower Body Dressing CARE Score 3   Putting On/Taking Off Footwear   Type of Assistance Needed Physical assistance   Physical Assistance Level 25% or less   Comment while seated in w/c  able to doff socks and don L sock  A to thread sock over toes, then able to manage over heel     Putting On/Taking Off Footwear CARE Score 3   Lying to Sitting on Side of Bed   Type of Assistance Needed Set-up / clean-up   Physical Assistance Level No physical assistance   Lying to Sitting on Side of Bed CARE Score 5 " Sit to Stand   Type of Assistance Needed Physical assistance;Verbal cues; Reggy Drilling / Galina Portillo; Adaptive equipment   Physical Assistance Level 25% or less   Comment vc's for proper hand placement   Sit to Stand CARE Score 3   Bed-Chair Transfer   Type of Assistance Needed Physical assistance;Verbal cues; Reggy Drilling / Galina Portillo; Adaptive equipment   Physical Assistance Level 25% or less   Comment stand pivot w/ HW, inc time   Chair/Bed-to-Chair Transfer CARE Score 3   Cognition   Overall Cognitive Status Impaired   Activity Tolerance   Medical Staff Made Aware while supine, /73 HR 57 at start of session, RN made aware of same  Assessment   Treatment Assessment Pt seen for skilled OT session focusing on self-care management  Details on sponge bath ADL noted above, completed at overall Min A which is req to steady when in fxnl stance  Overall improvement noted w/ safety awareness, fxnl xfers, and RUE strength/coordination compared to eval  Pt req vc's at times for task initiation, fxnl problem solving, and maximizing fxnl use of RUE  Cont OT POC: RUE NMR, stand pivot fxnl xfers w/ HW vs sit pivot, endurance work, fxnl standing tolerance, and ongoing FT  Pt was left resting in recliner, alarm activated and meal tray set-up  Plan for FT w/ pt's wife this afternoon, on track for d/c home w/ family support on 3/31 w/ recommendation for OP neuro OT  Prognosis Good   Problem List Decreased strength;Decreased range of motion;Decreased endurance; Impaired balance;Decreased mobility; Decreased coordination;Decreased cognition; Impaired sensation; Impaired tone   Plan   Treatment/Interventions ADL retraining;Functional transfer training; Therapeutic exercise; Endurance training;Cognitive reorientation;Patient/family training   Progress Progressing toward goals   Recommendation   OT Discharge Recommendation Home with outpatient rehabilitation   OT Therapy Minutes   OT Time In 0700   OT Time Out 0800   OT Total Time (minutes) 60   OT Mode of treatment - Individual (minutes) 60   OT Mode of treatment - Concurrent (minutes) 0   OT Mode of treatment - Group (minutes) 0   OT Mode of treatment - Co-treat (minutes) 0   OT Mode of Treatment - Total time(minutes) 60 minutes   OT Cumulative Minutes 1615   Therapy Time missed   Time missed?  No

## 2023-03-24 NOTE — PROGRESS NOTES
03/24/23 1430   Pain Assessment   Pain Assessment Tool 0-10   Pain Score No Pain   Restrictions/Precautions   Precautions Fall Risk;Supervision on toilet/commode;Aphasia; Aspiration;Cognitive   Cognition   Arousal/Participation Alert; Cooperative   Subjective   Subjective pt and wife agreeable to continue FT this PM    Sit to Lying   Type of Assistance Needed Set-up / clean-up   Sit to Lying CARE Score 5   Bed-Chair Transfer   Type of Assistance Needed Verbal cues   Physical Assistance Level 25% or less   Chair/Bed-to-Chair Transfer CARE Score 3   Car Transfer   Type of Assistance Needed Physical assistance;Verbal cues; Adaptive equipment   Physical Assistance Level 25% or less   Comment min with HW on personal car x 2 with PT then with  wife guarding pt - on first trial pt softly bump back of head on the rubber part of the overhead door for car was too low but no reported discomfort,  [facility] notified  Car Transfer CARE Score 3   Walk 10 Feet   Type of Assistance Needed Physical assistance;Verbal cues; Adaptive equipment   Physical Assistance Level 25% or less   Comment HW   Walk 10 Feet CARE Score 3   Walk 50 Feet with Two Turns   Type of Assistance Needed Physical assistance;Verbal cues   Physical Assistance Level 51%-75%   Walk 50 Feet with Two Turns CARE Score 2   Walk 150 Feet   Type of Assistance Needed Physical assistance;Verbal cues   Physical Assistance Level 51%-75%   Comment no AD   Walk 150 Feet CARE Score 2   Walking 10 Feet on Uneven Surfaces   Type of Assistance Needed Physical assistance;Verbal cues; Adaptive equipment   Physical Assistance Level 26%-50%   Comment min A outside sidewalk including w/c cut out using HWx 3 reps ~ 10' with 2x wife provided guarding   Walking 10 Feet on Uneven Surfaces CARE Score 3   Wheel 50 Feet with Two Turns   Type of Assistance Needed Independent   Wheel 50 Feet with Two Turns CARE Score 6   Wheel 150 Feet   Type of Assistance Needed Set-up / clean-up   Wheel 150 Feet CARE Score 5   Curb or Single Stair   Style negotiated Single stair   Type of Assistance Needed Physical assistance;Verbal cues; Adaptive equipment   Physical Assistance Level 26%-50%   1 Step (Curb) CARE Score 3   4 Steps   Type of Assistance Needed Physical assistance;Verbal cues; Adaptive equipment   Physical Assistance Level 26%-50%   4 Steps CARE Score 3   12 Steps   Type of Assistance Needed Physical assistance;Verbal cues; Adaptive equipment   Physical Assistance Level 26%-50%   Comment FF with L rail ascending fwd/descending bwd non reciprocal pattern - demo'd improved carry over of sequencing this session   12 Steps CARE Score 3   Therapeutic Interventions   Strengthening seated TE include target marches x 10 reps, knee ext with DF to knee flexion with PF combination x 20 reps, bilat ankle DF x 10 SH x 5 reps   Flexibility passive bilat hamstring and gastroc mm stretch x 20 SH x 5 reps   Neuromuscular Re-Education NPP gait training ~ 50' at a time due to pt's fatigue  while reading fwd/bwd then walking while singing happy birthday   gait training with SPC on L hand x 100' external VC to promote step through pattern   Assessment   Treatment Assessment skilled PT for 60 mins initially focused on car transfer training using personal vehicle, short distance amb using HW on even and uneven surface wife provided assist using gait belt  Romina verbalized confidence of abilities to provide necessary assist to the pt at d/c and denies additional need for hands on FT at this time  Pt then participated with FF negotiation training in preparation for FT tomorrow with ANGEL  pt then participated with NPP training but reps limited by fatigue and pt asked to return back to bed at end of tx with alarm on and all needs within reach  plan for this coming week is to cont on NMR/NPP training without AD and improve pt's functional indep with w/c level transfers to at least CS level to dec overall caregiver burden at d/c  Family/Caregiver Present yes   PT Family training done with: wife Andrew Troy participated with FT, see note above   Barriers to Discharge Inaccessible home environment   Barriers to Discharge Comments FT this saturday with ANGEL for stairs negotiation  although at this time will still recommend ramp installation for SHANNON due to its height and not all the time other family members can be present to assist pt and wife will all his medical appointments  PT Barriers   Physical Impairment Impaired balance;Decreased endurance;Decreased strength;Decreased range of motion;Decreased mobility; Decreased coordination; Impaired tone   Plan   Treatment/Interventions LE strengthening/ROM; Functional transfer training; Therapeutic exercise; Endurance training;Bed mobility;Gait training;Spoke to MD;Spoke to nursing;OT   Progress Progressing toward goals   Recommendation   PT Discharge Recommendation Home with outpatient rehabilitation   PT Therapy Minutes   PT Time In 1430   PT Time Out 1530   PT Total Time (minutes) 60   PT Mode of treatment - Individual (minutes) 60   PT Mode of treatment - Concurrent (minutes) 0   PT Mode of treatment - Group (minutes) 0   PT Mode of treatment - Co-treat (minutes) 0   PT Mode of Treatment - Total time(minutes) 60 minutes   PT Cumulative Minutes 1748   Therapy Time missed   Time missed?  No

## 2023-03-24 NOTE — PROGRESS NOTES
Internal Medicine Progress Note  Patient: Sarita Adamson  Age/sex: 60 y o  male  Medical Record #: [id number]      ASSESSMENT/PLAN: (Interval History)  Sarita Adamson is seen and examined and management for following issues:    Posterior circulation stroke  • S/p intracranial and extracranial vertebral artery stenting/TICI 2B revascularization  • Continue ASA, Brilinta, Eliquis and atorvastatin  • Prozac for depressed mood following CVA   • Neuropsych following  • Dysphagia 3 diet with nectar thick liquids  • Continue Baclofen 5mg 4x daily  • Therapy per primary service  • Outpt follow-up with Neurology and Neurosurgery  • CTA head and neck done 3/17/23  • Concern for in stent occlusion in the Rt vertebral artery V4 stent  • Neurosurgery saw pt 3/18/23 and 3/22/23 and finding likely does not represent new occlusion as lumen within stents is difficult to image on CTA  Pt will need a repeat CTA and NS follow-up in 6 weeks  • BMP stable monitor weekly    Rapid response  · Likely d/t vasovagal syncope  · No further events      HTN  • Home: No medications  • Here: amlodipine 10mg daily/Coreg 25mg 2x daily/lisinopril 40mg daily/hydralazine 50mg 2x daily  • Improved  • Renal artery ultrasound negative  • Continue to monitor trend  Fe deficiency anemia  · Likely multifactoral  · s/p IV venofer x 2 doses  · Cont daily ferrous sulfate  · Cbc stable     CKD stage 2  • Baseline Cr 1 2 - 1 3  • Chlorthalidone dc'd  • Cr 1 3  COVID  • Precautions lifted  • Pt was asymptomatic     Prediabetes  • HA1C 5 7  • Recommend dietary modifications       DC planning:  TBD  The above assessment and plan was reviewed and updated as determined by my evaluation of the patient on 3/24/2023      Labs:   Results from last 7 days   Lab Units 03/20/23  0546   WBC Thousand/uL 7 43   HEMOGLOBIN g/dL 10 2*   HEMATOCRIT % 32 6*   PLATELETS Thousands/uL 216     Results from last 7 days   Lab Units 03/20/23  0546 03/18/23  1122   SODIUM mmol/L 140 139   POTASSIUM mmol/L 4 1 4 6   CHLORIDE mmol/L 110* 111*   CO2 mmol/L 26 27   BUN mg/dL 27* 25   CREATININE mg/dL 1 25 1 27   CALCIUM mg/dL 8 9 8 7                   Review of Scheduled Meds:  Current Facility-Administered Medications   Medication Dose Route Frequency Provider Last Rate   • acetaminophen  650 mg Oral Q6H PRN Ce Rising, CRNP     • amLODIPine  10 mg Oral Daily Ashley South MD     • apixaban  2 5 mg Oral BID Ashley South MD     • aspirin  81 mg Oral Daily Ashley South MD     • atorvastatin  40 mg Oral Daily With Francis Perez MD     • baclofen  5 mg Oral TID Ashley South MD     • carvedilol  25 mg Oral BID With Meals YURI Mendoza     • vitamin B-12  1,000 mcg Oral Daily Ashley South MD     • ferrous sulfate  325 mg Oral Daily With Breakfast Ce RisingYURI     • FLUoxetine  20 mg Oral Daily Ashley South MD     • hydrALAZINE  25 mg Oral Q8H PRN Ce RisingYURI     • hydrALAZINE  50 mg Oral Q12H YURI Gallagher     • lisinopril  40 mg Oral Daily Ce RisingYURI     • polyethylene glycol  17 g Oral Daily PRN Ashley South MD     • psyllium  1 packet Oral Daily Isaac Javier MD     • ticagrelor  90 mg Oral Q12H Kendrick West MD         Subjective/ HPI: Patient seen and examined  Patients overnight issues or events were reviewed with nursing or staff during rounds or morning huddle session  New or overnight issues include the following:     Pt seen in his room  He denies any current complaints  ROS:   A 10 point ROS was performed; negative except as noted above  Imaging:     VAS renal artery complete   Final Result by Ayaal Renner MD (03/21 1801)      CTA head and neck w wo contrast   Final Result by Chelsie Veronica MD (03/18 5532)      CT HEAD   - Evolution of subacute infarcts in left midbrain involving cerebral peduncle, left tatiana, and bilateral cerebellum (right worse than left)     - No new acute intracranial abnormality  CTA HEAD AND NECK   - Right vertebral artery V4 stent appears to have in-stent occlusion  Right vertebral artery V4 segment is patent before and after the V4 stent  - Unchanged left vertebral artery post-PICA V4 segment occlusion    - Dissection flap in right vertebral artery V3 segment which is patent and improved in caliber status post thrombectomy  - Moderate stenosis in proximal-to- mid basilar artery due to atherosclerotic disease status post mechanical thrombectomy  Additional chronic/incidental findings as detailed above  I personally discussed this study with Isaiah Thorne on 3/18/2023 at 5:46 AM                               Workstation performed: [id number]         CTA head and neck w wo contrast    (Results Pending)       *Labs /Radiology studies Reviewed  *Medications  reviewed and reconciled as needed  *Please refer to order section for additional ordered labs studies  *Case discussed with primary attending during morning huddle case rounds    Physical Examination:  Vitals:   Vitals:    03/23/23 1407 03/23/23 1713 03/23/23 2023 03/24/23 0629   BP: 124/62 129/59 121/50 152/79   BP Location: Left arm  Left arm Left arm   Pulse: 58 56 58 56   Resp: 17  16 16   Temp: 98 °F (36 7 °C)  98 2 °F (36 8 °C) 98 1 °F (36 7 °C)   TempSrc: Oral  Oral Oral   SpO2: 98%  97% 98%   Weight:       Height:           GEN: No apparent distress, pleasant, conversive  NEURO: Alert and oriented x3; ongoing mild dysarthria  HEENT: Pupils are equal and reactive, EOMI, mucous membranes are moist, face asymmetrical  CV: S1 S2 regular, no MRG, no peripheral edema noted  RESP: Lungs are clear bilaterally, no wheezes, rales or rhonchi noted, on room air, respirations easy and non labored  GI: Flat, soft non tender, non distended; +BS x 4  : Voiding without difficulty  MUSC: Moves all extremities;  Rt hemiparesis; ongoing gait dysfunction  SKIN: pink, warm and dry, normal turgor, no rashes, lesions      The above physical exam was reviewed and updated as determined by my evaluation of the patient on 3/24/2023  Invasive Devices     None                    VTE Pharmacologic Prophylaxis: Eliquis  Code Status: Level 1 - Full Code  Current Length of Stay: 18 day(s)      Total time spent:  30 minutes with more than 50% spent counseling/coordinating care  Counseling includes discussion with patient re: progress  and discussion with patient of his/her current medical state/information  Coordination of patient's care was performed in conjunction with primary service  Time invested included review of patient's labs, vitals, and management of their comorbidities with continued monitoring  In addition, this patient was discussed with medical team including physician and advanced extenders  The care of the patient was extensively discussed and appropriate treatment plan was formulated unique for this patient  Medical decision making for the day was made by supervising physician unless otherwise noted in their attestation statement  ** Please Note:  voice to text software may have been used in the creation of this document   Although proof errors in transcription or interpretation are a potential of such software**

## 2023-03-24 NOTE — PROGRESS NOTES
"Occupational Therapy Treatment Note         03/24/23 7054   Pain Assessment   Pain Assessment Tool 0-10   Pain Score No Pain   Restrictions/Precautions   Precautions Bed/chair alarms;Cognitive; Fall Risk;Aphasia; Supervision on toilet/commode   Lifestyle   Autonomy \"I did a lot of therapy today\"   Tub/Shower Transfer   Findings min assist for dry tub transfer using tub bench, mobilizing into ADL suite with hemiwalker and wife, once seated on tub bench pt able to lift LEs in/out of tub using tub bench  pt demonstrates ability to lean to R to reach buttock, reviewed that if he cannot fully reach he can complete instance at sink or toilet before or after shower  For showers, will plan to have pt sit on tub bench for duration of shower as pt does not have grab bars at home  Sit to Stand   Type of Assistance Needed Physical assistance; Adaptive equipment   Physical Assistance Level 25% or less   Comment min assist with/without device   Sit to Stand CARE Score 3   Bed-Chair Transfer   Type of Assistance Needed Physical assistance; Adaptive equipment   Physical Assistance Level 25% or less   Comment min assist stand pivot with hemiwalker, min/mod assist to/from bathroom with hemiwalker   Chair/Bed-to-Chair Transfer CARE Score 3   Toileting Hygiene   Type of Assistance Needed Physical assistance; Adaptive equipment   Physical Assistance Level 25% or less   Comment min assist in stance on R with hemiwalker on R side, pt uses L UE to manage clothing up/down  Verbal cue to have pt switch hemiwalker from his left side (during mobility) to his right side to be able to use R UE to stabilize on it for clothing management   Toileting Hygiene CARE Score 3   Toilet Transfer   Type of Assistance Needed Physical assistance; Adaptive equipment   Physical Assistance Level 25% or less   Comment use of hemiwalker for sit <> stand off BSC over toilet   Toilet Transfer CARE Score 3   Cognition   Overall Cognitive Status Impaired " Arousal/Participation Alert; Cooperative   Attention Within functional limits   Orientation Level Oriented X4   Memory Decreased short term memory   Following Commands Follows multistep commands with increased time or repetition   Activity Tolerance   Activity Tolerance Patient tolerated treatment well   Assessment   Treatment Assessment Pt participated in skilled OT tx session  See above for further details on functional performance  Pt's wife present for family training  Per PT, pt's son/son in law will be able to assist pt up the stairs  From that point, anticipate pt could use hemiwalker and have assist for mobility into full bathroom with tub/shower  Pt's wife has tub bench already, practiced hands on tub transfers which pt able to perform at min assist assist and wife able to provide this assist  Pt's wife continued to provide hands on assistance with stand pivot transfers using hemiwalker/gait belt, standing balance during LB dressing and toileting  Pt's wife performed all transfers/mobility with pt today using gait belt (she will purchase gait belt for home)  OT reviewed pt's wife assisting with R UE stretches - shoulder abduction to about 90* with elbow/wrist extension, R forearm supination - holding x30 seconds for 2-3 reps 2-3x/day  Pt's wife will be beginning new job training next week and won't be in for further in person training next week, but can send a video from pt's phone to her phone of pt's progress  Pt's wife reports she called [facility] to schedule first OT/PT evaluations  OT encouraged them to look into pt's copays for therapy  Pt will continue to benefit from skilled OT intervention to address R UE neuromuscular re-education with focus on stretching to decrease tone, proximal ROM at shoulder and distal strengthening/coordination and , standing balance/tolerance   For mobility/transfers, use hemiwalker on L UE, however for standing during ADLs of LB dressing and toileting, use R UE on hemiwalker to allow L UE to complete clothing management/hygiene  Pt has progressed from total assist to min assist for overall ADL routine and continues to make good progress towards goals  In OT, continue to practice mobility to/from bathroom using hemiwalker, beginning of next week will assess pt's ability to perform walking to/from bathroom with all staff (currently outside of therapy recommend stand pivot only with hemiwalker)  Pt's wife is allowed to assist in stand pivot transfers using hemiwalker with staff providing supervision and managing the chair alarm/environment  Prognosis Good   Problem List Decreased strength;Decreased range of motion;Decreased endurance; Impaired balance;Decreased coordination;Decreased mobility; Decreased cognition; Impaired tone   Barriers to Discharge None  (no barriers to d/c home next friday  pt's wife has demonstrated ability to be able to assist pt)   Plan   Treatment/Interventions ADL retraining;Functional transfer training; Therapeutic exercise; Endurance training;Cognitive reorientation;Patient/family training;Equipment eval/education; Bed mobility; Compensatory technique education   Progress Progressing toward goals   Recommendation   OT Discharge Recommendation Home with outpatient rehabilitation   OT Therapy Minutes   OT Time In 1315   OT Time Out 1430   OT Total Time (minutes) 75   OT Mode of treatment - Individual (minutes) 75   OT Mode of treatment - Concurrent (minutes) 0   OT Mode of treatment - Group (minutes) 0   OT Mode of treatment - Co-treat (minutes) 0   OT Mode of Treatment - Total time(minutes) 75 minutes   OT Cumulative Minutes 8338   Therapy Time missed   Time missed?  No

## 2023-03-24 NOTE — PLAN OF CARE
Problem: Prexisting or High Potential for Compromised Skin Integrity  Goal: Skin integrity is maintained or improved  Description: INTERVENTIONS:  - Identify patients at risk for skin breakdown  - Assess and monitor skin integrity  - Assess and monitor nutrition and hydration status  - Monitor labs   - Assess for incontinence   - Turn and reposition patient  - Assist with mobility/ambulation  - Relieve pressure over bony prominences  - Avoid friction and shearing  - Provide appropriate hygiene as needed including keeping skin clean and dry  - Evaluate need for skin moisturizer/barrier cream  - Collaborate with interdisciplinary team   - Patient/family teaching  - Consider wound care consult   Outcome: Progressing     Problem: MOBILITY - ADULT  Goal: Maintain or return to baseline ADL function  Description: INTERVENTIONS:  -  Assess patient's ability to carry out ADLs; assess patient's baseline for ADL function and identify physical deficits which impact ability to perform ADLs (bathing, care of mouth/teeth, toileting, grooming, dressing, etc )  - Assess/evaluate cause of self-care deficits   - Assess range of motion  - Assess patient's mobility; develop plan if impaired  - Assess patient's need for assistive devices and provide as appropriate  - Encourage maximum independence but intervene and supervise when necessary  - Involve family in performance of ADLs  - Assess for home care needs following discharge   - Consider OT consult to assist with ADL evaluation and planning for discharge  - Provide patient education as appropriate  Outcome: Progressing  Goal: Maintains/Returns to pre admission functional level  Description: INTERVENTIONS:  - Perform BMAT or MOVE assessment daily    - Set and communicate daily mobility goal to care team and patient/family/caregiver  - Collaborate with rehabilitation services on mobility goals if consulted  - Perform Range of es a day  - Reposition paties    - Dangle pa  - Out of bed to c  - Out of bed for m  - Out of bed for toileting  - Record patient progress and toleration of activity level   Outcome: Progressing     Problem: PAIN - ADULT  Goal: Verbalizes/displays adequate comfort level or baseline comfort level  Description: Interventions:  - Encourage patient to monitor pain and request assistance  - Assess pain using appropriate pain scale  - Administer analgesics based on type and severity of pain and evaluate response  - Implement non-pharmacological measures as appropriate and evaluate response  - Consider cultural and social influences on pain and pain management  - Notify physician/advanced practitioner if interventions unsuccessful or patient reports new pain  Outcome: Progressing     Problem: INFECTION - ADULT  Goal: Absence or prevention of progression during hospitalization  Description: INTERVENTIONS:  - Assess and monitor for signs and symptoms of infection  - Monitor lab/diagnostic results  - Monitor all insertion sites, i e  indwelling lines, tubes, and drains  - Monitor endotracheal if appropriate and nasal secretions for changes in amount and color  - Elizabeth appropriate cooling/warming therapies per order  - Administer medications as ordered  - Instruct and encourage patient and family to use good hand hygiene technique  - Identify and instruct in appropriate isolation precautions for identified infection/condition  Outcome: Progressing  Goal: Absence of fever/infection during neutropenic period  Description: INTERVENTIONS:  - Monitor WBC    Outcome: Progressing     Problem: SAFETY ADULT  Goal: Patient will remain free of falls  Description: INTERVENTIONS:  - Educate patient/family on patient safety including physical limitations  - Instruct patient to call for assistance with activity   - Consult OT/PT to assist with strengthening/mobility   - Keep Call bell within reach  - Keep bed low and locked with side rails adjusted as appropriate  - Keep care items and personal belongings within reach  - Initiate and maintain comfort rounds  - Make Fall Risk Sign visible to staff  - Offer Toiletin, in advance of need  - Initiate/Main  - Obtain necessary fall risk management  - Apply yellow socks and bracelet for high fall risk patients  - Consider moving patient to room near nurses station  Outcome: Progressing  Goal: Maintain or return to baseline ADL function  Description: INTERVENTIONS:  -  Assess patient's ability to carry out ADLs; assess patient's baseline for ADL function and identify physical deficits which impact ability to perform ADLs (bathing, care of mouth/teeth, toileting, grooming, dressing, etc )  - Assess/evaluate cause of self-care deficits   - Assess range of motion  - Assess patient's mobility; develop plan if impaired  - Assess patient's need for assistive devices and provide as appropriate  - Encourage maximum independence but intervene and supervise when necessary  - Involve family in performance of ADLs  - Assess for home care needs following discharge   - Consider OT consult to assist with ADL evaluation and planning for discharge  - Provide patient education as appropriate  Outcome: Progressing  Goal: Maintains/Returns to pre admission functional level  Description: INTERVENTIONS:  - Perform BMAT or MOVE assessment daily    - Set and communicate daily mobility goal to care team and patient/family/caregiver  - Collaborate with rehabilitation services on mobility goals if consulted  - Perform Range of a day  - Reposition patient es    - Dangle   - Stand patient   - Ambulate p  - Out of bed to   - Out of bed   - Out of bed for toileting  - Record patient progress and toleration of activity level   Outcome: Progressing     Problem: DISCHARGE PLANNING  Goal: Discharge to home or other facility with appropriate resources  Description: INTERVENTIONS:  - Identify barriers to discharge w/patient and caregiver  - Arrange for needed discharge resources and transportation as appropriate  - Identify discharge learning needs (meds, wound care, etc )  - Arrange for interpretive services to assist at discharge as needed  - Refer to Case Management Department for coordinating discharge planning if the patient needs post-hospital services based on physician/advanced practitioner order or complex needs related to functional status, cognitive ability, or social support system  Outcome: Progressing     Problem: Nutrition/Hydration-ADULT  Goal: Nutrient/Hydration intake appropriate for improving, restoring or maintaining nutritional needs  Description: Monitor and assess patient's nutrition/hydration status for malnutrition  Collaborate with interdisciplinary team and initiate plan and interventions as ordered  Monitor patient's weight and dietary intake as ordered or per policy  Utilize nutrition screening tool and intervene as necessary  Determine patient's food preferences and provide high-protein, high-caloric foods as appropriate       INTERVENTIONS:  - Monitor oral intake, urinary output, labs, and treatment plans  - Assess nutrition and hydration status and recommend course of action  - Evaluate amount of meals eaten  - Assist patient with eating if necessary   - Allow adequate time for meals  - Recommend/ encourage appropriate diets, oral nutritional supplements, and vitamin/mineral supplements  - Order, calculate, and assess calorie counts as needed  - Recommend, monitor, and adjust tube feedings and TPN/PPN based on assessed needs  - Assess need for intravenous fluids  - Provide specific nutrition/hydration education as appropriate  - Include patient/family/caregiver in decisions related to nutrition  Outcome: Progressing

## 2023-03-24 NOTE — PROGRESS NOTES
03/24/23 0830   Pain Assessment   Pain Assessment Tool 0-10   Pain Score No Pain   Restrictions/Precautions   Precautions Fall Risk;Bed/chair alarms;Supervision on toilet/commode;Cognitive   Comprehension   Comprehension (FIM) 5 - Understands basic directions and conversation   Expression   Expression (FIM) 5 - Needs help/cues only RARELY (< 10% of the time)   Social Interaction   Social Interaction (FIM) 5 - Interacts appropriately with others 90% of time   Problem Solving   Problem solving (FIM) 4 - Solves basic problems 75-89% of time   Memory   Memory (FIM) 4 - Recognizes/recalls/performs 75-89%   Speech/Language/Cognition Assessmetn   Treatment Assessment Pt alert, awake and cooperative, seen sitting upright in recliner for the duration of today's ST tx session  Pt oriented x4, w/ intact remote recall of visitors and meals as well as prospective memory discussing family events planned for this weekend and his upcoming d/c date  Pt completed tangible medication management task w/ accuracy of 12/12 in placement of medications into correct container and with 7/12 recall of reason/use of medications  Pt able to appropriately decide the correct pill box to utilize given the frequency of his medications and request for assistance to open/ close pill bottles  Pt demo awareness, self correcting x4 when improper placement of beads occurred d/t impairment of fine motor skills  Pt then completed a fill in the blank paragraph task on a familiar topic ( home repair) where he was provided w/ a word bank and asked to determine the appropriate word in context  Pt completed task w/ accuracy of 6/11 initially given increased wait time and min verbal cues  Student SLP then provided education on use of reading aloud strategy to aid in processing/ increasing awareness of errors in written tasks with pt accuracy improving to 11/11 while using strategy (reading aloud)   Pt also completed written 5 step household sequencing task w/ "accuracy on initial stimulus item of 3/5  Pt verbally cued to use read aloud strategy and w/ improved accuracy of 5/5 on remaining 3 stimulus items  Pt stated awareness of improved accuracy when using strategy \" it worked\" and student SLP discussed w/ pt various functional contexts for use of strategy upon d/c  At this time pt continues to benefit from skilled ST services targeting cognitive linguistic skills to minimize caregiver burden upon d/c  SLP Therapy Minutes   SLP Time In 0830   SLP Time Out 0930   SLP Total Time (minutes) 60   SLP Mode of treatment - Individual (minutes) 0   SLP Mode of treatment - Concurrent (minutes) 60   SLP Mode of treatment - Group (minutes) 0   SLP Mode of treatment - Co-treat (minutes) 0   SLP Mode of Treatment - Total time(minutes) 60 minutes   SLP Cumulative Minutes 780   Therapy Time missed   Time missed?  No       "

## 2023-03-24 NOTE — PROGRESS NOTES
Physical Medicine and Rehabilitation Progress Note  Sofy Aragon 60 y o  male MRN: [id number]  Unit/Bed#: -87 Encounter: [id number]    HPI: Sofy Aragon is a 60 y o  right handed male with medical history of HTN who presented to [address] [place] [address] Road on 2/25 with nausea/dizziness  Found to have hypertensive emergency with acute/subcaute R posterior cerebellar CVA with 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, mild BRAULIO, and incidentally found + COVID (without evidence of respiratory illness/hypoxia/CXR findings)  Placed on cardene gtt, stroke pathway, ASA/Plavix, IV hydration for BRAULIO, and CTX for UTI  NIHSS 2  Symptoms began 2 days prior  Not tPA/TNK candidate  MRI/MRA with 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  NSx recommended transfer to [facility] and starting on heparin gtt and stopped Plavix  Repeat CTA on 2/26 stable with with R V3/4 occlusion  Not a candidate for interventional procedure due to clinical stability and risk  Speech consulted and noted mod-severe oropharyngeal dysphagia recommended pureed/HTL  Unfortunately later on 2/26, he clinically deteriorated with increased R sided weakness, and was taken to IR for stenting of V3 and V4 with TICI 2B revascularization  CTH without ICH  He was admitted back to the ICU intubated - extubated 2/27  MRI showed cerebellar CVA and R > L stroke burden, with additional hypodensities on DWI appreciated L midbrain, pontine area and R lower medullary/spinal cord junction  He was transitioned to triple therapy with DAPT (given recent stent) and A/C with eliquis 2 5mg BID due to COVID  Diet advanced to Level 3/NTL on 2/27  Noted to have spasticity in RLE - started on baclofen by Neurology  He was able to have cardene discontinued on 3/2, and they have been making adjustments to his oral regimen  He was started on Prozac for his mood  NSx has signed off  CTA H/N recommended around 3/17  Length of time on eliquis unclear   Admitted to [facility] on 3/6  Chief Complaint: Feeling well  Interval History/Subjective:  No acute events overnight  Sleep is fine  Shoulder feels tight - has been doing reaching exercises with therapy  Denies any new numbness/tingling/weakness and pushes himself through therapy  Denies any new CP, SOB, fevers, chills, N/V, abdominal pain  Last BM was 3/23 and continent  Continues to have more proprioceptive and motor coordination/planning difficulty, but doing divided attention tasks while ambulating with therapies  ROS:  A 10 point review of systems was negative except for what is noted in the HPI  Today's Changes:  1  Continue Baclofen 5mg TID  Will have him f/u with me outpatient to monitor and manage his spasticity  2  Placed ambulatory referrals to PT/OT/SLP in preparation for discharge next week  Total visit time: 35 minutes, with more than 50% spent counseling/coordinating care  Counseling includes discussion with patient re: progress in therapies, functional issues observed by therapy staff, and discussion with patient regarding their current medical state and wellbeing  Coordination of patient's care was performed in conjunction with Internal Medicine service to monitor patient's labs, vitals, and management of their comorbidities  Assessment/Plan:    * Acute Posterior Circulation Stroke  Assessment & Plan  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    - Now s/p stenting on 2/26 with TICI 2b revascularization    PPx: ASA/brilinta/Eliquis, Statin   - Discussed with Neurology, they defer to NSx on length of time on eliquis   Potentially 3-4 weeks if felt to be related to [address] out to Neurosurgery - they will discuss with their attending re: eliquis  - Repeat CTA H/N -  No new intracranial abnormalities, expected evolution of known CVA  See dissection and stenosis for more details  3/9 Had nocturnal oximetry to screen for nocturnal hypoxia - only 26 seconds total of mild desaturation  Maintain normotension  Education on prediabetes and diet  Follow-up with Neurovascular/Neurosurgery  Function improving slowly   Continue PT/OT/SLP - for swallow, speech, R sided weakness/tone, will need a good visual exam      Celiac artery stenosis (HCC)  Assessment & Plan  Incidentally noted to have approximately 70% celiac artery stenosis on 3/21 Renal Artery Ultrasound  No s/s of ischemic bowel  Would recommend outpatient f/u with PCP  Neurogenic bowel  Assessment & Plan  Disinhibited bowel + mobility difficulties -somewhat loose as well  Trial Metamucil daily - better formed  Monitor  Not on bowel meds right now  [facility] Bladder Training:   - 30-45 min after each meal will get patient onto commode to attempt bowel movement  - He wants to hold off on scheduled suppository for now (would schedule in AM to align with his previous bowel schedule at home)  For now monitor, close continence care especially    Anemia  Assessment & Plan  Stable 3/20  Normocytic anemia noted through stay  B12 borderline low  Folate normal  Iron Panel: Low iron saturation and iron with normal TIBC and Ferritin  Was started in the hospital on B12, but not iron  Per IM - 2 days of IV Venofer (last day 3/8)  Then start oral iron  Monitor Hgb, transfuse as appropriate  Will discuss with IM  Consulted  Adjustment disorder with depressed mood  Assessment & Plan  Tearful at times, but coping  Has been tearful and labile during hospitalization  Started on Prozac 20mgdaily - may need to increase dose    Consulted rehab psychology for support  Prediabetes  Assessment & Plan  A1C 5 7  Consult nutrition for education on diabetic diet  Diet/lifestyle modifications  Follow-up with PCP  CKD (chronic kidney disease)  Assessment & Plan  Lab Results   Component Value Date    EGFR 60 03/20/2023    EGFR 59 03/18/2023    EGFR 58 03/17/2023    CREATININE 1 25 03/20/2023    CREATININE 1 27 03/18/2023    CREATININE 1 30 03/17/2023     Per hospital team - suspect CKD Stage 2 2/2 hypertension   - stable today  Will monitor BMP while here  Avoid nephrotoxic meds and relative hypotension  Recommend outpatient f/u with PCP    Spasticity  Assessment & Plan  Intrinsic tonic tone in R quad which is mild  RUE with tone in SA, EF, WF (MAS 1) and pronator (MAS 1+)  Hiccups resolved  Would opt to avoid treating R quad for now, as tone there may help stabilize at the knee  R ankle multipodus  Baclofen 5mg TID - monitor and adjust as appropriate  Range of motion  Monitor as tone evolves  Outpatient f/u with PMR  Dysphagia  Assessment & Plan  Improved  Admitted with mod-severe oropharyngeal  Has massively improved  3/8 Advanced to Level 3/Thins  3/10 Advanced to Reg/Thins  Aspiration precautions  Good oral hygiene   SLP consulted    Primary hypertension  Assessment & Plan  Home: None  Here: Amlodipine 10mg daily, Coreg 25mg BID, Lisinopril 40mg daily, Hydralazine 50mg Q12hr  Has PRN hydralazine as well  Had hypertensive urgency in hospital requiring cardene gtt   3/21 IM ordered renal artery ultrasound  No significant PRIETO  Monitor and adjust as appropriate  IM consulted to assist with management  Stenosis of left vertebral artery  Assessment & Plan  Severe L vertebral artery origin stenosis  3/17 CTA H/N:   CTA HEAD AND NECK  - Right vertebral artery V4 stent appears to have in-stent occlusion  Right vertebral artery V4 segment is patent before and after the V4 stent    - Unchanged left vertebral artery post-PICA V4 segment occlusion   - Dissection flap in right vertebral artery V3 segment which is patent and improved in caliber status post thrombectomy  - Moderate stenosis in proximal-to- mid basilar artery due to atherosclerotic disease status post mechanical thrombectomy  - Per Neurosurgery unlikely in stent occlusion  No changes  Plan for outpatient f/u  ASA/Brilinta  Atorvastatin  SBP goals 120-160  Follow-up with Neurovascular  Right Vertebral artery dissection [facility])  Assessment & Plan  Now s/p R V3/4 stenting with TICI 2B revascularization on 2/26 for R Vert stenosis  Continue ASA/Brilinta  Statin  CTA HEAD AND NECK 3/17  - Dissection flap in right vertebral artery V3 segment which is patent and improved in caliber status post thrombectomy  Outpatient f/u with Neurosurgery/Dr Heath Myrick      Vasovagal syncope-resolved as of 3/22/2023  Assessment & Plan  No further episodes  3/8 Had episode of vasovagal syncope  - No convulsions  On body weight support system, started to feel dizzy  - Staring episode after, but able to sternal rubbed out of it  Very brief   - No post-ictal weakness   - Neurologically stable and back to baseline   - Orthostatics negative  Discussed with Neurology - unlikely seizure given distribution of stroke and presentation  No further testing recommended at this time  Monitor  COVID-resolved as of 3/10/2023  Assessment & Plan  Resolved  Incidentally found to have COVID on 2/25  Asymptomatic from respiratory standpoint  Per Neurosurgery concern for hypercoagulability related to COVID  Currently on Eliquis 2 5mg BID - per Neuro 3-4 weeks probably reasonable, but for them ultimately up to NSx as this was their initial recommendation to start this medication  3/8 Discontinued precautions after 10 days isolation  Health Maintenance  #Delirium/Sleep: At risk  Optimize bowel/bladder, sleep-wake cycle, mood and pain management  #Pain: Baclofen 5mg TID, Tylenol PRN  #Bowel: Last BM 3/23   On metamucil  Otherwise, only PRN miralax  See Neurogenic bowel above  #Bladder: Voiding and continent  #Skin/Pressure Injury Prevention: Turn Q2hr in bed, with weight shifts [id number] in wheelchair  Float heels in bed  #DVT Prophylaxis: On triple therapy, SCDs  #GI Prophylaxis: None  #Code Status:  Full Code  #FEN: Reg/Thins  #Dispo:  Team 3/21:  Family training with the wife starts tomorrow  Goals for outpatient therapies PT/OT/SLP  Family's goal is for him to leave by ADD 3/31  Barrier: R sided weakness, proprioception, kinesthetic awareness, impaired sensation on the R, ataxia, truncal weakness, dysphagia, expressive > receptive aphasia, post-stroke depression, bowel incontinence/disinhibited, spasticity, decreased righting reaction, 25/30 MOCA  Goals: Sup wheelchair level mobility/set-up ADLs  Follow-up: Neurology, Neurosurgery, PCP, PMR (spasticity)       Objective:    Functional Update:  PT: Eusebio transfers, bed mobility, maxA x2 ambulation (chair follow) - HHA, Dis Sup wheelchair 200', Ax2 for 20 stairs  OT: Sup eating, grooming, Eusebio grooming, Sup UB dressing, modA LB dressing, modA toileting, Eusebio tub/shower transfer, Eusebio toilet transfer  SLP: Eusebio problem solving/memory, Eusebio-Sup comprehension/expression  Executive function deficits  Fatigues with higher level tasks  On reg/thins for diet  Allergies per EMR    Physical Exam:  Temp:  [98 °F (36 7 °C)-98 2 °F (36 8 °C)] 98 1 °F (36 7 °C)  HR:  [56-58] 56  Resp:  [16-17] 16  BP: (121-152)/(50-79) 152/79  Oxygen Therapy  SpO2: 98 %    Gen: No acute distress, Well-nourished, well-appearing  HEENT: Moist mucus membranes, Normocephalic/Atraumatic  Cardiovascular: Regular rate, rhythm, S1/S2  Distal pulses palpable  Heme/Extr: No edema  Pulmonary: Non-labored breathing  Lungs CTAB  : No barnett  GI: Soft, non-tender, non-distended  BS+  MSK: Full PROM in all extremities  Tone as below  Integumentary: Skin is warm, dry    Neuro: AAOx3, Strength is stable and improving on the R  Tone is stable in his R SA, EF, WF, pronator, and quad  He ambulates with impaired clearance on the R and decreased time in stance on the R  He does not buckle, but knee remains flexed through stance  He has clear ankle weakness on the R  He requires 2 person gait belt with no AD and is able to read some phrases while ambulating, although has some difficulty with divided attention  With hemiwalker he needs only one person assist for ambulation  Clear issues yet with proprioception, motor planning/coordination on the R    Psych: Normal mood and affect         Diagnostic Studies: Reviewed, no new imaging    Laboratory:  Reviewed   Results from last 7 days   Lab Units 03/20/23  0546   HEMOGLOBIN g/dL 10 2*   HEMATOCRIT % 32 6*   WBC Thousand/uL 7 43     Results from last 7 days   Lab Units 03/20/23  0546 03/18/23  1122   BUN mg/dL 27* 25   POTASSIUM mmol/L 4 1 4 6   CHLORIDE mmol/L 110* 111*   CREATININE mg/dL 1 25 1 27            Patient Active Problem List   Diagnosis   • BRAULIO (acute kidney injury) ([facility] [address] )   • Acute Posterior Circulation Stroke   • Right Vertebral artery dissection (HCC)   • Stenosis of left vertebral artery   • Primary hypertension   • Dizziness   • Dysphagia   • Spasticity   • CKD (chronic kidney disease)   • Prediabetes   • Adjustment disorder with depressed mood   • Anemia   • Neurogenic bowel   • Celiac artery stenosis (HCC)         Medications  Current Facility-Administered Medications   Medication Dose Route Frequency Provider Last Rate   • acetaminophen  650 mg Oral Q6H PRN YURI Stanford     • amLODIPine  10 mg Oral Daily Massimo Zhang MD     • apixaban  2 5 mg Oral BID Massimo Zhang MD     • aspirin  81 mg Oral Daily Massimo Zhang MD     • atorvastatin  40 mg Oral Daily With Lavonne Russo MD     • baclofen  5 mg Oral TID Massimo Zhang MD     • carvedilol  25 mg Oral BID With Meals YURI Stanford     • vitamin B-12  1,000 mcg Oral Daily Stephanie Madden MD     • ferrous sulfate  325 mg Oral Daily With Breakfast YURI Parker     • FLUoxetine  20 mg Oral Daily Stephanie Madden MD     • hydrALAZINE  25 mg Oral Q8H PRN YURI Parker     • hydrALAZINE  50 mg Oral Q12H YURI Parker     • lisinopril  40 mg Oral Daily YURI Parker     • polyethylene glycol  17 g Oral Daily PRN Stephanie Madden MD     • psyllium  1 packet Oral Daily Claudean Solan, MD     • ticagrelor  90 mg Oral Q12H Cosimo Gowers, MD            ** Please Note: Fluency Direct voice to text software may have been used in the creation of this document   **

## 2023-03-25 RX ADMIN — ATORVASTATIN CALCIUM 40 MG: 40 TABLET, FILM COATED ORAL at 16:19

## 2023-03-25 RX ADMIN — FERROUS SULFATE TAB 325 MG (65 MG ELEMENTAL FE) 325 MG: 325 (65 FE) TAB at 06:21

## 2023-03-25 RX ADMIN — APIXABAN 2.5 MG: 2.5 TABLET, FILM COATED ORAL at 08:31

## 2023-03-25 RX ADMIN — FLUOXETINE 20 MG: 20 CAPSULE ORAL at 08:31

## 2023-03-25 RX ADMIN — BACLOFEN 5 MG: 10 TABLET ORAL at 21:12

## 2023-03-25 RX ADMIN — HYDRALAZINE HYDROCHLORIDE 50 MG: 50 TABLET, FILM COATED ORAL at 18:40

## 2023-03-25 RX ADMIN — CARVEDILOL 25 MG: 25 TABLET, FILM COATED ORAL at 06:22

## 2023-03-25 RX ADMIN — BACLOFEN 5 MG: 10 TABLET ORAL at 08:31

## 2023-03-25 RX ADMIN — CYANOCOBALAMIN TAB 500 MCG 1000 MCG: 500 TAB at 08:30

## 2023-03-25 RX ADMIN — TICAGRELOR 90 MG: 90 TABLET ORAL at 21:13

## 2023-03-25 RX ADMIN — APIXABAN 2.5 MG: 2.5 TABLET, FILM COATED ORAL at 18:40

## 2023-03-25 RX ADMIN — ASPIRIN 81 MG CHEWABLE TABLET 81 MG: 81 TABLET CHEWABLE at 08:31

## 2023-03-25 RX ADMIN — TICAGRELOR 90 MG: 90 TABLET ORAL at 08:32

## 2023-03-25 RX ADMIN — HYDRALAZINE HYDROCHLORIDE 50 MG: 50 TABLET, FILM COATED ORAL at 06:21

## 2023-03-25 RX ADMIN — BACLOFEN 5 MG: 10 TABLET ORAL at 16:20

## 2023-03-25 RX ADMIN — LISINOPRIL 40 MG: 20 TABLET ORAL at 08:31

## 2023-03-25 RX ADMIN — AMLODIPINE BESYLATE 10 MG: 10 TABLET ORAL at 08:31

## 2023-03-25 RX ADMIN — CARVEDILOL 25 MG: 25 TABLET, FILM COATED ORAL at 16:19

## 2023-03-25 RX ADMIN — Medication 1 PACKET: at 08:30

## 2023-03-25 NOTE — PROGRESS NOTES
Internal Medicine Progress Note  Patient: Naima Peguero  Age/sex: 60 y o  male  Medical Record #: [id number]      ASSESSMENT/PLAN: (Interval History)  Naima Peguero is seen and examined and management for following issues:    Posterior circulation stroke  • S/p intracranial and extracranial vertebral artery stenting/TICI 2B revascularization  • Continue ASA, Brilinta, Eliquis and atorvastatin  • Prozac for depressed mood following CVA   • Neuropsych following  • Continue Baclofen 5mg TID  • Therapy per primary service  • CTA head and neck done 3/17/23 and was concern for in stent occlusion in the Rt vertebral artery V4 stent  • Neurosurgery saw pt 3/18/23 and 3/22/23 and finding likely does not represent new occlusion as lumen within stents is difficult to image on CTA  Pt will need a repeat CTA and NS follow-up in 6 weeks  • Outpt follow-up with Neurology and Neurosurgery     Dysphagia  · s/p dysphagia 3 with NTL now on a regular diet starting 3/21/23  · ST following    Rapid response 3/8/23  • Likely d/t vasovagal syncope  • No further events      HTN  • Home: No medications  • Here: Amlodipine 10mg daily/Coreg 25mg BID/Lisinopril 40mg daily/Hydralazine 50mg BID  • Renal artery ultrasound negative  • Improved and BP trends acceptable     Fe deficiency anemia  • Likely multifactoral  • s/p IV Venofer x 2 doses  • Cont daily ferrous sulfate  • Cbc stable     CKD stage 2  • Baseline Cr 1 2 - 1 3  • Chlorthalidone dc'd  • Cr 1 3      COVID  • Precautions lifted  • Pt was asymptomatic     Prediabetes  • HA1C 5 7  • Recommend dietary modifications        DC planning:  TBD    The above assessment and plan was reviewed and updated as determined by my evaluation of the patient on 3/25/2023      Labs:   Results from last 7 days   Lab Units 03/20/23  0546   WBC Thousand/uL 7 43   HEMOGLOBIN g/dL 10 2*   HEMATOCRIT % 32 6*   PLATELETS Thousands/uL 216     Results from last 7 days   Lab Units 03/20/23  0546   SODIUM mmol/L 140   POTASSIUM mmol/L 4 1   CHLORIDE mmol/L 110*   CO2 mmol/L 26   BUN mg/dL 27*   CREATININE mg/dL 1 25   CALCIUM mg/dL 8 9                   Review of Scheduled Meds:  Current Facility-Administered Medications   Medication Dose Route Frequency Provider Last Rate   • acetaminophen  650 mg Oral Q6H PRN Jani Kanner, CRNP     • amLODIPine  10 mg Oral Daily Michael Nguyen MD     • apixaban  2 5 mg Oral BID Michael Nguyen MD     • aspirin  81 mg Oral Daily Michael Nguyen MD     • atorvastatin  40 mg Oral Daily With Gloria Ch MD     • baclofen  5 mg Oral TID Michael Nguyen MD     • carvedilol  25 mg Oral BID With Meals Jani Kanner, CRNP     • vitamin B-12  1,000 mcg Oral Daily Michael Nguyen MD     • ferrous sulfate  325 mg Oral Daily With Breakfast Jani Kanner, CRNP     • FLUoxetine  20 mg Oral Daily Michael Nguyen MD     • hydrALAZINE  25 mg Oral Q8H PRN Jani Kanner, CRNP     • hydrALAZINE  50 mg Oral Q12H YURI Gallagher     • lisinopril  40 mg Oral Daily Jani Kanner, CRNP     • polyethylene glycol  17 g Oral Daily PRN Michael Nguyen MD     • psyllium  1 packet Oral Daily Chu Michaels MD     • ticagrelor  90 mg Oral Q12H Symone Natarajan MD         Subjective/ HPI: Patient seen and examined  Patients overnight issues or events were reviewed with nursing or staff during rounds or morning huddle session  New or overnight issues include the following:     No new or overnight issues  Offers no complaints    ROS:   A 10 point ROS was performed; negative except as noted above  Imaging:     VAS renal artery complete   Final Result by Franklin Askew MD (03/21 1791)      CTA head and neck w wo contrast   Final Result by Richard De La Rosa MD (03/18 8114)      CT HEAD   - Evolution of subacute infarcts in left midbrain involving cerebral peduncle, left tatiana, and bilateral cerebellum (right worse than left)  - No new acute intracranial abnormality        CTA HEAD AND NECK   - Right vertebral artery V4 stent appears to have in-stent occlusion  Right vertebral artery V4 segment is patent before and after the V4 stent  - Unchanged left vertebral artery post-PICA V4 segment occlusion    - Dissection flap in right vertebral artery V3 segment which is patent and improved in caliber status post thrombectomy  - Moderate stenosis in proximal-to- mid basilar artery due to atherosclerotic disease status post mechanical thrombectomy  Additional chronic/incidental findings as detailed above  I personally discussed this study with Marcelina Ceballos on 3/18/2023 at 5:46 AM                               Workstation performed: [id number]         CTA head and neck w wo contrast    (Results Pending)       *Labs /Radiology studies reviewed  *Medications reviewed and reconciled as needed  *Please refer to order section for additional ordered labs studies  *Case discussed with primary attending during morning huddle case rounds    Physical Examination:  Vitals:   Vitals:    03/24/23 1729 03/24/23 1903 03/24/23 2112 03/25/23 0446   BP: 135/66 125/63 138/72 152/76   BP Location:   Right arm Right arm   Pulse: 65  60 62   Resp:   20 20   Temp:   98 3 °F (36 8 °C) 97 9 °F (36 6 °C)   TempSrc:   Oral Oral   SpO2:   97% 98%   Weight:    85 1 kg (187 lb 9 8 oz)   Height:           General Appearance: no distress, conversive  HEENT: PERRLA, conjuctiva normal; oropharynx clear; mucous membranes moist   Neck:  Supple, normal ROM  Lungs: CTA, normal respiratory effort, no retractions, expiratory effort normal  CV: regular rate and rhythm; no rubs/murmurs/gallops, PMI normal   ABD: soft; ND/NT; +BS  EXT: no edema  Skin: normal turgor, normal texture, no rashes  Psych: affect normal, mood normal  Neuro: AAO      The above physical exam was reviewed and updated as determined by my evaluation of the patient on 3/25/2023      Invasive Devices     None                    VTE Pharmacologic Prophylaxis: Eliquis  Code Status: Level 1 - Full Code  Current Length of Stay: 19 day(s)      Total time spent:  30 minutes with more than 50% spent counseling/coordinating care  Counseling includes discussion with patient re: progress  and discussion with patient of his/her current medical state/information  Coordination of patient's care was performed in conjunction with primary service  Time invested included review of patient's labs, vitals, and management of their comorbidities with continued monitoring  In addition, this patient was discussed with medical team including physician and advanced extenders  The care of the patient was extensively discussed and appropriate treatment plan was formulated unique for this patient  Medical decision making for the day was made by supervising physician unless otherwise noted in their attestation statement  ** Please Note:  voice to text software may have been used in the creation of this document   Although proof errors in transcription or interpretation are a potential of such software**

## 2023-03-25 NOTE — PROGRESS NOTES
"   03/25/23 1400   Pain Assessment   Pain Assessment Tool 0-10   Pain Score No Pain   Restrictions/Precautions   Precautions Aspiration;Bed/chair alarms;Cognitive; Fall Risk;Supervision on toilet/commode   Weight Bearing Restrictions No   ROM Restrictions No   Comprehension   Comprehension (FIM) 5 - Understands basic directions and conversation   Expression   Expression (FIM) 5 - Needs help/cues only RARELY (< 10% of the time)   Social Interaction   Social Interaction (FIM) 5 - Interacts appropriately with others 90% of time   Problem Solving   Problem solving (FIM) 4 - Solves basic problems 75-89% of time   Memory   Memory (FIM) 5 - [place] cues patient   Speech/Language/Cognition Assessmetn   Treatment Assessment Pt seen for skilled speech therapy session targeting cognitive linguistic communication skills  Pt alert and interactive- engaged in brief rapport with recall events  Pt had family training with PT today and his daughter/son-in-law  Pt stated we \"did the steps\" as we said he son wasn't able to be present to complete the education  Pt stated he will have to do stairs to get to second floor full bathroom as there is only a half bath on first floor  Pt is to go to outpt therapy at discharge  Pt completed the following skilled therapy tasks- working memory with recalling paragraph facts 3-4 elements  Pt was able to complete inclusion task with 14/15acc  Pt completed exclusion with 7/9acc  Pt next completed visual logic task with calendar and identifying errors  Pt was able to location all 10 errors with 7/10acc increasing with verbal cues for locating items in which pt then was able to determine then why this was an error  Regarding word retrieval, pt completed higher level/abstract naming task (e g , things that are bright, reflect, crisp, etc) and was able to name 3 items per category with 38/40acc increasing with semantic cues and extended time   Last, pt completed series of 3 deduction/planning puzzles " "(closet, kitchen, garden)  Pt was able to sort items in correct locations on a grid with overall min to mod A, often providing only cues for \"read it again\" to allow improve processing of the two step fact  Family present again at end of session, provided brief update on progress and tasks completed this session, pt overall performing well and making progress towards his goals  Plan to cont to target ST/working memory as well as higher level tasks for word associations and executive functioning skills  Pt overall is improving with skilled SLP services and will cont to benefit to maximize overall cognitive linguistic communication abilities at this time  SLP Therapy Minutes   SLP Time In 1400   SLP Time Out 1500   SLP Total Time (minutes) 60   SLP Mode of treatment - Individual (minutes) 60   SLP Mode of treatment - Concurrent (minutes) 0   SLP Mode of treatment - Group (minutes) 0   SLP Mode of treatment - Co-treat (minutes) 0   SLP Mode of Treatment - Total time(minutes) 60 minutes   SLP Cumulative Minutes 840   Therapy Time missed   Time missed?  No       "

## 2023-03-25 NOTE — PROGRESS NOTES
03/25/23 1000   Pain Assessment   Pain Assessment Tool 0-10   Pain Score No Pain   Restrictions/Precautions   Precautions Bed/chair alarms;Cognitive; Aphasia; Aspiration; Fall Risk;Supervision on toilet/commode   Putting On/Taking Off Footwear   Type of Assistance Needed Adaptive equipment;Supervision   Physical Assistance Level No physical assistance   Comment Pt seated to don slip on shoes and edu provided for use of shoe horn, pt able to don R shoe with shoe horn with inc time and VC for carryover of technique  Putting On/Taking Off Footwear CARE Score 4   Sit to Stand   Type of Assistance Needed Physical assistance;Verbal cues   Physical Assistance Level 25% or less   Comment CGA/Eusebio with HW on L, VC to push up to stand with pt trying to pull on HW   Sit to Stand CARE Score 3   Bed-Chair Transfer   Type of Assistance Needed Physical assistance   Physical Assistance Level 25% or less   Comment Eusebio/CGA with HW on L   Chair/Bed-to-Chair Transfer CARE Score 3   Transfer Bed/Chair/Wheelchair   Positioning Concerns Hemiplegia   Limitations Noted In Balance; Coordination; Endurance; Sequencing;UE Strength;LE Strength   Adaptive Equipment [address] Offered toileting multiple times with pt declining need to go   Neuromuscular Education   Functional Movement Patterns Followed scap mobilization and manual resistance to RUE with fxnl grasp/release and fxnl reach activity with pt utilizing RUE to retrieve golf balls from bin on R side and crossing midline to place golf balls on 8 cones and then take back off cones to place back in bin  Pt completes 4x for mass repetition with rest breaks in between 2* RUE fatigue  Pt dropping 1 golf ball throughout all trials though requires POC at R wrist from OT for improved hand to target and controlled release of golf ball  Comments Pt seated EOM for self assisted RUE PROM R shoulder flexion with RUE craddled in LUE and trunk flexion to knees 2x10   Pt then engaged in trunk rotation R/L 20x with RUE craddled and focus on stretch to latts to improve RUE fxnl reach  OT completing R scapular mobilization through all planes 15x with pt tolerating and reporting dec stiffness to RUE post completion  Manual resistance completed 4x5 R shoulder internal/external rotation and R forearm pronation/supination  Sets broken down into 5s due to pt unable to tolerate 10x straight through without fatigue  Pt placing RUE on large yoga ball and LUE on top for deep stretch and WB as tolerated through RUE to roll ball out in front 10x with 3 count hold  Pt responds well to scap mobs, trunk rotation, and manual resistance though fatigues quickly  Cognition   Overall Cognitive Status Impaired   Arousal/Participation Cooperative   Attention Within functional limits   Orientation Level Oriented X4   Memory Decreased short term memory   Following Commands Follows multistep commands with increased time or repetition   Comments pt presents emotionally labile at times   Activity Tolerance   Activity Tolerance Patient tolerated treatment well   Assessment   Treatment Assessment Pt participated in skilled OT Tx with focus on fxnl transfers and RUE NMR  See above note for activity detail  Pt tolerated Tx well though fatigues quickly and requires frequent rest breaks  Pt continues to benefit from skilled OT services with focus on ADL retraining, stand tolerance, standing balance, and RUE NMR  Prognosis Good   Problem List Decreased strength;Decreased range of motion;Decreased endurance; Impaired balance;Decreased mobility; Decreased coordination;Decreased cognition; Impaired tone   Barriers to Discharge Inaccessible home environment   Plan   Treatment/Interventions ADL retraining;Functional transfer training; Therapeutic exercise; Endurance training;Patient/family training   Progress Progressing toward goals   Recommendation   OT Discharge Recommendation   (pending pt progress)   OT Therapy Minutes OT Time In 1000   OT Time Out 1130   OT Total Time (minutes) 90   OT Mode of treatment - Individual (minutes) 90   OT Mode of treatment - Concurrent (minutes) 0   OT Mode of treatment - Group (minutes) 0   OT Mode of treatment - Co-treat (minutes) 0   OT Mode of Treatment - Total time(minutes) 90 minutes   OT Cumulative Minutes 1780   Therapy Time missed   Time missed?  No

## 2023-03-25 NOTE — PROGRESS NOTES
03/25/23 1230   Pain Assessment   Pain Assessment Tool 0-10   Pain Score No Pain   Restrictions/Precautions   Precautions Bed/chair alarms; Aphasia; Aspiration;Cognitive; Fall Risk;Supervision on toilet/commode   Weight Bearing Restrictions No   ROM Restrictions No   Cognition   Overall Cognitive Status Impaired   Arousal/Participation Alert; Cooperative   Attention Within functional limits   Orientation Level Oriented X4   Memory Decreased short term memory   Following Commands Follows multistep commands with increased time or repetition   Sit to Stand   Type of Assistance Needed Physical assistance;Verbal cues; Adaptive equipment   Physical Assistance Level 25% or less   Comment KEITH with/without AD, when HW present pt attempts to grab HW to get up  Sit to Stand CARE Score 3   Bed-Chair Transfer   Type of Assistance Needed Physical assistance;Verbal cues; Adaptive equipment   Physical Assistance Level 25% or less   Comment KEITH with/without AD   Chair/Bed-to-Chair Transfer CARE Score 3   Transfer Bed/Chair/Wheelchair   Adaptive Equipment Emiliano Walker   Walk 10 Feet   Type of Assistance Needed Physical assistance;Verbal cues; Adaptive equipment   Physical Assistance Level 25% or less   Comment with HW in room   Walk 10 Feet CARE Score 3   Walk 50 Feet with Two Turns   Type of Assistance Needed Physical assistance;Verbal cues; Adaptive equipment   Physical Assistance Level 26%-50%   Comment no AD with gait belt   Walk 50 Feet with Two Turns CARE Score 3   Walk 150 Feet   Type of Assistance Needed Physical assistance;Verbal cues; Adaptive equipment   Physical Assistance Level 51%-75%   Comment no AD with gait belt, increaed A with fatigue   Walk 150 Feet CARE Score 2   Ambulation   Primary Mode of Locomotion Prior to Admission Walk   Distance Walked (feet) 200 ft  (x2, 50' x2)   Assist Device Emiliano Walker  (hands on gait belt)   Gait Pattern Inconsistant Mae;Decreased foot clearance;R foot drag;Narrow GAYLA;Scissoring;Shuffle;Improper weight shift   Limitations Noted In Coordination;Balance;Device Management; Heel Strike;Midline Orientation;Posture;Speed;Strength;Swing   Provided Assistance with: Balance   Walk Assist Level Minimum Assist;Moderate Assist   Does the patient walk? 2  Yes   Wheelchair mobility   Does the patient use a wheelchair? 1  Yes   Type of Wheelchair Used 1  Manual   Curb or Single Stair   Style negotiated Single stair   Type of Assistance Needed Physical assistance;Verbal cues; Adaptive equipment   Physical Assistance Level 26%-50%   Comment L HR ascending, bwds ascending  ANGEL Shakira Shorty present for FT  Pt initially amb up 6 steps with just PTA for visual  On second trail Shakira Shorty A pt with PTA in front of pt for safety on FF    1 Step (Curb) CARE Score 3   4 Steps   Type of Assistance Needed Physical assistance;Verbal cues; Adaptive equipment   Physical Assistance Level 26%-50%   Comment L HR ascending, bwds ascending  ANGEL Shakira Shorty present for FT  Pt initially amb up 6 steps with just PTA for visual  On second trail Shakira Shorty A pt with PTA in front of pt for safety on FF    4 Steps CARE Score 3   12 Steps   Type of Assistance Needed Physical assistance;Verbal cues; Adaptive equipment   Physical Assistance Level 26%-50%   Comment L HR ascending, bwds ascending  ANGEL Shakira Shorty present for FT  Pt initially amb up 6 steps with just PTA for visual  On second trail Shakira Shorty A pt with PTA in front of pt for safety on FF    12 Steps CARE Score 3   Stairs   Type Stairs   # of Steps 12  (+6)   Weight Bearing Precautions Fall Risk   Assist Devices Single Rail   Findings Pt able to carryover sequencing this session with no VC's  Family eduacated on possible need for VC's at home as he may not get sequencing correct or perform reciprocal gait which is not recommended  Therapeutic Interventions   Neuromuscular Re-Education Pt amb x200 while holding yellow therapy ball, x200 fett with increaded gait speed   Noted increased scissoring when "attempting to have pt amb at fast pace  Pt hyper foucused on RLE and needed VC's to look up  Alt toe taps to 8\" step no UE support MOD/MAXA  Pt then asked to perform \"alfonzo says\" to 8\"step  VC's were given i e  L foot X, R foot O ect  Pt able to complete with 100% accuracy  With midline crossing pt utilized A to [address] [place] on L  staning unsupported ball toss with increased focus on use of RUE, noted improved reaction time of RUE when squeezing ball and ext fungers prior to throwing ball; x2 min then bouce pass x6 reps  Other encourage water throughout session, pt was given cup in R hand only  Equipment Use   NuStep L1 x3 min while waiting for family to get here  Other Comments   Comments daughter and ANGEL confirmed that ramp was still the plan for pt to enter home  Assessment   Treatment Assessment Pt and pt's family present for FT on steps  Pt amb up/down FF with MIN/MODA x1 with gait belt  [place] Dad (ANGEL) felt confident in ability to A pt on steps  Pt was able to carryover all sequencing and safety measures this session requiring no VC's  Again education was given to family as pt might not remember sequencing and may attempt to perform reciprocal gait and that is not recommended  Education to family of discharge plan for pt to remain WC level at this time but will cont to work towards I with SPT, WC to surface as pt's wife works during day  Pt and ANGEL felt confident and did not require increased training at this time  Pt cont to have slow zachariah 2* motor control of RLE and decreased weight shift to RLE  When performing task to WB on RLE noted poor shift requiring A to WB on RLE, most likely 2* fear  Pt will cont POC as tolerated with cont focus on balance, coordination, increased gait with LRAD, increased I with functional transfers and increased stair management to decrease burden of care     Family/Caregiver Present yes   PT Family training done with: ANGEL/herman   Problem List Decreased strength;Decreased range of " motion;Decreased endurance; Impaired balance;Decreased coordination;Decreased mobility; Decreased cognition; Impaired tone   Barriers to Discharge Inaccessible home environment   PT Barriers   Functional Limitation Car transfers; Ramp negotiation;Standing;Stair negotiation;Transfers; Walking   Plan   Treatment/Interventions Functional transfer training;LE strengthening/ROM; Therapeutic exercise; Endurance training;Cognitive reorientation;Gait training   Progress Progressing toward goals   Recommendation   PT Discharge Recommendation Home with outpatient rehabilitation   Equipment Recommended Wheelchair;Walker   PT Therapy Minutes   PT Time In 1230   PT Time Out 1330   PT Total Time (minutes) 60   PT Mode of treatment - Individual (minutes) 60   PT Mode of treatment - Concurrent (minutes) 0   PT Mode of treatment - Group (minutes) 0   PT Mode of treatment - Co-treat (minutes) 0   PT Mode of Treatment - Total time(minutes) 60 minutes   PT Cumulative Minutes 1808   Therapy Time missed   Time missed?  No

## 2023-03-26 RX ADMIN — BACLOFEN 5 MG: 10 TABLET ORAL at 20:11

## 2023-03-26 RX ADMIN — FLUOXETINE 20 MG: 20 CAPSULE ORAL at 08:58

## 2023-03-26 RX ADMIN — APIXABAN 2.5 MG: 2.5 TABLET, FILM COATED ORAL at 08:57

## 2023-03-26 RX ADMIN — AMLODIPINE BESYLATE 10 MG: 10 TABLET ORAL at 08:57

## 2023-03-26 RX ADMIN — BACLOFEN 5 MG: 10 TABLET ORAL at 15:44

## 2023-03-26 RX ADMIN — LISINOPRIL 40 MG: 20 TABLET ORAL at 08:57

## 2023-03-26 RX ADMIN — TICAGRELOR 90 MG: 90 TABLET ORAL at 20:11

## 2023-03-26 RX ADMIN — CARVEDILOL 25 MG: 25 TABLET, FILM COATED ORAL at 15:44

## 2023-03-26 RX ADMIN — TICAGRELOR 90 MG: 90 TABLET ORAL at 08:59

## 2023-03-26 RX ADMIN — Medication 1 PACKET: at 08:58

## 2023-03-26 RX ADMIN — ASPIRIN 81 MG CHEWABLE TABLET 81 MG: 81 TABLET CHEWABLE at 08:57

## 2023-03-26 RX ADMIN — BACLOFEN 5 MG: 10 TABLET ORAL at 08:57

## 2023-03-26 RX ADMIN — ATORVASTATIN CALCIUM 40 MG: 40 TABLET, FILM COATED ORAL at 15:44

## 2023-03-26 RX ADMIN — FERROUS SULFATE TAB 325 MG (65 MG ELEMENTAL FE) 325 MG: 325 (65 FE) TAB at 08:57

## 2023-03-26 RX ADMIN — HYDRALAZINE HYDROCHLORIDE 50 MG: 50 TABLET, FILM COATED ORAL at 05:24

## 2023-03-26 RX ADMIN — CARVEDILOL 25 MG: 25 TABLET, FILM COATED ORAL at 08:57

## 2023-03-26 RX ADMIN — APIXABAN 2.5 MG: 2.5 TABLET, FILM COATED ORAL at 18:08

## 2023-03-26 RX ADMIN — HYDRALAZINE HYDROCHLORIDE 50 MG: 50 TABLET, FILM COATED ORAL at 18:08

## 2023-03-26 RX ADMIN — CYANOCOBALAMIN TAB 500 MCG 1000 MCG: 500 TAB at 08:57

## 2023-03-26 NOTE — PROGRESS NOTES
[facility] OT TREATMENT   03/26/23 1230   Pain Assessment   Pain Score No Pain   Restrictions/Precautions   Precautions Aspiration;Bed/chair alarms;Cognitive; Fall Risk;Supervision on toilet/commode   Putting On/Taking Off Footwear   Type of Assistance Needed Supervision   Physical Assistance Level No physical assistance   Comment doffs shoes while sitting at EOB by kicking each shoe off with other foot   Putting On/Taking Off Footwear CARE Score 4   Sit to Lying   Type of Assistance Needed Independent   Physical Assistance Level No physical assistance   Comment HOB Flat, use of bed rails   Sit to Lying CARE Score 6   Sit to Stand   Type of Assistance Needed Physical assistance   Physical Assistance Level 25% or less   Comment min A without AD   Sit to Stand CARE Score 3   Bed-Chair Transfer   Type of Assistance Needed Physical assistance   Physical Assistance Level 26%-50%   Comment increased need for assistance at end of session, with R lateral lean upon standing, unable to self-correct and required min A to steady and then pivot to sit on EOB   Chair/Bed-to-Chair Transfer CARE Score 3   ROM- Right Upper Extremities   R Shoulder PROM;AROM; External rotation; Internal rotation; Extension;Flexion  (Place and Hold)   R Elbow AROM;Elbow flexion;Elbow extension   R Wrist PROM;AROM;AAROM;Prolonged stretch; Wrist flexion;Wrist extension  (Pronation/Supination)   R Hand PROM;AROM;AAROM;Prolonged stretch; Thumb; Index finger; Long finger;Ring finger;Little finger  (Place and Hold Fist Formation)   R Position Seated   RUE ROM Comment To improve functional use of the RUE, P/A/AAROM and Place and Hold techinques were utilized for the shoulder, elbow, wrist, forearm, and digits  Patient showed progressive improvements with shoulder abduction, and was able to maintain abduction at 90 degrees for 30 seconds and demonstrates good eccentric control/descent following intervention   He remains with inability to form full fist but showed improvements after each exercise  He was educated on self-lumbrical stretch to improve digit passive motion for future functional use  Right Upper Extremity- Strength   RUE Strength Comment   (To improve gross RUE strength for increased ease with self-care tasks, patient played connect4 with 0 75# wrist weight donned on wrist  He notes difficulty completing task with added weight, and requires increased effort to flex the shoulder )   Neuromuscular Education   Functional Movement Patterns Patient engaged in various gross and fine motor coordination activities to address his impaired coordination and proprioception  Activiites including in hand manipulation with marbles, pincer grasping with marbles, grasp release of pegs with combined wrist flexion/extension, card flipping for digit translation and FA rotation, and buttoning  He fatigues quickly with these NMR activities and did require short rest breaks throughout  As he fatigues, he is noted to have decreased coordination and overall performance  Cognition   Attention Within functional limits   Orientation Level Oriented X4   Activity Tolerance   Activity Tolerance Patient tolerated treatment well   Assessment   Treatment Assessment Good tolerance of 90 minute OT session today  Large focus of session on RUE NMR including use of fine and gross motor coordination activities with functional movement patterns as well as P/A/AAROM of all major joints of the RUE  He is noted to fatigue quickly with these activities and did require rest breaks  At end of session, he did require increased assistance with stand-pivot transfer due to right sided lean, likely due to fatigue from session  Recommend continued OT treatment to address patient's impairments and increase safety prior to return home  Problem List Decreased strength;Decreased range of motion;Decreased endurance; Impaired balance;Decreased coordination;Decreased mobility; Decreased cognition; Impaired tone Barriers to Discharge Inaccessible home environment   Plan   Treatment/Interventions ADL retraining;Functional transfer training; Therapeutic exercise; Endurance training;Patient/family training;Equipment eval/education; Bed mobility; Compensatory technique education   Progress Progressing toward goals   OT Therapy Minutes   OT Time In 1230   OT Time Out 1400   OT Total Time (minutes) 90   OT Mode of treatment - Individual (minutes) 90   OT Mode of treatment - Concurrent (minutes) 0   OT Mode of treatment - Group (minutes) 0   OT Mode of treatment - Co-treat (minutes) 0   OT Mode of Treatment - Total time(minutes) 90 minutes   OT Cumulative Minutes 1870

## 2023-03-26 NOTE — PROGRESS NOTES
Internal Medicine Progress Note  Patient: Gracie Staley  Age/sex: 60 y o  male  Medical Record #: [id number]      ASSESSMENT/PLAN: (Interval History)  Gracie Staley is seen and examined and management for following issues:    Posterior circulation stroke  • S/p intracranial and extracranial vertebral artery stenting/TICI 2B revascularization  • Continue ASA, Brilinta, Eliquis and atorvastatin  • Prozac for depressed mood following CVA   • Neuropsych following  • Continue Baclofen 5mg TID  • Therapy per primary service  • CTA head and neck done 3/17/23 and was concern for in stent occlusion in the Rt vertebral artery V4 stent  • Neurosurgery saw pt 3/18/23 and 3/22/23 and finding likely does not represent new occlusion as lumen within stents is difficult to image on CTA  Pt will need a repeat CTA and NS follow-up in 6 weeks  • Outpt follow-up with Neurology and Neurosurgery     Dysphagia  • s/p Dysphagia 3 with NTL now on a regular diet starting 3/21/23  • ST following     Rapid response 3/8/23  • Likely d/t vasovagal syncope  • No further events      HTN  • Home: No medications  • Here: Amlodipine 10mg daily/Coreg 25mg BID/Lisinopril 40mg daily/Hydralazine 50mg BID  • Renal artery ultrasound negative  • Improved and BP trends acceptable     Fe deficiency anemia  • Likely multifactoral  • s/p IV Venofer x 2 doses  • Cont daily ferrous sulfate  • Cbc stable     CKD stage 2  • Baseline Cr 1 2 - 1 3  • Chlorthalidone d/c'd  • Creat baseline      COVID  • Precautions lifted  • Pt was asymptomatic     Prediabetes  • HA1C 5 7  • Recommend dietary modifications        DC planning:  TBD       The above assessment and plan was reviewed and updated as determined by my evaluation of the patient on 3/26/2023      Labs:   Results from last 7 days   Lab Units 03/20/23  0546   WBC Thousand/uL 7 43   HEMOGLOBIN g/dL 10 2*   HEMATOCRIT % 32 6*   PLATELETS Thousands/uL 216     Results from last 7 days   Lab Units 03/20/23  0546 SODIUM mmol/L 140   POTASSIUM mmol/L 4 1   CHLORIDE mmol/L 110*   CO2 mmol/L 26   BUN mg/dL 27*   CREATININE mg/dL 1 25   CALCIUM mg/dL 8 9                   Review of Scheduled Meds:  Current Facility-Administered Medications   Medication Dose Route Frequency Provider Last Rate   • acetaminophen  650 mg Oral Q6H PRN YURI Parker     • amLODIPine  10 mg Oral Daily Stephanie Madden MD     • apixaban  2 5 mg Oral BID Stephanie Madden MD     • aspirin  81 mg Oral Daily Stehpanie Madden MD     • atorvastatin  40 mg Oral Daily With hSara Manjarrez MD     • baclofen  5 mg Oral TID Stephanie Madden MD     • carvedilol  25 mg Oral BID With Meals YURI Parker     • vitamin B-12  1,000 mcg Oral Daily Stephanie Madden MD     • ferrous sulfate  325 mg Oral Daily With Breakfast YURI Parker     • FLUoxetine  20 mg Oral Daily Stephanie Madden MD     • hydrALAZINE  25 mg Oral Q8H PRN YURI Parker     • hydrALAZINE  50 mg Oral Q12H YURI Gallagher     • lisinopril  40 mg Oral Daily YURI Parker     • polyethylene glycol  17 g Oral Daily PRN Stephanie Madden MD     • psyllium  1 packet Oral Daily Claudean Solan, MD     • ticagrelor  90 mg Oral Q12H Cosimo Gowers, MD         Subjective/ HPI: Patient seen and examined  Patients overnight issues or events were reviewed with nursing or staff during rounds or morning huddle session  New or overnight issues include the following:     No new or overnight issues  Offers no complaints    ROS:   A 10 point ROS was performed; negative except as noted above       *Labs /Radiology studies reviewed  *Medications reviewed and reconciled as needed  *Please refer to order section for additional ordered labs studies  *Case discussed with primary attending during morning huddle case rounds    Physical Examination:  Vitals:   Vitals:    03/25/23 1840 03/25/23 2040 03/26/23 0517 03/26/23 0857   BP: 114/56 110/57 150/66 129/62   BP Location:  Left arm Left arm Pulse: (!) 54 57 57 58   Resp:  20 20    Temp:  97 8 °F (36 6 °C) 98 5 °F (36 9 °C)    TempSrc:  Oral Oral    SpO2:  97% 99%    Weight:       Height:           General Appearance: no distress, conversive  HEENT:  External ear normal   Nose normal w/o drainage  Mucous membranes are moist  Oropharynx is clear  Conjunctiva clear w/o icterus or redness  Neck:  Supple, normal ROM  Lungs: BBS without crackles/wheeze/rhonchi; respirations unlabored with normal inspiratory/expiratory effort  No retractions noted  On RA  CV: regular rate and rhythm; no rubs/murmurs/gallops, PMI normal   ABD: Abdomen is soft  Bowel sounds all quadrants  Nontender with no distention  EXT: no edema  Skin: normal turgor, normal texture, no rashes  Psych: affect normal, mood normal  Neuro: AAO      The above physical exam was reviewed and updated as determined by my evaluation of the patient on 3/26/2023  Invasive Devices     None                    VTE Pharmacologic Prophylaxis: Eliquis  Code Status: Level 1 - Full Code  Current Length of Stay: 20 day(s)      Total time spent:  30 minutes with more than 50% spent counseling/coordinating care  Counseling includes discussion with patient re: progress  and discussion with patient of his/her current medical state/information  Coordination of patient's care was performed in conjunction with primary service  Time invested included review of patient's labs, vitals, and management of their comorbidities with continued monitoring  In addition, this patient was discussed with medical team including physician and advanced extenders  The care of the patient was extensively discussed and appropriate treatment plan was formulated unique for this patient  Medical decision making for the day was made by supervising physician unless otherwise noted in their attestation statement  ** Please Note:  voice to text software may have been used in the creation of this document   Although proof errors in transcription or interpretation are a potential of such software**

## 2023-03-26 NOTE — PLAN OF CARE
Problem: Prexisting or High Potential for Compromised Skin Integrity  Goal: Skin integrity is maintained or improved  Description: INTERVENTIONS:  - Identify patients at risk for skin breakdown  - Assess and monitor skin integrity  - Assess and monitor nutrition and hydration status  - Monitor labs   - Assess for incontinence   - Turn and reposition patient  - Assist with mobility/ambulation  - Relieve pressure over bony prominences  - Avoid friction and shearing  - Provide appropriate hygiene as needed including keeping skin clean and dry  - Evaluate need for skin moisturizer/barrier cream  - Collaborate with interdisciplinary team   - Patient/family teaching  - Consider wound care consult   Outcome: Progressing     Problem: MOBILITY - ADULT  Goal: Maintain or return to baseline ADL function  Description: INTERVENTIONS:  -  Assess patient's ability to carry out ADLs; assess patient's baseline for ADL function and identify physical deficits which impact ability to perform ADLs (bathing, care of mouth/teeth, toileting, grooming, dressing, etc )  - Assess/evaluate cause of self-care deficits   - Assess range of motion  - Assess patient's mobility; develop plan if impaired  - Assess patient's need for assistive devices and provide as appropriate  - Encourage maximum independence but intervene and supervise when necessary  - Involve family in performance of ADLs  - Assess for home care needs following discharge   - Consider OT consult to assist with ADL evaluation and planning for discharge  - Provide patient education as appropriate  Outcome: Progressing  Goal: Maintains/Returns to pre admission functional level  Description: INTERVENTIONS:  - Perform BMAT or MOVE assessment daily    - Set and communicate daily mobility goal to care team and patient/family/caregiver  - Collaborate with rehabilitation services on mobility goals if consulted  - Perform Range of Motion  times a day    - Reposition patient every hours   - Dangle patient times a day  - Stand patient  times a day  - Ambulate patient  times a day  - Out of bed to chair  times a day   - Out of bed for meals  times a day  - Out of bed for toileting  - Record patient progress and toleration of activity level   Outcome: Progressing     Problem: PAIN - ADULT  Goal: Verbalizes/displays adequate comfort level or baseline comfort level  Description: Interventions:  - Encourage patient to monitor pain and request assistance  - Assess pain using appropriate pain scale  - Administer analgesics based on type and severity of pain and evaluate response  - Implement non-pharmacological measures as appropriate and evaluate response  - Consider cultural and social influences on pain and pain management  - Notify physician/advanced practitioner if interventions unsuccessful or patient reports new pain  Outcome: Progressing     Problem: INFECTION - ADULT  Goal: Absence or prevention of progression during hospitalization  Description: INTERVENTIONS:  - Assess and monitor for signs and symptoms of infection  - Monitor lab/diagnostic results  - Monitor all insertion sites, i e  indwelling lines, tubes, and drains  - Monitor endotracheal if appropriate and nasal secretions for changes in amount and color  - [place] appropriate cooling/warming therapies per order  - Administer medications as ordered  - Instruct and encourage patient and family to use good hand hygiene technique  - Identify and instruct in appropriate isolation precautions for identified infection/condition  Outcome: Progressing  Goal: Absence of fever/infection during neutropenic period  Description: INTERVENTIONS:  - Monitor WBC    Outcome: Progressing     Problem: SAFETY ADULT  Goal: Patient will remain free of falls  Description: INTERVENTIONS:  - Educate patient/family on patient safety including physical limitations  - Instruct patient to call for assistance with activity   - Consult OT/PT to assist with strengthening/mobility   - Keep Call bell within reach  - Keep bed low and locked with side rails adjusted as appropriate  - Keep care items and personal belongings within reach  - Initiate and maintain comfort rounds  - Make Fall Risk Sign visible to staff  - Offer Toileting every Hours, in advance of need  - Initiate/Maintain alarm  - Obtain necessary fall risk management equipment:   - Apply yellow socks and bracelet for high fall risk patients  - Consider moving patient to room near nurses station  Outcome: Progressing  Goal: Maintain or return to baseline ADL function  Description: INTERVENTIONS:  -  Assess patient's ability to carry out ADLs; assess patient's baseline for ADL function and identify physical deficits which impact ability to perform ADLs (bathing, care of mouth/teeth, toileting, grooming, dressing, etc )  - Assess/evaluate cause of self-care deficits   - Assess range of motion  - Assess patient's mobility; develop plan if impaired  - Assess patient's need for assistive devices and provide as appropriate  - Encourage maximum independence but intervene and supervise when necessary  - Involve family in performance of ADLs  - Assess for home care needs following discharge   - Consider OT consult to assist with ADL evaluation and planning for discharge  - Provide patient education as appropriate  Outcome: Progressing  Goal: Maintains/Returns to pre admission functional level  Description: INTERVENTIONS:  - Perform BMAT or MOVE assessment daily    - Set and communicate daily mobility goal to care team and patient/family/caregiver  - Collaborate with rehabilitation services on mobility goals if consulted  - Perform Range of Motion  times a day  - Reposition patient every  hours    - Dangle patient times a day  - Stand patient  times a day  - Ambulate patient  times a day  - Out of bed to chair  times a day   - Out of bed for meals times a day  - Out of bed for toileting  - Record patient progress and toleration of activity level   Outcome: Progressing     Problem: DISCHARGE PLANNING  Goal: Discharge to home or other facility with appropriate resources  Description: INTERVENTIONS:  - Identify barriers to discharge w/patient and caregiver  - Arrange for needed discharge resources and transportation as appropriate  - Identify discharge learning needs (meds, wound care, etc )  - Arrange for interpretive services to assist at discharge as needed  - Refer to Case Management Department for coordinating discharge planning if the patient needs post-hospital services based on physician/advanced practitioner order or complex needs related to functional status, cognitive ability, or social support system  Outcome: Progressing     Problem: Nutrition/Hydration-ADULT  Goal: Nutrient/Hydration intake appropriate for improving, restoring or maintaining nutritional needs  Description: Monitor and assess patient's nutrition/hydration status for malnutrition  Collaborate with interdisciplinary team and initiate plan and interventions as ordered  Monitor patient's weight and dietary intake as ordered or per policy  Utilize nutrition screening tool and intervene as necessary  Determine patient's food preferences and provide high-protein, high-caloric foods as appropriate       INTERVENTIONS:  - Monitor oral intake, urinary output, labs, and treatment plans  - Assess nutrition and hydration status and recommend course of action  - Evaluate amount of meals eaten  - Assist patient with eating if necessary   - Allow adequate time for meals  - Recommend/ encourage appropriate diets, oral nutritional supplements, and vitamin/mineral supplements  - Order, calculate, and assess calorie counts as needed  - Recommend, monitor, and adjust tube feedings and TPN/PPN based on assessed needs  - Assess need for intravenous fluids  - Provide specific nutrition/hydration education as appropriate  - Include patient/family/caregiver in decisions related to nutrition  Outcome: Progressing

## 2023-03-26 NOTE — PROGRESS NOTES
03/26/23 1000   Pain Assessment   Pain Assessment Tool 0-10   Restrictions/Precautions   Precautions Aspiration;Bed/chair alarms;Cognitive; Fall Risk;Supervision on toilet/commode   Weight Bearing Restrictions No   ROM Restrictions No   Cognition   Overall Cognitive Status Impaired   Arousal/Participation Alert; Cooperative   Attention Within functional limits   Orientation Level Oriented X4   Memory Decreased short term memory   Following Commands Follows multistep commands with increased time or repetition   Sit to Stand   Type of Assistance Needed Physical assistance;Verbal cues; Adaptive equipment   Physical Assistance Level 25% or less   Comment KEITH with/without AD   Sit to Stand CARE Score 3   Bed-Chair Transfer   Type of Assistance Needed Physical assistance;Verbal cues; Adaptive equipment   Physical Assistance Level 25% or less   Comment KEITH with/without AD   Chair/Bed-to-Chair Transfer CARE Score 3   Transfer Bed/Chair/Wheelchair   Adaptive Equipment None;Emiliano Walker   Walk 10 Feet   Type of Assistance Needed Physical assistance;Verbal cues   Physical Assistance Level 25% or less   Comment with HW in room   Walk 10 Feet CARE Score 3   Walk 50 Feet with Two Turns   Type of Assistance Needed Physical assistance;Verbal cues   Physical Assistance Level 26%-50%   Comment no AD with gait belt   Walk 50 Feet with Two Turns CARE Score 3   Walk 150 Feet   Type of Assistance Needed Physical assistance;Verbal cues   Physical Assistance Level 26%-50%   Comment no AD with gait belt   Walk 150 Feet CARE Score 3   Ambulation   Primary Mode of Locomotion Prior to Admission Walk   Distance Walked (feet) 200 ft  (x3)   Assist Device   (B hands on gait belt)   Gait Pattern Inconsistant Mae;Decreased foot clearance;R foot drag;R hemiparesis; Lateral deviation; Wide GAYLA;Shuffle;Improper weight shift;Decreased R stance   Limitations Noted In Balance; Coordination; Heel Strike;Midline Orientation; Safety;Speed;Strength;Swing Provided Assistance with: Direction;Balance   Walk Assist Level Minimum Assist;Moderate Assist   Findings noted increased toe drag and a few times pt stumbled requiring MODA to correct  Does the patient walk? 2  Yes   Wheel 50 Feet with Two Turns   Type of Assistance Needed Independent   Comment ZEV's   Wheel 50 Feet with Two Turns CARE Score 6   Wheel 150 Feet   Type of Assistance Needed Set-up / clean-up   Comment ZEV's   Wheel 150 Feet CARE Score 5   Wheelchair mobility   Does the patient use a wheelchair? 1  Yes   Type of Wheelchair Used 1  Manual   Curb or Single Stair   Style negotiated Single stair   Type of Assistance Needed Adaptive equipment;Verbal cues; Physical assistance   Physical Assistance Level 26%-50%   Comment L HR ascending, bwds ascending  Pt attempt to use LLE to step down requyiring MOD VC's to correct and MIN/MODA for balance  1 Step (Curb) CARE Score 3   4 Steps   Type of Assistance Needed Physical assistance;Verbal cues; Adaptive equipment   Physical Assistance Level 26%-50%   Comment L HR ascending, bwds ascending  Pt attempt to use LLE to step down requyiring MOD VC's to correct and MIN/MODA for balance  4 Steps CARE Score 3   12 Steps   Type of Assistance Needed Physical assistance;Verbal cues; Adaptive equipment   Physical Assistance Level 26%-50%   Comment L HR ascending, bwds ascending  Pt attempt to use LLE to step down requyiring MOD VC's to correct and MIN/MODA for balance  12 Steps CARE Score 3   Stairs   Type Stairs   # of Steps 12   Weight Bearing Precautions Fall Risk   Assist Devices Single Rail   Picking Up Object   Type of Assistance Needed Physical assistance;Verbal cues; Adaptive equipment   Physical Assistance Level 25% or less   Comment no AD, picking up x6 cones     Picking Up Object CARE Score 3   Therapeutic Interventions   Neuromuscular Re-Education x200 NPP gait no AD with increased gait speed x2, x200 with horizontal and lateral head turns, MODA to correct LOB at times especially with R head turns  Weaving through cones no ADwd/bwd x2 reps, MODA at times for LOB but overall KEITH  Then pt retrieved cones from floor with [id number]  Other several sit pivot transfers; pt able to set WC up, remove arm reast and pivot with KEITH to R and MIN/CGA to L  Equipment Use   NuStep L3 x10 min neuro prime  Pt c/o increased R shoulder stiffness  Assessment   Treatment Assessment Pt participated in skilled PT session with increased focus on gait, NPP gait, increased safety awareness, improved stair management and carryover of learned techniques  Pt had x2 missteps on steps requiring MODA to correct and MOD VC's  Pt noted improved righting reactions this session but cont to be delayed  Pt cont to require increased verbal/tactile cues with sit pivot transfers to R side 2* decreased RUE strength  Pt will cont to benefit from cont functional transfers training, increased gait, improved safety awareness, increased righting reactions and improved carryover of new learning to decrease burden of care  Problem List Decreased strength;Decreased range of motion;Decreased endurance; Impaired balance;Decreased coordination;Decreased mobility; Decreased cognition; Impaired tone   Barriers to Discharge Inaccessible home environment   PT Barriers   Functional Limitation Car transfers; Ramp negotiation;Standing;Stair negotiation;Transfers; Walking   Plan   Treatment/Interventions Functional transfer training;LE strengthening/ROM; Therapeutic exercise; Endurance training;Cognitive reorientation;Gait training   Progress Progressing toward goals   Recommendation   PT Discharge Recommendation Home with outpatient rehabilitation   Equipment Recommended Wheelchair;Walker   PT Therapy Minutes   PT Time In 1000   PT Time Out 1100   PT Total Time (minutes) 60   PT Mode of treatment - Individual (minutes) 60   PT Mode of treatment - Concurrent (minutes) 0   PT Mode of treatment - Group (minutes) 0   PT Mode of treatment - Co-treat (minutes) 0   PT Mode of Treatment - Total time(minutes) 60 minutes   PT Cumulative Minutes 1868   Therapy Time missed   Time missed?  No

## 2023-03-27 LAB
ANION GAP SERPL CALCULATED.3IONS-SCNC: 6 MMOL/L (ref 4–13)
BASOPHILS # BLD AUTO: 0.06 THOUSANDS/ÂΜL (ref 0–0.1)
BASOPHILS NFR BLD AUTO: 1 % (ref 0–1)
BUN SERPL-MCNC: 32 MG/DL (ref 5–25)
CALCIUM SERPL-MCNC: 8.9 MG/DL (ref 8.3–10.1)
CHLORIDE SERPL-SCNC: 106 MMOL/L (ref 96–108)
CO2 SERPL-SCNC: 26 MMOL/L (ref 21–32)
CREAT SERPL-MCNC: 1.49 MG/DL (ref 0.6–1.3)
DME PARACHUTE DELIVERY DATE REQUESTED: NORMAL
DME PARACHUTE ITEM DESCRIPTION: NORMAL
DME PARACHUTE ORDER STATUS: NORMAL
DME PARACHUTE SUPPLIER NAME: NORMAL
DME PARACHUTE SUPPLIER PHONE: NORMAL
EOSINOPHIL # BLD AUTO: 0.37 THOUSAND/ÂΜL (ref 0–0.61)
EOSINOPHIL NFR BLD AUTO: 5 % (ref 0–6)
ERYTHROCYTE [DISTWIDTH] IN BLOOD BY AUTOMATED COUNT: 13.2 % (ref 11.6–15.1)
GFR SERPL CREATININE-BSD FRML MDRD: 49 ML/MIN/1.73SQ M
GLUCOSE SERPL-MCNC: 92 MG/DL (ref 65–140)
HCT VFR BLD AUTO: 31.1 % (ref 36.5–49.3)
HGB BLD-MCNC: 10.2 G/DL (ref 12–17)
IMM GRANULOCYTES # BLD AUTO: 0.04 THOUSAND/UL (ref 0–0.2)
IMM GRANULOCYTES NFR BLD AUTO: 1 % (ref 0–2)
LYMPHOCYTES # BLD AUTO: 0.75 THOUSANDS/ÂΜL (ref 0.6–4.47)
LYMPHOCYTES NFR BLD AUTO: 11 % (ref 14–44)
MCH RBC QN AUTO: 28.4 PG (ref 26.8–34.3)
MCHC RBC AUTO-ENTMCNC: 32.8 G/DL (ref 31.4–37.4)
MCV RBC AUTO: 87 FL (ref 82–98)
MONOCYTES # BLD AUTO: 0.62 THOUSAND/ÂΜL (ref 0.17–1.22)
MONOCYTES NFR BLD AUTO: 9 % (ref 4–12)
NEUTROPHILS # BLD AUTO: 5.22 THOUSANDS/ÂΜL (ref 1.85–7.62)
NEUTS SEG NFR BLD AUTO: 73 % (ref 43–75)
NRBC BLD AUTO-RTO: 0 /100 WBCS
PLATELET # BLD AUTO: 175 THOUSANDS/UL (ref 149–390)
PMV BLD AUTO: 10.1 FL (ref 8.9–12.7)
POTASSIUM SERPL-SCNC: 4.1 MMOL/L (ref 3.5–5.3)
RBC # BLD AUTO: 3.59 MILLION/UL (ref 3.88–5.62)
SODIUM SERPL-SCNC: 138 MMOL/L (ref 135–147)
WBC # BLD AUTO: 7.06 THOUSAND/UL (ref 4.31–10.16)

## 2023-03-27 RX ORDER — HYDRALAZINE HYDROCHLORIDE 50 MG/1
50 TABLET, FILM COATED ORAL EVERY 12 HOURS
Status: DISCONTINUED | OUTPATIENT
Start: 2023-03-27 | End: 2023-03-31 | Stop reason: HOSPADM

## 2023-03-27 RX ADMIN — APIXABAN 2.5 MG: 2.5 TABLET, FILM COATED ORAL at 09:48

## 2023-03-27 RX ADMIN — BACLOFEN 5 MG: 10 TABLET ORAL at 09:49

## 2023-03-27 RX ADMIN — TICAGRELOR 90 MG: 90 TABLET ORAL at 21:20

## 2023-03-27 RX ADMIN — APIXABAN 2.5 MG: 2.5 TABLET, FILM COATED ORAL at 18:14

## 2023-03-27 RX ADMIN — CYANOCOBALAMIN TAB 500 MCG 1000 MCG: 500 TAB at 09:49

## 2023-03-27 RX ADMIN — BACLOFEN 5 MG: 10 TABLET ORAL at 16:58

## 2023-03-27 RX ADMIN — AMLODIPINE BESYLATE 10 MG: 10 TABLET ORAL at 09:48

## 2023-03-27 RX ADMIN — HYDRALAZINE HYDROCHLORIDE 50 MG: 50 TABLET, FILM COATED ORAL at 06:13

## 2023-03-27 RX ADMIN — CARVEDILOL 25 MG: 25 TABLET, FILM COATED ORAL at 09:47

## 2023-03-27 RX ADMIN — FLUOXETINE 20 MG: 20 CAPSULE ORAL at 09:47

## 2023-03-27 RX ADMIN — TICAGRELOR 90 MG: 90 TABLET ORAL at 09:50

## 2023-03-27 RX ADMIN — ASPIRIN 81 MG CHEWABLE TABLET 81 MG: 81 TABLET CHEWABLE at 09:49

## 2023-03-27 RX ADMIN — FERROUS SULFATE TAB 325 MG (65 MG ELEMENTAL FE) 325 MG: 325 (65 FE) TAB at 09:48

## 2023-03-27 RX ADMIN — HYDRALAZINE HYDROCHLORIDE 50 MG: 50 TABLET, FILM COATED ORAL at 18:14

## 2023-03-27 RX ADMIN — BACLOFEN 5 MG: 10 TABLET ORAL at 21:20

## 2023-03-27 RX ADMIN — CARVEDILOL 25 MG: 25 TABLET, FILM COATED ORAL at 16:58

## 2023-03-27 RX ADMIN — ATORVASTATIN CALCIUM 40 MG: 40 TABLET, FILM COATED ORAL at 16:58

## 2023-03-27 NOTE — PROGRESS NOTES
03/27/23 0930   Pain Assessment   Pain Assessment Tool 0-10   Pain Score No Pain   Restrictions/Precautions   Precautions Bed/chair alarms;Aspiration; Aphasia;Cognitive; Fall Risk;Supervision on toilet/commode   Cognition   Arousal/Participation Alert; Cooperative   Attention Within functional limits   Orientation Level Oriented X4   Memory Decreased short term memory   Subjective   Subjective pt had no c/o and ready for PT   Sit to Stand   Type of Assistance Needed Verbal cues; Incidental touching   Comment with/without AD   Sit to Stand CARE Score 4   Bed-Chair Transfer   Type of Assistance Needed Verbal cues;incidental touching   Comment CS with repeated w/c <> bed/recliner/chair sit pivot transfers with VC to optimize technique, hand/foot placement  CGA with HW with VC for optimum walker placement   Chair/Bed-to-Chair Transfer CARE Score 4   Walk 10 Feet   Type of Assistance Needed Verbal cues; Adaptive equipment;Physical assistance   Physical Assistance Level 26%-50%   Comment CG with HWx 20', min-mod A without AD   Walk 10 Feet CARE Score 3   Walk 50 Feet with Two Turns   Type of Assistance Needed Physical assistance;Verbal cues   Physical Assistance Level 51%-75%   Comment no AD   Walk 50 Feet with Two Turns CARE Score 2   Walk 150 Feet   Type of Assistance Needed Physical assistance;Verbal cues   Physical Assistance Level 51%-75%   Comment mod-max x 200' x 3  no AD , provided external cues to promote step through and foot clearance , inc assist with fatigue due to impaired righting reactions with LOB   Walk 150 Feet CARE Score 2   Wheel 50 Feet with Two Turns   Type of Assistance Needed Independent   Wheel 50 Feet with Two Turns CARE Score 6   Wheel 150 Feet   Type of Assistance Needed Independent   Comment tameka Hauser with w/c brake management   Wheel 150 Feet CARE Score 6   Curb or Single Stair   Style negotiated Single stair   Type of Assistance Needed Physical assistance; Adaptive equipment   Physical Assistance Level 26%-50%   1 Step (Curb) CARE Score 3   4 Steps   Type of Assistance Needed Physical assistance;Verbal cues; Adaptive equipment   Physical Assistance Level 26%-50%   4 Steps CARE Score 3   12 Steps   Type of Assistance Needed Physical assistance;Verbal cues; Adaptive equipment   Physical Assistance Level 26%-50%   Comment L HR ascending fwd/descending bwd on FF step to pattern   12 Steps CARE Score 3   Toilet Transfer   Type of Assistance Needed Physical assistance;Verbal cues; Adaptive equipment   Physical Assistance Level 25% or less   Comment walk to/from bathroom with [facility]   Toilet Transfer CARE Score 3   Toilet Transfer   Findings attempted but unable to have any B/B movement  able to pull up brief/pants with R hand requiring min A   Therapeutic Interventions   Strengthening LAQ x 5 SHx 12 reps, active R DF x 10 SH with reps till fatigue, R heel raises  with reps till fatigue   Flexibility R hamstring/gastroc x 20 SH x 5 reps   Neuromuscular Re-Education NPP step through FF negotiation x 4 reps total with seated rest break every after rep bilat HR, VC/assist to advance R hand along the rail with noted inc difficulty clearing R heel off nosing when descending fwd facing  utilized cones to prevent R hip circumduction compensation when ascending  walking while reading x 200, reading + walking bwd x 30' - limited by reported fatigue  combing hair with R hand requiring assist due to ineffective pincer grasp to keep comb in place  provided assist to cut chicken using knife on R hand  repeated R retro heel taps off/on recliner foot rest with emphasis on maintaining DF engagement to sustained heel strike   Equipment Use   NuStep L1 x 12 mins LE and R hand only SPM> 60 , FL 67-71, SpO2 97-98%   Assessment   Treatment Assessment skilled PT initially focused on repeated sit pivot transfer training at w/c level to/from different surfaces   Pt able to complete at CS level however still requires VC for sequencing and w/c parts management about <25% of the time but with inc training this week anticipate pt will only require set up/DS by d/c  Also continues to progress with mobilities utilizing HW completing at [facility] for at least 10-20' at a time which pt will only perform to complete toileting task due to environmental limitation at home  utilization of AD at this point is to facilitate a safe home d/c with dec caregiver burden and dec risk for falls  However pt's primary goal is to eventually be able to complete mobilities without using an AD hence therapy focus on NPP/NMR training to promote gait normalization and righting reaction requiring min - max of 1 with LOB due to impaired righting reaction abhishek with fatigue onset  Due to ongoing gastroc/quad hypertonicity, musclar weakness/endurance abhishek againts gravity and impaired coordination given location of pt's stroke he continues to demo dec heel strike with tendency to stomp R foot at initial contact  pt encourage to perform self stretching and seated TE previously given to facilitate improvement and he verbalized understanding  although PT will cont to f/u on consistent compliance  For this week PT will cont to work on improving indep with w/c level transfers and NMR/NPP to facilitate motor/functional gains to dec overall caregiver at d/c  Family/Caregiver Present no   Barriers to Discharge Inaccessible home environment;Decreased caregiver support   PT Barriers   Functional Limitation Car transfers; Ramp negotiation;Transfers;Standing;Stair negotiation; Walking   Plan   Treatment/Interventions Functional transfer training;LE strengthening/ROM; Therapeutic exercise; Endurance training;Gait training;Spoke to nursing;Spoke to MD;OT;Equipment eval/education   Progress Progressing toward goals   Recommendation   PT Discharge Recommendation Home with outpatient rehabilitation   PT Therapy Minutes   PT Time In 0930   PT Time Out 1125   PT Total Time (minutes) 115   PT Mode of treatment - Individual (minutes) 115   PT Mode of treatment - Concurrent (minutes) 0   PT Mode of treatment - Group (minutes) 0   PT Mode of treatment - Co-treat (minutes) 0   PT Mode of Treatment - Total time(minutes) 115 minutes   PT Cumulative Minutes 1983   Therapy Time missed   Time missed?  No

## 2023-03-27 NOTE — PLAN OF CARE
Problem: Prexisting or High Potential for Compromised Skin Integrity  Goal: Skin integrity is maintained or improved  Description: INTERVENTIONS:  - Identify patients at risk for skin breakdown  - Assess and monitor skin integrity  - Assess and monitor nutrition and hydration status  - Monitor labs   - Assess for incontinence   - Turn and reposition patient  - Assist with mobility/ambulation  - Relieve pressure over bony prominences  - Avoid friction and shearing  - Provide appropriate hygiene as needed including keeping skin clean and dry  - Evaluate need for skin moisturizer/barrier cream  - Collaborate with interdisciplinary team   - Patient/family teaching  - Consider wound care consult   Outcome: Progressing     Problem: MOBILITY - ADULT  Goal: Maintain or return to baseline ADL function  Description: INTERVENTIONS:  -  Assess patient's ability to carry out ADLs; assess patient's baseline for ADL function and identify physical deficits which impact ability to perform ADLs (bathing, care of mouth/teeth, toileting, grooming, dressing, etc )  - Assess/evaluate cause of self-care deficits   - Assess range of motion  - Assess patient's mobility; develop plan if impaired  - Assess patient's need for assistive devices and provide as appropriate  - Encourage maximum independence but intervene and supervise when necessary  - Involve family in performance of ADLs  - Assess for home care needs following discharge   - Consider OT consult to assist with ADL evaluation and planning for discharge  - Provide patient education as appropriate  Outcome: Progressing  Goal: Maintains/Returns to pre admission functional level  Description: INTERVENTIONS:  - Perform BMAT or MOVE assessment daily    - Set and communicate daily mobility goal to care team and patient/family/caregiver  - Collaborate with rehabilitation services on mobility goals if consulted  - Perform Range of s a day  - Reposition patient jose    - Ifeoma kay  - Stand patient  - Ambulate pa  - Out of bed to ch  - Out of bed for m  - Out of bed for toileting  - Record patient progress and toleration of activity level   Outcome: Progressing     Problem: PAIN - ADULT  Goal: Verbalizes/displays adequate comfort level or baseline comfort level  Description: Interventions:  - Encourage patient to monitor pain and request assistance  - Assess pain using appropriate pain scale  - Administer analgesics based on type and severity of pain and evaluate response  - Implement non-pharmacological measures as appropriate and evaluate response  - Consider cultural and social influences on pain and pain management  - Notify physician/advanced practitioner if interventions unsuccessful or patient reports new pain  Outcome: Progressing     Problem: INFECTION - ADULT  Goal: Absence or prevention of progression during hospitalization  Description: INTERVENTIONS:  - Assess and monitor for signs and symptoms of infection  - Monitor lab/diagnostic results  - Monitor all insertion sites, i e  indwelling lines, tubes, and drains  - Monitor endotracheal if appropriate and nasal secretions for changes in amount and color  - [place] appropriate cooling/warming therapies per order  - Administer medications as ordered  - Instruct and encourage patient and family to use good hand hygiene technique  - Identify and instruct in appropriate isolation precautions for identified infection/condition  Outcome: Progressing  Goal: Absence of fever/infection during neutropenic period  Description: INTERVENTIONS:  - Monitor WBC    Outcome: Progressing     Problem: SAFETY ADULT  Goal: Patient will remain free of falls  Description: INTERVENTIONS:  - Educate patient/family on patient safety including physical limitations  - Instruct patient to call for assistance with activity   - Consult OT/PT to assist with strengthening/mobility   - Keep Call bell within reach  - Keep bed low and locked with side rails adjusted as appropriate  - Keep care items and personal belongings within reach  - Initiate and maintain comfort rounds  - Make Fall Risk Sign visible to staff  - Offer Toileours, in advance of need  - Initiate  - Obtain necessary fall risk management equ  - Apply yellow socks and bracelet for high fall risk patients  - Consider moving patient to room near nurses station  Outcome: Progressing  Goal: Maintain or return to baseline ADL function  Description: INTERVENTIONS:  -  Assess patient's ability to carry out ADLs; assess patient's baseline for ADL function and identify physical deficits which impact ability to perform ADLs (bathing, care of mouth/teeth, toileting, grooming, dressing, etc )  - Assess/evaluate cause of self-care deficits   - Assess range of motion  - Assess patient's mobility; develop plan if impaired  - Assess patient's need for assistive devices and provide as appropriate  - Encourage maximum independence but intervene and supervise when necessary  - Involve family in performance of ADLs  - Assess for home care needs following discharge   - Consider OT consult to assist with ADL evaluation and planning for discharge  - Provide patient education as appropriate  Outcome: Progressing  Goal: Maintains/Returns to pre admission functional level  Description: INTERVENTIONS:  - Perform BMAT or MOVE assessment daily    - Set and communicate daily mobility goal to care team and patient/family/caregiver  - Collaborate with rehabilitation services on mobility goals if consulted  - Perform Range ofes a day    - Reposition   - Stand p  - Ambulate  - Out of  - Out of be  - Out of bed for toileting  - Record patient progress and toleration of activity level   Outcome: Progressing     Problem: DISCHARGE PLANNING  Goal: Discharge to home or other facility with appropriate resources  Description: INTERVENTIONS:  - Identify barriers to discharge w/patient and caregiver  - Arrange for needed discharge resources and transportation as appropriate  - Identify discharge learning needs (meds, wound care, etc )  - Arrange for interpretive services to assist at discharge as needed  - Refer to Case Management Department for coordinating discharge planning if the patient needs post-hospital services based on physician/advanced practitioner order or complex needs related to functional status, cognitive ability, or social support system  Outcome: Progressing     Problem: Nutrition/Hydration-ADULT  Goal: Nutrient/Hydration intake appropriate for improving, restoring or maintaining nutritional needs  Description: Monitor and assess patient's nutrition/hydration status for malnutrition  Collaborate with interdisciplinary team and initiate plan and interventions as ordered  Monitor patient's weight and dietary intake as ordered or per policy  Utilize nutrition screening tool and intervene as necessary  Determine patient's food preferences and provide high-protein, high-caloric foods as appropriate       INTERVENTIONS:  - Monitor oral intake, urinary output, labs, and treatment plans  - Assess nutrition and hydration status and recommend course of action  - Evaluate amount of meals eaten  - Assist patient with eating if necessary   - Allow adequate time for meals  - Recommend/ encourage appropriate diets, oral nutritional supplements, and vitamin/mineral supplements  - Order, calculate, and assess calorie counts as needed  - Recommend, monitor, and adjust tube feedings and TPN/PPN based on assessed needs  - Assess need for intravenous fluids  - Provide specific nutrition/hydration education as appropriate  - Include patient/family/caregiver in decisions related to nutrition  Outcome: Progressing

## 2023-03-27 NOTE — PROGRESS NOTES
Pastoral Care Progress Note    3/27/2023  Patient: Bartolo Perez : 1960  Admission Date & Time: 3/6/2023 1626  MRN: [id number] [facility]: [id number]        Checked in on Norberto Duong, who said he was doing well, looking forward to discharge, no pastoral care needs  Offered him a blessing,  remains available

## 2023-03-27 NOTE — PROGRESS NOTES
Internal Medicine Progress Note  Patient: Angella Collins  Age/sex: 60 y o  male  Medical Record #: [id number]      ASSESSMENT/PLAN: (Interval History)  Angella Collins is seen and examined and management for following issues:    Posterior circulation stroke  • S/p intracranial and extracranial vertebral artery stenting/TICI 2B revascularization  • Continue ASA, Brilinta, Eliquis and atorvastatin  • Prozac for depressed mood following CVA   • Neuropsych following  • Continue Baclofen 5mg TID  • Therapy per primary service  • CTA head and neck done 3/17/23 and was concern for in stent occlusion in the Rt vertebral artery V4 stent  • Neurosurgery saw pt 3/18/23 and 3/22/23 and finding likely does not represent new occlusion as lumen within stents is difficult to image on CTA  Pt will need a repeat CTA and NS follow-up in 6 weeks  • Outpt follow-up with Neurology and Neurosurgery     Dysphagia  • s/p Dysphagia 3 with NTL now on a regular diet starting 3/21/23  • ST following     Rapid response 3/8/23  • Likely d/t vasovagal syncope  • No further events      HTN  • Home: No medications  • Here: Amlodipine 10mg daily/Coreg 25mg BID/Lisinopril 40mg daily/Hydralazine 25mg BID  • Renal artery ultrasound negative  • Improved but Cr up to 1 49  Will DC Lisinopril today      Fe deficiency anemia  • Likely multifactoral  • s/p IV Venofer x 2 doses  • Cont daily ferrous sulfate  • Cbc stable     CKD stage 2  • Baseline Cr 1 2 - 1 3  • Chlorthalidone d/c'd  • Cr up to 1 49  • DC Lisinopril  Discussed IV fluids  Pt prefers to try increasing po fluid intake  • Repeat BMP in AM      COVID  • Precautions lifted  • Pt was asymptomatic     Prediabetes  • HA1C 5 7  • Recommend dietary modifications        DC planning:  TBD       The above assessment and plan was reviewed and updated as determined by my evaluation of the patient on 3/27/2023      Labs:   Results from last 7 days   Lab Units 03/27/23  0517   WBC Thousand/uL 7 06 HEMOGLOBIN g/dL 10 2*   HEMATOCRIT % 31 1*   PLATELETS Thousands/uL 175     Results from last 7 days   Lab Units 03/27/23  0517   SODIUM mmol/L 138   POTASSIUM mmol/L 4 1   CHLORIDE mmol/L 106   CO2 mmol/L 26   BUN mg/dL 32*   CREATININE mg/dL 1 49*   CALCIUM mg/dL 8 9                   Review of Scheduled Meds:  Current Facility-Administered Medications   Medication Dose Route Frequency Provider Last Rate   • acetaminophen  650 mg Oral Q6H PRN YURI Lim     • amLODIPine  10 mg Oral Daily Nimo Wayne MD     • apixaban  2 5 mg Oral BID Nimo Wayne MD     • aspirin  81 mg Oral Daily Nimo Wayne MD     • atorvastatin  40 mg Oral Daily With Rachel Vega MD     • baclofen  5 mg Oral TID Nimo Wayne MD     • carvedilol  25 mg Oral BID With Meals YURI Lim     • vitamin B-12  1,000 mcg Oral Daily Nimo Wayne MD     • ferrous sulfate  325 mg Oral Daily With Breakfast YURI Lim     • FLUoxetine  20 mg Oral Daily Nmio Wayne MD     • hydrALAZINE  25 mg Oral Q8H PRN YURI Lim     • hydrALAZINE  50 mg Oral Q12H YURI Gallagher     • lisinopril  40 mg Oral Daily YURI Lim     • polyethylene glycol  17 g Oral Daily PRN Nimo Wayne MD     • ticagrelor  90 mg Oral Q12H Toni Gallagher MD         Subjective/ HPI: Patient seen and examined  Patients overnight issues or events were reviewed with nursing or staff during rounds or morning huddle session  New or overnight issues include the following:     Pt seen in his room  He states that he is doing well and denies any current complaints  ROS:   A 10 point ROS was performed; negative except as noted above       *Labs /Radiology studies reviewed  *Medications reviewed and reconciled as needed  *Please refer to order section for additional ordered labs studies  *Case discussed with primary attending during morning huddle case rounds    Physical Examination:  Vitals:   Vitals:    03/26/23 0857 03/26/23 1400 03/26/23 1900 03/27/23 0514   BP: 129/62 140/62 (!) 130/45 151/71   BP Location:  Left arm  Left arm   Pulse: 58 63 77 60   Resp:  17 18 18   Temp:  98 1 °F (36 7 °C) 98 4 °F (36 9 °C) 98 1 °F (36 7 °C)   TempSrc:  Oral  Oral   SpO2:  97% 94% 99%   Weight:       Height:           General Appearance: no distress, conversive, interactive  HEENT:  External ear normal   Nose normal w/o drainage  Mucous membranes are moist  Oropharynx is clear  Conjunctiva clear w/o icterus or redness  Neck:  Supple, normal ROM  Lungs: BBS without crackles/wheeze/rhonchi; respirations unlabored with normal inspiratory/expiratory effort  No retractions noted  On RA  CV: regular rate and rhythm; no rubs/murmurs/gallops, PMI normal   ABD: Abdomen is soft  Bowel sounds all quadrants  Nontender with no distention  EXT: no edema  Skin: normal turgor, normal texture, no rashes  Psych: affect normal, mood normal  Neuro: AAO      The above physical exam was reviewed and updated as determined by my evaluation of the patient on 3/27/2023  Invasive Devices     None                    VTE Pharmacologic Prophylaxis: Eliquis  Code Status: Level 1 - Full Code  Current Length of Stay: 21 day(s)      Total time spent:  30 minutes with more than 50% spent counseling/coordinating care  Counseling includes discussion with patient re: progress  and discussion with patient of his/her current medical state/information  Coordination of patient's care was performed in conjunction with primary service  Time invested included review of patient's labs, vitals, and management of their comorbidities with continued monitoring  In addition, this patient was discussed with medical team including physician and advanced extenders  The care of the patient was extensively discussed and appropriate treatment plan was formulated unique for this patient   Medical decision making for the day was made by supervising physician unless otherwise noted in their attestation statement  ** Please Note:  voice to text software may have been used in the creation of this document   Although proof errors in transcription or interpretation are a potential of such software**

## 2023-03-27 NOTE — PROGRESS NOTES
Physical Medicine and Rehabilitation Progress Note  Boogie Torres 60 y o  male MRN: [id number]  Unit/Bed#: -66 Encounter: [id number]    HPI: Boogie Torres is a 60 y o  right handed male with medical history of HTN who presented to [address] [place] [address] Road on 2/25 with nausea/dizziness  Found to have hypertensive emergency with acute/subcaute R posterior cerebellar CVA with 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, mild BRAULIO, and incidentally found + COVID (without evidence of respiratory illness/hypoxia/CXR findings)  Placed on cardene gtt, stroke pathway, ASA/Plavix, IV hydration for BRAULIO, and CTX for UTI  NIHSS 2  Symptoms began 2 days prior  Not tPA/TNK candidate  MRI/MRA with 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  NSx recommended transfer to [facility] and starting on heparin gtt and stopped Plavix  Repeat CTA on 2/26 stable with with R V3/4 occlusion  Not a candidate for interventional procedure due to clinical stability and risk  Speech consulted and noted mod-severe oropharyngeal dysphagia recommended pureed/HTL  Unfortunately later on 2/26, he clinically deteriorated with increased R sided weakness, and was taken to IR for stenting of V3 and V4 with TICI 2B revascularization  CTH without ICH  He was admitted back to the ICU intubated - extubated 2/27  MRI showed cerebellar CVA and R > L stroke burden, with additional hypodensities on DWI appreciated L midbrain, pontine area and R lower medullary/spinal cord junction  He was transitioned to triple therapy with DAPT (given recent stent) and A/C with eliquis 2 5mg BID due to COVID  Diet advanced to Level 3/NTL on 2/27  Noted to have spasticity in RLE - started on baclofen by Neurology  He was able to have cardene discontinued on 3/2, and they have been making adjustments to his oral regimen  He was started on Prozac for his mood  NSx has signed off  CTA H/N recommended around 3/17  Length of time on eliquis unclear   Admitted to [facility] on 3/6  Chief Complaint: No new issues  Interval History/Subjective:  No acute events over the weekend  Today ambulated a lot with therapies, did 5 flights of stairs with 2 people assisting  He is very motivated  No new headache, difficulty sleeping, CP, SOB, fevers, chills, N/V, abdominal pain  Last BM was 3/24  Continues with R sided weakness, tone, proprioception difficulties  ROS:  A 10 point review of systems was negative except for what is noted in the HPI  Today's Changes:  1  Continue Baclofen 5mg TID     - RLE with a mix of spasticity, weakness contributing to his gait -steppage, impaired foot/ankle mechanics despite some strength in his dorsiflexors  Occasionally catches his R toe     - May benefit from botulinum toxin in the future  2  Crea up to 1 49  Patient wants to try to increase his oral intake before IVF  IM holding lisinopril  Recheck BMP in the AM    3  Hold metamucil in case it is contributing to constipation  Give miralax today  Monitor     - Encourage PO fluid intake  Total visit time: 35 minutes, with more than 50% spent counseling/coordinating care  Counseling includes discussion with patient re: progress in therapies, functional issues observed by therapy staff, and discussion with patient regarding their current medical state and wellbeing  Coordination of patient's care was performed in conjunction with Internal Medicine service to monitor patient's labs, vitals, and management of their comorbidities  Assessment/Plan:    * Acute Posterior Circulation Stroke  Assessment & Plan  Presented with dizziness for 2 days and nausea and has residual R sided weakness, aphasia, dysphagia, R mild spasticity  Several small acute/early subacute bi-cerebellar infarcts without hemorrhage   Multiple infarcts involving R cerebellum and brainstem in particular (posterior medulla on the R, R midbrain, and L occipital lobe)  L vertebral artery severe stenosis and R vertebral artery occlusion and dissection    - Now s/p stenting on 2/26 with TICI 2b revascularization    PPx: ASA/brilinta/Eliquis, Statin   - Discussed with Neurology, they defer to NSx on length of time on eliquis  Potentially 3-4 weeks if felt to be related to [address] out to Neurosurgery - they will discuss with their attending re: eliquis  - Repeat CTA H/N -  No new intracranial abnormalities, expected evolution of known CVA  See dissection and stenosis for more details  3/9 Had nocturnal oximetry to screen for nocturnal hypoxia - only 26 seconds total of mild desaturation  Maintain normotension  Education on prediabetes and diet  Follow-up with Neurovascular/Neurosurgery  Function improving slowly   Continue PT/OT/SLP - for swallow, speech, R sided weakness/tone, will need a good visual exam      Celiac artery stenosis (HCC)  Assessment & Plan  Incidentally noted to have approximately 70% celiac artery stenosis on 3/21 Renal Artery Ultrasound  No s/s of ischemic bowel  Would recommend outpatient f/u with PCP  Neurogenic bowel  Assessment & Plan  Disinhibited bowel + mobility difficulties -somewhat loose as well  3/27 - has been improving, but maybe slightly constipated  - Hold metamucil for now   - Give miralax today   - Monitor  Not on bowel meds right now  [facility] Bowel Training:   - 30-45 min after each meal will get patient onto commode to attempt bowel movement  - He wants to hold off on scheduled suppository for now (would schedule in AM to align with his previous bowel schedule at home)  For now monitor, close continence care especially    Anemia  Assessment & Plan  Stable 3/27 at 10 2   Normocytic anemia noted through stay  B12 borderline low  Folate normal  Iron Panel: Low iron saturation and iron with normal TIBC and Ferritin  Was started in the hospital on B12, but not iron  Per IM - 2 days of IV Venofer (last day 3/8)  Then start oral iron    Monitor Hgb, transfuse as appropriate  Will discuss with IM  Consulted  Adjustment disorder with depressed mood  Assessment & Plan  Tearful at times, but coping  Has been tearful and labile during hospitalization  Started on Prozac 20mgdaily - may need to increase dose  Consulted rehab psychology for support  Prediabetes  Assessment & Plan  A1C 5 7  Consult nutrition for education on diabetic diet  Diet/lifestyle modifications  Follow-up with PCP  CKD (chronic kidney disease)  Assessment & Plan  Lab Results   Component Value Date    EGFR 49 03/27/2023    EGFR 60 03/20/2023    EGFR 59 03/18/2023    CREATININE 1 49 (H) 03/27/2023    CREATININE 1 25 03/20/2023    CREATININE 1 27 03/18/2023     Per hospital team - suspect CKD Stage 2 2/2 hypertension  3/27 Crea bumped to 1 49   - Patient was to increase his PO intake prior to trying IVF   - IM holding LIsinopril   - Recheck in the AM    Will monitor BMP while here  Avoid nephrotoxic meds and relative hypotension  Recommend outpatient f/u with PCP    Spasticity  Assessment & Plan  Intrinsic tonic tone in R quad which is MAS 2, Mas 1 in his ankle with intrinsic phasic tone in the form of clonus  RUE with tone in SA, EF, WF (MAS 1) and pronator (MAS 1+)  Hiccups resolved  Would opt to avoid treating R quad for now, as tone there may help stabilize at the knee  R ankle multipodus, stretches  Baclofen 5mg TID - monitor and adjust as appropriate  Range of motion  Monitor as tone evolves  May benefit from bracing in the future  Outpatient f/u with PMR  Dysphagia  Assessment & Plan  Improved  Admitted with mod-severe oropharyngeal  Has massively improved  3/8 Advanced to Level 3/Thins  3/10 Advanced to Reg/Thins  Aspiration precautions  Good oral hygiene   SLP consulted    Primary hypertension  Assessment & Plan  Home: None  Here: Amlodipine 10mg daily, Coreg 25mg BID, Lisinopril 40mg daily, Hydralazine 50mg Q12hr  Has PRN hydralazine as well     Had hypertensive urgency in hospital requiring cardene gtt   3/21 IM ordered renal artery ultrasound  No significant PRIETO  Monitor and adjust as appropriate  IM consulted to assist with management  Stenosis of left vertebral artery  Assessment & Plan  Severe L vertebral artery origin stenosis  3/17 CTA H/N:   CTA HEAD AND NECK  - Right vertebral artery V4 stent appears to have in-stent occlusion  Right vertebral artery V4 segment is patent before and after the V4 stent  - Unchanged left vertebral artery post-PICA V4 segment occlusion   - Dissection flap in right vertebral artery V3 segment which is patent and improved in caliber status post thrombectomy  - Moderate stenosis in proximal-to- mid basilar artery due to atherosclerotic disease status post mechanical thrombectomy  - Per Neurosurgery unlikely in stent occlusion  No changes  Plan for outpatient f/u  ASA/Brilinta  Atorvastatin  SBP goals 120-160  Follow-up with Neurovascular  Right Vertebral artery dissection [facility])  Assessment & Plan  Now s/p R V3/4 stenting with TICI 2B revascularization on 2/26 for R Vert stenosis  Continue ASA/Brilinta  Statin  CTA HEAD AND NECK 3/17  - Dissection flap in right vertebral artery V3 segment which is patent and improved in caliber status post thrombectomy  Outpatient f/u with Neurosurgery/Dr Lucia Hall      Vasovagal syncope-resolved as of 3/22/2023  Assessment & Plan  No further episodes  3/8 Had episode of vasovagal syncope  - No convulsions  On body weight support system, started to feel dizzy  - Staring episode after, but able to sternal rubbed out of it  Very brief   - No post-ictal weakness   - Neurologically stable and back to baseline   - Orthostatics negative  Discussed with Neurology - unlikely seizure given distribution of stroke and presentation  No further testing recommended at this time  Monitor       COVID-resolved as of 3/10/2023  Assessment & Plan  Resolved  Incidentally found to have COVID on 2/25  Asymptomatic from respiratory standpoint  Per Neurosurgery concern for hypercoagulability related to COVID  Currently on Eliquis 2 5mg BID - per Neuro 3-4 weeks probably reasonable, but for them ultimately up to NSx as this was their initial recommendation to start this medication  3/8 Discontinued precautions after 10 days isolation  Health Maintenance  #Delirium/Sleep: At risk  Optimize bowel/bladder, sleep-wake cycle, mood and pain management  #Pain: Baclofen 5mg TID, Tylenol PRN  #Bowel: Last BM 3/24  See Neurogenic bowel above  #Bladder: Voiding and continent  #Skin/Pressure Injury Prevention: Turn Q2hr in bed, with weight shifts [id number] in wheelchair  Float heels in bed  #DVT Prophylaxis: On triple therapy, SCDs  #GI Prophylaxis: None  #Code Status:  Full Code  #FEN: Reg/Thins  #Dispo:  Team 3/21:  Family training with the wife completed  Goals for outpatient therapies PT/OT/SLP  Family's goal is for him to leave by ADD 3/31  Barrier: R sided weakness, proprioception, kinesthetic awareness, impaired sensation on the R, ataxia, truncal weakness, dysphagia, expressive > receptive aphasia, post-stroke depression, bowel incontinence/disinhibited, spasticity, decreased righting reaction, 25/30 MOCA  Goals: Sup wheelchair level mobility/set-up ADLs  Follow-up: Neurology, Neurosurgery, PCP, PMR (spasticity)       Objective:    Functional Update:  PT: mod-maxA ambulation x200' with no AD other than gait belt   Eusebio 5 flights of stairs,   OT: CGA oral hygiene, CGA shower/bathe self, Eusebio tub/shower transfer, CGA LB dressing, Set-up UB dressing, CGA toileting hygiene and toilet transfers   SLP: Sup comprehension/expression, social interaction, Eusebio problem solving, Sup memory    Allergies per EMR    Physical Exam:  Temp:  [98 1 °F (36 7 °C)-98 4 °F (36 9 °C)] 98 1 °F (36 7 °C)  HR:  [58-77] 60  Resp:  [17-18] 18  BP: (129-151)/(45-71) 151/71  Oxygen Therapy  SpO2: 99 %    Gen: No acute distress, Well-nourished, well-appearing  HEENT: Moist mucus membranes, Normocephalic/Atraumatic  Cardiovascular: Regular rate, rhythm, S1/S2  Distal pulses palpable  Heme/Extr: No edema  Pulmonary: Non-labored breathing  Lungs CTAB  : No barnett  GI: Soft, non-tender, non-distended  BS+  Integumentary: Skin is warm, dry  Neuro: AAOx3 Speech is Appropriate to questioning  Spasticity in R quad, R ankle (with sustained clonus as well), R elbow, wrist, and shoulder  With full PROM yet  R sided weakness improving with DF up to 4/5 on the R  Strength in RUE stable at 3-4/5 proximally and 2-3/5 distally  Ambulates almost a steppage gait to help clear on the R  Fatigues quickly - will start with heel strike and appropriate roll over, but after two steps, has clear ankle weaknes with initial contact being midfoot  No roll over  Poor toe off on the R    Psych: Normal mood and affect         Diagnostic Studies: Reviewed, no new imaging    Laboratory:  Reviewed   Results from last 7 days   Lab Units 03/27/23  0517   HEMOGLOBIN g/dL 10 2*   HEMATOCRIT % 31 1*   WBC Thousand/uL 7 06     Results from last 7 days   Lab Units 03/27/23  0517   BUN mg/dL 32*   POTASSIUM mmol/L 4 1   CHLORIDE mmol/L 106   CREATININE mg/dL 1 49*            Patient Active Problem List   Diagnosis   • BRAULIO (acute kidney injury) ([facility] [address] )   • Acute Posterior Circulation Stroke   • Right Vertebral artery dissection (HCC)   • Stenosis of left vertebral artery   • Primary hypertension   • Dizziness   • Dysphagia   • Spasticity   • CKD (chronic kidney disease)   • Prediabetes   • Adjustment disorder with depressed mood   • Anemia   • Neurogenic bowel   • Celiac artery stenosis (HCC)         Medications  Current Facility-Administered Medications   Medication Dose Route Frequency Provider Last Rate   • acetaminophen  650 mg Oral Q6H PRN YURI Sol     • amLODIPine  10 mg Oral Daily Liset Espinoza MD     • apixaban  2 5 mg Oral BID Liset Espinoza MD • aspirin  81 mg Oral Daily Nory Degroot MD     • atorvastatin  40 mg Oral Daily With Sarah Barron MD     • baclofen  5 mg Oral TID Nory Degroot MD     • carvedilol  25 mg Oral BID With Meals Maldonado Dacula, CRNP     • vitamin B-12  1,000 mcg Oral Daily Nory Degroot MD     • ferrous sulfate  325 mg Oral Daily With Breakfast Maldonado [place]ac[place], CRNP     • FLUoxetine  20 mg Oral Daily Nory Degroot MD     • hydrALAZINE  25 mg Oral Q8H PRN Maldonado [place]ac[place], CRNP     • hydrALAZINE  50 mg Oral Q12H Violetta Devine CRNP     • lisinopril  40 mg Oral Daily Maldonado Dac[place], CRNP     • polyethylene glycol  17 g Oral Daily PRN Nory Degroot MD     • psyllium  1 packet Oral Daily Danielle Javier MD     • ticagrelor  90 mg Oral Q12H [address] Nory Degroot MD            ** Please Note: Fluency Direct voice to text software may have been used in the creation of this document   **

## 2023-03-27 NOTE — PROGRESS NOTES
"OT Treatment Note       03/27/23 0700   Pain Assessment   Pain Assessment Tool 0-10   Pain Score No Pain   Restrictions/Precautions   Precautions Aphasia; Aspiration;Bed/chair alarms;Cognitive; Fall Risk;Supervision on toilet/commode   Lifestyle   Autonomy \"My hand feels tight today\"   Eating   Type of Assistance Needed Set-up / clean-up   Physical Assistance Level No physical assistance   Eating CARE Score 5   Oral Hygiene   Type of Assistance Needed Incidental touching   Physical Assistance Level No physical assistance   Comment In stance at sink with gait belt and RUE supported on sink  Oral Hygiene CARE Score 4   Shower/Bathe Self   Type of Assistance Needed Incidental touching   Physical Assistance Level No physical assistance   Comment Seated on tub bench, pt able to bathe UB, groin, and LE by reaching down  Pt weight shifted to b/l to bathe buttocks seated  Towards R pt able to put RUE on tub bench for support, towards L pt puts LUE on wall for support  Min VC for thoroughness throughout  CGA during weight shifting  Shower/Bathe Self CARE Score 4   Bathing   Assessed Bath Style Shower   Tub/Shower Transfer   Findings Min A during short distance functional mobility onto tub bench with julieta walker, gait belt and shoes  Pt simulated sit swivel and lifting LE into shower on tub bench  Stand pivot after to w/c with julieta walker shoes and gait belt  Upper Body Dressing   Type of Assistance Needed Set-up / clean-up   Physical Assistance Level No physical assistance   Comment seated   Upper Body Dressing CARE Score 5   Lower Body Dressing   Type of Assistance Needed Physical assistance   Physical Assistance Level 25% or less   Comment Seated to thread LE into pants  In stance  With CGA/Min A and RUE on julieta walker  Pt able to manage clothing over hips  At times pt attempted utilizing RUE to A which is when he was Min A     Lower Body Dressing CARE Score 3   Putting On/Taking Off Footwear   Type of Assistance Needed " Physical assistance   Physical Assistance Level 25% or less   Comment Pt able to doff socks and shoes and can don b/l socks and L shoe  OT A with R shoe  Putting On/Taking Off Footwear CARE Score 3   Sit to Stand   Type of Assistance Needed Incidental touching   Physical Assistance Level No physical assistance   Comment with gait belt to Hemiwalker   Sit to Stand CARE Score 4   Bed-Chair Transfer   Type of Assistance Needed Physical assistance   Physical Assistance Level 25% or less   Comment Functional mobility to/from bathroom with julieta walker, gait belt and shoes   Chair/Bed-to-Chair Transfer CARE Score 3   Toileting Hygiene   Type of Assistance Needed Physical assistance   Physical Assistance Level 25% or less   Comment In stance with gait belt and RUE on julieta walker with CGA, utilizes LUE for rear hygiene  At times pt would take RUE off julieta walker, and would sway in stance requiring [address] CARE Score 3   Toilet Transfer   Type of Assistance Needed Physical assistance   Physical Assistance Level 25% or less   Comment Using julieta walker on LUE during sit to stand on [facility] over toilet   Toilet Transfer CARE Score 3   Cognition   Overall Cognitive Status Impaired   Arousal/Participation Alert; Cooperative   Attention Within functional limits   Orientation Level Oriented X4   Memory Decreased short term memory   Following Commands Follows one step commands without difficulty   Activity Tolerance   Activity Tolerance Patient tolerated treatment well   Assessment   Treatment Assessment Pt engaged in skilled OT tx session focused on ADL routine  See above for further details on functional performance  Pt progressing with ADLs, able to perform oral care in stance and peform bathing seated which requires only CGA during weight shifting  OT educated pt on need for stretching R hand in room to decrease tone and muscle tightness  Pt verbalized understanding    From OT standpoint, pt is able to perform short distance functional mobility to/from bathroom with julieta walker, gait belt and shoes with staff  Discussed with PT to assess further later today  Cont OT POC with focus on dry tub transfers using tub bench and sit swivel, RUE HEP, core strength to increase independence in weight shifting during bathing, RUE NMR, and dynamic/static standing tolerance/balance  Pt left in recliner with alarm activated and all needs in reach  Prognosis Good   Problem List Decreased strength;Decreased range of motion;Decreased endurance; Impaired balance;Decreased mobility; Decreased coordination;Decreased cognition; Impaired sensation; Impaired tone   Barriers to Discharge Inaccessible home environment   Plan   Treatment/Interventions ADL retraining;Functional transfer training; Therapeutic exercise; Endurance training;Cognitive reorientation;Patient/family training;Equipment eval/education; Compensatory technique education   Progress Progressing toward goals   Recommendation   OT Discharge Recommendation Home with outpatient rehabilitation   OT Therapy Minutes   OT Time In 0700   OT Time Out 0830   OT Total Time (minutes) 90   OT Mode of treatment - Individual (minutes) 90   OT Mode of treatment - Concurrent (minutes) 0   OT Mode of treatment - Group (minutes) 0   OT Mode of treatment - Co-treat (minutes) 0   OT Mode of Treatment - Total time(minutes) 90 minutes   OT Cumulative Minutes 1960   Therapy Time missed   Time missed?  No

## 2023-03-27 NOTE — CASE MANAGEMENT
Case Management Update:  Cm left vm for Patrese at [facility] regarding  sent 3/21  Awaiting determination

## 2023-03-28 PROBLEM — N17.9 ACUTE KIDNEY INJURY SUPERIMPOSED ON CKD (HCC): Status: ACTIVE | Noted: 2023-03-01

## 2023-03-28 LAB
ANION GAP SERPL CALCULATED.3IONS-SCNC: 3 MMOL/L (ref 4–13)
BUN SERPL-MCNC: 39 MG/DL (ref 5–25)
CALCIUM SERPL-MCNC: 8.6 MG/DL (ref 8.3–10.1)
CHLORIDE SERPL-SCNC: 110 MMOL/L (ref 96–108)
CO2 SERPL-SCNC: 25 MMOL/L (ref 21–32)
CREAT SERPL-MCNC: 1.66 MG/DL (ref 0.6–1.3)
GFR SERPL CREATININE-BSD FRML MDRD: 43 ML/MIN/1.73SQ M
GLUCOSE SERPL-MCNC: 104 MG/DL (ref 65–140)
POTASSIUM SERPL-SCNC: 4.2 MMOL/L (ref 3.5–5.3)
SODIUM SERPL-SCNC: 138 MMOL/L (ref 135–147)

## 2023-03-28 RX ORDER — SODIUM CHLORIDE 9 MG/ML
100 INJECTION, SOLUTION INTRAVENOUS CONTINUOUS
Status: DISCONTINUED | OUTPATIENT
Start: 2023-03-28 | End: 2023-03-28

## 2023-03-28 RX ORDER — SODIUM CHLORIDE 9 MG/ML
100 INJECTION, SOLUTION INTRAVENOUS CONTINUOUS
Status: DISCONTINUED | OUTPATIENT
Start: 2023-03-28 | End: 2023-03-29

## 2023-03-28 RX ADMIN — APIXABAN 2.5 MG: 2.5 TABLET, FILM COATED ORAL at 17:17

## 2023-03-28 RX ADMIN — TICAGRELOR 90 MG: 90 TABLET ORAL at 08:53

## 2023-03-28 RX ADMIN — BACLOFEN 5 MG: 10 TABLET ORAL at 08:51

## 2023-03-28 RX ADMIN — CARVEDILOL 25 MG: 25 TABLET, FILM COATED ORAL at 17:17

## 2023-03-28 RX ADMIN — FERROUS SULFATE TAB 325 MG (65 MG ELEMENTAL FE) 325 MG: 325 (65 FE) TAB at 07:16

## 2023-03-28 RX ADMIN — ATORVASTATIN CALCIUM 40 MG: 40 TABLET, FILM COATED ORAL at 17:16

## 2023-03-28 RX ADMIN — TICAGRELOR 90 MG: 90 TABLET ORAL at 20:00

## 2023-03-28 RX ADMIN — FLUOXETINE 20 MG: 20 CAPSULE ORAL at 08:51

## 2023-03-28 RX ADMIN — HYDRALAZINE HYDROCHLORIDE 50 MG: 50 TABLET, FILM COATED ORAL at 07:16

## 2023-03-28 RX ADMIN — BACLOFEN 5 MG: 10 TABLET ORAL at 20:00

## 2023-03-28 RX ADMIN — SODIUM CHLORIDE 100 ML/HR: 0.9 INJECTION, SOLUTION INTRAVENOUS at 08:59

## 2023-03-28 RX ADMIN — AMLODIPINE BESYLATE 10 MG: 10 TABLET ORAL at 08:52

## 2023-03-28 RX ADMIN — CYANOCOBALAMIN TAB 500 MCG 1000 MCG: 500 TAB at 08:51

## 2023-03-28 RX ADMIN — SODIUM CHLORIDE 100 ML/HR: 0.9 INJECTION, SOLUTION INTRAVENOUS at 11:57

## 2023-03-28 RX ADMIN — CARVEDILOL 25 MG: 25 TABLET, FILM COATED ORAL at 07:16

## 2023-03-28 RX ADMIN — HYDRALAZINE HYDROCHLORIDE 50 MG: 50 TABLET, FILM COATED ORAL at 19:59

## 2023-03-28 RX ADMIN — BACLOFEN 5 MG: 10 TABLET ORAL at 17:16

## 2023-03-28 RX ADMIN — APIXABAN 2.5 MG: 2.5 TABLET, FILM COATED ORAL at 08:51

## 2023-03-28 RX ADMIN — ASPIRIN 81 MG CHEWABLE TABLET 81 MG: 81 TABLET CHEWABLE at 08:51

## 2023-03-28 NOTE — PROGRESS NOTES
03/28/23 1410   Pain Assessment   Pain Assessment Tool 0-10   Pain Score No Pain   Restrictions/Precautions   Precautions Bed/chair alarms;Cognitive; Fall Risk;Aspiration;Multiple lines;Supervision on toilet/commode  (pt with IV connected this session)   Cognition   Overall Cognitive Status Impaired   Subjective   Subjective no complaints; ready for therapy   Sit to Stand   Type of Assistance Needed Supervision   Sit to Stand CARE Score 4   Bed-Chair Transfer   Type of Assistance Needed Supervision   Comment sit pivot no device   Chair/Bed-to-Chair Transfer CARE Score 4   Walk 10 Feet   Type of Assistance Needed Physical assistance; Adaptive equipment   Physical Assistance Level 26%-50%   Comment with SPC   Walk 10 Feet CARE Score 3   Walk 50 Feet with Two Turns   Type of Assistance Needed Physical assistance; Adaptive equipment   Physical Assistance Level 26%-50%   Comment with SPC   Walk 50 Feet with Two Turns CARE Score 3   Walk 150 Feet   Type of Assistance Needed Physical assistance   Physical Assistance Level 51%-75%   Comment occasional LOB, Eusebio to recover   Walk 150 Feet CARE Score 2   Ambulation   Distance Walked (feet) 150 ft  (x2; 330'x2)   Assist Device Cane  [facility])   Gait Pattern Inconsistant Mae; Slow Mae;Decreased foot clearance; Improper weight shift   Limitations Noted In Balance; Coordination; Heel Strike; Safety;Speed; Sequencing;Strength   Provided Assistance with: Balance;Weight Shift   Findings had pt utilizing cane for funcitonal mobility longer distances  HHAx2 150' end of session to increase gait speed  mod/maxA at times with LOB, R toe gets caught due to poor clearance     Wheel 50 Feet with Two Turns   Type of Assistance Needed Independent   Wheel 50 Feet with Two Turns CARE Score 6   Wheel 150 Feet   Type of Assistance Needed Independent   Wheel 150 Feet CARE Score 6   Stairs   Findings pt on IV did not assess this session   Therapeutic Interventions   Neuromuscular Re-Education step "throughs 6\" steps both sides 2 sets until fatigued  utilizing single rail on R with LLE advancement  manual stabilization at pelvis and hips to avoid rotation when advancing leg up  STS with ball squeezes and holding 3# wt to improve core strength and ant wt shift upon standing  Equipment Use   NuStep L2 10mins   Assessment   Treatment Assessment pt tolerated tx well with cont focus on gait training and NPP for neuro re-ed and strengthening  with fatigue, pt tends to drag RLE more making it difficult for food clearance when walking, decreasing righting reactions  will cont to work on neuro re-ed and strengthening to maximize funcitonal ind and mobility for safe d/c  Problem List Decreased range of motion;Decreased strength; Impaired balance;Decreased endurance;Decreased mobility; Decreased coordination;Decreased cognition; Impaired sensation; Impaired tone   Barriers to Discharge None   PT Barriers   Functional Limitation Car transfers; Ramp negotiation;Transfers;Standing;Stair negotiation; Walking   Plan   Progress Progressing toward goals   Recommendation   PT Discharge Recommendation Home with outpatient rehabilitation   Equipment Recommended Wheelchair   PT Therapy Minutes   PT Time In 1410   PT Time Out 6113   PT Total Time (minutes) 85   PT Mode of treatment - Individual (minutes) 85   PT Mode of treatment - Concurrent (minutes) 0   PT Mode of treatment - Group (minutes) 0   PT Mode of treatment - Co-treat (minutes) 0   PT Mode of Treatment - Total time(minutes) 85 minutes   PT Cumulative Minutes 2068   Therapy Time missed   Time missed?  No       "

## 2023-03-28 NOTE — PROGRESS NOTES
Internal Medicine Progress Note  Patient: Rakesh Barger  Age/sex: 60 y o  male  Medical Record #: [id number]       ASSESSMENT/PLAN: (Interval History)  Rakesh Barger is seen and examined and management for following issues:    Posterior circulation stroke  • S/p intracranial and extracranial vertebral artery stenting/TICI 2B revascularization  • Continue ASA, Brilinta, Eliquis and atorvastatin  • Prozac for depressed mood following CVA   • Neuropsych following  • Continue Baclofen 5mg TID  • Therapy per primary service  • CTA head and neck done 3/17/23 and was concern for in stent occlusion in the Rt vertebral artery V4 stent  • NS saw pt 3/18/23 and 3/22/23 and finding likely does not represent new occlusion as lumen within stents is difficult to image on CTA  Pt will need a repeat CTA and NS follow-up in 6 weeks  • Outpt follow-up with Neurology and NS     Dysphagia  • s/p Dysphagia 3 with NTL now on a regular diet starting 3/21/23  • ST following     HTN  • Home: No medications  • Here: Amlodipine 10mg daily/Coreg 25mg BID//Hydralazine 25mg BID  • Renal artery ultrasound negative     Fe deficiency anemia  • Likely multifactoral  • s/p IV Venofer x 2 doses  • Cont daily ferrous sulfate  • Cbc stable     CKD stage 2  • Baseline Cr 1 2 - 1 3  • Chlorthalidone d/c'd  • Cr up to 1 6  • DC'd lisinopril 3/27, wanted to try and increase po fluids however creatinine worse, will start IVF  • NS @ 100cc/hr  • Repeat bmp in am if improved will dc IVF     COVID  • Precautions lifted  • Pt was asymptomatic     Prediabetes  • HA1C 5 7  • Recommend dietary modifications        DC planning:  TBD       The above assessment and plan was reviewed and updated as determined by my evaluation of the patient on 3/28/2023      Labs:   Results from last 7 days   Lab Units 03/27/23  0517   WBC Thousand/uL 7 06   HEMOGLOBIN g/dL 10 2*   HEMATOCRIT % 31 1*   PLATELETS Thousands/uL 175     Results from last 7 days   Lab Units 03/28/23  0550 03/27/23  0517   SODIUM mmol/L 138 138   POTASSIUM mmol/L 4 2 4 1   CHLORIDE mmol/L 110* 106   CO2 mmol/L 25 26   BUN mg/dL 39* 32*   CREATININE mg/dL 1 66* 1 49*   CALCIUM mg/dL 8 6 8 9                   Review of Scheduled Meds:  Current Facility-Administered Medications   Medication Dose Route Frequency Provider Last Rate   • acetaminophen  650 mg Oral Q6H PRN Anushka Section, CRNP     • amLODIPine  10 mg Oral Daily Zhen Dumont MD     • apixaban  2 5 mg Oral BID Zhen Dumont MD     • aspirin  81 mg Oral Daily Zhen Dumont MD     • atorvastatin  40 mg Oral Daily With Jose Stevenson MD     • baclofen  5 mg Oral TID Zhen Dumont MD     • carvedilol  25 mg Oral BID With Meals Anushka Section, YURI     • vitamin B-12  1,000 mcg Oral Daily Zhen Dumont MD     • ferrous sulfate  325 mg Oral Daily With Breakfast Anushka Section, YURI     • FLUoxetine  20 mg Oral Daily Zhen Dumont MD     • hydrALAZINE  25 mg Oral Q8H PRN Anushka Section, CRNP     • hydrALAZINE  50 mg Oral Q12H Maria G Hayes PA-C     • polyethylene glycol  17 g Oral Daily PRN Zhen Dumont MD     • sodium chloride  100 mL/hr Intravenous Continuous Zhen Dumont  mL/hr (03/28/23 0859)   • ticagrelor  90 mg Oral Q12H [address] Zhen Dumont MD         Subjective/ HPI: Patient seen and examined  Patients overnight issues or events were reviewed with nursing or staff during rounds or morning huddle session  New or overnight issues include the following:     Pt seen in his room  He is agreeable to IVF  We discussed his oral intake and he states that he generally only drinks about 2 carafe of water daily  I told him that this is definitely not enough water in a day and he needs to increase this to 4-6 daily  Otherwise no complaints      ROS:   A 10 point ROS was performed; negative except as noted above       *Labs /Radiology studies reviewed  *Medications reviewed and reconciled as needed  *Please refer to order section for additional ordered labs studies  *Case discussed with primary attending during morning huddle case rounds    Physical Examination:  Vitals:   Vitals:    03/27/23 2135 03/28/23 0539 03/28/23 0716 03/28/23 0852   BP: 121/60 141/60 140/67 121/59   BP Location: Right arm Left arm Left arm    Pulse: 55 64 62    Resp: 16 16 18    Temp: 97 7 °F (36 5 °C) 98 4 °F (36 9 °C)     TempSrc: Oral Oral     SpO2: 98% 97%     Weight:       Height:           GEN: No apparent distress, interactive  NEURO: Alert and oriented x3; mild dysarthria  HEENT: Pupils are equal and reactive, EOMI, mucous membranes are moist, face symmetrical  CV: S1 S2 regular, no MRG, no peripheral edema noted  RESP: Lungs are clear bilaterally, no wheezes, rales or rhonchi noted, on room air, respirations easy and non labored  GI: Flat, soft non tender, non distended; +BS x4  : Voiding without difficulty  MUSC: Moves all extremities; except right hemiparesis with overall improvement noted  SKIN: pink, warm and dry, normal turgor, no rashes, lesions        The above physical exam was reviewed and updated as determined by my evaluation of the patient on 3/28/2023  Invasive Devices     Peripheral Intravenous Line  Duration           Peripheral IV 03/28/23 Left;Ventral (anterior) Forearm <1 day                   VTE Pharmacologic Prophylaxis: Eliquis  Code Status: Level 1 - Full Code  Current Length of Stay: 22 day(s)      Total time spent:  30 minutes  with more than 50% spent counseling/coordinating care  Counseling includes discussion with patient re: progress  and discussion with patient of his/her current medical state/information  Coordination of patient's care was performed in conjunction with primary service  Time invested included review of patient's labs, vitals, and management of their comorbidities with continued monitoring  In addition, this patient was discussed with medical team including physician and advanced extenders   The care of the patient was extensively discussed and appropriate treatment plan was formulated unique for this patient  Medical decision making for the day was made by supervising physician unless otherwise noted in their attestation statement  ** Please Note:  voice to text software may have been used in the creation of this document   Although proof errors in transcription or interpretation are a potential of such software**

## 2023-03-28 NOTE — PROGRESS NOTES
"OT Treatment Note       03/28/23 6790   Pain Assessment   Pain Assessment Tool 0-10   Pain Score No Pain   Restrictions/Precautions   Precautions Aphasia; Aspiration;Bed/chair alarms;Cognitive; Fall Risk; Other (comment); Supervision on toilet/commode  (IV Line)   Lifestyle   Autonomy \"Thats a lot of exercises\"   Kai 77 Pt declined need to use bathroom   Neuromuscular Education   Comments Seated pt engaged in 1 set, 10 reps of RUE HEP of the following exercies: shoulder flexion, abduction, shoulder flexion with towel slide, shoulder abduction towel slide, scapular retraction, shoulder elevation/depression, elbow flexion/extension, pronation and supination, wrist flexion/extension, digit flexion/extension, and isolated digit extensions  OT provided notes on each page of HEP for pt on proper hand placement, how to perform self AAROM, and tips for carryover at home  Cognition   Overall Cognitive Status Impaired   Arousal/Participation Alert; Cooperative   Attention Within functional limits   Orientation Level Oriented X4   Memory Decreased short term memory   Following Commands Follows one step commands without difficulty   Activity Tolerance   Activity Tolerance Patient tolerated treatment well   Assessment   Treatment Assessment Pt engaged in skilled OT tx session focused on RUE HEP  See above for further details on functional performance  Plan to continue HEP, with emphasis on all exercises after digit isolation in HEP packet 2* not being able to go over them in depth with time constraints  At this time pt requires supervision for HEP, pts wife will be able to provide at home and is not a barrier to d/c home   Cont OT POC with focus on reassessment of box and blocks, kitchen mobility at w/c level, dry tub transfer with sit and swivel on tub bench, HEP, functional transfers with julieta walker, switching julieta walker to R side during toilet hygiene and switching back to L, dynamic/static standing " balance, and core strength to increase independence with weight shifting during showering  Pt left in recliner with alarm activated and call bell in reach  From OT standpoint, pt on track to d/c 3/31 to home with family support and outpatient Neuro OT  Prognosis Good   Problem List Decreased range of motion;Decreased strength; Impaired balance;Decreased endurance;Decreased mobility; Decreased coordination;Decreased cognition; Impaired sensation; Impaired tone   Barriers to Discharge None   Plan   Treatment/Interventions ADL retraining;Functional transfer training; Therapeutic exercise; Endurance training;Cognitive reorientation;Patient/family training;Equipment eval/education; Compensatory technique education   Progress Progressing toward goals   Recommendation   OT Discharge Recommendation Home with outpatient rehabilitation   OT Therapy Minutes   OT Time In 1340   OT Time Out 1410   OT Total Time (minutes) 30   OT Mode of treatment - Individual (minutes) 30   OT Mode of treatment - Concurrent (minutes) 0   OT Mode of treatment - Group (minutes) 0   OT Mode of treatment - Co-treat (minutes) 0   OT Mode of Treatment - Total time(minutes) 30 minutes   OT Cumulative Minutes 2050   Therapy Time missed   Time missed?  No

## 2023-03-28 NOTE — PROGRESS NOTES
"OT Treatment Note       03/28/23 0001   Pain Assessment   Pain Assessment Tool 0-10   Pain Score No Pain   Restrictions/Precautions   Precautions Aphasia; Aspiration;Bed/chair alarms;Cognitive; Fall Risk;Supervision on toilet/commode; Other (comment)  (IV Line)   Lifestyle   Autonomy Silverio Sanchez been doing my stretches\"   Sit to Stand   Type of Assistance Needed Incidental touching   Physical Assistance Level No physical assistance   Comment To julieta walker   Sit to Stand CARE Score 4   Bed-Chair Transfer   Type of Assistance Needed Adaptive equipment; Incidental touching   Physical Assistance Level No physical assistance   Comment Close supervision with julieta walker during stand pivot transfer  Chair/Bed-to-Chair Transfer CARE Score 4   Toileting Hygiene   Comment Pt declined need to go to bathroom   Neuromuscular Education   Comments Seated in w/c pt engaged in 1 set of 8 reps each of RUE HEP  See below for further details of HEP  With focus on opposing UE placement during self AAROM, w/c positioning for towel glides, and engaging core  Cognition   Overall Cognitive Status Impaired   Arousal/Participation Alert; Cooperative   Attention Within functional limits   Orientation Level Oriented X4   Memory Decreased short term memory   Following Commands Follows one step commands without difficulty   Activity Tolerance   Activity Tolerance Patient tolerated treatment well   Assessment   Treatment Assessment Pt engaged in skilled OT tx session with focus on HEP  See above for further details on functional performance  Of note, pt required increased time during transfers 2* needing to manage IV line  However, pt will not have this at home and is not a barrier to d/c  Plan to continue to go over HEP further later today   POC with focus on reassessment of box and blocks, kitchen mobility at w/c level, dry tub transfer with sit and swivel on tub bench, HEP, functional transfers with julieta walker, switching julieta walker to R side during " toilet hygiene and switching back to L, dynamic/static standing balance, and core strength to increase independence with weight shifting during showering  Pt left in recliner with alarm activated and call bell in reach  From OT standpoint, pt on track to d/c 3/31 to home with family support and outpatient Neuro OT  Prognosis Good   Problem List Decreased strength;Decreased range of motion;Decreased endurance; Impaired balance;Decreased mobility; Decreased coordination;Decreased cognition; Impaired tone; Impaired sensation   Barriers to Discharge None   Plan   Treatment/Interventions ADL retraining;Functional transfer training; Endurance training;Cognitive reorientation;Patient/family training;Equipment eval/education; Compensatory technique education; Therapeutic exercise   Progress Progressing toward goals   Recommendation   OT Discharge Recommendation Home with outpatient rehabilitation   OT Therapy Minutes   OT Time In 0930   OT Time Out 1030   OT Total Time (minutes) 60   OT Mode of treatment - Individual (minutes) 60   OT Mode of treatment - Concurrent (minutes) 0   OT Mode of treatment - Group (minutes) 0   OT Mode of treatment - Co-treat (minutes) 0   OT Mode of Treatment - Total time(minutes) 60 minutes   OT Cumulative Minutes 2020   Therapy Time missed   Time missed?  No     RUE HEP    R UE HEP:   - Seated Single Arm Shoulder Flexion  - 2 x daily - 7 x weekly - 1 sets - 10 reps  - Seated Shoulder Abduction with Bent Elbow  - 2 x daily - 7 x weekly - 1 sets - 10 reps  - Seated Shoulder Flexion Towel Slide at Table Top  - 2 x daily - 7 x weekly - 1 sets - 10 reps  - Seated Shoulder Abduction Towel Slide at Table Top  - 2 x daily - 7 x weekly - 1 sets - 10 reps  - Seated Scapular Retraction  - 2 x daily - 7 x weekly - 1 sets - 10 reps  - Seated Scapular Resetting  - 2 x daily - 7 x weekly - 1 sets - 10 reps  - Seated Elbow Flexion and Extension AROM  - 2 x daily - 7 x weekly - 1 sets - 10 reps  - Seated Forearm Pronation and Supination AROM  - 2 x daily - 7 x weekly - 2 sets - 10 reps  - Wrist Flexion Extension AROM with Fingers Curled and Palm Down  - 2 x daily - 7 x weekly - 2 sets - 10 reps  - Digit Flexion Extension  - 2 x daily - 7 x weekly - 2 sets - 10 reps  - Individual Finger Extensions  - 2 x daily - 7 x weekly - 2 sets - 10 reps  - Seated Wrist Flexion Stretch  - 2 x daily - 7 x weekly - 2 sets - 10 reps  - Wrist Prayer Stretch  - 2 x daily - 7 x weekly - 2 sets - 10 reps  - Seated Single Digit Intrinsic Stretch  - 2 x daily - 7 x weekly - 2 sets - 10 reps  - Seated Shoulder Horizontal Adduction Abduction PROM with Caregiver  - 2 x daily - 7 x weekly - 1 sets - 5 reps  - Seated Forearm Pronation PROM with Caregiver  - 2 x daily - 7 x weekly - 1 sets - 5 reps

## 2023-03-28 NOTE — TEAM CONFERENCE
Acute RehabilitationTeam Conference Note  Date: 3/28/2023   Time: 10:34 AM       Patient Name:  Robby Reardon       Medical Record Number: [id number]   YOB: 1960  Sex:  Male          Room/Bed:  [facility] 459/[facility] 459-01  Payor Info:  Payor: Omid Ríos / Plan: Omid Ríos PPO / Product Type: PPO /      Admitting Diagnosis: Cerebellar infarct ([facility] ) [I63 9]   Admit Date/Time:  3/6/2023  4:26 PM  Admission Comments: No comment available     Primary Diagnosis:  Cerebrovascular accident (CVA) ([place] [address] )  Principal Problem: Cerebrovascular accident (CVA) [facility])    Patient Active Problem List    Diagnosis Date Noted   • Celiac artery stenosis ([facility] ) 03/22/2023   • Neurogenic bowel 03/08/2023   • Anemia 03/06/2023   • Spasticity 03/01/2023   • Acute kidney injury superimposed on CKD ([facility] ) 03/01/2023   • Prediabetes 03/01/2023   • Adjustment disorder with depressed mood 03/01/2023   • BRAULIO (acute kidney injury) ([facility] ) 02/25/2023   • Acute Posterior Circulation Stroke 02/25/2023   • Right Vertebral artery dissection ([facility] ) 02/25/2023   • Stenosis of left vertebral artery 02/25/2023   • Primary hypertension 02/25/2023   • Dizziness 02/25/2023   • Dysphagia 02/25/2023       Physical Therapy:    Weight Bearing Status: Full Weight Bearing  Transfers: Supervision  Bed Mobility: Independent, Supervision (set up to [facility])  Amulation Distance (ft): 10 feet (to 200')  Ambulation: Maximum Assistance, Incidental Touching (CGA with HW for 10-20' , min-max of 1 when amb without AD)  Assistive Device for Ambulation: Emiliano Walker  Wheelchair Mobility Distance: 200 ft  Wheelchair Mobility: Independent  Number of Stairs: 20  Assistive Device for Stairs: [address] Assistance: Minimal Assistance  Discharge Recommendations: Home with:  [address] with[de-identified] 24 Hour Supervision, 24 Hour Assisteance, Family Support, Outpatient Physical Therapy    Pt continues to demo consistent functional gains with w/c level mobilities completing transfers at  level and mod indep for w/c propulsion including w/c brake management  Also improving household distance mobilities when using a HW 10-20' at a time completing task at [facility]  Also improving with functional mobility retraining without using an AD but due to ongoing R hemiparesis abhishek with distal LE/UE musculature, impaired LT, proprioception and kinesthetic awareness on R UE/LE, impaired standing balance with delayed righting reaction and gait dysfunction he still requires min-max A of 1 to complete  so pt will greatly benefit from additional skilled PT intervention with focus this week on improving indep with w/c level mobilities while PT to cont with NMR/NPP gait/balance /stair training to improve overall functions in order for pt to be safe ambulator outside of therapy and improve quality of life at d/c with reduce risk for falls  Family training completed over the weekend  Occupational Therapy:  Eating: Supervision  Grooming: Supervision  Bathing: Minimal Assistance  Bathing: Minimal Assistance  Upper Body Dressing: Supervision  Lower Body Dressing: Minimal Assistance  Toileting: Minimal Assistance  Tub/Shower Transfer: Minimal Assistance  Toilet Transfer: Minimal Assistance  Cognition: Within Defined Limits  Cognition: Decreased Memory, Decreased Executive Functions  Orientation: Person, Place, Time, Situation  Discharge Recommendations: Home with:  [address] [place] with[de-identified] Family Support, First Floor Setup, Outpatient Occupational Therapy, [address] is making good progress towards goals  He has gone from total assist to min assist for overall ADL routine  With goals by end of this week to be min assist/supervision level  Multiple family training sessions have occurred  Continue to benefit from skilled inpatient OT services to maximize pt's independence and safety with transfers and ADL routine  On track for d/c Friday with assist/supervision for ADLS/transfers and continued outpatient neuro OT  Speech Therapy:  Mode of Communication: Verbal  Speech/Language: Expressive Aphasia (mild)  Cognition: Exceptions to WNL  Cognition: Decreased Memory, Decreased Executive Functions, Decreased Attention, Decreased Comprehension  Orientation: Person, Place, Time, Situation  Swallowing: Within Defined Limits  Diet Recommendations: Regular Diet, Thin  Discharge Recommendations: Home with:  [address] with[de-identified] 24 Hour Supervision, 24 Hour Assisteance, Family Support, Outpatient Speech Therapy  Orders received for skilled SLP assessment  Results and recommendations pending evaluation completion on 3/7/2023  Update from week 3/14/2023: Pt currently being followed for cognitive/language tx sessions now and was also briefly seen for dysphagia tx sessions  In regards to dysphagia, pt was initially on dysphagia level 3 diet w/ NT liquids  However, able to advance liquids quickly to thins, but still continued to remain on level 3 diet due to increased R sided pocketing/residual, initially requiring verbal prompts/cues to clear  As dysphagia tx sessions progressed in addition to completing trials of regular texture items, which pt had demonstrated more independence in use of swallow strategies, primarily clearing R sided residual/pocketing via lingual/finger sweeps and increased time  Diet was able to be advanced to regular w/ thin liquids to where brief f/u continued to where pt did not present w/ increased or overt signs/sxs of aspiration during meals, to where ongoing recommendations remain for continued diet, regular/thins at this time, continuing aspiration precautions  No further dysphagia tx sessions warranted at this time, but reconsult if any deficits arise   As for cognitive linguistic skills, pt did complete CLQT+ on initial evaluation with a Composite Severity Rating score of 3 4 out of 4 0, correlating to overall MILD cognitive linguistic impairments at time of evaluation and in comparison to age matched peers ranging from 22-74 y/o  It is noted that pt does have a higher level of independence prior to admission, but also there is new learning given medications due to stroke, as well as new learning given tasks/mobility due to stroke currently  As for language skills, pt completed the Bedside WAB, in which overall bedside aphasia score deems pt to demonstrate mildly impaired language deficits  Additionally, pt also demonstrates Anomic type aphasia as per Bedside Aphasia Classification Criteria, when comparing the pt's Fluency, Auditory Verbal Comprehension, Repetition scores  Pt's expressive language deficits mild junior by mild word finding difficulty, intermittent vague speech, concise/ often incomplete descriptions and dec fluency  Pt expressive deficits more prevalent during structured tx tasks and reading aloud, spontaneous speech is more fluent w/ only min instances of word retrieval difficulties and min amount of slurring in longer utterances  Of note, pt's spouse, Roberto Horner has been present for sessions, in which increased review was already presented given medications, reasons for taking medications and determining a plan for management moving forward, which spouse stated that a pill box would be most feasible moving forward  Further education had been provided to spouse about swallow function and language skills at this time  Barrier which present at this time include expressive> receptive language deficits, increased processing time, decreased ST/working memory, decreased executive function skills (problem solving, reasoning, organization, sequencing) which impacts cognitive linguistic and functional mobility  Pt is consistently functioning at min A level for comprehension, expression, executive functions, memory and mod I level for social interaction  Currently, pt will benefit from ongoing skilled SLP services targeting language and cognitive skills in attempts to decrease overall caregiver burden over time  Update from week of 3/20/2023: Pt conts to be followed for skilled SLP services targeting speech/cognitive skills  Pt is making good progress towards his goals, currently functioning at min A problem solving and memory, and supervision comprehension and expression  Pt conts with deficits in executive function skills, ST/working memory, planning, reasoning, problem solving, safety, and insight to deficits  Pt's overall word finding and thought organization skills cont to improve and per family report, are nearly baseline at this time, however are present as pt fatigues and with higher level tasks  Plan is for discharge home with 24h S/A and cont'd rehab services  Pt will cont to benefit from skilled SLP services targeting cognitive and speech needs for increased independence and decreased burden of care upon safe discharge home with family support/assistance  Update 3/28/23: Pt continues to be followed for skilled SLP services targeting cognitive linguistic and communication skills  Pt is making good and steady progress towards goals and is currently functioning at supervision level for expression, comprehension and social interaction, min assist to supervision for memory and min assist for problem solving  While pt is working towards goals, he remains with overall cognitive linguistic deficits, characterized by barriers in ST/working memory, executive functions, higher level reasoning/planning, higher level comprehension and expression skills, processing, and safety/reasoning  Pt's auditory comprehension and verbal expression appear greater than pt's reading comprehension and written expression at this time  Pt is making notable progress across all areas, but does at times still fatigue, which impacts comprehension, verbal expression and recall  Family education has been completed with pt's wife with discharge planned for 3/31/2023   At this time, pt is recommended for further skilled SLP services targeting overall cognitive linguistic and higher level language skills in order to improve level of independence on discharge  Additionally, pt is recommended for outpatient skilled SLP services on discharge  Nursing Notes:  Appetite: Good  Diet Type: Regular/House                                                            Bowel: 3 - Moderate Assistance        Pain Location/Orientation: Location: Head  Pain Score: 0                       Hospital Pain Intervention(s): Repositioned, Rest          Pt admitted S/P Posterior circulation stroke S/p intracranial and extracranial vertebral artery stenting/TICI 2B revascularization Continue ASA, Brilinta, Eliquis and atorvastatin  Repeat CTA head and neck around 3/17/23  Prozac for depressed mood following CVA  Recommend Neuropsych consult  Dysphagia 3 diet with nectar thick liquids  Continue Baclofen 10mg 2x daily  HTN-  Home: No medications  Here: amlodipine 10mg daily/Coreg 6 25mg 2x daily/lisinopril 20mg daily/chlorthalidone 12 5mg daily  Goal normotension  Monitor BP and adjust medications as needed  CKD stage 2 Baseline Cr 1 2 - 1 3  Monitor with the addition of chlorthalidone  COVID- Continue precautions through 3/7/23  Pt has not had respiratory symptoms  Prediabetes-HA1C 5  7  Pt is inc at times of bowel and bladder  Requires alarms for safety  This week we will encourage independence with ADLs  We will monitor labs and vital signs  We will educate pt  about repositioning to prevent skin breakdown  We will perform routine skin checks  We will monitor for constipation and medicate as ordered  We will monitor for adequate pain control  We will increase safety awareness with transfers and keep pt free from falls           Case Management:     Discharge Planning  Living Arrangements: Lives w/ Spouse/significant other  Support Systems: Spouse/significant other  Assistance Needed: NA  Type of Current Residence: Private residence (2 story home)  [address] [facility] Services: No  3/20- Team recommending dc of 3/31, follow up recs pending  Is the patient actively participating in therapies? yes  List any modifications to the treatment plan: None    Barriers Interventions   Multi potis on R foot Compensatory strategies   Right sided weakness/hemiperesis Neuro siddhartha, body weight support, ESTIM   Mood Neuro psych   BRAULIO IV Fluids, possible renal ultrasound   Anemia Stable   Hypertension Med adjustment, renal artery ultrasound   Expressive/receptive language, ST memory, word finding SLP Services, improving   Decreased righting reactrion Family training   Aphagia/Dysphagia SLP Services   Impaired cog- new learning, ST memory, executive functioning  Repetitive training   Impaired ROM Repetitive training   Bowel bowel training, improving      Is the patient making expected progress toward goals? yes  List any update or changes to goals: None    Medical Goals: Patient will be medically stable for discharge to [facility] upon completion of rehab program and Patient will be able to manage medical conditions and comorbid conditions with medications and follow up upon completion of rehab program    Weekly Team Goals:   Rehab Team Goals  ADL Team Goal: Patient will require assist with ADLs with least restrictive device upon completion of rehab program  Transfer Team Goal: Patient will require supervision with transfers with least restrictive device upon completion of rehab program  Locomotion Team Goal: Patient will require supervision with locomotion with least restrictive device upon completion of rehab program (at least household distance)  Cognitive Team Goal: Patient will be independent for basic and complex tasks upon completion of rehab program    Discussion: Pt participating in therapies and making progress in rehab program  Pt functioning at supervision for upper body dressing, min assist for lower body dressing and all over ADLs   Pt functioning at an independent/supervision for bed mobility  Max assist/incidental touching for ambulation, indpt supervision/set up at w/c level  Superiviosn for comprehension expression, min assist for problem solving and memory  Team recommending dc Friday 3/31 with OP PT OT ST through neuro day program at [facility]  Anticipated Discharge Date:  DC Friday 3/31 OP PT OT , [facility] Neuro Day program  [facility] Team Members Present: The following team members are supervising care for this patient and were present during this Weekly Team Conference      Physician: Dr Anne Stahl MD  : SOFÍA Michaud  Registered Nurse: Evgeny Mcdaniels RN  Physical Therapist: Afshin Doty DPT  Occupational Therapist: Abhijeet Linares MS, OTR/L, CBIS  Speech Therapist: Kanchan Cook [address], [id number] [facility]

## 2023-03-28 NOTE — OCCUPATIONAL THERAPY NOTE
R UE HEP:   - Seated Single Arm Shoulder Flexion  - 2 x daily - 7 x weekly - 1 sets - 10 reps  - Seated Shoulder Abduction with Bent Elbow  - 2 x daily - 7 x weekly - 1 sets - 10 reps  - Seated Shoulder Flexion Towel Slide at Table Top  - 2 x daily - 7 x weekly - 1 sets - 10 reps  - Seated Shoulder Abduction Towel Slide at Table Top  - 2 x daily - 7 x weekly - 1 sets - 10 reps  - Seated Scapular Retraction  - 2 x daily - 7 x weekly - 1 sets - 10 reps  - Seated Scapular Resetting  - 2 x daily - 7 x weekly - 1 sets - 10 reps  - Seated Elbow Flexion and Extension AROM  - 2 x daily - 7 x weekly - 1 sets - 10 reps  - Seated Forearm Pronation and Supination AROM  - 2 x daily - 7 x weekly - 2 sets - 10 reps  - Wrist Flexion Extension AROM with Fingers Curled and Palm Down  - 2 x daily - 7 x weekly - 2 sets - 10 reps  - Digit Flexion Extension  - 2 x daily - 7 x weekly - 2 sets - 10 reps  - Individual Finger Extensions  - 2 x daily - 7 x weekly - 2 sets - 10 reps  - Seated Wrist Flexion Stretch  - 2 x daily - 7 x weekly - 2 sets - 10 reps  - Wrist Prayer Stretch  - 2 x daily - 7 x weekly - 2 sets - 10 reps  - Seated Single Digit Intrinsic Stretch  - 2 x daily - 7 x weekly - 2 sets - 10 reps  - Seated Shoulder Horizontal Adduction Abduction PROM with Caregiver  - 2 x daily - 7 x weekly - 1 sets - 5 reps  - Seated Forearm Pronation PROM with Caregiver  - 2 x daily - 7 x weekly - 1 sets - 5 reps

## 2023-03-28 NOTE — PLAN OF CARE
Problem: Prexisting or High Potential for Compromised Skin Integrity  Goal: Skin integrity is maintained or improved  Description: INTERVENTIONS:  - Identify patients at risk for skin breakdown  - Assess and monitor skin integrity  - Assess and monitor nutrition and hydration status  - Monitor labs   - Assess for incontinence   - Turn and reposition patient  - Assist with mobility/ambulation  - Relieve pressure over bony prominences  - Avoid friction and shearing  - Provide appropriate hygiene as needed including keeping skin clean and dry  - Evaluate need for skin moisturizer/barrier cream  - Collaborate with interdisciplinary team   - Patient/family teaching  - Consider wound care consult   Outcome: Progressing     Problem: MOBILITY - ADULT  Goal: Maintain or return to baseline ADL function  Description: INTERVENTIONS:  -  Assess patient's ability to carry out ADLs; assess patient's baseline for ADL function and identify physical deficits which impact ability to perform ADLs (bathing, care of mouth/teeth, toileting, grooming, dressing, etc )  - Assess/evaluate cause of self-care deficits   - Assess range of motion  - Assess patient's mobility; develop plan if impaired  - Assess patient's need for assistive devices and provide as appropriate  - Encourage maximum independence but intervene and supervise when necessary  - Involve family in performance of ADLs  - Assess for home care needs following discharge   - Consider OT consult to assist with ADL evaluation and planning for discharge  - Provide patient education as appropriate  Outcome: Progressing  Goal: Maintains/Returns to pre admission functional level  Description: INTERVENTIONS:  - Perform BMAT or MOVE assessment daily    - Set and communicate daily mobility goal to care team and patient/family/caregiver  - Collaborate with rehabilitation services on mobility goals if consulted  - Perform Range of Motion 3 times a day    - Reposition patient every 2 hours   - Dangle patient 3 times a day  - Stand patient 3 times a day  - Ambulate patient 3 times a day  - Out of bed to chair 3 times a day   - Out of bed for meals 3 times a day  - Out of bed for toileting  - Record patient progress and toleration of activity level   Outcome: Progressing     Problem: PAIN - ADULT  Goal: Verbalizes/displays adequate comfort level or baseline comfort level  Description: Interventions:  - Encourage patient to monitor pain and request assistance  - Assess pain using appropriate pain scale  - Administer analgesics based on type and severity of pain and evaluate response  - Implement non-pharmacological measures as appropriate and evaluate response  - Consider cultural and social influences on pain and pain management  - Notify physician/advanced practitioner if interventions unsuccessful or patient reports new pain  Outcome: Progressing     Problem: INFECTION - ADULT  Goal: Absence or prevention of progression during hospitalization  Description: INTERVENTIONS:  - Assess and monitor for signs and symptoms of infection  - Monitor lab/diagnostic results  - Monitor all insertion sites, i e  indwelling lines, tubes, and drains  - Monitor endotracheal if appropriate and nasal secretions for changes in amount and color  - [place] appropriate cooling/warming therapies per order  - Administer medications as ordered  - Instruct and encourage patient and family to use good hand hygiene technique  - Identify and instruct in appropriate isolation precautions for identified infection/condition  Outcome: Progressing  Goal: Absence of fever/infection during neutropenic period  Description: INTERVENTIONS:  - Monitor WBC    Outcome: Progressing     Problem: SAFETY ADULT  Goal: Patient will remain free of falls  Description: INTERVENTIONS:  - Educate patient/family on patient safety including physical limitations  - Instruct patient to call for assistance with activity   - Consult OT/PT to assist with strengthening/mobility   - Keep Call bell within reach  - Keep bed low and locked with side rails adjusted as appropriate  - Keep care items and personal belongings within reach  - Initiate and maintain comfort rounds  - Make Fall Risk Sign visible to staff  - Offer Toileting every 2 Hours, in advance of need  - Initiate/Maintain bed  chair alarm  - Obtain necessary fall risk management equipment: nonskid socks  - Apply yellow socks and bracelet for high fall risk patients  - Consider moving patient to room near nurses station  Outcome: Progressing  Goal: Maintain or return to baseline ADL function  Description: INTERVENTIONS:  -  Assess patient's ability to carry out ADLs; assess patient's baseline for ADL function and identify physical deficits which impact ability to perform ADLs (bathing, care of mouth/teeth, toileting, grooming, dressing, etc )  - Assess/evaluate cause of self-care deficits   - Assess range of motion  - Assess patient's mobility; develop plan if impaired  - Assess patient's need for assistive devices and provide as appropriate  - Encourage maximum independence but intervene and supervise when necessary  - Involve family in performance of ADLs  - Assess for home care needs following discharge   - Consider OT consult to assist with ADL evaluation and planning for discharge  - Provide patient education as appropriate  Outcome: Progressing  Goal: Maintains/Returns to pre admission functional level  Description: INTERVENTIONS:  - Perform BMAT or MOVE assessment daily    - Set and communicate daily mobility goal to care team and patient/family/caregiver  - Collaborate with rehabilitation services on mobility goals if consulted  - Perform Range of Motion 3 times a day  - Reposition patient every 2 hours    - Dangle patient 3 times a day  - Stand patient 3 times a day  - Ambulate patient 3 times a day  - Out of bed to chair 3 times a day   - Out of bed for meals 3 times a day  - Out of bed for toileting  - Record patient progress and toleration of activity level   Outcome: Progressing     Problem: DISCHARGE PLANNING  Goal: Discharge to home or other facility with appropriate resources  Description: INTERVENTIONS:  - Identify barriers to discharge w/patient and caregiver  - Arrange for needed discharge resources and transportation as appropriate  - Identify discharge learning needs (meds, wound care, etc )  - Arrange for interpretive services to assist at discharge as needed  - Refer to Case Management Department for coordinating discharge planning if the patient needs post-hospital services based on physician/advanced practitioner order or complex needs related to functional status, cognitive ability, or social support system  Outcome: Progressing     Problem: Nutrition/Hydration-ADULT  Goal: Nutrient/Hydration intake appropriate for improving, restoring or maintaining nutritional needs  Description: Monitor and assess patient's nutrition/hydration status for malnutrition  Collaborate with interdisciplinary team and initiate plan and interventions as ordered  Monitor patient's weight and dietary intake as ordered or per policy  Utilize nutrition screening tool and intervene as necessary  Determine patient's food preferences and provide high-protein, high-caloric foods as appropriate       INTERVENTIONS:  - Monitor oral intake, urinary output, labs, and treatment plans  - Assess nutrition and hydration status and recommend course of action  - Evaluate amount of meals eaten  - Assist patient with eating if necessary   - Allow adequate time for meals  - Recommend/ encourage appropriate diets, oral nutritional supplements, and vitamin/mineral supplements  - Order, calculate, and assess calorie counts as needed  - Recommend, monitor, and adjust tube feedings and TPN/PPN based on assessed needs  - Assess need for intravenous fluids  - Provide specific nutrition/hydration education as appropriate  - Include patient/family/caregiver in decisions related to nutrition  Outcome: Progressing

## 2023-03-28 NOTE — PROGRESS NOTES
03/28/23 1230   Pain Assessment   Pain Assessment Tool 0-10   Pain Score No Pain   Restrictions/Precautions   Precautions Aphasia; Aspiration;Bed/chair alarms;Cognitive; Fall Risk;Supervision on toilet/commode  (IV line)   Comprehension   Comprehension (FIM) 5 - Understands basic directions and conversation   Expression   Expression (FIM) 5 - Needs help/cues only RARELY (< 10% of the time)   Social Interaction   Social Interaction (FIM) 6 - Interacts appropriately with others BUT requires extra  time   Problem Solving   Problem solving (FIM) 4 - Solves basic problems 75-89% of time   Memory   Memory (FIM) 5 - [place] cues patient   Speech/Language/Cognition Assessment   Treatment Assessment Pt participated in skilled ST session focusing on both word retrieval skills and cognitive linguistic skills  Began session by reviewing recent events to which pt recalled the need for his IV at this time, along with current plan for discharge  Pt also provided additional info regarding reasoning for dc Friday night due to his wife's new job  During this time, pt with some hesitations in speech but overall effective in communication skills  Targeting word retrieval, when verbally provided with object function of items which are not necessarily common, pt independently stated 10/10 objects  Verbally presented with a brand name, pt also provided the name of an associated product in 33/36 opportunities  Provided with min to moderate verbal and semantic cues, pt able to state remaining three items  Overall, pt provided prompt responses and only occasionally a vague response first to which he then self corrected his errors  Targeting ST/working memory, pt completed various word list retention tasks  In review of pt's performance the last time he completed this task, pt was accurate in recall of having difficulty when field increased to 5 items   Beginning task, pt was verbally presented with Fo4 words in which he then recalled words based on order in 9/11 trials, recalling remaining targets when given a single repetition of stimuli  When increased to [address] items, pt then recalled words based on word order for 6/12 trials  Pt improved to 7/12 accuracy when given a single verbal cue but did require 1-2 repetitions of stimuli to improve recall in remaining trials  Of note, this is a highly complex recall task to which pt did improve since last time this was trialed  Lastly, pt engaged in another higher level recall task which involved memory and sequencing both  Verbally presented with [facility] words, pt recalled words by correctly sequencing words based on progression/step by step order for 10/12 trials (correct sequencing)  Pt did not require additional repetition of words throughout tasks  Min to moderate verbal and semantic cues were needed to improve sequencing of final two trials  At end of session, engaged in informal conversation regarding pt's interests in order to target expression at the conversational level  Pt continued with ability to clearly express himself  Occasional hesitations remained with pt also self correcting errors  Appropriate comprehension and social skills related to conversation  While pt became briefly tearful regarding a leisure activity that he used to engage in but pt now recognizes will be unable to, pt then remained appropriate in social interaction  At this time, pt is recommended for further skilled SLP services to maximize overall functional independence with cognitive linguistic skills, as well as to improve functional language/communication abilities in order to decrease caregiver burden over time     SLP Therapy Minutes   SLP Time In 6412   SLP Time Out 1330   SLP Total Time (minutes) 60   SLP Mode of treatment - Individual (minutes) 60   SLP Mode of treatment - Concurrent (minutes) 0   SLP Mode of treatment - Group (minutes) 0   SLP Mode of treatment - Co-treat (minutes) 0   SLP Mode of Treatment - Total time(minutes) 60 minutes   SLP Cumulative Minutes 900   Therapy Time missed   Time missed?  No

## 2023-03-28 NOTE — PROGRESS NOTES
Physical Medicine and Rehabilitation Progress Note  Jose L Richards 60 y o  male MRN: [id number]  Unit/Bed#: -64 Encounter: [id number]    HPI: Jose L Richards is a 60 y o  right handed male with medical history of HTN who presented to [address] [place] [address] Road on 2/25 with nausea/dizziness  Found to have hypertensive emergency with acute/subcaute R posterior cerebellar CVA with 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, mild BRAULIO, and incidentally found + COVID (without evidence of respiratory illness/hypoxia/CXR findings)  Placed on cardene gtt, stroke pathway, ASA/Plavix, IV hydration for BRAULIO, and CTX for UTI  NIHSS 2  Symptoms began 2 days prior  Not tPA/TNK candidate  MRI/MRA with 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  NSx recommended transfer to [facility] and starting on heparin gtt and stopped Plavix  Repeat CTA on 2/26 stable with with R V3/4 occlusion  Not a candidate for interventional procedure due to clinical stability and risk  Speech consulted and noted mod-severe oropharyngeal dysphagia recommended pureed/HTL  Unfortunately later on 2/26, he clinically deteriorated with increased R sided weakness, and was taken to IR for stenting of V3 and V4 with TICI 2B revascularization  CTH without ICH  He was admitted back to the ICU intubated - extubated 2/27  MRI showed cerebellar CVA and R > L stroke burden, with additional hypodensities on DWI appreciated L midbrain, pontine area and R lower medullary/spinal cord junction  He was transitioned to triple therapy with DAPT (given recent stent) and A/C with eliquis 2 5mg BID due to COVID  Diet advanced to Level 3/NTL on 2/27  Noted to have spasticity in RLE - started on baclofen by Neurology  He was able to have cardene discontinued on 3/2, and they have been making adjustments to his oral regimen  He was started on Prozac for his mood  NSx has signed off  CTA H/N recommended around 3/17  Length of time on eliquis unclear   Admitted to [facility] on 3/6  Chief Complaint: In good spirits  Interval History/Subjective:  No acute events overnight  Sleep is fine  No flank pain, abdominal pain, dysuria, difficulty urinating  No new incontinence  No N/V, fevers, chills  No CP, SOB, dizziness/lightheadedness  He is otherwise feeling well  Has no other new concerns at this time  Continues to gain strength on his R side  ROS:  A 10 point review of systems was negative except for what is noted in the HPI  Today's Changes:  1  Tone is stable - continue Baclofen 5mg TID  2  Crea up to 1 66    - Insert peripheral IV     - Giving NS 100cc/hr for 1L   - Check PVR   - Repeat BMP in the AM    - If no improvement check renal ultrasound and consult renal   3  Team Conference, see separate note and dispo below  Total time spent:  50 minutes, with more than 50% spent counseling/coordinating care  Counseling includes discussion with patient re: progress in therapies, functional issues observed by therapy staff, and discussion with patient his/her current medical state/wellbeing  Coordination of patient's care was performed in conjunction with Internal Medicine service to monitor patient's labs, vitals, and management of their comorbidities  In addition, this patient was discussed by the interdisciplinary team in weekly case conference today  The care of the patient was extensively discussed with all care providers and an appropriate rehabilitation plan was formulated unique for this patient  Barriers were identified preventing progression of therapy and appropriate interventions were discussed with each discipline  Please see the team note for input from all disciplines regarding barriers, intervention, and discharge planning      Assessment/Plan:    * Acute Posterior Circulation Stroke  Assessment & Plan  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    - Now s/p stenting on 2/26 with TICI 2b revascularization    PPx: ASA/brilinta/Eliquis, Statin   - Discussed with Neurology, they defer to NSx on length of time on eliquis  Potentially 3-4 weeks if felt to be related to [address] out to Neurosurgery - they will discuss with their attending re: eliquis  - Repeat CTA H/N -  No new intracranial abnormalities, expected evolution of known CVA  See dissection and stenosis for more details  3/9 Had nocturnal oximetry to screen for nocturnal hypoxia - only 26 seconds total of mild desaturation  Maintain normotension  Education on prediabetes and diet  Follow-up with Neurovascular/Neurosurgery  Function improving slowly   Continue PT/OT/SLP - for swallow, speech, R sided weakness/tone, will need a good visual exam      Celiac artery stenosis (HCC)  Assessment & Plan  Incidentally noted to have approximately 70% celiac artery stenosis on 3/21 Renal Artery Ultrasound  No s/s of ischemic bowel  Would recommend outpatient f/u with PCP  Neurogenic bowel  Assessment & Plan  Disinhibited bowel + mobility difficulties -somewhat loose as well  3/27 - has been improving, but maybe slightly constipated  - Hold metamucil for now   - Gave miralax with BM  Not on bowel meds right now  [facility] Bowel Training:   - 30-45 min after each meal will get patient onto commode to attempt bowel movement  - He wants to hold off on scheduled suppository for now (would schedule in AM to align with his previous bowel schedule at home)  For now monitor, close continence care especially    Anemia  Assessment & Plan  Stable 3/27 at 10 2   Normocytic anemia noted through stay    B12 borderline low  Folate normal  Iron Panel: Low iron saturation and iron with normal TIBC and Ferritin  Was started in the hospital on B12, but not iron  Per IM - 2 days of IV Venofer (last day 3/8)  Then start oral iron  Monitor Hgb, transfuse as appropriate  Will discuss with IM  Consulted  Adjustment disorder with depressed mood  Assessment & Plan  Tearful at times, but coping  Has been tearful and labile during hospitalization  Started on Prozac 20mgdaily - may need to increase dose  Consulted rehab psychology for support  Prediabetes  Assessment & Plan  A1C 5 7  Consult nutrition for education on diabetic diet  Diet/lifestyle modifications  Follow-up with PCP  Acute kidney injury superimposed on CKD [facility])  Assessment & Plan  Lab Results   Component Value Date    EGFR 43 03/28/2023    EGFR 49 03/27/2023    EGFR 60 03/20/2023    CREATININE 1 66 (H) 03/28/2023    CREATININE 1 49 (H) 03/27/2023    CREATININE 1 25 03/20/2023     Per hospital team - suspect CKD Stage 2 2/2 hypertension  3/27 Crea bumped to 1 49   - Patient was to increase his PO intake prior to trying IVF   - IM holding LIsinopril  3/28 Crea up to 1 66   - Giving IVF   - Check PVR   - BMP in AM    - If no improvement, check renal ultrasound and consult renal tomorrow  Will monitor BMP while here  Avoid nephrotoxic meds and relative hypotension  Recommend outpatient f/u with PCP    Spasticity  Assessment & Plan  Intrinsic tonic tone in R quad which is MAS 2, Mas 1 in his ankle with intrinsic phasic tone in the form of clonus  RUE with tone in SA, EF, WF (MAS 1) and pronator (MAS 1+)  Hiccups resolved  Would opt to avoid treating R quad for now, as tone there may help stabilize at the knee  R ankle multipodus, stretches  Baclofen 5mg TID - monitor and adjust as appropriate  Range of motion  Monitor as tone evolves  May benefit from bracing in the future  Outpatient f/u with PMR       Dysphagia  Assessment & Plan  Improved  Admitted with mod-severe oropharyngeal  Has massively improved  3/8 Advanced to Level 3/Thins  3/10 Advanced to Reg/Thins  Aspiration precautions  Good oral hygiene   SLP consulted    Primary hypertension  Assessment & Plan  Home: None  Here: Amlodipine 10mg daily, Coreg 25mg BID, Lisinopril 40mg daily, Hydralazine 50mg Q12hr  Has PRN hydralazine as well  Had hypertensive urgency in hospital requiring cardene gtt   3/21 IM ordered renal artery ultrasound  No significant PRIETO  Monitor and adjust as appropriate  IM consulted to assist with management  Stenosis of left vertebral artery  Assessment & Plan  Severe L vertebral artery origin stenosis  3/17 CTA H/N:   CTA HEAD AND NECK  - Right vertebral artery V4 stent appears to have in-stent occlusion  Right vertebral artery V4 segment is patent before and after the V4 stent  - Unchanged left vertebral artery post-PICA V4 segment occlusion   - Dissection flap in right vertebral artery V3 segment which is patent and improved in caliber status post thrombectomy  - Moderate stenosis in proximal-to- mid basilar artery due to atherosclerotic disease status post mechanical thrombectomy  - Per Neurosurgery unlikely in stent occlusion  No changes  Plan for outpatient f/u  ASA/Brilinta  Atorvastatin  SBP goals 120-160  Follow-up with Neurovascular  Right Vertebral artery dissection [facility])  Assessment & Plan  Now s/p R V3/4 stenting with TICI 2B revascularization on 2/26 for R Vert stenosis  Continue ASA/Brilinta  Statin  CTA HEAD AND NECK 3/17  - Dissection flap in right vertebral artery V3 segment which is patent and improved in caliber status post thrombectomy  Outpatient f/u with Neurosurgery/Dr Chris Ge      Vasovagal syncope-resolved as of 3/22/2023  Assessment & Plan  No further episodes  3/8 Had episode of vasovagal syncope  - No convulsions  On body weight support system, started to feel dizzy  - Staring episode after, but able to sternal rubbed out of it   Very brief   - No post-ictal weakness   - Neurologically stable and back to baseline   - Orthostatics negative  Discussed with Neurology - unlikely seizure given distribution of stroke and presentation  No further testing recommended at this time  Monitor  COVID-resolved as of 3/10/2023  Assessment & Plan  Resolved  Incidentally found to have COVID on 2/25  Asymptomatic from respiratory standpoint  Per Neurosurgery concern for hypercoagulability related to COVID  Currently on Eliquis 2 5mg BID - per Neuro 3-4 weeks probably reasonable, but for them ultimately up to NSx as this was their initial recommendation to start this medication  3/8 Discontinued precautions after 10 days isolation  Health Maintenance  #Delirium/Sleep: At risk  Optimize bowel/bladder, sleep-wake cycle, mood and pain management  #Pain: Baclofen 5mg TID, Tylenol PRN  #Bowel: Last BM 3/27  See Neurogenic bowel above  #Bladder: Voiding and continent  #Skin/Pressure Injury Prevention: Turn Q2hr in bed, with weight shifts [id number] in wheelchair  Float heels in bed  #DVT Prophylaxis: On triple therapy, SCDs  #GI Prophylaxis: None  #Code Status:  Full Code  #FEN: Reg/Thins  #Dispo:  Team 3/28:  Family training with the wife completed  Goals for outpatient therapies PT/OT/SLP  Family's goal is for him to leave by ADD 3/31  Barrier: R sided weakness, spasticity, impaired proprioception, impaired righting reaction, kinesthetic awareness, impaired sensation on the R, ataxia, dysphagia, expressive > receptive aphasia, post-stroke depression, bowel incontinence/disinhibited, spasticity, decreased righting reaction, 25/30 MOCA  Goals: Sup-Set-up wheelchair level mobility/ADLs  Can ambulate with julieta-walker with wife    Follow-up: Neurology, Neurosurgery, PCP, PMR (spasticity)       Objective:    Functional Update:  PT: Cl Sup mobility  OT: CGA-Eusebio ADLs  SLP: Sup comprehension/expression and Eusebio for executive functioning    Allergies per [facility]    Physical Exam:  Temp:  [97 7 °F (36 5 °C)-98 5 °F (36 9 °C)] 98 4 °F (36 9 °C)  HR:  [55-64] 62  Resp:  [16-18] 18  BP: (100-149)/(45-68) 140/67  Oxygen Therapy  SpO2: 97 %    Gen: No acute distress, Well-nourished, well-appearing  HEENT: Moist mucus membranes, Normocephalic/Atraumatic  Cardiovascular: Regular rate, rhythm, S1/S2  Distal pulses palpable  Heme/Extr: No edema  Pulmonary: Non-labored breathing  Lungs CTAB  : No barnett  GI: Soft, non-tender, non-distended  BS+  MSK: AROM is WFL in all extremities  No effusions or deformities  Bulk is symmetric  Tight heel cords on the R  But able to get to neutral    Integumentary: Skin is warm, dry  Neuro: AAOx3, Speech is appropriate to questioning  MAS 1 in elbow, 1+ in wrist flexor, 1 in pronator  MAS 2 in quad, and 2 in his plantarflexor on the R      MMT:   Strength:   Right  Site  Right  Site    4 S Ab: Shoulder Abductors  4  HF: Hip Flexors    4+ EF: Elbow Flexors  NT  KF: Knee Flexors     4+ EE: Elbow Extensors  4+ KE: Knee Extensors    4 WE: Wrist Extensors  4 DR: Dorsi Flexors    2  FF: Finger Flexors  4-  PF: Plantar Flexors    2  HI: Hand Intrinsics  3  EHL: Extensor Hallucis Longus   Psych: Normal mood and affect       Diagnostic Studies: Reviewed, no new imaging    Laboratory:  Reviewed   Results from last 7 days   Lab Units 03/27/23  0517   HEMOGLOBIN g/dL 10 2*   HEMATOCRIT % 31 1*   WBC Thousand/uL 7 06     Results from last 7 days   Lab Units 03/28/23  0550 03/27/23  0517   BUN mg/dL 39* 32*   POTASSIUM mmol/L 4 2 4 1   CHLORIDE mmol/L 110* 106   CREATININE mg/dL 1 66* 1 49*            Patient Active Problem List   Diagnosis   • BRAULIO (acute kidney injury) ([facility] [address] )   • Acute Posterior Circulation Stroke   • Right Vertebral artery dissection (HCC)   • Stenosis of left vertebral artery   • Primary hypertension   • Dizziness   • Dysphagia   • Spasticity   • CKD (chronic kidney disease)   • Prediabetes   • Adjustment disorder with depressed mood   • Anemia   • Neurogenic bowel   • Celiac artery stenosis (HCC)         Medications  Current Facility-Administered Medications   Medication Dose Route Frequency Provider Last Rate   • acetaminophen  650 mg Oral Q6H PRN YURI Ramon     • amLODIPine  10 mg Oral Daily Sara Maguire MD     • apixaban  2 5 mg Oral BID Sara Maguire MD     • aspirin  81 mg Oral Daily Sara Maguire MD     • atorvastatin  40 mg Oral Daily With Serina Patel MD     • baclofen  5 mg Oral TID Sara Maguire MD     • carvedilol  25 mg Oral BID With Meals YURI Ramon     • vitamin B-12  1,000 mcg Oral Daily Sara Maguire MD     • ferrous sulfate  325 mg Oral Daily With Breakfast YURI Ramon     • FLUoxetine  20 mg Oral Daily Sara Maguire MD     • hydrALAZINE  25 mg Oral Q8H PRN YURI Ramon     • hydrALAZINE  50 mg Oral Q12H Maria G Hayes PA-C     • polyethylene glycol  17 g Oral Daily PRN Sara Maguire MD     • sodium chloride  100 mL/hr Intravenous Continuous Sara Maguire MD     • ticagrelor  90 mg Oral Q12H [address] Sara Maguire MD            ** Please Note: Fluency Direct voice to text software may have been used in the creation of this document   **

## 2023-03-29 LAB
ANION GAP SERPL CALCULATED.3IONS-SCNC: 5 MMOL/L (ref 4–13)
BUN SERPL-MCNC: 31 MG/DL (ref 5–25)
CALCIUM SERPL-MCNC: 8.6 MG/DL (ref 8.3–10.1)
CHLORIDE SERPL-SCNC: 112 MMOL/L (ref 96–108)
CO2 SERPL-SCNC: 24 MMOL/L (ref 21–32)
CREAT SERPL-MCNC: 1.31 MG/DL (ref 0.6–1.3)
GFR SERPL CREATININE-BSD FRML MDRD: 57 ML/MIN/1.73SQ M
GLUCOSE SERPL-MCNC: 102 MG/DL (ref 65–140)
POTASSIUM SERPL-SCNC: 3.9 MMOL/L (ref 3.5–5.3)
SODIUM SERPL-SCNC: 141 MMOL/L (ref 135–147)

## 2023-03-29 RX ADMIN — AMLODIPINE BESYLATE 10 MG: 10 TABLET ORAL at 10:00

## 2023-03-29 RX ADMIN — ASPIRIN 81 MG CHEWABLE TABLET 81 MG: 81 TABLET CHEWABLE at 10:00

## 2023-03-29 RX ADMIN — CARVEDILOL 25 MG: 25 TABLET, FILM COATED ORAL at 06:53

## 2023-03-29 RX ADMIN — APIXABAN 2.5 MG: 2.5 TABLET, FILM COATED ORAL at 10:00

## 2023-03-29 RX ADMIN — HYDRALAZINE HYDROCHLORIDE 50 MG: 50 TABLET, FILM COATED ORAL at 06:52

## 2023-03-29 RX ADMIN — ATORVASTATIN CALCIUM 40 MG: 40 TABLET, FILM COATED ORAL at 16:58

## 2023-03-29 RX ADMIN — TICAGRELOR 90 MG: 90 TABLET ORAL at 10:01

## 2023-03-29 RX ADMIN — SODIUM CHLORIDE 100 ML/HR: 0.9 INJECTION, SOLUTION INTRAVENOUS at 05:29

## 2023-03-29 RX ADMIN — BACLOFEN 5 MG: 10 TABLET ORAL at 20:03

## 2023-03-29 RX ADMIN — CARVEDILOL 25 MG: 25 TABLET, FILM COATED ORAL at 16:58

## 2023-03-29 RX ADMIN — CYANOCOBALAMIN TAB 500 MCG 1000 MCG: 500 TAB at 10:00

## 2023-03-29 RX ADMIN — Medication 1 PACKET: at 14:00

## 2023-03-29 RX ADMIN — BACLOFEN 5 MG: 10 TABLET ORAL at 10:00

## 2023-03-29 RX ADMIN — HYDRALAZINE HYDROCHLORIDE 50 MG: 50 TABLET, FILM COATED ORAL at 20:00

## 2023-03-29 RX ADMIN — FERROUS SULFATE TAB 325 MG (65 MG ELEMENTAL FE) 325 MG: 325 (65 FE) TAB at 06:52

## 2023-03-29 RX ADMIN — APIXABAN 2.5 MG: 2.5 TABLET, FILM COATED ORAL at 17:00

## 2023-03-29 RX ADMIN — TICAGRELOR 90 MG: 90 TABLET ORAL at 20:03

## 2023-03-29 RX ADMIN — BACLOFEN 5 MG: 10 TABLET ORAL at 16:58

## 2023-03-29 RX ADMIN — FLUOXETINE 20 MG: 20 CAPSULE ORAL at 10:00

## 2023-03-29 NOTE — PROGRESS NOTES
03/29/23 1330   Pain Assessment   Pain Assessment Tool 0-10   Pain Score No Pain   Restrictions/Precautions   Precautions Fall Risk;Aphasia;Bed/chair alarms;Cognitive;Supervision on toilet/commode   Cognition   Overall Cognitive Status Impaired   Arousal/Participation Alert; Cooperative   Subjective   Subjective pt had no c/o and ready for PT   Roll Left and Right   Type of Assistance Needed Independent   Comment HOB flat, no rails   Roll Left and Right CARE Score 6   Sit to Lying   Type of Assistance Needed Independent   Comment HOB flat, no rails   Sit to Lying CARE Score 6   Lying to Sitting on Side of Bed   Type of Assistance Needed Independent   Comment HOB flat, no rails   Lying to Sitting on Side of Bed CARE Score 6   Sit to Stand   Type of Assistance Needed Supervision; Adaptive equipment   Comment CS with/without AD   Sit to Stand CARE Score 4   Bed-Chair Transfer   Type of Assistance Needed Supervision; Adaptive equipment   Comment CS with HW, S with repeated sit pivots no AD   Chair/Bed-to-Chair Transfer CARE Score 4   Car Transfer   Type of Assistance Needed Supervision;Verbal cues; Adaptive equipment   Comment walk to/from with HW at CS level   Car Transfer CARE Score 4   Walk 10 Feet   Type of Assistance Needed Supervision;Verbal cues; Adaptive equipment   Walk 10 Feet CARE Score 4   Walk 50 Feet with Two Turns   Type of Assistance Needed Supervision;Verbal cues; Adaptive equipment   Walk 50 Feet with Two Turns CARE Score 4   Walk 150 Feet   Type of Assistance Needed Supervision;Verbal cues; Adaptive equipment   Comment CS with HW x 150' , VC provided to promote step through pattern   Walk 150 Feet CARE Score 4   Walking 10 Feet on Uneven Surfaces   Type of Assistance Needed Supervision;Verbal cues; Adaptive equipment   Comment CS over foam mat with HW   Walking 10 Feet on Uneven Surfaces CARE Score 4   Ambulation   Primary Mode of Locomotion Prior to Admission Walk   Distance Walked (feet) 200 ft   Assist "Device   (no AD)   Gait Pattern Decreased foot clearance; Inconsistant Mae; Slow Mae;R foot drag;Improper weight shift;Decreased R stance   Limitations Noted In Balance; Endurance; Heel Strike;Speed;Strength;Swing;Sensation;Posture   Provided Assistance with: Balance;Weight Shift;Trunk Support   Walk Assist Level Moderate Assist;Minimum Assist , external cues for gait normalization    Does the patient walk? 2  Yes   Wheel 50 Feet with Two Turns   Type of Assistance Needed Independent   Wheel 50 Feet with Two Turns CARE Score 6   Wheel 150 Feet   Type of Assistance Needed Independent   Comment distance not limited using bilat LE   Wheel 150 Feet CARE Score 6   Curb or Single Stair   Style negotiated Curb   Type of Assistance Needed Physical assistance;Verbal cues; Adaptive equipment   Physical Assistance Level 25% or less   Comment 6\" curb with HW   1 Step (Curb) CARE Score 3   4 Steps   Type of Assistance Needed Physical assistance;Verbal cues; Adaptive equipment   Physical Assistance Level 25% or less   4 Steps CARE Score 3   12 Steps   Type of Assistance Needed Physical assistance;Verbal cues; Adaptive equipment   Physical Assistance Level 25% or less   Comment min-CGA ascending fwd/descending bwd with L rail, non reciprocal pattern   12 Steps CARE Score 3   Picking Up Object   Type of Assistance Needed Supervision;Verbal cues; Adaptive equipment   Comment used L hand with reacher to retrieved pen off the floor while standing  attempted to use R hand on reacher but due to ongoing weakness unable to complete task   Picking Up Object CARE Score 4   Equipment Use   NuStep L2 x 5 mins bilat LE only, SPM >80 with SpO2 at 96%, MO 70-86  lvl 2 with bilat LE and R UE only x 5 mins, SPM >75 vwith GABBY scale of somewhat hard   Assessment   Treatment Assessment skilled PT for 60 mins mainly focused on therapeutic activities utilizing a HW in preparation for home d/c on 3/31/23 with pt completing mobilities at  level   cont " to demo indep with w/c management and propulsion and completing w/c level sit pivots at S level without requiring any VC this session  cont to demo difficulty with steps/stairs negotiation requiring min-CGA of 1 using LRAD  For tomorrow PT will reassess care score mobility sections with HW, work on improving w/c level sit pivots to DS level and curb/FF negotiation training using AD to inc indep and dec overall caregiver burden  Family/Caregiver Present no   Barriers to Discharge None   PT Barriers   Physical Impairment Impaired balance;Decreased strength;Decreased range of motion;Decreased endurance;Decreased mobility; Impaired sensation; Impaired tone;Decreased safety awareness   Plan   Treatment/Interventions Functional transfer training;LE strengthening/ROM; Therapeutic exercise; Endurance training;Bed mobility;Gait training;Spoke to nursing;OT   Progress Progressing toward goals   Recommendation   PT Discharge Recommendation Home with outpatient rehabilitation   PT Therapy Minutes   PT Time In 1330   PT Time Out 1430   PT Total Time (minutes) 60   PT Mode of treatment - Individual (minutes) 30   PT Mode of treatment - Concurrent (minutes) 30   PT Mode of treatment - Group (minutes) 0   PT Mode of treatment - Co-treat (minutes) 0   PT Mode of Treatment - Total time(minutes) 60 minutes   PT Cumulative Minutes 2128   Therapy Time missed   Time missed?  No

## 2023-03-29 NOTE — PROGRESS NOTES
"   03/29/23 1899   Pain Assessment   Pain Assessment Tool 0-10   Pain Score No Pain   Restrictions/Precautions   Precautions Aphasia; Aspiration;Bed/chair alarms;Cognitive; Fall Risk;Supervision on toilet/commode   Weight Bearing Restrictions No   ROM Restrictions No   Comprehension   Comprehension (FIM) 5 - Understands basic directions and conversation   Expression   Expression (FIM) 5 - Needs help/cues only RARELY (< 10% of the time)   Social Interaction   Social Interaction (FIM) 6 - Interacts appropriately with others BUT requires extra  time   Problem Solving   Problem solving (FIM) 5 - Solves basic problems 90% of time   Memory   Memory (FIM) 5 - Recalls/performs request 90% of time   Speech/Language/Cognition Assessmetn   Treatment Assessment Pt seen for skilled speech therapy session targeting cognitive linguistic communication skills  Pt alert and interactive- engaged in brief rapport since this SLP last worked with pt over the weekend  Pt with IV pole at bedside with fluids hanging  Pt commented \"I need to drink more water\"  Miesha CRNP present as well as commenting improvement on kidneys therefore IVF discontinued with HIGH encouragement/education on increasing hydration  Discussed with pt strategies to use at home- timed water bottle, alarms to remind to drink, use of fresh fruit infusion, [place]/lemonade powders  Pt stated \"I didn't drink much before either so it isn't new\"  Pt engaged in category matrix task where pt was presented with list of categories and targeted initial letters  Pt was able to name items that fit with 26/32acc increasing with vebral cues, semantic, phonemic, and contextual  Pt next completed time management task which pt presented with functional word problems- able to complete with 9/10acc increasing with extended processing time and verbal cues with how to set up the problem   Last pt completed further thought organization tasks- completed unscrambling of sentences with 6/10acc " increasing with verbal cues and assistance with placement of key words (nouns/verbs)  Pt completed filling in words for a paragraph- pt able to complete the paragraph filling in the missing words with 9/11acc increasing again with verbal cues  Reviewed plan to update med cheatsheet tomorrow and create HEP with worksheets to take home  Pt overall is improving with skilled SLP services and will cont to benefit to maximize overall cognitive linguistic communication abilities at this time  SLP Therapy Minutes   SLP Time In 0930   SLP Time Out 1030   SLP Total Time (minutes) 60   SLP Mode of treatment - Individual (minutes) 60   SLP Mode of treatment - Concurrent (minutes) 0   SLP Mode of treatment - Group (minutes) 0   SLP Mode of treatment - Co-treat (minutes) 0   SLP Mode of Treatment - Total time(minutes) 60 minutes   SLP Cumulative Minutes 960   Therapy Time missed   Time missed?  No

## 2023-03-29 NOTE — PLAN OF CARE
Problem: Prexisting or High Potential for Compromised Skin Integrity  Goal: Skin integrity is maintained or improved  Description: INTERVENTIONS:  - Identify patients at risk for skin breakdown  - Assess and monitor skin integrity  - Assess and monitor nutrition and hydration status  - Monitor labs   - Assess for incontinence   - Turn and reposition patient  - Assist with mobility/ambulation  - Relieve pressure over bony prominences  - Avoid friction and shearing  - Provide appropriate hygiene as needed including keeping skin clean and dry  - Evaluate need for skin moisturizer/barrier cream  - Collaborate with interdisciplinary team   - Patient/family teaching  - Consider wound care consult   Outcome: Progressing     Problem: MOBILITY - ADULT  Goal: Maintain or return to baseline ADL function  Description: INTERVENTIONS:  -  Assess patient's ability to carry out ADLs; assess patient's baseline for ADL function and identify physical deficits which impact ability to perform ADLs (bathing, care of mouth/teeth, toileting, grooming, dressing, etc )  - Assess/evaluate cause of self-care deficits   - Assess range of motion  - Assess patient's mobility; develop plan if impaired  - Assess patient's need for assistive devices and provide as appropriate  - Encourage maximum independence but intervene and supervise when necessary  - Involve family in performance of ADLs  - Assess for home care needs following discharge   - Consider OT consult to assist with ADL evaluation and planning for discharge  - Provide patient education as appropriate  Outcome: Progressing  Goal: Maintains/Returns to pre admission functional level  Description: INTERVENTIONS:  - Perform BMAT or MOVE assessment daily    - Set and communicate daily mobility goal to care team and patient/family/caregiver  - Collaborate with rehabilitation services on mobility goals if consulted  - Perform Range of Motion 3 times a day    - Reposition patient every 2 hours   - Dangle patient 3 times a day  - Stand patient 3 times a day  - Ambulate patient 3 times a day  - Out of bed to chair 3 times a day   - Out of bed for meals 3 times a day  - Out of bed for toileting  - Record patient progress and toleration of activity level   Outcome: Progressing     Problem: PAIN - ADULT  Goal: Verbalizes/displays adequate comfort level or baseline comfort level  Description: Interventions:  - Encourage patient to monitor pain and request assistance  - Assess pain using appropriate pain scale  - Administer analgesics based on type and severity of pain and evaluate response  - Implement non-pharmacological measures as appropriate and evaluate response  - Consider cultural and social influences on pain and pain management  - Notify physician/advanced practitioner if interventions unsuccessful or patient reports new pain  Outcome: Progressing     Problem: INFECTION - ADULT  Goal: Absence or prevention of progression during hospitalization  Description: INTERVENTIONS:  - Assess and monitor for signs and symptoms of infection  - Monitor lab/diagnostic results  - Monitor all insertion sites, i e  indwelling lines, tubes, and drains  - Monitor endotracheal if appropriate and nasal secretions for changes in amount and color  - [employer] appropriate cooling/warming therapies per order  - Administer medications as ordered  - Instruct and encourage patient and family to use good hand hygiene technique  - Identify and instruct in appropriate isolation precautions for identified infection/condition  Outcome: Progressing  Goal: Absence of fever/infection during neutropenic period  Description: INTERVENTIONS:  - Monitor WBC    Outcome: Progressing     Problem: SAFETY ADULT  Goal: Patient will remain free of falls  Description: INTERVENTIONS:  - Educate patient/family on patient safety including physical limitations  - Instruct patient to call for assistance with activity   - Consult OT/PT to assist with strengthening/mobility   - Keep Call bell within reach  - Keep bed low and locked with side rails adjusted as appropriate  - Keep care items and personal belongings within reach  - Initiate and maintain comfort rounds  - Make Fall Risk Sign visible to staff  - Offer Toileting every 2 Hours, in advance of need  - Initiate/Maintain bed alarm  - Obtain necessary fall risk management equipment: bed alarm  - Apply yellow socks and bracelet for high fall risk patients  - Consider moving patient to room near nurses station  Outcome: Progressing  Goal: Maintain or return to baseline ADL function  Description: INTERVENTIONS:  -  Assess patient's ability to carry out ADLs; assess patient's baseline for ADL function and identify physical deficits which impact ability to perform ADLs (bathing, care of mouth/teeth, toileting, grooming, dressing, etc )  - Assess/evaluate cause of self-care deficits   - Assess range of motion  - Assess patient's mobility; develop plan if impaired  - Assess patient's need for assistive devices and provide as appropriate  - Encourage maximum independence but intervene and supervise when necessary  - Involve family in performance of ADLs  - Assess for home care needs following discharge   - Consider OT consult to assist with ADL evaluation and planning for discharge  - Provide patient education as appropriate  Outcome: Progressing  Goal: Maintains/Returns to pre admission functional level  Description: INTERVENTIONS:  - Perform BMAT or MOVE assessment daily    - Set and communicate daily mobility goal to care team and patient/family/caregiver  - Collaborate with rehabilitation services on mobility goals if consulted  - Perform Range of Motion 3 times a day  - Reposition patient every 2 hours    - Dangle patient 3 times a day  - Stand patient 3 times a day  - Ambulate patient 3 times a day  - Out of bed to chair 3 times a day   - Out of bed for meals 3 times a day  - Out of bed for toileting  - Record patient progress and toleration of activity level   Outcome: Progressing     Problem: DISCHARGE PLANNING  Goal: Discharge to home or other facility with appropriate resources  Description: INTERVENTIONS:  - Identify barriers to discharge w/patient and caregiver  - Arrange for needed discharge resources and transportation as appropriate  - Identify discharge learning needs (meds, wound care, etc )  - Arrange for interpretive services to assist at discharge as needed  - Refer to Case Management Department for coordinating discharge planning if the patient needs post-hospital services based on physician/advanced practitioner order or complex needs related to functional status, cognitive ability, or social support system  Outcome: Progressing     Problem: Nutrition/Hydration-ADULT  Goal: Nutrient/Hydration intake appropriate for improving, restoring or maintaining nutritional needs  Description: Monitor and assess patient's nutrition/hydration status for malnutrition  Collaborate with interdisciplinary team and initiate plan and interventions as ordered  Monitor patient's weight and dietary intake as ordered or per policy  Utilize nutrition screening tool and intervene as necessary  Determine patient's food preferences and provide high-protein, high-caloric foods as appropriate       INTERVENTIONS:  - Monitor oral intake, urinary output, labs, and treatment plans  - Assess nutrition and hydration status and recommend course of action  - Evaluate amount of meals eaten  - Assist patient with eating if necessary   - Allow adequate time for meals  - Recommend/ encourage appropriate diets, oral nutritional supplements, and vitamin/mineral supplements  - Order, calculate, and assess calorie counts as needed  - Recommend, monitor, and adjust tube feedings and TPN/PPN based on assessed needs  - Assess need for intravenous fluids  - Provide specific nutrition/hydration education as appropriate  - Include patient/family/caregiver in decisions related to nutrition  Outcome: Progressing

## 2023-03-29 NOTE — PROGRESS NOTES
Physical Medicine and Rehabilitation Progress Note  Moreno Zaraet 60 y o  male MRN: [id number]  Unit/Bed#: -23 Encounter: [id number]    HPI: Moreno Zarate is a 60 y o  right handed male with medical history of HTN who presented to [address] [place] [address] Road on 2/25 with nausea/dizziness  Found to have hypertensive emergency with acute/subcaute R posterior cerebellar CVA with 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, mild BRAULIO, and incidentally found + COVID (without evidence of respiratory illness/hypoxia/CXR findings)  Placed on cardene gtt, stroke pathway, ASA/Plavix, IV hydration for BRAULIO, and CTX for UTI  NIHSS 2  Symptoms began 2 days prior  Not tPA/TNK candidate  MRI/MRA with 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  NSx recommended transfer to [facility] and starting on heparin gtt and stopped Plavix  Repeat CTA on 2/26 stable with with R V3/4 occlusion  Not a candidate for interventional procedure due to clinical stability and risk  Speech consulted and noted mod-severe oropharyngeal dysphagia recommended pureed/HTL  Unfortunately later on 2/26, he clinically deteriorated with increased R sided weakness, and was taken to IR for stenting of V3 and V4 with TICI 2B revascularization  CTH without ICH  He was admitted back to the ICU intubated - extubated 2/27  MRI showed cerebellar CVA and R > L stroke burden, with additional hypodensities on DWI appreciated L midbrain, pontine area and R lower medullary/spinal cord junction  He was transitioned to triple therapy with DAPT (given recent stent) and A/C with eliquis 2 5mg BID due to COVID  Diet advanced to Level 3/NTL on 2/27  Noted to have spasticity in RLE - started on baclofen by Neurology  He was able to have cardene discontinued on 3/2, and they have been making adjustments to his oral regimen  He was started on Prozac for his mood  NSx has signed off  CTA H/N recommended around 3/17  Length of time on eliquis unclear   Admitted to [facility] on 3/6  Chief Complaint: No new issues  Interval History/Subjective:  No acute events overnight  Had IVF overnight  Denies any new CP, SOB, fevers, chills, N/V, abdominal pain  Last BM 3/28  Had some bowel incontinence yesterday (Since stopping metamucil)  PVRs were fine  ROS:  A 10 point review of systems was negative except for what is noted in the HPI  Today's Changes:  1  Crea improved today to 1 31 after getting IVF (1L over 10 hours)   - Stopped IVF   - Encouraging regular fluid intake   - Staff to cue patient to drink   - IM provided patient educatoin   - Continue to hold Lisinopril   - Staff provided patient with strategies to maintain his fluid intake  2  Monitor BP off lisinopril  3  Add metamucil M/W/F     - Encourage fluid intake to avoid constipation on metamucil  Total visit time: 35 minutes, with more than 50% spent counseling/coordinating care  Counseling includes discussion with patient re: progress in therapies, functional issues observed by therapy staff, and discussion with patient regarding their current medical state and wellbeing  Coordination of patient's care was performed in conjunction with Internal Medicine service to monitor patient's labs, vitals, and management of their comorbidities  Assessment/Plan:    * Acute Posterior Circulation Stroke  Assessment & Plan  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    - Now s/p stenting on 2/26 with TICI 2b revascularization    PPx: ASA/brilinta/Eliquis, Statin   - Discussed with Neurology, they defer to NSx on length of time on eliquis   Potentially 3-4 weeks if felt to be related to [address] out to Neurosurgery - they will discuss with their attending re: eliquis  - Repeat CTA H/N -  No new intracranial abnormalities, expected evolution of known CVA  See dissection and stenosis for more details  3/9 Had nocturnal oximetry to screen for nocturnal hypoxia - only 26 seconds total of mild desaturation  Maintain normotension  Education on prediabetes and diet  Follow-up with Neurovascular/Neurosurgery  Function improving slowly   Continue PT/OT/SLP - for swallow, speech, R sided weakness/tone, will need a good visual exam      Celiac artery stenosis (HCC)  Assessment & Plan  Incidentally noted to have approximately 70% celiac artery stenosis on 3/21 Renal Artery Ultrasound  No s/s of ischemic bowel  Would recommend outpatient f/u with PCP  Neurogenic bowel  Assessment & Plan  Disinhibited bowel + mobility difficulties -somewhat loose as well  3/27 - has been improving, but maybe slightly constipated  - Gave miralax with BM    3/28 - with incontinent BM  Will add metamucil back but only 3x weekly  - Encouraged adequate hydration   Not on bowel meds right now  [facility] Bowel Training:   - 30-45 min after each meal will get patient onto commode to attempt bowel movement  - He wants to hold off on scheduled suppository for now (would schedule in AM to align with his previous bowel schedule at home)  For now monitor, close continence care especially    Anemia  Assessment & Plan  Stable 3/27 at 10 2   Normocytic anemia noted through stay  B12 borderline low  Folate normal  Iron Panel: Low iron saturation and iron with normal TIBC and Ferritin  Was started in the hospital on B12, but not iron  Per IM - 2 days of IV Venofer (last day 3/8)  Then start oral iron  Monitor Hgb, transfuse as appropriate  Will discuss with IM  Consulted  Adjustment disorder with depressed mood  Assessment & Plan  Tearful at times, but coping    Has been tearful and labile during hospitalization  Started on Prozac 20mgdaily - may need to increase dose  Consulted rehab psychology for support  Prediabetes  Assessment & Plan  A1C 5 7  Consult nutrition for education on diabetic diet  Diet/lifestyle modifications  Follow-up with PCP  Acute kidney injury superimposed on CKD [facility])  Assessment & Plan  Lab Results   Component Value Date    EGFR 57 03/29/2023    EGFR 43 03/28/2023    EGFR 49 03/27/2023    CREATININE 1 31 (H) 03/29/2023    CREATININE 1 66 (H) 03/28/2023    CREATININE 1 49 (H) 03/27/2023     Per hospital team - suspect CKD Stage 2 2/2 hypertension  3/27 Crea bumped to 1 49   - Patient was to increase his PO intake prior to trying IVF   - IM holding LIsinopril  3/28 Crea up to 1 66   - Giving IVF   - Check PVR - these were fine  3/29 Crea down to 1 31 after 1L over 10 hours  - Encourage adequate hydration while here  Will monitor BMP while here  Avoid nephrotoxic meds and relative hypotension  Recommend outpatient f/u with PCP    Spasticity  Assessment & Plan  Intrinsic tonic tone in R quad which is MAS 2, Mas 1 in his ankle with intrinsic phasic tone in the form of clonus  RUE with tone in SA, EF, WF (MAS 1) and pronator (MAS 1+)  Hiccups resolved  Would opt to avoid treating R quad for now, as tone there may help stabilize at the knee  R ankle multipodus, stretches  Baclofen 5mg TID - monitor and adjust as appropriate  Range of motion  Monitor as tone evolves  May benefit from bracing in the future  Outpatient f/u with PMR  Dysphagia  Assessment & Plan  Improved  Admitted with mod-severe oropharyngeal  Has massively improved  3/8 Advanced to Level 3/Thins  3/10 Advanced to Reg/Thins  Aspiration precautions  Good oral hygiene   SLP consulted    Primary hypertension  Assessment & Plan  Home: None  Here: Amlodipine 10mg daily, Coreg 25mg BID, Hydralazine 50mg Q12hr  Has PRN hydralazine as well     Had hypertensive urgency in hospital requiring cardene gtt   3/21 IM ordered renal artery ultrasound  No significant PRIETO  3/27 Lisinopril held due to BRAULIO  Monitor and adjust as appropriate  IM consulted to assist with management  Stenosis of left vertebral artery  Assessment & Plan  Severe L vertebral artery origin stenosis  3/17 CTA H/N:   CTA HEAD AND NECK  - Right vertebral artery V4 stent appears to have in-stent occlusion  Right vertebral artery V4 segment is patent before and after the V4 stent  - Unchanged left vertebral artery post-PICA V4 segment occlusion   - Dissection flap in right vertebral artery V3 segment which is patent and improved in caliber status post thrombectomy  - Moderate stenosis in proximal-to- mid basilar artery due to atherosclerotic disease status post mechanical thrombectomy  - Per Neurosurgery unlikely in stent occlusion  No changes  Plan for outpatient f/u  ASA/Brilinta  Atorvastatin  SBP goals 120-160  Follow-up with Neurovascular  Right Vertebral artery dissection [facility])  Assessment & Plan  Now s/p R V3/4 stenting with TICI 2B revascularization on 2/26 for R Vert stenosis  Continue ASA/Brilinta  Statin  CTA HEAD AND NECK 3/17  - Dissection flap in right vertebral artery V3 segment which is patent and improved in caliber status post thrombectomy  Outpatient f/u with Neurosurgery/Dr Edmonds Fresh      Vasovagal syncope-resolved as of 3/22/2023  Assessment & Plan  No further episodes  3/8 Had episode of vasovagal syncope  - No convulsions  On body weight support system, started to feel dizzy  - Staring episode after, but able to sternal rubbed out of it  Very brief   - No post-ictal weakness   - Neurologically stable and back to baseline   - Orthostatics negative  Discussed with Neurology - unlikely seizure given distribution of stroke and presentation  No further testing recommended at this time  Monitor       COVID-resolved as of 3/10/2023  Assessment & Plan  Resolved  Incidentally found to have COVID on 2/25  Asymptomatic from respiratory standpoint  Per Neurosurgery concern for hypercoagulability related to COVID  Currently on Eliquis 2 5mg BID - per Neuro 3-4 weeks probably reasonable, but for them ultimately up to NSx as this was their initial recommendation to start this medication  3/8 Discontinued precautions after 10 days isolation  Health Maintenance  #Delirium/Sleep: At risk  Optimize bowel/bladder, sleep-wake cycle, mood and pain management  #Pain: Baclofen 5mg TID, Tylenol PRN  #Bowel: Last BM 3/28  See Neurogenic bowel above  #Bladder: Voiding and continent  #Skin/Pressure Injury Prevention: Turn Q2hr in bed, with weight shifts [id number] in wheelchair  Float heels in bed  #DVT Prophylaxis: On triple therapy, SCDs  #GI Prophylaxis: None  #Code Status:  Full Code  #FEN: Reg/Thins  #Dispo:  Team 3/28:  Family training with the wife completed  Goals for outpatient therapies PT/OT/SLP  Family's goal is for him to leave by ADD 3/31  Barrier: R sided weakness, spasticity, impaired proprioception, impaired righting reaction, kinesthetic awareness, impaired sensation on the R, ataxia, dysphagia, expressive > receptive aphasia, post-stroke depression, bowel incontinence/disinhibited, spasticity, decreased righting reaction, 25/30 MOCA  Goals: Sup-Set-up wheelchair level mobility/ADLs  Can ambulate with julieta-walker with wife  Follow-up: Neurology, Neurosurgery, PCP, PMR (spasticity)       Objective:    Functional Update:  PT: Cl Sup mobility  OT: CGA-Eusebio ADLs  SLP: Sup comprehension/expression and Eusebio for executive functioning    Allergies per EMR    Physical Exam:  Temp:  [97 8 °F (36 6 °C)-98 4 °F (36 9 °C)] 98 4 °F (36 9 °C)  HR:  [62-82] 66  Resp:  [16-18] 18  BP: (121-149)/(59-69) 149/69  Oxygen Therapy  SpO2: 96 %    Gen: No acute distress, Well-nourished, well-appearing  HEENT: Moist mucus membranes, Normocephalic/Atraumatic  Cardiovascular: Regular rate, rhythm, S1/S2  Distal pulses palpable  Heme/Extr: No edema  Pulmonary: Non-labored breathing  : No barnett  GI: Soft, non-tender, non-distended  Integumentary: Skin is warm, dry  Neuro: AAOx3,  Speech is intact  Appropriate to questioning  Psych: Normal mood and affect       Diagnostic Studies: Reviewed, no new imaging    Laboratory:  Reviewed   Results from last 7 days   Lab Units 03/27/23  0517   HEMOGLOBIN g/dL 10 2*   HEMATOCRIT % 31 1*   WBC Thousand/uL 7 06     Results from last 7 days   Lab Units 03/29/23  0646 03/28/23  0550 03/27/23  0517   BUN mg/dL 31* 39* 32*   POTASSIUM mmol/L 3 9 4 2 4 1   CHLORIDE mmol/L 112* 110* 106   CREATININE mg/dL 1 31* 1 66* 1 49*            Patient Active Problem List   Diagnosis   • BRAULIO (acute kidney injury) ([facility] [address] )   • Acute Posterior Circulation Stroke   • Right Vertebral artery dissection (HCC)   • Stenosis of left vertebral artery   • Primary hypertension   • Dizziness   • Dysphagia   • Spasticity   • Acute kidney injury superimposed on CKD (HCC)   • Prediabetes   • Adjustment disorder with depressed mood   • Anemia   • Neurogenic bowel   • Celiac artery stenosis (HCC)         Medications  Current Facility-Administered Medications   Medication Dose Route Frequency Provider Last Rate   • acetaminophen  650 mg Oral Q6H PRN Maldonado [place], CRNP     • amLODIPine  10 mg Oral Daily Nory Degroot MD     • apixaban  2 5 mg Oral BID Nory Degroot MD     • aspirin  81 mg Oral Daily Nory Degroot MD     • atorvastatin  40 mg Oral Daily With Sarah Barron MD     • baclofen  5 mg Oral TID Nory Degroot MD     • carvedilol  25 mg Oral BID With Meals Maldonado [place], CRNP     • vitamin B-12  1,000 mcg Oral Daily Nory Degroot MD     • ferrous sulfate  325 mg Oral Daily With Breakfast Maldonado [place], CRNP     • FLUoxetine  20 mg Oral Daily Nory Degroot MD     • hydrALAZINE  25 mg Oral Q8H PRN Maldonado [place], CRNP     • hydrALAZINE  50 mg Oral Q12H Maria G Hayes PA-C     • polyethylene glycol  17 g Oral Daily PRN Zhen Dumont MD     • sodium chloride  100 mL/hr Intravenous Continuous Anushka Section, CRNP 100 mL/hr (03/29/23 0529)   • ticagrelor  90 mg Oral Q12H [facility] Zhen Dumont MD            ** Please Note: Fluency Direct voice to text software may have been used in the creation of this document   **

## 2023-03-29 NOTE — PLAN OF CARE
Problem: Prexisting or High Potential for Compromised Skin Integrity  Goal: Skin integrity is maintained or improved  Description: INTERVENTIONS:  - Identify patients at risk for skin breakdown  - Assess and monitor skin integrity  - Assess and monitor nutrition and hydration status  - Monitor labs   - Assess for incontinence   - Turn and reposition patient  - Assist with mobility/ambulation  - Relieve pressure over bony prominences  - Avoid friction and shearing  - Provide appropriate hygiene as needed including keeping skin clean and dry  - Evaluate need for skin moisturizer/barrier cream  - Collaborate with interdisciplinary team   - Patient/family teaching  - Consider wound care consult   Outcome: Progressing     Problem: MOBILITY - ADULT  Goal: Maintain or return to baseline ADL function  Description: INTERVENTIONS:  -  Assess patient's ability to carry out ADLs; assess patient's baseline for ADL function and identify physical deficits which impact ability to perform ADLs (bathing, care of mouth/teeth, toileting, grooming, dressing, etc )  - Assess/evaluate cause of self-care deficits   - Assess range of motion  - Assess patient's mobility; develop plan if impaired  - Assess patient's need for assistive devices and provide as appropriate  - Encourage maximum independence but intervene and supervise when necessary  - Involve family in performance of ADLs  - Assess for home care needs following discharge   - Consider OT consult to assist with ADL evaluation and planning for discharge  - Provide patient education as appropriate  Outcome: Progressing  Goal: Maintains/Returns to pre admission functional level  Description: INTERVENTIONS:  - Perform BMAT or MOVE assessment daily    - Set and communicate daily mobility goal to care team and patient/family/caregiver  - Collaborate with rehabilitation services on mobility goals if consulted  - Perform Range of Motion 3 times a day    - Reposition patient every 2 hours   - Dangle patient 3 times a day  - Stand patient 3 times a day  - Ambulate patient 3 times a day  - Out of bed to chair 3 times a day   - Out of bed for meals 3 times a day  - Out of bed for toileting  - Record patient progress and toleration of activity level   Outcome: Progressing     Problem: PAIN - ADULT  Goal: Verbalizes/displays adequate comfort level or baseline comfort level  Description: Interventions:  - Encourage patient to monitor pain and request assistance  - Assess pain using appropriate pain scale  - Administer analgesics based on type and severity of pain and evaluate response  - Implement non-pharmacological measures as appropriate and evaluate response  - Consider cultural and social influences on pain and pain management  - Notify physician/advanced practitioner if interventions unsuccessful or patient reports new pain  Outcome: Progressing     Problem: INFECTION - ADULT  Goal: Absence or prevention of progression during hospitalization  Description: INTERVENTIONS:  - Assess and monitor for signs and symptoms of infection  - Monitor lab/diagnostic results  - Monitor all insertion sites, i e  indwelling lines, tubes, and drains  - Monitor endotracheal if appropriate and nasal secretions for changes in amount and color  - Milo appropriate cooling/warming therapies per order  - Administer medications as ordered  - Instruct and encourage patient and family to use good hand hygiene technique  - Identify and instruct in appropriate isolation precautions for identified infection/condition  Outcome: Progressing  Goal: Absence of fever/infection during neutropenic period  Description: INTERVENTIONS:  - Monitor WBC    Outcome: Progressing     Problem: SAFETY ADULT  Goal: Patient will remain free of falls  Description: INTERVENTIONS:  - Educate patient/family on patient safety including physical limitations  - Instruct patient to call for assistance with activity   - Consult OT/PT to assist with strengthening/mobility   - Keep Call bell within reach  - Keep bed low and locked with side rails adjusted as appropriate  - Keep care items and personal belongings within reach  - Initiate and maintain comfort rounds  - Make Fall Risk Sign visible to staff  - Offer Toileting every 2 Hours, in advance of need  - Initiate/Maintain bed/chair alarm  - Obtain necessary fall risk management equipment: nonskid socks  - Apply yellow socks and bracelet for high fall risk patients  - Consider moving patient to room near nurses station  Outcome: Progressing  Goal: Maintain or return to baseline ADL function  Description: INTERVENTIONS:  -  Assess patient's ability to carry out ADLs; assess patient's baseline for ADL function and identify physical deficits which impact ability to perform ADLs (bathing, care of mouth/teeth, toileting, grooming, dressing, etc )  - Assess/evaluate cause of self-care deficits   - Assess range of motion  - Assess patient's mobility; develop plan if impaired  - Assess patient's need for assistive devices and provide as appropriate  - Encourage maximum independence but intervene and supervise when necessary  - Involve family in performance of ADLs  - Assess for home care needs following discharge   - Consider OT consult to assist with ADL evaluation and planning for discharge  - Provide patient education as appropriate  Outcome: Progressing  Goal: Maintains/Returns to pre admission functional level  Description: INTERVENTIONS:  - Perform BMAT or MOVE assessment daily    - Set and communicate daily mobility goal to care team and patient/family/caregiver  - Collaborate with rehabilitation services on mobility goals if consulted  - Perform Range of Motion 3 times a day  - Reposition patient every 2 hours    - Dangle patient 3 times a day  - Stand patient 3 times a day  - Ambulate patient 3 times a day  - Out of bed to chair 3 times a day   - Out of bed for meals 3 times a day  - Out of bed for toileting  - Record patient progress and toleration of activity level   Outcome: Progressing     Problem: DISCHARGE PLANNING  Goal: Discharge to home or other facility with appropriate resources  Description: INTERVENTIONS:  - Identify barriers to discharge w/patient and caregiver  - Arrange for needed discharge resources and transportation as appropriate  - Identify discharge learning needs (meds, wound care, etc )  - Arrange for interpretive services to assist at discharge as needed  - Refer to Case Management Department for coordinating discharge planning if the patient needs post-hospital services based on physician/advanced practitioner order or complex needs related to functional status, cognitive ability, or social support system  Outcome: Progressing     Problem: Nutrition/Hydration-ADULT  Goal: Nutrient/Hydration intake appropriate for improving, restoring or maintaining nutritional needs  Description: Monitor and assess patient's nutrition/hydration status for malnutrition  Collaborate with interdisciplinary team and initiate plan and interventions as ordered  Monitor patient's weight and dietary intake as ordered or per policy  Utilize nutrition screening tool and intervene as necessary  Determine patient's food preferences and provide high-protein, high-caloric foods as appropriate       INTERVENTIONS:  - Monitor oral intake, urinary output, labs, and treatment plans  - Assess nutrition and hydration status and recommend course of action  - Evaluate amount of meals eaten  - Assist patient with eating if necessary   - Allow adequate time for meals  - Recommend/ encourage appropriate diets, oral nutritional supplements, and vitamin/mineral supplements  - Order, calculate, and assess calorie counts as needed  - Recommend, monitor, and adjust tube feedings and TPN/PPN based on assessed needs  - Assess need for intravenous fluids  - Provide specific nutrition/hydration education as appropriate  - Include patient/family/caregiver in decisions related to nutrition  Outcome: Progressing

## 2023-03-29 NOTE — PROGRESS NOTES
Internal Medicine Progress Note  Patient: Gracie Staley  Age/sex: 60 y o  male  Medical Record #: [id number]       ASSESSMENT/PLAN: (Interval History)  Gracie Staley is seen and examined and management for following issues:    Posterior circulation stroke  • S/p intracranial and extracranial vertebral artery stenting/TICI 2B revascularization  • Continue ASA, Brilinta, Eliquis and atorvastatin  • Prozac for depressed mood following CVA   • Neuropsych following  • Continue Baclofen 5mg TID  • Therapy per primary service  • CTA head and neck done 3/17/23 and was concern for in stent occlusion in the Rt vertebral artery V4 stent  • NS saw pt 3/18/23 and 3/22/23 and finding likely does not represent new occlusion as lumen within stents is difficult to image on CTA  Pt will need a repeat CTA and NS follow-up in 6 weeks  • Outpt follow-up with Neurology and NS     Dysphagia  • improved  • ST following     HTN  • Home: No medications  • Here: Amlodipine 10mg daily/Coreg 25mg BID/Hydralazine 25mg BID (lisinopril on hold 2/2 to BRAULIO 3/27)  • Renal artery ultrasound negative     Fe deficiency anemia  • Likely multifactoral  • s/p IV Venofer x 2 doses  • Cont daily ferrous sulfate  • Cbc stable     CKD stage 2  • Baseline Cr 1 2 - 1 3  • Chlorthalidone d/c'd  • Cr back to baseline 1 3 s/p IVF  • Lisinopril remains on hold will monitor BP if trends upward will titrate hydralazine for now to ensure renal function remains stable  • He definitely does not drink enough fluids throughout the day     COVID  • resolved     Prediabetes  • HA1C 5 7  • Recommend dietary modifications        DC planning:  3/31/23       The above assessment and plan was reviewed and updated as determined by my evaluation of the patient on 3/29/2023      Labs:   Results from last 7 days   Lab Units 03/27/23  0517   WBC Thousand/uL 7 06   HEMOGLOBIN g/dL 10 2*   HEMATOCRIT % 31 1*   PLATELETS Thousands/uL 175     Results from last 7 days   Lab Units 03/29/23  0646 03/28/23  0550   SODIUM mmol/L 141 138   POTASSIUM mmol/L 3 9 4 2   CHLORIDE mmol/L 112* 110*   CO2 mmol/L 24 25   BUN mg/dL 31* 39*   CREATININE mg/dL 1 31* 1 66*   CALCIUM mg/dL 8 6 8 6                   Review of Scheduled Meds:  Current Facility-Administered Medications   Medication Dose Route Frequency Provider Last Rate   • acetaminophen  650 mg Oral Q6H PRN YRUI Souza     • amLODIPine  10 mg Oral Daily Nazia Edward MD     • apixaban  2 5 mg Oral BID Nazia Edward MD     • aspirin  81 mg Oral Daily Nazia Edward MD     • atorvastatin  40 mg Oral Daily With Anthony Wellington MD     • baclofen  5 mg Oral TID Nazia Edward MD     • carvedilol  25 mg Oral BID With Meals YURI Souza     • vitamin B-12  1,000 mcg Oral Daily Nazia Edward MD     • ferrous sulfate  325 mg Oral Daily With Breakfast YURI Souza     • FLUoxetine  20 mg Oral Daily Nazia Edward MD     • hydrALAZINE  25 mg Oral Q8H PRN YURI Souza     • hydrALAZINE  50 mg Oral Q12H Maria G Hayes PA-C     • polyethylene glycol  17 g Oral Daily PRN Nazia Edward MD     • ticagrelor  90 mg Oral Q12H [facility] Nazia Edward MD         Subjective/ HPI: Patient seen and examined  Patients overnight issues or events were reviewed with nursing or staff during rounds or morning huddle session  New or overnight issues include the following:     Pt seen in his room  Happy regarding dc IVF  Again stressed the importance of adequate hydration  ROS:   A 10 point ROS was performed; negative except as noted above       *Labs /Radiology studies reviewed  *Medications reviewed and reconciled as needed  *Please refer to order section for additional ordered labs studies  *Case discussed with primary attending during morning huddle case rounds    Physical Examination:  Vitals:   Vitals:    03/28/23 1337 03/28/23 1717 03/28/23 1958 03/29/23 0635   BP: 142/68 140/64 140/59 149/69   BP Location: Left arm  Left arm Left arm   Pulse: 72 62 82 66   Resp: 16  16 18   Temp: 98 2 °F (36 8 °C)  97 8 °F (36 6 °C) 98 4 °F (36 9 °C)   TempSrc: Oral  Oral Oral   SpO2: 96%  95% 96%   Weight:    87 kg (191 lb 12 8 oz)   Height:           GEN: No apparent distress, interactive  NEURO: Alert and oriented x3; mild dysarthria  HEENT: Pupils are equal and reactive, EOMI, mucous membranes are moist, face symmetrical  CV: S1 S2 regular, no MRG, no peripheral edema noted  RESP: Lungs are CTA, no wheezes, rales or rhonchi noted, on room air, respirations easy and non labored  GI: Flat, soft non tender, non distended; +BS x4  : Voiding without difficulty  MUSC: Moves all extremities; except right hemiparesis with overall improvement noted  SKIN: pink, warm and dry, normal turgor, no rashes, lesions        The above physical exam was reviewed and updated as determined by my evaluation of the patient on 3/29/2023  Invasive Devices     Peripheral Intravenous Line  Duration           Peripheral IV 03/28/23 Left;Ventral (anterior) Forearm 1 day                   VTE Pharmacologic Prophylaxis: Eliquis  Code Status: Level 1 - Full Code  Current Length of Stay: 23 day(s)      Total time spent:  30 minutes  with more than 50% spent counseling/coordinating care  Counseling includes discussion with patient re: progress  and discussion with patient of his/her current medical state/information  Coordination of patient's care was performed in conjunction with primary service  Time invested included review of patient's labs, vitals, and management of their comorbidities with continued monitoring  In addition, this patient was discussed with medical team including physician and advanced extenders  The care of the patient was extensively discussed and appropriate treatment plan was formulated unique for this patient  Medical decision making for the day was made by supervising physician unless otherwise noted in their attestation statement      ** Please Note:  voice to text software may have been used in the creation of this document   Although proof errors in transcription or interpretation are a potential of such software**

## 2023-03-29 NOTE — PROGRESS NOTES
Pastoral Care Progress Note    3/29/2023  Patient: Sofy Aragon : 1960  Admission Date & Time: 3/6/2023 1626  MRN: [id number] CSN: [id number]        Ale Renae while he was in PT just to encourage him to keep working hard and bless him as he prepares for D/C

## 2023-03-29 NOTE — PROGRESS NOTES
"OT Treatment Note       03/29/23 1035   Pain Assessment   Pain Assessment Tool 0-10   Pain Score No Pain   Restrictions/Precautions   Precautions Aphasia; Aspiration;Bed/chair alarms;Cognitive; Fall Risk;Supervision on toilet/commode   Lifestyle   Autonomy \"I'm ready to go home\"   Eating   Type of Assistance Needed Set-up / clean-up   Physical Assistance Level No physical assistance   Eating CARE Score 5   Sit to Stand   Type of Assistance Needed Supervision   Physical Assistance Level No physical assistance   Comment with Hemiwalker   Sit to Stand CARE Score 4   Bed-Chair Transfer   Type of Assistance Needed Supervision; Adaptive equipment   Physical Assistance Level No physical assistance   Comment Short distance functional mobility with julieta walker on L to/from bathroom  Pt demonstrated carryover of switching julieta walker back to L      Chair/Bed-to-Chair Transfer CARE Score 4   Toileting Hygiene   Type of Assistance Needed Supervision   Physical Assistance Level No physical assistance   Comment Pt able to manage front hygiene seated on BSC over toilet, in stance with intermittent use of hemiwalker on R as needed for stability, pt performed rear hygiene  Pt was incontinent of bowel  Pt able to mange clothing  Toileting Hygiene CARE Score 4   Toilet Transfer   Type of Assistance Needed Supervision   Physical Assistance Level No physical assistance   Comment With julieta walker on L, uses grab bar or BSC handle during descent  Toilet Transfer CARE Score 4   Neuromuscular Education   Comments Seated, pt engaged in 1 set of 10 reps for AAROM and holding each stretch for 3 sets of 20 seconds  The following exercises were completed from pts RUE HEP: self AAROM shoulder flexion, self ARROM scapula elevation/depression, scapular retraction, digit isolation, wrist flexion/extension, gross digit flexion and extension, self AAROM isolated digit flexion, and prayer stretch/forearm stretch     Cognition   Overall Cognitive " Status Impaired   Arousal/Participation Alert; Cooperative   Attention Within functional limits   Orientation Level Oriented X4   Memory Decreased short term memory   Following Commands Follows one step commands without difficulty   Additional Activities   Additional Activities Comments Pt engaged in reassessment of box and blocks  Pt is R hand dominant and R side affected  Initial assessment performed 3/20/23  Pts score was R 16 and L 39  Today, pts score was R 28 and L 42  Norm for males 63-62 years old: R 71 3 and L 70 5  Activity Tolerance   Activity Tolerance Patient tolerated treatment well   Assessment   Treatment Assessment Pt engaged in skilled OT tx session focused on toileting, box and blocks reassessment, and RUE HEP  See above for further details on functional performance  Pt progressed to being supervision during functional mobility and toileting , and demonstrated carryover of hemiwalker and w/c safety/managment  Pts score for box and blocks reassessment was R 28 and L 42  Pt is still functioning below norm for males 63-62 years old which is R 71 3 and L 70 5  However, pts score on R increased by 12 blocks  Cont OT POC with focus on kitchen mobility at w/c level, functional transfers with julieta walker, switching julieta walker to R side during toilet hygiene and then switching back to L, dry tub transfer on tub bench with sit swivel, dynamic/static standing balance, and core strength  Pt left in recliner with alarm activated and all needs in reach  From OT standpoint pt on track to d/c 3/31 to home with family support and outpatient Neuro OT  Prognosis Good   Problem List Decreased range of motion;Decreased strength;Decreased endurance; Impaired balance;Decreased mobility; Decreased coordination;Decreased cognition; Impaired sensation; Impaired tone   Barriers to Discharge None   Plan   Treatment/Interventions ADL retraining;Functional transfer training; Therapeutic exercise; Endurance training;Cognitive reorientation;Patient/family training;Equipment eval/education; Compensatory technique education   Progress Progressing toward goals   Recommendation   OT Discharge Recommendation Home with outpatient rehabilitation   OT Therapy Minutes   OT Time In 1035   OT Time Out 1145   OT Total Time (minutes) 70   OT Mode of treatment - Individual (minutes) 50   OT Mode of treatment - Concurrent (minutes) 20   OT Mode of treatment - Group (minutes) 0   OT Mode of treatment - Co-treat (minutes) 0   OT Mode of Treatment - Total time(minutes) 70 minutes   OT Cumulative Minutes 3731   Therapy Time missed   Time missed?  No

## 2023-03-29 NOTE — PROGRESS NOTES
"   03/29/23 0700   Pain Assessment   Pain Assessment Tool 0-10   Pain Score No Pain   Restrictions/Precautions   Precautions Bed/chair alarms;Cognitive; Fall Risk;Aspiration;Multiple lines;Supervision on toilet/commode  (Pt with IV this session)   Weight Bearing Restrictions No   ROM Restrictions No   Lifestyle   Autonomy \"Wow that was a lot of standing today\"   Eating   Type of Assistance Needed Set-up / clean-up   Physical Assistance Level No physical assistance   Eating CARE Score 5   Oral Hygiene   Type of Assistance Needed Incidental touching   Physical Assistance Level No physical assistance   Comment Pt complete oral care in stance at sink with julieta walker present on L side  Pt able to open toothpaste and apply of brush with RUE  CGA provided for safety  Oral Hygiene CARE Score 4   Shower/Bathe Self   Type of Assistance Needed Incidental touching   Physical Assistance Level No physical assistance   Comment Pt complete sponge bath seated at EOB today due to IV  Pt able to wash 10/10 parts, seated for entirety with exception to sadiq/rear hygiene  CGA provided in stance when completing sadiq/rear hygiene  Hemiwalker present on L side while in stance  Shower/Bathe Self CARE Score 4   Tub/Shower Transfer   Reason Not Assessed Sponge Bath  (Pt with IV on this day)   Upper Body Dressing   Type of Assistance Needed Set-up / clean-up   Physical Assistance Level No physical assistance   Comment Seated EOB to complete  MOISÉS Toussaint present for disconnection/reconnection of IV line while changing shirts  Upper Body Dressing CARE Score 5   Lower Body Dressing   Type of Assistance Needed Physical assistance   Physical Assistance Level 25% or less   Comment Pt seated at EOB to thread BLE in pants and brief with inc time req for completion  Pt then complete clothing management in stance with min A for steadying  Assist provided for tightening of brief  Hemiwalker present and switched to R side while in stance for CM   Pt " utilize mainly LUE, but attempt to use RUE for which min A req for steadying  Lower Body Dressing CARE Score 3   Putting On/Taking Off Footwear   Type of Assistance Needed Physical assistance   Physical Assistance Level 25% or less   Comment Pt able to doff slipper socks  However req assist with donning R shoe/sock due to inability to maintain xleg position  Wife agreeable to assist with this post DC   Putting On/Taking Off Footwear CARE Score 3   Lying to Sitting on Side of Bed   Type of Assistance Needed Set-up / clean-up   Physical Assistance Level No physical assistance   Comment Pt move from lying to sitting EOB  HOB elevated, no use of bedrail  Lying to Sitting on Side of Bed CARE Score 5   Sit to Stand   Type of Assistance Needed Incidental touching   Physical Assistance Level No physical assistance   Comment with emiliano walker, CGA for safety   Sit to Stand CARE Score 4   Bed-Chair Transfer   Type of Assistance Needed Incidental touching   Physical Assistance Level No physical assistance   Comment CGA with use of emiliano walker on L side   Chair/Bed-to-Chair Transfer CARE Score 4   Toileting Hygiene   Type of Assistance Needed Incidental touching   Physical Assistance Level No physical assistance   Comment Pt perform hygiene in stance following BM  Pt able to thoroughly perform rear hygiene with use of LUE  Emiliano walker placed on R side during hygiene  CGA provided for safety   Toileting Hygiene CARE Score 4   Toilet Transfer   Type of Assistance Needed Incidental touching   Physical Assistance Level No physical assistance   Comment Use of emiliano walker on L side throughout transfer, then switching to R side when performing hygiene  Switching back to L side when transferring off of toilet  Use of grba bars during descent to toilet  CGA provided for safety throughout transfer   Verbal cues provided for switching of emiliano walker throughout   Toilet Transfer CARE Score 4   Functional Standing Tolerance   Time 4:30, 2:15, 4:10, 5:00   Activity Card Sort   Comments Pt complete card sort activity in stance with julieta walker placed on L side for additional support  CGA-Min A provided throughout task due to fatigue towards end of session  Pt instructed to retrieve cards from table with RUE and place in designated location on board  Pt demo inc difficulty with picking up card from flat table  However, able to compensate by sliding card to edge of table and flipping over  Pt taking seated rest breaks in between sets to combat LE fatigue  Cognition   Overall Cognitive Status Impaired   Arousal/Participation Responsive; Alert; Cooperative   Attention Within functional limits   Orientation Level Oriented X4   Memory Decreased short term memory   Following Commands Follows one step commands without difficulty   Activity Tolerance   Activity Tolerance Patient tolerated treatment well   Medical Staff Made Aware MOISÉS Rodrigues present to manage IV during dressing   Assessment   Treatment Assessment Pt participated in 90 minute skilled OT session with focus on ADL retraining, functional transfers, hemiwalker use, standing balnce/tolerance, endurance, and safety  Pt tolerated treatment well and demo improved ability to manage hemiwalker during toileting  At this time, Pt would benefit from continued OT treatment focused on ADL retraining, RUE NMR, functional transfers, standing bal/gregory, weight shifting, UE and core TE to maximize independence in daily tasks and decrease caregiver burden  Pt expected to DC home with family support on 3/21/23  At end of session, Pt sitting in recliner chair with alarm activated and call bell in reach  Prognosis Good   Problem List Decreased strength;Decreased range of motion; Impaired balance;Decreased mobility; Decreased coordination;Decreased cognition; Impaired sensation; Impaired tone   Plan   Treatment/Interventions ADL retraining;Functional transfer training; Therapeutic exercise; Endurance training;Cognitive reorientation;Patient/family training;Equipment eval/education; Compensatory technique education   Progress Progressing toward goals   Recommendation   OT Discharge Recommendation Home with outpatient rehabilitation   Equipment Recommended Bedside commode   Commode Type Standard w/ drop arm   Date ordered 03/23/23   OT Therapy Minutes   OT Time In 0700   OT Time Out 0830   OT Total Time (minutes) 90   OT Mode of treatment - Individual (minutes) 90   OT Mode of treatment - Concurrent (minutes) 0   OT Mode of treatment - Group (minutes) 0   OT Mode of treatment - Co-treat (minutes) 0   OT Mode of Treatment - Total time(minutes) 90 minutes   OT Cumulative Minutes 2140   Therapy Time missed   Time missed?  No

## 2023-03-29 NOTE — PROGRESS NOTES
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, mild BRAULIO, and incidentally found + COVID (without evidence of respiratory illness/hypoxia/CXR findings)  Placed on cardene gtt, stroke pathway, ASA/Plavix, IV hydration for BRAULIO, and CTX for UTI  NIHSS 2  Symptoms began 2 days prior  Not tPA/TNK candidate  MRI/MRA with 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  NSx recommended [facility] and starting on heparin gtt and stopped Plavix  Repeat CTA on  stable with with R V3/4 occlusion  Not a candidate for interventional procedure due to clinical stability and risk  Speech consulted and noted mod-severe oropharyngeal dysphagia recommended pureed/HTL  Unfortunately later on , he clinically deteriorated with increased R sided weakness, and was taken to IR for stenting of V3 and V4 with TICI 2B revascularization  CTH without ICH  He was admitted back to the ICU intubated - extubated   MRI showed cerebellar CVA and R > L stroke burden, with additional hypodensities on DWI appreciated L midbrain, pontine area and R lower medullary/spinal cord junction  He was transitioned to triple therapy with DAPT (given recent stent) and A/C with eliquis 2 5mg BID due to COVID  Diet advanced to Level 3/NTL on   Noted to have spasticity in RLE - started on baclofen by Neurology  He was able to have cardene discontinued on 3/2, and they have been making adjustments to his oral regimen  He was started on Prozac for his mood   NSx has signed off  CTA H/N recommended around 3/17  Length of time on eliquis unclear  He was [facility]/6/[facility]  Since admission, pt has been very tearful and depressed  Denies any new CP, SOB, fevers, chills, N/V, abdominal pain  He is incontinent of bowel on admission  He has some word finding difficulties and dysphagia  He has aphasia, but speech is improving  He feels the strength in his RLE is improving, but remains with decreased tone and strength in his right upper extremity  He denies any pain anywhere  No past medical history on file  Patient Active Problem List    Diagnosis Date Noted   • Celiac artery stenosis ([facility] ) 03/22/2023   • Neurogenic bowel 03/08/2023   • Anemia 03/06/2023   • Spasticity 03/01/2023   • Acute kidney injury superimposed on CKD ([facility] ) 03/01/2023   • Prediabetes 03/01/2023   • Adjustment disorder with depressed mood 03/01/2023   • BRAULIO (acute kidney injury) ([facility] ) 02/25/2023   • Acute Posterior Circulation Stroke 02/25/2023   • Right Vertebral artery dissection ([facility] ) 02/25/2023   • Stenosis of left vertebral artery 02/25/2023   • Primary hypertension 02/25/2023   • Dizziness 02/25/2023   • Dysphagia 02/25/2023       Body mass index is 28 32 kg/m²  Past Medical History:     No past medical history on file  Past Surgical History:     No past surgical history on file        Allergies:     No Known Allergies      Family and Social Support:   No data recorded    Social History:    Social History     Socioeconomic History   • Marital status: /Civil Union     Spouse name: Not on file   • Number of children: Not on file   • Years of education: Not on file   • Highest education level: Not on file   Occupational History   • Not on file   Tobacco Use   • Smoking status: Never   • Smokeless tobacco: Never   Substance and Sexual Activity   • Alcohol use: Not Currently   • Drug use: Not Currently   • Sexual activity: Not on file   Other Topics Concern   • Not on file   Social History Narrative   • Not on file     Social Determinants of Health     Financial Resource Strain: Not on file   Food Insecurity: Not on file   Transportation Needs: Not on file   Physical Activity: Not on file   Stress: Not on file   Social Connections: Not on file   Intimate Partner Violence: Not on file   Housing Stability: Not on file        Family History:    No family history on file      Medications:     Current Facility-Administered Medications:   •  acetaminophen (TYLENOL) tablet 650 mg, 650 mg, Oral, Q6H PRN, Marlyne Malady, CRNP, 650 mg at 03/17/23 2021  •  amLODIPine (NORVASC) tablet 10 mg, 10 mg, Oral, Daily, Efrem Bearden MD, 10 mg at 03/29/23 1000  •  apixaban (ELIQUIS) tablet 2 5 mg, 2 5 mg, Oral, BID, Efrem Bearden MD, 2 5 mg at 03/29/23 1000  •  aspirin chewable tablet 81 mg, 81 mg, Oral, Daily, Efrem Bearden MD, 81 mg at 03/29/23 1000  •  atorvastatin (LIPITOR) tablet 40 mg, 40 mg, Oral, Daily With Alberta Muñoz MD, 40 mg at 03/28/23 1716  •  baclofen tablet 5 mg, 5 mg, Oral, TID, Efrem Bearden MD, 5 mg at 03/29/23 1000  •  carvedilol (COREG) tablet 25 mg, 25 mg, Oral, BID With Meals, Tomi Hood, CRNP, 25 mg at 03/29/23 3408  •  cyanocobalamin (VITAMIN B-12) tablet 1,000 mcg, 1,000 mcg, Oral, Daily, Efrem Bearden MD, 1,000 mcg at 03/29/23 1000  •  ferrous sulfate tablet 325 mg, 325 mg, Oral, Daily With Breakfast, Tomi Hood, CRNP, 325 mg at 03/29/23 9739  •  FLUoxetine (PROzac) capsule 20 mg, 20 mg, Oral, Daily, Efrem Bearden MD, 20 mg at 03/29/23 1000  •  hydrALAZINE (APRESOLINE) tablet 25 mg, 25 mg, Oral, Q8H PRN, Marlyne Malady, CRNP, 25 mg at 03/20/23 0551  •  hydrALAZINE (APRESOLINE) tablet 50 mg, 50 mg, Oral, Q12H, Maria G [place], PA-C, 50 mg at 03/29/23 8543  •  polyethylene glycol (MIRALAX) packet 17 g, 17 g, Oral, Daily PRN, Efrem Bearden MD  •  ticagrelor (BRILINTA) tablet 90 mg, 90 mg, Oral, Q12H [address], Efrem Bearden MD, 90 mg at 03/29/23 "1001        OBSERVATIONS:     Appearance  __x__Neat ____Disheveled ____Overweight ____Underweight ____Frail    Speech  __x__Fluid, Articulate ___x_Spontaneous ____Circumlocutions ____Word Finding difficulties ____Dysarthria ____Circumstantial ____Paucity ____Perseverative ____Pressured ____ Tangential     Thought Process/Content  __x__Coherent  ____Preoccupations____Ruminations____Obsessions____Hallucinations ____Delusions ____Flight of Ideas ____Distortions ____Distracted ____Suicidal Ideation ____Homicidal Ideation ____ Memory Issues ____ Perseveration ____ Tangential    Interaction  __x__Engaged__x__Cooperative____Superficial____Detached____Fearful____Guarded____Suspicious ____Poor Boundaries ____Aggressive    Affect  ____Neutral___x_Positive____Anxiety____Worried____Irritable____Angry____Depressed/Dysphoric ____Euthymic _____ Tearful ____ Fatigued     Behavior  _x__Spontaneous __x__Purposeful ____Slowed Initiation ____Apathetic ____Impulsive ____Dyscontrolled ____Hypoactive ____Hyperactive ____Repetitive/Perseverative      Overall Progress:    ____Very Declined ____Declined __x__Stable ____Improved ____Very Improved    Motivation and Participation:    ____Very Poor ____Poor ____Fair __x__Good ____Very Good                    ASSESSMENT & RECOMMENDATIONS:   Pt reports feeling \"good\" and less distressed about the CVA  He is looking forward to Friday discharge and reports the ramps and adaptations have already been completed to his home  Reports wife has set up outpatient family therapy appointments  Working on feeling ok about slowing down and modifying his lifestyle and expectations  Mood is improved and pt notes he is aware of his progress in rehab  Reports improved sleep  No tearful episodes observed today  Provided CBT and mindfulness based interventions, as well as supportive psychotherapy  Addressed coping, adjustment, and motivation      I will continue to evaluate pt's emotional functioning and " status and provide supportive and mindfulness interventions during pt's stay  Effective coping strategies, distress tolerance, breathing exercises will be key interventions  Total face to face time with pt - 25 minutes  Continued Psychological intervention is medically necessary to:  __x__ Maintain current progress  __X_   Achieve Therapy Goals   __X__Prevent relapse       DIAGNOSIS:  Adjustment Disorder with Anxiety and Depression  Post Stroke Depression      RECOMMENDATIONS:   I will follow Mr Thomas Wilson during his stay to provide the following interventions:    · Supportive psychotherapy, utilizing CBT and mindfulness strategies  · DBT distress tolerance techniques  to improve coping and mood  · Meditation and relaxation training as tolerated        Thank you for the opportunity to participate in Mr Dante Carlisle care  Nehal Yang, Ph D   Licensed Psychologist

## 2023-03-30 ENCOUNTER — TELEPHONE (OUTPATIENT)
Dept: NEUROLOGY | Facility: CLINIC | Age: 63
End: 2023-03-30

## 2023-03-30 RX ADMIN — TICAGRELOR 90 MG: 90 TABLET ORAL at 10:37

## 2023-03-30 RX ADMIN — CARVEDILOL 25 MG: 25 TABLET, FILM COATED ORAL at 10:35

## 2023-03-30 RX ADMIN — TICAGRELOR 90 MG: 90 TABLET ORAL at 20:05

## 2023-03-30 RX ADMIN — ASPIRIN 81 MG CHEWABLE TABLET 81 MG: 81 TABLET CHEWABLE at 10:35

## 2023-03-30 RX ADMIN — APIXABAN 2.5 MG: 2.5 TABLET, FILM COATED ORAL at 10:34

## 2023-03-30 RX ADMIN — CARVEDILOL 25 MG: 25 TABLET, FILM COATED ORAL at 16:11

## 2023-03-30 RX ADMIN — BACLOFEN 5 MG: 10 TABLET ORAL at 20:05

## 2023-03-30 RX ADMIN — AMLODIPINE BESYLATE 10 MG: 10 TABLET ORAL at 10:35

## 2023-03-30 RX ADMIN — FERROUS SULFATE TAB 325 MG (65 MG ELEMENTAL FE) 325 MG: 325 (65 FE) TAB at 10:35

## 2023-03-30 RX ADMIN — CYANOCOBALAMIN TAB 500 MCG 1000 MCG: 500 TAB at 10:34

## 2023-03-30 RX ADMIN — HYDRALAZINE HYDROCHLORIDE 50 MG: 50 TABLET, FILM COATED ORAL at 06:34

## 2023-03-30 RX ADMIN — APIXABAN 2.5 MG: 2.5 TABLET, FILM COATED ORAL at 17:46

## 2023-03-30 RX ADMIN — BACLOFEN 5 MG: 10 TABLET ORAL at 16:11

## 2023-03-30 RX ADMIN — FLUOXETINE 20 MG: 20 CAPSULE ORAL at 10:34

## 2023-03-30 RX ADMIN — ATORVASTATIN CALCIUM 40 MG: 40 TABLET, FILM COATED ORAL at 16:11

## 2023-03-30 RX ADMIN — BACLOFEN 5 MG: 10 TABLET ORAL at 10:36

## 2023-03-30 NOTE — PROGRESS NOTES
03/30/23 1400   Pain Assessment   Pain Assessment Tool 0-10   Pain Score No Pain   Restrictions/Precautions   Precautions Bed/chair alarms; Fall Risk;Supervision on toilet/commode;Cognitive   Weight Bearing Restrictions No   ROM Restrictions No   Cognition   Overall Cognitive Status Impaired   Arousal/Participation Alert; Cooperative   Attention Within functional limits   Memory Decreased short term memory   Following Commands Follows one step commands without difficulty   Subjective   Subjective pt with no c/o of pain or discomfort  Pt agreeable to therapy   Sit to Stand   Type of Assistance Needed Supervision   Comment HW/no HW   Sit to Stand CARE Score 4   Bed-Chair Transfer   Type of Assistance Needed Supervision   Comment DS sit pivot from chair to w/c, CS with HW; able to appropriately set up w/c for transfer demonstrating safety awareness   Chair/Bed-to-Chair Transfer CARE Score 4   Car Transfer   Type of Assistance Needed Supervision   Comment CS with HW   Car Transfer CARE Score 4   Walk 10 Feet   Type of Assistance Needed Supervision   Comment CS with HW   Walk 10 Feet CARE Score 4   Walk 50 Feet with Two Turns   Type of Assistance Needed Supervision   Comment CS with HW   Walk 50 Feet with Two Turns CARE Score 4   Walk 150 Feet   Type of Assistance Needed Supervision   Comment CS with HW   Walk 150 Feet CARE Score 4   Walking 10 Feet on Uneven Surfaces   Type of Assistance Needed Supervision   Comment CS with HW; min VC for sequencing on foam mat   Walking 10 Feet on Uneven Surfaces CARE Score 4   Ambulation   Does the patient walk? 2   Yes   Wheel 50 Feet with Two Turns   Type of Assistance Needed Independent   Wheel 50 Feet with Two Turns CARE Score 6   Wheel 150 Feet   Type of Assistance Needed Independent   Comment 2x150 ft   Wheel 150 Feet CARE Score 6   Curb or Single Stair   Style negotiated Curb   Type of Assistance Needed Physical assistance   Physical Assistance Level 25% or less   Comment Min Ax1; 8in with HW   1 Step (Curb) CARE Score 3   4 Steps   Type of Assistance Needed Physical assistance   Physical Assistance Level 25% or less   Comment CGA ascending, Min A descending bwds; LHR; VC to avoid circumduction RLE   4 Steps CARE Score 3   12 Steps   Type of Assistance Needed Physical assistance   Physical Assistance Level 25% or less   Comment CGA ascending, Min A descending bwds; LHR; VC to avoid circumduction RLE   12 Steps CARE Score 3   Stairs   Type Stairs   Findings FF x1 with non-reciprocal pattern LHR   Therapeutic Interventions   Neuromuscular Re-Education Cone weaving walking fwd x2, cone weaving with all directions x6, walking while holding cones with bean bag in cone 1 x150 ft, walking no AD while answering questions 1x150, FFx2 with reciprocal pattern BHR - VC to avoid circumduction of RLE and for hand placement, required Min-CGA walking without AD, picking up cones with RUE - VC for shift weight to R side - attempted picking up bean bag from ground however pt was unable to at this time   Other Comments   Comments 10 meter walk test performed: 0 56 m/s with no AD and self-selected velocity indicating limited community ambulator  Slight improvement from previous assessment with less assistance required of Min-CGA this session compared to Mod A last assessment  Assessment   Treatment Assessment Pt tolerated 60 min skilled PT focusing on NMR, therapeutic activity, and gait training  Pt demonstrated increased control and coordination of RLE with walking with and without AD  Pt also demonstrates increased endurance as indicated by decreased need for rest breaks throughout session  Pt educated on using RUE for tasks to improve mobility and strength instead of always relying on LUE - pt demonstrated understanding and agreed  Pt showing great improvement in function and independence with discharge scheduled for tomorrow 3/30/23   Pt left in recliner with chair alarm activated and all needs within reach    Problem List Decreased strength;Decreased endurance; Impaired balance;Decreased mobility; Decreased coordination   PT Barriers   Physical Impairment Decreased strength;Decreased endurance; Impaired balance;Decreased mobility; Decreased coordination   Plan   Treatment/Interventions Functional transfer training;LE strengthening/ROM; Therapeutic exercise; Endurance training;Patient/family training;Equipment eval/education;Gait training; Compensatory technique education   Progress Progressing toward goals   Recommendation   PT Discharge Recommendation Home with outpatient rehabilitation   PT Therapy Minutes   PT Time In 1400   PT Time Out 1500   PT Total Time (minutes) 60   PT Mode of treatment - Individual (minutes) 60   PT Mode of treatment - Concurrent (minutes) 0   PT Mode of treatment - Group (minutes) 0   PT Mode of treatment - Co-treat (minutes) 0   PT Mode of Treatment - Total time(minutes) 60 minutes   PT Cumulative Minutes 3357

## 2023-03-30 NOTE — DISCHARGE SUMMARY
Discharge Summary - PMR   Carolin Blair 60 y o  male MRN: [id number]  Unit/Bed#: -01 Encounter: [id number]    Admission Date: 3/6/2023     Discharge Date: 3/31/2023    Etiologic/Rehabilitation Diagnosis: Impairment of mobility, safety and Activities of Daily Living (ADLs) due to Stroke:  01 3  Bilateral involvement     HPI: Carolin Blair is a 60 y o  right handed male with medical history of HTN who presented to [address] [place] [address] Road on 2/25 with nausea/dizziness  Found to have hypertensive emergency with acute/subcaute R posterior cerebellar CVA with 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, mild BRAULIO, and incidentally found + COVID (without evidence of respiratory illness/hypoxia/CXR findings)  Placed on cardene gtt, stroke pathway, ASA/Plavix, IV hydration for BRAULIO, and CTX for UTI  NIHSS 2  Symptoms began 2 days prior  Not tPA/TNK candidate  MRI/MRA with 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  NSx recommended transfer to [facility] and starting on heparin gtt and stopped Plavix  Repeat CTA on 2/26 stable with with R V3/4 occlusion  Not a candidate for interventional procedure due to clinical stability and risk  Speech consulted and noted mod-severe oropharyngeal dysphagia recommended pureed/HTL  Unfortunately later on 2/26, he clinically deteriorated with increased R sided weakness, and was taken to IR for stenting of V3 and V4 with TICI 2B revascularization  CTH without ICH  He was admitted back to the ICU intubated - extubated 2/27  MRI showed cerebellar CVA and R > L stroke burden, with additional hypodensities on DWI appreciated L midbrain, pontine area and R lower medullary/spinal cord junction  He was transitioned to triple therapy with DAPT (given recent stent) and A/C with eliquis 2 5mg BID due to COVID  Diet advanced to Level 3/NTL on 2/27  Noted to have spasticity in RLE - started on baclofen by Neurology   He was able to have cardene discontinued on 3/2, and they have been making adjustments to his oral regimen  He was started on Prozac for his mood  NSx has signed off  CTA H/N recommended around 3/17  Length of time on eliquis unclear  Admitted to [facility] on 3/6  Procedures Performed During [facility] Admission: None    Acute Rehabilitation Center Course: Patient participated in a comprehensive interdisciplinary inpatient rehabilitation program which included involvment of MD, therapies (PT, OT, and/or SLP), RN, CM, SW, dietary, and psychology services  He was able to be advanced to an overall supervision to set-up level of assist and considered safe for discharge home with family  Please see below for patient's day to day management of rehabilitation needs  Please refer to Internal Medicine notes during [facility] stay for day to day management of patient's medical co-morbidities  * Acute Posterior Circulation Stroke  Assessment & Plan  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    - Now s/p stenting on 2/26 with TICI 2b revascularization    PPx: ASA/brilinta/Eliquis, Statin   - Discussed with Neurology, they defer to NSx on length of time on eliquis  - Reached out to Neurosurgery - they would like it on for another 6 weeks and will f/u on 5/1    - Repeat CTA H/N -  No new intracranial abnormalities, expected evolution of known CVA  See dissection and stenosis for more details  3/9 Had nocturnal oximetry to screen for nocturnal hypoxia - only 26 seconds total of mild desaturation  Maintain normotension  Education on prediabetes and diet     Follow-up with Neurovascular/Neurosurgery    - Dr Castro Ranks arranged f/u for 5/1  He will get repeat CTA prior to that appointment  - I reached out to Neuro clerical team to arrange f/u with me and Neurology    Function improving slowly   Continue PT/OT/SLP - for swallow, speech, R sided weakness/tone, will need a good visual exam      Celiac artery stenosis (HCC)  Assessment & Plan  Incidentally noted to have approximately 70% celiac artery stenosis on 3/21 Renal Artery Ultrasound  No s/s of ischemic bowel  Would recommend outpatient f/u with PCP  Neurogenic bowel  Assessment & Plan  Disinhibited bowel + mobility difficulties -somewhat loose as well  3/27 - has been improving, but maybe slightly constipated  - Gave miralax with BM    3/28 - with incontinent BM  Will add metamucil back but only 3x weekly  - Encouraged adequate hydration   Not on bowel meds right now  [facility] Bowel Training:   - 30-45 min after each meal will get patient onto commode to attempt bowel movement  - He wants to hold off on scheduled suppository for now (would schedule in AM to align with his previous bowel schedule at home)  For now monitor, close continence care especially    Outpatient f/u with PMR    Anemia  Assessment & Plan  Stable 3/27 at 10 2   Normocytic anemia noted through stay  B12 borderline low - started on daily oral supplement  Folate normal  Iron Panel: Low iron saturation and iron with normal TIBC and Ferritin  Was started in the hospital on B12, but not iron  Per IM - 2 days of IV Venofer (last day 3/8)  Then started oral iron  Outpatient f/u with PCP    Adjustment disorder with depressed mood  Assessment & Plan  Tearful at times, but coping  Mood has been improving  Has been tearful and labile during hospitalization  Started on Prozac 20mg daily  Consulted rehab psychology for support during stay  Outpatient f/u with PCP    Prediabetes  Assessment & Plan  A1C 5 7  Consult nutrition for education on diabetic diet  Diet/lifestyle modifications   Follow-up with PCP     Stage 2 chronic kidney disease  Assessment & Plan  Lab Results   Component Value Date    EGFR 61 03/31/2023    EGFR 57 03/29/2023    EGFR 43 03/28/2023    CREATININE 1 24 03/31/2023    CREATININE 1 31 (H) 03/29/2023    CREATININE 1 66 (H) 03/28/2023     Per hospital team - suspect CKD Stage 2 2/2 hypertension  3/27 Crea bumped to 1 49   - Patient was to increase his PO intake prior to trying IVF   - IM holding LIsinopril  3/28 Crea up to 1 66   - Giving IVF   - Check PVR - these were fine  3/29 Crea down to 1 31 after 1L over 10 hours  - Encourage adequate hydration while here  3/31 Crea back to 1 24  Ordered recheck BMP next week  Avoid nephrotoxic meds and relative hypotension  Will need to hydrate well prior to repeat CTA H/N  Recommend outpatient f/u with PCP    Spasticity  Assessment & Plan  Intrinsic tonic tone in R quad which is MAS 2, Mas 1 in his ankle with intrinsic phasic tone in the form of clonus  RUE with tone in SA, EF, WF (MAS 1) and pronator (MAS 1+)  Hiccups resolved  Would opt to avoid treating R quad for now, as tone there may help stabilize at the knee  R ankle multipodus, stretches  Baclofen 5mg TID - monitor and adjust as appropriate  Range of motion  Monitor as tone evolves  May benefit from bracing in the future  Outpatient f/u with PMR  Primary hypertension  Assessment & Plan  Home: None  Here: Amlodipine 10mg daily, Coreg 25mg BID, Hydralazine 50mg Q12hr  Has PRN hydralazine as well  Had hypertensive urgency in hospital requiring cardene gtt   3/21 IM ordered renal artery ultrasound  No significant PRIETO  3/27 Lisinopril held due to BRAULIO  Monitor and adjust as appropriate  IM consulted to assist with management  Outpatient f/u with PCP    Stenosis of left vertebral artery  Assessment & Plan  Severe L vertebral artery origin stenosis  3/17 CTA H/N:   CTA HEAD AND NECK  - Right vertebral artery V4 stent appears to have in-stent occlusion    Right vertebral artery V4 segment is patent before and after the V4 stent  - Unchanged left vertebral artery post-PICA V4 segment occlusion   - Dissection flap in right vertebral artery V3 segment which is patent and improved in caliber status post thrombectomy  - Moderate stenosis in proximal-to- mid basilar artery due to atherosclerotic disease status post mechanical thrombectomy  - Per Neurosurgery unlikely in stent occlusion  No changes  Plan for outpatient f/u 5/1 with repeat CTA H/N completed   ASA/Brilinta  Atorvastatin  SBP goals 120-160  Follow-up with Neurovascular  Right Vertebral artery dissection [facility])  Assessment & Plan  Now s/p R V3/4 stenting with TICI 2B revascularization on 2/26 for R Vert stenosis  Continue ASA/Brilinta  Statin  CTA HEAD AND NECK 3/17  - Dissection flap in right vertebral artery V3 segment which is patent and improved in caliber status post thrombectomy  Outpatient f/u with Neurosurgery/Dr Karo Shoemaker      Vasovagal syncope-resolved as of 3/22/2023  Assessment & Plan  No further episodes  3/8 Had episode of vasovagal syncope  - No convulsions  On body weight support system, started to feel dizzy  - Staring episode after, but able to sternal rubbed out of it  Very brief   - No post-ictal weakness   - Neurologically stable and back to baseline   - Orthostatics negative  Discussed with Neurology - unlikely seizure given distribution of stroke and presentation  No further testing recommended at this time  Monitor  Dysphagia-resolved as of 3/31/2023  Assessment & Plan  Improved  Admitted with mod-severe oropharyngeal  Has massively improved    3/8 Advanced to Level 3/Thins  3/10 Advanced to Reg/Thins  Aspiration precautions  Good oral hygiene   SLP consulted    COVID-resolved as of 3/10/2023  Assessment & Plan  Resolved  Incidentally found to have COVID on 2/25  Asymptomatic from respiratory standpoint  Per Neurosurgery concern for hypercoagulability related to COVID  Currently "on Eliquis 2 5mg BID - per Neuro 3-4 weeks probably reasonable, but for them ultimately up to NSx as this was their initial recommendation to start this medication  3/8 Discontinued precautions after 10 days isolation  Discharge Physical Examination:  /66   Pulse 64   Temp 98 5 °F (36 9 °C) (Oral)   Resp 16   Ht 5' 9\" (1 753 m)   Wt 87 kg (191 lb 12 8 oz)   SpO2 97%   BMI 28 32 kg/m²     Gen: No acute distress, Well-nourished, well-appearing  HEENT: Moist mucus membranes, Normocephalic/Atraumatic  Cardiovascular: Regular rate, rhythm, S1/S2  Distal pulses palpable  Heme/Extr: No edema  Pulmonary: Non-labored breathing  Lungs CTAB  : No barnett  GI: Soft, non-tender, non-distended  BS+  MSK: PROM is WFL in all extremities  No effusions or deformities  Bulk is symmetric  See below for MMT scores  Integumentary: Skin is warm, dry  Neuro: AAOx3, CN 2-12 intact - except for R facial sensation very mild disturbance which is improving  Sensation with slight decrease on the R - but improving and close to normal  Speech is mostly intact at this point  Appropriate to questioning  Spasticity: Mild and easily ranged out  MAS 1 in her elbow flexor, wrist flexor,MAS 1+ in her pronator  MAS 2 in her quad, MAS 1+ in her hamstrings  MAS 1+ in her plantarflexor  Coord: With ataxia with FTN and HTS on the R       MMT:   Strength:   Right  Left  Site  Right  Left  Site    4 5  S Ab: Shoulder Abductors  4+ 5  HF: Hip Flexors    4+ 5  EF: Elbow Flexors  4+ 5 KF: Knee Flexors    4+ 5  EE: Elbow Extensors  5 5  KE: Knee Extensors    4+ 5  WE: Wrist Extensors  4+  5  DR: Dorsi Flexors    3 5  FF: Finger Flexors  4+ 5  PF: Plantar Flexors    3  5  HI: Hand Intrinsics  NT NT  EHL: Extensor Hallucis Longus   Psych: Normal mood and affect         Significant Findings, Care, Treatment and Services Provided: Acute comprehensive interdisciplinary inpatient rehabilitation including PT, OT, SLP, RN, CM, SW, dietary, " psychology, etc     Complications: None    Functional Status Upon Admission to [facility]:  Mobility: modA bed mobiity, not appropriate for ambulation  Transfers: modA x2  ADLs: modA grooming, modA UB bathing, maxA LB bathing, modA UB dressing, total A LB dressing  Functional Status Upon Discharge from [facility]:   PT: Supervision transfers, bed mobility, ambulation 150' with HW on even surfaces and uneven surfaces, Ind with wheelchair mobility, CGA ascending stairs, Eusebio descending  OT: Ind eating, Sup oral hygiene, Sup shower/bathe, Set-up UB dressing, Sup LB dressing, CGA footwear, Sup toileting hygiene, Sup toilet transfer, Sup bed-chair transfer     Discharge Diagnosis: Impairment of mobility, safety and Activities of Daily Living (ADLs) due to Stroke:  01 3  Bilateral involvement    Discharge Medications:   See after visit summary for reconciled discharge medications provided to patient and family  Condition at Discharge: good     Discharge instructions/Information to patient and family:   See after visit summary for information provided to patient and family  Provisions for Follow-Up Care:  See after visit summary for information related to follow-up care and any pertinent home health orders  Future Appointments   Date Time Provider Suzan Sheldon   4/4/2023  4:00 PM DO MAINOR Chau [facility]   4/6/2023  8:00 AM Cristina Acharya PT [facility] PT [facility]   4/6/2023  9:15 AM Poncho Mendoza OT [facility] OT [facility]   4/6/2023 11:00 AM DINA Sepulveda [facility] PT [facility]   5/1/2023  8:45 AM Delvis Kumar MD NEURO [facility]       Disposition: Home with SLP/PT/OT at [facility]    Planned Readmission: No    Discharge Statement   I spent 45 minutes discharging the patient  This time was spent on the day of discharge  I had direct contact with the patient on the day of discharge   Greater than 50% of the total time was spent examining patient, answering all patient questions, arranging and discussing plan of care with patient as well as directly providing post-discharge instructions  Additional time then spent on discharge activities  Discharge Medications:  See after visit summary for reconciled discharge medications provided to patient and family        Facility Administered Medications Prior to Discharge:    Current Facility-Administered Medications   Medication Dose Route Frequency Provider Last Rate   • acetaminophen  650 mg Oral Q6H PRN Daisha Holiday, CRNP     • amLODIPine  10 mg Oral Daily Paulo Bhakta MD     • apixaban  2 5 mg Oral BID Paulo Bhakta MD     • aspirin  81 mg Oral Daily Paulo Bhakta MD     • atorvastatin  40 mg Oral Daily With Wendy Abdalla MD     • baclofen  5 mg Oral TID Paulo Bhakta MD     • carvedilol  25 mg Oral BID With Meals Daisha Holiday, CRNP     • vitamin B-12  1,000 mcg Oral Daily Paulo Bhakta MD     • ferrous sulfate  325 mg Oral Daily With Breakfast Daisha Holiday, CRNP     • FLUoxetine  20 mg Oral Daily Paulo Bhakta MD     • hydrALAZINE  25 mg Oral Q8H PRN Daisha Holiday, CRNP     • hydrALAZINE  50 mg Oral Q12H Maria G Hayes PA-C     • polyethylene glycol  17 g Oral Daily PRN Paulo Bhakta MD     • psyllium  1 packet Oral Once per day on Mon Wed Fri Paulo Bhakta MD     • ticagrelor  90 mg Oral Q12H [facility] Paulo Bhakta MD

## 2023-03-30 NOTE — PROGRESS NOTES
Physical Medicine and Rehabilitation Progress Note  Chiara Lennon 60 y o  male MRN: [id number]  Unit/Bed#: [facility] 406-41 Encounter: [id number]    HPI: Chiara Lennon is a 60 y o  right handed male with medical history of HTN who presented to [address] [place] [place] [address] Road on 2/25 with nausea/dizziness  Found to have hypertensive emergency with acute/subcaute R posterior cerebellar CVA with 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, mild BRAULIO, and incidentally found + COVID (without evidence of respiratory illness/hypoxia/CXR findings)  Placed on cardene gtt, stroke pathway, ASA/Plavix, IV hydration for BRAULIO, and CTX for UTI  NIHSS 2  Symptoms began 2 days prior  Not tPA/TNK candidate  MRI/MRA with 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  NSx recommended transfer to [facility] and starting on heparin gtt and stopped Plavix  Repeat CTA on 2/26 stable with with R V3/4 occlusion  Not a candidate for interventional procedure due to clinical stability and risk  Speech consulted and noted mod-severe oropharyngeal dysphagia recommended pureed/HTL  Unfortunately later on 2/26, he clinically deteriorated with increased R sided weakness, and was taken to IR for stenting of V3 and V4 with TICI 2B revascularization  CTH without ICH  He was admitted back to the ICU intubated - extubated 2/27  MRI showed cerebellar CVA and R > L stroke burden, with additional hypodensities on DWI appreciated L midbrain, pontine area and R lower medullary/spinal cord junction  He was transitioned to triple therapy with DAPT (given recent stent) and A/C with eliquis 2 5mg BID due to COVID  Diet advanced to Level 3/NTL on 2/27  Noted to have spasticity in RLE - started on baclofen by Neurology  He was able to have cardene discontinued on 3/2, and they have been making adjustments to his oral regimen  He was started on Prozac for his mood  NSx has signed off  CTA H/N recommended around 3/17  Length of time on eliquis unclear   Admitted to [facility] on 3/6  Chief Complaint: Feeling well  Interval History/Subjective:  No acute events overnight  Did have an incontinent BM yesterday  Restarted him on his metamucil yesterday  Monitor closely  No new lightheadedness/dizziness, CP, SOB, fevers, chills, N/V, abdominal pain  Called wife today, and she developed a runny nose on 3/28 and tested herself for COVID which was positive  She last saw patient on Monday night  He is afebrile and asymptomatic  ROS:  A 10 point review of systems was negative except for what is noted in the HPI  Today's Changes:  1  Encouraging fluid intake  Reviewed with patient and wife that I would like to him to get labs done next week to monitor his fluid intake  I can order and follow those up  She is in the process of getting him a PCP  I gave her [address] so she can look up one closer to them  2  His daughter will pick him up tomorrow  Wife will not be able to come pick him up tomorrow  3  Wife will wear a mask around patient  She is afebrile, minimally symptomatic  4  Discussed with rehab director - unless he shows symptoms, no need to isolate/quarantine  5  Encouraged wife to call her PCP for evaluation  6   Will send meds to [facility]      Total time spent:  50 minutes, with more than 50% spent counseling/coordinating care  Counseling includes discussion with patient re: progress in therapies, functional issues observed by therapy staff, and discussion with patient his/her current medical state/wellbeing  Coordination of patient's care was performed in conjunction with Internal Medicine service to monitor patient's labs, vitals, and management of their comorbidities       Assessment/Plan:    * Acute Posterior Circulation Stroke  Assessment & Plan  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    - Now s/p stenting on 2/26 with TICI 2b revascularization    PPx: ASA/brilinta/Eliquis, Statin   - Discussed with Neurology, they defer to NSx on length of time on eliquis  Potentially 3-4 weeks if felt to be related to [address] out to Neurosurgery - they will discuss with their attending re: eliquis  - Repeat CTA H/N -  No new intracranial abnormalities, expected evolution of known CVA  See dissection and stenosis for more details  3/9 Had nocturnal oximetry to screen for nocturnal hypoxia - only 26 seconds total of mild desaturation  Maintain normotension  Education on prediabetes and diet  Follow-up with Neurovascular/Neurosurgery  Function improving slowly   Continue PT/OT/SLP - for swallow, speech, R sided weakness/tone, will need a good visual exam      Celiac artery stenosis (HCC)  Assessment & Plan  Incidentally noted to have approximately 70% celiac artery stenosis on 3/21 Renal Artery Ultrasound  No s/s of ischemic bowel  Would recommend outpatient f/u with PCP  Neurogenic bowel  Assessment & Plan  Disinhibited bowel + mobility difficulties -somewhat loose as well  3/27 - has been improving, but maybe slightly constipated  - Gave miralax with BM    3/28 - with incontinent BM  Will add metamucil back but only 3x weekly  - Encouraged adequate hydration   Not on bowel meds right now  [facility] Bowel Training:   - 30-45 min after each meal will get patient onto commode to attempt bowel movement  - He wants to hold off on scheduled suppository for now (would schedule in AM to align with his previous bowel schedule at home)     For now monitor, close continence care especially    Anemia  Assessment & Plan  Stable 3/27 at 10 2   Normocytic anemia noted through stay   B12 borderline low  Folate normal  Iron Panel: Low iron saturation and iron with normal TIBC and Ferritin  Was started in the hospital on B12, but not iron  Per IM - 2 days of IV Venofer (last day 3/8)  Then start oral iron  Monitor Hgb, transfuse as appropriate  Will discuss with IM  Consulted  Adjustment disorder with depressed mood  Assessment & Plan  Tearful at times, but coping  Mood has been improving  Has been tearful and labile during hospitalization  Started on Prozac 20mgdaily - may need to increase dose  Consulted rehab psychology for support  Prediabetes  Assessment & Plan  A1C 5 7  Consult nutrition for education on diabetic diet  Diet/lifestyle modifications  Follow-up with PCP  Acute kidney injury superimposed on CKD [facility])  Assessment & Plan  Lab Results   Component Value Date    EGFR 57 03/29/2023    EGFR 43 03/28/2023    EGFR 49 03/27/2023    CREATININE 1 31 (H) 03/29/2023    CREATININE 1 66 (H) 03/28/2023    CREATININE 1 49 (H) 03/27/2023     Per hospital team - suspect CKD Stage 2 2/2 hypertension  3/27 Crea bumped to 1 49   - Patient was to increase his PO intake prior to trying IVF   - IM holding LIsinopril  3/28 Crea up to 1 66   - Giving IVF   - Check PVR - these were fine  3/29 Crea down to 1 31 after 1L over 10 hours  - Encourage adequate hydration while here  Will recheck BMP next week  Avoid nephrotoxic meds and relative hypotension  Recommend outpatient f/u with PCP    Spasticity  Assessment & Plan  Intrinsic tonic tone in R quad which is MAS 2, Mas 1 in his ankle with intrinsic phasic tone in the form of clonus  RUE with tone in SA, EF, WF (MAS 1) and pronator (MAS 1+)  Hiccups resolved  Would opt to avoid treating R quad for now, as tone there may help stabilize at the knee  R ankle multipodus, stretches  Baclofen 5mg TID - monitor and adjust as appropriate  Range of motion  Monitor as tone evolves     May benefit from bracing in the future  Outpatient f/u with PMR  Dysphagia  Assessment & Plan  Improved  Admitted with mod-severe oropharyngeal  Has massively improved  3/8 Advanced to Level 3/Thins  3/10 Advanced to Reg/Thins  Aspiration precautions  Good oral hygiene   SLP consulted    Primary hypertension  Assessment & Plan  Home: None  Here: Amlodipine 10mg daily, Coreg 25mg BID, Hydralazine 50mg Q12hr  Has PRN hydralazine as well  Had hypertensive urgency in hospital requiring cardene gtt   3/21 IM ordered renal artery ultrasound  No significant PRIETO  3/27 Lisinopril held due to BRAULIO  Monitor and adjust as appropriate  IM consulted to assist with management  Stenosis of left vertebral artery  Assessment & Plan  Severe L vertebral artery origin stenosis  3/17 CTA H/N:   CTA HEAD AND NECK  - Right vertebral artery V4 stent appears to have in-stent occlusion  Right vertebral artery V4 segment is patent before and after the V4 stent  - Unchanged left vertebral artery post-PICA V4 segment occlusion   - Dissection flap in right vertebral artery V3 segment which is patent and improved in caliber status post thrombectomy  - Moderate stenosis in proximal-to- mid basilar artery due to atherosclerotic disease status post mechanical thrombectomy  - Per Neurosurgery unlikely in stent occlusion  No changes  Plan for outpatient f/u  ASA/Brilinta  Atorvastatin  SBP goals 120-160  Follow-up with Neurovascular  Right Vertebral artery dissection [facility])  Assessment & Plan  Now s/p R V3/4 stenting with TICI 2B revascularization on 2/26 for R Vert stenosis  Continue ASA/Brilinta  Statin  CTA HEAD AND NECK 3/17  - Dissection flap in right vertebral artery V3 segment which is patent and improved in caliber status post thrombectomy  Outpatient f/u with Neurosurgery/Dr Stephania Rubin      Vasovagal syncope-resolved as of 3/22/2023  Assessment & Plan  No further episodes  3/8 Had episode of vasovagal syncope  - No convulsions   On body weight support system, started to feel dizzy  - Staring episode after, but able to sternal rubbed out of it  Very brief   - No post-ictal weakness   - Neurologically stable and back to baseline   - Orthostatics negative  Discussed with Neurology - unlikely seizure given distribution of stroke and presentation  No further testing recommended at this time  Monitor  COVID-resolved as of 3/10/2023  Assessment & Plan  Resolved  Incidentally found to have COVID on 2/25  Asymptomatic from respiratory standpoint  Per Neurosurgery concern for hypercoagulability related to COVID  Currently on Eliquis 2 5mg BID - per Neuro 3-4 weeks probably reasonable, but for them ultimately up to NSx as this was their initial recommendation to start this medication  3/8 Discontinued precautions after 10 days isolation  Health Maintenance  #Delirium/Sleep: At risk  Optimize bowel/bladder, sleep-wake cycle, mood and pain management  #Pain: Baclofen 5mg TID, Tylenol PRN  #Bowel: Last BM 3/29  See Neurogenic bowel above  #Bladder: Voiding and continent  #Skin/Pressure Injury Prevention: Turn Q2hr in bed, with weight shifts N12-31wxs in wheelchair  Float heels in bed  #DVT Prophylaxis: On triple therapy, SCDs  #GI Prophylaxis: None  #Code Status:  Full Code  #FEN: Reg/Thins  #Dispo:  Team 3/28:  Family training with the wife completed  Goals for outpatient therapies PT/OT/SLP  Family's goal is for him to leave by ADD 3/31  Barrier: R sided weakness, spasticity, impaired proprioception, impaired righting reaction, kinesthetic awareness, impaired sensation on the R, ataxia, dysphagia, expressive > receptive aphasia, post-stroke depression, bowel incontinence/disinhibited, spasticity, decreased righting reaction, 25/30 MOCA  Goals: Sup-Set-up wheelchair level mobility/ADLs  Can ambulate with julieta-walker with wife    Follow-up: Neurology, Neurosurgery, PCP, PMR (spasticity)       Objective:    Functional Update:  PT: Cl Sup mobility  OT: CGA-Eusebio ADLs  SLP: Sup comprehension/expression and Eusebio for executive functioning    Allergies per EMR    Physical Exam:  Temp:  [97 7 °F (36 5 °C)-98 2 °F (36 8 °C)] 98 2 °F (36 8 °C)  HR:  [59-65] 60  Resp:  [18] 18  BP: (120-153)/(62-71) 153/71  Oxygen Therapy  SpO2: 97 %    Gen: No acute distress, Well-nourished, well-appearing  HEENT: Moist mucus membranes, Normocephalic/Atraumatic  Cardiovascular: Regular rate, rhythm, S1/S2  Distal pulses palpable  Heme/Extr: No edema  Pulmonary: Non-labored breathing  Lungs CTAB  : No barnett  GI: Soft, non-tender, non-distended  BS+  Integumentary: Skin is warm, dry  Neuro: AAOx3, Improving aphasia and R sided weakness  Mild spasticity stable  Psych: Normal mood and affect       Diagnostic Studies: Reviewed, no new imaging    Laboratory:  Reviewed  Results from last 7 days   Lab Units 03/27/23  0517   HEMOGLOBIN g/dL 10 2*   HEMATOCRIT % 31 1*   WBC Thousand/uL 7 06     Results from last 7 days   Lab Units 03/29/23  0646 03/28/23  0550 03/27/23  0517   BUN mg/dL 31* 39* 32*   POTASSIUM mmol/L 3 9 4 2 4 1   CHLORIDE mmol/L 112* 110* 106   CREATININE mg/dL 1 31* 1 66* 1 49*            Patient Active Problem List   Diagnosis   • BRAULIO (acute kidney injury) ([facility] [address] )   • Acute Posterior Circulation Stroke   • Right Vertebral artery dissection (HCC)   • Stenosis of left vertebral artery   • Primary hypertension   • Dizziness   • Dysphagia   • Spasticity   • Acute kidney injury superimposed on CKD (HCC)   • Prediabetes   • Adjustment disorder with depressed mood   • Anemia   • Neurogenic bowel   • Celiac artery stenosis (HCC)         Medications  Current Facility-Administered Medications   Medication Dose Route Frequency Provider Last Rate   • acetaminophen  650 mg Oral Q6H PRN YURI Bertrand     • amLODIPine  10 mg Oral Daily Raheem Howard MD     • apixaban  2 5 mg Oral BID Raheem Howard MD     • aspirin  81 mg Oral Daily Raheem Howard MD     • atorvastatin  40 mg Oral Daily With Lavonne Russo MD     • baclofen  5 mg Oral TID Massimo Zhang MD     • carvedilol  25 mg Oral BID With Meals YURI Stanford     • vitamin B-12  1,000 mcg Oral Daily Massimo Zhang MD     • ferrous sulfate  325 mg Oral Daily With Breakfast YURI Stanford     • FLUoxetine  20 mg Oral Daily Massimo Zhang MD     • hydrALAZINE  25 mg Oral Q8H PRN YURI Stanford     • hydrALAZINE  50 mg Oral Q12H Maria G Hayes PA-C     • polyethylene glycol  17 g Oral Daily PRN Massimo Zhang MD     • psyllium  1 packet Oral Once per day on Mon Wed Fri Massimo Zhang MD     • ticagrelor  90 mg Oral Q12H [facility] HOME Massimo Zhang MD            ** Please Note: Fluency Direct voice to text software may have been used in the creation of this document   **

## 2023-03-30 NOTE — PROGRESS NOTES
"OT Treatment Note       03/30/23 0700   Pain Assessment   Pain Assessment Tool 0-10   Pain Score No Pain   Restrictions/Precautions   Precautions Aphasia; Aspiration;Bed/chair alarms;Cognitive; Fall Risk;Supervision on toilet/commode   Lifestyle   Autonomy \"That was fine\" -tub transfer   Eating   Type of Assistance Needed Independent   Physical Assistance Level No physical assistance   Comment Utilizing built up handle, pt able to self feed with RUE  However, pt stated that he would not use it at home and will continue to use LUE  Pt able to use RUE while drinking from cup with LUE supporting cup  Eating CARE Score 6   Oral Hygiene   Type of Assistance Needed Supervision   Physical Assistance Level No physical assistance   Comment In stance at sink with RUE supported on sink  Oral Hygiene CARE Score 4   Shower/Bathe Self   Type of Assistance Needed Supervision   Physical Assistance Level No physical assistance   Comment Sponge bath completed at sink in w/c  Seated, pt bathed UB, groin, and BLE by reaching down  In stance with RUE supported on sink as needed, pt bathed buttocks  Pt will be sponge bathing at home w/c level at sink and will have counter to support RUE on  Shower/Bathe Self CARE Score 4   Bathing   Assessed Bath Style Sponge Bath   Tub/Shower Transfer   Adaptive Equipment Transfer Bench   Findings Supervision for short distance functional mobilty to tub bench and for dry tub transfer using tub bench and sit swivel  Pt demonstrated carryover of lifting one LE first, scooting, and then lifting other LE  Pts wife will be able to provide supervision at home  With pts permission, OT took video of pt performing tub transfer (fully clothed) for pts wife     Upper Body Dressing   Type of Assistance Needed Set-up / clean-up   Physical Assistance Level No physical assistance   Comment seated EOB   Upper Body Dressing CARE Score 5   Lower Body Dressing   Type of Assistance Needed Supervision   Physical " Assistance Level No physical assistance   Comment Seated EOB to thread LE  In stance for clothing managment  Emiliano walker on R for support if needed, however pt did not use  Pt also able to doff pants and thread brief/pants with socks on seated on EOB- would anticipate pt able to do this on toilet if needed to change brief  Lower Body Dressing CARE Score 4   Putting On/Taking Off Footwear   Type of Assistance Needed Physical assistance   Physical Assistance Level 25% or less   Comment Pt able to don/doff socks by reaching down with increased time  Pt requires A for donning R shoe, able to don L and doff b/l shoes using opposing LE  Putting On/Taking Off Footwear CARE Score 3   Sit to Stand   Type of Assistance Needed Supervision   Physical Assistance Level No physical assistance   Comment With emiliano walker, Min vc to completely stop and square up with chair before sitting   Sit to Stand CARE Score 4   Bed-Chair Transfer   Type of Assistance Needed Supervision   Physical Assistance Level No physical assistance   Comment to/from bathroom with emiliano walker   Chair/Bed-to-Chair Transfer CARE Score 4   Toileting Hygiene   Type of Assistance Needed Supervision   Physical Assistance Level No physical assistance   Comment Able to manage clothing and rear hygiene in stance with RUE on emiliano walker as needed for support  Toileting Hygiene CARE Score 4   Toilet Transfer   Type of Assistance Needed Supervision   Physical Assistance Level No physical assistance   Comment With Emiliano walker on L to BSC over toilet, uses BSC handle during sit to stand portion     Toilet Transfer CARE Score 4   Kitchen Mobility   Kitchen-Mobility Level Wheelchair   Kitchen Mobility Comments OT educated pt on kitchen mobility at w/c level for the following: retrieving cup out of fridge, locking brakes prior to reaching in fridge, using closed containers to prevent spillage, where to align w/c to retrieve items from fridge/drawers, putting paper plates on counter, having wife heat items in microwave because it is not accessible at w/c level, and transporting items in kitchen  With supervision, pt able to perform w/c mobility safely  Cognition   Overall Cognitive Status Impaired   Arousal/Participation Alert; Cooperative   Attention Within functional limits   Orientation Level Oriented X4   Memory Decreased short term memory   Following Commands Follows one step commands without difficulty   Activity Tolerance   Activity Tolerance Patient tolerated treatment well   Assessment   Treatment Assessment Pt engaged in skilled OT tx session focused on ADL routine, dry tub transfer, and kitchen mobility at w/c level  See above for further details on functional performance  Pt able to complete all at supervision level  Pts wife will be home during the day and has completed family training for ADL routine and transfers and would be able to A at home, therefore not a barrier to d/c  From OT standpoint, pt on track to d/c tomorrow to home with family support and outpatient OT  Cont OT POC with ADL routine, RUE HEP, functional standing tolerance, and core strengthening  Plan for d/c ADL shower tomorrow, and HEP  Pt left in recliner with all needs in reach and alarm activated  Prognosis Good   Problem List Decreased strength;Decreased range of motion;Decreased endurance; Impaired balance;Decreased mobility; Decreased coordination;Decreased cognition; Impaired sensation; Impaired tone   Barriers to Discharge None   Plan   Treatment/Interventions ADL retraining;Functional transfer training; Therapeutic exercise; Endurance training;Cognitive reorientation;Patient/family training;Equipment eval/education; Compensatory technique education   Progress Progressing toward goals   Recommendation   OT Discharge Recommendation Home with outpatient rehabilitation   OT Therapy Minutes   OT Time In 0700   OT Time Out 0845   OT Total Time (minutes) 105   OT Mode of treatment - Individual (minutes) 105   OT Mode of treatment - Concurrent (minutes) 0   OT Mode of treatment - Group (minutes) 0   OT Mode of treatment - Co-treat (minutes) 0   OT Mode of Treatment - Total time(minutes) 105 minutes   OT Cumulative Minutes 2594   Therapy Time missed   Time missed?  No

## 2023-03-30 NOTE — PROGRESS NOTES
03/30/23 1030   Pain Assessment   Pain Assessment Tool 0-10   Pain Score No Pain   Restrictions/Precautions   Precautions Aphasia; Aspiration;Bed/chair alarms;Cognitive; Fall Risk;Supervision on toilet/commode   Weight Bearing Restrictions No   ROM Restrictions No   Comprehension   Comprehension (FIM) 5 - Understands basic directions and conversation   Expression   Expression (FIM) 5 - Needs help/cues only RARELY (< 10% of the time)   Social Interaction   Social Interaction (FIM) 6 - Interacts appropriately with others BUT requires extra  time   Problem Solving   Problem solving (FIM) 5 - Solves complex problems But requires cues from helper   Memory   Memory (FIM) 5 - Recalls/performs request 90% of time   Speech/Language/Cognition Assessmetn   Treatment Assessment Pt seen for skilled speech therapy session targeting cognitive linguistic communication skills focusing on updating medication cheatsheet and creating and reviewing HEP  Pt with only one med change since last updated- pt no longer on lisinopril and will need to follow up with PCP regarding when to restart  Pt provided with folder of activities for home including higher level cog with number sequences and identification of trends and higher level word finding/thought organization tasks  Reviewed unfamiliar tasks with pt so he understood his assignments to complete independently and have a family member check work  Discussed having a routine at home with doing a few activities a day  Pt is planned to follow up with outpt SLP services to cont to maximize overall cognitive linguistic communication skills  Pt receptive to education and plan  Discharge set for tomorrow 3/31  No further questions at this time  Pt made good progress while on acute rehab unit and will cont to benefit from skilled SLP services to maximize overall cognitive linguistic communication abilities at this time     SLP Therapy Minutes   SLP Time In 8970   SLP Time Out 1100   SLP Total Time (minutes) 30   SLP Mode of treatment - Individual (minutes) 30   SLP Mode of treatment - Concurrent (minutes) 0   SLP Mode of treatment - Group (minutes) 0   SLP Mode of treatment - Co-treat (minutes) 0   SLP Mode of Treatment - Total time(minutes) 30 minutes   SLP Cumulative Minutes 990   Therapy Time missed   Time missed?  No

## 2023-03-30 NOTE — TELEPHONE ENCOUNTER
Awaiting for Echo's response if there a slot we can use for this pt  Viktoria Yanez MD  [facility] [place]; [place],     Patient will need follow-up with Neurology for bilateral cerebellar strokes  Being discharged tomorrow from [facility]  He will also need follow-up with me, specifically, to monitor and manage his neurogenic bowel and spasticity       Thank you in advance,   Mario Adams MD   Physical Medicine and Rehabilitation

## 2023-03-30 NOTE — PLAN OF CARE
Problem: Prexisting or High Potential for Compromised Skin Integrity  Goal: Skin integrity is maintained or improved  Description: INTERVENTIONS:  - Identify patients at risk for skin breakdown  - Assess and monitor skin integrity  - Assess and monitor nutrition and hydration status  - Monitor labs   - Assess for incontinence   - Turn and reposition patient  - Assist with mobility/ambulation  - Relieve pressure over bony prominences  - Avoid friction and shearing  - Provide appropriate hygiene as needed including keeping skin clean and dry  - Evaluate need for skin moisturizer/barrier cream  - Collaborate with interdisciplinary team   - Patient/family teaching  - Consider wound care consult   Outcome: Progressing     Problem: MOBILITY - ADULT  Goal: Maintain or return to baseline ADL function  Description: INTERVENTIONS:  -  Assess patient's ability to carry out ADLs; assess patient's baseline for ADL function and identify physical deficits which impact ability to perform ADLs (bathing, care of mouth/teeth, toileting, grooming, dressing, etc )  - Assess/evaluate cause of self-care deficits   - Assess range of motion  - Assess patient's mobility; develop plan if impaired  - Assess patient's need for assistive devices and provide as appropriate  - Encourage maximum independence but intervene and supervise when necessary  - Involve family in performance of ADLs  - Assess for home care needs following discharge   - Consider OT consult to assist with ADL evaluation and planning for discharge  - Provide patient education as appropriate  Outcome: Progressing  Goal: Maintains/Returns to pre admission functional level  Description: INTERVENTIONS:  - Perform BMAT or MOVE assessment daily    - Set and communicate daily mobility goal to care team and patient/family/caregiver  - Collaborate with rehabilitation services on mobility goals if consulted  - Perform Range of es a day  - Reposition patis    - Dangle pa  - Stand patien  - Amb  - Out of bed   - Out of bed for m  - Out of bed for toileting  - Record patient progress and toleration of activity level   Outcome: Progressing     Problem: PAIN - ADULT  Goal: Verbalizes/displays adequate comfort level or baseline comfort level  Description: Interventions:  - Encourage patient to monitor pain and request assistance  - Assess pain using appropriate pain scale  - Administer analgesics based on type and severity of pain and evaluate response  - Implement non-pharmacological measures as appropriate and evaluate response  - Consider cultural and social influences on pain and pain management  - Notify physician/advanced practitioner if interventions unsuccessful or patient reports new pain  Outcome: Progressing     Problem: INFECTION - ADULT  Goal: Absence or prevention of progression during hospitalization  Description: INTERVENTIONS:  - Assess and monitor for signs and symptoms of infection  - Monitor lab/diagnostic results  - Monitor all insertion sites, i e  indwelling lines, tubes, and drains  - Monitor endotracheal if appropriate and nasal secretions for changes in amount and color  - [place] appropriate cooling/warming therapies per order  - Administer medications as ordered  - Instruct and encourage patient and family to use good hand hygiene technique  - Identify and instruct in appropriate isolation precautions for identified infection/condition  Outcome: Progressing  Goal: Absence of fever/infection during neutropenic period  Description: INTERVENTIONS:  - Monitor WBC    Outcome: Progressing     Problem: SAFETY ADULT  Goal: Patient will remain free of falls  Description: INTERVENTIONS:  - Educate patient/family on patient safety including physical limitations  - Instruct patient to call for assistance with activity   - Consult OT/PT to assist with strengthening/mobility   - Keep Call bell within reach  - Keep bed low and locked with side rails adjusted as appropriate  - Keep care items and personal belongings within reach  - Initiate and maintain comfort rounds  - Make Fall Risk Sign visible to staff  - Offer Toiletin advance of need  - Initiate  - Obtain necessary fall risk management equipm  - Apply yellow socks and bracelet for high fall risk patients  - Consider moving patient to room near nurses station  Outcome: Progressing  Goal: Maintain or return to baseline ADL function  Description: INTERVENTIONS:  -  Assess patient's ability to carry out ADLs; assess patient's baseline for ADL function and identify physical deficits which impact ability to perform ADLs (bathing, care of mouth/teeth, toileting, grooming, dressing, etc )  - Assess/evaluate cause of self-care deficits   - Assess range of motion  - Assess patient's mobility; develop plan if impaired  - Assess patient's need for assistive devices and provide as appropriate  - Encourage maximum independence but intervene and supervise when necessary  - Involve family in performance of ADLs  - Assess for home care needs following discharge   - Consider OT consult to assist with ADL evaluation and planning for discharge  - Provide patient education as appropriate  Outcome: Progressing  Goal: Maintains/Returns to pre admission functional level  Description: INTERVENTIONS:  - Perform BMAT or MOVE assessment daily    - Set and communicate daily mobility goal to care team and patient/family/caregiver  - Collaborate with rehabilitation services on mobility goals if consulted  - Perform Range of Miguel Ángel a day  - Reposition patiers    - Dangle patient   - Stand dexter  - Ambulate pa  - Out of bed to   - Out of bed for m  - Out of bed for toileting  - Record patient progress and toleration of activity level   Outcome: Progressing     Problem: DISCHARGE PLANNING  Goal: Discharge to home or other facility with appropriate resources  Description: INTERVENTIONS:  - Identify barriers to discharge w/patient and caregiver  - Arrange for needed discharge resources and transportation as appropriate  - Identify discharge learning needs (meds, wound care, etc )  - Arrange for interpretive services to assist at discharge as needed  - Refer to Case Management Department for coordinating discharge planning if the patient needs post-hospital services based on physician/advanced practitioner order or complex needs related to functional status, cognitive ability, or social support system  Outcome: Progressing     Problem: Nutrition/Hydration-ADULT  Goal: Nutrient/Hydration intake appropriate for improving, restoring or maintaining nutritional needs  Description: Monitor and assess patient's nutrition/hydration status for malnutrition  Collaborate with interdisciplinary team and initiate plan and interventions as ordered  Monitor patient's weight and dietary intake as ordered or per policy  Utilize nutrition screening tool and intervene as necessary  Determine patient's food preferences and provide high-protein, high-caloric foods as appropriate       INTERVENTIONS:  - Monitor oral intake, urinary output, labs, and treatment plans  - Assess nutrition and hydration status and recommend course of action  - Evaluate amount of meals eaten  - Assist patient with eating if necessary   - Allow adequate time for meals  - Recommend/ encourage appropriate diets, oral nutritional supplements, and vitamin/mineral supplements  - Order, calculate, and assess calorie counts as needed  - Recommend, monitor, and adjust tube feedings and TPN/PPN based on assessed needs  - Assess need for intravenous fluids  - Provide specific nutrition/hydration education as appropriate  - Include patient/family/caregiver in decisions related to nutrition  Outcome: Progressing

## 2023-03-30 NOTE — PROGRESS NOTES
03/30/23 0900   Pain Assessment   Pain Assessment Tool 0-10   Pain Score No Pain   Restrictions/Precautions   Precautions Cognitive;Aspiration;Bed/chair alarms; Fall Risk;Supervision on toilet/commode   Cognition   Overall Cognitive Status Impaired   Arousal/Participation Alert; Cooperative   Subjective   Subjective pt has no c/o and agreeable to be seen early for PT   Roll Left and Right   Type of Assistance Needed Independent   Roll Left and Right CARE Score 6   Sit to Lying   Type of Assistance Needed Independent   Sit to Lying CARE Score 6   Lying to Sitting on Side of Bed   Type of Assistance Needed Independent   Comment HOB flat, no rails   Lying to Sitting on Side of Bed CARE Score 6   Sit to Stand   Type of Assistance Needed Supervision   Comment with/without AD   Sit to Stand CARE Score 4   Bed-Chair Transfer   Type of Assistance Needed Supervision;Verbal cues; Adaptive equipment   Comment DS sit pivot, S with HW   Chair/Bed-to-Chair Transfer CARE Score 4   Walk 10 Feet   Type of Assistance Needed Supervision;Verbal cues; Adaptive equipment   Comment CS with HW   Walk 10 Feet CARE Score 4   Wheel 50 Feet with Two Turns   Type of Assistance Needed Independent   Wheel 50 Feet with Two Turns CARE Score 6   Wheel 150 Feet   Type of Assistance Needed Independent   Comment bilat LE, able to manage w/c brakes and arm rest indep   Wheel 150 Feet CARE Score 6   Equipment Use   NuStep lvl 3 x 10 mins bilat LE and R UE, SPM> 60 - RPE GABBY somewhat hard rating   Assessment   Treatment Assessment pt tolerated 30 mins skilled PT which focused on nu step for neuropriming/strengthening then pt engaged in mobility training  Pt demonstrating indep with w/c propulsion management, DS for sit pivot transfers while CS for mobilities using a HW  no LOB and pt assisted back to recliner at end of tx with alarm on and all needs within reach  also encourage to perform HEP with handout placed on pt's tray table for reference  Family/Caregiver Present no   Barriers to Discharge None   Plan   Treatment/Interventions Functional transfer training;LE strengthening/ROM; Therapeutic exercise; Endurance training;Bed mobility;Gait training;Spoke to nursing;OT   Progress Progressing toward goals   Recommendation   PT Discharge Recommendation Home with outpatient rehabilitation   PT Therapy Minutes   PT Time In 0900   PT Time Out 0930   PT Total Time (minutes) 30   PT Mode of treatment - Individual (minutes) 15   PT Mode of treatment - Concurrent (minutes) 15   PT Mode of treatment - Group (minutes) 0   PT Mode of treatment - Co-treat (minutes) 0   PT Mode of Treatment - Total time(minutes) 30 minutes   PT Cumulative Minutes 2158   Therapy Time missed   Time missed?  No

## 2023-03-31 VITALS
TEMPERATURE: 98 F | OXYGEN SATURATION: 99 % | WEIGHT: 191.8 LBS | RESPIRATION RATE: 16 BRPM | HEART RATE: 63 BPM | SYSTOLIC BLOOD PRESSURE: 156 MMHG | DIASTOLIC BLOOD PRESSURE: 76 MMHG | HEIGHT: 69 IN | BODY MASS INDEX: 28.41 KG/M2

## 2023-03-31 PROBLEM — R13.10 DYSPHAGIA: Status: RESOLVED | Noted: 2023-02-25 | Resolved: 2023-03-31

## 2023-03-31 PROBLEM — N18.9 ACUTE KIDNEY INJURY SUPERIMPOSED ON CKD (HCC): Status: RESOLVED | Noted: 2023-03-01 | Resolved: 2023-03-31

## 2023-03-31 PROBLEM — N18.2 STAGE 2 CHRONIC KIDNEY DISEASE: Status: RESOLVED | Noted: 2023-03-01 | Resolved: 2023-03-31

## 2023-03-31 PROBLEM — N17.9 ACUTE KIDNEY INJURY SUPERIMPOSED ON CKD (HCC): Status: RESOLVED | Noted: 2023-03-01 | Resolved: 2023-03-31

## 2023-03-31 PROBLEM — N18.2 STAGE 2 CHRONIC KIDNEY DISEASE: Status: ACTIVE | Noted: 2023-03-01

## 2023-03-31 LAB
ANION GAP SERPL CALCULATED.3IONS-SCNC: 3 MMOL/L (ref 4–13)
BUN SERPL-MCNC: 25 MG/DL (ref 5–25)
CALCIUM SERPL-MCNC: 9.1 MG/DL (ref 8.3–10.1)
CHLORIDE SERPL-SCNC: 109 MMOL/L (ref 96–108)
CO2 SERPL-SCNC: 26 MMOL/L (ref 21–32)
CREAT SERPL-MCNC: 1.24 MG/DL (ref 0.6–1.3)
GFR SERPL CREATININE-BSD FRML MDRD: 61 ML/MIN/1.73SQ M
GLUCOSE SERPL-MCNC: 92 MG/DL (ref 65–140)
POTASSIUM SERPL-SCNC: 4 MMOL/L (ref 3.5–5.3)
SODIUM SERPL-SCNC: 138 MMOL/L (ref 135–147)

## 2023-03-31 RX ORDER — FERROUS SULFATE 325(65) MG
325 TABLET ORAL
Qty: 30 TABLET | Refills: 0 | Status: SHIPPED | OUTPATIENT
Start: 2023-04-01

## 2023-03-31 RX ORDER — BACLOFEN 5 MG/1
5 TABLET ORAL 3 TIMES DAILY
Qty: 90 TABLET | Refills: 0 | Status: SHIPPED | OUTPATIENT
Start: 2023-03-31

## 2023-03-31 RX ORDER — HYDRALAZINE HYDROCHLORIDE 50 MG/1
50 TABLET, FILM COATED ORAL EVERY 12 HOURS
Qty: 60 TABLET | Refills: 0 | Status: SHIPPED | OUTPATIENT
Start: 2023-03-31

## 2023-03-31 RX ORDER — FLUOXETINE HYDROCHLORIDE 20 MG/1
20 CAPSULE ORAL DAILY
Qty: 30 CAPSULE | Refills: 0 | Status: SHIPPED | OUTPATIENT
Start: 2023-03-31

## 2023-03-31 RX ORDER — CARVEDILOL 25 MG/1
25 TABLET ORAL 2 TIMES DAILY WITH MEALS
Qty: 60 TABLET | Refills: 0 | Status: SHIPPED | OUTPATIENT
Start: 2023-03-31

## 2023-03-31 RX ORDER — AMLODIPINE BESYLATE 10 MG/1
10 TABLET ORAL DAILY
Qty: 30 TABLET | Refills: 0 | Status: SHIPPED | OUTPATIENT
Start: 2023-03-31

## 2023-03-31 RX ORDER — ASPIRIN 81 MG/1
81 TABLET, CHEWABLE ORAL DAILY
Qty: 30 TABLET | Refills: 0 | Status: SHIPPED | OUTPATIENT
Start: 2023-03-31

## 2023-03-31 RX ORDER — ATORVASTATIN CALCIUM 40 MG/1
40 TABLET, FILM COATED ORAL
Qty: 30 TABLET | Refills: 0 | Status: SHIPPED | OUTPATIENT
Start: 2023-03-31

## 2023-03-31 RX ADMIN — FERROUS SULFATE TAB 325 MG (65 MG ELEMENTAL FE) 325 MG: 325 (65 FE) TAB at 07:55

## 2023-03-31 RX ADMIN — ASPIRIN 81 MG CHEWABLE TABLET 81 MG: 81 TABLET CHEWABLE at 08:04

## 2023-03-31 RX ADMIN — BACLOFEN 5 MG: 10 TABLET ORAL at 08:05

## 2023-03-31 RX ADMIN — Medication 1 PACKET: at 08:05

## 2023-03-31 RX ADMIN — CYANOCOBALAMIN TAB 500 MCG 1000 MCG: 500 TAB at 08:04

## 2023-03-31 RX ADMIN — HYDRALAZINE HYDROCHLORIDE 50 MG: 50 TABLET, FILM COATED ORAL at 07:54

## 2023-03-31 RX ADMIN — APIXABAN 2.5 MG: 2.5 TABLET, FILM COATED ORAL at 08:04

## 2023-03-31 RX ADMIN — TICAGRELOR 90 MG: 90 TABLET ORAL at 09:54

## 2023-03-31 RX ADMIN — FLUOXETINE 20 MG: 20 CAPSULE ORAL at 08:04

## 2023-03-31 RX ADMIN — ATORVASTATIN CALCIUM 40 MG: 40 TABLET, FILM COATED ORAL at 16:24

## 2023-03-31 RX ADMIN — BACLOFEN 5 MG: 10 TABLET ORAL at 16:24

## 2023-03-31 RX ADMIN — CARVEDILOL 25 MG: 25 TABLET, FILM COATED ORAL at 16:22

## 2023-03-31 RX ADMIN — CARVEDILOL 25 MG: 25 TABLET, FILM COATED ORAL at 07:55

## 2023-03-31 RX ADMIN — APIXABAN 2.5 MG: 2.5 TABLET, FILM COATED ORAL at 17:00

## 2023-03-31 RX ADMIN — AMLODIPINE BESYLATE 10 MG: 10 TABLET ORAL at 09:53

## 2023-03-31 NOTE — PROGRESS NOTES
Pt  is being discharged today with services  Pt  is alert and oriented x4  Pt  ambulates in room at close supervision level with the use of julieta walker  Pt  is occasionally incontinent of bowel and bladder  No skin breakdowns noted at the day of discharge from [facility]  Pt  denies any pain

## 2023-03-31 NOTE — DISCHARGE INSTR - AVS FIRST PAGE
DISCHARGE INSTRUCTIONS: [address] [age]    Bring these instructions with you to your Outpatient Physician appointments so they can order and follow-up any additional lab work or imaging recommended at time of discharge  You remain a fall and injury risk which could be severe  - Your risk of fall has decreased however since admission to acute rehab  Caregiver training has been completed with our staff  - Appropriate supervision +/- assistance as instructed during your rehab course is recommended to decrease risk of fall and injury  - Continue skilled therapy as discussed after discharge to further decrease this risk    If you (or your health care proxy) have any questions or concerns regarding your acute rehabilitation stay including issues with medications, rehabilitation, and follow-up plan, please call:          [address] [place] [address] in [place] at [phone] or [phone]  Should you develop fevers, chills, new weakness, changes in sensation, difficulty speaking, facial weakness, confusion, shortness of breath, chest pain, or other concerning symptoms please call 911 and/or obtain transportation to nearest ER immediately  PHYSICIANS to see:  Please see your doctors listed in the follow up providers section of your discharge paperwork, and take the discharge paperwork with you to your appointments  LAB WORK recommended after discharge: Follow-up lab work at discretion of your outpatient physicians to be determined at time of your future appointments  Also, obtain the following lab orders and follow-up management through primary care physician and outpatient specialists  BMP to monitor kidney function at next visit within 1 week     Excessive delay in labs and appropriate follow-up could potentially increase your risk of complications which could be severe and even life-threatening    It is you or your caregiver's responsibility "to ensure these tests are ordered by your outpatient providers and followed up with accordingly  Call [facility] [address] [facility] to locate the closest open outpatient lab center where you can have your lab work drawn  Contact them at [phone]    IMAGING to follow-up:  Follow-up imaging as discussed with your recent physicians or at discretion of your outpatient physicians to be determined at time of your appointments  Please [address] [facility] Sched[facility] at [phone] to schedule or confirm your follow-up recommended studies:    CTA Head and Neck ordered/recommended by Neurosurgery/Neurovascular specialty  Call this specialist with additional questions  Please complete the week prior to your appointment on 5/1/2023  You have been given an electronic prescription that can be used at [facility] [address] radiology facilities, however if you do not wish to use [facility] radiology services please contact [facility] for a paper prescription to use at a radiology facility of your choosing and bring the disk with the x-rays/images on them to your appointment     IMAGING, ADDITIONAL FINDINGS and ISSUES to follow-up:  Check under the \"DISCHARGE PROVIDER\" section of these DISCHARGE INSTRUCTIONS for provider contact information and specific issues as well  Anemia:Improved  Follow-up with PCP for further treatment and evaluation  Continue iron  Disinhibited bowel: Take metamucil every other day to every day to help bulk stools  Go on the toilet 35-40 minutes after each meal and try to have a bowel movement  You can also try to time your bowel movements using a suppository or mini enema (docusate mini enema) 30-45 minutes after the meal of your choice  Celiac Artery Stenosis: 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  This artery supplies your gut  Follow-up with your primary care physician for appropriate surveillance  Likely mild chronic kidney disease: Follow-up with PCP with repeat blood work   " Prior to your CTA H/N, make sure to hydrate as contrast from CTA can impact kidneys  Discuss with PCP preparation  Driving restrictions: You are recommended against driving until cleared by an outpatient physician and cleared through a formal driving evaluation  Driving at current time can increase your risk of injury and can increase risk of injury of others  **As outlined in [address], healthcare personnel are required to report every person over 13years of age diagnosed as having a condition that could impair their ability to drive, with the exception of medical condition expected to last less than 90 days (most commonly orthopedic fractures and post orthopedic surgery conditions without other co-morbidities)  Your medical condition hopefully will have adequately improved by that time but at this time it is not certain  Work restrictions: You should NOT return to work (if working) until cleared by an outpatient physician  You should not operate heavy machinery (if applicable) until cleared by an outpatient physician  Alcohol restrictions: You are recommended to not drink alcohol at this time unless cleared by an outpatient physician  Drinking alcohol in your current functional condition can increase your risk of injury which could be severe  Drinking alcohol given your current health problems can lead to increased medical complications which could be severe  Combining alcohol with your current medications can increase your risk of injury which could be severe  Smoking restrictions: You are recommended to not smoke nicotine  Smoking increases your risk of heart attack, stroke, emphysema/COPD, and lung cancer  MEDICATIONS:  Please see a full list of your medications outlined in the After Visit Summary that is attached to these Discharge Instructions    Please note changes may have been made to your medications please refer to your discharge paperwork for your current medications and take this list with you to all your doctors appointments for your doctors to review  Please do not resume a home medication unless the medication reconciliation sheet indicates to do so, please do not assume that a medication that you were given a prescription for is the same as a medication you have at home based on both medications having the same name as dosages and frequency may have changed  Unless specifically noted in your medication list provided to you in your discharge paper work do not resume prior vitamins, minerals, or supplements you may have been taking prior to your hospitalization unless instructed by an outpatient physician in the future  Discuss with your primary care at next visit if applicable  NSAID Warning:  Please avoid NSAID (including but not limited to advil, aleve, motrin, naproxen, ibuprofen, mobic, meloxicam, diclofenac etc) medications as NSAID medications may increase your risk of bleeding (which can be life-threatening), stroke, worsening kidney disease, heart attack, developing/worsening GI ulcers, worsening heart failure, worsening liver (cirrhosis) disease, potentially delay bone healing  Blood thinners prescribed to optimize long and short term health and decrease risk of complications but with risks related to bleeding and other complications  Eliquis (Apixiban) instructions: You have been prescribed apixiban(Eliquis)  This medication is used to prevent clots which can cause severe disability and even death  This medication will need to be managed by your outpatient physician(s) after discharge  Follow-up with the appropriate provider as soon as possible to ensure appropriate use  This is a strong anticoagulant (blood thinner)  It is being recommended for use by you (or your family) to decrease the risk of clotting which can be severely disabling and even life-threatening    Even when provided as recommended it can cause severe disabling and even life-threatening bleeding  Too much or too little of this blood thinner further increases your risk of this medication causing serious and even life-threatening complications such as severe bleeding, clots, strokes, and death  With that said, at this time based on available evidence and consensus agreement, your physicians recommend you take Eliquis (Catherene Alu) medication based on your overall risks and benefits in your specific medical situation  If you (or your family/caregiver) notice black stools, bloody stools, vomit blood, develop new weakness, slurred speech, confusion or have any other concerning symptoms call 911 or obtain transportation to nearest emergency room immediately  Aspirin instructions  TAKE aspirin daily unless instructed otherwise by a doctor  This medication will need to be managed by your outpatient physician(s) after discharge  Follow-up with the appropriate provider as soon as possible to ensure appropriate use  This is a blood thinner  It is being recommended for use by you (or your family) to decrease the risk of clotting which can be severely disabling and even life-threatening  Even when provided as recommended it can cause severe disabling and even life-threatening bleeding  Too much or too little of this blood thinner further increases your risk of this medication causing serious and even life-threatening complications such as severe bleeding, clots, strokes, and death  With that said, at this time based on best available evidence and consensus agreement, your physicians recommend you take aspirin based on your overall risks and benefits in your specific medical situation  If you (or your family/caregiver) notice black stools, bloody stools, vomit blood, develop new weakness, slurred speech, confusion or have any other concerning symptoms call 911 or obtain transportation to nearest emergency room immediately       Plavix (clopidogrel) instructions  Take Plavix (clopidogrel) daily unless instructed otherwise by a doctor  This medication will need to be managed by your outpatient physician(s) after discharge  Follow-up with the appropriate provider as soon as possible to ensure appropriate use  This is a blood thinner  It is being recommended for use by you (or your family) to decrease the risk of clotting which can be severely disabling and even life-threatening  Even when provided as recommended it can cause severe disabling and even life-threatening bleeding  Too much or too little of this blood thinner further increases your risk of this medication causing serious and even life-threatening complications such as severe bleeding, clots, strokes, and death  With that said, at this time based on best available evidence and consensus agreement, your physicians recommend you take clopidogrel (Plavix) based on your overall risks and benefits in your specific medical situation  If you (or your family/caregiver) notice black stools, bloody stools, vomit blood, develop new weakness, slurred speech, confusion or have any other concerning symptoms call 911 or obtain transportation to nearest emergency room immediately  Psychotropic Medications (Medications intended to improve mental, cognitive, and functional health)  You were evaluated recently and found to have signs and symptoms of adjustment disorder with depressive symptoms  You were managed by both medications and non-pharmacologic (non-medication) methods to try to improve mood  Non-pharmacologic methods included supportive counseling, neuropsychology consultation and management during course, discussion of deep breathing/relaxation/behavioral management techniques, and skilled functional therapies  Psychiatric(psychotropic) medications were adjusted during acute rehabilitation course    To attempt to improve depression you were started on serotonin modulating medication and antidepressant fluoxetine (Prozac) with good  results  Medications were adjusted during your course based on their effectiveness, tolerability, and safety  You tolerated the medication(s) adequately during your course without significant side-effects noted by rehab staff or by yourself (family member)  Prior to beginning of treatment medications risks and benefits and possible side effects including risk of suicidality related to treatment with antidepressants were reviewed with patient  The patient verbalized understanding and agreement for treatment  You have functionally improved significantly and appear to be doing better  Treatment team (rehab physician, internal medicine team, skilled therapists, nursing, and social work) feel you are adequately appropriate for discharge  You nevertheless remain at risk for fall, injury, and additional medical and physical complications in the future  Obtain transportation to nearest hospital from family/friends or call 911 for any concerning new symptoms  Please follow-up with recommended discharge plan to optimize your mental and physical health and decrease risk of mental and physical health complications and hospital re-admission  MEDICAL MANAGEMENT AT HOME specific to you:    Hypertension Management:  Only take the medications prescribed for you at time of discharge - overly high or low blood pressure increases your risk for health complications    Follow-up with PCP/family doctor regularly to ensure blood pressure remains adequately controlled  Please check your blood pressure prior to taking your blood pressure medications and keep a log that you will bring with you to your follow-up doctors' appointments  >Please contact your family doctor or cardiologist immediately for a blood pressure below 100/50 and do not take your blood pressure medications until speaking with them    >Please contact your family doctor or cardiologist as soon as possible for blood pressure greater than 160/100  Anemia management:  Follow-up with primary care physician at next visit  Continue to monitor blood counts intermittently  Follow-up management at discretion of PCP   >If you develop increased lightheadedness, shortness of breath, chest pain, confusion, difficulty staying awake, or other concerning signs or symptoms obtain transport to nearest emergency room as soon as possible  Chronic kidney impairment:  Follow-up with primary care at next visit  Monitor BMP lab within 1 week  Acetaminophen (Tylenol) Dosing Warning: You may have up to 3000 mg of acetaminophen (Tylenol) from all sources spread out over a 24 hours period  Do not have more than that, as this can increase your risk of liver injury which can be serious  Please note a summary of your hospital stay with relevant information for your doctors will try to be sent to them  Please confirm with your doctors at your follow up visits that they have received this summary and have them contact [address] [place] [address] if they have not received them along with any other medical records they may require       Ganesh Velarde Phone Number:  [phone]

## 2023-03-31 NOTE — PROGRESS NOTES
Name: Rivera Feliciano      : 1960      MRN: [id number]  Encounter Provider: Yana Elias DO  Encounter Date: 2023   Encounter department: [address] [place] [address]     1  Cerebrovascular accident (CVA), unspecified mechanism ([facility] [address] )  acute/recent posterior circulation infarctions with increasing right cerebellar and new brainstem infarctions   S/p stent and revascularization of vertebral arteries  Patient on DAPT with Brillinta and ASA as well as eliquis  Has residual right sided weakness which is improving  He is scheduled to see PT/OT and speech therapy  Has f/u with neurosurgery in early may  Needs prior auth for his eliquis  2  Right Vertebral artery dissection (HCC)  S/p stent and revascularization  3  Primary hypertension  bp well controlled on current medications  In fact, bp on low side today  Will continue to monitor in ambulatory setting  Asked them to call me if he should develop and lightheadedness and I can decrease his hydralazine  4  Celiac artery stenosis ([facility] [address] )  Discovered incidentally in workup for his hypertension  He has no symptoms  Suggested referral to vascular and he declines this today  5  Stage 2 chronic kidney disease  -     Comprehensive metabolic panel; Future; Expected date: 2023  -     Comprehensive metabolic panel  Lab Results   Component Value Date    SODIUM 138 2023    K 3 8 2023     2023    CO2 23 2023    AGAP 11 2023    BUN 29 (H) 2023    CREATININE 1 38 (H) 2023    GLUC 105 2023    GLUF 107 (H) 2023    CALCIUM 9 7 2023    AST 16 2023    ALT 38 2023    ALKPHOS 98 2023    TP 7 6 2023    TBILI 0 42 2023    EGFR 54 2023     Has rx to have bmp repeated next week  Avoid NSAIDs and other nephrotoxins  6  Prediabetes  -     Comprehensive metabolic panel;  Future; Expected date: 2023  -     Comprehensive metabolic panel  Lab Results   Component Value Date    HGBA1C 5 7 (H) 2023     Discussed importance of decreasing sweets and simple carbs  7  Adjustment disorder with depressed mood  Doing well on prozac 20 mg once daily  Does not need refill today  PHQ-2/9 Depression Screening    Little interest or pleasure in doing things: 0 - not at all  Feeling down, depressed, or hopeless: 0 - not at all  Trouble falling or staying asleep, or sleeping too much: 0 - not at all  Feeling tired or having little energy: 0 - not at all  Poor appetite or overeatin - not at all  Feeling bad about yourself - or that you are a failure or have let yourself or your family down: 0 - not at all  Trouble concentrating on things, such as reading the newspaper or watching television: 0 - not at all  Moving or speaking so slowly that other people could have noticed  Or the opposite - being so fidgety or restless that you have been moving around a lot more than usual: 0 - not at all  Thoughts that you would be better off dead, or of hurting yourself in some way: 0 - not at all  PHQ-9 Score: 0   PHQ-9 Interpretation: No or Minimal depression          8  Iron deficiency anemia, unspecified iron deficiency anemia type  -     Fecal Globin By Immunochemistry  -     CBC and differential; Future; Expected date: 2023  -     Iron Panel (Includes Ferritin, Iron Sat%, Iron, and TIBC); Future; Expected date: 2023  -     CBC and differential  Lab Results   Component Value Date    WBC 7 62 2023    HGB 11 5 (L) 2023    HCT 36 1 (L) 2023    MCV 87 2023     2023     His iron level was low at 10  Taking iron supplement  Has well balanced diet and nothing on history to suggest malabsorption  Ordered FIT cards today  Will need to see GI for colonoscopy  9  Stenosis of left vertebral artery    10  Neurogenic bowel  Improved  11  Spasticity  On baclofen  Also improving     12  B12 deficiency  - Vitamin B12; Future; Expected date: 05/16/2023  -     Vitamin B12    13  Screening for thyroid disorder  -     TSH, 3rd generation with Free T4 reflex; Future; Expected date: 05/16/2023  -     TSH, 3rd generation with Free T4 reflex           Subjective     HPI     Patient is a 60year old male who is being seen today as a new patient following a prolonged hospitalization  He has never seen a primary care doctor until now  He initially presented to [address]  [facility] ED for complaint of nausea and dizziness on 2/25/23 and was found to have hypertensive urgency with acute/subacute right posterior cerebellar CVA with right cervical vertebral artery dissection  Incidentally was found to be COVID +  He was placed on cardene gtt, stroke pathway, ASA/Plavix and was given IV hydration for his BRAULIO  Not candidate for tPA as sx had begun 2 days prior to his presentation  He was transferred to [facility] due to MRI showing progression of his right vertebral artery dissection  Due to deterioration in his condition on 2/26/23, IR did stenting of V3 and V4 with TICI 2B revascularization and he was intubated and admitted to ICU  MRI showed cerebellar CVA and R > L stroke burden, with additional hypodensities on DWI appreciated L midbrain, pontine area and R lower medullary/spinal cord junction  He was transitioned to triple therapy with DAPT (given recent stent) and A/C with eliquis 2 5mg BID due to COVID  He was then in acute care rehab facility from 3/6/23-3/31/23  Following discharge home from acute care facility, patient had a fall at home on 4/1/23 while getting up at night to use bedside commode  No LOC  He had CT of head to rule out bleed given his anticoagulation and he had xray of right shoulder done which was normal  He states that his shoulder pain has resolved  Has some residual right sided weakness in both upper and lower extremities  Is getting around at home with the assistance of a julieta-walker  Arrived today in wheelchair  He is scheduled to see PT/OT and speech therapy though notes that his speech has returned to baseline and he denies any issues with swallowing  He has neurogenic bowel  Wife states that he has been using metamucil and patient states that he is moving bowels okay  His last BM was today  He denies any easy bleeding while shaving and has not had any nosebleeds  No dizziness or headaches  Asking about prior authorization for his eliquis and was advised that this would typically be done by prescriber of the medication as they know how long he plans to be continued on this medication/etc      He was found to be anemic with low iron level and low vitamin B12  Is taking both iron and b12 supplements  He denies any dark stool or blood in stool  Eats red meat and has no history of diarrhea or malabsorptive issues  He has never had colon cancer screening done FIT cards given today  Would like him to see GI for colonoscopy  Not ready today and we can revisit this issue at his next visit in 6 weeks       Patient Active Problem List   Diagnosis   • BRAULIO (acute kidney injury) [facility])   • Acute Posterior Circulation Stroke   • Right Vertebral artery dissection (HCC)   • Stenosis of left vertebral artery   • Primary hypertension   • Spasticity   • Stage 2 chronic kidney disease   • Prediabetes   • Adjustment disorder with depressed mood   • Iron deficiency anemia   • Neurogenic bowel   • Celiac artery stenosis (HCC)         Review of Systems    Past Medical History:   Diagnosis Date   • BRAULIO (acute kidney injury) ([facility] [address] )    • B12 deficiency    • Celiac artery stenosis (HCC)    • Cerebellar infarct ([facility] [address] ) 02/25/2023   • CKD (chronic kidney disease) stage 2, GFR 60-89 ml/min    • Essential hypertension    • Impaired fasting glucose    • Iron deficiency anemia    • Neurogenic bowel    • Stenosis of left vertebral artery    • Vertebral artery dissection [facility])      Past Surgical History:   Procedure Laterality Date   • ARTERY SURGERY      vertebral artery stent/revascularization     Family History   Problem Relation Age of Onset   • Hypertension Brother    • Hypertension Brother      Social History     Socioeconomic History   • Marital status: /Civil Union     Spouse name: None   • Number of children: None   • Years of education: None   • Highest education level: None   Occupational History   • None   Tobacco Use   • Smoking status: Former     Packs/day: 1 00     Years: 5 00     Pack years: 5 00     Types: Cigarettes     Quit date: 1983     Years since quittin 0   • Smokeless tobacco: Never   Vaping Use   • Vaping Use: Never used   Substance and Sexual Activity   • Alcohol use: Not Currently   • Drug use: Never   • Sexual activity: Not Currently     Partners: Female   Other Topics Concern   • None   Social History Narrative        2 children    Works as an  (self employed)     Social Determinants of Health     Financial Resource Strain: Not on file   Food Insecurity: Not on file   Transportation Needs: Not on file   Physical Activity: Not on file   Stress: Not on file   Social Connections: Not on file   Intimate Partner Violence: Not on file   Housing Stability: Not on file     Current Outpatient Medications on File Prior to Visit   Medication Sig   • amLODIPine (NORVASC) 10 mg tablet Take 1 tablet (10 mg total) by mouth daily   • apixaban (ELIQUIS) 2 5 mg Take 1 tablet (2 5 mg total) by mouth 2 (two) times a day   • aspirin 81 mg chewable tablet Chew 1 tablet (81 mg total) daily   • atorvastatin (LIPITOR) 40 mg tablet Take 1 tablet (40 mg total) by mouth daily with dinner   • baclofen 5 MG TABS Take 5 mg by mouth 3 (three) times a day   • carvedilol (COREG) 25 mg tablet Take 1 tablet (25 mg total) by mouth 2 (two) times a day with meals   • cyanocobalamin (VITAMIN B-12) 1000 MCG tablet Take 1 tablet (1,000 mcg total) by mouth daily   • ferrous sulfate 325 (65 Fe) mg tablet Take 1 tablet (325 mg total) by mouth daily with breakfast Do not start before April 1, 2023  • FLUoxetine (PROzac) 20 mg capsule Take 1 capsule (20 mg total) by mouth daily   • hydrALAZINE (APRESOLINE) 50 mg tablet Take 1 tablet (50 mg total) by mouth every 12 (twelve) hours   • psyllium (METAMUCIL) packet Take 1 packet by mouth every other day   • ticagrelor (BRILINTA) 90 MG Take 1 tablet (90 mg total) by mouth every 12 (twelve) hours     No Known Allergies  Immunization History   Administered Date(s) Administered   • COVID-19 MODERNA VACC 0 5 ML IM 01/21/2021, 03/01/2021, 11/29/2021       Objective     /62   Pulse 64   Temp 98 4 °F (36 9 °C) (Tympanic)   SpO2 99%     Physical Exam  Vitals and nursing note reviewed  Constitutional:       Appearance: Normal appearance  He is not ill-appearing, toxic-appearing or diaphoretic  Comments: Ambulates with julieta-walker   HENT:      Head: Normocephalic and atraumatic  Right Ear: Tympanic membrane normal       Left Ear: Tympanic membrane normal       Nose: Nose normal       Mouth/Throat:      Mouth: Mucous membranes are moist       Pharynx: No oropharyngeal exudate or posterior oropharyngeal erythema  Eyes:      Extraocular Movements: Extraocular movements intact  Conjunctiva/sclera: Conjunctivae normal       Pupils: Pupils are equal, round, and reactive to light  Neck:      Vascular: No carotid bruit  Cardiovascular:      Rate and Rhythm: Normal rate and regular rhythm  Heart sounds: No murmur heard  Pulmonary:      Effort: Pulmonary effort is normal       Breath sounds: Normal breath sounds  No wheezing, rhonchi or rales  Abdominal:      General: Abdomen is flat  Bowel sounds are normal  There is no distension  Palpations: Abdomen is soft  There is no mass  Tenderness: There is no abdominal tenderness  Musculoskeletal:      Cervical back: Normal range of motion and neck supple  Right lower leg: No edema        Left lower leg: No edema  Lymphadenopathy:      Cervical: No cervical adenopathy  Skin:     General: Skin is warm and dry  Findings: No rash  Neurological:      Mental Status: He is alert and oriented to person, place, and time  Motor: Weakness (right upper and lower extremity weakness) present     Psychiatric:         Mood and Affect: Mood normal        Cherry Bustamante DO

## 2023-03-31 NOTE — PLAN OF CARE
Problem: Prexisting or High Potential for Compromised Skin Integrity  Goal: Skin integrity is maintained or improved  Description: INTERVENTIONS:  - Identify patients at risk for skin breakdown  - Assess and monitor skin integrity  - Assess and monitor nutrition and hydration status  - Monitor labs   - Assess for incontinence   - Turn and reposition patient  - Assist with mobility/ambulation  - Relieve pressure over bony prominences  - Avoid friction and shearing  - Provide appropriate hygiene as needed including keeping skin clean and dry  - Evaluate need for skin moisturizer/barrier cream  - Collaborate with interdisciplinary team   - Patient/family teaching  - Consider wound care consult   Outcome: Progressing     Problem: MOBILITY - ADULT  Goal: Maintain or return to baseline ADL function  Description: INTERVENTIONS:  -  Assess patient's ability to carry out ADLs; assess patient's baseline for ADL function and identify physical deficits which impact ability to perform ADLs (bathing, care of mouth/teeth, toileting, grooming, dressing, etc )  - Assess/evaluate cause of self-care deficits   - Assess range of motion  - Assess patient's mobility; develop plan if impaired  - Assess patient's need for assistive devices and provide as appropriate  - Encourage maximum independence but intervene and supervise when necessary  - Involve family in performance of ADLs  - Assess for home care needs following discharge   - Consider OT consult to assist with ADL evaluation and planning for discharge  - Provide patient education as appropriate  Outcome: Progressing  Goal: Maintains/Returns to pre admission functional level  Description: INTERVENTIONS:  - Perform BMAT or MOVE assessment daily    - Set and communicate daily mobility goal to care team and patient/family/caregiver  - Collaborate with rehabilitation services on mobility goals if consulted  - Perform Range of Motion  times a day    - Reposition patient every hours   - Dangle patient  times a day  - Stand patient  times a day  - Ambulate patient  times a day  - Out of bed to chair  times a day   - Out of bed for meals  times a day  - Out of bed for toileting  - Record patient progress and toleration of activity level   Outcome: Progressing     Problem: PAIN - ADULT  Goal: Verbalizes/displays adequate comfort level or baseline comfort level  Description: Interventions:  - Encourage patient to monitor pain and request assistance  - Assess pain using appropriate pain scale  - Administer analgesics based on type and severity of pain and evaluate response  - Implement non-pharmacological measures as appropriate and evaluate response  - Consider cultural and social influences on pain and pain management  - Notify physician/advanced practitioner if interventions unsuccessful or patient reports new pain  Outcome: Progressing     Problem: INFECTION - ADULT  Goal: Absence or prevention of progression during hospitalization  Description: INTERVENTIONS:  - Assess and monitor for signs and symptoms of infection  - Monitor lab/diagnostic results  - Monitor all insertion sites, i e  indwelling lines, tubes, and drains  - Monitor endotracheal if appropriate and nasal secretions for changes in amount and color  - [place] appropriate cooling/warming therapies per order  - Administer medications as ordered  - Instruct and encourage patient and family to use good hand hygiene technique  - Identify and instruct in appropriate isolation precautions for identified infection/condition  Outcome: Progressing  Goal: Absence of fever/infection during neutropenic period  Description: INTERVENTIONS:  - Monitor WBC    Outcome: Progressing     Problem: SAFETY ADULT  Goal: Patient will remain free of falls  Description: INTERVENTIONS:  - Educate patient/family on patient safety including physical limitations  - Instruct patient to call for assistance with activity   - Consult OT/PT to assist with strengthening/mobility   - Keep Call bell within reach  - Keep bed low and locked with side rails adjusted as appropriate  - Keep care items and personal belongings within reach  - Initiate and maintain comfort rounds  - Make Fall Risk Sign visible to staff  - Offer Toileting every  Hours, in advance of need  - Initiate/Maintain alarm  - Obtain necessary fall risk management equipment:   - Apply yellow socks and bracelet for high fall risk patients  - Consider moving patient to room near nurses station  Outcome: Progressing  Goal: Maintain or return to baseline ADL function  Description: INTERVENTIONS:  -  Assess patient's ability to carry out ADLs; assess patient's baseline for ADL function and identify physical deficits which impact ability to perform ADLs (bathing, care of mouth/teeth, toileting, grooming, dressing, etc )  - Assess/evaluate cause of self-care deficits   - Assess range of motion  - Assess patient's mobility; develop plan if impaired  - Assess patient's need for assistive devices and provide as appropriate  - Encourage maximum independence but intervene and supervise when necessary  - Involve family in performance of ADLs  - Assess for home care needs following discharge   - Consider OT consult to assist with ADL evaluation and planning for discharge  - Provide patient education as appropriate  Outcome: Progressing  Goal: Maintains/Returns to pre admission functional level  Description: INTERVENTIONS:  - Perform BMAT or MOVE assessment daily    - Set and communicate daily mobility goal to care team and patient/family/caregiver  - Collaborate with rehabilitation services on mobility goals if consulted  - Perform Range of Motion  times a day  - Reposition patient every  hours    - Dangle patient  times a day  - Stand patient  times a day  - Ambulate patient  times a day  - Out of bed to chair  times a day   - Out of bed for meals times a day  - Out of bed for toileting  - Record patient progress and toleration of activity level   Outcome: Progressing     Problem: DISCHARGE PLANNING  Goal: Discharge to home or other facility with appropriate resources  Description: INTERVENTIONS:  - Identify barriers to discharge w/patient and caregiver  - Arrange for needed discharge resources and transportation as appropriate  - Identify discharge learning needs (meds, wound care, etc )  - Arrange for interpretive services to assist at discharge as needed  - Refer to Case Management Department for coordinating discharge planning if the patient needs post-hospital services based on physician/advanced practitioner order or complex needs related to functional status, cognitive ability, or social support system  Outcome: Progressing     Problem: Nutrition/Hydration-ADULT  Goal: Nutrient/Hydration intake appropriate for improving, restoring or maintaining nutritional needs  Description: Monitor and assess patient's nutrition/hydration status for malnutrition  Collaborate with interdisciplinary team and initiate plan and interventions as ordered  Monitor patient's weight and dietary intake as ordered or per policy  Utilize nutrition screening tool and intervene as necessary  Determine patient's food preferences and provide high-protein, high-caloric foods as appropriate       INTERVENTIONS:  - Monitor oral intake, urinary output, labs, and treatment plans  - Assess nutrition and hydration status and recommend course of action  - Evaluate amount of meals eaten  - Assist patient with eating if necessary   - Allow adequate time for meals  - Recommend/ encourage appropriate diets, oral nutritional supplements, and vitamin/mineral supplements  - Order, calculate, and assess calorie counts as needed  - Recommend, monitor, and adjust tube feedings and TPN/PPN based on assessed needs  - Assess need for intravenous fluids  - Provide specific nutrition/hydration education as appropriate  - Include patient/family/caregiver in decisions related to nutrition  Outcome: Progressing

## 2023-03-31 NOTE — PROGRESS NOTES
Internal Medicine Progress Note  Patient: Leesa Yousif  Age/sex: 60 y o  male  Medical Record #: [id number]       ASSESSMENT/PLAN: (Interval History)  Leesa Yousif is seen and examined and management for following issues:    Posterior circulation stroke  • S/p intracranial and extracranial vertebral artery stenting/TICI 2B revascularization  • Continue ASA, Brilinta, Eliquis and atorvastatin  • Prozac for depressed mood following CVA   • Neuropsych following  • Continue Baclofen 5mg TID  • Therapy per primary service  • CTA head and neck done 3/17/23 and was concern for in stent occlusion in the Rt vertebral artery V4 stent  • NS saw pt 3/18/23 and 3/22/23 and finding likely does not represent new occlusion as lumen within stents is difficult to image on CTA  Pt will need a repeat CTA and NS follow-up in 6 weeks  • Outpt follow-up with Neurology and NS     Dysphagia  • improved  • ST following     HTN  • Home: No medications  • Here: Amlodipine 10mg daily/Coreg 25mg BID/Hydralazine 50mg BID (lisinopril on hold 2/2 to BRAULIO 3/27)  • Renal artery ultrasound negative     Fe deficiency anemia  • Likely multifactoral  • s/p IV Venofer x 2 doses  • Cont daily ferrous sulfate  • Cbc stable     CKD stage 2  • Baseline Cr 1 2 - 1 3  • Chlorthalidone d/c'd  • Cr back to baseline 1 24  • Lisinopril remains on hold   • He definitely does not drink enough fluids throughout the day     COVID  • resolved     Prediabetes  • HA1C 5 7  • Recommend dietary modifications        DC planning:  3/31/23       The above assessment and plan was reviewed and updated as determined by my evaluation of the patient on 3/31/2023      Labs:   Results from last 7 days   Lab Units 03/27/23  0517   WBC Thousand/uL 7 06   HEMOGLOBIN g/dL 10 2*   HEMATOCRIT % 31 1*   PLATELETS Thousands/uL 175     Results from last 7 days   Lab Units 03/31/23  0534 03/29/23  0646   SODIUM mmol/L 138 141   POTASSIUM mmol/L 4 0 3 9   CHLORIDE mmol/L 109* 112*   CO2 mmol/L 26 24   BUN mg/dL 25 31*   CREATININE mg/dL 1 24 1 31*   CALCIUM mg/dL 9 1 8 6                   Review of Scheduled Meds:  Current Facility-Administered Medications   Medication Dose Route Frequency Provider Last Rate   • acetaminophen  650 mg Oral Q6H PRN YURI Todd     • amLODIPine  10 mg Oral Daily Neill Dakin, MD     • apixaban  2 5 mg Oral BID Neill Dakin, MD     • aspirin  81 mg Oral Daily Neill Dakin, MD     • atorvastatin  40 mg Oral Daily With Sachaalexsander Cedillo MD     • baclofen  5 mg Oral TID Neill Dakin, MD     • carvedilol  25 mg Oral BID With Meals YURI Todd     • vitamin B-12  1,000 mcg Oral Daily Neill Dakin, MD     • ferrous sulfate  325 mg Oral Daily With Breakfast YURI Todd     • FLUoxetine  20 mg Oral Daily Neill Dakin, MD     • hydrALAZINE  25 mg Oral Q8H PRN YURI Todd     • hydrALAZINE  50 mg Oral Q12H Maria G Hayes PA-C     • polyethylene glycol  17 g Oral Daily PRN Neill Dakin, MD     • psyllium  1 packet Oral Once per day on Mon Wed Fri Neill Dakin, MD     • ticagrelor  90 mg Oral Q12H [facility] Neill Dakin, MD         Subjective/ HPI: Patient seen and examined  Patients overnight issues or events were reviewed with nursing or staff during rounds or morning huddle session  New or overnight issues include the following:     Pt seen in his room  He states that he is doing well  He denies any current complaints  ROS:   A 10 point ROS was performed; negative except as noted above       *Labs /Radiology studies reviewed  *Medications reviewed and reconciled as needed  *Please refer to order section for additional ordered labs studies  *Case discussed with primary attending during morning huddle case rounds    Physical Examination:  Vitals:   Vitals:    03/30/23 1033 03/30/23 1500 03/30/23 2003 03/31/23 0529   BP: 157/69 140/70 (!) 119/48 155/69   BP Location:  Right arm Left arm Left arm   Pulse: 56 60 63 64   Resp:  18 20 16   Temp:  98 2 °F (36 8 °C) 97 8 °F (36 6 °C) 98 5 °F (36 9 °C)   TempSrc:  Oral Oral Oral   SpO2:  97% 97% 97%   Weight:       Height:           GEN: No apparent distress, interactive  NEURO: Alert and oriented x3  Speech greatly improved  HEENT: Pupils are equal and reactive, EOMI, mucous membranes are moist, face symmetrical  CV: S1 S2 regular, no MRG, no peripheral edema noted  RESP: Lungs are CTA, no wheezes, rales or rhonchi noted, on room air, respirations easy and non labored  GI: Flat, soft non tender, non distended; +BS x4  : Voiding without difficulty  MUSC: Moves all extremities; except right hemiparesis with overall improvement noted  SKIN: pink, warm and dry, normal turgor, no rashes, lesions        The above physical exam was reviewed and updated as determined by my evaluation of the patient on 3/31/2023  Invasive Devices     None                    VTE Pharmacologic Prophylaxis: Eliquis  Code Status: Level 1 - Full Code  Current Length of Stay: 25 day(s)      Total time spent:  30 minutes  with more than 50% spent counseling/coordinating care  Counseling includes discussion with patient re: progress  and discussion with patient of his/her current medical state/information  Coordination of patient's care was performed in conjunction with primary service  Time invested included review of patient's labs, vitals, and management of their comorbidities with continued monitoring  In addition, this patient was discussed with medical team including physician and advanced extenders  The care of the patient was extensively discussed and appropriate treatment plan was formulated unique for this patient  Medical decision making for the day was made by supervising physician unless otherwise noted in their attestation statement  ** Please Note:  voice to text software may have been used in the creation of this document   Although proof errors in transcription or interpretation are a potential of such software**

## 2023-03-31 NOTE — PLAN OF CARE
Problem: INFECTION - ADULT  Goal: Absence or prevention of progression during hospitalization  Description: INTERVENTIONS:  - Assess and monitor for signs and symptoms of infection  - Monitor lab/diagnostic results  - Monitor all insertion sites, i e  indwelling lines, tubes, and drains  - Monitor endotracheal if appropriate and nasal secretions for changes in amount and color  - [place] appropriate cooling/warming therapies per order  - Administer medications as ordered  - Instruct and encourage patient and family to use good hand hygiene technique  - Identify and instruct in appropriate isolation precautions for identified infection/condition  Outcome: Adequate for Discharge

## 2023-03-31 NOTE — TELEPHONE ENCOUNTER
1ST ATTEMPT,    Called pt to offer an appt With Dr Depadua for his HFU no answer, LMOM      Please offer :    Echo PAYTON Villatoro  5/24 at 230?                 Thank you,     Cielo Guzmán

## 2023-03-31 NOTE — PROGRESS NOTES
Occupational Therapy Treatment Note         03/31/23 0850   Pain Assessment   Pain Assessment Tool 0-10   Pain Score No Pain   Restrictions/Precautions   Precautions Bed/chair alarms;Cognitive; Fall Risk;Supervision on toilet/commode   Eating   Type of Assistance Needed Independent   Physical Assistance Level No physical assistance   Eating CARE Score 6   Oral Hygiene   Type of Assistance Needed Supervision - in stance at sink    Physical Assistance Level No physical assistance   Oral Hygiene CARE Score 4   Shower/Bathe Self   Type of Assistance Needed Supervision; Adaptive equipment   Physical Assistance Level No physical assistance   Comment seated on tub bench for bathing   Shower/Bathe Self CARE Score 4   Tub/Shower Transfer   Findings pt demonstrates ability to complete sit and swivel transfer using tub bench with close supervision   Upper Body Dressing   Type of Assistance Needed Set-up / clean-up   Physical Assistance Level No physical assistance   Comment seated   Upper Body Dressing CARE Score 5   Lower Body Dressing   Type of Assistance Needed Supervision   Physical Assistance Level No physical assistance   Comment seated to thread LEs, in stance to manage clothing over hips   Hemiwalker on R to provide R UE support as needed during clothing management   Lower Body Dressing CARE Score 4   Putting On/Taking Off Footwear   Type of Assistance Needed Physical assistance   Physical Assistance Level 25% or less   Comment set up assist for footwear except assist with R shoe   Putting On/Taking Off Footwear CARE Score 3   Roll Left and Right   Type of Assistance Needed Independent   Physical Assistance Level No physical assistance   Roll Left and Right CARE Score 6   Sit to Lying   Type of Assistance Needed Independent   Physical Assistance Level No physical assistance   Sit to Lying CARE Score 6   Lying to Sitting on Side of Bed   Type of Assistance Needed Independent   Physical Assistance Level No physical assistance   Lying to Sitting on Side of Bed CARE Score 6   Sit to Stand   Type of Assistance Needed Supervision; Adaptive equipment   Physical Assistance Level No physical assistance   Comment hemiwalker   Sit to Stand CARE Score 4   Bed-Chair Transfer   Type of Assistance Needed Supervision; Adaptive equipment   Physical Assistance Level No physical assistance   Comment hemiwalker   Chair/Bed-to-Chair Transfer CARE Score 4   Toileting Hygiene   Type of Assistance Needed Supervision; Adaptive equipment   Physical Assistance Level No physical assistance   Comment in stance for clothing management and hygiene  use of hemiwalker on R side to assist with R UE as needed   Toileting Hygiene CARE Score 4   Toilet Transfer   Type of Assistance Needed Supervision; Adaptive equipment   Physical Assistance Level No physical assistance   Comment julieta to/from [facility] over toilet   Toilet Transfer CARE Score 4   Cognition   Overall Cognitive Status Impaired   Arousal/Participation Alert; Cooperative   Attention Within functional limits   Orientation Level Oriented to person;Oriented to place;Oriented to time;Oriented to situation   Memory Decreased short term memory   Following Commands Follows multistep commands with increased time or repetition   Activity Tolerance   Activity Tolerance Patient tolerated treatment well   Assessment   Treatment Assessment Pt participated in skilled OT tx session  See above for further details on functional performance  Pt d/c home today with family support, assist/supervision with all mobility/ADL routine and continued outpatient neuro OT  Pt's wife has been present for multiple family training sessions  Pt ordered for [facility] and w/c, and family to independently purchase gait belt and hemiwalker  Pt has made great progress during [facility] stay   Pt initially total assist / assist x2 for ADL routine and sliding board transfers at w/c level, and in 25 days progressed to supervision for ADLs and functional mobility/transfers using julieta walker  Pt will benefit from continued outpatient OT to focus on R UE neuromuscular re-education, as well as higher level cognitive retraining with long term goals to return to IADLs and driving if able  Pt provided with R UE HEP  Pt provided with 2 urinals for use at home at night   Barriers to Discharge None   Recommendation   OT Discharge Recommendation Home with outpatient rehabilitation   OT Therapy Minutes   OT Time In 0850   OT Time Out 1000   OT Total Time (minutes) 70   OT Mode of treatment - Individual (minutes) 70   OT Mode of treatment - Concurrent (minutes) 0   OT Mode of treatment - Group (minutes) 0   OT Mode of treatment - Co-treat (minutes) 0   OT Mode of Treatment - Total time(minutes) 70 minutes   OT Cumulative Minutes 2385   Therapy Time missed   Time missed?  No

## 2023-04-01 ENCOUNTER — TELEPHONE (OUTPATIENT)
Dept: OTHER | Facility: HOSPITAL | Age: 63
End: 2023-04-01

## 2023-04-01 ENCOUNTER — HOSPITAL ENCOUNTER (EMERGENCY)
Facility: HOSPITAL | Age: 63
Discharge: HOME | End: 2023-04-01
Attending: EMERGENCY MEDICINE

## 2023-04-01 ENCOUNTER — APPOINTMENT (EMERGENCY)
Dept: RADIOLOGY | Facility: HOSPITAL | Age: 63
End: 2023-04-01

## 2023-04-01 ENCOUNTER — APPOINTMENT (EMERGENCY)
Dept: CT IMAGING | Facility: HOSPITAL | Age: 63
End: 2023-04-01

## 2023-04-01 VITALS
HEART RATE: 56 BPM | WEIGHT: 173.94 LBS | OXYGEN SATURATION: 97 % | SYSTOLIC BLOOD PRESSURE: 154 MMHG | RESPIRATION RATE: 15 BRPM | DIASTOLIC BLOOD PRESSURE: 62 MMHG | HEIGHT: 69 IN | BODY MASS INDEX: 25.76 KG/M2 | TEMPERATURE: 98.7 F

## 2023-04-01 DIAGNOSIS — N18.9 CKD (CHRONIC KIDNEY DISEASE): ICD-10-CM
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
## 2023-04-01 DIAGNOSIS — S49.91XA INJURY OF RIGHT SHOULDER, INITIAL ENCOUNTER: ICD-10-CM

## 2023-04-01 LAB
ABO GROUP BLD: NORMAL
ALBUMIN SERPL BCP-MCNC: 4.1 G/DL (ref 3.5–5)
ALP SERPL-CCNC: 98 U/L (ref 34–104)
ALT SERPL W P-5'-P-CCNC: 38 U/L (ref 7–52)
ANION GAP SERPL CALCULATED.3IONS-SCNC: 11 MMOL/L (ref 4–13)
APTT PPP: 33 SECONDS (ref 23–37)
AST SERPL W P-5'-P-CCNC: 16 U/L (ref 13–39)
BASOPHILS # BLD AUTO: 0.06 THOUSANDS/ÂΜL (ref 0–0.1)
BASOPHILS NFR BLD AUTO: 1 % (ref 0–1)
BILIRUB SERPL-MCNC: 0.42 MG/DL (ref 0.2–1)
BLD GP AB SCN SERPL QL: NEGATIVE
BUN SERPL-MCNC: 29 MG/DL (ref 5–25)
CALCIUM SERPL-MCNC: 9.7 MG/DL (ref 8.4–10.2)
CHLORIDE SERPL-SCNC: 104 MMOL/L (ref 96–108)
CO2 SERPL-SCNC: 23 MMOL/L (ref 21–32)
CREAT SERPL-MCNC: 1.38 MG/DL (ref 0.6–1.3)
EOSINOPHIL # BLD AUTO: 0.4 THOUSAND/ÂΜL (ref 0–0.61)
EOSINOPHIL NFR BLD AUTO: 5 % (ref 0–6)
ERYTHROCYTE [DISTWIDTH] IN BLOOD BY AUTOMATED COUNT: 12.8 % (ref 11.6–15.1)
GFR SERPL CREATININE-BSD FRML MDRD: 54 ML/MIN/1.73SQ M
GLUCOSE SERPL-MCNC: 105 MG/DL (ref 65–140)
HCT VFR BLD AUTO: 36.1 % (ref 36.5–49.3)
HGB BLD-MCNC: 11.5 G/DL (ref 12–17)
IMM GRANULOCYTES # BLD AUTO: 0.05 THOUSAND/UL (ref 0–0.2)
IMM GRANULOCYTES NFR BLD AUTO: 1 % (ref 0–2)
INR PPP: 1.05 (ref 0.84–1.19)
LYMPHOCYTES # BLD AUTO: 1 THOUSANDS/ÂΜL (ref 0.6–4.47)
LYMPHOCYTES NFR BLD AUTO: 13 % (ref 14–44)
MCH RBC QN AUTO: 27.8 PG (ref 26.8–34.3)
MCHC RBC AUTO-ENTMCNC: 31.9 G/DL (ref 31.4–37.4)
MCV RBC AUTO: 87 FL (ref 82–98)
MONOCYTES # BLD AUTO: 0.53 THOUSAND/ÂΜL (ref 0.17–1.22)
MONOCYTES NFR BLD AUTO: 7 % (ref 4–12)
NEUTROPHILS # BLD AUTO: 5.58 THOUSANDS/ÂΜL (ref 1.85–7.62)
NEUTS SEG NFR BLD AUTO: 73 % (ref 43–75)
NRBC BLD AUTO-RTO: 0 /100 WBCS
PLATELET # BLD AUTO: 270 THOUSANDS/UL (ref 149–390)
PMV BLD AUTO: 9.6 FL (ref 8.9–12.7)
POTASSIUM SERPL-SCNC: 3.8 MMOL/L (ref 3.5–5.3)
PROT SERPL-MCNC: 7.6 G/DL (ref 6.4–8.4)
PROTHROMBIN TIME: 14.4 SECONDS (ref 11.6–14.5)
RBC # BLD AUTO: 4.14 MILLION/UL (ref 3.88–5.62)
RH BLD: POSITIVE
SODIUM SERPL-SCNC: 138 MMOL/L (ref 135–147)
SPECIMEN EXPIRATION DATE: NORMAL
WBC # BLD AUTO: 7.62 THOUSAND/UL (ref 4.31–10.16)

## 2023-04-01 NOTE — TELEPHONE ENCOUNTER
Received call from Rebeca Qiu that Eliquis was not ready when they went to pick it up from pharmacy and required [address]  He became impulsive overnight and had fall when transferring from commode overnight  He notes some R shoulder pain but was brought to hospital out of caution  CTH without acute bleed; XR shoulder final read pending; CXR unremarkable; Patient was released home  He did receive Eliquis last evening at ED  I called pharm and they stated still needed prior auth; I was able to get coupon for 1 month free Eliquis thru [facility] and wife will ; Recommended follow-up with PCP/neuro to ensure next months Eliquis auth'd if not obtained in near future

## 2023-04-01 NOTE — ED PROVIDER NOTES
Emergency Department Trauma Note  Bartolo Perez 60 y o  male MRN: [id number]  Unit/Bed#: ED 05/ED 05 Encounter: [id number]      Trauma Alert:    Model of Arrival:   via    Trauma Team: Current Providers  Attending Provider: Douglas Galvez MD  Registered Nurse: Mike Henry, MOISÉS  Consultants:     None      History of Present Illness     Chief Complaint:   Chief Complaint   Patient presents with   • Fall     Pt states that he fell on his right side of body  Pt denies LOC  Pt is taking multiple thinners  HPI:  Bartolo Perez is a 60 y o  male who presents with fall  Mechanism:Details of Incident: fall Injury Date: 04/01/23 Injury Time: 0130 Injury Occurence Location - [address] [place] [address] Way: fell while transfering from bed to bedside commode  59 yo M presents to ED via private vehicle for a mechanical fall at home  Just [facility] yesterday, had a prolonged > 1 month hospitalization following an ischemic cerebellar CVA (admitted 2/25 at [address] - transferred to [facility]  Acute/subcaute R posterior cerebellar CVA with 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, mild BRAULIO, and incidentally found + COVID  Not tPA/TNK candidate  MRI/MRA with 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  Later on 2/26, he clinically deteriorated with increased R sided weakness, and was taken to IR for stenting of V3 and V4 with TICI 2B revascularization  CTH without ICH  MRI showed cerebellar CVA and R > L stroke burden, with additional hypodensities on DWI appreciated L midbrain, pontine area and R lower medullary/spinal cord junction  He was transitioned to triple therapy with DAPT (given recent stent) and A/C with eliquis 2 5mg BID due to COVID  D/C home last night, lives with wife  Woke up to use commode bedside, and fumbled with the commode and fell down  Not sure if he hit his head or not, no LOC or confusion  Was dark when he fell so he couldn't really see  Came straight here   Able to ambulate  Has some mild R posterior shoulder pain where he hit it on something, but denies any other complaints  No new numbness/tingling/weakness  Has residual weakness/numbness R side from CVA  No HA or neck pain  No N/V or visual changes  No CP/SOB or palpitations  No hip/leg pain  No wounds  Wife is here at bedside as well        History provided by:  Patient, medical records and spouse   used: No    Fall  Mechanism of injury: fall    Injury location:  Shoulder/arm  Shoulder/arm injury location:  R shoulder  Incident location:  Home  Arrived directly from scene: yes    Fall:     Fall occurred:  Tripped    Impact surface:  Unable to specify    Point of impact:  Unable to specify    Entrapped after fall: no    Protective equipment: none    Suspicion of alcohol use: no    Suspicion of drug use: no    Tetanus status:  Up to date  Prior to arrival data:     Bystander interventions:  None    Patient ambulatory at scene: yes      Blood loss:  None    Responsiveness at scene:  Alert    Orientation at scene:  Person, place, situation and time    Loss of consciousness: no      Amnesic to event: no      Airway interventions:  None    Breathing interventions:  None    IV access status:  None    IO access:  None    Fluids administered:  None    Cardiac interventions:  None    Medications administered:  None    Immobilization:  None    Airway condition since incident:  Stable    Breathing condition since incident:  Stable    Circulation condition since incident:  Stable    Mental status condition since incident:  Stable    Disability condition since incident:  Stable  Associated symptoms: no abdominal pain, no back pain, no blindness, no chest pain, no difficulty breathing, no headaches, no hearing loss, no loss of consciousness, no nausea, no neck pain, no seizures and no vomiting    Risk factors: anticoagulation therapy      Review of Systems   Constitutional: Negative for chills, diaphoresis, fatigue, fever and unexpected weight change  HENT: Negative for congestion, ear pain, hearing loss, rhinorrhea, sore throat, trouble swallowing and voice change  Eyes: Negative for blindness, pain and visual disturbance  Respiratory: Negative for cough, chest tightness and shortness of breath  Cardiovascular: Negative for chest pain, palpitations and leg swelling  Gastrointestinal: Negative for abdominal pain, blood in stool, constipation, diarrhea, nausea and vomiting  Genitourinary: Negative for difficulty urinating, dysuria, flank pain, frequency and hematuria  Musculoskeletal: Negative for arthralgias, back pain and neck pain  Skin: Negative for rash  Neurological: Positive for weakness (at baseline) and numbness (at baseline)  Negative for dizziness, tremors, seizures, loss of consciousness, syncope, facial asymmetry, speech difficulty, light-headedness and headaches  Psychiatric/Behavioral: Negative for confusion and suicidal ideas  The patient is not nervous/anxious  Historical Information     Immunizations: There is no immunization history on file for this patient  Past Medical History:   Diagnosis Date   • Brain disorder     2 stents placed   • Stroke [facility])     right side weakness     History reviewed  No pertinent family history  History reviewed  No pertinent surgical history  Social History     Tobacco Use   • Smoking status: Never   • Smokeless tobacco: Never   Vaping Use   • Vaping Use: Never used   Substance Use Topics   • Alcohol use: Not Currently   • Drug use: Not Currently     E-Cigarette/Vaping   • E-Cigarette Use Never User      E-Cigarette/Vaping Substances       Family History: non-contributory    Meds/Allergies   Prior to Admission Medications   Prescriptions Last Dose Informant Patient Reported? Taking?    FLUoxetine (PROzac) 20 mg capsule   No No   Sig: Take 1 capsule (20 mg total) by mouth daily   amLODIPine (NORVASC) 10 mg tablet   No No   Sig: Take 1 tablet (10 mg total) by mouth daily   apixaban (ELIQUIS) 2 5 mg   No No   Sig: Take 1 tablet (2 5 mg total) by mouth 2 (two) times a day   aspirin 81 mg chewable tablet   No No   Sig: Chew 1 tablet (81 mg total) daily   atorvastatin (LIPITOR) 40 mg tablet   No No   Sig: Take 1 tablet (40 mg total) by mouth daily with dinner   baclofen 5 MG TABS   No No   Sig: Take 5 mg by mouth 3 (three) times a day   carvedilol (COREG) 25 mg tablet   No No   Sig: Take 1 tablet (25 mg total) by mouth 2 (two) times a day with meals   cyanocobalamin (VITAMIN B-12) 1000 MCG tablet   No No   Sig: Take 1 tablet (1,000 mcg total) by mouth daily   ferrous sulfate 325 (65 Fe) mg tablet   No No   Sig: Take 1 tablet (325 mg total) by mouth daily with breakfast Do not start before April 1, 2023    hydrALAZINE (APRESOLINE) 50 mg tablet   No No   Sig: Take 1 tablet (50 mg total) by mouth every 12 (twelve) hours   psyllium (METAMUCIL) packet   No No   Sig: Take 1 packet by mouth every other day   ticagrelor (BRILINTA) 90 MG   No No   Sig: Take 1 tablet (90 mg total) by mouth every 12 (twelve) hours      Facility-Administered Medications: None       No Known Allergies    PHYSICAL EXAM    PE limited by: nothing    Objective   Vitals:   First set: Temperature: 98 7 °F (37 1 °C) (04/01/23 0216)  Pulse: 60 (04/01/23 0216)  Respirations: 18 (04/01/23 0216)  Blood Pressure: (!) 187/81 (04/01/23 0216)  SpO2: 98 % (04/01/23 0216)    Primary Survey:   (A) Airway: intact  (B) Breathing: equal  (C) Circulation: Pulses:   normal  (D) Disabliity:  GCS Total:  15  (E) Expose:  Completed    Secondary Survey: (Click on Physical Exam tab above)  Physical Exam  Vitals and nursing note reviewed  Constitutional:       General: He is not in acute distress  Appearance: Normal appearance  He is well-developed  He is not ill-appearing, toxic-appearing or diaphoretic  HENT:      Head: Normocephalic and atraumatic        Right Ear: External ear normal       Left Ear: External ear normal       Nose: Nose normal       Mouth/Throat:      Mouth: Mucous membranes are moist       Pharynx: Oropharynx is clear  Eyes:      General: No visual field deficit or scleral icterus  Right eye: No discharge  Left eye: No discharge  Extraocular Movements: Extraocular movements intact  Conjunctiva/sclera: Conjunctivae normal       Pupils: Pupils are equal, round, and reactive to light  Neck:      Vascular: No JVD  Trachea: No tracheal deviation  Cardiovascular:      Rate and Rhythm: Normal rate and regular rhythm  Pulses: Normal pulses  Heart sounds: Normal heart sounds  No murmur heard  No friction rub  No gallop  Pulmonary:      Effort: Pulmonary effort is normal  No respiratory distress  Breath sounds: Normal breath sounds  No stridor  No wheezing or rales  Chest:      Chest wall: No tenderness  Abdominal:      General: Bowel sounds are normal  There is no distension  Palpations: Abdomen is soft  Tenderness: There is no abdominal tenderness  There is no right CVA tenderness, left CVA tenderness, guarding or rebound  Musculoskeletal:         General: No swelling, deformity or signs of injury  Normal range of motion  Right shoulder: Tenderness (minimally tender posterior shoulder, w/out deformity  FROM, NV intact  no wound or bruise  no clavicle/scapula tenderness) present  No swelling, deformity, effusion, laceration or crepitus  Normal range of motion  Normal pulse  Cervical back: Normal, normal range of motion and neck supple  No rigidity or tenderness  Thoracic back: Normal       Lumbar back: Normal       Right hip: Normal       Left hip: Normal       Right lower leg: No edema  Left lower leg: No edema  Comments: Pelvis stable, nontender  Breath sounds equal  No crepitus or chest wall tenderness with compression over the chest  No pain or instability with compression over the pelvis   No bedside imaging required  Patient is stable to proceed to CT scan  Lymphadenopathy:      Cervical: No cervical adenopathy  Skin:     General: Skin is warm and dry  Findings: No bruising, erythema or rash  Neurological:      General: No focal deficit present  Mental Status: He is alert and oriented to person, place, and time  Mental status is at baseline  GCS: GCS eye subscore is 4  GCS verbal subscore is 5  GCS motor subscore is 6  Cranial Nerves: Cranial nerves 2-12 are intact  No cranial nerve deficit, dysarthria or facial asymmetry  Sensory: Sensory deficit (decreased sensation to light touch RUE - baseline) present  Motor: Weakness (4/5 RUE, RLE - baseline from CVA) present  Coordination: Coordination normal    Psychiatric:         Behavior: Behavior normal          Cervical spine cleared by clinical criteria?  Yes     Invasive Devices     Peripheral Intravenous Line  Duration           Peripheral IV 04/01/23 Right;Ventral (anterior) Forearm <1 day                Lab Results:   Results Reviewed     Procedure Component Value Units Date/Time    Protime-INR [[id number]]  (Normal) Collected: 04/01/23 0229    Lab Status: Final result Specimen: Blood from Arm, Right Updated: 04/01/23 0255     Protime 14 4 seconds      INR 1 05    APTT [[id number]]  (Normal) Collected: 04/01/23 0229    Lab Status: Final result Specimen: Blood from Arm, Right Updated: 04/01/23 0255     PTT 33 seconds     Comprehensive metabolic panel [[id number]]  (Abnormal) Collected: 04/01/23 0229    Lab Status: Final result Specimen: Blood from Arm, Right Updated: 04/01/23 0251     Sodium 138 mmol/L      Potassium 3 8 mmol/L      Chloride 104 mmol/L      CO2 23 mmol/L      ANION GAP 11 mmol/L      BUN 29 mg/dL      Creatinine 1 38 mg/dL      Glucose 105 mg/dL      Calcium 9 7 mg/dL      AST 16 U/L      ALT 38 U/L      Alkaline Phosphatase 98 U/L      Total Protein 7 6 g/dL      Albumin 4 1 g/dL      Total Bilirubin 0 42 mg/dL eGFR 54 ml/min/1 73sq m     Narrative:      Mk guidelines for Chronic Kidney Disease (CKD):   •  Stage 1 with normal or high GFR (GFR > 90 mL/min/1 73 square meters)  •  Stage 2 Mild CKD (GFR = 60-89 mL/min/1 73 square meters)  •  Stage 3A Moderate CKD (GFR = 45-59 mL/min/1 73 square meters)  •  Stage 3B Moderate CKD (GFR = 30-44 mL/min/1 73 square meters)  •  Stage 4 Severe CKD (GFR = 15-29 mL/min/1 73 square meters)  •  Stage 5 End Stage CKD (GFR <15 mL/min/1 73 square meters)  Note: GFR calculation is accurate only with a steady state creatinine    CBC and differential [[id number]]  (Abnormal) Collected: 04/01/23 0229    Lab Status: Final result Specimen: Blood from Arm, Right Updated: 04/01/23 0237     WBC 7 62 Thousand/uL      RBC 4 14 Million/uL      Hemoglobin 11 5 g/dL      Hematocrit 36 1 %      MCV 87 fL      MCH 27 8 pg      MCHC 31 9 g/dL      RDW 12 8 %      MPV 9 6 fL      Platelets 520 Thousands/uL      nRBC 0 /100 WBCs      Neutrophils Relative 73 %      Immat GRANS % 1 %      Lymphocytes Relative 13 %      Monocytes Relative 7 %      Eosinophils Relative 5 %      Basophils Relative 1 %      Neutrophils Absolute 5 58 Thousands/µL      Immature Grans Absolute 0 05 Thousand/uL      Lymphocytes Absolute 1 00 Thousands/µL      Monocytes Absolute 0 53 Thousand/µL      Eosinophils Absolute 0 40 Thousand/µL      Basophils Absolute 0 06 Thousands/µL                  Imaging Studies:   Direct to CT: Yes  XR Trauma chest portable   ED Interpretation by Waleska Devine MD (04/01 0251)   No ptx or acute abnormality  XR shoulder 2+ views RIGHT   ED Interpretation by Waleska Devine MD (04/01 0251)   No acute fracture or dislocation  TRAUMA - CT head wo contrast   Final Result by Daphnie Parson DO (04/01 0240)      No intracranial hemorrhage or calvarial fracture  Microangiopathy and multiple areas of chronic infarction, as described           The study was marked in EPIC for immediate notification, per trauma protocol  Workstation performed: [id number]               Procedures  ECG 12 Lead Documentation Only    Date/Time: 4/1/2023 2:56 AM  Performed by: Samantha Gaston MD  Authorized by: Samantha Gaston MD     Indications / Diagnosis:  Fall  ECG reviewed by me, the ED Provider: yes    Patient location:  ED  Previous ECG:     Previous ECG:  Compared to current    Comparison ECG info:  St and t wave changes improved  Similarity:  Changes noted    Comparison to cardiac monitor: Yes    Interpretation:     Interpretation: normal    Rate:     ECG rate:  60    ECG rate assessment: normal    Rhythm:     Rhythm: sinus rhythm    Ectopy:     Ectopy: none    QRS:     QRS axis:  Left    QRS intervals:  Normal  Conduction:     Conduction: normal    ST segments:     ST segments:  Non-specific  T waves:     T waves: non-specific               ED Course  ED Course as of 04/01/23 0300   Sat Apr 01, 2023   0257 Cr at baseline  CTH no acute findings, stable  Pt feels well  Will d/c home, f/u with PCP  Discussed RTED precautions  All questions answered  Medical Decision Making  CKD (chronic kidney disease): chronic illness or injury  Fall, initial encounter: acute illness or injury  Injury of right shoulder, initial encounter: acute illness or injury  Amount and/or Complexity of Data Reviewed  Independent Historian: spouse  External Data Reviewed: notes  Labs: ordered  Radiology: ordered and independent interpretation performed  ECG/medicine tests: ordered and independent interpretation performed  Decision-making details documented in ED Course  Risk  OTC drugs                    Disposition  [facility] Transfer: No  Final diagnoses:   Fall, initial encounter   Injury of right shoulder, initial encounter   CKD (chronic kidney disease)     Time reflects when diagnosis was documented in both MDM as applicable and the Disposition within this note Time User Action Codes Description Comment    4/1/2023  2:59 AM Alonso Estrada Add [G05  KKOO] Fall, initial encounter     4/1/2023  2:59 AM Alonso Estrada Add [S49 91XA] Injury of right shoulder, initial encounter     4/1/2023  2:59 AM Devin CASTANEDA Add [N18 9] CKD (chronic kidney disease)       ED Disposition     ED Disposition   Discharge    Condition   Stable    Date/Time   Sat Apr 1, 2023  2:59 AM    Comment   Anny Jones discharge to home/self care  Follow-up Information     Follow up With Specialties Details Why Contact Info Additional Information     [address] Emergency Department Emergency Medicine  If symptoms worsen [address] [address]  [address] [facility] Emergency Department, [address] [place] [address] [address]        Patient's Medications   Discharge Prescriptions    No medications on file     No discharge procedures on file      PDMP Review       Value Time User    PDMP Reviewed  Yes 3/31/2023  7:58 AM Neill Dakin, MD          ED Provider  Electronically Signed by         Samantha Gaston MD  04/01/23 [id number]

## 2023-04-02 LAB
ATRIAL RATE: 60 BPM
P AXIS: 61 DEGREES
PR INTERVAL: 168 MS
QRS AXIS: -2 DEGREES
QRSD INTERVAL: 92 MS
QT INTERVAL: 434 MS
QTC INTERVAL: 434 MS
T WAVE AXIS: 30 DEGREES
VENTRICULAR RATE: 60 BPM

## 2023-04-03 NOTE — PHYSICAL THERAPY NOTE
PT Discharge Summary    Pt is a 61 y/o male who demonstrated good progress in PT presenting to [facility] on 3/6/23 s/p an acute posterior circulation stroke  Upon initial eval, pt presented with the following impairments: R hemiparesis, decreased standing balance/tolerance with decreased righting reaction, impaired coordination, impaired sensation/proprioception/kinesthesia, decreased endurance, gait dysfunction, and decreased cognition  For initial eval, pt required Mod A with bed mobility and total A with all transfers, ambulation, and stair negotiation  At discharge, pt demonstrated independence with bed mobility, supervision with transfers and ambulation with hemiwalker, and min A with stair negotiation utilizing non-reciprocal pattern with LHR  Pt has demonstrated improvements with strength, cognition, endurance, gait, standing tolerance/righting reactions, and proprioception/kinesthesia  Pt's family installed ramp for w/c to enter home and has made accommodations for a first-floor set-up  Pt's wife works from home and both children live at home to provide assistance/supervision to pt  Extensive family training and education has been completed throughout rehab focusing on transfers, ambulation, w/c management, and stair negotiation  Pt will utilize w/c and hemiwalker at home for safety  Pt given HEP for sitting and supine exercises to complete each day with pt able to demonstrate carry over of completing exercises independently  Pt discharged home on 3/31/23 with family support and outpatient PT services to continue working on improving dynamic balance, increasing independence with steps, and walking without AD

## 2023-04-03 NOTE — SPEECH THERAPY NOTE
[facility] Speech Therapy Discharge Summary    Pt admitted to [facility] on 3/6/2023 dx bilateral cerebellar infarcts  Pt completed cognitive/language and swallow assessment on initial evaluation  In regards to swallow: pt was initially on dysphagia level 3 diet w/ NT liquids  However, able to advance liquids quickly to thins, but still continued to remain on level 3 diet due to increased R sided pocketing/residual, initially requiring verbal prompts/cues to clear  As dysphagia tx sessions progressed in addition to completing trials of regular texture items, which pt had demonstrated more independence in use of swallow strategies, primarily clearing R sided residual/pocketing via lingual/finger sweeps and increased time  Diet was able to be advanced to regular w/ thin liquids to where brief f/u continued to where pt did not present w/ increased or overt signs/sxs of aspiration during meals, to where ongoing recommendations remain for continued diet, regular/thins at this time, continuing aspiration precautions  No further dysphagia tx sessions warranted at this time    In regards to cognition: pt did complete CLQT+ on initial evaluation with a Composite Severity Rating score of 3 4 out of 4 0, correlating to overall MILD cognitive linguistic impairments at time of evaluation and in comparison to age matched peers ranging from 22-74 y/o  It was noted that pt did have a higher level of independence prior to admission, but also there was new learning given medications due to stroke, as well as new learning given tasks/mobility due to stroke currently  As for language skills, pt completed the Bedside WAB, in which overall bedside aphasia score deems pt to demonstrate mildly impaired language deficits  Additionally, pt also demonstrates Anomic type aphasia as per Bedside Aphasia Classification Criteria, when comparing the pt's Fluency, Auditory Verbal Comprehension, Repetition scores   Pt's expressive language deficits mild junior by mild word finding difficulty, intermittent vague speech, concise/ often incomplete descriptions and dec fluency  Pt expressive deficits were more prevalent during structured tx tasks and reading aloud, spontaneous speech was more fluent w/ only min instances of word retrieval difficulties and min amount of slurring in longer utterances  As sessions progressed, pt's auditory comprehension and verbal expression appeared greater than pt's reading comprehension and written expression at this time  Pt was making notable progress across all areas, but does at times still fatigue, which impacts comprehension, verbal expression and recall  Pt was able to achieve his goals of supervision for all cognitive domains- including problem solving, memory, expression and comprehension as well as Mod I for overall social interactions  Family training was complete with wife, Ehsan Alfonso, in which she is able to assist with IADL tasks at home  Pt was successfully discharged home on 3/31/2023 with recommendations for further skilled outpt SLP services to increase independence and decreased burden of care         [id number]

## 2023-04-03 NOTE — PROGRESS NOTES
04/03/23 1625   Hello, [Guardian’s Name / Patient’s Ray Pap, this is [Caller Ray Pap from [facility], and our clinical care team wanted to check on you / your child after your recent visit to the hospital  It will only take 3-5 minutes  Is this a good time? Discharge Call Type/ Specific Diagnosis: General Call   Call Complete   Attempted Number of Calls 1   Discharge phone call complete?  Left Message

## 2023-04-04 ENCOUNTER — OFFICE VISIT (OUTPATIENT)
Dept: FAMILY MEDICINE CLINIC | Facility: CLINIC | Age: 63
End: 2023-04-04

## 2023-04-04 VITALS
TEMPERATURE: 98.4 F | DIASTOLIC BLOOD PRESSURE: 62 MMHG | SYSTOLIC BLOOD PRESSURE: 100 MMHG | OXYGEN SATURATION: 99 % | HEART RATE: 64 BPM

## 2023-04-04 DIAGNOSIS — I10 PRIMARY HYPERTENSION: ICD-10-CM

## 2023-04-04 DIAGNOSIS — I65.02 STENOSIS OF LEFT VERTEBRAL ARTERY: ICD-10-CM

## 2023-04-04 DIAGNOSIS — N18.2 STAGE 2 CHRONIC KIDNEY DISEASE: ICD-10-CM

## 2023-04-04 DIAGNOSIS — I63.9 CEREBROVASCULAR ACCIDENT (CVA), UNSPECIFIED MECHANISM (HCC): Primary | ICD-10-CM

## 2023-04-04 DIAGNOSIS — R73.03 PREDIABETES: ICD-10-CM

## 2023-04-04 DIAGNOSIS — D50.9 IRON DEFICIENCY ANEMIA, UNSPECIFIED IRON DEFICIENCY ANEMIA TYPE: ICD-10-CM

## 2023-04-04 DIAGNOSIS — K59.2 NEUROGENIC BOWEL: ICD-10-CM

## 2023-04-04 DIAGNOSIS — F43.21 ADJUSTMENT DISORDER WITH DEPRESSED MOOD: ICD-10-CM

## 2023-04-04 DIAGNOSIS — I77.1 CELIAC ARTERY STENOSIS (HCC): ICD-10-CM

## 2023-04-04 DIAGNOSIS — R25.2 SPASTICITY: ICD-10-CM

## 2023-04-04 DIAGNOSIS — E53.8 B12 DEFICIENCY: ICD-10-CM

## 2023-04-04 DIAGNOSIS — Z13.29 SCREENING FOR THYROID DISORDER: ICD-10-CM

## 2023-04-04 DIAGNOSIS — I77.74 VERTEBRAL ARTERY DISSECTION (HCC): ICD-10-CM

## 2023-04-04 PROBLEM — R42 DIZZINESS: Status: RESOLVED | Noted: 2023-02-25 | Resolved: 2023-04-04

## 2023-04-04 NOTE — PROGRESS NOTES
"Speech-Language Pathology Initial Evaluation    Today's date: 2023   Patient’s name: Mayra Schmitt  : 1960  MRN: [id number]  Safety measures: CVA - wheel chair  Referring provider: Milo Moore MD    Encounter Diagnosis     ICD-10-CM    1  Other symbolic dysfunctions  P39 5       2  Acute Posterior Circulation Stroke  I63 9 Ambulatory Referral to Speech Therapy      3  Expressive aphasia  R47 01       4  Cerebrovascular accident (CVA), unspecified mechanism ([facility] [address] )  I63 9         Visit tracking:  -Referring provider: Epic  -Billing guidelines: AMA  -Visit #1  -Insurance: [employer]  -RE due 23    Subjective comments: Patient reports that he is alright  How did the patient hear about us? Physician    Patient's goal(s): Not Recorded    Reason for referral: Change in cognitive status and Decreased language skills  Prior functional status: Communication effective and appropriate in all situations  Clinically complex situations: Discharge from SNF or Hospital in the last 30 days    History: Patient is a 60 y o  male who was referred to outpatient skilled Speech Therapy services for a cognitive-linguistic evaluation  23 - Hospitalized due to Covid/UTI and Stroke    23 - 3/6/23 - Hospitalized at [facility] ([facility]  \"MRI brain revealed several small acute/early subacute bi-cerebellar infarcts (R>L) without evidence of hemorrhage  \"    3/6/23 - 3/31/23 - [facility]    3/31/23 -  Discharge summary from [place] stated, no further treatment necessary for dysphagia as patient is tolerating a regular/thin diet  Demonstrates both a mild cognitive impairment and mild anomic aphasia  During today's evaluation the patient reports that he continues to do well with swallow foods/liquids and is not experiencing any dysphagia  He reports that he at times has trouble getting out his words   His memory \"seems okay\"         Hearing: WFL  Vision: wears glasses - his left eye is a little " blurry    Home environment/lifestyle: Lives with his wife and two adult children  Highest level of education: High school  Vocational status:  - 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  Mental status: Alert  Behavior status: Cooperative  Patient reported symptoms of: Not reported  Communication modalities: Verbal  Rehabilitation prognosis: Good rehab potential to reach the established goals    Assessments    The Repeatable Battery for the Assessment of Neuropsychological Status (RBANS) is a brief, individually-administered assessment which measures attention, language, visuospatial/constructional abilities, and immediate & delayed memory  The RBANS is intended for use with adolescents to adults, ages 15 to 80 years  The following results were obtained during the administration of the assessment  Form: C    Cognitive Domain/Subtest: Index Score: Percentile Rank: Classification:   IMMEDIATE MEMORY 19 65%ile Extremely Low        1  List Learning (19/40)        2  Story Memory (11/24)       VISUOSPATIAL/  CONSTRUCTIONAL 75 5%ile Borderline        3  Figure Copy (13/20)        4  Line Orientation (16/20)       LANGUAGE 68 2%ile Extremely Low        5  Picture Naming (8/10)        6  Semantic Fluency (12/40)       ATTENTION 82 12%ile Low Average        7  Digit Span (12/16)        8  Coding (16/89)       DELAYED MEMORY 81 10%ile Low Average        9  List Recall (2/10)        10  List Recognition (17/20)        11  Story Recall (6/12)        12  Figure Recall (15/20)         Sum of Index Scores:  371   Total Score:  67   Percentile: 1%ile   Classification: Extremely Low       *Patient named 10 concrete category members (animals) in 60 sec (norm=15+)  -- BELOW AVERAGE    *Patient named 6 abstract category members (words beginning with letter 'R') in 60 sec (norm=10+)  -- BELOW AVERAGE      Goals    Short Term Goals  1   Patient will be educated on the use of internal and external memory aids and compensatory strategies with 80% accuracy to facilitate increased recall of routine, personal information, and recent events, to be achieved in 4-6 weeks  2  Patient will complete auditory immediate and short term memory tasks to 80% accuracy to facilitate increased ability to retell narratives and recall information within functional living environment, to be achieved in 4-6 weeks  3  Patient will complete complex auditory attention processing tasks (e g , sentence unscramble, ranking numbers/words, etc ) to improve working memory with 80% accuracy, to be achieved in 4-6 weeks  4  Patient will facilitate planning by completing thought organization tasks (e g , sequencing, deduction puzzles, etc ) with 80% accuracy to facilitate increased executive functioning, working memory, problem solving, and processing skills, to be achieved in 4-6 weeks  5  Patient will complete concrete and abstract categorization tasks to 80% accuracy to facilitate improved generative naming skills and working memory, to be achieved in 4-6 weeks  6  Patient will utilize word finding strategies during semantic feature analysis treatment activity for improved naming and verbal expression skills  7  Patient will name an average of 15+ items in a category in 60 seconds over 5 trials using compensatory strategies and min cues to facilitate improved word retrieval skills, to be achieved in 4-6 weeks  8  Patient will name an average of 10+ words that begin with a specific letter in 60 seconds over 5 trials using compensatory strategies and min cues to facilitate improved word retrieval skills, to be achieved in 4-6 weeks  9  Patient will name an appropriate synonym/antonym for a given word with 80% accuracy to build expressive vocabulary for conversation, to be achieved in 4-6 weeks      10  Patient will complete word generation tasks (e g , analogies, category matrices, etc ) with 80% accuracy using "word finding strategies to facilitate improved word retrieval skills, to be achieved in 4-6 weeks  Long Term Goals    Patient will demonstrate cognitive-communication skills consistent with age and education given use of compensatory strategies when needed to resume baseline activities and responsibilities in home, community, and work/school settings by discharge  Patient will complete higher-level expressive language tasks (e g , word definitions, idioms, synonym/antonyms, etc) with 80% accuracy to improve functional communication skills by discharge  Impressions/Recommendations    Impressions:   Based upon results of the RBANS cognitive assessment, overall, the patient tests \"Extremely Low\" in cognitive function based upon standard norms  However, the patient did demonstrate \"Low Average\" cognition in the domains of Attention and Delayed Memory  Domain of \"Borderline\" deficit was Visuospatial/Constructional  \"Extremely Low\" domains were Immediate Memory and Language  It should be noted that they patient is experiencing hemiplegia in his dominant (right) hand which may have affected visiospatial scoring for \"Figure Copy\"  The patient conversed well throughout the evaluation, demonstrating no overt word finding difficulties in conversation  Speech therapy is recommended to address areas of deficit in order to restore the patient to his prior level of function or as close to his prior level of function as is possible  Recommendations:  -Patient would benefit from outpatient skilled Speech Therapy services: Cognitive-linguistic therapy    -Frequency: 3x weekly  -Duration: 6-8 weeks    -Intervention certification from: 6/5/5223  -Intervention certification to: 6/0/3276    -Intervention comments:   60 minutes of standard cognitive assessment time and 60 minutes of documentation time       "

## 2023-04-04 NOTE — OCCUPATIONAL THERAPY NOTE
Occupational Therapy Discharge Summary:     Pt made good progress throughout rehab stay, from OT standpoint  Pt was D/C home with family support and outpatient neuro OT for R UE and higher level cognitive retraining  At time of D/C pt was completing ADLS and functional transfers at supervision level using hemiwalker  Wife had been trained on how to provide min assist for ADLs and functional transfers/mobility  Pt recommended for the following DME: drop arm BSC, taryn, w/c  Family training was completed with pt's wife  Pt was provided with R UE HEP

## 2023-04-04 NOTE — CASE MANAGEMENT
Team dc summary: Pt made gains in rehab program  Pt was set up with PT OT ST at neuro rehab in [facility]  Pt was dc'd with wheelchair and drop arm bsc through [facility]  Pt's wife present for transportation and DCI

## 2023-04-05 ENCOUNTER — TELEPHONE (OUTPATIENT)
Dept: NEUROLOGY | Facility: CLINIC | Age: 63
End: 2023-04-05

## 2023-04-05 NOTE — PROGRESS NOTES
04/05/23 0958   Hello, [Guardian’s Name / Patient’s Danelle Germain, this is [Caller Danelle Germain from [facility], and our clinical care team wanted to check on you / your child after your recent visit to the hospital  It will only take 3-5 minutes  Is this a good time? Discharge Call Type/ Specific Diagnosis: General Call   Call Complete   Attempted Number of Calls 2   Discharge phone call complete?  Left Message

## 2023-04-05 NOTE — TELEPHONE ENCOUNTER
LM for patient to return call  Would like to set up appt with Depadua on 5/17 at 2 pm in 8th e office

## 2023-04-06 ENCOUNTER — EVALUATION (OUTPATIENT)
Dept: PHYSICAL THERAPY | Facility: CLINIC | Age: 63
End: 2023-04-06

## 2023-04-06 ENCOUNTER — EVALUATION (OUTPATIENT)
Dept: SPEECH THERAPY | Facility: CLINIC | Age: 63
End: 2023-04-06

## 2023-04-06 ENCOUNTER — EVALUATION (OUTPATIENT)
Dept: OCCUPATIONAL THERAPY | Facility: CLINIC | Age: 63
End: 2023-04-06

## 2023-04-06 DIAGNOSIS — I63.50 POSTERIOR CIRCULATION STROKE (HCC): ICD-10-CM

## 2023-04-06 DIAGNOSIS — I63.9 CEREBROVASCULAR ACCIDENT (CVA), UNSPECIFIED MECHANISM (HCC): ICD-10-CM

## 2023-04-06 DIAGNOSIS — R47.01 EXPRESSIVE APHASIA: ICD-10-CM

## 2023-04-06 DIAGNOSIS — R48.8 OTHER SYMBOLIC DYSFUNCTIONS: Primary | ICD-10-CM

## 2023-04-06 DIAGNOSIS — I63.9 CEREBROVASCULAR ACCIDENT (CVA) (HCC): Primary | ICD-10-CM

## 2023-04-06 DIAGNOSIS — I63.9 CEREBROVASCULAR ACCIDENT (CVA) (HCC): ICD-10-CM

## 2023-04-06 NOTE — PROGRESS NOTES
OCCUPATIONAL THERAPY INITIAL EVALUATION        23  Angella Hams  1960  [id number]  Rupesh Son,*   Diagnosis ICD-10-CM Associated Orders   1  Acute Posterior Circulation Stroke  I63 9 Ambulatory Referral to Occupational Therapy      2  Posterior circulation stroke (HCC)  I63 50             Assessment/Plan      SKILLED ANALYSIS:    Pt is a 60 y o  male referred to Occupational Therapy s/p Cerebrovascular accident (CVA) ([facility] [address] ) [I63 9]  Pt participated in skilled OT evaluation and, following formalized testing as well as clinical observation, pt presents with the following areas of deficit: FMC/FMS, GMC/GMS, hand to target accuracy and in hand manipulation/dexterity impacting indep and completion of ADL/IADL and leisure tasks  Pt does demo the need for skilled Occupational Therapy services 2x/week for 12 weeks with focus on ADL re-training, IADL re-training, fxnl xfers, standing tolerance, standing balance, UE NMR, UE strengthening, UE endurance, FMC/GMC, DME training/education, family training/education, community re-integration and return to work simulation to address the goals as listed below  Pt in agreement with POC, POC to  23  Short Term Goals (to be achieved within 4 weeks):    1  Pt will improve R hand motor control so as to be able to grasp/ release medium and large sized objects with min to no difficulty during ADLs/IADLs and leisure activities  2  Pt will use RUE as functional assist in 80% of functional tasks, to increase ease and independence with completion of ADLs/ IADLs and leisure tasks  3  Pt will demo with G tolerance to supine, seated, and in stance exercise x 45 minutes with minimal rest breaks required for increased engagement in weekly exercise regimen and increased engagement in life roles  4  Pt will increase R UE rate of manipulation for all FM tests for improved functional performance/independence with functional tasks x 25%   5   Pt will increase "R UE strength to 4/5,  strength by 3-5 lbs and pinch strength by 1-2 pounds, through the use of strengthening exercises and home program for eventual return to IADLs and life roles  Long Term Goals (to be achieved within 12 weeks):  1  Pt will increase R UE strength to 4+/5 and  strength R=L, through the use of strengthening exercises and home program   2  Pt will improve RUE GMC/FMC, so as to be able to return to using RUE as dominant in 80% of daily functional tasks  3  Pt will increase RUE prehension patterns for improved ability to manage clothing fasteners and to manage eating utensils with minimal difficulty   4  Pt will demo with G carryover of Home Exercise Program to improve functional progression towards goals in Plan of care and for improved functional use of RUE  5  Pt will complete pre-driving assessments to provide feedback to physician to assist with determining if pt is able to return to driving  SUBJECTIVE      SUBJECTIVE: \"it's not as good as I'd like it to be\"    PATIENT GOAL: \"use of my hand\" pt stating he wants to be able to eat with his RUE, would like to be able to drive        HISTORY OF PRESENT ILLNESS:     Pt is a 60 y o  male who was referred to Occupational Therapy s/p  Cerebrovascular accident (CVA) ([facility] [address] ) [I63 9]  Per Ashok Arias MD [facility] dc note on 3/31:   HPI: Jim Ortiz is a 60 y  o  right handed male with medical history of HTN who presented to [address] [place] [address] [address] on 2/25 with nausea/dizziness  Found to have hypertensive emergency with acute/subcaute R posterior cerebellar CVA with 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, mild BRAULIO, and incidentally found + COVID (without evidence of respiratory illness/hypoxia/CXR findings)   Placed on cardene gtt, stroke pathway, ASA/Plavix, IV hydration for BRAULIO, and CTX for UTI  NIHSS 2  Symptoms began 2 days prior   Not tPA/TNK candidate   MRI/MRA with progression of R vertebral artery dissection and R vertebral " artery thrombus   NSx recommended transfer to B and starting on heparin gtt and stopped Plavix  Repeat CTA on 2/26 stable with with R V3/4 occlusion  Not a candidate for interventional procedure due to clinical stability and risk   Speech consulted and noted mod-severe oropharyngeal dysphagia recommended pureed/HTL  Unfortunately later on 2/26, he clinically deteriorated with increased R sided weakness, and was taken to IR for stenting of V3 and V4 with TICI 2B revascularization  CTH without ICH  He was admitted back to the ICU intubated - extubated 2/27  MRI showed cerebellar CVA and R > L stroke burden, with additional hypodensities on DWI appreciated L midbrain, pontine area and R lower medullary/spinal cord junction  He was transitioned to triple therapy with DAPT (given recent stent) and A/C with eliquis 2 5mg BID due to COVID  Diet advanced to Level 3/NTL on 2/27  Noted to have spasticity in RLE - started on baclofen by Neurology  He was able to have cardene discontinued on 3/2, and they have been making adjustments to his oral regimen  He was started on Prozac for his mood  NSx has signed off  CTA H/N recommended around 3/17  Length of time on eliquis unclear  Admitted to [facility] on 3/6  Pt with an [facility] stay from 3/6-3/31  Pt was dc home 3/31  Pt sustained a fall the night of 3/31 trying to get onto the [facility]  Pt with fall onto his R side, without resulting injuries  Pt was checked at the hospital and dc home the same day       PMH:   Past Medical History:   Diagnosis Date   • BRAULIO (acute kidney injury) ([facility] [address] )    • B12 deficiency    • Celiac artery stenosis (HCC)    • Cerebellar infarct ([facility] [address] ) 02/25/2023   • CKD (chronic kidney disease) stage 2, GFR 60-89 ml/min    • Essential hypertension    • Impaired fasting glucose    • Iron deficiency anemia    • Neurogenic bowel    • Stenosis of left vertebral artery    • Vertebral artery dissection [facility])        Past Surgical Hx:   Past Surgical History:   Procedure Laterality Date   • ARTERY SURGERY      vertebral artery stent/revascularization        HOME SETUP/ CLOF: lives with wife, dtr (25 yo) and ANGEL, and son (17yo); lives on [address] acres of property   2 SH; laundry on 1st floor; bedroom is on the 2nd floor but is sleeping on the first floor; powder room on 1st floor; full bath with tub combo  Work: ; was working 40+ hours; 6 days a week  (+) driving  ADLs: I in all aspects prior to CVA  Wife did IADLs of cooking, laundry, grocery shopping  Pt did all outside work, including mowing the lawn   Animals: 2 dogs, 2 cats upstairs and 4 cats downstairs; used to walk the dogs in the morning; family completing pet care   Physical activity: outdoor yard work      Current Status:  DME: w/c, BSC, hemiwalker, transfer tub bench    ADLs:   UBB/D: I   LBD: I with pants, underwear; A with socks/ shoes  LBB: I  Clothing fasteners: currently wearing clothing without fasteners  Grooming: using L hand to brush teeth     Bed mobility: can get in/out of bed by self  Transfers: S  Mobility: uses a julieta-walker around the house; CG/CS    Pain Levels: none         OBJECTIVE         PHYSICAL ASSESSMENT        RUE LUE Comments                 UPPER EXTREMITY FXN Impaired Intact Dominant Hand: Right                  UE ROM LUE PROM  LUE AROM  RUE PROM  RUE AROM COMMENTS   Shoulder Flex WNL WNL  WNL 1/2 range     Shoulder Ext WNL  WNL  WNL WNL    Shoulder ABD WNL  WNL  WNL 1/2 range    Horizontal ABD WNL  WNL  WNL WNL    Horizontal ADD WNL  WNL  WNL WNL    Shoulder IR  WNL  WNL  WNL WFL    Shoulder ER WNL  WNL WNL WFL    Elbow Flex WNL  WNL WNL 3/4 range    Elbow Ext  WNL  WNL WNL WNL    Wrist flexion WNL  WNL WNL 3/4 range    Wrist Ext WNL  WNL WNL 3/4 range     Supination WNL  WNL WNL 3/4 range    Pronation WNL  WNL WNL WNL    Finger Flex WNL  WNL WNL 1/2 range    Finger Ext WNL  WNL WNL WNL    Opposition  WNL  WNL WNL WNL                               MMT  LUE RUE   Comments   Elevation 5/5 4+/5    Shoulder Flex/Ext 5/5 4-/5    Shoulder Abd 5/5 4-/5    Shoulder Add 5/5 4-/5    Shoulder ER 5/5 3+/5    Shoulder IR 5/5 4-/5    Elbow Flex 5/5 4-/5    Elbow Ext 5/5 4-/5    Wrist Flex 5/5 4-/5    Wrist Ext 5/5 4/5    Gross Grasp 5/5 4-/5                      /Pinch Strength  LUE  RUE    Comments   Dynamometer      - Gross Grasp 70 lbs 0 lbs Below normal RUE   Pinch Meter       - PINCER 14 lbs 3 lbs     - 3 JAW MUNDO 19 lbs 5 lbs     - LATERAL 27 lbs  5 lbs      SENSATION LUE RUE Comments   Sharp Dull  Intact Impaired    Proprioception Intact Intact    Pain Intact Impaired    Hot/Cold Temp Intact Impaired      COMMENTS: decreased sensation on RUE/RLE/ face    COORDINATION LUE RUE    9 Hole Peg Test 30 seconds 1 min 40  seconds Below average for RUE   Handwriting (Print)   G- legibility  G tripod grasp   Handwriting (script)   P+ legibility         MODIFIED HERIBERTO SCALE (TONE) LUE RUE    No increase in muscle tone (0) 0 0    Slight Increase in muscle tone with catch and release or min resist at end range (1)      Slight Increase in muscle tone with catch and release, followed by min resistance through remainder of range (1+)      Increased muscle tone through full range, able to be moved easily (2)      Considerable increase in tone, difficult to move (3)      Rigid in Flexion/Extension (4)                  COGNITIVE ASSESSMENT        Cognitive Checklist: Patient indicated that he is experiencing the following symptoms:    · Memory: no deficits    · Attention: no deficits    · Processing: a little slower with processing compared to prior to the CVA    · Executive Functions: no deficits     · Communication: no deficits     · Visual: no deficits     · Emotional: no deficits     · Increased Sensitivities to: no deficits             VISUAL ASSESSMENT      Comments: (+) glasses; pt with blurry vision with L eye    Vision Screen     Accommodation Intact   Convergence Intact   Pursuits Intact   Saccades Intact   ROM Intact              PLANNED THERAPY INTERVENTIONS:  Therapeutic exercise  Therapeutic activity  ADL/IADL re-training   NMRE  Supine, seated, and in stance neuro re-ed  FMC/prehension  Manual tx  Hand to target  Sensory re-ed   WBearing strategies   Closed chain activities  Open chain activities       INTERVENTION COMMENTS:  Diagnosis: Cerebrovascular accident (CVA) ([facility] [address] ) [I63 9]  Precautions: fall risk, R side weakness   Insurance: Payor: Andree Veloz / Plan: Andree Veloz PPO / Product Type: PPO /   1 of 20 visits  PN due 5/4/23

## 2023-04-06 NOTE — PROGRESS NOTES
PT Evaluation     Today's date: 2023  Patient name: Marta Adrian  : 1960  MRN: [id number]  Referring provider: María Ham  Dx:   Encounter Diagnosis     ICD-10-CM    1  Posterior circulation stroke [facility])  I63 50 Ambulatory Referral to Physical Therapy          Start Time: 805  Stop Time: 845  Total time in clinic (min): 40 minutes    Assessment  Assessment details: Patient is a 60y o  year old male presenting to OPPT s/p diagnosis of posterior circulation stroke  Pt recently discharged from the [facility], subsequently experienced a fall on 2023  Patient demonstrates decreased strength, endurance, balance, and functional independence  His gait speed with no AD is 0 58 m/s which classifies him as a limited classified ambulator  He ambulates with slight ataxic R foot placement and inadequate knee extension in mid/terminal swing  Per multiple outcome measures he is at high risk for falls  His FGA is 8/30 which places him at high fall risk, he displays more imbalance with any head movement, obstacle negotiation, and especially backward due to his knee extension tone  6MWT is reduced against age matched norms showing reduce overall endurance - needs PT intervention x2 to maintain balance during testing without device  Patient is unable to return to work at this time and requires assistance for ADLs and IADLs  He will benefit from skilled PT interventions to maximize return to PLOF and independence as able  Given initial prescription of more walking with his hemiwalker at home for safety, and strengthening exercises with UE support  Thank you for this referral and the ability to participate in the care of this patient  Impairments: abnormal coordination, abnormal gait, activity intolerance, impaired balance, impaired physical strength, lacks appropriate home exercise program, safety issue and weight-bearing intolerance     Prognosis: good    Goals  In 4 weeks, patient will:  1    Demonstrate ability to perform 20 minutes of activity without requiring seated rest break  2   Demonstrate ability to maintain upright balance unsupported by UEs while reaching above shoulder height without resistance to promote stability with ADLs  3  Improve 5xSTS to below cutoff score for age-matched peers, 14 8 seconds, to demonstrate improved LE stability to complete transfers safely  4   Improve TUG to below cutoff score with least restrictive device against age matched peers, 13 5 seconds, to demonstrate improved functional mobility  In 4-10 weeks, patient will:  1  Meet FOTO predicted score to demonstrate improvement in functional mobility  2   Demonstrate consistent carryover with HEP and walking program   3   Improve gait speed by at least 0 16 m/s to meet MCID value for PwCVA and reduce fall risk  4   Improve 6MWT by at least 100ft to demonstrate improvement in ability in community ambulation  5   Improve FGA by at least 4 points to demonstrate improvement in balance and reduce fall risk, meet MDC for PwCVA  6  Improve ABC to at least above 67% to show reduced risk of falling and improved balance confidence  Plan  Patient would benefit from: skilled physical therapy  Planned therapy interventions: neuromuscular re-education, manual therapy, patient education, home exercise program, therapeutic exercise, therapeutic activities and gait training  Frequency: 2x week  Duration in weeks: 8  Plan of Care beginning date: 4/6/2023  Plan of Care expiration date: 6/5/2023  Treatment plan discussed with: patient and family        Subjective Evaluation    History of Present Illness  Mechanism of injury: Present at PT:   prior to hospitalization  No AD prior  Was at [facility]  Fell day after dc  No injury  Not walking much at home  Mainly using WC for mobility  Walking to bathroom  2nd floor bedroom  Doing stairs, very careful, does okay with them  Goals: Walking better/more   Better control of R leg    [facility] DC: HPI: Tano Daniel is a 60 y o  right handed male with medical history of HTN who presented to [address] on 2/25 with nausea/dizziness  Found to have hypertensive emergency with acute/subcaute R posterior cerebellar CVA with 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, mild BRAULIO, and incidentally found + COVID (without evidence of respiratory illness/hypoxia/CXR findings)  Placed on cardene gtt, stroke pathway, ASA/Plavix, IV hydration for BRAULIO, and CTX for UTI  NIHSS 2  Symptoms began 2 days prior  Not tPA/TNK candidate  MRI/MRA with 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  NSx recommended transfer to B and starting on heparin gtt and stopped Plavix  Repeat CTA on 2/26 stable with with R V3/4 occlusion  Not a candidate for interventional procedure due to clinical stability and risk  Speech consulted and noted mod-severe oropharyngeal dysphagia recommended pureed/HTL  Unfortunately later on 2/26, he clinically deteriorated with increased R sided weakness, and was taken to IR for stenting of V3 and V4 with TICI 2B revascularization  CTH without ICH  He was admitted back to the ICU intubated - extubated 2/27  MRI showed cerebellar CVA and R > L stroke burden, with additional hypodensities on DWI appreciated L midbrain, pontine area and R lower medullary/spinal cord junction  He was transitioned to triple therapy with DAPT (given recent stent) and A/C with eliquis 2 5mg BID due to COVID  Diet advanced to Level 3/NTL on 2/27  Noted to have spasticity in RLE - started on baclofen by Neurology  He was able to have cardene discontinued on 3/2, and they have been making adjustments to his oral regimen  He was started on Prozac for his mood  NSx has signed off  CTA H/N recommended around 3/17  Length of time on eliquis unclear  Admitted to [facility] on 3/6     Pain  No pain reported    Social Support  Stairs in house: yes   Lives in: multiple-level home  Lives with: spouse    Employment status: not working    Diagnostic Tests  No diagnostic tests performed  Treatments  Discharged from (in last 30 days): inpatient hospitalization        Objective    Flowsheet Rows    Flowsheet Row Most Recent Value   PT/OT G-Codes    Current Score 50   Projected Score 67         Balance Test 4/6   6 Minute Walk Test (ft): 576 1-2 LOB PT assist no AD   FGA: 8 no AD   Gait Speed (m/s): 0 58   5x Sit To Stand (s): 20 81   TUG/TUGC: 17 15 / 16 70 100>85 no AD         Sensation Left Right   Kinesthesia  diminished   Light Touch  diminshed   Sharp/Dull     2 Point Discrimination         Muscle Tone Left Right   Modified Aguilar Scale     Hamstring     Gastroc  1+   Quad         Manual Muscle Testing - Hip Left Right   Flexion 5 4   Extension 5 3+   Abduction 5 3+   External Rotation       Manual Muscle Testing - Knee Left Right   Flexion 5 3   Extension 5 5     Manual Muscle Testing - Ankle Left Right   Doriflexion 5 4   Plantarflexion 5 4        Transfers    Sit To Stand Independent   W/C To Bed Contact Guard   Sit To Supine Independent   Roll Independent         Gait Assessment: Normal speed shows slightly ataxic R foot placement  Inadequate knee extension in mid/terminal swing  With increased walking pace smoother limb advancement still inadequate knee extension             Precautions: Falls,   Re-eval Date: 5/6/2023    Date 4/6       Visit Count 1       FOTO See IE       Pain In See IE       Pain Out                Testing        TUG/TUGC 17 15 / 16 70       5xSTS 20 81       Gait speed 0 58       FGA 8       2/6MWT 576       Neuro Re-Ed        Dynamic balance        Static balance        Obstacle course                                        Ther Ex        SLR x 4        HR/TR        Mini Squats        Step ups                                        Ther Activity        ADL                Gait Training        Divided Attention        Head turns        Modalities         prn

## 2023-04-13 NOTE — TELEPHONE ENCOUNTER
Patients wife called and stated she would like to schedule a follow up appointment with Dr Yanet Holbrook  She had asked that we make her the primary contact for her   Is there any appointments I can offer her with Dr Depadua?

## 2023-04-14 NOTE — TELEPHONE ENCOUNTER
The one I listed above would be great  This is not a normally scheduled day for Dr Christine Verduzco so if they agree let me know and I will get them scheduled  Thank you so much for your help!

## 2023-04-14 NOTE — TELEPHONE ENCOUNTER
I called and spoke with the patients wife, Mary Ann Kim, she accepted the appointment time and date with Dr Jil Méndez on 5/17 at 2pm in [place]  I provided Mary Ann Kim with the office address and she had asked to be placed on the waitlist if anything opens up sooner

## 2023-04-21 NOTE — OCCUPATIONAL THERAPY NOTE
Occupational Therapy Progress Note     Patient Name: Tammy Washington  TKHYF'C Date: 3/4/2023  Problem List  Principal Problem:    Acute Posterior Circulation Stroke  Active Problems:    COVID    Right Vertebral artery dissection ([address] )    Stenosis of left vertebral artery    Hypertensive emergency    UTI (urinary tract infection)    Dysphagia    Spasticity    Elevated serum creatinine    Leukocytosis    Prediabetes    Tearfulness          03/04/23 0902   OT Last Visit   OT Visit Date 03/04/23   Note Type   Note Type Treatment for insurance authorization   Pain Assessment   Pain Assessment Tool 0-10   Pain Score No Pain   Restrictions/Precautions   Weight Bearing Precautions Per Order No   Braces or Orthoses Sling  (RUE for mobility )   Other Precautions Contact/isolation; Airborne/isolation;Droplet precautions; Fall Risk; Chair Alarm; Bed Alarm  (RUE flaccid )   ADL   Where Assessed Standing at sink   Grooming Assistance 3  Moderate Assistance   Grooming Deficit Teeth care; Increased time to complete;Supervision/safety   Grooming Comments pt stood at sink with mod Ax1, with RUE support on sinktop (stabilization at elbow and [place] placement), knee blocking on R, while engaging in grooming task of brushing teeth  [place] A used to assist pt w holding toothpaste, while using LUE to manipulate top  Pt reached across midline to cindy toothpaste onto toothbrush, used LUE to brush teeth  UB Bathing Assistance 3  Moderate Assistance   UB Bathing Deficit Increased time to complete; Chest;Right arm; Abdomen;Left arm;Verbal cueing;Supervision/safety   UB Bathing Comments [place] on R for washing L side of body  A from therapist for lifting RUE for washing R armpit  LB Bathing Assistance 2  Maximal Assistance   LB Bathing Deficit Perineal area; Buttocks;Right upper leg;Left upper leg;Right lower leg including foot; Left lower leg including foot   LB Bathing Comments pt able to manager upper LE's with L hand, requires a For lower LE and perineal Therapy area    [address] 3  Moderate Assistance   UB Dressing Deficit Increased time to complete; Thread RUE; Thread LUE;Pull down in back; Setup;Supervision/safety   UB Dressing Comments edu on one handed dressing techniques, pt w G carryover and execution  requires A for lifting over L shoulder and situating in back  LB Dressing Assistance 1  Total Assistance   LB Dressing Deficit Don/doff R sock; Don/doff L sock   Functional Standing Tolerance   Time 4 standing trials, tolerated stance for about 45 seconds each time  These occured while pt was standing at sink, RUE support on sinktop  Bed Mobility   Supine to Sit 3  Moderate assistance   Additional items Assist x 1; Increased time required;Verbal cues;LE management   Additional Comments found supine in bed, left in chair w all needs in reach and alarm on  Transfers   Sit to Stand 3  Moderate assistance   Additional items Assist x 2; Increased time required;Verbal cues   Stand to Sit 3  Moderate assistance   Additional items Assist x 2; Increased time required;Verbal cues   Stand pivot 2  Maximal assistance   Additional items Assist x 2; Increased time required;Verbal cues   Additional Comments sling donned for transfers  Inital STS mod Ax2 w b/l HHA and knee blocking  stand pivot w same support  subsequent STS trails were completed at sink w mod Ax1  Therapeutic Exercise - ROM   UE-ROM Yes   ROM- Right Upper Extremities   R Shoulder AAROM; Flexion;ABduction   R Elbow PROM;Elbow flexion;Elbow extension   R Wrist Wrist flexion;Wrist extension;PROM   R Hand PROM; Thumb; Index finger; Long finger;Ring finger;Little finger   R Position Seated   R Weight/Reps/Sets 2x10   RUE ROM Comment provided edu on importance of engaging in ROM, use of LUE to assist  pt's wife present, also provvided ROM edu to her to execute when she visits     Neuromuscular Education   Weight Bearing Technique Yes   RUE Weight Bearing Extended arm standing  (while standing at sink; stabilization at elbow and [place] on R hand)   Subjective   Subjective "I think I'm getting a little something back"   Cognition   Overall Cognitive Status [facility]   Arousal/Participation Alert; Responsive; Cooperative   Attention Within functional limits   Orientation Level Oriented X4   Memory Within functional limits   Following Commands Follows one step commands without difficulty   Comments very pleasant, cooperative and motivated at this time  follows commands well and does not appear to have dec processing speeds at this time  Activity Tolerance   Activity Tolerance Patient tolerated treatment well   Medical Staff Made Aware COR[place]   Assessment   Assessment Pt seen on this date for OT session focusing on ADL retraining, body mechanics, transfer retraining, increasing activity tolerance/endurance and EOB sitting to increase ability to participate in ADL/functional tasks  Pt was found in bed and was left in chair w/ all needs within reach, chair alarm on  Pt completed bed mob w mod Ax1, sat EOB w S/min A  STS w mod Ax2 and stand pivot with max Ax2, b/l HHA and knee blocking  Pt stood at sink w mod Ax1 while engaging in grooming tasks, which he required mod A to complete  UB/LB bathing completed while pt sitting in chair w mod A/max A, respectively  Please see flowsheet for further detail  Provided edu to spouse regarding UE ROM engagement and when to cindy/doff sling, as well as proper positioning while in bed  Pt w/ improvements in activity tolerance, transfer ability, ADL Task completion and cognition, however is still limited 2* decreased ADL/High-level ADL status, decreased activity tolerance/endurance,  decreased self-care trans, decreased safety awareness and insight to condition  The patient's raw score on the AM-PAC Daily Activity Inpatient Short Form is 13  A raw score of less than 19 suggests the patient may benefit from discharge to post-acute rehabilitation services   Please refer to the recommendation of the Occupational Therapist for safe discharge planning  Recommending pt D/C to STR when medically stable  Pt will continue to benefit from acute OT services to meet goals  Plan   Treatment Interventions ADL retraining;Functional transfer training;UE strengthening/ROM; Endurance training;Patient/family training;Energy conservation; Activityengagement; Compensatory technique education   Goal Expiration Date 03/13/23   OT Treatment Day 2   OT Frequency 3-5x/wk   Recommendation   OT Discharge Recommendation Post acute rehabilitation services   AM-PAC Daily Activity Inpatient   Lower Body Dressing 2   Bathing 2   Toileting 2   Upper Body Dressing 2   Grooming 2   Eating 3   Daily Activity Raw Score 13   Daily Activity Standardized Score (Calc for Raw Score >=11) 32 03   AM-PAC Applied Cognition Inpatient   Following a Speech/Presentation 4   Understanding Ordinary Conversation 4   Taking Medications 4   Remembering Where Things Are Placed or Put Away 4   Remembering List of 4-5 Errands 3   Taking Care of Complicated Tasks 3   Applied Cognition Raw Score 22   Applied Cognition Standardized Score 47 83   Modified Naya Scale   Modified Glacier Scale 4   End of Consult   Education Provided Yes   Patient Position at End of Consult Bedside chair;Bed/Chair alarm activated; All needs within reach   Nurse Communication Nurse aware of consult         MUKESH Gonzalez, OTR/L Patient Education

## 2023-04-22 ENCOUNTER — HOSPITAL ENCOUNTER (OUTPATIENT)
Dept: RADIOLOGY | Facility: HOSPITAL | Age: 63
Discharge: HOME | End: 2023-04-22

## 2023-04-22 DIAGNOSIS — I63.9 CVA (CEREBRAL VASCULAR ACCIDENT) (HCC): ICD-10-CM

## 2023-04-22 DIAGNOSIS — I77.74 VERTEBRAL ARTERY DISSECTION (HCC): ICD-10-CM

## 2023-04-22 RX ADMIN — IOHEXOL 85 ML: 350 INJECTION, SOLUTION INTRAVENOUS at 16:00

## 2023-04-24 ENCOUNTER — OFFICE VISIT (OUTPATIENT)
Dept: PHYSICAL THERAPY | Facility: CLINIC | Age: 63
End: 2023-04-24

## 2023-04-24 ENCOUNTER — OFFICE VISIT (OUTPATIENT)
Dept: SPEECH THERAPY | Facility: CLINIC | Age: 63
End: 2023-04-24

## 2023-04-24 DIAGNOSIS — R48.8 OTHER SYMBOLIC DYSFUNCTIONS: ICD-10-CM

## 2023-04-24 DIAGNOSIS — R47.01 EXPRESSIVE APHASIA: ICD-10-CM

## 2023-04-24 DIAGNOSIS — I63.50 POSTERIOR CIRCULATION STROKE (HCC): Primary | ICD-10-CM

## 2023-04-24 DIAGNOSIS — I63.9 CEREBROVASCULAR ACCIDENT (CVA), UNSPECIFIED MECHANISM (HCC): Primary | ICD-10-CM

## 2023-04-24 NOTE — PROGRESS NOTES
Daily Speech Treatment Note    Today's date: 2023  Patient’s name: Jefferson Vazquez  : 1960  MRN: [id number]  Safety measures:   Referring provider: Sona Altamirano,*    Encounter Diagnosis     ICD-10-CM    1  Cerebrovascular accident (CVA), unspecified mechanism ([facility] [address] )  I63 9       2  Other symbolic dysfunctions  M68 1       3  Expressive aphasia  R47 01         Visit trackin    Subjective/Behavioral:  - Pleasant/Cooperative    Objective/Assessment:  Completed structured activities with patient to target goals below  Results as stated below  The Repeatable Battery for the Assessment of Neuropsychological Status (RBANS) is a brief, individually-administered assessment which measures attention, language, visuospatial/constructional abilities, and immediate & delayed memory  The RBANS is intended for use with adolescents to adults, ages 15 to 80 years  The following results were obtained during the administration of the assessment  Form: D    Cognitive Domain/Subtest: Index Score: Percentile Rank: Classification: IE Index Score: Status:   IMMEDIATE MEMORY 103 58%ile Average 19 IMPROVEMENT        1  List Learning ()          2  Story Memory ()           VISUOSPATIAL/  CONSTRUCTIONAL 75 75%ile Borderline 75 NO CHANGE        3  Figure Copy (15/20)          4  Line Orientation ()           LANGUAGE 92 30%ile Average 68 IMPROVEMENT        5  Picture Naming (10/10)          6  Semantic Fluency ()           ATTENTION 82 12%ile Low Average 82 NO CHANGE        7  Digit Span ()          8  Coding ()           DELAYED MEMORY 102 55%ile Average 81 IMPROVEMENT        9  List Recall (6/10)          10  List Recognition ()          11  Story Recall (10/12)          12   Figure Recall ()           Sum of Index Scores:  454  371   IMPROVEMENT   Total Score:  86      Percentile: 18%ile      Classification: Low Average          “IE” indicates the scores from the initial evaluation (4/6/23)  Form: C      *Patient named 14 concrete category members in 60 sec (norm=15+)  -- BELOW AVERAGE            Short Term Goals  1  Patient will be educated on the use of internal and external memory aids and compensatory strategies with 80% accuracy to facilitate increased recall of routine, personal information, and recent events, to be achieved in 4-6 weeks  4/10: Completed education on memory strategies and word retrieval and provided handouts       2  Patient will complete auditory immediate and short term memory tasks to 80% accuracy to facilitate increased ability to retell narratives and recall information within functional living environment, to be achieved in 4-6 weeks  4/10: Visual recall following 5 minutes: 90% accuracy  4/12: Recall of details following 3 sentences of paragraph: 100% , recall of 1st column following 2nd column activity: 75%  4/18: Reading paragraph and answering questions: 90% accuracy, instructed in strategies for optimal reading recall   4/21: Recall of 80% of details - following SLP reading longer paragraph  100% of details recalled from medium length paragraph with door open      3  Patient will complete complex auditory attention processing tasks (e g , sentence unscramble, ranking numbers/words, etc ) to improve working memory with 80% accuracy, to be achieved in 4-6 weeks  4/10: Reverse order 3 & 4 words: 90% accuracy, unscrambling words into sentences: 3 & 4 words: 90% accuracy  Putting words in order sequence, 3 & 4 words: 90% accuracy      4  Patient will facilitate planning by completing thought organization tasks (e g , sequencing, deduction puzzles, etc ) with 80% accuracy to facilitate increased executive functioning, working memory, problem solving, and processing skills, to be achieved in 4-6 weeks  4/12/23: Completed organization of closet given paragraph information with Howard, 100% accuracy      5   Patient will complete concrete and abstract categorization tasks to 80% accuracy to facilitate improved generative naming skills and working memory, to be achieved in 4-6 weeks  4/18: Naming concrete and abstract members and answering word finding responsive naming questions correctly, x 80% accuracy       6  Patient will utilize word finding strategies during semantic feature analysis treatment activity for improved naming and verbal expression skills  4/12: Completed semantic feature analysis given content words and emotional words, completed with initial minimal cues but then independent        7  Patient will name an average of 15+ items in a category in 60 seconds over 5 trials using compensatory strategies and min cues to facilitate improved word retrieval skills, to be achieved in 4-6 weeks  4/18: Achieved 15+ items in half of categories in 1 minute time span, other categories increased to over 15 with min cues       8  Patient will name an average of 10+ words that begin with a specific letter in 60 seconds over 5 trials using compensatory strategies and min cues to facilitate improved word retrieval skills, to be achieved in 4-6 weeks  4/12: Formulating list of words given letter, list of 11 for /p/, 4 for /m/, 10 for /s/  Provided education on formulating additional words after this task completed and encouraged practice at home       9  Patient will name an appropriate synonym/antonym for a given word with 80% accuracy to build expressive vocabulary for conversation, to be achieved in 4-6 weeks  4/10: Providing synonym and antonym: 80% accuracy, generally mildly increased time for processing/word retrieval      10  Patient will complete word generation tasks (e g , analogies, category matrices, etc ) with 80% accuracy using word finding strategies to facilitate improved word retrieval skills, to be achieved in 4-6 weeks  4/21: Answering 90% of analogies      4/24/23: Completed RBANS-Form D for reassessment: Patient showed progress in all areas tested except for visuospatial constructional and attention  Overall score improved from 371 to a 454      Long Term Goals     Patient will demonstrate cognitive-communication skills consistent with age and education given use of compensatory strategies when needed to resume baseline activities and responsibilities in home, community, and work/school settings by discharge       Patient will complete higher-level expressive language tasks (e g , word definitions, idioms, synonym/antonyms, etc) with 80% accuracy to improve functional communication skills by discharge         Plan:  -Continue with current plan of care  Review of games/ workbooks next session to maximize cognition- discharge next session

## 2023-04-24 NOTE — PROGRESS NOTES
Daily Note     Today's date: 2023  Patient name: Brielle Kowalski  : 1960  MRN: [id number]  Referring provider: Leslie Ayala  Dx:   Encounter Diagnosis     ICD-10-CM    1  Posterior circulation stroke (HCC)  I63 50                      Subjective: No new complaints  Objective: See treatment diary below      Assessment:  Good tolerance to TE as noted  Pt able to complete increased ambulation distance without AD with increased safety and cues for increased step length  Moderate fatigue reported with ambulation without AD  Patient demonstrated fatigue post treatment, exhibited good technique with therapeutic exercises and would benefit from continued PT to improve gait, endurance, and balance  Plan: Continue per plan of care  limb adv, propulsion  Uneven surfaces  Obstacles  Increase TM pace  Precautions: Falls,   Re-eval Date: 2023    Date      Visit Count 6 7      FOTO See IE       Pain In See IE       Pain Out                Testing        TUG/TUGC 17 15 / 16 70       5xSTS 20 81       Gait speed 0 58       FGA 8       2/6MWT 576       Neuro Re-Ed        Dynamic balance  Hurdles only on L 8 laps 7  5#R    Lat step 7  5#R 6 laps cues for plyometric force    bwd step solo 4 laps 7 5# R Lat step   6 laps   7 5# RLE    BKWD step 4 laps   7 5# RLE    Hurdles only on L 8 laps 7 5# on R         Static balance        Obstacle course  Folded uneven mat + hurdles step through [address] 8 laps 7 5# R      Resisted ambulation Sled push 4 laps 5 plates, 2 laps 5 plates    [place] TB + 5#R resisted solo 10 laps 10 laps red/blue TB solo fwd 7 5# R    [place] TB + 7  5#R at ankle resisted 10 laps      Uneven surfaces        HKM on foam (// bars)                Ther Ex        SLR x 4        HR/TR        Mini Squats        Step ups                                        Ther Activity        ADL        Curb negotiation         Gait Training        No AD  14 laps 7 5# R 8 laps cues for inc L Patient referred for BRBPR (bright red blood per rectum). Comments state: previous hx of hemorrhoids, may need colonoscopy. Please advise. step 5 laps around ortho gym 7 5# R cues for inc L step     SPC 5# R grass fwd/bwd 8min    Orthoside x2 5#R    5#R stairs 1 UE support 3 flights 7  5#R fwd 6 laps 7 5# R 8 laps in neuro gym     Treadmill R forced use 2 5min 1  1mph 0%       Head turns        Modalities         prn

## 2023-04-25 ENCOUNTER — APPOINTMENT (OUTPATIENT)
Dept: SPEECH THERAPY | Facility: CLINIC | Age: 63
End: 2023-04-25

## 2023-04-25 ENCOUNTER — OFFICE VISIT (OUTPATIENT)
Dept: PHYSICAL THERAPY | Facility: CLINIC | Age: 63
End: 2023-04-25

## 2023-04-25 DIAGNOSIS — I63.50 POSTERIOR CIRCULATION STROKE (HCC): Primary | ICD-10-CM

## 2023-04-25 NOTE — PROGRESS NOTES
Daily Note     Today's date: 2023  Patient name: Diamante Ibarra  : 1960  MRN: [id number]  Referring provider: Jeison Vasquez  Dx:   Encounter Diagnosis     ICD-10-CM    1  Posterior circulation stroke ([address] )  I63 50           Start Time: 0850  Stop Time: 09  Total time in clinic (min): 40 minutes    Subjective: No new complaints  Objective: See treatment diary below      Assessment:    Ambulation without a device with occasional toe catching on right  Introduced ambulation over hidden obstacles with wedge walk up and over  Instability and increased tone evident with rapid changes in position  Nice gains in stability with repetition  Added 7 5 # LE weight and anterior toe wedge to encourage dorsiflexion with nice gains  Hurdles with step over step, increased stride with initial circumduction evident  Discussed at length positioning to assist with sleep due to pain waking patient  Suspect increased tone, patient will discuss more with Neurology this week  Recommended placement of washcloth in brace to increase dorsiflexion and to attempt sidelying with pillow for support due to decreased pain noted in sitting (decreased extensor positioning)  Patient and wife voiced understanding to all instructions  Nice gains with increased zachariah and stride length at end of session without device  Plan: Continue per plan of care  limb adv, propulsion  Uneven surfaces  Obstacles  Increase TM pace  Assess changes to sleep position  Precautions: Falls,   Re-eval Date: 2023    Date     Visit Count 6 7      FOTO See IE       Pain In See IE       Pain Out                Testing        TUG/TUGC 17 15  70       5xSTS 20 81       Gait speed 0 58       FGA 8       2/6MWT 576       Neuro Re-Ed        Dynamic balance  Hurdles only on L 8 laps 7  5#R    Lat step 7  5#R 6 laps cues for plyometric force    bwd step solo 4 laps 7 5# R Lat step   6 laps   7 5# RLE    BKWD step 4 laps   7 5# RLE    Hurdles only on L 8 laps 7 5# on R     Hurdles 6 foot over foot with 7 5# right and toe wedge for DF 6 laps x2    Static balance        Obstacle course  Folded uneven mat + hurdles step through [facility] 8 laps 7 5# R  Mat over obstacles with wedge walk up and down, in SOLO without device, increased speed    Resisted ambulation Sled push 4 laps 5 plates, 2 laps 5 plates    [place] TB + 5#R resisted solo 10 laps 10 laps red/blue TB solo fwd 7 5# R    [place] TB + 7  5#R at ankle resisted 10 laps  Ambulation with increased zachariah and stride 7 5 # right and toe wedge    Uneven surfaces        HKM on foam (// bars)                Ther Ex        SLR x 4        HR/TR        Mini Squats        Step ups                                        Ther Activity        ADL        Curb negotiation         Gait Training        No AD  14 laps 7 5# R 8 laps cues for inc L step 5 laps around ortho gym 7 5# R cues for inc L step No device 40' x10 rapid pace 7 5 # right    SPC 5# R grass fwd/bwd 8min    Orthoside x2 5#R    5#R stairs 1 UE support 3 flights 7  5#R fwd 6 laps 7 5# R 8 laps in neuro gym     Treadmill R forced use 2 5min 1  1mph 0%       Head turns        Modalities         prn

## 2023-04-26 ENCOUNTER — OFFICE VISIT (OUTPATIENT)
Dept: PHYSICAL THERAPY | Facility: CLINIC | Age: 63
End: 2023-04-26

## 2023-04-26 ENCOUNTER — OFFICE VISIT (OUTPATIENT)
Dept: OCCUPATIONAL THERAPY | Facility: CLINIC | Age: 63
End: 2023-04-26

## 2023-04-26 DIAGNOSIS — I63.50 POSTERIOR CIRCULATION STROKE (HCC): Primary | ICD-10-CM

## 2023-04-26 DIAGNOSIS — I63.9 CEREBROVASCULAR ACCIDENT (CVA), UNSPECIFIED MECHANISM (HCC): ICD-10-CM

## 2023-04-26 NOTE — PROGRESS NOTES
Daily Note/Progress Note     Today's date: 2023  Patient name: Sofy Aragon  : 1960  MRN: [id number]  Referring provider: Gemma Ceballos  Dx:   Encounter Diagnosis     ICD-10-CM    1  Posterior circulation stroke ([address] )  I63 50           Start Time: 3806  Stop Time: 1040  Total time in clinic (min): 63 minutes    Subjective: Did not sleep that well, still having cramps  Feels like he is noticing improvement since starting PT  Walking a lot at home and doing home projects  No falls  Using [facility], taking some steps without AD at home  Seeing physiatry and neurosurgery soon  Objective: See treatment diary below      Balance Test    6 Minute Walk Test (ft): 576 1-2 LOB PT assist no AD 914ft no AD no LOB   FGA: 8 no AD SPC 15/30   Gait Speed (m/s): 0 58 SPC 0 88 m/s   5x Sit To Stand (s): 20 81 10 39 L weight shift   TUG/TUGC: 17 15 / 16 70 100>85 no AD 13 15 SPC     Goals  In 4 weeks, patient will:  1  Demonstrate ability to perform 20 minutes of activity without requiring seated rest break  - progressing  2  Demonstrate ability to maintain upright balance unsupported by UEs while reaching above shoulder height without resistance to promote stability with ADLs - met  3  Improve 5xSTS to below cutoff score for age-matched peers, 14 8 seconds, to demonstrate improved LE stability to complete transfers safely  - met  4  Improve TUG to below cutoff score with least restrictive device against age matched peers, 13 5 seconds, to demonstrate improved functional mobility  - met    Assessment:  Pt progressing well with outcome measures and overall mobility  He is remaining diligent with walking program and staying active at home  Shows MCID value gains in nearly all measures for Persons with stroke  Reducing assistance to [facility] at home without falls  His gait speed is now at the level of an unlimited community ambulator at 0 88m/s with his SPC   His FGA still places him at risk for falls, but he "does show significant improvement in his balance  Shows wide base of support especially with fatigue  He is following up with neurosurgery and physiatry soon for medical management  He is continuing to develop better gait and balance with uneven surfaces and developing his endurance  He is meeting his short term PT goals as well as personal goals  He will continue to benefit from skilled PT to improve his gait, balance, and overall strength  Continue per plan of care  Plan: Continue per plan of care  limb adv, propulsion  Uneven surfaces  Obstacles  Increase TM pace  Assess changes to sleep position  Precautions: Falls,   Re-eval Date: 6/5/2023    Date 4/19 4/20 4/24 4/25 4/26   Visit Count 6 7 8 9 10   FOTO See IE       Pain In See IE       Pain Out                Testing   4/24     TUG/TUGC 17 15 / 16 70       5xSTS 20 81       Gait speed 0 58       FGA 8       2/6MWT 576       Neuro Re-Ed        Dynamic balance  Hurdles only on L 8 laps 7  5#R    Lat step 7  5#R 6 laps cues for plyometric force    bwd step solo 4 laps 7 5# R Lat step   6 laps   7 5# RLE    BKWD step 4 laps   7 5# RLE    Hurdles only on L 8 laps 7 5# on R     Hurdles 6 foot over foot with 7 5# right and toe wedge for DF 6 laps x2 Timed shuttle run solo fwd/bwd DE 24 67s 7 5# R x4 RPE 16/20   Static balance     Mat over bosu static balance + perturb narrow base of support no AD x4 max 30\" RPS 7-8/10   Obstacle course  Folded uneven mat + hurdles step through [facility] 8 laps 7 5# R  Mat over obstacles with wedge walk up and down, in SOLO without device, increased speed Uneven folded mat + 6' step + 3 low hurdles 7 5# R solo cues for speed 4 laps x1, 0# 4 laps x1 RPE 15/20   Resisted ambulation Sled push 4 laps 5 plates, 2 laps 5 plates    [place] TB + 5#R resisted solo 10 laps 10 laps red/blue TB solo fwd 7 5# R    [place] TB + 7  5#R at ankle resisted 10 laps  Ambulation with increased zachariah and stride 7 5 # right and toe wedge    Uneven surfaces     " HKM on foam (// bars)                Ther Ex        SLR x 4        HR/TR        Mini Squats        Step ups        Calf stretch                                Ther Activity        ADL        Curb negotiation         Gait Training        No AD  14 laps 7 5# R 8 laps cues for inc L step 5 laps around ortho gym 7 5# R cues for inc L step No device 40' x10 rapid pace 7 5 # right No AD 2 laps ortho side cues for inc pace 0#   SPC 5# R grass fwd/bwd 8min    Orthoside x2 5#R    5#R stairs 1 UE support 3 flights 7  5#R fwd 6 laps 7 5# R 8 laps in neuro gym     Treadmill R forced use 2 5min 1  1mph 0%    1  3mph R forced use [place] TB 3min x2 RPE 15/20   Head turns        Modalities         prn

## 2023-04-26 NOTE — PROGRESS NOTES
Occupational Therapy Daily Note    Today's date: 2023  Patient name: Mallory Narvaez  : 1960  MRN: [id number]  Referring provider: Kolton Rose  Dx:   Encounter Diagnoses   Name Primary? • Posterior circulation stroke ([facility] ) Yes   • C[facility]bro[facility] accident (CVA), unspecified mechanism ([facility] )        Subjective: pt without complaints     Objective: Pt engaged in skilled OT treatment session with focus on UE NMR, UE strengthening, UE endurance and FMC/GMC to increase engagement, endurance, tolerance, and independence with daily ADL and IADL tasks  CPT Code Minutes                                           Task Details        Therapeutic Activity               Neuro Re-Ed  Pt engaged in series of activities focused on NMRE, functional reaching, target placement, and in-hand manipulations to improve motor control and functional use of RUE  Completed the following in stance:   BUE coordination: pt able to use BUE to catch a baseball sized ball with G BUE coordination and G eye-hand coordination  Throwing underhand at floor target: G+ accuracy, G movement patterns/GMC, G timing of grasp/ release    Throwing underhand at hanging target: G accuracy, G/G- movement patterns/GMC, G timing of grasp/ release    Throwing overhand at hanging target: F+ accuracy, F+ movement patterns/ GMC, G timing of grasp/ release     Functional reaching: pt able to use RUE to reach into all planes and retrieve medium sized object, min difficulty with reach, G weight shifting into plane of reach, G standing balance    Standing gregory: pt engaged in activity for 30 min total in stance, with 1 seated rest break, gregory well  Pt then engaged in seated tabletop tasks focusing on [facility] and dexterity  Pt able to use alternating pincer to  small soft objects and place on target, G regulation of grasp on object, G+ target accuracy     Dexterity: pt able to bring object from palm to fingertips into pincer with G- in-hand manipulations                            Therapeutic Exercise  [employer] Web: 10 reps each   Finger Flex  Finger Ext  Finger Adduction  Finger Abduction  Thumb Flex/ adduction             TESTING  4/12:   [place] Finger Dexterity Test  L: 3 min 2 sec ( top 5 rows)  R: 8 min 24 sec  (top 3 rows)            HEP           Assessment: Tolerated treatment well  Pt with improving dexterity, BUE coordination, [address] and [address] [place] during functional tasks  Patient would benefit from continued skilled OT      Plan: Continued skilled OT per POC    INTERVENTION COMMENTS:  Diagnosis: Cerebrovascular accident (CVA) ([facility] ) [I63 9]  Precautions: fall risk, R side weakness   Insurance: Payor: Alejandro Salomon / Plan: Alejandro Salomon PPO / Product Type: PPO /   7 of 20 visits  PN due 5/4/23

## 2023-04-27 ENCOUNTER — TELEPHONE (OUTPATIENT)
Dept: NEUROLOGY | Facility: CLINIC | Age: 63
End: 2023-04-27

## 2023-04-27 ENCOUNTER — OFFICE VISIT (OUTPATIENT)
Dept: SPEECH THERAPY | Facility: CLINIC | Age: 63
End: 2023-04-27

## 2023-04-27 ENCOUNTER — OFFICE VISIT (OUTPATIENT)
Dept: PHYSICAL THERAPY | Facility: CLINIC | Age: 63
End: 2023-04-27

## 2023-04-27 ENCOUNTER — TELEPHONE (OUTPATIENT)
Dept: FAMILY MEDICINE CLINIC | Facility: CLINIC | Age: 63
End: 2023-04-27

## 2023-04-27 DIAGNOSIS — I63.9 CEREBROVASCULAR ACCIDENT (CVA) (HCC): ICD-10-CM

## 2023-04-27 DIAGNOSIS — I63.50 POSTERIOR CIRCULATION STROKE (HCC): Primary | ICD-10-CM

## 2023-04-27 DIAGNOSIS — R48.8 OTHER SYMBOLIC DYSFUNCTIONS: ICD-10-CM

## 2023-04-27 DIAGNOSIS — I63.9 CEREBROVASCULAR ACCIDENT (CVA), UNSPECIFIED MECHANISM (HCC): Primary | ICD-10-CM

## 2023-04-27 DIAGNOSIS — R47.01 EXPRESSIVE APHASIA: ICD-10-CM

## 2023-04-27 NOTE — TELEPHONE ENCOUNTER
Pt's wife Rachana Barba left message   Stated that pt is on Eliquis and needs authorization to get that refilled  Please call us back of how to proceed  CB# [phone]    Called pt's wife back and advised that refill request for Eliquis should be send to family doctor since pt is not seen by us yet    Rachana Barba stated that his PCP dr Dandre Cid refused to fill Eliquis and told them that they need to call Neurology  Pt only has 3 days left of Eliquis  Pt has HFU on 5/4 with dr Trey Jurado on 6/21 with Magen Elena      Please advise

## 2023-04-27 NOTE — TELEPHONE ENCOUNTER
Message received from [facility]  We are calling to see if you started the prior authorization for Eliquis  Please give us a call back at [phone]

## 2023-04-27 NOTE — PROGRESS NOTES
Refilled eliquis as requested by patient  Has upcoming visit with neurosurgery at which time med plan moving forward to be determined

## 2023-04-27 NOTE — TELEPHONE ENCOUNTER
Prior Auth-    Rx: Eliquis    Received VM from patient's wife stating that Prior Auth is needed for this medication refill  I spoke with the patient's wife and advised that while our office had not received notification of the request from the pharmacy, we do see that [address] [place] [address] Neurology has noted in pt's chart that they have begun the PA process  I left a VM for  Neurology CV, requesting call back, to confirm that they are proceeding with the Prior Auth for Eliquis  Awaiting call back

## 2023-04-27 NOTE — TELEPHONE ENCOUNTER
Pt's wife left message  Pt has follow up appt on 5/4 with dr Jennifer Vasquez but I'm calling because he has very, very bad pain in his right leg that was affected by his stroke  The pain is so severe that it's keeping him from sleeping at night and he's made his physical therapists aware of this at [place] [address] in [facility]  But I just wanted to make sure that the message got to doctor Jennifer Vasquez   And I don't know if there's anything that can be done before his appointment, but it really is limiting what he's able to do with his PT sessions  At this point  My callback number is [phone]  Thank you very much  Called back and left message that pt needs to follow up with PCP until seen by us

## 2023-04-27 NOTE — TELEPHONE ENCOUNTER
Called and made [facility] aware, she stated that she was told that Eliquis needs Prior authorizations,advised that I will send that to appropriate team to work on the [address]    # [phone]

## 2023-04-27 NOTE — TELEPHONE ENCOUNTER
"I called  patient's wife to let her know that I refilled patient's eliquis so that he does not run out  It's not that I did not want to refill it, I was just waiting for clarification from neurology regarding length of time they wanted in on this along with the brilinta  I did receive message back from neurology, Dr Verona London, who noted \"I'm not very familiar with the patient, however I can see in the chart that he has an appointment with Neurosurgery on 5/1/23 and in the discharge summary from [facility] - medication clarification to be determined at that appointment on Eliquis duration after 5/1/23  Yaya Rubin can clarify this after the 5/1/23 appointment with comments on Eliquis continuation  Ana Hernandez would refill all for now        After calling patient/patient's wife, saw in chart that neurology PA had already sent rx for the eliquis and that neurology is working on the prior authorization    "

## 2023-04-27 NOTE — PROGRESS NOTES
Daily Note     Today's date: 2023  Patient name: Sofy Aragon  : 1960  MRN: [id number]  Referring provider: Gemma Ceballos  Dx:   Encounter Diagnosis     ICD-10-CM    1  Posterior circulation stroke ([address] )  I63 50           Start Time: 0845  Stop Time: 0940  Total time in clinic (min): 55 minutes    Subjective:   Right arch pain -6/10  Objective: See treatment diary below      Assessment:    Despite changes in positioning including sidelying and use of towel roll in brace, pain in arch and ankle on right continue to persist with poor night's sleep  Pain is now intermittently being noted during day with pain today  Plantar fascia with increased tissue density noted  Stair stretch has been performed at home, but is only addressing calf tightness without impact on arch  Prostretch with nice gains in stretch and decreased pain noted, 3-4/10  Resisted stance on right with TB for swing with improved motion  Gait over hurdles with nice gains in clearance and stability  KT applied to arch for support with education on wear time, heat avoidance and proper removal with voiced understanding  Added towel stretch for improved plantar release with good demonstration  Due to poor arch support in present shoes, encouraged arch supports to aide with corrected positioning  Plan:   Assess tolerance to new plantar stretch, continue to progress gait challenges for increased stability with focus on increased stride length right  Overall plantar pain decreased to 3/10 post treatment  Precautions: Falls,   Re-eval Date: 2023    Date    Visit Count 6 7      FOTO See IE       Pain In See IE       Pain Out                Testing      TUG/TUGC 17 15 / 16 70       5xSTS 20 81       Gait speed 0 58       FGA 8       2/6MWT 576       Neuro Re-Ed        Dynamic balance  Hurdles only on L 8 laps 7  5#R    Lat step 7  5#R 6 laps cues for plyometric force    bwd step "solo 4 laps 7 5# R Lat step   6 laps   7 5# RLE    BKWD step 4 laps   7 5# RLE    Hurdles only on L 8 laps 7 5# on R     Hurdles 6 foot over foot with 7 5# right and toe wedge for DF 6 laps x2 Hurdles 6 foot over foot 6 laps x2   Static balance        Obstacle course  Folded uneven mat + hurdles step through [facility] 8 laps 7 5# R  Mat over obstacles with wedge walk up and down, in SOLO without device, increased speed    Resisted ambulation Sled push 4 laps 5 plates, 2 laps 5 plates    [place] TB + 5#R resisted solo 10 laps 10 laps red/blue TB solo fwd 7 5# R    [place] TB + 7  5#R at ankle resisted 10 laps  Ambulation with increased zachariah and stride 7 5 # right and toe wedge Ambulation with increased zachariah and stride right 6 laps x2   Uneven surfaces        HKM on foam (// bars)                Ther Ex        SLR x 4        HR/TR        Mini Squats        Step ups        Plantar stretch pro-stretch     30\" x2 right   Towel stretch right      30\" x2                   Ther Activity        ADL        Curb negotiation         Gait Training        No AD  14 laps 7 5# R 8 laps cues for inc L step 5 laps around ortho gym 7 5# R cues for inc L step No device 40' x10 rapid pace 7 5 # right No device 40' x5   SPC 5# R grass fwd/bwd 8min    Orthoside x2 5#R    5#R stairs 1 UE support 3 flights 7  5#R fwd 6 laps 7 5# R 8 laps in neuro gym     Treadmill R forced use 2 5min 1  1mph 0%       Head turns        Manuals        Plantar release     VM   KT 50% arch support, paper off tension med/lat right ankle     VM              "

## 2023-04-27 NOTE — PROGRESS NOTES
Speech-Language Pathology Discharge    Today's date: 2023   Patient’s name: Jose L Richards  : 1960  MRN: [id number]  Safety measures:   Referring provider: Krystin Zuñiga,*    Encounter Diagnosis     ICD-10-CM    1  Cerebrovascular accident (CVA), unspecified mechanism ([facility] [address] )  I63 9       2  Other symbolic dysfunctions  P97 6       3  Expressive aphasia  R47 01           Visit tracking:  -Referring provider: Epic  -Billing guidelines: AMA  -Visit # 7  -Insurance: [employer]      Subjective comments: Pleasant/Cooperative    Patient's goal(s): To continue to improve in all areas  : Reviewed progress and results of testing last session  Patient expressed understanding  Decreased visualspatial constuctional and attention scores presumed largely secondary to use of weaker hand on writing tasks  Goals    Short Term Goals  1  Patient will be educated on the use of internal and external memory aids and compensatory strategies with 80% accuracy to facilitate increased recall of routine, personal information, and recent events, to be achieved in 4-6 weeks  4/10: Completed education on memory strategies and word retrieval and provided handouts  : Provided list of workbooks and games to continue to keep cognition at optimal levels  Played / instructed in [facility] game with min cues at times fading to independent self correction with divided attention task, 90% accuracy overall, played spot it and 1 LiveAction game  Patient performed well with tasks, Provided general education for maximizing divided attention including taking short breaks to maximize attention focus and limiting distractions  ACHIEVED     2  Patient will complete auditory immediate and short term memory tasks to 80% accuracy to facilitate increased ability to retell narratives and recall information within functional living environment, to be achieved in 4-6 weeks  4/10: Visual recall following 5 minutes: 90% accuracy    : Recall of details following 3 sentences of paragraph: 100% , recall of 1st column following 2nd column activity: 75%  4/18: Reading paragraph and answering questions: 90% accuracy, instructed in strategies for optimal reading recall   4/21: Recall of 80% of details - following SLP reading longer paragraph  100% of details recalled from medium length paragraph with door open  ACHIEVED     3  Patient will complete complex auditory attention processing tasks (e g , sentence unscramble, ranking numbers/words, etc ) to improve working memory with 80% accuracy, to be achieved in 4-6 weeks  4/10: Reverse order 3 & 4 words: 90% accuracy, unscrambling words into sentences: 3 & 4 words: 90% accuracy  Putting words in order sequence, 3 & 4 words: 90% accuracy  ACHIEVED     4  Patient will facilitate planning by completing thought organization tasks (e g , sequencing, deduction puzzles, etc ) with 80% accuracy to facilitate increased executive functioning, working memory, problem solving, and processing skills, to be achieved in 4-6 weeks  4/12/23: Completed organization of closet given paragraph information with [place], 100% accuracy  ACHIEVED     5  Patient will complete concrete and abstract categorization tasks to 80% accuracy to facilitate improved generative naming skills and working memory, to be achieved in 4-6 weeks  4/18: Naming concrete and abstract members and answering word finding responsive naming questions correctly, x 80% accuracy  ACHIEVED     6  Patient will utilize word finding strategies during semantic feature analysis treatment activity for improved naming and verbal expression skills  4/12: Completed semantic feature analysis given content words and emotional words, completed with initial minimal cues but then independent    ACHIEVED     7  Patient will name an average of 15+ items in a category in 60 seconds over 5 trials using compensatory strategies and min cues to facilitate improved word retrieval skills, to be achieved in 4-6 weeks  4/18: Achieved 15+ items in half of categories in 1 minute time span, other categories increased to over 15 with min cues  ACHIEVED     8  Patient will name an average of 10+ words that begin with a specific letter in 60 seconds over 5 trials using compensatory strategies and min cues to facilitate improved word retrieval skills, to be achieved in 4-6 weeks  4/12: Formulating list of words given letter, list of 11 for /p/, 4 for /m/, 10 for /s/  Provided education on formulating additional words after this task completed and encouraged practice at home  ACHIEVED     9  Patient will name an appropriate synonym/antonym for a given word with 80% accuracy to build expressive vocabulary for conversation, to be achieved in 4-6 weeks  4/10: Providing synonym and antonym: 80% accuracy, generally mildly increased time for processing/word retrieval  ACHIEVED     10  Patient will complete word generation tasks (e g , analogies, category matrices, etc ) with 80% accuracy using word finding strategies to facilitate improved word retrieval skills, to be achieved in 4-6 weeks  4/21: Answering 90% of analogies  ACHIEVED     4/24/23: Completed RBANS-Form D for reassessment: Patient showed progress in all areas tested except for visuospatial constructional and attention  Overall score improved from 371 to a 454      Long Term Goals     Patient will demonstrate cognitive-communication skills consistent with age and education given use of compensatory strategies when needed to resume baseline activities and responsibilities in home, community, and work/school settings by discharge   ACHIEVED     Patient will complete higher-level expressive language tasks (e g , word definitions, idioms, synonym/antonyms, etc) with 80% accuracy to improve functional communication skills by discharge  ACHIEVED             Impressions/Recommendations    Impressions:   -Patient presents with functional cognition and achieved all goals above  Discharge appropriate  Patient may have higher level slight attentional deficits and slight higher level processing/word retrieval weakness but patient independent with use of compensatory strategies and operating at functional level  Recommendations:  -Patient to be discharged from outpatient skilled Speech Therapy services: Patient achieved all goals in plan of care      -Frequency: No treatment is warranted at this time   -Duration: N/A    -Intervention comments:

## 2023-04-28 ENCOUNTER — OFFICE VISIT (OUTPATIENT)
Dept: PHYSICAL THERAPY | Facility: CLINIC | Age: 63
End: 2023-04-28

## 2023-04-28 ENCOUNTER — OFFICE VISIT (OUTPATIENT)
Dept: OCCUPATIONAL THERAPY | Facility: CLINIC | Age: 63
End: 2023-04-28

## 2023-04-28 DIAGNOSIS — I63.9 CEREBROVASCULAR ACCIDENT (CVA), UNSPECIFIED MECHANISM (HCC): ICD-10-CM

## 2023-04-28 DIAGNOSIS — I63.50 POSTERIOR CIRCULATION STROKE (HCC): Primary | ICD-10-CM

## 2023-04-28 NOTE — PROGRESS NOTES
Occupational Therapy Daily Note    Today's date: 2023  Patient name: Anny Jones  : 1960  MRN: [id number]  Referring provider: Gab Guardado  Dx:   Encounter Diagnoses   Name Primary? • Posterior circulation stroke ([facility] [address] ) Yes   • [facility] accident (CV[facility]        Subjective: pt without complaints     Objective: Pt engaged in skilled OT treatment session with focus on UE NMR, UE strengthening, UE endurance and FMC/GMC to increase engagement, endurance, tolerance, and independence with daily ADL and IADL tasks  CPT Code Minutes                                           Task Details        Therapeutic Activity               Neuro Re-Ed  Pt engaged in series of activities focused on NMRE, functional reaching, target placement, and in-hand manipulations to improve motor control and functional use of RUE  Functional reaching: pt able to reach into overhead plane with RUE to retrieve red resistive clothespins from clothesline and transfer to vertical line on his left side, G functional reaching, min difficulty with sustained sh flexion during reaching  Prehension patterns/ Strength:   pt able to use R hand pincer to open red resistive clothespins and place on vertical pole at midline, min difficulty  Able to use R 3 jaw belia to manipulate green resistive clothespins and place on vertical pole at midline, min difficulty  Able to use R lateral pinch to manipulate blue resistive clothespins and place on [place] clothesline with min difficulty with reach and min difficulty with dexterity to get clothespin into lateral grasp  Dexterity: pt with max difficulty picking up flat objects from tabletop surface, uses a modified raking grasp to  quarter sized object  Pt with max difficulty with in-hand manipulations and max difficulty maintaining grasp on multiple items in palm when bringing one item at a time into pincer to place on target   Dropped 40%                           Therapeutic Exercise  Pt completed R hand AROM exercise into finger extension and finger ab/adduction to improve motor control and ROM  Provided HEP to address finger isolated ROM  Pt with G understanding  TESTING  4/12:   O'Juanjose Finger Dexterity Test  L: 3 min 2 sec ( top 5 rows)  R: 8 min 24 sec  (top 3 rows)            HEP  4/28: provided pt with [facility] activity as HEP to improve finger ROM into ab/adduction         Assessment: Tolerated treatment well  Pt with improving dexterity, BUE coordination, [address] [place] [address] and [facility] during functional tasks  Patient would benefit from continued skilled OT      Plan: Continued skilled OT per POC    INTERVENTION COMMENTS:  Diagnosis: [facility] [I63 9]  Precautions: fall risk, R side weakness   Insurance: Payor: Lorena Pennington / Plan: Lorena Pennington PPO / Product Type: PPO /   8 of 20 visits  PN due 5/4/23

## 2023-04-28 NOTE — TELEPHONE ENCOUNTER
Pts wife called and asked what to do if this is not filled by Sunday and Pt does not have the medication by then   Please assist

## 2023-04-28 NOTE — TELEPHONE ENCOUNTER
Called [employer] to help expedite that appeal  Spoke with the representative  Requested to expedite the urgent appeal  Provided ICD code for acute posterior circulation stroke and medical necessity for medication  The representative reports how she needs to send it to the reviewer and how this process can take up to 15 days  However, she states how it usually takes much shorter than 15 days  Inquired if there is anything this RN can do to expedite this urgent appeal  She said no and how we have to wait for the reviewer's determination  Requested for a phone number to follow up  She provided [phone]  This phone call took approximately one hour long due to wait time       Case number [id number]

## 2023-04-28 NOTE — TELEPHONE ENCOUNTER
Received vm-This message is regarding my husbands, prescription  His name is Diamante Ibarra,  date of birth [birth year]  Concerning eliquis and insurance authorization, he now has just one day and a half left of his medication  If someone could let me know what the status is for this  Thank you very much   My number is [phone]   -------------------------------------

## 2023-04-28 NOTE — TELEPHONE ENCOUNTER
Unfortunately the insurance denied the urgent PA  Sent an urgent appeal via covermymeds  Will await for determination  Called [place]  Provided her an update  Advised how she can  a supply of 4 days  She was under the impression how she thought the supply was for 4 days  Patient will have enough to last through the weekend  She expressed understanding and was very appreciative for the help

## 2023-04-28 NOTE — ASSESSMENT & PLAN NOTE
S/p intracranial and extracranial right vertebral artery stenting by Dr Ziggy Lewis 2/26/23  · TICI 2B revascularization   · Patient presented with dysarthria and dizziness  · Found to have bilateral cerebellar infarct with significant R V3/4 thrombus, L vertebral artery stenosis with possible occlusion within intradural segment  NIHSS 0 with symptom onset 2/23 progressed to dysarthria, left facial droop not corrected with smile, R side flaccid  · Recovering well  Ambulating with cane  Mild residual right sided weakness, especially in the hand with numbness  · Exam: GCS 15, diminished LT to right face and RUE/RLE, +drift on right, RUE/RLE 4/5, brisk upper and lower extremity reflexes R>L with right Talavera's    Imaging:  · CTA head/neck 4/22/23: 1   Stable right greater than left cerebellar, left greater than right pontine and midbrain, and left thalamic infarcts  2   Stable dissection flap within the V3 segment of the right vertebral artery  Stable in-stent occlusion in the V4 segment of the right vertebral artery with patent proximal and distal vessel  3   Stable occluded post PICA V4 segment of the left vertebral artery  4  Atherosclerotic change of the cervical carotid arteries without hemodynamically significant stenosis  5  Moderate sinus disease    Plan:  · Reviewed imaging with patient, wife and Dr Ziggy Lewis  Stable right vertebral dissection  Stent likely patent per Dr Joshua Matute review  · Given that he is >6 weeks from intervention, will discontinue Eliquis at this time  · Continue ASA, Brilinta  · Follow up with Neurology for ongoing stroke care  · Schedule for cerebral angiogram in 3 months  Continue DAPT including day of procedure  · Risks/benefits discussed and informed consent obtained  OR  to review instructions and dates  · Reviewed red flag s/s  · Call sooner with any questions or concerns

## 2023-04-28 NOTE — TELEPHONE ENCOUNTER
Completed urgent prior authorization on covermymeds  [email or web address]  Key: [id number]  Requested for expedited prior authorization  Waiting for determination by insurance  Called [facility] pharmacy in [place], [place]  Spoke with the pharmacist  Requested for an emergency fill  Unfortunately, they cannot accommodate an emergency fill  She reports the patient can  4 day supply at a cost around 95 dollars until the urgent PA comes back from the pharmacy  Checked GoodRx, unfortunately the price was equivalent  No samples at the office  Discussed with PCP office, Willa Severe, since she was also working on the same task  She reports how the wife is willing to pay 95 dollars for the 4 day supply until we await for the PA determination  No other needs at this time

## 2023-04-28 NOTE — PROGRESS NOTES
"Daily Note     Today's date: 2023  Patient name: Jim Ortiz  : 1960  MRN: [id number]  Referring provider: Vanna Leon  Dx:   Encounter Diagnosis     ICD-10-CM    1  Posterior circulation stroke ([facility] [address] )  I63 50           Start Time: 1017  Stop Time: 1058  Total time in clinic (min): 41 minutes    Subjective:  No pain in ankle/foot today  Staying active at home working on swimming platform  Objective: See treatment diary below      Assessment:  Challenged with reducing UE support with stairs, slight lateral instability with this and obstacle negotiation  Still requires some circumdution over hurdles especially with ankle weight but overall good clearance over obstacles  With head movement and external perturbations experiences imbalance, requires solo step at times to remain upright  Fair stability with direction change, most challenged backward stepping  Pt reports not foot/ankle pain throughout session when asked  He will continue to benefit from skilled PT to improve gait, balance, and strength  Plan:  High intensity stepping  Obstacle negotiation  Increased pacing for propulsion  Weighted/resisted ambulating  Precautions: Falls,   Re-eval Date: 2023    Date    Visit Count 12 7      FOTO See IE       Pain In See IE       Pain Out                Testing        TUG/TUGC 17 15 / 16 70       5xSTS 20 81       Gait speed 0 58       FGA 8       2/6MWT 576       Neuro Re-Ed        Dynamic balance 7  5#R fwd/bwd random direction cues 3min total RPE 16/20    7  5#R hurdles 6 laps x2       Static balance bosu under mat \"fly fishing\" 30\" x4       Obstacle course        Resisted ambulation 6 laps red/blue TB solo fwd x2 16/20       Uneven surfaces        HKM on foam (// bars)        RLE forced use Mini squat R forced use 2x6       Ther Ex        SLR x 4        HR/TR        Mini Squats        Step ups        Plantar stretch pro-stretch        Towel stretch right     " Ther Activity        ADL        Curb negotiation         Gait Training        No AD 5#R Stairs x3 flights no UE asc, RUE desc RPE 12/20       SPC        Treadmill        Head turns        Manuals        Plantar release        KT 50% arch support, paper off tension med/lat right ankle

## 2023-04-28 NOTE — TELEPHONE ENCOUNTER
Urgent PA was submitted via covermymeds  Please refer to this RN's documentation in other telephone note encounter

## 2023-04-28 NOTE — TELEPHONE ENCOUNTER
Patient wife calls in as she had not heard anything from the neurologist office about the eliquis  I contacted [facility] for assistance and to follow up as patient will be out of medication this Saturday  Pharmacy states that prior auth is needed, medication is on hold, out of pocket will be $500  Per Neurology a 4 day supplies should be around 95--patient agreeable to pay  Until prior auth determination returns  Contact pharmacy they will get fill ready for patient wife for the 4 day supplies and notify patient is Prior [employer] is determined over the weekend

## 2023-05-01 ENCOUNTER — OFFICE VISIT (OUTPATIENT)
Dept: NEUROSURGERY | Facility: CLINIC | Age: 63
End: 2023-05-01

## 2023-05-01 VITALS
WEIGHT: 179 LBS | HEIGHT: 69 IN | OXYGEN SATURATION: 98 % | RESPIRATION RATE: 16 BRPM | SYSTOLIC BLOOD PRESSURE: 142 MMHG | HEART RATE: 68 BPM | BODY MASS INDEX: 26.51 KG/M2 | TEMPERATURE: 97.7 F | DIASTOLIC BLOOD PRESSURE: 70 MMHG

## 2023-05-01 DIAGNOSIS — I77.74 VERTEBRAL ARTERY DISSECTION (HCC): Primary | ICD-10-CM

## 2023-05-01 DIAGNOSIS — I65.02 STENOSIS OF LEFT VERTEBRAL ARTERY: ICD-10-CM

## 2023-05-01 RX ORDER — SODIUM CHLORIDE 9 MG/ML
75 INJECTION, SOLUTION INTRAVENOUS CONTINUOUS
OUTPATIENT
Start: 2023-05-01

## 2023-05-01 NOTE — TELEPHONE ENCOUNTER
Called [facility] to obtain an update  Spoke with the prior authorization agent  Explained the situation and requested for an update  Offered to conduct a peer to peer review if that would help expedite the process  She transferred the call to the appeal department to obtain an update  Spoke with Suzan Rodriguez with the appeal department  Expressed the high importance of this medication and how it can be life threatening if the patient does not take it  She reports how the clinicals will make the decision today and will be faxed  She reports how the clinician will reach out by phone call if they have any questions since this is urgent  She provided a direct appeal department phone number of [phone]  Will continue to follow

## 2023-05-01 NOTE — PROGRESS NOTES
Neurosurgery Office Note  Bernadette Reed 60 y o  male MRN: [id number]      Assessment/Plan     Acute Posterior Circulation Stroke  S/p intracranial and extracranial right vertebral artery stenting by Dr Dina Kraus 2/26/23  · TICI 2B revascularization   · Patient presented with dysarthria and dizziness  · Found to have bilateral cerebellar infarct with significant R V3/4 thrombus, L vertebral artery stenosis with possible occlusion within intradural segment  NIHSS 0 with symptom onset 2/23 progressed to dysarthria, left facial droop not corrected with smile, R side flaccid  · Recovering well  Ambulating with cane  Mild residual right sided weakness, especially in the hand with numbness  · Exam: GCS 15, diminished LT to right face and RUE/RLE, +drift on right, RUE/RLE 4/5, brisk upper and lower extremity reflexes R>L with right Talavera's    Imaging:  · CTA head/neck 4/22/23: 1   Stable right greater than left cerebellar, left greater than right pontine and midbrain, and left thalamic infarcts  2   Stable dissection flap within the V3 segment of the right vertebral artery  Stable in-stent occlusion in the V4 segment of the right vertebral artery with patent proximal and distal vessel  3   Stable occluded post PICA V4 segment of the left vertebral artery  4  Atherosclerotic change of the cervical carotid arteries without hemodynamically significant stenosis  5  Moderate sinus disease    Plan:  · Reviewed imaging with patient, wife and Dr Dina Kraus  Stable right vertebral dissection  Stent likely patent per Dr Vidal Penheladio review  · Given that he is >6 weeks from intervention, will discontinue Eliquis at this time  · Continue ASA, Brilinta  · Follow up with Neurology for ongoing stroke care  · Schedule for cerebral angiogram in 3 months  Continue DAPT including day of procedure  · Risks/benefits discussed and informed consent obtained  OR  to review instructions and dates  · Reviewed red flag s/s    · Call sooner with any questions or concerns  Diagnoses and all orders for this visit:    Right Vertebral artery dissection (HCC)  -     IR cerebral angiography; Future    Stenosis of left vertebral artery  -     IR cerebral angiography; Future    Other orders  -     Acetaminophen (TYLENOL EXTRA STRENGTH PO); Take 1,000 mg by mouth daily as needed  -     Diet NPO; Sips with meds; Standing  -     Nursing communication Apply gown prior to procedure; Standing  -     Have Patient Void On Call to Procedure Room; Standing  -     Insert and Maintain IV; Standing  -     sodium chloride 0 9 % infusion          I have spent a total time of 50 minutes on 05/01/23 in caring for this patient including Diagnostic results, Prognosis, Risks and benefits of tx options, Instructions for management, Patient and family education, Impressions, Counseling / Coordination of care, Documenting in the medical record, Reviewing / ordering tests, medicine, procedures  , Obtaining or reviewing history   and Communicating with other healthcare professionals   CHIEF COMPLAINT    Chief Complaint   Patient presents with   [facility] follow up, with CTA       HISTORY    History of Present Illness     60y o  year old male     60year old gentleman seen for 6-8 week follow up s/p intracranial and extracranial right vertebral artery stenting by Dr Donya Miles 2/26/23 for right V3/4 thrombus  He presented with dysarthria and dizziness since 2/23/23  A couple days prior he was having double vision, but did not admit this to family prior to hospitalization  Imaging was significant for bilateral cerebellar infarcts, right V3/4 thrombus and left vertebral stenosis with possible occlusion  He was also found to be COVID + and have a UTI  NIHSS in ED was reportedly a 0 on admission and he was not a candidate for TNK given low NIH score and length of symptoms   He was also not recommended to undergo IR intervention because CT perfusion showed decreased flow in the posterior fossa; given length of symptoms and imaging it was thought that intervention with anesthesia may have more risk than conservative management       Unfortunately he did develop worsening symptoms with right facial droop and right sided flaccidity between 10:30 and 11:30am, prompting stroke alert and IR intervention 2/26/23  He was discharge on 81mg ASA, Brilinta, and Eliquis  He is recovering well  Ambulating with a cane and doing PT/OT 5 days per week  He complains of intermittent right facial throbbing in the right cheek that comes and goes, lasting about 10-15 minutes  No known triggers, but aggravated by chewing  The right arm and leg are still weaker than the left with some numbness  Worst in the right hand  No dizziness, lightheadedness, blurry or double vision, difficulty speaking  See Discussion    REVIEW OF SYSTEMS    Review of Systems   HENT: Negative  Eyes: Negative  Respiratory: Negative  Cardiovascular: Negative  Gastrointestinal: Negative  Endocrine: Positive for cold intolerance (colder right side)  Genitourinary: Negative  Musculoskeletal: Positive for gait problem (using cane) and myalgias  Skin: Negative  Neurological: Positive for weakness (right side) and numbness (right side)  Negative for headaches  Intermittent right face throbbing  Intermittent right leg spasms, worse when laying doen   Hematological:        ASA, brilinta, eliquis   Psychiatric/Behavioral: Positive for sleep disturbance  ROS obtained by MA  Reviewed  See HPI       Meds/Allergies     Current Outpatient Medications   Medication Sig Dispense Refill    Acetaminophen (TYLENOL EXTRA STRENGTH PO) Take 1,000 mg by mouth daily as needed      amLODIPine (NORVASC) 10 mg tablet Take 1 tablet (10 mg total) by mouth daily 30 tablet 0    apixaban (ELIQUIS) 2 5 mg Take 1 tablet (2 5 mg total) by mouth 2 (two) times a day 60 tablet 0    aspirin 81 mg chewable tablet Chew 1 tablet (81 mg total) daily 30 tablet 0    atorvastatin (LIPITOR) 40 mg tablet Take 1 tablet (40 mg total) by mouth daily with dinner 30 tablet 0    Baclofen 5 MG TABS Take 5 mg by mouth 3 (three) times a day 90 tablet 0    carvedilol (COREG) 25 mg tablet Take 1 tablet (25 mg total) by mouth 2 (two) times a day with meals 60 tablet 0    cyanocobalamin (VITAMIN B-12) 1000 MCG tablet Take 1 tablet (1,000 mcg total) by mouth daily 30 tablet 0    ferrous sulfate 325 (65 Fe) mg tablet Take 1 tablet (325 mg total) by mouth daily with breakfast 30 tablet 0    FLUoxetine (PROzac) 20 mg capsule Take 1 capsule (20 mg total) by mouth daily 30 capsule 0    hydrALAZINE (APRESOLINE) 50 mg tablet Take 1 tablet (50 mg total) by mouth every 12 (twelve) hours 60 tablet 0    psyllium (METAMUCIL) packet Take 1 packet by mouth every other day (Patient taking differently: Take 1 packet by mouth if needed) 30 packet 0    ticagrelor (BRILINTA) 90 MG Take 1 tablet (90 mg total) by mouth every 12 (twelve) hours 60 tablet 0     No current facility-administered medications for this visit         No Known Allergies    PAST HISTORY    Past Medical History:   Diagnosis Date    [facility]RAULIO [facility] injury) ([facility] )     B12 deficiency     Celiac artery stenosis (HCC)     Cerebellar infarct ([facility] ) 2023    CKD (chronic kidney disease) stage 2, GFR 60-89 ml/min     Essential hypertension     Impaired fasting glucose     Iron deficiency anemia     Neurogenic bowel     Stenosis of left vertebral artery     Vertebral artery dissection (HCC)        Past Surgical History:   Procedure Laterality Date    ARTERY SURGERY      vertebral artery stent/revascularization       Social History     Tobacco Use    Smoking status: Former     Packs/day: 1 00     Years: 5 00     Pack years: 5 00     Types: Cigarettes     Quit date: 1983     Years since quittin 1    Smokeless tobacco: Never   Vaping Use    Vaping Use: Never used "  Substance Use Topics    Alcohol use: Not Currently    Drug use: Never       Family History   Problem Relation Age of Onset    Hypertension Brother     Hypertension Brother          Above history personally reviewed  EXAM    Vitals:Blood pressure 142/70, pulse 68, temperature 97 7 °F (36 5 °C), temperature source Tympanic, resp  rate 16, height 5' 9\" (1 753 m), weight 81 2 kg (179 lb), SpO2 98 %  ,Body mass index is 26 43 kg/m²  Physical Exam  Vitals reviewed  Constitutional:       General: He is awake  Appearance: Normal appearance  HENT:      Head: Normocephalic and atraumatic  Eyes:      Extraocular Movements: EOM normal       Conjunctiva/sclera: Conjunctivae normal       Pupils: Pupils are equal, round, and reactive to light  Cardiovascular:      Rate and Rhythm: Normal rate  Pulmonary:      Effort: Pulmonary effort is normal    Skin:     General: Skin is warm and dry  Neurological:      Mental Status: He is alert and oriented to person, place, and time  Coordination: Finger-Nose-Finger Test and Heel to [place] misti Test normal       Deep Tendon Reflexes:      Reflex Scores:       Bicep reflexes are 4+ on the right side and 3+ on the left side  Brachioradialis reflexes are 4+ on the right side and 3+ on the left side  Patellar reflexes are 4+ on the right side and 3+ on the left side  Achilles reflexes are 4+ on the right side and 3+ on the left side  Psychiatric:         Attention and Perception: Attention and perception normal          Mood and Affect: Mood and affect normal          Speech: Speech normal          Behavior: Behavior normal  Behavior is cooperative  Thought Content: Thought content normal          Cognition and Memory: Cognition and memory normal          Judgment: Judgment normal          Neurologic Exam     Mental Status   Oriented to person, place, and time  Follows 2 step commands     Attention: normal  Concentration: normal    Speech: " speech is normal   Level of consciousness: alert  Knowledge: good  Able to perform simple calculations  Able to name object  Able to repeat  Normal comprehension  Cranial Nerves     CN III, IV, VI   Pupils are equal, round, and reactive to light  Extraocular motions are normal    Right pupil: Shape: regular  Reactivity: brisk  Consensual response: intact  Left pupil: Shape: regular  Reactivity: brisk  Consensual response: intact  CN III: no CN III palsy  CN VI: no CN VI palsy  Nystagmus: none   Ophthalmoparesis: none  Upgaze: normal  Downgaze: normal  Conjugate gaze: present    CN V   Right facial sensation deficit: diminished right side of face  Left facial sensation deficit: none    CN VII   Facial expression full, symmetric  CN VIII   Hearing: intact    CN XI   Right trapezius strength: normal  Left trapezius strength: normal    CN XII   CN XII normal      Motor Exam   Muscle bulk: normal  Overall muscle tone: normal  Right arm pronator drift: present  Left arm pronator drift: absent  RUE - 4/5  LUE - 5/5  RLE - 4/5  LLE - 5/5     Sensory Exam   Right arm light touch: diminished to RUE  Left arm light touch: normal  Right leg light touch: diminished to RLE    Left leg light touch: normal    Gait, Coordination, and Reflexes     Gait  Gait: spastic    Coordination   Finger to nose coordination: normal  Heel to shin coordination: normal    Tremor   Resting tremor: absent  Intention tremor: absent  Action tremor: absent    Reflexes   Right brachioradialis: 4+  Left brachioradialis: 3+  Right biceps: 4+  Left biceps: 3+  Right patellar: 4+  Left patellar: 3+  Right achilles: 4+  Left achilles: 3+  Right Talavera: present  Left Talavera: absent  Right ankle clonus: absent  Left ankle clonus: absent        MEDICAL DECISION MAKING    Imaging Studies:     CTA head and neck w wo contrast    Result Date: 4/26/2023  Narrative: CTA NECK AND BRAIN WITH AND WITHOUT CONTRAST INDICATION: I63 9: Cerebral infarction, unspecified I77 74: Dissection of vertebral artery COMPARISON:   Head CT 4/1/2023 and CTA head and neck 3/17/2023 TECHNIQUE:  Routine CT imaging of the Brain without contrast   Post contrast imaging was performed after administration of iodinated contrast through the neck and brain  Post contrast axial 0 625 mm images timed to opacify the arterial system  3D rendering was performed on an independent workstation  MIP reconstructions performed  Coronal reconstructions were performed of the noncontrast portion of the brain  Radiation dose length product (DLP) for this visit:  1453 07 mGy-cm   This examination, like all CT scans performed in the [facility], was performed utilizing techniques to minimize radiation dose exposure, including the use of iterative reconstruction and automated exposure control  IV Contrast:  85 mL of iohexol (OMNIPAQUE)  IMAGE QUALITY:   Diagnostic FINDINGS: NONCONTRAST BRAIN PARENCHYMA: Stable old infarcts involving right greater than left cerebellar hemispheres, left greater than right tatiana and midbrain  Stable left thalamic lacunar infarct  No intracranial mass, mass effect or midline shift  No CT signs of acute infarction  No acute parenchymal hemorrhage  VENTRICLES AND EXTRA-AXIAL SPACES:  Normal for the patient's age  VISUALIZED ORBITS: Normal visualized orbits  PARANASAL SINUSES: Moderate mucosal thickening of the visualized the paranasal sinuses  CERVICAL VASCULATURE AORTIC ARCH AND GREAT VESSELS: Aortic arch and great vessel origins are unremarkable  RIGHT VERTEBRAL ARTERY CERVICAL SEGMENT:The vessel origin is unremarkable  Stable dissection flap in the V3 segment  The vessel is patent  LEFT VERTEBRAL ARTERY CERVICAL SEGMENT: The vessel is developmentally smaller than right  The vessel is patent throughout the neck without high-grade stenosis   RIGHT EXTRACRANIAL CAROTID SEGMENT: Partially calcified atherosclerotic plaque at the bifurcation and proximal internal carotid artery  No hemodynamically significant stenosis of cervical ICA by NASCET criteria  LEFT EXTRACRANIAL CAROTID SEGMENT: Atherosclerotic plaque at the bifurcation and proximal internal carotid artery  No hemodynamically significant stenosis of cervical ICA by NASCET criteria  INTRACRANIAL VASCULATURE INTERNAL CAROTID ARTERIES: Multifocal atherosclerotic change of bilateral cavernous and supraclinoid internal carotid arteries without high grade stenosis  Normal ophthalmic artery origins  ANTERIOR CIRCULATION:  Normal A1 segments  Bilateral anterior cerebral arteries are unremarkable  Normal anterior comminuting artery  MIDDLE CEREBRAL ARTERY CIRCULATION:  Bilateral M1 segments and major M2 branches are patent without critical stenosis  DISTAL VERTEBRAL ARTERIES: Redemonstrated right intracranial vertebral artery stent with in-stent occlusion  The proximal and distal vessel is patent  Stable occluded post PICA segment of the left intracranial vertebral artery  Posterior inferior cerebellar artery origins are patent  BASILAR ARTERY: Stable moderate stenosis at proximal to mid basilar artery  Normal superior cerebellar arteries  POSTERIOR CEREBRAL ARTERIES: Persistent fetal origin of right posterior cerebral arteries  Bilateral posterior cerebral arteries are patent without high-grade stenosis  Hypoplastic left posterior communicating artery  DURAL VENOUS SINUSES:  Unremarkable  NON VASCULAR ANATOMY BONY STRUCTURES: No acute or aggressive appearing osseous abnormality  SOFT TISSUES OF THE NECK:  Unremarkable  THORACIC INLET:  Unremarkable  Impression: 1  Stable right greater than left cerebellar, left greater than right pontine and midbrain, and left thalamic infarcts  2   Stable dissection flap within the V3 segment of the right vertebral artery  Stable in-stent occlusion in the V4 segment of the right vertebral artery with patent proximal and distal vessel   3   Stable occluded post PICA V4 segment of the left vertebral artery  4   Atherosclerotic change of the cervical carotid arteries without hemodynamically significant stenosis  5   Moderate sinus disease  Workstation performed: [id number]       I have personally reviewed pertinent reports     and I have personally reviewed pertinent films in PACS

## 2023-05-01 NOTE — TELEPHONE ENCOUNTER
Called patient's spouse, Glenda Pham  Provided her an update  Glenda Pham reports how they just had an office visit with Dr Racheal Duarte and how he discontinued the eliquis  Per Glenda Pham patient will remain on brilinta and aspirin after finishing the four day suppy of eliquis  She was appreciative for the effort and follow up

## 2023-05-02 ENCOUNTER — OFFICE VISIT (OUTPATIENT)
Dept: PHYSICAL THERAPY | Facility: CLINIC | Age: 63
End: 2023-05-02

## 2023-05-02 ENCOUNTER — OFFICE VISIT (OUTPATIENT)
Dept: OCCUPATIONAL THERAPY | Facility: CLINIC | Age: 63
End: 2023-05-02

## 2023-05-02 DIAGNOSIS — I63.9 CEREBROVASCULAR ACCIDENT (CVA), UNSPECIFIED MECHANISM (HCC): ICD-10-CM

## 2023-05-02 DIAGNOSIS — I63.50 POSTERIOR CIRCULATION STROKE (HCC): Primary | ICD-10-CM

## 2023-05-02 NOTE — PROGRESS NOTES
OCCUPATIONAL THERAPY PROGRESS NOTE         23  Aris Lanza  1960  [id number]  Elio Wilson,*   Diagnosis ICD-10-CM Associated Orders   1  Posterior circulation stroke (HCC)  I63 50       2  Cerebrovascular accident (CVA), unspecified mechanism ([facility] [address] )  I63 9             Assessment/Plan      SKILLED ANALYSIS:    Pt is a 60 y o  male referred to Occupational Therapy s/p Posterior circulation stroke ([facility] [address] ) [I63 50]  Pt participated in 9 sessions of skilled OT sessions, focusing on improving ROM, motor control and strength of RUE  Pt with excellent progress towards all goals  Pt with increased AROM at R shoulder by 1/4 range into shoulder flexion and abduction  Pt with improved RUE strength, progressing from 4- to 4/5 in most RUE movement, refer to grid below for details  Pt with improved [address] as evidenced by faster scores on the 9 hole peg test and O'Juanjose Finger Dexterity test, see grid for details  Pt reporting he continues to attempt to use his RUE as dominant in daily tasks and continues to engage in IADLs and leisure tasks at home to increase function of RUE  Pt does cont to present with the following areas of deficit: FMC/FMS, GMC/GMS, hand to target accuracy and in hand manipulation/dexterity impacting indep and completion of ADL/IADL and leisure tasks  Pt does demo the need for cont skilled Occupational Therapy services 2x/week for 12 weeks with focus on ADL re-training, IADL re-training, fxnl xfers, standing tolerance, standing balance, UE NMR, UE strengthening, UE endurance, FMC/GMC, DME training/education, family training/education, community re-integration and return to work simulation to address the goals as listed below  Pt in agreement with POC, POC to  23  Short Term Goals (to be achieved within 4 weeks):    1   Pt will improve R hand motor control so as to be able to grasp/ release medium and large sized objects with min to no difficulty during ADLs/IADLs and "leisure activities  PROGRESSING   2  Pt will use RUE as functional assist in 80% of functional tasks, to increase ease and independence with completion of ADLs/ IADLs and leisure tasks  PROGRESSING   3  Pt will demo with G tolerance to supine, seated, and in stance exercise x 45 minutes with minimal rest breaks required for increased engagement in weekly exercise regimen and increased engagement in life roles MET  4  Pt will increase R UE rate of manipulation for all FM tests for improved functional performance/independence with functional tasks x 25% MET  5  Pt will increase R UE strength to 4/5,  strength by 3-5 lbs and pinch strength by 1-2 pounds, through the use of strengthening exercises and home program for eventual return to IADLs and life roles  PROGRESSING   6  Pt will increase RUE rate of manipulation for all [facility] tests, where RUE=LUE, for improved functional performance/ independence with functional tasks NEW GOAL            Long Term Goals (to be achieved within 12 weeks):  1  Pt will increase R UE strength to 4+/5 and  strength R=L, through the use of strengthening exercises and home program PROGRESSING   2  Pt will improve RUE GMC/FMC, so as to be able to return to using RUE as dominant in 80% of daily functional tasks PROGRESSING   3  Pt will increase RUE prehension patterns for improved ability to manage clothing fasteners and to manage eating utensils with minimal difficulty PROGRESSING   4  Pt will demo with G carryover of Home Exercise Program to improve functional progression towards goals in Plan of care and for improved functional use of RUE PROGRESSING   5  Pt will complete pre-driving assessments to provide feedback to physician to assist with determining if pt is able to return to driving  NOT MET           SUBJECTIVE      SUBJECTIVE: \"so it is getting better\"  \"I'm trying to use my right hand more   to eat, to comb my hair\"    PATIENT GOAL: \"use of my hand\" pt stating he wants to be " able to eat with his RUE, would like to be able to drive        HISTORY OF PRESENT ILLNESS:     Pt is a 60 y o  male who was referred to Occupational Therapy s/p  Posterior circulation stroke ([facility] [address] ) [I63 50]  Per Deyanira Marquez MD [facility] dc note on 3/31:   HPI: Latrice Rodríguez is a 60 y  o  right handed male with medical history of HTN who presented to [address] [place] [address] [address] on 2/25 with nausea/dizziness  Found to have hypertensive emergency with acute/subcaute R posterior cerebellar CVA with 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, mild BRAULIO, and incidentally found + COVID (without evidence of respiratory illness/hypoxia/CXR findings)   Placed on cardene gtt, stroke pathway, ASA/Plavix, IV hydration for BRAULIO, and CTX for UTI  NIHSS 2  Symptoms began 2 days prior  Not tPA/TNK candidate   MRI/MRA with 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   NSx recommended transfer to [facility] and starting on heparin gtt and stopped Plavix  Repeat CTA on 2/26 stable with with R V3/4 occlusion  Not a candidate for interventional procedure due to clinical stability and risk   Speech consulted and noted mod-severe oropharyngeal dysphagia recommended pureed/HTL  Unfortunately later on 2/26, he clinically deteriorated with increased R sided weakness, and was taken to IR for stenting of V3 and V4 with TICI 2B revascularization  CTH without ICH  He was admitted back to the ICU intubated - extubated 2/27  MRI showed cerebellar CVA and R > L stroke burden, with additional hypodensities on DWI appreciated L midbrain, pontine area and R lower medullary/spinal cord junction  He was transitioned to triple therapy with DAPT (given recent stent) and A/C with eliquis 2 5mg BID due to COVID  Diet advanced to Level 3/NTL on 2/27  Noted to have spasticity in RLE - started on baclofen by Neurology  He was able to have cardene discontinued on 3/2, and they have been making adjustments to his oral regimen   He was started on Prozac for his mood  NSx has signed off  CTA H/N recommended around 3/17  Length of time on eliquis unclear  Admitted to [facility] on 3/6  Pt with an [facility] stay from 3/6-3/31  Pt was dc home 3/31  Pt sustained a fall the night of 3/31 trying to get onto the [facility]  Pt with fall onto his R side, without resulting injuries  Pt was checked at the hospital and dc home the same day       PMH:   Past Medical History:   Diagnosis Date    BRAULIO (acute kidney injury) ([facility] [address] )     B12 deficiency     Celiac artery stenosis (HCC)     Cerebellar infarct ([facility] [address] ) 02/25/2023    CKD (chronic kidney disease) stage 2, GFR 60-89 ml/min     Essential hypertension     Impaired fasting glucose     Iron deficiency anemia     Neurogenic bowel     Stenosis of left vertebral artery     Vertebral artery dissection (HCC)        Past Surgical Hx:   Past Surgical History:   Procedure Laterality Date    ARTERY SURGERY      vertebral artery stent/revascularization        HOME SETUP/ CLOF: lives with wife, dtr (27 yo) and ANGEL, and son (19yo); lives on [address] acres of property   2 ; laundry on 1st floor; bedroom is on the 2nd floor but is sleeping on the first floor; powder room on 1st floor; full bath with tub combo  Work: ; was working 40+ hours; 6 days a week  (+) driving  ADLs: I in all aspects prior to CVA  Wife did IADLs of cooking, laundry, grocery shopping  Pt did all outside work, including mowing the lawn   Animals: 2 dogs, 2 cats upstairs and 4 cats downstairs; used to walk the dogs in the morning; family completing pet care   Physical activity: outdoor yard work      Current Status:  DME: w/c, BSC, hemiwalker, transfer tub bench    ADLs:   UBB/D: I   LBD: I with pants, underwear; A with socks/ shoes  LBB: I  Clothing fasteners: currently wearing clothing without fasteners  Grooming: using L hand to brush teeth but has been attempting to use RUE for all groom tasks    Bed mobility: can get in/out of bed by self  Transfers: I  Mobility: DS/I with SPC     Pain Levels: none at present but does report increased tone/ muscle spasms in R foot/ ankle          OBJECTIVE         PHYSICAL ASSESSMENT        RUE LUE Comments                 UPPER EXTREMITY FXN Impaired Intact Dominant Hand: Right                  UE ROM LUE PROM  LUE AROM  RUE PROM  RUE AROM COMMENTS   Shoulder Flex WNL WNL  WNL 3/4 range (was 1/2 range)     Shoulder Ext WNL  WNL  WNL WNL    Shoulder ABD WNL  WNL  WNL 3/4 range (was 1/2 range)     Horizontal ABD WNL  WNL  WNL WNL    Horizontal ADD WNL  WNL  WNL WNL    Shoulder IR  WNL  WNL  WNL WFL    Shoulder ER WNL  WNL WNL WFL    Elbow Flex WNL  WNL WNL 3/4 range    Elbow Ext  WNL  WNL WNL WNL    Wrist flexion WNL  WNL WNL 3/4 range    Wrist Ext WNL  WNL WNL 3/4 range     Supination WNL  WNL WNL 3/4 range    Pronation WNL  WNL WNL WNL    Finger Flex WNL  WNL WNL 1/2 range    Finger Ext WNL  WNL WNL WNL    Opposition  WNL  WNL WNL WNL                               MMT  LUE RUE   Comments   Elevation 5/5 4+/5    Shoulder Flex/Ext 5/5 4/5 (was 4-/5)    Shoulder Abd 5/5 4/5 (was 4-/5)    Shoulder Add 5/5 4/5 (was 4-/5)    Shoulder ER 5/5 4- (was 3+/5)    Shoulder IR 5/5 4/5 (was 4-/5)    Elbow Flex 5/5 4/5 (was 4-/5)    Elbow Ext 5/5 4/5 (was 4-/5)    Wrist Flex 5/5 4/5 (was 4-/5)    Wrist Ext 5/5 4/5    Gross Grasp 5/5 4-/5                      /Pinch Strength  LUE  RUE    Comments   Dynamometer      - Gross Grasp 60 lbs 0 lbs    Pinch Meter       - PINCER 15 lbs( was 14)  6 lbs (was 3)     - 3 JAW MUNDO 22 lbs(was 19) 8 lbs (was 5)      - LATERAL 27 lbs (was 27)  10 lbs (was 5)       SENSATION LUE RUE Comments   Sharp Dull  Intact Impaired    Proprioception Intact Intact    Pain Intact Impaired    Hot/Cold Temp Intact Impaired      COMMENTS: decreased sensation on RUE/RLE/ face    COORDINATION LUE RUE    9 Hole Peg Test  26 sec (was 30sec) 48 sec (was 1 min 40 sec) Below average for RUE   [employer] Dexterity Test   4 min 32 sec (top 3 rows)    Handwriting (Print)   G legibility (was G-)  G tripod grasp   Handwriting (script)   G- legibility (was P+)           4/12:   O'Juanjose Finger Dexterity Test  L: 3 min 2 sec ( top 5 rows)  R: 8 min 24 sec  (top 3 rows)        MODIFIED HERIBERTO SCALE (TONE) MICAH VALERA    No increase in muscle tone (0) 0 0    Slight Increase in muscle tone with catch and release or min resist at end range (1)      Slight Increase in muscle tone with catch and release, followed by min resistance through remainder of range (1+)      Increased muscle tone through full range, able to be moved easily (2)      Considerable increase in tone, difficult to move (3)      Rigid in Flexion/Extension (4)                  COGNITIVE ASSESSMENT    Memory: Intact  Attention: Intact  Executive Functions: Intact  Communication: Intact   Processing: Intact             VISUAL ASSESSMENT      Comments: (+) glasses; pt with blurry vision with L eye    Vision Screen     Accommodation Intact   Convergence Intact   Pursuits Intact   Saccades Intact   ROM Intact              PLANNED THERAPY INTERVENTIONS:  Therapeutic exercise  Therapeutic activity  ADL/IADL re-training   NMRE  Supine, seated, and in stance neuro re-ed  FMC/prehension  Manual tx  Hand to target  Sensory re-ed   WBearing strategies   Closed chain activities  Open chain activities       INTERVENTION COMMENTS:  Diagnosis: Posterior circulation stroke ([facility] [address] ) [I63 50]  Precautions: fall risk, R side weakness   Insurance: Payor: Ryan Wallace / Plan: Ryan Wallace PPO / Product Type: PPO /   9 of 20 visits  PN due 6/06/23

## 2023-05-02 NOTE — PROGRESS NOTES
"Daily Note     Today's date: 2023  Patient name: Lisandro Olivera  : 1960  MRN: [id number]  Referring provider: Joellen Yoder  Dx:   Encounter Diagnosis     ICD-10-CM    1  Posterior circulation stroke ([facility] [address] )  I63 50           Start Time: 08  Stop Time: 930  Total time in clinic (min): 45 minutes    Subjective:   Slight ankle pain continues to be noted, particularly at night  KT was slightly helpful  Objective: See treatment diary below      Assessment:   Introduced wedge stance stretch to assist with decreased tone and ankle pain  Improved gait post stretch  Added hamstring 3 way stretch with good demonstration and voiced understanding  Instructed in wedge for supine positioning to decrease tone, patient to attempt positioning at home and will report on pain relief on follow-up  Overall gait stability improving  Continue to attempt to address pain to promote increased tolerance to gait and stance  Plan:  High intensity stepping  Obstacle negotiation  Increased pacing for propulsion  Weighted/resisted ambulating  Precautions: Falls,   Re-eval Date: 2023    Date    Visit Count 12      FOTO See IE      Pain In See IE      Pain Out               Testing       TUG/TUGC 17 15 / 16 70      5xSTS 20 81      Gait speed 0 58      FGA 8      2/6MWT 576      Neuro Re-Ed       Dynamic balance 7  5#R fwd/bwd random direction cues 3min total RPE 16/20    7  5#R hurdles 6 laps x2   Hurdles foot over foot 6x6 laps; increased zachariah and arm swing;    Static balance bosu under mat \"fly fishing\" 30\" x4      Obstacle course       Resisted ambulation 6 laps red/blue TB solo fwd x2 16/20   Right LE Red TB for swing resist 8 laps outside SOLO   Uneven surfaces       HKM on foam (// bars)       RLE forced use Mini squat R forced use 2x6      Ther Ex       Hamstring 3 way stretch    20\" [id number]   SLR x 4       HR/TR       Mini Squats       Step ups       Plantar stretch " "pro-stretch    Heel cord stretch on wedge standing 20\" x3   Towel stretch right                      Ther Activity       ADL       Curb negotiation        Gait Training       No AD 5#R Stairs x3 flights no UE asc, RUE desc RPE 12/20   Rapid pace with increased arm swing; 30'x6   SPC       Treadmill       Head turns       Manuals       Plantar release       KT 50% arch support, paper off tension med/lat right ankle                  "

## 2023-05-03 ENCOUNTER — TELEPHONE (OUTPATIENT)
Dept: NEUROLOGY | Facility: CLINIC | Age: 63
End: 2023-05-03

## 2023-05-03 NOTE — TELEPHONE ENCOUNTER
Post CVA Discharge Follow Up  Hospitalization: 2/25/23-3/6/23, 3/5/23-3/31/23    Called patient with no answer  Left a voice message requesting for a call  Provided the office's phone number

## 2023-05-04 ENCOUNTER — OFFICE VISIT (OUTPATIENT)
Dept: PHYSICAL THERAPY | Facility: CLINIC | Age: 63
End: 2023-05-04

## 2023-05-04 ENCOUNTER — OFFICE VISIT (OUTPATIENT)
Dept: OCCUPATIONAL THERAPY | Facility: CLINIC | Age: 63
End: 2023-05-04

## 2023-05-04 ENCOUNTER — APPOINTMENT (OUTPATIENT)
Dept: SPEECH THERAPY | Facility: CLINIC | Age: 63
End: 2023-05-04
Payer: COMMERCIAL

## 2023-05-04 ENCOUNTER — OFFICE VISIT (OUTPATIENT)
Dept: NEUROLOGY | Facility: CLINIC | Age: 63
End: 2023-05-04

## 2023-05-04 VITALS
HEIGHT: 69 IN | HEART RATE: 67 BPM | DIASTOLIC BLOOD PRESSURE: 63 MMHG | BODY MASS INDEX: 27.19 KG/M2 | WEIGHT: 183.6 LBS | SYSTOLIC BLOOD PRESSURE: 138 MMHG | TEMPERATURE: 97.9 F

## 2023-05-04 DIAGNOSIS — I63.50 POSTERIOR CIRCULATION STROKE (HCC): Primary | ICD-10-CM

## 2023-05-04 DIAGNOSIS — I63.9 CEREBROVASCULAR ACCIDENT (CVA), UNSPECIFIED MECHANISM (HCC): Primary | ICD-10-CM

## 2023-05-04 DIAGNOSIS — M79.89 PAIN AND SWELLING OF RIGHT LOWER EXTREMITY: ICD-10-CM

## 2023-05-04 DIAGNOSIS — I63.9 CEREBROVASCULAR ACCIDENT (CVA), UNSPECIFIED MECHANISM (HCC): ICD-10-CM

## 2023-05-04 DIAGNOSIS — I69.351 SPASTIC HEMIPARESIS OF RIGHT DOMINANT SIDE AS LATE EFFECT OF CEREBRAL INFARCTION (HCC): ICD-10-CM

## 2023-05-04 DIAGNOSIS — R25.2 SPASTICITY: ICD-10-CM

## 2023-05-04 DIAGNOSIS — M79.604 PAIN AND SWELLING OF RIGHT LOWER EXTREMITY: ICD-10-CM

## 2023-05-04 DIAGNOSIS — M79.2 NEUROPATHIC PAIN: ICD-10-CM

## 2023-05-04 RX ORDER — BACLOFEN 10 MG/1
10 TABLET ORAL EVERY 8 HOURS
Qty: 90 TABLET | Refills: 1 | Status: SHIPPED | OUTPATIENT
Start: 2023-05-04

## 2023-05-04 RX ORDER — GABAPENTIN 300 MG/1
300 CAPSULE ORAL EVERY 12 HOURS SCHEDULED
Qty: 60 CAPSULE | Refills: 0 | Status: SHIPPED | OUTPATIENT
Start: 2023-05-04

## 2023-05-04 NOTE — PROGRESS NOTES
Physical [facility] Follow-up Evaluation  Odalys Martin 60 y o  male      ASSESSMENT/PLAN:     Phuong Rashid was seen today for stroke  Diagnoses and all orders for this visit:    Cerebrovascular accident (CVA), unspecified mechanism ([address] )  -     gabapentin (Neurontin) 300 mg capsule; Take 1 capsule (300 mg total) by mouth every 12 (twelve) hours    Spasticity  -     baclofen 10 mg tablet; Take 1 tablet (10 mg total) by mouth every 8 (eight) hours    Neuropathic pain  -     gabapentin (Neurontin) 300 mg capsule; Take 1 capsule (300 mg total) by mouth every 12 (twelve) hours    Pain and swelling of right lower extremity  -     VAS lower limb venous duplex study, unilateral/limited; Future    Spastic hemiparesis of right dominant side as late effect of cerebral infarction [facility])      Mr Kriss Ching is a very pleasant 59 yo M who is very well known to me, who I took care of on the [facility] after his cerebellar CVA and R > L stroke burden, with additional hypodensities on DWI appreciated L midbrain, pontine area and R lower medullary/spinal cord junction  He has developed R spastic hemiparesis (Although has had remarkable recovery in regards to strength on that R side), neurogenic bowel (with some incontinence during rehab which has improved), and now has developed R facial pain/sensitivity with pain, as well as spasms and worsening tone that are impacting his tolerance of activity  1  Spastic hemiparesis:  - We discussed our goals  Functionally some of his tone helps him maintain stability in his R leg  His foot is able to be flat (although admittedly with decreased ankle ROM due to co-contraction)  His knee is stable due to co-contraction of quads/hamstrings  He has regained good strength    - Main complaints are pain due to wrist flexion spasms and R ankle dorsiflexion spasms   - We discussed multimodal management   I would like to try increasing his Baclofen to 10mg TID to see if we are able to better control his spasms and pain  I reviewed risk/benefits/adverse effects to monitor for, and he and his wife are agreeable  - He wants to try titrating his medication prior to botulinum toxin  His wife had some questions about a baclofen pump, but reviewed this would only help LE, and I do not think his symptoms or tone warrant this at this point  Again, tone can be helpful in some cases in regards to stability  If Baclofen titration doesn't work, we can always try other medications like valium or tizanidine moving forward  - I reviewed how to safely ramp up his dosage  His pain is worse at night, so he may be able to do fine with just increasing his night time dose  - He is a candidate for botulinum toxin, but again, I want to be judicious with use  - We would target his R dorsiflexors (very conservatively - 25 units to start)  With goal to decrease extrinsic tone (spasms)  Goal to avoid ankle weakness resulting in plantarflexion predominance  In this setting, I may also target his med/lat gastroc with a modest amount (50/50)   - We would also target his R wrist flexors (25 units to start)    2  Facial Pain:  - ? Did have R medullary stroke  - Started on gabapentin with instructions to taper up to 300mg Q12hr (monitor kidney function due to CKD 2)  - I discussed case with the Neurology AP seeing him in June  - They will evaluate further  - Maybe some desensitization with therapies    3  R leg swelling  - Likely 2/2 CVA, but given increased pain in leg, increased tone, it is not unreasonable to check doppler - ordered  - Less concerned just because he was on eliquis until April   - Elevate, compression stockings  - Also has some RUE hand swelling - compression/isotoner gloves and elevation  I will plan to follow-up with him in 4 weeks to see how he is doing after adjusting these medications  I asked that he have the doppler done prior to that visit       *I have spent 45 minutes with Patient and family today in which greater than 50% of this time was spent in counseling/coordination of care regarding Risks and benefits of tx options, Instructions for management, Patient and family education, Importance of tx compliance, Impressions, Counseling / Coordination of care, Reviewing / ordering tests, medicine, procedures  , Obtaining or reviewing history   and Communicating with other healthcare professionals   HPI/SUBJECTIVE: Lonnie Cano 60 y o  male right handed, with medical history of CKD2, B12 deficiency, Celiac Artery Stenosis, Iron Deficiency Anemia, Neurogenic Bowel, 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  To review:    Lonnie Cano is a 60 y  o  right handed male with medical history of HTN who presented to [address] [place] [address] Road on 2/25 with nausea/dizziness  Found to have hypertensive emergency with acute/subcaute R posterior cerebellar CVA with 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, mild BRAULIO, and incidentally found + COVID (without evidence of respiratory illness/hypoxia/CXR findings)   Placed on cardene gtt, stroke pathway, ASA/Plavix, IV hydration for BRAULIO, and CTX for UTI  NIHSS 2  Symptoms began 2 days prior  Not tPA/TNK candidate   MRI/MRA with 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   NSx recommended transfer to [facility] and starting on heparin gtt and stopped Plavix  Repeat CTA on 2/26 stable with with R V3/4 occlusion  Not a candidate for interventional procedure due to clinical stability and risk   Speech consulted and noted mod-severe oropharyngeal dysphagia recommended pureed/HTL  Unfortunately later on 2/26, he clinically deteriorated with increased R sided weakness, and was taken to IR for stenting of V3 and V4 with TICI 2B revascularization  CTH without ICH  He was admitted back to the ICU intubated - extubated 2/27   MRI showed cerebellar CVA and R > L stroke burden, with additional hypodensities on DWI appreciated L midbrain, pontine area and R lower medullary/spinal cord junction  He was transitioned to triple therapy with DAPT (given recent stent) and A/C with eliquis 2 5mg BID due to COVID  Diet advanced to Level 3/NTL on 2/27  Noted to have spasticity in RLE - started on baclofen by Neurology  He was able to have cardene discontinued on 3/2, and they have been making adjustments to his oral regimen  He was started on Prozac for his mood  NSx has signed off  CTA H/N recommended around 3/17  Length of time on eliquis unclear  Admitted to [facility] on 3/6  Since discharge, he has been in therapy near [facility] and has made many gains - particularly with mobility  He established with a PCP, he was seen by Dr Farhad Bui who stopped his eliquis and continued his brillinta/ASA and recommended Angio in 3 months  Although functionally he has done well, he has found he has difficulty getting up due to R sided facial pain with headaches, difficulty at times talking and chewing because of this pain and hypersensitivity on that side of his face  He has also had worsening spasticity and tone  With pain in his wrist and ankle  It feels like a charley horse and spasm  The pain he gets in his ankle is anterior and radiates to the top of his foot and instep - it results in a spasm that dorsiflexes his foot  He takes Baclofen 5mg TID which was started in the [facility], but it hasn't helped  He has had no major falls  He denies any new bowel/bladder dysfunction  Expanded Social History/Living Situation:   Patient lives with spouse in a [facility] with 1 steps to enter  Patient worked full time as an  prior to his stroke  He is not currently driving  He is independent walking, and doesn't use an assistive device consistently - when he does it's a cane  He doesn't use/need a MAFO  He is more or less independent with ADLs  and many of his IADLs      Review of Systems  A 10 point review of systems was negative except for what is noted in the "HPI     OBJECTIVE:   /63 (BP Location: Left arm, Patient Position: Sitting)   Pulse 67   Temp 97 9 °F (36 6 °C) (Temporal)   Ht 5' 9\" (1 753 m)   Wt 83 3 kg (183 lb 9 6 oz)   BMI 27 11 kg/m²     Gen: No acute distress, Well-nourished, well-appearing  HEENT: Moist mucus membranes, Normocephalic/Atraumatic  Cardiovascular: Regular rate, rhythm, S1/S2  Distal pulses palpable  Heme/Extr: RLE and RUE distal edema  Pulmonary: Non-labored breathing  Lungs CTAB  : No barnett  GI: Soft, non-tender, non-distended  MSK: Has impaired ROM in knee, ankle due to tone, but ankle is in neutral position  Integumentary: Skin is warm, dry  Neuro: AAOx3, CN 2-12 intact except for R facial hypersensitivity, Some impaired sensation on the R  Speech is intact  Appropriate to questioning  MAS: RIGHT 2 in his EF/EE, WF, 1+ in FF, 2 in KF/KE, 3-4 with DF/PF - cocontraction  Tone in UE does improve with ROM  Tone in RLE is more stubborn  Gait:   No AD  Absent right arm swing  Excellent step length on the R  Stiff leg on the R, glute weakness  Foot flat  Impaired ankle/foot motion, but clearing well  MMT:   Strength:   Right  Left  Site  Right  Left  Site    4+ 5  S Ab: Shoulder Abductors  4-  5  HF: Hip Flexors    4+ 5  EF: Elbow Flexors  4+  5 KF: Knee Flexors    4+ 5  EE: Elbow Extensors  5  5  KE: Knee Extensors    4+  5  WE: Wrist Extensors  4+ 5  DR: Dorsi Flexors    4  5  FF: Finger Flexors  4  5  PF: Plantar Flexors    4 5  HI: Hand Intrinsics  2  5 EHL: Extensor Hallucis Longus   Psych: Normal mood and affect  Imaging: I personally reviewed pertinent imaging  4/1 CTh:  No intracranial hemorrhage or calvarial fracture      Microangiopathy and multiple areas of chronic infarction, as described  4/1 XR R Shoulder:  Normal right shoulder      Labs: Personally reviewed  Lab Results   Component Value Date    WBC 7 62 04/01/2023    HGB 11 5 (L) 04/01/2023    HCT 36 1 (L) 04/01/2023    MCV 87 04/01/2023    PLT " 270 04/01/2023     Lab Results   Component Value Date    CALCIUM 9 5 04/08/2023    K 4 7 04/08/2023    CO2 26 04/08/2023     04/08/2023    BUN 33 (H) 04/08/2023    CREATININE 1 34 04/08/2023         The following portions of the patient's history were reviewed and updated as appropriate: allergies, current medications, past family history, past medical history, past social history, past surgical history and problem list     Past Medical History:   Diagnosis Date    BRAULIO (acute kidney injury) ([facility] )     B12 deficiency     Celiac artery stenosis ([facility] )     Cerebellar infarct ([place] [address] ) 02/25/2023    CKD (chronic kidney disease) stage 2, GFR 60-89 ml/min     Essential hypertension     Impaired fasting glucose     Iron deficiency anemia     Neurogenic bowel     Stenosis of left vertebral artery     Vertebral artery dissection [facility])        Patient Active Problem List    Diagnosis Date Noted    Acute Posterior Circulation Stroke 02/25/2023    Celiac artery stenosis ([place] [address] ) 03/22/2023    Neurogenic bowel 03/08/2023    Iron deficiency anemia 03/06/2023    Spasticity 03/01/2023    Stage 2 chronic kidney disease 03/01/2023    Prediabetes 03/01/2023    Adjustment disorder with depressed mood 03/01/2023    BRAULIO (acute kidney injury) ([facility] ) 02/25/2023    Right Vertebral artery dissection ([place] [address] ) 02/25/2023    Stenosis of left vertebral artery 02/25/2023    Primary hypertension 02/25/2023       Past Surgical History:   Procedure Laterality Date    ARTERY SURGERY      vertebral artery stent/revascularization       Family History   Problem Relation Age of Onset    Hypertension Brother     Hypertension Brother        Social History     No Known Allergies      Current Outpatient Medications:     Acetaminophen (TYLENOL EXTRA STRENGTH PO), Take 1,000 mg by mouth daily as needed, Disp: , Rfl:     amLODIPine (NORVASC) 10 mg tablet, Take 1 tablet (10 mg total) by mouth daily, Disp: 30 tablet, Rfl: 0    apixaban (ELIQUIS) 2 5 mg, Take 1 tablet (2 5 mg total) by mouth 2 (two) times a day, Disp: 60 tablet, Rfl: 0    aspirin 81 mg chewable tablet, Chew 1 tablet (81 mg total) daily, Disp: 30 tablet, Rfl: 0    atorvastatin (LIPITOR) 40 mg tablet, Take 1 tablet (40 mg total) by mouth daily with dinner, Disp: 30 tablet, Rfl: 0    Baclofen 5 MG TABS, Take 5 mg by mouth 3 (three) times a day, Disp: 90 tablet, Rfl: 0    carvedilol (COREG) 25 mg tablet, Take 1 tablet (25 mg total) by mouth 2 (two) times a day with meals, Disp: 60 tablet, Rfl: 0    cyanocobalamin (VITAMIN B-12) 1000 MCG tablet, Take 1 tablet (1,000 mcg total) by mouth daily, Disp: 30 tablet, Rfl: 0    ferrous sulfate 325 (65 Fe) mg tablet, Take 1 tablet (325 mg total) by mouth daily with breakfast, Disp: 30 tablet, Rfl: 0    FLUoxetine (PROzac) 20 mg capsule, Take 1 capsule (20 mg total) by mouth daily, Disp: 30 capsule, Rfl: 0    hydrALAZINE (APRESOLINE) 50 mg tablet, Take 1 tablet (50 mg total) by mouth every 12 (twelve) hours, Disp: 60 tablet, Rfl: 0    ticagrelor (BRILINTA) 90 MG, Take 1 tablet (90 mg total) by mouth every 12 (twelve) hours, Disp: 60 tablet, Rfl: 0    psyllium (METAMUCIL) packet, Take 1 packet by mouth every other day (Patient not taking: Reported on 5/4/2023), Disp: 30 packet, Rfl: 0

## 2023-05-04 NOTE — PROGRESS NOTES
"Daily Note     Today's date: 2023  Patient name: Clarence Hitchcock  : 1960  MRN: [id number]  Referring provider: Lyric Turk  Dx:   Encounter Diagnosis     ICD-10-CM    1  Posterior circulation stroke ([facility] )  I63 50           Start Time: 08  Stop Time: 930  Total time in clinic (min): 45 minutes    Subjective: Ankle/foot pain is present  Mild/mod in nature  Did not sleep well last night, contemplated cancelling  Objective: See treatment diary below      Assessment:  Challenged with increasing pace throughout session  Changing directions, obstacle negotiation, and uneven surfaces still requires multiple steps to regain balance  Incline and resisted walking to challenge limb advancement, propulsion, and stance  Improving overall gait stability with continued practice  Pt reports improvement in ankle pain during ambulation post mobilizations  Continue to attempt to address pain to promote increased tolerance to gait and stance  Plan:  High intensity stepping  Obstacle negotiation  Increased pacing for propulsion  Weighted/resisted ambulating  Precautions: Falls,   Re-eval Date: 2023    Date    Visit Count     14   FOTO See IE       Pain In See IE       Pain Out                Testing        TUG/TUGC 17 15 / 16 70       5xSTS 20 81       Gait speed 0 58       FGA 8       2/6MWT 576       Neuro Re-Ed        Dynamic balance 7  5#R fwd/bwd random direction cues 3min total RPE 16/20    7  5#R hurdles 6 laps x2   Hurdles foot over foot 6x6 laps; increased zachariah and arm swing;  0#R fwd/bwd shuttle run NJ :32 x3 RPE 15/20   Static balance bosu under mat \"fly fishing\" 30\" x4       Obstacle course     6' step + uneven + hurdles 5#R 6 laps x2 RPE 16/20   Resisted ambulation 6 laps red/blue TB solo fwd x2 16/20   Right LE Red TB for swing resist 8 laps outside SOLO Blue TB at ankle challenging limb adv 8 laps solo    Red/blue TB resist challenging propulsion/stance 8 " "laps   Uneven surfaces        HKM on foam (// bars)        RLE forced use Mini squat R forced use 2x6       Ther Ex        Hamstring 3 way stretch    20\" [id number]    SLR x 4        HR/TR        Mini Squats        Step ups        Plantar stretch pro-stretch    Heel cord stretch on wedge standing 20\" x3    Towel stretch right                         Ther Activity        ADL        Curb negotiation         Gait Training        No AD 5#R Stairs x3 flights no UE asc, RUE desc RPE 12/20   Rapid pace with increased arm swing; 30'x6 TM 8% 30s on/off 1 0mph > 1  3mph no UE 6min total   SPC        Treadmill        Head turns        Manuals        Plantar release     G4 R ankle a/p AF   KT 50% arch support, paper off tension med/lat right ankle                   "

## 2023-05-04 NOTE — PROGRESS NOTES
Review of Systems   Constitutional: Negative  Negative for appetite change, chills and fever  HENT: Negative  Negative for ear pain, hearing loss, sore throat, tinnitus, trouble swallowing and voice change  Eyes: Negative  Negative for photophobia, pain and visual disturbance  Respiratory: Negative  Negative for cough and shortness of breath  Cardiovascular: Negative  Negative for chest pain and palpitations  Gastrointestinal: Negative  Negative for abdominal pain, nausea and vomiting  Endocrine: Negative  Negative for cold intolerance  Genitourinary: Negative  Negative for dysuria, frequency, hematuria and urgency  Musculoskeletal: Positive for gait problem and joint swelling  Negative for arthralgias, back pain, myalgias and neck pain  Skin: Negative  Negative for color change and rash  Allergic/Immunologic: Negative  Neurological: Positive for numbness  Negative for dizziness, tremors, seizures, syncope, facial asymmetry, speech difficulty, weakness, light-headedness and headaches  Hematological: Negative  Does not bruise/bleed easily  Psychiatric/Behavioral: Positive for sleep disturbance  Negative for confusion and hallucinations  All other systems reviewed and are negative

## 2023-05-04 NOTE — PATIENT INSTRUCTIONS
Elevate leg, compression socks are fine, get ultrasound to rule out DVT/clot but low suspicion  Elevate L arm, look at compression or isotoner gloves on [employer] that can help with swelling there  Face sensitivity: 2/2 CVA- trial of gabapentin 300mg Q12hr  Start at night for 1 week, if tolerating welll, increase to twice a day  Spasticity:    Baclofen increase to 10mg, similar ramp up  Start with 10mg at night, 5mg twice during the day  Every 2-3 days if you're tolerating it and you notice some improvement, you can increase one of the doses until you are taking 10mg Q8hr     - 5-5-10, then    - 10-5-10, then   - 10-10-10   You are a candidate for botox to help specifically for pain  Targeting wrist flexor and dorsiflexor

## 2023-05-04 NOTE — PROGRESS NOTES
Occupational Therapy Daily Note    Today's date: 2023  Patient name: Michelina Apgar  : 1960  MRN: [id number]  Referring provider: Claudene Quinton  Dx:   Encounter Diagnoses   Name Primary?  Posterior circulation stroke ([facility] ) Yes    Cerebrovascular accident (CVA), unspecified mechanism ([facility] )        Subjective: pt without complaints     Objective: Pt engaged in skilled OT treatment session with focus on UE NMR, UE strengthening, UE endurance and FMC/GMC to increase engagement, endurance, tolerance, and independence with daily ADL and IADL tasks  CPT Code Minutes                                           Task Details        Therapeutic Activity               Neuro Re-Ed  Pt engaged in series of activities focused on NMRE, functional reaching, target placement, and in-hand manipulations to improve motor control and functional use of RUE  Functional reaching/ dexterity: pt able to use RUE to reach into a small sandwich bag to retrieve small sized objects  Pt used 3 jaw belia to retrieve items from bag and then place on same sized target, G- functional pinches, G- accuracy, min difficulty with in-hand manipulations, mod difficulty with [address] in smaller spaces  Functional reaching/Prehension patterns/ Strength: pt able to use red resistive clothespin and then green resistive clothespin to  small items from tabletop surface and transfer to over shoulder height surface  Pt with G functional reaching at shoulder, min to mod difficulty manipulating and regulating grasp on resistive clothespin, G+ target accuracy  Dexterity: pt with mod difficulty using functional pinches to  small items from flat surface  Pt with mod/max difficulty picking up flat objects from a flat surface and max difficulty picking up flat objects from an uneven surface                                       Therapeutic Exercise  Green Power Web: 12 reps each (increased from red power web, and from 10- reps)  Finger Flex  Finger Ext  Finger Adduction  Finger Abduction  Thumb Flex/ adduction             TESTING  4/12:   O'Juanjose Finger Dexterity Test  L: 3 min 2 sec ( top 5 rows)  R: 8 min 24 sec  (top 3 rows)            HEP  4/28: provided pt with [facility] activity as HEP to improve finger ROM into ab/adduction         Assessment: Tolerated treatment well  Pt with G gregory to increased resistance and reps with powerweb  Pt cont to improve in-hand dexterity and is completing [facility] with increased precision  Patient would benefit from continued skilled OT      Plan: Continued skilled OT per POC    INTERVENTION COMMENTS:  Diagnosis: Posterior circulation stroke ([facility] ) [I63 50]  Precautions: fall risk, R side weakness   Insurance: Payor: Clemon Scheuermann / Plan: Clemon Scheuermann PPO / Product Type: PPO /   10 of 20 visits  PN due 6/06/23

## 2023-05-05 PROBLEM — I69.351 SPASTIC HEMIPARESIS OF RIGHT DOMINANT SIDE AS LATE EFFECT OF CEREBRAL INFARCTION (HCC): Status: ACTIVE | Noted: 2023-05-05

## 2023-05-09 ENCOUNTER — OFFICE VISIT (OUTPATIENT)
Dept: PHYSICAL THERAPY | Facility: CLINIC | Age: 63
End: 2023-05-09

## 2023-05-09 ENCOUNTER — OFFICE VISIT (OUTPATIENT)
Dept: OCCUPATIONAL THERAPY | Facility: CLINIC | Age: 63
End: 2023-05-09

## 2023-05-09 DIAGNOSIS — I63.50 POSTERIOR CIRCULATION STROKE (HCC): Primary | ICD-10-CM

## 2023-05-09 DIAGNOSIS — I63.9 CEREBROVASCULAR ACCIDENT (CVA), UNSPECIFIED MECHANISM (HCC): ICD-10-CM

## 2023-05-09 LAB
ALBUMIN SERPL-MCNC: 4.1 G/DL (ref 3.6–5.1)
ALBUMIN/GLOB SERPL: 1.5 (CALC) (ref 1–2.5)
ALP SERPL-CCNC: 63 U/L (ref 35–144)
ALT SERPL-CCNC: 35 U/L (ref 9–46)
APTT PPP: 29 SEC (ref 23–32)
AST SERPL-CCNC: 17 U/L (ref 10–35)
BASOPHILS # BLD AUTO: 79 CELLS/UL (ref 0–200)
BASOPHILS NFR BLD AUTO: 1 %
BILIRUB SERPL-MCNC: 0.4 MG/DL (ref 0.2–1.2)
BUN SERPL-MCNC: 27 MG/DL (ref 7–25)
BUN/CREAT SERPL: 25 (CALC) (ref 6–22)
CALCIUM SERPL-MCNC: 9.1 MG/DL (ref 8.6–10.3)
CHLORIDE SERPL-SCNC: 108 MMOL/L (ref 98–110)
CO2 SERPL-SCNC: 24 MMOL/L (ref 20–32)
CREAT SERPL-MCNC: 1.1 MG/DL (ref 0.7–1.35)
EOSINOPHIL # BLD AUTO: 648 CELLS/UL (ref 15–500)
EOSINOPHIL NFR BLD AUTO: 8.2 %
ERYTHROCYTE [DISTWIDTH] IN BLOOD BY AUTOMATED COUNT: 13.4 % (ref 11–15)
GFR/BSA.PRED SERPLBLD CYS-BASED-ARV: 75 ML/MIN/1.73M2
GLOBULIN SER CALC-MCNC: 2.7 G/DL (CALC) (ref 1.9–3.7)
GLUCOSE SERPL-MCNC: 96 MG/DL (ref 65–99)
HCT VFR BLD AUTO: 34.3 % (ref 38.5–50)
HGB BLD-MCNC: 10.9 G/DL (ref 13.2–17.1)
INR PPP: 1
LYMPHOCYTES # BLD AUTO: 1122 CELLS/UL (ref 850–3900)
LYMPHOCYTES NFR BLD AUTO: 14.2 %
MCH RBC QN AUTO: 27.8 PG (ref 27–33)
MCHC RBC AUTO-ENTMCNC: 31.8 G/DL (ref 32–36)
MCV RBC AUTO: 87.5 FL (ref 80–100)
MONOCYTES # BLD AUTO: 521 CELLS/UL (ref 200–950)
MONOCYTES NFR BLD AUTO: 6.6 %
NEUTROPHILS # BLD AUTO: 5530 CELLS/UL (ref 1500–7800)
NEUTROPHILS NFR BLD AUTO: 70 %
PLATELET # BLD AUTO: 231 THOUSAND/UL (ref 140–400)
PMV BLD REES-ECKER: 9.6 FL (ref 7.5–12.5)
POTASSIUM SERPL-SCNC: 4 MMOL/L (ref 3.5–5.3)
PROT SERPL-MCNC: 6.8 G/DL (ref 6.1–8.1)
PROTHROMBIN TIME: 9.8 SEC (ref 9–11.5)
RBC # BLD AUTO: 3.92 MILLION/UL (ref 4.2–5.8)
SODIUM SERPL-SCNC: 141 MMOL/L (ref 135–146)
TSH SERPL-ACNC: 0.81 MIU/L (ref 0.4–4.5)
VIT B12 SERPL-MCNC: 593 PG/ML (ref 200–1100)
WBC # BLD AUTO: 7.9 THOUSAND/UL (ref 3.8–10.8)

## 2023-05-09 NOTE — PROGRESS NOTES
Occupational Therapy Daily Note    Today's date: 2023  Patient name: Cosmo Sewell  : 1960  MRN: [id number]  Referring provider: Vaibhav Troy  Dx:   Encounter Diagnoses   Name Primary? • Posterior circulation stroke ([facility] ) Yes   • Cerebrovascular accident (CVA), unspecified mechanism ([facility] )        Subjective: pt without complaints     Objective: Pt engaged in skilled OT treatment session with focus on UE NMR, UE strengthening, UE endurance and FMC/GMC to increase engagement, endurance, tolerance, and independence with daily ADL and IADL tasks  CPT Code Minutes                                           Task Details        Therapeutic Activity               Neuro Re-Ed  Pt engaged in series of activities focused on NMRE, functional reaching, target placement, and in-hand manipulations to improve motor control and functional use of RUE  Pt completed a series of isolated and complex motor movements at R hand to improve wrist flex/ ext and finger flex/ ext/ abduction/ adduction, focusing on control at each joint, separate from the movement at the immediate proximal joint  Place and hold exercise completed into wrist extension, pt with mod difficulty sustaining wrist extension at end range, but noted improved range at end of exercise  Pt able to use R hand to  washers and nuts from flat tabletop surface, using functional pincer and 3 jaw belia, with min/mod difficulty  Pt able to transfer onto target with min difficulty, G target accuracy  Pt with improved ability to  flat items from flat tabletop, no longer needing raking grasp to  items  Therapeutic Exercise  Green Power Web: 12 reps each   Finger Flex  Finger Ext  Finger Adduction  Finger Abduction  Thumb Flex/ adduction     Pt with tightness into R wrist extension and R finger flexion   Completed joint mobs, retrograde massage and PROM with long passive stretch at R wrist and fingers, with noted improved ROM after intervention  TESTING  4/12:   O'Juanjose Finger Dexterity Test  L: 3 min 2 sec ( top 5 rows)  R: 8 min 24 sec  (top 3 rows)            HEP  5/9: [address] of thumb to alternating fingers, transitioning from pad to pad to fingertip to fingertip    4/28: provided pt with [address] activity as HEP to improve finger ROM into ab/adduction         Assessment: Tolerated treatment well  Pt cont to improve in-hand dexterity and is completing [address] with increased precision  Patient would benefit from continued skilled OT      Plan: Continued skilled OT per POC    INTERVENTION COMMENTS:  Diagnosis: Posterior circulation stroke ([facility] ) [I63 50]  Precautions: fall risk, R side weakness   Insurance: Payor: Denisse Richardson / Plan: Denisse Richradson PPO / Product Type: PPO /   11 of 20 visits  PN due 6/06/23

## 2023-05-09 NOTE — PROGRESS NOTES
Daily Note     Today's date: 2023  Patient name: Unique Roberts  : 1960  MRN: [id number]  Referring provider: Tono Jones  Dx:   Encounter Diagnosis     ICD-10-CM    1  Posterior circulation stroke ([address] )  I63 50           Start Time: 930  Stop Time: 1015  Total time in clinic (min): 45 minutes    Subjective:  Neurology appt went well  Not sure about botox  Has appt tomorrow unsure about what  Foot has been better since inc meds  May have overdid it yesterday, walked whole driveway - [id number] about  Objective: See treatment diary below      Assessment:  Increased treadmill pacing and more resistance + repetitions to challenge sub-components of gait cycle  Difficulty with step through pattern while negotiating obstacles with various lead foot  Few near losses of balance with PT mod a x2 while walking overground with ankle weight to challenge limb advancement  Pt reports ankle feeling looser after manuals  Pt will continue to benefit from skilled PT to address gait deficits, balance, and strength  Plan:  High intensity stepping  Obstacle negotiation  Increased pacing for propulsion  Weighted/resisted ambulating       Precautions: Falls,   Re-eval Date: 2023    Date        Visit Count 15       FOTO See IE       Pain In See IE       Pain Out                Testing        TUG/TUGC 17 15 / 16 70       5xSTS 20 81       Gait speed 0 58       FGA 8       2/6MWT 576       Neuro Re-Ed        Dynamic balance     0#R fwd/bwd shuttle run OK :32 x3 RPE 15/20   Static balance        Obstacle course 0# hurdles + 6' step x10 laps    6' step + uneven + hurdles 5#R 6 laps x2 RPE 16/20   Resisted ambulation Red TB at hips 10 laps propulsion solo    Peach TB at ankle 10 laps limb adv solo    Red TB at hips bwd 8 laps solo    Blue TB at ankle challenging limb adv 8 laps solo    Red/blue TB resist challenging propulsion/stance 8 laps   Uneven surfaces        HKM on foam (// bars)        RLE forced use Ther Ex        Hamstring 3 way stretch        SLR x 4        HR/TR        Mini Squats        Step ups        Plantar stretch pro-stretch        Towel stretch right                         Ther Activity        ADL        Curb negotiation         Gait Training        No AD Ortho x2 5#R CGA    TM 8% 30s on/off 1 0mph > 1  3mph no UE 6min total   SPC        Treadmill 2min x3 1  9mph RUE forced use       Head turns        Manuals        Plantar release G4 R ankle a/p MWM AF    G4 R ankle a/p AF   KT 50% arch support, paper off tension med/lat right ankle

## 2023-05-10 ENCOUNTER — OFFICE VISIT (OUTPATIENT)
Dept: NEUROLOGY | Facility: CLINIC | Age: 63
End: 2023-05-10

## 2023-05-10 VITALS
WEIGHT: 181 LBS | BODY MASS INDEX: 26.81 KG/M2 | DIASTOLIC BLOOD PRESSURE: 58 MMHG | HEART RATE: 58 BPM | SYSTOLIC BLOOD PRESSURE: 109 MMHG | HEIGHT: 69 IN | TEMPERATURE: 97.5 F

## 2023-05-10 DIAGNOSIS — I10 PRIMARY HYPERTENSION: ICD-10-CM

## 2023-05-10 DIAGNOSIS — I77.74 VERTEBRAL ARTERY DISSECTION (HCC): ICD-10-CM

## 2023-05-10 DIAGNOSIS — E78.5 HYPERLIPIDEMIA: ICD-10-CM

## 2023-05-10 DIAGNOSIS — Z86.73 HISTORY OF STROKE: Primary | ICD-10-CM

## 2023-05-10 DIAGNOSIS — I69.351 SPASTIC HEMIPARESIS OF RIGHT DOMINANT SIDE AS LATE EFFECT OF CEREBRAL INFARCTION (HCC): ICD-10-CM

## 2023-05-10 NOTE — PATIENT INSTRUCTIONS
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, which Dr Bayron Fenton had seen him for in the hospital and the artery had been stented  Patient is still following closely with neurosurgery at this time, the last visit his Eliquis was discontinued by Dr Bayron Fenton  Patient is currently still on dual antiplatelet therapy  As far as residual deficits go, patient does have decreased  strength of the right hand compared to the left at this time, does have some lower extremity weakness on the right side compared to the left, patient is hyper reflexive throughout on the right side  Also, patient's most concerning aspect is the trigeminal nerve pain that he feels over the right side of his face as well  However, at this time still no new strokelike symptoms  Patient is in physical therapy and Occupational Therapy and appears to be doing very well     -At this time, would like for the patient to increase his gabapentin dosage to the 300 mg twice daily  After trying this for about a good week and 1/2 to 2 weeks, would like to have the patient contact our office  They can either call in or message me directly on [address], I would like to know how he is doing with the trigeminal nerve pain and if the gabapentin seems to be working  If gabapentin is not helping or helping to the extent that we would expected to, then I will talk with Dr Christa Nguyen and see if we can switch the patient to either carbamazepine or oxcarbazepine  Or, if she would like to continue to increase the gabapentin and see if that would help   -Would like for the patient to have a lipid panel before his next visit    Would like to see how the atorvastatin is working to lower his LDL at this time   -Continue to follow with neurosurgery and Dr Aguilar Natarajan, patient has an IR cerebral angiography coming up in the future  Most recent CTA head and neck, did show the stable dissection flap and the stent in place at this time  Continue to follow with Dr Wilder Poag recommendations as far as dual antiplatelet therapy goes as well   -For the time being, we will continue with aspirin 81 mg once daily and Brilinta 90 mg twice daily   -Continue to follow with Dr Bryan Byers from [facility] as well  For the patient's spastic hemiparesis, should continue to follow with Dr Po Cameron as far as if any new increase to his baclofen dosage is required or if she decides to perform Botox injections in the future for the patient  -We had discussed the patient's history of sleep apnea, would like him to consider having a sleep study performed sometime in the future  However, the patient has a lot going on at the moment, may discuss at next visit in regards to having a home sleep study done  - For ongoing stroke prevention continue: Aspirin 81 mg once daily, Brilinta 90 mg twice daily, atorvastatin 40 mg once a day  - Discussed the importance of antiplatelet management with the patient to prevent future strokes  - Recommend to check blood pressure occasionally away from the doctor's office to make sure that those numbers are typically less than 130/80  If they are frequently higher than that, we recommend checking a little more often and to follow up with primary care team   - Will defer to primary care team for monitoring of cholesterol panel and blood sugar numbers with target LDL cholesterol of less than 70 and hemoglobin A1c less than 7%  - Recommend following a low salt, mediterranean diet   - Recommend routine physical exercise as tolerated    We will plan for him to return to the office in 2 months time to see on of the APPs or Dr Annita Stone but would be happy to see him sooner if the need should arise    If he has any symptoms concerning for TIA or stroke including sudden painless loss of vision or double vision, difficulty speaking or swallowing, vertigo/room spinning that does not quickly resolve, or weakness/numbness/loss of coordination affecting 1 side of the face or body he should proceed by ambulance to the nearest emergency room immediately

## 2023-05-10 NOTE — PROGRESS NOTES
Patient ID: Nico Villareal is a 60 y o  male who presents to the [facility]  Assessment/Plan:    History of Stroke:    I had the pleasure of seeing Dipika Carpio today in the office at [place] [address] [facility] in [place]  He is presenting today as a hospital follow-up in regards to his recent strokes  He was also found to have a right vertebral artery dissection, which Dr Vivi Pierre had seen him for in the hospital and the artery had been stented  Patient is still following closely with neurosurgery at this time, the last visit his Eliquis was discontinued by Dr Vivi Pierre  Patient is currently still on dual antiplatelet therapy  As far as residual deficits go, patient does have decreased  strength of the right hand compared to the left at this time, does have some lower extremity weakness on the right side compared to the left, patient is hyperreflexive throughout on the right side  Also, patient's most concerning aspect is the trigeminal nerve pain that he feels over the right side of his face as well  However, at this time still no new strokelike symptoms  Patient is in physical therapy and Occupational Therapy and appears to be doing very well     -At this time, would like for the patient to increase his gabapentin dosage to the 300 mg twice daily  After trying this for about a good week and 1/2 to 2 weeks, would like to have the patient contact our office  They can either call in or message me directly on [employer], I would like to know how he is doing with the trigeminal nerve pain and if the gabapentin seems to be working  If gabapentin is not helping or helping to the extent that we would expected to, then I will talk with Dr Lees Friday and see if we can switch the patient to either carbamazepine or oxcarbazepine    Or, if she would like to continue to increase the gabapentin and see if that would help   -Would like for the patient to have a lipid panel before his next visit  Would like to see how the atorvastatin is working to lower his LDL at this time   -Continue to follow with neurosurgery and Dr Taurus Leon, patient has an IR cerebral angiography coming up in the future  Most recent CTA head and neck, did show the stable dissection flap and the stent in place at this time  Continue to follow with Dr Radha Lopez recommendations as far as dual antiplatelet therapy goes as well   -For the time being, we will continue with aspirin 81 mg once daily and Brilinta 90 mg twice daily   -Continue to follow with Dr Jose Mejia [facility] as well  For the patient's spastic hemiparesis, should continue to follow with Dr Donnell Raymond as far as if any new increase to his baclofen dosage is required or if she decides to perform Botox injections in the future for the patient  -We had discussed the patient's history of sleep apnea, would like him to consider having a sleep study performed sometime in the future  However, the patient has a lot going on at the moment, may discuss at next visit in regards to having a home sleep study done  - For ongoing stroke prevention continue: Aspirin 81 mg once daily, Brilinta 90 mg twice daily, atorvastatin 40 mg once a day  - Discussed the importance of antiplatelet management with the patient to prevent future strokes  - Recommend to check blood pressure occasionally away from the doctor's office to make sure that those numbers are typically less than 130/80    If they are frequently higher than that, we recommend checking a little more often and to follow up with primary care team   - Will defer to primary care team for monitoring of cholesterol panel and blood sugar numbers with target LDL cholesterol of less than 70 and hemoglobin A1c less than 7%  - Recommend following a low salt, mediterranean diet   - Recommend routine physical exercise as tolerated    We will plan for him to return to the office in 2 months time to see on of the APPs or Dr Andriy Scherer "but would be happy to see him sooner if the need should arise  If he has any symptoms concerning for TIA or stroke including sudden painless loss of vision or double vision, difficulty speaking or swallowing, vertigo/room spinning that does not quickly resolve, or weakness/numbness/loss of coordination affecting 1 side of the face or body he should proceed by ambulance to the nearest emergency room immediately  Subjective:    HPI    For Review/Hospital Course:    Madison Fischer is a 60 y o  male who presents for follow up in regard to his previous multifocal, bihemispheric strokes  \"60 y o  male with no reported significant past medical history (although patient does not follow with a physician) who presented to [facility] on 2/25/2023 with complaint of several days of weakness, dizziness, and nausea/vomiting        • CT head revealed acute/subacute infarcts in the right posterior cerebellum  • CTA head/neck revealed 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 and left vertebral artery origin stenosis with presumed occlusion of the distal left intradural vertebral segment  • MRA head/carotids demonstrated possible extension of the vertebral artery dissection into the basilar artery  • MRI brain revealed several small acute/early subacute bi-cerebellar infarcts (R>L) without evidence of hemorrhage  • , Cholesterol 163, A1C 5 7  • COVID positive     Patient was transferred to [facility] for closer monitoring with consideration for endovascular intervention  Initially given stable exam, intervention was felt too risky and patient was on a heparin gtt with full dose aspirin  However on 2/26, patient had worsened dysarthria and right sided weakness    Stroke alert activated:  • CT head negative for intracranial hemorrhage and stable bilateral cerebellar infarcts  • Patient was taken to IR for emergent R vertebral/basilar thrombectomy with R V3/4 stenting with TICI 2B " "revascularization  • Patient was started on Integrilin gtt and repeat CT head negative for hemorrhage      • MRI brain 2/26: Increased infarct burden  Bilateral cerebellar infarcts with larger right cerebellar infarct and new acute infarcts in the right medulla, bilateral midbrain, and left occipital lobe  • Echo: EF 65%, normal wall motion, normal atria size, no PFO     On exam, patient has dysarthria, right facial droop, and right hemiparesis  Etiology for acute infarcts in the setting of bilateral vertebral artery occlusions remains unclear, however likely due to dissection vs chronic diffuse atherosclerotic disease  Possible contribution from underlying COVID infection and hypercoagulability  No recent head trauma, strenuous physical activity, neck manipulation, coughing/sneezing, etc \" -Audrey Zamora PA-C    Patient was initiated on dual antiplatelet therapy on 83/09 with aspirin 81 mg once daily and Brilinta 90 mg twice daily  Patient was also on Eliquis 2 5 mg twice daily as of 02/28  Patient was to continue with atorvastatin 40 mg daily for hyperlipidemia  Patient was on baclofen 10 mg twice daily for right lower extremity spasms/myoclonus  Patient was eventually started on antidepressant medication as well with Prozac 20 mg once daily  Patient had presented for a neurosurgery follow-up appointment in the office on 05/01/2023  Patient's imaging was reviewed with himself and his wife with Dr Arcelia Garay  Patient had a stable right vertebral artery dissection  And the stent was patent per Dr Hernadnez Shade review  Since it had been more than 6 weeks since the procedure for the stenting of the artery dissection, patient was to discontinue Eliquis at this time  Still to continue on dual antiplatelet therapy for the time being, and would continue dual antiplatelet therapy up until the cerebral angiogram in 3 months      Patient was also seen on 05/04/2023 by Dr Shiv Forrester with the physical medicine and " rehabilitation team   She is working with the patient for the spastic hemiparesis that he does have on the right side  She is currently working to increase his baclofen to 3 times a day for the spasms and pain he is experiencing rather than twice daily  Also, looking into the option of eventually doing Botox for the right dorsiflexors to avoid any foot drop for the patient at this time  Also having significant facial pain on the right side, which seems to be characteristic of a potential trigeminal neuralgia  Patient was started on gabapentin 300 mg twice daily for this related nerve pain  This was a(n) cryptogenic stroke, most likely related to Other: vertebral artery dissection vs diffuse atheroscleroti disease    Residual symptoms include: weakness of the right UE/LE: upper extremities and lower extremities, spasticity and other: facial pain    Current treatment including stroke ppx:  Aspirin 81mg and Brillinta (ticagrelor)    Stroke risk factors were evaluated including: Stenosis of the vertebral arteries, right vertebral artery dissection, hypertension, prediabetes      Interval History:    Patient's wife had joined him for the appointment today in the office  Residual deficits of right sided weakness, right-sided hemiparesis/spasticity,  facial pain on the right side of the face  Pain at the top of the head, around the cheek, and mouth  Shooting, stabbing pain as the patient describes  Currently on gabapentin 300 mg once daily at nighttime  When the patient saw Dr Donya Bearden, he was instructed to slowly titrate up on his gabapentin  He will be taking the additional 300 mg dose in the morning within the next few days  Has a hard time for him to lay down because of the stabbing pain in the right foot  Has to stand up to get relief from pain  Baclofen 10 mg twice daily, and has not taken the third dose yet at this time    Patient's wife is also concerned of some swelling in the legs noted at this time   An ultrasound Doppler was ordered by Dr Jocelyne Mesa at this time to evaluate for any DVTs at this time  Patient's wife was wondering if there were any medications he was currently taking that causes swelling, I stated that some calcium channel blockers like amlodipine could potentially cause peripheral edema  Encouraged him to keep an eye on the swelling and if it were to continue, talk with the patient's primary care about adjusting blood pressure medication  Other than the deficits noted, no new strokelike symptoms at this time  Patient was discontinued from Eliquis therapy by the neurosurgery team   Now currently just taking aspirin 81 mg and Brilinta 90 mg twice daily for dual antiplatelet therapy  Blood pressure today was 109/58, currently on sufficient antihypertensive therapy  Patient is also taking atorvastatin 40 mg once daily  Most recent LDL was 112 mg/dL  Most recent hemoglobin A1c at 5 7%  Just physical and occupational therapy at this time, no speech therapy at this time  Randolph Garcia for physical therapy  Trying to stay as active as he can at this time  Educated the patient on the importance of a well-balanced diet, especially for secondary stroke prevention  Patient does report snoring in his sleep, may evaluate for potential sleep apnea in the future  Some depression was noted right after the stroke had occurred, patient is currently on Prozac 20 mg once daily  States he is doing much better at this time not feeling as depressed as he did before        Lab Results   Component Value Date/Time    CHOLESTEROL 163 02/26/2023 05:06 AM     Lab Results   Component Value Date/Time    TRIG 94 02/26/2023 05:06 AM     Lab Results   Component Value Date/Time    HDL 32 (L) 02/26/2023 05:06 AM     Lab Results   Component Value Date/Time    LDLCALC 112 (H) 02/26/2023 05:06 AM       Lab Results   Component Value Date/Time    HGBA1C 5 7 (H) 02/25/2023 01:04 PM     Lab Results Component Value Date/Time     02/25/2023 01:04 PM           Past Medical History:   Diagnosis Date   • BRAULIO (acute kidney injury) ([facility] [address] )    • B12 deficiency    • Celiac artery stenosis (HCC)    • Cerebellar infarct ([facility] ) 02/25/2023   • CKD (chronic kidney disease) stage 2, GFR 60-89 ml/min    • Essential hypertension    • Impaired fasting glucose    • Iron deficiency anemia    • Neurogenic bowel    • Stenosis of left vertebral artery    • Vertebral artery dissection (HCC)        Current Outpatient Medications:   •  Acetaminophen (TYLENOL EXTRA STRENGTH PO), Take 1,000 mg by mouth daily as needed, Disp: , Rfl:   •  amLODIPine (NORVASC) 10 mg tablet, Take 1 tablet (10 mg total) by mouth daily, Disp: 30 tablet, Rfl: 0  •  apixaban (ELIQUIS) 2 5 mg, Take 1 tablet (2 5 mg total) by mouth 2 (two) times a day, Disp: 60 tablet, Rfl: 0  •  aspirin 81 mg chewable tablet, Chew 1 tablet (81 mg total) daily, Disp: 30 tablet, Rfl: 0  •  atorvastatin (LIPITOR) 40 mg tablet, Take 1 tablet (40 mg total) by mouth daily with dinner, Disp: 30 tablet, Rfl: 0  •  baclofen 10 mg tablet, Take 1 tablet (10 mg total) by mouth every 8 (eight) hours, Disp: 90 tablet, Rfl: 1  •  carvedilol (COREG) 25 mg tablet, Take 1 tablet (25 mg total) by mouth 2 (two) times a day with meals, Disp: 60 tablet, Rfl: 0  •  cyanocobalamin (VITAMIN B-12) 1000 MCG tablet, Take 1 tablet (1,000 mcg total) by mouth daily, Disp: 30 tablet, Rfl: 0  •  ferrous sulfate 325 (65 Fe) mg tablet, Take 1 tablet (325 mg total) by mouth daily with breakfast, Disp: 30 tablet, Rfl: 0  •  FLUoxetine (PROzac) 20 mg capsule, Take 1 capsule (20 mg total) by mouth daily, Disp: 30 capsule, Rfl: 0  •  gabapentin (Neurontin) 300 mg capsule, Take 1 capsule (300 mg total) by mouth every 12 (twelve) hours, Disp: 60 capsule, Rfl: 0  •  hydrALAZINE (APRESOLINE) 50 mg tablet, Take 1 tablet (50 mg total) by mouth every 12 (twelve) hours, Disp: 60 tablet, Rfl: 0  •  psyllium (METAMUCIL) "packet, Take 1 packet by mouth every other day, Disp: 30 packet, Rfl: 0  •  ticagrelor (BRILINTA) 90 MG, Take 1 tablet (90 mg total) by mouth every 12 (twelve) hours, Disp: 60 tablet, Rfl: 0     Objective:    Physical Exam:                                                                 Vitals:            Constitutional:    /58 (BP Location: Left arm, Patient Position: Sitting)   Pulse 58   Temp 97 5 °F (36 4 °C) (Temporal)   Ht 5' 9\" (1 753 m)   Wt 82 1 kg (181 lb)   BMI 26 73 kg/m²   BP Readings from Last 3 Encounters:   05/10/23 109/58   05/04/23 138/63   05/01/23 142/70     Pulse Readings from Last 3 Encounters:   05/10/23 58   05/04/23 67   05/01/23 68         Well developed, well nourished, well groomed  No dysmorphic features  Psychiatric:  Normal behavior and appropriate affect        Neurological Examination:     Mental status/cognitive function:   Orientated to time, place and person  Recent and remote memory intact  Attention span and concentration as well as fund of knowledge are appropriate for age  Normal language and spontaneous speech  Cranial Nerves:  II-visual fields full  III, IV, VI-Pupils were equal, round, and reactive to light and accomodation  Extraocular movements were full and conjugate without nystagmus  Conjugate gaze, normal smooth pursuits, normal saccades   V-facial sensation asymmetric, decreased light touch sensation over the V1 distribution of the trigeminal nerve on the right side of the head compared to the left, decreased temperature sensation over the V1 distribution of the trigeminal nerve on the right side compared to the left, intact pinprick sensation throughout  VII-facial expression symmetric, intact forehead wrinkle, strong eye closure, symmetric smile    VIII-hearing grossly intact bilaterally   IX, X-palate elevation symmetric, no dysarthria  XI-shoulder shrug strength intact    XII-tongue protrusion midline      Motor Exam: symmetric bulk and " tone throughout, no pronator drift  Power/strength 5/5 of the left upper extremity and left lower extremity, power/strength 4/5 of the right lower extremity, decreased  strength of the right hand compared to left, no atrophy, fasciculations or abnormal movements noted  Sensory: grossly intact light touch in all extremities  Reflexes: left: brachioradialis 2+, biceps 2+, knee 2+   Right: brachioradialis 3+, biceps 3+, knee 3+   Coordination: Finger nose finger intact bilaterally, no apparent dysmetria, ataxia or tremor noted  Gait: steady casual and tandem gait  ROS:    Review of Systems   Constitutional: Negative  Negative for appetite change, chills and fever  HENT: Negative  Negative for ear pain, hearing loss, sore throat, tinnitus, trouble swallowing and voice change  Eyes: Negative  Negative for photophobia, pain and visual disturbance  Respiratory: Negative  Negative for cough and shortness of breath  Cardiovascular: Negative  Negative for chest pain and palpitations  Gastrointestinal: Negative  Negative for abdominal pain, nausea and vomiting  Endocrine: Negative  Negative for cold intolerance  Genitourinary: Negative  Negative for dysuria, frequency, hematuria and urgency  Musculoskeletal: Positive for gait problem  Negative for arthralgias, back pain, myalgias and neck pain  Skin: Negative  Negative for color change and rash  Allergic/Immunologic: Negative  Neurological: Positive for weakness  Negative for dizziness, tremors, seizures, syncope, facial asymmetry, speech difficulty, light-headedness, numbness and headaches  Muscle spasm r leg   Hematological: Negative  Does not bruise/bleed easily  Psychiatric/Behavioral: Negative  Negative for confusion, hallucinations and sleep disturbance  All other systems reviewed and are negative        I have spent 60 minutes today on this case including chart review, performing history and exam, patient counseling, and documentation/communication        Susie Naik PA-C  05/10/2023

## 2023-05-10 NOTE — PROGRESS NOTES
Review of Systems   Constitutional: Negative  Negative for appetite change, chills and fever  HENT: Negative  Negative for ear pain, hearing loss, sore throat, tinnitus, trouble swallowing and voice change  Eyes: Negative  Negative for photophobia, pain and visual disturbance  Respiratory: Negative  Negative for cough and shortness of breath  Cardiovascular: Negative  Negative for chest pain and palpitations  Gastrointestinal: Negative  Negative for abdominal pain, nausea and vomiting  Endocrine: Negative  Negative for cold intolerance  Genitourinary: Negative  Negative for dysuria, frequency, hematuria and urgency  Musculoskeletal: Positive for gait problem  Negative for arthralgias, back pain, myalgias and neck pain  Skin: Negative  Negative for color change and rash  Allergic/Immunologic: Negative  Neurological: Positive for weakness  Negative for dizziness, tremors, seizures, syncope, facial asymmetry, speech difficulty, light-headedness, numbness and headaches  Muscle spasm r leg   Hematological: Negative  Does not bruise/bleed easily  Psychiatric/Behavioral: Negative  Negative for confusion, hallucinations and sleep disturbance  All other systems reviewed and are negative

## 2023-05-11 ENCOUNTER — OFFICE VISIT (OUTPATIENT)
Dept: OCCUPATIONAL THERAPY | Facility: CLINIC | Age: 63
End: 2023-05-11

## 2023-05-11 ENCOUNTER — OFFICE VISIT (OUTPATIENT)
Dept: PHYSICAL THERAPY | Facility: CLINIC | Age: 63
End: 2023-05-11

## 2023-05-11 DIAGNOSIS — I63.50 POSTERIOR CIRCULATION STROKE (HCC): Primary | ICD-10-CM

## 2023-05-11 DIAGNOSIS — I63.9 CEREBROVASCULAR ACCIDENT (CVA), UNSPECIFIED MECHANISM (HCC): ICD-10-CM

## 2023-05-11 NOTE — PROGRESS NOTES
Daily Note     Today's date: 2023  Patient name: Milan Alberts  : 1960  MRN: [id number]  Referring provider: Laura Hoffman  Dx:   Encounter Diagnosis     ICD-10-CM    1  Posterior circulation stroke ([facility] [address] )  I63 50           Start Time: 0800  Stop Time: 0845  Total time in clinic (min): 45 minutes    Subjective:  Neurology seen abt medication  Another appt in   Walking a lot at home in his driveway  Objective: See treatment diary below      Assessment:  Pt challenged trunk stability with hurdles with posterior pull and reducing UE support on treadmill  Trialed bracing with less foot scuffing will follow up with orthotic clinic about patient benefiting from this for long distances  Cues to drive through R foot with good repsonse in various directions  Improving stability and pacing with forward/backward transitioning  Pt will continue to benefit from skilled PT to address gait deficits, balance, and strength  Plan:  High intensity stepping  Obstacle negotiation  Increased pacing for propulsion  Weighted/resisted ambulating  Precautions: Falls,   Re-eval Date: 2023    Date       Visit Count 15 16      FOTO See IE       Pain In See IE       Pain Out                Testing        TUG/TUGC 17 15 / 16 70       5xSTS 20 81       Gait speed 0 58       FGA 8       2/6MWT 576       Neuro Re-Ed        Dynamic balance        Static balance        Obstacle course 0# hurdles + 6' step x10 laps       Resisted ambulation Red TB at hips 10 laps propulsion solo    Peach TB at ankle 10 laps limb adv solo    Red TB at hips bwd 8 laps solo Red TB at hip over hurdles 8 laps    Gooding TB at ankle side step 8 laps    7  5#R fwd/bwd timed x6 AR 10 54      Uneven surfaces        HKM on foam (// bars)        RLE forced use        Ther Ex        Hamstring 3 way stretch        SLR x 4        HR/TR        Mini Squats  2x10      Step ups  x30 6' R focus      Plantar stretch pro-stretch        Towel stretch right                         Ther Activity        ADL        Curb negotiation         Gait Training        No AD Ortho x2 5#R CGA AFO solo 10 laps      SPC        Treadmill 2min x3 1  9mph RUE forced use 2min 1  8mph R forced use 7 5#    2min x2 7  5#R 1  3mph no UE      Head turns        Manuals        Plantar release G4 R ankle a/p MWM AF G4 R ankle a/p MWM AF      KT 50% arch support, paper off tension med/lat right ankle Yes

## 2023-05-11 NOTE — PROGRESS NOTES
Occupational Therapy Daily Note    Today's date: 2023  Patient name: Tye Michaels  : 1960  MRN: [id number]  Referring provider: Italia Lebron  Dx:   Encounter Diagnoses   Name Primary? • Posterior circulation stroke ([facility] ) Yes   • Cerebrovascular accident (CVA), unspecified mechanism ([facility] )        Subjective: pt without complaints     Objective: Pt engaged in skilled OT treatment session with focus on UE NMR, UE strengthening, UE endurance and FMC/GMC to increase engagement, endurance, tolerance, and independence with daily ADL and IADL tasks  CPT Code Minutes                                           Task Details        Therapeutic Activity               Neuro Re-Ed  Pt engaged in series of activities focused on NMRE, functional reaching, target placement, and in-hand manipulations to improve motor control and functional use of RUE  Pt able to use R hand to  nuts, bolts and washers from tabletop surface, with G functional pincer  Pt with improving ability to  flat items from a tabletop surface with pincer, no longer needing a raking grasp for pickup  Pt able to use R hand to place nuts, washers and bolts to assemble on a board  Pt with G BUE coordination, able to use L hand on top of board to stabilize the bolt while reaching under the board with his R hand to screw on the nuts  Pt with improving motor control over isolated finger movement and improving coordination between fingers to allow for improved [facility] and dexterity  Pt then able to use R hand to manipulate an kylah wrench to assemble an kylah bolt and nut, G BUE coordination, min/mod difficulty, increased time  Place and hold exercise completed into wrist extension, pt with min/mod difficulty sustaining wrist extension at end range                             Therapeutic Exercise               TESTING  :   Sparkle Martinez Finger Dexterity Test  L: 3 min 2 sec ( top 5 rows)  R: 8 min 24 sec  (top 3 rows) HEP  5/9: [address] of thumb to alternating fingers, transitioning from pad to pad to fingertip to fingertip    4/28: provided pt with [address] activity as HEP to improve finger ROM into ab/adduction         Assessment: Tolerated treatment well  Pt cont to improve in-hand dexterity and is completing [address] with increased precision  Patient would benefit from continued skilled OT      Plan: Continued skilled OT per POC    INTERVENTION COMMENTS:  Diagnosis: Posterior circulation stroke ([facility] [address] ) [I63 50]  Precautions: fall risk, R side weakness   Insurance: Payor: Phil Velazquez / Plan: Phil Velazquez PPO / Product Type: PPO /   12 of 20 visits  PN due 6/06/23

## 2023-05-12 ENCOUNTER — OFFICE VISIT (OUTPATIENT)
Dept: PHYSICAL THERAPY | Facility: CLINIC | Age: 63
End: 2023-05-12

## 2023-05-12 DIAGNOSIS — I63.50 POSTERIOR CIRCULATION STROKE (HCC): Primary | ICD-10-CM

## 2023-05-12 NOTE — TELEPHONE ENCOUNTER
Archie Duarte, at this time I believe that this is probably just some lightheadedness or orthostasis due to the rigorous PT session   His blood pressure is 118/52, would keep an eye on this to make sure is not getting much lower   If the patient's wife would like to she can take the patient's blood pressure sitting and standing to see if there is a significant difference   If there is a difference between 20 on the top number and 10 on the bottom number, then this is probably most likely related to a drop in blood pressure   Make sure that the patient is staying as hydrated as possible and making sure to take it easy and rest after PT       If the symptoms continue to persist, and do not seem to get better, then there is concern of something like a TIA or CVA that we could bring the patient into the ED for  Wilmer Diego other new strokelike symptoms that are concerning to the patient and his wife at this time should always be addressed with a call to 911 and evaluation in the emergency department   I would say that if the patient's spotty vision seems to resolve with some rest and hydration, then there is likely no need to be going to the hospital for this  [facility] if it gets worse or he does have new strokelike symptoms then I would recommend evaluation in the ED      -Justine Monsivais

## 2023-05-12 NOTE — PROGRESS NOTES
Name: Milan Alberts      : 1960      MRN: [id number]  Encounter Provider: Tyler Pizano DO  Encounter Date: 2023   Encounter department: [address] [place] [address]  Primary hypertension  -     amLODIPine-benazepril (LOTREL 5-10) 5-10 MG per capsule; Take 1 capsule by mouth daily  bp is at goal (100/62 ) in office today  Having swelling in right lower extremity  Venous duplex negative yesterday  Suspect that this is related to immobility and some venous insufficiency together with the amlodipine though he has no left lower leg swelling which one would expect if it were secondary to the amlodipine  Will decrease amlodipine from 10 to 5 mg  Not comfortable with d/c altogether at this time due to some elevated bp readings in other offices recently  Will add low dose benazepril 10 mg to his 5 of amlodipine  Recheck bp 1 month  2  Cerebrovascular accident (CVA), unspecified mechanism ([facility] [address] )  With residual right sided weakness, recovering  Following with neurosurgery, neurology and PMR  On dual antiplatelet therapy  Continues in PT   3  Right Vertebral artery dissection ([facility] [address] )    4  BRAULIO (acute kidney injury) ([facility] )  Resolved  5  Stage 2 chronic kidney disease  Lab Results   Component Value Date    SODIUM 141 2023    K 4 0 2023     2023    CO2 24 2023    AGAP 11 2023    BUN 27 (H) 2023    CREATININE 1 10 2023    GLUC 96 2023    GLUF 107 (H) 2023    CALCIUM 9 1 2023    AST 17 2023    ALT 35 2023    ALKPHOS 63 2023    TP 6 8 2023    TBILI 0 4 2023    EGFR 75 2023     Avoid NSAIDs  6  Neuropathic pain  Trigeminal nerve distribution  Gabapentin 300 mg bid ineffective thus far and was started on trileptal yesterday  Wants to d/c the gabapentin altogether since it is making him tired and does not seem to be helping     Advised that this med could be titrated up much higher before we consider it a failure  Not interested going up to higher doses due to side effects  Hopeful that the trileptal will be helpful for his pain  7  Prediabetes  Lab Results   Component Value Date    SODIUM 141 05/08/2023    K 4 0 05/08/2023     05/08/2023    CO2 24 05/08/2023    AGAP 11 04/01/2023    BUN 27 (H) 05/08/2023    CREATININE 1 10 05/08/2023    GLUC 96 05/08/2023    GLUF 107 (H) 03/13/2023    CALCIUM 9 1 05/08/2023    AST 17 05/08/2023    ALT 35 05/08/2023    ALKPHOS 63 05/08/2023    TP 6 8 05/08/2023    TBILI 0 4 05/08/2023    EGFR 75 05/08/2023     Lab Results   Component Value Date    HGBA1C 5 7 (H) 02/25/2023     monitoring  8  Spasticity  On baclofen  PMR trying to get authorization for botox  9  Adjustment disorder with depressed mood  Resolved  Moods good  10  Iron deficiency anemia, unspecified iron deficiency anemia type  Lab Results   Component Value Date    WBC 7 9 05/08/2023    HGB 10 9 (L) 05/08/2023    HCT 34 3 (L) 05/08/2023    MCV 87 5 05/08/2023     05/08/2023     Anemic  b12 normal    Never went for iron studies  Reprinted order today  Taking iron  Had previously ordered FIT test to be sure there was no rectal bleeding  This was never completed  They would rather do cologuard since he has never had colon cancer screen  Ordered today  C  11  Lower extremity edema  -     Elastic Bandages & Supports (Medical Compression Stockings) MISC; Use in the morning  Compression stockings ordered  Keep leg elevated higher than heart  12  Colon cancer screening  -     Cologuard           Subjective     HPI   Patient is a 60year old male with hypertension, prediabetes, CKD2, depression, anemia and history of cerebellar CVA, history right vertebral artery dissection s/p stenting here today for f/u visit  Following with neurosurgery and had visit there on 5/10/23  Is also seeing PMR and had visit with Dr Lois Magallanes on 5/4/23   She is managing his spastic hemiparesis with baclofen  Had some lower extremity swelling at time of his visit with PMR on 5/4/23  Venous duplex was ordered and was completed on 5/15/23  Showed no evidence of DVT  Still a lot of swelling in right leg  Tried to get compression stockings (bought on [facility] and not sure what strength they got)  Were difficult to get them on and not sure that they really helped all that much  Wondering if he could stop the amlodipine since swelling is side effect of this  He will be seeing neurology in July  His eliquis was discontinued on 5/1/23 given that it had been > 6 weeks since his intervention  Last lipid panel was completed on 2/26/23 and LDL was not at goal at 112  Now on lipitor 40  Repeat lipid panel ordered by neurosurgery but not yet completed  His neuropathic right sided facial pain was being managed with gabapentin  Dose recently titrated up by neurosurgery at 5/10/23 visit  Wife messaged neurosurgery noting that that gabapentin was not working  Was started on trileptal yesterday  Still a lot of pain in face-- 5/10 in severity--and unable to sleep due to pain  Wants to stop the gabapentin  Too tired on this and it is ineffective  Moods are good  Gaining some strength back and is no longer in wheelchair  He is  walking with a cane today       Review of Systems    Past Medical History:   Diagnosis Date   • BRAULIO (acute kidney injury) ([facility] [address] )    • B12 deficiency    • Celiac artery stenosis (HCC)    • Cerebellar infarct ([facility] [address] ) 02/25/2023   • CKD (chronic kidney disease) stage 2, GFR 60-89 ml/min    • Essential hypertension    • Impaired fasting glucose    • Iron deficiency anemia    • Neurogenic bowel    • Stenosis of left vertebral artery    • Vertebral artery dissection [facility])      Past Surgical History:   Procedure Laterality Date   • ARTERY SURGERY      vertebral artery stent/revascularization     Family History   Problem Relation Age of Onset   • Hypertension Brother    • Hypertension Brother Social History     Socioeconomic History   • Marital status: /Civil Union     Spouse name: None   • Number of children: None   • Years of education: None   • Highest education level: None   Occupational History   • None   Tobacco Use   • Smoking status: Former     Packs/day: 1 00     Years: 5 00     Pack years: 5 00     Types: Cigarettes     Quit date: 1983     Years since quittin 1   • Smokeless tobacco: Never   Vaping Use   • Vaping Use: Never used   Substance and Sexual Activity   • Alcohol use: Not Currently   • Drug use: Never   • Sexual activity: Not Currently     Partners: Female   Other Topics Concern   • None   Social History Narrative        2 children    Works as an  (self employed)     Social Determinants of Health     Financial Resource Strain: Not on file   Food Insecurity: Not on file   Transportation Needs: Not on file   Physical Activity: Not on file   Stress: Not on file   Social Connections: Not on file   Intimate Partner Violence: Not on file   Housing Stability: Not on file     Current Outpatient Medications on File Prior to Visit   Medication Sig   • Acetaminophen (TYLENOL EXTRA STRENGTH PO) Take 1,000 mg by mouth daily as needed   • aspirin 81 mg chewable tablet Chew 1 tablet (81 mg total) daily   • atorvastatin (LIPITOR) 40 mg tablet Take 1 tablet (40 mg total) by mouth daily with dinner   • baclofen 10 mg tablet Take 1 tablet (10 mg total) by mouth every 8 (eight) hours   • carvedilol (COREG) 25 mg tablet Take 1 tablet (25 mg total) by mouth 2 (two) times a day with meals   • cyanocobalamin (VITAMIN B-12) 1000 MCG tablet Take 1 tablet (1,000 mcg total) by mouth daily   • ferrous sulfate 325 (65 Fe) mg tablet Take 1 tablet (325 mg total) by mouth daily with breakfast   • FLUoxetine (PROzac) 20 mg capsule Take 1 capsule (20 mg total) by mouth daily   • hydrALAZINE (APRESOLINE) 50 mg tablet Take 1 tablet (50 mg total) by mouth every 12 (twelve) hours   • "OXcarbazepine (Trileptal) 150 mg tablet Take 1 tablet (150 mg total) by mouth 2 (two) times a day   • ticagrelor (BRILINTA) 90 MG Take 1 tablet (90 mg total) by mouth every 12 (twelve) hours   • [DISCONTINUED] amLODIPine (NORVASC) 10 mg tablet Take 1 tablet (10 mg total) by mouth daily   • [DISCONTINUED] gabapentin (Neurontin) 300 mg capsule Take 1 capsule (300 mg total) by mouth every 12 (twelve) hours   • [DISCONTINUED] psyllium (METAMUCIL) packet Take 1 packet by mouth every other day (Patient not taking: Reported on 5/16/2023)     No Known Allergies  Immunization History   Administered Date(s) Administered   • COVID-19 MODERNA VACC 0 5 ML IM 01/21/2021, 03/01/2021, 11/29/2021       Objective     /62   Pulse 64   Temp 98 7 °F (37 1 °C) (Tympanic)   Ht 5' 10\" (1 778 m)   Wt 84 1 kg (185 lb 6 4 oz)   SpO2 97%   BMI 26 60 kg/m²     Physical Exam  Vitals and nursing note reviewed  Constitutional:       General: He is not in acute distress  Appearance: Normal appearance  He is not ill-appearing, toxic-appearing or diaphoretic  HENT:      Head: Normocephalic and atraumatic  Nose: Nose normal       Mouth/Throat:      Mouth: Mucous membranes are moist       Pharynx: No oropharyngeal exudate or posterior oropharyngeal erythema  Eyes:      Conjunctiva/sclera: Conjunctivae normal    Cardiovascular:      Rate and Rhythm: Normal rate and regular rhythm  Pulmonary:      Effort: Pulmonary effort is normal       Breath sounds: Normal breath sounds  Abdominal:      General: Abdomen is flat  Bowel sounds are normal       Palpations: Abdomen is soft  Musculoskeletal:      Cervical back: Normal range of motion and neck supple  Right lower leg: Edema (negative bernadette's  no erythema) present  Left lower leg: No edema  Lymphadenopathy:      Cervical: No cervical adenopathy  Skin:     General: Skin is warm and dry  Findings: No rash  Neurological:      Mental Status: He is alert        " Motor: Weakness present     Psychiatric:         Mood and Affect: Mood normal        Rudy Jovel DO

## 2023-05-12 NOTE — PROGRESS NOTES
Daily Note     Today's date: 2023  Patient name: Tye Michaels  : 1960  MRN: [id number]  Referring provider: Italia Lebron  Dx:   Encounter Diagnosis     ICD-10-CM    1  Posterior circulation stroke ([facility] [address] )  I63 50           Start Time: 801  Stop Time:   Total time in clinic (min): 43 minutes    Subjective:  Nothing new to report  Not too sore  Feeling a little sore in his foot/ankle though  Objective: See treatment diary below      Assessment:  More power focus during session - with weighted L side facilitates more R sided use  Shows fair balance with explosive hopping drills  Increasing duration of treadmill walks and adding inclines for further propulsion  Improving stability with uneven surfaces and treadmill speed  Pt instructed to reduce distance walk today due to intensity of session, pt in agreement  Pt will continue to benefit from skilled PT to address gait deficits, balance, and strength  Plan:  High intensity stepping  Obstacle negotiation  Increased pacing for propulsion  Weighted/resisted ambulating  Precautions: Falls,   Re-eval Date: 2023    Date      Visit Count 15 16 16     FOTO See IE       Pain In See IE       Pain Out                Testing        TUG/TUGC 17 15 / 16 70       5xSTS 20 81       Gait speed 0 58       FGA 8       2/6MWT 576       Neuro Re-Ed        Dynamic balance        Static balance        Obstacle course 0# hurdles + 6' step x10 laps       Resisted ambulation Red TB at hips 10 laps propulsion solo    [place] TB at ankle 10 laps limb adv solo    Red TB at hips bwd 8 laps solo Red TB at hip over hurdles 8 laps    [place] TB at ankle side step 8 laps    7  5#R fwd/bwd timed x6 MO 10 54 Red/blue TB 10 laps at hip solo    [place] TB at ankle fwd 10 laps    Hopping solo 4 laps 10#L     Uneven surfaces   Hurdles 4#R 10 laps     HKM on foam (// bars)        RLE forced use        Ther Ex        Hamstring 3 way stretch        SLR x 4 HR/TR        Mini Squats  2x10 10#L 2x6 cues for explosion upward     Step ups  x30 6' R focus      Plantar stretch pro-stretch        Towel stretch right                         Ther Activity        ADL        Curb negotiation         Gait Training        No AD Ortho x2 5#R CGA AFO solo 10 laps      SPC        Treadmill 2min x3 1  9mph RUE forced use 2min 1  8mph R forced use 7 5#    2min x2 7  5#R 1  3mph no UE 3min x2 1  9mph (once with AFO) 0%    3min 1  2mph 10% 1UE assist at times     Head turns        Manuals        Plantar release G4 R ankle a/p MWM AF G4 R ankle a/p MWM AF G4 R ankle a/p MWM AF     KT 50% arch support, paper off tension med/lat right ankle

## 2023-05-12 NOTE — TELEPHONE ENCOUNTER
Post CVA Discharge Follow Up  Hospitalization: 2/25/23-3/6/23, 3/5/23-3/31/23    Called patient and spoke with Goran Kuo  She reports how he is doing okay  She reports how today he completed a rigorous PT session  After the session, patient reported his vision was broken up and described it as seeing white spots  Denies any loss of vision or double vision  This lasted for about 45 minutes  Romina took his BP and it was 118/52  She plans on re-checking it soon  Denies any other new or worsening stroke-like symptoms  Patient is continuing to take the gabapentin for the trigeminal neuralgia  No improvement yet  She reports he continues to have the following symptoms: right sided weakness  She claims symptoms are improving since discharge  She reports he has not recently complained about the previously reported cold sensation on the right side of his body  She is assuming it has improved  He is ambulating independently as well as preforming his own ADLs  Patient manages his own medications, appointments, and affairs  Patient has single point cane for mobilization  Reviewed appointments - patient successfully followed up with PCP, neurology and neurosurgery  She continues to report swelling in right foot  Dr Laverne Hairston ordered an ultrasound to rule out DVT  It is scheduled for 5/18/23  Kevincolette Adorno would like a sooner appointment if possible  Placed her on hold  Called central scheduling  Scheduled ultrasound for 5/15/23  Provided new date/time/location  She accepted the appointment and was very appreciative  Patient is working with PT and OT  I reviewed medications with her  Reports he is taking as prescribed with no medication side effects or signs of bleeding  During this call, we reviewed stroke type, symptoms, personal risk factors and management, medications, and resources  As for risk factors, Romina checks his BP at home  He is a non smoker     Encouraged for patient to follow low salt / low cholesterol / diabetic friendly diet  I addressed all her questions  At the conclusion of the conversation, she denies having any further questions or concerns

## 2023-05-15 ENCOUNTER — TELEPHONE (OUTPATIENT)
Dept: PHYSICAL MEDICINE AND REHAB | Facility: HOSPITAL | Age: 63
End: 2023-05-15

## 2023-05-15 ENCOUNTER — HOSPITAL ENCOUNTER (OUTPATIENT)
Dept: NON INVASIVE DIAGNOSTICS | Age: 63
Discharge: HOME | End: 2023-05-15
Attending: PHYSICAL MEDICINE & REHABILITATION

## 2023-05-15 ENCOUNTER — TELEPHONE (OUTPATIENT)
Dept: NEUROLOGY | Facility: CLINIC | Age: 63
End: 2023-05-15

## 2023-05-15 DIAGNOSIS — M79.604 PAIN AND SWELLING OF RIGHT LOWER EXTREMITY: ICD-10-CM

## 2023-05-15 DIAGNOSIS — G50.0 TRIGEMINAL NEURALGIA: Primary | ICD-10-CM

## 2023-05-15 DIAGNOSIS — M79.89 PAIN AND SWELLING OF RIGHT LOWER EXTREMITY: ICD-10-CM

## 2023-05-15 RX ORDER — OXCARBAZEPINE 150 MG/1
150 TABLET, FILM COATED ORAL 2 TIMES DAILY
Qty: 60 TABLET | Refills: 3 | Status: SHIPPED | OUTPATIENT
Start: 2023-05-15

## 2023-05-15 NOTE — TELEPHONE ENCOUNTER
Mariam, Pt's wife called in and states that her  is having intolerable facial pain, that he's not sleeping at night  Pt wants to switch medication, he wants relief please

## 2023-05-15 NOTE — PROGRESS NOTES
Oxcarbazepine 150 mg, take 1 tablet by mouth twice daily for treatment of the patient's trigeminal neuralgia associated with his most recent stroke  Patient has not been responding well to gabapentin 300 mg every 12 hours  He has been taking a total of 600 mg for about 6 days now and the patient's wife Magen Parry states that he does not have any relief with this current dosage  Both her and the patient are very interested in starting oxcarbazepine or carbamazepine instead      Cirilo Manzano PA-C  05/15/2023

## 2023-05-16 ENCOUNTER — APPOINTMENT (OUTPATIENT)
Dept: OCCUPATIONAL THERAPY | Facility: CLINIC | Age: 63
End: 2023-05-16
Payer: COMMERCIAL

## 2023-05-16 ENCOUNTER — OFFICE VISIT (OUTPATIENT)
Dept: FAMILY MEDICINE CLINIC | Facility: CLINIC | Age: 63
End: 2023-05-16

## 2023-05-16 ENCOUNTER — APPOINTMENT (OUTPATIENT)
Dept: PHYSICAL THERAPY | Facility: CLINIC | Age: 63
End: 2023-05-16
Payer: COMMERCIAL

## 2023-05-16 ENCOUNTER — APPOINTMENT (OUTPATIENT)
Dept: SPEECH THERAPY | Facility: CLINIC | Age: 63
End: 2023-05-16
Payer: COMMERCIAL

## 2023-05-16 VITALS
HEART RATE: 64 BPM | HEIGHT: 70 IN | WEIGHT: 185.4 LBS | DIASTOLIC BLOOD PRESSURE: 62 MMHG | SYSTOLIC BLOOD PRESSURE: 100 MMHG | BODY MASS INDEX: 26.54 KG/M2 | TEMPERATURE: 98.7 F | OXYGEN SATURATION: 97 %

## 2023-05-16 DIAGNOSIS — I10 PRIMARY HYPERTENSION: Primary | ICD-10-CM

## 2023-05-16 DIAGNOSIS — R73.03 PREDIABETES: ICD-10-CM

## 2023-05-16 DIAGNOSIS — R60.0 LOWER EXTREMITY EDEMA: ICD-10-CM

## 2023-05-16 DIAGNOSIS — Z12.11 COLON CANCER SCREENING: ICD-10-CM

## 2023-05-16 DIAGNOSIS — D50.9 IRON DEFICIENCY ANEMIA, UNSPECIFIED IRON DEFICIENCY ANEMIA TYPE: ICD-10-CM

## 2023-05-16 DIAGNOSIS — F43.21 ADJUSTMENT DISORDER WITH DEPRESSED MOOD: ICD-10-CM

## 2023-05-16 DIAGNOSIS — I77.74 VERTEBRAL ARTERY DISSECTION (HCC): ICD-10-CM

## 2023-05-16 DIAGNOSIS — N17.9 AKI (ACUTE KIDNEY INJURY) (HCC): ICD-10-CM

## 2023-05-16 DIAGNOSIS — M79.2 NEUROPATHIC PAIN: ICD-10-CM

## 2023-05-16 DIAGNOSIS — I63.9 CEREBROVASCULAR ACCIDENT (CVA), UNSPECIFIED MECHANISM (HCC): ICD-10-CM

## 2023-05-16 DIAGNOSIS — N18.2 STAGE 2 CHRONIC KIDNEY DISEASE: ICD-10-CM

## 2023-05-16 DIAGNOSIS — R25.2 SPASTICITY: ICD-10-CM

## 2023-05-16 RX ORDER — AMLODIPINE BESYLATE AND BENAZEPRIL HYDROCHLORIDE 5; 10 MG/1; MG/1
1 CAPSULE ORAL DAILY
Qty: 30 CAPSULE | Refills: 5 | Status: SHIPPED | OUTPATIENT
Start: 2023-05-16

## 2023-05-16 NOTE — PATIENT INSTRUCTIONS
Stop the amlodipine 10 mg and start lotrel 5-10 (this is a combination of amlodipine at a decreased dose along with benazepril which is an ACE inhibitor)  Get compression stockings for right leg

## 2023-05-18 ENCOUNTER — OFFICE VISIT (OUTPATIENT)
Dept: OCCUPATIONAL THERAPY | Facility: CLINIC | Age: 63
End: 2023-05-18

## 2023-05-18 ENCOUNTER — OFFICE VISIT (OUTPATIENT)
Dept: PHYSICAL THERAPY | Facility: CLINIC | Age: 63
End: 2023-05-18

## 2023-05-18 DIAGNOSIS — I63.50 POSTERIOR CIRCULATION STROKE (HCC): Primary | ICD-10-CM

## 2023-05-18 DIAGNOSIS — I63.9 CEREBROVASCULAR ACCIDENT (CVA), UNSPECIFIED MECHANISM (HCC): ICD-10-CM

## 2023-05-18 NOTE — PROGRESS NOTES
"Daily Note     Today's date: 2023  Patient name: Peewee Amaro  : 1960  MRN: [id number]  Referring provider: Althea Foster  Dx:   Encounter Diagnosis     ICD-10-CM    1  Posterior circulation stroke ([facility] [address] )  I63 50           Start Time: 08  Stop Time: 08  Total time in clinic (min): 43 minutes    Subjective:  Pt reported white spots in vision after PT last session, went away after 30 minutes  Been increasing his walking at home  Having a lot of facial pain  Seen GP the other day, trying to change some meds around to help with swelling  Objective: See treatment diary below      Assessment:  Pt RLE seems more edematous than usual, pt has difficulty maintaining elevated position d/t increasing pain when elevated  Slightly lower intensity due to response from prior session with patient noting no adverse effects post session  Improving balance on compliant surfaces, challenged with foam wedge and having to shift weight anteriorly to generate power fwd  Good response to forced use on R LE, cues to generate power with faster stand  Wobble board to perform more task specific activity to boating and balance, difficulty maintaining midline  Pt will continue to benefit from skilled PT to address gait deficits, balance, and strength  Plan:  High intensity stepping  Obstacle negotiation  Increased pacing for propulsion  Weighted/resisted ambulating  Precautions: Falls,   Re-eval Date: 2023    Date     Visit Count 15 16 17 18    FOTO See IE       Pain In See IE       Pain Out                Testing        TUG/TUGC 17 15 /  70       5xSTS 20 81       Gait speed 0 58       FGA 8       2/6MWT 576       Neuro Re-Ed        Dynamic balance    Fwd/bwd 7  5#R 8 laps direction change    Static balance    Wobble board 2min total cues     Obstacle course 0# hurdles + 6' step x10 laps   \"dont touch ground\" foam to foam + foam wedge solo 8 laps    Resisted ambulation Red TB at hips 10 " laps propulsion solo    [place] TB at ankle 10 laps limb adv solo    Red TB at hips bwd 8 laps solo Red TB at hip over hurdles 8 laps    [place] TB at ankle side step 8 laps    7  5#R fwd/bwd timed x6 SD 10 54 Red/blue TB 10 laps at hip solo    [place] TB at ankle fwd 10 laps    Hopping solo 4 laps 10#L Red TB x2 4' step up 7  5#R 10 laps    Lat step low hurdles 7  5#R 6 laps            Uneven surfaces   Hurdles 4#R 10 laps     HKM on foam (// bars)        RLE forced use    Sit<>stand cues for power ascent 2x10    Ther Ex        Hamstring 3 way stretch        SLR x 4        HR/TR        Mini Squats  2x10 10#L 2x6 cues for explosion upward     Step ups  x30 6' R focus      Plantar stretch pro-stretch        Towel stretch right                         Ther Activity        ADL        Curb negotiation         Gait Training        No AD Ortho x2 5#R CGA AFO solo 10 laps      SPC        Treadmill 2min x3 1  9mph RUE forced use 2min 1  8mph R forced use 7 5#    2min x2 7  5#R 1  3mph no UE 3min x2 1  9mph (once with AFO) 0%    3min 1  2mph 10% 1UE assist at times     Head turns        Manuals        Plantar release G4 R ankle a/p MWM AF G4 R ankle a/p MWM AF G4 R ankle a/p MWM AF G4 R ankle a/p MWMAF    KT 50% arch support, paper off tension med/lat right ankle

## 2023-05-18 NOTE — PROGRESS NOTES
Occupational Therapy Daily Note    Today's date: 2023  Patient name: Tye Michaels  : 1960  MRN: [id number]  Referring provider: Italia Lebron  Dx:   Encounter Diagnoses   Name Primary? • Post[facility] ([facility] ) Yes   • Cerebrovascular accident (CVA), unspecified mechanism ([facility] )        Subjective: pt without complaints     Objective: Pt engaged in skilled OT treatment session with focus on UE NMR, UE strengthening, UE endurance and FMC/GMC to increase engagement, endurance, tolerance, and independence with daily ADL and IADL tasks  CPT Code Minutes                                           Task Details        Therapeutic Activity               Neuro Re-Ed  Pt engaged in series of activities focused on NMRE, functional reaching, target placement, and in-hand manipulations to improve motor control and functional use of RUE  While in supine, pt completed functional reaching to target with focus on improving range and motor control at R shoulder  Pt with difficulty reaching half range of flexion and abduction, G- target accuracy  While seated on mat, pt engaged in series of reach and retrieval movements, alternating punching to target and reaching to target with open palm  Pt able to transition between movement with increased time and mod difficulty  While in stance at mat, pt able to use RUE to reach to shoulder height with mod difficulty, retrieved small items with fair grasp and G ability to maintain grasp while transferring item to target  Pt with G release at target  Focused on improving functional reach into end ranges of movement  Therapeutic Exercise  Pt with increased tightness in R shoulder and forearm  While in supine, completed PROM with LPS at end range into all planes of movement at shoulder and into supination  Pt instructed to complete AAROM stretches at home, and to have wife assist with ROM if needed   Pt with G understanding  TESTING  4/12:   [place] Finger Dexterity Test  L: 3 min 2 sec ( top 5 rows)  R: 8 min 24 sec  (top 3 rows)            HEP  5/18/23: AAROM exercises, functional reaching exercises    5/9: [address] of thumb to alternating fingers, transitioning from pad to pad to fingertip to fingertip    4/28: provided pt with [address] activity as HEP to improve finger ROM into ab/adduction         Assessment: Tolerated treatment well  Pt with increased tightness in RUE, affecting ability to move thru full range of movements  Pt provided with HEP for stretching and functional reaching  Pt with G understanding  Patient would benefit from continued skilled OT      Plan: Continued skilled OT per POC    INTERVENTION COMMENTS:  Diagnosis: Posterior circulation stroke ([facility] ) [I63 50]  Precautions: fall risk, R side weakness   Insurance: Payor: Concetta Nur / Plan: Concetta Nur PPO / Product Type: PPO /   13 of 20 visits  PN due 6/06/23

## 2023-05-23 ENCOUNTER — OFFICE VISIT (OUTPATIENT)
Dept: OCCUPATIONAL THERAPY | Facility: CLINIC | Age: 63
End: 2023-05-23

## 2023-05-23 ENCOUNTER — OFFICE VISIT (OUTPATIENT)
Dept: PHYSICAL THERAPY | Facility: CLINIC | Age: 63
End: 2023-05-23

## 2023-05-23 DIAGNOSIS — I63.50 POSTERIOR CIRCULATION STROKE (HCC): Primary | ICD-10-CM

## 2023-05-23 DIAGNOSIS — I63.9 CEREBROVASCULAR ACCIDENT (CVA), UNSPECIFIED MECHANISM (HCC): ICD-10-CM

## 2023-05-23 DIAGNOSIS — M25.531 RIGHT WRIST PAIN: ICD-10-CM

## 2023-05-23 NOTE — PROGRESS NOTES
"Daily Note     Today's date: 2023  Patient name: Darryl Carrillo  : 1960  MRN: [id number]  Referring provider: Ajith Peng  Dx:   Encounter Diagnosis     ICD-10-CM    1  Posterior circulation stroke (HCC)  I63 50       2  Right wrist pain  M25 531                      Subjective: Pt presents without complaints  Reports that he felt okay following last session  Objective: See treatment diary below      Assessment: Pt is cooperative and motivated to participate in all selected interventions  Pt demonstrating improving functional endurance, today performing 3 trials of 3 min on TM at 0% grade vs 2 trials previously  He benefits from cues for \"pull your right leg forward\" during amb trials, particularly when fatigued as he demonstrates R foot drag at times  He responds well to resisted amb overground, requiring only 1 seated rest break throughout these tasks  Added foam pad under LLE during sit<>stand transfers to increase use of RLE with positive response  He is making consistent progress but remains limited by dec strength, dec balance, and dec endurance  He continues to benefit from skilled PT services to address these deficits to maximize his functional potential and minimize risk for falls  Plan: High intensity ambulation, dynamic balance training, resisted ambulation     Precautions: Falls,   Re-eval Date: 2023    Date    Visit Count 15 16 17 18 19   FOTO See IE       Pain In See IE       Pain Out                Testing        TUG/TUGC 17 15 / 16 70       5xSTS 20 81       Gait speed 0 58       FGA 8       2/6MWT 576       Neuro Re-Ed        Dynamic balance    Fwd/bwd 7  5#R 8 laps direction change Fwd/bwd x8 laps with 7 5# on RLE   Static balance    Wobble board 2min total cues     Obstacle course 0# hurdles + 6' step x10 laps   \"dont touch ground\" foam to foam + foam wedge solo 8 laps    Resisted ambulation Red TB at hips 10 laps propulsion " solo    [place] TB at ankle 10 laps limb adv solo    Red TB at hips bwd 8 laps solo Red TB at hip over hurdles 8 laps    [place] TB at ankle side step 8 laps    7  5#R fwd/bwd timed x6 PA 10 54 Red/blue TB 10 laps at hip solo    [place] TB at ankle fwd 10 laps    [place] solo 4 laps 10#L Red TB x2 4' step up 7  5#R 10 laps    Lat step low hurdles 7  5#R 6 laps         With 7 5# on RLE:    1  Low hurdles x6 laps fwd  2  Low hurdles x6 laps lateral   Uneven surfaces   Hurdles 4#R 10 laps     HKM on foam (// bars)        RLE forced use    Sit<>stand cues for power ascent 2x10 Sit<>stand 2x10 with foam under LLE to increase use of RLE   Ther Ex        Hamstring 3 way stretch        SLR x 4        HR/TR        Mini Squats  2x10 10#L 2x6 cues for explosion upward     Step ups  x30 6' R focus      Plantar stretch pro-stretch        Towel stretch right                         Ther Activity        ADL        Curb negotiation         Gait Training        No AD Ortho x2 5#R CGA AFO solo 10 laps      SPC        Treadmill 2min x3 1  9mph RUE forced use 2min 1  8mph R forced use 7 5#    2min x2 7  5#R 1  3mph no UE 3min x2 1  9mph (once with AFO) 0%    3min 1  2mph 10% 1UE assist at times  In SOLO: 3 min x3 at 1 9 mph at 0% grade    2 min at 1  2mph at 10% grade   Head turns        Manuals        Plantar release G4 R ankle a/p MWM AF G4 R ankle a/p MWM AF G4 R ankle a/p MWM AF G4 R ankle a/p MWMAF    KT 50% arch support, paper off tension med/lat right ankle

## 2023-05-23 NOTE — PROGRESS NOTES
Occupational Therapy Daily Note    Today's date: 2023  Patient name: Talon Messer  : 1960  MRN: [id number]  Referring provider: Michel Duncan  Dx:   Encounter Diagnoses   Name Primary? • Posterior circulation stroke ([facility] ) Yes   • Cerebrovascular accident (CVA), unspecified mechanism ([email or web address] )        Subjective: pt without complaints     Objective: Pt engaged in skilled OT treatment session with focus on UE NMR, UE strengthening, UE endurance and FMC/GMC to increase engagement, endurance, tolerance, and independence with daily ADL and IADL tasks  CPT Code Minutes                                           Task Details        Therapeutic Activity               Neuro Re-Ed  Pt engaged in series of activities focused on NMRE, functional reaching, target placement, and in-hand manipulations to improve motor control and functional use of RUE  Functional reaching: pt able to use RUE to reach to shoulder height, transferring object from tabletop surface to shoulder height clothesline on right side, min/mod difficulty with reach, G/G- target accuracy,     FMC: pt with mod/max difficulty using pincer to  small objects from tabletop surface    In-hand dexterity: pt able to isolate fingers to hold object in hand with 4th and 5th digit and manipulate fastener with D1, D2 and D3; pt with noted improving pinch strength into 3 jaw belia to open resistive clasps, min difficulty  R UE reaction time: pt able to catch/ throw nerf golf ball with R hand, using table for stability, fair reaction speed, fair+ accuracy, G- GMC when throwing ball                         Therapeutic Exercise  Pt with increased tightness in R shoulder with increased pain at end range  While seated, completed PROM with LPS at end range into all planes of movement at shoulder  Pt reporting he has been stretching at home inconsistently  Pt instructed to complete AAROM stretches at home daily               TESTING  : [place] Finger Dexterity Test  L: 3 min 2 sec ( top 5 rows)  R: 8 min 24 sec  (top 3 rows)            HEP  5/18/23: AAROM exercises, functional reaching exercises    5/9: [facility] of thumb to alternating fingers, transitioning from pad to pad to fingertip to fingertip    4/28: provided pt with [facility] activity as HEP to improve finger ROM into ab/adduction         Assessment: Tolerated treatment well  Pt cont to present with tightness and pain in R shoulder at 1/2 range  Pt with greatly improving [facility] and in-hand dexterity  Patient would benefit from continued skilled OT      Plan: Continued skilled OT per POC    INTERVENTION COMMENTS:  Diagnosis: Post[facility] circulation stroke [facility] [I63 50]  Precautions: fall risk, R side weakness   Insurance: Payor: Gustavo Raymond / Plan: Gustavo Raymond PPO / Product Type: PPO /   14 of 20 visits  PN due 6/06/23

## 2023-05-25 ENCOUNTER — TELEPHONE (OUTPATIENT)
Dept: NEUROLOGY | Facility: CLINIC | Age: 63
End: 2023-05-25

## 2023-05-25 ENCOUNTER — APPOINTMENT (OUTPATIENT)
Dept: OCCUPATIONAL THERAPY | Facility: CLINIC | Age: 63
End: 2023-05-25
Payer: COMMERCIAL

## 2023-05-25 ENCOUNTER — PATIENT MESSAGE (OUTPATIENT)
Dept: NEUROLOGY | Facility: CLINIC | Age: 63
End: 2023-05-25

## 2023-05-25 ENCOUNTER — APPOINTMENT (OUTPATIENT)
Dept: PHYSICAL THERAPY | Facility: CLINIC | Age: 63
End: 2023-05-25
Payer: COMMERCIAL

## 2023-05-25 NOTE — TELEPHONE ENCOUNTER
New-Start-Botox-Authorization  Submitted Prior-Authorization request to [employer] via fax for:    New-Start:  Botox-200 UNITS  Qty 1, Q3 Months  DX: G81 11 & I69 351, Spastic hemiparesis of right dominant side as late effect of cerebral infarction  Awaiting approval/denial response  Will follow up on the status of pending authorization requested

## 2023-05-25 NOTE — TELEPHONE ENCOUNTER
Patient is a new start to Botox, as per requested you would like to start this patient on:     Botox-200 UNITS  Qty  1  DX: G81 11 & I69 351, Spastic hemiparesis of right dominant side as late effect of cerebral infarction  Sig: Inject up to 200 UNITS I M  (EMG-Guided) into various sites of the upper/lower extremities once every three months for one year with:  Refills: 3     If you agree to this order transcribed above please respond directly if you agree or not, so I may proceed further with authorization  Thank you

## 2023-05-25 NOTE — TELEPHONE ENCOUNTER
----- Message from Bryce Sanchez MD sent at 5/15/2023  2:15 PM EDT -----  Hello,    Patient and wife decided the yant to proceed with botulinum toxin  They have f/u with me on 6/7  Could we get 200 units approved by then? Thanks so much      Rohit Rodríguez MD  Physical Medicine and Rehabilitation

## 2023-05-26 DIAGNOSIS — I65.02 STENOSIS OF LEFT VERTEBRAL ARTERY: ICD-10-CM

## 2023-05-26 DIAGNOSIS — R25.2 SPASTICITY: Primary | ICD-10-CM

## 2023-05-26 DIAGNOSIS — I77.74 VERTEBRAL ARTERY DISSECTION (HCC): ICD-10-CM

## 2023-05-26 DIAGNOSIS — D64.9 ANEMIA, UNSPECIFIED TYPE: ICD-10-CM

## 2023-05-26 DIAGNOSIS — I10 PRIMARY HYPERTENSION: ICD-10-CM

## 2023-05-26 DIAGNOSIS — F43.21 ADJUSTMENT DISORDER WITH DEPRESSED MOOD: ICD-10-CM

## 2023-05-26 DIAGNOSIS — I77.1 CELIAC ARTERY STENOSIS (HCC): ICD-10-CM

## 2023-05-26 DIAGNOSIS — I63.9 CEREBROVASCULAR ACCIDENT (CVA) (HCC): ICD-10-CM

## 2023-05-30 ENCOUNTER — APPOINTMENT (OUTPATIENT)
Dept: PHYSICAL THERAPY | Facility: CLINIC | Age: 63
End: 2023-05-30
Payer: COMMERCIAL

## 2023-05-30 ENCOUNTER — APPOINTMENT (OUTPATIENT)
Dept: OCCUPATIONAL THERAPY | Facility: CLINIC | Age: 63
End: 2023-05-30
Payer: COMMERCIAL

## 2023-05-30 RX ORDER — HYDRALAZINE HYDROCHLORIDE 50 MG/1
50 TABLET, FILM COATED ORAL EVERY 12 HOURS
Qty: 60 TABLET | Refills: 0 | Status: SHIPPED | OUTPATIENT
Start: 2023-05-30

## 2023-05-30 RX ORDER — ASPIRIN 81 MG/1
81 TABLET, CHEWABLE ORAL DAILY
Qty: 30 TABLET | Refills: 0 | Status: SHIPPED | OUTPATIENT
Start: 2023-05-30

## 2023-05-30 RX ORDER — CARVEDILOL 25 MG/1
25 TABLET ORAL 2 TIMES DAILY WITH MEALS
Qty: 60 TABLET | Refills: 0 | Status: SHIPPED | OUTPATIENT
Start: 2023-05-30

## 2023-05-30 RX ORDER — ATORVASTATIN CALCIUM 40 MG/1
40 TABLET, FILM COATED ORAL
Qty: 30 TABLET | Refills: 0 | Status: SHIPPED | OUTPATIENT
Start: 2023-05-30

## 2023-05-30 RX ORDER — FERROUS SULFATE 325(65) MG
325 TABLET ORAL
Qty: 30 TABLET | Refills: 0 | Status: SHIPPED | OUTPATIENT
Start: 2023-05-30

## 2023-05-30 RX ORDER — FLUOXETINE HYDROCHLORIDE 20 MG/1
20 CAPSULE ORAL DAILY
Qty: 30 CAPSULE | Refills: 0 | Status: SHIPPED | OUTPATIENT
Start: 2023-05-30

## 2023-06-01 ENCOUNTER — OFFICE VISIT (OUTPATIENT)
Dept: PHYSICAL THERAPY | Facility: CLINIC | Age: 63
End: 2023-06-01

## 2023-06-01 ENCOUNTER — OFFICE VISIT (OUTPATIENT)
Dept: OCCUPATIONAL THERAPY | Facility: CLINIC | Age: 63
End: 2023-06-01

## 2023-06-01 ENCOUNTER — TELEPHONE (OUTPATIENT)
Dept: FAMILY MEDICINE CLINIC | Facility: CLINIC | Age: 63
End: 2023-06-01

## 2023-06-01 DIAGNOSIS — I10 PRIMARY HYPERTENSION: Primary | ICD-10-CM

## 2023-06-01 DIAGNOSIS — I63.50 POSTERIOR CIRCULATION STROKE (HCC): Primary | ICD-10-CM

## 2023-06-01 DIAGNOSIS — I63.9 CEREBROVASCULAR ACCIDENT (CVA), UNSPECIFIED MECHANISM (HCC): ICD-10-CM

## 2023-06-01 RX ORDER — AMLODIPINE BESYLATE 10 MG/1
10 TABLET ORAL DAILY
Qty: 90 TABLET | Refills: 3 | Status: SHIPPED | OUTPATIENT
Start: 2023-06-01 | End: 2023-06-05

## 2023-06-01 NOTE — PROGRESS NOTES
PT Re-Evaluation /progress note    Today's date: 2023  Patient name: Belén Zuleta  : 1960  MRN: [id number]  Referring provider: Liset Dover  Dx:   Encounter Diagnosis     ICD-10-CM    1  Posterior circulation stroke ([address] )  I63 50           Start Time: 0759  Stop Time: 5916  Total time in clinic (min): 45 minutes    Assessment  Assessment details: Patient is a 60y o  year old male presenting to OPPT s/p diagnosis of posterior circulation stroke  Mallorie Vazquez is continuing to show steady progress in his gait, strength, and balance  All his outcome measures show progress, notably 80ft increase in his 6MWT with no losses of balance as well as a 4 point increase to 1930 on his FGA  His measures still demonstrate he is at risk for falls and shows increased endurance against age matched norms  His gait speed is at community ambulator level, 0 81m/s with no AD  Still shows reduced strength and power in his legs as well with reduced time on 5xSTS and maintaining for duration with 30s chair stand  He is remaining active and adhering to his walking program daily  He will continue to benefit from skilled PT to address his impairments  He is receiving botox and PM&R is trying to better manage his spasticity which inhibits his overall mobility performance  He is meeting his goals in PT thus far and additional goals placed for new plan of care  Impairments: abnormal coordination, abnormal gait, activity intolerance, impaired balance, impaired physical strength, lacks appropriate home exercise program, safety issue and weight-bearing intolerance     Prognosis: good    Goals  In 4 weeks, patient will:  1  Demonstrate ability to perform 20 minutes of activity without requiring seated rest break  - progressing  2  Demonstrate ability to maintain upright balance unsupported by UEs while reaching above shoulder height without resistance to promote stability with ADLs - met  3    Improve 5xSTS to below cutoff score for age-matched peers, 14 8 seconds, to demonstrate improved LE stability to complete transfers safely  - met  4  Improve TUG to below cutoff score with least restrictive device against age matched peers, 13 5 seconds, to demonstrate improved functional mobility  - met      In 4-10 weeks, patient will:  1  Meet FOTO predicted score to demonstrate improvement in functional mobility  - NP  2  Demonstrate consistent carryover with HEP and walking program  - progressing  3  Improve gait speed by at least 0 16 m/s to meet MCID value for PwCVA and reduce fall risk  - met  4  Improve 6MWT by at least 100ft to demonstrate improvement in ability in community ambulation  - nearly met from last re-evaluation  5  Improve FGA by at least 4 points to demonstrate improvement in balance and reduce fall risk, meet MDC for PwCVA - met  6  Improve ABC to at least above 67% to show reduced risk of falling and improved balance confidence  - progressing    In 4 weeks, patient will:  1  Improve 6MWT by an additional 80ft without AD or loss of balance to show improved community endurance  2  Improve FGA by an additional 2 points to show improving dynamic balance to assist with ADLs and hobbies  Plan  Patient would benefit from: skilled physical therapy  Planned therapy interventions: neuromuscular re-education, manual therapy, patient education, home exercise program, therapeutic exercise, therapeutic activities and gait training  Frequency: 2x week  Duration in weeks: 8  Plan of Care beginning date: 6/1/2023  Plan of Care expiration date: 7/31/2023  Treatment plan discussed with: patient and family        Subjective Evaluation    History of Present Illness  Mechanism of injury: 6/1; 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  Catching his balance when needed  Still haivng lots of pain which does limit his mobility + sleeping  Getting botox soon   BP is being variable today, high the other day 128+ systolic and cancelled therapy  Present at PT:   prior to hospitalization  No AD prior  Was at [facility]  Fell day after dc  No injury  Not walking much at home  Mainly using WC for mobility  Walking to bathroom  2nd floor bedroom  Doing stairs, very careful, does okay with them  Goals: Walking better/more  Better control of R leg    [facility] DC: HPI: Morales Schuster is a 60 y o  right handed male with medical history of HTN who presented to [address] [place] [address] [address] on 2/25 with nausea/dizziness  Found to have hypertensive emergency with acute/subcaute R posterior cerebellar CVA with 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, mild BRAULIO, and incidentally found + COVID (without evidence of respiratory illness/hypoxia/CXR findings)  Placed on cardene gtt, stroke pathway, ASA/Plavix, IV hydration for BRAULIO, and CTX for UTI  NIHSS 2  Symptoms began 2 days prior  Not tPA/TNK candidate  MRI/MRA with 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  NSx recommended transfer to [facility] and starting on heparin gtt and stopped Plavix  Repeat CTA on 2/26 stable with with R V3/4 occlusion  Not a candidate for interventional procedure due to clinical stability and risk  Speech consulted and noted mod-severe oropharyngeal dysphagia recommended pureed/HTL  Unfortunately later on 2/26, he clinically deteriorated with increased R sided weakness, and was taken to IR for stenting of V3 and V4 with TICI 2B revascularization  CTH without ICH  He was admitted back to the ICU intubated - extubated 2/27  MRI showed cerebellar CVA and R > L stroke burden, with additional hypodensities on DWI appreciated L midbrain, pontine area and R lower medullary/spinal cord junction  He was transitioned to triple therapy with DAPT (given recent stent) and A/C with eliquis 2 5mg BID due to COVID  Diet advanced to Level 3/NTL on 2/27  Noted to have spasticity in RLE - started on baclofen by Neurology   He was able to have cardene discontinued on 3/2, and they have been making adjustments to his oral regimen  He was started on Prozac for his mood  NSx has signed off  CTA H/N recommended around 3/17  Length of time on eliquis unclear  Ad[facility] to [facility] on 3/6  Pain  No pain reported    Social Support  Stairs in house: yes   Lives in: multiple-level home  Lives with: spouse    Employment status: not working    Diagnostic Tests  No diagnostic tests performed  Treatments  Discharged from (in last 30 days): inpatient hospitalization        Objective         Balance Test 4/6 4/26 6/1   6 Minute Walk Test (ft): 731 1-2 LOB PT assist no AD 914ft no AD no LOB 990ft no AD no LOB outside   FGA: 8 no AD SPC 15/30 19/30 no AD   Gait Speed (m/s): 0 58 SPC 0 88 m/s Self select No AD 0 81m/s - Fast 1 11 m/s   30s chair stand   12   5x Sit To Stand (s): 20 81 10 39 L weight shift 12 85 improved symmetry   TUG/TUGC: 17 15 / 16 70 100>85 no AD 13 15 SPC 10 25 SPC / 09 93       Sensation Left Right   Kinesthesia  diminished   Light Touch  diminshed   Sharp/Dull     2 Point Discrimination         Muscle Tone Left Right   Modified Aguilar Scale     Hamstring     Gastroc  1+   Quad         Manual Muscle Testing - Hip Left Right   Flexion 5 4   Extension 5 3+   Abduction 5 3+   External Rotation       Manual Muscle Testing - Knee Left Right   Flexion 5 3   Extension 5 5     Manual Muscle Testing - Ankle Left Right   Doriflexion 5 4   Plantarflexion 5 4        Transfers    Sit To Stand Independent   W/C To Bed Contact Guard   Sit To Supine Independent   Roll Independent         Gait Assessment: Normal speed shows slightly ataxic R foot placement  Inadequate knee extension in mid/terminal swing  With increased walking pace smoother limb advancement still inadequate knee extension  6/1: No AD improving step length and stability  Due to spasticity still exhibits inadequate knee ext in swing w ataxic foot placement         Precautions: Falls,   Re-eval Date: 7/1/2023    Date 6/1 Visit Count 20       FOTO See IE       Pain In See IE       Pain Out                Testing        TUG/TUGC 09 93 no AD       30s chair 12       5xSTS 12 85       Gait speed 0 81 m/s no AD       FGA 19       2/6MWT 990ft no AD       Neuro Re-Ed FGA, 6MWT, 10mWT, 30s chair stand, 5xSTS, TUG       Dynamic balance Solo 7  5#R lat step 8 laps cues for speed    Fwd/bwd PT callout solo 7  5#R 5min total       Static balance        Obstacle course        Resisted ambulation        Uneven surfaces        HKM on foam (// bars)        RLE forced use        Ther Ex        Hamstring 3 way stretch        SLR x 4        HR/TR        Mini Squats        Step ups        Plantar stretch pro-stretch        Towel stretch right                         Ther Activity        ADL        Curb negotiation         Gait Training        No AD        SPC        Treadmill Solo 10% 1  1mph 3min x1, 10% 1  2mph 2 5min       Head turns        Manuals        Plantar release        KT 50% arch support, paper off tension med/lat right ankle

## 2023-06-01 NOTE — TELEPHONE ENCOUNTER
Advice Only-     Received VM from the pt stating that the pt's blood pressure did not decrease on amlodipine 5 mg, and the pt's spouse returned the pt to the amlodipine 10 mg     Pt's spouse is requesting a refill for amlodipine 10 mg at this time  Pt last seen for OV 5/16/2023  Please review and advise

## 2023-06-01 NOTE — TELEPHONE ENCOUNTER
I spoke with patient's wife  It was not communicated clearly in first message that bp was elevated  I would agree that current BP numbers are not where we want them to be  Wife states that he only took the lotrel for 2 days and bp went up  Was not on it long enough to see if the swelling would decrease  Since bp was controlled on the amlodipine 10, requests he go back on this     Will send refill   lotrel removed from med list    Keep appt for bp check on 6/15 23

## 2023-06-01 NOTE — TELEPHONE ENCOUNTER
"I spoke with the patient's spouse  Patient's wife reiterated that the pt's blood pressure is in the 698-017 range systolic while on the lower amlodipine 5 mg  Pt only used the 5 mg for two days, as the blood pressure was considered too high by the pt's wife  Pt's wife states that, therefore, they did not have a chance to observe a decrease in leg swelling  Pt's wife states that this blood pressure is unacceptable  Pt states that this is in the \"Very dangerous range\"    Pt's wife restated that this is why she put the pt back on the original 10 mg     Pt's blood pressure while on the amlodipine 10mg is in the 807'O systolic  It was 096 systolic over the weekend  Pt's wife insists that the original 10 mg is indicated  [facility]     Please review and advise          "

## 2023-06-01 NOTE — PROGRESS NOTES
"Occupational Therapy Daily Note    Today's date: 2023  Patient name: Isaiah Beckford  : 1960  MRN: [id number]  Referring provider: Eduardo Grimes  Dx:   Encounter Diagnoses   Name Primary? • Posterior circulation stroke ([facility] ) Yes   • Cerebrovascular accident (CVA), unspecified mechanism ([facility] )        Subjective: \"I am good today  \"      Objective: Pt engaged in skilled OT treatment session with focus on UE NMR, UE strengthening, UE endurance and FMC/GMC to increase engagement, endurance, tolerance, and independence with daily ADL and IADL tasks  CPT Code Minutes                                           Task Details        Therapeutic Activity               Neuro Re-Ed  Pt engaged in series of activities focused on gross motor control, fine motor control, and in-hand manipulation to improve his motor control and function use of his RUE  [address]: Pt able to use RUE to  medium sized golf nerf ball from table top height, reach, and throw the ball to the target  Pt presents with difficulty with RUE over head movement  Difficulty coordinating release, 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  20% target accuracy when standing throwing nerf golf ball  Pt reported leg being sore after standing 20 minutes  [address]: Pt with min difficulty picking up nerf golf balls from basket at hip height  Pt with mind/mod difficulty picking up golf balls from floor  In-hand dexterity: Pt with min difficulty able to isolate fingers to roll golf ball in hand manipulating with alternating pincer  Pt able to isolate fingers with ball in D1 and D2 and place ball on board with min difficulty  R UE reaction time: Pt able to catch/throw nerf golf ball with R hand  Pt 100% accuracy bouncing ball on table  Activity 1 (standing; throwing at target):   Good B hand coordination and speed when catching       Activity 2 (seated; catch, manipulate, place):   Fair- reaction speed, with use of LUE to stabilize when " catching  80% accuracy throwing while seated to target  Therapeutic Exercise  Pt with increased tightness in R shoulder with increased pain  Pt reporting he has been stretching at home inconsistently  Pt instructed to continue stretching  TESTING  4/12:   O'[place]uan[place]ose Finger Dexterity Test  L: 3 min 2 sec ( top 5 rows)  R: 8 min 24 sec  (top 3 rows)            HEP  5/18/23: AAROM exercises, functional reaching exercises    5/9: [address] of thumb to alternating fingers, transitioning from pad to pad to fingertip to fingertip    4/28: provided pt with [address] activity as HEP to improve finger ROM into ab/adduction         Assessment: Tolerated treatment well  Pt cont to present with tightness and pain in R shoulder at 1/2 range  Pt with greatly improving [address] and in-hand dexterity  Patient reported he will be receiving Botox in the future  Patient would benefit from continued skilled OT      Plan: Continued skilled OT per POC    INTERVENTION COMMENTS:  Diagnosis: Posterior circulation stroke ([facility] ) [I63 50]  Precautions: fall risk, R side weakness   Insurance: Payor: Suni Ramirez / Plan: Suni Ramirez PPO / Product Type: PPO /   15 of 20 visits  PN due 6/06/23

## 2023-06-01 NOTE — TELEPHONE ENCOUNTER
This message does not make sense to me  Do you mean his swelling did not decrease? I decreased his amlodipine from 10 to 5 mg to see if that would help with his lower extremity swelling as the higher (10 mg) dose will often cause some edema  I also added low dose of benazepril 5 mg to maintain blood pressure  So the medication he should be on is the combination pill lotrel (amlodipine-benazepril)    Should continue meds as he is taking and keep f/u on 6/15/23 to reassess the swelling

## 2023-06-03 ENCOUNTER — PATIENT MESSAGE (OUTPATIENT)
Dept: FAMILY MEDICINE CLINIC | Facility: CLINIC | Age: 63
End: 2023-06-03

## 2023-06-03 DIAGNOSIS — I10 PRIMARY HYPERTENSION: Primary | ICD-10-CM

## 2023-06-03 RX ORDER — BACLOFEN 10 MG/1
10 TABLET ORAL EVERY 8 HOURS
Qty: 90 TABLET | Refills: 0 | Status: SHIPPED | OUTPATIENT
Start: 2023-06-03

## 2023-06-05 RX ORDER — LOSARTAN POTASSIUM 50 MG/1
50 TABLET ORAL DAILY
Qty: 30 TABLET | Refills: 5 | Status: SHIPPED | OUTPATIENT
Start: 2023-06-05 | End: 2023-06-15

## 2023-06-05 RX ORDER — AMLODIPINE BESYLATE 5 MG/1
5 TABLET ORAL DAILY
Qty: 30 TABLET | Refills: 5 | Status: SHIPPED | OUTPATIENT
Start: 2023-06-05 | End: 2023-06-15

## 2023-06-05 NOTE — PATIENT COMMUNICATION
I spoke with patient's wife regarding blood pressure medications  The lotrel (amlodipine-benazepril) was d/c'd as bp was not well controlled on the 5 mg dose of amlodipine  She wants to try decreasing the amlodipine due to his swelling  Will have him decrease the amlodipine to 5 mg daily   Will add losartan 50 mg once daily  Will monitor bp closely  Keep f/u on 6/15/23

## 2023-06-06 ENCOUNTER — OFFICE VISIT (OUTPATIENT)
Dept: OCCUPATIONAL THERAPY | Facility: CLINIC | Age: 63
End: 2023-06-06
Payer: COMMERCIAL

## 2023-06-06 ENCOUNTER — OFFICE VISIT (OUTPATIENT)
Dept: PHYSICAL THERAPY | Facility: CLINIC | Age: 63
End: 2023-06-06
Payer: COMMERCIAL

## 2023-06-06 DIAGNOSIS — I63.50 POSTERIOR CIRCULATION STROKE (HCC): Primary | ICD-10-CM

## 2023-06-06 DIAGNOSIS — I63.9 CEREBROVASCULAR ACCIDENT (CVA), UNSPECIFIED MECHANISM (HCC): ICD-10-CM

## 2023-06-06 PROCEDURE — 97112 NEUROMUSCULAR REEDUCATION: CPT

## 2023-06-06 PROCEDURE — 97110 THERAPEUTIC EXERCISES: CPT

## 2023-06-06 PROCEDURE — 97116 GAIT TRAINING THERAPY: CPT

## 2023-06-06 NOTE — PROGRESS NOTES
Daily Note     Today's date: 2023  Patient name: Vane Ruiz  : 1960  MRN: [id number]  Referring provider: Giovani Haddad  Dx:   Encounter Diagnosis     ICD-10-CM    1  Posterior circulation stroke ([address] )  I63 50           Start Time: 801  Stop Time: 845  Total time in clinic (min): 44 minutes    Subjective: Pt doing his walking daily  Not sure if he will get one in today bc of weather  Pain is relatively unchanged  Botox tomorrow  Objective: See treatment diary below      Assessment:  Pt challeneged with multidirectional stepping, more imbalance and instability with lateral stepping and negotiating obstacles  Good stability with R forced use on uphill TM  Good direction changing stability, favors L side to push off vs R  Sled push fatigues patient and indicates higher RPE, as well as other propulsive focus exercises  Notes no shoulder/wrist pain with sled push  Introduced leg press for LE strength and power  Patient would benefit from continued PT to improve gait, balance, and overall functional mobility  Plan: Continue per plan of care  Assess response to botox  Precautions: Falls,   Re-eval Date: 2023    Date       Visit Count 20 21      FOTO See IE       Pain In See IE       Pain Out                Testing        TUG/TUGC 09 93 no AD       30s chair 12       5xSTS 12 85       Gait speed 0 81 m/s no AD       FGA 19       2/6MWT 990ft no AD       Neuro Re-Ed FGA, 6MWT, 10mWT, 30s chair stand, 5xSTS, TUG       Dynamic balance Solo 7  5#R lat step 8 laps cues for speed    Fwd/bwd PT callout solo 7  5#R 5min total 6' step + low hurdles cues for fast as possible 2x6 laps fwd/lat    Fwd/bwd PT callout solo 7  5#R 5min      Static balance        Obstacle course        Resisted ambulation  Sled push 5 plates + red KB 8 laps 15ft, 4 laps 15ft RPE 15/20      Uneven surfaces        HKM on foam (// bars)        RLE forced use        Ther Ex        Hamstring 3 way stretch SLR x 4        HR/TR        Mini Squats        Step ups        Plantar stretch pro-stretch  1min x2      Towel stretch right         Leg press  95lb x10  105lb 2x20              Ther Activity        ADL        Curb negotiation         Gait Training        No AD        SPC        Treadmill Solo 10% 1  1mph 3min x1, 10% 1  2mph 2 5min Solo 10% 1  2mph 3min x2 RUE support      Head turns        Manuals        Plantar release        KT 50% arch support, paper off tension med/lat right ankle

## 2023-06-06 NOTE — PROGRESS NOTES
"Occupational Therapy Daily Note    Today's date: 2023  Patient name: Perla Pope  : 1960  MRN: [id number]  Referring provider: Tony Giraldo  Dx:   Encounter Diagnoses   Name Primary? • Posterior circulation stroke ([facility] ) Yes   • Cerebrovascular accident (CVA), unspecified mechanism ([facility] )        Subjective: \"Im good\"       Objective: Pt engaged in skilled OT treatment session with focus on UE NMR, UE strengthening, UE endurance and FMC/GMC to increase engagement, endurance, tolerance, and independence with daily ADL and IADL tasks  CPT Code Minutes                                           Task Details        Therapeutic Activity               Neuro Re-Ed  Pt engaged in activities focused on gross motor control, fine motor control, and in-hand manipulation to improve his motor control and functional use of his RUE needed for daily functional use  Pt engaged in dynamic RUE activity  Pt had 90% accuracy with eye hand coordination with moving target  Pt had G+ reaction speed to fast and slow moving targets  Noted good motor control of RUE below shoulder height  [address]:   Pt was able to use RUE to manipulate blue resistive clothespin to retrieve small items from table top surface and transfer to shoulder height target  Pt with good functional reach and G with target accuracy  In-hand dexterity:   Pt difficulty with regulation of  with hard vs  Softer objects  G functional pincer with harder objects  Noted increased difficulty with softer objects; G- pincer  Pt able to hold 3 objects in palm, while using functional pincer to place object on target                Therapeutic Exercise                TESTING  :   Jaclyn Parson Finger Dexterity Test  L: 3 min 2 sec ( top 5 rows)  R: 8 min 24 sec  (top 3 rows)            HEP  23: AAROM exercises, functional reaching exercises    : FMC of thumb to alternating fingers, transitioning from pad to pad to fingertip to " fingertip    4/28: provided pt with [facility] activity as HEP to improve finger ROM into ab/adduction         Assessment: Tolerated treatment well  Pt presents with improving GMC, [facility], and in-hand dexterity  Patient would benefit from continued skilled OT      Plan: Continued skilled OT per POC    INTERVENTION COMMENTS:  Diagnosis: Posterior circulation stroke ([facility] ) [I63 50]  Precautions: fall risk, R side weakness   Insurance: Payor: [place] / Plan: [place] PPO / Product Type: PPO /   16 of 20 visits  PN due 6/08/23

## 2023-06-07 ENCOUNTER — TELEPHONE (OUTPATIENT)
Dept: NEUROLOGY | Facility: CLINIC | Age: 63
End: 2023-06-07

## 2023-06-07 ENCOUNTER — OFFICE VISIT (OUTPATIENT)
Dept: NEUROLOGY | Facility: CLINIC | Age: 63
End: 2023-06-07
Payer: COMMERCIAL

## 2023-06-07 VITALS
SYSTOLIC BLOOD PRESSURE: 165 MMHG | TEMPERATURE: 97.6 F | WEIGHT: 189.4 LBS | DIASTOLIC BLOOD PRESSURE: 79 MMHG | BODY MASS INDEX: 27.11 KG/M2 | HEIGHT: 70 IN | HEART RATE: 59 BPM

## 2023-06-07 DIAGNOSIS — I69.351 SPASTIC HEMIPARESIS OF RIGHT DOMINANT SIDE AS LATE EFFECT OF CEREBRAL INFARCTION (HCC): ICD-10-CM

## 2023-06-07 DIAGNOSIS — G50.0 TRIGEMINAL NEURALGIA OF RIGHT SIDE OF FACE: Primary | ICD-10-CM

## 2023-06-07 PROCEDURE — 99215 OFFICE O/P EST HI 40 MIN: CPT | Performed by: PHYSICAL MEDICINE & REHABILITATION

## 2023-06-07 RX ORDER — DIAZEPAM 5 MG/1
2.5 TABLET ORAL
Qty: 10 TABLET | Refills: 0 | Status: SHIPPED | OUTPATIENT
Start: 2023-06-07

## 2023-06-07 NOTE — TELEPHONE ENCOUNTER
[address] [place] [address] and gave verbal RX to Kiki Johnson (pharmacist) for:     [employer]  1   DX: G81 11 & I69 351, Spastic hemiparesis of right dominant side as late effect of cerebral infarction  Sig: Inject up to 200 UNITS I M  (EMG-Guided) into various sites of the upper/lower extremities once every three months for one year with:   Refills: 3    Kiki Johnson did expedite this order to be processed STAT per my request     Will follow up on the status of this order  Please schedule patients Botox-Inj-Appt within a reasonable time frame for Botox medication to be received prior while delivery status is currently pending  Thank you

## 2023-06-07 NOTE — PROGRESS NOTES
Review of Systems   Constitutional: Negative  Negative for appetite change, chills and fever  HENT: Negative  Negative for ear pain, hearing loss, sore throat, tinnitus, trouble swallowing and voice change  Eyes: Negative  Negative for photophobia, pain and visual disturbance  Respiratory: Negative  Negative for cough and shortness of breath  Cardiovascular: Negative  Negative for chest pain and palpitations  Gastrointestinal: Negative  Negative for abdominal pain, nausea and vomiting  Endocrine: Negative  Negative for cold intolerance  Genitourinary: Negative  Negative for dysuria, frequency, hematuria and urgency  Musculoskeletal: Negative  Negative for arthralgias, back pain, gait problem, myalgias and neck pain  Skin: Negative  Negative for color change and rash  Allergic/Immunologic: Negative  Neurological: Positive for numbness  Negative for dizziness, tremors, seizures, syncope, facial asymmetry, speech difficulty, weakness, light-headedness and headaches  Hematological: Negative  Does not bruise/bleed easily  Psychiatric/Behavioral: Negative  Negative for confusion, hallucinations and sleep disturbance  All other systems reviewed and are negative

## 2023-06-07 NOTE — PROGRESS NOTES
"Physical Medicine & Rehabilitation Spasticity Follow-up/Procedure  Estrella Gould 60 y o  male    ASSESSMENT/PLAN:   Tasia Stacy was seen today for cerebrovascular accident  Diagnoses and all orders for this visit:    Trigeminal neuralgia of right side of face  -     Basic metabolic panel; Future  -     Basic metabolic panel    Spastic hemiparesis of right dominant side as late effect of cerebral infarction (HCC)  -     diazepam (VALIUM) 5 mg tablet; Take 0 5 tablets (2 5 mg total) by mouth daily at bedtime as needed for muscle spasms      Mr Rena Mullen is a very pleasant 61 yo M who is very well known to me, who I took care of on the [facility] after his cerebellar CVA and R > L stroke burden, with additional hypodensities on DWI appreciated L midbrain, pontine area and R lower medullary/spinal cord junction  He has developed R spastic hemiparesis (Although has had remarkable recovery in regards to strength on that R side), neurogenic bowel (with some incontinence during rehab which has improved), and now has developed R facial pain/sensitivity with pain, as well as spasms and worsening tone that are impacting his tolerance of activity  He was supposed to be here to start botulinum toxin treatment for his tone, but unfortunately, the specialty pharmacy approved by his insurance has not sent in his botulinum toxin  We contacted his insurance to see if we could get approval to use stock, but they denied it       1  Spastic hemiparesis:  - Goals would be to help primarily with spasms and pain  - I would like to listen to both his R tibialis anterior vs  His R EHL at the next visit  He seems to have spasms of these muscles that cause him significant discomfort at night   - He also gets wrist flexion spasms, tightness and \"locking\" that is bothersome  - Since I last saw him, he has developed more L shoulder tone particularly in his pec with difficulty with passive and active range of motion/external rotation/abduction     - 200 " units of botulinum toxin have been approved, but we are waiting for it to be delivered  - I suspect I may need 300 units, but will start with 200 units   - In the interim, I would like to make some adjustments to his medication regimen to help take the edge off of his symptoms  - I did review that it may take several rounds to get to the ideal response for him  - I offered several options for medication adjustments:   - First, compared to his last visit, the intrinsic tonic tone in his RLE has improved, so the higher dose of Baclofen has been beneficial  He has more tone in his R shoulder, but the tone in his wrist flexors and his elbow flex/extensors has improved  - We could increase his Baclofen (I do think we will need to do this, but I understand their hesitance in bumping up this medication)  He is developing more tone in his LUE  I suggested considering increasing to 10 QID, with two doses in the evening to help with sleep, since his tightness is worse at night  They would like to hold on this for now   - For the spasms, I find Valium works a bit better  After some discussion about risks/benefits/adverse effects, they are open to having 2 5mg QHS PRN as a rescue medication if the spasms at night are severe  I provided a prescription  - I am hopeful his botulinum toxin will arrive in time for an appointment on 6/21  If it arrives prior to that, will fit him in sooner       2  Trigeminal Neuralgia  - R medullary stroke  - Did not find benefit from gabapentin, and would like to minimize medications  - This pain is more severe for him  - Was started on oxcarbazepine by Neurology  Per Neuro and patient prior to up-titration, would need to check a BMP to monitor his sodium levels  - I placed an order for a BMP he can get done  - I do wonder if he would benefit from a block  Family is not sure - they would like to learn more about it       3  R leg swelling  - Improved   Dopplers negative       *I have spent 60 minutes with Patient and family today in which greater than 50% of this time was spent in counseling/coordination of care regarding Instructions for management, Patient and family education, Importance of tx compliance, Impressions, Counseling / Coordination of care, Documenting in the medical record, Reviewing / ordering tests, medicine, procedures  , Obtaining or reviewing history   and Communicating with other healthcare professionals   HPI/SUBJECTIVE:  Patient presents today in the office with chief complaint of trigeminal neuralgia pain and continued spasms  Since last seen has titrated up to Baclofen 10mg TID, without much relief in the spasms  He continues to get spasms that dorsiflex his foot and extend his big toe, which cause severe pain  He also gets wrist pain/stiffness/difficulty moving it  He has also started to developing decreased L shoulder range of motion with  Difficulty with abduction and tightness along his pec  The pain markedly impacts his quality of life, ability to participate, and interrupts his sleep  Last seen for chemodenervation: Never  Results of chemodenervation: N/A  How long did effects last: N/A  Side affect/Adverse Effects: N/A    Any issues with driving, swallowing, appetite: Not driving, no issues with swallowing/appetite  Stretching/Exercise Program: Still in therapy twice a week  And on a regular basis at home  Currently in therapy: PT/OT  Any falls with significant injuries: Falls/significant  Any new weakness: None  Any changes to care since last seen: No longer on gabapentin, has been switched to oxcarbazepine (Trileptal)  Safe at home: Yes  Any oral meds: Baclofen 10mg TID  On anticoagulation/blood thinners: ASA, Brilinta  Current functional status:   Patient lives with spouse in a [facility] with 1 steps to enter      Patient worked full time as an  prior to his stroke  He is not currently driving  He is independent walking, and doesn't use an "assistive device consistently - when he does it's a cane  He doesn't use/need a MAFO  He is more or less independent with ADLs  and many of his IADLs  ROS: A 10 point review of systems was negative except for what is noted in the HPI  Imaging: I have personally reviewed imaging with results as follows:  5/15 Duplex:  Impression:  RIGHT LOWER LIMB  No evidence of acute or chronic deep vein thrombosis  No evidence of superficial thrombophlebitis noted  Doppler evaluation shows a normal response to augmentation maneuvers  Popliteal and posterior tibial arterial Doppler waveform's are triphasic  LEFT LOWER LIMB LIMITED  Evaluation shows no evidence of thrombus in the common femoral vein  Doppler evaluation shows a normal response to augmentation maneuvers  OBJECTIVE:   /79 (BP Location: Left arm, Patient Position: Sitting)   Pulse 59   Temp 97 6 °F (36 4 °C) (Temporal)   Ht 5' 10\" (1 778 m)   Wt 85 9 kg (189 lb 6 4 oz)   BMI 27 18 kg/m²     Gen: No acute distress, Well-nourished, well-appearing  HEENT: Moist mucus membranes, Normocephalic/Atraumatic  Cardiovascular: Regular  Distal pulses palpable  Heme/Extr: No edema  Pulmonary: Non-labored breathing  : No barnett  GI: Soft, non-tender, non-distended  MSK:   Decreased PROM in L shoulder abduction  Has full elbow extension and PROM in his wrist    Able to get ankle in a neutral position  Co-contraction at his knee  Integumentary: Skin is warm, dry  Psych: Normal mood and affect  Neuro: AAOx3, CN 2-12 intact  Sensation intact to light touch throughout  Speech is intact  Appropriate to questioning   Tone is normal     DTRs:   Coord:  MMT:   Strength:   Right  Left  Site  Right  Left  Site    5 5  S Ab: Shoulder Abductors  5  5  HF: Hip Flexors    5 5  EF: Elbow Flexors  5  5 KF: Knee Flexors    5  5  EE: Elbow Extensors  5  5  KE: Knee Extensors    5  5  WE: Wrist Extensors  5  5  DR: Dorsi Flexors    5  5  FF: Finger Flexors  5  5  " PF: Plantar Flexors    5  5  HI: Hand Intrinsics  5  5  EHL: Extensor Hallucis Longus     MAS:  Right  Left  Site  Right  Left  Site    2 0  Shoulder 0 0  Hip Adductors   1+ 0  EF: Elbow Flexors  2 0 KF: Knee Flexors    1+ 0  EE: Elbow Extensors  2  0  KE: Knee Extensors    1/0 0/0  WF/WE 2  0  DR: Dorsi Flexors    1 0  FF: Finger Flexors  2  0  PF: Plantar Flexors    0  0  HI: Hand Intrinsics  0/0  0/0  Toe Flex/Ex   *2 in pronator teres     MAS  0 - no increased tone  1 - slight increase in tone at the end of the ROM  1+ increase in tone at 1/2 the ROM  2 - increase in tone through most the ROM  3 - moderate increase in muscle tone - passive movement difficult  4 - affected parts ridid in flexion or extension    SPASMS observed:   RUE: no   LUE: no  RLE: no   LLE: no    The following portions of the patient's history were reviewed and updated as appropriate: allergies, current medications, past family history, past medical history, past social history, past surgical history and problem list       Current Outpatient Medications:   •  Acetaminophen (TYLENOL EXTRA STRENGTH PO), Take 1,000 mg by mouth daily as needed, Disp: , Rfl:   •  amLODIPine (NORVASC) 5 mg tablet, Take 1 tablet (5 mg total) by mouth daily, Disp: 30 tablet, Rfl: 5  •  aspirin 81 mg chewable tablet, Chew 1 tablet (81 mg total) daily, Disp: 30 tablet, Rfl: 0  •  atorvastatin (LIPITOR) 40 mg tablet, Take 1 tablet (40 mg total) by mouth daily with dinner, Disp: 30 tablet, Rfl: 0  •  baclofen 10 mg tablet, Take 1 tablet (10 mg total) by mouth every 8 (eight) hours, Disp: 90 tablet, Rfl: 0  •  carvedilol (COREG) 25 mg tablet, Take 1 tablet (25 mg total) by mouth 2 (two) times a day with meals, Disp: 60 tablet, Rfl: 0  •  cyanocobalamin (VITAMIN B-12) 1000 MCG tablet, Take 1 tablet (1,000 mcg total) by mouth daily, Disp: 30 tablet, Rfl: 0  •  Elastic Bandages & Supports (Medical Compression Stockings) MISC, Use in the morning, Disp: 2 each, Rfl: 0  • ferrous sulfate 325 (65 Fe) mg tablet, Take 1 tablet (325 mg total) by mouth daily with breakfast, Disp: 30 tablet, Rfl: 0  •  FLUoxetine (PROzac) 20 mg capsule, Take 1 capsule (20 mg total) by mouth daily, Disp: 30 capsule, Rfl: 0  •  hydrALAZINE (APRESOLINE) 50 mg tablet, Take 1 tablet (50 mg total) by mouth every 12 (twelve) hours, Disp: 60 tablet, Rfl: 0  •  losartan (Cozaar) 50 mg tablet, Take 1 tablet (50 mg total) by mouth daily, Disp: 30 tablet, Rfl: 5  •  OXcarbazepine (Trileptal) 150 mg tablet, Take 1 tablet (150 mg total) by mouth 2 (two) times a day, Disp: 60 tablet, Rfl: 3  •  ticagrelor (BRILINTA) 90 MG, Take 1 tablet (90 mg total) by mouth every 12 (twelve) hours, Disp: 60 tablet, Rfl: 0    Past Surgical History:   Procedure Laterality Date   • ARTERY SURGERY      vertebral artery stent/revascularization       Patient Active Problem List    Diagnosis Date Noted   • Acute Posterior Circulation Stroke 02/25/2023   • History of stroke 05/10/2023   • Spastic hemiparesis of right dominant side as late effect of cerebral infarction ([facility] ) 05/05/2023   • Neuropathic pain 05/04/2023   • Celiac artery stenosis ([facility] ) 03/22/2023   • Neurogenic bowel 03/08/2023   • Iron deficiency anemia 03/06/2023   • Spasticity 03/01/2023   • Stage 2 chronic kidney disease 03/01/2023   • Prediabetes 03/01/2023   • Adjustment disorder with depressed mood 03/01/2023   • BRAULIO (acute kidney injury) ([facility] ) 02/25/2023   • Right Vertebral artery dissection ([facility] ) 02/25/2023   • Stenosis of left vertebral artery 02/25/2023   • Primary hypertension 02/25/2023

## 2023-06-07 NOTE — PATIENT INSTRUCTIONS
Will do a trial of Valium 2 5mg at night as needed for bad spasms  Contact me via [employer] in a week  Options: May be a good idea in the future to consider increasing the Baclofen  Will discuss with Neuro - after you have your bloodwork done if sodium looks ok, possibly increasing Oxcarbazepine  I will ask our Neurologists about blocks  5  You can stop the gabapentin

## 2023-06-08 ENCOUNTER — OFFICE VISIT (OUTPATIENT)
Dept: OCCUPATIONAL THERAPY | Facility: CLINIC | Age: 63
End: 2023-06-08
Payer: COMMERCIAL

## 2023-06-08 ENCOUNTER — OFFICE VISIT (OUTPATIENT)
Dept: PHYSICAL THERAPY | Facility: CLINIC | Age: 63
End: 2023-06-08
Payer: COMMERCIAL

## 2023-06-08 DIAGNOSIS — I63.50 POSTERIOR CIRCULATION STROKE (HCC): Primary | ICD-10-CM

## 2023-06-08 DIAGNOSIS — I63.9 CEREBROVASCULAR ACCIDENT (CVA), UNSPECIFIED MECHANISM (HCC): ICD-10-CM

## 2023-06-08 PROCEDURE — 97112 NEUROMUSCULAR REEDUCATION: CPT

## 2023-06-08 PROCEDURE — 97116 GAIT TRAINING THERAPY: CPT

## 2023-06-08 PROCEDURE — 97110 THERAPEUTIC EXERCISES: CPT

## 2023-06-08 NOTE — PROGRESS NOTES
Daily Note     Today's date: 2023  Patient name: Juan Mcknight  : 1960  MRN: [id number]  Referring provider: Greg Mcclain  Dx:   Encounter Diagnosis     ICD-10-CM    1  Posterior circulation stroke ([facility] [address] )  I63 50           Start Time: 0800  Stop Time: 0842  Total time in clinic (min): 42 minutes    Subjective: Did not get botox, just got pre-auth, will get botox as soon as possible  Objective: See treatment diary below      Assessment:  Some issues with foot clearance with either head movement dual task or ball toss  Improving stability with higher hurdles but does perform with step-to pattern where as with focus only on hurdles performs step-through with good stability  Challenged with backward stepping on TM + uphill climbing  Coordination deficits with rapid alternating step tap to focus on explosiveness of calves  Patient would benefit from continued PT to improve gait, balance, and overall functional mobility  Plan: Continue per plan of care  Continue propulsion and stability work  Foot clearance with dual task  Precautions: Falls,   Re-eval Date: 2023    Date      Visit Count 20 21 22     FOTO See IE       Pain In See IE       Pain Out                Testing        TUG/TUGC 09 93 no AD       30s chair 12       5xSTS 12 85       Gait speed 0 81 m/s no AD       FGA 19       2/6MWT 990ft no AD       Neuro Re-Ed FGA, 6MWT, 10mWT, 30s chair stand, 5xSTS, TUG       Dynamic balance Solo 7  5#R lat step 8 laps cues for speed    Fwd/bwd PT callout solo 7  5#R 5min total 6' step + low hurdles cues for fast as possible 2x6 laps fwd/lat    Fwd/bwd PT callout solo 7  5#R 5min Hurdles med 4 laps solo step-to/through    Hurdles low ball toss 4 laps step-to/through    HT solo 7  5#R 10 laps    Alt step tap cues for coordination 3min total     Static balance        Obstacle course        Resisted ambulation  Sled push 5 plates + red KB 8 laps 15ft, 4 laps 15ft RPE 15/20 Uneven surfaces        HKM on foam (// bars)        RLE forced use        Ther Ex        Hamstring 3 way stretch        SLR x 4        HR/TR        Mini Squats        Step ups        Plantar stretch pro-stretch  1min x2      Towel stretch right         Leg press  95lb x10  105lb 2x20              Ther Activity        ADL        Curb negotiation         Gait Training        No AD        SPC        Treadmill Solo 10% 1  1mph 3min x1, 10% 1  2mph 2 5min Solo 10% 1  2mph 3min x2 RUE support Solo 11% 1  1mph RUE support 4min x1, 3min x1    Solo 0 8mph 0% bwd 3min     Head turns        Manuals        Plantar release        KT 50% arch support, paper off tension med/lat right ankle

## 2023-06-08 NOTE — PROGRESS NOTES
OCCUPATIONAL THERAPY PROGRESS NOTE         23  Chica Butcher  1960  [id number]  Minoo Cheng,*   Diagnosis ICD-10-CM Associated Orders   1  Posterior circulation stroke (HCC)  I63 50       2  Cerebrovascular accident (CVA), unspecified mechanism ([facility] [address] )  I63 9             Assessment/Plan      SKILLED ANALYSIS:     Pt is a 60 y o  male referred to Occupational Therapy s/p Posterior circulation stroke ([facility] [address] ) [I63 50]  Pt participated in 17 sessions of skilled OT sessions, focusing on improving ROM, motor control and strength of RUE  Pt with progress towards unmet goals  Pt with decreased AROM and PROM at the shoulder and the wrist due to shoulder pain, refer to gird below for details  RUE strength was unable to be retested at this time due to shoulder pain  Neurology is aware of is pain and pt is scheduled for Botox for pain management  Pt with improved gross grasp evidenced by greater score using the dynamometer  Pt with decreased pinch strength by 1-2lbs, which is due to increased pain in RUE  Pt reports he continues to attempt to use his RUE as dominant in daily tasks and continues to engage in IADLs and leisure tasks at home to increase function of RUE  Pt does cont to present with the following areas of deficit: FMC/FMS, GMC/GMS, hand to target accuracy and in hand manipulation/dexterity impacting independece and completion of ADL/IADL and leisure tasks  Pt does demo the need for cont skilled Occupational Therapy services 2x/week for 12 weeks with focus on ADL re-training, IADL re-training, fxnl xfers, standing tolerance, standing balance, UE NMR, UE strengthening, UE endurance, FMC/GMC, DME training/education, family training/education, community re-integration and return to work simulation to address the goals as listed below  Pt in agreement with POC, POC to  23  Short Term Goals (to be achieved within 4 weeks):    1   Pt will improve R hand motor control so as to be able to grasp/ release medium and large sized objects with min to no difficulty during ADLs/IADLs and leisure activities  MET - min difficulty with gross grasp   2  Pt will improve R hand motor control to be able to grasp/release small sized objects with min to no difficulty to increase function during ADLs/IADLs and leisure activities  NEW GOAL  3  Pt will use RUE as functional assist in 80% of functional tasks, to increase ease and independence with completion of ADLs/ IADLs and leisure tasks  MET  4  Pt will demo with G tolerance to supine, seated, and in stance exercise x 45 minutes with minimal rest breaks required for increased engagement in weekly exercise regimen and increased engagement in life roles MET  5  Pt will increase R UE rate of manipulation for all FM tests for improved functional performance/independence with functional tasks x 25% PREVIOUSLY MET - DECLINED DUE TO RUE PAIN   6  Pt will increase R UE strength to 4/5,  strength by 3-5 lbs and pinch strength by 1-2 pounds, through the use of strengthening exercises and home program for eventual return to IADLs and life roles  PROGRESSING   7  Pt will increase RUE rate of manipulation for all [address] [facility] tests, where RUE=LUE, for improved functional performance/ independence with functional tasks PROGRESSING        Long Term Goals (to be achieved within 12 weeks):  1  Pt will increase R UE strength to 4+/5 and  strength R=L, through the use of strengthening exercises and home program PROGRESSING   2  Pt will improve RUE GMC/FMC, so as to be able to return to using RUE as dominant in 80% of daily functional tasks PROGRESSING   3  Pt will increase RUE prehension patterns for improved ability to manage clothing fasteners and to manage eating utensils with minimal difficulty PROGRESSING   4   Pt will demo with G carryover of Home Exercise Program to improve functional progression towards goals in Plan of care and for improved functional use of RUE PROGRESSING "  5  Pt will complete pre-driving assessments to provide feedback to physician to assist with determining if pt is able to return to driving  NOT MET           SUBJECTIVE      SUBJECTIVE: \"I am having the same pain I do everyday  \"    PATIENT GOAL: \"use of my hand\" pt stating he wants to be able to eat with his RUE, would like to be able to drive        HISTORY OF PRESENT ILLNESS:     Pt is a 60 y o  male who was referred to Occupational Therapy s/p  Posterior circulation stroke ([facility] [address] ) [I63 50]  Per Marquise Mckeon MD [facility] dc note on 3/31:   HPI: Valene Cooks is a 60 y  o  right handed male with medical history of HTN who presented to [address] [place] [address] [address] on 2/25 with nausea/dizziness  Found to have hypertensive emergency with acute/subcaute R posterior cerebellar CVA with 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, mild BRAULIO, and incidentally found + COVID (without evidence of respiratory illness/hypoxia/CXR findings)   Placed on cardene gtt, stroke pathway, ASA/Plavix, IV hydration for BRAULIO, and CTX for UTI  NIHSS 2  Symptoms began 2 days prior  Not tPA/TNK candidate   MRI/MRA with 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   NSx recommended [facility] and starting on heparin gtt and stopped Plavix  Repeat CTA on 2/26 stable with with R V3/4 occlusion  Not a candidate for interventional procedure due to clinical stability and risk   Speech consulted and noted mod-severe oropharyngeal dysphagia recommended pureed/HTL  Unfortunately later on 2/26, he clinically deteriorated with increased R sided weakness, and was taken to IR for stenting of V3 and V4 with TICI 2B revascularization  CTH without ICH  He was admitted back to the ICU intubated - extubated 2/27  MRI showed cerebellar CVA and R > L stroke burden, with additional hypodensities on DWI appreciated L midbrain, pontine area and R lower medullary/spinal cord junction   He was transitioned to triple therapy with DAPT (given " recent stent) and A/C with eliquis 2 5mg BID due to COVID  Diet advanced to Level 3/NTL on 2/27  Noted to have spasticity in RLE - started on baclofen by Neurology  He was able to have cardene discontinued on 3/2, and they have been making adjustments to his oral regimen  He was started on Prozac for his mood  NSx has signed off  CTA H/N recommended around 3/17  Length of time on eliquis unclear  Admitted to [facility] on 3/6  Pt with an [facility] stay from 3/6-3/31  Pt was dc home 3/31  Pt sustained a fall the night of 3/31 trying to get onto the [facility]  Pt with fall onto his R side, without resulting injuries  Pt was checked at the hospital and dc home the same day       PMH:   Past Medical History:   Diagnosis Date   • BRAULIO (acute kidney injury) ([facility] [address] )    • B12 deficiency    • Celiac artery stenosis (HCC)    • Cerebellar infarct ([facility] [address] ) 02/25/2023   • CKD (chronic kidney disease) stage 2, GFR 60-89 ml/min    • Essential hypertension    • Impaired fasting glucose    • Iron deficiency anemia    • Neurogenic bowel    • Stenosis of left vertebral artery    • Vertebral artery dissection (HCC)        Past Surgical Hx:   Past Surgical History:   Procedure Laterality Date   • ARTERY SURGERY      vertebral artery stent/revascularization        HOME SETUP/ CLOF: lives with wife, dtr (25 yo) and ANGEL, and son (17yo); lives on [address] acres of property   2 SH; laundry on 1st floor; bedroom is on the 2nd floor but is sleeping on the first floor; powder room on 1st floor; full bath with tub combo  Work: ; was working 40+ hours; 6 days a week  (+) driving  ADLs: I in all aspects prior to CVA  Wife did IADLs of cooking, laundry, grocery shopping  Pt did all outside work, including mowing the lawn   Animals: 2 dogs, 2 cats upstairs and 4 cats downstairs; used to walk the dogs in the morning; family completing pet care   Physical activity: outdoor yard work      Current Status:  DME: w/c, BSC, hemiwalker, transfer tub bench    ADLs: UBB/D: I   LBD: I with pants, underwear; A with socks/ shoes  LBB: I  Clothing fasteners: currently wearing clothing without fasteners  Grooming: using L hand to brush teeth but has been attempting to use RUE for all groom tasks    Bed mobility: can get in/out of bed by self  Transfers: I  Mobility: DS/I with SPC     Pain Levels: none at rest  Pain with ROM at 1/2 range  Pt did not state level but has visual expression of pain           OBJECTIVE         PHYSICAL ASSESSMENT        RUE LUE Comments                 UPPER EXTREMITY FXN Impaired Intact Dominant Hand: Right                  UE ROM LUE PROM  LUE AROM  RUE PROM  RUE AROM COMMENTS   Shoulder Flex WNL WNL  1/2 range (was WNL)  1/2 range (was 3/4 range)  Limited due to pain    Shoulder Ext WNL  WNL  WNL WNL    Shoulder ABD WNL  WNL  Less than 1/2 range (was WNL) Less than 1/2 range (was 3/4 range)  Limited due to pain    Horizontal ABD WNL  WNL  WNL WNL    Horizontal ADD WNL  WNL  WNL WNL    Shoulder IR  WNL  WNL  WNL WFL    Shoulder ER WNL  WNL WNL 1/2 range (was [facility])  Limited due to pain    Elbow Flex WNL  WNL WNL WFL    Elbow Ext  WNL  WNL WNL WNL    Wrist flexion WNL  WNL WNL WNL    Wrist Ext WNL  WNL 1/2 range (was WNL)  1/2 range (was 3/4)  Limited due to pain    Supination WNL  WNL 3/4 range  3/4 range    Pronation WNL  WNL WNL WNL    Finger Flex WNL  WNL 3/4 range (was WNL)  1/2 range Limited due to pain    Finger Ext WNL  WNL WNL WNL    Opposition  WNL  WNL WNL WNL                               MMT  LUE RUE   Comments   Elevation 5/5 4+/5 See comment below    Shoulder Flex/Ext 5/5 4/5 (was 4-/5)    Shoulder Abd 5/5 4/5 (was 4-/5)    Shoulder Add 5/5 4/5 (was 4-/5)    Shoulder ER 5/5 4- (was 3+/5)    Shoulder IR 5/5 4/5 (was 4-/5)    Elbow Flex 5/5 4/5 (was 4-/5)    Elbow Ext 5/5 4/5 (was 4-/5)    Wrist Flex 5/5 4/5 (was 4-/5)    Wrist Ext 5/5 4/5    Gross Grasp 5/5 4-/5      COMMENT: Not retested at this time due to pain in the shoulder /Pinch Strength  LUE  RUE    Comments   Dynamometer      - Gross Grasp 80 lbs (was 60 lbs) 5lbs (was 0 lbs)    Pinch Meter       - PINCER 18 lbs( was 15)  5 lbs (was 6)     - 3 JAW MUDNO 21 lbs(was 22) 6 lbs (was 8)      - LATERAL 24 lbs (was 27)  10 lbs (was 10)       SENSATION LUE RUE Comments   Sharp Dull  Intact Impaired    Proprioception Intact Intact    Pain Intact Impaired    Hot/Cold Temp Intact Impaired      COMMENTS: decreased sensation on RUE/RLE/ face    COORDINATION LUE RUE    9 Hole Peg Test  26 sec (was 30sec) 55 sec (was 48 sec) Below average for RUE    Limited due to pain    O'Juanjose Finger Dexterity Test   4 min 32 sec (top 3 rows) Not retested due to time constraint will test again next session    Handwriting (Print)   G legibility (was G-)  G tripod grasp   Handwriting (script)   G- legibility (was P+)           4/12:   Torrence Boxer Finger Dexterity Test  L: 3 min 2 sec ( top 5 rows)  R: 8 min 24 sec  (top 3 rows)        MODIFIED HERIBERTO SCALE (TONE) LUE RUE    No increase in muscle tone (0) 0 0    Slight Increase in muscle tone with catch and release or min resist at end range (1)      Slight Increase in muscle tone with catch and release, followed by min resistance through remainder of range (1+)      Increased muscle tone through full range, able to be moved easily (2)      Considerable increase in tone, difficult to move (3)      Rigid in Flexion/Extension (4)                  COGNITIVE ASSESSMENT    Memory: Intact  Attention: Intact  Executive Functions: Intact  Communication: Intact   Processing: Intact           VISUAL ASSESSMENT      Comments: (+) glasses; pt with blurry vision with L eye    Vision Screen     Accommodation Intact   Convergence Intact   Pursuits Intact   Saccades Intact   ROM Intact            PLANNED THERAPY INTERVENTIONS:  Therapeutic exercise  Therapeutic activity  ADL/IADL re-training   NMRE  Supine, seated, and in stance neuro re-ed  FMC/prehension  Manual tx  Hand to target  Sensory re-ed   WBearing strategies   Closed chain activities  Open chain activities       INTERVENTION COMMENTS:  Diagnosis: Posterior circulation stroke ([facility] [address] ) [I63 50]  Precautions: fall risk, R side weakness   Insurance: Payor: Pinkie Libman / Plan: Pinkie Libman PPO / Product Type: PPO /   17 of 20 visits  PN due 7/06/23

## 2023-06-08 NOTE — TELEPHONE ENCOUNTER
Called [facility] and spoke with Jeet Toscano regarding the delivery status of patients Botox medication  Jeet Toscano informed me that patients account is currently pending b/c they need to obtain patients verbal consent along with collection of any co-pay that may be owed in order to have their Botox medication delivered  Called patient and left VM message requesting they reach out to [address] [place] [address] and provide them w/ any and all info needed in order to have Botox medication delivered to our office so we can schedule them for their Botox appointment  Will follow up on the status of this order

## 2023-06-13 ENCOUNTER — OFFICE VISIT (OUTPATIENT)
Dept: PHYSICAL THERAPY | Facility: CLINIC | Age: 63
End: 2023-06-13
Payer: COMMERCIAL

## 2023-06-13 ENCOUNTER — OFFICE VISIT (OUTPATIENT)
Dept: OCCUPATIONAL THERAPY | Facility: CLINIC | Age: 63
End: 2023-06-13
Payer: COMMERCIAL

## 2023-06-13 DIAGNOSIS — I63.50 POSTERIOR CIRCULATION STROKE (HCC): Primary | ICD-10-CM

## 2023-06-13 DIAGNOSIS — I63.9 CEREBROVASCULAR ACCIDENT (CVA), UNSPECIFIED MECHANISM (HCC): ICD-10-CM

## 2023-06-13 PROCEDURE — 97116 GAIT TRAINING THERAPY: CPT

## 2023-06-13 PROCEDURE — 97112 NEUROMUSCULAR REEDUCATION: CPT

## 2023-06-13 PROCEDURE — 97110 THERAPEUTIC EXERCISES: CPT

## 2023-06-13 PROCEDURE — 97530 THERAPEUTIC ACTIVITIES: CPT

## 2023-06-13 NOTE — PROGRESS NOTES
"Occupational Therapy Daily Note    Today's date: 2023  Patient name: Chica Butcher  : 1960  MRN: [id number]  Referring provider: Jeanette Pacheco  Dx:   Encounter Diagnoses   Name Primary? • Posterior circulation stroke ([facility] ) Yes   • Cerebrovascular accident (CVA), unspecified mechanism ([facility] )        Subjective: \"Im waiting for them to call about botox, it should be this week\"       Objective: Pt engaged in skilled OT treatment session with focus on UE NMR, UE strengthening, UE endurance and FMC/GMC to increase engagement, endurance, tolerance, and independence with daily ADL and IADL tasks  CPT Code Minutes                                           Task Details        Therapeutic Activity  Reviewed ADL status to update PN due to running out of time last session  DME: SPC     ADLs:  UBB/D: I   LBD: I with pants, underwear;   LBB: I  Clothing Fasteners: I (usually only wearing clothes with zippers)   Grooming: using L hand to brush teeth    Bed Mobility: able to get in/out of bed by self   Transfers: I  Mobility: I     Pain: Shoulder pain 8/10 at rest  Pt reports he is supposed to be receiving botox this week to improve pain  Neuro Re-Ed    Therapeutic Exercise                TESTING    O'Juanjose Finger Dexterity Test   R: 6 min 49 sec (top 5 rows)   Improved from  (3 rows; 4 min 32 sec)        Keyhole   L: 1 min 47 seconds   R: 7 min 50 seconds           HEP  23: AAROM exercises, functional reaching exercises    : [address] of thumb to alternating fingers, transitioning from pad to pad to fingertip to fingertip    : provided pt with [address] activity as HEP to improve finger ROM into ab/adduction         Assessment: Tolerated treatment well  Pt presents with improving GMC, [address], and in-hand dexterity  Pt greatly  improved on the [facility] assessment  Pt instructed to use RUE for grooming tasks for increased use during ADL/IADLs   Follow up with pt " regarding status of Botox  Patient would benefit from continued skilled OT      Plan: Continued skilled OT per POC    INTERVENTION COMMENTS:  Diagnosis: [facility] [I63 50]  Precautions: fall risk, R side weakness   Insurance: Payor: Yovanny Gutiérrez / Plan: Yovanny Gutiérrez PPO / Product Type: PPO /   18 of 20 visits  PN due 07/06/23

## 2023-06-13 NOTE — PROGRESS NOTES
Daily Note     Today's date: 2023  Patient name: Ashly Silverman  : 1960  MRN: [id number]  Referring provider: Rachana Lynch  Dx:   Encounter Diagnosis     ICD-10-CM    1  Posterior circulation stroke ([address] )  I63 50           Start Time: 0800  Stop Time: 7969  Total time in clinic (min): 44 minutes    Subjective: Botox next week, potentially this week  Nothing new to report still going for walks, was walking in [facility] over the weekend, he states he was tired bc his son was travelling all over the store  Objective: See treatment diary below      Assessment:  Continuing to develop propulsion and stability phases  Challenged with foam beam and her single limb stability  Ataxic foot placement with R during cone tap occasionally needing stepping strategy to regain balance  More successful with hurdles vs cone tap  Uphill more of a balance challenge vs downhill  Occasional instability on treadmill but with RUE assist recovers quickly and continues walking  Patient would benefit from continued PT to improve gait, balance, and overall functional mobility  Plan: Continue per plan of care  Continue propulsion and stability work  Foot clearance with dual task  Precautions: Falls,   Re-eval Date: 2023    Date     Visit Count 20 21 22 23    FOTO See IE       Pain In See IE       Pain Out                Testing        TUG/TUGC 09 93 no AD       30s chair 12       5xSTS 12 85       Gait speed 0 81 m/s no AD       FGA 19       2/6MWT 990ft no AD       Neuro Re-Ed FGA, 6MWT, 10mWT, 30s chair stand, 5xSTS, TUG       Dynamic balance Solo 7  5#R lat step 8 laps cues for speed    Fwd/bwd PT callout solo 7  5#R 5min total 6' step + low hurdles cues for fast as possible 2x6 laps fwd/lat    Fwd/bwd PT callout solo 7  5#R 5min Hurdles med 4 laps solo step-to/through    Hurdles low ball toss 4 laps step-to/through    HT solo 7  5#R 10 laps    Alt step tap cues for coordination 3min total Foam beam cone tap 4 laps, hurdles 2 laps solo    Static balance        Obstacle course    Foam wedge up/down + 6' step + foam beam 6 laps solo    Resisted ambulation  Sled push 5 plates + red KB 8 laps 15ft, 4 laps 15ft RPE 15/20  Red TB PT resist 6 laps x2 8/10 solo    Uneven surfaces        HKM on foam (// bars)        RLE forced use        Ther Ex        Hamstring 3 way stretch        SLR x 4        HR/TR        Mini Squats        Step ups        Plantar stretch pro-stretch  1min x2      Towel stretch right         Leg press  95lb x10  105lb 2x20  135lb 1x15 BL  145lb 1x15 BL            Ther Activity        ADL        Curb negotiation         Gait Training        No AD        SPC        Treadmill Solo 10% 1  1mph 3min x1, 10% 1  2mph 2 5min Solo 10% 1  2mph 3min x2 RUE support Solo 11% 1  1mph RUE support 4min x1, 3min x1    Solo 0 8mph 0% bwd 3min Solo 8 5% 1  3mph RUE support     Head turns        Manuals        Plantar release        KT 50% arch support, paper off tension med/lat right ankle

## 2023-06-14 ENCOUNTER — TELEPHONE (OUTPATIENT)
Dept: NEUROLOGY | Facility: CLINIC | Age: 63
End: 2023-06-14

## 2023-06-14 LAB
ALBUMIN SERPL-MCNC: 4.3 G/DL (ref 3.6–5.1)
ALBUMIN/GLOB SERPL: 1.6 (CALC) (ref 1–2.5)
ALP SERPL-CCNC: 81 U/L (ref 35–144)
ALT SERPL-CCNC: 30 U/L (ref 9–46)
AST SERPL-CCNC: 18 U/L (ref 10–35)
BASOPHILS # BLD AUTO: 77 CELLS/UL (ref 0–200)
BASOPHILS NFR BLD AUTO: 1 %
BILIRUB SERPL-MCNC: 0.4 MG/DL (ref 0.2–1.2)
BUN SERPL-MCNC: 25 MG/DL (ref 7–25)
BUN/CREAT SERPL: NORMAL (CALC) (ref 6–22)
CALCIUM SERPL-MCNC: 9.6 MG/DL (ref 8.6–10.3)
CHLORIDE SERPL-SCNC: 106 MMOL/L (ref 98–110)
CO2 SERPL-SCNC: 29 MMOL/L (ref 20–32)
CREAT SERPL-MCNC: 1.29 MG/DL (ref 0.7–1.35)
EOSINOPHIL # BLD AUTO: 616 CELLS/UL (ref 15–500)
EOSINOPHIL NFR BLD AUTO: 8 %
ERYTHROCYTE [DISTWIDTH] IN BLOOD BY AUTOMATED COUNT: 12.9 % (ref 11–15)
FERRITIN SERPL-MCNC: 239 NG/ML (ref 24–380)
GFR/BSA.PRED SERPLBLD CYS-BASED-ARV: 62 ML/MIN/1.73M2
GLOBULIN SER CALC-MCNC: 2.7 G/DL (CALC) (ref 1.9–3.7)
GLUCOSE SERPL-MCNC: 91 MG/DL (ref 65–99)
HCT VFR BLD AUTO: 37.5 % (ref 38.5–50)
HGB BLD-MCNC: 12.2 G/DL (ref 13.2–17.1)
IRON SATN MFR SERPL: 28 % (CALC) (ref 20–48)
IRON SERPL-MCNC: 90 MCG/DL (ref 50–180)
LYMPHOCYTES # BLD AUTO: 1278 CELLS/UL (ref 850–3900)
LYMPHOCYTES NFR BLD AUTO: 16.6 %
MCH RBC QN AUTO: 28.2 PG (ref 27–33)
MCHC RBC AUTO-ENTMCNC: 32.5 G/DL (ref 32–36)
MCV RBC AUTO: 86.6 FL (ref 80–100)
MONOCYTES # BLD AUTO: 631 CELLS/UL (ref 200–950)
MONOCYTES NFR BLD AUTO: 8.2 %
NEUTROPHILS # BLD AUTO: 5097 CELLS/UL (ref 1500–7800)
NEUTROPHILS NFR BLD AUTO: 66.2 %
PLATELET # BLD AUTO: 245 THOUSAND/UL (ref 140–400)
PMV BLD REES-ECKER: 9.9 FL (ref 7.5–12.5)
POTASSIUM SERPL-SCNC: 4.8 MMOL/L (ref 3.5–5.3)
PROT SERPL-MCNC: 7 G/DL (ref 6.1–8.1)
RBC # BLD AUTO: 4.33 MILLION/UL (ref 4.2–5.8)
SODIUM SERPL-SCNC: 143 MMOL/L (ref 135–146)
TIBC SERPL-MCNC: 323 MCG/DL (CALC) (ref 250–425)
TSH SERPL-ACNC: 1.47 MIU/L (ref 0.4–4.5)
VIT B12 SERPL-MCNC: 740 PG/ML (ref 200–1100)
WBC # BLD AUTO: 7.7 THOUSAND/UL (ref 3.8–10.8)

## 2023-06-14 NOTE — PATIENT INSTRUCTIONS
Wellness Visit for Adults   AMBULATORY CARE:   A wellness visit  is when you see your healthcare provider to get screened for health problems  Your healthcare provider will also give you advice on how to stay healthy  Write down your questions so you remember to ask them  Ask your healthcare provider how often you should have a wellness visit  What happens at a wellness visit:  Your healthcare provider will ask about your health, and your family history of health problems  This includes high blood pressure, heart disease, and cancer  He or she will ask if you have symptoms that concern you, if you smoke, and about your mood  You may also be asked about your intake of medicines, supplements, food, and alcohol  Any of the following may be done:  • Your weight  will be checked  Your height may also be checked so your body mass index (BMI) can be calculated  Your BMI shows if you are at a healthy weight  • Your blood pressure  and heart rate will be checked  Your temperature may also be checked  • Blood and urine tests  may be done  Blood tests may be done to check your cholesterol levels  Abnormal cholesterol levels increase your risk for heart disease and stroke  You may also need a blood or urine test to check for diabetes if you are at increased risk  Urine tests may be done to look for signs of an infection or kidney disease  • A physical exam  includes checking your heartbeat and lungs with a stethoscope  Your healthcare provider may also check your skin to look for sun damage  • Screening tests  may be recommended  A screening test is done to check for diseases that may not cause symptoms  The screening tests you may need depend on your age, gender, family history, and lifestyle habits  For example, colorectal screening may be recommended if you are 48years old or older  Screening tests you need if you are a woman:   • A Pap smear  is used to screen for cervical cancer   Pap smears are usually done every 3 to 5 years depending on your age  You may need them more often if you have had abnormal Pap smear test results in the past  Ask your healthcare provider how often you should have a Pap smear  • A mammogram  is an x-ray of your breasts to screen for breast cancer  Experts recommend mammograms every 2 years starting at age 48 years  You may need a mammogram at age 52 years or younger if you have an increased risk for breast cancer  Talk to your healthcare provider about when you should start having mammograms and how often you need them  Vaccines you may need:   • Get an influenza vaccine  every year  The influenza vaccine protects you from the flu  Several types of viruses cause the flu  The viruses change over time, so new vaccines are made each year  • Get a tetanus-diphtheria (Td) booster vaccine  every 10 years  This vaccine protects you against tetanus and diphtheria  Tetanus is a severe infection that may cause painful muscle spasms and lockjaw  Diphtheria is a severe bacterial infection that causes a thick covering in the back of your mouth and throat  • Get a human papillomavirus (HPV) vaccine  if you are female and aged 23 to 32 or male 23 to 24 and never received it  This vaccine protects you from HPV infection  HPV is the most common infection spread by sexual contact  HPV may also cause vaginal, penile, and anal cancers  • Get a pneumococcal vaccine  if you are aged 72 years or older  The pneumococcal vaccine is an injection given to protect you from pneumococcal disease  Pneumococcal disease is an infection caused by pneumococcal bacteria  The infection may cause pneumonia, meningitis, or an ear infection  • Get a shingles vaccine  if you are 60 or older, even if you have had shingles before  The shingles vaccine is an injection to protect you from the varicella-zoster virus  This is the same virus that causes chickenpox   Shingles is a painful rash that develops in people who had chickenpox or have been exposed to the virus  How to eat healthy:  My Plate is a model for planning healthy meals  It shows the types and amounts of foods that should go on your plate  Fruits and vegetables make up about half of your plate, and grains and protein make up the other half  A serving of dairy is included on the side of your plate  The amount of calories and serving sizes you need depends on your age, gender, weight, and height  Examples of healthy foods are listed below:  • Eat a variety of vegetables  such as dark green, red, and orange vegetables  You can also include canned vegetables low in sodium (salt) and frozen vegetables without added butter or sauces  • Eat a variety of fresh fruits , canned fruit in 100% juice, frozen fruit, and dried fruit  • Include whole grains  At least half of the grains you eat should be whole grains  Examples include whole-wheat bread, wheat pasta, brown rice, and whole-grain cereals such as oatmeal     • Eat a variety of protein foods such as seafood (fish and shellfish), lean meat, and poultry without skin (turkey and chicken)  Examples of lean meats include pork leg, shoulder, or tenderloin, and beef round, sirloin, tenderloin, and extra lean ground beef  Other protein foods include eggs and egg substitutes, beans, peas, soy products, nuts, and seeds  • Choose low-fat dairy products such as skim or 1% milk or low-fat yogurt, cheese, and cottage cheese  • Limit unhealthy fats  such as butter, hard margarine, and shortening  Exercise:  Exercise at least 30 minutes per day on most days of the week  Some examples of exercise include walking, biking, dancing, and swimming  You can also fit in more physical activity by taking the stairs instead of the elevator or parking farther away from stores  Include muscle strengthening activities 2 days each week  Regular exercise provides many health benefits   It helps you manage your weight, and decreases your risk for type 2 diabetes, heart disease, stroke, and high blood pressure  Exercise can also help improve your mood  Ask your healthcare provider about the best exercise plan for you  General health and safety guidelines:   • Do not smoke  Nicotine and other chemicals in cigarettes and cigars can cause lung damage  Ask your healthcare provider for information if you currently smoke and need help to quit  E-cigarettes or smokeless tobacco still contain nicotine  Talk to your healthcare provider before you use these products  • Limit alcohol  A drink of alcohol is 12 ounces of beer, 5 ounces of wine, or 1½ ounces of liquor  • Lose weight, if needed  Being overweight increases your risk of certain health conditions  These include heart disease, high blood pressure, type 2 diabetes, and certain types of cancer  • Protect your skin  Do not sunbathe or use tanning beds  Use sunscreen with a SPF 15 or higher  Apply sunscreen at least 15 minutes before you go outside  Reapply sunscreen every 2 hours  Wear protective clothing, hats, and sunglasses when you are outside  • Drive safely  Always wear your seatbelt  Make sure everyone in your car wears a seatbelt  A seatbelt can save your life if you are in an accident  Do not use your cell phone when you are driving  This could distract you and cause an accident  Pull over if you need to make a call or send a text message  • Practice safe sex  Use latex condoms if are sexually active and have more than one partner  Your healthcare provider may recommend screening tests for sexually transmitted infections (STIs)  • Wear helmets, lifejackets, and protective gear  Always wear a helmet when you ride a bike or motorcycle, go skiing, or play sports that could cause a head injury  Wear protective equipment when you play sports  Wear a lifejacket when you are on a boat or doing water sports      © Copyright [employer] 2022 Information is for End User's use only and may not be sold, redistributed or otherwise used for commercial purposes  The above information is an  only  It is not intended as medical advice for individual conditions or treatments  Talk to your doctor, nurse or pharmacist before following any medical regimen to see if it is safe and effective for you  Wellness Visit for Adults   AMBULATORY CARE:   A wellness visit  is when you see your healthcare provider to get screened for health problems  Your healthcare provider will also give you advice on how to stay healthy  Write down your questions so you remember to ask them  Ask your healthcare provider how often you should have a wellness visit  What happens at a wellness visit:  Your healthcare provider will ask about your health, and your family history of health problems  This includes high blood pressure, heart disease, and cancer  He or she will ask if you have symptoms that concern you, if you smoke, and about your mood  You may also be asked about your intake of medicines, supplements, food, and alcohol  Any of the following may be done:  • Your weight  will be checked  Your height may also be checked so your body mass index (BMI) can be calculated  Your BMI shows if you are at a healthy weight  • Your blood pressure  and heart rate will be checked  Your temperature may also be checked  • Blood and urine tests  may be done  Blood tests may be done to check your cholesterol levels  Abnormal cholesterol levels increase your risk for heart disease and stroke  You may also need a blood or urine test to check for diabetes if you are at increased risk  Urine tests may be done to look for signs of an infection or kidney disease  • A physical exam  includes checking your heartbeat and lungs with a stethoscope  Your healthcare provider may also check your skin to look for sun damage  • Screening tests  may be recommended   A screening test is done to check for diseases that may not cause symptoms  The screening tests you may need depend on your age, gender, family history, and lifestyle habits  For example, colorectal screening may be recommended if you are 48years old or older  Screening tests you need if you are a woman:   • A Pap smear  is used to screen for cervical cancer  Pap smears are usually done every 3 to 5 years depending on your age  You may need them more often if you have had abnormal Pap smear test results in the past  Ask your healthcare provider how often you should have a Pap smear  • A mammogram  is an x-ray of your breasts to screen for breast cancer  Experts recommend mammograms every 2 years starting at age 48 years  You may need a mammogram at age 52 years or younger if you have an increased risk for breast cancer  Talk to your healthcare provider about when you should start having mammograms and how often you need them  Vaccines you may need:   • Get an influenza vaccine  every year  The influenza vaccine protects you from the flu  Several types of viruses cause the flu  The viruses change over time, so new vaccines are made each year  • Get a tetanus-diphtheria (Td) booster vaccine  every 10 years  This vaccine protects you against tetanus and diphtheria  Tetanus is a severe infection that may cause painful muscle spasms and lockjaw  Diphtheria is a severe bacterial infection that causes a thick covering in the back of your mouth and throat  • Get a human papillomavirus (HPV) vaccine  if you are female and aged 23 to 32 or male 23 to 24 and never received it  This vaccine protects you from HPV infection  HPV is the most common infection spread by sexual contact  HPV may also cause vaginal, penile, and anal cancers  • Get a pneumococcal vaccine  if you are aged 72 years or older  The pneumococcal vaccine is an injection given to protect you from pneumococcal disease   Pneumococcal disease is an infection caused by pneumococcal bacteria  The infection may cause pneumonia, meningitis, or an ear infection  • Get a shingles vaccine  if you are 60 or older, even if you have had shingles before  The shingles vaccine is an injection to protect you from the varicella-zoster virus  This is the same virus that causes chickenpox  Shingles is a painful rash that develops in people who had chickenpox or have been exposed to the virus  How to eat healthy:  My Plate is a model for planning healthy meals  It shows the types and amounts of foods that should go on your plate  Fruits and vegetables make up about half of your plate, and grains and protein make up the other half  A serving of dairy is included on the side of your plate  The amount of calories and serving sizes you need depends on your age, gender, weight, and height  Examples of healthy foods are listed below:  • Eat a variety of vegetables  such as dark green, red, and orange vegetables  You can also include canned vegetables low in sodium (salt) and frozen vegetables without added butter or sauces  • Eat a variety of fresh fruits , canned fruit in 100% juice, frozen fruit, and dried fruit  • Include whole grains  At least half of the grains you eat should be whole grains  Examples include whole-wheat bread, wheat pasta, brown rice, and whole-grain cereals such as oatmeal     • Eat a variety of protein foods such as seafood (fish and shellfish), lean meat, and poultry without skin (turkey and chicken)  Examples of lean meats include pork leg, shoulder, or tenderloin, and beef round, sirloin, tenderloin, and extra lean ground beef  Other protein foods include eggs and egg substitutes, beans, peas, soy products, nuts, and seeds  • Choose low-fat dairy products such as skim or 1% milk or low-fat yogurt, cheese, and cottage cheese  • Limit unhealthy fats  such as butter, hard margarine, and shortening         Exercise:  Exercise at least 30 minutes per day on most days of the week  Some examples of exercise include walking, biking, dancing, and swimming  You can also fit in more physical activity by taking the stairs instead of the elevator or parking farther away from stores  Include muscle strengthening activities 2 days each week  Regular exercise provides many health benefits  It helps you manage your weight, and decreases your risk for type 2 diabetes, heart disease, stroke, and high blood pressure  Exercise can also help improve your mood  Ask your healthcare provider about the best exercise plan for you  General health and safety guidelines:   • Do not smoke  Nicotine and other chemicals in cigarettes and cigars can cause lung damage  Ask your healthcare provider for information if you currently smoke and need help to quit  E-cigarettes or smokeless tobacco still contain nicotine  Talk to your healthcare provider before you use these products  • Limit alcohol  A drink of alcohol is 12 ounces of beer, 5 ounces of wine, or 1½ ounces of liquor  • Lose weight, if needed  Being overweight increases your risk of certain health conditions  These include heart disease, high blood pressure, type 2 diabetes, and certain types of cancer  • Protect your skin  Do not sunbathe or use tanning beds  Use sunscreen with a SPF 15 or higher  Apply sunscreen at least 15 minutes before you go outside  Reapply sunscreen every 2 hours  Wear protective clothing, hats, and sunglasses when you are outside  • Drive safely  Always wear your seatbelt  Make sure everyone in your car wears a seatbelt  A seatbelt can save your life if you are in an accident  Do not use your cell phone when you are driving  This could distract you and cause an accident  Pull over if you need to make a call or send a text message  • Practice safe sex  Use latex condoms if are sexually active and have more than one partner   Your healthcare provider may recommend screening tests for sexually transmitted infections (STIs)  • Wear helmets, lifejackets, and protective gear  Always wear a helmet when you ride a bike or motorcycle, go skiing, or play sports that could cause a head injury  Wear protective equipment when you play sports  Wear a lifejacket when you are on a boat or doing water sports  © Copyright [facility] 2022 Information is for End User's use only and may not be sold, redistributed or otherwise used for commercial purposes  The above information is an  only  It is not intended as medical advice for individual conditions or treatments  Talk to your doctor, nurse or pharmacist before following any medical regimen to see if it is safe and effective for you

## 2023-06-14 NOTE — TELEPHONE ENCOUNTER
Called [facility] and spoke with Jose Najera regarding the delivery status of patients Botox medication  Jose Najera informed me that patients account is showing that their [facility] Benefits [facility]  Plan is NOT in Network with [facility] even though they are listed as the servicing specialty on the Approved Botox Authorization  Also patients Pharmacy Benefits states that Botox is a Non covered Drug under this patients plan, therefore [facility] will NOT be able to dispense/deliver patients Botox medication order  Will follow up with patients [id number] to have Botox Authorization changed to a [employer] for our Providers Office to use [employer]

## 2023-06-14 NOTE — TELEPHONE ENCOUNTER
Called [employer] and spoke to Romelia Marrero in the Authorizations Department and requested patients Approved Botox Auth be modified for the Providers Office to be servicing the Botox medication under [facility] because [facility] is NOT in Network and is unable to [place] for patients Botox order  Romelia Marrero informed me that she processed and submitted for patients Botox Authorization be updated and changed to the servicing Providers Office Under [facility]       Romelia Marrero stated the updated authorization will be faxed to our office once completed

## 2023-06-14 NOTE — PROGRESS NOTES
[facility] PRIMARY CARE    NAME: Shila Clayton  AGE: 60 y o  SEX: male  : 1960     DATE: 6/15/2023     Assessment and Plan:     Problem List Items Addressed This Visit        Cardiovascular and Mediastinum    Acute Posterior Circulation Stroke  With residual hemiparesis  In PT/OT    Right Vertebral artery dissection (HCC)    Primary hypertension    Relevant Medications    losartan (Cozaar) 100 MG tablet    Other Relevant Orders    Lipid panel  bp at goal today  Because of some lower extremity edema, will try to d/c the amlodipine and see if that helps  Will increase the losartan to 100 mg   Recheck bp 1 month       Nervous and Auditory    Spastic hemiparesis of right dominant side as late effect of cerebral infarction [facility])  Following with PMR  Will be getting botox    Trigeminal neuralgia of right side of face  On oxcarbazepine  Still with pain  Hoping they might increase dose at his upcoming visit       Genitourinary    Stage 2 chronic kidney disease    Relevant Orders      Lab Results   Component Value Date    AGAP 11 2023    ALKPHOS 81 2023    ALT 30 2023    AST 18 2023    BUN 25 2023    CALCIUM 9 6 2023     2023    CO2 29 2023    CREATININE 1 29 2023    EGFR 62 2023    GLUC 91 2023    GLUF 107 (H) 2023    K 4 8 2023    SODIUM 143 2023    TBILI 0 4 2023    TP 7 0 2023     Stable   Avoid NSAIDS       Other    Prediabetes    Relevant Orders    Lipid panel    Comprehensive metabolic panel  Fasting glucose normal on his labs done yesterday    Adjustment disorder with depressed mood  Stable on prozac    Iron deficiency anemia    Relevant Orders    CBC and differential  Lab Results   Component Value Date    HCT 37 5 (L) 2023    HGB 12 2 (L) 2023    MCV 86 6 2023     2023    WBC 7 7 2023 Hemoglobin improved  Up from 10 9 on 5/8/23  His iron level was normal    Will continue iron supplement for another month  Needs to complete cologuard too  Other Visit Diagnoses     Annual physical exam    -  Primary  Reviewed labs  Overdue for dental visit  Overdue for colon cancer screening  Has cologuard at home  Encouraged to send it back  He is overdue for adacel, shingrix and also needs covid vaccine  Declines all  Discussed diet  Not really able to exercise much due to weakness, spasticity and pain from his stroke  BMI 27 0-27 9,adult        Prostate cancer screening        Relevant Orders    PSA, Total Screen    Lower extremity edema      Initially had edema of right lower extremity  Now with bilateral lower extremity edema  DVT ruled out previously  He had echo during hospitalization which showed normal LV systolic function  Suspect that the edema is related to both his amlodipine and venous stasis  No improvement in edema with reduction in his amlodipine dose  Has not gotten compression stockings as recommended but does plan to  Will d/c the amlodipine altogether and increase losartan to see if swelling improves  If no improvement in the swelling with that, consider adding lasix  Recheck 1 month          Immunizations and preventive care screenings were discussed with patient today  Appropriate education was printed on patient's after visit summary  Discussed risks and benefits of prostate cancer screening  We discussed the controversial history of PSA screening for prostate cancer in the [place] as well as the risk of over detection and over treatment of prostate cancer by way of PSA screening    The patient understands that PSA blood testing is an imperfect way to screen for prostate cancer and that elevated PSA levels in the blood may also be caused by infection, inflammation, prostatic trauma or manipulation, urological procedures, or by benign prostatic enlargement  The role of the digital rectal examination in prostate cancer screening was also discussed and I discussed with him that there is large interobserver variability in the findings of digital rectal examination  Counseling:  Alcohol/drug use: discussed moderation in alcohol intake, the recommendations for healthy alcohol use, and avoidance of illicit drug use  Dental Health: discussed importance of regular tooth brushing, flossing, and dental visits  Sexual health: discussed sexually transmitted diseases, partner selection, use of condoms, avoidance of unintended pregnancy, and contraceptive alternatives  Exercise: the importance of regular exercise/physical activity was discussed  Recommend exercise 3-5 times per week for at least 30 minutes  BMI Counseling: Body mass index is 27 5 kg/m²  The BMI is above normal  Nutrition recommendations include decreasing portion sizes, consuming healthier snacks, limiting drinks that contain sugar, moderation in carbohydrate intake and increasing intake of lean protein  No pharmacotherapy was ordered  Rationale for BMI follow-up plan is due to patient being overweight or obese  Return in about 4 weeks (around 7/13/2023) for Recheck  Chief Complaint:     Chief Complaint   Patient presents with   • Annual Exam     Patient is in the office c/o Annual Physical and f/u (HTN)  Stated concerns - 1  None   Annual - due   Lab - [facility]       History of Present Illness:     Adult Annual Physical   Patient is a 60year old male with hypertension, impaired fastin glucose, history of acute posterior circulation stroke and right cerebral artery dissection with resultant spastic hemiparesis and trigeminal neuralgia who is here for a comprehensive physical exam and f/u on his hypertension and review of his labs     At his last visit here on 5/16/23, we had decreased his amlodipine dose from 10 to 5 and added lisinopril due to some swelling he was having in lower extremities  Wife messaged me  noting that he only took the 5 mg dose for 2 days because bp was quite high  Went back on 10 mg  Wife messaged again on 6/3/23, requesting rx for ARB at recommendation of a family member  He was changed to losartan 50 and amlodipine decreased to 5 mg  Saw PMR on 23 for f/u regarding spasticity  Is awaiting approval on the botox injections  Wife states that they are coming to end of his PT and OT  Walking with cane and feeling steady on his feet  Still having pain in face from trigeminal neuralgia  Taking ox  Sees neurology next week  Having a lot of pain in his right shoulder  Limited range of motion  Not sure if related to his spasticity or other  No injury to this shoulder  Due for colon cancer screen  Has colElectroJetuard  Kit at home  Swelling in right leg is the same  Arm is better  Still with weakness in right upper extremity  Feels it leg weakness is better  Diet and Physical Activity  Diet/Nutrition: well balanced diet  Exercise: no formal exercise  Depression Screening  PHQ-2/9 Depression Screening    Little interest or pleasure in doing things: 0 - not at all  Feeling down, depressed, or hopeless: 0 - not at all  Trouble falling or staying asleep, or sleeping too much: 0 - not at all  Feeling tired or having little energy: 0 - not at all  Poor appetite or overeatin - not at all  Feeling bad about yourself - or that you are a failure or have let yourself or your family down: 0 - not at all  Trouble concentrating on things, such as reading the newspaper or watching television: 0 - not at all  Moving or speaking so slowly that other people could have noticed   Or the opposite - being so fidgety or restless that you have been moving around a lot more than usual: 0 - not at all  Thoughts that you would be better off dead, or of hurting yourself in some way: 0 - not at all  PHQ-9 Score: 0   PHQ-9 Interpretation: No or Minimal depression        General Health  Sleep: sleeps poorly due to his pain  Spends most nights on sofa with right foot hanging off  If he puts foot up, it goes into spasm  Hearing: normal - bilateral   Vision: no vision problems  Dental: regular dental visits          Health   Symptoms include: none     Review of Systems:     Review of Systems   Past Medical History:     Past Medical History:   Diagnosis Date   • BRAULIO (acute kidney injury) ([facility] )    • B12 deficiency    • Celiac artery stenosis (HCC)    • Cerebellar infarct ([facility] ) 2023   • CKD (chronic kidney disease) stage 2, GFR 60-89 ml/min    • Essential hypertension    • Impaired fasting glucose    • Iron deficiency anemia    • Neurogenic bowel    • Stenosis of left vertebral artery    • Vertebral artery dissection (HCC)       Past Surgical History:     Past Surgical History:   Procedure Laterality Date   • ARTERY SURGERY      vertebral artery stent/revascularization      Family History:     Family History   Problem Relation Age of Onset   • Hypertension Brother    • Hypertension Brother       Social History:     Social History     Socioeconomic History   • Marital status: /Civil Union     Spouse name: None   • Number of children: None   • Years of education: None   • Highest education level: None   Occupational History   • None   Tobacco Use   • Smoking status: Former     Packs/day: 1 00     Years: 5 00     Total pack years: 5 00     Types: Cigarettes     Quit date: 1983     Years since quittin 2   • Smokeless tobacco: Never   Vaping Use   • Vaping Use: Never used   Substance and Sexual Activity   • Alcohol use: Not Currently   • Drug use: Never   • Sexual activity: Not Currently     Partners: Female   Other Topics Concern   • None   Social History Narrative        2 children    Works as an  (self employed)     Social Determinants of Health     Financial Resource Strain: Not on file   Food Insecurity: Not on file "  Transportation Needs: Not on file   Physical Activity: Not on file   Stress: Not on file   Social Connections: Not on file   Intimate Partner Violence: Not on file   Housing Stability: Not on file      Current Medications:     Current Outpatient Medications   Medication Sig Dispense Refill   • Acetaminophen (TYLENOL EXTRA STRENGTH PO) Take 1,000 mg by mouth daily as needed     • aspirin 81 mg chewable tablet Chew 1 tablet (81 mg total) daily 30 tablet 0   • atorvastatin (LIPITOR) 40 mg tablet Take 1 tablet (40 mg total) by mouth daily with dinner 30 tablet 0   • baclofen 10 mg tablet Take 1 tablet (10 mg total) by mouth every 8 (eight) hours 90 tablet 0   • carvedilol (COREG) 25 mg tablet Take 1 tablet (25 mg total) by mouth 2 (two) times a day with meals 60 tablet 0   • cyanocobalamin (VITAMIN B-12) 1000 MCG tablet Take 1 tablet (1,000 mcg total) by mouth daily 30 tablet 0   • diazepam (VALIUM) 5 mg tablet Take 0 5 tablets (2 5 mg total) by mouth daily at bedtime as needed for muscle spasms 10 tablet 0   • ferrous sulfate 325 (65 Fe) mg tablet Take 1 tablet (325 mg total) by mouth daily with breakfast 30 tablet 0   • FLUoxetine (PROzac) 20 mg capsule Take 1 capsule (20 mg total) by mouth daily 30 capsule 0   • hydrALAZINE (APRESOLINE) 50 mg tablet Take 1 tablet (50 mg total) by mouth every 12 (twelve) hours 60 tablet 0   • losartan (Cozaar) 100 MG tablet Take 1 tablet (100 mg total) by mouth daily 30 tablet 5   • OXcarbazepine (Trileptal) 150 mg tablet Take 1 tablet (150 mg total) by mouth 2 (two) times a day 60 tablet 3   • ticagrelor (BRILINTA) 90 MG Take 1 tablet (90 mg total) by mouth every 12 (twelve) hours 60 tablet 0     No current facility-administered medications for this visit        Allergies:     No Known Allergies   Physical Exam:     /66 (BP Location: Left arm, Patient Position: Sitting, Cuff Size: Large)   Pulse 61   Temp (!) 97 4 °F (36 3 °C) (Tympanic)   Ht 5' 9 5\" (1 765 m)   Wt 85 7 kg " (188 lb 14 4 oz)   SpO2 98%   BMI 27 50 kg/m²     Physical Exam  Vitals and nursing note reviewed  Constitutional:       General: He is not in acute distress  Appearance: Normal appearance  He is not ill-appearing, toxic-appearing or diaphoretic  Comments: Ambulates with cane  HENT:      Head: Normocephalic and atraumatic  Right Ear: Tympanic membrane normal       Left Ear: Tympanic membrane normal       Nose: Nose normal    Eyes:      Extraocular Movements: Extraocular movements intact  Conjunctiva/sclera: Conjunctivae normal       Pupils: Pupils are equal, round, and reactive to light  Neck:      Vascular: No carotid bruit  Cardiovascular:      Rate and Rhythm: Normal rate and regular rhythm  Heart sounds: No murmur heard  Pulmonary:      Effort: Pulmonary effort is normal       Breath sounds: Normal breath sounds  Abdominal:      General: Abdomen is flat  Bowel sounds are normal       Palpations: Abdomen is soft  Musculoskeletal:      Cervical back: Normal range of motion and neck supple  Right lower leg: Edema present  Left lower leg: Edema present  Lymphadenopathy:      Cervical: No cervical adenopathy  Skin:     General: Skin is warm and dry  Findings: No rash  Neurological:      Mental Status: He is alert  Motor: Weakness present        Gait: Gait abnormal    Psychiatric:         Mood and Affect: Mood normal           Deedee Galloway DO  [facility] PRIMARY CARE

## 2023-06-15 ENCOUNTER — OFFICE VISIT (OUTPATIENT)
Dept: FAMILY MEDICINE CLINIC | Facility: CLINIC | Age: 63
End: 2023-06-15
Payer: COMMERCIAL

## 2023-06-15 ENCOUNTER — OFFICE VISIT (OUTPATIENT)
Dept: PHYSICAL THERAPY | Facility: CLINIC | Age: 63
End: 2023-06-15
Payer: COMMERCIAL

## 2023-06-15 ENCOUNTER — OFFICE VISIT (OUTPATIENT)
Dept: OCCUPATIONAL THERAPY | Facility: CLINIC | Age: 63
End: 2023-06-15
Payer: COMMERCIAL

## 2023-06-15 ENCOUNTER — TELEPHONE (OUTPATIENT)
Dept: FAMILY MEDICINE CLINIC | Facility: CLINIC | Age: 63
End: 2023-06-15

## 2023-06-15 VITALS
HEART RATE: 61 BPM | SYSTOLIC BLOOD PRESSURE: 130 MMHG | WEIGHT: 188.9 LBS | DIASTOLIC BLOOD PRESSURE: 66 MMHG | HEIGHT: 70 IN | BODY MASS INDEX: 27.04 KG/M2 | TEMPERATURE: 97.4 F | OXYGEN SATURATION: 98 %

## 2023-06-15 DIAGNOSIS — R60.0 LOWER EXTREMITY EDEMA: ICD-10-CM

## 2023-06-15 DIAGNOSIS — I10 PRIMARY HYPERTENSION: ICD-10-CM

## 2023-06-15 DIAGNOSIS — D50.9 IRON DEFICIENCY ANEMIA, UNSPECIFIED IRON DEFICIENCY ANEMIA TYPE: ICD-10-CM

## 2023-06-15 DIAGNOSIS — Z12.5 PROSTATE CANCER SCREENING: ICD-10-CM

## 2023-06-15 DIAGNOSIS — I63.9 CEREBROVASCULAR ACCIDENT (CVA), UNSPECIFIED MECHANISM (HCC): ICD-10-CM

## 2023-06-15 DIAGNOSIS — G50.0 TRIGEMINAL NEURALGIA OF RIGHT SIDE OF FACE: ICD-10-CM

## 2023-06-15 DIAGNOSIS — I63.50 POSTERIOR CIRCULATION STROKE (HCC): Primary | ICD-10-CM

## 2023-06-15 DIAGNOSIS — Z00.00 ANNUAL PHYSICAL EXAM: Primary | ICD-10-CM

## 2023-06-15 DIAGNOSIS — N18.2 STAGE 2 CHRONIC KIDNEY DISEASE: ICD-10-CM

## 2023-06-15 DIAGNOSIS — I69.351 SPASTIC HEMIPARESIS OF RIGHT DOMINANT SIDE AS LATE EFFECT OF CEREBRAL INFARCTION (HCC): ICD-10-CM

## 2023-06-15 DIAGNOSIS — I77.74 VERTEBRAL ARTERY DISSECTION (HCC): ICD-10-CM

## 2023-06-15 DIAGNOSIS — R73.03 PREDIABETES: ICD-10-CM

## 2023-06-15 DIAGNOSIS — F43.21 ADJUSTMENT DISORDER WITH DEPRESSED MOOD: ICD-10-CM

## 2023-06-15 PROCEDURE — 97112 NEUROMUSCULAR REEDUCATION: CPT

## 2023-06-15 PROCEDURE — 97530 THERAPEUTIC ACTIVITIES: CPT

## 2023-06-15 PROCEDURE — 99396 PREV VISIT EST AGE 40-64: CPT | Performed by: FAMILY MEDICINE

## 2023-06-15 PROCEDURE — 97116 GAIT TRAINING THERAPY: CPT

## 2023-06-15 PROCEDURE — 99214 OFFICE O/P EST MOD 30 MIN: CPT | Performed by: FAMILY MEDICINE

## 2023-06-15 RX ORDER — LOSARTAN POTASSIUM 100 MG/1
100 TABLET ORAL DAILY
Qty: 30 TABLET | Refills: 5 | Status: SHIPPED | OUTPATIENT
Start: 2023-06-15

## 2023-06-15 NOTE — TELEPHONE ENCOUNTER
----- Message from Katherine Gonzáles DO sent at 6/15/2023  8:47 AM EDT -----  Can let patient's wife know that his cbc showed much improvement in his anemia  Hemoglobin up from 10 9 to 12 2     His iron levels are normal    His b12 and thyroid were also normal    Kidney function, electrolytes and blood sugar also normal

## 2023-06-15 NOTE — TELEPHONE ENCOUNTER
Received the following authorization approval info via fax from [employer]:    Approved: Under [facility]  Botox-200 units  QTY:1, Q3 Months  Auth/Case# [id number]  Valid: 6/01/2023 until 5/31/2024  4 visits    Please use our Stock

## 2023-06-15 NOTE — PROGRESS NOTES
Daily Note     Today's date: 6/15/2023  Patient name: Lela Levi  : 1960  MRN: [id number]  Referring provider: Dennise Barnes  Dx:   Encounter Diagnosis     ICD-10-CM    1  Posterior circulation stroke ([address] )  I63 50           Start Time: 0800  Stop Time: 0841  Total time in clinic (min): 41 minutes    Subjective: Had some blood work done  Has a busy day ahead of him  Been doing his walking  Objective: See treatment diary below      Assessment:  Responds and adjusts appropriately with resistance while stepping over hurdles, occasional lacking foot clearance  1 stumble during timed trial with PT assist to recover  Good balance changing direction and with walking backward at an increased pace  Appearing more symmetric with plyometric jump  Patient would benefit from continued PT to improve gait, balance, and overall functional mobility  Plan: Continue per plan of care  Continue propulsion and stability work  Foot clearance with dual task  Precautions: Falls,   Re-eval Date: 2023    Date 6/1 6/6 6/8 6/13 6/15   Visit Count 20 21 22 23 24   FOTO See IE       Pain In See IE       Pain Out                Testing        TUG/TUGC 09 93 no AD       30s chair 12       5xSTS 12 85       Gait speed 0 81 m/s no AD       FGA 19       2/6MWT 990ft no AD       Neuro Re-Ed FGA, 6MWT, 10mWT, 30s chair stand, 5xSTS, TUG       Dynamic balance Solo 7  5#R lat step 8 laps cues for speed    Fwd/bwd PT callout solo 7  5#R 5min total 6' step + low hurdles cues for fast as possible 2x6 laps fwd/lat    Fwd/bwd PT callout solo 7  5#R 5min Hurdles med 4 laps solo step-to/through    Hurdles low ball toss 4 laps step-to/through    HT solo 7  5#R 10 laps    Alt step tap cues for coordination 3min total Foam beam cone tap 4 laps, hurdles 2 laps solo Hurdles w posterior pull red TB 12 laps solo    Solo fwd/bwd shuttle run x2 NC :52   Static balance        Obstacle course    Foam wedge up/down + 6' step + foam beam 6 laps solo    Resisted ambulation  Sled push 5 plates + red KB 8 laps 15ft, 4 laps 15ft RPE 15/20  Red TB PT resist 6 laps x2 8/10 solo    Uneven surfaces        HKM on foam (// bars)        RLE forced use     1x11, 1x12 mini squat into plyometric jump weighted vest 5#L   Ther Ex        Hamstring 3 way stretch        SLR x 4        HR/TR        Mini Squats        Step ups        Plantar stretch pro-stretch  1min x2      Towel stretch right         Leg press  95lb x10  105lb 2x20  135lb 1x15 BL  145lb 1x15 BL            Ther Activity        ADL        Curb negotiation         Gait Training        No AD     Weighted vest + 5#R time trials x3 IL :44 full ortho side   SPC        Treadmill Solo 10% 1  1mph 3min x1, 10% 1  2mph 2 5min Solo 10% 1  2mph 3min x2 RUE support Solo 11% 1  1mph RUE support 4min x1, 3min x1    Solo 0 8mph 0% bwd 3min Solo 8 5% 1  3mph RUE support  Weighted vest solo 5#R 8 0% 1 0mph 3min x2 RPE 8/10    Head turns        Manuals        Plantar release        KT 50% arch support, paper off tension med/lat right ankle

## 2023-06-15 NOTE — PROGRESS NOTES
"Occupational Therapy Daily Note    Today's date: 6/15/2023  Patient name: Shanika Cooney  : 1960  MRN: [id number]  Referring provider: Di Weir  Dx:   Encounter Diagnoses   Name Primary? • Posterior circulation stroke ([facility] ) Yes   • Cerebrovascular accident (CVA), unspecified mechanism ([facility] )        Subjective: \"I am doing good today  I am picking up my glasses \"       Objective: Pt engaged in skilled OT treatment session with focus on UE NMR, UE strengthening, UE endurance and FMC/GMC to increase engagement, endurance, tolerance, and independence with daily ADL and IADL tasks  CPT Code Minutes                                           Task Details        Therapeutic Activity  Pt engaged in a series of activities focused on NMRE and FMC/GMC  Pt engaged in functional reaching, target placement, in-hand manipulation, reaction time to improve motor control and functional use of RUE  RUE reaction time:   Pt was able to catch/throw tennis ball with R hand while standing with G reaction speed, G- accuracy, G- GMC when throwing tennis ball to target  [address]:   Pt tied a knot at head height with min difficulty with his R dominant hand  Pt had G BUE hand coordination and over head UE movement to tie the knot  Activity 1 (dressing)   Pt with min difficulty buttoning a button-up long sleeve shirt on tabletop  Pt was able to don/doff shirt and button it with min difficulty  Pt with G BUE use and functional use of RUE  Pt reports he has been wearing shirts with zippers more frequently  Activity 2 ([address] to paper target)   Pt with mod difficulty using pincer to  soft small object from table surface  Pt able to transfer objects from table top height to shoulder height with G functional reach and G target accuracy  In-Hand Manipulation:   Pt was able to use pincer to  soft small object from table top surface   Pt with mod difficulty isolating fingers to hold " multiple objects in in hand  Neuro Re-Ed    Therapeutic Exercise                TESTING  6/13  O'Juanjose Finger Dexterity Test   R: 6 min 49 sec (top 5 rows)   Improved from 5/2 (3 rows; 4 min 32 sec)      6/13  Keyhole   L: 1 min 47 seconds   R: 7 min 50 seconds           HEP  5/18/23: AAROM exercises, functional reaching exercises    5/9: [address] of thumb to alternating fingers, transitioning from pad to pad to fingertip to fingertip    4/28: provided pt with [address] activity as HEP to improve finger ROM into ab/adduction         Assessment: Tolerated treatment well  Pt presents with improving GMC, [address], and in-hand dexterity  Pt to be receiving Botox within the next few weeks for pain management  Patient would benefit from continued skilled OT      Plan: Continued skilled OT per POC    INTERVENTION COMMENTS:  Diagnosis: Posterior circulation stroke ([facility] ) [I63 50]  Precautions: fall risk, R side weakness   Insurance: Payor: Brenda Singletary / Plan: Brenda Singletary PPO / Product Type: PPO /   19 of 26 visits  PN due 07/06/23

## 2023-06-16 ENCOUNTER — TELEPHONE (OUTPATIENT)
Dept: NEUROLOGY | Facility: CLINIC | Age: 63
End: 2023-06-16

## 2023-06-16 NOTE — TELEPHONE ENCOUNTER
----- Message from Aminata Lopez RN sent at 6/15/2023 10:56 AM EDT -----  Regarding: FW: Arturo Vazquez / pain   Contact: [phone]    ----- Message -----  From: Dc Escalera  Sent: 6/15/2023  10:27 AM EDT  To: [facility]  Subject: Arturo Vazquez / pain                                 Dr Delmy Wolf good morning, I am following up re: Dank's appointment on the 21st   I've left a [facility] for Ciera Tuesday but she's not returned my call  Can you please confirm that the Botox is in & everything is set for the 21st?     Also, his blood test results are in mychart    The sodium level looks good so I wanted to see re: increasing the Oxcarb, could you let me know on that?  thank Cruz Gonzalez

## 2023-06-16 NOTE — TELEPHONE ENCOUNTER
Romina sent a message asking since pt's sodium level looks good, will Dr Isela Dominguez be increasing the Oxcarb?

## 2023-06-19 DIAGNOSIS — G50.0 TRIGEMINAL NEURALGIA: ICD-10-CM

## 2023-06-19 RX ORDER — OXCARBAZEPINE 150 MG/1
300 TABLET, FILM COATED ORAL 2 TIMES DAILY
Qty: 60 TABLET | Refills: 3 | Status: SHIPPED | OUTPATIENT
Start: 2023-06-19 | End: 2023-07-01 | Stop reason: SDUPTHER

## 2023-06-19 NOTE — TELEPHONE ENCOUNTER
Patient's wife calling because her insurance is calling her regarding Botox  She would like a call back to confirm everything is set for Botox stock

## 2023-06-19 NOTE — TELEPHONE ENCOUNTER
Called patients spouse back and informed her that Approved- Prior-Auth was received from [employer] under [employer] and that patients BINJ-Appt is all set for 6/21/2023  Spouse understood information provided and was thankful for the call back

## 2023-06-19 NOTE — PROGRESS NOTES
- Check patient's most recent sodium levels from a CMP done on 06/14, sodium was 143 and believe that was acceptable to increase the patient's oxcarbazepine from 150 mg, take 1 tablet by mouth twice daily to taking 2 tablets by mouth twice daily for total of 600 mg/day      Josselyn Forman PA-C  06/19/2023

## 2023-06-19 NOTE — PATIENT COMMUNICATION
"Dr Viviana Tariq, please see my chart message:sodium level on 6/14 was \"143\"    Echo:  Please contact patient and advise if patient is okay for botox on 6/21  Thanks for your help        "

## 2023-06-20 ENCOUNTER — APPOINTMENT (OUTPATIENT)
Dept: PHYSICAL THERAPY | Facility: CLINIC | Age: 63
End: 2023-06-20
Payer: COMMERCIAL

## 2023-06-20 ENCOUNTER — APPOINTMENT (OUTPATIENT)
Dept: OCCUPATIONAL THERAPY | Facility: CLINIC | Age: 63
End: 2023-06-20
Payer: COMMERCIAL

## 2023-06-20 NOTE — PROGRESS NOTES
Physical Medicine & Rehabilitation Spasticity Follow-up/Procedure  Serene Mcfarlane 60 y o  male    ASSESSMENT/PLAN:   Diagnoses and all orders for this visit:    Spastic hemiparesis of right dominant side as late effect of cerebral infarction [facility])      Mr Romeo Turner is a very pleasant 59 yo M who is very well known to me, who I took care of on the [facility] after his cerebellar CVA and R > L stroke burden, with additional hypodensities on DWI appreciated L midbrain, pontine area and R lower medullary/spinal cord junction  He has developed R spastic hemiparesis (Although has had remarkable recovery in regards to strength on that R side), neurogenic bowel (with some incontinence during rehab which has improved), and now has developed R facial pain/sensitivity with pain, as well as spasms/extrinsic tone and worsening intrinsic tonic tone in his RUE       He is here for botulinum toxin injections as part of his multi-modal approach to pain and spasticity       1  Spastic hemiparesis:  - Goals would be to help primarily with spasms and pain  - He did have inappropriate activation and clear spasms on EHL today, so I injected that along with TA  I may be able to discontinue TA in the future  I injected a modest amount into his gastroc as well  - In his RUE, I injected his pectoralis to help with his shoulder adduction tone, and his wrist flexors to help with his spasm pain  - May need to increase to 300 units at his next round of botulinum toxin injections  - They are open to increase Baclofen to 15mg at night (10mg, with additional 5mg later if needed)  - He does respond well to Valium for his spasms/pain, although it makes him sleepy  Can continue this medication for now  I reviewed PDMP  He is utilizing this medication appropriately  They will try to limit this medication and use it only for rescue       2  R leg swelling  - Improved  Dopplers negative         1   Oral antispasticity medications Baclofen 10-10-15mg, Valium 2 5mg QHS PRN  2  Schedule 200-300  Transfers independently  3  Physical/occupational therapy to continue  4  I will follow-up on 7/25/2023 for a midpoint botox visit at 3:15pm      *I have spent 45 minutes with Patient and family today in which greater than 50% of this time was spent in counseling/coordination of care regarding Risks and benefits of tx options, Instructions for management, Patient and family education, Importance of tx compliance, Risk factor reductions, Impressions, Counseling / Coordination of care and Obtaining or reviewing history    HPI/SUBJECTIVE:  Present today with wife  Patient presents today in the office with chief complaint of spasms in his foot, that cause dorsiflexion, and wrist flexion spasms that are quite painful  He has tried the valium, which does allow for some sleep at night, but does make him sleepy, so wouldn't feel comfortable taking it during the day  Last seen for chemodenervation: N/A  Results of chemodenervation: N/A  How long did effects last: N/A  Side affect/Adverse Effects: N/A    Any issues with driving, swallowing, appetite: Not driving, no issues with swallowing/appetite  Stretching/Exercise Program: Still in therapy twice a week, and stretches and is active at home when pain allows  Currently in therapy: PT/OT   Any falls with significant injuries: No significant falls  Any new weakness: None  Any changes to care since last seen: Amlodipine discontinued due to LE edema, and Losartan increased  Getting TONY stockings - helps a lot, elevates his legs at rest  Oxcarbazepine increased by Neurology for trigeminal neuralgia  Safe at home: Yes  Any oral meds: Baclofen 10mg TID, Valium 2 5mg QHS PRN     On anticoagulation/blood thinners: ASA, Brilinta  Current functional status:  Patient lives with spouse in a 2SH with 1 steps to enter     Patient worked full time as an  prior to his stroke  He is not currently driving  He is independent walking, and doesn't use an assistive device consistently - when he does it's a cane  He doesn't use/need a MAFO  He is more or less independent with ADLs  and many of his IADLs  ROS: A 10 point review of systems was negative except for what is noted in the HPI  Imaging: I have personally reviewed imaging with results as follows:  5/15 Doppler:  RIGHT LOWER LIMB  No evidence of acute or chronic deep vein thrombosis  No evidence of superficial thrombophlebitis noted  Doppler evaluation shows a normal response to augmentation maneuvers  Popliteal and posterior tibial arterial Doppler waveform's are triphasic  LEFT LOWER LIMB LIMITED  Evaluation shows no evidence of thrombus in the common femoral vein  Doppler evaluation shows a normal response to augmentation maneuvers  OBJECTIVE:   /90 (BP Location: Left arm, Patient Position: Sitting, Cuff Size: Adult)   Pulse 55   Temp 98 °F (36 7 °C)     Gen: No acute distress, Well-nourished, well-appearing  HEENT: Moist mucus membranes, Normocephalic/Atraumatic  Cardiovascular: Regular rate, rhythm, S1/S2  Distal pulses palpable in R DP/PT  Heme/Extr: LLE edema 2+  Pitting  Pulmonary: Non-labored breathing  Lungs CTAB  : No barnett  GI: Soft, non-tender, non-distended  BS+  MSK:   R shoulder PROM limited to full adduction to 90 degrees  Full PROM in R elbow  Full PROM in R writs  Full PROM in fingers  Ankle in neutral with limited DF/PF  Full PROM in R knee  Integumentary: Skin is warm, dry  No rashes or ulcers  Psych: Normal mood and affect  Neuro: AAOx3, impaired sensation on the R  Speech is intact  Appropriate to questioning     MMT:   Strength:   Right  Left  Site  Right  Left  Site    4- 5  S Ab: Shoulder Abductors  4  5  HF: Hip Flexors    5- 5  EF: Elbow Flexors  5  5 KF: Knee Flexors    5-  5  EE: Elbow Extensors  5  5  KE: Knee Extensors    4 5  WE: Wrist Extensors  5  5  DR: Dorsi Flexors    4-  5  FF: Finger Flexors  4  5  PF: Plantar Flexors    4-  5  HI: Hand Intrinsics  5  5  EHL: Extensor Hallucis Longus     MAS:  Right  Left  Site  Right  Left  Site    2 0  Shoulder 1 0  Hip Adductors   2 0  EF: Elbow Flexors  1 0 KF: Knee Flexors    1+  0  EE: Elbow Extensors  2 0  KE: Knee Extensors    1+/0 0/0  WF/WE 1  0  DR: Dorsi Flexors    1  0  FF: Finger Flexors  1+  0  PF: Plantar Flexors    1  0  HI: Hand Intrinsics  0/0  0/0  Toe Flex/Ex       MAS  0 - no increased tone  1 - slight increase in tone at the end of the ROM  1+ increase in tone at 1/2 the ROM  2 - increase in tone through most the ROM  3 - moderate increase in muscle tone - passive movement difficult  4 - affected parts ridid in flexion or extension    SPASMS observed:   RUE: no   LUE: no  RLE: yes - dorsiflexion and EHL spasms  LLE: no      Botulinum Toxin Injection Procedure    Pre-operative diagnosis: Spasticity    Post-operative diagnosis: Same    Procedure: Chemodenervation    After risks and benefits were explained including bleeding, infection, worsening of pain, damage to the areas being injected, weakness of muscles, loss of muscle control, dysphagia if injecting the head or neck, facial droop if injecting the facial area, painful injection, allergic or other reaction to the medications being injected, and the failure of the procedure to help the problem, a signed consent was obtained on 06/21/2023     The patient was placed in a seated position and the sites to be treated were identified  A time out was called and performed  The area to be treated was prepped three times with alcohol and the alcohol allowed to dry  Next, a 25 gauge, 50mm disposable electrode needle was used to inject the medication in the area to be treated      Guidance: EMG  Patient position: Seated  Area(s) injected:    Muscle Dose (units) # Sites Technique   R TA 25  1 EMG   R gastrocs 50  2 EMG   R EHL 25  1 EMG   R lateral pec  50  3 EMG   R FCR 25  1 EMG   R FCU 25  1 EMG       EMG       EMG EMG         EMG         EMG    Waste  0   EMG         EMG        Medications used: 200 units of botox diluted in 2 mL of preservative free saline    Botox Lot/Exp: [id number] x2 ; 05/2025 x2    The patient did tolerate the procedure well  There were no complications      The following portions of the patient's history were reviewed and updated as appropriate: allergies, current medications, past family history, past medical history, past social history, past surgical history and problem list       Current Outpatient Medications:   •  Acetaminophen (TYLENOL EXTRA STRENGTH PO), Take 1,000 mg by mouth daily as needed, Disp: , Rfl:   •  aspirin 81 mg chewable tablet, Chew 1 tablet (81 mg total) daily, Disp: 30 tablet, Rfl: 0  •  atorvastatin (LIPITOR) 40 mg tablet, Take 1 tablet (40 mg total) by mouth daily with dinner, Disp: 30 tablet, Rfl: 0  •  baclofen 10 mg tablet, Take 1 tablet (10 mg total) by mouth every 8 (eight) hours, Disp: 90 tablet, Rfl: 0  •  carvedilol (COREG) 25 mg tablet, Take 1 tablet (25 mg total) by mouth 2 (two) times a day with meals, Disp: 60 tablet, Rfl: 0  •  cyanocobalamin (VITAMIN B-12) 1000 MCG tablet, Take 1 tablet (1,000 mcg total) by mouth daily, Disp: 30 tablet, Rfl: 0  •  diazepam (VALIUM) 5 mg tablet, Take 0 5 tablets (2 5 mg total) by mouth daily at bedtime as needed for muscle spasms, Disp: 10 tablet, Rfl: 0  •  ferrous sulfate 325 (65 Fe) mg tablet, Take 1 tablet (325 mg total) by mouth daily with breakfast, Disp: 30 tablet, Rfl: 0  •  FLUoxetine (PROzac) 20 mg capsule, Take 1 capsule (20 mg total) by mouth daily, Disp: 30 capsule, Rfl: 0  •  hydrALAZINE (APRESOLINE) 50 mg tablet, Take 1 tablet (50 mg total) by mouth every 12 (twelve) hours, Disp: 60 tablet, Rfl: 0  •  losartan (Cozaar) 100 MG tablet, Take 1 tablet (100 mg total) by mouth daily, Disp: 30 tablet, Rfl: 5  •  OXcarbazepine (Trileptal) 150 mg tablet, Take 2 tablets (300 mg total) by mouth 2 (two) times a day, Disp: 60 tablet, Rfl: 3  •  ticagrelor (BRILINTA) 90 MG, Take 1 tablet (90 mg total) by mouth every 12 (twelve) hours, Disp: 60 tablet, Rfl: 0    Past Surgical History:   Procedure Laterality Date   • ARTERY SURGERY      vertebral artery stent/revascularization   • IR STROKE ALERT  2/26/2023       Patient Active Problem List    Diagnosis Date Noted   • Acute Posterior Circulation Stroke 02/25/2023   • Trigeminal neuralgia of right side of face 06/07/2023   • Spastic hemiparesis of right dominant side as late effect of cerebral infarction ([facility] ) 05/05/2023   • Neuropathic pain 05/04/2023   • Celiac artery stenosis ([facility] ) 03/22/2023   • Neurogenic bowel 03/08/2023   • Iron deficiency anemia 03/06/2023   • Spasticity 03/01/2023   • Stage 2 chronic kidney disease 03/01/2023   • Prediabetes 03/01/2023   • Adjustment disorder with depressed mood 03/01/2023   • BRAULIO (acute kidney injury) ([facility] ) 02/25/2023   • Right Vertebral artery dissection ([facility] ) 02/25/2023   • Stenosis of left vertebral artery 02/25/2023   • Primary hypertension 02/25/2023

## 2023-06-21 ENCOUNTER — PROCEDURE VISIT (OUTPATIENT)
Dept: NEUROLOGY | Facility: CLINIC | Age: 63
End: 2023-06-21
Payer: COMMERCIAL

## 2023-06-21 VITALS — SYSTOLIC BLOOD PRESSURE: 170 MMHG | DIASTOLIC BLOOD PRESSURE: 90 MMHG | TEMPERATURE: 98 F | HEART RATE: 55 BPM

## 2023-06-21 DIAGNOSIS — I69.351 SPASTIC HEMIPARESIS OF RIGHT DOMINANT SIDE AS LATE EFFECT OF CEREBRAL INFARCTION (HCC): Primary | ICD-10-CM

## 2023-06-21 DIAGNOSIS — R25.2 SPASTICITY: ICD-10-CM

## 2023-06-21 PROCEDURE — 64642 CHEMODENERV 1 EXTREMITY 1-4: CPT | Performed by: PHYSICAL MEDICINE & REHABILITATION

## 2023-06-21 PROCEDURE — 64643 CHEMODENERV 1 EXTREM 1-4 EA: CPT | Performed by: PHYSICAL MEDICINE & REHABILITATION

## 2023-06-21 PROCEDURE — 99214 OFFICE O/P EST MOD 30 MIN: CPT | Performed by: PHYSICAL MEDICINE & REHABILITATION

## 2023-06-21 RX ORDER — BACLOFEN 10 MG/1
TABLET ORAL
Qty: 105 TABLET | Refills: 2 | Status: SHIPPED | OUTPATIENT
Start: 2023-06-21

## 2023-06-21 RX ORDER — DIAZEPAM 5 MG/1
2.5 TABLET ORAL
Qty: 16 TABLET | Refills: 0 | Status: SHIPPED | OUTPATIENT
Start: 2023-06-21

## 2023-06-21 NOTE — PATIENT INSTRUCTIONS
You have received botulinum toxin injections today  The skin around the site of injections should be monitored for a couple of days  If redness or swelling occur, the skin should be examined by a health care professional to rule out infection  You can call my office if this occurs  Please contact our office via [employer] in 2 weeks to report on the effects of the injections or any time with any questions or concerns  Please note that your next injections should not be scheduled less than 90 days from today  If your health insurance changes before the next injections, please contact our office as soon as possible so we can submit for a new prior authorization if needed  Please note that we may not be able to perform the injections if your insurance changes and the treatment is not pre-authorized  Botox Therapy  Important Information    Our goal is to make sure you fully understand how Botox Therapy treatment may benefit you and to help you understand how you can play an active role in your treatments and ongoing care  Please review the following information below  Call our office IMMEDIATELY @ [phone] and speak to one of our Botox Coordinators if you have a change in insurance  (a prior authorization is required and accurate information is vital)  Please call at least 24 hours in advance if you can't make your appointment  Appointments are scheduled every 91 days (this will be scheduled in advance before leaving the office)  You must allow for at least 2-3 treatments to determine if Botox is right for you  It may take a few weeks to see a response from treatment  No hair dye or scalp massage or treatment within 24 hours of treatment  We encourage you to use a headache diary or journal to document your headache frequency and severity   You can also utilize the Migraine Esvin adelfo (downloadable on [employer])  Sign up for the [employer] Program  (commercial insurance patients may qualify) Sign up at [facility] or call [phone] Option: 4  To get more information on Botox therapy for Chronic Migraines and see frequently asked questions, please visit [email or web address] [email or web address]  [email or web address]  If you have any questions or concerns, please speak to one of our Botox Coordinators at [phone]  We look forward to servicing you!

## 2023-06-22 ENCOUNTER — OFFICE VISIT (OUTPATIENT)
Dept: OCCUPATIONAL THERAPY | Facility: CLINIC | Age: 63
End: 2023-06-22
Payer: COMMERCIAL

## 2023-06-22 ENCOUNTER — OFFICE VISIT (OUTPATIENT)
Dept: PHYSICAL THERAPY | Facility: CLINIC | Age: 63
End: 2023-06-22
Payer: COMMERCIAL

## 2023-06-22 DIAGNOSIS — I77.1 CELIAC ARTERY STENOSIS (HCC): ICD-10-CM

## 2023-06-22 DIAGNOSIS — M25.531 RIGHT WRIST PAIN: ICD-10-CM

## 2023-06-22 DIAGNOSIS — I63.50 POSTERIOR CIRCULATION STROKE (HCC): Primary | ICD-10-CM

## 2023-06-22 DIAGNOSIS — D64.9 ANEMIA, UNSPECIFIED TYPE: ICD-10-CM

## 2023-06-22 DIAGNOSIS — I10 PRIMARY HYPERTENSION: ICD-10-CM

## 2023-06-22 DIAGNOSIS — F43.21 ADJUSTMENT DISORDER WITH DEPRESSED MOOD: ICD-10-CM

## 2023-06-22 DIAGNOSIS — I63.9 CEREBROVASCULAR ACCIDENT (CVA), UNSPECIFIED MECHANISM (HCC): ICD-10-CM

## 2023-06-22 DIAGNOSIS — I65.02 STENOSIS OF LEFT VERTEBRAL ARTERY: ICD-10-CM

## 2023-06-22 DIAGNOSIS — I77.74 VERTEBRAL ARTERY DISSECTION (HCC): ICD-10-CM

## 2023-06-22 DIAGNOSIS — I63.9 CEREBROVASCULAR ACCIDENT (CVA) (HCC): ICD-10-CM

## 2023-06-22 PROCEDURE — 97110 THERAPEUTIC EXERCISES: CPT

## 2023-06-22 PROCEDURE — 97116 GAIT TRAINING THERAPY: CPT

## 2023-06-22 PROCEDURE — 97112 NEUROMUSCULAR REEDUCATION: CPT

## 2023-06-22 RX ORDER — ATORVASTATIN CALCIUM 40 MG/1
TABLET, FILM COATED ORAL
Qty: 90 TABLET | Refills: 1 | Status: SHIPPED | OUTPATIENT
Start: 2023-06-22

## 2023-06-22 RX ORDER — LORATADINE 10 MG
TABLET ORAL
Qty: 90 TABLET | Refills: 1 | Status: SHIPPED | OUTPATIENT
Start: 2023-06-22

## 2023-06-22 RX ORDER — HYDRALAZINE HYDROCHLORIDE 50 MG/1
TABLET, FILM COATED ORAL
Qty: 180 TABLET | Refills: 1 | Status: SHIPPED | OUTPATIENT
Start: 2023-06-22

## 2023-06-22 RX ORDER — CARVEDILOL 25 MG/1
TABLET ORAL
Qty: 180 TABLET | Refills: 1 | Status: SHIPPED | OUTPATIENT
Start: 2023-06-22

## 2023-06-22 RX ORDER — OMEGA-3/DHA/EPA/FISH OIL 35-113.5MG
TABLET,CHEWABLE ORAL
Qty: 90 TABLET | Refills: 1 | Status: SHIPPED | OUTPATIENT
Start: 2023-06-22

## 2023-06-22 RX ORDER — FERROUS SULFATE 325(65) MG
TABLET ORAL
Qty: 90 TABLET | Refills: 1 | Status: SHIPPED | OUTPATIENT
Start: 2023-06-22

## 2023-06-22 RX ORDER — FLUOXETINE HYDROCHLORIDE 20 MG/1
CAPSULE ORAL
Qty: 90 CAPSULE | Refills: 1 | Status: SHIPPED | OUTPATIENT
Start: 2023-06-22

## 2023-06-22 NOTE — PROGRESS NOTES
"Occupational Therapy Daily Note    Today's date: 2023  Patient name: Denzel Blackburn  : 1960  MRN: [id number]  Referring provider: Joshua Abrams  Dx:   Encounter Diagnoses   Name Primary? • Posterior circulation stroke ([facility] ) Yes   • Cerebrovascular accident (CVA), unspecified mechanism ([facility] )      Start Time: 848a  Stop Time: 935a    Subjective: \"My back is sore today; I got Botox yesterday  \"       Objective: Pt engaged in skilled OT treatment session with focus on UE NMR, UE strengthening, UE endurance and FMC/GMC to increase engagement, endurance, tolerance, and independence with daily ADL and IADL tasks  CPT Code Minutes                                           Task Details        Therapeutic Activity                                       Neuro Re-Ed  Pt engaged in a series of activities focused on NMRE and FMC/GMC  Pt engaged in gross motor control, functional reaching, target placement, in-hand manipulation, and reaction time to improve motor control and functional use of RUE  [address] [facility]:   Pt able to use RUE to throw light weighted ball overhand to target with mod difficulty  Pt with 75% accuracy to target  Pt then engaged in a dynamic RUE activity while in standing at hip height mat  Pt with 90% accuracy with hand-eye coordination to moving target  Noted G reaction speed to fast and slow moving targets and G motor control between hip and shoulder height  [address]:   Pt with min difficulty using R alternating pincer grasp to  small nerf golf balls and place on target  Pt with 100% target accuracy and G functional reach of RUE  In-hand Manipulation:  Pt able to use R hand to  two small sized nerf golf balls and small softer objects, manipulate to pincer and place on target with min difficulty  Pt with G motor control and 95% accuracy with small softer objects  Therapeutic Exercise  Pt engaged in 10 minutes PROM of R shoulder   Pt with increased tightness " and pain with PROM  Instructed pt to complete PROM in supine at home for planes for shoulder and wrist               TESTING  6/13  O'Juanjose Finger Dexterity Test   R: 6 min 49 sec (top 5 rows)   Improved from 5/2 (3 rows; 4 min 32 sec)      6/13  Keyhole   L: 1 min 47 seconds   R: 7 min 50 seconds         HEP  5/18/23: AAROM exercises, functional reaching exercises    5/9: [address] [place] of thumb to alternating fingers, transitioning from pad to pad to fingertip to fingertip    4/28: provided pt with [address] [place] activity as HEP to improve finger ROM into ab/adduction         Assessment: Tolerated treatment well  Pt presents with improving GMC, [address] [place], and in-hand dexterity  Pt with continued tightness and pain in shoulder and RUE  Pt received Botox on 6/21/23 for tightness and pain management  Patient would benefit from continued skilled OT      Plan: Continued skilled OT per POC    INTERVENTION COMMENTS:  Diagnosis: Posterior circulation stroke ([facility] ) [I63 50]  Precautions: fall risk, R side weakness   Insurance: Payor: Kaleigh Million / Plan: Kaleigh Million PPO / Product Type: PPO /   20 of 26 visits  PN due 07/11/23

## 2023-06-22 NOTE — PROGRESS NOTES
Daily Note     Today's date: 2023  Patient name: Collins Downey  : 1960  MRN: [id number]  Referring provider: Arnol Westbrook  Dx:   Encounter Diagnosis     ICD-10-CM    1  Posterior circulation stroke (HCC)  I63 50       2  Right wrist pain  M25 531           Start Time: 0800  Stop Time: 0845  Total time in clinic (min): 45 minutes    Subjective: Received botox yesterday  Getting his walking in  No effects of botox yet  Objective: See treatment diary below      Assessment:  Challenged with agility ladder and faster paced footwork  With skipping box, more imbalance likely due to tone in RLE and reduced plyometric ability  1 near fall with min a x1 PT intervention to regain balance on foam wedge  With additional resistance and weight shows good/fair stability  Sometimes lacking foot clearance over hurdles with ankle weight  Appropriate RPE throughout session  Patient would benefit from continued PT to improve gait, balance, and overall functional mobility  Plan: Continue per plan of care  Continue propulsion and stability work  Foot clearance with dual task       Precautions: Falls,   Re-eval Date: 2023    Date        Visit Count 25       FOTO See IE       Pain In See IE       Pain Out                Testing        TUG/TUGC 09 93 no AD       30s chair 12       5xSTS 12 85       Gait speed 0 81 m/s no AD       FGA 19       2/6MWT 990ft no AD       Neuro Re-Ed        Dynamic balance Agility ladder step through 4 laps x1 solo    Agility ladder skip box 4 laps solo    Agility ladder in/out 2 laps solo       Static balance        Obstacle course Uphill/down foam wedge + 6' step narrow + uneven folded mat 8 laps solo 5lb R RPE 6-8/10       Resisted ambulation Low hurdles + 6' step red TB 6 laps solo 5lb R RPE 6-8/10       Uneven surfaces        HKM on foam (// bars)        RLE forced use        Ther Ex        Hamstring 3 way stretch        SLR x 4        HR/TR        Mini Squats 2x6 weighted vest       Step ups        Plantar stretch pro-stretch 1min x2       Towel stretch right         Leg press                Ther Activity        ADL        Curb negotiation         Gait Training        No AD Weighted vest 5#R 3 laps long ortho side       SPC        Treadmill        Head turns        Manuals        Plantar release        KT 50% arch support, paper off tension med/lat right ankle

## 2023-06-26 DIAGNOSIS — I10 PRIMARY HYPERTENSION: Primary | ICD-10-CM

## 2023-06-26 RX ORDER — AMLODIPINE BESYLATE 2.5 MG/1
2.5 TABLET ORAL DAILY
Qty: 30 TABLET | Refills: 5 | Status: SHIPPED | OUTPATIENT
Start: 2023-06-26

## 2023-06-27 ENCOUNTER — OFFICE VISIT (OUTPATIENT)
Dept: PHYSICAL THERAPY | Facility: CLINIC | Age: 63
End: 2023-06-27
Payer: COMMERCIAL

## 2023-06-27 ENCOUNTER — OFFICE VISIT (OUTPATIENT)
Dept: OCCUPATIONAL THERAPY | Facility: CLINIC | Age: 63
End: 2023-06-27
Payer: COMMERCIAL

## 2023-06-27 DIAGNOSIS — I63.50 POSTERIOR CIRCULATION STROKE (HCC): Primary | ICD-10-CM

## 2023-06-27 DIAGNOSIS — I63.9 CEREBROVASCULAR ACCIDENT (CVA), UNSPECIFIED MECHANISM (HCC): ICD-10-CM

## 2023-06-27 PROCEDURE — 97112 NEUROMUSCULAR REEDUCATION: CPT

## 2023-06-27 PROCEDURE — 97110 THERAPEUTIC EXERCISES: CPT

## 2023-06-27 PROCEDURE — 97116 GAIT TRAINING THERAPY: CPT

## 2023-06-27 NOTE — PROGRESS NOTES
"Occupational Therapy Daily Note    Today's date: 2023  Patient name: Mandy Monique  : 1960  MRN: [id number]  Referring provider: Hanh Ziegler  Dx:   Encounter Diagnoses   Name Primary? • Posterior circulation stroke ([facility] ) Yes   • Cerebrovascular accident (CVA), unspecified mechanism ([facility] )      Start Time: 845a  Stop Time: 931a    Subjective: \"I am having the same shoulder pain; 7/10  \"       Objective: Pt engaged in skilled OT treatment session with focus on UE NMR, UE strengthening, UE endurance and FMC/GMC to increase engagement, endurance, tolerance, and independence with daily ADL and IADL tasks  CPT Code Minutes                                           Task Details        Therapeutic Activity                                        Neuro Re-Ed  Pt engaged in a series of activities focused on NMRE and FMC/GMC  Pt engaged in gross motor control, functional reaching, target placement, in-hand manipulation, and reaction time to improve motor control and functional use of RUE  [facility]:   Pt able to throw tennis ball overhand with RUE while standing with mod/max difficulty to target  Noted difficulty with shoulder height or above shoulder target  Pt with G-reaction speed, F accuracy, and G- GMC when throwing  RUE reaction time:   Pt with 80% accuracy with hand-eye coordination with small moving object  Pt with F- reaction speed to fast moving objects and G- GMC  [facility]:   Pt with mod difficulty using R alternating pincer grasp to  small soft objects and place to table top target  Pt with 95% accuracy and G functional reach to target  Pt with min difficulty using R alternating pincer grasp to  medium sized golf balls and place to table top target  Pt with 100% target accuracy with medium sized object and G functional reach       In-hand Manipulation:  Pt able to use R hand to  three small soft objects, manipulate to pincer and place to shoulder height target " with mod difficulty  Pt with G motor control and 90% accuracy with small softer objects  Therapeutic Exercise  Pt engaged in 10 minutes PROM of R shoulder  Pt with increased tightness and pain with PROM  Instructed pt to complete PROM in supine at home for planes for shoulder and wrist               TESTING  6/13  O'Juanjose Finger Dexterity Test   R: 6 min 49 sec (top 5 rows)   Improved from 5/2 (3 rows; 4 min 32 sec)      6/13  Keyhole   L: 1 min 47 seconds   R: 7 min 50 seconds         HEP  6/27/23: Provided pt with [facility] activity to do at home to improve finger ROM ab/adduction  5/18/23: AAROM exercises, functional reaching exercises    5/9: [facility] of thumb to alternating fingers, transitioning from pad to pad to fingertip to fingertip    4/28: provided pt with [facility] activity as HEP to improve finger ROM into ab/adduction         Assessment: Tolerated treatment well  Pt presents with improving GMC, [facility], and in-hand dexterity  Pt received Botox on 6/21/23, pt continues with shoulder tightness and pain in RUE; shoulder pain 7/10  Pt report of loosening in RLE feeling like it is affecting his balance  Patient would benefit from continued skilled OT      Plan: Continued skilled OT per POC    INTERVENTION COMMENTS:  Diagnosis: Posterior circulation stroke ([facility] ) [I63 50]  Precautions: fall risk, R side weakness   Insurance: Payor: Rosanna Phoenix / Plan: Rosanna Phoenix PPO / Product Type: PPO /   21 of 26 visits  PN due 07/11/23

## 2023-06-27 NOTE — PROGRESS NOTES
Daily Note     Today's date: 2023  Patient name: Ara Hernandes  : 1960  MRN: [id number]  Referring provider: Darryle Mccallum  Dx:   Encounter Diagnosis     ICD-10-CM    1  Posterior circulation stroke ([address] )  I63 50           Start Time:   Stop Time: 0845  Total time in clinic (min): 47 minutes    Subjective: Feels a little looser bc of the botox  Min change in pain, some less pain in wrist        Objective: See treatment diary below      Assessment:  Pt challenged with more R stance time over higher hurdles  Challenged with propulsive exercises and postural stability throughout session with weighted vest and carry  Some imbalance with uneven surfaces as well as higher hurdles but able to [facility] independently  Discussed inc stretch duration at home in conjunction with botox, pt in agreement  Patient would benefit from continued PT to improve gait, balance, and overall functional mobility  Plan: Continue per plan of care  Continue propulsion and stability work  Foot clearance with dual task       Precautions: Falls,   Re-eval Date: 2023    Date       Visit Count 25 32      FOTO See IE       Pain In See IE       Pain Out                Testing        TUG/TUGC 09 93 no AD       30s chair 12       5xSTS 12 85       Gait speed 0 81 m/s no AD       FGA 19       2/6MWT 990ft no AD       Neuro Re-Ed        Dynamic balance Agility ladder step through 4 laps x1 solo    Agility ladder skip box 4 laps solo    Agility ladder in/out 2 laps solo Med hurdles solo lat step 4 laps RPE 8/10    bwd stepping solo 6 laps       Static balance        Obstacle course Uphill/down foam wedge + 6' step narrow + uneven folded mat 8 laps solo 5lb R RPE 6-8/10 Uneven surface + hurdles fwd resisted red TB 10 laps solo    Uneven surface + hurdles 4 laps lateral step      Resisted ambulation Low hurdles + 6' step red TB 6 laps solo 5lb R RPE 6-8/10       Uneven surfaces        HKM on foam (// bars) RLE forced use        Ther Ex        Hamstring 3 way stretch        SLR x 4        HR/TR        Mini Squats 2x6 weighted vest       Step ups        Plantar stretch pro-stretch 1min x2       Towel stretch right         Leg press                Ther Activity        ADL        Curb negotiation         Gait Training        No AD Weighted vest 5#R 3 laps long ortho side Weighted vest 3 laps long ortho side cues for fast as possible 5lb KB carry R      SPC        Treadmill  Solo 10% 1 0mph 4min x1       Head turns        Manuals        Plantar release        KT 50% arch support, paper off tension med/lat right ankle

## 2023-06-28 ENCOUNTER — TELEPHONE (OUTPATIENT)
Dept: NEUROLOGY | Facility: CLINIC | Age: 63
End: 2023-06-28

## 2023-06-28 NOTE — TELEPHONE ENCOUNTER
Spoke with Pts wife about r/s Cristal Arreola appt  They are just going to follow with Dr Abraham Mcintyre at this time  Will r/s if she feels they should see him

## 2023-06-29 ENCOUNTER — APPOINTMENT (OUTPATIENT)
Dept: OCCUPATIONAL THERAPY | Facility: CLINIC | Age: 63
End: 2023-06-29
Payer: COMMERCIAL

## 2023-06-29 ENCOUNTER — APPOINTMENT (OUTPATIENT)
Dept: PHYSICAL THERAPY | Facility: CLINIC | Age: 63
End: 2023-06-29
Payer: COMMERCIAL

## 2023-06-30 DIAGNOSIS — I77.1 CELIAC ARTERY STENOSIS (HCC): ICD-10-CM

## 2023-06-30 DIAGNOSIS — I63.9 CEREBROVASCULAR ACCIDENT (CVA) (HCC): ICD-10-CM

## 2023-06-30 DIAGNOSIS — I77.74 VERTEBRAL ARTERY DISSECTION (HCC): ICD-10-CM

## 2023-06-30 DIAGNOSIS — G50.0 TRIGEMINAL NEURALGIA: ICD-10-CM

## 2023-06-30 DIAGNOSIS — I65.02 STENOSIS OF LEFT VERTEBRAL ARTERY: ICD-10-CM

## 2023-06-30 RX ORDER — OXCARBAZEPINE 150 MG/1
300 TABLET, FILM COATED ORAL 2 TIMES DAILY
Qty: 60 TABLET | Refills: 0 | Status: CANCELLED | OUTPATIENT
Start: 2023-06-30

## 2023-07-01 ENCOUNTER — NURSE TRIAGE (OUTPATIENT)
Dept: OTHER | Facility: OTHER | Age: 63
End: 2023-07-01

## 2023-07-01 DIAGNOSIS — G50.0 TRIGEMINAL NEURALGIA: ICD-10-CM

## 2023-07-01 RX ORDER — OXCARBAZEPINE 150 MG/1
300 TABLET, FILM COATED ORAL 2 TIMES DAILY
Qty: 60 TABLET | Refills: 0 | Status: SHIPPED | OUTPATIENT
Start: 2023-07-01

## 2023-07-01 RX ORDER — OXCARBAZEPINE 150 MG/1
300 TABLET, FILM COATED ORAL 2 TIMES DAILY
Qty: 60 TABLET | Refills: 0 | Status: SHIPPED | OUTPATIENT
Start: 2023-07-01 | End: 2023-07-01 | Stop reason: SDUPTHER

## 2023-07-01 NOTE — TELEPHONE ENCOUNTER
"Regarding: Medication issue- Trileptal  ----- Message from Danielle Franz sent at 7/1/2023 11:45 AM EDT -----   \"My husbands neurologist increased his dosage of OXcarbazepine (Trileptal) 150 mg tablet and now the pharmacy says they cannot fill it until it has been submitted to the insurance  He can't go without his medication and he is completely out of it  \"    "

## 2023-07-01 NOTE — TELEPHONE ENCOUNTER
Wife calling saying that the pharmacy is telling her they can not give her this patient's medication because it was not run through his insurance first   In checking the chart it looks like the increased dose script was still pending from 6/19/2023  I called Dmitry/Pharmacist who states that they never received this script from 6/19/2023  I resent it and pharmacist will get it ready for this patient  They were both very grateful for this help

## 2023-07-01 NOTE — TELEPHONE ENCOUNTER
"  Reason for Disposition  • [1] Prescription prescribed recently is not at pharmacy AND [2] triager has access to patient's EMR AND [3] prescription is recorded in the EMR    Answer Assessment - Initial Assessment Questions  1  NAME of MEDICATION: \"What medicine are you calling about? \"     Trileptal  2  QUESTION: Marixa Simpson is your question? \" (e g , medication refill, side effect)     Out of med today and pharmacy is saying that it needs to be run through his insurance  3  PRESCRIBING HCP: \"Who prescribed it? \" Reason: if prescribed by specialist, call should be referred to that group  Riana Watson PA-C  4  SYMPTOMS: \"Do you have any symptoms? \"     Trigemal Neuralgia    Protocols used: MEDICATION QUESTION CALL-ADULT-    "

## 2023-07-03 ENCOUNTER — TELEPHONE (OUTPATIENT)
Dept: NEUROLOGY | Facility: CLINIC | Age: 63
End: 2023-07-03

## 2023-07-03 DIAGNOSIS — I63.9 CEREBROVASCULAR ACCIDENT (CVA) (HCC): ICD-10-CM

## 2023-07-03 DIAGNOSIS — I77.74 VERTEBRAL ARTERY DISSECTION (HCC): ICD-10-CM

## 2023-07-03 DIAGNOSIS — I77.1 CELIAC ARTERY STENOSIS (HCC): ICD-10-CM

## 2023-07-03 DIAGNOSIS — I65.02 STENOSIS OF LEFT VERTEBRAL ARTERY: ICD-10-CM

## 2023-07-03 NOTE — TELEPHONE ENCOUNTER
Patient's wife calling to see if our office will fill his ticagrelor. Please assist and call with answer.

## 2023-07-03 NOTE — TELEPHONE ENCOUNTER
[facility] [phone]. Patient's wife is requesting a call back when this medication is called in. She was told by the answering service that this office was open today in the afternoon. This office is not back open until Wednesday. Please call this script in, wife states she doesn't want the patient to have a repeated stroke.

## 2023-07-03 NOTE — TELEPHONE ENCOUNTER
Health call failed the medication. Pt wife states this is a medication that the patient has always used. She doesn't understand why he can't get his medication.

## 2023-07-03 NOTE — TELEPHONE ENCOUNTER
Wife called stating her 's prescription for Royal Kunia Blitz still has not been sent to his pharmacy. Patient is completely out of his medication. Please call wife back to discuss.

## 2023-07-05 ENCOUNTER — APPOINTMENT (OUTPATIENT)
Dept: PHYSICAL THERAPY | Facility: CLINIC | Age: 63
End: 2023-07-05
Payer: COMMERCIAL

## 2023-07-05 ENCOUNTER — APPOINTMENT (OUTPATIENT)
Dept: OCCUPATIONAL THERAPY | Facility: CLINIC | Age: 63
End: 2023-07-05
Payer: COMMERCIAL

## 2023-07-06 ENCOUNTER — OFFICE VISIT (OUTPATIENT)
Facility: CLINIC | Age: 63
End: 2023-07-06
Payer: COMMERCIAL

## 2023-07-06 DIAGNOSIS — I63.50 POSTERIOR CIRCULATION STROKE (HCC): Primary | ICD-10-CM

## 2023-07-06 PROCEDURE — 97116 GAIT TRAINING THERAPY: CPT

## 2023-07-06 PROCEDURE — 97112 NEUROMUSCULAR REEDUCATION: CPT

## 2023-07-06 NOTE — PROGRESS NOTES
PT Re-Evaluation /progress note    Today's date: 2023  Patient name: Monico Marcelino  : 1960  MRN: [id number]  Referring provider: Tatyana Sen  Dx:   Encounter Diagnosis     ICD-10-CM    1. Posterior circulation stroke ([address])  I63.50           Start Time: 1030  Stop Time: 1124  Total time in clinic (min): 54 minutes    Assessment  Assessment details: Patient is a 60y.o. year old male presenting to OPPT s/p diagnosis of posterior circulation stroke. Lupe Cota is continuing to show steady progress in his gait, strength, and balance. Dank's outcome measures continue to demonstrate progress, more so in LE strength. His 5xSTS and 30 second chair stand demonstrate improvement in time reduction and more stands in 30 seconds. His FGA shows slight improvement,  - still demonstrating fall risk especially with fatigue as he occasionally will lack clearance on his R side through swing. 6MWT also shows slight improvement, reduced vs age matched norms. He recently received botox for painful spasticity in his R calf. It would be beneficial to continue with therapy considering his inc fall risk per the FGA and to maximize on the benefits of botox to improve ankle/knee ROM as well as improve overall gait and balance. Instructed patient to continue with walking program and calf stretching to facilitate improving ROM of ankle. Lupe Cota is progressing toward his PT goals and will continue to benefit from skilled PT intervention. Impairments: abnormal coordination, abnormal gait, abnormal muscle tone, abnormal movement, activity intolerance, impaired balance, impaired physical strength, lacks appropriate home exercise program, pain with function, safety issue and weight-bearing intolerance     Prognosis: good    Goals  In 4 weeks, patient will:  1. Demonstrate ability to perform 20 minutes of activity without requiring seated rest break. - progressing  2.   Demonstrate ability to maintain upright balance unsupported by UEs while reaching above shoulder height without resistance to promote stability with ADLs - met  3. Improve 5xSTS to below cutoff score for age-matched peers, 14.8 seconds, to demonstrate improved LE stability to complete transfers safely. - met  4. Improve TUG to below cutoff score with least restrictive device against age matched peers, 13.5 seconds, to demonstrate improved functional mobility. - met      In 4-10 weeks, patient will:  1. Meet FOTO predicted score to demonstrate improvement in functional mobility. - NP  2. Demonstrate consistent carryover with HEP and walking program. - progressing  3. Improve gait speed by at least 0.16 m/s to meet MCID value for PwCVA and reduce fall risk. - met  4. Improve 6MWT by at least 100ft to demonstrate improvement in ability in community ambulation. - nearly met  5. Improve FGA by at least 4 points to demonstrate improvement in balance and reduce fall risk, meet MDC for PwCVA - met  6. Improve ABC to at least above 67% to show reduced risk of falling and improved balance confidence. - progressing    In 4 weeks, patient will:  1. Improve 6MWT by an additional 80ft without AD or loss of balance to show improved community endurance. - progressing  2. Improve FGA by an additional 2 points to show improving dynamic balance to assist with ADLs and hobbies. - met    Plan  Patient would benefit from: skilled physical therapy  Planned therapy interventions: neuromuscular re-education, manual therapy, patient education, home exercise program, therapeutic exercise, therapeutic activities and gait training  Frequency: 2x week  Duration in weeks: 8  Plan of Care beginning date: 6/1/2023  Plan of Care expiration date: 7/31/2023  Treatment plan discussed with: patient and family        Subjective Evaluation    History of Present Illness  Mechanism of injury: 7/6: Walking daily up/down driveway. Mostly without SPC but he holds it as needed. More with fatigue.  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 Ankle feels more bouncy not muhc change in shoulder and wrist since botox. No falls. 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 Catching his balance when needed. Still haivng lots of pain which does limit his mobility + sleeping. Getting botox soon. BP is being variable today, high the other day 678+ systolic and cancelled therapy. Present at PT:   prior to hospitalization. No AD prior. Was at [facility]. Fell day after dc. No injury. Not walking much at home. Mainly using WC for mobility. Walking to bathroom. 2nd floor bedroom. Doing stairs, very careful, does okay with them. Goals: Walking better/more. Better control of R leg    [facility] DC: HPI: Pili Dawson is a 60 y.o. right handed male with medical history of HTN who presented to [address] [place] [address] on 2/25 with nausea/dizziness. Found to have hypertensive emergency with acute/subcaute R posterior cerebellar CVA with 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, mild BRAULIO, and incidentally found + COVID (without evidence of respiratory illness/hypoxia/CXR findings). Placed on cardene gtt, stroke pathway, ASA/Plavix, IV hydration for BRAULIO, and CTX for UTI. NIHSS 2. Symptoms began 2 days prior. Not tPA/TNK candidate. 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 NSx recommended transfer to [facility] and starting on heparin gtt and stopped Plavix. Repeat CTA on 2/26 stable with with R V3/4 occlusion. Not a candidate for interventional procedure due to clinical stability and risk. Speech consulted and noted mod-severe oropharyngeal dysphagia recommended pureed/HTL. Unfortunately later on 2/26, he clinically deteriorated with increased R sided weakness, and was taken to IR for stenting of V3 and V4 with TICI 2B revascularization. CTH without ICH. He was admitted back to the ICU intubated - extubated 2/27.  MRI showed cerebellar CVA and R > L stroke burden, with additional hypodensities on DWI appreciated L midbrain, pontine area and R lower medullary/spinal cord junction. He was transitioned to triple therapy with DAPT (given recent stent) and A/C with eliquis 2.5mg BID due to COVID. Diet advanced to Level 3/NTL on 2/27. Noted to have spasticity in RLE - started on baclofen by Neurology. He was able to have cardene discontinued on 3/2, and they have been making adjustments to his oral regimen. He was started on Prozac for his mood. NSx has signed off. CTA H/N recommended around 3/17. Length of time on eliquis unclear. Admitted to [address] [place] [address] on 3/6.    Pain  No pain reported    Social Support  Stairs in house: yes   Lives in: multiple-level home  Lives with: spouse    Employment status: not working    Diagnostic Tests  No diagnostic tests performed  Treatments  Discharged from (in last 30 days): inpatient hospitalization        Objective         Balance Test 4/6 4/26 6/1 7/6   6 Minute Walk Test (ft): 576 1-2 LOB PT assist no AD 914ft no AD no LOB 990ft no AD no LOB outside 1008ft no AD inside   FGA: 8 no AD SPC 15/30 19/30 no AD 21/30 no AD   Gait Speed (m/s): 0.58 SPC 0.88 m/s Self select No AD 0.81m/s - Fast 1.11 m/s 0.91m/s no AD SSGS - Fast    30s chair stand   12 15   5x Sit To Stand (s): 20.81 10.39 L weight shift 12.85 improved symmetry 10.85   TUG/TUGC: 17.15 / 16.70 100>85 no AD 13.15 SPC 10.25 SPC / 09.93 09.02 no AD / 09.85 mTUG       Sensation Left Right   Kinesthesia  diminished   Light Touch  diminshed   Sharp/Dull     2 Point Discrimination         Muscle Tone Left Right   Modified Aguilar Scale     Hamstring     Gastroc  1+   Quad         Manual Muscle Testing - Hip Left Right   Flexion 5 4   Extension 5 3+   Abduction 5 3+   External Rotation       Manual Muscle Testing - Knee Left Right   Flexion 5 3   Extension 5 5     Manual Muscle Testing - Ankle Left Right   Doriflexion 5 4   Plantarflexion 5 4        Transfers    Sit To Stand Independent   W/C To Bed Contact Guard   Sit To Supine Independent   Roll Independent         Gait Assessment: Normal speed shows slightly ataxic R foot placement. Inadequate knee extension in mid/terminal swing. With increased walking pace smoother limb advancement still inadequate knee extension. 6/1: No AD improving step length and stability. Due to spasticity still exhibits inadequate knee ext in swing w ataxic foot placement. 7/6: No AD improving step length and postural stability. Inadequate knee ext in swing. Improving mid/terminal stance. Less foot clearance with fatigue setting in, appropriate stepping strategy.        Precautions: Falls,   Re-eval Date: 7/1/2023    Date 6/22 6/27 7/6     Visit Count 25 26 32     FOTO See IE       Pain In See IE       Pain Out                Testing        TUG/TUGC 09.02 no AD       30s chair 15       5xSTS 10.85       Gait speed 0.91 m/s no AD       FGA 21       2/6MWT 1008ft no AD       Neuro Re-Ed   6MWT, TUG,mTUG, 5xSTS, 30s chair, FGA, 10mWT     Dynamic balance Agility ladder step through 4 laps x1 solo    Agility ladder skip box 4 laps solo    Agility ladder in/out 2 laps solo Med hurdles solo lat step 4 laps RPE 8/10    bwd stepping solo 6 laps  Low hurdles lat step + red TB resisted 6 laps     Static balance        Obstacle course Uphill/down foam wedge + 6' step narrow + uneven folded mat 8 laps solo 5lb R RPE 6-8/10 Uneven surface + hurdles fwd resisted red TB 10 laps solo    Uneven surface + hurdles 4 laps lateral step      Resisted ambulation Low hurdles + 6' step red TB 6 laps solo 5lb R RPE 6-8/10       Uneven surfaces        HKM on foam (// bars)        RLE forced use        Ther Ex        Hamstring 3 way stretch        SLR x 4        HR/TR        Mini Squats 2x6 weighted vest       Step ups        Plantar stretch pro-stretch 1min x2       Towel stretch right         Leg press                Ther Activity        ADL        Curb negotiation         Gait Training        No AD Weighted vest 5#R 3 laps long ortho side Weighted vest 3 laps long ortho side cues for fast as possible 5lb KB carry R Weighted vest Stairs 3 flights CGA RUE     SPC        Treadmill  Solo 10% 1.0mph 4min x1  Solo 11.5% 1.0mph RUE 4min x1, 2min x1     Head turns        Manuals        Plantar release        KT 50% arch support, paper off tension med/lat right ankle

## 2023-07-07 ENCOUNTER — APPOINTMENT (OUTPATIENT)
Dept: OCCUPATIONAL THERAPY | Facility: CLINIC | Age: 63
End: 2023-07-07
Payer: COMMERCIAL

## 2023-07-07 ENCOUNTER — APPOINTMENT (OUTPATIENT)
Dept: PHYSICAL THERAPY | Facility: CLINIC | Age: 63
End: 2023-07-07
Payer: COMMERCIAL

## 2023-07-08 DIAGNOSIS — G50.0 TRIGEMINAL NEURALGIA: ICD-10-CM

## 2023-07-09 DIAGNOSIS — I10 PRIMARY HYPERTENSION: ICD-10-CM

## 2023-07-09 RX ORDER — LOSARTAN POTASSIUM 100 MG/1
TABLET ORAL
Qty: 90 TABLET | Refills: 2 | Status: SHIPPED | OUTPATIENT
Start: 2023-07-09

## 2023-07-10 RX ORDER — OXCARBAZEPINE 150 MG/1
TABLET, FILM COATED ORAL
Qty: 360 TABLET | Refills: 0 | Status: SHIPPED | OUTPATIENT
Start: 2023-07-10

## 2023-07-11 ENCOUNTER — APPOINTMENT (OUTPATIENT)
Facility: CLINIC | Age: 63
End: 2023-07-11
Payer: COMMERCIAL

## 2023-07-12 ENCOUNTER — TELEPHONE (OUTPATIENT)
Dept: FAMILY MEDICINE CLINIC | Facility: CLINIC | Age: 63
End: 2023-07-12

## 2023-07-12 NOTE — TELEPHONE ENCOUNTER
Advice Only -    Patient's spouse called in to request a direct call from [address] [place] [address] regarding adjustment of blood pressure dosage of medication for patient. Dr. Gabo Awad treated the patient for Trigeminal pain on 7/11/2023 and expressed concern that the patient's blood pressure was not well controlled. Please review and call Romina at [phone].

## 2023-07-12 NOTE — TELEPHONE ENCOUNTER
I do not have progress note from that doctor's visit to see what bp was ? He is scheduled to see me for one month f/u of his blood pressure on 7/18/23  Have him come in sooner. Any day this week is fine.

## 2023-07-12 NOTE — TELEPHONE ENCOUNTER
Patient's spouse is aware of Dr. Trace Atkins advice. Patient is scheduled for OV / BP check / MM on 7/13/2023.

## 2023-07-13 ENCOUNTER — OFFICE VISIT (OUTPATIENT)
Facility: CLINIC | Age: 63
End: 2023-07-13
Payer: COMMERCIAL

## 2023-07-13 ENCOUNTER — OFFICE VISIT (OUTPATIENT)
Dept: FAMILY MEDICINE CLINIC | Facility: CLINIC | Age: 63
End: 2023-07-13
Payer: COMMERCIAL

## 2023-07-13 VITALS
SYSTOLIC BLOOD PRESSURE: 128 MMHG | BODY MASS INDEX: 27.23 KG/M2 | WEIGHT: 190.2 LBS | HEIGHT: 70 IN | DIASTOLIC BLOOD PRESSURE: 70 MMHG | TEMPERATURE: 96.9 F | OXYGEN SATURATION: 98 % | HEART RATE: 61 BPM

## 2023-07-13 DIAGNOSIS — I10 PRIMARY HYPERTENSION: Primary | ICD-10-CM

## 2023-07-13 DIAGNOSIS — I63.9 CEREBROVASCULAR ACCIDENT (CVA), UNSPECIFIED MECHANISM (HCC): ICD-10-CM

## 2023-07-13 DIAGNOSIS — I63.50 POSTERIOR CIRCULATION STROKE (HCC): Primary | ICD-10-CM

## 2023-07-13 DIAGNOSIS — J34.9 SINUS DISEASE: ICD-10-CM

## 2023-07-13 DIAGNOSIS — G50.0 TRIGEMINAL NEURALGIA OF RIGHT SIDE OF FACE: ICD-10-CM

## 2023-07-13 DIAGNOSIS — I77.74 VERTEBRAL ARTERY DISSECTION (HCC): ICD-10-CM

## 2023-07-13 PROCEDURE — 97112 NEUROMUSCULAR REEDUCATION: CPT

## 2023-07-13 PROCEDURE — 99213 OFFICE O/P EST LOW 20 MIN: CPT | Performed by: FAMILY MEDICINE

## 2023-07-13 PROCEDURE — 97116 GAIT TRAINING THERAPY: CPT

## 2023-07-13 PROCEDURE — 97530 THERAPEUTIC ACTIVITIES: CPT

## 2023-07-13 NOTE — PROGRESS NOTES
Name: Rickie Cameron      : 1960      MRN: [id number]  Encounter Provider: Tammy Bowden DO  Encounter Date: 2023   Encounter department: [address] [place] [address] Street     1. Primary hypertension  Reassured. bp in office today was at goal. Checked twice. Patient has appt here next week and advised to keep that appt. Bring home blood pressure cuff along to that office visit. No changes made in his medication today. 2. Cerebrovascular accident (CVA), unspecified mechanism ([address])  With resultant spastic hemiplegia. In PT. 3. Right Vertebral artery dissection ([address])    4. Sinus disease  -     Ambulatory Referral to Otolaryngology; Future  Incidental finding on MRI done during admission for his stroke. Will refer to ENT for evaluation  Hold of on antibiotic for now. Cannot say for certain this is an infection  Also discussed that this is unlikely cause of his right sided facial pain given that all sinus disease is bilateral.          Subjective     HPI Patient is a 60year old male with hypertension, impaired fasting glucose, history of acute posterior circulation stroke and right cerebral artery dissection with resultant spastic hemiparesis and trigeminal neuralgia who is here for concerns regarding blood pressure    At time of last visit on 6/15/23, his bp was at goal and his amlodipine was d/c'd secondary to some increased lower extremity edema. Losartan dose was increased. Wife messaged me on 23  noting that bp was running high and his amlodipine was resumed at 2.5 mg dose. Wife called yesterday noting that BP had been elevated at one of his specialist office visits. (Dr. Chacorta Akhtar) who is at [facility]. Systolic reading was over 200 in her office and was done on an automatic blood pressure machine. Wife is also checking bp at home. Readings at home are in 267'V systolic.      Wife states that lower extremity edema has improved with reduced amlodipine dose and use of his compression stockings. He denies any headache, dizziness. No chest pain, palpitations or shortness of breath. Wife also had concern regarding one of his MRI's that was done in February during admission for his stroke. States that it showed "sinusitis" and wants to discuss. Wondering if the sinus issues could be contributing to the right sided facial pain he is having. MRI dated 23 that was done during hospital admission for stroke showed incidental finding of "Moderate to advanced bilateral ethmoidal sinus signal abnormality. Moderate bilateral maxillary sinus signal abnormality. Moderate left frontal sinus signal abnormality. 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   Mild bilateral sphenoid   sinus signal abnormality."          Review of Systems    Past Medical History:   Diagnosis Date   • BRAULIO (acute kidney injury) ([address])    • B12 deficiency    • Celiac artery stenosis (HCC)    • Cerebellar infarct ([address]) 2023   • CKD (chronic kidney disease) stage 2, GFR 60-89 ml/min    • Essential hypertension    • Impaired fasting glucose    • Iron deficiency anemia    • Neurogenic bowel    • Stenosis of left vertebral artery    • Vertebral artery dissection (HCC)      Past Surgical History:   Procedure Laterality Date   • ARTERY SURGERY      vertebral artery stent/revascularization   • IR STROKE ALERT  2023     Family History   Problem Relation Age of Onset   • Hypertension Brother    • Hypertension Brother      Social History     Socioeconomic History   • Marital status: /Civil Union     Spouse name: None   • Number of children: None   • Years of education: None   • Highest education level: None   Occupational History   • None   Tobacco Use   • Smoking status: Former     Packs/day: 1.00     Years: 5.00     Total pack years: 5.00     Types: Cigarettes     Quit date: 1983     Years since quittin.3   • Smokeless tobacco: Never   Vaping Use   • Vaping Use: Never used Substance and Sexual Activity   • Alcohol use: Not Currently   • Drug use: Never   • Sexual activity: Not Currently     Partners: Female   Other Topics Concern   • None   Social History Narrative        2 children    Works as an  (self employed)     Social Determinants of Health     Financial Resource Strain: Not on file   Food Insecurity: Not on file   Transportation Needs: Not on file   Physical Activity: Not on file   Stress: Not on file   Social Connections: Not on file   Intimate Partner Violence: Not on file   Housing Stability: Not on file     Current Outpatient Medications on File Prior to Visit   Medication Sig   • amLODIPine (NORVASC) 2.5 mg tablet Take 1 tablet (2.5 mg total) by mouth daily   • atorvastatin (LIPITOR) 40 mg tablet TAKE 1 TABLET BY MOUTH DAILY WITH DINNER   • baclofen 10 mg tablet Take 1 tablet in the AM, 1 tablet midday, and 1.5 tablets at night by mouth. • carvedilol (COREG) 25 mg tablet TAKE 1 TABLET BY MOUTH TWICE A DAY WITH FOOD WITH MEALS   • [facility] Aspirin Adult Low Dose 81 MG chewable tablet CHEW 1 TABLET BY MOUTH DAILY   • [facility] Vitamin B-12 1000 MCG tablet TAKE 1 TABLET BY MOUTH EVERY DAY   • diazepam (VALIUM) 5 mg tablet Take 0.5 tablets (2.5 mg total) by mouth daily at bedtime as needed for muscle spasms   • ferrous sulfate 325 (65 Fe) mg tablet TAKE 1 TABLET BY MOUTH EVERY DAY WITH BREAKFAST   • FLUoxetine (PROzac) 20 mg capsule TAKE 1 CAPSULE BY MOUTH EVERY DAY   • hydrALAZINE (APRESOLINE) 50 mg tablet TAKE 1 TABLET BY MOUTH EVERY 12 HOURS. • losartan (COZAAR) 100 MG tablet TAKE 1 TABLET BY MOUTH EVERY DAY   • OXcarbazepine (TRILEPTAL) 150 mg tablet TAKE 2 TABLETS BY MOUTH 2 TIMES A DAY.    • ticagrelor (BRILINTA) 90 MG Take 1 tablet (90 mg total) by mouth every 12 (twelve) hours   • [DISCONTINUED] Acetaminophen (TYLENOL EXTRA STRENGTH PO) Take 1,000 mg by mouth daily as needed (Patient not taking: Reported on 7/13/2023)     No Known Allergies  Immunization History   Administered Date(s) Administered   • COVID-19 MODERNA VACC 0.5 ML IM 01/21/2021, 03/01/2021, 11/29/2021       Objective     /70 (BP Location: Right arm, Patient Position: Sitting, Cuff Size: Large)   Pulse 61   Temp (!) 96.9 °F (36.1 °C) (Tympanic)   Ht 5' 9.5" (1.765 m)   Wt 86.3 kg (190 lb 3.2 oz)   SpO2 98%   BMI 27.68 kg/m²     Physical Exam  Vitals and nursing note reviewed. Constitutional:       Appearance: Normal appearance. Comments: Ambulates with cane   HENT:      Head: Normocephalic and atraumatic. Right Ear: Tympanic membrane normal.      Left Ear: Tympanic membrane normal.      Nose: Nose normal.      Mouth/Throat:      Mouth: Mucous membranes are moist.      Pharynx: No oropharyngeal exudate or posterior oropharyngeal erythema. Eyes:      Extraocular Movements: Extraocular movements intact. Conjunctiva/sclera: Conjunctivae normal.      Pupils: Pupils are equal, round, and reactive to light. Cardiovascular:      Rate and Rhythm: Normal rate and regular rhythm. Heart sounds: No murmur heard. Pulmonary:      Effort: Pulmonary effort is normal.      Breath sounds: Normal breath sounds. Musculoskeletal:      Cervical back: Normal range of motion and neck supple. Right lower leg: No edema. Left lower leg: No edema. Lymphadenopathy:      Cervical: No cervical adenopathy. Skin:     General: Skin is warm and dry. Findings: No rash. Neurological:      General: No focal deficit present. Mental Status: He is alert and oriented to person, place, and time.    Psychiatric:         Mood and Affect: Mood normal.       Mardel Smoker, DO

## 2023-07-13 NOTE — PROGRESS NOTES
Daily Note     Today's date: 2023  Patient name: Yessica Eden  : 1960  MRN: [id number]  Referring provider: Renae Salas  Dx:   Encounter Diagnosis     ICD-10-CM    1. Posterior circulation stroke ([address])  I63.50           Start Time: 1419  Stop Time: 1458  Total time in clinic (min): 39 minutes    Subjective: Saw someone for his trigeminal neuralgia. Rec some medication, he could not remember the name. Botoxed plataeu'd, more of a rubbery feeling. Blood pressure has been high recently. Objective: See treatment diary below  Post session LUE seated: 14781    Assessment: Compensation with hops noted with higher clearance due to less use of RLE, but better with repetition as seen with more RLE use. Challenged with increased pace on treadmill for short duration bouts, shows good terminal stance and appropriate swing - still lacking full ext at intial contact. Successful with clearance on stairs more than not, however, no stumbles or trips. Pt reports fatigue post session. Patient would benefit from continued PT      Plan: Continue per plan of care. Propulsion, foot clearance. Obstacle negotiation. Plyometric.      Precautions: Falls,   Re-eval Date: 2023    Date     Visit Count 25 26 32 28    FOTO See IE       Pain In See IE       Pain Out                Testing        TUG/TUGC 09.02 no AD       30s chair 15       5xSTS 10.85       Gait speed 0.91 m/s no AD       FGA 21       2/6MWT 1008ft no AD       Neuro Re-Ed   6MWT, TUG,mTUG, 5xSTS, 30s chair, FGA, 10mWT     Dynamic balance Agility ladder step through 4 laps x1 solo    Agility ladder skip box 4 laps solo    Agility ladder in/out 2 laps solo Med hurdles solo lat step 4 laps RPE 8/10    bwd stepping solo 6 laps  Low hurdles lat step + red TB resisted 6 laps Hop agility ladder CGA weighted vest 4 laps    Static balance        Obstacle course Uphill/down foam wedge + 6' step narrow + uneven folded mat 8 laps solo 5lb R RPE 6-8/10 Uneven surface + hurdles fwd resisted red TB 10 laps solo    Uneven surface + hurdles 4 laps lateral step      Resisted ambulation Low hurdles + 6' step red TB 6 laps solo 5lb R RPE 6-8/10   25ft x6 red TB resistance weighted vest    Uneven surfaces        HKM on foam (// bars)        RLE forced use        Ther Ex        Hamstring 3 way stretch        SLR x 4        HR/TR        Mini Squats 2x6 weighted vest       Step ups        Plantar stretch pro-stretch 1min x2       Towel stretch right         Leg press                Ther Activity        ADL        Curb negotiation         Gait Training        No AD Weighted vest 5#R 3 laps long ortho side Weighted vest 3 laps long ortho side cues for fast as possible 5lb KB carry R Weighted vest Stairs 3 flights CGA RUE Weighted vest stairs 4#R stairs 3 flights RUE    SPC        Treadmill  Solo 10% 1.0mph 4min x1  Solo 11.5% 1.0mph RUE 4min x1, 2min x1 Solo 2. 2mph 1min interval x6 RUE    Head turns        Manuals        Plantar release        KT 50% arch support, paper off tension med/lat right ankle

## 2023-07-13 NOTE — PROGRESS NOTES
OCCUPATIONAL THERAPY RE-ASSESSMENT        23  Brian Guzman  1960  [id number]  Mark Hawthorne,*   Diagnosis ICD-10-CM Associated Orders   1. Posterior circulation stroke (HCC)  I63.50       2. Cerebrovascular accident (CVA), unspecified mechanism ([address])  I63.9             Assessment/Plan      SKILLED ANALYSIS:     Pt is a 60 y.o. male referred to Occupational Therapy s/p Posterior circulation stroke ([address]) [I63.50]. Pt participated in 22 sessions of skilled OT sessions, focusing on improving ROM, motor control and strength of RUE. Pt with increased I with ADL/IADLs. Pt with slow progress towards unmet goals. Pt continues with limited RUE ROM at shoulder and wrist due to pain. Pt received botox injection on 23 for pain management; pt reports no changes to pain levels after botox. Pt's medical team continues to work to address pain. RUE strength was unable to be assessed at this time due to RUE pain, mainly R shoulder. Pt with significant improvement with RUE gross grasp; see grid below for details. Pt with decreased pinch strength due to increased pain in RUE. Pt continues to present with the following areas of deficit: FMC/FMS, GMC/GMS, hand to target accuracy and in hand manipulation/dexterity impacting independece and completion of ADL/IADL and leisure tasks. Pt does demo the need for cont skilled Occupational Therapy services 2x/week for 12 weeks with focus on ADL re-training, IADL re-training, fxnl xfers, standing tolerance, standing balance, UE NMR, UE strengthening, UE endurance, FMC/GMC, DME training/education, family training/education, community re-integration and return to work simulation to address the goals as listed below. Pt in agreement with POC, POC to  23. Short Term Goals (to be achieved within 4 weeks):    1.  Pt will improve R hand motor control so as to be able to grasp/ release medium and large sized objects with min to no difficulty during ADLs/IADLs and leisure activities. MET - min difficulty with gross grasp   2. Pt will improve R hand motor control to be able to grasp/release small sized objects with min to no difficulty to increase function during ADLs/IADLs and leisure activities. PROGRESSING   3. Pt will use RUE as functional assist in 80% of functional tasks, to increase ease and independence with completion of ADLs/ IADLs and leisure tasks. MET  4. Pt will demo with G tolerance to supine, seated, and in stance exercise x 45 minutes with minimal rest breaks required for increased engagement in weekly exercise regimen and increased engagement in life roles MET  5. Pt will increase R UE rate of manipulation for all FM tests for improved functional performance/independence with functional tasks x 25% PREVIOUSLY MET - DECLINED DUE TO RUE PAIN; ONGOING  6. Pt will increase R UE strength to 4/5,  strength by 3-5 lbs and pinch strength by 1-2 pounds, through the use of strengthening exercises and home program for eventual return to IADLs and life roles. UNABLE TO ASSESS DUE TO PAIN  7. Pt will increase RUE rate of manipulation for all [place] tests, where RUE=LUE, for improved functional performance/ independence with functional tasks       PROGRESSING        Long Term Goals (to be achieved within 12 weeks):  1. Pt will increase R UE strength to 4+/5 and  strength R=L, through the use of strengthening exercises and home program UNABLE TO ASSESS DUE TO PAIN  2. Pt will improve RUE GMC/FMC, so as to be able to return to using RUE as dominant in 80% of daily functional tasks PROGRESSING   3. Pt will increase RUE prehension patterns for improved ability to manage clothing fasteners and to manage eating utensils with minimal difficulty PROGRESSING   4. Pt will demo with G carryover of Home Exercise Program to improve functional progression towards goals in Plan of care and for improved functional use of RUE PROGRESSING   5.  Pt will complete pre-driving assessments to provide feedback to physician to assist with determining if pt is able to return to driving. NOT MET           SUBJECTIVE      SUBJECTIVE: "It is very frustrating; I have the same pain even after botox, it didn't help."    PATIENT GOAL: "use of my hand" pt stating he wants to be able to eat with his RUE, would like to be able to drive  Decrease pain          HISTORY OF PRESENT ILLNESS:     Pt is a 60 y.o. male who was referred to Occupational Therapy s/p  Posterior circulation stroke ([address]) [I63.50]. Per Stanley Lujan MD [facility] dc note on 3/31:   HPI: Ar Lyons is a 60 y. o. right handed male with medical history of HTN who presented to [address] [place] [address] on 2/25 with nausea/dizziness. Found to have hypertensive emergency with acute/subcaute R posterior cerebellar CVA with 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, mild BRAULIO, and incidentally found + COVID (without evidence of respiratory illness/hypoxia/CXR findings).  Placed on cardene gtt, stroke pathway, ASA/Plavix, IV hydration for BRAULIO, and CTX for UTI. NIHSS 2. Symptoms began 2 days prior. Not tPA/TNK candidate.  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  NSx recommended [facility] and starting on heparin gtt and stopped Plavix. Repeat CTA on 2/26 stable with with R V3/4 occlusion. Not a candidate for interventional procedure due to clinical stability and risk.  Speech consulted and noted mod-severe oropharyngeal dysphagia recommended pureed/HTL. Unfortunately later on 2/26, he clinically deteriorated with increased R sided weakness, and was taken to IR for stenting of V3 and V4 with TICI 2B revascularization. CTH without ICH. He was admitted back to the ICU intubated - extubated 2/27. MRI showed cerebellar CVA and R > L stroke burden, with additional hypodensities on DWI appreciated L midbrain, pontine area and R lower medullary/spinal cord junction.  He was transitioned to triple therapy with DAPT (given recent stent) and A/C with eliquis 2.5mg BID due to COVID. Diet advanced to Level 3/NTL on 2/27. Noted to have spasticity in RLE - started on baclofen by Neurology. He was able to have cardene discontinued on 3/2, and they have been making adjustments to his oral regimen. He was started on Prozac for his mood. NSx has signed off. CTA H/N recommended around 3/17. Length of time on eliquis unclear. Admitted to [facility] on 3/6. Pt with an [facility] stay from 3/6-3/31. Pt was dc home 3/31. Pt sustained a fall the night of 3/31 trying to get onto the [facility]. Pt with fall onto his R side, without resulting injuries. Pt was checked at the hospital and dc home the same day.      PMH:   Past Medical History:   Diagnosis Date   • BRAULIO (acute kidney injury) ([address])    • B12 deficiency    • Celiac artery stenosis (HCC)    • Cerebellar infarct ([address]) 02/25/2023   • CKD (chronic kidney disease) stage 2, GFR 60-89 ml/min    • Essential hypertension    • Impaired fasting glucose    • Iron deficiency anemia    • Neurogenic bowel    • Stenosis of left vertebral artery    • Vertebral artery dissection (HCC)        Past Surgical Hx:   Past Surgical History:   Procedure Laterality Date   • ARTERY SURGERY      vertebral artery stent/revascularization   • IR STROKE ALERT  2/26/2023        HOME SETUP/ CLOF: lives with wife, dtr (25 yo) and ANGEL, and son (17yo); lives on [address] acres of property   2 SH; laundry on 1st floor; bedroom is on the 2nd floor but is sleeping on the first floor; powder room on 1st floor; full bath with tub combo  Work: ; was working 40+ hours; 6 days a week  (+) driving  ADLs: I in all aspects prior to CVA  Wife did IADLs of cooking, laundry, grocery shopping  Pt did all outside work, including mowing the lawn   Animals: 2 dogs, 2 cats upstairs and 4 cats downstairs; used to walk the dogs in the morning; family completing pet care   Physical activity: outdoor yard work      Current Status:  DME: w/c, BSC, hemiwalker, transfer tub bench    ADLs:   UBB/D: I   LBD: I with pants, underwear; now mostly I   LBB: I  Clothing fasteners: I  Grooming: using L hand to brush teeth but has been attempting to use RUE for all groom tasks    Bed mobility: can get in/out of bed by self  Transfers:  I  Mobility: DS/I with SPC     Pain Levels: Pain in head (9/10)   Ankle (locks up and is unable to move; brief increased pain levels   Wrist locks up in the morning with increased pain levels   Shoulder 7/10 w/o activity       OBJECTIVE         PHYSICAL ASSESSMENT        RUE LUE Comments                 UPPER EXTREMITY FXN Impaired Intact Dominant Hand: Right                  UE ROM LUE PROM  LUE AROM  RUE PROM  RUE AROM COMMENTS   Shoulder Flex WNL WNL  1/2 range (was WNL on eval)  1/2 range (was 3/4 range at eval)  Limited due to pain    Shoulder Ext WNL  WNL  WNL WNL    Shoulder ABD WNL  WNL  Less than 1/2 range (was WNL at eval) Less than 1/2 range (was 3/4 range at eval)  Limited due to pain    Horizontal ABD WNL  WNL  WNL WNL    Horizontal ADD WNL  WNL  WNL WNL    Shoulder IR  WNL  WNL  WNL WFL    Shoulder ER WNL  WNL 3/4 range ( was WNL) 1/2 range (was [facility] at eval)  Limited due to pain    Elbow Flex WNL  WNL WNL WFL    Elbow Ext  WNL  WNL WNL WNL    Wrist flexion WNL  WNL 3/4 (was WNL) 3/4 (was WNL)    Wrist Ext WNL  WNL  WNL ( was 1/2 range) WNL ( was 1/2 range) Limited due to pain    Supination WNL  WNL 3/4 range  3/4 range Limited due to pain    Pronation WNL  WNL WNL WNL    Finger Flex WNL  WNL 3/4 range (was WNL at eval)  1/2 range  Limited due to pain    Finger Ext WNL  WNL WNL WNL    Opposition  WNL  WNL WNL WNL                               MMT  LUE RUE   Comments   Elevation 5/5 4+/5 See comment below    Shoulder Flex/Ext 5/5 4/5 (was 4-/5)    Shoulder Abd 5/5 4/5 (was 4-/5)    Shoulder Add 5/5 4/5 (was 4-/5)    Shoulder ER 5/5 4- (was 3+/5)    Shoulder IR 5/5 4/5 (was 4-/5)    Elbow Flex 5/5 4/5 (was 4-/5)    Elbow Ext 5/5 4/5 (was 4-/5)    Wrist Flex 5/5 4/5 (was 4-/5)    Wrist Ext 5/5 4/5    Gross Grasp 5/5 4-/5      COMMENT: Not retested at this time due to pain in the shoulder                   /Pinch Strength  MICAH VALERA    Comments   Dynamometer      - Gross Grasp 90lbs   (was 80 lbs) 19lbs  (was 0lbs)    Pinch Meter       - PINCER 18 lbs  5 lbs     - 3 JAW MUNDO 21 lbs 5 lbs (was 6)      - LATERAL 21 lbs (was 24)  8 lbs (was 10)       SENSATION LUE RUE Comments   Sharp Dull  Intact Mild Impaired (was impaired)    Proprioception Intact Intact    Pain Intact Intact (was impaired)    Hot/Cold Temp Intact Mild Impaired  (was impaired)      COMMENTS: decreased sensation on RLE and upper head     COORDINATION LULORETTA VALERA    9 Hole Peg Test  26 sec (was 30sec at eval) 58 sec (was 55 sec) Below average for RUE    Limited due to pain    O'Juanjose Finger Dexterity Test   4 min 5 seconds (was 4 min 32 sec (top 3 rows)    Handwriting (Print)   G legibility  G tripod grasp   Handwriting (script)   G- legibility         Keyhole   L: 1 min 47 seconds   R:  8 min 3 seconds  (was 7 min 50 seconds -6/13)       MODIFIED HERIBERTO SCALE (TONE) MICAH VLAERA    No increase in muscle tone (0) 0 0    Slight Increase in muscle tone with catch and release or min resist at end range (1)      Slight Increase in muscle tone with catch and release, followed by min resistance through remainder of range (1+)      Increased muscle tone through full range, able to be moved easily (2)      Considerable increase in tone, difficult to move (3)      Rigid in Flexion/Extension (4)                  COGNITIVE ASSESSMENT    Memory: Intact  Attention: Intact  Executive Functions: Intact  Communication: Intact   Processing: Intact           VISUAL ASSESSMENT      Comments: (+) glasses    Vision Screen     Accommodation Intact   Convergence Intact   Pursuits Intact   Saccades Intact   ROM Intact            PLANNED THERAPY INTERVENTIONS:  Therapeutic exercise  Therapeutic activity  ADL/IADL re-training   NMRE  Supine, seated, and in stance neuro re-ed  FMC/prehension  Manual tx  Hand to target  Sensory re-ed   WBearing strategies   Closed chain activities  Open chain activities       INTERVENTION COMMENTS:  Diagnosis: Posterior circulation stroke ([address]) [I63.50]  Precautions: fall risk, R side weakness   Insurance: Payor: Natalio Kuhn / Plan: Natalio Kuhn PPO / Product Type: PPO /   Visit: 22   PN due 8/10/23

## 2023-07-18 ENCOUNTER — OFFICE VISIT (OUTPATIENT)
Facility: CLINIC | Age: 63
End: 2023-07-18
Payer: COMMERCIAL

## 2023-07-18 ENCOUNTER — EVALUATION (OUTPATIENT)
Facility: CLINIC | Age: 63
End: 2023-07-18
Payer: COMMERCIAL

## 2023-07-18 DIAGNOSIS — I63.50 POSTERIOR CIRCULATION STROKE (HCC): Primary | ICD-10-CM

## 2023-07-18 DIAGNOSIS — I63.9 CEREBROVASCULAR ACCIDENT (CVA), UNSPECIFIED MECHANISM (HCC): ICD-10-CM

## 2023-07-18 PROCEDURE — 97116 GAIT TRAINING THERAPY: CPT

## 2023-07-18 PROCEDURE — 97112 NEUROMUSCULAR REEDUCATION: CPT

## 2023-07-18 NOTE — PROGRESS NOTES
Daily Note     Today's date: 2023  Patient name: Karo Bonilla  : 1960  MRN: [id number]  Referring provider: Elpidio Quezada  Dx:   Encounter Diagnosis     ICD-10-CM    1. Posterior circulation stroke ([address])  I63.50           Start Time: 1500  Stop Time: 1545  Total time in clinic (min): 45 minutes    Subjective: Blood pressure been good recently. Seeing PCP tomorrow for BP check. Sleeping a little better taking valium. Notes soreness in calf muscles after last time. Soreness is gone now. Objective: See treatment diary below  Post session LUE seated: 159/80    Assessment: Pt showing good response to treadmill trials, challenging him with limb adv with addition of 7.5lb ankle weight. Occasionally shows excessive L side lean to clear higher moose, but successful with lifting and foot placement with hurdles. Added trampoline to develop propulsion and explosiveness, where pt reports some calf fatigue and shortness of breath. Mild instability on foam with occasional stepping strategy but maintains balance independently. Patient would benefit from continued PT      Plan: Continue per plan of care. Propulsion, foot clearance. Obstacle negotiation. Plyometric.      Precautions: Falls,   Re-eval Date: 2023    Date    Visit Count 25 26 32 28 34   FOTO See IE       Pain In See IE       Pain Out                Testing        TUG/TUGC 09.02 no AD       30s chair 15       5xSTS 10.85       Gait speed 0.91 m/s no AD       FGA 21       2/6MWT 1008ft no AD       Neuro Re-Ed   6MWT, TUG,mTUG, 5xSTS, 30s chair, FGA, 10mWT     Dynamic balance Agility ladder step through 4 laps x1 solo    Agility ladder skip box 4 laps solo    Agility ladder in/out 2 laps solo Med hurdles solo lat step 4 laps RPE 8/10    bwd stepping solo 6 laps  Low hurdles lat step + red TB resisted 6 laps Hop agility ladder CGA weighted vest 4 laps 30" x2 trampoline LUE hold    Weighted vest CGA 4 med hurdles 10 laps    Foam beam low hurdles 6 laps CGA weighted vest   Static balance        Obstacle course Uphill/down foam wedge + 6' step narrow + uneven folded mat 8 laps solo 5lb R RPE 6-8/10 Uneven surface + hurdles fwd resisted red TB 10 laps solo    Uneven surface + hurdles 4 laps lateral step      Resisted ambulation Low hurdles + 6' step red TB 6 laps solo 5lb R RPE 6-8/10   25ft x6 red TB resistance weighted vest    Uneven surfaces        HKM on foam (// bars)        RLE forced use        Ther Ex        Hamstring 3 way stretch        SLR x 4        HR/TR        Mini Squats 2x6 weighted vest       Step ups        Plantar stretch pro-stretch 1min x2       Towel stretch right         Leg press                Ther Activity        ADL        Curb negotiation         Gait Training        No AD Weighted vest 5#R 3 laps long ortho side Weighted vest 3 laps long ortho side cues for fast as possible 5lb KB carry R Weighted vest Stairs 3 flights CGA RUE Weighted vest stairs 4#R stairs 3 flights RUE    SPC        Treadmill  Solo 10% 1.0mph 4min x1  Solo 11.5% 1.0mph RUE 4min x1, 2min x1 Solo 2. 2mph 1min interval x6 RUE Solo 1. 1mph 10% 7. 5#R 3min x2 LUE    Solo 1. 6mph 2min x1 7. 5#R   Head turns        Manuals        Plantar release        KT 50% arch support, paper off tension med/lat right ankle

## 2023-07-18 NOTE — PROGRESS NOTES
Occupational Therapy Daily Note    Today's date: 2023  Patient name: Nikki Rojas  : 1960  MRN: [id number]  Referring provider: Oliva Shone  Dx:   Encounter Diagnoses   Name Primary? • Posterior circulation stroke ([address]) Yes   • Cerebrovascular accident (CVA), unspecified mechanism ([address])          Subjective: "My pain is still the same, it hasn't changed." level 7/10        Objective: Pt engaged in skilled OT treatment session with focus on UE NMR, UE strengthening, UE endurance and FMC/GMC to increase engagement, endurance, tolerance, and independence with daily ADL and IADL tasks. CPT Code Minutes                                           Task Details        Therapeutic Activity                                        Neuro Re-Ed  Pt engaged in a session focused on NMRE. Pt engaged in an activity focused on scissor skills, [facility], target placement,  in-hand manipulation, BUE coordination of his RUE. [facility]:   Pt with min/mod difficulty with [facility] of R hand. Pt with G BUE coordination manipulating small Velcro pieces. Pt able to obtain thin objects from small bag with min difficulty. Pt with G scissors skills using dominant hand; pt with noted G ability to cut on straight line. Target Placement:   Pt with G target placement of small Velcro pieces to small target. Pt with G target accuracy and consistency of placement; Noted difficulty with R hand fatigue. Therapeutic Exercise  Pt reporting he has been stretching RUE at home daily or every other day. TESTING    O'Juanjose Finger Dexterity Test   R: 6 min 49 sec (top 5 rows)   Improved from / (3 rows; 4 min 32 sec)      Keyhole   L: 1 min 47 seconds   R: 7 min 50 seconds         HEP  23: Provided pt with [facility] activity to do at home to improve finger ROM ab/adduction.     23: AAROM exercises, functional reaching exercises    : [facility] of thumb to alternating fingers, transitioning from pad to pad to fingertip to fingertip    4/28: provided pt with Andres Bentley activity as HEP to improve finger ROM into ab/adduction         Assessment: Tolerated treatment well. Pt presents with improving Andres Bentley. Pt demonstrated G precision to small target. Pt continues with pain limiting his functional use of RUE. Patient would benefit from continued skilled OT.     Plan: Continued skilled OT per POC    INTERVENTION COMMENTS:  Diagnosis: Posterior circulation stroke ([address]) [I63.50]  Precautions: fall risk, R side weakness   Insurance: Payor: Elan Verma / Plan: Elan Verma PPO / Product Type: PPO /   23 visits  PN due 8/10/23

## 2023-07-19 DIAGNOSIS — I10 PRIMARY HYPERTENSION: ICD-10-CM

## 2023-07-19 RX ORDER — AMLODIPINE BESYLATE 2.5 MG/1
TABLET ORAL
Qty: 90 TABLET | Refills: 2 | Status: SHIPPED | OUTPATIENT
Start: 2023-07-19

## 2023-07-20 ENCOUNTER — APPOINTMENT (OUTPATIENT)
Facility: CLINIC | Age: 63
End: 2023-07-20
Payer: COMMERCIAL

## 2023-07-20 ENCOUNTER — OFFICE VISIT (OUTPATIENT)
Dept: FAMILY MEDICINE CLINIC | Facility: CLINIC | Age: 63
End: 2023-07-20
Payer: COMMERCIAL

## 2023-07-20 ENCOUNTER — OFFICE VISIT (OUTPATIENT)
Facility: CLINIC | Age: 63
End: 2023-07-20
Payer: COMMERCIAL

## 2023-07-20 VITALS
SYSTOLIC BLOOD PRESSURE: 120 MMHG | TEMPERATURE: 97.7 F | WEIGHT: 193.4 LBS | DIASTOLIC BLOOD PRESSURE: 58 MMHG | OXYGEN SATURATION: 98 % | HEART RATE: 62 BPM | BODY MASS INDEX: 28.64 KG/M2 | HEIGHT: 69 IN

## 2023-07-20 DIAGNOSIS — I10 PRIMARY HYPERTENSION: Primary | ICD-10-CM

## 2023-07-20 DIAGNOSIS — I69.351 SPASTIC HEMIPARESIS OF RIGHT DOMINANT SIDE AS LATE EFFECT OF CEREBRAL INFARCTION (HCC): ICD-10-CM

## 2023-07-20 DIAGNOSIS — J32.9 SINUSITIS, UNSPECIFIED CHRONICITY, UNSPECIFIED LOCATION: ICD-10-CM

## 2023-07-20 DIAGNOSIS — I63.50 POSTERIOR CIRCULATION STROKE (HCC): Primary | ICD-10-CM

## 2023-07-20 DIAGNOSIS — G50.0 TRIGEMINAL NEURALGIA OF RIGHT SIDE OF FACE: ICD-10-CM

## 2023-07-20 DIAGNOSIS — I63.9 CEREBROVASCULAR ACCIDENT (CVA), UNSPECIFIED MECHANISM (HCC): ICD-10-CM

## 2023-07-20 PROCEDURE — 97110 THERAPEUTIC EXERCISES: CPT

## 2023-07-20 PROCEDURE — 97116 GAIT TRAINING THERAPY: CPT

## 2023-07-20 PROCEDURE — 99213 OFFICE O/P EST LOW 20 MIN: CPT | Performed by: FAMILY MEDICINE

## 2023-07-20 RX ORDER — LORATADINE 10 MG/1
10 TABLET ORAL DAILY
COMMUNITY
Start: 2023-07-15

## 2023-07-20 RX ORDER — AMOXICILLIN AND CLAVULANATE POTASSIUM 875; 125 MG/1; MG/1
1 TABLET, FILM COATED ORAL 2 TIMES DAILY
COMMUNITY
Start: 2023-07-15

## 2023-07-20 NOTE — PROGRESS NOTES
Name: Mckenzie Pruett      : 1960      MRN: [id number]  Encounter Provider: Shane Arrington DO  Encounter Date: 2023   Encounter department: [address] [place] [address]     1. Primary hypertension  bp is at goal.   Continue meds as he is taking. 2. Sinusitis, unspecified chronicity, unspecified location  On augmentin as prescribed by oral  Medicine specialist (Dr. Viviana Pascual)  Also taking claritin  Encouraged to follow through with ENT eval  3. Cerebrovascular accident (CVA), unspecified mechanism ([address])  -     Ambulatory Referral to Pain Management; Future  Stable  Soon completing PT. Has f/u with neurology next week. 4. Spastic hemiparesis of right dominant side as late effect of cerebral infarction ([address])    5. Trigeminal neuralgia of right side of face  -     Ambulatory Referral to Pain Management; Future  On oxcarbazepine and still having pain in right side of face. To f/u with neurology next week. Pain management referral placed as well. Subjective     HPI   Patient is a 60year old male with hypertension, impaired fasting glucose, history of posterior circulation stroke, spastic hemiparesis of right side, trigeminal neuralgia being seen today for f/u on his hypertension. Patient was seen last week for concern regarding elevated blood pressure at another physician's office. bp here in our office was at goal so no changes were made in his medication regimen. Wife was advised to check bp in ambulatory setting, record and bring readings along to today's visit. She has not been checking because she gets upset if readings are elevated. She was also advised to bring their machine along to today's visit for comparison purposes. Since I had seen him last, the "oral medicine specialist", Dr. Viviana Pascual, put patient on augmentin 875 mg bid and claritin for "sinusitis".      I had referred him to see ENT at time of last visit for sinus disease seen on prior imaging in February. Has not scheduled this yet. bp in his cuff in office today was 114/64. He sees neurology next week. The oral medicine specialist had suggested that they speak with neurology about doing MRI with contrast with attention to the trigeminal nerve. Wife is wondering about seeing pain management for the post stroke trigeminal nerve pain.      Review of Systems    Past Medical History:   Diagnosis Date   • BRAULIO (acute kidney injury) ([address])    • B12 deficiency    • Celiac artery stenosis (HCC)    • Cerebellar infarct ([address]) 2023   • CKD (chronic kidney disease) stage 2, GFR 60-89 ml/min    • Essential hypertension    • Impaired fasting glucose    • Iron deficiency anemia    • Neurogenic bowel    • Stenosis of left vertebral artery    • Vertebral artery dissection (HCC)      Past Surgical History:   Procedure Laterality Date   • ARTERY SURGERY      vertebral artery stent/revascularization   • IR STROKE ALERT  2023     Family History   Problem Relation Age of Onset   • Hypertension Brother    • Hypertension Brother      Social History     Socioeconomic History   • Marital status: /Civil Union     Spouse name: None   • Number of children: None   • Years of education: None   • Highest education level: None   Occupational History   • None   Tobacco Use   • Smoking status: Former     Packs/day: 1.00     Years: 5.00     Total pack years: 5.00     Types: Cigarettes     Quit date: 1983     Years since quittin.3   • Smokeless tobacco: Never   Vaping Use   • Vaping Use: Never used   Substance and Sexual Activity   • Alcohol use: Not Currently   • Drug use: Never   • Sexual activity: Not Currently     Partners: Female   Other Topics Concern   • None   Social History Narrative        2 children    Works as an  (self employed)     Social Determinants of Health     Financial Resource Strain: Not on file   Food Insecurity: Not on file   Transportation Needs: Not on file   Physical Activity: Not on file   Stress: Not on file   Social Connections: Not on file   Intimate Partner Violence: Not on file   Housing Stability: Not on file     Current Outpatient Medications on File Prior to Visit   Medication Sig   • amLODIPine (NORVASC) 2.5 mg tablet TAKE 1 TABLET BY MOUTH EVERY DAY   • amoxicillin-clavulanate (AUGMENTIN) 875-125 mg per tablet Take 1 tablet by mouth 2 (two) times a day   • atorvastatin (LIPITOR) 40 mg tablet TAKE 1 TABLET BY MOUTH DAILY WITH DINNER   • baclofen 10 mg tablet Take 1 tablet in the AM, 1 tablet midday, and 1.5 tablets at night by mouth. • carvedilol (COREG) 25 mg tablet TAKE 1 TABLET BY MOUTH TWICE A DAY WITH FOOD WITH MEALS   • CVS Aspirin Adult Low Dose 81 MG chewable tablet CHEW 1 TABLET BY MOUTH DAILY   • [facility]S Vitamin B-12 1000 MCG tablet TAKE 1 TABLET BY MOUTH EVERY DAY   • diazepam (VALIUM) 5 mg tablet Take 0.5 tablets (2.5 mg total) by mouth daily at bedtime as needed for muscle spasms   • ferrous sulfate 325 (65 Fe) mg tablet TAKE 1 TABLET BY MOUTH EVERY DAY WITH BREAKFAST   • FLUoxetine (PROzac) 20 mg capsule TAKE 1 CAPSULE BY MOUTH EVERY DAY   • hydrALAZINE (APRESOLINE) 50 mg tablet TAKE 1 TABLET BY MOUTH EVERY 12 HOURS. • loratadine (CLARITIN) 10 mg tablet Take 10 mg by mouth daily   • losartan (COZAAR) 100 MG tablet TAKE 1 TABLET BY MOUTH EVERY DAY   • OXcarbazepine (TRILEPTAL) 150 mg tablet TAKE 2 TABLETS BY MOUTH 2 TIMES A DAY. • ticagrelor (BRILINTA) 90 MG Take 1 tablet (90 mg total) by mouth every 12 (twelve) hours     No Known Allergies  Immunization History   Administered Date(s) Administered   • COVID-19 MODERNA VACC 0.5 ML IM 01/21/2021, 03/01/2021, 11/29/2021       Objective     /58   Pulse 62   Temp 97.7 °F (36.5 °C)   Ht 5' 9" (1.753 m)   Wt 87.7 kg (193 lb 6.4 oz)   SpO2 98%   BMI 28.56 kg/m²     Physical Exam  Vitals and nursing note reviewed. Constitutional:       General: He is not in acute distress. Appearance: Normal appearance. He is not ill-appearing, toxic-appearing or diaphoretic. HENT:      Head: Normocephalic and atraumatic. Right Ear: Tympanic membrane normal.      Mouth/Throat:      Mouth: Mucous membranes are moist.      Pharynx: No oropharyngeal exudate or posterior oropharyngeal erythema. Eyes:      Conjunctiva/sclera: Conjunctivae normal.   Cardiovascular:      Rate and Rhythm: Normal rate and regular rhythm. Heart sounds: No murmur heard. Pulmonary:      Effort: Pulmonary effort is normal.      Breath sounds: Normal breath sounds. Neurological:      Mental Status: He is alert.    Psychiatric:         Mood and Affect: Mood normal.       Isabella Mccarty,

## 2023-07-20 NOTE — PROGRESS NOTES
Daily Note / Unplanned discharge     Today's date: 2023  Patient name: Bandar Man  : 1960  MRN: [id number]  Referring provider: Irving Crespo  Dx:   Encounter Diagnosis     ICD-10-CM    1. Posterior circulation stroke ([address])  I63.50           Start Time: 1500  Stop Time: 1542  Total time in clinic (min): 42 minutes    Subjective: Pt discouraged that insurance is not allowing more visits. Seeing Dr. Anastacio Bansal next tuesday. Objective: See treatment diary below    Assessment: Discussed transitioning to fitness with going through appropriate program for him. He demonstrates good use of machines, treadmill, and parallel bars as well as idea of program, will provide written program when he returns for fitness. Discussed appropriate intensity as well as safety precautions needed. PT goals not all met due to insurance visits limiting PT. Unplanned discharge due to lacking insurance visits, as patient would benefit from further PT. Discharged to home program until futher notice. Plan: DC PT until further notice from insurance about visits. Pt will transition to fitness program, discussed safety parameters.      Precautions: Falls,   Re-eval Date: 2023    Date        Visit Count 30       FOTO See IE       Pain In See IE       Pain Out                Testing        TUG/TUGC 09.02 no AD       30s chair 15       5xSTS 10.85       Gait speed 0.91 m/s no AD       FGA 21       2/6MWT 1008ft no AD       Neuro Re-Ed        Dynamic balance        Static balance        Obstacle course        Resisted ambulation Lateral step GTB // bar 8 laps       Uneven surfaces        HKM on foam (// bars)        RLE forced use        Ther Ex        Hamstring 3 way stretch        SLR x 4        HR/TR        Mini Squats        Step ups // bar 6' fwd 3x10, 2x10 lat       Plantar stretch pro-stretch        Towel stretch right         Leg press 145lb x10  165lb 2x10               Ther Activity        ADL Curb negotiation         Gait Training        No AD        SPC        Treadmill TM 1. 3mph 5% 5min supervision - instruction on use of treadmill       Head turns        Manuals        Plantar release        KT 50% arch support, paper off tension med/lat right ankle

## 2023-07-20 NOTE — PROGRESS NOTES
7/20/23: pt was scheduled for an OT visit today, however pt used up outpatient benefit and insurance did not approve any further visits. Spent 10 minutes with pt and wife discussing d/c. Spoke with pt and pt's wife concerning HEP of continuing AROM exercises, abhishek in supine, to allow for improved shoulder ROM within pain levels; pt was provided with [facility] HEP to improve isolated finger movement and prehension patterns. Pt and wife with G understanding. Encouraged pt and wife to speak with the doctor about pt's continued sh and wrist pain, which is severely limiting AROM and motor return of his RUE. Pt and wife with G understanding. D/C skilled OT at this time.

## 2023-07-24 ENCOUNTER — TELEPHONE (OUTPATIENT)
Dept: NEUROLOGY | Facility: CLINIC | Age: 63
End: 2023-07-24

## 2023-07-24 NOTE — TELEPHONE ENCOUNTER
MSW phoned [facility] at [phone] and spoke with John Bernstein. She stated that patient had used 60 visits between PT/OT/ST this calendar year, but that patient's insurance has a "hard max" so he cannot qualify for additional sessions. MSW inquired if they offer any kind of maintenance therapy. John Bernstein stated that they do have an exercise fitness program but she is not sure if patient would be a candidate since he would need to work on his own. John Bernstein took down this writer's name/number and advised that she would have Cherylene Iba, PT/[facility] call this writer pn 7/25 to discuss if this would be an option. John Bernstein stated that the cost would be $35 per month if patient would be a candidate for the exercise fitness program.     MSW will await callback from PT/OT.

## 2023-07-24 NOTE — TELEPHONE ENCOUNTER
----- Message from Chloe Anaya MD sent at 7/24/2023  8:11 AM EDT -----  Regarding: PT coverage? Hello,    Patient has used all his PT days - was exploring options with family re: maintenance therapies? They were wondering how they could extend his PT. In the past we got him with maintenance therapies through [facility], but would like to keep him in [facility] if possible.     Thanks in advance,  Nathalia Briscoe MD  Physical Medicine and Rehabilitation

## 2023-07-25 ENCOUNTER — OFFICE VISIT (OUTPATIENT)
Dept: NEUROLOGY | Facility: CLINIC | Age: 63
End: 2023-07-25
Payer: COMMERCIAL

## 2023-07-25 VITALS
SYSTOLIC BLOOD PRESSURE: 168 MMHG | DIASTOLIC BLOOD PRESSURE: 86 MMHG | OXYGEN SATURATION: 98 % | HEART RATE: 60 BPM | TEMPERATURE: 98.6 F | BODY MASS INDEX: 28.68 KG/M2 | WEIGHT: 193.6 LBS | HEIGHT: 69 IN

## 2023-07-25 DIAGNOSIS — I69.351 SPASTIC HEMIPARESIS OF RIGHT DOMINANT SIDE AS LATE EFFECT OF CEREBRAL INFARCTION (HCC): ICD-10-CM

## 2023-07-25 DIAGNOSIS — I63.9 CEREBROVASCULAR ACCIDENT (CVA), UNSPECIFIED MECHANISM (HCC): Primary | ICD-10-CM

## 2023-07-25 DIAGNOSIS — G50.0 TRIGEMINAL NEURALGIA OF RIGHT SIDE OF FACE: ICD-10-CM

## 2023-07-25 PROCEDURE — 99215 OFFICE O/P EST HI 40 MIN: CPT | Performed by: PHYSICAL MEDICINE & REHABILITATION

## 2023-07-25 NOTE — PROGRESS NOTES
Review of Systems   Constitutional: Negative for appetite change, fatigue and fever. HENT: Negative. Negative for hearing loss, tinnitus, trouble swallowing and voice change. Eyes: Negative. Negative for photophobia, pain and visual disturbance. Respiratory: Negative. Negative for shortness of breath. Cardiovascular: Negative. Negative for palpitations. Gastrointestinal: Negative. Negative for nausea and vomiting. Endocrine: Negative. Negative for cold intolerance. Genitourinary: Negative. Negative for dysuria, frequency and urgency. Musculoskeletal: Negative for back pain, gait problem, myalgias and neck pain. Skin: Negative. Negative for rash. Allergic/Immunologic: Negative. Neurological: Negative. Negative for dizziness, tremors, seizures, syncope, facial asymmetry, speech difficulty, weakness, light-headedness, numbness and headaches. Hematological: Negative. Does not bruise/bleed easily. Psychiatric/Behavioral: Negative. Negative for confusion, hallucinations and sleep disturbance. All other systems reviewed and are negative.

## 2023-07-25 NOTE — PATIENT INSTRUCTIONS
Keep baclofen dosing the same for now  Can try to take Valium 2.5mg in the morning and once at night. Will plan to increase dose of botox at next visit. I will speak to Neurology about the trigeminal neuralgia.

## 2023-07-25 NOTE — PROGRESS NOTES
Physical Medicine & Rehabilitation Spasticity Follow-up/Procedure  Taras Cruz 60 y.o. male    ASSESSMENT/PLAN:   Diagnoses and all orders for this visit:    Cerebrovascular accident (CVA), unspecified mechanism ([address])  -     MRI brain IAC wo and w contrast; Future    Trigeminal neuralgia of right side of face  -     MRI brain IAC wo and w contrast; Future    Spastic hemiparesis of right dominant side as late effect of cerebral infarction [facility])      Mr. Angela Whitaker is a very pleasant 61 yo M who is very well known to me, who I took care of on the [facility] after his cerebellar CVA and R > L stroke burden, with additional hypodensities on DWI appreciated L midbrain, pontine area and R lower medullary/spinal cord junction. He has developed R spastic hemiparesis (Although has had remarkable recovery in regards to strength on that R side), neurogenic bowel (with some incontinence during rehab which has improved), and now has developed R facial pain/sensitivity with pain, as well as spasms/extrinsic tone and worsening intrinsic tonic tone in his RUE. He is here for midpoint evaluation after his first round of botulinum toxin.     1. Spastic hemiparesis:  - Goals would be to help primarily with spasms and pain  - He did have some weakness in his TA, I may focus more on his EHL and increase the dose in that muscle today. He had improved PROM in his ankle at this visit. - In his RUE, I suspect I will need to significantly increase the dose to his lateral pec. - His wrist tone has also improved as of today, but he continues to get spasms. He also has some FDP/FDS tone. Will re-eval at next visit to see if these may need injection as that can contribute to the pain he is having in his forearm. - Will request 400 units for his next round of botulinum toxin. - I suspect I will need to increase Baclofen to 15mg TID, but for now continue current dosing of 10-10-15mg.  - I will increase Valium to 2.5mg BID PRN.  They will monitor for drowsiness. Reviewed risks/benefits/ potential adverse effects with him and his wife and they are in agreement with this, as they find the Valium does provide notable relief. - Would not make any other adjustments so I can see response to his valium. Reviewed PDMP. Using appropriately. 2. Severe facial pain, likely a component of central sensitization. Trigeminal Neuralgia vs. Central Post Stroke Pain. This is managed by Neurology  - Discussed with case with Dr. Karlie Crenshaw in the office. He recommended MRI Brain IAC with cuts to look at trigeminal nerve. Ordered. - He will see them on 7/27 at 11:00AM to evaluate for possible blocks - would target V2 maybe V1 based on patient's complaints. - Continue current oxcarbazepine being managed by Neuro  - He was only ever on a very low dose of gabapentin - this could be retrialed. I would also recommend considering switching prozac to cymbalta as this may help with his pain and also help with his mood, as he is very tearful at times.      I will follow-up with him 9/21/2023 at 2:30pm for repeat botulinum toxin injections. ?  *I have spent 75 minutes with Patient and family today in which greater than 50% of this time was spent in counseling/coordination of care regarding Prognosis, Risks and benefits of tx options, Instructions for management, Patient and family education, Importance of tx compliance, Impressions, Counseling / Coordination of care, Documenting in the medical record, Reviewing / ordering tests, medicine, procedures   and Communicating with other healthcare professionals . HPI/SUBJECTIVE:  Patient presents today in the office with chief complaint of continued wrist and shoulder pain, spasms in his foot. He also continues to have significant R sided facial pain 24/7. The valium does help him get sleep at night and help with the spasms. He's not as sure about the Baclofen.  He has decreased range of motion and increased difficulty moving that shoulder. He reports he saw a dental facial pain specialist who recommended that he get an MRI to look at his trigeminal nerve. His pain is mostly around his cheek bone. He is here for his midpoint visit after his first round of botulinum toxin. Last seen for chemodenervation: 6/21/2023  Results of chemodenervation: Inadequate improvement  How long did effects last: N/A  Side affect/Adverse Effects: none    Any issues with driving, swallowing, appetite: Does not drive. No new issues with swallowing/appetite  Stretching/Exercise Program: Ran out of PT. Currently looking into other options. [facility] also offers an athletic center, and his physical therapist told him that he would provide a handout to bring to the trainers there. It is about $35/month. They would pursue this if insurance is not willing to cover additional therapy. They are already paying a $40 copay for each visit. Currently in therapy: Ending. Any falls with significant injuries: None  Any new weakness: None  Any changes to care since last seen: None  Safe at home: Yes  Any oral meds:  Baclofen [id number]  Valium 2.5mg QHS  On anticoagulation/blood thinners: ASA, Brilinta  Current functional status:  Lives with spouse in [place] [facility] with 1 [place], Worked full time as an  prior to his stroke. Not currently driving. Walks independent, doesn't use an assistive device consistently, when he does it is a cane. More or less independent with ADLs, and many IADLs  Limited by extent of pain he is experiencing. ROS: A 10 point review of systems was negative except for what is noted in the HPI. Imaging: I have personally reviewed imaging with results as follows: No new pertinent imaging.     OBJECTIVE:   /86 (BP Location: Left arm, Patient Position: Sitting, Cuff Size: Adult)   Pulse 60   Temp 98.6 °F (37 °C) (Temporal)   Ht 5' 9" (1.753 m)   Wt 87.8 kg (193 lb 9.6 oz)   SpO2 98%   BMI 28.59 kg/m²     Gen: No acute distress, Well-nourished, well-appearing. HEENT: Moist mucus membranes, Normocephalic/Atraumatic  Cardiovascular: Regular rate, rhythm, S1/S2. Distal pulses palpable  Heme/Extr: No edema  Pulmonary: Non-labored breathing. : No barnett  GI: Soft, non-tender, non-distended. BS+  MSK:   R shoulder with PROM limited to full adduction to less than 90 degrees  Full PROM in R elbow  Full PROM in R Wrist  Full PROM in fingers  Ankle with some improved DF/PF range of motion. Full PROM in R knee. Integumentary: Skin is warm, dry. Psych: Normal mood and affect. Neuro: AAOx3, R upper face a bit flushed, no tearing. Hypersensitivity V2 > V1 branch trigeminal. Speech is intelligible and appropriate to questioning. Gait is stiff, but able to clear and advance with adequate zachariah.    MMT:   Strength:   Right  Left  Site  Right  Left  Site    4- 5  S Ab: Shoulder Abductors  4  5  HF: Hip Flexors    4 5  EF: Elbow Flexors  5  5 KF: Knee Flexors    4+  5  EE: Elbow Extensors  5  5  KE: Knee Extensors    5  5  WE: Wrist Extensors  4+  5  DR: Dorsi Flexors    3+  5  FF: Finger Flexors  4  5  PF: Plantar Flexors    4  5  HI: Hand Intrinsics  NA  5  EHL: Extensor Hallucis Longus     MAS:  Right  Left  Site  Right  Left  Site    2 0  Shoulder 1+ 0  Hip Adductors   2 0  EF: Elbow Flexors  1+ 0 KF: Knee Flexors    1+  0  EE: Elbow Extensors  2  0  KE: Knee Extensors    1/0 0/0  WF/WE 1  0  DR: Dorsi Flexors    1+  0  FF: Finger Flexors  1+  0  PF: Plantar Flexors    0  0  HI: Hand Intrinsics  0/0  0/0  Toe Flex/Ex   Clonus on right    MAS  0 - no increased tone  1 - slight increase in tone at the end of the ROM  1+ increase in tone at 1/2 the ROM  2 - increase in tone through most the ROM  3 - moderate increase in muscle tone - passive movement difficult  4 - affected parts ridid in flexion or extension    SPASMS observed:   RUE: no   LUE: no  RLE: no   LLE: no      The following portions of the patient's history were reviewed and updated as appropriate: allergies, current medications, past family history, past medical history, past social history, past surgical history and problem list.      Current Outpatient Medications:   •  amLODIPine (NORVASC) 2.5 mg tablet, TAKE 1 TABLET BY MOUTH EVERY DAY, Disp: 90 tablet, Rfl: 2  •  amoxicillin-clavulanate (AUGMENTIN) 875-125 mg per tablet, Take 1 tablet by mouth 2 (two) times a day, Disp: , Rfl:   •  atorvastatin (LIPITOR) 40 mg tablet, TAKE 1 TABLET BY MOUTH DAILY WITH DINNER, Disp: 90 tablet, Rfl: 1  •  baclofen 10 mg tablet, Take 1 tablet in the AM, 1 tablet midday, and 1.5 tablets at night by mouth., Disp: 105 tablet, Rfl: 2  •  carvedilol (COREG) 25 mg tablet, TAKE 1 TABLET BY MOUTH TWICE A DAY WITH FOOD WITH MEALS, Disp: 180 tablet, Rfl: 1  •  CVS Aspirin Adult Low Dose 81 MG chewable tablet, CHEW 1 TABLET BY MOUTH DAILY, Disp: 90 tablet, Rfl: 1  •  CVS Vitamin B-12 1000 MCG tablet, TAKE 1 TABLET BY MOUTH EVERY DAY, Disp: 90 tablet, Rfl: 1  •  diazepam (VALIUM) 5 mg tablet, Take 0.5 tablets (2.5 mg total) by mouth daily at bedtime as needed for muscle spasms, Disp: 16 tablet, Rfl: 0  •  ferrous sulfate 325 (65 Fe) mg tablet, TAKE 1 TABLET BY MOUTH EVERY DAY WITH BREAKFAST, Disp: 90 tablet, Rfl: 1  •  FLUoxetine (PROzac) 20 mg capsule, TAKE 1 CAPSULE BY MOUTH EVERY DAY, Disp: 90 capsule, Rfl: 1  •  hydrALAZINE (APRESOLINE) 50 mg tablet, TAKE 1 TABLET BY MOUTH EVERY 12 HOURS., Disp: 180 tablet, Rfl: 1  •  loratadine (CLARITIN) 10 mg tablet, Take 10 mg by mouth daily, Disp: , Rfl:   •  losartan (COZAAR) 100 MG tablet, TAKE 1 TABLET BY MOUTH EVERY DAY, Disp: 90 tablet, Rfl: 2  •  OXcarbazepine (TRILEPTAL) 150 mg tablet, TAKE 2 TABLETS BY MOUTH 2 TIMES A DAY., Disp: 360 tablet, Rfl: 0  •  ticagrelor (BRILINTA) 90 MG, Take 1 tablet (90 mg total) by mouth every 12 (twelve) hours, Disp: 60 tablet, Rfl: 0    Past Surgical History:   Procedure Laterality Date   • ARTERY SURGERY      vertebral artery stent/revascularization   • IR STROKE ALERT  2/26/2023       Patient Active Problem List    Diagnosis Date Noted   • Acute Posterior Circulation Stroke 02/25/2023   • Trigeminal neuralgia of right side of face 06/07/2023   • Spastic hemiparesis of right dominant side as late effect of cerebral infarction ([address]) 05/05/2023   • Neuropathic pain 05/04/2023   • Celiac artery stenosis ([address]) 03/22/2023   • Neurogenic bowel 03/08/2023   • Iron deficiency anemia 03/06/2023   • Spasticity 03/01/2023   • Stage 2 chronic kidney disease 03/01/2023   • Prediabetes 03/01/2023   • Adjustment disorder with depressed mood 03/01/2023   • BRAULIO (acute kidney injury) ([address]) 02/25/2023   • Right Vertebral artery dissection ([address]) 02/25/2023   • Stenosis of left vertebral artery 02/25/2023   • Primary hypertension 02/25/2023

## 2023-07-26 ENCOUNTER — TELEPHONE (OUTPATIENT)
Facility: CLINIC | Age: 63
End: 2023-07-26

## 2023-07-26 NOTE — TELEPHONE ENCOUNTER
Spoke with Lindsay Sharma, neuro SW, concerning Dank's therapy. Lindsay Sharma was following up after pt's visit with Dr. Dinh Hernandez. Discussed that therapy was ended solely due to insurance cut, and not because of therapy recommendations. Discussed that re-evals were submitted to insurance to attempt to obtain more visits, but additional therapy visits were denied. Discussed that Yisel Dougherty was recommended to continue with the fitness program and would be shown which exercises were appropriate for him when he comes in for fitness.  Pt has not yet returned for the fitness program.

## 2023-07-26 NOTE — TELEPHONE ENCOUNTER
MSW retrieved the following message from [place], patient's OT, this date:    "Hi, [address] [facility]. This is [place] calling from [facility]. I'm returning your call about Abdiel Marine. You can reach me back at [phone]. I did get your message. You were wondering if he was a good candidate for the fitness program. I just wanted to let you know that the only reason we were transitioning into that was because insurance. He ran out of visits and he denied further visits. So we were trying to keep him as active as we can. And we're right here in the same room basically to oversee or answer any questions he has with whatever exercise we set him up with. But it's really just because you ran out of insurance visits. So we can't. He has no more formal therapy left. OK. You can always call me back at that number if you have any further questions. Thank you."    MSW called [facility] back at [phone]. [place] explained that they very recently discharged him from therapy as he had reached a max with his insurance company. [place] stated that their  staff attempted to request more visits from his plan, but there was a hard limit which he met. [place] stated that they offered patient to participate in their "fitness program" which is held in their gym and costs $35 per month. [place] stated that while it is not formal therapy sessions, they interact with patient (set him up on machines and are there to answer any questions he may have). [place] stated that patient has not yet attended, but that he was just discharged from therapy last week. SOFÍA also phoned Joshua Garduno at [facility] (OUTPATIENT CAMP[facility])- she advised that there is not much that can be offered once patient meets the max limits of their plan besides pay privately.  Lor stated that [facility] [facility] are $150 for the evaluation and $120 per visit, but that they do have exercise physiologists that charge less per visit and can offer exercises to keep patient active and following his home exercise plan. Lor will look into if they have an exercise physiologist in the [facility] and if so, will also obtain their pricing for services.

## 2023-07-27 ENCOUNTER — PROCEDURE VISIT (OUTPATIENT)
Dept: NEUROLOGY | Facility: CLINIC | Age: 63
End: 2023-07-27

## 2023-07-27 VITALS
TEMPERATURE: 97.8 F | SYSTOLIC BLOOD PRESSURE: 144 MMHG | DIASTOLIC BLOOD PRESSURE: 82 MMHG | HEIGHT: 69 IN | BODY MASS INDEX: 28.59 KG/M2

## 2023-07-27 DIAGNOSIS — R51.9 RIGHT FACIAL PAIN: Primary | ICD-10-CM

## 2023-07-27 RX ORDER — LIDOCAINE HYDROCHLORIDE 10 MG/ML
2 INJECTION, SOLUTION INFILTRATION; PERINEURAL ONCE
Status: COMPLETED | OUTPATIENT
Start: 2023-07-27 | End: 2023-07-27

## 2023-07-27 RX ORDER — TRIAMCINOLONE ACETONIDE 40 MG/ML
40 INJECTION, SUSPENSION INTRA-ARTICULAR; INTRAMUSCULAR ONCE
Status: COMPLETED | OUTPATIENT
Start: 2023-07-27 | End: 2023-07-27

## 2023-07-27 RX ORDER — BUPIVACAINE HYDROCHLORIDE 2.5 MG/ML
2 INJECTION, SOLUTION EPIDURAL; INFILTRATION; INTRACAUDAL ONCE
Status: COMPLETED | OUTPATIENT
Start: 2023-07-27 | End: 2023-07-27

## 2023-07-27 RX ADMIN — TRIAMCINOLONE ACETONIDE 40 MG: 40 INJECTION, SUSPENSION INTRA-ARTICULAR; INTRAMUSCULAR at 13:34

## 2023-07-27 RX ADMIN — LIDOCAINE HYDROCHLORIDE 2 ML: 10 INJECTION, SOLUTION INFILTRATION; PERINEURAL at 13:32

## 2023-07-27 RX ADMIN — BUPIVACAINE HYDROCHLORIDE 2 ML: 2.5 INJECTION, SOLUTION EPIDURAL; INFILTRATION; INTRACAUDAL at 13:31

## 2023-07-27 NOTE — TELEPHONE ENCOUNTER
SOFÍA received a callback from [facility] with Adamaris Espino - she advised that Adamaris Espino does have an exercise physiologist available in the [facility] area (depending how many times a week patient is looking for) and that the cost is $75 per session. Lor also advised that there are free exercise classes available to the public at [employer] and [employer] (classes focus on strength and balance).

## 2023-07-27 NOTE — PROGRESS NOTES
Nerve block    Date/Time: 7/27/2023 11:00 AM    Performed by: Rivera Miller MD  Authorized by: Rivera Miller MD    Patient location:  Clinic  [place] Protocol:  Consent: Written consent obtained. Consent given by: patient      Indications:     Indications:  Pain relief  Location:     Body area:  Head    Head nerve:  Supratrochlear and greater auricular (Supraorbital, infraorbital)    Nerve type:  Peripheral    Laterality:  Right  Procedure details (see MAR for exact dosages): Block needle gauge:  27 G    Steroid injected:  Triamcinolone    Injection procedure:  Anatomic landmarks identified and anatomic landmarks palpated  Post-procedure details:     Dressing:  None    Outcome:  Pain improved    Patient tolerance of procedure: Tolerated well, no immediate complications  Comments:      A mixture of 2cc bupivicaine 0.25% w/o epi, 2cc lidocaine 0.5% w/o epi, and 1cc of triamcinolone (40mg/ml) was prepared    1cc injected into the auroculotemporal nerve, 2cc into the temporalis for branches of the auriculotemporal nerve, 0.5cc in the supraorbital and supratrochlear nerve,  And 1cc into the infraorbital nerve    Preprocedure pain level was 10. Immediate post procedure pain level was 5.

## 2023-07-28 DIAGNOSIS — I77.1 CELIAC ARTERY STENOSIS (HCC): ICD-10-CM

## 2023-07-28 DIAGNOSIS — I63.9 CEREBROVASCULAR ACCIDENT (CVA) (HCC): ICD-10-CM

## 2023-07-28 DIAGNOSIS — I77.74 VERTEBRAL ARTERY DISSECTION (HCC): ICD-10-CM

## 2023-07-28 DIAGNOSIS — I65.02 STENOSIS OF LEFT VERTEBRAL ARTERY: ICD-10-CM

## 2023-07-28 NOTE — TELEPHONE ENCOUNTER
MSW attempted to reach patient at [phone]. Patient's wife, Melba Martínez, answered (she is on Communication Consent). MSW reviewed options for "maintenance therapy":     1) Fitness program at [address] [place] [address] - patient's wife stated that patient is interested in same, and they have discussed this program with the therapists at [place]. Patient's wife stated that patient plans to start this program next week (plans to attend on Tuesdays and Thursdays). 2) [facility]- patient's wife given pricing for PT eval ($150) and subsequent treatments ($120 for PT sessions and $75 for exercise physiologists sessions). 3) Group exercise classes at [employer] or [address] [place] [address]. MSW advised that this writer would need to get more information if they were interested. Patient's wife stated that she would be interested in info on the class at [address] [place] [address] as that is close to them. MSW advised that this writer will call [address] [place] [address] and will update her as able. Patient's wife also questioned when patient's benefits reset. MSW advised that this writer will call insurance to inquire and will update her as able. MSW phoned [address] [place] [address] at [phone]. The  stated that the program is run by [facility] Xiong. MSW inquired who this writer could get more information from on this program. MSREGULO's call was transferred to the Resident Care Director, but her voicemail indicated that she is out of the office until 8/8 and in her absence, callers can contact the [employer] on Duty at [phone]. MSW called the number for the Med Tech - no answer. MSW left a message requesting callback. Awaiting same. MSW phoned patient's insurance at [phone] and spoke with Joe Arana - patient's plan runs on a calendar year. Reference # for call is [id number].

## 2023-07-31 NOTE — TELEPHONE ENCOUNTER
SOFÍA received a call from [place] at [facility] at [facility]. She stated that the exercise classes are held Thursdays at [address] [place] [address] advised that the classes are open to the community, but that she would recommend that they have advance notice if patient wants to join as they will have the patient come the first time 15 minutes early to complete a screening with the physical therapist and complete some paperwork. [place] stated that they are located at:    [address], [place]  (across from the [employer] and [employer])  *park at lower building and enter front door, then ask at the  to be directed to the [facility] for the [facility] exercise class.

## 2023-07-31 NOTE — TELEPHONE ENCOUNTER
SOFÍA phoned the [place] at [facility] at [phone] to try to reach the Med Tech on duty, but the call was answered by Yudi Fargo Care Director. She will look into if the classes are offered for the general public and will get back to this writer. MSW will update patient as able. MSW attempted to reach patient/wife at [phone] to advise that patient's plan runs on a calendar year so his PT/OT/ST benefits will reset in January. No answer. SOFÍA left a detailed message.

## 2023-08-02 NOTE — TELEPHONE ENCOUNTER
MSW attempted to reach patient/wife at [phone] to make them aware of the [facility] (OUTPATIENT CAMPUS) exercise class at [facility]. No answer. MSW left a detailed message. MSW requested callback to see how they wished to proceed. Awaiting same.

## 2023-08-04 NOTE — TELEPHONE ENCOUNTER
MSW phoned patient's wife, Karlyne Dandy, at [phone] and was able to reach her. Karlyne Dandy confirmed that they received this writer's message with the information about the free exercise class at the [address] [place] [address]. Karlyne Dandy stated that patient prefers to attend the fitness program at [facility], as he is familiar with the facility/staff there. MSW phoned [facility] at [phone] and spoke with Mayela Franco - she advised that patient has not yet started the fitness program.    MSW will make Dr. José Miguel Neville aware of above. MSW did inform patient's wife that patient's PT/OT/ST benefits will reset as of 1/1/24. MSW will be available to patient/wife as needed.

## 2023-08-08 DIAGNOSIS — I69.351 SPASTIC HEMIPARESIS OF RIGHT DOMINANT SIDE AS LATE EFFECT OF CEREBRAL INFARCTION (HCC): ICD-10-CM

## 2023-08-09 ENCOUNTER — TELEPHONE (OUTPATIENT)
Dept: PHYSICAL MEDICINE AND REHAB | Facility: HOSPITAL | Age: 63
End: 2023-08-09

## 2023-08-09 DIAGNOSIS — G89.29 CENTRAL SENSITIZATION TO PAIN: ICD-10-CM

## 2023-08-09 DIAGNOSIS — F43.21 ADJUSTMENT DISORDER WITH DEPRESSED MOOD: Primary | ICD-10-CM

## 2023-08-09 DIAGNOSIS — G89.0 CENTRAL PAIN SYNDROME: ICD-10-CM

## 2023-08-09 RX ORDER — DULOXETIN HYDROCHLORIDE 30 MG/1
30 CAPSULE, DELAYED RELEASE ORAL DAILY
Qty: 30 CAPSULE | Refills: 1 | Status: SHIPPED | OUTPATIENT
Start: 2023-08-09

## 2023-08-09 RX ORDER — DIAZEPAM 5 MG/1
2.5 TABLET ORAL
Qty: 35 TABLET | Refills: 0 | Status: SHIPPED | OUTPATIENT
Start: 2023-08-09

## 2023-08-09 NOTE — TELEPHONE ENCOUNTER
[facility], she reports that the injections from Dr. Sandra Torres significantly helped his pain, and he was like his old self again, however, they have since worn off. They have an appointment next Thursday for additional blocks. They would like to discuss botox and rhizotomies with Dr. Sandra Torres at his next visit. I reviewed with them that I did order additional botulinum toxin to potentially accommodate if Dr. Sandra Torres thinks this would be beneficial. 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 Sandra Torres and I had discussed switching him over from prozac (for depression) to cymbalta to see if that provides additional pain relief in addition to helping his mood. They are in agreement and I will send that script today. Gave them instructions to discontinue prozac and take cymbalta in its place. Finally, she placed a request for the valium 2.5mg - he has been taking it only at night time, and not every night, but does find that it helps his spasms and allows him to sleep. Will provide him a refill with enough to get to our next visit. I reviewed PDMP.      Becca Davis MD  Physical Medicine and Rehabilitation

## 2023-08-13 ENCOUNTER — HOSPITAL ENCOUNTER (OUTPATIENT)
Dept: MRI IMAGING | Facility: HOSPITAL | Age: 63
Discharge: HOME | End: 2023-08-13
Attending: PHYSICAL MEDICINE & REHABILITATION
Payer: COMMERCIAL

## 2023-08-13 DIAGNOSIS — G50.0 TRIGEMINAL NEURALGIA OF RIGHT SIDE OF FACE: ICD-10-CM

## 2023-08-13 DIAGNOSIS — I63.9 CEREBROVASCULAR ACCIDENT (CVA), UNSPECIFIED MECHANISM (HCC): ICD-10-CM

## 2023-08-13 PROCEDURE — A9585 GADOBUTROL INJECTION: HCPCS | Performed by: PHYSICAL MEDICINE & REHABILITATION

## 2023-08-13 PROCEDURE — 70553 MRI BRAIN STEM W/O & W/DYE: CPT

## 2023-08-13 PROCEDURE — G1004 CDSM NDSC: HCPCS

## 2023-08-13 RX ORDER — GADOBUTROL 604.72 MG/ML
9 INJECTION INTRAVENOUS
Status: COMPLETED | OUTPATIENT
Start: 2023-08-13 | End: 2023-08-13

## 2023-08-13 RX ADMIN — GADOBUTROL 9 ML: 604.72 INJECTION INTRAVENOUS at 08:52

## 2023-08-15 ENCOUNTER — PROCEDURE VISIT (OUTPATIENT)
Dept: NEUROLOGY | Facility: CLINIC | Age: 63
End: 2023-08-15
Payer: COMMERCIAL

## 2023-08-15 VITALS — SYSTOLIC BLOOD PRESSURE: 138 MMHG | HEART RATE: 57 BPM | DIASTOLIC BLOOD PRESSURE: 70 MMHG | TEMPERATURE: 98.2 F

## 2023-08-15 DIAGNOSIS — G89.0 CENTRAL PAIN SYNDROME: Primary | ICD-10-CM

## 2023-08-15 PROCEDURE — 64450 NJX AA&/STRD OTHER PN/BRANCH: CPT | Performed by: PSYCHIATRY & NEUROLOGY

## 2023-08-15 RX ORDER — BUPIVACAINE HYDROCHLORIDE 2.5 MG/ML
2 INJECTION, SOLUTION INFILTRATION; PERINEURAL ONCE
Status: COMPLETED | OUTPATIENT
Start: 2023-08-15 | End: 2023-08-15

## 2023-08-15 RX ORDER — TRIAMCINOLONE ACETONIDE 40 MG/ML
40 INJECTION, SUSPENSION INTRA-ARTICULAR; INTRAMUSCULAR ONCE
Status: COMPLETED | OUTPATIENT
Start: 2023-08-15 | End: 2023-08-15

## 2023-08-15 RX ORDER — LIDOCAINE HYDROCHLORIDE 10 MG/ML
2 INJECTION, SOLUTION INFILTRATION; PERINEURAL ONCE
Status: COMPLETED | OUTPATIENT
Start: 2023-08-15 | End: 2023-08-15

## 2023-08-15 RX ADMIN — BUPIVACAINE HYDROCHLORIDE 2 ML: 2.5 INJECTION, SOLUTION INFILTRATION; PERINEURAL at 11:53

## 2023-08-15 RX ADMIN — LIDOCAINE HYDROCHLORIDE 2 ML: 10 INJECTION, SOLUTION INFILTRATION; PERINEURAL at 11:54

## 2023-08-15 RX ADMIN — TRIAMCINOLONE ACETONIDE 40 MG: 40 INJECTION, SUSPENSION INTRA-ARTICULAR; INTRAMUSCULAR at 11:54

## 2023-08-15 NOTE — PROGRESS NOTES
Nerve block    Date/Time: 8/15/2023 10:30 AM    Performed by: Claria Aschoff, MD  Authorized by: Claria Aschoff, MD    Patient location:  [facility] Protocol:  Consent: Written consent obtained. Indications:     Indications:  Pain relief  Location:     Body area:  Head    Head nerve: Auricular, temporal and supratrochlear (Supraorbital)    Nerve type:  Peripheral    Laterality:  Right  Pre-procedure details:     Skin preparation:  Alcohol  Post-procedure details:     Dressing:  None    Outcome:  Pain relieved    Patient tolerance of procedure: Tolerated well, no immediate complications  Comments:      A mixture of 4cc bupivicaine 0.25% w/o epi, 4cc lidocaine 0.5% w/o epi, and 2cc of triamcinolone (40mg/ml) was prepared    1cc injected directly into right tragus, 1.5cc into right auriculotemporal nerve and temporal branch of of the auriculotemporal nerve, 0.5cc into the right supratrochlear and supraorbital nerve. Preprocedure pain level was 8. Immediate post procedure pain level was 1.

## 2023-08-17 ENCOUNTER — TELEPHONE (OUTPATIENT)
Dept: RADIOLOGY | Facility: HOSPITAL | Age: 63
End: 2023-08-17

## 2023-08-23 ENCOUNTER — PATIENT MESSAGE (OUTPATIENT)
Dept: NEUROSURGERY | Facility: CLINIC | Age: 63
End: 2023-08-23

## 2023-08-24 NOTE — PATIENT COMMUNICATION
Unfortunately Dr. Cortez Garrido doesn't par with Anupama Carmen at this time. Added patient to Dr. Deven Murcia schedule for 10/6 but also added patient to the wait list in case something sooner becomes available or if Dr. Cortez Garrido starts accepting aeta. Patient's wife and Dr. Tang Hasten aware.

## 2023-09-01 ENCOUNTER — TELEPHONE (OUTPATIENT)
Dept: NEUROLOGY | Facility: CLINIC | Age: 63
End: 2023-09-01

## 2023-09-01 DIAGNOSIS — F43.21 ADJUSTMENT DISORDER WITH DEPRESSED MOOD: ICD-10-CM

## 2023-09-01 DIAGNOSIS — G89.0 CENTRAL PAIN SYNDROME: ICD-10-CM

## 2023-09-01 DIAGNOSIS — G89.29 CENTRAL SENSITIZATION TO PAIN: ICD-10-CM

## 2023-09-01 NOTE — TELEPHONE ENCOUNTER
Patient's wife called in stating patient is in intolerable pain. Is there any way patient's appointment can moved up with Dr Trinidad Singh?

## 2023-09-02 RX ORDER — DULOXETIN HYDROCHLORIDE 30 MG/1
30 CAPSULE, DELAYED RELEASE ORAL DAILY
Qty: 90 CAPSULE | Refills: 1 | Status: SHIPPED | OUTPATIENT
Start: 2023-09-02

## 2023-09-06 NOTE — TELEPHONE ENCOUNTER
Louisa Wu these need to be sent to the provider to determine if he is agreeable to do the injections sooner. Dr. Hall Bread he had injections with you on 7/27 and 8/15. He is scheduled for Botox with Depadua on 9/21 and the next TPI with you on 9/26. If you can please advise on how we should proceed with scheduling.

## 2023-09-08 NOTE — TELEPHONE ENCOUNTER
I did reach out to the patient and also lmom to call us back to get more information on what was tried and to schedule an appt. If the patient would like to come in sooner. Please offer him the spot next week on the 12th because the following week he gets his Botox and I'm not sure that Dr. Janeth Jones would want to do botox in the same week as he gets TPI. I provided my direct number to call back and also the main number incase I am gone for the day when they call back.

## 2023-09-11 ENCOUNTER — TELEPHONE (OUTPATIENT)
Dept: NEUROLOGY | Facility: CLINIC | Age: 63
End: 2023-09-11

## 2023-09-11 NOTE — TELEPHONE ENCOUNTER
Called and spoke with patients wife. She stated they did try lidocaine, gels and several topicals and they provide no relief.  The patient is now scheduled for injections tomorrow at 1pm in [facility]

## 2023-09-11 NOTE — TELEPHONE ENCOUNTER
----- Message from Keira Eddy MD sent at 7/26/2023  9:24 AM EDT -----  Would like to request increase to 400 units of botulinum toxin for 9/21/2023 botox visit.     Thank you,  Rafael Coker MD  Physical Medicine and Rehabilitation

## 2023-09-12 ENCOUNTER — PROCEDURE VISIT (OUTPATIENT)
Dept: NEUROLOGY | Facility: CLINIC | Age: 63
End: 2023-09-12
Payer: COMMERCIAL

## 2023-09-12 VITALS
TEMPERATURE: 98.4 F | DIASTOLIC BLOOD PRESSURE: 71 MMHG | BODY MASS INDEX: 28.59 KG/M2 | HEART RATE: 59 BPM | SYSTOLIC BLOOD PRESSURE: 161 MMHG | HEIGHT: 69 IN

## 2023-09-12 DIAGNOSIS — G89.0 CENTRAL PAIN SYNDROME: Primary | ICD-10-CM

## 2023-09-12 DIAGNOSIS — G89.29 CENTRAL SENSITIZATION TO PAIN: ICD-10-CM

## 2023-09-12 DIAGNOSIS — F43.21 ADJUSTMENT DISORDER WITH DEPRESSED MOOD: ICD-10-CM

## 2023-09-12 PROCEDURE — 64450 NJX AA&/STRD OTHER PN/BRANCH: CPT | Performed by: PSYCHIATRY & NEUROLOGY

## 2023-09-12 RX ORDER — LIDOCAINE HYDROCHLORIDE 10 MG/ML
4 INJECTION, SOLUTION INFILTRATION; PERINEURAL ONCE
Status: COMPLETED | OUTPATIENT
Start: 2023-09-12 | End: 2023-09-12

## 2023-09-12 RX ORDER — TRIAMCINOLONE ACETONIDE 40 MG/ML
80 INJECTION, SUSPENSION INTRA-ARTICULAR; INTRAMUSCULAR ONCE
Status: COMPLETED | OUTPATIENT
Start: 2023-09-12 | End: 2023-09-12

## 2023-09-12 RX ORDER — BUPIVACAINE HYDROCHLORIDE 2.5 MG/ML
4 INJECTION, SOLUTION INFILTRATION; PERINEURAL ONCE
Status: COMPLETED | OUTPATIENT
Start: 2023-09-12 | End: 2023-09-12

## 2023-09-12 RX ORDER — DULOXETIN HYDROCHLORIDE 60 MG/1
60 CAPSULE, DELAYED RELEASE ORAL DAILY
Qty: 30 CAPSULE | Refills: 3 | Status: SHIPPED | OUTPATIENT
Start: 2023-09-12

## 2023-09-12 RX ADMIN — BUPIVACAINE HYDROCHLORIDE 4 ML: 2.5 INJECTION, SOLUTION INFILTRATION; PERINEURAL at 13:00

## 2023-09-12 RX ADMIN — TRIAMCINOLONE ACETONIDE 80 MG: 40 INJECTION, SUSPENSION INTRA-ARTICULAR; INTRAMUSCULAR at 13:00

## 2023-09-12 RX ADMIN — LIDOCAINE HYDROCHLORIDE 4 ML: 10 INJECTION, SOLUTION INFILTRATION; PERINEURAL at 13:00

## 2023-09-12 NOTE — PROGRESS NOTES
Nerve block    Date/Time: 9/12/2023 1:00 PM    Performed by: Tsering Tan MD  Authorized by: Tsering Tan MD    Patient location:  [facility] Protocol:  Consent: Written consent obtained. Indications:     Indications:  Pain relief  Location:     Body area:  Head    Head nerve:  Greater auricular and supratrochlear (supraorbital, trigeminal)    Nerve type:  Peripheral    Laterality:  Right  Pre-procedure details:     Skin preparation:  Alcohol  Post-procedure details:     Dressing:  None    Outcome:  Pain relieved    Patient tolerance of procedure: Tolerated well, no immediate complications  Comments:         A mixture of 4cc bupivicaine 0.25% w/o epi, 4cc lidocaine 0.5% w/o epi, and 2cc of triamcinolone (40mg/ml) was prepared     1cc injected directly into right tragus, 1.5cc into right auriculotemporal nerve and temporal branch of of the auriculotemporal nerve, 0.5cc into the right supratrochlear and supraorbital nerve. 1cc in the right temporalis between the greater auricular and trigeminal nerve branch     Preprocedure pain level was 8. Immediate post procedure pain level was 3    We also discussed baseline pain control in the office today. He will see the neurosurgery group on September 20 for another issue and then on October 6 to discuss the possibility of rhizotomy. It is not clear at this point the degree of benefit that he is getting from oxcarbazepine. We will begin additional treatment by increasing Cymbalta to 60 mg/day. In the future consideration could be given to pregabalin or amitriptyline. He has not derived benefit from prior trials of gabapentin.

## 2023-09-12 NOTE — TELEPHONE ENCOUNTER
Submitted Prior-Authorization request via fax to [facility] for:    Botox: Dosage-Increase:  Botox-400 units. QTY:1, Q3 Months. 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 Sig: Inject up to 400 UNITS I.M. (EMG-Guided) into various sites of the upper/lower extremities once every three months for one year with:   Refills: 3    Awaiting approval/denial response. Will follow up on the status of pending authorization requested.

## 2023-09-20 ENCOUNTER — OFFICE VISIT (OUTPATIENT)
Dept: NEUROSURGERY | Facility: CLINIC | Age: 63
End: 2023-09-20
Payer: COMMERCIAL

## 2023-09-20 VITALS
BODY MASS INDEX: 28.22 KG/M2 | SYSTOLIC BLOOD PRESSURE: 124 MMHG | OXYGEN SATURATION: 95 % | TEMPERATURE: 98 F | WEIGHT: 190.5 LBS | HEART RATE: 76 BPM | RESPIRATION RATE: 18 BRPM | DIASTOLIC BLOOD PRESSURE: 86 MMHG | HEIGHT: 69 IN

## 2023-09-20 DIAGNOSIS — I77.74 VERTEBRAL ARTERY DISSECTION (HCC): ICD-10-CM

## 2023-09-20 DIAGNOSIS — G50.0 TRIGEMINAL NEURALGIA OF RIGHT SIDE OF FACE: ICD-10-CM

## 2023-09-20 DIAGNOSIS — I63.9 CEREBROVASCULAR ACCIDENT (CVA), UNSPECIFIED MECHANISM (HCC): Primary | ICD-10-CM

## 2023-09-20 PROCEDURE — 99215 OFFICE O/P EST HI 40 MIN: CPT | Performed by: NEUROLOGICAL SURGERY

## 2023-09-20 NOTE — PROGRESS NOTES
Neurosurgery Office Note  Marshall Sarah 60 y.o. male MRN: [id number]      Assessment/Plan     Acute Posterior Circulation Stroke  Patient presents for reevaluation   · S/p intracranial and extracranial right vertebral artery stenting by Dr. Antony Cleaves 2/26/23 (TICI 2B)  · Presented with dysarthria and dizziness in 2/2023 and was found to have bilateral cerebellar infarct with significant R V3/4 thrombus, L vertebral artery stenosis with possible occlusion within intradural segment. NIHSS 0 with symptom onset 2/23 progressed to dysarthria, left facial droop not corrected with smile, R side flaccid  · Last seen 5/2023 and was kept on DAPT with brilinta and asa without need for eliquis; was supposed to have repeat angiogram in 3 months (8/2023) however cancelled this (fear of risks) and returns to discuss options moving forward    Imaging:  · MRI brain cavernous / trigeminal w wo contrast 8/13/2023: Right intracranial vertebral artery stent is noted. Known dissection of the right V3 segment. 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 Please refer to the CTA head and neck dated 4/22/2023 for further details. · CTA head/neck 4/22/23: 1.  Stable right greater than left cerebellar, left greater than right pontine and midbrain, and left thalamic infarcts. 2.  Stable dissection flap within the V3 segment of the right vertebral artery. Stable in-stent occlusion in the V4 segment of the right vertebral artery with patent proximal and distal vessel. 3.  Stable occluded post PICA V4 segment of the left vertebral artery. 4. Atherosclerotic change of the cervical carotid arteries without hemodynamically significant stenosis. 5. Moderate sinus disease    Plan:  · Extensive discussion had with patient, wife and attending regarding plan of care moving forward.   Again discussed benefit of moving forward with cerebral angiogram specifically in terms of evaluating the patency of right vertebral artery stent and antiplatelet therapy moving forward. He would like to think about this further before another angiogram.  · In the meantime he will continue aspirin 81 mg and Brilinta 90 mg twice daily. He is aware that should he not undergo angiogram for evaluation of stent he will likely remain on dual antiplatelet therapy for at least 1 year. · Surgery scheduler information provided, patient will call when he makes a decision regarding Intervention    Trigeminal neuralgia of right side of face  Patient comes in to discuss options for trigeminal neuralgia  · Reports since discharge from the hospital in March 2023 he has almost constant right-sided facial pain along the cheekbone  · Has trialed gabapentin without relief, currently on oxcarbazepine without significant relief  · Has undergone 3 injections with neurology without lasting relief  · Has appointment set up with Dr. Osbaldo Gerber on 10/6/2023 to discuss further options    Imaging:  · MRI brain cavernous / trigeminal w wo contrast 8/13/2023: The trigeminal nerves appear normal in morphology with no suspected vascular compression.     Plan:  · Patient already has an appointment scheduled with Dr. Osbaldo Gerber on 10/6/2023 to discuss interventions for trigeminal neuralgia  · There is also concern that given he is refractory to oxcarbazepine that this could be atypical facial pain in the setting of prior stroke rather than trigeminal neuralgia  · Briefly discussed different treatment options however will need to discuss this further with Dr. Osbaldo Gerber at their next appointment  · In the meantime they should continue regular follow-up with Dr. Ridge Michaud regarding medication regimen to see if different regimen or adjustments can be made    Right Vertebral artery dissection [facility])  See plan above       Diagnoses and all orders for this visit:    Cerebrovascular accident (CVA), unspecified mechanism ([address])    Trigeminal neuralgia of right side of face    Right Vertebral artery dissection (HCC)          I have spent a total time of 40 minutes on 09/20/23 in caring for this patient including Diagnostic results, Risks and benefits of tx options, Instructions for management, Patient and family education, Impressions, Counseling / Coordination of care, Documenting in the medical record, Reviewing / ordering tests, medicine, procedures   and Obtaining or reviewing history  . CHIEF COMPLAINT    Chief Complaint   Patient presents with   • Follow-up       HISTORY    History of Present Illness     60y.o. year old male with past medical history significant for prior posterior circulation stroke, status post intracranial and extracranial right vertebral artery stenting by Dr. Maris Love on 2/26/2023, left vertebral artery stenosis, CKD, possible trigeminal neuralgia on the right side who presents for follow-up. In brief, patient initially presented with dysarthria and dizziness in February 2023 and was found to have bilateral cerebellar infarcts with significant right V3 and V4 thrombus, left vertebral artery stenosis with possible occlusion within intradural segment. His symptoms progressed to dysarthria, left facial droop and right-sided flaccidity requiring intervention as above. He was previously maintained on aspirin, Brilinta and Eliquis but at his last visit in May he was taken off of Eliquis and kept on dual antiplatelet therapy with plans for repeat angiogram in 3 months for which patient cancelled. Patient is here with his wife. Both patient and wife are very concerned about patient's new right-sided facial pain which started when he was discharged from the hospital in March 2023. He has sharp pain along his right cheekbone which can at times radiate up to his temple/eye area. He states this happens every 15 to 20 minutes on a daily basis.   The only thing that helps his pain is a heating pad and wife states that he sits on the couch for 10 hours a day with a heating pad against his face as he is unable to do anything else. He does see neurology who has him on oxcarbazepine which she feels is not really making much of a difference. He was previously on gabapentin which was not helpful. He has also had 3 injections which have only provided him with 5 to 6 days of relief. They are already set up to see Dr. Mart Alves on 10/6/2023 as Dr. Marixa Rhodes is not yet on par with their insurance. Wife is very upset and feels this is very debilitating to patient and his day-to-day life. She is very interested in treatment of this first and worrying about repeat angio afterwards. In regards to his prior stroke and vertebral artery stent, patient has been doing well from that regard. He continues on aspirin 81 mg and Brilinta 90 mg twice daily but they wonder when he can come off of the Brilinta. He was supposed to have a repeat angiogram in August for evaluation of patency of the stent however patient and wife were worried about risks with procedure and decided against this. He continues with right-sided weakness but is able to ambulate without cane or walker and has not had any falls. He does get Botox in his ankle and hand which has been helping and they are scheduled for one tomorrow as it is already been 3 months since their last session. Even though they did not have intervention they still felt it important to come to this follow up appointment to discuss next steps in treatment. Patient is not currently working. See Discussion    REVIEW OF SYSTEMS    Review of Systems   HENT: Negative. Eyes: Negative. Respiratory: Negative. Cardiovascular: Negative. Gastrointestinal: Negative. Endocrine: Positive for cold intolerance (colder right side). Genitourinary: Negative. Musculoskeletal: Positive for gait problem (using cane ) and myalgias. Skin: Negative.     Neurological: Positive for weakness (right side) and numbness (DIFFERENT SENSATION ON RIGHT SIDE DOESNT FEEL NUMB). Negative for headaches. Intermittent right face throbbing  Intermittent right leg spasms, worse when laying doen   Hematological: Bruises/bleeds easily (MEDICATION). ASA, brilinta, eliquis   Psychiatric/Behavioral: Positive for sleep disturbance. Negative for confusion. ROS obtained by MA. Reviewed. See HPI. Meds/Allergies     Current Outpatient Medications   Medication Sig Dispense Refill   • amLODIPine (NORVASC) 2.5 mg tablet TAKE 1 TABLET BY MOUTH EVERY DAY 90 tablet 2   • atorvastatin (LIPITOR) 40 mg tablet TAKE 1 TABLET BY MOUTH DAILY WITH DINNER 90 tablet 1   • baclofen 10 mg tablet Take 1 tablet in the AM, 1 tablet midday, and 1.5 tablets at night by mouth. 105 tablet 2   • carvedilol (COREG) 25 mg tablet TAKE 1 TABLET BY MOUTH TWICE A DAY WITH FOOD WITH MEALS 180 tablet 1   • [facility] Aspirin Adult Low Dose 81 MG chewable tablet CHEW 1 TABLET BY MOUTH DAILY 90 tablet 1   • [facility] Vitamin B-12 1000 MCG tablet TAKE 1 TABLET BY MOUTH EVERY DAY 90 tablet 1   • diazepam (VALIUM) 5 mg tablet Take 0.5 tablets (2.5 mg total) by mouth daily at bedtime as needed for muscle spasms 35 tablet 0   • DULoxetine (CYMBALTA) 60 mg delayed release capsule Take 1 capsule (60 mg total) by mouth daily 30 capsule 3   • ferrous sulfate 325 (65 Fe) mg tablet TAKE 1 TABLET BY MOUTH EVERY DAY WITH BREAKFAST 90 tablet 1   • hydrALAZINE (APRESOLINE) 50 mg tablet TAKE 1 TABLET BY MOUTH EVERY 12 HOURS. 180 tablet 1   • losartan (COZAAR) 100 MG tablet TAKE 1 TABLET BY MOUTH EVERY DAY 90 tablet 2   • OXcarbazepine (TRILEPTAL) 150 mg tablet TAKE 2 TABLETS BY MOUTH 2 TIMES A DAY. 360 tablet 0   • ticagrelor (BRILINTA) 90 MG Take 1 tablet (90 mg total) by mouth every 12 (twelve) hours 60 tablet 2   • loratadine (CLARITIN) 10 mg tablet Take 10 mg by mouth daily (Patient not taking: Reported on 9/20/2023)       No current facility-administered medications for this visit.        No Known Allergies    PAST HISTORY    Past Medical History:   Diagnosis Date   • BRAULIO (acute kidney injury) ([address])    • B12 deficiency    • Celiac artery stenosis (HCC)    • Cerebellar infarct ([address]) 2023   • CKD (chronic kidney disease) stage 2, GFR 60-89 ml/min    • Essential hypertension    • Impaired fasting glucose    • Iron deficiency anemia    • Neurogenic bowel    • Stenosis of left vertebral artery    • Vertebral artery dissection [facility])        Past Surgical History:   Procedure Laterality Date   • ARTERY SURGERY      vertebral artery stent/revascularization   • IR STROKE ALERT  2023       Social History     Tobacco Use   • Smoking status: Former     Packs/day: 1.00     Years: 5.00     Total pack years: 5.00     Types: Cigarettes     Quit date: 1983     Years since quittin.4   • Smokeless tobacco: Never   Vaping Use   • Vaping Use: Never used   Substance Use Topics   • Alcohol use: Not Currently   • Drug use: Never       Family History   Problem Relation Age of Onset   • Hypertension Brother    • Hypertension Brother          Above history personally reviewed. EXAM    Vitals:Blood pressure 124/86, pulse 76, temperature 98 °F (36.7 °C), temperature source Temporal, resp. rate 18, height 5' 9" (1.753 m), weight 86.4 kg (190 lb 8 oz), SpO2 95 %. ,Body mass index is 28.13 kg/m². Physical Exam  Constitutional:       General: He is awake. Appearance: Normal appearance. HENT:      Head: Normocephalic and atraumatic. Eyes:      Extraocular Movements: EOM normal.      Conjunctiva/sclera: Conjunctivae normal.   Cardiovascular:      Rate and Rhythm: Normal rate. Pulmonary:      Effort: Pulmonary effort is normal. No respiratory distress. Skin:     General: Skin is warm and dry. Neurological:      Mental Status: He is alert and oriented to person, place, and time.       Coordination: Finger-Nose-Finger Test normal.   Psychiatric:         Attention and Perception: Attention and perception normal.         Mood and Affect: Mood and affect normal.         Speech: Speech normal.         Behavior: Behavior normal. Behavior is cooperative. Thought Content: Thought content normal.         Cognition and Memory: Cognition and memory normal.         Judgment: Judgment normal.         Neurologic Exam     Mental Status   Oriented to person, place, and time. Follows 1 step commands. Attention: normal. Concentration: normal.   Speech: speech is normal   Level of consciousness: alert  Knowledge: good. Normal comprehension. Cranial Nerves     CN III, IV, VI   Extraocular motions are normal.   CN III: no CN III palsy  CN VI: no CN VI palsy  Nystagmus: none   Diplopia: none  Ophthalmoparesis: none  Upgaze: normal  Downgaze: normal  Conjugate gaze: present    CN V   Right facial sensation deficit: none  Left facial sensation deficit: none    CN VII   Right facial weakness: none  Left facial weakness: none    CN VIII   Hearing: intact    CN IX, X   CN IX normal.   CN X normal.     CN XI   Right trapezius strength: normal  Left trapezius strength: normal    CN XII   CN XII normal.     Motor Exam   Muscle bulk: normal  Overall muscle tone: normal  Right arm pronator drift: absent  Left arm pronator drift: absent    Strength   Strength 5/5 except as noted. Right upper extremity with residual weakness, 4 out of 5 throughout  Right lower extremity with residual weakness mostly with ankle dorsiflexion, 3-4-/5      Sensory Exam   Light touch normal.     Gait, Coordination, and Reflexes     Gait  Gait: (abnormal gait due to prior stroke and leg weakness)    Coordination   Finger to nose coordination: normal    Tremor   Resting tremor: absent  Intention tremor: absent  Action tremor: absent    Reflexes   Right Talavera: absent  Left Talavera: absent  Right ankle clonus: absent  Left ankle clonus: absent        MEDICAL DECISION MAKING    Imaging Studies:     No results found.     I have personally reviewed pertinent reports.    and I have personally reviewed pertinent films in PACS

## 2023-09-20 NOTE — ASSESSMENT & PLAN NOTE
Patient comes in to discuss options for trigeminal neuralgia  · Reports since discharge from the hospital in March 2023 he has almost constant right-sided facial pain along the cheekbone  · Has trialed gabapentin without relief, currently on oxcarbazepine without significant relief  · Has undergone 3 injections with neurology without lasting relief  · Has appointment set up with Dr. Usha Siegel on 10/6/2023 to discuss further options    Imaging:  · MRI brain cavernous / trigeminal w wo contrast 8/13/2023: The trigeminal nerves appear normal in morphology with no suspected vascular compression.     Plan:  · Patient already has an appointment scheduled with Dr. Usha Siegel on 10/6/2023 to discuss interventions for trigeminal neuralgia  · There is also concern that given he is refractory to oxcarbazepine that this could be atypical facial pain in the setting of prior stroke rather than trigeminal neuralgia  · Briefly discussed different treatment options however will need to discuss this further with Dr. Usha Siegel at their next appointment  · In the meantime they should continue regular follow-up with Dr. Fernanda Bernstein regarding medication regimen to see if different regimen or adjustments can be made

## 2023-09-20 NOTE — ASSESSMENT & PLAN NOTE
Patient presents for reevaluation   · S/p intracranial and extracranial right vertebral artery stenting by Dr. Morris Fernandez 2/26/23 (TICI 2B)  · Presented with dysarthria and dizziness in 2/2023 and was found to have bilateral cerebellar infarct with significant R V3/4 thrombus, L vertebral artery stenosis with possible occlusion within intradural segment. NIHSS 0 with symptom onset 2/23 progressed to dysarthria, left facial droop not corrected with smile, R side flaccid  · Last seen 5/2023 and was kept on DAPT with brilinta and asa without need for eliquis; was supposed to have repeat angiogram in 3 months (8/2023) however cancelled this (fear of risks) and returns to discuss options moving forward    Imaging:  · MRI brain cavernous / trigeminal w wo contrast 8/13/2023: Right intracranial vertebral artery stent is noted. Known dissection of the right V3 segment. 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 Please refer to the CTA head and neck dated 4/22/2023 for further details. · CTA head/neck 4/22/23: 1.  Stable right greater than left cerebellar, left greater than right pontine and midbrain, and left thalamic infarcts. 2.  Stable dissection flap within the V3 segment of the right vertebral artery. Stable in-stent occlusion in the V4 segment of the right vertebral artery with patent proximal and distal vessel. 3.  Stable occluded post PICA V4 segment of the left vertebral artery. 4. Atherosclerotic change of the cervical carotid arteries without hemodynamically significant stenosis. 5. Moderate sinus disease    Plan:  · Extensive discussion had with patient, wife and attending regarding plan of care moving forward. Again discussed benefit of moving forward with cerebral angiogram specifically in terms of evaluating the patency of right vertebral artery stent and antiplatelet therapy moving forward.   He would like to think about this further before another angiogram.  · In the meantime he will continue aspirin 81 mg and Brilinta 90 mg twice daily. He is aware that should he not undergo angiogram for evaluation of stent he will likely remain on dual antiplatelet therapy for at least 1 year.   · Surgery scheduler information provided, patient will call when he makes a decision regarding Intervention

## 2023-09-21 ENCOUNTER — PROCEDURE VISIT (OUTPATIENT)
Dept: NEUROLOGY | Facility: CLINIC | Age: 63
End: 2023-09-21
Payer: COMMERCIAL

## 2023-09-21 VITALS — TEMPERATURE: 97.7 F | SYSTOLIC BLOOD PRESSURE: 160 MMHG | DIASTOLIC BLOOD PRESSURE: 90 MMHG

## 2023-09-21 DIAGNOSIS — I69.351 SPASTIC HEMIPARESIS OF RIGHT DOMINANT SIDE AS LATE EFFECT OF CEREBRAL INFARCTION (HCC): Primary | ICD-10-CM

## 2023-09-21 DIAGNOSIS — R25.2 SPASTICITY: ICD-10-CM

## 2023-09-21 PROCEDURE — 95874 GUIDE NERV DESTR NEEDLE EMG: CPT | Performed by: PHYSICAL MEDICINE & REHABILITATION

## 2023-09-21 PROCEDURE — 99214 OFFICE O/P EST MOD 30 MIN: CPT | Performed by: PHYSICAL MEDICINE & REHABILITATION

## 2023-09-21 PROCEDURE — 64642 CHEMODENERV 1 EXTREMITY 1-4: CPT | Performed by: PHYSICAL MEDICINE & REHABILITATION

## 2023-09-21 RX ORDER — BACLOFEN 10 MG/1
TABLET ORAL
Qty: 105 TABLET | Refills: 0 | Status: SHIPPED | OUTPATIENT
Start: 2023-09-21

## 2023-09-21 RX ORDER — DIAZEPAM 5 MG/1
2.5 TABLET ORAL
Qty: 35 TABLET | Refills: 0 | Status: SHIPPED | OUTPATIENT
Start: 2023-09-21

## 2023-09-21 NOTE — PROGRESS NOTES
Physical Medicine & Rehabilitation Spasticity Follow-up/Procedure  Dean Burkitt 60 y.o. male    ASSESSMENT/PLAN:   Diagnoses and all orders for this visit:    Spastic hemiparesis of right dominant side as late effect of cerebral infarction (HCC)  -     onabotulinumtoxin A (BOTOX) injection 200 Units  -     diazepam (VALIUM) 5 mg tablet; Take 0.5 tablets (2.5 mg total) by mouth daily at bedtime as needed for muscle spasms  -     Podous Boot    Spasticity  -     baclofen 10 mg tablet; Take 1 tablet in the AM, 1 tablet midday, and 1.5 tablets at night by mouth. Mr. Myla Solomon is a very pleasant 59 yo M who is very well known to me, who I took care of on the [facility] after his cerebellar CVA and R > L stroke burden, with additional hypodensities on DWI appreciated L midbrain, pontine area and R lower medullary/spinal cord junction. He has developed R spastic hemiparesis (Although has had remarkable recovery in regards to strength on that R side), neurogenic bowel (with some incontinence during rehab which has improved), and now has developed R facial pain/sensitivity with pain, as well as spasms/extrinsic tone and intrinsic tonic tone in his RUE.     He is here for botulinum toxin. His RLE is not bothering him nearly as much, and he nor his wife have noticed any tremor, spasm. He is starting to flex at knee during much of his gait cycle - which we will need to monitor and may need to dress with bracing/botulinum toxin in the future, but for now he maintains functionality. HE gets cramping tightness and spasms in his fingers, with difficulty extending his fingers. He also has difficulty supinating his forearm which results in impaired functional use of that R arm. Finally, he has significant shoulder pain related to his pec tightness.      We redistributed his botulinum toxin to address these issues. I believe the wrist and finger issues are being driven primarily by his FDS.  His FDP was not active on EMG, and his MCP is not tight like his PIP are. Because FDS crosses the wrist, that contributes to wrist tightness as well.      1. Spastic hemiparesis:  - Botulinum toxin as above and below. - We discussed increasing Baclofen to 15mg TID, he expresses hesitation due to concern for fatigue/tiredness. Continue 10-10-15mg.   - He uses valium sparingly and at night to help with his discomfort and sleep with good results. - Provided refills for both.        I will follow-up with him 12/19/2023 at 4:00pm for repeat botulinum toxin injections. ?  *I have spent 35 minutes with Patient and family today in which greater than 50% of this time was spent in counseling/coordination of care regarding Instructions for management, Patient and family education, Risk factor reductions, Impressions, Counseling / Coordination of care and Obtaining or reviewing history  . HPI/SUBJECTIVE:  Wife is present today. Patient presents today in the office with chief complaint of facial pain as his main concern, but really his L arm and hand - waking up at night with the hand locking up. He has had a lot less issues with his foot. He still gets a bit of shaking on occasion in the arm, but this is less noticeable. He also continues with pain in his shoulder. He is much more functional, riding tractors, moving manure, staying active, managing his lawn. Last seen for chemodenervation: 6/21/2023  Results of chemodenervation: Some benefit it appears in his RLE. How long did effects last: Still with some benefit in the RLE. R arm different muscles seemed to be involved now. Did not last the entire 10-12 weeks. Side affect/Adverse Effects: None     Any issues with driving, swallowing, appetite: Does not drive, no issues with swallowing/appetite. Stretching/Exercise Program: Not as much stretching, but tries to be active when the pain isn't so bad. Currently in therapy: No longer in therapies.   Any falls with significant injuries: None  Any new weakness: None  Any changes to care since last seen: Recommended for Angiogram, and is going to see Dr. Latrice Membreno for further evaluation of his facial pain. Safe at home: Yes   Any oral meds: Valium 1/2 tablet at night. Baclofen [id number]. Is very tired during the day. On anticoagulation/blood thinners: ASA,  Brilinta  Current functional status:  Independent with ambulation, transfers, ADLs. ROS: A 10 point review of systems was negative except for what is noted in the HPI. Imaging: I have personally reviewed imaging with results as follows:  8/13/23 Mri Brain:  1. There is no acute territorial infarct. Chronic right posterior cerebellar infarct with encephalomalacia and mild hemosiderin deposition. Chronic brainstem and bilateral cerebellar lacunar infarcts which appeared acute on the prior 2/26/2023   examination.     2. The trigeminal nerves appear normal in morphology with no suspected vascular compression.     3. Mild chronic microangiopathic ischemic changes.     4. Right intracranial vertebral artery stent is noted. Known dissection of the right V3 segment. 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 Please refer to the CTA head and neck dated   4/22/2023 for further details.     OBJECTIVE:   /90   Temp 97.7 °F (36.5 °C)     Gen: No acute distress, Well-nourished, well-appearing. HEENT: Moist mucus membranes, Normocephalic/Atraumatic  Cardiovascular: Distal pulses palpable  Heme/Extr: No edema  Pulmonary: Non-labored breathing. : No barnett  GI: Soft, non-tender, non-distended. MSK:   With slow range of motion, able to get R shoulder to neutral  Full PROM in R elbow  Full PROM with supination/pronation but with resistance. Full PROM in R wrist  Full PROM in fingers. Ankle mostly in DF. Full PROM in R knee. Integumentary: Skin is warm, dry. Psych: Normal mood and affect. Neuro: AAOx3,  Speech is intact. Appropriate to questioning. MMT:   Strength:   Right  Left  Site  Right  Left  Site    4+ 5  S Ab: Shoulder Abductors  4+ 5  HF: Hip Flexors    4+ 5  EF: Elbow Flexors  5  5 KF: Knee Flexors    4+  5  EE: Elbow Extensors  5  5  KE: Knee Extensors    4+  5  WE: Wrist Extensors  4 5  DR: Dorsi Flexors    4+ 5  FF: Finger Flexors  4  5  PF: Plantar Flexors    4+  5  HI: Hand Intrinsics  NA  5  EHL: Extensor Hallucis Longus     MAS:  Right  Left  Site  Right  Left  Site    1+ 0  Shoulder 0 0  Hip Adductors   1 0  EF: Elbow Flexors  1+ 0 KF: Knee Flexors    1  0  EE: Elbow Extensors  1+  0  KE: Knee Extensors    1/0 0/0  WF/WE 1 0  DR: Dorsi Flexors    1  0  FF: Finger Flexors  1+  0  PF: Plantar Flexors    0  0  HI: Hand Intrinsics  0/0  0/0  Toe Flex/Ex       MAS  0 - no increased tone  1 - slight increase in tone at the end of the ROM  1+ increase in tone at 1/2 the ROM  2 - increase in tone through most the ROM  3 - moderate increase in muscle tone - passive movement difficult  4 - affected parts ridid in flexion or extension    SPASMS observed:   RUE: no   LUE: no  RLE: no   LLE: no    Botulinum Toxin Injection Procedure    Pre-operative diagnosis: Spasticity    Post-operative diagnosis: Same    Procedure: Chemodenervation    After risks and benefits were explained including bleeding, infection, worsening of pain, damage to the areas being injected, weakness of muscles, loss of muscle control, dysphagia if injecting the head or neck, facial droop if injecting the facial area, painful injection, allergic or other reaction to the medications being injected, and the failure of the procedure to help the problem, a signed consent was obtained on 09/21/2023     The patient was placed in a seated position and the sites to be treated were identified. A time out was called and performed. The area to be treated was prepped three times with alcohol and the alcohol allowed to dry.  Next, a 26 gauge, 37.5mm disposable electrode needle was used to inject the medication in the area to be treated. Guidance: EMG  Patient position: seated  Area(s) injected:    Muscle (R) Dose (units) # Sites Technique   R lateral 100  4 EMG   R FDS 30  2 EMG   R Pronator 30  2 EMG       EMG       EMG       EMG       EMG       EMG         EMG         EMG         EMG    Waste 40   EMG         EMG        Medications used: 200 units of botox diluted in 2 mL of preservative free saline    Botox Lot/Exp: [id number] x2 ; 10/2025 x2    The patient did tolerate the procedure well. There were no complications.     The following portions of the patient's history were reviewed and updated as appropriate: allergies, current medications, past family history, past medical history, past social history, past surgical history and problem list.      Current Outpatient Medications:   •  amLODIPine (NORVASC) 2.5 mg tablet, TAKE 1 TABLET BY MOUTH EVERY DAY, Disp: 90 tablet, Rfl: 2  •  atorvastatin (LIPITOR) 40 mg tablet, TAKE 1 TABLET BY MOUTH DAILY WITH DINNER, Disp: 90 tablet, Rfl: 1  •  baclofen 10 mg tablet, Take 1 tablet in the AM, 1 tablet midday, and 1.5 tablets at night by mouth., Disp: 105 tablet, Rfl: 2  •  carvedilol (COREG) 25 mg tablet, TAKE 1 TABLET BY MOUTH TWICE A DAY WITH FOOD WITH MEALS, Disp: 180 tablet, Rfl: 1  •  [facility] Aspirin Adult Low Dose 81 MG chewable tablet, CHEW 1 TABLET BY MOUTH DAILY, Disp: 90 tablet, Rfl: 1  •  [facility] Vitamin B-12 1000 MCG tablet, TAKE 1 TABLET BY MOUTH EVERY DAY, Disp: 90 tablet, Rfl: 1  •  diazepam (VALIUM) 5 mg tablet, Take 0.5 tablets (2.5 mg total) by mouth daily at bedtime as needed for muscle spasms, Disp: 35 tablet, Rfl: 0  •  DULoxetine (CYMBALTA) 60 mg delayed release capsule, Take 1 capsule (60 mg total) by mouth daily, Disp: 30 capsule, Rfl: 3  •  ferrous sulfate 325 (65 Fe) mg tablet, TAKE 1 TABLET BY MOUTH EVERY DAY WITH BREAKFAST, Disp: 90 tablet, Rfl: 1  •  hydrALAZINE (APRESOLINE) 50 mg tablet, TAKE 1 TABLET BY MOUTH EVERY 12 HOURS., Disp: 180 tablet, Rfl: 1  •  loratadine (CLARITIN) 10 mg tablet, Take 10 mg by mouth daily (Patient not taking: Reported on 9/20/2023), Disp: , Rfl:   •  losartan (COZAAR) 100 MG tablet, TAKE 1 TABLET BY MOUTH EVERY DAY, Disp: 90 tablet, Rfl: 2  •  OXcarbazepine (TRILEPTAL) 150 mg tablet, TAKE 2 TABLETS BY MOUTH 2 TIMES A DAY., Disp: 360 tablet, Rfl: 0  •  ticagrelor (BRILINTA) 90 MG, Take 1 tablet (90 mg total) by mouth every 12 (twelve) hours, Disp: 60 tablet, Rfl: 2    Past Surgical History:   Procedure Laterality Date   • ARTERY SURGERY      vertebral artery stent/revascularization   • IR STROKE ALERT  2/26/2023       Patient Active Problem List    Diagnosis Date Noted   • Acute Posterior Circulation Stroke 02/25/2023   • 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 08/09/2023   • 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   • Right facial pain 07/27/2023   • Trigeminal neuralgia of right side of face 06/07/2023   • Spastic hemiparesis of right dominant side as late effect of cerebral infarction ([address]) 05/05/2023   • Neuropathic pain 05/04/2023   • Celiac artery stenosis ([address]) 03/22/2023   • Neurogenic bowel 03/08/2023   • Iron deficiency anemia 03/06/2023   • Spasticity 03/01/2023   • Stage 2 chronic kidney disease 03/01/2023   • Prediabetes 03/01/2023   • Adjustment disorder with depressed mood 03/01/2023   • BRAULIO (acute kidney injury) ([address]) 02/25/2023   • Right Vertebral artery dissection ([address]) 02/25/2023   • Stenosis of left vertebral artery 02/25/2023   • Primary hypertension 02/25/2023

## 2023-09-21 NOTE — PATIENT INSTRUCTIONS
You have received botulinum toxin injections today. The skin around the site of injections should be monitored for a couple of days. If redness or swelling occur, the skin should be examined by a health care professional to rule out infection. You can call my office if this occurs. Please contact our office via [address] [place] [address] in 2 weeks to report on the effects of the injections or any time with any questions or concerns. Please note that your next injections should not be scheduled less than 90 days from today. If your health insurance changes before the next injections, please contact our office as soon as possible so we can submit for a new prior authorization if needed. Please note that we may not be able to perform the injections if your insurance changes and the treatment is not pre-authorized.

## 2023-10-06 ENCOUNTER — OFFICE VISIT (OUTPATIENT)
Dept: NEUROSURGERY | Facility: CLINIC | Age: 63
End: 2023-10-06
Payer: COMMERCIAL

## 2023-10-06 VITALS
OXYGEN SATURATION: 97 % | SYSTOLIC BLOOD PRESSURE: 140 MMHG | BODY MASS INDEX: 28.14 KG/M2 | HEART RATE: 57 BPM | DIASTOLIC BLOOD PRESSURE: 84 MMHG | TEMPERATURE: 98.3 F | WEIGHT: 190 LBS | HEIGHT: 69 IN

## 2023-10-06 DIAGNOSIS — I77.74 VERTEBRAL ARTERY DISSECTION (HCC): Primary | ICD-10-CM

## 2023-10-06 DIAGNOSIS — G50.0 TRIGEMINAL NEURALGIA OF RIGHT SIDE OF FACE: ICD-10-CM

## 2023-10-06 PROCEDURE — 99214 OFFICE O/P EST MOD 30 MIN: CPT | Performed by: RADIOLOGY

## 2023-10-06 NOTE — PROGRESS NOTES
Assessment/Plan:     Diagnoses and all orders for this visit:    Right Vertebral artery dissection (HCC)  -     CTA head and neck w wo contrast; Future    Trigeminal neuralgia of right side of face          Discussion Summary:   Carlos Galeano continues to have significant pain in the right V1 and V3 distributions. We discussed various treatments such as microvascular decompression, SRS, and rhizotomy. Given his atypical post-stroke neuralgia I believe a rhizotomy would be the most likely to be effective for him. However given his recent stroke and inability to come off of dual antiplatelet therapy at this time he is not yet a candidate for any surgical intervention. We discussed performing a cerebral angiogram for further evaluation of his stent and he is open to this procedure. I have ordered him a new CTA and referred him back to Dr. Nile Sheppard for further discussion. Additionally he has asked about Lyrica therapy prior to any surgical intervention for his neuralgia and I have asked that they discussed further with Dr. Can Luu. Total time of encounter was 75 minutes. The patient, patient's family was counseled regarding diagnostic results, instructions for management. Time also includes but not limited to time spent reviewing tests, communicating with other healthcare professionals, documenting information in the medical record, and care coordination. Chief Complaint: Follow-up (MRI Brain and Trigem- 8/13)      Patient ID: Mckenzie Serna is a 60 y.o. male    HPI   Mr. Anni Espitia returns for follow-up of his right-sided trigeminal neuralgia. This all occurred since his stroke in February. He reports right ear and forehead pain radiating upwards to top of head. Pain is a sharp pulsating quality. Not constant but no particular trigger. Pain occurs every 20 minutes. Heat helps the pain. Cold air makes it worse. No numbness. No visual difficulty.   He has been treated by Dr. Can Luu and has been on multiple medications for TN as well as nerve blocks. Doesn't know if he gets any relief from local nerve blocks. He continues on aspirin and Brilinta for his right vertebral artery dissection and stent. Review of Systems   Constitutional: Negative. HENT: Negative for trouble swallowing. Right sided facial pain-constant  Pain scale 8/10     Eyes: Negative. Respiratory: Negative. Cardiovascular: Negative. Gastrointestinal: Negative. Endocrine: Negative. Genitourinary: Negative. Musculoskeletal: Negative. Skin: Negative. Allergic/Immunologic: Negative. Neurological: Positive for headaches. Negative for dizziness, seizures, speech difficulty, light-headedness and numbness. Hematological: Negative. Psychiatric/Behavioral: Negative. I have personally reviewed the MA's review of systems and made changes as necessary.     The following portions of the patient's history were reviewed and updated as appropriate: allergies, current medications, past family history, past medical history, past social history, past surgical history and problem list.      Active Ambulatory Problems     Diagnosis Date Noted   • BRAULIO (acute kidney injury) ([address]) 02/25/2023   • Acute Posterior Circulation Stroke 02/25/2023   • Right Vertebral artery dissection ([address]) 02/25/2023   • Stenosis of left vertebral artery 02/25/2023   • Primary hypertension 02/25/2023   • Spasticity 03/01/2023   • Stage 2 chronic kidney disease 03/01/2023   • Prediabetes 03/01/2023   • Adjustment disorder with depressed mood 03/01/2023   • Iron deficiency anemia 03/06/2023   • Neurogenic bowel 03/08/2023   • Celiac artery stenosis ([address]) 03/22/2023   • Neuropathic pain 05/04/2023   • Spastic hemiparesis of right dominant side as late effect of cerebral infarction ([address]) 05/05/2023   • Trigeminal neuralgia of right side of face 06/07/2023   • Right facial pain 07/27/2023   • 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 08/09/2023   • 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     Resolved Ambulatory Problems     Diagnosis Date Noted   • COVID 02/25/2023   • Dizziness 02/25/2023   • UTI (urinary tract infection) 02/25/2023   • Dysphagia 02/25/2023   • Sinus tachycardia 03/01/2023   • Leukocytosis 03/01/2023   • Vasovagal syncope 03/09/2023     Past Medical History:   Diagnosis Date   • B12 deficiency    • Cerebellar infarct ([address]) 02/25/2023   • CKD (chronic kidney disease) stage 2, GFR 60-89 ml/min    • Essential hypertension    • Impaired fasting glucose    • Stroke [facility])        Past Surgical History:   Procedure Laterality Date   • ARTERY SURGERY      vertebral artery stent/revascularization   • IR STROKE ALERT  2/26/2023       Current Outpatient Medications   Medication Sig Dispense Refill   • amLODIPine (NORVASC) 2.5 mg tablet TAKE 1 TABLET BY MOUTH EVERY DAY 90 tablet 2   • atorvastatin (LIPITOR) 40 mg tablet TAKE 1 TABLET BY MOUTH DAILY WITH DINNER 90 tablet 1   • baclofen 10 mg tablet Take 1 tablet in the AM, 1 tablet midday, and 1.5 tablets at night by mouth. 105 tablet 0   • carvedilol (COREG) 25 mg tablet TAKE 1 TABLET BY MOUTH TWICE A DAY WITH FOOD WITH MEALS 180 tablet 1   • [facility] Aspirin Adult Low Dose 81 MG chewable tablet CHEW 1 TABLET BY MOUTH DAILY 90 tablet 1   • [facility] Vitamin B-12 1000 MCG tablet TAKE 1 TABLET BY MOUTH EVERY DAY 90 tablet 1   • diazepam (VALIUM) 5 mg tablet Take 0.5 tablets (2.5 mg total) by mouth daily at bedtime as needed for muscle spasms 35 tablet 0   • DULoxetine (CYMBALTA) 60 mg delayed release capsule Take 1 capsule (60 mg total) by mouth daily 30 capsule 3   • ferrous sulfate 325 (65 Fe) mg tablet TAKE 1 TABLET BY MOUTH EVERY DAY WITH BREAKFAST 90 tablet 1   • hydrALAZINE (APRESOLINE) 50 mg tablet TAKE 1 TABLET BY MOUTH EVERY 12 HOURS. 180 tablet 1   • losartan (COZAAR) 100 MG tablet TAKE 1 TABLET BY MOUTH EVERY DAY 90 tablet 2   • OXcarbazepine (TRILEPTAL) 150 mg tablet TAKE 2 TABLETS BY MOUTH 2 TIMES A DAY.  360 tablet 0   • ticagrelor (BRILINTA) 90 MG Take 1 tablet (90 mg total) by mouth every 12 (twelve) hours 60 tablet 2   • loratadine (CLARITIN) 10 mg tablet Take 10 mg by mouth daily (Patient not taking: Reported on 9/20/2023)       No current facility-administered medications for this visit. There were no vitals filed for this visit. Objective:    Physical Exam  Constitutional:       Appearance: Normal appearance. Eyes:      Pupils: Pupils are equal, round, and reactive to light. Pulmonary:      Effort: Pulmonary effort is normal.   Musculoskeletal:         General: Normal range of motion. Skin:     General: Skin is warm. Neurological:      Mental Status: He is alert. Mental status is at baseline. Psychiatric:         Mood and Affect: Mood normal.         Behavior: Behavior normal.         Thought Content: Thought content normal.         Judgment: Judgment normal.       Neurologic Exam     Cranial Nerves     CN III, IV, VI   Pupils are equal, round, and reactive to light. Results/Data:  I have reviewed the results and images from the MRI in detail with the patient.

## 2023-10-10 DIAGNOSIS — G50.0 TRIGEMINAL NEURALGIA OF RIGHT SIDE OF FACE: Primary | ICD-10-CM

## 2023-10-10 DIAGNOSIS — G89.0 CENTRAL PAIN SYNDROME: ICD-10-CM

## 2023-10-10 DIAGNOSIS — G89.29 CENTRAL SENSITIZATION TO PAIN: ICD-10-CM

## 2023-10-10 DIAGNOSIS — F43.21 ADJUSTMENT DISORDER WITH DEPRESSED MOOD: ICD-10-CM

## 2023-10-10 RX ORDER — PREGABALIN 25 MG/1
CAPSULE ORAL
Qty: 180 CAPSULE | Refills: 1 | Status: SHIPPED | OUTPATIENT
Start: 2023-10-10

## 2023-10-10 RX ORDER — DULOXETIN HYDROCHLORIDE 60 MG/1
60 CAPSULE, DELAYED RELEASE ORAL DAILY
Qty: 90 CAPSULE | Refills: 2 | Status: SHIPPED | OUTPATIENT
Start: 2023-10-10

## 2023-10-12 LAB
ALBUMIN SERPL-MCNC: 4.1 G/DL (ref 3.6–5.1)
ALBUMIN/GLOB SERPL: 1.7 (CALC) (ref 1–2.5)
ALP SERPL-CCNC: 85 U/L (ref 35–144)
ALT SERPL-CCNC: 31 U/L (ref 9–46)
AST SERPL-CCNC: 16 U/L (ref 10–35)
BASOPHILS # BLD AUTO: 47 CELLS/UL (ref 0–200)
BASOPHILS NFR BLD AUTO: 0.6 %
BILIRUB SERPL-MCNC: 0.3 MG/DL (ref 0.2–1.2)
BUN SERPL-MCNC: 26 MG/DL (ref 7–25)
BUN/CREAT SERPL: 21 (CALC) (ref 6–22)
CALCIUM SERPL-MCNC: 9 MG/DL (ref 8.6–10.3)
CHLORIDE SERPL-SCNC: 108 MMOL/L (ref 98–110)
CHOLEST SERPL-MCNC: 160 MG/DL
CHOLEST/HDLC SERPL: 4.1 (CALC)
CO2 SERPL-SCNC: 27 MMOL/L (ref 20–32)
CREAT SERPL-MCNC: 1.23 MG/DL (ref 0.7–1.35)
EOSINOPHIL # BLD AUTO: 312 CELLS/UL (ref 15–500)
EOSINOPHIL NFR BLD AUTO: 4 %
ERYTHROCYTE [DISTWIDTH] IN BLOOD BY AUTOMATED COUNT: 13.5 % (ref 11–15)
GFR/BSA.PRED SERPLBLD CYS-BASED-ARV: 66 ML/MIN/1.73M2
GLOBULIN SER CALC-MCNC: 2.4 G/DL (CALC) (ref 1.9–3.7)
GLUCOSE SERPL-MCNC: 99 MG/DL (ref 65–99)
HCT VFR BLD AUTO: 39.6 % (ref 38.5–50)
HDLC SERPL-MCNC: 39 MG/DL
HGB BLD-MCNC: 13.1 G/DL (ref 13.2–17.1)
LDLC SERPL CALC-MCNC: 98 MG/DL (CALC)
LYMPHOCYTES # BLD AUTO: 1482 CELLS/UL (ref 850–3900)
LYMPHOCYTES NFR BLD AUTO: 19 %
MCH RBC QN AUTO: 28.5 PG (ref 27–33)
MCHC RBC AUTO-ENTMCNC: 33.1 G/DL (ref 32–36)
MCV RBC AUTO: 86.3 FL (ref 80–100)
MONOCYTES # BLD AUTO: 585 CELLS/UL (ref 200–950)
MONOCYTES NFR BLD AUTO: 7.5 %
NEUTROPHILS # BLD AUTO: 5374 CELLS/UL (ref 1500–7800)
NEUTROPHILS NFR BLD AUTO: 68.9 %
NONHDLC SERPL-MCNC: 121 MG/DL (CALC)
PLATELET # BLD AUTO: 217 THOUSAND/UL (ref 140–400)
PMV BLD REES-ECKER: 9.9 FL (ref 7.5–12.5)
POTASSIUM SERPL-SCNC: 3.7 MMOL/L (ref 3.5–5.3)
PROT SERPL-MCNC: 6.5 G/DL (ref 6.1–8.1)
PSA SERPL-MCNC: 2.98 NG/ML
RBC # BLD AUTO: 4.59 MILLION/UL (ref 4.2–5.8)
SODIUM SERPL-SCNC: 141 MMOL/L (ref 135–146)
TRIGL SERPL-MCNC: 133 MG/DL
WBC # BLD AUTO: 7.8 THOUSAND/UL (ref 3.8–10.8)

## 2023-10-14 DIAGNOSIS — R25.2 SPASTICITY: ICD-10-CM

## 2023-10-16 ENCOUNTER — RA CDI HCC (OUTPATIENT)
Dept: OTHER | Facility: HOSPITAL | Age: 63
End: 2023-10-16

## 2023-10-16 RX ORDER — BACLOFEN 10 MG/1
TABLET ORAL
Qty: 105 TABLET | Refills: 0 | Status: SHIPPED | OUTPATIENT
Start: 2023-10-16

## 2023-10-16 NOTE — PROGRESS NOTES
[address] [facility] coding opportunities       Chart reviewed, no opportunity found: CHART REVIEWED, NO OPPORTUNITY FOUND        Patients Insurance        Commercial Insurance: [employer]

## 2023-10-19 ENCOUNTER — TELEPHONE (OUTPATIENT)
Dept: FAMILY MEDICINE CLINIC | Facility: CLINIC | Age: 63
End: 2023-10-19

## 2023-10-19 NOTE — TELEPHONE ENCOUNTER
I called patient  to verify His appointment at the office Tomorrow 10/20/23      Wife answered and confirmed his appointment and also wanted to let us know that her  has a CTA at 700am tomorrow at [address]  may run a little behind

## 2023-10-19 NOTE — PROGRESS NOTES
Name: Laura Wells      : 1960      MRN: [id number]  Encounter Provider: Renate Montana DO  Encounter Date: 10/20/2023   Encounter department: [address] [place] [address]     1. Primary hypertension  -     Comprehensive metabolic panel; Future; Expected date: 2024  -     Lipid panel; Future; Expected date: 2024  -     Comprehensive metabolic panel  -     Lipid panel  BP at goal on current meds. Continue same. 2. Cerebrovascular accident (CVA), unspecified mechanism ([address])  Cerebellar infarct 23 (Multiple small age indeterminate infarctions within the right posterior cerebellum) in setting of right vertebral artery dissection (There is marked tapering and eventual occlusion of the distal V3 segment of the right vertebral artery suggestive of   Dissection). CTA at time of presentation also showed Severe focal stenosis at the origin of the left vertebral artery without calcific plaque. s/p stenting on  with TICI 2b revascularization  Initially on triple therapy with ASA/brilinta/Eliquis and  Statin  Eliquis d/c'd 23. Remains on DAPT    3. Stage 2 chronic kidney disease  Lab Results   Component Value Date    SODIUM 141 10/12/2023    K 3.7 10/12/2023     10/12/2023    CO2 27 10/12/2023    AGAP 11 2023    BUN 26 (H) 10/12/2023    CREATININE 1.23 10/12/2023    GLUC 99 10/12/2023    GLUF 107 (H) 2023    CALCIUM 9.0 10/12/2023    AST 16 10/12/2023    ALT 31 10/12/2023    ALKPHOS 85 10/12/2023    TP 6.5 10/12/2023    TBILI 0.3 10/12/2023    EGFR 66 10/12/2023     Stable. 4. Right facial pain  Much improved since recent addition of lyrica 25 mg tid. Just recently titrated up to 50 tid and are weaning off of the trileptal.   5. Right Vertebral artery dissection (HCC)    6. Spastic hemiparesis of right dominant side as late effect of cerebral infarction ([address])  No longer in PT but doing exercises at home and weakness improved. Spasms improved as well. 7. Anemia, unspecified type  -     CBC and differential; Future; Expected date: 04/20/2024  -     Iron; Future; Expected date: 04/20/2024  -     CBC and differential  -     Iron  Lab Results   Component Value Date    WBC 7.8 10/12/2023    HGB 13.1 (L) 10/12/2023    HCT 39.6 10/12/2023    MCV 86.3 10/12/2023     10/12/2023     Hemoglobin improved  On iron  Recheck 6 months. Subjective     HPI patient is a 60year old male with HTN, impaired fasting glucose, history of posterior circulation stroke, R vertebral artery dissection, right sided hemiparesis, spasticity and trigeminal neuralgia here today for f/u visit and review of labs. Since he was here last , saw neurosurgery in consultation regarding his trigeminal neuralgia. Discussed possibility of rhizotomy for this but not surgical candidate due to him being on DAPT and inability to d/c this for now. He was started on lyrica by neurology on 10/10/23. Just increased to 50 mg (2 of the 25 mg) tid. Wife cut his trileptal down to one and a half bid from 2 bid. States that the pain is much better and today has no pain at all.! Has no side effects of the combination. No longer getting any spasms in foot. Still some spasms in wrist. Thinks botox helped. Strength is improved. Had CTA this AM to assess status of his stent. Results pending. He declines flu vaccine today.            Review of Systems    Past Medical History:   Diagnosis Date   • Adjustment disorder with depressed mood 03/01/2023   • BRAULIO (acute kidney injury) ([address])    • B12 deficiency    • Celiac artery stenosis (HCC)    • Cerebellar infarct ([address]) 02/25/2023   • CKD (chronic kidney disease) stage 2, GFR 60-89 ml/min    • Essential hypertension    • Impaired fasting glucose    • Iron deficiency anemia    • Neurogenic bowel    • Stenosis of left vertebral artery    • Stroke [facility])    • Vertebral artery dissection [facility])      Past Surgical History: Procedure Laterality Date   • ARTERY SURGERY      vertebral artery stent/revascularization   • IR STROKE ALERT  2023     Family History   Problem Relation Age of Onset   • Hypertension Brother    • Hypertension Brother      Social History     Socioeconomic History   • Marital status: /Civil Union     Spouse name: None   • Number of children: None   • Years of education: None   • Highest education level: None   Occupational History   • None   Tobacco Use   • Smoking status: Former     Packs/day: 1.00     Years: 5.00     Total pack years: 5.00     Types: Cigarettes     Quit date: 1983     Years since quittin.5   • Smokeless tobacco: Never   Vaping Use   • Vaping Use: Never used   Substance and Sexual Activity   • Alcohol use: Not Currently   • Drug use: Never   • Sexual activity: Not Currently     Partners: Female   Other Topics Concern   • None   Social History Narrative        2 children    Works as an  (self employed)     Social Determinants of Health     Financial Resource Strain: Not on file   Food Insecurity: Not on file   Transportation Needs: Not on file   Physical Activity: Not on file   Stress: Not on file   Social Connections: Not on file   Intimate Partner Violence: Not on file   Housing Stability: Not on file     Current Outpatient Medications on File Prior to Visit   Medication Sig   • amLODIPine (NORVASC) 2.5 mg tablet TAKE 1 TABLET BY MOUTH EVERY DAY   • atorvastatin (LIPITOR) 40 mg tablet TAKE 1 TABLET BY MOUTH DAILY WITH DINNER   • baclofen 10 mg tablet Take 1 tablet in the AM, 1 tablet midday, and 1.5 tablets at night by mouth.    • carvedilol (COREG) 25 mg tablet TAKE 1 TABLET BY MOUTH TWICE A DAY WITH FOOD WITH MEALS   • [facility] Aspirin Adult Low Dose 81 MG chewable tablet CHEW 1 TABLET BY MOUTH DAILY   • [facility] Vitamin B-12 1000 MCG tablet TAKE 1 TABLET BY MOUTH EVERY DAY   • diazepam (VALIUM) 5 mg tablet Take 0.5 tablets (2.5 mg total) by mouth daily at bedtime as needed for muscle spasms   • DULoxetine (CYMBALTA) 60 mg delayed release capsule TAKE 1 CAPSULE BY MOUTH EVERY DAY   • ferrous sulfate 325 (65 Fe) mg tablet TAKE 1 TABLET BY MOUTH EVERY DAY WITH BREAKFAST   • hydrALAZINE (APRESOLINE) 50 mg tablet TAKE 1 TABLET BY MOUTH EVERY 12 HOURS. • losartan (COZAAR) 100 MG tablet TAKE 1 TABLET BY MOUTH EVERY DAY   • OXcarbazepine (TRILEPTAL) 150 mg tablet TAKE 2 TABLETS BY MOUTH 2 TIMES A DAY. • pregabalin (LYRICA) 25 mg capsule 1 cap TID for 2 weeks, then if no change 2 caps TID   • ticagrelor (BRILINTA) 90 MG Take 1 tablet (90 mg total) by mouth every 12 (twelve) hours   • [DISCONTINUED] loratadine (CLARITIN) 10 mg tablet Take 10 mg by mouth daily (Patient not taking: Reported on 9/20/2023)     No Known Allergies  Immunization History   Administered Date(s) Administered   • COVID-19 MODERNA VACC 0.5 ML IM 01/21/2021, 03/01/2021, 11/29/2021       Objective     /58 (BP Location: Left arm, Patient Position: Sitting, Cuff Size: Standard)   Pulse 66   Temp (!) 97.1 °F (36.2 °C) (Tympanic)   Ht 5' 9" (1.753 m)   Wt 87.5 kg (193 lb)   SpO2 99%   BMI 28.50 kg/m²     Physical Exam  Vitals and nursing note reviewed. Constitutional:       General: He is not in acute distress. Appearance: Normal appearance. He is not ill-appearing, toxic-appearing or diaphoretic. HENT:      Head: Normocephalic and atraumatic. Mouth/Throat:      Mouth: Mucous membranes are moist.      Pharynx: No oropharyngeal exudate or posterior oropharyngeal erythema. Eyes:      Extraocular Movements: Extraocular movements intact. Conjunctiva/sclera: Conjunctivae normal.      Pupils: Pupils are equal, round, and reactive to light. Neck:      Vascular: No carotid bruit. Cardiovascular:      Rate and Rhythm: Normal rate and regular rhythm. Heart sounds: No murmur heard. Pulmonary:      Effort: Pulmonary effort is normal.      Breath sounds: Normal breath sounds. Abdominal:      General: Abdomen is flat. Palpations: Abdomen is soft. Musculoskeletal:      Cervical back: Normal range of motion and neck supple. Right lower leg: No edema. Left lower leg: No edema. Lymphadenopathy:      Cervical: No cervical adenopathy. Skin:     General: Skin is warm and dry. Findings: No rash. Neurological:      Mental Status: He is alert. Comments: Mild weakness right upper extremity and right lower extremity. He has decreased  strength on right.     Psychiatric:         Mood and Affect: Mood normal.       Kasandra Rashaun, DO

## 2023-10-20 ENCOUNTER — HOSPITAL ENCOUNTER (OUTPATIENT)
Dept: CT IMAGING | Facility: HOSPITAL | Age: 63
Discharge: HOME | End: 2023-10-20
Attending: RADIOLOGY
Payer: COMMERCIAL

## 2023-10-20 ENCOUNTER — OFFICE VISIT (OUTPATIENT)
Dept: FAMILY MEDICINE CLINIC | Facility: CLINIC | Age: 63
End: 2023-10-20

## 2023-10-20 VITALS
TEMPERATURE: 97.1 F | DIASTOLIC BLOOD PRESSURE: 58 MMHG | WEIGHT: 193 LBS | OXYGEN SATURATION: 99 % | BODY MASS INDEX: 28.58 KG/M2 | HEIGHT: 69 IN | SYSTOLIC BLOOD PRESSURE: 125 MMHG | HEART RATE: 66 BPM

## 2023-10-20 DIAGNOSIS — I77.74 VERTEBRAL ARTERY DISSECTION (HCC): ICD-10-CM

## 2023-10-20 DIAGNOSIS — D64.9 ANEMIA, UNSPECIFIED TYPE: ICD-10-CM

## 2023-10-20 DIAGNOSIS — I63.9 CEREBROVASCULAR ACCIDENT (CVA), UNSPECIFIED MECHANISM (HCC): ICD-10-CM

## 2023-10-20 DIAGNOSIS — R51.9 RIGHT FACIAL PAIN: ICD-10-CM

## 2023-10-20 DIAGNOSIS — I10 PRIMARY HYPERTENSION: Primary | ICD-10-CM

## 2023-10-20 DIAGNOSIS — I69.351 SPASTIC HEMIPARESIS OF RIGHT DOMINANT SIDE AS LATE EFFECT OF CEREBRAL INFARCTION (HCC): ICD-10-CM

## 2023-10-20 DIAGNOSIS — N18.2 STAGE 2 CHRONIC KIDNEY DISEASE: ICD-10-CM

## 2023-10-20 PROBLEM — R73.03 PREDIABETES: Status: RESOLVED | Noted: 2023-03-01 | Resolved: 2023-10-20

## 2023-10-20 PROBLEM — R25.2 SPASTICITY: Status: RESOLVED | Noted: 2023-03-01 | Resolved: 2023-10-20

## 2023-10-20 PROBLEM — F43.21 ADJUSTMENT DISORDER WITH DEPRESSED MOOD: Status: RESOLVED | Noted: 2023-03-01 | Resolved: 2023-10-20

## 2023-10-20 PROBLEM — M79.2 NEUROPATHIC PAIN: Status: RESOLVED | Noted: 2023-05-04 | Resolved: 2023-10-20

## 2023-10-20 PROBLEM — D50.9 IRON DEFICIENCY ANEMIA: Status: RESOLVED | Noted: 2023-03-06 | Resolved: 2023-10-20

## 2023-10-20 PROBLEM — G50.0 TRIGEMINAL NEURALGIA OF RIGHT SIDE OF FACE: Status: RESOLVED | Noted: 2023-06-07 | Resolved: 2023-10-20

## 2023-10-20 PROBLEM — N17.9 AKI (ACUTE KIDNEY INJURY) (HCC): Status: RESOLVED | Noted: 2023-02-25 | Resolved: 2023-10-20

## 2023-10-20 PROCEDURE — G1004 CDSM NDSC: HCPCS

## 2023-10-20 PROCEDURE — 70498 CT ANGIOGRAPHY NECK: CPT

## 2023-10-20 PROCEDURE — 70496 CT ANGIOGRAPHY HEAD: CPT

## 2023-10-20 RX ADMIN — IOHEXOL 90 ML: 350 INJECTION, SOLUTION INTRAVENOUS at 06:52

## 2023-10-26 DIAGNOSIS — I65.02 STENOSIS OF LEFT VERTEBRAL ARTERY: ICD-10-CM

## 2023-10-26 DIAGNOSIS — I77.1 CELIAC ARTERY STENOSIS (HCC): ICD-10-CM

## 2023-10-26 DIAGNOSIS — I77.74 VERTEBRAL ARTERY DISSECTION (HCC): ICD-10-CM

## 2023-10-26 DIAGNOSIS — I63.9 CEREBROVASCULAR ACCIDENT (CVA) (HCC): ICD-10-CM

## 2023-10-30 RX ORDER — TICAGRELOR 90 MG/1
90 TABLET ORAL EVERY 12 HOURS
Qty: 60 TABLET | Refills: 2 | Status: SHIPPED | OUTPATIENT
Start: 2023-10-30

## 2023-10-31 ENCOUNTER — TELEPHONE (OUTPATIENT)
Dept: NEUROLOGY | Facility: CLINIC | Age: 63
End: 2023-10-31

## 2023-10-31 NOTE — TELEPHONE ENCOUNTER
Received VM transcription from 10/30/23, 10:02 AM:    I'm calling on behalf of my , Dilcia Gill. Date of birth, [birth year]. He is out of his Brilinta medication and I cannot request it through my chart. It says that I need to call. So will someone please authorize that refill he has today and tomorrow left. I can be reached at [phone].  ---------------------------------    Josemanuel Fenton   to Kasandra Rodney DO   MS      10/30/23 10:01 AM  Please review. Patient's spouse called in to state that Neuro only previously prescribed when patient could not get in touch with Dr. Viry Maharaj office. Two days' worth of dosage remaining. Patient's spouse requests call back if Dr. Yinka Espinosa will not fill. [facility] - [address] [place], [place]      It appears Dr. Yinka Espinosa (pt's PCP) has already sent in script to pt's pharmacy.

## 2023-10-31 NOTE — TELEPHONE ENCOUNTER
Called pt's wife and left her a detailed message making her aware that a script for the medication was already sent to the pharmacy by Dr. Prasanth Mckenzie. Instructed that she call the office back if there are any issues getting this medication.

## 2023-11-03 ENCOUNTER — TELEPHONE (OUTPATIENT)
Dept: NEUROSURGERY | Facility: CLINIC | Age: 63
End: 2023-11-03

## 2023-11-03 NOTE — TELEPHONE ENCOUNTER
Spoke to patient to offer to move up his appt with Dr. Ladi Clayton to Monday 11/6/23 at 2:15pm as Dr. Ladi Clayton had a cancellation. He stated he will talk to his wife and have her call back to confirm by the end of today.

## 2023-11-06 ENCOUNTER — OFFICE VISIT (OUTPATIENT)
Dept: NEUROSURGERY | Facility: CLINIC | Age: 63
End: 2023-11-06
Payer: COMMERCIAL

## 2023-11-06 VITALS
SYSTOLIC BLOOD PRESSURE: 148 MMHG | WEIGHT: 206.4 LBS | TEMPERATURE: 98.2 F | DIASTOLIC BLOOD PRESSURE: 82 MMHG | BODY MASS INDEX: 30.57 KG/M2 | HEART RATE: 60 BPM | HEIGHT: 69 IN | OXYGEN SATURATION: 95 %

## 2023-11-06 DIAGNOSIS — I65.02 STENOSIS OF LEFT VERTEBRAL ARTERY: Primary | ICD-10-CM

## 2023-11-06 DIAGNOSIS — I63.9 CEREBROVASCULAR ACCIDENT (CVA), UNSPECIFIED MECHANISM (HCC): ICD-10-CM

## 2023-11-06 DIAGNOSIS — I77.74 VERTEBRAL ARTERY DISSECTION (HCC): ICD-10-CM

## 2023-11-06 PROCEDURE — 99214 OFFICE O/P EST MOD 30 MIN: CPT | Performed by: NEUROLOGICAL SURGERY

## 2023-11-06 NOTE — PROGRESS NOTES
Patient Id: Roel Hughes is a 60 y.o. male        Handedness: Right     Assessment/Plan:    Diagnoses and all orders for this visit:    Stenosis of left vertebral artery    Right Vertebral artery dissection ([address])    Cerebrovascular accident (CVA), unspecified mechanism ([address])        Discussion and summary:   1. Posterior circulation stroke due to dissected right V3 and intracranial V4 occlusion requiring stenting of both segments. Currently on aspirin and Brilinta. Patient developed atypical facial pain and interested in proceeding with treatment for this. However I am unclear if the intracranial V4 stent which is a drug-eluting cardiac stent is patent. I do not feel comfortable discontinuing his aspirin and Brilinta until I have angiographic proof that this is truly occluded. I believe the occlusion documented on CTA is likely artifact from the stent itself. In order to better assess this we will proceed with formal arteriography. He understands the risks and benefits of this procedure including bleeding vascular injury and stroke. We will schedule this at his convenience. I spent 30 minutes in the care of this patient including the review and interpretation of imaging and tests results, as well as communication/explanation to the patient  regarding the natural history of this process and test/imaging results, and documentation. Chief Complaint: Follow-up (Discuss CTA 10/20/23)        HPI:  Roel Hughes is a very unfortunate male who presented with 2 days of progressive dysarthria, dizziness, and imbalance. He was found to have a right vertebral artery dissection and occlusion in the context of incidental COVID. Initially his NIHSS was 1. However over the course of the day he was found to have a deteriorating exam with new right-sided weakness.   After extensive conversation with his wife and family regarding the risks associated with attempted revascularization including bleeding, worsening stroke symptoms and death they like to proceed. Ultimately he underwent right vertebral artery stenting of his V3 and V4 segments. He made a significant improvement however he does have some posterior circulation stroke and developed atypical facial pain afterwards. He is eager to have his atypical facial pain treated, however, due to the usage of a drug-eluting stent he cannot come off Brilinta for at least 6 months without documented occlusion of it. Given his pain he has represented to discuss formal arteriography again. Pt is . He has one son who is 39 and one daughter who is 34years old. He is not currently employed. He is a former smokers, but denies alcohol or illicit drug use. He denies any pertinent family history related to this visit today. Review of systems obtained by the MA reviewed and updated below. Review of Systems   HENT: Negative. Negative for tinnitus. Eyes: Negative. Respiratory: Negative. Cardiovascular: Negative. Gastrointestinal: Negative. Endocrine: Positive for cold intolerance (colder right side). Genitourinary: Negative. Musculoskeletal:  Positive for gait problem (using cane) and myalgias. Skin: Negative. Neurological:  Negative for dizziness, tremors, seizures, speech difficulty, weakness, light-headedness, numbness and headaches. Intermittent right face throbbing  Intermittent right leg spasms, worse when laying doen   Hematological:  Bruises/bleeds easily. ASA and brilinta   Psychiatric/Behavioral:  Negative for confusion, decreased concentration and sleep disturbance. Physical Exam  Vitals:    11/06/23 1408   BP: 148/82   Pulse: 60   Temp: 98.2 °F (36.8 °C)   SpO2: 95%   He has a slight right facial.  Slight right-sided weakness mostly in his arm.   He also has right 5th nerve dysfunction    The following portions of the patient's history were reviewed and updated as appropriate: allergies, current medications, past family history, past medical history, past social history, past surgical history, and problem list.    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    Anemia 10/20/2023     Resolved Ambulatory Problems     Diagnosis Date Noted    BRAULIO (acute kidney injury) ([address]) 02/25/2023    COVID 02/25/2023    Dizziness 02/25/2023    UTI (urinary tract infection) 02/25/2023    Dysphagia 02/25/2023    Spasticity 03/01/2023    Sinus tachycardia 03/01/2023    Leukocytosis 03/01/2023    Prediabetes 03/01/2023    Adjustment disorder with depressed mood 03/01/2023    Iron deficiency anemia 03/06/2023    Vasovagal syncope 03/09/2023    Neuropathic pain 05/04/2023    Trigeminal neuralgia of right side of face 06/07/2023     Past Medical History:   Diagnosis Date    B12 deficiency     Cerebellar infarct ([address]) 02/25/2023    CKD (chronic kidney disease) stage 2, GFR 60-89 ml/min     Essential hypertension     Impaired fasting glucose     Stroke [facility])        Past Surgical History:   Procedure Laterality Date    ARTERY SURGERY      vertebral artery stent/revascularization    IR STROKE ALERT  2/26/2023         Current Outpatient Medications:     amLODIPine (NORVASC) 2.5 mg tablet, TAKE 1 TABLET BY MOUTH EVERY DAY, Disp: 90 tablet, Rfl: 2    atorvastatin (LIPITOR) 40 mg tablet, TAKE 1 TABLET BY MOUTH DAILY WITH DINNER, Disp: 90 tablet, Rfl: 1    baclofen 10 mg tablet, Take 1 tablet in the AM, 1 tablet midday, and 1.5 tablets at night by mouth., Disp: 105 tablet, Rfl: 0    Brilinta 90 MG, TAKE 1 TABLET BY MOUTH EVERY 12 HOURS., Disp: 60 tablet, Rfl: 2    carvedilol (COREG) 25 mg tablet, TAKE 1 TABLET BY MOUTH TWICE A DAY WITH FOOD WITH MEALS, Disp: 180 tablet, Rfl: 1    [facility] Aspirin Adult Low Dose 81 MG chewable tablet, CHEW 1 TABLET BY MOUTH DAILY, Disp: 90 tablet, Rfl: 1    [facility] Vitamin B-12 1000 MCG tablet, TAKE 1 TABLET BY MOUTH EVERY DAY, Disp: 90 tablet, Rfl: 1    diazepam (VALIUM) 5 mg tablet, Take 0.5 tablets (2.5 mg total) by mouth daily at bedtime as needed for muscle spasms, Disp: 35 tablet, Rfl: 0    DULoxetine (CYMBALTA) 60 mg delayed release capsule, TAKE 1 CAPSULE BY MOUTH EVERY DAY, Disp: 90 capsule, Rfl: 2    ferrous sulfate 325 (65 Fe) mg tablet, TAKE 1 TABLET BY MOUTH EVERY DAY WITH BREAKFAST, Disp: 90 tablet, Rfl: 1    hydrALAZINE (APRESOLINE) 50 mg tablet, TAKE 1 TABLET BY MOUTH EVERY 12 HOURS., Disp: 180 tablet, Rfl: 1    losartan (COZAAR) 100 MG tablet, TAKE 1 TABLET BY MOUTH EVERY DAY, Disp: 90 tablet, Rfl: 2    OXcarbazepine (TRILEPTAL) 150 mg tablet, TAKE 2 TABLETS BY MOUTH 2 TIMES A DAY., Disp: 360 tablet, Rfl: 0    pregabalin (LYRICA) 25 mg capsule, 1 cap TID for 2 weeks, then if no change 2 caps TID, Disp: 180 capsule, Rfl: 1    Results/Data: Imaging reviewed in detail with patient as well as reports.

## 2023-11-12 DIAGNOSIS — R25.2 SPASTICITY: ICD-10-CM

## 2023-11-12 RX ORDER — BACLOFEN 10 MG/1
TABLET ORAL
Qty: 315 TABLET | Refills: 1 | Status: SHIPPED | OUTPATIENT
Start: 2023-11-12

## 2023-11-14 NOTE — RESULT ENCOUNTER NOTE
Patient admitted at [facility] AND [facility] Speech-Language Pathology Evaluation Attempt     Chart reviewed. Attempted Speech-Language Pathology Evaluation/Treatment, however, patient unable to be seen due to:  []  Nausea/vomiting  []  Eating  []  Pain  []  Patient too lethargic  []  Off Unit for testing/procedure  []  Dialysis treatment in progress   []  Telemetry Results  [x]  Other:     1440: ST introduced self and role in patient care. Patient refused evaluation at this time, citing NPO status. ST and nursing explained that patient is no longer NPO and on full liquid diet and that ST is here to assess swallow function after NG tube removal. Patient continuing to refuse, rejected water, apple sauce, and gloria cracker and states that she will only eat soup when it comes for dinner tonight. Unable to redirect patient, multiple attempts. ST will follow up later as patient's schedule allows.    Thank you for this referral  Jerad Wheeler M.S., CCC-SLP

## 2023-11-20 ENCOUNTER — TELEPHONE (OUTPATIENT)
Dept: NEUROLOGY | Facility: CLINIC | Age: 63
End: 2023-11-20

## 2023-11-20 NOTE — TELEPHONE ENCOUNTER
Patient wife called wants to reschedule patient appointment tomorrow with Dr Rachana Lawton for TPI/ nerve block test. Please call patient to schedule.

## 2023-11-24 NOTE — TELEPHONE ENCOUNTER
Called and left  for [employer]. When she calls back, please reschedule to Dr Chapman Age next injection date which is on 1/30.

## 2023-11-29 DIAGNOSIS — G50.0 TRIGEMINAL NEURALGIA: ICD-10-CM

## 2023-11-30 NOTE — TELEPHONE ENCOUNTER
Patient wife called needs a new script for Oxcabazepine. [facility] pharmacy has the wrong dosage- they have pt is taking 1 tab 2x day. They wont fill the medicine. Patient is currently taking 2 tabs 2x day. Patient is out of it and needs this sent to the pharmacy.  Pt wife wants a call back when medicine is sent to [facility].

## 2023-12-01 DIAGNOSIS — G50.0 TRIGEMINAL NEURALGIA: ICD-10-CM

## 2023-12-01 RX ORDER — OXCARBAZEPINE 150 MG/1
300 TABLET, FILM COATED ORAL 2 TIMES DAILY
Qty: 360 TABLET | Refills: 0 | Status: SHIPPED | OUTPATIENT
Start: 2023-12-01

## 2023-12-01 RX ORDER — OXCARBAZEPINE 150 MG/1
300 TABLET, FILM COATED ORAL 2 TIMES DAILY
Qty: 360 TABLET | Refills: 0 | OUTPATIENT
Start: 2023-12-01

## 2023-12-01 NOTE — TELEPHONE ENCOUNTER
Patient spouse called frustrated her  has not received the medication. She left messages since Wednesday. He is out of it since Wednesday and cannot go without it over the weekend. Messaged in teams the nurse Aury. She tiger text Dr Juliana Granados and Dr Baron Yeager for them to fill the medication. The nurse said [facility] will [facility] when [facility] receive the [facility]. But she wants a call from the nurse that the medication is sent today.

## 2023-12-01 NOTE — TELEPHONE ENCOUNTER
Pts wife needs script called in for 2 tablets twice a day. Pts wife needs this called in ASAP he has not had any since Wednesday. Pt can not go with out medication this weekend.

## 2023-12-01 NOTE — PROGRESS NOTES
Needed urgent referral for Oxcarbazepine per wife. This is prescribed by Vani Lugo with Neurology. I do know this patient, and to avoid delay, I will send the script today. However, any necessary surveillance and referrals should be through Neurology from here on. Recommend f/u with Neurology for this medication.      Gigi Iglesias MD  Physical Medicine and Rehabilitation

## 2023-12-04 ENCOUNTER — TELEPHONE (OUTPATIENT)
Dept: NEUROSURGERY | Facility: CLINIC | Age: 63
End: 2023-12-04

## 2023-12-04 NOTE — TELEPHONE ENCOUNTER
I spoke with the patient attempting to schedule angio with Dr. Clyde Lopes, pt stated he would like to hold off another month to see if alternative methods like medical marijuana gummies & cream to help his discomfort, requested I call back in one month to see how he would like to proceed. Also, requesting to discontinue Brilinta as he remembers past conversations regarding d/c once one year has came. Dr. Clyde Lopes - Can patient d/c Brilinta at the 1 year ria? Also would like to know if medical marijuana will affect Brilinta.

## 2023-12-04 NOTE — TELEPHONE ENCOUNTER
LV informing pt Dr. Arpan Logan would like to do the angio before d/c Brilinta and KASSI TAN will not affect this medication. Provided cb#.

## 2023-12-18 DIAGNOSIS — I10 PRIMARY HYPERTENSION: ICD-10-CM

## 2023-12-18 RX ORDER — HYDRALAZINE HYDROCHLORIDE 50 MG/1
TABLET, FILM COATED ORAL
Qty: 180 TABLET | Refills: 1 | Status: SHIPPED | OUTPATIENT
Start: 2023-12-18

## 2023-12-19 ENCOUNTER — PROCEDURE VISIT (OUTPATIENT)
Dept: NEUROLOGY | Facility: CLINIC | Age: 63
End: 2023-12-19
Payer: COMMERCIAL

## 2023-12-19 VITALS
HEART RATE: 67 BPM | TEMPERATURE: 97.2 F | DIASTOLIC BLOOD PRESSURE: 82 MMHG | HEIGHT: 69 IN | SYSTOLIC BLOOD PRESSURE: 164 MMHG | OXYGEN SATURATION: 97 % | BODY MASS INDEX: 30.48 KG/M2

## 2023-12-19 DIAGNOSIS — I69.351 SPASTIC HEMIPARESIS OF RIGHT DOMINANT SIDE AS LATE EFFECT OF CEREBRAL INFARCTION (HCC): Primary | ICD-10-CM

## 2023-12-19 DIAGNOSIS — G89.0 CENTRAL PAIN SYNDROME: ICD-10-CM

## 2023-12-19 DIAGNOSIS — G89.29 CENTRAL SENSITIZATION TO PAIN: ICD-10-CM

## 2023-12-19 PROCEDURE — 64642 CHEMODENERV 1 EXTREMITY 1-4: CPT | Performed by: PHYSICAL MEDICINE & REHABILITATION

## 2023-12-19 PROCEDURE — 99214 OFFICE O/P EST MOD 30 MIN: CPT | Performed by: PHYSICAL MEDICINE & REHABILITATION

## 2023-12-19 PROCEDURE — 95874 GUIDE NERV DESTR NEEDLE EMG: CPT | Performed by: PHYSICAL MEDICINE & REHABILITATION

## 2023-12-19 PROCEDURE — 64643 CHEMODENERV 1 EXTREM 1-4 EA: CPT | Performed by: PHYSICAL MEDICINE & REHABILITATION

## 2023-12-19 RX ORDER — NALTREXONE HYDROCHLORIDE 50 MG/1
TABLET, FILM COATED ORAL
Qty: 42 TABLET | Refills: 0 | Status: SHIPPED | OUTPATIENT
Start: 2023-12-19

## 2023-12-19 NOTE — PATIENT INSTRUCTIONS
For naltrexone: Stop the valium. Can keep talking the other meds. No narcotics (so that you don't get pushed into withdrawal) for now. Check TSH 2 weeks after you start the medication. Take 1.5mg at night to start for 2 weeks, then increase to 3mg at night if you are still having pain.   Monitor for signs of withdrawal if you stop the valium.  Mychart message if any concerns.  Follow-up with Neurology to discuss your other medications for your central sensitization to pain  Provided referral to pain management, I would make an appointment for 6-8 weeks from now in case the naltrexone doesn't work.   [facility] or [facility]  [employer] [employer] [address] eval to be timed by your therapists.

## 2023-12-19 NOTE — PROGRESS NOTES
Physical Medicine & Rehabilitation Spasticity Follow-up/Procedure  Jimmy Mello 63 y.o. 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  -     TSH, 3rd generation with Free T4 reflex; Future  -     TSH, 3rd generation with Free T4 reflex    Central pain syndrome  -     Ambulatory referral to Spine & Pain Management; Future      Mr. Mello is a very pleasant 61 yo M who is very well known to me, who I took care of on the [facility] after his cerebellar CVA and R > L stroke burden, with additional hypodensities on DWI appreciated L midbrain, pontine area and R lower medullary/spinal cord junction. He has developed R spastic hemiparesis (Although has had remarkable recovery in regards to strength on that R side), neurogenic bowel (with some incontinence during rehab which has improved), and now has developed R facial pain/sensitivity with pain suspected to be central pain syndrome, as well as spasms/extrinsic tone and intrinsic tonic tone in his RUE.     The spasms in his RUE are well controlled on the botulinum toxin, and he did notice improved AROM in his shoulder, pronator, and his wrist/fingers (mostly finger flexors contributing to wrist tightness), that gradually worsened in the week leading up to this appointment. He has been taking valium at  night, but is fine with weaning that off, as he has started using medical marijuana to help with his pain. He finds it really just makes him sleepy - similar to the oxcarbazepine and the Lyrica. The main source of his pain now is his facial pain.    In addition, he has noticed his R toe dragging. He does have tightness in his plantarflexors, but more importantly weakness in dorsiflexion. We discussed a trial of botulinum toxin to see if loosening his plantarflexors allows him to utilize his dorsiflexors with more ease. He was amenable to that. He also his flexion at the knee, but I do not think that is the main thing contributing to their primary complaint.     They are interested in getting back into therapy in the new year, and potentially pursuing fitness to drive evaluation. I provided referral.     Finally, we discussed his pain regimen. Given concern for central pain sensitization and limited help from his other medications, they both expressed interest in a trial of low dose naltrexone. I did review that this is investigational, but relatively low risk. It is non-addictive, and can be helpful in chronic pain patients. Side effects can include thyroid dysfunction (recent TSH was normal, and will check again 2 weeks after initiation prior to titration up), vivid dreams. Would stop valium prior, and would not adjust other medications that way we can be sure of the effect of it. He and his wife would have to talk to the folks who prescribe his Lyrica and Oxcarbazepine if they want to trial weaning those down, but I would wait as to minimize confounders. They are amenable to a trial of this. I also provided them a referral to out of network pain providers should this trial not demonstrate benefit, so they can pursue other means of pain management if necessary. He is also being seen by Neurosurgery to see if he would benefit from a procedure. They ordered a CTA, and felt that the findings of occlusion may be more  artifact from the stent itself, and are hesitant to hold his antiplatelets for a procedure prior to confirming the presence of the occlusion and recommend formal arteriography, planned for after he completes his DAPT (As I understand it, and was relayed to me by the family).      I will follow-up with him 1/17/2023 at 1:00pm for a virtual to see how he is tolerating the low dose naltrexone trial, and review his repeat TFTs.    I will also follow-up with him on 3/20/2023 at 3:15pm for repeat botulinum toxin injections    1. Oral antispasticity medications: Continue Baclofen for now. Will discuss trial of weaning it down later, but cautioned them on changing too many medications at once, so we can monitor how he responds to medication changes. He will stop the Valium at this time. He also utilizes medical marijuana.  2. Schedule 400-500 units. Transfers independently   3. Physical/occupational therapy to resume, and to get Fitness to Drive evaluation  ?  *I have spent 35 minutes with Patient and family today in which greater than 50% of this time was spent in counseling/coordination of care regarding Risks and benefits of tx options, Instructions for management, Patient and family education, Importance of tx compliance, Risk factor reductions, Impressions, Counseling / Coordination of care, Documenting in the medical record, and Obtaining or reviewing history  .        HPI/SUBJECTIVE:  Patient presents today in the office with chief complaint of stumbling and toe catching when he walks , He has notice the tightness in his finger and wrist more in the past week. He has good response at the shoulder and at his pronator with improved AROM and tightness/discomfort.     He continues to have persistent facial pain that is the main thing impacting his quality of life.     Last seen for chemodenervation: 9/21/2023  Results of chemodenervation: Improved AROM and tightness/discomfort  How long did effects last: 12 week  Side  "affect/Adverse Effects: Nothing     Any issues with driving, swallowing, appetite: Doesn't drive, no issues with swallowing/appetite.   Stretching/Exercise Program: Yes he works at shoulder, wrist, forearm, and fingers.   Currently in therapy: Would like to get back in with PT.   Any falls with significant injuries: None   Any new weakness: None  Any changes to care since last seen: Can not do any procedures until off Brilinta in the end of February per Neurosurgery. They stopped local blocks for facial pain, given limited relief.   Safe at home: Yes  Any oral meds: Valium 2.5mg QHS, Medical Marijuana, Baclofen 10-10-15mg. He does find all the pain meds do make him very tired (he is also on Lyrica and Oxcarbazepine).   On anticoagulation/blood thinners: ASA/Brilinta   Current functional status:  Independent with Ambulation, transfers, ADLs     ROS: A 10 point review of systems was negative except for what is noted in the HPI.    Imaging: I have personally reviewed imaging with results as follows:  10/20/203 CTA H/N:  Stable CT of the brain. Chronic infarcts as described.     Focal dissection V3 segment of the right vertebral artery identified at the C2 vertebral level beyond which the vessel is widely patent. On the prior study, the the dissection extended to nearly the intracranial segment with improved appearance on   today's study.     Stable visualization of right distal vertebral artery stenosis with in-stent occlusion. The vessel is patent proximal and distal to the stent.     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    OBJECTIVE:   /82 (BP Location: Left arm, Patient Position: Sitting, Cuff Size: Large)   Pulse 67   Temp (!) 97.2 °F (36.2 °C) (Temporal)   Ht 5' 9\" (1.753 m)   SpO2 97%   BMI 30.48 kg/m²     Gen: No acute distress, Well-nourished, well-appearing.  HEENT: Moist mucus membranes, Normocephalic/Atraumatic  Cardiovascular: " distal pulses palpable  Heme/Extr: No edema  Pulmonary: Non-labored breathing.   : No barnett  GI: Soft, non-tender, non-distended.   MSK: Has limited R shoulder AROM  Full elbow PROM and AROM when going slow  Wrist and fingers are full  Knee with full  Ankle with some tightness in plantarflexors, which improves with knee flexed.    Integumentary: Skin is warm, dry   Psych: Normal mood and affect.   Neuro: AAOx3,  Speech is intact. Appropriate to questioning. Gait is hemiparetic with decreased ankle/foot mechanics on the R, hits forefoot/midfoot on initial contact, with impaired foot rollover through the rest of stance. Impaired dorsiflexion in particular. Knee remains flexed throughout gait cycle.   MMT:   Strength:   Right  Left  Site  Right  Left  Site    5- 5  S Ab: Shoulder Abductors  4 5  HF: Hip Flexors    5- 5  EF: Elbow Flexors  4  5 KF: Knee Flexors    4+ 5  EE: Elbow Extensors  5  5  KE: Knee Extensors    5-  5  WE: Wrist Extensors  4- 5  DR: Dorsi Flexors    5-  5  FF: Finger Flexors  4+ 5  PF: Plantar Flexors    4+  5  HI: Hand Intrinsics  NT NT  EHL: Extensor Hallucis Longus     MAS:  Right  Left  Site  Right  Left  Site    2 0  Shoulder 0 0  Hip Adductors   2 0  EF: Elbow Flexors  1+ 0 KF: Knee Flexors    1+  0  EE: Elbow Extensors  2 0  KE: Knee Extensors    0/0 0/0  WF/WE 0  0  DR: Dorsi Flexors    2  0  FF: Finger Flexors  1+  0  PF: Plantar Flexors    0  0  HI: Hand Intrinsics  0/0  0/0  Toe Flex/Ex       MAS  0 - no increased tone  1 - slight increase in tone at the end of the ROM  1+ increase in tone at 1/2 the ROM  2 - increase in tone through most the ROM  3 - moderate increase in muscle tone - passive movement difficult  4 - affected parts ridid in flexion or extension    SPASMS observed:   RUE: no  LUE: no  RLE: no   LLE: no    Botulinum Toxin Injection Procedure    Pre-operative diagnosis: Spasticity    Post-operative diagnosis: Same    Procedure: Chemodenervation    After risks and  benefits were explained including bleeding, infection, worsening of pain, damage to the areas being injected, weakness of muscles, loss of muscle control, dysphagia if injecting the head or neck, facial droop if injecting the facial area, painful injection, allergic or other reaction to the medications being injected, and the failure of the procedure to help the problem, a signed consent was obtained on 12/19/2023     The patient was placed in a seated position and the sites to be treated were identified. A time out was called and performed. The area to be treated was prepped three times with alcohol and the alcohol allowed to dry. Next, a 25 gauge, 50mm disposable electrode needle was used to inject the medication in the area to be treated.    Guidance: EMG  Patient position: Seated  Area(s) injected:    Muscle Dose (units) # Sites Technique   R lateral pec 100  4 EMG   R FDS 30  2 EMG   R Pronator  30  2  EMG   R medial gastroc 75  3 EMG   R lateral gastroc 75  3 EMG   R soleus 25  1 EMG       EMG       EMG         EMG         EMG         EMG    Waste  65   EMG         EMG        Medications used: 400 units of botox diluted in 4 mL of preservative free saline    See MAR for Lot/Exp    The patient did tolerate the procedure well. There were no complications. Hemostasis achieved with pressure.     The following portions of the patient's history were reviewed and updated as appropriate: allergies, current medications, past family history, past medical history, past social history, past surgical history and problem list.      Current Outpatient Medications:     amLODIPine (NORVASC) 2.5 mg tablet, TAKE 1 TABLET BY MOUTH EVERY DAY, Disp: 90 tablet, Rfl: 2    atorvastatin (LIPITOR) 40 mg tablet, TAKE 1 TABLET BY MOUTH DAILY WITH DINNER, Disp: 90 tablet, Rfl: 1    baclofen 10 mg tablet, TAKE 1 TABLET IN THE MORNING, 1 TABLET MIDDAY, AND 1.5 TABLETS AT NIGHT BY MOUTH., Disp: 315 tablet, Rfl: 1    Brilinta 90 MG, TAKE 1  TABLET BY MOUTH EVERY 12 HOURS., Disp: 60 tablet, Rfl: 2    carvedilol (COREG) 25 mg tablet, TAKE 1 TABLET BY MOUTH TWICE A DAY WITH FOOD WITH MEALS, Disp: 180 tablet, Rfl: 1    [facility] Aspirin Adult Low Dose 81 MG chewable tablet, CHEW 1 TABLET BY MOUTH DAILY, Disp: 90 tablet, Rfl: 1    [facility] Vitamin B-12 1000 MCG tablet, TAKE 1 TABLET BY MOUTH EVERY DAY, Disp: 90 tablet, Rfl: 1    diazepam (VALIUM) 5 mg tablet, Take 0.5 tablets (2.5 mg total) by mouth daily at bedtime as needed for muscle spasms, Disp: 35 tablet, Rfl: 0    DULoxetine (CYMBALTA) 60 mg delayed release capsule, TAKE 1 CAPSULE BY MOUTH EVERY DAY, Disp: 90 capsule, Rfl: 2    ferrous sulfate 325 (65 Fe) mg tablet, TAKE 1 TABLET BY MOUTH EVERY DAY WITH BREAKFAST, Disp: 90 tablet, Rfl: 1    hydrALAZINE (APRESOLINE) 50 mg tablet, TAKE 1 TABLET BY MOUTH EVERY 12 HOURS, Disp: 180 tablet, Rfl: 1    losartan (COZAAR) 100 MG tablet, TAKE 1 TABLET BY MOUTH EVERY DAY, Disp: 90 tablet, Rfl: 2    OXcarbazepine (TRILEPTAL) 150 mg tablet, Take 2 tablets (300 mg total) by mouth 2 (two) times a day, Disp: 360 tablet, Rfl: 0    pregabalin (LYRICA) 25 mg capsule, 1 cap TID for 2 weeks, then if no change 2 caps TID, Disp: 180 capsule, Rfl: 1    Current Facility-Administered Medications:     onabotulinumtoxin A (BOTOX) injection 400 Units, 400 Units, Intramuscular, Once, Ashley Depadua, MD    Past Surgical History:   Procedure Laterality Date    ARTERY SURGERY      vertebral artery stent/revascularization    IR STROKE ALERT  2/26/2023       Patient Active Problem List    Diagnosis Date Noted    Acute Posterior Circulation Stroke 02/25/2023    Anemia 10/20/2023    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

## 2023-12-26 DIAGNOSIS — I77.1 CELIAC ARTERY STENOSIS (HCC): ICD-10-CM

## 2023-12-26 DIAGNOSIS — I77.74 VERTEBRAL ARTERY DISSECTION (HCC): ICD-10-CM

## 2023-12-26 DIAGNOSIS — I10 PRIMARY HYPERTENSION: ICD-10-CM

## 2023-12-26 DIAGNOSIS — I65.02 STENOSIS OF LEFT VERTEBRAL ARTERY: ICD-10-CM

## 2023-12-26 DIAGNOSIS — I63.9 CEREBROVASCULAR ACCIDENT (CVA) (HCC): ICD-10-CM

## 2023-12-27 ENCOUNTER — PATIENT MESSAGE (OUTPATIENT)
Dept: FAMILY MEDICINE CLINIC | Facility: CLINIC | Age: 63
End: 2023-12-27

## 2023-12-27 DIAGNOSIS — I65.02 STENOSIS OF LEFT VERTEBRAL ARTERY: ICD-10-CM

## 2023-12-27 DIAGNOSIS — I77.74 VERTEBRAL ARTERY DISSECTION (HCC): ICD-10-CM

## 2023-12-27 DIAGNOSIS — I77.1 CELIAC ARTERY STENOSIS (HCC): ICD-10-CM

## 2023-12-27 DIAGNOSIS — I63.9 CEREBROVASCULAR ACCIDENT (CVA) (HCC): ICD-10-CM

## 2023-12-27 RX ORDER — ASPIRIN 81 MG/1
81 TABLET, CHEWABLE ORAL DAILY
Qty: 90 TABLET | Refills: 0 | Status: SHIPPED | OUTPATIENT
Start: 2023-12-27

## 2023-12-27 RX ORDER — CARVEDILOL 25 MG/1
25 TABLET ORAL 2 TIMES DAILY WITH MEALS
Qty: 180 TABLET | Refills: 0 | Status: SHIPPED | OUTPATIENT
Start: 2023-12-27

## 2023-12-31 DIAGNOSIS — G50.0 TRIGEMINAL NEURALGIA OF RIGHT SIDE OF FACE: ICD-10-CM

## 2024-01-03 NOTE — TELEPHONE ENCOUNTER
Called pt. Left a detailed message on his VM requesting his current dose of pregabalin. Awaiting return call.

## 2024-01-04 DIAGNOSIS — G50.0 TRIGEMINAL NEURALGIA OF RIGHT SIDE OF FACE: ICD-10-CM

## 2024-01-04 RX ORDER — PREGABALIN 25 MG/1
CAPSULE ORAL
Qty: 180 CAPSULE | Refills: 1 | OUTPATIENT
Start: 2024-01-04

## 2024-01-04 NOTE — TELEPHONE ENCOUNTER
Trey Del Real MD   to Aury Tang, MOISÉS  [facility] [place] Clinical Team 4       1/4/24 12:24 PM   Please give Jimmy and his wife a call    I am not sure what happened, I did not get any messages with regard to this prescription refill until today but I did process the refill.  Given that he is only taking 2 capsules twice per day that is a total dose of 50 mg twice per day.  I did adjust the prescription to give him the 50 mg capsules so that he can take just 1 twice per day.    Please be sure he is aware of the adjustment.  Thanks.  ________________    Called patient to advise strength capsules will be sent for 50 mg, take 1 twice per day. Reached , left detailed message. Please sign off on script, thank you so much.

## 2024-01-04 NOTE — TELEPHONE ENCOUNTER
Received VM transcription from 1/3/2024, 12:32 PM:    I am calling for a prescription refill for Jimmy Mello. Date of birth [birth year]. This is for Pregabalin 25 mg. And a script needs to be sent to our pharmacy. I can be reached on [phone]. And they're already is a mychart request in, but it's from the 31st so I don't know that it's been seen yet. Thank you very much. Carmelina.  ------------------------------    Next OV (virtual) 1/17/2024 with Dr. Depadua.    See refill encounter 12/31/23. Pt currently out of medication. Takes 2 tabs BID.    Dr. Del Real - Rx entered from pharmacy. Please review and sign if in agreement.

## 2024-01-04 NOTE — TELEPHONE ENCOUNTER
Patient's wife called says  is completely out of medication. Please try to expedite, thank you!    Patient's dosage:   pregabalin 25mg 2 tabs 2x's a day

## 2024-01-04 NOTE — TELEPHONE ENCOUNTER
Wife called checking on the status prescription refill for Pregabalin 25 mg. A script needs to be sent to the pharmacy. Please call when this has been completed [phone]. He has been without medication for 4 days now.

## 2024-01-05 RX ORDER — PREGABALIN 50 MG/1
50 CAPSULE ORAL 2 TIMES DAILY
Qty: 180 CAPSULE | Refills: 1 | Status: SHIPPED | OUTPATIENT
Start: 2024-01-05

## 2024-01-15 ENCOUNTER — TELEPHONE (OUTPATIENT)
Dept: NEUROLOGY | Facility: CLINIC | Age: 64
End: 2024-01-15

## 2024-02-04 LAB — TSH SERPL-ACNC: 1.41 MIU/L (ref 0.4–4.5)

## 2024-02-05 DIAGNOSIS — G89.29 CENTRAL SENSITIZATION TO PAIN: ICD-10-CM

## 2024-02-07 RX ORDER — NALTREXONE HYDROCHLORIDE 50 MG/1
TABLET, FILM COATED ORAL
Qty: 30 TABLET | Refills: 2 | Status: SHIPPED | OUTPATIENT
Start: 2024-02-07

## 2024-02-20 ENCOUNTER — TELEPHONE (OUTPATIENT)
Dept: NEUROLOGY | Facility: CLINIC | Age: 64
End: 2024-02-20

## 2024-02-20 NOTE — TELEPHONE ENCOUNTER
Wife Ailin called requesting for a MRI disc. Transferred to medical records to assist. She was driving unable to take down the phone number.

## 2024-02-26 DIAGNOSIS — G50.0 TRIGEMINAL NEURALGIA: ICD-10-CM

## 2024-02-27 NOTE — TELEPHONE ENCOUNTER
Pt is requesting a refill of oxcarbazepine. OV is scheduled for 3/20/24. Routed to provider to review the refill request.

## 2024-02-28 ENCOUNTER — EVALUATION (OUTPATIENT)
Facility: CLINIC | Age: 64
End: 2024-02-28
Payer: COMMERCIAL

## 2024-02-28 DIAGNOSIS — I69.351 SPASTIC HEMIPARESIS OF RIGHT DOMINANT SIDE AS LATE EFFECT OF CEREBRAL INFARCTION (HCC): Primary | ICD-10-CM

## 2024-02-28 PROCEDURE — 97167 OT EVAL HIGH COMPLEX 60 MIN: CPT

## 2024-02-28 PROCEDURE — 97163 PT EVAL HIGH COMPLEX 45 MIN: CPT

## 2024-02-28 NOTE — PROGRESS NOTES
PT Evaluation     Today's date: 2024  Patient name: Jimmy Mello  : 1960  MRN: [id number]  Referring provider: Depadua, Ashley, MD  Dx:   Encounter Diagnosis     ICD-10-CM    1. Spastic hemiparesis of right dominant side as late effect of cerebral infarction (HCC)  I69.351           Start Time: 1300  Stop Time: 1345  Total time in clinic (min): 45 minutes    Assessment  Assessment details: Patient is a 63 y.o. year old male presenting to OPPT s/p diagnosis of chronic CVA. Pt is familiar to PT from prior episode due to his CVA.  Patient demonstrates decreased strength, endurance, balance, and functional independence.  Pt ambulates at gait speed of 0.86 m/s with no AD, classifying them as an unlimited community ambulator. 30s chair stand, able to complete 12 stands, 5xSTS shows adequate power generation. 6MWT reveals reduced endurance, 1118 ft, against age matched norms. FGA shows they are at risk for falls scoring, 20/30. General instability with obstacle negotiation, head movement, and narrow base of support. Discussed trialing AFO due to issues with foot clearance, more so with fatigue, pt is in agreement. He will benefit from skilled PT interventions to maximize return to PLOF and independence as able.  Thank you for this referral and the ability to participate in the care of this patient.    Impairments: abnormal coordination, abnormal gait, abnormal muscle tone, abnormal or restricted ROM, abnormal movement, activity intolerance, impaired balance, impaired physical strength, lacks appropriate home exercise program, safety issue and weight-bearing intolerance     Prognosis: good    Goals  In 4 weeks, patient will:  1.  Pt will achieve MDC value, 2.9s, on TUG to demonstrate reduced fall risk  2.  Improve 5xSTS by at least 2 seconds to demonstrate improved lower extremity strength and support    In 4-10 weeks, patient will:  1.  Demonstrate consistent carryover with HEP and walking program.  2.   Improve gait speed by at least 0.16 m/s to meet MCID value for persons with CVA.  3.  Improve 6MWT by at least 120ft with least restrictive device to meet MCID value for PwCVA & to show improved endurance to complete ADLs  4.  Improve FGA by at least 4 points to meet MDC value to show improved balance with least restrictive device.  5.  Improve ABC by at least 10 points to show improved balance confidence.  6.  Improve 30s chair stand by at least 4 stands to show improved endurance and ability to generate power.  7.  Demonstrate independent level floor<>stand transfer to improve safety at home.        Plan  Patient would benefit from: skilled physical therapy  Planned therapy interventions: neuromuscular re-education, manual therapy, patient education, home exercise program, therapeutic exercise, therapeutic activities and gait training  Frequency: 2x week  Duration in weeks: 12  Plan of Care beginning date: 2/28/2024  Plan of Care expiration date: 5/28/2024  Treatment plan discussed with: patient        Subjective Evaluation    History of Present Illness  Mechanism of injury: Present in PT: reports been doing quite well since not in PT. No falls. Using a recumbent bike daily or every other day. Facial pain seeing [facility] for now. Wants to improve his walking more and improve his strength. Botox in march. Ankle still locking up, wears boot at night feels like it helps. Working as he is able and visiting [employer]. No ad in home or outside of home.  Patient Goals  Patient goals for therapy: improved balance, increased strength and increased motion    Pain  No pain reported      Diagnostic Tests  No diagnostic tests performed  Treatments  Previous treatment: physical therapy, occupational therapy and speech therapy        Objective    Balance Test 2/28   6 Minute Walk Test (ft): 1118ft   FGA: 20/30   Gait Speed (m/s): SSGS: 0.86m/s  Fast: 1.00   5x Sit To Stand (s): 11.06   30s chair stand 13   ABC: 78%   TUG:  11.73s          MCTSIB Number of Seconds   Feet Together, Eyes Open 30   Feet Together, Eyes Closed 30   Feet Together, Eyes Open Foam 30   Feet Together, Eyes Closed Foam 7 1st try, 30 2nd try         Muscle Tone Left Right   Modified Aguilar Scale     Hamstring  1+   Gastroc  8 beats of clonus   Quad         Manual Muscle Testing - Hip Left Right   Flexion 5 4   Extension 5 4-   Abduction 5 4-   External Rotation       Manual Muscle Testing - Knee Left Right   Flexion     Extension 5 5     Manual Muscle Testing - Ankle Left Right   Doriflexion 5 3-   Plantarflexion 5 5        Transfers    Sit To Stand Independent   W/C To Bed    Sit To Supine    Roll          Gait Assessment: Slight steppage and circumduction to assist for clearance on R side due to inadequate DF. Potentially inadequate knee ext in mid & terminal stance, difficult to assess with long pants on. Inadequate knee ext in terminal swing/IC.           Precautions: Falls  Re-eval Date:  3/28/2024    Date 2/28       Visit Count 1       FOTO See IE       Pain In See IE       Pain Out                Testing        TUG        5xSTS        Gait speed SSGS: 0.86  Fast: 1.00       FGA 20/30       2/6MWT 1118ft       Neuro Re-Ed        Dynamic balance        Static balance        Obstacle course        Resisted ambulation        Runner step up                        Ther Ex                                                                        Ther Activity        ADL                Gait Training        TM        Head turns        Stairs        Modalities         prn

## 2024-02-28 NOTE — PROGRESS NOTES
OCCUPATIONAL THERAPY INITIAL EVALUATION        24  Jimmy Mello  1960  [id number]  Depadua, Ashley, MD   Diagnosis ICD-10-CM Associated Orders   1. Spastic hemiparesis of right dominant side as late effect of cerebral infarction (HCC)  I69.351             Assessment/Plan      SKILLED ANALYSIS:    Pt is a 63 y.o. male referred to Occupational Therapy s/p Spastic hemiparesis of right dominant side as late effect of cerebral infarction (HCC) [I69.351].  Pt participated in skilled OT evaluation and, following formalized testing as well as clinical observation, pt presents with the following areas of deficit: FMC/FMS, GMC/GMS, hypertonicity, hand to target accuracy, and in hand manipulation/dexterity impacting indep and completion of ADL/IADL and leisure tasks.  Pt does demo the need for skilled Occupational Therapy services 2x/week for 12 weeks with focus on ADL re-training, IADL re-training, UE NMR, UE strengthening, UE endurance, FMC/GMC, family training/education, healthy coping education, leisure pursuits, and community re-integration to address the goals as listed below. Pt in agreement with POC, POC to  24.      Short Term Goals (to be achieved within 4 weeks):     Pt will improve R hand motor control so as to be able to manipulate medium and small sized objects with min to no difficulty during ADLs/IADLs and leisure activities.   Pt will use RUE as dominant in 90% of functional tasks, to increase ease and independence with completion of ADLs/ IADLs and leisure tasks.   Pt will increase R UE rate of manipulation for all FM tests for improved functional performance/independence with functional tasks x 25%   Pt will increase R UE strength to 4/5,  strength by 3-5 lbs and pinch strength by 1-2 pounds, through the use of strengthening exercises and home program for eventual return to IADLs and life roles.       Long Term Goals (to be achieved within 12 weeks):  Pt will increase R UE strength  "to 4+/5 and  strength by 5-7 lbs and pinch strength by 2-3 pounds, through the use of strengthening exercises and home program   Pt will improve RUE GMC/FMC, so as to be able to return to using RUE as dominant in 100% of daily functional tasks  Pt will demo G carryover of Home Exercise Program to improve functional progression towards goals in Plan of care and for improved functional use of RUE               SUBJECTIVE      SUBJECTIVE: \"my arm gets tired \"    PATIENT GOAL: to work on his arm and improve functional use of RUE        HISTORY OF PRESENT ILLNESS:     Pt is a 63 y.o. male who was referred to Occupational Therapy s/p  Spastic hemiparesis of right dominant side as late effect of cerebral infarction (HCC) [I69.351]. 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 Pt with [facility] stay from 3/6-3/31, with last fall on 3/31 in the hospital. Pt was in OPOT from 4/6-7/18/23. Pt now returns for OPOT for therapy for cont RUE deficits.           PMH:   Past Medical History:   Diagnosis Date    Adjustment disorder with depressed mood 03/01/2023    BRAULIO (acute kidney injury) ([facility])     B12 deficiency     Celiac artery stenosis ([facility])     Cerebellar infarct ([facility]) 02/25/2023    CKD (chronic kidney disease) stage 2, GFR 60-89 ml/min     Essential hypertension     Impaired fasting glucose     Iron deficiency anemia     Neurogenic bowel     Stenosis of left vertebral artery     Stroke (HCC)     Vertebral artery dissection (HCC)        Past Surgical Hx:   Past Surgical History:   Procedure Laterality Date    ARTERY SURGERY      vertebral artery stent/revascularization    IR STROKE ALERT  2/26/2023        HOME SETUP/ CLOF: lives with wife, dtr and ANGEL and son; lives on [address] acres of property; 2 SH; bed on 2nd floor with tub shower combo; powder room on 1st floor;     ADLs: I with bathing; I with dressing, occ has A with R sock; able to manage R slip on shoe; I with " grooming; I with eating but does have some difficulty with manipulating utensils and cutting; I with clothing fasteners     IADLs: pt's wife completes IADLs; pt does outdoor work including mowing lawn and working on the equipment   (+)  but has not driven since CVA    Functional Mobility: I without AD    Work: Has not worked since the CVA; pt was an ; was working 40+ hours; 6 days a week and was a . 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[employer], including knot tying    Physical activity/ leisure engagement: was walking and using stationary bike; goes to the [employer] once every 2 weeks, goes for meetings  Animals: 2 dogs, 2 cats upstairs and 4 cats downstairs    DME: owns a w/c, BSC, hemiwalker, transfer tub bench     Falls: none    Pain Levels:   Facial pain: fluctuates frequently, with worst at 10/10 and at best 1/10; current 2/10; pain increases every time he stands  Wrist pain: worst pain 9/10; best pain: 0/10; currently 0/10         OBJECTIVE         PHYSICAL ASSESSMENT    Botox injections: R wrist, R shoulder, occ R foot      RUE LUE Comments                 UPPER EXTREMITY FXN Impaired Intact Dominant Hand: Right handed but does use LUE more since CVA                 UE ROM LUE AROM  RUE PROM  RUE AROM COMMENTS   Shoulder Flex WNL  Just above 1/2 range 1/2 range    Shoulder Ext WNL  WNL WNL    Shoulder ABD WNL  1/2 range 1/3 range    Horizontal ABD WNL  WNL WNL    Horizontal ADD WNL  WNL WNL    Shoulder IR  WNL  3/4 range 3/4 range    Shoulder ER WNL 3/4 range 3/4 range    Elbow Flex WNL WNL WNL    Elbow Ext  WNL WNL WNL    Wrist flexion WNL WNL WNL    Wrist Ext WNL WNL 3/4 range    Supination WNL WNL 3/4 range    Pronation WNL WNL WNL    Finger Flex WNL D2 3/4 range, otherwise WNL D2 1/2 range, D3 3/4 range; otherwise WNL    Finger Ext WNL WNL WNL    Opposition  WNL WNL WNL                               MMT  LUE RUE   Comments   Shoulder Flex/Ext 5/5 4-/5    Shoulder Abd  5/5 4-/5    Shoulder Add 5/5 4-/5    Shoulder ER 5/5 4-/5    Shoulder IR 5/5 4-/5    Elbow Flex 5/5 4-/5    Elbow Ext 5/5 4-/5    Wrist Flex 5/5 4-/5    Wrist Ext 5/5 4-/5    Gross Grasp 5/5 4-/5      *RUE strength within ROM limitations                 /Pinch Strength  LUE  RULORETTA    Comments   Dynamometer      - Gross Grasp 86 lbs 31 lbs    Pinch Meter       - PINCER 18 lbs 8 lbs     - 3 JAW MUNDO 22 lbs 10 lbs     - LATERAL 28 lbs  16 lbs      SENSATION LUE RUE Comments   Sharp Dull  Intact Impaired    Proprioception Intact Intact    Pain Intact Intact    Hot/Cold Temp Intact Impaired Decreased on R side     Comments:     COORDINATION MICAH VALERA    9 Hole Peg Test 27 seconds 38 seconds    Keyhole Peg Test  NT NT    O'Juanjose Finger Dexterity Test  NT NT    Handwriting (Print)  G+ legibility     Handwriting (script)   G legibility               COGNITIVE ASSESSMENT    Memory: Intact  Attention: Intact  Executive Functions: Intact  Communication: Intact   Processing: Intact             VISUAL ASSESSMENT      Vision Screen     ROM Intact   Tracking Intact   Saccades Intact   Convergence/ Divergence Intact   Visual Fields  Intact             PLANNED THERAPY INTERVENTIONS:  Therapeutic activity  Therapeutic exercise  Neuromuscular re-education   ADL re-training   IADL re-training  Supine, seated, and in stance neuro re-ed  FMC/prehension  Timed Trials  Manual tx  Hand to target  Sensory re-ed  WBearing strategies   Closed chain activities  Open chain activities           INTERVENTION COMMENTS:  Diagnosis: Spastic hemiparesis of right dominant side as late effect of cerebral infarction (HCC) [I69.351]  Precautions: R side weakness, R facial pain   Insurance: Payor: RD / Plan: [employer] PPO / Product Type: PPO /   1 of 30 visits through 12/31/24  PN due 3/28/24

## 2024-02-29 NOTE — TELEPHONE ENCOUNTER
last botox 12/19, last office visit 7/25    Next botox scheduled 3/20.    Dr. DePadua are you able to refill or do you want to see patient in a formal office visit first, thanks for your help.

## 2024-03-04 ENCOUNTER — OFFICE VISIT (OUTPATIENT)
Facility: CLINIC | Age: 64
End: 2024-03-04
Payer: COMMERCIAL

## 2024-03-04 DIAGNOSIS — I69.351 SPASTIC HEMIPARESIS OF RIGHT DOMINANT SIDE AS LATE EFFECT OF CEREBRAL INFARCTION (HCC): ICD-10-CM

## 2024-03-04 PROCEDURE — 97112 NEUROMUSCULAR REEDUCATION: CPT

## 2024-03-04 PROCEDURE — 97116 GAIT TRAINING THERAPY: CPT

## 2024-03-04 RX ORDER — OXCARBAZEPINE 150 MG/1
300 TABLET, FILM COATED ORAL 2 TIMES DAILY
Qty: 360 TABLET | Refills: 3 | Status: SHIPPED | OUTPATIENT
Start: 2024-03-04

## 2024-03-04 NOTE — TELEPHONE ENCOUNTER
Last neurology Visit 5/10, Jarad Germain  office note documents: Return in about 2 years (around 5/10/2025)    Please sign script if in agreement, thank you.

## 2024-03-04 NOTE — PROGRESS NOTES
Daily Note     Today's date: 3/4/2024  Patient name: Jimmy Mello  : 1960  MRN: [id number]  Referring provider: Depadua, Ashley, MD  Dx:   Encounter Diagnosis     ICD-10-CM    1. Spastic hemiparesis of right dominant side as late effect of cerebral infarction (HCC)  I69.351 Ambulatory Referral to Physical Therapy          Start Time: 0845  Stop Time: 930  Total time in clinic (min): 45 minutes    Subjective: Pt reports riding his bike over the weekend. Otherwise unremarkable.      Objective: See treatment diary below      Assessment:  Propulsion challenged with resisted ambulation and faster bursts of treadmill. With faster speeds requires 1UE assist. Uneven surfaces challenge patient stability and limb clearance at times, with repetition better motor performance and ability to look up vs look at feet. Pt with improved foot clearance and limb adv while trialing AFO, will reach out to physician regarding AFO for home. No skin changes after doffing AFO. Appropriate RPE throughout session. Patient would benefit from continued PT      Plan: Continue per plan of care. Limb adv, propulsion, stability.     Precautions: Falls  Re-eval Date:  3/28/2024    Date 2/28 3/4      Visit Count 1 2      FOTO See IE       Pain In See IE       Pain Out                Testing        TUG        5xSTS        Gait speed SSGS: 0.86  Fast: 1.00       FGA        2/6MWT 1118ft       Neuro Re-Ed        Dynamic balance  High hurdles PLS AFO 8 laps CGA      Static balance        Obstacle course  Uneven surfaces + foam block + dual foam beams 8 fwd, 4 laps CGA      Resisted ambulation  20ft x8 PT resisted      Runner step up                        Ther Ex                                                                        Ther Activity        ADL                Gait Training        TM  1.4mph base pace [id number]  1.9-2.3mph 1min 1UE 8-9/10 10min total      Head turns        PLS AFO  20ft x8 laps      Stairs        Modalities          prn

## 2024-03-06 ENCOUNTER — OFFICE VISIT (OUTPATIENT)
Facility: CLINIC | Age: 64
End: 2024-03-06
Payer: COMMERCIAL

## 2024-03-06 DIAGNOSIS — I69.351 SPASTIC HEMIPARESIS OF RIGHT DOMINANT SIDE AS LATE EFFECT OF CEREBRAL INFARCTION (HCC): Primary | ICD-10-CM

## 2024-03-06 PROCEDURE — 97112 NEUROMUSCULAR REEDUCATION: CPT

## 2024-03-06 PROCEDURE — 97116 GAIT TRAINING THERAPY: CPT

## 2024-03-06 NOTE — PROGRESS NOTES
Daily Note     Today's date: 3/6/2024  Patient name: Jimmy Mello  : 1960  MRN: [id number]  Referring provider: Depadua, Ashley, MD  Dx:   Encounter Diagnosis     ICD-10-CM    1. Spastic hemiparesis of right dominant side as late effect of cerebral infarction (HCC)  I69.351             Start Time: 1500  Stop Time: 1545  Total time in clinic (min): 45 minutes    Subjective: Pt states he was a little sore after last session, nothing remarkable though. Nothing new.      Objective: See treatment diary below      Assessment:  Trialed R AFO on treadmill with pt reports of increased ease of walking due to not having to think about picking foot up. Discussed about acquiring brace for future use. Other interventions to using AFO to improve dorsiflexion and limb adv without brace. 5# weight increase intensity of stepping exercises, no instances of lacking foot clearance. Targeting propulsion, lateral stability, and limb adv throughout session. Fair ability to change directions, slowed transition from fwd<>bwd. Incline/decline walking challenges Dank's overall stability. Patient would benefit from continued PT      Plan: Continue per plan of care. Limb adv, propulsion, stability.     Precautions: Falls  Re-eval Date:  3/28/2024    Date 2/28 3/4 3/6     Visit Count 1 2 3     FOTO See IE       Pain In See IE       Pain Out                Testing        TUG        5xSTS        Gait speed SSGS: 0.86  Fast: 1.00       FGA 20/30       2/6MWT 1118ft       Neuro Re-Ed        Dynamic balance  High hurdles PLS AFO 8 laps CGA Agility ladder skipping box fwd SOLO 10 laps, lat step 5#RLE    Fwd/bwd stop/go x2 cues for fast as possible 5#RLE     Static balance        Obstacle course  Uneven surfaces + foam block + dual foam beams 8 fwd, 4 laps CGA Foam wedge incline/decline 8 laps SOLO     Resisted ambulation  20ft x8 PT resisted      Runner step up                        Ther Ex                                                                         Ther Activity        ADL                Gait Training        TM  1.4mph base pace [id number]  1.9-2.3mph 1min 1UE 8-9/10 10min total R PLS AFO  1.4mph base pace [id number] > 2.1mph 1min 1UE 10 min total 4-8/10RPE      Head turns        PLS AFO  20ft x8 laps 20ft x3 laps     Stairs        Modalities         prn

## 2024-03-11 ENCOUNTER — OFFICE VISIT (OUTPATIENT)
Facility: CLINIC | Age: 64
End: 2024-03-11
Payer: COMMERCIAL

## 2024-03-11 DIAGNOSIS — I69.351 SPASTIC HEMIPARESIS OF RIGHT DOMINANT SIDE AS LATE EFFECT OF CEREBRAL INFARCTION (HCC): Primary | ICD-10-CM

## 2024-03-11 PROCEDURE — 97112 NEUROMUSCULAR REEDUCATION: CPT | Performed by: PHYSICAL THERAPY ASSISTANT

## 2024-03-11 PROCEDURE — 97116 GAIT TRAINING THERAPY: CPT | Performed by: PHYSICAL THERAPY ASSISTANT

## 2024-03-11 NOTE — PROGRESS NOTES
Daily Note     Today's date: 3/11/2024  Patient name: Jimmy Mello  : 1960  MRN: [id number]  Referring provider: Depadua, Ashley, MD  Dx:   Encounter Diagnosis     ICD-10-CM    1. Spastic hemiparesis of right dominant side as late effect of cerebral infarction (HCC)  I69.351                        Subjective: No new complaints.       Objective: See treatment diary below      Assessment:  Utilized PLS AFO on TM again this session.  Able to amb continually x 10 min with interval speed increases. Targeting propulsion, lateral stability, and limb adv throughout session. Demonstrating improved ability for large amplitude stepping with agility ladder skipping rungs fwd.  Continues to have difficulty with large amplitude steps laterally.  Incline/decline walking challenges  stability. Patient would benefit from continued PT      Plan: Continue per plan of care. Limb adv, propulsion, stability.     Precautions: Falls  Re-eval Date:  3/28/2024    Date 2/28 3/4 3/6 3/11    Visit Count 1 2 3 4    FOTO See IE       Pain In See IE       Pain Out                Testing        TUG        5xSTS        Gait speed SSGS: 0.86  Fast: 1.00       FGA        2/6MWT 1118ft       Neuro Re-Ed        Dynamic balance  High hurdles PLS AFO 8 laps CGA Agility ladder skipping box fwd SOLO 10 laps, lat step 5#RLE    Fwd/bwd stop/go x2 cues for fast as possible 5#RLE Agility ladder skipping box fwd and lat SOLO 10 laps, 5#RLE    Hurdles varied height SOLO 5 laps fwd/lat    Static balance        Obstacle course  Uneven surfaces + foam block + dual foam beams 8 fwd, 4 laps CGA Foam wedge incline/decline 8 laps SOLO Foam wedge incline/decline 8 laps SOLO    Resisted ambulation  20ft x8 PT resisted      Runner step up                        Ther Ex                                                                        Ther Activity        ADL                Gait Training        TM  1.4mph base pace [id number]  1.9-2.3mph 1min 1UE 8-9/10  10min total R PLS AFO  1.4mph base pace [id number] > 2.1mph 1min 1UE 10 min total 4-8/10RPE  R PLS AFO  1.4mph base pace 1 min> 2.1mph 10 min total   1 UE   9/10 max RPE    Head turns        PLS AFO  20ft x8 laps 20ft x3 laps     Stairs        Modalities         prn

## 2024-03-13 ENCOUNTER — TELEPHONE (OUTPATIENT)
Dept: NEUROLOGY | Facility: CLINIC | Age: 64
End: 2024-03-13

## 2024-03-14 ENCOUNTER — OFFICE VISIT (OUTPATIENT)
Facility: CLINIC | Age: 64
End: 2024-03-14
Payer: COMMERCIAL

## 2024-03-14 DIAGNOSIS — I69.351 SPASTIC HEMIPARESIS OF RIGHT DOMINANT SIDE AS LATE EFFECT OF CEREBRAL INFARCTION (HCC): Primary | ICD-10-CM

## 2024-03-14 PROCEDURE — 97112 NEUROMUSCULAR REEDUCATION: CPT

## 2024-03-14 NOTE — PROGRESS NOTES
Daily Note     Today's date: 3/14/2024  Patient name: Jimmy Mello  : 1960  MRN: [id number]  Referring provider: Depadua, Ashley, MD  Dx:   Encounter Diagnosis     ICD-10-CM    1. Spastic hemiparesis of right dominant side as late effect of cerebral infarction (HCC)  I69.351               Start Time: 0800  Stop Time: 0845  Total time in clinic (min): 45 minutes    Subjective: Pt reports nothing new. Ready for PT.      Objective: See treatment diary below      Assessment:  Improved foot clearance and limb adv with PLS AFO, still lacking full knee extension at terminal swing & initial contact due to hamstring and PF tone. Challenged postural stability with walking on compliant and uneven surfaces, improved motor performance with repetition of tasks. Requires gaze at feet and obstacles directly in front of him to assure foot clearance. Fair R stance stability and developing propulsion with kettlebell carry. Patient would benefit from continued PT to improve gait, strength, and balance.      Plan: Continue per plan of care. Limb adv, propulsion, stability.     Precautions: Falls  Re-eval Date:  3/28/2024    Date  3/4 3/6 3/11 3/14   Visit Count 1 2 3 4 5   FOTO See IE       Pain In See IE       Pain Out                Testing        TUG        5xSTS        Gait speed SSGS: 0.86  Fast: 1.00       FGA 20/30       2/6MWT 1118ft       Neuro Re-Ed        Dynamic balance  High hurdles PLS AFO 8 laps CGA Agility ladder skipping box fwd SOLO 10 laps, lat step 5#RLE    Fwd/bwd stop/go x2 cues for fast as possible 5#RLE Agility ladder skipping box fwd and lat SOLO 10 laps, 5#RLE    Hurdles varied height SOLO 5 laps fwd/lat Low hurdles 5#RLE fwd 8 laps, lat step 6 laps    10lb KB RUE carry 40ft x10 laps CGA    Lat step foam beam R PLS AFO 6 laps CGA   Static balance        Obstacle course  Uneven surfaces + foam block + dual foam beams 8 fwd, 4 laps CGA Foam wedge incline/decline 8 laps SOLO Foam wedge incline/decline  8 laps SOLO Uneven surfaces indoor/outdoor R PLSAFO 5#RLE 10min   Resisted ambulation  20ft x8 PT resisted      Runner step up                        Ther Ex                                                                        Ther Activity        ADL                Gait Training        TM  1.4mph base pace [id number]  1.9-2.3mph 1min 1UE 8-9/10 10min total R PLS AFO  1.4mph base pace [id number] > 2.1mph 1min 1UE 10 min total 4-8/10RPE  R PLS AFO  1.4mph base pace 1 min> 2.1mph 10 min total   1 UE   9/10 max RPE    Head turns        PLS AFO  20ft x8 laps 20ft x3 laps     Stairs        Modalities         prn

## 2024-03-17 DIAGNOSIS — I10 PRIMARY HYPERTENSION: ICD-10-CM

## 2024-03-18 ENCOUNTER — OFFICE VISIT (OUTPATIENT)
Facility: CLINIC | Age: 64
End: 2024-03-18
Payer: COMMERCIAL

## 2024-03-18 DIAGNOSIS — I69.351 SPASTIC HEMIPARESIS OF RIGHT DOMINANT SIDE AS LATE EFFECT OF CEREBRAL INFARCTION (HCC): Primary | ICD-10-CM

## 2024-03-18 PROCEDURE — 97112 NEUROMUSCULAR REEDUCATION: CPT

## 2024-03-18 PROCEDURE — 97116 GAIT TRAINING THERAPY: CPT

## 2024-03-18 RX ORDER — AMLODIPINE BESYLATE 2.5 MG/1
TABLET ORAL
Qty: 90 TABLET | Refills: 2 | Status: SHIPPED | OUTPATIENT
Start: 2024-03-18

## 2024-03-18 RX ORDER — LOSARTAN POTASSIUM 100 MG/1
TABLET ORAL
Qty: 90 TABLET | Refills: 2 | Status: SHIPPED | OUTPATIENT
Start: 2024-03-18

## 2024-03-18 NOTE — PROGRESS NOTES
Daily Note     Today's date: 3/18/2024  Patient name: Jimmy Mello  : 1960  MRN: [id number]  Referring provider: Depadua, Ashley, MD  Dx:   Encounter Diagnosis     ICD-10-CM    1. Spastic hemiparesis of right dominant side as late effect of cerebral infarction (HCC)  I69.351               Start Time: 0800  Stop Time: 0845  Total time in clinic (min): 45 minutes    Subjective: Pt reports he was out working on his boat this weekend. No falls. Would take his time, did not do too much or carry too much.      Objective: See treatment diary below      Assessment:  With LUE carry able to tolerate heavier loads and working on R stance. Treadmill with good pacing without brace, occasional catching of foot near end of bout - more prone to happen at higher speeds. Walking lunges used to generate power in split stance, will practice floor<>stand transfer nv. More difficulty with LLE balance and RLE hip/knee flexion, more likely due to inc tone. Patient would benefit from continued PT to improve gait, strength, and balance.      Plan: Continue per plan of care. Limb adv, propulsion, stability.     Precautions: Falls  Re-eval Date:  3/28/2024    Date 3/18       Visit Count 6       FOTO See IE       Pain In See IE       Pain Out                Testing        TUG        5xSTS        Gait speed SSGS: 0.86  Fast: 1.00       FGA 20/30       2/6MWT 1118ft       Neuro Re-Ed        Dynamic balance BUE 10 laps walking lunge       Static balance        Obstacle course        Resisted ambulation 30lb kb MICAH fwd ortho side x2    30lb kb bwd LUE 20ft x4    Lat step 20ft x6 laps CGA       Runner step up X10 ea LE 6' CGA                       Ther Ex                                                                        Ther Activity        ADL                Gait Training        TM 10min total SOLO 1.4mph-2.1mph 1min on/off       Head turns        PLS AFO        Stairs        Modalities

## 2024-03-20 ENCOUNTER — PROCEDURE VISIT (OUTPATIENT)
Dept: NEUROLOGY | Facility: CLINIC | Age: 64
End: 2024-03-20
Payer: COMMERCIAL

## 2024-03-20 VITALS
TEMPERATURE: 97.7 F | SYSTOLIC BLOOD PRESSURE: 172 MMHG | WEIGHT: 206 LBS | OXYGEN SATURATION: 91 % | BODY MASS INDEX: 30.51 KG/M2 | DIASTOLIC BLOOD PRESSURE: 96 MMHG | HEART RATE: 74 BPM | HEIGHT: 69 IN

## 2024-03-20 DIAGNOSIS — G89.0 CENTRAL PAIN SYNDROME: ICD-10-CM

## 2024-03-20 DIAGNOSIS — R25.2 SPASTICITY: ICD-10-CM

## 2024-03-20 DIAGNOSIS — I69.351 SPASTIC HEMIPARESIS OF RIGHT DOMINANT SIDE AS LATE EFFECT OF CEREBRAL INFARCTION (HCC): Primary | ICD-10-CM

## 2024-03-20 PROCEDURE — 64642 CHEMODENERV 1 EXTREMITY 1-4: CPT | Performed by: PHYSICAL MEDICINE & REHABILITATION

## 2024-03-20 PROCEDURE — 99215 OFFICE O/P EST HI 40 MIN: CPT | Performed by: PHYSICAL MEDICINE & REHABILITATION

## 2024-03-20 PROCEDURE — 64643 CHEMODENERV 1 EXTREM 1-4 EA: CPT | Performed by: PHYSICAL MEDICINE & REHABILITATION

## 2024-03-20 RX ORDER — BACLOFEN 10 MG/1
10 TABLET ORAL 4 TIMES DAILY
Qty: 120 TABLET | Refills: 2 | Status: SHIPPED | OUTPATIENT
Start: 2024-03-20

## 2024-03-20 NOTE — PROGRESS NOTES
Physical Medicine & Rehabilitation Spasticity Follow-up/Procedure  Jimmy Mello 63 y.o. male    ASSESSMENT/PLAN:   Diagnoses and all orders for this visit:    Spastic hemiparesis of right dominant side as late effect of cerebral infarction (HCC)  -     onabotulinumtoxin A (BOTOX) injection 400 Units    Spasticity  -     baclofen 10 mg tablet; Take 1 tablet (10 mg total) by mouth 4 (four) times a day TAKE 1 TABLET IN THE MORNING, 1 TABLET MIDDAY, AND 1.5 TABLETS AT NIGHT BY MOUTH.    Central pain syndrome      Mr. Mello is a very pleasant 64 yo M who is very well known to me, who I took care of on the [facility] after his  2023 cerebellar CVA and R > L stroke burden, with additional hypodensities on DWI appreciated L midbrain, pontine area and R lower medullary/spinal cord junction. He has developed R spastic hemiparesis (Although has had remarkable recovery in regards to strength on that R side), neurogenic bowel (with some incontinence during rehab which has largely resolved), and now has developed R facial pain/sensitivity with pain suspected to be central pain syndrome, as well as spasms/extrinsic tone and intrinsic tonic tone in his RUE.     The spasms have largely improved as has the pain associated earlier in his recovery since initiating botulinum toxin. I increased the dose to his finger flexors as he found the effects did not last as long as they did for his shoulder/pronator.     IN his RLE, he has plantarflexion tone, which makes it difficult for him to dorsiflex at appropriate moments in his gait cycle, and results in occasional toe dragging and tripping. I increased the dose to his soleus to also help with clonus which causes shaking in that leg at times (this came on about 2-3 weeks prior to this appointment.     He is now off valium, and did not find medical marijuana helpful. We now have him on low dose naloxone for central sensitization, and he's not sure if he has noticed much benefit, but his overall  pain (other than his facial pain) seems to have improved.     I reviewed with him the note from the [facility] for his facial pain - and reviewed their rational and printed a copy of their note for them. They would like to discuss this in more detail with Dr. Del Real, and I encouraged them to make an appointment to f/u with Neurology.     They also had questions about their ASA/Brilinta - this was managed by Neurovascular, and I encouraged them to reach out to either Neurology or Neurosurgery to answer their concerns.     He is currently in therapies and doing well. I provided a script for an off the shelf AFO seems to correct his tendency to plantarflexion quite well. I am not sure which orthotic company they are working with - this is being arranged with their PT, so I faxed the script to their PT.       1. Oral antispasticity medications: Continue Baclofen for now. If they are interested we can try weaning down to see how he responds, but I believe he is benefiting from this from a spasticity standpoint.   2. Schedule 400-500 units. Transfers independently. I may need more toxin depending on how he responds to this regimen.  3. Physical/occupational therapy to continue.  4. Follow-up on 6/25/2024 at 1:45pm for repeat botulinum toxin injections.    His insurance changes at the end of May, and new insurance auth will need to be submitted prior to his next appointment.    Needs to f/u with Neuro to discuss antiplatelet  *I have spent 54 minutes with Patient and family today in which greater than 50% of this time was spent in counseling/coordination of care regarding Prognosis, Risks and benefits of tx options, Instructions for management, Patient and family education, Risk factor reductions, Impressions, Counseling / Coordination of care, Documenting in the medical record, and Reviewing / ordering tests, medicine, procedures  .        HPI/SUBJECTIVE:  Patient presents today in the office with chief  complaint of shaking in his leg with certain activities that began about 2-3 weeks. He continues with toe dragging/tripping that started about about a month ago increasing in frequency.    Continues with facial pain, followed by Neurology. Not a candidate for glycerol rhizotomy after discussion with patient's wife. They haven't talked to anyone about ablation - they have been [place] options [place] the [place] folks who do this, but there aren't many centers that do. They did get a second opinion from a Neurosurgeon at [facility] - Dr. Luciano who do not feel that interventions directed at the ganglion or root entry zone would improve his pain.He talked about mood, and possibly deep brain stimulation. He also said that if the pain ever extended more peripherally, he could consider a lesioning procedure to deaden the nerve, although efficacy may be limited due to vascular destruction of the pathway of the second order trigeminal nerves as they go up from the ganglion to the thalamus.     Not getting as much pain in arm/leg.    Last seen for chemodenervation: 12/19/2023  Results of chemodenervation: Improved stiffness in his hand,   How long did effects last: 11 weeks, stiffness in hand and toe dragging lasted maybe 9-sterling weeks. The shoulder and pronator are still lasting to this point.  Side affect/Adverse Effects: None.     Any issues with driving, swallowing, appetite: Doesn't drive. NO issues with swallowing/appetite.  Stretching/Exercise Program: Diligent with shoulder, wrist, forearm, and fingers.   Currently in therapy: Back in PT/OT.   Any falls with significant injuries: None  Any new weakness: None  Any changes to care since last seen: Started acupuncture 3x a week for 4 weeks. Still gets the facial pain.  Safe at home: Yes  Any oral meds: Baclofen 10mg QID. Tolerating, but is tired during the day. Still on low dose naltrexone 3mg QHS.   He is on Lyrica 50mg BID and Oxcarbazepine 300mg BID. Did try medical marijuana  "- not effective.   Again limited by tiredness on these meds.   On anticoagulation/blood thinners: ASA and Brilinta  Current functional status:      ROS: A 10 point review of systems was negative except for what is noted in the HPI.    Imaging: I have personally reviewed imaging with results as follows:  No new imaging since previously seen.   OBJECTIVE:   BP (!) 172/96 (BP Location: Left arm, Patient Position: Sitting, Cuff Size: Adult)   Pulse 74   Temp 97.7 °F (36.5 °C) (Temporal)   Ht 5' 9\" (1.753 m)   Wt 93.4 kg (206 lb)   SpO2 91%   BMI 30.42 kg/m²     Gen: No acute distress, Well-nourished, well-appearing.  HEENT: Moist mucus membranes, Normocephalic/Atraumatic  Cardiovascular: Regular rate, rhythm, S1/S2. Distal pulses palpable  Heme/Extr: No edema  Pulmonary: Non-labored breathing.   : No barnett  GI: Soft, non-tender, non-distended.   MSK:  Limitations in R shoulder abduction to about 100 degrees of abduction passively  Full elbow PROM (with slow ROM)  Wrist and fingers full  Able to fully supinate forearm  Knee with full range of motion  Ankl with some tightness in plantarflexors, but able to get foot flat.   Integumentary: Skin is warm, dry.   Psych: Normal mood and affect.   Neuro: AAOx3, unsustained clonus R plantarflexor. Ambulates with decreased R arm swing, Hits forefoot/midfoot on initial contact, impaired dorsiflexion  through gait cycle. At toe off, foot gets stuck at times transitioning from preswing to initial swing. Foot ankle mechanics improve significantly with addition of off the shelf carbon fiber AFO.   MMT:   Strength:   Right  Left  Site  Right  Left  Site    5- 5  S Ab: Shoulder Abductors  4+ 5  HF: Hip Flexors    4+ 5  EF: Elbow Flexors  4+ 5 KF: Knee Flexors    4+ 5  EE: Elbow Extensors  4+ 5  KE: Knee Extensors    5  5  WE: Wrist Extensors  4 5  DR: Dorsi Flexors    4+  5  FF: Finger Flexors  5-  5  PF: Plantar Flexors    4+ 5  HI: Hand Intrinsics  5  5  EHL: Extensor Hallucis " Longus     MAS:  Right  Left  Site  Right  Left  Site    2 0  Shoulder 0 0  Hip Adductors   2 0  EF: Elbow Flexors  1+ 0 KF: Knee Flexors    1+  0  EE: Elbow Extensors  2 0  KE: Knee Extensors    1/0 0/0  WF/WE 0  0  DR: Dorsi Flexors    2  0  FF: Finger Flexors  1+  0  PF: Plantar Flexors    0  0  HI: Hand Intrinsics  0/0  0/0  Toe Flex/Ex       MAS  0 - no increased tone  1 - slight increase in tone at the end of the ROM  1+ increase in tone at 1/2 the ROM  2 - increase in tone through most the ROM  3 - moderate increase in muscle tone - passive movement difficult  4 - affected parts ridid in flexion or extension    SPASMS observed:   RUE: no   LUE: no  RLE: no   LLE: no      Botulinum Toxin Injection Procedure    Pre-operative diagnosis: Spasticity    Post-operative diagnosis: Same    Procedure: Chemodenervation    After risks and benefits were explained including bleeding, infection, worsening of pain, damage to the areas being injected, weakness of muscles, loss of muscle control, dysphagia if injecting the head or neck, facial droop if injecting the facial area, painful injection, allergic or other reaction to the medications being injected, and the failure of the procedure to help the problem, a signed consent was obtained on 3/20/2024     The patient was placed in a seated position and the sites to be treated were identified. A time out was called and performed. The area to be treated was prepped three times with alcohol and the alcohol allowed to dry. Next, a 25 gauge, 50mm disposable electrode needle was used to inject the medication in the area to be treated.    Guidance: EMG  Area(s) injected:    Muscle Dose (units) # Sites Technique   R pectoralis 100  4 EMG   R FDS 40  2 EMG   R pronator 35  2 EMG   R medial gastroc 75  3 EMG   R lateral gastroc 75  3 EMG   Soleus 75  3 EMG       EMG       EMG         EMG         EMG         EMG    Waste 0   EMG         EMG        Medications used: 400 units of botox  diluted in 4c mL of preservative free saline    See MAR for Lot/Exp    The patient did tolerate the procedure well. There were no complications.    The following portions of the patient's history were reviewed and updated as appropriate: allergies, current medications, past family history, past medical history, past social history, past surgical history and problem list.      Current Outpatient Medications:     amLODIPine (NORVASC) 2.5 mg tablet, TAKE 1 TABLET BY MOUTH EVERY DAY, Disp: 90 tablet, Rfl: 2    aspirin ([employer] Aspirin Adult Low Dose) 81 mg chewable tablet, Chew 1 tablet (81 mg total) daily, Disp: 90 tablet, Rfl: 0    atorvastatin (LIPITOR) 40 mg tablet, Take 1 tablet (40 mg total) by mouth daily with dinner, Disp: 90 tablet, Rfl: 0    baclofen 10 mg tablet, TAKE 1 TABLET IN THE MORNING, 1 TABLET MIDDAY, AND 1.5 TABLETS AT NIGHT BY MOUTH., Disp: 315 tablet, Rfl: 1    carvedilol (COREG) 25 mg tablet, Take 1 tablet (25 mg total) by mouth 2 (two) times a day with meals, Disp: 180 tablet, Rfl: 0    cyanocobalamin ([employer] Vitamin B-12) 1000 MCG tablet, Take 1 tablet (1,000 mcg total) by mouth daily, Disp: 90 tablet, Rfl: 0    DULoxetine (CYMBALTA) 60 mg delayed release capsule, TAKE 1 CAPSULE BY MOUTH EVERY DAY, Disp: 90 capsule, Rfl: 2    ferrous sulfate 325 (65 Fe) mg tablet, Take 1 tablet (325 mg total) by mouth daily with breakfast, Disp: 90 tablet, Rfl: 0    hydrALAZINE (APRESOLINE) 50 mg tablet, TAKE 1 TABLET BY MOUTH EVERY 12 HOURS, Disp: 180 tablet, Rfl: 1    losartan (COZAAR) 100 MG tablet, TAKE 1 TABLET BY MOUTH EVERY DAY, Disp: 90 tablet, Rfl: 2    naltrexone (REVIA) 50 mg tablet, Please compound 3mg tablets to take by mouth QHS, Disp: 30 tablet, Rfl: 2    OXcarbazepine (TRILEPTAL) 150 mg tablet, Take 2 tablets (300 mg total) by mouth 2 (two) times a day, Disp: 360 tablet, Rfl: 3    pregabalin (LYRICA) 50 mg capsule, Take 1 capsule (50 mg total) by mouth 2 (two) times a day, Disp: 180 capsule, Rfl: 1     ticagrelor (Brilinta) 90 MG, Take 1 tablet (90 mg total) by mouth every 12 (twelve) hours, Disp: 60 tablet, Rfl: 2    Past Surgical History:   Procedure Laterality Date    ARTERY SURGERY      vertebral artery stent/revascularization    IR STROKE ALERT  2/26/2023       Patient Active Problem List    Diagnosis Date Noted    Acute Posterior Circulation Stroke 02/25/2023    Anemia 10/20/2023    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 08/09/2023    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    Right facial pain 07/27/2023    Spastic hemiparesis of right dominant side as late effect of cerebral infarction (HCC) 05/05/2023    Celiac artery stenosis (HCC) 03/22/2023    Neurogenic bowel 03/08/2023    Stage 2 chronic kidney disease 03/01/2023    Right Vertebral artery dissection (HCC) 02/25/2023    Stenosis of left vertebral artery 02/25/2023    Primary hypertension 02/25/2023

## 2024-03-20 NOTE — PATIENT INSTRUCTIONS
54You have received botulinum toxin injections today.     The skin around the site of injections should be monitored for a couple of days. If redness or swelling occur, the skin should be examined by a health care professional to rule out infection. You can call my office if this occurs.    Please contact our office via [facility] in 2 weeks to report on the effects of the injections or any time with any questions or concerns.     Please note that your next injections should not be scheduled less than 90 days from today.    If your health insurance changes before the next injections, please contact our office as soon as possible so we can submit for a new prior authorization if needed. Please note that we may not be able to perform the injections if your insurance changes and the treatment is not pre-authorized.     Discuss with Dr. Del Real - facial issues, to discuss deep brain stimulation possibly for pain, and for him to review the suggestion from the [facility] doc. You can also ask Dr. Del Real about the anti-platelets, but he may defer to Neurosurgery/Neurovascular.

## 2024-03-22 ENCOUNTER — OFFICE VISIT (OUTPATIENT)
Facility: CLINIC | Age: 64
End: 2024-03-22
Payer: COMMERCIAL

## 2024-03-22 DIAGNOSIS — I69.351 SPASTIC HEMIPARESIS OF RIGHT DOMINANT SIDE AS LATE EFFECT OF CEREBRAL INFARCTION (HCC): Primary | ICD-10-CM

## 2024-03-22 PROCEDURE — 97110 THERAPEUTIC EXERCISES: CPT

## 2024-03-22 PROCEDURE — 97112 NEUROMUSCULAR REEDUCATION: CPT

## 2024-03-22 PROCEDURE — 97116 GAIT TRAINING THERAPY: CPT

## 2024-03-22 NOTE — PROGRESS NOTES
Occupational Therapy Daily Note:    Today's date: 3/22/2024  Patient name: Jimmy Mello  : 1960  MRN: [id number]  Referring provider: Depadua, Ashley, MD  Dx:   Encounter Diagnosis   Name Primary?    Spastic hemiparesis of right dominant side as late effect of cerebral infarction (HCC) Yes       Subjective: no complaints     Pain:  6/10 facial pain     Objective: Pt engaged in skilled OT treatment session with focus on UE NMR, UE strengthening, UE endurance, and FMC/GMC to increase engagement, endurance, tolerance, and independence with daily ADL and IADL tasks.     CPT Code Minutes                                           Task Details        Therapeutic Activity  Pt reporting this week he had Botox and began acupuncture. Pt will be doing acupuncture for 3 sessions/ week for a month              Neuro Re-Ed               Therapeutic Exercise  PROM with long passive stretch completed at R shoulder and R hand/ fingers; limited flexion at D2-3, able to achieve close to full flexion after prolonged stretch.      -Pt completed isolated finger movement into flex/ ext/ abd/ adduction to target, 5 reps each direction    -completed a series of finger movements to improve isolated control at MCPs, DIPs and PIPs and in-hand manipulations; completed with mod difficulty         Testing  Keyhole Peg Test:  L: 1 min 40 sec   R: 6 min     O'Juanjose Finger Dexterity Test:   L: 2 min 49 sec   R: 7 min 7 sec         HEP           Assessment: Tolerated treatment well. Completed FMC testing. Pt with improving motor control over isolated fingers, allowing for improved dexterity.  Patient would benefit from continued skilled OT.    Plan: Continued skilled OT per POC    INTERVENTION COMMENTS:  Diagnosis: Spastic hemiparesis of right dominant side as late effect of cerebral infarction (HCC) [I69.351]  Precautions: R side weakness, R facial pain   Insurance: Payor: RD / Plan: [employer] PPO / Product Type: PPO /   2 of 30 visits  through 12/31/24  PN due 4/28/24

## 2024-03-22 NOTE — PROGRESS NOTES
Daily Note     Today's date: 3/22/2024  Patient name: Jimmy Mello  : 1960  MRN: [id number]  Referring provider: Depadua, Ashley, MD  Dx:   Encounter Diagnosis     ICD-10-CM    1. Spastic hemiparesis of right dominant side as late effect of cerebral infarction (HCC)  I69.351           Start Time: 843  Stop Time: 929  Total time in clinic (min): 46 minutes    Subjective: Pt reported that he feels his RLE has been fatigued since he got the Botox injection.       Objective: See treatment diary below      Assessment: Pt needed closer supervision with guarding due to increased LOB which may be due to increased fatigue. He needed increased break time between interventions due to increased fatigue. Pt responded well to incline on TM and needed VC to not shuffle feet and Pt had good carryover of VC. For the dynamic balance exercises, he performed well with uneven surfaces, but for side stepping on foam beam he was leaning more anterior and needed to step forward to catch himself.        Plan: Continue per plan of care.      Precautions: Falls  Re-eval Date:  3/28/2024    Date 3/18 3/22      Visit Count 6 7      FOTO See IE       Pain In See IE       Pain Out                Testing        TUG        5xSTS        Gait speed SSGS: 0.86  Fast: 1.00       FGA /30       2/6MWT 1118ft       Neuro Re-Ed        Dynamic balance BUE 10 laps walking lunge 5# AW over juan w/ AW 10 laps      Static balance        Obstacle course        Resisted ambulation 30lb kb LUE fwd ortho side x2    30lb kb bwd LUE 20ft x4    Lat step 20ft x6 laps CGA 30lb kb LUE fwd ortho side x3    Fwd/lat step foam beam 4 laps each CGA    Agility ladder 2 front 1 back; side step 5# AW       Runner step up X10 ea LE 6' CGA                       Ther Ex                                                                        Ther Activity        ADL                Gait Training        TM 10min total SOLO 1.4mph-2.1mph 1min on/off 10 min total 6% incline  0.9 mph base>1.4 mph CGA      Head turns        PLS AFO        Stairs        Modalities

## 2024-03-23 DIAGNOSIS — I65.02 STENOSIS OF LEFT VERTEBRAL ARTERY: ICD-10-CM

## 2024-03-23 DIAGNOSIS — I77.1 CELIAC ARTERY STENOSIS (HCC): ICD-10-CM

## 2024-03-23 DIAGNOSIS — I10 PRIMARY HYPERTENSION: ICD-10-CM

## 2024-03-23 DIAGNOSIS — I63.9 CEREBROVASCULAR ACCIDENT (CVA) (HCC): ICD-10-CM

## 2024-03-23 DIAGNOSIS — I77.74 VERTEBRAL ARTERY DISSECTION (HCC): ICD-10-CM

## 2024-03-24 DIAGNOSIS — I65.02 STENOSIS OF LEFT VERTEBRAL ARTERY: ICD-10-CM

## 2024-03-24 DIAGNOSIS — I63.9 CEREBROVASCULAR ACCIDENT (CVA) (HCC): ICD-10-CM

## 2024-03-24 DIAGNOSIS — D64.9 ANEMIA, UNSPECIFIED TYPE: ICD-10-CM

## 2024-03-24 DIAGNOSIS — I77.1 CELIAC ARTERY STENOSIS (HCC): ICD-10-CM

## 2024-03-24 DIAGNOSIS — I77.74 VERTEBRAL ARTERY DISSECTION (HCC): ICD-10-CM

## 2024-03-25 ENCOUNTER — OFFICE VISIT (OUTPATIENT)
Facility: CLINIC | Age: 64
End: 2024-03-25
Payer: COMMERCIAL

## 2024-03-25 DIAGNOSIS — I69.351 SPASTIC HEMIPARESIS OF RIGHT DOMINANT SIDE AS LATE EFFECT OF CEREBRAL INFARCTION (HCC): Primary | ICD-10-CM

## 2024-03-25 PROCEDURE — 97116 GAIT TRAINING THERAPY: CPT

## 2024-03-25 PROCEDURE — 97112 NEUROMUSCULAR REEDUCATION: CPT

## 2024-03-25 RX ORDER — FERROUS SULFATE 325(65) MG
1 TABLET ORAL
Qty: 90 TABLET | Refills: 0 | Status: SHIPPED | OUTPATIENT
Start: 2024-03-25

## 2024-03-25 RX ORDER — CARVEDILOL 25 MG/1
25 TABLET ORAL 2 TIMES DAILY WITH MEALS
Qty: 180 TABLET | Refills: 0 | Status: SHIPPED | OUTPATIENT
Start: 2024-03-25

## 2024-03-25 RX ORDER — LORATADINE 10 MG
81 TABLET ORAL DAILY
Qty: 90 TABLET | Refills: 0 | Status: SHIPPED | OUTPATIENT
Start: 2024-03-25

## 2024-03-25 RX ORDER — ATORVASTATIN CALCIUM 40 MG/1
40 TABLET, FILM COATED ORAL
Qty: 90 TABLET | Refills: 0 | Status: SHIPPED | OUTPATIENT
Start: 2024-03-25

## 2024-03-25 NOTE — PROGRESS NOTES
"Daily Note     Today's date: 3/25/2024  Patient name: Jimmy Mello  : 1960  MRN: [id number]  Referring provider: Depadua, Ashley, MD  Dx:   Encounter Diagnosis     ICD-10-CM    1. Spastic hemiparesis of right dominant side as late effect of cerebral infarction (HCC)  I69.351           Start Time: 847  Stop Time: 933  Total time in clinic (min): 46 minutes    Subjective: Pt reported that his R foot still has that pressure feeling from the Botox injections.       Objective: See treatment diary below      Assessment: Pt fatigue in RLE reduced time with interventions today. Pt needed contact guard with the foam beams as he would have a fair amount of LOB with initial contact with the R foot. It was evident due to patient losing form and having shortness of breath that he needed to have a break non-typical of a regular session.       Plan: Continue per plan of care.      Precautions: Falls  Re-eval Date:  3/28/2024    Date 3/18 3/22 3/25     Visit Count 6 7 8     FOTO See IE       Pain In See IE       Pain Out                Testing        TUG        5xSTS        Gait speed SSGS: 0.86  Fast: 1.00       FGA        2/6MWT 1118ft       Neuro Re-Ed        Dynamic balance BUE 10 laps walking lunge 5# AW over juan w/ AW 10 laps Foam beam fwd step with 4\" step long ways inbetween with RLE only on it 6 laps CGA     Static balance        Obstacle course        Resisted ambulation 30lb kb LUE fwd ortho side x2    30lb kb bwd LUE 20ft x4    Lat step 20ft x6 laps CGA 30lb kb LUE fwd ortho side x3    Fwd/lat step foam beam 4 laps each CGA    Agility ladder 2 front 1 back; side step 5# AW  Agility ladder 2 front back; side step 5# AW CGA 3 laps each       Runner step up X10 ea LE 6' CGA                       Ther Ex                                                                        Ther Activity        ADL                Gait Training        TM 10min total SOLO 1.4mph-2.1mph 1min on/off 10 min total 6% incline 0.9 " mph base>1.4 mph CGA 10 min total with a seated break at 5 min; 6% incline 1.2 mph-1.6 mph 1 min on/off      Head turns        PLS AFO        Stairs        Modalities

## 2024-03-26 DIAGNOSIS — I77.74 VERTEBRAL ARTERY DISSECTION (HCC): ICD-10-CM

## 2024-03-26 DIAGNOSIS — I63.9 CEREBROVASCULAR ACCIDENT (CVA) (HCC): ICD-10-CM

## 2024-03-26 DIAGNOSIS — I77.1 CELIAC ARTERY STENOSIS (HCC): ICD-10-CM

## 2024-03-26 DIAGNOSIS — I65.02 STENOSIS OF LEFT VERTEBRAL ARTERY: ICD-10-CM

## 2024-03-26 RX ORDER — TICAGRELOR 90 MG/1
90 TABLET ORAL EVERY 12 HOURS
Qty: 60 TABLET | Refills: 1 | Status: SHIPPED | OUTPATIENT
Start: 2024-03-26

## 2024-03-26 NOTE — TELEPHONE ENCOUNTER
Rx refilled  Did message his neurologist . Dr Del Real via staff message to inquire as to whether patient should still be on dual anti-platelet therapy  Patient does have appt with him on 5/31/24

## 2024-03-27 ENCOUNTER — OFFICE VISIT (OUTPATIENT)
Facility: CLINIC | Age: 64
End: 2024-03-27
Payer: COMMERCIAL

## 2024-03-27 DIAGNOSIS — I69.351 SPASTIC HEMIPARESIS OF RIGHT DOMINANT SIDE AS LATE EFFECT OF CEREBRAL INFARCTION (HCC): ICD-10-CM

## 2024-03-27 DIAGNOSIS — I69.351 SPASTIC HEMIPARESIS OF RIGHT DOMINANT SIDE AS LATE EFFECT OF CEREBRAL INFARCTION (HCC): Primary | ICD-10-CM

## 2024-03-27 PROCEDURE — 97116 GAIT TRAINING THERAPY: CPT

## 2024-03-27 PROCEDURE — 97112 NEUROMUSCULAR REEDUCATION: CPT

## 2024-03-27 NOTE — PROGRESS NOTES
"Daily Note     Today's date: 3/27/2024  Patient name: Jimmy Mello  : 1960  MRN: [id number]  Referring provider: Depadua, Ashley, MD  Dx:   Encounter Diagnosis     ICD-10-CM    1. Spastic hemiparesis of right dominant side as late effect of cerebral infarction (HCC)  I69.351           Start Time: 0846  Stop Time: 930  Total time in clinic (min): 44 minutes    Subjective: Pt reported that his R foot still has that pressure feeling from the Botox injections.       Objective: See treatment diary below      Assessment: Transitioning to TM without UE support to challenge postural stability, slower speeds required for success. Gaze primarily on feet to assist with foot clearance even with AFO use. With the AFO on, Dank hd an easier time navigating through uneven surfaces and hurdles. Dank needed to reduce the weight of the kettle bell as he was unable to  weight for the duration of the laps.       Plan: Continue per plan of care.      Precautions: Falls  Re-eval Date:  3/28/2024    Date 3/18 3/22 3/25 3/27    Visit Count 6 7 8 9    FOTO See IE       Pain In See IE       Pain Out                Testing        TUG        5xSTS        Gait speed SSGS: 0.86  Fast: 1.00       FGA        2/6MWT 1118ft       Neuro Re-Ed        Dynamic balance BUE 10 laps walking lunge 5# AW over juan w/ AW 10 laps Foam beam fwd step with 4\" step long ways inbetween with RLE only on it 6 laps CGA Foam incline and decline into uneven mat surface 5 laps CGA     Static balance        Obstacle course        Resisted ambulation 30lb kb LUE fwd ortho side x2    30lb kb bwd LUE 20ft x4    Lat step 20ft x6 laps CGA 30lb kb LUE fwd ortho side x3    Fwd/lat step foam beam 4 laps each CGA    Agility ladder 2 front 1 back; side step 5# AW  Agility ladder 2 front back; side step 5# AW CGA 3 laps each   20 lb kettle bell RUE lobby 6 laps with break in between CGA    Low hurdles with TB posterior pull 4 laps CGA       Runner step up X10 ea LE " 6' CGA                       Ther Ex                                                                        Ther Activity        ADL                Gait Training        TM 10min total SOLO 1.4mph-2.1mph 1min on/off 10 min total 6% incline 0.9 mph base>1.4 mph CGA 10 min total with a seated break at 5 min; 6% incline 1.2 mph-1.6 mph 1 min on/off  SOLO 1.4mph - 1.8mph no UE support 1min on/off 4min total, R PLS AFO 5min total     Head turns        PLS AFO        Stairs        Modalities

## 2024-03-27 NOTE — PROGRESS NOTES
Occupational Therapy Daily Note:    Today's date: 3/27/2024  Patient name: Jimmy Mello  : 1960  MRN: [id number]  Referring provider: Depadua, Ashley, MD  Dx:   Encounter Diagnosis   Name Primary?    Spastic hemiparesis of right dominant side as late effect of cerebral infarction (HCC)        Subjective: no complaints     Pain: pt continuing with acupuncture; reports shooting pains have been eliminated but he does cont to have facial pain.    5/10 facial pain     Objective: Pt engaged in skilled OT treatment session with focus on UE NMR, UE strengthening, UE endurance, and FMC/GMC to increase engagement, endurance, tolerance, and independence with daily ADL and IADL tasks.     CPT Code Minutes                                           Task Details        Therapeutic Activity               Neuro Re-Ed  NMRE to RUE     Item transfer: pt able to use RUE to retrieve large and medium sized items from low surface on his right side and transfer to tabletop and sh height shelf; G functional reaching, G medium sized target accuracy and G sustained sh flex during item placement.       Dexterity:   -pt able to  medium sized objects with G functional pincer and G alternating pincer.   -pt with G B hand coordination to open/close small containers  -pt with G FMC to  and manipulate small pom poms to add to small containers                   Therapeutic Exercise                Testing  3/22:  Keyhole Peg Test:  L: 1 min 40 sec   R: 6 min     O'Juanjose Finger Dexterity Test:   L: 2 min 49 sec   R: 7 min 7 sec         HEP           Assessment: Tolerated treatment well. Pt demo G functional reaching under sh height; improving functional pincer and dexterity with medium and small sized items.  Patient would benefit from continued skilled OT.    Plan: Continued skilled OT per POC    INTERVENTION COMMENTS:  Diagnosis: Spastic hemiparesis of right dominant side as late effect of cerebral infarction (HCC)  [I69.351]  Precautions: R side weakness, R facial pain   Insurance: Payor: AETNA / Plan: AET[employer] PPO / Product Type: PPO /   3 of 30 visits through 12/31/24  PN due 4/28/24

## 2024-03-28 ENCOUNTER — TELEPHONE (OUTPATIENT)
Dept: FAMILY MEDICINE CLINIC | Facility: CLINIC | Age: 64
End: 2024-03-28

## 2024-03-28 NOTE — TELEPHONE ENCOUNTER
----- Message from Lupis Meza DO sent at 3/28/2024  1:25 PM EDT -----  Can you just call patient and find out if he is planning to see neurosurgery at [place] or at [facility] moving forward. I know he has appt with neurology (dr. Del Real) in may but nothing scheduled with neurosurgery.....  ----- Message -----  From: Trey Del Real MD  Sent: 3/28/2024   1:15 PM EDT  To: Lupis Meza DO    Hi Dr Meza,    Thank you for your message and for doing the refills for Mr. Mello.  I have to agree that we would want to continue dual antiplatelet agents for now until such time as he has a conventional angiogram to confirm patency or occlusion of the stent.  If it is occluded he could not really embolize through it, and in that case we could back down to monotherapy, but absent that I would probably keep him on both.  Do you know if he plans to continue seeing neurosurgery down at [place] or if he is going to see [facility] and his group?    Thanks!    -Mike  ----- Message -----  From: Lupis Meza DO  Sent: 3/26/2024  11:14 AM EDT  To: Trey Del Real MD    Hi Dr. Del Real,     As you are aware, Mr. 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  He is remains on dual anti-platelet therapy aspirin and Brilinta. I have been filling his Briliinta prescriptions for him and am wondering if he should still be on both medications? He saw neurosurgery in November and their note indicated that without further imaging  to ensure patency, they were uneasy about him stopping either. (He never went for the CTA).   He has appointment to see you on 5/31/24 for follow-up.   I will continue to refill for him as requested unless you note otherwise.     Thanks!    Lupis Meza D.O.   [facility]

## 2024-03-28 NOTE — TELEPHONE ENCOUNTER
Called pt and wife answered the phone. Pt wife stated that pt is not longer in need for neurosurgery, so he will be only be seeing neurology in [facility].

## 2024-03-28 NOTE — TELEPHONE ENCOUNTER
Thanks for reaching out to patient.   I would recommend that they discuss this with neurology at the upcoming visit.

## 2024-03-29 DIAGNOSIS — I63.9 CEREBROVASCULAR ACCIDENT (CVA) (HCC): ICD-10-CM

## 2024-03-29 DIAGNOSIS — I65.02 STENOSIS OF LEFT VERTEBRAL ARTERY: ICD-10-CM

## 2024-03-29 DIAGNOSIS — D64.9 ANEMIA, UNSPECIFIED TYPE: ICD-10-CM

## 2024-03-29 DIAGNOSIS — I10 PRIMARY HYPERTENSION: ICD-10-CM

## 2024-03-29 DIAGNOSIS — I77.1 CELIAC ARTERY STENOSIS (HCC): ICD-10-CM

## 2024-03-29 DIAGNOSIS — I77.74 VERTEBRAL ARTERY DISSECTION (HCC): ICD-10-CM

## 2024-03-29 RX ORDER — OMEGA-3/DHA/EPA/FISH OIL 35-113.5MG
1000 TABLET,CHEWABLE ORAL DAILY
Qty: 90 TABLET | Refills: 0 | Status: SHIPPED | OUTPATIENT
Start: 2024-03-29

## 2024-04-01 RX ORDER — CARVEDILOL 25 MG/1
25 TABLET ORAL 2 TIMES DAILY WITH MEALS
Qty: 180 TABLET | Refills: 0 | OUTPATIENT
Start: 2024-04-01

## 2024-04-01 RX ORDER — ASPIRIN 81 MG/1
81 TABLET, CHEWABLE ORAL DAILY
Qty: 90 TABLET | Refills: 0 | OUTPATIENT
Start: 2024-04-01

## 2024-04-03 ENCOUNTER — OFFICE VISIT (OUTPATIENT)
Facility: CLINIC | Age: 64
End: 2024-04-03
Payer: COMMERCIAL

## 2024-04-03 DIAGNOSIS — I69.351 SPASTIC HEMIPARESIS OF RIGHT DOMINANT SIDE AS LATE EFFECT OF CEREBRAL INFARCTION (HCC): Primary | ICD-10-CM

## 2024-04-03 PROCEDURE — 97164 PT RE-EVAL EST PLAN CARE: CPT

## 2024-04-03 PROCEDURE — 97110 THERAPEUTIC EXERCISES: CPT

## 2024-04-03 PROCEDURE — 97112 NEUROMUSCULAR REEDUCATION: CPT

## 2024-04-03 PROCEDURE — 97116 GAIT TRAINING THERAPY: CPT

## 2024-04-03 NOTE — PROGRESS NOTES
PT Re-Evaluation /Progress note    Today's date: 4/3/2024  Patient name: Jimmy Mello  : 1960  MRN: [id number]  Referring provider: Depadua, Ashley, MD  Dx:   Encounter Diagnosis     ICD-10-CM    1. Spastic hemiparesis of right dominant side as late effect of cerebral infarction (HCC)  I69.351             Start Time: 0847  Stop Time: 0930  Total time in clinic (min): 43 minutes    Assessment  Assessment details: Patient is a 63 y.o. year old male presenting to OPPT s/p diagnosis of chronic CVA. Pt is familiar to PT from prior episode due to his CVA.        Patient demonstrates decreased strength, endurance, balance, and functional independence.  Pt ambulates at gait speed of 0.86 m/s with no AD and for a faster gait speed of 1.0 m/s with no AD, classifying them as an unlimited community ambulator. 30s chair stand, able to complete 14 stands, 5xSTS shows adequate power generation. 6MWT reveals reduced endurance, 1300 ft, against age matched norms, but shows improvement since last re-evaluation. FGA shows they are no longer at risk for falls scoring, 23/30. General instability with obstacle negotiation, head movement, and narrow base of support. Pt is progressing towards his goals with increase in endurance, strength, and balance. Discussed trialing AFO due to issues with foot clearance, more so with fatigue, pt is in agreement. He will benefit from skilled PT interventions to maximize return to PLOF and independence as able.  Thank you for this referral and the ability to participate in the care of this patient.    Impairments: abnormal coordination, abnormal gait, abnormal muscle tone, abnormal or restricted ROM, abnormal movement, activity intolerance, impaired balance, impaired physical strength, lacks appropriate home exercise program, safety issue and weight-bearing intolerance     Prognosis: good    Goals  In 4 weeks, patient will:  1.  Pt will achieve MDC value, 2.9s, on TUG to demonstrate reduced  fall risk- Progressing  2.  Improve 5xSTS by at least 2 seconds to demonstrate improved lower extremity strength and support- Progressing     In 4-10 weeks, patient will:  1.  Demonstrate consistent carryover with HEP and walking program.- MET  2.  Improve gait speed by at least 0.16 m/s to meet MCID value for persons with CVA. - Progressing  3.  Improve 6MWT by at least 120ft with least restrictive device to meet MCID value for PwCVA & to show improved endurance to complete ADLs- MET  4.  Improve FGA by at least 4 points to meet MDC value to show improved balance with least restrictive device.- Progressing  5.  Improve ABC by at least 10 points to show improved balance confidence.- Not MET  6.  Improve 30s chair stand by at least 4 stands to show improved endurance and ability to generate power. -Progressing  7.  Demonstrate independent level floor<>stand transfer to improve safety at home.- n/t          Plan  Patient would benefit from: skilled physical therapy  Planned therapy interventions: neuromuscular re-education, manual therapy, patient education, home exercise program, therapeutic exercise, therapeutic activities and gait training  Frequency: 2x week  Duration in weeks: 12  Plan of Care beginning date: 2/28/2024  Plan of Care expiration date: 5/28/2024  Treatment plan discussed with: patient      Subjective Evaluation    History of Present Illness  Mechanism of injury: Present in PT: reports been doing quite well since not in PT. No falls. Using a recumbent bike daily or every other day. Wants to improve his walking speed more and improve his strength. Ankle still locking up, wears boot at night feels like it helps. He feels like he is progressing well and wants to continue working towards his walking speed as he feels that will also help with his balance. Working as he is able and visiting [employer]. No ad in home or outside of home.  Patient Goals  Patient goals for therapy: improved balance, increased  "strength and increased motion    Pain  No pain reported      Diagnostic Tests  No diagnostic tests performed  Treatments  Previous treatment: physical therapy, occupational therapy and speech therapy      Objective    Balance Test 2/28 4/3   6 Minute Walk Test (ft): 1118ft 1300   FGA:    Gait Speed (m/s): SSGS: 0.86m/s  Fast: 1.00   Fast: 1.00 m/s    5x Sit To Stand (s): 11.06 10.22   30s chair stand 13 14   ABC: 78% 77%   TU.73s  10.66         MCTSIB Number of Seconds   Feet Together, Eyes Open 30   Feet Together, Eyes Closed 30   Feet Together, Eyes Open Foam 30   Feet Together, Eyes Closed Foam 7 1st try, 30 2nd try         Muscle Tone Left Right   Modified Aguilar Scale     Hamstring  1+   Gastroc  8 beats of clonus   Quad         Manual Muscle Testing - Hip Left Right   Flexion 5 4   Extension 5 4-   Abduction 5 4-   External Rotation       Manual Muscle Testing - Knee Left Right   Flexion     Extension 5 5     Manual Muscle Testing - Ankle Left Right   Doriflexion 5 3-   Plantarflexion 5 5        Transfers    Sit To Stand Independent   W/C To Bed    Sit To Supine    Roll          Gait Assessment: Slight steppage and circumduction to assist for clearance on R side due to inadequate DF. Potentially inadequate knee ext in mid & terminal stance, difficult to assess with long pants on. Inadequate knee ext in terminal swing/IC.           Precautions: Falls  Re-eval Date:  3/28/2024    Date 3/18 3/22 3/25 3/27 4/3   Visit Count 6 7 8 9 10   FOTO See IE       Pain In See IE       Pain Out                Testing     TUG; 5xSTS; 30xSTS; 6MWT; FGA; Gait speed    TUG 10.66 s       5xSTS 10.22 s       Gait speed SSGS: 0.86  Fast: 1.00       FGA        2/6MWT 1300ft       Neuro Re-Ed        Dynamic balance BUE 10 laps walking lunge 5# AW over juan w/ AW 10 laps Foam beam fwd step with 4\" step long ways inbetween with RLE only on it 6 laps CGA Foam incline and decline into uneven mat surface 5 laps CGA  " "   Static balance        Obstacle course     3 high hurdles with 8\" step with foam incline/decline with 5# AW on RLE CGA 4 laps   Resisted ambulation 30lb kb LUE fwd ortho side x2    30lb kb bwd LUE 20ft x4    Lat step 20ft x6 laps CGA 30lb kb LUE fwd ortho side x3    Fwd/lat step foam beam 4 laps each CGA    Agility ladder 2 front 1 back; side step 5# AW  Agility ladder 2 front back; side step 5# AW CGA 3 laps each   20 lb kettle bell RUE lobby 6 laps with break in between CGA    Low hurdles with TB posterior pull 4 laps CGA    Agility ladder 2 front/1 back fwd and side step 5# AW CGA 4 laps each   Runner step up X10 ea LE 6' CGA                       Ther Ex                                                                        Ther Activity        ADL                Gait Training        TM 10min total SOLO 1.4mph-2.1mph 1min on/off 10 min total 6% incline 0.9 mph base>1.4 mph CGA 10 min total with a seated break at 5 min; 6% incline 1.2 mph-1.6 mph 1 min on/off  SOLO 1.4mph - 1.8mph no UE support 1min on/off 4min total, R PLS AFO 5min total     Head turns        PLS AFO        Stairs        Modalities                               "

## 2024-04-03 NOTE — PROGRESS NOTES
Occupational Therapy Daily Note:    Today's date: 4/3/2024  Patient name: Jimmy Mello  : 1960  MRN: [id number]  Referring provider: Depadua, Ashley, MD  Dx:   Encounter Diagnosis   Name Primary?    Spastic hemiparesis of right dominant side as late effect of cerebral infarction (HCC) Yes       Subjective: no complaints     Pain: reports no changes with facial pain; cont with acupuncture; no longer has shooting pains but cont to have throbbing pain    Objective: Pt engaged in skilled OT treatment session with focus on UE NMR, UE strengthening, UE endurance, and FMC/GMC to increase engagement, endurance, tolerance, and independence with daily ADL and IADL tasks.     CPT Code Minutes                                           Task Details        Therapeutic Activity               Neuro Re-Ed  NMRE to RUE     -pt able to use RUE to retrieve wooden spools out of a medium sized bag and place on tabletop, stacking a medium and large spool; G functional reaching; G target accuracy; G prehension patterns to manipulate spools.     -pt able to advance a small wooden spool from palm to fingertips into functional pincer and place on top the already stacked spools, for a 3 height assembly; G prehension patterns; G target accuracy; min/mod difficulty with in-hand manipulations.     -Threading 3 different sized wooden spools onto lanyard: G B hand coordination; G FMC to thread lanyard; G prehension patterns    -Isolated finger movement: pt able to isolate thumb and alternating fingers to flick wooden spools across the table at target; G- coordination of thumb/finger to make contact with wooden spool; G- strength of movement; G large target accuracy.                        Therapeutic Exercise   -PROM to R hand with LPS at D2-3 to increase flexion.     -completed a series of finger movements to improve isolated control at MCPs, DIPs and PIPs and in-hand manipulations; completed with mod difficulty               Testing   3/22:  Keyhole Peg Test:  L: 1 min 40 sec   R: 6 min     O'Juanjose Finger Dexterity Test:   L: 2 min 49 sec   R: 7 min 7 sec         HEP           Assessment: Tolerated treatment well. Pt with G functional reaching into various planes; improving prehension patterns.  Patient would benefit from continued skilled OT.    Plan: Continued skilled OT per POC    INTERVENTION COMMENTS:  Diagnosis: Spastic hemiparesis of right dominant side as late effect of cerebral infarction (HCC) [I69.351]  Precautions: R side weakness, R facial pain   Insurance: Payor: RD / Plan: AET[employer] PPO / Product Type: PPO /   4 of 30 visits through 12/31/24  PN due 4/28/24

## 2024-04-05 ENCOUNTER — APPOINTMENT (OUTPATIENT)
Facility: CLINIC | Age: 64
End: 2024-04-05
Payer: COMMERCIAL

## 2024-04-10 ENCOUNTER — OFFICE VISIT (OUTPATIENT)
Facility: CLINIC | Age: 64
End: 2024-04-10
Payer: COMMERCIAL

## 2024-04-10 DIAGNOSIS — I69.351 SPASTIC HEMIPARESIS OF RIGHT DOMINANT SIDE AS LATE EFFECT OF CEREBRAL INFARCTION (HCC): Primary | ICD-10-CM

## 2024-04-10 PROCEDURE — 97112 NEUROMUSCULAR REEDUCATION: CPT

## 2024-04-10 PROCEDURE — 97110 THERAPEUTIC EXERCISES: CPT

## 2024-04-10 NOTE — PROGRESS NOTES
"Daily Note     Today's date: 4/10/2024  Patient name: Jimmy Mello  : 1960  MRN: [id number]  Referring provider: Depadua, Ashley, MD  Dx:   Encounter Diagnosis     ICD-10-CM    1. Spastic hemiparesis of right dominant side as late effect of cerebral infarction (HCC)  I69.351           Start Time: 0846  Stop Time: 930  Total time in clinic (min): 44 minutes    Subjective: Pt reported that he has recovered from feeling sick in the previous week.       Objective: See treatment diary below      Assessment: Dank was given auditory cues to move fwd and bwd during dynamic balance and he responded well with no moments of LOB. Once he gets fatigued, he tends to lose his balance and needs contact guard. For the resisted time trials, Dank also starts to loose  on his R side towards the last lap.       Plan: Continue per plan of care.      Precautions: Falls  Re-eval Date:  3/28/2024    Date 3/18 3/22 3/25 3/27 4/3 4/10   Visit Count 6 7 8 9 10 11   FOTO See IE        Pain In See IE        Pain Out                 Testing     TUG; 5xSTS; 30xSTS; 6MWT; FGA; Gait speed     TUG 10.66 s        5xSTS 10.22 s        Gait speed SSGS: 0.86  Fast: 1.00        FGA         2/6MWT 1300ft        Neuro Re-Ed         Dynamic balance BUE 10 laps walking lunge 5# AW over juan w/ AW 10 laps Foam beam fwd step with 4\" step long ways inbetween with RLE only on it 6 laps CGA Foam incline and decline into uneven mat surface 5 laps CGA   CGA HT/walking bwd 20' with 5# AW RLE 10 laps    Static balance         Obstacle course     3 high hurdles with 8\" step with foam incline/decline with 5# AW on RLE CGA 4 laps 4 high hurdles with 8\" step with 5# AW RLE 6 laps    Resisted ambulation 30lb kb LUE fwd ortho side x2    30lb kb bwd LUE 20ft x4    Lat step 20ft x6 laps CGA 30lb kb LUE fwd ortho side x3    Fwd/lat step foam beam 4 laps each CGA    Agility ladder 2 front 1 back; side step 5# AW  Agility ladder 2 front back; side step 5# AW CGA " 3 laps each   20 lb kettle bell RUE lobby 6 laps with break in between CGA    Low hurdles with TB posterior pull 4 laps CGA    Agility ladder 2 front/1 back fwd and side step 5# AW CGA 4 laps each 20 lb kettle bell RUE and 5# AW 3 laps around lobby 3x (80') timed; fastest time 1:15   Runner step up X10 ea LE 6' CGA                          Ther Ex                                                                                 Ther Activity         ADL                  Gait Training           TM 10min total SOLO 1.4mph-2.1mph 1min on/off 10 min total 6% incline 0.9 mph base>1.4 mph CGA 10 min total with a seated break at 5 min; 6% incline 1.2 mph-1.6 mph 1 min on/off  SOLO 1.4mph - 1.8mph no UE support 1min on/off 4min total, R PLS AFO 5min total   1.2 mph 7% incline RUE support; 30 seconds 1.6mph/90 seconds at 1.2 mph 8 min total   Head turns         PLS AFO         Stairs         Modalities

## 2024-04-10 NOTE — PROGRESS NOTES
Occupational Therapy Daily Note:    Today's date: 4/10/2024  Patient name: Jimmy Mello  : 1960  MRN: [id number]  Referring provider: Depadua, Ashley, MD  Dx:   Encounter Diagnosis   Name Primary?    Spastic hemiparesis of right dominant side as late effect of cerebral infarction (HCC) Yes       Subjective: no complaints     Pain: reports no changes with facial pain; cont with acupuncture; no longer has shooting pains but cont to have throbbing pain    Objective: Pt engaged in skilled OT treatment session with focus on UE NMR, UE strengthening, UE endurance, and FMC/GMC to increase engagement, endurance, tolerance, and independence with daily ADL and IADL tasks.     CPT Code Minutes                                           Task Details        Therapeutic Activity               Neuro Re-Ed  NMRE to RUE     -Reach and retrieval: pt able to use RUE to retrieve small soft items from raised container on floor, place inside small container and then transfer to target on R sided shoulder height shelf.   Pt with G alternating pincer to  small items and place inside small containers; G(-) B hand coordination to open/close small containers; G functional reach to shoulder height to place item at medium sized target with G accuracy.    -pt able to use RUE to reach over multiple non-stationary items and retrieve specified items; G awareness of RUE position in space and G targeted reach during item retrieval. Pt able to transfer items from sh height shelf on R side to center table; mix ataxia noted during target approach                         Therapeutic Exercise                Testing  3/22:  Keyhole Peg Test:  L: 1 min 40 sec   R: 6 min     O'Juanjose Finger Dexterity Test:   L: 2 min 49 sec   R: 7 min 7 sec         HEP           Assessment: Tolerated treatment well. Pt with G motor control at R shoulder during reach and retrieval; improving prehension patterns and dexterity. Patient would benefit from  continued skilled OT.    Plan: Continued skilled OT per POC    INTERVENTION COMMENTS:  Diagnosis: Spastic hemiparesis of right dominant side as late effect of cerebral infarction (HCC) [I69.351]  Precautions: R side weakness, R facial pain   Insurance: Payor: AETNA / Plan: AETNA PPO / Product Type: PPO /   5 of 30 visits through 12/31/24  PN due 4/28/24

## 2024-04-12 ENCOUNTER — OFFICE VISIT (OUTPATIENT)
Facility: CLINIC | Age: 64
End: 2024-04-12
Payer: COMMERCIAL

## 2024-04-12 DIAGNOSIS — I69.351 SPASTIC HEMIPARESIS OF RIGHT DOMINANT SIDE AS LATE EFFECT OF CEREBRAL INFARCTION (HCC): Primary | ICD-10-CM

## 2024-04-12 PROCEDURE — 97112 NEUROMUSCULAR REEDUCATION: CPT

## 2024-04-12 PROCEDURE — 97110 THERAPEUTIC EXERCISES: CPT

## 2024-04-12 PROCEDURE — 97530 THERAPEUTIC ACTIVITIES: CPT

## 2024-04-12 NOTE — PROGRESS NOTES
Occupational Therapy Daily Note:    Today's date: 2024  Patient name: Jimmy Mello  : 1960  MRN: [id number]  Referring provider: Depadua, Ashley, MD  Dx:   Encounter Diagnosis   Name Primary?    Spastic hemiparesis of right dominant side as late effect of cerebral infarction (HCC) Yes       Subjective: Pt reported doing well today, and he is going to work on his boat this weekend.     Pain: reports no changes with facial pain; cont with acupuncture; no longer has shooting pains but cont to have throbbing pain    Objective: Pt engaged in skilled OT treatment session with focus on UE NMR, UE strengthening, UE endurance, and FMC/GMC to increase engagement, endurance, tolerance, and independence with daily ADL and IADL tasks.     CPT Code Minutes                                           Task Details        Therapeutic Activity  Pt engaged in bilateral task to focus on hand to target accuracy, FMC/FMS, and b/l coordination skills. Pt stabilized popsicle stick in L hand, while pinching small clip in R hand and targeting to stick. Pt completed 4 clips, then connected together using hooks. Pt demo mild difficulty to pinch with appropriate grasp patterns, however demo G hand to target accuracy.                Neuro Re-Ed  Pt engaged in item retrieval task to focus on FMC, fxnl reaching, hand to target accuracy, manipulation skills, and b/l coordination skills. Pt req to reach across midline to with RUE to retrieve pegs at shoulder height and target into pegboard positioned at midline. Therapist upgraded reaching demand to include manipulation skills to rotate pegs before targeting to board. Once board was completed, req to thread string through pegs, and at every row create a knot in the string. Pt with G coordination and hand to target accuracy, completing multi-step task with additional time provided due to mild difficulty with targeting string. Pt then req to unknot/ unthread pegs, remove pegs from pegboard  and place into target at shoulder height.                      Therapeutic Exercise                Testing  3/22:  Keyhole Peg Test:  L: 1 min 40 sec   R: 6 min     O'Juanjose Finger Dexterity Test:   L: 2 min 49 sec   R: 7 min 7 sec         HEP           Assessment: Tolerated treatment well. Pt with G motor control at R shoulder during reach and retrieval; improving prehension patterns and dexterity. Pt demo mild difficulty with hand to target accuracy, completing with additional time provided to complete tasks. Patient would benefit from continued skilled OT.    Plan: Continued skilled OT per POC    INTERVENTION COMMENTS:  Diagnosis: Spastic hemiparesis of right dominant side as late effect of cerebral infarction (HCC) [I69.351]  Precautions: R side weakness, R facial pain   Insurance: Payor: [employer] / Plan: AETNA PPO / Product Type: PPO /   6 of 30 visits through 12/31/24  PN due 4/28/24

## 2024-04-12 NOTE — PROGRESS NOTES
Daily Note     Today's date: 2024  Patient name: Jimmy Mello  : 1960  MRN: [id number]  Referring provider: Depadua, Ashley, MD  Dx:   Encounter Diagnosis     ICD-10-CM    1. Spastic hemiparesis of right dominant side as late effect of cerebral infarction (HCC)  I69.351           Start Time: 08  Stop Time: 845  Total time in clinic (min): 40 minutes    Subjective: Pt reported that he feels slight improvement with his walking. He is going to recall  for a new brace.       Objective: See treatment diary below      Assessment: A lighter kettle bell was attempted for resisted ambulation due to him having difficulty carrying the heavier one last visit and he was able to maintain control of  throughout timed trials. During foam beam, Pt had a mod amount of LOB with ball toss.       Plan: Continue per plan of care.      Precautions: Falls  Re-eval Date:  2024    Date 3/18 4/12        Visit Count 6 12        FOTO See IE         Pain In See IE         Pain Out                  Testing          TUG 10.66 s         5xSTS 10.22 s         Gait speed SSGS: 0.86  Fast: 1.00         FGA          2/6MWT 1300ft         Neuro Re-Ed          Dynamic balance BUE 10 laps walking lunge         Static balance          Obstacle course  5 high hurdles with mat with 5# AW RLE 6 laps     Foam beam lat stepping with ball toss 6 laps         Resisted ambulation 30lb kb LUE fwd ortho side x2    30lb kb bwd LUE 20ft x4    Lat step 20ft x6 laps CGA 15 lb kettle bell RUE and 5# AW 3 laps x2 (80') timed; 1:05 fastest time        Runner step up X10 ea LE 6' CGA                             Ther Ex                                                                                          Ther Activity          ADL                    Gait Training          TM 10min total SOLO 1.4mph-2.1mph 1min on/off 1.2 mph 7% incline RUE support; 30 seconds 1.6mph/90 seconds at 1.2 mph 8 min total        Head turns          PLS AFO           Stairs          Modalities

## 2024-04-17 ENCOUNTER — OFFICE VISIT (OUTPATIENT)
Facility: CLINIC | Age: 64
End: 2024-04-17
Payer: COMMERCIAL

## 2024-04-17 DIAGNOSIS — I69.351 SPASTIC HEMIPARESIS OF RIGHT DOMINANT SIDE AS LATE EFFECT OF CEREBRAL INFARCTION (HCC): Primary | ICD-10-CM

## 2024-04-17 PROCEDURE — 97112 NEUROMUSCULAR REEDUCATION: CPT

## 2024-04-17 PROCEDURE — 97116 GAIT TRAINING THERAPY: CPT

## 2024-04-17 NOTE — PROGRESS NOTES
Occupational Therapy Daily Note:    Today's date: 2024  Patient name: Jimmy Mello  : 1960  MRN: [id number]  Referring provider: Depadua, Ashley, MD  Dx:   Encounter Diagnosis   Name Primary?    Spastic hemiparesis of right dominant side as late effect of cerebral infarction (HCC) Yes       Subjective: no new complaints     Pain: reports moderate amount of pain in R side of face     Objective: Pt engaged in skilled OT treatment session with focus on UE NMR, UE strengthening, UE endurance, and FMC/GMC to increase engagement, endurance, tolerance, and independence with daily ADL and IADL tasks.     CPT Code Minutes                                           Task Details        Therapeutic Activity                 Neuro Re-Ed  NMRE to RUE to improve GMC/FMC and in-hand manipulations:       Reach and retrieval:  -pt able to use RUE to manipulate red resistive clip to  small items from R side shelf just under sh height, and transfer to medium sized target on center table; G functional reaching; G prehension patterns to manipulate clip; G+ target accuracy     -pt able to use RUE to manipulate red resistive clip to stack small items, 2 pieces high; G small target accuracy; G prehension patterns; completed with min/mod difficulty; increased time    -pt able to use alternating pincer to retrieve small items from R sided table to transfer to small tabletop target, to stack small items into a 3 height assembly; G functional reaching during reach and retrieval; G prehension patterns to manipulate small items to prepare for target placement                    Therapeutic Exercise                Testing  3/22:  Keyhole Peg Test:  L: 1 min 40 sec   R: 6 min     O'Juanjose Finger Dexterity Test:   L: 2 min 49 sec   R: 7 min 7 sec         HEP           Assessment: Tolerated treatment well. Pt cont to demo progress towards goals, with noted G GMC and FMC to target; improving prehension patterns to manipulate small  items. Patient would benefit from continued skilled OT.    Plan: Continued skilled OT per POC    INTERVENTION COMMENTS:  Diagnosis: Spastic hemiparesis of right dominant side as late effect of cerebral infarction (HCC) [I69.351]  Precautions: R side weakness, R facial pain   Insurance: Payor: RD / Plan: [employer] PPO / Product Type: PPO /   7 of 30 visits through 12/31/24  PN due 5/22/24

## 2024-04-17 NOTE — PROGRESS NOTES
Daily Note     Today's date: 2024  Patient name: Jimmy Mello  : 1960  MRN: [id number]  Referring provider: Depadua, Ashley, MD  Dx:   Encounter Diagnosis     ICD-10-CM    1. Spastic hemiparesis of right dominant side as late effect of cerebral infarction (HCC)  I69.351           Start Time: 0846  Stop Time: 930  Total time in clinic (min): 44 minutes    Subjective: Pt reported that his legs feel tired due to having to do yard work yesterday.       Objective: See treatment diary below      Assessment: Foam beams was attempted with cone taps, but Pt had increased LOB, once removed Pt was able to complete the exercise but needed CG as he would have LOB. Pt needed VC for completing the karoke foot sequence and responded fairly.       Plan: Continue per plan of care.      Precautions: Falls  Re-eval Date:  2024    Date 3/18 4/12 4/17       Visit Count 6 12 13       FOTO See IE         Pain In See IE         Pain Out                  Testing          TUG 10.66 s         5xSTS 10.22 s         Gait speed SSGS: 0.86  Fast: 1.00         FGA          2/6MWT 1300ft         Neuro Re-Ed          Dynamic balance BUE 10 laps walking lunge         Static balance          Obstacle course  5 high hurdles with mat with 5# AW RLE 6 laps     Foam beam lat stepping with ball toss 6 laps  Cone taps with 5# AW RLE 20' 6 laps          Resisted ambulation 30lb kb LUE fwd ortho side x2    30lb kb bwd LUE 20ft x4    Lat step 20ft x6 laps CGA 15 lb kettle bell RUE and 5# AW 3 laps x2 (80') timed; 1:05 fastest time Agility ladder 7.5# AW RLE karoke and 2 step through 6 laps each    Agility ladder side step ball toss 4 laps     15 lb kettle bell RUE and 5# AW 3 laps x2 (80') timed; 1:06 fastest time           Runner step up X10 ea LE 6' CGA                             Ther Ex                                                                                          Ther Activity          ADL                    Gait Training           TM 10min total SOLO 1.4mph-2.1mph 1min on/off 1.2 mph 7% incline RUE support; 30 seconds 1.6mph/90 seconds at 1.2 mph 8 min total 1.2 mph 7% incline LUE supprt; 30 seconds 1.7 mph 8 min total        Head turns          PLS AFO          Stairs          Modalities

## 2024-04-19 ENCOUNTER — OFFICE VISIT (OUTPATIENT)
Facility: CLINIC | Age: 64
End: 2024-04-19
Payer: COMMERCIAL

## 2024-04-19 DIAGNOSIS — G50.0 TRIGEMINAL NEURALGIA: ICD-10-CM

## 2024-04-19 DIAGNOSIS — I69.351 SPASTIC HEMIPARESIS OF RIGHT DOMINANT SIDE AS LATE EFFECT OF CEREBRAL INFARCTION (HCC): Primary | ICD-10-CM

## 2024-04-19 PROCEDURE — 97112 NEUROMUSCULAR REEDUCATION: CPT

## 2024-04-19 PROCEDURE — 97116 GAIT TRAINING THERAPY: CPT

## 2024-04-19 RX ORDER — OXCARBAZEPINE 150 MG/1
450 TABLET, FILM COATED ORAL 2 TIMES DAILY
Qty: 540 TABLET | Refills: 1 | Status: SHIPPED | OUTPATIENT
Start: 2024-04-19

## 2024-04-19 NOTE — PROGRESS NOTES
Occupational Therapy Daily Note:    Today's date: 2024  Patient name: Jimmy Mello  : 1960  MRN: [id number]  Referring provider: Depadua, Ashley, MD  Dx:   Encounter Diagnosis   Name Primary?    Spastic hemiparesis of right dominant side as late effect of cerebral infarction (HCC) Yes       Subjective: no new complaints     Pain: reports moderate amount of pain in R side of face     Objective: Pt engaged in skilled OT treatment session with focus on UE NMR, UE strengthening, UE endurance, and FMC/GMC to increase engagement, endurance, tolerance, and independence with daily ADL and IADL tasks.     CPT Code Minutes                                           Task Details        Therapeutic Activity                 Neuro Re-Ed  NMRE to RUE to improve GMC/FMC and in-hand manipulations:       B hand coordination:   -pt with min/mod difficulty to separate moving parts of a medium sized objects; min increased time to complete task    Item Transfer:  -pt able to use RUE to transfer medium sized objects to chest level shelf on R side. G functional reaching and G prehension patterns to manipulate objects.     -pt then able to use RUE to remove golf tees from large bag and transfer to small target on chest level shelf on R side; G functional reaching; G prehension patterns; G- target accuracy; min difficulty with task.     -pt able to use RUE to retrieve golf tees from a large bag and place upside down on tabletop surface. Pt with G retrieval from bag; min difficulty with orienting tees in fingertips prior to placement.    -pt able to retrieve small rings from large bag and place over upside down golf tees on tabletop surface; pt with min difficulty with in-hand manipulation to orient rings to prepare for placement and min/mod difficulty with sliding rings down tees without knocking tees over. Pt with G motor control and accuracy     -pt able to retrieve golf tees from R side shelf and transfer to various targets  across the table with min difficulty with reaching to target and G prehension patterns to manipulate golf tees.                           Therapeutic Exercise                Testing  3/22:  Keyhole Peg Test:  L: 1 min 40 sec   R: 6 min     O'Juanjose Finger Dexterity Test:   L: 2 min 49 sec   R: 7 min 7 sec         HEP           Assessment: Tolerated treatment well. Pt with improving dexterity of RUE, with G functional reaching. Cont to make progress towards goals.Patient would benefit from continued skilled OT.    Plan: Continued skilled OT per POC    INTERVENTION COMMENTS:  Diagnosis: Spastic hemiparesis of right dominant side as late effect of cerebral infarction (HCC) [I69.351]  Precautions: R side weakness, R facial pain   Insurance: Payor: [employer] / Plan: [employer] PPO / Product Type: PPO /   8 of 30 visits through 12/31/24  PN due 5/22/24

## 2024-04-19 NOTE — PROGRESS NOTES
"Daily Note     Today's date: 2024  Patient name: Jimmy Mello  : 1960  MRN: [id number]  Referring provider: Depadua, Ashley, MD  Dx:   Encounter Diagnosis     ICD-10-CM    1. Spastic hemiparesis of right dominant side as late effect of cerebral infarction (HCC)  I69.351           Start Time: 0801  Stop Time: 0844  Total time in clinic (min): 43 minutes    Subjective: Dank reported that he feels more fatigued today.       Objective: See treatment diary below      Assessment: Due to Pt fatigue, interventions were reduced in time and resistance to accommodate. Towards the end of the session, Pt had issues with foot clearance and would drag his RLE due to fatigue. Dank would also have issues with cone taps as he would have a hard time controlling the lowering his LE weight on top of the cone as his R stability causes him to have decreased control of his LLE.       Plan: Continue per plan of care.      Precautions: Falls  Re-eval Date:  2024    Date 3/18 4/12 4/17 4/19      Visit Count 6 12 13 14      FOTO See IE         Pain In See IE         Pain Out                  Testing          TUG 10.66 s         5xSTS 10.22 s         Gait speed SSGS: 0.86  Fast: 1.00         FGA          2/6MWT 1300ft         Neuro Re-Ed          Dynamic balance BUE 10 laps walking lunge   Cone taps SOLO 20' 4 laps    Fwd/bwd with ball toss SOLO 6 laps       Static balance          Obstacle course  5 high hurdles with mat with 5# AW RLE 6 laps     Foam beam lat stepping with ball toss 6 laps  Cone taps with 5# AW RLE 20' 6 laps    High hurdles, foam wedge up and down, and 8\" step long with 5# AW CGA 4 laps      Resisted ambulation 30lb kb LUE fwd ortho side x2    30lb kb bwd LUE 20ft x4    Lat step 20ft x6 laps CGA 15 lb kettle bell RUE and 5# AW 3 laps x2 (80') timed; 1:05 fastest time Agility ladder 7.5# AW RLE karoke and 2 step through 6 laps each    Agility ladder side step ball toss 4 laps     15 lb kettle bell RUE " and 5# AW 3 laps x2 (80') timed; 1:06 fastest time     Mutlidirectional stepping with med moose and kicking ball into goal 25' away 4 laps CGA      Runner step up X10 ea LE 6' CGA                             Ther Ex                                                                                          Ther Activity          ADL                    Gait Training          TM 10min total SOLO 1.4mph-2.1mph 1min on/off 1.2 mph 7% incline RUE support; 30 seconds 1.6mph/90 seconds at 1.2 mph 8 min total 1.2 mph 7% incline LUE supprt; 30 seconds 1.7 mph 8 min total  1.5 mph 7% incline RUE 90 seconds; 45 seconds 1.8 mph 8 min total 1 break       Head turns          PLS AFO          Stairs          Modalities

## 2024-04-24 ENCOUNTER — APPOINTMENT (OUTPATIENT)
Facility: CLINIC | Age: 64
End: 2024-04-24
Payer: COMMERCIAL

## 2024-04-24 ENCOUNTER — OFFICE VISIT (OUTPATIENT)
Facility: CLINIC | Age: 64
End: 2024-04-24
Payer: COMMERCIAL

## 2024-04-24 DIAGNOSIS — I69.351 SPASTIC HEMIPARESIS OF RIGHT DOMINANT SIDE AS LATE EFFECT OF CEREBRAL INFARCTION (HCC): Primary | ICD-10-CM

## 2024-04-24 PROCEDURE — 97112 NEUROMUSCULAR REEDUCATION: CPT

## 2024-04-24 NOTE — PROGRESS NOTES
Occupational Therapy Daily Note:    Today's date: 2024  Patient name: Jimmy Mello  : 1960  MRN: [id number]  Referring provider: Depadua, Ashley, MD  Dx:   Encounter Diagnosis   Name Primary?    Spastic hemiparesis of right dominant side as late effect of cerebral infarction (HCC) Yes       Subjective: no new complaints     Pain: reports moderate amount of pain in R side of face     Objective: Pt engaged in skilled OT treatment session with focus on UE NMR, UE strengthening, UE endurance, and FMC/GMC to increase engagement, endurance, tolerance, and independence with daily ADL and IADL tasks.     CPT Code Minutes                                           Task Details        Therapeutic Activity                 Neuro Re-Ed  NMRE to RUE to improve GMC/FMC and in-hand manipulations:       -pt with min difficulty to pop open a resistive top and transfer to sh height shelf on R side; G functional reaching.     -pt with G/G- B hand coordination to catch a bouncing nerf golf ball and transfer to medium sized target on R sided table. Pt with G functional reaching into all planes of sh movement while catching and transferring ball and G reaction speed.    -able to use RUE to retrieve small ball from R side and throw to target. Pt with G functional reaching during reach and retrieval; G-/F+ GMC with RUE to throw to target; target accuracy at 45%    -In-hand manipulations: min difficulty translating golf balls from palm into alternating pincer to prepare for target placement; able to then place on medium sized target with G accuracy    -In-hand manipulations: min/mod difficulty translating small items from palm into functional pincer to prepare for target placement. Able to then bring small items from fingertips back into palm with min difficulty and min increased time.     -Dexterity: pt able to use alternating fingers to flick small items across tabletop surface to large target; pt with min difficulty  coordinating movement with D3-5; max difficulty with D2    -completed a series of finger movements to improve isolated control at MCPs, DIPs and PIPs and in-hand manipulations; completed with mod difficulty                    Therapeutic Exercise   PROM with LPS into finger flexion to increase joint ROM, abhishek at D2.              Testing  3/22:  Keyhole Peg Test:  L: 1 min 40 sec   R: 6 min     O'Juanjose Finger Dexterity Test:   L: 2 min 49 sec   R: 7 min 7 sec         HEP  4/24: Instructed pt to use heating pad for 15 min on R hand and then complete stretching to R fingers into flexion to increase joint ROM.          Assessment: Tolerated treatment well, cont to progress towards goals, with improving GMC/FMC of RUE. Patient would benefit from continued skilled OT.    Plan: Continued skilled OT per POC    INTERVENTION COMMENTS:  Diagnosis: Spastic hemiparesis of right dominant side as late effect of cerebral infarction (HCC) [I69.351]  Precautions: R side weakness, R facial pain   Insurance: Payor: [employer] / Plan: [employer] PPO / Product Type: PPO /   9 of 30 visits through 12/31/24  PN due 5/22/24

## 2024-04-26 ENCOUNTER — OFFICE VISIT (OUTPATIENT)
Facility: CLINIC | Age: 64
End: 2024-04-26
Payer: COMMERCIAL

## 2024-04-26 DIAGNOSIS — I69.351 SPASTIC HEMIPARESIS OF RIGHT DOMINANT SIDE AS LATE EFFECT OF CEREBRAL INFARCTION (HCC): Primary | ICD-10-CM

## 2024-04-26 PROCEDURE — 97112 NEUROMUSCULAR REEDUCATION: CPT

## 2024-04-26 PROCEDURE — 97116 GAIT TRAINING THERAPY: CPT

## 2024-04-26 NOTE — PROGRESS NOTES
"Daily Note     Today's date: 2024  Patient name: Jimmy Mello  : 1960  MRN: [id number]  Referring provider: Depadua, Ashley, MD  Dx:   Encounter Diagnosis     ICD-10-CM    1. Spastic hemiparesis of right dominant side as late effect of cerebral infarction (HCC)  I69.351           Start Time: 0802  Stop Time: 0845  Total time in clinic (min): 43 minutes    Subjective: Pt reported that he feels fatigued this morning.       Objective: See treatment diary below      Assessment: During balance exercises, Dank had fatigue in his RLE after completing the TM and timed trials and would have R foot drop with stepping. He would have moderate amount of LOB with stepping, but he was able to recover without assistance.       Plan: Continue per plan of care.      Precautions: Falls  Re-eval Date:  2024    Date 3/18 4/12 4/17 4/19 4/26     Visit Count 6 12 13 14 15     FOTO See IE         Pain In See IE         Pain Out                  Testing          TUG 10.66 s         5xSTS 10.22 s         Gait speed SSGS: 0.86  Fast: 1.00         FGA          2/6MWT 1300ft         Neuro Re-Ed          Dynamic balance BUE 10 laps walking lunge   Cone taps SOLO 20' 4 laps    Fwd/bwd with ball toss SOLO 6 laps  Cone taps with 5# AW RLE 6 laps 20 feet CGA     Static balance          Obstacle course  5 high hurdles with mat with 5# AW RLE 6 laps     Foam beam lat stepping with ball toss 6 laps  Cone taps with 5# AW RLE 20' 6 laps    High hurdles, foam wedge up and down, and 8\" step long with 5# AW CGA 4 laps      Resisted ambulation 30lb kb LUE fwd ortho side x2    30lb kb bwd LUE 20ft x4    Lat step 20ft x6 laps CGA 15 lb kettle bell RUE and 5# AW 3 laps x2 (80') timed; 1:05 fastest time Agility ladder 7.5# AW RLE karoke and 2 step through 6 laps each    Agility ladder side step ball toss 4 laps     15 lb kettle bell RUE and 5# AW 3 laps x2 (80') timed; 1:06 fastest time     Mutlidirectional stepping with med moose and " kicking ball into goal 25' away 4 laps CGA Timed trials 15lb KB 5# AW 2 laps (80'); 1:07 fastest time     Multidirectional stepping with colored dots 6 laps 5# AW RLE     Agility ladder 8 laps every other step CGA 5# AW RLE     Agility ladder 4 laps karaoke CGA 5# AW RLE      Runner step up X10 ea LE 6' CGA                             Ther Ex                                                                                          Ther Activity          ADL                    Gait Training          TM 10min total SOLO 1.4mph-2.1mph 1min on/off 1.2 mph 7% incline RUE support; 30 seconds 1.6mph/90 seconds at 1.2 mph 8 min total 1.2 mph 7% incline LUE supprt; 30 seconds 1.7 mph 8 min total  1.5 mph 7% incline RUE 90 seconds; 45 seconds 1.8 mph 8 min total 1 break  1.2 mph 7% incline RUE 8 min RPE 6/10     Head turns          PLS AFO          Stairs          Modalities

## 2024-04-26 NOTE — PROGRESS NOTES
Occupational Therapy Daily Note:    Today's date: 2024  Patient name: Jimmy Mello  : 1960  MRN: [id number]  Referring provider: Depadua, Ashley, MD  Dx:   Encounter Diagnosis   Name Primary?    Spastic hemiparesis of right dominant side as late effect of cerebral infarction (HCC) Yes       Subjective: no new complaints     Pain: reports moderate amount of pain in R side of face     Objective: Pt engaged in skilled OT treatment session with focus on UE NMR, UE strengthening, UE endurance, and FMC/GMC to increase engagement, endurance, tolerance, and independence with daily ADL and IADL tasks.     CPT Code Minutes                                           Task Details        Therapeutic Activity                 Neuro Re-Ed  NMRE to RUE to improve GMC/FMC and in-hand manipulations:     -completed a series of finger movements to improve isolated control at MCPs, DIPs and PIPs and in-hand manipulations; completed with mod difficulty     -In-hand manipulations: able to translate small items from palm into pincer, then along fingertips to thumb to little finger and then back into pincer to then place on small target; min difficulty with orienting small item to prepare for placement and min difficulty with in-hand manipulation of small items.     -reach and retrieval: RUE retrieval of small items from various targets at and above sh height; G functional reaching to target; G small target accuracy to retrieve small items and then to transfer to small target; min difficulty with manipulating small items to prepare for placement at small target.     -able to use R hand functional pincer to retrieve multiple small items by color from tabletop surface and transfer to wide mouthed container; G ability to translate small items from fingertips to palm and sustain grasp on multiple items in palm while retrieving additional items; G functional reach to large target.                        Therapeutic Exercise   PROM  with LPS into finger flexion to increase joint ROM, abhishek at D2 and D3.              Testing  3/22:  Keyhole Peg Test:  L: 1 min 40 sec   R: 6 min     O'Juanjose Finger Dexterity Test:   L: 2 min 49 sec   R: 7 min 7 sec         HEP  4/24: Instructed pt to use heating pad for 15 min on R hand and then complete stretching to R fingers into flexion to increase joint ROM.          Assessment: Tolerated treatment well. Pt with G response to passive stretching of R fingers, with noted improved ROM afterwards, allowing for improved dexterity. Cont to progress towards goals. Patient would benefit from continued skilled OT.    Plan: Continued skilled OT per POC    INTERVENTION COMMENTS:  Diagnosis: Spastic hemiparesis of right dominant side as late effect of cerebral infarction (HCC) [I69.351]  Precautions: R side weakness, R facial pain   Insurance: Payor: RD / Plan: RD PPO / Product Type: PPO /   10 of 30 visits through 12/31/24  PN due 5/22/24

## 2024-04-27 DIAGNOSIS — I10 PRIMARY HYPERTENSION: ICD-10-CM

## 2024-04-27 DIAGNOSIS — D64.9 ANEMIA, UNSPECIFIED TYPE: ICD-10-CM

## 2024-04-28 RX ORDER — CARVEDILOL 25 MG/1
25 TABLET ORAL 2 TIMES DAILY WITH MEALS
Qty: 180 TABLET | Refills: 1 | Status: SHIPPED | OUTPATIENT
Start: 2024-04-28

## 2024-04-29 RX ORDER — OMEGA-3/DHA/EPA/FISH OIL 35-113.5MG
1000 TABLET,CHEWABLE ORAL DAILY
Qty: 90 TABLET | Refills: 0 | Status: SHIPPED | OUTPATIENT
Start: 2024-04-29

## 2024-05-01 ENCOUNTER — OFFICE VISIT (OUTPATIENT)
Facility: CLINIC | Age: 64
End: 2024-05-01
Payer: COMMERCIAL

## 2024-05-01 DIAGNOSIS — I69.351 SPASTIC HEMIPARESIS OF RIGHT DOMINANT SIDE AS LATE EFFECT OF CEREBRAL INFARCTION (HCC): Primary | ICD-10-CM

## 2024-05-01 PROCEDURE — 97110 THERAPEUTIC EXERCISES: CPT

## 2024-05-01 PROCEDURE — 97112 NEUROMUSCULAR REEDUCATION: CPT

## 2024-05-01 NOTE — PROGRESS NOTES
Progress Note/Daily Note     Today's date: 2024  Patient name: Jimmy Mello  : 1960  MRN: [id number]  Referring provider: Depadua, Ashley, MD  Dx:   Encounter Diagnosis     ICD-10-CM    1. Spastic hemiparesis of right dominant side as late effect of cerebral infarction (HCC)  I69.351           Start Time: 0845  Stop Time: 930  Total time in clinic (min): 45 minutes    Subjective: Pt reported that he feels like he is 70% complete with PT and sees the benefits of PT with his walking and balance. He reports that the acupuncture is helping him with his spasticity.        Objective: See treatment diary below      Assessment: Re-evaluation was completed today. TUG and FGA reveal there is an increase in balance when compared to previous visits. He still has moments where he has LOB when ambulating with a small base of support or ambulating backwards. Endurance did not increase as indicated by the 6MWT, but this may be due to Pt scuffing his foot during ambulation and having to slow his gait speed to have less occurrences of scuffing. Pt was educated to continue working on obtaining a brace to reduce scuffing of R foot during ambulation. For this session, a 2# AW was taped on the top of his R foot to evaluate a difference with his gait to reduce the scuffing of the R foot. During exercises, there was reduced scuffing noted and the Pt reported that he did not notice much fatigue from having it on there. Pt would benefit from skilled PT to continue to achieve his goals.     Goals  In 4 weeks, patient will:  1.  Pt will achieve MDC value, 2.9s, on TUG to demonstrate reduced fall risk- Progressing  2.  Improve 5xSTS by at least 2 seconds to demonstrate improved lower extremity strength and support- Progressing      In 4-10 weeks, patient will:  1.  Demonstrate consistent carryover with HEP and walking program.- MET  2.  Improve gait speed by at least 0.16 m/s to meet MCID value for persons with CVA. -  "Progressing  3.  Improve 6MWT by at least 120ft with least restrictive device to meet MCID value for PwCVA & to show improved endurance to complete ADLs- MET  4.  Improve FGA by at least 4 points to meet MDC value to show improved balance with least restrictive device.- MET  5.  Improve ABC by at least 10 points to show improved balance confidence.- Not MET  6.  Improve 30s chair stand by at least 4 stands to show improved endurance and ability to generate power. -Progressing  7.  Demonstrate independent level floor<>stand transfer to improve safety at home.- n/t      Plan: Continue per plan of care.      Precautions: Falls  Re-eval Date:  6/1/2024    Date 3/18 4/12 4/17 4/19 4/26 5/1    Visit Count 6 12 13 14 15 16    FOTO See IE         Pain In See IE         Pain Out                  Testing          TUG 10.66 s     9.62 s    5xSTS 10.22 s     9.72 s    Gait speed SSGS: 0.86  Fast: 1.00     SGSS: 0.82  Fast: 1.19    FGA 23/30 26/30    2/6MWT 1300ft     1243 ft     Neuro Re-Ed      TUG,5XSTS, 10MWT, FGA, 6MWT    Dynamic balance BUE 10 laps walking lunge   Cone taps SOLO 20' 4 laps    Fwd/bwd with ball toss SOLO 6 laps  Cone taps with 5# AW RLE 6 laps 20 feet CGA Low hurdles 8 laps with AW on R foot CGA     Static balance          Obstacle course  5 high hurdles with mat with 5# AW RLE 6 laps     Foam beam lat stepping with ball toss 6 laps  Cone taps with 5# AW RLE 20' 6 laps    High hurdles, foam wedge up and down, and 8\" step long with 5# AW CGA 4 laps  Multidirectional stepping med moose CCW 4 laps     Resisted ambulation 30lb kb LUE fwd ortho side x2    30lb kb bwd LUE 20ft x4    Lat step 20ft x6 laps CGA 15 lb kettle bell RUE and 5# AW 3 laps x2 (80') timed; 1:05 fastest time Agility ladder 7.5# AW RLE karoke and 2 step through 6 laps each    Agility ladder side step ball toss 4 laps     15 lb kettle bell RUE and 5# AW 3 laps x2 (80') timed; 1:06 fastest time     Mutlidirectional stepping with med moose " and kicking ball into goal 25' away 4 laps CGA Timed trials 15lb KB 5# AW 2 laps (80'); 1:07 fastest time     Multidirectional stepping with colored dots 6 laps 5# AW RLE     Agility ladder 8 laps every other step CGA 5# AW RLE     Agility ladder 4 laps karaoke CGA 5# AW RLE  Timed trials with 2 lb AW on R foot 2 laps of 80 ft x2  (42 seconds fastest)      Runner step up X10 ea LE 6' CGA                             Ther Ex                                                                                          Ther Activity          ADL                    Gait Training          TM 10min total SOLO 1.4mph-2.1mph 1min on/off 1.2 mph 7% incline RUE support; 30 seconds 1.6mph/90 seconds at 1.2 mph 8 min total 1.2 mph 7% incline LUE supprt; 30 seconds 1.7 mph 8 min total  1.5 mph 7% incline RUE 90 seconds; 45 seconds 1.8 mph 8 min total 1 break  1.2 mph 7% incline RUE 8 min RPE 6/10     Head turns          PLS AFO          Stairs          Modalities

## 2024-05-01 NOTE — PROGRESS NOTES
Occupational Therapy Daily Note:    Today's date: 2024  Patient name: Jimmy Mello  : 1960  MRN: [id number]  Referring provider: Depadua, Ashley, MD  Dx:   Encounter Diagnosis   Name Primary?    Spastic hemiparesis of right dominant side as late effect of cerebral infarction (HCC) Yes       Subjective: no new complaints     Pain: reports moderate amount of pain in R side of face     Objective: Pt engaged in skilled OT treatment session with focus on UE NMR, UE strengthening, UE endurance, and FMC/GMC to increase engagement, endurance, tolerance, and independence with daily ADL and IADL tasks.     CPT Code Minutes                                           Task Details        Therapeutic Activity                 Neuro Re-Ed  NMRE to RUE to improve GMC/FMC and in-hand manipulations:     -completed a series of finger movements to improve isolated control at MCPs, DIPs and PIPs and in-hand manipulations; completed with mod difficulty     -Reach and retrieval: RUE retrieval of small items from R side sh height shelf and transfer to center target. Pt with G functional reaching during item retrieval; G prehension patterns to  small sized items; G medium sized target accuracy.     -In-hand manipulations: min difficulty translating small sized objects from palm to fingertips and along tips of fingertips; no droppage    -Dexterity: pt able to use alternating pincer to  and manipulate small items with min difficulty with finger isolation to sustain alternating pincer and during item placement at target                      Therapeutic Exercise   Completed PROM with extended passive stretching to R hand, abhishek into MCP and PIP flexion at D2 and D3 to improve ROM and achieve full fist.                  Testing  3/22:  Keyhole Peg Test:  L: 1 min 40 sec   R: 6 min     O'Juanjose Finger Dexterity Test:   L: 2 min 49 sec   R: 7 min 7 sec         HEP  : Instructed pt to use heating pad for 15 min on R hand  and then complete stretching to R fingers into flexion to increase joint ROM.          Assessment: Tolerated treatment well. Pt with improved gregory to stretching at R fingers, able to achieve full finger flexion with stretching, with improved AROM after stretching. Pt cont to demo improving in-hand manipulations and control over isolated finger movement. Patient would benefit from continued skilled OT.    Plan: Continued skilled OT per POC    INTERVENTION COMMENTS:  Diagnosis: Spastic hemiparesis of right dominant side as late effect of cerebral infarction (HCC) [I69.351]  Precautions: R side weakness, R facial pain   Insurance: Payor: RD / Plan: RD PPO / Product Type: PPO /   11 of 30 visits through 12/31/24  PN due 5/22/24

## 2024-05-03 ENCOUNTER — OFFICE VISIT (OUTPATIENT)
Facility: CLINIC | Age: 64
End: 2024-05-03
Payer: COMMERCIAL

## 2024-05-03 DIAGNOSIS — I69.351 SPASTIC HEMIPARESIS OF RIGHT DOMINANT SIDE AS LATE EFFECT OF CEREBRAL INFARCTION (HCC): Primary | ICD-10-CM

## 2024-05-03 PROCEDURE — 97112 NEUROMUSCULAR REEDUCATION: CPT

## 2024-05-03 PROCEDURE — 97116 GAIT TRAINING THERAPY: CPT

## 2024-05-03 NOTE — PROGRESS NOTES
Daily Note     Today's date: 5/3/2024  Patient name: Jimmy Mello  : 1960  MRN: [id number]  Referring provider: Depadua, Ashley, MD  Dx:   Encounter Diagnosis     ICD-10-CM    1. Spastic hemiparesis of right dominant side as late effect of cerebral infarction (HCC)  I69.351           Start Time: 0800  Stop Time: 0844  Total time in clinic (min): 44 minutes    Subjective: Pt reports of nothing new since last session.       Objective: See treatment diary below      Assessment: Pt was able to complete multidirectional stepping on TM fairly with some moments where he lost his balance when he would switch directions but was able to recover his stepping. Dank also had issues with ascending the foam wedge with his RLE and needed to use his LLE to ascend.       Plan: Continue per plan of care.      Precautions: Falls  Re-eval Date:  2024    Date 5/1 5/3   Visit Count 16 17   FOTO     Pain In     Pain Out             Testing     TUG 9.62 s    5xSTS 9.72 s    Gait speed SGSS: 0.82  Fast: 1.19    FGA /    2/6MWT 1243 ft     Neuro Re-Ed TUG,5XSTS, 10MWT, FGA, 6MWT    Dynamic balance Low hurdles 8 laps with AW on R foot CGA  Lat step foam beam with low moose 5# AW RLE SOLO 8 laps   Static balance     Obstacle course Multidirectional stepping med moose CCW 4 laps  Uneven surface and foam wedges 10 laps SOLO   Resisted ambulation Timed trials with 2 lb AW on R foot 2 laps of 80 ft x2  (42 seconds fastest)   [employer] carries with 7lb DB 5# AW RLE over alternating hurdles SOLO 4 laps    Runner step up               Ther Ex                                             Ther Activity     ADL          Gait Training     TM  Multidirectional amb  0.5 mph 9 min SOLO   Head turns     PLS AFO     Stairs     Modalities

## 2024-05-03 NOTE — PROGRESS NOTES
Occupational Therapy Daily Note:    Today's date: 5/3/2024  Patient name: Jimmy Mello  : 1960  MRN: [id number]  Referring provider: Depadua, Ashley, MD  Dx:   Encounter Diagnosis   Name Primary?    Spastic hemiparesis of right dominant side as late effect of cerebral infarction (HCC) Yes       Subjective: no new complaints     Pain: reports moderate amount of pain in R side of face; no changes     Objective: Pt engaged in skilled OT treatment session with focus on UE NMR, UE strengthening, UE endurance, and FMC/GMC to increase engagement, endurance, tolerance, and independence with daily ADL and IADL tasks.     CPT Code Minutes                                           Task Details        Therapeutic Activity                 Neuro Re-Ed  NMRE to RUE to improve GMC/FMC and in-hand manipulations:     Reach and retrieval:   -RUE retrieval of small cubes into end range of sh flex/ abd with transfer to tabletop target to stack small cube on top of medium sized cube    -in-hand manipulation to translate small items from palm to fingertips and then placement on small cubes for a 3 item height assembly; then stacking on top of small items for a 4 item height assembly.     -completed with min difficulty with functional reaching during item retrieval; G prehension patterns; G in-hand manipulations to translate items from palm to fingertips and to orient item in fingertips to prepare for target placement.                  Therapeutic Exercise  PROM with LPS at R sh into flex/ abd/ ER to increase joint ROM and improve joint integrity.     R pec muscle tightness; completed resistive stretching/ trigger point release to decrease tightness.                Testing  3/22:  Keyhole Peg Test:  L: 1 min 40 sec   R: 6 min     O'Juanjose Finger Dexterity Test:   L: 2 min 49 sec   R: 7 min 7 sec         HEP  : Instructed pt to use heating pad for 15 min on R hand and then complete stretching to R fingers into flexion to  increase joint ROM.          Assessment: Tolerated treatment well. Pt responds well to stretching, followed by improved functional reaching into all planes of sh movement. Cont to demo improving dexterity and prehension patterns. Patient would benefit from continued skilled OT.    Plan: Continued skilled OT per POC    INTERVENTION COMMENTS:  Diagnosis: Spastic hemiparesis of right dominant side as late effect of cerebral infarction (HCC) [I69.351]  Precautions: R side weakness, R facial pain   Insurance: Payor: [employer] / Plan: [employer] PPO / Product Type: PPO /   12 of 30 visits through 12/31/24  PN due 5/22/24

## 2024-05-08 ENCOUNTER — OFFICE VISIT (OUTPATIENT)
Facility: CLINIC | Age: 64
End: 2024-05-08
Payer: COMMERCIAL

## 2024-05-08 DIAGNOSIS — I69.351 SPASTIC HEMIPARESIS OF RIGHT DOMINANT SIDE AS LATE EFFECT OF CEREBRAL INFARCTION (HCC): Primary | ICD-10-CM

## 2024-05-08 PROCEDURE — 97112 NEUROMUSCULAR REEDUCATION: CPT

## 2024-05-08 PROCEDURE — 97116 GAIT TRAINING THERAPY: CPT

## 2024-05-08 NOTE — PROGRESS NOTES
Occupational Therapy Daily Note:    Today's date: 2024  Patient name: Jimmy Mello  : 1960  MRN: [id number]  Referring provider: Depadua, Ashley, MD  Dx:   Encounter Diagnosis   Name Primary?    Spastic hemiparesis of right dominant side as late effect of cerebral infarction (HCC) Yes       Subjective: no new complaints     Pain: reports moderate amount of pain in R side of face; no changes     Objective: Pt engaged in skilled OT treatment session with focus on UE NMR, UE strengthening, UE endurance, and FMC/GMC to increase engagement, endurance, tolerance, and independence with daily ADL and IADL tasks.     CPT Code Minutes                                           Task Details        Therapeutic Activity               Neuro Re-Ed  NMRE to RUE to improve GMC/FMC and in-hand manipulations:     GMC: catching and throwing beanbags to medium sized wall target; G reaction speed and eye-hand coordination to catch; G-/G target accuracy during throw    -completed a series of finger movements to improve isolated control at MCPs, DIPs and PIPs; completed full finger flex/ ext to target to improve motor control; min/mod difficulty      -in-hand manipulation to translate coin shaped items from palm to pincer, translate across fingertips to little finger, and then translate back to pincer for placement on large target.     -RUE to retrieve 3 small items at once from a small bag, hold items in palm while bringing one at a time into functional pincer with eventual transfer to small target.                      Therapeutic Exercise   Completed PROM with extended passive stretching to R hand, abhishek into MCP and PIP flexion at D2 and D3 to improve ROM and achieve full fist.                 Testing  3/22:  Keyhole Peg Test:  L: 1 min 40 sec   R: 6 min     O'Juanjose Finger Dexterity Test:   L: 2 min 49 sec   R: 7 min 7 sec         HEP  : Instructed pt to use heating pad for 15 min on R hand and then complete stretching  to R fingers into flexion to increase joint ROM.          Assessment: Tolerated treatment well; fair ROM at shoulder during GMC tasks; G R hand prehension patterns; G in-hand manipulations to translate items from palm to fingertips; improving R hand ROM and isolated control over finger movements. Patient would benefit from continued skilled OT.    Plan: Continued skilled OT per POC    INTERVENTION COMMENTS:  Diagnosis: Spastic hemiparesis of right dominant side as late effect of cerebral infarction (HCC) [I69.351]  Precautions: R side weakness, R facial pain   Insurance: Payor: RD / Plan: RD PPO / Product Type: PPO /   13 of 30 visits through 12/31/24  PN due 5/22/24

## 2024-05-08 NOTE — PROGRESS NOTES
Daily Note     Today's date: 2024  Patient name: Jimmy Mello  : 1960  MRN: [id number]  Referring provider: Depadua, Ashley, MD  Dx:   Encounter Diagnosis     ICD-10-CM    1. Spastic hemiparesis of right dominant side as late effect of cerebral infarction (HCC)  I69.351           Start Time: 0845  Stop Time: 0930  Total time in clinic (min): 45 minutes    Subjective: Pt reported of nothing new since last session.      Objective: See treatment diary below      Assessment: Dual tasking was included in cone taps and Pt responded well with increased RLE control. Dank had noticeable control of his balance compared to previous visits as there was less loss of balance during SLS moments during navigation through hurdles and cones.       Plan: Continue per plan of care.      Precautions: Falls  Re-eval Date:  2024    Date 5/1 5/3 5/8   Visit Count 16 17 18   FOTO      Pain In      Pain Out              Testing      TUG 9.62 s     5xSTS 9.72 s     Gait speed SGSS: 0.82  Fast: 1.19     FGA      2/6MWT 1243 ft      Neuro Re-Ed TUG,5XSTS, 10MWT, FGA, 6MWT     Dynamic balance Low hurdles 8 laps with AW on R foot CGA  Lat step foam beam with low moose 5# AW RLE SOLO 8 laps Lat step foam beam with low moose 5# AW RLE SOLO 8 laps    Cone taps 5# AW RLE while holding ball on cone SOLO 4 laps   Static balance      Obstacle course Multidirectional stepping med moose CCW 4 laps  Uneven surface and foam wedges 10 laps SOLO    Resisted ambulation Timed trials with 2 lb AW on R foot 2 laps of 80 ft x2  (42 seconds fastest)   [place] carries with 7lb DB 5# AW RLE over alternating hurdles SOLO 4 laps  [place] carries with 7lb DB 5# AW RLE over alternating hurdles fwd/lat SOLO 4 laps each    Runner step up                  Ther Ex                                                      Ther Activity      ADL            Gait Training      TM  Multidirectional amb  0.5 mph 9 min SOLO Multidirectional amb  0.5 mph 9 min  total, 3 min each direction SOLO   Head turns      PLS AFO      Stairs      Modalities

## 2024-05-10 ENCOUNTER — OFFICE VISIT (OUTPATIENT)
Facility: CLINIC | Age: 64
End: 2024-05-10
Payer: COMMERCIAL

## 2024-05-10 DIAGNOSIS — I69.351 SPASTIC HEMIPARESIS OF RIGHT DOMINANT SIDE AS LATE EFFECT OF CEREBRAL INFARCTION (HCC): Primary | ICD-10-CM

## 2024-05-10 PROCEDURE — 97110 THERAPEUTIC EXERCISES: CPT

## 2024-05-10 PROCEDURE — 97112 NEUROMUSCULAR REEDUCATION: CPT

## 2024-05-10 NOTE — PROGRESS NOTES
Daily Note     Today's date: 5/10/2024  Patient name: Jimmy Mello  : 1960  MRN: [id number]  Referring provider: Depadua, Ashley, MD  Dx:   Encounter Diagnosis     ICD-10-CM    1. Spastic hemiparesis of right dominant side as late effect of cerebral infarction (HCC)  I69.351             Start Time: 0801  Stop Time: 0845  Total time in clinic (min): 44 minutes    Subjective: Dank reports nothing new.      Objective: See treatment diary below      Assessment: Dank shows fair stability at higher speeds on the treadmill with RUE forced use. Stance stability challenged with weighted LLE and L blaze pod taps. Hopping and resisted walking to assist with propulsion. Seems to have better foot clearance with R AFO through fast paced walking, obstacles, and overground walking. Dank will continue to benefit from skilled PT to address gait and balance deficits.       Plan: Continue per plan of care. Propulsion, stance stability, general balance.     Precautions: Falls  Re-eval Date:  2024    Date 5/1 5/3 5/8 5/10   Visit Count 16 17 18 19   FOTO       Pain In       Pain Out               Testing       TUG 9.62 s      5xSTS 9.72 s      Gait speed SGSS: 0.82  Fast: 1.19      FGA /30      2/6MWT 1243 ft       Neuro Re-Ed TUG,5XSTS, 10MWT, FGA, 6MWT      Dynamic balance Low hurdles 8 laps with AW on R foot CGA  Lat step foam beam with low moose 5# AW RLE SOLO 8 laps Lat step foam beam with low moose 5# AW RLE SOLO 8 laps    Cone taps 5# AW RLE while holding ball on cone SOLO 4 laps Blaze pods 30s burst fwd/bwd 20ft x6 ID 10 pods RAFO    Hopping cues for more RWB 2x10 RAFO   Static balance       Obstacle course Multidirectional stepping med moose CCW 4 laps  Uneven surface and foam wedges 10 laps SOLO     Resisted ambulation Timed trials with 2 lb AW on R foot 2 laps of 80 ft x2  (42 seconds fastest)   [place] carries with 7lb DB 5# AW RLE over alternating hurdles SOLO 4 laps  [place] carries with 7lb DB 5# AW RLE over  alternating hurdles fwd/lat SOLO 4 laps each  20ft PT resisted 8 laps R AFO    10lb LLE 2 laps ortho side R AFO   Runner step up                     Ther Ex                                                               Ther Activity       ADL              Gait Training       TM  Multidirectional amb  0.5 mph 9 min SOLO Multidirectional amb  0.5 mph 9 min total, 3 min each direction SOLO 1min on/off PLS AFO R 1.6mph > 2.2mph 9min total SOLO   Head turns       PLS AFO       Stairs       Modalities

## 2024-05-10 NOTE — PROGRESS NOTES
Occupational Therapy Daily Note:    Today's date: 5/10/2024  Patient name: Jimmy Mello  : 1960  MRN: [id number]  Referring provider: Depadua, Ashley, MD  Dx:   Encounter Diagnosis   Name Primary?    Spastic hemiparesis of right dominant side as late effect of cerebral infarction (HCC) Yes       Subjective: no new complaints     Pain: reports moderate amount of pain in R side of face; no changes     Objective: Pt engaged in skilled OT treatment session with focus on UE NMR, UE strengthening, UE endurance, and FMC/GMC to increase engagement, endurance, tolerance, and independence with daily ADL and IADL tasks.     CPT Code Minutes                                           Task Details        Therapeutic Activity               Neuro Re-Ed  NMRE to RUE to improve GMC/FMC and in-hand manipulations:       -Functional [place] reaching into end range of sh flex and abd to retreive resistive red, green, and blue clips, manipulate around in fingertips to orient for placement and transfer to vertical pole across the table.    -Reaching into sh height plane for removal of resistive red, green, and blue clips and transfer to tabletop container.       Completed tasks with G- functional reaching to target; G prehension patterns; G target accuracy; min difficulty with in-hand manipulations.                      Therapeutic Exercise  Pt with increased tightness in R shoulder today. In supine, completed PROM with extended passive stretching to R shoulder into all planes.     While seated, completed PROM with LPS to R hand, abhishek into MCP and PIP flexion at D2 and D3 to improve ROM and achieve full fist.                 Testing  3/22:  Keyhole Peg Test:  L: 1 min 40 sec   R: 6 min     O'Juanjose Finger Dexterity Test:   L: 2 min 49 sec   R: 7 min 7 sec         HEP  : Instructed pt to use heating pad for 15 min on R hand and then complete stretching to R fingers into flexion to increase joint ROM.          Assessment: Tolerated  treatment well; pt with increased tightness in R shoulder, with improved ROM after stretching. Pt cont with limited ROM of R fingers D2 and D3, able to obtain close to full fist after stretching. Cont to demo improving dexterity.  Patient would benefit from continued skilled OT.    Plan: Continued skilled OT per POC    INTERVENTION COMMENTS:  Diagnosis: Spastic hemiparesis of right dominant side as late effect of cerebral infarction (HCC) [I69.351]  Precautions: R side weakness, R facial pain   Insurance: Payor: RD / Plan: [employer] PPO / Product Type: PPO /   14 of 30 visits through 12/31/24  PN due 5/22/24

## 2024-05-15 ENCOUNTER — OFFICE VISIT (OUTPATIENT)
Facility: CLINIC | Age: 64
End: 2024-05-15
Payer: COMMERCIAL

## 2024-05-15 DIAGNOSIS — I69.351 SPASTIC HEMIPARESIS OF RIGHT DOMINANT SIDE AS LATE EFFECT OF CEREBRAL INFARCTION (HCC): Primary | ICD-10-CM

## 2024-05-15 PROCEDURE — 97110 THERAPEUTIC EXERCISES: CPT

## 2024-05-15 PROCEDURE — 97112 NEUROMUSCULAR REEDUCATION: CPT

## 2024-05-15 NOTE — PROGRESS NOTES
Occupational Therapy Daily Note:    Today's date: 5/15/2024  Patient name: Jimmy Mello  : 1960  MRN: [id number]  Referring provider: Depadua, Ashley, MD  Dx:   Encounter Diagnosis   Name Primary?    Spastic hemiparesis of right dominant side as late effect of cerebral infarction (HCC) Yes       Subjective: no new complaints     Pain: moderate amount of pain in R side of face; no changes     Objective: Pt engaged in skilled OT treatment session with focus on UE NMR, UE strengthening, UE endurance, and FMC/GMC to increase engagement, endurance, tolerance, and independence with daily ADL and IADL tasks.     CPT Code Minutes                                           Task Details        Therapeutic Activity               Neuro Re-Ed  NMRE to RUE to improve GMC/FMC and in-hand manipulations:     -Functional OH reaching into end range of sh flex and abd to retreive small egg shaped items, manipulate around in fingertips to orient for placement and transfer to medium sized target on tabletop.     -in-hand manipulations: pincer to  egg shaped objects, able to manipulate in fingertips to flip over and place upside down at target    -RUE manipulation of green resistive clip to retrieve small objects and transfer to sh height target; G forward reach to target and G manipulation of resistive clip.     Completed tasks with G/G- functional reaching to target; G prehension patterns; G+ target accuracy; min/mod difficulty with in-hand manipulations.                      Therapeutic Exercise  Seated, completed PROM with LPS to R shoulder into all planes, along with muscle massage and trigger point release to R pec to allow for increased ROM.    While seated, completed PROM with LPS to R hand, abhishek into MCP and PIP flexion at D2 and D3 to improve ROM and achieve full fist.                 Testing  3/22:  Keyhole Peg Test:  L: 1 min 40 sec   R: 6 min     O'Juanjose Finger Dexterity Test:   L: 2 min 49 sec   R: 7 min 7 sec          HEP  4/24: Instructed pt to use heating pad for 15 min on R hand and then complete stretching to R fingers into flexion to increase joint ROM.          Assessment: Tolerated treatment well; pt responds well to passive stretching, with noted improved ROM after stretching; cont to demo improving dexterity and functional reach with RUE. Patient would benefit from continued skilled OT.    Plan: Continued skilled OT per POC    INTERVENTION COMMENTS:  Diagnosis: Spastic hemiparesis of right dominant side as late effect of cerebral infarction (HCC) [I69.351]  Precautions: R side weakness, R facial pain   Insurance: Payor: [employer] / Plan: AET[employer] PPO / Product Type: PPO /   15 of 30 visits through 12/31/24  PN due 5/22/24

## 2024-05-17 ENCOUNTER — OFFICE VISIT (OUTPATIENT)
Facility: CLINIC | Age: 64
End: 2024-05-17
Payer: COMMERCIAL

## 2024-05-17 ENCOUNTER — TELEPHONE (OUTPATIENT)
Dept: NEUROLOGY | Facility: CLINIC | Age: 64
End: 2024-05-17

## 2024-05-17 DIAGNOSIS — I69.351 SPASTIC HEMIPARESIS OF RIGHT DOMINANT SIDE AS LATE EFFECT OF CEREBRAL INFARCTION (HCC): Primary | ICD-10-CM

## 2024-05-17 PROCEDURE — 97112 NEUROMUSCULAR REEDUCATION: CPT

## 2024-05-17 PROCEDURE — 97110 THERAPEUTIC EXERCISES: CPT

## 2024-05-17 NOTE — PROGRESS NOTES
Occupational Therapy Daily Note:    Today's date: 2024  Patient name: Jimmy Mello  : 1960  MRN: [id number]  Referring provider: Depadua, Ashley, MD  Dx:   Encounter Diagnosis   Name Primary?    Spastic hemiparesis of right dominant side as late effect of cerebral infarction (HCC) Yes       Subjective: no new complaints     Pain: moderate amount of pain in R side of face; no changes     Objective: Pt engaged in skilled OT treatment session with focus on UE NMR, UE strengthening, UE endurance, and FMC/GMC to increase engagement, endurance, tolerance, and independence with daily ADL and IADL tasks.     CPT Code Minutes                                           Task Details        Therapeutic Activity               Neuro Re-Ed  NMRE to RUE to improve GMC/FMC and in-hand manipulations:     -RUE picking up golf tees from tabletop surface, orienting in fingertips and placing upside on flat surface     -Focus on ROM into finger flexion, to close fingers into full fist around small objects, with min manual cues at D4 and D5 to achieve full fist; then translation of item from palm to fingertips and placement on top of golf rachid. Pt with min difficulty with precision of movement during item placement, G functional reach to target    -RUE retrieval of small items and transfer to wide mouthed container; G prehension patterns, G functional reach into forward flexion to target                        Therapeutic Exercise  Seated, completed PROM with LPS to R shoulder into sh flex/ abd    Place and hold exercises while seated, 5 reps into sh flex and sh abduction, 5 sec hold each trial     Completed PROM with LPS to R hand, abhishek into MCP and PIP flexion at D2 and D3 to improve ROM and achieve full fist.                 Testing  3/22:  Keyhole Peg Test:  L: 1 min 40 sec   R: 6 min     O'Juanjose Finger Dexterity Test:   L: 2 min 49 sec   R: 7 min 7 sec         HEP  : Instructed pt to use heating pad for 15 min on R  hand and then complete stretching to R fingers into flexion to increase joint ROM.          Assessment: Tolerated treatment well; pt with improved R hand finger flexion after stretching, able to achieve full fist with min manual cues; improving functional reach at shoulder and prehension patterns when manipulating small items.  Patient would benefit from continued skilled OT.    Plan: Continued skilled OT per POC    INTERVENTION COMMENTS:  Diagnosis: Spastic hemiparesis of right dominant side as late effect of cerebral infarction (HCC) [I69.351]  Precautions: R side weakness, R facial pain   Insurance: Payor: RD / Plan: RD PPO / Product Type: PPO /   16 of 30 visits through 12/31/24  PN due 5/22/24

## 2024-05-17 NOTE — TELEPHONE ENCOUNTER
Chiara from [facility] called requesting LOV notes from 3/20 w/ Dr Depadua.    Please fax # [phone]

## 2024-05-17 NOTE — PROGRESS NOTES
Daily Note     Today's date: 2024  Patient name: Jimmy Mello  : 1960  MRN: [id number]  Referring provider: Depadua, Ashley, MD  Dx:   Encounter Diagnosis     ICD-10-CM    1. Spastic hemiparesis of right dominant side as late effect of cerebral infarction (HCC)  I69.351             Start Time: 0800  Stop Time: 0845  Total time in clinic (min): 45 minutes    Subjective: Dank reports orthotic appt went well, has f/up in 2 weeks.      Objective: See treatment diary below      Assessment:  More symmetric hopping observed today. Challenged with resisted walking and skipping step on stairs to generate power and propulsion. Slowed L lateral stepping most likely due to reduced R stance stability. Occasional lack of foot clearance on level surfaces due to inc R DF fatigue and addition of 2# on top of foot. Dank will continue to benefit from skilled PT to address gait and balance deficits.       Plan: Continue per plan of care. Propulsion, stance stability, general balance.     Precautions: Falls  Re-eval Date:  2024    Date       Visit Count 20      FOTO       Pain In       Pain Out               Testing       TUG 9.62 s      5xSTS 9.72 s      Gait speed SGSS: 0.82  Fast: 1.19      FGA 26/30      2/6MWT 1243 ft       Neuro Re-Ed       Dynamic balance Blaze pods 15ft lat step 1:30 UT 33 taps    Blaze pods + med hurdles fwd/bwd 1:30 UT 15 2#R foot    Jump squat 2x10 SOLO    2#R foot 2 laps ortho side      Static balance       Obstacle course       Resisted ambulation Stair skipping step 1UE 2 flights x2    Fwd/bwd SOLO 3 laps ea 2#R foot red TB PT resisted      Runner step up                     Ther Ex                                                               Ther Activity       ADL              Gait Training       TM       Head turns       PLS AFO       Stairs       Modalities                                           age appropriate assistance

## 2024-05-21 ENCOUNTER — OFFICE VISIT (OUTPATIENT)
Facility: CLINIC | Age: 64
End: 2024-05-21
Payer: COMMERCIAL

## 2024-05-21 DIAGNOSIS — I69.351 SPASTIC HEMIPARESIS OF RIGHT DOMINANT SIDE AS LATE EFFECT OF CEREBRAL INFARCTION (HCC): Primary | ICD-10-CM

## 2024-05-21 PROCEDURE — 97112 NEUROMUSCULAR REEDUCATION: CPT

## 2024-05-21 PROCEDURE — 97168 OT RE-EVAL EST PLAN CARE: CPT

## 2024-05-21 NOTE — PROGRESS NOTES
Daily Note     Today's date: 2024  Patient name: Jimmy Mello  : 1960  MRN: [id number]  Referring provider: Depadua, Ashley, MD  Dx:   Encounter Diagnosis     ICD-10-CM    1. Spastic hemiparesis of right dominant side as late effect of cerebral infarction (HCC)  I69.351             Start Time: 0800  Stop Time: 0845  Total time in clinic (min): 45 minutes    Subjective: Dank reports nothing new over the weekend. Attended event, walking around got alright.      Objective: See treatment diary below      Assessment:  More fatigue generated in ankle dorsiflexors with use of ankle weight on top of foot, a few episodes of lacking clearance but able to independently stabilize. Shows better motor planning and power generation with increasing step length overground and on stairs. Postural stability and stance time continue to show deficits as observed instability with weighted carries and quick lateral stepping. Dank will continue to benefit from skilled PT to address gait and balance deficits.       Plan: Continue per plan of care. Propulsion, stance stability, general balance.     Precautions: Falls  Re-eval Date:  2024    Date      Visit Count 20 21     FOTO       Pain In       Pain Out               Testing       TUG 9.62 s      5xSTS 9.72 s      Gait speed SGSS: 0.82  Fast: 1.19      FGA 26/30      2/6MWT 1243 ft       Neuro Re-Ed       Dynamic balance Blaze pods 15ft lat step 1:30 MD 33 taps    Blaze pods + med hurdles fwd/bwd 1:30 MD 15 2#R foot    Jump squat 2x10 SOLO    2#R foot 2 laps ortho side Agility ladder skipping box 2#RLE on toe 12 laps fwd    20lb kb LUE hold 4 laps ortho side 2#RLE on toes    Lat step shuttle run 2#RLE on toes 2 laps     Static balance       Obstacle course       Resisted ambulation Stair skipping step 1UE 2 flights x2    Fwd/bwd SOLO 3 laps ea 2#R foot red TB PT resisted Skipping step 1UE 2 flights x2 2#RLE on toes    Fwd/bwd SOLO 4 laps ea PT resisted     Runner  step up                     Ther Ex                                                               Ther Activity       ADL              Gait Training       TM       Head turns       PLS AFO       Stairs       Modalities

## 2024-05-21 NOTE — PROGRESS NOTES
OCCUPATIONAL THERAPY RE-EVALUATION        24  Jimmy Mello  1960  [id number]  Depadua, Ashley, MD   Diagnosis ICD-10-CM Associated Orders   1. Spastic hemiparesis of right dominant side as late effect of cerebral infarction (HCC)  I69.351             Assessment/Plan      SKILLED ANALYSIS:    Pt is a 63 y.o. male referred to Occupational Therapy s/p Spastic hemiparesis of right dominant side as late effect of cerebral infarction (HCC) [I69.351].  Pt participated in 17 sessions of skilled OT focused on improving ROM, strength, and coordination of the RUE. Pt with G progress towards all goals. Pt with increased ROM at R shoulder and hand, with improved active and passive range into sh flex, abd, ER/IR and finger flex. Pt with improved strength t/o RUE, from 4-/5 to 4+ to 5/5, see grid below for details. Pt with improved FMC AEB improved scores on the 9 hole peg test, O'Juanjose Finger Dexterity test and the keyhole peg test. Pt does cont to present with the following areas of deficit: FMC/FMS, GMC/GMS, hypertonicity, hand to target accuracy, and in hand manipulation/dexterity impacting indep and completion of ADL/IADL and leisure tasks.  Pt does demo the need for cont skilled Occupational Therapy services 2x/week for 12 weeks with focus on ADL re-training, IADL re-training, UE NMR, UE strengthening, UE endurance, FMC/GMC, family training/education, healthy coping education, leisure pursuits, and community re-integration to address the goals as listed below. Pt in agreement with POC, POC to  .      Short Term Goals (to be achieved within 4 weeks):     Pt will improve R hand motor control so as to be able to manipulate medium and small sized objects with min to no difficulty during ADLs/IADLs and leisure activities. PROGRESSING   Pt will use RUE as dominant in 90% of functional tasks, to increase ease and independence with completion of ADLs/ IADLs and leisure tasks. PROGRESSING    Pt will increase  "R UE rate of manipulation for all FM tests for improved functional performance/independence with functional tasks x 25% PROGRESSING    Pt will increase R UE strength to 4/5,  strength by 3-5 lbs and pinch strength by 1-2 pounds, through the use of strengthening exercises and home program for eventual return to IADLs and life roles. MET       Long Term Goals (to be achieved within 12 weeks):  Pt will increase R UE strength to 4+/5 and  strength by 5-7 lbs and pinch strength by 2-3 pounds, through the use of strengthening exercises and home program PROGRESSING    Pt will improve RUE GMC/FMC, so as to be able to return to using RUE as dominant in 100% of daily functional tasks PROGRESSING    Pt will demo G carryover of Home Exercise Program to improve functional progression towards goals in Plan of care and for improved functional use of RUE ONGOING               SUBJECTIVE      SUBJECTIVE: \"my arm gets tired \"    PATIENT GOAL: to work on his arm and improve functional use of RUE        HISTORY OF PRESENT ILLNESS:     Pt is a 63 y.o. male who was referred to Occupational Therapy s/p  Spastic hemiparesis of right dominant side as late effect of cerebral infarction (HCC) [I69.351]. 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 Pt with [facility] stay from 3/6-3/31, with last fall on 3/31 in the hospital. Pt was in OPOT from 4/6-7/18/23. Pt now returns for OPOT for therapy for cont RUE deficits.           PMH:   Past Medical History:   Diagnosis Date    Adjustment disorder with depressed mood 03/01/2023    BRAULIO (acute kidney injury) ([facility])     B12 deficiency     Celiac artery stenosis (HCC)     Cerebellar infarct ([facility]) 02/25/2023    CKD (chronic kidney disease) stage 2, GFR 60-89 ml/min     Essential hypertension     Impaired fasting glucose     Iron deficiency anemia     Neurogenic bowel     Stenosis of left vertebral artery     Stroke (HCC)     Vertebral artery " dissection (HCC)        Past Surgical Hx:   Past Surgical History:   Procedure Laterality Date    ARTERY SURGERY      vertebral artery stent/revascularization    IR STROKE ALERT  2/26/2023        HOME SETUP/ CLOF: lives with wife, dtr and ANGEL and son; lives on [address] acres of property; 2 SH; bed on 2nd floor with tub shower combo; powder room on 1st floor;     ADLs: I with bathing; I with dressing, occ has A with R sock; able to manage R slip on shoe; I with grooming; I with eating but does have some difficulty with manipulating utensils and cutting; I with clothing fasteners     IADLs: pt's wife completes IADLs; pt does outdoor work including mowing lawn and working on the equipment   (+)  but has not driven since CVA    Functional Mobility: I without AD    Work: Has not worked since the CVA; pt was an ; was working 40+ hours; 6 days a week and was a . 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[employer], including knot tying    Physical activity/ leisure engagement: was walking and using stationary bike; goes to the firehouse once every 2 weeks, goes for meetings  Animals: 2 dogs, 2 cats upstairs and 4 cats downstairs    DME: owns a w/c, BSC, hemiwalker, transfer tub bench     Falls: none    Pain Levels:   Facial pain: fluctuates frequently, with worst at 10/10 and at best 1/10; current 2/10; pain increases every time he stands  Wrist pain: worst pain 9/10; best pain: 0/10; currently 0/10         OBJECTIVE         PHYSICAL ASSESSMENT    Botox injections: R wrist, R shoulder, occ R foot      RUE LUE Comments                 UPPER EXTREMITY FXN Impaired Intact Dominant Hand: Right handed but does use LUE more since CVA                 UE ROM LUE AROM  RUE PROM  RUE AROM COMMENTS   Shoulder Flex WNL  3/4 range (was just above 1/2 range) 100 degrees (was 1/2 range)    Shoulder Ext WNL  WNL WNL    Shoulder ABD WNL  3/4 range (was 1/2 range) 1/2 range (was 1/3 range)    Horizontal ABD WNL  WNL  WNL    Horizontal ADD WNL  WNL WNL    Shoulder IR  WNL  WFL (was 3/4 range) WFL (was 3/4 range)     Shoulder ER WNL WFL (was 3/4 range)  3/4 range    Elbow Flex WNL WNL WNL    Elbow Ext  WNL WNL WNL    Wrist flexion WNL WNL WNL    Wrist Ext WNL WNL 3/4 range    Supination WNL WNL 3/4 range    Pronation WNL WNL WNL    Finger Flex WNL Full range with stretching (was D2 3/4 range, otherwise WNL) D2, D3 at 3/4 range, D1, D4, D5 WNL (was D2 1/2 range, D3 3/4 range; otherwise WNL)    Finger Ext WNL WNL WNL    Opposition  WNL WNL WNL                               MMT  LUE RUE   Comments   Shoulder Flex/Ext 5/5 4+/5 (was 4-/5)    Shoulder Abd 5/5 4/5 (was 4-/5)    Shoulder Add 5/5 4+/5 (was 4-/5)     Shoulder ER 5/5 4+/5 (was 4-/5)     Shoulder IR 5/5 4+/5 (was 4-/5)     Elbow Flex 5/5 5/5 (was 4-/5)    Elbow Ext 5/5 5/5 (was 4-/5)    Wrist Flex 5/5 5/5 (was 4-/5)    Wrist Ext 5/5 4+/5 (was 4-/5)    Gross Grasp 5/5 4+/5 (was 4-/5)      *RUE strength within ROM limitations                 /Pinch Strength  LUE  RUE    Comments   Dynamometer      - Gross Grasp 95 (was 86 lbs) 36 (was 31 lbs)    Pinch Meter       - PINCER 18 lbs (no change) 10 (was 8 lbs)     - 3 JAW MUNDO 22 lbs (no change) 11 (was 10 lbs)     - LATERAL 28 lbs (no change)  18 (was 16 lbs)      SENSATION LUE RUE Comments   Sharp Dull  Intact Impaired    Proprioception Intact Intact    Pain Intact Intact    Hot/Cold Temp Intact Impaired Decreased on R side     Comments:     COORDINATION LULORETTA RULORETTA    9 Hole Peg Test 27 seconds 35 sec (was 38 seconds)    Keyhole Peg Test   1 min 40 sec   4 min 11 sec (Was 6 min)    O'Juanjose Finger Dexterity Test (5 rows) 2 min 46 sec (was 2 min 49 sec) 4 min 54 sec (was 7 min 7 sec)    Handwriting (Print)  G+ legibility     Handwriting (script)   G legibility             COGNITIVE ASSESSMENT    Memory: Intact  Attention: Intact  Executive Functions: Intact  Communication: Intact   Processing: Intact             VISUAL  ASSESSMENT      Vision Screen     ROM Intact   Tracking Intact   Saccades Intact   Convergence/ Divergence Intact   Visual Fields  Intact             PLANNED THERAPY INTERVENTIONS:  Therapeutic activity  Therapeutic exercise  Neuromuscular re-education   ADL re-training   IADL re-training  Supine, seated, and in stance neuro re-ed  FMC/prehension  Timed Trials  Manual tx  Hand to target  Sensory re-ed  WBearing strategies   Closed chain activities  Open chain activities           INTERVENTION COMMENTS:  Diagnosis: Spastic hemiparesis of right dominant side as late effect of cerebral infarction (HCC) [I69.351]  Precautions: R side weakness, R facial pain   Insurance: Payor: RD / Plan: AETNA PPO / Product Type: PPO /   17 of 30 visits through 12/31/24

## 2024-05-22 ENCOUNTER — OFFICE VISIT (OUTPATIENT)
Facility: CLINIC | Age: 64
End: 2024-05-22
Payer: COMMERCIAL

## 2024-05-22 DIAGNOSIS — I69.351 SPASTIC HEMIPARESIS OF RIGHT DOMINANT SIDE AS LATE EFFECT OF CEREBRAL INFARCTION (HCC): Primary | ICD-10-CM

## 2024-05-22 PROCEDURE — 97112 NEUROMUSCULAR REEDUCATION: CPT

## 2024-05-22 PROCEDURE — 97110 THERAPEUTIC EXERCISES: CPT

## 2024-05-22 NOTE — PROGRESS NOTES
Daily Note     Today's date: 2024  Patient name: Jimmy Mello  : 1960  MRN: [id number]  Referring provider: Depadua, Ashley, MD  Dx:   Encounter Diagnosis     ICD-10-CM    1. Spastic hemiparesis of right dominant side as late effect of cerebral infarction (HCC)  I69.351               Start Time: 0845  Stop Time: 0930  Total time in clinic (min): 45 minutes    Subjective: Dank reports nothing new since being here yesterday.      Objective: See treatment diary below      Assessment:  More focus on postural control and R stance stability. Shows more postural instability while stepping laterally or backward as well as on compliant surfaces. Requires 1UE assist while stepping backward on treadmill due to postural instability and lacking R hip ext. Shows difficulty maintaining R SLS for prolonged period. Shows good ability to maintain motor dual task while consistently stepping. Dank will continue to benefit from skilled PT to address gait and balance deficits.       Plan: Continue per plan of care. Propulsion, stance stability, general balance.     Precautions: Falls  Re-eval Date:  2024    Date     Visit Count 20 21 22    FOTO       Pain In       Pain Out               Testing       TUG 9.62 s      5xSTS 9.72 s      Gait speed SGSS: 0.82  Fast: 1.19      FGA 26/30      2/6MWT 1243 ft       Neuro Re-Ed       Dynamic balance Blaze pods 15ft lat step 1:30 RI 33 taps    Blaze pods + med hurdles fwd/bwd 1:30 RI 15 2#R foot    Jump squat 2x10 SOLO    2#R foot 2 laps ortho side Agility ladder skipping box 2#RLE on toe 12 laps fwd    20lb kb LUE hold 4 laps ortho side 2#RLE on toes    Lat step shuttle run 2#RLE on toes 2 laps TM 0.9mph 5min 1UE SOLO    Shuttle run fwd/bwd x3 SOLO    Lat step 7.5lb LLE 8 laps SOLO with ball toss    Static balance       Obstacle course   Dual foam beams + uneven surface SOLO fwd/bwd 10 laps    Resisted ambulation Stair skipping step 1UE 2 flights x2    Fwd/bwd SOLO 3  laps ea 2#R foot red TB PT resisted Skipping step 1UE 2 flights x2 2#RLE on toes    Fwd/bwd SOLO 4 laps ea PT resisted     Runner step up   6' step 2x15 RLE SLS                  Ther Ex                                                               Ther Activity       ADL              Gait Training       TM       Head turns       PLS AFO       Stairs       Modalities

## 2024-05-22 NOTE — PROGRESS NOTES
Occupational Therapy Daily Note:    Today's date: 2024  Patient name: Jimmy Mello  : 1960  MRN: [id number]  Referring provider: Depadua, Ashley, MD  Dx:   Encounter Diagnosis   Name Primary?    Spastic hemiparesis of right dominant side as late effect of cerebral infarction (HCC) Yes         Subjective: no new complaints     Pain: moderate amount of pain in R side of face; no changes     Objective: Pt engaged in skilled OT treatment session with focus on UE NMR, UE strengthening, UE endurance, and FMC/GMC to increase engagement, endurance, tolerance, and independence with daily ADL and IADL tasks.     CPT Code Minutes                                           Task Details        Therapeutic Activity               Neuro Re-Ed  NMRE to RUE to improve GMC/FMC and in-hand manipulations:       -Reach and retrieval: RUE reach OH into sh flex and abd to retrieve medium sized objects; G functional reaching into end range at sh    -RUE manipulation of small resistive poptops to flip into open position and transfer to chest height shelf; able to manipulate with R hand only, with min difficulty with dexterity to open, G functional reach to shelf    -RUE manipulating green resistive clip to  small objects and transfer from table to chest level shelf in forward plane. Pt with G forward reach to shelf, G manipulation of resistive clip, G prehension patterns     -FMC: use of pincer and 3 jaw belia to  small items, orient in fingertips and place on same size target; G/G+ target accuracy                               Therapeutic Exercise  Seated, completed PROM with LPS to R shoulder into sh flex/ abd    Place and hold exercises while seated, 5 reps into sh flex and sh abduction, 5 sec hold each trial     Completed PROM with LPS to R hand, abhishek into MCP and PIP flexion at D2 and D3 to improve ROM and achieve full fist.                 Testing  3/22:  Keyhole Peg Test:  L: 1 min 40 sec   R: 6 min      Kiara Finger Dexterity Test:   L: 2 min 49 sec   R: 7 min 7 sec         HEP  4/24: Instructed pt to use heating pad for 15 min on R hand and then complete stretching to R fingers into flexion to increase joint ROM.          Assessment: Tolerated treatment well; cont to demo improving dexterity and functional reaching with R dominant UE. Patient would benefit from continued skilled OT.    Plan: Continued skilled OT per POC    INTERVENTION COMMENTS:  Diagnosis: Spastic hemiparesis of right dominant side as late effect of cerebral infarction (HCC) [I69.351]  Precautions: R side weakness, R facial pain   Insurance: Payor: RD / Plan: AETNA PPO / Product Type: PPO /   18 of 30 visits through 12/31/24

## 2024-05-23 ENCOUNTER — PATIENT MESSAGE (OUTPATIENT)
Dept: NEUROSURGERY | Facility: CLINIC | Age: 64
End: 2024-05-23

## 2024-05-23 LAB
ALBUMIN SERPL-MCNC: 4.1 G/DL (ref 3.6–5.1)
ALBUMIN/GLOB SERPL: 1.8 (CALC) (ref 1–2.5)
ALP SERPL-CCNC: 91 U/L (ref 35–144)
ALT SERPL-CCNC: 24 U/L (ref 9–46)
AST SERPL-CCNC: 18 U/L (ref 10–35)
BILIRUB SERPL-MCNC: 0.3 MG/DL (ref 0.2–1.2)
BUN SERPL-MCNC: 52 MG/DL (ref 7–25)
BUN/CREAT SERPL: 18 (CALC) (ref 6–22)
CALCIUM SERPL-MCNC: 8.7 MG/DL (ref 8.6–10.3)
CHLORIDE SERPL-SCNC: 113 MMOL/L (ref 98–110)
CO2 SERPL-SCNC: 24 MMOL/L (ref 20–32)
CREAT SERPL-MCNC: 2.85 MG/DL (ref 0.7–1.35)
GFR/BSA.PRED SERPLBLD CYS-BASED-ARV: 24 ML/MIN/1.73M2
GLOBULIN SER CALC-MCNC: 2.3 G/DL (CALC) (ref 1.9–3.7)
GLUCOSE SERPL-MCNC: 96 MG/DL (ref 65–139)
POTASSIUM SERPL-SCNC: 4.6 MMOL/L (ref 3.5–5.3)
PROT SERPL-MCNC: 6.4 G/DL (ref 6.1–8.1)
SODIUM SERPL-SCNC: 146 MMOL/L (ref 135–146)

## 2024-05-24 ENCOUNTER — APPOINTMENT (OUTPATIENT)
Facility: CLINIC | Age: 64
End: 2024-05-24
Payer: COMMERCIAL

## 2024-05-24 ENCOUNTER — TELEPHONE (OUTPATIENT)
Dept: NEUROSURGERY | Facility: CLINIC | Age: 64
End: 2024-05-24

## 2024-05-24 DIAGNOSIS — G50.0 TRIGEMINAL NEURALGIA OF RIGHT SIDE OF FACE: ICD-10-CM

## 2024-05-24 DIAGNOSIS — I65.02 STENOSIS OF LEFT VERTEBRAL ARTERY: Primary | ICD-10-CM

## 2024-05-24 DIAGNOSIS — I77.74 VERTEBRAL ARTERY DISSECTION (HCC): ICD-10-CM

## 2024-05-24 DIAGNOSIS — I63.9 CEREBROVASCULAR ACCIDENT (CVA), UNSPECIFIED MECHANISM (HCC): ICD-10-CM

## 2024-05-24 NOTE — TELEPHONE ENCOUNTER
Received [employer] message regarding discontinue of Brilinta. Per documentation will need angio first. Placed order for angio as original was . Surgery coordinator to attempt scheduling.

## 2024-05-28 ENCOUNTER — TELEPHONE (OUTPATIENT)
Dept: NEUROLOGY | Facility: CLINIC | Age: 64
End: 2024-05-28

## 2024-05-28 ENCOUNTER — OFFICE VISIT (OUTPATIENT)
Facility: CLINIC | Age: 64
End: 2024-05-28
Payer: COMMERCIAL

## 2024-05-28 DIAGNOSIS — R79.89 ELEVATED SERUM CREATININE: Primary | ICD-10-CM

## 2024-05-28 DIAGNOSIS — N17.9 AKI (ACUTE KIDNEY INJURY) (HCC): ICD-10-CM

## 2024-05-28 DIAGNOSIS — I69.351 SPASTIC HEMIPARESIS OF RIGHT DOMINANT SIDE AS LATE EFFECT OF CEREBRAL INFARCTION (HCC): Primary | ICD-10-CM

## 2024-05-28 DIAGNOSIS — G89.29 CENTRAL SENSITIZATION TO PAIN: ICD-10-CM

## 2024-05-28 PROCEDURE — 97112 NEUROMUSCULAR REEDUCATION: CPT

## 2024-05-28 PROCEDURE — 97110 THERAPEUTIC EXERCISES: CPT

## 2024-05-28 RX ORDER — NALTREXONE HYDROCHLORIDE 50 MG/1
TABLET, FILM COATED ORAL
Qty: 30 TABLET | Refills: 2 | Status: SHIPPED | OUTPATIENT
Start: 2024-05-28

## 2024-05-28 NOTE — TELEPHONE ENCOUNTER
I see that patient's wife sent my chart message to me last night. It was never routed to me.   I will gladly order repeat CMP.   This is a dramatic jump in his creatinine.

## 2024-05-28 NOTE — TELEPHONE ENCOUNTER
Spoke with pt's wife, Ailin. She was already aware of the kidney function results. She did state that the phlebotomist asked pt if he was dehydrated when she ángel the blood. Ailin is requesting to ask if the CMP can be repeated as soon as possible. Routed to PCP to advise if this can be reordered by her. Ailin did also send a message through [facility] to Dr. Meza.

## 2024-05-28 NOTE — PROGRESS NOTES
Adjusting dose of low dose naltrexone, has noticed benefit. Will increase to 4mg and repeat thyroid studies at our next visit.    Ashley de Padua, MD  Physical Medicine and Rehabilitation

## 2024-05-28 NOTE — TELEPHONE ENCOUNTER
----- Message from Trey Del Real MD sent at 5/28/2024  8:56 AM EDT -----  Please call Jimmy.  The CMP looks pretty good, no issues with liver function or sodium level on his medication.  I am a little concerned that his kidney function was off.  That is really a bit of a jump from the last check 7 months ago.  It may just be a fluke or related to dehydration but this is something he should follow up with his PCP for any appropriate repeat testing and evaluation.  The Oxcarbazepine does not typically impair kidney function so I don't think that it is related to his medication.    Thanks!    Dr QUIGLEY

## 2024-05-28 NOTE — TELEPHONE ENCOUNTER
Pt stated she wanted to know about repeating CMP and that labs are on chart to get done as of today.

## 2024-05-28 NOTE — PROGRESS NOTES
Occupational Therapy Daily Note:    Today's date: 2024  Patient name: Jimmy Mello  : 1960  MRN: [id number]  Referring provider: Depadua, Ashley, MD  Dx:   Encounter Diagnosis   Name Primary?    Spastic hemiparesis of right dominant side as late effect of cerebral infarction (HCC) Yes         Subjective: no new complaints     Pain: moderate amount of pain in R side of face; no changes     Objective: Pt engaged in skilled OT treatment session with focus on UE NMR, UE strengthening, UE endurance, and FMC/GMC to increase engagement, endurance, tolerance, and independence with daily ADL and IADL tasks.     CPT Code Minutes                                           Task Details        Therapeutic Activity               Neuro Re-Ed  NMRE to RUE to improve GMC/FMC and in-hand manipulations:     -reach and retrieval: RUE reach to sh height shelf on R side to retrieve medium and small items, picking up with pincer, and translating into palm to hold in a full fist to transfer to center table. G functional reaching and in-hand manipulations     -RUE dexterity: manipulating medium and small items from palm to pincer and placing on same sized target; G target accuracy     -RUE reach into all planes of sh movement at end range to retrieve small items and stack onto medium sized objects; G functional reaching into end range of sh movement, G- precision of retrieval/ eye-hand coordination     -RUE OH reach to retrieve wooden dowels, re-orient in fingers to prepare for placement, and then threading thru small holes in stacked items. G accuracy with eye-hand coordination to thread                        Therapeutic Exercise  Seated, completed PROM with LPS to R shoulder into sh flex/ abd    Completed PROM with LPS to R hand, abhishek into MCP and PIP flexion at D2 and D3 to improve ROM and achieve full fist.                 Testing  3/22:  Keyhole Peg Test:  L: 1 min 40 sec   R: 6 min     O'Juanjose Finger Dexterity Test:   L:  2 min 49 sec   R: 7 min 7 sec         HEP  4/24: Instructed pt to use heating pad for 15 min on R hand and then complete stretching to R fingers into flexion to increase joint ROM.          Assessment: Tolerated treatment well; engaged in series of activities focusing on improving ROM at sh and hand, as well as FMC. Pt with improved full finger flexion by end of session. Patient would benefit from continued skilled OT.    Plan: Continued skilled OT per POC    INTERVENTION COMMENTS:  Diagnosis: Spastic hemiparesis of right dominant side as late effect of cerebral infarction (HCC) [I69.351]  Precautions: R side weakness, R facial pain   Insurance: Payor: RD / Plan: [employer] PPO / Product Type: PPO /   19 of 30 visits through 12/31/24

## 2024-05-29 ENCOUNTER — OFFICE VISIT (OUTPATIENT)
Facility: CLINIC | Age: 64
End: 2024-05-29
Payer: COMMERCIAL

## 2024-05-29 DIAGNOSIS — I69.351 SPASTIC HEMIPARESIS OF RIGHT DOMINANT SIDE AS LATE EFFECT OF CEREBRAL INFARCTION (HCC): Primary | ICD-10-CM

## 2024-05-29 PROCEDURE — 97112 NEUROMUSCULAR REEDUCATION: CPT

## 2024-05-29 NOTE — PROGRESS NOTES
Daily Note     Today's date: 2024  Patient name: Jimmy Mello  : 1960  MRN: [id number]  Referring provider: Depadua, Ashley, MD  Dx:   Encounter Diagnosis     ICD-10-CM    1. Spastic hemiparesis of right dominant side as late effect of cerebral infarction (HCC)  I69.351           Start Time: 0845  Stop Time: 0930  Total time in clinic (min): 45 minutes    Subjective: Dank reports nothing new. Botox potentially wearing off. Ankle pain has been controlled.       Objective: See treatment diary below      Assessment: Difficulty coordinating SLS opposing step tap, but better with repetition. Seems to be improving single limb stance on compliant surfaces, showing improved weight shift and stance stability. Better power generation on stairs, except occasional knee buckle but no need for PT intervention to maintain upright. Responds well to least amt of steps on RLE while walking fwd on step to gain hip ext + propulsion. Patient would benefit from continued PT      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      Precautions: Falls  Re-eval Date:  2024    Date    Visit Count 20 21 22 23   FOTO       Pain In       Pain Out               Testing       TUG 9.62 s      5xSTS 9.72 s      Gait speed SGSS: 0.82  Fast: 1.19      FGA 26/30      2/6MWT 1243 ft       Neuro Re-Ed       Dynamic balance Blaze pods 15ft lat step 1:30 FL 33 taps    Blaze pods + med hurdles fwd/bwd 1:30 FL 15 2#R foot    Jump squat 2x10 SOLO    2#R foot 2 laps ortho side Agility ladder skipping box 2#RLE on toe 12 laps fwd    20lb kb LUE hold 4 laps ortho side 2#RLE on toes    Lat step shuttle run 2#RLE on toes 2 laps TM 0.9mph 5min 1UE SOLO    Shuttle run fwd/bwd x3 SOLO    Lat step 7.5lb LLE 8 laps SOLO with ball toss Lat step foam beam + bolster carry 6 laps CGA    4' step agile step tap x20    Timed trials ortho side x4 FL 36.41    8' step + folded mat RLE walk 12 laps   Static balance       Obstacle course    Dual foam beams + uneven surface SOLO fwd/bwd 10 laps Dual foam beams + cone taps fwd/bwd 8 laps ea   Resisted ambulation Stair skipping step 1UE 2 flights x2    Fwd/bwd SOLO 3 laps ea 2#R foot red TB PT resisted Skipping step 1UE 2 flights x2 2#RLE on toes    Fwd/bwd SOLO 4 laps ea PT resisted  Skipping step 1UE 3 flights   Runner step up   6' step 2x15 RLE SLS                  Ther Ex                                                               Ther Activity       ADL              Gait Training       TM       Head turns       PLS AFO       Stairs       Modalities

## 2024-05-29 NOTE — TELEPHONE ENCOUNTER
Noted. Will call when results are back.   He is overdue for appt.   I see that he has a physical scheduled for 6/19/24

## 2024-05-30 ENCOUNTER — TELEPHONE (OUTPATIENT)
Dept: FAMILY MEDICINE CLINIC | Facility: CLINIC | Age: 64
End: 2024-05-30

## 2024-05-30 DIAGNOSIS — N28.9 ACUTE RENAL INSUFFICIENCY: Primary | ICD-10-CM

## 2024-05-30 LAB
ALBUMIN SERPL-MCNC: 4 G/DL (ref 3.6–5.1)
ALBUMIN/GLOB SERPL: 1.7 (CALC) (ref 1–2.5)
ALP SERPL-CCNC: 84 U/L (ref 35–144)
ALT SERPL-CCNC: 16 U/L (ref 9–46)
AST SERPL-CCNC: 15 U/L (ref 10–35)
BASOPHILS # BLD AUTO: 47 CELLS/UL (ref 0–200)
BASOPHILS NFR BLD AUTO: 0.6 %
BILIRUB SERPL-MCNC: 0.4 MG/DL (ref 0.2–1.2)
BUN SERPL-MCNC: 41 MG/DL (ref 7–25)
BUN/CREAT SERPL: 21 (CALC) (ref 6–22)
CALCIUM SERPL-MCNC: 8.7 MG/DL (ref 8.6–10.3)
CHLORIDE SERPL-SCNC: 101 MMOL/L (ref 98–110)
CHOLEST SERPL-MCNC: 151 MG/DL
CHOLEST/HDLC SERPL: 4.4 (CALC)
CO2 SERPL-SCNC: 28 MMOL/L (ref 20–32)
CREAT SERPL-MCNC: 1.93 MG/DL (ref 0.7–1.35)
EOSINOPHIL # BLD AUTO: 324 CELLS/UL (ref 15–500)
EOSINOPHIL NFR BLD AUTO: 4.1 %
ERYTHROCYTE [DISTWIDTH] IN BLOOD BY AUTOMATED COUNT: 12.9 % (ref 11–15)
GFR/BSA.PRED SERPLBLD CYS-BASED-ARV: 38 ML/MIN/1.73M2
GLOBULIN SER CALC-MCNC: 2.4 G/DL (CALC) (ref 1.9–3.7)
GLUCOSE SERPL-MCNC: 90 MG/DL (ref 65–99)
HCT VFR BLD AUTO: 34.1 % (ref 38.5–50)
HDLC SERPL-MCNC: 34 MG/DL
HGB BLD-MCNC: 11.3 G/DL (ref 13.2–17.1)
IRON SERPL-MCNC: 56 MCG/DL (ref 50–180)
LDLC SERPL CALC-MCNC: 93 MG/DL (CALC)
LYMPHOCYTES # BLD AUTO: 1074 CELLS/UL (ref 850–3900)
LYMPHOCYTES NFR BLD AUTO: 13.6 %
MCH RBC QN AUTO: 28.6 PG (ref 27–33)
MCHC RBC AUTO-ENTMCNC: 33.1 G/DL (ref 32–36)
MCV RBC AUTO: 86.3 FL (ref 80–100)
MONOCYTES # BLD AUTO: 490 CELLS/UL (ref 200–950)
MONOCYTES NFR BLD AUTO: 6.2 %
NEUTROPHILS # BLD AUTO: 5965 CELLS/UL (ref 1500–7800)
NEUTROPHILS NFR BLD AUTO: 75.5 %
NONHDLC SERPL-MCNC: 117 MG/DL (CALC)
PLATELET # BLD AUTO: 217 THOUSAND/UL (ref 140–400)
PMV BLD REES-ECKER: 10.4 FL (ref 7.5–12.5)
POTASSIUM SERPL-SCNC: 4.4 MMOL/L (ref 3.5–5.3)
PROT SERPL-MCNC: 6.4 G/DL (ref 6.1–8.1)
RBC # BLD AUTO: 3.95 MILLION/UL (ref 4.2–5.8)
SODIUM SERPL-SCNC: 138 MMOL/L (ref 135–146)
TRIGL SERPL-MCNC: 142 MG/DL
WBC # BLD AUTO: 7.9 THOUSAND/UL (ref 3.8–10.8)

## 2024-05-30 NOTE — TELEPHONE ENCOUNTER
Patient wife verbalized understanding of results-will schedule the US, states that patient does not take aleve or advil, wife informed patient that she is the water police from now on as this is problem the hydration. Wife aware to complete bloodwork before appt and we confirmed that the [facility] was [facility]    ----- Message from Lupis Meza DO sent at 5/30/2024  8:01 AM EDT -----  Call patient.   Received lab results. His kidney function is improved when compared to last week but still abnormal/not back down to his baseline.   Avoid all nephrotoxic agents such as advil and aleve and stay hydrated.   Will check renal US.   Repeat bmp prior to visit on the 16th.

## 2024-05-31 ENCOUNTER — OFFICE VISIT (OUTPATIENT)
Dept: NEUROLOGY | Facility: CLINIC | Age: 64
End: 2024-05-31
Payer: COMMERCIAL

## 2024-05-31 ENCOUNTER — OFFICE VISIT (OUTPATIENT)
Facility: CLINIC | Age: 64
End: 2024-05-31
Payer: COMMERCIAL

## 2024-05-31 ENCOUNTER — HOSPITAL ENCOUNTER (OUTPATIENT)
Dept: RADIOLOGY | Facility: HOSPITAL | Age: 64
Discharge: HOME | End: 2024-05-31
Attending: FAMILY MEDICINE
Payer: COMMERCIAL

## 2024-05-31 VITALS
WEIGHT: 173 LBS | DIASTOLIC BLOOD PRESSURE: 68 MMHG | HEART RATE: 89 BPM | SYSTOLIC BLOOD PRESSURE: 140 MMHG | OXYGEN SATURATION: 99 % | HEIGHT: 69 IN | BODY MASS INDEX: 25.62 KG/M2 | TEMPERATURE: 97.3 F

## 2024-05-31 DIAGNOSIS — G89.0 CENTRAL PAIN SYNDROME: ICD-10-CM

## 2024-05-31 DIAGNOSIS — I65.02 STENOSIS OF LEFT VERTEBRAL ARTERY: ICD-10-CM

## 2024-05-31 DIAGNOSIS — I69.351 SPASTIC HEMIPARESIS OF RIGHT DOMINANT SIDE AS LATE EFFECT OF CEREBRAL INFARCTION (HCC): Primary | ICD-10-CM

## 2024-05-31 DIAGNOSIS — G89.29 CENTRAL SENSITIZATION TO PAIN: ICD-10-CM

## 2024-05-31 DIAGNOSIS — I10 PRIMARY HYPERTENSION: ICD-10-CM

## 2024-05-31 DIAGNOSIS — N28.9 ACUTE RENAL INSUFFICIENCY: ICD-10-CM

## 2024-05-31 DIAGNOSIS — Z86.73 HISTORY OF STROKE: Primary | ICD-10-CM

## 2024-05-31 DIAGNOSIS — G50.0 TRIGEMINAL NEURALGIA: ICD-10-CM

## 2024-05-31 DIAGNOSIS — I77.74 VERTEBRAL ARTERY DISSECTION (HCC): ICD-10-CM

## 2024-05-31 DIAGNOSIS — R51.9 RIGHT FACIAL PAIN: ICD-10-CM

## 2024-05-31 PROCEDURE — 97112 NEUROMUSCULAR REEDUCATION: CPT

## 2024-05-31 PROCEDURE — 76770 US EXAM ABDO BACK WALL COMP: CPT

## 2024-05-31 PROCEDURE — 99214 OFFICE O/P EST MOD 30 MIN: CPT | Performed by: PSYCHIATRY & NEUROLOGY

## 2024-05-31 RX ORDER — OXCARBAZEPINE 600 MG/1
600 TABLET, FILM COATED ORAL 2 TIMES DAILY
Qty: 180 TABLET | Refills: 1 | Status: SHIPPED | OUTPATIENT
Start: 2024-05-31

## 2024-05-31 NOTE — PROGRESS NOTES
PT Re-Evaluation /Progress note    Today's date: 2024  Patient name: Jimmy Mello  : 1960  MRN: [id number]  Referring provider: Depadua, Ashley, MD  Dx:   Encounter Diagnosis     ICD-10-CM    1. Spastic hemiparesis of right dominant side as late effect of cerebral infarction (HCC)  I69.351               Start Time: 08  Stop Time: 0845  Total time in clinic (min): 44 minutes    Assessment  Impairments: abnormal coordination, abnormal gait, abnormal muscle tone, abnormal or restricted ROM, abnormal movement, activity intolerance, impaired balance, impaired physical strength, lacks appropriate home exercise program, safety issue and weight-bearing intolerance    Assessment details: Patient is a 63 y.o. year old male presenting to OPPT s/p diagnosis of chronic CVA. Pt is familiar to PT from prior episode due to his CVA.  Dank continues to show improvement in his overall endurance, balance, and strength. He demonstrates improved power per 30s chair stand with 4 additional stands. 6MWT shows improved endurance at 1350ft with no AD, occasional R foot scuffing due to building fatigue. FGA is +1 at . He is showing improved postural control as well as increased gait speed and stance stability. He will continue to benefit from skilled PT to address his PT goals and for AFO training as he has just received a [facility] AFO. New goals made for updated poc.      Patient demonstrates decreased strength, endurance, balance, and functional independence.  Pt ambulates at gait speed of 0.86 m/s with no AD and for a faster gait speed of 1.0 m/s with no AD, classifying them as an unlimited community ambulator. 30s chair stand, able to complete 14 stands, 5xSTS shows adequate power generation. 6MWT reveals reduced endurance, 1300 ft, against age matched norms, but shows improvement since last re-evaluation. FGA shows they are no longer at risk for falls scoring, . General instability with obstacle negotiation, head  movement, and narrow base of support. Pt is progressing towards his goals with increase in endurance, strength, and balance. Discussed trialing AFO due to issues with foot clearance, more so with fatigue, pt is in agreement. He will benefit from skilled PT interventions to maximize return to PLOF and independence as able.  Thank you for this referral and the ability to participate in the care of this patient.       Prognosis: good    Goals  In 4 weeks, patient will:  1. Improve 6MWT by additional 60ft to meet MCID value for persons post stroke to demonstrate improved community mobility.  2. Demonstrate improved gait and balance performance with use of R PLS AFO as measured by     In 8 weeks, patient will:  1.  Improve fast gait speed by at least 0.14 m/s to improve safety crossing crosswalk (1.2m/s)  2.  Improve ABC by at least 10 points to show improved balance confidence.-  3.  Improve gait speed by at least 0.16 m/s to meet MCID value for persons with CVA. - Progressing  4.  Improve TUGM by at least 2 seconds to show improved motor dual task balance.        Plan  Patient would benefit from: skilled physical therapy    Planned therapy interventions: neuromuscular re-education, manual therapy, patient education, home exercise program, therapeutic exercise, therapeutic activities and gait training    Frequency: 2x week  Duration in weeks: 12  Plan of Care beginning date: 5/31/2024  Plan of Care expiration date: 8/1/2024  Treatment plan discussed with: patient        Subjective Evaluation    History of Present Illness  Mechanism of injury: 5/31: Dank feels like PT is helping. He notcies improved balance and endurance. Has been doing more relating to work and staying busy. Recently got AFO.     Present in PT: reports been doing quite well since not in PT. No falls. Using a recumbent bike daily or every other day. Wants to improve his walking speed more and improve his strength. Ankle still locking up, wears boot at  night feels like it helps. He feels like he is progressing well and wants to continue working towards his walking speed as he feels that will also help with his balance. Working as he is able and visiting [employer]. No ad in home or outside of home.  Patient Goals  Patient goals for therapy: improved balance, increased strength and increased motion    Pain  No pain reported      Diagnostic Tests  No diagnostic tests performed  Treatments  Previous treatment: physical therapy, occupational therapy and speech therapy        Objective    Balance Test 2/28 4/3 5/31   6 Minute Walk Test (ft): 1118ft 1300 1350ft   FGA:     Gait Speed (m/s): SSGS: 0.86m/s  Fast: 1.00   Fast: 1.00 m/s  SSGS: 0.94 m/s  Fast: 1.06 m/s   5x Sit To Stand (s): 11.06 10.22 06.88   30s chair stand 13 14 18   ABC: 78% 77%    TU.73s  10.66 08.98 / TUGM 09.86         MCTSIB Number of Seconds   Feet Together, Eyes Open 30   Feet Together, Eyes Closed 30   Feet Together, Eyes Open Foam 30   Feet Together, Eyes Closed Foam 7 1st try, 30 2nd try         Muscle Tone Left Right   Modified Aguilar Scale     Hamstring  1+   Gastroc  8 beats of clonus   Quad         Manual Muscle Testing - Hip Left Right   Flexion 5 4   Extension 5 4-   Abduction 5 4-   External Rotation       Manual Muscle Testing - Knee Left Right   Flexion     Extension 5 5     Manual Muscle Testing - Ankle Left Right   Doriflexion 5 3-   Plantarflexion 5 5        Transfers    Sit To Stand Independent   W/C To Bed    Sit To Supine    Roll          Gait Assessment: Slight steppage and circumduction to assist for clearance on R side due to inadequate DF. Potentially inadequate knee ext in mid & terminal stance, difficult to assess with long pants on. Inadequate knee ext in terminal swing/IC.    : PLS AFO donned. Better foot clearance on R with AFO. Still inadequate knee ext in mid/term swing. [place] R stance with lacking full hip ext in term stance.          Precautions: Falls  Re-eval Date:  7/1/2024    Date 5/17 5/21 5/22 5/29 5/31   Visit Count 20 21 22 23 24   FOTO        Pain In        Pain Out                Testing        TUG 9.62 s       5xSTS 9.72 s       Gait speed SGSS: 0.82  Fast: 1.19       FGA 26/30       2/6MWT 1243 ft        Neuro Re-Ed     6MWT, FGA, 10mWT, 5xSTS/30s chair stand, TUG TUGM   Dynamic balance Blaze pods 15ft lat step 1:30 OR 33 taps    Blaze pods + med hurdles fwd/bwd 1:30 OR 15 2#R foot    Jump squat 2x10 SOLO    2#R foot 2 laps ortho side Agility ladder skipping box 2#RLE on toe 12 laps fwd    20lb kb LUE hold 4 laps ortho side 2#RLE on toes    Lat step shuttle run 2#RLE on toes 2 laps TM 0.9mph 5min 1UE SOLO    Shuttle run fwd/bwd x3 SOLO    Lat step 7.5lb LLE 8 laps SOLO with ball toss Lat step foam beam + bolster carry 6 laps CGA    4' step agile step tap x20    Timed trials ortho side x4 OR 36.41    8' step + folded mat RLE walk 12 laps 4lb bolster hold lat step foam 8 laps CGA   Static balance        Obstacle course   Dual foam beams + uneven surface SOLO fwd/bwd 10 laps Dual foam beams + cone taps fwd/bwd 8 laps ea    Resisted ambulation Stair skipping step 1UE 2 flights x2    Fwd/bwd SOLO 3 laps ea 2#R foot red TB PT resisted Skipping step 1UE 2 flights x2 2#RLE on toes    Fwd/bwd SOLO 4 laps ea PT resisted  Skipping step 1UE 3 flights    Runner step up   6' step 2x15 RLE SLS     Plyometric     35lb 2x20 on leg press           Ther Ex                                                                        Ther Activity        ADL                Gait Training        TM        Head turns        PLS AFO        Stairs        Modalities

## 2024-05-31 NOTE — PROGRESS NOTES
Patient ID: Jimmy Mello is a 63 y.o. male.    Assessment/Plan:   Patient Instructions   History of stroke: Dank presents for a follow-up evaluation with regard to a remote history of stroke and bilateral vertebral artery stenosis status post stenting.  He is maintained on dual antiplatelet therapy and Lipitor at this time.  He did not endorse new strokelike symptoms.  -For ongoing stroke prevention he should continue his combination of aspirin, Brilinta, Lipitor, and appropriate blood pressure and glycemic control  -We will defer to his primary care team for monitoring of his cholesterol panel and blood sugar numbers but would recommend targeting an LDL cholesterol of less than 70 and hemoglobin A1c of less than 7%  -He should occasionally check his blood pressure away from the doctor's office targeting numbers less than 130/80 most of the time.  If the numbers are frequently higher he can work with his primary care team  -He should continue to work with the physical medicine and rehab team with regard to treatment of his spasticity.  -He can talk with the physical medicine and rehab team to see if they agree he would be a reasonable candidate for the Vivistim program    Atypical facial pain: He continues to experience relatively constant right-sided facial pain with flareups up to 3 times per day.  He did see the neurosurgery group down at [place] and they did not feel that he was a good candidate for an interventional procedure.  For the time being while he remains on dual antiplatelet therapy and interventional procedure would be at somewhat higher risk  -For the moment he should continue his current combination of baclofen and Lyrica as well as naltrexone at his currently prescribed doses  -We will increase oxcarbazepine to 600 mg twice per day.  If he notices no side effects and no benefit we can continue to increase.  If he notices side effects he can immediately return to his current dose which is actually 1.5  tablets or 375 mg twice per day.  Either way he should let us know in no more than 2 weeks how he is doing  -I will write a prescription for a comprehensive metabolic panel as well as a CBC to be done in approximately 2 weeks at the same time that he will be getting blood work from his primary care team  -Depending on the results we can continue to adjust oxcarbazepine or could consider working with adjustments of Lyrica or baclofen for adding a novel agent to try and assist with the facial pain    I agree with the neurosurgery plan to perform angiography to evaluate the condition of his vertebral artery stents and if his vertebral artery stents are amenable it would be reasonable from my standpoint to transition to antiplatelet monotherapy at the discretion of the neurosurgery group    He will return to the office to see me directly in 4 months but I would be happy to see him sooner if the need should arise and he will contact me in 2 weeks to let me know how he is doing with the change in medicine.  If he were to have strokelike symptoms such as sudden painless loss of vision or double vision, difficulty speaking or swallowing, vertigo/room spinning that does not quickly resolve, or new weakness/numbness/loss of coordination affecting 1 side of the face or body he should proceed by ambulance to the nearest emergency room immediately.  On the dual antiplatelet therapy if he were to suddenly develop the worst headache of his life he should likewise proceed by ambulance to the nearest emergency room.       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  He reports no new stroke like symptoms and is taking his medications as prescribed. He continues to experience facial pain.  When considering possible interventional procedures, he would likely need to hold his antiplatelet meds which would increase the risk for stroke short term, or to stay on them and have an increased risk of hemorrhagic complication.    We discussed options for treatment of the pain/discomfort and will start with adjustment and optimization of what he is already taking.    We had a discussion regarding Vivistim as he does have dysfunction of the upper extremity due to stroke.       Objective:    Blood pressure 140/68, pulse 89, temperature (!) 97.3 °F (36.3 °C), temperature source Temporal, height 5' 9\" (1.753 m), weight 78.5 kg (173 lb), SpO2 99%.    Physical Exam    Neurological Exam    At the time of my exam he was awake and alert and brightly interactive.  He has persistent changes related to his prior stroke but no clear new focal findings.    Review of Systems   Constitutional:  Negative for appetite change, fatigue and fever.   HENT: Negative.  Negative for hearing loss, tinnitus, trouble swallowing and voice change.    Eyes: Negative.  Negative for photophobia, pain and visual disturbance.   Respiratory: Negative.  Negative for shortness of breath.    Cardiovascular: Negative.  Negative for palpitations.   Gastrointestinal: Negative.  Negative for nausea and vomiting.   Endocrine: Negative.  Negative for cold intolerance.   Genitourinary: Negative.  Negative for dysuria, frequency and urgency.   Musculoskeletal:  Positive for gait problem (new brace for R " leg/toe drag). Negative for back pain, myalgias, neck pain and neck stiffness.   Skin: Negative.  Negative for rash.   Allergic/Immunologic: Negative.    Neurological:  Negative for dizziness, tremors, seizures, syncope, facial asymmetry, speech difficulty, weakness, light-headedness, numbness and headaches.   Hematological: Negative.  Does not bruise/bleed easily.   Psychiatric/Behavioral: Negative.  Negative for confusion, hallucinations and sleep disturbance.    All other systems reviewed and are negative.

## 2024-05-31 NOTE — PATIENT INSTRUCTIONS
History of stroke: Dank presents for a follow-up evaluation with regard to a remote history of stroke and bilateral vertebral artery stenosis status post stenting.  He is maintained on dual antiplatelet therapy and Lipitor at this time.  He did not endorse new strokelike symptoms.  -For ongoing stroke prevention he should continue his combination of aspirin, Brilinta, Lipitor, and appropriate blood pressure and glycemic control  -We will defer to his primary care team for monitoring of his cholesterol panel and blood sugar numbers but would recommend targeting an LDL cholesterol of less than 70 and hemoglobin A1c of less than 7%  -He should occasionally check his blood pressure away from the doctor's office targeting numbers less than 130/80 most of the time.  If the numbers are frequently higher he can work with his primary care team  -He should continue to work with the physical medicine and rehab team with regard to treatment of his spasticity.  -He can talk with the physical medicine and rehab team to see if they agree he would be a reasonable candidate for the [facility] program    Atypical facial pain: He continues to experience relatively constant right-sided facial pain with flareups up to 3 times per day.  He did see the neurosurgery group down at [place] and they did not feel that he was a good candidate for an interventional procedure.  For the time being while he remains on dual antiplatelet therapy and interventional procedure would be at somewhat higher risk  -For the moment he should continue his current combination of baclofen and Lyrica as well as naltrexone at his currently prescribed doses  -We will increase oxcarbazepine to 600 mg twice per day.  If he notices no side effects and no benefit we can continue to increase.  If he notices side effects he can immediately return to his current dose which is actually 1.5 tablets or 375 mg twice per day.  Either way he should let us know in no more than 2 weeks  how he is doing  -I will write a prescription for a comprehensive metabolic panel as well as a CBC to be done in approximately 2 weeks at the same time that he will be getting blood work from his primary care team  -Depending on the results we can continue to adjust oxcarbazepine or could consider working with adjustments of Lyrica or baclofen for adding a novel agent to try and assist with the facial pain    I agree with the neurosurgery plan to perform angiography to evaluate the condition of his vertebral artery stents and if his vertebral artery stents are amenable it would be reasonable from my standpoint to transition to antiplatelet monotherapy at the discretion of the neurosurgery group    He will return to the office to see me directly in 4 months but I would be happy to see him sooner if the need should arise and he will contact me in 2 weeks to let me know how he is doing with the change in medicine.  If he were to have strokelike symptoms such as sudden painless loss of vision or double vision, difficulty speaking or swallowing, vertigo/room spinning that does not quickly resolve, or new weakness/numbness/loss of coordination affecting 1 side of the face or body he should proceed by ambulance to the nearest emergency room immediately.  On the dual antiplatelet therapy if he were to suddenly develop the worst headache of his life he should likewise proceed by ambulance to the nearest emergency room.

## 2024-06-05 ENCOUNTER — OFFICE VISIT (OUTPATIENT)
Facility: CLINIC | Age: 64
End: 2024-06-05
Payer: COMMERCIAL

## 2024-06-05 DIAGNOSIS — I69.351 SPASTIC HEMIPARESIS OF RIGHT DOMINANT SIDE AS LATE EFFECT OF CEREBRAL INFARCTION (HCC): Primary | ICD-10-CM

## 2024-06-05 PROCEDURE — 97112 NEUROMUSCULAR REEDUCATION: CPT

## 2024-06-05 NOTE — PROGRESS NOTES
"Daily Note     Today's date: 2024  Patient name: Jimmy Mello  : 1960  MRN: [id number]  Referring provider: Depadua, Ashley, MD  Dx:   Encounter Diagnosis     ICD-10-CM    1. Spastic hemiparesis of right dominant side as late effect of cerebral infarction (HCC)  I69.351           Start Time: 852  Stop Time: 933  Total time in clinic (min): 41 minutes    Subjective: Dank reports his neurology appt went well.      Objective: See treatment diary below      Assessment:  Dank shows good progress with his postural stability and single limb stance, however, still demonstrates instability and weakness on RLE. With fatigue instability is more present. Occasional knee buckle during stairs when skipping step due to lack of coordinating muscle contraction. Appropriate postural positioning against heavier resisted walking with fair stability, imbalance especially present on walking backward with resistance. Patient would benefit from continued PT to improve gait, strength, and balance.      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 [employer] step length.     Precautions: Falls  Re-eval Date:  2024    Date    Visit Count 25 21 22 23 24   FOTO        Pain In        Pain Out                Testing        TUG 9.62 s       5xSTS 9.72 s       Gait speed SGSS: 0.82  Fast: 1.19       FGA        2/6MWT 1243 ft        Neuro Re-Ed        Dynamic balance Foam beam fwd dot tap 12 laps CGA    HT 20ft x6 fwd/bwd ea            Static balance Foam FT EC 30\" x1    Bosu step on/off RLE x20        Obstacle course        Resisted ambulation Stair skipping step 1UE 2 flights x2 weighted vest    66lb x15 fwd/bwd cbl machine weighted vest       Runner step up        Plyometric 2x10 jumping CGA               Ther Ex                                                                        Ther Activity        ADL                Gait Training        TM        Head turns        PLS AFO        Stairs " Stairs no UE 2 flights x1 weighted vest CGA       Modalities

## 2024-06-07 ENCOUNTER — OFFICE VISIT (OUTPATIENT)
Facility: CLINIC | Age: 64
End: 2024-06-07
Payer: COMMERCIAL

## 2024-06-07 DIAGNOSIS — I69.351 SPASTIC HEMIPARESIS OF RIGHT DOMINANT SIDE AS LATE EFFECT OF CEREBRAL INFARCTION (HCC): Primary | ICD-10-CM

## 2024-06-07 PROCEDURE — 97112 NEUROMUSCULAR REEDUCATION: CPT

## 2024-06-07 NOTE — PROGRESS NOTES
"Daily Note     Today's date: 2024  Patient name: Jimmy Mello  : 1960  MRN: [id number]  Referring provider: Depadua, Ashley, MD  Dx:   Encounter Diagnosis     ICD-10-CM    1. Spastic hemiparesis of right dominant side as late effect of cerebral infarction (HCC)  I69.351             Start Time: 847  Stop Time: 925  Total time in clinic (min): 38 minutes    Subjective: Dank reports he felt tired after last session but no pain in RLE.       Objective: See treatment diary below      Assessment:  Trialed more plyometric work with good tolerance. As he fatigues less RLE weight bearing evident and further instability. No knee buckling on stairs potentially indicating increasing RLE strength.Uneven surfaces without gaze at feet he shows slower more unsteady gait with scuffing RLE. Patient would benefit from continued PT to improve gait, strength, and balance.      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 [employer] step length.     Precautions: Falls  Re-eval Date:  2024    Date       Visit Count 25 26      FOTO        Pain In        Pain Out                Testing        TUG 9.62 s       5xSTS 9.72 s       Gait speed SGSS: 0.82  Fast: 1.19       FGA /       2/6MWT 1243 ft        Neuro Re-Ed        Dynamic balance Foam beam fwd dot tap 12 laps CGA    HT 20ft x6 fwd/bwd ea      Lat step mirror PT 30\" x2 SOLO    4' step bounding x20 SOLO      Static balance Foam FT EC 30\" x1    Bosu step on/off RLE x20        Obstacle course  Uneven surface HT SOLO 12 laps       Resisted ambulation Stair skipping step 1UE 2 flights x2 weighted vest    66lb x15 fwd/bwd cbl machine weighted vest Stair skipping step 1UE 3 flights x1       Runner step up        Plyometric 2x10 jumping CGA Pilates board x30    SOLO trek poles bunny hopping 6 laps              Ther Ex                                                                        Ther Activity        ADL                Gait Training        TM      "   Head turns        PLS AFO        Stairs Stairs no UE 2 flights x1 weighted vest CGA       Modalities

## 2024-06-10 ENCOUNTER — TELEPHONE (OUTPATIENT)
Dept: FAMILY MEDICINE CLINIC | Facility: CLINIC | Age: 64
End: 2024-06-10

## 2024-06-10 ENCOUNTER — OFFICE VISIT (OUTPATIENT)
Facility: CLINIC | Age: 64
End: 2024-06-10
Payer: COMMERCIAL

## 2024-06-10 DIAGNOSIS — I69.351 SPASTIC HEMIPARESIS OF RIGHT DOMINANT SIDE AS LATE EFFECT OF CEREBRAL INFARCTION (HCC): Primary | ICD-10-CM

## 2024-06-10 PROCEDURE — 97110 THERAPEUTIC EXERCISES: CPT

## 2024-06-10 PROCEDURE — 97112 NEUROMUSCULAR REEDUCATION: CPT

## 2024-06-10 NOTE — PROGRESS NOTES
Occupational Therapy Daily Note:    Today's date: 6/10/2024  Patient name: Jimmy Mello  : 1960  MRN: [id number]  Referring provider: Depadua, Ashley, MD  Dx:   Encounter Diagnosis   Name Primary?    Spastic hemiparesis of right dominant side as late effect of cerebral infarction (HCC) Yes         Subjective: no new complaints     Pain: moderate amount of pain in R side of face; no changes     Objective: Pt engaged in skilled OT treatment session with focus on UE NMR, UE strengthening, UE endurance, and FMC/GMC to increase engagement, endurance, tolerance, and independence with daily ADL and IADL tasks.     CPT Code Minutes                                           Task Details        Therapeutic Activity               Neuro Re-Ed  NMRE to RUE to improve GMC/FMC and in-hand manipulations:     -In-hand manipulations: able to make a full fist around small objects with min manual cues for full flexion at D2, D3; then able to translate small items into pincer, orient for placement and place on small target    -reach and retrieval: RUE reach to various targets at sh height for item retrieval, focus on functional reach and normalizing motor patterns; min manual cues for full supination and elbow extension to reach target and retrieve small item to transfer to small target on tabletop.     -in-hand manipulations: able to advance small object from palm to fingertip, then translate from first finger across fingertips to little finger, back again into pincer and then placement on small target.     -R hand alternating pincer to  small items from tabletop surface and transfer to various targets under sh height                   Therapeutic Exercise  Completed PROM with LPS to R hand, abhishek into MCP and PIP flexion at D2 and D3 to improve ROM and achieve full fist.         Completed series of isolated finger movements into flex/ ex to target, 5 reps each direction with and then without slider assist.          Testing  3/22:  Keyhole Peg Test:  L: 1 min 40 sec   R: 6 min     O'Juanjose Finger Dexterity Test:   L: 2 min 49 sec   R: 7 min 7 sec         HEP  4/24: Instructed pt to use heating pad for 15 min on R hand and then complete stretching to R fingers into flexion to increase joint ROM.          Assessment: Tolerated treatment well; completed with min difficulty with functional reaching; mod difficulty and increased time with in-hand manipulations; G target accuracy.  Patient would benefit from continued skilled OT.    Plan: Continued skilled OT per POC    INTERVENTION COMMENTS:  Diagnosis: Spastic hemiparesis of right dominant side as late effect of cerebral infarction (HCC) [I69.351]  Precautions: R side weakness, R facial pain   Insurance: Payor: [employer] / Plan: [employer] PPO / Product Type: PPO /   1 of 15 visits through 5/31/25

## 2024-06-10 NOTE — TELEPHONE ENCOUNTER
Wife aware, and verbalized understanding.     ----- Message from Lupis Meza DO sent at 6/10/2024  2:56 PM EDT -----  Let patient know that ultrasound of kidneys showed findings that are consistent with kidney disease.   I would like him to see the kidney specialist. Referral placed.

## 2024-06-12 ENCOUNTER — APPOINTMENT (OUTPATIENT)
Facility: CLINIC | Age: 64
End: 2024-06-12
Payer: COMMERCIAL

## 2024-06-13 ENCOUNTER — OFFICE VISIT (OUTPATIENT)
Facility: CLINIC | Age: 64
End: 2024-06-13
Payer: COMMERCIAL

## 2024-06-13 DIAGNOSIS — I10 PRIMARY HYPERTENSION: ICD-10-CM

## 2024-06-13 DIAGNOSIS — I69.351 SPASTIC HEMIPARESIS OF RIGHT DOMINANT SIDE AS LATE EFFECT OF CEREBRAL INFARCTION (HCC): Primary | ICD-10-CM

## 2024-06-13 PROCEDURE — 97110 THERAPEUTIC EXERCISES: CPT

## 2024-06-13 PROCEDURE — 97112 NEUROMUSCULAR REEDUCATION: CPT

## 2024-06-13 RX ORDER — HYDRALAZINE HYDROCHLORIDE 50 MG/1
TABLET, FILM COATED ORAL
Qty: 180 TABLET | Refills: 1 | Status: SHIPPED | OUTPATIENT
Start: 2024-06-13

## 2024-06-13 NOTE — PROGRESS NOTES
Occupational Therapy Daily Note:    Today's date: 2024  Patient name: Jimmy Mello  : 1960  MRN: [id number]  Referring provider: Depadua, Ashley, MD  Dx:   Encounter Diagnosis   Name Primary?    Spastic hemiparesis of right dominant side as late effect of cerebral infarction (HCC) Yes         Subjective: no new complaints       Objective: Pt engaged in skilled OT treatment session with focus on UE NMR, UE strengthening, UE endurance, and FMC/GMC to increase engagement, endurance, tolerance, and independence with daily ADL and IADL tasks.     CPT Code Minutes                                           Task Details        Therapeutic Activity               Neuro Re-Ed  NMRE to RUE to improve GMC/FMC and in-hand manipulations:     -OH retrieval of beanbags with gross grasp with transition to throw to wall target; G- full finger flexion during grasp of beanbag; G- target accuracy during throw; G functional reaching into all planes of sh movement.     -In-hand manipulations: able to translate medium sized object from palm to fingertips into functional pincer and place on medium sized target; min manual cues for full finger flexion when first receiving item in palm; min difficulty with dexterity to advance item to fingertips; G target accuracy during placement                   Therapeutic Exercise  Completed PROM with LPS to R hand, abhishek into MCP and PIP flexion at D2 and D3 to improve ROM and achieve full fist.      Seated, completed PROM with LPS to R shoulder into sh flex/ abd    Place and hold exercises while seated, 5 reps into sh flex and sh abduction, 5 sec hold each trial         Completed series of isolated finger movements focused on improving range and motor control at MCPs and PIPs; completed with min manual cues for end range of motion         Testing  3/22:  Keyhole Peg Test:  L: 1 min 40 sec   R: 6 min     O'Juanjose Finger Dexterity Test:   L: 2 min 49 sec   R: 7 min 7 sec         HEP  :  Instructed pt to use heating pad for 15 min on R hand and then complete stretching to R fingers into flexion to increase joint ROM.          Assessment: Tolerated treatment well; demo improving functional OH reach for item retrieval. Improving FMC with medium objects.  Patient would benefit from continued skilled OT.    Plan: Continued skilled OT per POC    INTERVENTION COMMENTS:  Diagnosis: Spastic hemiparesis of right dominant side as late effect of cerebral infarction (HCC) [I69.351]  Precautions: R side weakness, R facial pain   Insurance: Payor: RD / Plan: RD PPO / Product Type: PPO /   2 of 15 visits through 5/31/25

## 2024-06-14 ENCOUNTER — OFFICE VISIT (OUTPATIENT)
Facility: CLINIC | Age: 64
End: 2024-06-14
Payer: COMMERCIAL

## 2024-06-14 DIAGNOSIS — I69.351 SPASTIC HEMIPARESIS OF RIGHT DOMINANT SIDE AS LATE EFFECT OF CEREBRAL INFARCTION (HCC): Primary | ICD-10-CM

## 2024-06-14 PROCEDURE — 97112 NEUROMUSCULAR REEDUCATION: CPT

## 2024-06-14 PROCEDURE — 97116 GAIT TRAINING THERAPY: CPT

## 2024-06-14 NOTE — PROGRESS NOTES
"Daily Note     Today's date: 2024  Patient name: Jimmy Mello  : 1960  MRN: [id number]  Referring provider: Depadua, Ashley, MD  Dx:   Encounter Diagnosis     ICD-10-CM    1. Spastic hemiparesis of right dominant side as late effect of cerebral infarction (HCC)  I69.351             Start Time: 0801  Stop Time: 0845  Total time in clinic (min): 44 minutes    Subjective: Dank reports he is fatigued from working yesterday, general soreness. Arrives wearing R AFO.      Objective: See treatment diary below      Assessment:  More balance focus due to pt being sore from activity yesterday. Shows improved compliant surfaces with single limb stability. Without external cues of cone taps, seems to have harder time with single limb stance. Slight path deviation on stairs with slight postural instability into flexion without UE support. Patient would benefit from continued PT to improve gait, strength, and balance.      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 [employer] step length.     Precautions: Falls  Re-eval Date:  2024    Date      Visit Count 25 26 27     FOTO        Pain In        Pain Out                Testing        TUG 9.62 s       5xSTS 9.72 s       Gait speed SGSS: 0.82  Fast: 1.19       FGA 26/30       2/6MWT 1243 ft        Neuro Re-Ed        Dynamic balance Foam beam fwd dot tap 12 laps CGA    HT 20ft x6 fwd/bwd ea      Lat step mirror PT 30\" x2 SOLO    4' step bounding x20 SOLO Lat step foam beam 4 laps, 4 laps with cone tap    Bwd stepping 20ft x10 laps cues for fast as possible    Timed trials ortho side 3x1 KS: 39s     Static balance Foam FT EC 30\" x1    Bosu step on/off RLE x20   Foam FT HT 30\" x1    Foam FA HT EC 20\" x3     Obstacle course  Uneven surface HT SOLO 12 laps       Resisted ambulation Stair skipping step 1UE 2 flights x2 weighted vest    66lb x15 fwd/bwd cbl machine weighted vest Stair skipping step 1UE 3 flights x1       Runner step up   4' step + " foam x15 RLE     Plyometric 2x10 jumping CGA Pilates board x30    SOLO trek poles bunny hopping 6 laps              Ther Ex        Leg press   165 3x15                                                             Ther Activity        ADL                Gait Training        TM        Head turns        PLS AFO        Stairs Stairs no UE 2 flights x1 weighted vest CGA  Stairs no UE 3 flights x1     Modalities

## 2024-06-17 ENCOUNTER — OFFICE VISIT (OUTPATIENT)
Facility: CLINIC | Age: 64
End: 2024-06-17
Payer: COMMERCIAL

## 2024-06-17 DIAGNOSIS — I69.351 SPASTIC HEMIPARESIS OF RIGHT DOMINANT SIDE AS LATE EFFECT OF CEREBRAL INFARCTION (HCC): Primary | ICD-10-CM

## 2024-06-17 PROCEDURE — 97110 THERAPEUTIC EXERCISES: CPT

## 2024-06-17 PROCEDURE — 97112 NEUROMUSCULAR REEDUCATION: CPT

## 2024-06-17 NOTE — PROGRESS NOTES
Occupational Therapy Daily Note:    Today's date: 2024  Patient name: Jimmy Mello  : 1960  MRN: [id number]  Referring provider: Depadua, Ashley, MD  Dx:   Encounter Diagnosis   Name Primary?    Spastic hemiparesis of right dominant side as late effect of cerebral infarction (HCC) Yes         Subjective: no new complaints       Objective: Pt engaged in skilled OT treatment session with focus on UE NMR, UE strengthening, UE endurance, and FMC/GMC to increase engagement, endurance, tolerance, and independence with daily ADL and IADL tasks.     CPT Code Minutes                                           Task Details        Therapeutic Activity               Neuro Re-Ed  NMRE to RUE to improve GMC/FMC and in-hand manipulations:       OH reach to retrieve small items in supination with open palm, focused on gross grasp to retrieve object with min manual cues to achieve full finger flexion; then able to translate small item to fingertips and orient for placement into narrow mouthed container on tabletop surface    OH retrieval of small items with pronated forearm, focused on full finger flexion to maintain grasp on item during transfer; then translate item to fingertips and place in narrow mouthed container on tabletop    RUE pincer to  small items from tabletop surface, translate across fingertips to little finger and then back into pincer and then place into narrow mouthed container                   Therapeutic Exercise  Completed PROM with LPS to R hand into MCP and PIP flexion at D2 and D3 to improve ROM and achieve full fist.      Seated, completed PROM with LPS to R shoulder into sh flex/ abd with LPS at end range          Completed series of isolated finger movements focused on improving range and motor control at MCPs and PIPs; completed with min manual cues for end range of motion         Testing  3/22:  Keyhole Peg Test:  L: 1 min 40 sec   R: 6 min     O'Juanjose Finger Dexterity Test:   L: 2  min 49 sec   R: 7 min 7 sec         HEP  4/24: Instructed pt to use heating pad for 15 min on R hand and then complete stretching to R fingers into flexion to increase joint ROM.          Assessment: Tolerated treatment well; pt completed with G functional OH reach; G/G+ functional pincer and G+ target accuracy. Patient would benefit from continued skilled OT.    Plan: Continued skilled OT per POC    INTERVENTION COMMENTS:  Diagnosis: Spastic hemiparesis of right dominant side as late effect of cerebral infarction (HCC) [I69.351]  Precautions: R side weakness, R facial pain   Insurance: Payor: RD / Plan: [employer] PPO / Product Type: PPO /   3 of 15 visits through 5/31/25

## 2024-06-18 ENCOUNTER — APPOINTMENT (OUTPATIENT)
Facility: CLINIC | Age: 64
End: 2024-06-18
Payer: COMMERCIAL

## 2024-06-19 ENCOUNTER — OFFICE VISIT (OUTPATIENT)
Dept: FAMILY MEDICINE CLINIC | Facility: CLINIC | Age: 64
End: 2024-06-19
Payer: COMMERCIAL

## 2024-06-19 VITALS
SYSTOLIC BLOOD PRESSURE: 120 MMHG | BODY MASS INDEX: 28.73 KG/M2 | HEIGHT: 69 IN | TEMPERATURE: 97.9 F | WEIGHT: 194 LBS | HEART RATE: 69 BPM | DIASTOLIC BLOOD PRESSURE: 70 MMHG | OXYGEN SATURATION: 97 % | RESPIRATION RATE: 18 BRPM

## 2024-06-19 DIAGNOSIS — R51.9 RIGHT FACIAL PAIN: ICD-10-CM

## 2024-06-19 DIAGNOSIS — Z23 ENCOUNTER FOR IMMUNIZATION: ICD-10-CM

## 2024-06-19 DIAGNOSIS — D64.9 ANEMIA, UNSPECIFIED TYPE: ICD-10-CM

## 2024-06-19 DIAGNOSIS — N18.2 STAGE 2 CHRONIC KIDNEY DISEASE: ICD-10-CM

## 2024-06-19 DIAGNOSIS — Z86.73 HISTORY OF STROKE: ICD-10-CM

## 2024-06-19 DIAGNOSIS — Z12.5 SCREENING FOR PROSTATE CANCER: ICD-10-CM

## 2024-06-19 DIAGNOSIS — Z00.00 ANNUAL PHYSICAL EXAM: Primary | ICD-10-CM

## 2024-06-19 DIAGNOSIS — R73.01 IMPAIRED FASTING GLUCOSE: ICD-10-CM

## 2024-06-19 DIAGNOSIS — Z12.11 SCREENING FOR COLON CANCER: ICD-10-CM

## 2024-06-19 DIAGNOSIS — I69.351 SPASTIC HEMIPARESIS OF RIGHT DOMINANT SIDE AS LATE EFFECT OF CEREBRAL INFARCTION (HCC): ICD-10-CM

## 2024-06-19 DIAGNOSIS — I10 PRIMARY HYPERTENSION: ICD-10-CM

## 2024-06-19 PROBLEM — K59.2 NEUROGENIC BOWEL: Status: RESOLVED | Noted: 2023-03-08 | Resolved: 2024-06-19

## 2024-06-19 PROCEDURE — 90715 TDAP VACCINE 7 YRS/> IM: CPT

## 2024-06-19 PROCEDURE — 90471 IMMUNIZATION ADMIN: CPT

## 2024-06-19 PROCEDURE — 99396 PREV VISIT EST AGE 40-64: CPT | Performed by: FAMILY MEDICINE

## 2024-06-19 PROCEDURE — 99214 OFFICE O/P EST MOD 30 MIN: CPT | Performed by: FAMILY MEDICINE

## 2024-06-19 NOTE — ASSESSMENT & PLAN NOTE
"Lab Results   Component Value Date    EGFR 38 (L) 05/29/2024    EGFR 24 (L) 05/23/2024    EGFR 66 10/12/2023    CREATININE 1.93 (H) 05/29/2024    CREATININE 2.85 (H) 05/23/2024    CREATININE 1.23 10/12/2023   Patient recently had bump in his BUN and creatinine.   US was done and showed \"Mild post void residual bladder volume. Echogenic renal parenchyma consistent with medical renal disease.\".  No changes were made in his meds  He was referred to nephrology but has not scheduled anything.   Repeat labs done at [facility] yesterday showed BUN of 30 and Creatinine of 1.33 with GFR of 60  He is back to baseline  Recommend he continue to avoid nephrotoxic agents  Repeat labs 6 months.   "

## 2024-06-19 NOTE — ASSESSMENT & PLAN NOTE
Has tried multiple meds for this as well as medical marijuana and accupuncture.   Sees neurology  Currently on oxcarbazepine , lyrica and naltrexone. Still with pain. Naltrexone has taken the edge off.

## 2024-06-19 NOTE — PROGRESS NOTES
Adult Annual Physical  Name: Jimmy Mello      : 1960      MRN: [id number]  Encounter Provider: Lupis Meza DO  Encounter Date: 2024   Encounter department: [facility]    Assessment & Plan   1. Annual physical exam  Discussed diet and exercise. Patient has made drastic changes to diet after speaking with his accupuncturist. No longer eating sugar/carbs  Is due for colon cancer screening. He declines colonoscopy. Is agreeable to cologuard which was ordered today  PSA done in the fall and will order this to be done prior to next visit in 6 months.   Adacel ordered today  Due for shingrix but our office ran out. Can schedule this as nurse visit.   2. Screening for colon cancer  -     Cologuard  3. Right facial pain  Assessment & Plan:  Has tried multiple meds for this as well as medical marijuana and accupuncture.   Sees neurology  Currently on oxcarbazepine , lyrica and naltrexone. Still with pain. Naltrexone has taken the edge off.   4. History of stroke  Assessment & Plan:  Cerebellar infarct 23 (Multiple small age indeterminate infarctions within the right posterior cerebellum) in setting of right vertebral artery dissection (There is marked tapering and eventual occlusion of the distal V3 segment of the right vertebral artery suggestive of   Dissection). CTA at time of presentation also showed Severe focal stenosis at the origin of the left vertebral artery without calcific plaque.        s/p stenting on  with TICI 2b revascularization  Initially on triple therapy with ASA/brilinta/Eliquis and  Statin  Eliquis d/c'd 23. Remains on DAPT  Has appt with neurology next month. Will discuss brillinta with them.   5. Encounter for immunization  -     TDAP VACCINE GREATER THAN OR EQUAL TO 6YO IM  6. Stage 2 chronic kidney disease  Assessment & Plan:  Lab Results   Component Value Date    EGFR 38 (L) 2024    EGFR 24 (L) 2024    EGFR 66 10/12/2023     "CREATININE 1.93 (H) 05/29/2024    CREATININE 2.85 (H) 05/23/2024    CREATININE 1.23 10/12/2023   Patient recently had bump in his BUN and creatinine.   US was done and showed \"Mild post void residual bladder volume. Echogenic renal parenchyma consistent with medical renal disease.\".  No changes were made in his meds  He was referred to nephrology but has not scheduled anything.   Repeat labs done at [facility] yesterday showed BUN of 30 and Creatinine of 1.33 with GFR of 60  He is back to baseline  Recommend he continue to avoid nephrotoxic agents  Repeat labs 6 months.   7. Spastic hemiparesis of right dominant side as late effect of cerebral infarction (HCC)  Assessment & Plan:  Continues to do PT and OT.   Doing much better  Now ambulating without assistance.   Wearing brace for his right foot drop.   8. Primary hypertension  Assessment & Plan:  BP at goal on current medications  9. Anemia, unspecified type  Assessment & Plan:  CBC done yesterday. Hemoglobin was 12.8 and hematocrit was 40.1  Up from 11.3/43.1 last month.     Will continue to follow this.   Immunizations and preventive care screenings were discussed with patient today. Appropriate education was printed on patient's after visit summary.        Counseling:  Alcohol/drug use: discussed moderation in alcohol intake, the recommendations for healthy alcohol use, and avoidance of illicit drug use.  Dental Health: discussed importance of regular tooth brushing, flossing, and dental visits.  Injury prevention: discussed safety/seat belts, safety helmets, smoke detectors, carbon dioxide detectors, and smoking near bedding or upholstery.  Exercise: the importance of regular exercise/physical activity was discussed. Recommend exercise 3-5 times per week for at least 30 minutes.          History of Present Illness     Adult Annual Physical:  Patient presents for annual physical.     Diet and Physical Activity:  - Diet/Nutrition: well balanced diet. low carb  - " "Exercise: walking.    Depression Screening:  - PHQ-2 Score: 0    General Health:  - Sleep:. wakes frequently through the night due to his facial pain  - Hearing: normal hearing right ear and normal hearing left ear.  - Vision: no vision problems and wears glasses.  - Dental: no dental visits for > 1 year.     Health:    - Urinary symptoms: nocturia and urinary frequency.       Patient is a 63 year old male with hypertension, history of CVA with right sided hemiparesis, right facial pain, CKD2 here today for annual physical and f/u visit.     Had renal ultrasound done on 5/31/24 after labs revealed decline in his kidney function. US showed \"Mild post void residual bladder volume. Echogenic renal parenchyma consistent with medical renal disease.\". Was referred to see nephrology. Nothing has been scheduled. [facility] Results are pending. Will call for them today.     Has appt for f/u with neurology on 6/25/24.     Still doing PT and OT for his hemiparesis. Gaining strength. Walking without assistance. He is wearing brace for foot drop.     Still with facial pain. Most recently started revia.     Changed diet. Started avoiding sugar and bread.   Due for colon cancer screen. Declines colonoscopy. Agreeable to cologuard         Review of Systems  Medical History Reviewed by provider this encounter:  Tobacco  Allergies  Meds  Problems  Med Hx  Surg Hx  Fam Hx  Soc   Hx      Current Outpatient Medications on File Prior to Visit   Medication Sig Dispense Refill   • amLODIPine (NORVASC) 2.5 mg tablet TAKE 1 TABLET BY MOUTH EVERY DAY 90 tablet 2   • atorvastatin (LIPITOR) 40 mg tablet TAKE 1 TABLET BY MOUTH DAILY WITH DINNER 90 tablet 0   • baclofen 10 mg tablet Take 1 tablet (10 mg total) by mouth 4 (four) times a day TAKE 1 TABLET IN THE MORNING, 1 TABLET MIDDAY, AND 1.5 TABLETS AT NIGHT BY MOUTH. 120 tablet 2   • carvedilol (COREG) 25 mg tablet TAKE 1 TABLET BY MOUTH TWICE A DAY WITH " "FOOD 180 tablet 1   • [facility] Aspirin Adult Low Dose 81 MG chewable tablet CHEW 1 TABLET BY MOUTH DAILY 90 tablet 0   • [facility] Vitamin B-12 1000 MCG tablet TAKE 1 TABLET BY MOUTH EVERY DAY 90 tablet 0   • DULoxetine (CYMBALTA) 60 mg delayed release capsule TAKE 1 CAPSULE BY MOUTH EVERY DAY 90 capsule 2   • ferrous sulfate 325 (65 Fe) mg tablet TAKE 1 TABLET BY MOUTH EVERY DAY WITH BREAKFAST 90 tablet 0   • hydrALAZINE (APRESOLINE) 50 mg tablet TAKE 1 TABLET BY MOUTH EVERY 12 HOURS 180 tablet 1   • losartan (COZAAR) 100 MG tablet TAKE 1 TABLET BY MOUTH EVERY DAY 90 tablet 2   • naltrexone (REVIA) 50 mg tablet Please compound 4mg tablets to take by mouth QHS 30 tablet 2   • OXcarbazepine (TRILEPTAL) 600 mg tablet Take 1 tablet (600 mg total) by mouth 2 (two) times a day 180 tablet 1   • pregabalin (LYRICA) 50 mg capsule Take 1 capsule (50 mg total) by mouth 2 (two) times a day 180 capsule 1   • ticagrelor (Brilinta) 90 MG Take 1 tablet (90 mg total) by mouth every 12 (twelve) hours 60 tablet 0     No current facility-administered medications on file prior to visit.        Objective     /70 (BP Location: Left arm, Patient Position: Sitting, Cuff Size: Large)   Pulse 69   Temp 97.9 °F (36.6 °C) (Tympanic)   Resp 18   Ht 5' 9\" (1.753 m)   Wt 88 kg (194 lb)   SpO2 97%   BMI 28.65 kg/m²     Physical Exam  Vitals and nursing note reviewed.   Constitutional:       General: He is not in acute distress.     Appearance: Normal appearance. He is not ill-appearing, toxic-appearing or diaphoretic.   HENT:      Head: Normocephalic and atraumatic.      Right Ear: Tympanic membrane normal.      Left Ear: Tympanic membrane normal.      Nose: Nose normal.      Mouth/Throat:      Mouth: Mucous membranes are moist.      Pharynx: No posterior oropharyngeal erythema.   Eyes:      Extraocular Movements: Extraocular movements intact.      Conjunctiva/sclera: Conjunctivae normal.      Pupils: Pupils are equal, round, and reactive to " light.   Neck:      Vascular: No carotid bruit.   Cardiovascular:      Rate and Rhythm: Normal rate and regular rhythm.      Heart sounds: No murmur heard.  Pulmonary:      Effort: Pulmonary effort is normal.      Breath sounds: Normal breath sounds. Rhonchi: [id number].   Abdominal:      General: Abdomen is flat. Bowel sounds are normal.      Palpations: Abdomen is soft.   Musculoskeletal:      Cervical back: Normal range of motion and neck supple.      Right lower leg: No edema.      Left lower leg: No edema.   Lymphadenopathy:      Cervical: No cervical adenopathy.   Neurological:      Mental Status: He is alert and oriented to person, place, and time.      Comments: Right sided weakness upper and lower extremities   Administrative Statements

## 2024-06-19 NOTE — ASSESSMENT & PLAN NOTE
Continues to do PT and OT.   Doing much better  Now ambulating without assistance.   Wearing brace for his right foot drop.

## 2024-06-19 NOTE — ASSESSMENT & PLAN NOTE
CBC done yesterday. Hemoglobin was 12.8 and hematocrit was 40.1  Up from 11.3/43.1 last month.     Will continue to follow this.

## 2024-06-19 NOTE — ASSESSMENT & PLAN NOTE
Cerebellar infarct 2/25/23 (Multiple small age indeterminate infarctions within the right posterior cerebellum) in setting of right vertebral artery dissection (There is marked tapering and eventual occlusion of the distal V3 segment of the right vertebral artery suggestive of   Dissection). CTA at time of presentation also showed Severe focal stenosis at the origin of the left vertebral artery without calcific plaque.        s/p stenting on 2/26 with TICI 2b revascularization  Initially on triple therapy with ASA/brilinta/Eliquis and  Statin  Eliquis d/c'd 5/1/23. Remains on DAPT

## 2024-06-20 ENCOUNTER — OFFICE VISIT (OUTPATIENT)
Facility: CLINIC | Age: 64
End: 2024-06-20
Payer: COMMERCIAL

## 2024-06-20 DIAGNOSIS — I69.351 SPASTIC HEMIPARESIS OF RIGHT DOMINANT SIDE AS LATE EFFECT OF CEREBRAL INFARCTION (HCC): Primary | ICD-10-CM

## 2024-06-20 PROCEDURE — 97112 NEUROMUSCULAR REEDUCATION: CPT

## 2024-06-20 NOTE — PROGRESS NOTES
"Daily Note     Today's date: 2024  Patient name: Jimmy Mello  : 1960  MRN: [id number]  Referring provider: Depadua, Ashley, MD  Dx:   Encounter Diagnosis     ICD-10-CM    1. Spastic hemiparesis of right dominant side as late effect of cerebral infarction (HCC)  I69.351                        Subjective: Dank notes nothing new since last visit, has been busy. PCP appt went well.      Objective: See treatment diary below      Assessment:  Shows improved power generation with single limb stance skipping step and bounding, with fatigue less clearance observed from jump. Shows good balance with cognitive dual tasking, with some stoppages of gait with more complex dual task but remains steady.Tolerates weighted vest well with timed trials and faster pace with vs without. Patient would benefit from continued PT to improve gait, strength, and balance.      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 [employer] step length.     Precautions: Falls  Re-eval Date:  2024    Date     Visit Count 25 26 27 28    FOTO        Pain In        Pain Out                Testing        TUG 9.62 s       5xSTS 9.72 s       Gait speed SGSS: 0.82  Fast: 1.19       FGA 26/30       2/6MWT 1243 ft        Neuro Re-Ed        Dynamic balance Foam beam fwd dot tap 12 laps CGA    HT 20ft x6 fwd/bwd ea      Lat step mirror PT 30\" x2 SOLO    4' step bounding x20 SOLO Lat step foam beam 4 laps, 4 laps with cone tap    Bwd stepping 20ft x10 laps cues for fast as possible    Timed trials ortho side 3x1 MD: 39s SL Bounding SOLO dots for visual cues 2x10    Timed trials ortho side weighted vest x4 Pr34s    Low hurdles ball on cone balance 6 laps SOLO 3#RLE    Bean bag catch cog task low hurdles 3#RLE 8 laps    Static balance Foam FT EC 30\" x1    Bosu step on/off RLE x20   Foam FT HT 30\" x1    Foam FA HT EC 20\" x3     Obstacle course  Uneven surface HT SOLO 12 laps       Resisted ambulation Stair skipping step 1UE " 2 flights x2 weighted vest    66lb x15 fwd/bwd cbl machine weighted vest Stair skipping step 1UE 3 flights x1   Stair skipping 1UE 3 flights x1 weighted vest    Runner step up   4' step + foam x15 RLE     Plyometric 2x10 jumping CGA Pilates board x30    SOLO trek poles bunny hopping 6 laps              Ther Ex        Leg press   165 3x15                                                             Ther Activity        ADL                Gait Training        TM        Head turns        PLS AFO        Stairs Stairs no UE 2 flights x1 weighted vest CGA  Stairs no UE 3 flights x1     Modalities

## 2024-06-21 ENCOUNTER — TELEPHONE (OUTPATIENT)
Dept: NEUROLOGY | Facility: CLINIC | Age: 64
End: 2024-06-21

## 2024-06-21 ENCOUNTER — OFFICE VISIT (OUTPATIENT)
Facility: CLINIC | Age: 64
End: 2024-06-21
Payer: COMMERCIAL

## 2024-06-21 DIAGNOSIS — I69.351 SPASTIC HEMIPARESIS OF RIGHT DOMINANT SIDE AS LATE EFFECT OF CEREBRAL INFARCTION (HCC): Primary | ICD-10-CM

## 2024-06-21 PROCEDURE — 97110 THERAPEUTIC EXERCISES: CPT

## 2024-06-21 PROCEDURE — 97112 NEUROMUSCULAR REEDUCATION: CPT

## 2024-06-21 NOTE — PROGRESS NOTES
Occupational Therapy Daily Note:    Today's date: 2024  Patient name: Jimmy Mello  : 1960  MRN: [id number]  Referring provider: Depadua, Ashley, MD  Dx:   Encounter Diagnosis   Name Primary?    Spastic hemiparesis of right dominant side as late effect of cerebral infarction (HCC) Yes         Subjective: Pt reported have R shoulder soreness due to recent tetanus vaccine.      Objective: Pt engaged in skilled OT treatment session with focus on UE NMR, UE strengthening, UE endurance, and FMC/GMC to increase engagement, endurance, tolerance, and independence with daily ADL and IADL tasks.     CPT Code Minutes                                           Task Details        Therapeutic Activity               Neuro Re-Ed  NMRE to RUE to improve GMC/FMC/in-hand manipulations:    First task:  - Pt used RUE to retrieve small items in supination with open palm. Task focused on using gross grasp to retrieve object with min manual cues to achieve full finger flexion with object in hand  - Pt min difficulty translating small items from palm to fingertips  - Pt demo G pincer pinch for manipulation of item         Second task: in-hand manipulation and functional reaching  - Reach to various targets under shoulder height to retrieve small beads; translated bead to finger tips and placed on small center tabletop targets. Pt instructed to alternate fingers with pincer.  - Task modified from OH to under shoulder height due to soreness from receiving tetanus shot; pt responded well to task modification  - G reaching to under shoulder targets  - mod difficulty with dexterity advancing item to each fingertip  - Pt able to place on small center targets with G accuracy    Pt used BUE to demo knot tying; min/mod difficulty with gross grasp of rope to demo steps to tie knot; pt had min difficulty with GMC of RUE tying and re-wrapping rope                               Therapeutic Exercise  Completed PROM with LPS to R hand into  MCP and PIP flexion at D2 and D3 to improve ROM and achieve full fist.                     Testing  3/22:  Keyhole Peg Test:  L: 1 min 40 sec   R: 6 min     O'Juanjose Finger Dexterity Test:   L: 2 min 49 sec   R: 7 min 7 sec         HEP  4/24: Instructed pt to use heating pad for 15 min on R hand and then complete stretching to R fingers into flexion to increase joint ROM.          Assessment: Tolerated treatment well. Pt completed tasks with G pincer and functional reaching with RUE. Pt had mod difficulty with dexterity in RUE during task. Pt reported soreness in R shoulder from tetanus vaccine. Patient would benefit from continued skilled OT.    Plan: Continued skilled OT per POC    INTERVENTION COMMENTS:  Diagnosis: Spastic hemiparesis of right dominant side as late effect of cerebral infarction (HCC) [I69.351]  Precautions: R side weakness, R facial pain   Insurance: Payor: RD / Plan: [employer] PPO / Product Type: PPO /   4 of 15 visits through 5/31/25

## 2024-06-21 NOTE — TELEPHONE ENCOUNTER
Called and spoke with patients spouse Trudy who informed me that after calling and speaking with [employer] Plan it definitely not active anymore so patients Botox Prior-Auth will need to be obtained through the [place] Personal Choice Ins-Plan. I made Romina aware that I will start the PA for Botox w/ their updated new Insurance Plan and will cancel patients BINJ-Appt for Tuesday 06/25/2024.    Please have patients BINJ-Appt rescheduled for an available date w/ Dr. Depadua as patients schedule is very flexible so any date and time works per Romina (patients spouse)

## 2024-06-23 DIAGNOSIS — I65.02 STENOSIS OF LEFT VERTEBRAL ARTERY: ICD-10-CM

## 2024-06-23 DIAGNOSIS — D64.9 ANEMIA, UNSPECIFIED TYPE: ICD-10-CM

## 2024-06-23 DIAGNOSIS — I63.9 CEREBROVASCULAR ACCIDENT (CVA) (HCC): ICD-10-CM

## 2024-06-23 DIAGNOSIS — I77.1 CELIAC ARTERY STENOSIS (HCC): ICD-10-CM

## 2024-06-23 DIAGNOSIS — I77.74 VERTEBRAL ARTERY DISSECTION (HCC): ICD-10-CM

## 2024-06-23 RX ORDER — ATORVASTATIN CALCIUM 40 MG/1
40 TABLET, FILM COATED ORAL
Qty: 90 TABLET | Refills: 1 | Status: SHIPPED | OUTPATIENT
Start: 2024-06-23

## 2024-06-24 RX ORDER — FERROUS SULFATE 325(65) MG
1 TABLET ORAL
Qty: 90 TABLET | Refills: 0 | Status: SHIPPED | OUTPATIENT
Start: 2024-06-24

## 2024-06-25 ENCOUNTER — OFFICE VISIT (OUTPATIENT)
Facility: CLINIC | Age: 64
End: 2024-06-25
Payer: COMMERCIAL

## 2024-06-25 DIAGNOSIS — I69.351 SPASTIC HEMIPARESIS OF RIGHT DOMINANT SIDE AS LATE EFFECT OF CEREBRAL INFARCTION (HCC): Primary | ICD-10-CM

## 2024-06-25 PROCEDURE — 97112 NEUROMUSCULAR REEDUCATION: CPT

## 2024-06-25 PROCEDURE — 97110 THERAPEUTIC EXERCISES: CPT

## 2024-06-25 NOTE — PROGRESS NOTES
"Daily Note     Today's date: 2024  Patient name: Jimmy Mello  : 1960  MRN: [id number]  Referring provider: Depadua, Ashley, MD  Dx:   Encounter Diagnosis     ICD-10-CM    1. Spastic hemiparesis of right dominant side as late effect of cerebral infarction (HCC)  I69.351               Start Time: 0846  Stop Time: 930  Total time in clinic (min): 44 minutes    Subjective: Dank reports he was busy over the weekend, working on his boat.       Objective: See treatment diary below      Assessment:  Dank shows improving RLE strength and power with minimal buckling with higher step height. With fatigue less hip/knee extension observed during stair negotiation and higher step height. Performing timed trials  improves gait speed and increases cardiovascular challenge well, motivated by time reduction. Observe inc step length on RLE. Patient would benefit from continued PT to improve gait, strength, and balance.      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 [employer] step length.     Precautions: Falls  Re-eval Date:  2024    Date    Visit Count 25 26 27 28 29   FOTO        Pain In        Pain Out                Testing        TUG 9.62 s       5xSTS 9.72 s       Gait speed SGSS: 0.82  Fast: 1.19       FGA        2/6MWT 1243 ft        Neuro Re-Ed        Dynamic balance Foam beam fwd dot tap 12 laps CGA    HT 20ft x6 fwd/bwd ea      Lat step mirror PT 30\" x2 SOLO    4' step bounding x20 SOLO Lat step foam beam 4 laps, 4 laps with cone tap    Bwd stepping 20ft x10 laps cues for fast as possible    Timed trials ortho side 3x1 WV: 39s SL Bounding SOLO dots for visual cues 2x10    Timed trials ortho side weighted vest x4 Pr34s    Low hurdles ball on cone balance 6 laps SOLO 3#RLE    Bean bag catch cog task low hurdles 3#RLE 8 laps Timed trials ortho side weighted vest 33.42 2x3 laps    CGA folded mat + 8' step up x8, + foam on top x6    Fwd bounding dots on ground SOLO 8 " "laps   Static balance Foam FT EC 30\" x1    Bosu step on/off RLE x20   Foam FT HT 30\" x1    Foam FA HT EC 20\" x3     Obstacle course  Uneven surface HT SOLO 12 laps       Resisted ambulation Stair skipping step 1UE 2 flights x2 weighted vest    66lb x15 fwd/bwd cbl machine weighted vest Stair skipping step 1UE 3 flights x1   Stair skipping 1UE 3 flights x1 weighted vest Stair skipping 1UE 4 flights x1 weighted vest   Runner step up   4' step + foam x15 RLE     Plyometric 2x10 jumping CGA Pilates board x30    SOLO trek poles bunny hopping 6 laps              Ther Ex        Leg press   165 3x15                                                             Ther Activity        ADL                Gait Training        TM        Head turns        PLS AFO        Stairs Stairs no UE 2 flights x1 weighted vest CGA  Stairs no UE 3 flights x1     Modalities                                 "

## 2024-06-25 NOTE — PROGRESS NOTES
Occupational Therapy Daily Note:    Today's date: 2024  Patient name: Jimmy Mello  : 1960  MRN: [id number]  Referring provider: Depadua, Ashley, MD  Dx:   Encounter Diagnosis   Name Primary?    Spastic hemiparesis of right dominant side as late effect of cerebral infarction (HCC) Yes           Subjective: Pt reported shoulder working better than it used to       Objective: Pt engaged in skilled OT treatment session with focus on UE NMR, UE strengthening, UE endurance, and FMC/GMC to increase engagement, endurance, tolerance, and independence with daily ADL and IADL tasks.     CPT Code Minutes                                           Task Details        Therapeutic Activity               Neuro Re-Ed  NMRE to RUE to improve GMC/FMC/in-hand manipulations:    Task 1: In-hand manipulation and FMC  -in-hand manipulation to translate bead to finger tips from palm then transferred to medium sized tabletop targets on center table and R side shelf  - Pt G+ in-hand manipulation translating beads to fingertips  - Pt demo G 3 jaw belia pinch to place beads on targets   - Pt demo G reaching to shoulder height table for target placement    Task 2: FMC  - Used R hand alternating fingers to flick small balls into large mouth container   - Pt had min difficulty with isolated finger movements  - Pt had min difficulty with force of movement flicking balls   - G target accuracy     Task 3: GMC/Eye-hand coordination  - While in stance at the mat, pt held cone in RUE to hit slow and fast moving medium-sized balls.  - Pt demo G gross grasp of cone to hit moving balls  - Pt demo G GMC   - Pt with G-/G reaction speed and eye-hand coordination with slow moving balls  - Pt with G-/G reaction speed and F+/G- eye-hand coordination with fast moving balls   - While in stance, pt had loss of balance but able to recover independently.                         Therapeutic Exercise  Completed PROM with LPS to R hand into MCP and PIP  flexion at D2 and D3 to improve ROM and achieve full fist.             Seated, completed PROM with LPS to R shoulder into sh flex/ abd with LPS at end range          Testing  3/22:  Keyhole Peg Test:  L: 1 min 40 sec   R: 6 min     O'Juanjose Finger Dexterity Test:   L: 2 min 49 sec   R: 7 min 7 sec         HEP  4/24: Instructed pt to use heating pad for 15 min on R hand and then complete stretching to R fingers into flexion to increase joint ROM.          Assessment: Tolerated treatment well. Pt completed tasks with G 3 jaw belia pinch and gross grasp. Pt demo G-/G reaction speed to slow and fast moving targets during task. Patient would benefit from continued skilled OT.    Plan: Continued skilled OT per POC    INTERVENTION COMMENTS:  Diagnosis: Spastic hemiparesis of right dominant side as late effect of cerebral infarction (HCC) [I69.351]  Precautions: R side weakness, R facial pain   Insurance: Payor: RD / Plan: [employer] PPO / Product Type: PPO /   5 of 15 visits through 5/31/25

## 2024-06-26 LAB
ALBUMIN SERPL-MCNC: 4.3 G/DL (ref 3.9–4.9)
ALP SERPL-CCNC: 104 IU/L (ref 44–121)
ALT SERPL-CCNC: 26 IU/L (ref 0–44)
AST SERPL-CCNC: 19 IU/L (ref 0–40)
BASOPHILS # BLD AUTO: 0.1 X10E3/UL (ref 0–0.2)
BASOPHILS NFR BLD AUTO: 1 %
BILIRUB SERPL-MCNC: <0.2 MG/DL (ref 0–1.2)
BUN SERPL-MCNC: 30 MG/DL (ref 8–27)
BUN/CREAT SERPL: 23 (ref 10–24)
CALCIUM SERPL-MCNC: 9 MG/DL (ref 8.6–10.2)
CHLORIDE SERPL-SCNC: 104 MMOL/L (ref 96–106)
CO2 SERPL-SCNC: 22 MMOL/L (ref 20–29)
CREAT SERPL-MCNC: 1.33 MG/DL (ref 0.76–1.27)
EGFR: 60 ML/MIN/1.73
EOSINOPHIL # BLD AUTO: 0.5 X10E3/UL (ref 0–0.4)
EOSINOPHIL NFR BLD AUTO: 6 %
ERYTHROCYTE [DISTWIDTH] IN BLOOD BY AUTOMATED COUNT: 12.9 % (ref 11.6–15.4)
GLOBULIN SER-MCNC: 2.4 G/DL (ref 1.5–4.5)
GLUCOSE SERPL-MCNC: 102 MG/DL (ref 70–99)
HCT VFR BLD AUTO: 40.1 % (ref 37.5–51)
HGB BLD-MCNC: 12.8 G/DL (ref 13–17.7)
IMM GRANULOCYTES # BLD: 0.1 X10E3/UL (ref 0–0.1)
IMM GRANULOCYTES NFR BLD: 1 %
LYMPHOCYTES # BLD AUTO: 1.3 X10E3/UL (ref 0.7–3.1)
LYMPHOCYTES NFR BLD AUTO: 15 %
MCH RBC QN AUTO: 28.4 PG (ref 26.6–33)
MCHC RBC AUTO-ENTMCNC: 31.9 G/DL (ref 31.5–35.7)
MCV RBC AUTO: 89 FL (ref 79–97)
MONOCYTES # BLD AUTO: 0.5 X10E3/UL (ref 0.1–0.9)
MONOCYTES NFR BLD AUTO: 7 %
NEUTROPHILS # BLD AUTO: 5.9 X10E3/UL (ref 1.4–7)
NEUTROPHILS NFR BLD AUTO: 70 %
PLATELET # BLD AUTO: 251 X10E3/UL (ref 150–450)
POTASSIUM SERPL-SCNC: 4.4 MMOL/L (ref 3.5–5.2)
PROT SERPL-MCNC: 6.7 G/DL (ref 6–8.5)
RBC # BLD AUTO: 4.51 X10E6/UL (ref 4.14–5.8)
SODIUM SERPL-SCNC: 139 MMOL/L (ref 134–144)
WBC # BLD AUTO: 8.4 X10E3/UL (ref 3.4–10.8)

## 2024-06-27 ENCOUNTER — OFFICE VISIT (OUTPATIENT)
Facility: CLINIC | Age: 64
End: 2024-06-27
Payer: COMMERCIAL

## 2024-06-27 DIAGNOSIS — I69.351 SPASTIC HEMIPARESIS OF RIGHT DOMINANT SIDE AS LATE EFFECT OF CEREBRAL INFARCTION (HCC): Primary | ICD-10-CM

## 2024-06-27 PROCEDURE — 97110 THERAPEUTIC EXERCISES: CPT

## 2024-06-27 PROCEDURE — 97112 NEUROMUSCULAR REEDUCATION: CPT

## 2024-06-27 NOTE — PROGRESS NOTES
Daily Note     Today's date: 2024  Patient name: Jimmy Mello  : 1960  MRN: [id number]  Referring provider: Depadua, Ashley, MD  Dx:   Encounter Diagnosis     ICD-10-CM    1. Spastic hemiparesis of right dominant side as late effect of cerebral infarction (HCC)  I69.351               Start Time: 0845  Stop Time: 925  Total time in clinic (min): 40 minutes    Subjective: Dank felt good after last session. Reports nothing new. Arrives with RLE AFO.      Objective: See treatment diary below      Assessment:  Trialed light jog with reduced propulsion noted as well as inadequate ext in swing phase on RLE, but he is able to perform with fair stability. With more fatigue less hip extension observed during step ups and backward stepping. Good ability to change direction when cued and avoid obstacles. Patient would benefit from continued PT to improve gait, strength, and balance.      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 [employer] step length.     Precautions: Falls  Re-eval Date:  2024    Date        Visit Count 30       FOTO        Pain In        Pain Out                Testing        TUG 9.62 s       5xSTS 9.72 s       Gait speed SGSS: 0.82  Fast: 1.19       FGA        2/6MWT 1243 ft        Neuro Re-Ed        Dynamic balance Ortho side timed trials 2x3 laps 32.8s PB    CGA folded mat + 8' step up + foam on top 2x8 w weighted vest RLE    Light jog SOLO 8 laps       Static balance        Obstacle course 4 square multidirectional stepping SOLO vc random switch 3min x2       Resisted ambulation Bwd red TB SOLO 8 laps       Runner step up        Plyometric                Ther Ex        Leg press                                                                Ther Activity        ADL                Gait Training        TM        Head turns        PLS AFO        Stairs        Modalities

## 2024-06-27 NOTE — PROGRESS NOTES
Occupational Therapy Daily Note:    Today's date: 2024  Patient name: Jimmy Mello  : 1960  MRN: [id number]  Referring provider: Depadua, Ashley, MD  Dx:   Encounter Diagnosis   Name Primary?    Spastic hemiparesis of right dominant side as late effect of cerebral infarction (HCC) Yes           Subjective: Pt reported experiencing decreased tightness in R hand      Objective: Pt engaged in skilled OT treatment session with focus on UE NMR, UE strengthening, UE endurance, and FMC/GMC to increase engagement, endurance, tolerance, and independence with daily ADL and IADL tasks.     CPT Code Minutes                                           Task Details        Therapeutic Activity               Neuro Re-Ed  NMRE to RUE to improve FMC/GMC:    Task 1:  - Pt able to use BUE to open and close easter egg; pt used R hand to transfer small sized bead from small mouthed container into easter egg  - Demo G- pincer pinch to retrieve bead  - Demo G target accuracy placing bead into egg  - Pt with min/mod difficulty with GMC to open and close the egg    Task 2:   - Pt used RUE to unraveled rope from spool to be used as clothesline; pt used R hand to retrieve clothespin from wide-mouthed bag on R side table at waist height and placed on shoulder height clothesline  - pt demo G-/G prehension patterns to unravel rope and retrieve and open clothespin  - pt had min difficulty sustaining force to keep clothespin open   - pt demo G-/G functional reach for target placement    Task 3:   - Placed pom poms of varying size into pt's R hand while in supination with open palm. Pt squeezed pom poms to achieve full fist then placed object on R sided waist height table  - Pt demo G gross grasp of pom poms   - Provided min manual cues to achieve full finger flexion with object in hand  - Pt demo G prehension patterns placing objects on side table     Task 4:  - Pt used R hand to retrieve pom poms of varying size from R side table, then  translated items to fingertips to place into medium-sized opening of cone at various locations on center table  - Pt with min difficulty retrieving small sized pom poms  - G functional reaching in different directions; G precision of target placement                    Therapeutic Exercise  Completed PROM with LPS to R hand into MCP and PIP flexion at D2 and D3 to improve ROM and achieve full fist.                     Testing  3/22:  Keyhole Peg Test:  L: 1 min 40 sec   R: 6 min     O'Juanjose Finger Dexterity Test:   L: 2 min 49 sec   R: 7 min 7 sec         HEP  4/24: Instructed pt to use heating pad for 15 min on R hand and then complete stretching to R fingers into flexion to increase joint ROM.          Assessment: Tolerated treatment well. Pt completed tasks with G-/G prehension patterns, gross grasp, and functional reaching using R hand. Pt with min difficulty with RUE FMC during tasks. Patient would benefit from continued skilled OT.    Plan: Continued skilled OT per POC    INTERVENTION COMMENTS:  Diagnosis: Spastic hemiparesis of right dominant side as late effect of cerebral infarction (HCC) [I69.351]  Precautions: R side weakness, R facial pain   Insurance: Payor: RD / Plan: RD PPO / Product Type: PPO /   6 of 15 visits through 5/31/25

## 2024-07-02 ENCOUNTER — OFFICE VISIT (OUTPATIENT)
Facility: CLINIC | Age: 64
End: 2024-07-02
Payer: COMMERCIAL

## 2024-07-02 DIAGNOSIS — I69.351 SPASTIC HEMIPARESIS OF RIGHT DOMINANT SIDE AS LATE EFFECT OF CEREBRAL INFARCTION (HCC): Primary | ICD-10-CM

## 2024-07-02 PROCEDURE — 97112 NEUROMUSCULAR REEDUCATION: CPT

## 2024-07-02 PROCEDURE — 97110 THERAPEUTIC EXERCISES: CPT

## 2024-07-02 NOTE — PROGRESS NOTES
Occupational Therapy Daily Note:    Today's date: 2024  Patient name: Jimmy Mello  : 1960  MRN: [id number]  Referring provider: Depadua, Ashley, MD  Dx:   Encounter Diagnosis   Name Primary?    Spastic hemiparesis of right dominant side as late effect of cerebral infarction (HCC) Yes         Subjective: No new complaints      Objective: Pt engaged in skilled OT treatment session with focus on UE NMR, UE strengthening, UE endurance, and FMC/GMC to increase engagement, endurance, tolerance, and independence with daily ADL and IADL tasks.     CPT Code Minutes                                           Task Details        Therapeutic Activity               Neuro Re-Ed  NMRE to RUE to improve FMC/GMC/in-hand manipulations:    Task 1:  - pt used R hand to individually retrieve medium sized rings from R sided table then placed ring around top of cone to shoulder height targets   - pt demo G 3 jaw belia pinch to  rings  - pt demo G functional reaching to various planes of shoulder movement; G precision of target placement     Task 2:  - item retrieval of small sized pegs at R side; in-hand manipulation of pegs from palm to finger tips then placed on center table targets  - Pt demo G-/G in hand manipulation of pegs; G 3 jaw belia pinch for target placement    Task 3:  - pt individually retrieved pegs from center table and placed on R side table    - pt demo G-/G prehension patterns retrieving pegs from center table and transferred pegs to R side table    Task 4:  - Placed medium sized square of fabric on center table, pt used fingers to achieve full fist with fabric in hand; after achieving full fist, pt placed fabric on R sided table  - pt demo G-/G gross grasp of fabric  - pt demo G finger flexion to gather fabric into palm of hand                         Therapeutic Exercise  Completed PROM with LPS to R hand into MCP and PIP flexion at D2 and D3 to improve ROM and achieve full fist.                      Testing  3/22:  Keyhole Peg Test:  L: 1 min 40 sec   R: 6 min     O'Juanjose Finger Dexterity Test:   L: 2 min 49 sec   R: 7 min 7 sec         HEP  4/24: Instructed pt to use heating pad for 15 min on R hand and then complete stretching to R fingers into flexion to increase joint ROM.          Assessment: Tolerated treatment well. Pt completed tasks with G-/G 3 jaw belia, gross grasp, and in-hand manipulations of small and medium sized objects. Pt demo improving functional reaching in different planes of movement using R hand. Patient would benefit from continued skilled OT.    Plan: Continued skilled OT per POC    INTERVENTION COMMENTS:  Diagnosis: Spastic hemiparesis of right dominant side as late effect of cerebral infarction (HCC) [I69.351]  Precautions: R side weakness, R facial pain   Insurance: Payor: RD / Plan: RD PPO / Product Type: PPO /   7 of 15 visits through 5/31/25

## 2024-07-02 NOTE — PROGRESS NOTES
PT Re-Evaluation /Progress note    Today's date: 2024  Patient name: Jimmy Mello  : 1960  MRN: [id number]  Referring provider: Depadua, Ashley, MD  Dx:   Encounter Diagnosis     ICD-10-CM    1. Spastic hemiparesis of right dominant side as late effect of cerebral infarction (HCC)  I69.351                 Start Time: 0845  Stop Time: 930  Total time in clinic (min): 45 minutes    Assessment  Impairments: abnormal coordination, abnormal gait, abnormal muscle tone, abnormal or restricted ROM, abnormal movement, activity intolerance, impaired balance, impaired physical strength, lacks appropriate home exercise program, safety issue and weight-bearing intolerance    Assessment details: Patient is a 63 y.o. year old male presenting to OPPT s/p diagnosis of chronic CVA. Dank is showing improvement in his overall R LE strength and general balance. His gait speed shows improvement nearly MCID value for OPPT post-stroke, now at 1.00m/s at self selected gait speed and 1.17m/s at fastest gait speed. 6WMT remains similar to last re-assessment. Observe more inversion  at his R foot as botox is wearing off, R AFO still showing benefit with foot clearance and increasing gait speed. He is remaining active and walking as much as he feels like he can. He will continue to benefit from skilled PT to work toward his PT goals.       Prognosis: good    Goals  In 4 weeks, patient will:  1. Improve 6MWT by additional 60ft to meet MCID value for persons post stroke to demonstrate improved community mobility. - progressing  2. Demonstrate improved gait and balance performance with use of R PLS AFO as measured by - progressing    In 8 weeks, patient will:  1.  Improve fast gait speed by at least 0.14 m/s to improve safety crossing crosswalk (1.2m/s) - progressing  2.  Improve ABC by at least 10 points to show improved balance confidence.- progressing  3.  Improve gait speed by at least 0.16 m/s to meet MCID value for persons with  CVA. - Progressing  4.  Improve TUGM by at least 2 seconds to show improved motor dual task balance. - progressing        Plan  Patient would benefit from: skilled physical therapy    Planned therapy interventions: neuromuscular re-education, manual therapy, patient education, home exercise program, therapeutic exercise, therapeutic activities and gait training    Frequency: 2x week  Duration in weeks: 12  Plan of Care beginning date: 5/31/2024  Plan of Care expiration date: 8/1/2024  Treatment plan discussed with: patient        Subjective Evaluation    History of Present Illness  Mechanism of injury: 7/2: Dank feels like his RLE is getting stronger and his balance is improving. AFO is helpful for clearing his foot. Has to try harder to make sure his foot is not turning out.    5/31: Dank feels like PT is helping. He notcies improved balance and endurance. Has been doing more relating to work and staying busy. Recently got AFO.     Present in PT: reports been doing quite well since not in PT. No falls. Using a recumbent bike daily or every other day. Wants to improve his walking speed more and improve his strength. Ankle still locking up, wears boot at night feels like it helps. He feels like he is progressing well and wants to continue working towards his walking speed as he feels that will also help with his balance. Working as he is able and visiting [employer]. No ad in home or outside of home.  Patient Goals  Patient goals for therapy: improved balance, increased strength and increased motion    Pain  No pain reported      Diagnostic Tests  No diagnostic tests performed  Treatments  Previous treatment: physical therapy, occupational therapy and speech therapy        Objective    Balance Test 2/28 4/3 5/31 7/2   6 Minute Walk Test (ft): 1118ft 1300 1350ft 1350ft R AFO   FGA: 20/30 23/30 27/30   Gait Speed (m/s): SSGS: 0.86m/s  Fast: 1.00   Fast: 1.00 m/s  SSGS: 0.94 m/s  Fast: 1.06 m/s SSGS: 1.00  Fast: 1.17 m/s    5x Sit To Stand (s): 11.06 10.22 06.88    30s chair stand 13 14 18 18   ABC: 78% 77%  78%   TU.73s  10.66 08.98 / TUGM 09.86 TUGM 08.74         MCTSIB Number of Seconds   Feet Together, Eyes Open 30   Feet Together, Eyes Closed 30   Feet Together, Eyes Open Foam 30   Feet Together, Eyes Closed Foam 7 1st try, 30 2nd try         Muscle Tone Left Right   Modified Aguilar Scale     Hamstring  1+   Gastroc  8 beats of clonus   Quad         Manual Muscle Testing - Hip Left Right   Flexion 5 4   Extension 5 4-   Abduction 5 4-   External Rotation       Manual Muscle Testing - Knee Left Right   Flexion     Extension 5 5     Manual Muscle Testing - Ankle Left Right   Doriflexion 5 3-   Plantarflexion 5 5        Transfers    Sit To Stand Independent   W/C To Bed    Sit To Supine    Roll          Gait Assessment: Slight steppage and circumduction to assist for clearance on R side due to inadequate DF. Potentially inadequate knee ext in mid & terminal stance, difficult to assess with long pants on. Inadequate knee ext in terminal swing/IC.    : PLS AFO donned. Better foot clearance on R with AFO. Still inadequate knee ext in mid/term swing. [place] R stance with lacking full hip ext in term stance.    : PLS AFO helps with foot clearance. More R inversion observed with occasional ER. Continues to lack full hip ext in terminal stance.         Precautions: Falls  Re-eval Date:  2024    Date  7      Visit Count 30 31      FOTO        Pain In        Pain Out                Testing        TUG 8.74 TUGM       5xSTS 9.72 s       Gait speed SGSS: 1.00  Fast: 1.17       FGA        2/6MWT 1350 ft        Neuro Re-Ed  6MWT, FGA, 10mWT,       Dynamic balance Ortho side timed trials 2x3 laps 32.8s PB    CGA folded mat + 8' step up + foam on top 2x8 w weighted vest RLE    Light jog SOLO 8 laps 1UE skipping step 5 flights w weighted vest    Lat step w motor dual task random direction changes RUE catch/toss 5min  total w weighted vest      Static balance        Obstacle course 4 square multidirectional stepping SOLO vc random switch 3min x2       Resisted ambulation Bwd red TB SOLO 8 laps       Runner step up        Plyometric                Ther Ex        Leg press                                                                Ther Activity        ADL                Gait Training        TM        Head turns        PLS AFO        Stairs        Modalities

## 2024-07-03 DIAGNOSIS — I77.1 CELIAC ARTERY STENOSIS (HCC): ICD-10-CM

## 2024-07-03 DIAGNOSIS — I63.9 CEREBROVASCULAR ACCIDENT (CVA) (HCC): ICD-10-CM

## 2024-07-03 DIAGNOSIS — I65.02 STENOSIS OF LEFT VERTEBRAL ARTERY: ICD-10-CM

## 2024-07-03 DIAGNOSIS — I77.74 VERTEBRAL ARTERY DISSECTION (HCC): ICD-10-CM

## 2024-07-03 RX ORDER — TICAGRELOR 90 MG/1
90 TABLET ORAL EVERY 12 HOURS
Qty: 60 TABLET | Refills: 0 | Status: SHIPPED | OUTPATIENT
Start: 2024-07-03

## 2024-07-05 ENCOUNTER — OFFICE VISIT (OUTPATIENT)
Facility: CLINIC | Age: 64
End: 2024-07-05
Payer: COMMERCIAL

## 2024-07-05 DIAGNOSIS — G50.0 TRIGEMINAL NEURALGIA OF RIGHT SIDE OF FACE: ICD-10-CM

## 2024-07-05 DIAGNOSIS — I69.351 SPASTIC HEMIPARESIS OF RIGHT DOMINANT SIDE AS LATE EFFECT OF CEREBRAL INFARCTION (HCC): Primary | ICD-10-CM

## 2024-07-05 PROCEDURE — 97110 THERAPEUTIC EXERCISES: CPT

## 2024-07-05 PROCEDURE — 97112 NEUROMUSCULAR REEDUCATION: CPT

## 2024-07-05 RX ORDER — PREGABALIN 50 MG/1
50 CAPSULE ORAL 2 TIMES DAILY
Qty: 180 CAPSULE | Refills: 1 | Status: SHIPPED | OUTPATIENT
Start: 2024-07-05

## 2024-07-05 RX ORDER — PREGABALIN 50 MG/1
50 CAPSULE ORAL 2 TIMES DAILY
Qty: 14 CAPSULE | Refills: 0 | Status: SHIPPED | OUTPATIENT
Start: 2024-07-05 | End: 2024-07-12

## 2024-07-05 NOTE — PROGRESS NOTES
Notified of urgent refill request; will provide enough to last 1 week, and give time for patient's primary neurologist to provide proper medication refills.

## 2024-07-05 NOTE — TELEPHONE ENCOUNTER
Sent message to Dr. Del Real but it is after 5:30 at this time and is likely out of office.     Sent message to Neuro On-Call Dr. John Palomo to see if he can assist

## 2024-07-05 NOTE — TELEPHONE ENCOUNTER
On Call Dr. John Palomo sent in temporary supply of pregabalin     Called patient's wife Ailin. Made her aware supply was sent to pharmacy. She was very appreciative and denies further questions or concerns at this time.     Dr. Del Real - please send full supply of medication. Thank you!

## 2024-07-05 NOTE — PROGRESS NOTES
Daily Note     Today's date: 2024  Patient name: Jimmy Mello  : 1960  MRN: [id number]  Referring provider: Depadua, Ashley, MD  Dx:   Encounter Diagnosis     ICD-10-CM    1. Spastic hemiparesis of right dominant side as late effect of cerebral infarction (HCC)  I69.351           Start Time: 0845  Stop Time: 925  Total time in clinic (min): 40 minutes    Subjective: did a lot of walking yesterday, he feels like they are a little tired.      Objective: See treatment diary below  12lb weighted vest    Assessment:  Shows good carryover of gait pace and step length after weighted RLE walking. With additional weight in weighted vest, preserves gait speed during overground walking with minimally more imbalance. Seems to have better stability and power generation during high step ups and resisted walking. Patient would benefit from continued PT      Plan: Continue per plan of care. Resisted RLE. Weighted vest. Agility training.     Precautions: Falls  Re-eval Date:  2024    Date      Visit Count 30 31 32     FOTO        Pain In        Pain Out                Testing        TUG 8.74 TUGM       5xSTS 9.72 s       Gait speed SGSS: 1.00  Fast: 1.17       FGA        2/6MWT 1350 ft        Neuro Re-Ed  6MWT, FGA, 10mWT,       Dynamic balance Ortho side timed trials 2x3 laps 32.8s PB    CGA folded mat + 8' step up + foam on top 2x8 w weighted vest RLE    Light jog SOLO 8 laps 1UE skipping step 5 flights w weighted vest    Lat step w motor dual task random direction changes RUE catch/toss 5min total w weighted vest Ortho side timed trials 2x3 laps 33.3s PB w weighted vest    CGA folded mat + 8' step up + foam on top 2x8 w weighted vest RLE    Low hurdles fwd 7.5#RLE + weighted vest 8 laps    Lat step weighted vest + 7.5#RLE 20ft x6 laps, bwd 20ft x4 laps     Static balance        Obstacle course 4 square multidirectional stepping SOLO vc random switch 3min x2       Resisted ambulation Bwd red TB  SOLO 8 laps  Fwd 30ft x8     Runner step up        Plyometric                Ther Ex        Leg press                                                                Ther Activity        ADL                Gait Training        TM        Head turns        PLS AFO        Stairs        Modalities

## 2024-07-05 NOTE — TELEPHONE ENCOUNTER
Patient takes pregabalin (LYRICA) 50 mg capsule for pain and needs a refill ASAP.      Patient is out and did not have it this morning, needs a dose as soon as possible. Patient wife would appreciate if someone could even call in a few to hold them over the weekend.      Dr. Del Real please refill if you are agreeable.     Patient wife requesting a call back with plan by 5:30 pm.

## 2024-07-05 NOTE — PROGRESS NOTES
Occupational Therapy Daily Note:    Today's date: 2024  Patient name: Jimmy Mello  : 1960  MRN: [id number]  Referring provider: Depadua, Ashley, MD  Dx:   Encounter Diagnosis   Name Primary?    Spastic hemiparesis of right dominant side as late effect of cerebral infarction (HCC) Yes           Subjective: Pt reports no new changes since last session and no new pain.     Pt presents 5 minutes late for therapy.        Objective: Pt engaged in skilled OT treatment session with focus on UE NMR, UE strengthening, UE endurance, and FMC/GMC to increase engagement, endurance, tolerance, and independence with daily ADL and IADL tasks.     CPT Code Minutes                                           Task Details        Therapeutic Activity               Neuro Re-Ed  NMRE to RUE to improve FMC/GMC/in-hand manipulations:    Task 1:  - Pt used R hand to individually retrieve various colored pincers and placed onto dowel. Pt instructed to squeeze x3 before placing. Emphasis on R FMC, dexterity, in hand manipulation. Pt tolerated well. Pt demo G pincer grasp to place pincers.    Task 2:  - Pt completed R hand palm to tip translation of multi matrix pieces. Pt instructed to then place pieces on correlating letter on target placed tabletop. Emphasis on shoulder ROM to retrieve pieces and in hand manipulation skills. Pt demo G functional reaching to various planes of shoulder movement and G precision of target placement.     Task 3:  - Pt used R hand to individually retrieve small marble stones with R hand and place in small mouth cone placed tabletop. Used blue pincers, downgrading to green 2* FMC deficits. Emphasis on R UE FMC, sustained pincer strength, and target accuracy. Pt demo G functional reaching to cones and G sustained pinch.                          Therapeutic Exercise  Completed PROM with LPS to R hand into MCP and PIP flexion at D2 and D3 to improve ROM and achieve full fist.                    Testing   3/22:  Keyhole Peg Test:  L: 1 min 40 sec   R: 6 min     O'Juanjose Finger Dexterity Test:   L: 2 min 49 sec   R: 7 min 7 sec         HEP  4/24: Instructed pt to use heating pad for 15 min on R hand and then complete stretching to R fingers into flexion to increase joint ROM.        Assessment: Tolerated treatment well. Pt completed tasks with G-/G sustained pincer grasp and in-hand manipulations of small and medium sized objects. Pt demo improving functional reaching in different planes of movement using R hand. Patient would benefit from continued skilled OT.    Plan: Continued skilled OT per POC    INTERVENTION COMMENTS:  Diagnosis: Spastic hemiparesis of right dominant side as late effect of cerebral infarction (HCC) [I69.351]  Precautions: R side weakness, R facial pain   Insurance: Payor: RD / Plan: RD PPO / Product Type: PPO /   8 of 15 visits through 5/31/25

## 2024-07-05 NOTE — TELEPHONE ENCOUNTER
Outbound call made to Patient Wife to let her know we are still processing her request. Patient Wife requested another call this evening with update on refill status.     Patient wife made aware if Patient is in severe pain he should go to the emergency room. Patient wife states the Patient is not in pain now, the pain comes and goes. Patient wife explained the Patient's Trigeminal neuralgia pain is typically worse at night so she is trying to be prepared. Patient wife said since the Pharmacy had said they requested it this morning she thought it would be refilled. Patient wife was requested to ask for refills sooner in the future to provide more time.    Refill is still needed.

## 2024-07-08 ENCOUNTER — PATIENT MESSAGE (OUTPATIENT)
Dept: NEUROSURGERY | Facility: CLINIC | Age: 64
End: 2024-07-08

## 2024-07-09 ENCOUNTER — APPOINTMENT (OUTPATIENT)
Facility: CLINIC | Age: 64
End: 2024-07-09
Payer: COMMERCIAL

## 2024-07-10 ENCOUNTER — CLINICAL SUPPORT (OUTPATIENT)
Dept: FAMILY MEDICINE CLINIC | Facility: CLINIC | Age: 64
End: 2024-07-10
Payer: COMMERCIAL

## 2024-07-10 DIAGNOSIS — Z23 ENCOUNTER FOR IMMUNIZATION: Primary | ICD-10-CM

## 2024-07-10 DIAGNOSIS — G50.0 TRIGEMINAL NEURALGIA OF RIGHT SIDE OF FACE: ICD-10-CM

## 2024-07-10 DIAGNOSIS — G89.0 CENTRAL PAIN SYNDROME: ICD-10-CM

## 2024-07-10 DIAGNOSIS — G89.29 CENTRAL SENSITIZATION TO PAIN: ICD-10-CM

## 2024-07-10 DIAGNOSIS — F43.21 ADJUSTMENT DISORDER WITH DEPRESSED MOOD: ICD-10-CM

## 2024-07-10 RX ORDER — PREGABALIN 50 MG/1
50 CAPSULE ORAL 2 TIMES DAILY
Qty: 180 CAPSULE | Refills: 1 | Status: SHIPPED | OUTPATIENT
Start: 2024-07-10

## 2024-07-10 NOTE — TELEPHONE ENCOUNTER
Called [facility] to follow up, as Dr. Del Real also sent a script to the pharmacy on 7/5/24. Spoke with the pharmacist. The script that was sent by Dr. Del Real was never received. Pt has no refills left at the pharmacy. Repended the med and routed to provider to review.

## 2024-07-10 NOTE — TELEPHONE ENCOUNTER
Medication: DULoxetine (CYMBALTA) 60 mg delayed release capsule     Dose/Frequency: 60mg    Quantity: 90 capsule    Pharmacy: [facility] #[id number] - [place] - [address]   [address] [id number]     Office:   [] PCP/Provider -   [x] Speciality/Provider - DR. Del Real     Does the patient have enough for 3 days?   [x] Yes   [] No - Send as HP to POD

## 2024-07-10 NOTE — TELEPHONE ENCOUNTER
Pt wife called stating  that medication pregabalin (LYRICA) 50 mg capsule wasn't approval by DR. Del Real.     Pt has only 2 pills left . Please assist pt.     Pt wife states a neurologist on call 7/5/2024 prescribe 7 days supply .

## 2024-07-11 ENCOUNTER — OFFICE VISIT (OUTPATIENT)
Facility: CLINIC | Age: 64
End: 2024-07-11
Payer: COMMERCIAL

## 2024-07-11 DIAGNOSIS — I69.351 SPASTIC HEMIPARESIS OF RIGHT DOMINANT SIDE AS LATE EFFECT OF CEREBRAL INFARCTION (HCC): Primary | ICD-10-CM

## 2024-07-11 PROCEDURE — 90750 HZV VACC RECOMBINANT IM: CPT

## 2024-07-11 PROCEDURE — 97112 NEUROMUSCULAR REEDUCATION: CPT

## 2024-07-11 PROCEDURE — 97110 THERAPEUTIC EXERCISES: CPT

## 2024-07-11 PROCEDURE — 90471 IMMUNIZATION ADMIN: CPT

## 2024-07-11 NOTE — PROGRESS NOTES
Occupational Therapy Daily Note:    Today's date: 2024  Patient name: Jimmy Mello  : 1960  MRN: [id number]  Referring provider: Depadua, Ashley, MD  Dx:   Encounter Diagnosis   Name Primary?    Spastic hemiparesis of right dominant side as late effect of cerebral infarction (HCC) Yes         Subjective: Pt reports not feeling well due to shingles vaccine    Objective: Pt engaged in skilled OT treatment session with focus on UE NMR, UE strengthening, UE endurance, and FMC/GMC to increase engagement, endurance, tolerance, and independence with daily ADL and IADL tasks.     CPT Code Minutes                                           Task Details        Therapeutic Activity               Neuro Re-Ed  NMRE to RUE to improve FMC:    Task 1:  - Pt used green resistive clip in RUE to retrieve foam pieces from R sided table and stacked pieces on center table; then, using resistive clip pt removed foam pieces from center table and placed into wide mouthed container   - Demo G- prehension patterns   - G precision of target placement   - G target accuracy     Task 2:  - Pt used R alternating pincer in D2 and D3 to retrieve small colored pegs from wide mouth container and placed them in center table top peg boad. Then, pt used alternating pincer to remove pegs and place back into wide mouth container.  - G-/G alternating pincer   - Pt had mod difficulty keeping peg between fingertips   - G- target placement   - Pt had mod/max difficulty with in-hand manipulations of pegs using D4 and D5                    Therapeutic Exercise  Completed PROM with LPS to R hand into MCP and PIP flexion at D2 and D3 to improve ROM and achieve full fist.                    Testing  3/22:  Keyhole Peg Test:  L: 1 min 40 sec   R: 6 min     O'Juanjose Finger Dexterity Test:   L: 2 min 49 sec   R: 7 min 7 sec         HEP  : Instructed pt to use heating pad for 15 min on R hand and then complete stretching to R fingers into flexion to  increase joint ROM.        Assessment: Tolerated treatment well. Pt completed tasks with G-/G alternating pincer grasp and prehension patterns. Pt demo G- target placement during tasks. Patient would benefit from continued skilled OT.    Plan: Continued skilled OT per POC    INTERVENTION COMMENTS:  Diagnosis: Spastic hemiparesis of right dominant side as late effect of cerebral infarction (HCC) [I69.351]  Precautions: R side weakness, R facial pain   Insurance: Payor: RD / Plan: [employer] PPO / Product Type: PPO /   9 of 15 visits through 5/31/25

## 2024-07-11 NOTE — PROGRESS NOTES
Daily Note     Today's date: 2024  Patient name: Jimmy Mello  : 1960  MRN: [id number]  Referring provider: Depadua, Ashley, MD  Dx:   Encounter Diagnosis     ICD-10-CM    1. Spastic hemiparesis of right dominant side as late effect of cerebral infarction (HCC)  I69.351             Start Time: 0845  Stop Time: 925  Total time in clinic (min): 40 minutes    Subjective: Pt reports he went to his boat this weekend.    Objective: See treatment diary below  12lb weighted vest    Assessment:  Dank is progressing well with timed trials in order to improve his gait speed and stability with external pressures. Less instability and improved power generation with increased step length and with step ups. Lateral stepping quickly shows some difficulty likely due to lacking motor control on R LE, occasional need for step over step to complete at increased speeds. Patient would benefit from continued PT      Plan: Continue per plan of care. Resisted RLE. Weighted vest. Agility training.     Precautions: Falls  Re-eval Date:  2024    Date     Visit Count 30 31 32 33    FOTO        Pain In        Pain Out                Testing        TUG 8.74 TUGM       5xSTS 9.72 s       Gait speed SGSS: 1.00  Fast: 1.17       FGA        2/6MWT 1350 ft        Neuro Re-Ed  6MWT, FGA, 10mWT,       Dynamic balance Ortho side timed trials 2x3 laps 32.8s PB    CGA folded mat + 8' step up + foam on top 2x8 w weighted vest RLE    Light jog SOLO 8 laps 1UE skipping step 5 flights w weighted vest    Lat step w motor dual task random direction changes RUE catch/toss 5min total w weighted vest Ortho side timed trials 2x3 laps 33.3s PB w weighted vest    CGA folded mat + 8' step up + foam on top 2x8 w weighted vest RLE    Low hurdles fwd 7.5#RLE + weighted vest 8 laps    Lat step weighted vest + 7.5#RLE 20ft x6 laps, bwd 20ft x4 laps Ortho side short loop 2 laps 2x3 DC 43.81s w weighted vest    Agilitiy ladder skipping  box 7.5lbRLE + weighted vest 10 laps CGA    CGA folded mat + 8' step up + foam on top 2x10 w weighted vest RLE    30s x7 6 blaze pods in a row 10ft width lat step only PR13 weighted vest        Static balance        Obstacle course 4 square multidirectional stepping SOLO vc random switch 3min x2       Resisted ambulation Bwd red TB SOLO 8 laps  Fwd 30ft x8 Weighted vest 30ft x8 fwd    Runner step up        Plyometric                Ther Ex        Leg press                                                                Ther Activity        ADL                Gait Training        TM        Head turns        PLS AFO        Stairs        Modalities

## 2024-07-12 DIAGNOSIS — R25.2 SPASTICITY: ICD-10-CM

## 2024-07-16 ENCOUNTER — APPOINTMENT (OUTPATIENT)
Facility: CLINIC | Age: 64
End: 2024-07-16
Payer: COMMERCIAL

## 2024-07-16 DIAGNOSIS — R25.2 SPASTICITY: ICD-10-CM

## 2024-07-16 RX ORDER — DULOXETIN HYDROCHLORIDE 60 MG/1
60 CAPSULE, DELAYED RELEASE ORAL DAILY
Qty: 90 CAPSULE | Refills: 0 | Status: SHIPPED | OUTPATIENT
Start: 2024-07-16

## 2024-07-16 RX ORDER — BACLOFEN 10 MG/1
10 TABLET ORAL 4 TIMES DAILY
Qty: 120 TABLET | Refills: 0 | Status: CANCELLED | OUTPATIENT
Start: 2024-07-16

## 2024-07-16 NOTE — TELEPHONE ENCOUNTER
Chante    Thank you for choosing [facility].  We know you have options when it comes to your healthcare; we appreciate that you chose us. Our goal is to provide exceptional  service and world class care to every patient.  You will be receiving a survey via email or text message asking for your feedback.  Please take a few minutes to share your thoughts about your recent visit. Your comments help us understand what we do well and ways we can improve.  Thank you in advance for your valuable feedback.      Dr. Hernandez Rubio MA    Patient has visit scheduled for this afternoon  Will review results then  No need to call him now

## 2024-07-16 NOTE — TELEPHONE ENCOUNTER
Medication: Duloxetine     Dose/Frequency: 60 mg delayed release, take 1 capsule by mouth daily     Quantity: 90 capsules     Pharmacy: [facility] in [place]    Office:   [] PCP/Provider -   [x] Speciality/Provider - Neurology, Dr. Del Real     Does the patient have enough for 3 days?   [x] Yes   [] No - Send as HP to POD    Dr. Del Real: Medication pended, please sign if agreeable.

## 2024-07-17 RX ORDER — BACLOFEN 10 MG/1
TABLET ORAL
Qty: 360 TABLET | Refills: 3 | Status: SHIPPED | OUTPATIENT
Start: 2024-07-17

## 2024-07-17 NOTE — TELEPHONE ENCOUNTER
Medication: Baclofen    Dose/Frequency: 10 mg qid    Quantity: 120    Pharmacy: [facility]    Office:   [] PCP/Provider -   [x] Speciality/Provider -     Does the patient have enough for 3 days?   [] Yes   [] No - Send as HP to POD

## 2024-07-18 ENCOUNTER — OFFICE VISIT (OUTPATIENT)
Facility: CLINIC | Age: 64
End: 2024-07-18
Payer: COMMERCIAL

## 2024-07-18 DIAGNOSIS — I69.351 SPASTIC HEMIPARESIS OF RIGHT DOMINANT SIDE AS LATE EFFECT OF CEREBRAL INFARCTION (HCC): Primary | ICD-10-CM

## 2024-07-18 DIAGNOSIS — G89.0 CENTRAL PAIN SYNDROME: Primary | ICD-10-CM

## 2024-07-18 PROCEDURE — 97112 NEUROMUSCULAR REEDUCATION: CPT

## 2024-07-18 NOTE — PROGRESS NOTES
Daily Note     Today's date: 2024  Patient name: Jimmy Mello  : 1960  MRN: [id number]  Referring provider: Depadua, Ashley, MD  Dx:   Encounter Diagnosis     ICD-10-CM    1. Spastic hemiparesis of right dominant side as late effect of cerebral infarction (HCC)  I69.351               Start Time: 0845  Stop Time: 09  Total time in clinic (min): 40 minutes    Subjective: Pt reports things going well this week so far.    Objective: See treatment diary below  12lb weighted vest    Assessment:  Dank shows improved gait speed with new PB during timed trials on ortho side. Trialed RUE handrail during stairs with good overall response, seemed to have more difficulty with LLE vs RLE in power generation. Better forward progression with trunk and R limb when skipping box with agility ladder. Bounding performed last which due to fatigue less clearance of L limb advancement evident as well as min time spent in air. Patient would benefit from continued PT      Plan: Continue per plan of care. Resisted RLE. Weighted vest. Agility training.     Precautions: Falls  Re-eval Date:  2024    Date    Visit Count 30 31 32 33 34   FOTO        Pain In        Pain Out                Testing        TUG 8.74 TUGM       5xSTS 9.72 s       Gait speed SGSS: 1.00  Fast: 1.17       FGA        2/6MWT 1350 ft        Neuro Re-Ed  6MWT, FGA, 10mWT,       Dynamic balance Ortho side timed trials 2x3 laps 32.8s PB    CGA folded mat + 8' step up + foam on top 2x8 w weighted vest RLE    Light jog SOLO 8 laps 1UE skipping step 5 flights w weighted vest    Lat step w motor dual task random direction changes RUE catch/toss 5min total w weighted vest Ortho side timed trials 2x3 laps 33.3s PB w weighted vest    CGA folded mat + 8' step up + foam on top 2x8 w weighted vest RLE    Low hurdles fwd 7.5#RLE + weighted vest 8 laps    Lat step weighted vest + 7.5#RLE 20ft x6 laps, bwd 20ft x4 laps Ortho side short loop 2  laps 2x3 MA 43.81s w weighted vest    Agilitiy ladder skipping box 7.5lbRLE + weighted vest 10 laps CGA    CGA folded mat + 8' step up + foam on top 2x10 w weighted vest RLE    30s x7 6 blaze pods in a row 10ft width lat step only PR13 weighted vest     Ortho side timed tirals 2x3 laps 32.09s PB w weighted vest    Agility ladder skipping box 7.5lb LR E + weighted vest 10 laps CGA    1UE skipping step 3 flights w weighted vest    SOLO fwd bounding 20ft x6 laps   Static balance        Obstacle course 4 square multidirectional stepping SOLO vc random switch 3min x2    4 square mutidirectional stepping SOLO    Resisted ambulation Bwd red TB SOLO 8 laps  Fwd 30ft x8 Weighted vest 30ft x8 fwd Fwd 30ft x10 laps weighted vest   Runner step up        Plyometric                Ther Ex        Leg press                                                                Ther Activity        ADL                Gait Training        TM        Head turns        PLS AFO        Stairs        Modalities

## 2024-07-18 NOTE — TELEPHONE ENCOUNTER
Chart reviewed  Baclofen was sent to [facility] on 7/17/24  E-Prescribing Status: Receipt confirmed by pharmacy (7/17/2024  2:59 PM EDT)

## 2024-07-23 ENCOUNTER — APPOINTMENT (OUTPATIENT)
Facility: CLINIC | Age: 64
End: 2024-07-23
Payer: COMMERCIAL

## 2024-07-25 ENCOUNTER — OFFICE VISIT (OUTPATIENT)
Facility: CLINIC | Age: 64
End: 2024-07-25
Payer: COMMERCIAL

## 2024-07-25 DIAGNOSIS — I69.351 SPASTIC HEMIPARESIS OF RIGHT DOMINANT SIDE AS LATE EFFECT OF CEREBRAL INFARCTION (HCC): Primary | ICD-10-CM

## 2024-07-25 PROCEDURE — 97112 NEUROMUSCULAR REEDUCATION: CPT

## 2024-07-25 PROCEDURE — 97116 GAIT TRAINING THERAPY: CPT

## 2024-07-25 PROCEDURE — 97110 THERAPEUTIC EXERCISES: CPT

## 2024-07-25 NOTE — PROGRESS NOTES
Occupational Therapy Daily Note:    Today's date: 2024  Patient name: Jimmy Mello  : 1960  MRN: [id number]  Referring provider: Depadua, Ashley, MD  Dx:   Encounter Diagnosis   Name Primary?    Spastic hemiparesis of right dominant side as late effect of cerebral infarction (HCC) Yes           Subjective: No new complaints     Objective: Pt engaged in skilled OT treatment session with focus on UE NMR, UE strengthening, UE endurance, and FMC/GMC to increase engagement, endurance, tolerance, and independence with daily ADL and IADL tasks.     CPT Code Minutes                                           Task Details        Therapeutic Activity               Neuro Re-Ed  NMRE to RUE to improve FMC:    Task 1:  - Pt used R hand to retrieve and placed medium sized square of fabric from wide mouth container on center table, then pt used fingers to achieve full fist with fabric in hand; after achieving full fist, pt placed fabric on R sided table  - pt demo G-/G gross grasp of fabric    Task 2:  - Pt used blue resistive clip in RUE to retrieve fabric from R sided table and placed into wide mouth container at .    - G prehension patterns using resistive clip  - G functional reaching to target  - G target placement    Task 3:  - Pt used RUE to translate eraser caps from palm to fingertips, then placed on center table target.  - G in-hand manipulation of eraser caps  - G pincer pinch for item placement   - G target placement                      Therapeutic Exercise  Completed PROM with LPS to R hand into MCP and PIP flexion at D2 and D3 to improve ROM and achieve full fist.            Completed series of isolated finger flexion/extension using slider; completed in 3 sets of 5 reps. Pt had min difficulty with D3 finger flexion.           Testing  3/22:  Keyhole Peg Test:  L: 1 min 40 sec   R: 6 min     O'Juanjose Finger Dexterity Test:   L: 2 min 49 sec   R: 7 min 7 sec         HEP  : Instructed pt to use  heating pad for 15 min on R hand and then complete stretching to R fingers into flexion to increase joint ROM.        Assessment: Tolerated treatment well. During today's session, increased tightness noted in pt's RUE. Pt completed tasks with G-/G gross grasp and prehension patterns. Pt demo G manipulation of blue resistive clip for item retrieval. Patient would benefit from continued skilled OT.    Plan: Continued skilled OT per POC    INTERVENTION COMMENTS:  Diagnosis: Spastic hemiparesis of right dominant side as late effect of cerebral infarction (HCC) [I69.351]  Precautions: R side weakness, R facial pain   Insurance: Payor: RD / Plan: [employer] PPO / Product Type: PPO /   10 of 15 visits through 5/31/25

## 2024-07-25 NOTE — PROGRESS NOTES
Daily Note     Today's date: 2024  Patient name: Jimmy Mello  : 1960  MRN: [id number]  Referring provider: Depadua, Ashley, MD  Dx:   Encounter Diagnosis     ICD-10-CM    1. Spastic hemiparesis of right dominant side as late effect of cerebral infarction (HCC)  I69.351                      Subjective: Pt presents without complaints.      Objective: See treatment diary below      Assessment: Pt is highly motivated to participate in therapy session this date, tolerating all interventions well. Session focused on LE power development and high intensity gait training. Pt donned 12# weighted vest for entirety of session. Pt demonstrates improving ability to generate force through the RLE during reciprocal stair climbing with skipped stair following cues to place whole R foot on stair. Additionally, with cues for increased step length, pt able to successfully navigate agility ladder with skipped box using weighted RLE. Initiated incline TM walking for propulsive power. Pt is making continued progress but does remain limited by dec strength/power, dec dynamic stability, and dec functional endurance. He continues to benefit from skilled PT services to address these deficits to maximize his functional potential.      Plan: Progress treatment as tolerated.       Precautions: Falls  Re-eval Date:  2024    Date    Visit Count 30 31 32 33 34 35   FOTO         Pain In         Pain Out                 Testing         TUG 8.74 TUGM        5xSTS 9.72 s        Gait speed SGSS: 1.00  Fast: 1.17        FGA /30        2/6MWT 1350 ft         Neuro Re-Ed  6MWT, FGA, 10mWT,        Dynamic balance Ortho side timed trials 2x3 laps 32.8s PB    CGA folded mat + 8' step up + foam on top 2x8 w weighted vest RLE    Light jog SOLO 8 laps 1UE skipping step 5 flights w weighted vest    Lat step w motor dual task random direction changes RUE catch/toss 5min total w weighted vest Ortho side timed trials  2x3 laps 33.3s PB w weighted vest    CGA folded mat + 8' step up + foam on top 2x8 w weighted vest RLE    Low hurdles fwd 7.5#RLE + weighted vest 8 laps    Lat step weighted vest + 7.5#RLE 20ft x6 laps, bwd 20ft x4 laps Ortho side short loop 2 laps 2x3 OK 43.81s w weighted vest    Agilitiy ladder skipping box 7.5lbRLE + weighted vest 10 laps CGA    CGA folded mat + 8' step up + foam on top 2x10 w weighted vest RLE    30s x7 6 blaze pods in a row 10ft width lat step only PR13 weighted vest     Ortho side timed tirals 2x3 laps 32.09s PB w weighted vest    Agility ladder skipping box 7.5lb LR E + weighted vest 10 laps CGA    1UE skipping step 3 flights w weighted vest    SOLO fwd bounding 20ft x6 laps Ortho side timed trials 5 laps, PB 34.12s with 12# weighted vest    Stair climbing with UUE support while skipping 1 step using reciprocal pattern x5 flights with 12# weighted vest    Agility ladder while skipping 1 box with 12# weighted vest, 7.5# RLE AW x10 laps with CGA as needed   Static balance         Obstacle course 4 square multidirectional stepping SOLO vc random switch 3min x2    4 square mutidirectional stepping SOLO     Resisted ambulation Bwd red TB SOLO 8 laps  Fwd 30ft x8 Weighted vest 30ft x8 fwd Fwd 30ft x10 laps weighted vest Fwd 30 feet x14 laps with 12# weight vest, 7.5# RLE AW, maroon tband at hips   Runner step up         Plyometric                  Ther Ex         Leg press                                                                        Ther Activity         ADL                  Gait Training         TM      With 12# weighted vest x7 min at 1.5mph at 5% incline   Head turns         PLS AFO         Stairs         Modalities

## 2024-07-30 ENCOUNTER — APPOINTMENT (OUTPATIENT)
Facility: CLINIC | Age: 64
End: 2024-07-30
Payer: COMMERCIAL

## 2024-07-30 DIAGNOSIS — D64.9 ANEMIA, UNSPECIFIED TYPE: ICD-10-CM

## 2024-07-31 RX ORDER — OMEGA-3/DHA/EPA/FISH OIL 35-113.5MG
1000 TABLET,CHEWABLE ORAL DAILY
Qty: 90 TABLET | Refills: 0 | Status: SHIPPED | OUTPATIENT
Start: 2024-07-31

## 2024-08-01 ENCOUNTER — OFFICE VISIT (OUTPATIENT)
Facility: CLINIC | Age: 64
End: 2024-08-01
Payer: COMMERCIAL

## 2024-08-01 DIAGNOSIS — I69.351 SPASTIC HEMIPARESIS OF RIGHT DOMINANT SIDE AS LATE EFFECT OF CEREBRAL INFARCTION (HCC): Primary | ICD-10-CM

## 2024-08-01 PROCEDURE — 97112 NEUROMUSCULAR REEDUCATION: CPT

## 2024-08-01 PROCEDURE — 97116 GAIT TRAINING THERAPY: CPT

## 2024-08-01 NOTE — PROGRESS NOTES
Occupational Therapy Daily Note:    Today's date: 2024  Patient name: Jimmy Mello  : 1960  MRN: [id number]  Referring provider: Depadua, Ashley, MD  Dx:   Encounter Diagnosis   Name Primary?    Spastic hemiparesis of right dominant side as late effect of cerebral infarction (HCC) Yes         Subjective: No new complaints     Objective: Pt engaged in skilled OT treatment session with focus on UE NMR, UE strengthening, UE endurance, and FMC/GMC to increase engagement, endurance, tolerance, and independence with daily ADL and IADL tasks.     CPT Code Minutes                                           Task Details        Therapeutic Activity               Neuro Re-Ed  NMRE to RUE to improve FMC/GMC:     Using RUE, pt used blue resistive clip to retrieve colored pegs from R sided table and placed on center table targets.  - G prehension patterns using clip  - G manipulation of clip for placement  - G target accuracy    Pt used RUE to translate eraser cap from palm to fingertips, then placed on center targets.  - G-/G in hand manipulation  - G 3 jaw belia pinch for placement  - G target accuracy    Pt used RUE to reach and retrieve popsicle sticks from various [place] targets and placed into narrow opening of cones on R side table.  - G functional reaching for item retrieval  - G manipulation of sticks from gross grasp to 3 jaw belia pinch for item placement  - G target accuracy                   Therapeutic Exercise  Completed PROM with LPS to R hand into MCP and PIP flexion at D2 and D3 to improve ROM and achieve full fist.                      Testing  3/22:  Keyhole Peg Test:  L: 1 min 40 sec   R: 6 min     O'Juanjose Finger Dexterity Test:   L: 2 min 49 sec   R: 7 min 7 sec         HEP  : Instructed pt to use heating pad for 15 min on R hand and then complete stretching to R fingers into flexion to increase joint ROM.        Assessment: Tolerated treatment well. Pt demo improved RUE endurance manipulating  the blue resistive clip for item retrieval and placement with min R shoulder fatigue. Pt completed FM tasks with G target accuracy and prehension patterns. Patient would benefit from continued skilled OT.    Plan: Continued skilled OT per POC    INTERVENTION COMMENTS:  Diagnosis: Spastic hemiparesis of right dominant side as late effect of cerebral infarction (HCC) [I69.351]  Precautions: R side weakness, R facial pain   Insurance: Payor: [employer] / Plan: [employer] PPO / Product Type: PPO /   11 of 15 visits through 5/31/25

## 2024-08-01 NOTE — PROGRESS NOTES
Daily Note     Today's date: 2024  Patient name: Jimmy Mello  : 1960  MRN: [id number]  Referring provider: Depadua, Ashley, MD  Dx:   Encounter Diagnosis     ICD-10-CM    1. Spastic hemiparesis of right dominant side as late effect of cerebral infarction (HCC)  I69.351             Start Time: 0845  Stop Time: 0930  Total time in clinic (min): 45 minutes    Subjective: Dank reports he has been pretty active lately.       Objective: See treatment diary below      Assessment: Dank continues to demonstrate improved RLE power with interventions focused on propulsion and speed of movement. Improving personal best of timed laps with faster movement, occasional foot scuffing. Reports of low back pain during treadmill walking, however, good performance with minimal issues with foot clearance. Good stability with LLE step overs and soft L foot landing. He continues to benefit from skilled PT services to address these deficits to maximize his functional potential.      Plan: Progress treatment as tolerated.       Precautions: Falls  Re-eval Date:  2024    Date         Visit Count 36        FOTO         Pain In         Pain Out                 Testing         TUG 8.74 TUGM        5xSTS 9.72 s        Gait speed SGSS: 1.00  Fast: 1.17        FGA 26/30        2/6MWT 1350 ft         Neuro Re-Ed         Dynamic balance Ortho side timed laps 6 laps PB 32.5 seconds 12# weighted vest    Stair climibing 1UE support skipping step x4 laps 12# weighted vest + 5lb RLE    High moose LLE step over 10 laps 12# weighted vest    Bwd stepping HT 20ft x8 laps 12# weighted vest    Lat step ball toss 20ft x10 laps 12# weighted vest            Static balance         Obstacle course         Resisted ambulation         Runner step up         Plyometric X10 jump squat                 Ther Ex         Leg press                                                                        Ther Activity         ADL                  Gait  Training         TM 1.2mph 8% 4-6/10 RPE 7min 12#weighted vest        Head turns         PLS AFO         Stairs         Modalities

## 2024-08-02 LAB
ALBUMIN SERPL-MCNC: 4.3 G/DL (ref 3.9–4.9)
ALP SERPL-CCNC: 104 IU/L (ref 44–121)
ALT SERPL-CCNC: 21 IU/L (ref 0–44)
AST SERPL-CCNC: 18 IU/L (ref 0–40)
BILIRUB SERPL-MCNC: <0.2 MG/DL (ref 0–1.2)
BUN SERPL-MCNC: 34 MG/DL (ref 8–27)
BUN/CREAT SERPL: 23 (ref 10–24)
CALCIUM SERPL-MCNC: 9.1 MG/DL (ref 8.6–10.2)
CHLORIDE SERPL-SCNC: 105 MMOL/L (ref 96–106)
CO2 SERPL-SCNC: 24 MMOL/L (ref 20–29)
CREAT SERPL-MCNC: 1.5 MG/DL (ref 0.76–1.27)
EGFR: 52 ML/MIN/1.73
GLOBULIN SER-MCNC: 2.1 G/DL (ref 1.5–4.5)
GLUCOSE SERPL-MCNC: 93 MG/DL (ref 70–99)
POTASSIUM SERPL-SCNC: 4.8 MMOL/L (ref 3.5–5.2)
PROT SERPL-MCNC: 6.4 G/DL (ref 6–8.5)
SODIUM SERPL-SCNC: 142 MMOL/L (ref 134–144)

## 2024-08-05 NOTE — RESULT ENCOUNTER NOTE
Martinez Marquez,    So the labs looks good, no signs of liver dysfunction or low Sodium.  What dose of the Oxcarbazepine are you up to at this point and have you noticed any change in the facial pain at this dose?    Thanks!    Dr QUIGLEY

## 2024-08-08 ENCOUNTER — OFFICE VISIT (OUTPATIENT)
Facility: CLINIC | Age: 64
End: 2024-08-08
Payer: COMMERCIAL

## 2024-08-08 DIAGNOSIS — I69.351 SPASTIC HEMIPARESIS OF RIGHT DOMINANT SIDE AS LATE EFFECT OF CEREBRAL INFARCTION (HCC): Primary | ICD-10-CM

## 2024-08-08 PROCEDURE — 97112 NEUROMUSCULAR REEDUCATION: CPT

## 2024-08-08 NOTE — PROGRESS NOTES
Occupational Therapy Daily Note:    Today's date: 2024  Patient name: Jimmy Mello  : 1960  MRN: [id number]  Referring provider: Depadua, Ashley, MD  Dx:   Encounter Diagnosis   Name Primary?    Spastic hemiparesis of right dominant side as late effect of cerebral infarction (HCC) Yes       Subjective: No new complaints     Objective: Pt engaged in skilled OT treatment session with focus on UE NMR, UE strengthening, UE endurance, and FMC/GMC to increase engagement, endurance, tolerance, and independence with daily ADL and IADL tasks.     CPT Code Minutes                                           Task Details        Therapeutic Activity               Neuro Re-Ed  NMRE to RUE to improve FMC/GMC:     Using RUE, pt used blue resistive clip to retrieve colored pom poms from center table and transferred to targets on R side table.   - G modified pincer pinch of clip  - G target accuracy  - Pt reported increased tightness in R hand after using blue resistive clip; completed min manual stretching of finger extension and flexion to decrease tightness     Using R hand, pt alternated pincer retrieving pom poms from R sided table and placed them into narrow sectioned container on the center table.   - G-/G alternating pincer   - G target accuracy                     Therapeutic Exercise  Completed PROM with LPS to R hand into MCP and PIP flexion at D2 and D3 to improve ROM and achieve full fist.                      Testing  3/22:  Keyhole Peg Test:  L: 1 min 40 sec   R: 6 min     O'Juanjose Finger Dexterity Test:   L: 2 min 49 sec   R: 7 min 7 sec         HEP  : Instructed pt to use heating pad for 15 min on R hand and then complete stretching to R fingers into flexion to increase joint ROM.        Assessment: Tolerated treatment well. Pt demo G modified pincer pinch of blue resistive clip retrieving small sized items. After task completion, pt reported min/mod tightness in R hand, but tightness decreased with  min manual stretching to fingers into flexion and extension. Patient would benefit from continued skilled OT.    Plan: Continued skilled OT per POC    INTERVENTION COMMENTS:  Diagnosis: Spastic hemiparesis of right dominant side as late effect of cerebral infarction (HCC) [I69.351]  Precautions: R side weakness, R facial pain   Insurance: Payor: RD / Plan: [employer] PPO / Product Type: PPO /   12 of 15 visits through 5/31/25

## 2024-08-08 NOTE — PROGRESS NOTES
PT Re-Evaluation /Progress note    Today's date: 2024  Patient name: Jimmy Mello  : 1960  MRN: [id number]  Referring provider: Depadua, Ashley, MD  Dx:   Encounter Diagnosis     ICD-10-CM    1. Spastic hemiparesis of right dominant side as late effect of cerebral infarction (HCC)  I69.351             Start Time: 08  Stop Time: 930  Total time in clinic (min): 45 minutes    Assessment  Impairments: abnormal coordination, abnormal gait, abnormal muscle tone, abnormal or restricted ROM, abnormal movement, activity intolerance, impaired balance, impaired physical strength, lacks appropriate home exercise program, safety issue and weight-bearing intolerance    Assessment details: Patient is a 63 y.o. year old male presenting to OPPT s/p diagnosis of chronic CVA. Dank continues to demonstrate improvement in his walking speed and overall balance and strength. FGA is 29/30 with deficits with narrow base of support, likely due to tonal differences in his RLE vs LLE. Gait speed self-selected is improved, however, still shows deficits at fast pace which leaves him at risk for falls while crossing a crosswalk. 6MWT displays no improvement, however, I suspect this is due to external constraints in clinic. He may continue to benefit from botox as he is displaying more tone during ambulation with catching his R foot, despite AFO. Dank will continue to benefit from skilled PT through the month of August.        Prognosis: good    Goals  In 4 weeks, patient will:  1. Improve 6MWT by additional 60ft to meet MCID value for persons post stroke to demonstrate improved community mobility. - progressing  2. Demonstrate improved gait and balance performance with use of R PLS AFO as measured by - progressing    In 8 weeks, patient will:  1.  Improve fast gait speed by at least 0.14 m/s to improve safety crossing crosswalk (1.2m/s) - progressing  2.  Improve ABC by at least 10 points to show improved balance confidence.-  progressing  3.  Improve gait speed by at least 0.16 m/s to meet MCID value for persons with CVA. - Progressing  4.  Improve TUGM by at least 2 seconds to show improved motor dual task balance. - progressing        Plan  Patient would benefit from: skilled physical therapy    Planned therapy interventions: neuromuscular re-education, manual therapy, patient education, home exercise program, therapeutic exercise, therapeutic activities and gait training    Frequency: 2x week  Duration in weeks: 12  Plan of Care beginning date: 8/8/2024  Plan of Care expiration date: 9/7/2024  Treatment plan discussed with: patient        Subjective Evaluation    History of Present Illness  Mechanism of injury: 8/1: Dank continues to feel PT is helpful for his gait and balance. He has been staying active with work on his property and walking as much as he can.    7/2: Dank feels like his RLE is getting stronger and his balance is improving. AFO is helpful for clearing his foot. Has to try harder to make sure his foot is not turning out.    5/31: Dank feels like PT is helping. He notcies improved balance and endurance. Has been doing more relating to work and staying busy. Recently got AFO.     Present in PT: reports been doing quite well since not in PT. No falls. Using a recumbent bike daily or every other day. Wants to improve his walking speed more and improve his strength. Ankle still locking up, wears boot at night feels like it helps. He feels like he is progressing well and wants to continue working towards his walking speed as he feels that will also help with his balance. Working as he is able and visiting [employer]. No ad in home or outside of home.  Patient Goals  Patient goals for therapy: improved balance, increased strength and increased motion    Pain  No pain reported      Diagnostic Tests  No diagnostic tests performed  Treatments  Previous treatment: physical therapy, occupational therapy and speech  therapy        Objective    Balance Test 2/28 4/3 5/31 7/2 8/8   6 Minute Walk Test (ft): 1118ft 1300 1350ft 1350ft R AFO R AFO 1296ft   FGA:    Gait Speed (m/s): SSGS: 0.86m/s  Fast: 1.00   Fast: 1.00 m/s  SSGS: 0.94 m/s  Fast: 1.06 m/s SSGS: 1.00  Fast: 1.17 m/s SSGS: 1.06m/s  Fast: 1.17m/s    5x Sit To Stand (s): 11.06 10.22 06.88     30s chair stand 13 14 18 18 20   ABC: 78% 77%  78%    TU.73s  10.66 08.98 / TUGM 09.86 TUGM 08.74          MCTSIB Number of Seconds   Feet Together, Eyes Open 30   Feet Together, Eyes Closed 30   Feet Together, Eyes Open Foam 30   Feet Together, Eyes Closed Foam 7 1st try, 30 2nd try         Muscle Tone Left Right   Modified Aguilar Scale     Hamstring  1+   Gastroc  8 beats of clonus   Quad         Manual Muscle Testing - Hip Left Right   Flexion 5 4   Extension 5 4-   Abduction 5 4-   External Rotation       Manual Muscle Testing - Knee Left Right   Flexion     Extension 5 5     Manual Muscle Testing - Ankle Left Right   Doriflexion 5 3-   Plantarflexion 5 5        Transfers    Sit To Stand Independent   W/C To Bed    Sit To Supine    Roll          Gait Assessment: Slight steppage and circumduction to assist for clearance on R side due to inadequate DF. Potentially inadequate knee ext in mid & terminal stance, difficult to assess with long pants on. Inadequate knee ext in terminal swing/IC.    : PLS AFO donned. Better foot clearance on R with AFO. Still inadequate knee ext in mid/term swing. [place] R stance with lacking full hip ext in term stance.    : PLS AFO helps with foot clearance. More R inversion observed with occasional ER. Continues to lack full hip ext in terminal stance.         Precautions: Falls  Re-eval Date:  2024    Date        Visit Count 36 37       FOTO         Pain In         Pain Out                 Testing         TUG 8.74 TUGM        5xSTS 9.72 s        Gait speed SGSS: 1.06  Fast: 1.17 SSGS: 1.06  Fast: 1.17        FGA 29/30        2/6MWT 1350 ft         Neuro Re-Ed  6MWT, FGA, 30s chair rise, 10mWT       Dynamic balance Ortho side timed laps 6 laps PB 32.5 seconds 12# weighted vest    Stair climibing 1UE support skipping step x4 laps 12# weighted vest + 5lb RLE    High moose LLE step over 10 laps 12# weighted vest    Bwd stepping HT 20ft x8 laps 12# weighted vest    Lat step ball toss 20ft x10 laps 12# weighted vest     SOLO bounding 4 laps    SOLO bwd hopping 2 laps       Static balance         Obstacle course         Resisted ambulation         Runner step up         Plyometric X10 jump squat                 Ther Ex         Leg press                                                                        Ther Activity         ADL                  Gait Training         TM 1.2mph 8% 4-6/10 RPE 7min 12#weighted vest        Head turns         PLS AFO         Stairs         Modalities

## 2024-08-14 ENCOUNTER — TELEPHONE (OUTPATIENT)
Dept: NEUROLOGY | Facility: CLINIC | Age: 64
End: 2024-08-14

## 2024-08-14 NOTE — TELEPHONE ENCOUNTER
Patient scheduled for next available appointment for botox with Dr Depadua. Booked for 10/23/24 at 4pm.  Will monitor for cancellations and offer appointments when available. Patient is very unhappy having to wait so long for appointment.

## 2024-08-15 ENCOUNTER — APPOINTMENT (OUTPATIENT)
Facility: CLINIC | Age: 64
End: 2024-08-15
Payer: COMMERCIAL

## 2024-08-22 ENCOUNTER — OFFICE VISIT (OUTPATIENT)
Facility: CLINIC | Age: 64
End: 2024-08-22
Payer: COMMERCIAL

## 2024-08-22 DIAGNOSIS — I69.351 SPASTIC HEMIPARESIS OF RIGHT DOMINANT SIDE AS LATE EFFECT OF CEREBRAL INFARCTION (HCC): Primary | ICD-10-CM

## 2024-08-22 PROCEDURE — 97112 NEUROMUSCULAR REEDUCATION: CPT

## 2024-08-22 PROCEDURE — 97168 OT RE-EVAL EST PLAN CARE: CPT

## 2024-08-22 NOTE — PROGRESS NOTES
OCCUPATIONAL THERAPY RE-EVALUATION        24  Jimmy Mello  1960  [id number]  Depadua, Ashley, MD   Diagnosis ICD-10-CM Associated Orders   1. Spastic hemiparesis of right dominant side as late effect of cerebral infarction (HCC)  I69.351               Assessment/Plan      SKILLED ANALYSIS:    Pt is a 63 y.o. male referred to Occupational Therapy s/p Spastic hemiparesis of right dominant side as late effect of cerebral infarction (HCC) [I69.351].  Pt participated in 12 sessions of skilled OT focused on improving ROM, strength, and coordination of the RUE. Pt with G progress towards his goals. Pt's has increased AROM in R shoulder flexion. Pt has maintained strength in RUE since last re-evaluation. Pt with improved R hand strength in gross grasp, 3 jaw belia pinch, and lateral pinch, see grid below. During therapy sessions, pt is able to achieve full fist with PROM of D2 and D3 of R hand. Pt's scores on FM testing of R hand has decreased for the 9 hole peg test, O'Juanjose Dexterity, and Keyhole Peg test. Despite decreased scores, pt demo G in-hand manipulations of small and medium sized objects. Additionally, pt demo G prehension patterns using blue resistive clip during item retrieval and placement. Pt does cont to present with the following areas of deficit: FMC/FMS, GMC/GMS, hypertonicity, hand to target accuracy, and in hand manipulation/dexterity impacting indep and completion of ADL/IADL and leisure tasks.  Pt does demo the need for cont skilled Occupational Therapy services 2x/week for 12 weeks with focus on ADL re-training, IADL re-training, UE NMR, UE strengthening, UE endurance, FMC/GMC, family training/education, healthy coping education, leisure pursuits, and community re-integration to address the goals as listed below. Pt in agreement with POC, POC to  24 .      Short Term Goals (to be achieved within 4 weeks):     Pt will improve R hand motor control so as to be able to manipulate  medium and small sized objects with min to no difficulty during ADLs/IADLs and leisure activities. MET   Pt will use RUE as dominant in 90% of functional tasks, to increase ease and independence with completion of ADLs/ IADLs and leisure tasks. PROGRESSING    Pt will increase R UE rate of manipulation for all FM tests for improved functional performance/independence with functional tasks x 25% PROGRESSING    Pt will increase R UE strength to 4/5,  strength by 3-5 lbs and pinch strength by 1-2 pounds, through the use of strengthening exercises and home program for eventual return to IADLs and life roles. MET       Long Term Goals (to be achieved within 12 weeks):  Pt will increase R UE strength to 4+/5 and  strength by 5-7 lbs and pinch strength by 2-3 pounds, through the use of strengthening exercises and home program PROGRESSING    Pt will improve RUE GMC/FMC, so as to be able to return to using RUE as dominant in 100% of daily functional tasks PROGRESSING    Pt will demo G carryover of Home Exercise Program to improve functional progression towards goals in Plan of care and for improved functional use of RUE ONGOING         SUBJECTIVE      SUBJECTIVE: pt reporting having a fall a couple weeks; pt reports still having soreness on L side of ribs when pt gets stands up and sits down     PATIENT GOAL: pt reports wanting to work on his R arm and improve functional use and strength of his R hand        HISTORY OF PRESENT ILLNESS:     Pt is a 63 y.o. male who was referred to Occupational Therapy s/p  Spastic hemiparesis of right dominant side as late effect of cerebral infarction (HCC) [I69.351]. 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 Pt with [facility] stay from 3/6-3/31, with last fall on 3/31 in the hospital. Pt was in OPOT from 4/6-7/18/23. Pt now returns for OPOT for therapy for cont RUE deficits.         PMH:   Past Medical History:   Diagnosis Date     Adjustment disorder with depressed mood 03/01/2023    RBAULIO (acute kidney injury) (HCC)     B12 deficiency     Celiac artery stenosis (HCC)     Cerebellar infarct (HCC) 02/25/2023    CKD (chronic kidney disease) stage 2, GFR 60-89 ml/min     Essential hypertension     Impaired fasting glucose     Iron deficiency anemia     Neurogenic bowel     Stenosis of left vertebral artery     Stroke (HCC)     Vertebral artery dissection (HCC)        Past Surgical Hx:   Past Surgical History:   Procedure Laterality Date    ARTERY SURGERY      vertebral artery stent/revascularization    IR STROKE ALERT  2/26/2023        HOME SETUP/ CLOF: lives with wife, dtr and ANGEL and son; lives on [address] acres of property; 2 SH; bed on 2nd floor with tub shower combo; powder room on 1st floor    ADLs: I with bathing; I with dressing, occ has A with R sock; able to manage R slip on shoe; I with grooming; I with eating but does have some difficulty with manipulating utensils and cutting; I with clothing fasteners; Update: I with eating utensils     IADLs: pt's wife completes IADLs; pt does outdoor work including mowing lawn and working on the equipment;    (+)  but has not driven since CVA    Functional Mobility: I without AD    Work: Has not worked since the CVA; pt was an ; was working 40+ hours; 6 days a week and was a . 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[employer], including knot tying    Physical activity/ leisure engagement: was walking and using stationary bike; goes to the firehouse once every 2 weeks, goes for meetings  Animals: 2 dogs, 2 cats upstairs and 4 cats downstairs    DME: owns a w/c, BSC, hemiwalker, transfer tub bench     Falls: 1 fall since re-assessment; (-) injury or head strike    Pain Levels:   Facial pain: fluctuates frequently, with worst at 10/10 and at best 1/10; current 2/10; pain increases every time he stands  Wrist pain: worst pain 9/10; best pain: 0/10; currently 0/10          OBJECTIVE         PHYSICAL ASSESSMENT    Botox injections: R wrist, R shoulder, occ R foot      RUE LUE Comments                 UPPER EXTREMITY FXN Impaired Intact Dominant Hand: Right handed but does use LUE more since CVA                 UE ROM LUE AROM  RUE PROM  RUE AROM COMMENTS   Shoulder Flex WNL  3/4 range  3/4 range (Was 100 degrees)    Shoulder Ext WNL  WNL WNL    Shoulder ABD WNL  3/4 range  1/2 range     Horizontal ABD WNL  WNL WNL    Horizontal ADD WNL  WNL WNL    Shoulder IR  WNL  WFL  WFL     Shoulder ER WNL WFL  3/4 range    Elbow Flex WNL WNL WNL    Elbow Ext  WNL WNL WNL    Wrist flexion WNL WNL WNL    Wrist Ext WNL WNL 3/4 range    Supination WNL WNL 3/4 range    Pronation WNL WNL WNL    Finger Flex WNL Full range with stretching D2, D3 at 3/4 range, D1, D4, D5 WNL     Finger Ext WNL WNL WNL    Opposition  WNL WNL WNL                               MMT  LUE RUE   Comments   Shoulder Flex/Ext 5/5 4+/5    Shoulder Abd 5/5 4/5     Shoulder Add 5/5 4+/5     Shoulder ER 5/5 4+/5     Shoulder IR 5/5 4+/5     Elbow Flex 5/5 5/5     Elbow Ext 5/5 5/5     Wrist Flex 5/5 5/5     Wrist Ext 5/5 4+/5     Gross Grasp 5/5 4+/5      *RUE strength within ROM limitations                 /Pinch Strength  LUE  RUE    Comments   Dynamometer      - Gross Grasp 80 lb (was 95 lb)   38 lb (was 36 lb)    Pinch Meter       - PINCER 25 lb (was 18 lb)   10 lb (no change)     - 3 JAW MUNDO 23 lbs (was 22 lbs) 12 (was 11 lbs)     - LATERAL 26 lbs (was 28 lbs)  19 (was 18 lbs)      SENSATION LUE RUE Comments   Sharp Dull  Intact Impaired    Proprioception Intact Intact    Pain Intact Intact    Hot/Cold Temp Intact Impaired Decreased on R side     Comments:     COORDINATION LUE RUE    9 Hole Peg Test 27 seconds (no change) 44 sec (was 35 seconds)    Keyhole Peg Test   2 min 6 sec  (Was 1 min 40 sec)   4 min 41 sec (Was 4 min 11 sec min)    O'Juanjose Finger Dexterity Test (5 rows) NT   (was 2 min 46 sec) 6 min 1 sec    (was 4 min 54 sec) LUE not tested due to time constraints    Handwriting (Print)  G+ legibility     Handwriting (script)   G+ legibility (Was G legibility)             COGNITIVE ASSESSMENT    Memory: Intact  Attention: Intact  Executive Functions: Intact  Communication: Intact   Processing: Intact             VISUAL ASSESSMENT      Vision Screen     ROM Intact   Tracking Intact   Saccades Intact   Convergence/ Divergence Intact   Visual Fields  Intact             PLANNED THERAPY INTERVENTIONS:  Therapeutic activity  Therapeutic exercise  Neuromuscular re-education   ADL re-training   IADL re-training  Supine, seated, and in stance neuro re-ed  FMC/prehension  Timed Trials  Manual tx  Hand to target  Sensory re-ed  WBearing strategies   Closed chain activities  Open chain activities           INTERVENTION COMMENTS:  Diagnosis: Spastic hemiparesis of right dominant side as late effect of cerebral infarction (HCC) [I69.351]  Precautions: R side weakness, R facial pain   Insurance: Payor: GURPREET CRANDALL / Plan: INDEPENDENCE PERSONAL CHOICE / Product Type: Blue PPO /   13 of 15 visits through 12/31/24

## 2024-08-22 NOTE — PROGRESS NOTES
Daily Note     Today's date: 2024  Patient name: Jimmy Mello  : 1960  MRN: [id number]  Referring provider: Depadua, Ashley, MD  Dx:   Encounter Diagnosis     ICD-10-CM    1. Spastic hemiparesis of right dominant side as late effect of cerebral infarction (HCC)  I69.351           Start Time: 08  Stop Time: 929  Total time in clinic (min): 43 minutes    Subjective: Dank reports side soreness is diminishing. Bothers him when sitting and standing but not while walking.      Objective: See treatment diary below      Assessment:  Continue to challenge R stance, higher step he is not as successful with R sided weight shift. He had better motor performance with stepping over hurdles vs runner step up. Slower backward stepping with weighted ball. Performs agility ladder with good adjustment in postural awareness vs just reaching for subssequent ladder rung. Patient would benefit from continued PT      Plan: Continue per plan of care. Continue R limb adv. R propulsion.     Precautions: Falls  Re-eval Date:  2024    Date       Visit Count 36 37 38      FOTO         Pain In         Pain Out                 Testing         TUG 8.74 TUGM        5xSTS 9.72 s        Gait speed SGSS: 1.06  Fast: 1.17 SSGS: 1.06  Fast: 1.17       FGA /30        2/6MWT 1350 ft         Neuro Re-Ed  6MWT, FGA, 30s chair rise, 10mWT       Dynamic balance Ortho side timed laps 6 laps PB 32.5 seconds 12# weighted vest    Stair climibing 1UE support skipping step x4 laps 12# weighted vest + 5lb RLE    High moose LLE step over 10 laps 12# weighted vest    Bwd stepping HT 20ft x8 laps 12# weighted vest    Lat step ball toss 20ft x10 laps 12# weighted vest     SOLO bounding 4 laps    SOLO bwd hopping 2 laps Agility ladder 7.5#R fwd 8 laps, lat step 6 laps CGA    Bwd stepping 8lb med ball carry 20ft x8 SOLO    High hurdles LLE step over R stance focusSOLO 10 laps      Static balance         Obstacle course         Resisted  ambulation   L resisted SOLO peach TB 10 laps      Runner step up   6' step x30 SOLO      Plyometric X10 jump squat                 Ther Ex         Leg press                                                                        Ther Activity         ADL                  Gait Training         TM 1.2mph 8% 4-6/10 RPE 7min 12#weighted vest        Head turns         PLS AFO         Stairs         Modalities

## 2024-08-29 ENCOUNTER — OFFICE VISIT (OUTPATIENT)
Facility: CLINIC | Age: 64
End: 2024-08-29
Payer: COMMERCIAL

## 2024-08-29 DIAGNOSIS — I69.351 SPASTIC HEMIPARESIS OF RIGHT DOMINANT SIDE AS LATE EFFECT OF CEREBRAL INFARCTION (HCC): Primary | ICD-10-CM

## 2024-08-29 PROCEDURE — 97112 NEUROMUSCULAR REEDUCATION: CPT

## 2024-08-29 NOTE — PROGRESS NOTES
Occupational Therapy Daily Note:    Today's date: 2024  Patient name: Jimmy Mello  : 1960  MRN: [id number]  Referring provider: Depadua, Ashley, MD  Dx:   Encounter Diagnosis   Name Primary?    Spastic hemiparesis of right dominant side as late effect of cerebral infarction (HCC) Yes       Subjective: No new complaints     Objective: Pt engaged in skilled OT treatment session with focus on UE NMR, UE strengthening, UE endurance, and FMC/GMC to increase engagement, endurance, tolerance, and independence with daily ADL and IADL tasks.     CPT Code Minutes                                           Task Details        Therapeutic Activity               Neuro Re-Ed  NMRE to RUE:      Item Transfer and dexterity:   -Able to use RUE to retrieve small pegs from tabletop surface and place upside down within medium sized rings. Pt with G functional pincer to  pegs; G/G- dexterity to orient pegs for placement and G+ target accuracy.     -Able to use RUE to retrieve pegs from rings and transfer to slanted small pegboard. Pt with G+ functional reach; G+ functional pincer to  pegs; min difficulty with dexterity to manipulate pegs in fingertips and G target accuracy to place in pegboard.     In-hand manipulations:  -pt able to translate small items from palm to fingertips with G dexterity and then place on top of small pegs; completed with G prehension patterns; G target accuracy.                      Therapeutic Exercise  Completed PROM with LPS to R hand into MCP and PIP flexion at D2 and D3 to improve ROM and achieve full fist. Able to achieve full ROM after stretching                   Testing  3/22:  Keyhole Peg Test:  L: 1 min 40 sec   R: 6 min     O'Juanjose Finger Dexterity Test:   L: 2 min 49 sec   R: 7 min 7 sec         HEP  : Instructed pt to use heating pad for 15 min on R hand and then complete stretching to R fingers into flexion to increase joint ROM.        Assessment: Tolerated  treatment well. Pt with increased finger tightness at start of session; noted increased ROM after stretching. Completed FMC tasks with G+ prehension patterns and in-hand manipulations. Patient would benefit from continued skilled OT.    Plan: Continued skilled OT per POC    INTERVENTION COMMENTS:  Diagnosis: Spastic hemiparesis of right dominant side as late effect of cerebral infarction (HCC) [I69.351]  Precautions: R side weakness, R facial pain   Insurance: Payor: GURPREET CRANDALL / Plan: [employer]END[employer] PERSONAL CHOICE / Product Type: Blue PPO /   14 of 15 visits through 12/31/243

## 2024-08-29 NOTE — PROGRESS NOTES
Daily Note/unplanned discharge     Today's date: 2024  Patient name: Jimmy Mello  : 1960  MRN: [id number]  Referring provider: Depadua, Ashley, MD  Dx:   Encounter Diagnosis     ICD-10-CM    1. Spastic hemiparesis of right dominant side as late effect of cerebral infarction (HCC)  I69.351             Start Time: 0845  Stop Time: 930  Total time in clinic (min): 45 minutes    Subjective: Dank reports side soreness is still there but getting better, still bothers with overhead movement and rolling onto that side. Feels comfortable stopping PT at this time due to insurance visit restriction. Knows he needs to just keep moving and being active.       Objective: See treatment diary below      Assessment:  Challenged with reactive balance when letting go of resistance during resisted walking, but able to remain upright with 1-2 steps to recover stability. Good moose navigation, some postural instability exhibited during backward navigation of hurdles. Min R foot catching while advancing RLE over hurdles. Still shows instability on RLE runner step up, likely due to ankle and hip weakness. With trek pole better stance time but doesn't seem to achieve full ext through limb. Due to insurance visits, patient to be discharged from skilled PT at this time despite likely benefiting from continuing. PT Goals unmet.      Plan: Dc from skilled PT     Precautions: Falls  Re-eval Date:  2024    Date      Visit Count 36 37 38 39     FOTO         Pain In         Pain Out                 Testing         TUG 8.74 TUGM        5xSTS 9.72 s        Gait speed SGSS: 1.06  Fast: 1.17 SSGS: 1.06  Fast: 1.17       FGA         2/6MWT 1350 ft         Neuro Re-Ed  6MWT, FGA, 30s chair rise, 10mWT       Dynamic balance Ortho side timed laps 6 laps PB 32.5 seconds 12# weighted vest    Stair climibing 1UE support skipping step x4 laps 12# weighted vest + 5lb RLE    High moose LLE step over 10 laps 12# weighted  vest    Bwd stepping HT 20ft x8 laps 12# weighted vest    Lat step ball toss 20ft x10 laps 12# weighted vest     SOLO bounding 4 laps    SOLO bwd hopping 2 laps Agility ladder 7.5#R fwd 8 laps, lat step 6 laps CGA    Bwd stepping 8lb med ball carry 20ft x8 SOLO    High hurdles LLE step over R stance focusSOLO 10 laps Med hurdles step through fwd 8 laps, 6 laps bwd step to SOLO    8' step up 8lb med ball x10 ea LE    Foam wedge incline/decline SOLO 8 laps         Static balance         Obstacle course         Resisted ambulation   L resisted SOLO peach TB 10 laps Fwd/bwd resisted w perturbation or letting go resistance 20ft x8 laps SOLO, lat step 20ft x6 laps      Runner step up   6' step x30 SOLO 6' step 2x10 SOLO 1 trek pole     Plyometric X10 jump squat                 Ther Ex         Leg press                                                                        Ther Activity         ADL                  Gait Training         TM 1.2mph 8% 4-6/10 RPE 7min 12#weighted vest        Head turns         PLS AFO         Stairs         Modalities

## 2024-09-05 ENCOUNTER — APPOINTMENT (OUTPATIENT)
Facility: CLINIC | Age: 64
End: 2024-09-05
Payer: COMMERCIAL

## 2024-09-10 DIAGNOSIS — G89.29 CENTRAL SENSITIZATION TO PAIN: ICD-10-CM

## 2024-09-10 NOTE — TELEPHONE ENCOUNTER
Last visit Dr. Del Real 5/31  Pharmacy confirmed need new prescription:  filled in May, July 5 and August 2    Dr. Del Real, are you dima to refill?

## 2024-09-11 RX ORDER — NALTREXONE HYDROCHLORIDE 50 MG/1
TABLET, FILM COATED ORAL
Qty: 30 TABLET | Refills: 3 | Status: SHIPPED | OUTPATIENT
Start: 2024-09-11

## 2024-09-12 ENCOUNTER — OFFICE VISIT (OUTPATIENT)
Facility: CLINIC | Age: 64
End: 2024-09-12
Payer: COMMERCIAL

## 2024-09-12 DIAGNOSIS — I69.351 SPASTIC HEMIPARESIS OF RIGHT DOMINANT SIDE AS LATE EFFECT OF CEREBRAL INFARCTION (HCC): Primary | ICD-10-CM

## 2024-09-12 PROCEDURE — 97112 NEUROMUSCULAR REEDUCATION: CPT

## 2024-09-12 PROCEDURE — 97110 THERAPEUTIC EXERCISES: CPT

## 2024-09-12 NOTE — PROGRESS NOTES
Occupational Therapy Daily Note and Discharge     Today's date: 2024  Patient name: iJmmy Mello  : 1960  MRN: [id number]  Referring provider: Depadua, Ashley, MD  Dx:   Encounter Diagnosis   Name Primary?    Spastic hemiparesis of right dominant side as late effect of cerebral infarction (HCC) Yes       Subjective: pt reporting no change in facial pain    Objective: Pt engaged in skilled OT treatment session with focus on UE NMR, UE strengthening, UE endurance, and FMC/GMC to increase engagement, endurance, tolerance, and independence with daily ADL and IADL tasks.     CPT Code Minutes                                           Task Details        Therapeutic Activity  Handwriting:   -G tripod grasp on pen  -G legibility with script to write name              Neuro Re-Ed  NMRE to RUE:    FMC: nut/bolt assembly  -able to manipulate large, medium and small size nuts onto bolts, using R hand to manipulate the nuts; completed with min droppage with small nuts and min difficulty with dexterity with small nuts; min to no difficulty with medium and large sized nuts/ bolts.                      Therapeutic Exercise  Completed PROM with LPS to R hand into MCP and PIP flexion at D2 and D3 to improve ROM and achieve full fist. Able to achieve full ROM after stretching                   Testing  3/22:  Keyhole Peg Test:  L: 1 min 40 sec   R: 6 min     O'Juanjose Finger Dexterity Test:   L: 2 min 49 sec   R: 7 min 7 sec         HEP  : Instructed pt to use heating pad for 15 min on R hand and then complete stretching to R fingers into flexion to increase joint ROM.        Assessment: Tolerated treatment well. Pt with G progress with RUE ROM and dexterity. Pt with increased use of RUE during all functional tasks; does have cont FM deficits with manipulation of small items. Pt without further insurance visits for therapy, therefore being dc from OT. Pt has been issued HEP and remains active at the [employer], with  home chores and leisure activities.     Plan: Discharge OT services    INTERVENTION COMMENTS:  Diagnosis: Spastic hemiparesis of right dominant side as late effect of cerebral infarction (HCC) [I69.351]  Precautions: R side weakness, R facial pain   Insurance: Payor: A4 Data / Plan: INDEPENDENCE PERSONAL CHOICE / Product Type: Blue PPO /   15 of 15 visits through 12/31/243

## 2024-09-19 ENCOUNTER — APPOINTMENT (OUTPATIENT)
Facility: CLINIC | Age: 64
End: 2024-09-19
Payer: COMMERCIAL

## 2024-09-19 DIAGNOSIS — D64.9 ANEMIA, UNSPECIFIED TYPE: ICD-10-CM

## 2024-09-19 RX ORDER — FERROUS SULFATE 325(65) MG
1 TABLET ORAL
Qty: 30 TABLET | Refills: 0 | Status: SHIPPED | OUTPATIENT
Start: 2024-09-19

## 2024-09-20 ENCOUNTER — TELEPHONE (OUTPATIENT)
Dept: NEUROLOGY | Facility: CLINIC | Age: 64
End: 2024-09-20

## 2024-09-20 NOTE — TELEPHONE ENCOUNTER
Botox number of units: 100  Botox quantity: 5  Arrived at what location: [facility]  Botox at [facility]: yes  NDC number: [id number]  Lot number:[id number]  Expiration Date: 10/26  Appt notes indicate correct medication:  yes

## 2024-09-26 ENCOUNTER — APPOINTMENT (OUTPATIENT)
Facility: CLINIC | Age: 64
End: 2024-09-26
Payer: COMMERCIAL

## 2024-10-01 ENCOUNTER — OFFICE VISIT (OUTPATIENT)
Dept: NEUROLOGY | Facility: CLINIC | Age: 64
End: 2024-10-01
Payer: COMMERCIAL

## 2024-10-01 VITALS
HEART RATE: 59 BPM | SYSTOLIC BLOOD PRESSURE: 122 MMHG | BODY MASS INDEX: 28.87 KG/M2 | WEIGHT: 194.9 LBS | DIASTOLIC BLOOD PRESSURE: 80 MMHG | HEIGHT: 69 IN

## 2024-10-01 DIAGNOSIS — R51.9 RIGHT FACIAL PAIN: ICD-10-CM

## 2024-10-01 DIAGNOSIS — I77.4 CELIAC ARTERY STENOSIS (HCC): ICD-10-CM

## 2024-10-01 DIAGNOSIS — I69.351 SPASTIC HEMIPARESIS OF RIGHT DOMINANT SIDE AS LATE EFFECT OF CEREBRAL INFARCTION (HCC): ICD-10-CM

## 2024-10-01 DIAGNOSIS — G50.0 TRIGEMINAL NEURALGIA OF RIGHT SIDE OF FACE: ICD-10-CM

## 2024-10-01 DIAGNOSIS — I77.74 VERTEBRAL ARTERY DISSECTION (HCC): ICD-10-CM

## 2024-10-01 DIAGNOSIS — I65.02 STENOSIS OF LEFT VERTEBRAL ARTERY: ICD-10-CM

## 2024-10-01 DIAGNOSIS — Z86.73 HISTORY OF STROKE: Primary | ICD-10-CM

## 2024-10-01 DIAGNOSIS — I63.9 CEREBROVASCULAR ACCIDENT (CVA) (HCC): ICD-10-CM

## 2024-10-01 PROCEDURE — 99214 OFFICE O/P EST MOD 30 MIN: CPT | Performed by: PSYCHIATRY & NEUROLOGY

## 2024-10-01 RX ORDER — OXCARBAZEPINE 150 MG/1
TABLET, FILM COATED ORAL
COMMUNITY
Start: 2024-09-12

## 2024-10-01 RX ORDER — ASPIRIN 325 MG
325 TABLET ORAL DAILY
COMMUNITY

## 2024-10-01 RX ORDER — PREGABALIN 50 MG/1
CAPSULE ORAL
Qty: 30 CAPSULE | Refills: 1 | Status: SHIPPED | OUTPATIENT
Start: 2024-10-01

## 2024-10-01 RX ORDER — ATORVASTATIN CALCIUM 80 MG/1
80 TABLET, FILM COATED ORAL
Qty: 90 TABLET | Refills: 1 | Status: SHIPPED | OUTPATIENT
Start: 2024-10-01

## 2024-10-01 RX ORDER — PREGABALIN 100 MG/1
100 CAPSULE ORAL 2 TIMES DAILY
Qty: 60 CAPSULE | Refills: 2 | Status: SHIPPED | OUTPATIENT
Start: 2024-10-01

## 2024-10-01 NOTE — PROGRESS NOTES
Ambulatory Visit  Name: Jimmy Mello      : 1960      MRN: [id number]  Encounter Provider: Trey Del Real MD  Encounter Date: 10/1/2024   Encounter department: NEUROLOGY [facility]    Assessment & Plan  Trigeminal neuralgia of right side of face    Orders:    pregabalin (LYRICA) 100 mg capsule; Take 1 capsule (100 mg total) by mouth 2 (two) times a day In addition to 50mg in the afternoon    pregabalin (LYRICA) 50 mg capsule; In the afternoon in addition to 100mg BID, increase as directed    Stenosis of left vertebral artery    Orders:    CTA head and neck w wo contrast; Future    BUN; Future    Creatinine, serum; Future    atorvastatin (LIPITOR) 80 mg tablet; Take 1 tablet (80 mg total) by mouth daily with dinner    Comprehensive metabolic panel; Future    Lipid Panel with Direct LDL reflex; Future    BUN    Creatinine, serum    Comprehensive metabolic panel    Lipid Panel with Direct LDL reflex    Right Vertebral artery dissection (HCC)    Orders:    atorvastatin (LIPITOR) 80 mg tablet; Take 1 tablet (80 mg total) by mouth daily with dinner    Spastic hemiparesis of right dominant side as late effect of cerebral infarction (HCC)         Right facial pain         History of stroke    Orders:    Comprehensive metabolic panel; Future    Lipid Panel with Direct LDL reflex; Future    Comprehensive metabolic panel    Lipid Panel with Direct LDL reflex    Acute Posterior Circulation Stroke    Orders:    atorvastatin (LIPITOR) 80 mg tablet; Take 1 tablet (80 mg total) by mouth daily with dinner    Celiac artery stenosis (HCC)    Orders:    atorvastatin (LIPITOR) 80 mg tablet; Take 1 tablet (80 mg total) by mouth daily with dinner        History of Present Illness   Atypical facial pain:  - pain is on the right side, but moves around to back of the head  - since increasing the lyrica (pregabalin) from 50 mg twice/day to 50 mg four/day pain has been better, but not completely eradicated  - no  "symptoms with increased dose of lyrica (pregabalin)  - no associated headaches or diziness  - the newly increased dosage of oxcarbazapine made him loopy, so they have kept dosage at 750 mg TID. He was initially very sleepy at this dose, but this fatigue has improved overtime    Stroke symptoms:  - no new stroke symptoms  - no bleeding or bruising    Mood: okay  Sleep: 6 hours  Appetite: fine      Review of Systems  I have personally reviewed the MA's review of systems and made changes as necessary.      Objective     /80 (BP Location: Right arm, Patient Position: Sitting, Cuff Size: Large)   Pulse 59   Ht 5' 9\" (1.753 m)   Wt 88.4 kg (194 lb 14.4 oz)   BMI 28.78 kg/m²     Physical Exam  Eyes:      Extraocular Movements: EOM normal.      Pupils: Pupils are equal, round, and reactive to light.   Neurological:      Coordination: Finger-Nose-Finger Test normal.      Deep Tendon Reflexes:      Reflex Scores:       Bicep reflexes are 2+ on the right side and 2+ on the left side.       Brachioradialis reflexes are 2+ on the right side and 2+ on the left side.       Patellar reflexes are 2+ on the right side and 2+ on the left side.  Psychiatric:         Speech: Speech normal.       Neurologic Exam     Mental Status   Speech: speech is normal   Level of consciousness: alert  oriented     Cranial Nerves     CN II   Visual fields full to confrontation.     CN III, IV, VI   Pupils are equal, round, and reactive to light.  Extraocular motions are normal.     CN V   Right facial sensation deficit: forehead (reports tingling when touched)  Left facial sensation deficit: none    CN VII   Facial expression full, symmetric.     CN VIII   Hearing: intact    CN IX, X   Palate: symmetric    CN XI   Right trapezius strength: normal  Left trapezius strength: normal    CN XII   Tongue: not atrophic  Tongue deviation: none    Motor Exam   Muscle bulk: normal    Strength   Right deltoid: 5/5  Left deltoid: 5/5  Right biceps: " 5/5  Left biceps: 5/5  Right triceps: 5/5  Left triceps: 5/5  Right iliopsoas: 5/5  Left iliopsoas: 5/5  Right quadriceps: 5/5  Left quadriceps: 5/5  Right hamstrin/5  Left hamstrin/5  Right anterior tibial: 5/5  Left anterior tibial: 5/5    Sensory Exam   Light touch normal.     Gait, Coordination, and Reflexes     Coordination   Finger to nose coordination: normal    Reflexes   Right brachioradialis: 2+  Left brachioradialis: 2+  Right biceps: 2+  Left biceps: 2+  Right patellar: 2+  Left patellar: 2+      Results/Data:  I have reviewed the results and images from the CTA head and neck w wo contrast (10/20/2023) in detail with the patient.

## 2024-10-01 NOTE — ASSESSMENT & PLAN NOTE
Orders:    CTA head and neck w wo contrast; Future    BUN; Future    Creatinine, serum; Future    atorvastatin (LIPITOR) 80 mg tablet; Take 1 tablet (80 mg total) by mouth daily with dinner    Comprehensive metabolic panel; Future    Lipid Panel with Direct LDL reflex; Future    BUN    Creatinine, serum    Comprehensive metabolic panel    Lipid Panel with Direct LDL reflex

## 2024-10-01 NOTE — PROGRESS NOTES
Ambulatory Visit  Name: Jimmy Mello      : 1960      MRN: [id number]  Encounter Provider: Trey Del Real MD  Encounter Date: 10/1/2024   Encounter department: NEUROLOGY [facility]    Assessment & Plan  History of stroke  Patient Instructions   Stroke: Jimmy presents for a follow-up evaluation with regard to his remote history of stroke.  He has not had any new recent strokelike symptoms and is taking his medication as prescribed.  He has been transitioned from Brilinta plus low-dose aspirin to a single full dose aspirin.  -For stroke prevention he should continue his combination of aspirin, Lipitor, and appropriate blood pressure and glycemic control  -Given that his most recent LDL cholesterol was in the 90s we will increase Lipitor to 80 mg and recheck a cholesterol panel and comprehensive metabolic panel in 3 months.  The target will be LDL of less than 70.  -We will defer to his primary care team for monitoring of his blood sugar numbers and blood pressure we do recommend he occasionally check his blood pressure away from the doctor's office  -He will continue to work with the physical medicine and rehab team with regard to Botox for his right spastic hemiparesis.  In the future, it may be reasonable to consider a trial of reducing baclofen but only at a time when the Botox is quite stable, preferably midcycle  -As result of his vertebral artery stenosis/stent we will plan for repeat CT angiogram of the head and neck.    Atypical facial pain: He reports that he is doing somewhat better on his current dose of Lyrica which is 50 mg 4 times per day for total dose of 200 mg.  He had side effects with increased dosing of oxcarbazepine beyond his current level so that medicine is maxed out  -For the time being he should continue his combination of oxcarbazepine, baclofen, and we will plan to increase Lyrica to 100 mg twice per day (morning and night) with an extra 50 mg in the afternoon.   If he notices that he is more tired or dizzy on the higher dose he can start by eliminating the midday dose  -For the time being, given that he is doing a little bit better clinically we will plan to defer a trial of Botox but in the future it could be utilized  -Once he is stable and in a good place with regard to facial pain we will consider a trial of reducing oxcarbazepine    He will return to the office to see me directly in 6 months but I would be happy to see him sooner if the need should arise.  He should contact our office in no more than 4 weeks to let me know how he is doing on the higher dose of Lyrica.  If he were to have strokelike symptoms such as sudden painless loss of vision or double vision, difficulty speaking or swallowing, vertigo/room spinning that does not quickly resolve, or new weakness/numbness/loss of coordination affecting 1 side of the face or body he should proceed by ambulance to the nearest emergency room immediately.       Orders:    Comprehensive metabolic panel; Future    Lipid Panel with Direct LDL reflex; Future    Comprehensive metabolic panel    Lipid Panel with Direct LDL reflex    Trigeminal neuralgia of right side of face    Orders:    pregabalin (LYRICA) 100 mg capsule; Take 1 capsule (100 mg total) by mouth 2 (two) times a day In addition to 50mg in the afternoon    pregabalin (LYRICA) 50 mg capsule; In the afternoon in addition to 100mg BID, increase as directed    Stenosis of left vertebral artery    Orders:    CTA head and neck w wo contrast; Future    BUN; Future    Creatinine, serum; Future    atorvastatin (LIPITOR) 80 mg tablet; Take 1 tablet (80 mg total) by mouth daily with dinner    Comprehensive metabolic panel; Future    Lipid Panel with Direct LDL reflex; Future    BUN    Creatinine, serum    Comprehensive metabolic panel    Lipid Panel with Direct LDL reflex    Right Vertebral artery dissection (HCC)    Orders:    atorvastatin (LIPITOR) 80 mg tablet; Take 1  tablet (80 mg total) by mouth daily with dinner    Spastic hemiparesis of right dominant side as late effect of cerebral infarction (HCC)         Right facial pain         Acute Posterior Circulation Stroke    Orders:    atorvastatin (LIPITOR) 80 mg tablet; Take 1 tablet (80 mg total) by mouth daily with dinner    Celiac artery stenosis (HCC)    Orders:    atorvastatin (LIPITOR) 80 mg tablet; Take 1 tablet (80 mg total) by mouth daily with dinner        History of Present Illness   HPI    I reviewed, personally confirmed, and agree with the history as documented by the medical student.  Documentation reflects my recommended edits    History of Present Illness   Atypical facial pain:  - pain is on the right side, but moves around to back of the head  - since increasing the lyrica (pregabalin) from 50 mg twice/day to 50 mg four/day pain has been better, but not completely eradicated  - no symptoms with increased dose of lyrica (pregabalin)  - no associated headaches or diziness  - the newly increased dosage of oxcarbazapine made him loopy, so they have kept dosage at 750 mg TID. He was initially very sleepy at this dose, but this fatigue has improved overtime     Stroke symptoms:  - no new stroke symptoms  - no bleeding or bruising     Mood: okay  Sleep: 6 hours  Appetite: fine        Review of Systems  I have personally reviewed the MA's review of systems and made changes as necessary.       Review of Systems   Constitutional:  Negative for appetite change, fatigue and fever.   HENT: Negative.  Negative for hearing loss, tinnitus, trouble swallowing and voice change.    Eyes: Negative.  Negative for photophobia, pain and visual disturbance.   Respiratory: Negative.  Negative for shortness of breath.    Cardiovascular: Negative.  Negative for palpitations.   Gastrointestinal: Negative.  Negative for nausea and vomiting.   Endocrine: Negative.  Negative for cold intolerance.   Genitourinary: Negative.  Negative for  "dysuria, frequency and urgency.   Musculoskeletal:  Negative for back pain, gait problem, myalgias, neck pain and neck stiffness.   Skin: Negative.  Negative for rash.   Allergic/Immunologic: Negative.    Neurological: Negative.  Negative for dizziness, tremors, seizures, syncope, facial asymmetry, speech difficulty, weakness, light-headedness, numbness and headaches.   Hematological: Negative.  Does not bruise/bleed easily.   Psychiatric/Behavioral: Negative.  Negative for confusion, hallucinations and sleep disturbance.      I have personally reviewed the MA's review of systems and made changes as necessary.      Objective     /80 (BP Location: Right arm, Patient Position: Sitting, Cuff Size: Large)   Pulse 59   Ht 5' 9\" (1.753 m)   Wt 88.4 kg (194 lb 14.4 oz)   BMI 28.78 kg/m²     Physical Exam  Neurologic Exam    I was present for, directly observed, and agree with the exam as documented by the medical student.  Documentation below reflects my recommended edits.       Objective      /80 (BP Location: Right arm, Patient Position: Sitting, Cuff Size: Large)   Pulse 59   Ht 5' 9\" (1.753 m)   Wt 88.4 kg (194 lb 14.4 oz)   BMI 28.78 kg/m²      Physical Exam  Eyes:      Extraocular Movements: EOM normal.      Pupils: Pupils are equal, round, and reactive to light.   Neurological:      Coordination: Finger-Nose-Finger Test normal.      Deep Tendon Reflexes:      Reflex Scores:       Bicep reflexes are 2+ on the right side and 2+ on the left side.       Brachioradialis reflexes are 2+ on the right side and 2+ on the left side.       Patellar reflexes are 2+ on the right side and 2+ on the left side.  Psychiatric:         Speech: Speech normal.         Neurologic Exam      Mental Status   Speech: speech is normal   Level of consciousness: alert  oriented      Cranial Nerves      CN II   Visual fields full to confrontation.      CN III, IV, VI   Pupils are equal, round, and reactive to " light.  Extraocular motions are normal.      CN V   Right facial sensation deficit: forehead (reports tingling when touched)  Left facial sensation deficit: none     CN VII   Facial expression full, symmetric.      CN VIII   Hearing: intact     CN IX, X   Palate: symmetric     CN XI   Right trapezius strength: normal  Left trapezius strength: normal     CN XII   Tongue: not atrophic  Tongue deviation: none     Motor Exam   Muscle bulk: normal     Strength   Right deltoid: 5/5  Left deltoid: 5/5  Right biceps: 5/5  Left biceps: 5/5  Right triceps: 5/5  Left triceps: 5/5  Right iliopsoas: 5/5  Left iliopsoas: 5/5  Right quadriceps: 5/5  Left quadriceps: 5/5  Right hamstrin/5  Left hamstrin/5  Right anterior tibial: 5/5  Left anterior tibial: 5/5     Sensory Exam   Light touch normal.      Gait, Coordination, and Reflexes      Coordination   Finger to nose coordination: normal     Reflexes   Right brachioradialis: 2+  Left brachioradialis: 2+  Right biceps: 2+  Left biceps: 2+  Right patellar: 2+  Left patellar: 2+

## 2024-10-01 NOTE — ASSESSMENT & PLAN NOTE
Orders:    Comprehensive metabolic panel; Future    Lipid Panel with Direct LDL reflex; Future    Comprehensive metabolic panel    Lipid Panel with Direct LDL reflex

## 2024-10-01 NOTE — ASSESSMENT & PLAN NOTE
Orders:    atorvastatin (LIPITOR) 80 mg tablet; Take 1 tablet (80 mg total) by mouth daily with dinner

## 2024-10-01 NOTE — PATIENT INSTRUCTIONS
Stroke: Jimmy presents for a follow-up evaluation with regard to his remote history of stroke.  He has not had any new recent strokelike symptoms and is taking his medication as prescribed.  He has been transitioned from Brilinta plus low-dose aspirin to a single full dose aspirin.  -For stroke prevention he should continue his combination of aspirin, Lipitor, and appropriate blood pressure and glycemic control  -Given that his most recent LDL cholesterol was in the 90s we will increase Lipitor to 80 mg and recheck a cholesterol panel and comprehensive metabolic panel in 3 months.  The target will be LDL of less than 70.  -We will defer to his primary care team for monitoring of his blood sugar numbers and blood pressure we do recommend he occasionally check his blood pressure away from the doctor's office  -He will continue to work with the physical medicine and rehab team with regard to Botox for his right spastic hemiparesis.  In the future, it may be reasonable to consider a trial of reducing baclofen but only at a time when the Botox is quite stable, preferably midcycle  -As result of his vertebral artery stenosis/stent we will plan for repeat CT angiogram of the head and neck.    Atypical facial pain: He reports that he is doing somewhat better on his current dose of Lyrica which is 50 mg 4 times per day for total dose of 200 mg.  He had side effects with increased dosing of oxcarbazepine beyond his current level so that medicine is maxed out  -For the time being he should continue his combination of oxcarbazepine, baclofen, and we will plan to increase Lyrica to 100 mg twice per day (morning and night) with an extra 50 mg in the afternoon.  If he notices that he is more tired or dizzy on the higher dose he can start by eliminating the midday dose  -For the time being, given that he is doing a little bit better clinically we will plan to defer a trial of Botox but in the future it could be utilized  -Once he  is stable and in a good place with regard to facial pain we will consider a trial of reducing oxcarbazepine    He will return to the office to see me directly in 6 months but I would be happy to see him sooner if the need should arise.  He should contact our office in no more than 4 weeks to let me know how he is doing on the higher dose of Lyrica.  If he were to have strokelike symptoms such as sudden painless loss of vision or double vision, difficulty speaking or swallowing, vertigo/room spinning that does not quickly resolve, or new weakness/numbness/loss of coordination affecting 1 side of the face or body he should proceed by ambulance to the nearest emergency room immediately.

## 2024-10-08 NOTE — ASSESSMENT & PLAN NOTE
Patient Instructions   Stroke: Jimmy presents for a follow-up evaluation with regard to his remote history of stroke.  He has not had any new recent strokelike symptoms and is taking his medication as prescribed.  He has been transitioned from Brilinta plus low-dose aspirin to a single full dose aspirin.  -For stroke prevention he should continue his combination of aspirin, Lipitor, and appropriate blood pressure and glycemic control  -Given that his most recent LDL cholesterol was in the 90s we will increase Lipitor to 80 mg and recheck a cholesterol panel and comprehensive metabolic panel in 3 months.  The target will be LDL of less than 70.  -We will defer to his primary care team for monitoring of his blood sugar numbers and blood pressure we do recommend he occasionally check his blood pressure away from the doctor's office  -He will continue to work with the physical medicine and rehab team with regard to Botox for his right spastic hemiparesis.  In the future, it may be reasonable to consider a trial of reducing baclofen but only at a time when the Botox is quite stable, preferably midcycle  -As result of his vertebral artery stenosis/stent we will plan for repeat CT angiogram of the head and neck.    Atypical facial pain: He reports that he is doing somewhat better on his current dose of Lyrica which is 50 mg 4 times per day for total dose of 200 mg.  He had side effects with increased dosing of oxcarbazepine beyond his current level so that medicine is maxed out  -For the time being he should continue his combination of oxcarbazepine, baclofen, and we will plan to increase Lyrica to 100 mg twice per day (morning and night) with an extra 50 mg in the afternoon.  If he notices that he is more tired or dizzy on the higher dose he can start by eliminating the midday dose  -For the time being, given that he is doing a little bit better clinically we will plan to defer a trial of Botox but in the future it could  be utilized  -Once he is stable and in a good place with regard to facial pain we will consider a trial of reducing oxcarbazepine    He will return to the office to see me directly in 6 months but I would be happy to see him sooner if the need should arise.  He should contact our office in no more than 4 weeks to let me know how he is doing on the higher dose of Lyrica.  If he were to have strokelike symptoms such as sudden painless loss of vision or double vision, difficulty speaking or swallowing, vertigo/room spinning that does not quickly resolve, or new weakness/numbness/loss of coordination affecting 1 side of the face or body he should proceed by ambulance to the nearest emergency room immediately.       Orders:    Comprehensive metabolic panel; Future    Lipid Panel with Direct LDL reflex; Future    Comprehensive metabolic panel    Lipid Panel with Direct LDL reflex

## 2024-10-10 ENCOUNTER — HOSPITAL ENCOUNTER (OUTPATIENT)
Dept: CT IMAGING | Facility: HOSPITAL | Age: 64
Discharge: HOME | End: 2024-10-10
Attending: PSYCHIATRY & NEUROLOGY
Payer: COMMERCIAL

## 2024-10-10 DIAGNOSIS — I65.02 STENOSIS OF LEFT VERTEBRAL ARTERY: ICD-10-CM

## 2024-10-10 DIAGNOSIS — G89.29 CENTRAL SENSITIZATION TO PAIN: ICD-10-CM

## 2024-10-10 DIAGNOSIS — G89.0 CENTRAL PAIN SYNDROME: ICD-10-CM

## 2024-10-10 DIAGNOSIS — F43.21 ADJUSTMENT DISORDER WITH DEPRESSED MOOD: ICD-10-CM

## 2024-10-10 PROCEDURE — 70498 CT ANGIOGRAPHY NECK: CPT

## 2024-10-10 PROCEDURE — 70496 CT ANGIOGRAPHY HEAD: CPT

## 2024-10-10 RX ORDER — DULOXETIN HYDROCHLORIDE 60 MG/1
60 CAPSULE, DELAYED RELEASE ORAL DAILY
Qty: 90 CAPSULE | Refills: 1 | Status: SHIPPED | OUTPATIENT
Start: 2024-10-10

## 2024-10-10 RX ADMIN — IOHEXOL 85 ML: 350 INJECTION, SOLUTION INTRAVENOUS at 19:05

## 2024-10-18 DIAGNOSIS — D64.9 ANEMIA, UNSPECIFIED TYPE: ICD-10-CM

## 2024-10-18 RX ORDER — FERROUS SULFATE 325(65) MG
1 TABLET ORAL
Qty: 90 TABLET | Refills: 1 | Status: SHIPPED | OUTPATIENT
Start: 2024-10-18

## 2024-10-23 ENCOUNTER — PROCEDURE VISIT (OUTPATIENT)
Dept: NEUROLOGY | Facility: CLINIC | Age: 64
End: 2024-10-23
Payer: COMMERCIAL

## 2024-10-23 VITALS
HEART RATE: 58 BPM | DIASTOLIC BLOOD PRESSURE: 78 MMHG | WEIGHT: 197 LBS | TEMPERATURE: 97.7 F | SYSTOLIC BLOOD PRESSURE: 142 MMHG | OXYGEN SATURATION: 97 % | BODY MASS INDEX: 29.09 KG/M2

## 2024-10-23 DIAGNOSIS — R51.9 RIGHT FACIAL PAIN: ICD-10-CM

## 2024-10-23 DIAGNOSIS — G89.29 CENTRAL SENSITIZATION TO PAIN: ICD-10-CM

## 2024-10-23 DIAGNOSIS — I69.351 SPASTIC HEMIPARESIS OF RIGHT DOMINANT SIDE AS LATE EFFECT OF CEREBRAL INFARCTION (HCC): Primary | ICD-10-CM

## 2024-10-23 PROCEDURE — 64642 CHEMODENERV 1 EXTREMITY 1-4: CPT | Performed by: PHYSICAL MEDICINE & REHABILITATION

## 2024-10-23 PROCEDURE — 64643 CHEMODENERV 1 EXTREM 1-4 EA: CPT | Performed by: PHYSICAL MEDICINE & REHABILITATION

## 2024-10-23 PROCEDURE — 95874 GUIDE NERV DESTR NEEDLE EMG: CPT | Performed by: PHYSICAL MEDICINE & REHABILITATION

## 2024-10-23 PROCEDURE — 99214 OFFICE O/P EST MOD 30 MIN: CPT | Performed by: PHYSICAL MEDICINE & REHABILITATION

## 2024-10-23 NOTE — PROGRESS NOTES
Review of Systems   Constitutional:  Negative for fatigue and fever.   HENT: Negative.     Eyes: Negative.    Respiratory: Negative.     Cardiovascular: Negative.    Gastrointestinal: Negative.    Genitourinary: Negative.    Musculoskeletal: Negative.    Neurological: Negative.      I have personally reviewed the MA's review of systems and made changes as necessary.

## 2024-10-23 NOTE — PROGRESS NOTES
Ambulatory Visit  Name: Jimmy Mello      : 1960      MRN: [id number]  Encounter Provider: Ashley L De Padua, MD  Encounter Date: 10/23/2024   [facility]    Assessment & Plan  Spastic hemiparesis of right dominant side as late effect of cerebral infarction (HCC)    Orders:    onabotulinumtoxin A (BOTOX) injection 500 Units  Mr. Mello is a very pleasant 62 yo M with medical history of  cerebellar CVA with R > L stroke burden with additional hypodensities on DWI appreciated in L midbrain, pontine area, and R lower medullary/spinal cord junction. He has had remarkable recovery in regards to strength on his R side, but remains with fairly significant spasticity that has caused a fall since I last saw him. His primary issues are a tendency to plantarflexion, pronator tightness, and he gets this dystonic locking that involves both his FDS and his EDC. He also has shoulder tightness. We addressed those muscles today as detailed below.     From a medication standpoint, he is on Baclofen 10-10-15mg. We are going to remain on this regimen for now and monitor how he responds to toxin. Once he is stabilized on toxin, and if it results in improvement in some of his symptoms, we can discuss decreasing his Baclofen, as it does make him tired. They are in agreement with this plan.     He is now off valium and has not found medical marijuana helpful for his pain or tone.     Oral antispasticity medicaiton: Continue Baclofen for now. Once his dosages of oxcarbazepine and his botulinum toxin regimen are stabilized, may be able to wean down.   Schedule 500 units. Transfers independently.  PT/OT held until his insurance renews in the new year  Follow-up on 2025 at 1:45pm for repeat inj and on 2025 at 1:00pm for repeat inj to get back on a 90 day schedule.    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 Cautioned on making too many changes to  medications at once, so that we can clearly assess the efficacy of his current medication regimen. They do not need refills today. We may be able to wean off this in the future.   Right facial pain       He has an atypical presentation of R facial pain since his stroke with a question of possible central sensitization to pain. This is managed by Dr. Del Real, and we have discussed botulinum to his face. This would be done in conjunction with Dr. Del Real. Patient reports that the pain has improved to a 6-7/10 and is not as constant on his current medication regimen, but when it is bad, it can be quite debilitating. Neurology is managing his pregabalin and oxcarbazepine.     I do have enough toxin if Dr. Del Real would like to proceed at our next visit. I have messaged him to make him aware of his plan for follow-up.       History of Present Illness     HPI/SUBJECTIVE:  Patient presents today in the office with chief complaint of spasticity and tone in his R side that impacts his shoulder. HE also has a dystonic locking of his hand that involves his fingers in extension at MCP and FDS with some flexion, with difficulty moving his fingers at times. He has also tripped on his R toes while walking, and had a fall in therapy related to that. He continues to have stiffness in his pronator. Overall he has been stronger and has been much more functional.    He remains somewhat limited by his facial pain which is managed by Neurology. It is overall better on this current regimen - not as constant and generally a 6-7/10 when it is happening, but at times can be excruciatingly debilitating stopping him in his tracks. Neurology has previously made adjustments to him - now on Lyrica, and they may be decreasing his oxcarbazepine in the future. They have also discussed with both me and the patient botulinum toxin injections for his facial pain, but were planning on holding off for now.    Last seen for chemodenervation:  3/20/2024  Results of chemodenervation: Hard to tell at this point. He thinks it did help his tightness in his ankle.  How long did effects last: Unclear at this time.  Side affect/Adverse Effects: None    Any issues with driving, swallowing, appetite: Not cleared to drive yet. No issues with swallowing/appetite.  Stretching/Exercise Program: Is active. Holding on PT/OT until his counts renew next year.  Currently in therapy: No  Any falls with significant injuries: No significant injuries, but did trip due to plantarflexion tone in therapies and fell.  Any new weakness: None  Any changes to care since last seen: Stopped Brillinta  Safe at home: Yes  Any oral meds: Baclofen 10-10-15mg this regimen does contribute to some fatigue/tiredness.  On anticoagulation/blood thinners: ASA 325mg   Current functional status:  Ind with mobility and transfers without an assistive device  Ind with ADLs  Doing higher level activities.       Review of Systems  I have personally reviewed the MA's review of systems and made changes as necessary.      Objective     There were no vitals taken for this visit.    Gen: No acute distress, Well-nourished, well-appearing.  HEENT: Moist mucus membranes, Normocephalic/Atraumatic  Cardiovascular: Regular rate, rhythm, S1/S2. Distal pulses palpable  Heme/Extr: Has some slight R >L LE edema.   Pulmonary: Non-labored breathing. Lungs CTAB  : No barnett  GI: Soft, non-tender, non-distended.  MSK: Some slight limitation in AROM/PROM in R shoulder abduction due to tone  Full elbow, wrist and finger ROM  Ankle able to range to neutral much more easily with knee flexed than extended.   Integumentary: Skin is warm, dry.   Psych: Normal mood and affect.   Neuro: AAOx3,  Speech is intact. Appropriate to questioning.Gati is a bit stiff. Difficulty getting foot flat without AFO.   MMT:   Strength:   Right  Left  Site  Right  Left  Site    5- 5  S Ab: Shoulder Abductors  5  5  HF: Hip Flexors    5- 5  EF:  Elbow Flexors  5  5 KF: Knee Flexors    5 - 5  EE: Elbow Extensors  5-  5  KE: Knee Extensors    5  5  WE: Wrist Extensors  4 5  DR: Dorsi Flexors    5  5  FF: Finger Flexors  5  5  PF: Plantar Flexors    5  5  HI: Hand Intrinsics  5  5  EHL: Extensor Hallucis Longus     MAS:  Right  Left  Site  Right  Left  Site    2 0  Shoulder 0 0  Hip Adductors   2 0  EF: Elbow Flexors  1+ 0 KF: Knee Flexors    3  0  EE: Elbow Extensors  2  0  KE: Knee Extensors    1+/1+ 0/0  WF/WE 0  0  DR: Dorsi Flexors    1+ 0  FF: Finger Flexors  2  0  PF: Plantar Flexors    0 0  HI: Hand Intrinsics  0/0  0/0  Toe Flex/Ex   1 Finger extension    MAS  0 - no increased tone  1 - slight increase in tone at the end of the ROM  1+ increase in tone at 1/2 the ROM  2 - increase in tone through most the ROM  3 - moderate increase in muscle tone - passive movement difficult  4 - affected parts ridid in flexion or extension    SPASMS observed:   RUE: yes   LUE: no  RLE: no   LLE: no    Results/Data:  I have reviewed the results and images from the chart in detail with the patient.  10/10/2024 CTA H/N:  CT brain:  No acute intracranial abnormality. Chronic right cerebellar, left midbrain, and left pontine infarcts    CTA head: Negative for large vessel intracranial occlusion. Right intracranial vertebral artery stent is redemonstrated.The left vertebral artery beyond the PICA origin is again not visualized and likely occluded.     CTA neck:  -No extracranial carotid stenosis.  -The cervical vertebral arteries are patent.Stable short segment dissection involving the right vertebral artery V3 segment similar to 10/20/2023.        Administrative Statements   I have spent a total time of 30 minutes in caring for this patient on the day of the visit/encounter including Instructions for management, Patient and family education, Impressions, Counseling / Coordination of care, Documenting in the medical record, and Communicating with other healthcare  professionals .      Botulinum Toxin Injection Procedure    Pre-operative diagnosis: Spasticity    Post-operative diagnosis: Same    Procedure: Chemodenervation    After risks and benefits were explained including bleeding, infection, worsening of pain, damage to the areas being injected, weakness of muscles, loss of muscle control, dysphagia if injecting the head or neck, facial droop if injecting the facial area, painful injection, allergic or other reaction to the medications being injected, and the failure of the procedure to help the problem, a signed consent was obtained on 10/23/2024     The patient was placed in a seated position and the sites to be treated were identified. A time out was called and performed. The area to be treated was prepped three times with alcohol and the alcohol allowed to dry. Next, a 25 gauge, 50mm disposable electrode needle was used to inject the medication in the area to be treated.    Guidance: EMG  Area(s) injected:    Muscle Dose (units) # Sites Technique   R EDC 25 1 EMG   R Pectoralis 100  4 EMG   R FDS 40  2 EMG   R pronator 40  2 EMG   R med gastroc 75  3 EMG   R lat gastroc 75  3 EMG   R soleus 75  3 EMG       EMG         EMG         EMG         EMG    Waste 70   EMG         EMG        Medications used: 500 units of botox diluted in 5 mL of preservative free saline    See MAR for Lot/Exp    The patient did tolerate the procedure well. There were no complications.    The following portions of the patient's history were reviewed and updated as appropriate: allergies, current medications, past family history, past medical history, past social history, past surgical history and problem list.

## 2024-10-23 NOTE — ASSESSMENT & PLAN NOTE
Orders:    onabotulinumtoxin A (BOTOX) injection 500 Units  Mr. Mello is a very pleasant 64 yo M with medical history of  cerebellar CVA with R > L stroke burden with additional hypodensities on DWI appreciated in L midbrain, pontine area, and R lower medullary/spinal cord junction. He has had remarkable recovery in regards to strength on his R side, but remains with fairly significant spasticity that has caused a fall since I last saw him. His primary issues are a tendency to plantarflexion, pronator tightness, and he gets this dystonic locking that involves both his FDS and his EDC. He also has shoulder tightness. We addressed those muscles today as detailed below.     From a medication standpoint, he is on Baclofen 10-10-15mg. We are going to remain on this regimen for now and monitor how he responds to toxin. Once he is stabilized on toxin, and if it results in improvement in some of his symptoms, we can discuss decreasing his Baclofen, as it does make him tired. They are in agreement with this plan.     He is now off valium and has not found medical marijuana helpful for his pain or tone.     Oral antispasticity medicaiton: Continue Baclofen for now. Once his dosages of oxcarbazepine and his botulinum toxin regimen are stabilized, may be able to wean down.   Schedule 500 units. Transfers independently.  PT/OT held until his insurance renews in the new year  Follow-up on 2/11/2025 at 1:45pm for repeat inj and on 5/13/2025 at 1:00pm for repeat inj to get back on a 90 day schedule.

## 2024-10-23 NOTE — PATIENT INSTRUCTIONS
You have received botulinum toxin injections today.     The skin around the site of injections should be monitored for a couple of days. If redness or swelling occur, the skin should be examined by a health care professional to rule out infection. You can call my office if this occurs.    Please contact our office via [employer] in 2 weeks to report on the effects of the injections or any time with any questions or concerns.     Please note that your next injections should not be scheduled less than 90 days from today.    If your health insurance changes before the next injections, please contact our office as soon as possible so we can submit for a new prior authorization if needed. Please note that we may not be able to perform the injections if your insurance changes and the treatment is not pre-authorized.     Message me in 1 month let me know how you responded to the toxin to help guide our next session. Keep the Baclofen where it is at right now. We'll see how you respond to toxin first.

## 2024-10-23 NOTE — ASSESSMENT & PLAN NOTE
Continue low dose naltrexone. Cautioned on making too many changes to medications at once, so that we can clearly assess the efficacy of his current medication regimen. They do not need refills today. We may be able to wean off this in the future.

## 2024-10-23 NOTE — ASSESSMENT & PLAN NOTE
He has an atypical presentation of R facial pain since his stroke with a question of possible central sensitization to pain. This is managed by Dr. Del Real, and we have discussed botulinum to his face. This would be done in conjunction with Dr. Del Real. Patient reports that the pain has improved to a 6-7/10 and is not as constant on his current medication regimen, but when it is bad, it can be quite debilitating. Neurology is managing his pregabalin and oxcarbazepine.     I do have enough toxin if Dr. Del Real would like to proceed at our next visit. I have messaged him to make him aware of his plan for follow-up.

## 2024-11-18 ENCOUNTER — PATIENT MESSAGE (OUTPATIENT)
Dept: NEUROLOGY | Facility: CLINIC | Age: 64
End: 2024-11-18

## 2024-11-19 NOTE — PATIENT COMMUNICATION
Dr. Del Real: Please advise. No instructions given for weaning off of the Oxcarbazepine      Per LOV:  -Once he is stable and in a good place with regard to facial pain we will consider a trial of reducing oxcarbazepine.

## 2024-12-06 DIAGNOSIS — G50.0 TRIGEMINAL NEURALGIA: ICD-10-CM

## 2024-12-06 DIAGNOSIS — I10 PRIMARY HYPERTENSION: ICD-10-CM

## 2024-12-09 RX ORDER — CARVEDILOL 25 MG/1
25 TABLET ORAL 2 TIMES DAILY WITH MEALS
Qty: 180 TABLET | Refills: 1 | Status: SHIPPED | OUTPATIENT
Start: 2024-12-09

## 2024-12-09 RX ORDER — HYDRALAZINE HYDROCHLORIDE 50 MG/1
50 TABLET, FILM COATED ORAL EVERY 12 HOURS
Qty: 60 TABLET | Refills: 5 | Status: SHIPPED | OUTPATIENT
Start: 2024-12-09

## 2024-12-09 RX ORDER — AMLODIPINE BESYLATE 2.5 MG/1
2.5 TABLET ORAL DAILY
Qty: 90 TABLET | Refills: 1 | Status: SHIPPED | OUTPATIENT
Start: 2024-12-09

## 2024-12-09 RX ORDER — LOSARTAN POTASSIUM 100 MG/1
100 TABLET ORAL DAILY
Qty: 90 TABLET | Refills: 1 | Status: SHIPPED | OUTPATIENT
Start: 2024-12-09

## 2024-12-09 RX ORDER — OXCARBAZEPINE 600 MG/1
600 TABLET, FILM COATED ORAL 2 TIMES DAILY
Qty: 180 TABLET | Refills: 1 | Status: SHIPPED | OUTPATIENT
Start: 2024-12-09

## 2024-12-18 DIAGNOSIS — G50.0 TRIGEMINAL NEURALGIA OF RIGHT SIDE OF FACE: ICD-10-CM

## 2024-12-18 RX ORDER — PREGABALIN 50 MG/1
CAPSULE ORAL
Qty: 30 CAPSULE | Refills: 1 | Status: SHIPPED | OUTPATIENT
Start: 2024-12-18

## 2024-12-18 RX ORDER — OXCARBAZEPINE 150 MG/1
TABLET, FILM COATED ORAL
Qty: 540 TABLET | Refills: 1 | Status: SHIPPED | OUTPATIENT
Start: 2024-12-18

## 2024-12-19 NOTE — PROGRESS NOTES
Name: Jimmy Mello      : 1960      MRN: [id number]  Encounter Provider: Lupis Meza DO  Encounter Date: 2024   Encounter department: [facility]    Assessment & Plan  Primary hypertension  Bp at goal on current meds   Lab Results   Component Value Date    SODIUM 142 2024    K 4.8 2024     2024    CO2 24 2024    AGAP 11 2023    BUN 34 (H) 2024    CREATININE 1.50 (H) 2024    GLUC 93 2024    GLUF 107 (H) 2023    CALCIUM 8.7 2024    AST 18 2024    ALT 21 2024    ALKPHOS 84 2024    TP 6.4 2024    TBILI <0.2 2024    EGFR 52 (L) 2024       Check CMP  Continue same    Orders:  •  Lipid panel  •  Comprehensive metabolic panel    History of stroke  With right sided hemiparesis.   I completed the [place] handicap placard application for him today .          Stage 2 chronic kidney disease  Lab Results   Component Value Date    EGFR 52 (L) 2024    EGFR 60 2024    EGFR 38 (L) 2024    CREATININE 1.50 (H) 2024    CREATININE 1.33 (H) 2024    CREATININE 1.93 (H) 2024     Orders:  •  CBC and differential    Spastic hemiparesis of right dominant side as late effect of cerebral infarction (HCC)  Stable.        Right facial pain  Currently on trileptal, lyrica, naltrexone, and cymbalta  Following with neurology, Dr. Del Real, and though still having pain, has been able to reduce his oxcarbazepine doses       Encounter for screening colonoscopy  Has cologuard and encouraged him to send it back       Encounter for immunization    Orders:  •  Zoster Vaccine Recombinant IM    Prostate cancer screening    Orders:  •  PSA Total (Reflex To Free)    Screening for diabetes mellitus    Orders:  •  Hemoglobin A1C    Screening for thyroid disorder    Orders:  •  TSH, 3rd generation with Free T4 reflex         History of Present Illness     HPI  Patient is a 64 year old  male with hypertension, history of stroke, with resultant right sided spastic hemiparesis, right vertebral artery dissection, facial pain who is being seen today for f/u visit.     Follows with neurology for his stroke and spastic hemiparesis. Getting botox. Had CTA of head and neck in October which was stable.     Has not completed labs that I had ordered in . Overdue for colon cancer screening.   Due for second shingles vaccine    Declines flu and covid vaccine.     He has cologuard but has not completed.     Review of Systems  Past Medical History:   Diagnosis Date   • Adjustment disorder with depressed mood 2023   • BRAULIO (acute kidney injury) (HCC)    • B12 deficiency    • Celiac artery stenosis (HCC)    • Cerebellar infarct (HCC) 2023   • CKD (chronic kidney disease) stage 2, GFR 60-89 ml/min    • Essential hypertension    • Impaired fasting glucose    • Iron deficiency anemia    • Neurogenic bowel    • Stenosis of left vertebral artery    • Stroke (HCC)    • Vertebral artery dissection (HCC)      Past Surgical History:   Procedure Laterality Date   • ARTERY SURGERY      vertebral artery stent/revascularization   • IR STROKE ALERT  2023     Family History   Problem Relation Age of Onset   • Hypertension Brother    • Hypertension Brother      Social History     Tobacco Use   • Smoking status: Former     Current packs/day: 0.00     Average packs/day: 1 pack/day for 5.0 years (5.0 ttl pk-yrs)     Types: Cigarettes     Start date: 1978     Quit date: 1983     Years since quittin.7   • Smokeless tobacco: Never   Vaping Use   • Vaping status: Never Used   Substance and Sexual Activity   • Alcohol use: Not Currently   • Drug use: Never   • Sexual activity: Not Currently     Partners: Female     Current Outpatient Medications on File Prior to Visit   Medication Sig   • amLODIPine (NORVASC) 2.5 mg tablet TAKE 1 TABLET BY MOUTH EVERY DAY   • aspirin 325 mg tablet Take 325 mg by mouth  daily   • atorvastatin (LIPITOR) 80 mg tablet Take 1 tablet (80 mg total) by mouth daily with dinner   • baclofen 10 mg tablet TAKE 1 TABLET BY MOUTH IN THE MORNING, 1 TABLET MIDDAY, AND 1.5 TABLETS AT NIGHT   • carvedilol (COREG) 25 mg tablet TAKE 1 TABLET BY MOUTH TWICE A DAY WITH FOOD   • cyanocobalamin ([employer] Vitamin B-12) 1000 MCG tablet Take 1 tablet (1,000 mcg total) by mouth daily   • DULoxetine (CYMBALTA) 60 mg delayed release capsule TAKE 1 CAPSULE BY MOUTH EVERY DAY   • ferrous sulfate 325 (65 Fe) mg tablet TAKE 1 TABLET BY MOUTH EVERY DAY WITH BREAKFAST   • hydrALAZINE (APRESOLINE) 50 mg tablet TAKE 1 TABLET BY MOUTH EVERY 12 HOURS   • losartan (COZAAR) 100 MG tablet TAKE 1 TABLET BY MOUTH EVERY DAY   • naltrexone (REVIA) 50 mg tablet Please compound 4mg tablets to take by mouth QHS   • OXcarbazepine (TRILEPTAL) 150 mg tablet TAKE 3 TABLETS (450 MG TOTAL) BY MOUTH 2 (TWO) TIMES A DAY   • OXcarbazepine (TRILEPTAL) 600 mg tablet TAKE 1 TABLET BY MOUTH TWICE A DAY   • pregabalin (LYRICA) 100 mg capsule Take 1 capsule (100 mg total) by mouth 2 (two) times a day In addition to 50mg in the afternoon   • pregabalin (LYRICA) 50 mg capsule TAKE 1 CAPSULE BY MOUTH IN THE AFTERNOON IN ADDITION TO THE 100MG TWICE DAILY AS DIRECTED     No Known Allergies  Immunization History   Administered Date(s) Administered   • COVID-19 MODERNA VACC 0.5 ML IM 01/21/2021, 03/01/2021, 11/29/2021   • Tdap 06/19/2024   • Zoster Vaccine Recombinant 07/11/2024     Objective   /69   Pulse 60   Temp 98 °F (36.7 °C)   Wt 89.3 kg (196 lb 12.8 oz)   SpO2 95%   BMI 29.06 kg/m²     Physical Exam  Vitals and nursing note reviewed.   Constitutional:       General: He is not in acute distress.     Appearance: Normal appearance. He is not ill-appearing, toxic-appearing or diaphoretic.   HENT:      Head: Normocephalic and atraumatic.   Eyes:      Conjunctiva/sclera: Conjunctivae normal.   Neck:      Vascular: No carotid bruit.    Cardiovascular:      Rate and Rhythm: Normal rate and regular rhythm.      Heart sounds: No murmur heard.  Pulmonary:      Effort: Pulmonary effort is normal.      Breath sounds: Normal breath sounds.   Abdominal:      General: Abdomen is flat. Bowel sounds are normal.      Palpations: Abdomen is soft.   Musculoskeletal:      Cervical back: Normal range of motion and neck supple.      Right lower leg: No edema.      Left lower leg: No edema.      Comments: Wearing brace on right lower extremity   Neurological:      Mental Status: He is alert.      Motor: Weakness (and spasticity of right upper and lower extremities) present.   Psychiatric:         Mood and Affect: Mood normal.

## 2024-12-23 ENCOUNTER — OFFICE VISIT (OUTPATIENT)
Dept: FAMILY MEDICINE CLINIC | Facility: CLINIC | Age: 64
End: 2024-12-23
Payer: COMMERCIAL

## 2024-12-23 VITALS
TEMPERATURE: 98 F | DIASTOLIC BLOOD PRESSURE: 69 MMHG | BODY MASS INDEX: 29.06 KG/M2 | SYSTOLIC BLOOD PRESSURE: 135 MMHG | WEIGHT: 196.8 LBS | HEART RATE: 60 BPM | OXYGEN SATURATION: 95 %

## 2024-12-23 DIAGNOSIS — Z13.1 SCREENING FOR DIABETES MELLITUS: ICD-10-CM

## 2024-12-23 DIAGNOSIS — I69.351 SPASTIC HEMIPARESIS OF RIGHT DOMINANT SIDE AS LATE EFFECT OF CEREBRAL INFARCTION (HCC): ICD-10-CM

## 2024-12-23 DIAGNOSIS — Z23 ENCOUNTER FOR IMMUNIZATION: ICD-10-CM

## 2024-12-23 DIAGNOSIS — Z13.29 SCREENING FOR THYROID DISORDER: ICD-10-CM

## 2024-12-23 DIAGNOSIS — I10 PRIMARY HYPERTENSION: Primary | ICD-10-CM

## 2024-12-23 DIAGNOSIS — Z12.11 ENCOUNTER FOR SCREENING COLONOSCOPY: ICD-10-CM

## 2024-12-23 DIAGNOSIS — R51.9 RIGHT FACIAL PAIN: ICD-10-CM

## 2024-12-23 DIAGNOSIS — N18.2 STAGE 2 CHRONIC KIDNEY DISEASE: ICD-10-CM

## 2024-12-23 DIAGNOSIS — Z86.73 HISTORY OF STROKE: ICD-10-CM

## 2024-12-23 DIAGNOSIS — Z12.5 PROSTATE CANCER SCREENING: ICD-10-CM

## 2024-12-23 PROCEDURE — 99214 OFFICE O/P EST MOD 30 MIN: CPT | Performed by: FAMILY MEDICINE

## 2024-12-23 PROCEDURE — 90471 IMMUNIZATION ADMIN: CPT

## 2024-12-23 PROCEDURE — 90750 HZV VACC RECOMBINANT IM: CPT

## 2024-12-23 NOTE — ASSESSMENT & PLAN NOTE
Lab Results   Component Value Date    EGFR 52 (L) 08/01/2024    EGFR 60 06/18/2024    EGFR 38 (L) 05/29/2024    CREATININE 1.50 (H) 08/01/2024    CREATININE 1.33 (H) 06/18/2024    CREATININE 1.93 (H) 05/29/2024     Orders:  •  CBC and differential

## 2024-12-23 NOTE — ASSESSMENT & PLAN NOTE
Bp at goal on current meds   Lab Results   Component Value Date    SODIUM 142 08/01/2024    K 4.8 08/01/2024     08/01/2024    CO2 24 08/01/2024    AGAP 11 04/01/2023    BUN 34 (H) 08/01/2024    CREATININE 1.50 (H) 08/01/2024    GLUC 93 08/01/2024    GLUF 107 (H) 03/13/2023    CALCIUM 8.7 05/29/2024    AST 18 08/01/2024    ALT 21 08/01/2024    ALKPHOS 84 05/29/2024    TP 6.4 08/01/2024    TBILI <0.2 08/01/2024    EGFR 52 (L) 08/01/2024       Check CMP  Continue same    Orders:  •  Lipid panel  •  Comprehensive metabolic panel

## 2024-12-23 NOTE — ASSESSMENT & PLAN NOTE
With right sided hemiparesis.   I completed the [place] handicap placard application for him today .

## 2024-12-23 NOTE — ASSESSMENT & PLAN NOTE
Currently on trileptal, lyrica, naltrexone, and cymbalta  Following with neurology, Dr. Del Real, and though still having pain, has been able to reduce his oxcarbazepine doses

## 2025-01-04 DIAGNOSIS — G50.0 TRIGEMINAL NEURALGIA OF RIGHT SIDE OF FACE: ICD-10-CM

## 2025-01-06 RX ORDER — PREGABALIN 100 MG/1
CAPSULE ORAL
Qty: 60 CAPSULE | Refills: 2 | Status: SHIPPED | OUTPATIENT
Start: 2025-01-06

## 2025-01-06 NOTE — TELEPHONE ENCOUNTER
Medication: Lyrica  PDMP  12/18/2024 12/18/2024 Pregabalin (Capsule) 30.0 30 50 MG [facility] 0 / 1 [place]   1 [id number] 12/06/2024 10/01/2024 Pregabalin (Capsule) 60.0 30 100 MG NA [facility] Commercial Insurance 2 / 2 [place]   1 [id number] 11/16/2024 10/01/2024 Pregabalin (Capsule) 30.0 30 50 MG NA [facility] Commercial Insurance 1 / 1 [place]   1 [id number] 11/03/2024 10/01/2024 Pregabalin (Capsule) 60.0 30 100 MG NA [facility] Commercial Insurance 1 / 2 [place]   Active agreement on file -No

## 2025-01-16 ENCOUNTER — HOSPITAL ENCOUNTER (EMERGENCY)
Facility: HOSPITAL | Age: 65
Discharge: HOME | End: 2025-01-16
Attending: EMERGENCY MEDICINE
Payer: COMMERCIAL

## 2025-01-16 ENCOUNTER — APPOINTMENT (EMERGENCY)
Dept: CT IMAGING | Facility: HOSPITAL | Age: 65
End: 2025-01-16
Payer: COMMERCIAL

## 2025-01-16 VITALS
TEMPERATURE: 98.8 F | HEART RATE: 57 BPM | SYSTOLIC BLOOD PRESSURE: 183 MMHG | HEIGHT: 69 IN | OXYGEN SATURATION: 95 % | WEIGHT: 194 LBS | DIASTOLIC BLOOD PRESSURE: 81 MMHG | RESPIRATION RATE: 16 BRPM | BODY MASS INDEX: 28.73 KG/M2

## 2025-01-16 DIAGNOSIS — S00.03XA CONTUSION OF SCALP, INITIAL ENCOUNTER: Primary | ICD-10-CM

## 2025-01-16 DIAGNOSIS — S09.90XA CLOSED HEAD INJURY, INITIAL ENCOUNTER: ICD-10-CM

## 2025-01-16 LAB
ALBUMIN SERPL BCG-MCNC: 4 G/DL (ref 3.5–5)
ALP SERPL-CCNC: 99 U/L (ref 34–104)
ALT SERPL W P-5'-P-CCNC: 21 U/L (ref 7–52)
ANION GAP SERPL CALCULATED.3IONS-SCNC: 6 MMOL/L (ref 4–13)
APTT PPP: 29 SECONDS (ref 23–34)
AST SERPL W P-5'-P-CCNC: 20 U/L (ref 13–39)
BASOPHILS # BLD AUTO: 0.06 THOUSANDS/ΜL (ref 0–0.1)
BASOPHILS NFR BLD AUTO: 1 % (ref 0–1)
BILIRUB SERPL-MCNC: 0.27 MG/DL (ref 0.2–1)
BUN SERPL-MCNC: 36 MG/DL (ref 5–25)
CALCIUM SERPL-MCNC: 8.5 MG/DL (ref 8.4–10.2)
CHLORIDE SERPL-SCNC: 107 MMOL/L (ref 96–108)
CO2 SERPL-SCNC: 28 MMOL/L (ref 21–32)
CREAT SERPL-MCNC: 1.44 MG/DL (ref 0.6–1.3)
EOSINOPHIL # BLD AUTO: 0.36 THOUSAND/ΜL (ref 0–0.61)
EOSINOPHIL NFR BLD AUTO: 4 % (ref 0–6)
ERYTHROCYTE [DISTWIDTH] IN BLOOD BY AUTOMATED COUNT: 13.1 % (ref 11.6–15.1)
GFR SERPL CREATININE-BSD FRML MDRD: 50 ML/MIN/1.73SQ M
GLUCOSE SERPL-MCNC: 96 MG/DL (ref 65–140)
HCT VFR BLD AUTO: 38.4 % (ref 36.5–49.3)
HGB BLD-MCNC: 12.3 G/DL (ref 12–17)
IMM GRANULOCYTES # BLD AUTO: 0.05 THOUSAND/UL (ref 0–0.2)
IMM GRANULOCYTES NFR BLD AUTO: 1 % (ref 0–2)
INR PPP: 1.06 (ref 0.85–1.19)
LYMPHOCYTES # BLD AUTO: 1.25 THOUSANDS/ΜL (ref 0.6–4.47)
LYMPHOCYTES NFR BLD AUTO: 15 % (ref 14–44)
MCH RBC QN AUTO: 27.7 PG (ref 26.8–34.3)
MCHC RBC AUTO-ENTMCNC: 32 G/DL (ref 31.4–37.4)
MCV RBC AUTO: 87 FL (ref 82–98)
MONOCYTES # BLD AUTO: 0.73 THOUSAND/ΜL (ref 0.17–1.22)
MONOCYTES NFR BLD AUTO: 9 % (ref 4–12)
NEUTROPHILS # BLD AUTO: 5.69 THOUSANDS/ΜL (ref 1.85–7.62)
NEUTS SEG NFR BLD AUTO: 70 % (ref 43–75)
NRBC BLD AUTO-RTO: 0 /100 WBCS
PLATELET # BLD AUTO: 229 THOUSANDS/UL (ref 149–390)
PMV BLD AUTO: 9.3 FL (ref 8.9–12.7)
POTASSIUM SERPL-SCNC: 3.9 MMOL/L (ref 3.5–5.3)
PROT SERPL-MCNC: 6.7 G/DL (ref 6.4–8.4)
PROTHROMBIN TIME: 14.3 SECONDS (ref 12.3–15)
RBC # BLD AUTO: 4.44 MILLION/UL (ref 3.88–5.62)
SODIUM SERPL-SCNC: 141 MMOL/L (ref 135–147)
WBC # BLD AUTO: 8.14 THOUSAND/UL (ref 4.31–10.16)

## 2025-01-16 PROCEDURE — 80053 COMPREHEN METABOLIC PANEL: CPT | Performed by: EMERGENCY MEDICINE

## 2025-01-16 PROCEDURE — 99284 EMERGENCY DEPT VISIT MOD MDM: CPT

## 2025-01-16 PROCEDURE — 70450 CT HEAD/BRAIN W/O DYE: CPT

## 2025-01-16 PROCEDURE — 85025 COMPLETE CBC W/AUTO DIFF WBC: CPT | Performed by: EMERGENCY MEDICINE

## 2025-01-16 PROCEDURE — 36415 COLL VENOUS BLD VENIPUNCTURE: CPT | Performed by: EMERGENCY MEDICINE

## 2025-01-16 PROCEDURE — 72125 CT NECK SPINE W/O DYE: CPT

## 2025-01-16 PROCEDURE — 85610 PROTHROMBIN TIME: CPT | Performed by: EMERGENCY MEDICINE

## 2025-01-16 PROCEDURE — 99284 EMERGENCY DEPT VISIT MOD MDM: CPT | Performed by: EMERGENCY MEDICINE

## 2025-01-16 PROCEDURE — 85730 THROMBOPLASTIN TIME PARTIAL: CPT | Performed by: EMERGENCY MEDICINE

## 2025-01-23 DIAGNOSIS — D64.9 ANEMIA, UNSPECIFIED TYPE: ICD-10-CM

## 2025-01-23 DIAGNOSIS — G50.0 TRIGEMINAL NEURALGIA OF RIGHT SIDE OF FACE: ICD-10-CM

## 2025-01-23 RX ORDER — FERROUS SULFATE 325(65) MG
1 TABLET ORAL
Qty: 90 TABLET | Refills: 0 | Status: SHIPPED | OUTPATIENT
Start: 2025-01-23

## 2025-01-24 RX ORDER — PREGABALIN 50 MG/1
CAPSULE ORAL
Qty: 30 CAPSULE | Refills: 2 | Status: SHIPPED | OUTPATIENT
Start: 2025-01-24

## 2025-01-24 NOTE — TELEPHONE ENCOUNTER
Patient Id Prescription # Filled Written Drug Label Qty Days Strength MME** Prescriber Pharmacy Payment REFILL #/Auth State Detail  1 [id number] 01/06/2025 01/06/2025 Pregabalin (Capsule) 60.0 30 100 [facility] 0 / 2 [place]   1 [id number] 12/18/2024 12/18/2024 Pregabalin (Capsule) 30.0 30 50 [facility] 0 / 1 [place]   1 [id number] 12/06/2024 10/01/2024 Pregabalin (Capsule) 60.0 30 100 MG [facility] Commercial [facility] 2 / 2 [place]   1 [id number] 11/16/2024 10/01/2024 Pregabalin (Capsule) 30.0 30 50 MG [facility] 1 / 1 [place]   1 [id number] 11/03/2024 10/01/2024 Pregabalin (Capsule) 60.0 30 100 MG [facility] 1 / 2 [place]   1 [id number] 10/12/2024 10/01/2024 Pregabalin (Capsule) 30.0 30 50 MG [facility] Commercial [facility] 0 / 1 [place]   1 [id number] 10/01/2024 10/01/2024 Pregabalin (Capsule) 60.0 30 100 MG [facility] Commercial [facility] 0 / 2 [place]   1 [id number] 09/14/2024 07/10/2024 Pregabalin (Capsule) 60.0 30 50 MG NA [facility] Commercial Insurance 2 / 1 [place]   1 [id number] 08/09/2024 07/10/2024 Pregabalin (Capsule) 60.0 30 50 MG [facility] Commercial Insurance 1 / 1 [place]   1 [id number] 07/10/2024 07/10/2024 Pregabalin (Capsule) 60.0 30 50 MG LARRY MONTEZ [facility] Commercial Insurance 0 / 1 [place]

## 2025-02-05 DIAGNOSIS — G89.29 CENTRAL SENSITIZATION TO PAIN: ICD-10-CM

## 2025-02-11 ENCOUNTER — TELEPHONE (OUTPATIENT)
Dept: NEUROLOGY | Facility: CLINIC | Age: 65
End: 2025-02-11

## 2025-02-11 ENCOUNTER — PROCEDURE VISIT (OUTPATIENT)
Age: 65
End: 2025-02-11
Payer: COMMERCIAL

## 2025-02-11 VITALS
DIASTOLIC BLOOD PRESSURE: 86 MMHG | TEMPERATURE: 98.3 F | OXYGEN SATURATION: 96 % | SYSTOLIC BLOOD PRESSURE: 144 MMHG | HEART RATE: 61 BPM

## 2025-02-11 DIAGNOSIS — I69.351 SPASTIC HEMIPARESIS OF RIGHT DOMINANT SIDE AS LATE EFFECT OF CEREBRAL INFARCTION (HCC): Primary | ICD-10-CM

## 2025-02-11 DIAGNOSIS — G89.29 CENTRAL SENSITIZATION TO PAIN: ICD-10-CM

## 2025-02-11 PROCEDURE — 64643 CHEMODENERV 1 EXTREM 1-4 EA: CPT | Performed by: PHYSICAL MEDICINE & REHABILITATION

## 2025-02-11 PROCEDURE — 99213 OFFICE O/P EST LOW 20 MIN: CPT | Performed by: PHYSICAL MEDICINE & REHABILITATION

## 2025-02-11 PROCEDURE — 64642 CHEMODENERV 1 EXTREMITY 1-4: CPT | Performed by: PHYSICAL MEDICINE & REHABILITATION

## 2025-02-11 PROCEDURE — 95874 GUIDE NERV DESTR NEEDLE EMG: CPT | Performed by: PHYSICAL MEDICINE & REHABILITATION

## 2025-02-11 RX ORDER — NALTREXONE HYDROCHLORIDE 50 MG/1
TABLET, FILM COATED ORAL
Qty: 30 TABLET | Refills: 0 | Status: SHIPPED | OUTPATIENT
Start: 2025-02-11 | End: 2025-02-19

## 2025-02-11 NOTE — ASSESSMENT & PLAN NOTE
Provided renewal for his low dose naltrexone, which his wife feels helps with his overall pain.

## 2025-02-11 NOTE — PATIENT INSTRUCTIONS
You have received botulinum toxin injections today.     The skin around the site of injections should be monitored for a couple of days. If redness or swelling occur, the skin should be examined by a health care professional to rule out infection. You can call my office if this occurs.    Please contact our office via [email or web address] in 2 weeks to report on the effects of the injections or any time with any questions or concerns.     Please note that your next injections should not be scheduled less than 90 days from today.    If your health insurance changes before the next injections, please contact our office as soon as possible so we can submit for a new prior authorization if needed. Please note that we may not be able to perform the injections if your insurance changes and the treatment is not pre-authorized.

## 2025-02-11 NOTE — ASSESSMENT & PLAN NOTE
Orders:    onabotulinumtoxin A (BOTOX) injection 500 Units  Mr. Mello is a very pleasant 62yo M with medical history of cerebellar CVA with R > L stroke burden with additional hypodensities on DWI appreciated in L midbrain, pontine area, and R lower medullary/spinal cord junction. He has had remarkably recovery in regards to strength on his R side, but remains with spasticity resulting in a tendency to plantarflexion, pronator tightness, shoulder tightness, and a dystonic kind of locking that involves both his FDS and EDC.    He did find the last round of toxin particularly helpful for his R fingers. His shoulder has good active ROM still, and he is ok with redistributing some of that toxin to other areas. I increased the dose to his pronator teres (consideration for pronator quadratus in the future). I also increased the dose to his plantarflexors to hopefully help with his tendency to catch his toe.     He is now off Baclofen, and while I think he would benefit, he would like to see how he does off of it.     Oral antispasticity medication: He has taken himself off valium and Baclofen, and would like to see how he does with just botox and activity.   Schedule 500 units. Transfers independently.  PT/OT held until his insurance renews in the new year  Follow-up on 5/13/2025 at 1:00pm for repeat inj, and 8/12 at 1:45pm for repeat botulinum toxin injections.

## 2025-02-11 NOTE — PROGRESS NOTES
Name: Jimmy Mello      : 1960      MRN: [id number]  Encounter Provider: Ashley L De Padua, MD  Encounter Date: 2025   Encounter department: [facility] PHYSICAL MEDICINE AND REHABILITATION [place]  :  Assessment & Plan  Spastic hemiparesis of right dominant side as late effect of cerebral infarction (HCC)    Orders:    onabotulinumtoxin A (BOTOX) injection 500 Units      Mr. Mello is a very pleasant 62 yo M with medical history of  cerebellar CVA with R > L stroke burden with additional hypodensities on DWI appreciated in L midbrain, pontine area, and R lower medullary/spinal cord junction. He has had remarkable recovery in regards to strength on his R side, but remains with fairly significant spasticity that has caused a fall since I last saw him. His primary issues are a tendency to plantarflexion, pronator tightness, and he gets this dystonic locking that involves both his FDS and his EDC. He also has shoulder tightness. We addressed those muscles today as detailed below.      From a medication standpoint, he is on Baclofen 10-10-15mg. We are going to remain on this regimen for now and monitor how he responds to toxin. Once he is stabilized on toxin, and if it results in improvement in some of his symptoms, we can discuss decreasing his Baclofen, as it does make him tired. They are in agreement with this plan.      He is now off valium and has not found medical marijuana helpful for his pain or tone.      Oral antispasticity medicaiton: Continue Baclofen for now. Once his dosages of oxcarbazepine and his botulinum toxin regimen are stabilized, may be able to wean down.   Schedule 500 units. Transfers independently.  PT/OT held until his insurance renews in the new year  Follow-up on 2025 at 1:45pm for repeat inj and on 2025 at 1:00pm for repeat inj to get back on a 90 day schedule.    History of Present Illness {?Quick Links Encounters * My Last Note * Last Note in Specialty * Snapshot  * Since Last Visit * History :34972}  HPI/SUBJECTIVE:  Patient presents today in the office with chief complaint of ***    Last seen for chemodenervation: 10/23/2024  Results of chemodenervation:  How long did effects last:  Side affect/Adverse Effects     Any issues with driving, swallowing, appetite:  Stretching/Exercise Program:  Currently in therapy:  Any falls with significant injuries: Slipped, with head strike and no LOC on 1/16. CT C-Spine and CTH without acute injury.   Any new weakness:  Any changes to care since last seen:  Safe at home:  Any oral meds: Baclofen 10-10-15mg causes some fatigue/tiredness. Low dose naltrexone does seem to help his pain and he is due for refills.   On anticoagulation/blood thinners: Full dose ASA  Current functional status:    {History obtained from(Optional):58622}    Review of Systems  {Select to insert medical history sections (Optional):97418}     Objective {?Quick Links Trend Vitals * Enter New Vitals * Results Review * Timeline (Adult) * Labs * Imaging * Cardiology * Procedures * Lung Cancer Screening * Surgical eConsent :93038}  There were no vitals taken for this visit.     Gen: No acute distress, Well-nourished, well-appearing.  HEENT: Moist mucus membranes, Normocephalic/Atraumatic  Cardiovascular: Regular rate, rhythm, S1/S2. Distal pulses palpable  Heme/Extr: No edema/clubbing/cyanosis  Pulmonary: Non-labored breathing. Lungs CTAB  : No barnett  GI: Soft, non-tender, non-distended. BS+  MSK: ROM is WFL in all extremities. No effusions or deformities. Bulk is symmetric. See below for MMT scores.   Integumentary: Skin is warm, dry. No rashes or ulcers.  Psych: Normal mood and affect.   Neuro: AAOx3, CN 2-12 intact. Sensation intact to light touch throughout. Speech is intact. Appropriate to questioning. Tone is normal.    DTRs:   Coord:  MMT:   Strength:   Right  Left  Site  Right  Left  Site    5 5  S Ab: Shoulder Abductors  5  5  HF: Hip Flexors    5 5  EF: Elbow  "Flexors  5  5 KF: Knee Flexors    5  5  EE: Elbow Extensors  5  5  KE: Knee Extensors    5  5  WE: Wrist Extensors  5  5  DR: Dorsi Flexors    5  5  FF: Finger Flexors  5  5  PF: Plantar Flexors    5  5  HI: Hand Intrinsics  5  5  EHL: Extensor Hallucis Longus     MAS:  Right  Left  Site  Right  Left  Site    0 0  Shoulder 0 0  Hip Adductors   0 0  EF: Elbow Flexors  0 0 KF: Knee Flexors    0  0  EE: Elbow Extensors  0  0  KE: Knee Extensors    0/0 0/0  WF/WE 0  0  DR: Dorsi Flexors    0  0  FF: Finger Flexors  0  0  PF: Plantar Flexors    0  0  HI: Hand Intrinsics  0/0  0/0  Toe Flex/Ex       MAS  0 - no increased tone  1 - slight increase in tone at the end of the ROM  1+ increase in tone at 1/2 the ROM  2 - increase in tone through most the ROM  3 - moderate increase in muscle tone - passive movement difficult  4 - affected parts ridid in flexion or extension    SPASMS observed:   RUE: {YES/NO:60::\"Yes\"}   LUE: {YES/NO:60::\"Yes\"}  RLE: {YES/NO:60::\"Yes\"}   LLE: {YES/NO:60::\"Yes\"}    {Administrative / Billing Section (Optional):36204}      Botulinum Toxin Injection Procedure    Pre-operative diagnosis: Spasticity    Post-operative diagnosis: Same    Procedure: Chemodenervation    After risks and benefits were explained including bleeding, infection, worsening of pain, damage to the areas being injected, weakness of muscles, loss of muscle control, dysphagia if injecting the head or neck, facial droop if injecting the facial area, painful injection, allergic or other reaction to the medications being injected, and the failure of the procedure to help the problem, a signed consent was obtained on 2/11/2025     The patient was placed in a seated position and the sites to be treated were identified. A time out was called and performed. The area to be treated was prepped three times with alcohol and the alcohol allowed to dry. Next, a 25 gauge, 50mm disposable electrode needle was used to inject the medication in the " area to be treated.    Guidance: EMG  Area(s) injected:    Muscle Dose (units) # Sites Technique   R EDC 25   EMG   R Pectoralis 100   EMG   R FDS 40   EMG   R Pronator 40   EMG   R med gastroc 75   EMG   R lat gastroc 75   EMG   R soleus 75   EMG       EMG         EMG         EMG         EMG         EMG         EMG        Medications used: 500 units of botox diluted in 5 mL of preservative free saline    See MAR for Lot/Exp    The patient {Did/not:49521} tolerate the procedure well. {Post-botulinum findings:09183}.    The following portions of the patient's history were reviewed and updated as appropriate: allergies, current medications, past family history, past medical history, past social history, past surgical history and problem list.

## 2025-02-11 NOTE — TELEPHONE ENCOUNTER
Botox number of units: 500  Botox quantity: 5  [facility]  Botox at Correct Administering Location: yes  NDC number:[address]-72  Lot number: [id number]  Expiration Date: 05/30/27  Appt notes indicate correct medication:  yes

## 2025-02-11 NOTE — PROGRESS NOTES
Name: Jimmy Mello      : 1960      MRN: [id number]  Encounter Provider: Ashley L De Padua, MD  Encounter Date: 2025   Encounter department: [facility] PHYSICAL MEDICINE AND REHABILITATION [place]  :  Assessment & Plan  Spastic hemiparesis of right dominant side as late effect of cerebral infarction (HCC)    Orders:    onabotulinumtoxin A (BOTOX) injection 500 Units  Mr. Mello is a very pleasant 64yo M with medical history of cerebellar CVA with R > L stroke burden with additional hypodensities on DWI appreciated in L midbrain, pontine area, and R lower medullary/spinal cord junction. He has had remarkably recovery in regards to strength on his R side, but remains with spasticity resulting in a tendency to plantarflexion, pronator tightness, shoulder tightness, and a dystonic kind of locking that involves both his FDS and EDC.    He did find the last round of toxin particularly helpful for his R fingers. His shoulder has good active ROM still, and he is ok with redistributing some of that toxin to other areas. I increased the dose to his pronator teres (consideration for pronator quadratus in the future). I also increased the dose to his plantarflexors to hopefully help with his tendency to catch his toe.     He is now off Baclofen, and while I think he would benefit, he would like to see how he does off of it.     Oral antispasticity medication: He has taken himself off valium and Baclofen, and would like to see how he does with just botox and activity.   Schedule 500 units. Transfers independently.  PT/OT held until his insurance renews in the new year  Follow-up on 2025 at 1:00pm for repeat inj, and  at 1:45pm for repeat botulinum toxin injections.   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       Provided renewal for his low dose naltrexone, which his wife feels helps with his overall pain.     History of Present Illness   HPI/SUBJECTIVE:  Patient presents today in the office with chief complaint  of tightening in his fingers locking up again - that began a few weeks ago. He' s not sure how much benefit in the ankle, he still had a tendency to catch his toe, and maybe he has to think about it a bit more now that the toxin has worn off. He has some tightness in his pronator as well. He's not sure how much of a difference he noted with that.    He feels his shoulder feels ok right now, and is ok with either decreasing the dose or seeing how he does off of it.     Here alone, his brother drove him. His wife is ill currently.      Last seen for chemodenervation: 10/23/2024  Results of chemodenervation: See above  How long did effects last: 12 weeks.   Side affect/Adverse Effects: None that he can tell.      Any issues with driving, swallowing, appetite: Doesn't drive. 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   Currently in therapy: Not currently   Any falls with significant injuries: Slipped, with head strike and no LOC on 1/16. CT C-Spine and CTH without acute injury. He feel again this past weekend slipped, unable to catch himself.   Any new weakness: None   Any changes to care since last seen: He is due for refill of naltrexone   Safe at home: yes   Any oral meds: Baclofen 10-10-15mg causes some fatigue/tiredness. Low dose naltrexone does seem to help his pain and he is due for refills. He stopped taking the Baclofen and didn't notice much difference. It appears he would have run out in the middle of fall/winter. He's not sure he has noticed a difference off of it, and would not like to resume it at this time.   On anticoagulation/blood thinners: Full dose ASA  Current functional status:  Ind with mobility and transfers without an assistive device.  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    Review of Systems   Constitutional: Negative.    HENT: Negative.      Respiratory: Negative.     Cardiovascular: Negative.    Genitourinary: Negative.    Musculoskeletal: Negative.    Neurological: Negative.         Objective   /86 (BP Location: Right arm, Patient Position: Sitting, Cuff Size: Standard)   Pulse 61   Temp 98.3 °F (36.8 °C) (Temporal)   SpO2 96%      Gen: No acute distress, Well-nourished, well-appearing.  HEENT: Moist mucus membranes, Normocephalic/Atraumatic  Cardiovascular: Regular rate, rhythm, S1/S2. Distal pulses palpable  Heme/Extr: No edema  Pulmonary: Non-labored breathing.   : No barnett  GI: Soft, non-tender, non-distended.   MSK:   Excellent AROM/PROM in R shoulder abduction with catch at about 90 degrees abduction, but able to get through remainder with ease.  Able to get full elbow flexion/extension.  Able to get full wrist flexion/extension  Able to get full finger ROM  Integumentary: Skin is warm, dry.   Psych: Normal mood and affect.   Neuro: AAOx3, Speech is intact. Appropriate to questioning.   MMT:   Strength:   Right  Left  Site  Right  Left  Site    5- 5  S Ab: Shoulder Abductors  4+  5  HF: Hip Flexors    5 5  EF: Elbow Flexors  4+  5 KF: Knee Flexors    5  5  EE: Elbow Extensors  5  5  KE: Knee Extensors    5  5  WE: Wrist Extensors  4+ 5  DR: Dorsi Flexors    4+ 5  FF: Finger Flexors  5  5  PF: Plantar Flexors    4- 5  HI: Hand Intrinsics  5  5  EHL: Extensor Hallucis Longus     MAS:  Right  Left  Site  Right  Left  Site    1+ 0  Shoulder 0 0  Hip Adductors   1+ 0  EF: Elbow Flexors  2 0 KF: Knee Flexors    0 0  EE: Elbow Extensors  2  0  KE: Knee Extensors    1+/0 0/0  WF/WE 0  0  DR: Dorsi Flexors    1  0  FF: Finger Flexors  1+  0  PF: Plantar Flexors    0  0  HI: Hand Intrinsics  0/0  0/0  Toe Flex/Ex   Pronator MAS 2.   MAS  0 - no increased tone  1 - slight increase in tone at the end of the ROM  1+ increase in tone at 1/2 the ROM  2 - increase in tone through most the ROM  3 - moderate increase in muscle tone - passive movement  difficult  4 - affected parts ridid in flexion or extension    SPASMS observed:   RUE: no   LUE: no  RLE: no   LLE: no    Administrative Statements   I have spent a total time of 25 minutes in caring for this patient on the day of the visit/encounter including Instructions for management, Patient and family education, Impressions, Documenting in the medical record, Reviewing / ordering tests, medicine, procedures  , and Obtaining or reviewing history  .     Botulinum Toxin Injection Procedure     Pre-operative diagnosis: Spasticity     Post-operative diagnosis: Same     Procedure: Chemodenervation     After risks and benefits were explained including bleeding, infection, worsening of pain, damage to the areas being injected, weakness of muscles, loss of muscle control, dysphagia if injecting the head or neck, facial droop if injecting the facial area, painful injection, allergic or other reaction to the medications being injected, and the failure of the procedure to help the problem, a signed consent was obtained on 2/11/2025      The patient was placed in a seated position and the sites to be treated were identified. A time out was called and performed. The area to be treated was prepped three times with alcohol and the alcohol allowed to dry. Next, a 25 gauge, 50mm disposable electrode needle was used to inject the medication in the area to be treated.     Guidance: EMG  Area(s) injected:     Muscle Dose (units) # Sites Technique   R EDC 25  1 EMG   R Pectoralis 75  3 EMG   R FDS 40  2 EMG   R Pronator 50  2 EMG   R med gastroc 100  4 EMG   R lat gastroc 100  4 EMG   R soleus 75  3 EMG         EMG         EMG         EMG         EMG    Waste 35   EMG         EMG         Medications used: 500 units of botox diluted in 5 mL of preservative free saline     See MAR for Lot/Exp     The patient did tolerate the procedure well. There were no complications.     The following portions of the patient's history were reviewed  and updated as appropriate: allergies, current medications, past family history, past medical history, past social history, past surgical history and problem list.

## 2025-02-13 DIAGNOSIS — F43.21 ADJUSTMENT DISORDER WITH DEPRESSED MOOD: ICD-10-CM

## 2025-02-13 DIAGNOSIS — G89.29 CENTRAL SENSITIZATION TO PAIN: ICD-10-CM

## 2025-02-13 DIAGNOSIS — G89.0 CENTRAL PAIN SYNDROME: ICD-10-CM

## 2025-02-13 RX ORDER — DULOXETIN HYDROCHLORIDE 60 MG/1
60 CAPSULE, DELAYED RELEASE ORAL DAILY
Qty: 90 CAPSULE | Refills: 0 | Status: SHIPPED | OUTPATIENT
Start: 2025-02-13

## 2025-02-18 ENCOUNTER — TELEPHONE (OUTPATIENT)
Dept: NEUROLOGY | Facility: CLINIC | Age: 65
End: 2025-02-18

## 2025-02-19 DIAGNOSIS — G89.29 CENTRAL SENSITIZATION TO PAIN: ICD-10-CM

## 2025-02-19 RX ORDER — NALTREXONE HYDROCHLORIDE 50 MG/1
TABLET, FILM COATED ORAL
Qty: 90 TABLET | Refills: 0 | OUTPATIENT
Start: 2025-02-19

## 2025-02-19 RX ORDER — NALTREXONE HYDROCHLORIDE 50 MG/1
TABLET, FILM COATED ORAL
Qty: 90 TABLET | Refills: 0 | Status: SHIPPED | OUTPATIENT
Start: 2025-02-19 | End: 2025-02-19

## 2025-02-19 RX ORDER — NALTREXONE HYDROCHLORIDE 50 MG/1
TABLET, FILM COATED ORAL
Qty: 90 TABLET | Refills: 0 | Status: SHIPPED | OUTPATIENT
Start: 2025-02-19

## 2025-03-04 DIAGNOSIS — I63.9 CEREBROVASCULAR ACCIDENT (CVA) (HCC): ICD-10-CM

## 2025-03-04 DIAGNOSIS — I65.02 STENOSIS OF LEFT VERTEBRAL ARTERY: ICD-10-CM

## 2025-03-04 DIAGNOSIS — I77.4 CELIAC ARTERY STENOSIS (HCC): ICD-10-CM

## 2025-03-04 DIAGNOSIS — I77.74 VERTEBRAL ARTERY DISSECTION (HCC): ICD-10-CM

## 2025-03-05 ENCOUNTER — APPOINTMENT (EMERGENCY)
Dept: RADIOLOGY | Facility: HOSPITAL | Age: 65
End: 2025-03-05
Payer: COMMERCIAL

## 2025-03-05 ENCOUNTER — APPOINTMENT (EMERGENCY)
Dept: CT IMAGING | Facility: HOSPITAL | Age: 65
End: 2025-03-05
Payer: COMMERCIAL

## 2025-03-05 ENCOUNTER — HOSPITAL ENCOUNTER (EMERGENCY)
Facility: HOSPITAL | Age: 65
Discharge: HOME | End: 2025-03-06
Attending: EMERGENCY MEDICINE
Payer: COMMERCIAL

## 2025-03-05 DIAGNOSIS — S09.90XA HEAD INJURY: Primary | ICD-10-CM

## 2025-03-05 DIAGNOSIS — J18.9 PNEUMONIA: ICD-10-CM

## 2025-03-05 LAB
ABO GROUP BLD: NORMAL
ANION GAP SERPL CALCULATED.3IONS-SCNC: 9 MMOL/L (ref 4–13)
BASOPHILS # BLD AUTO: 0.06 THOUSANDS/ÂΜL (ref 0–0.1)
BASOPHILS NFR BLD AUTO: 0 % (ref 0–1)
BILIRUB UR QL STRIP: NEGATIVE
BLD GP AB SCN SERPL QL: NEGATIVE
BUN SERPL-MCNC: 47 MG/DL (ref 5–25)
CALCIUM SERPL-MCNC: 8.6 MG/DL (ref 8.4–10.2)
CHLORIDE SERPL-SCNC: 109 MMOL/L (ref 96–108)
CLARITY UR: CLEAR
CO2 SERPL-SCNC: 23 MMOL/L (ref 21–32)
COLOR UR: NORMAL
CREAT SERPL-MCNC: 2.19 MG/DL (ref 0.6–1.3)
EOSINOPHIL # BLD AUTO: 0.16 THOUSAND/ÂΜL (ref 0–0.61)
EOSINOPHIL NFR BLD AUTO: 1 % (ref 0–6)
ERYTHROCYTE [DISTWIDTH] IN BLOOD BY AUTOMATED COUNT: 13.3 % (ref 11.6–15.1)
FLUAV RNA RESP QL NAA+PROBE: NEGATIVE
FLUBV RNA RESP QL NAA+PROBE: NEGATIVE
GFR SERPL CREATININE-BSD FRML MDRD: 30 ML/MIN/1.73SQ M
GLUCOSE SERPL-MCNC: 96 MG/DL (ref 65–140)
GLUCOSE UR STRIP-MCNC: NEGATIVE MG/DL
HCT VFR BLD AUTO: 40 % (ref 36.5–49.3)
HGB BLD-MCNC: 12.8 G/DL (ref 12–17)
HGB UR QL STRIP.AUTO: NEGATIVE
IMM GRANULOCYTES # BLD AUTO: 0.11 THOUSAND/UL (ref 0–0.2)
IMM GRANULOCYTES NFR BLD AUTO: 1 % (ref 0–2)
KETONES UR STRIP-MCNC: NEGATIVE MG/DL
LEUKOCYTE ESTERASE UR QL STRIP: NEGATIVE
LYMPHOCYTES # BLD AUTO: 0.95 THOUSANDS/ÂΜL (ref 0.6–4.47)
LYMPHOCYTES NFR BLD AUTO: 7 % (ref 14–44)
MCH RBC QN AUTO: 27.8 PG (ref 26.8–34.3)
MCHC RBC AUTO-ENTMCNC: 32 G/DL (ref 31.4–37.4)
MCV RBC AUTO: 87 FL (ref 82–98)
MONOCYTES # BLD AUTO: 0.57 THOUSAND/ÂΜL (ref 0.17–1.22)
MONOCYTES NFR BLD AUTO: 4 % (ref 4–12)
NEUTROPHILS # BLD AUTO: 12.45 THOUSANDS/ÂΜL (ref 1.85–7.62)
NEUTS SEG NFR BLD AUTO: 87 % (ref 43–75)
NITRITE UR QL STRIP: NEGATIVE
NRBC BLD AUTO-RTO: 0 /100 WBCS
PH UR STRIP.AUTO: 5 [PH]
PLATELET # BLD AUTO: 236 THOUSANDS/UL (ref 149–390)
PMV BLD AUTO: 9.8 FL (ref 8.9–12.7)
POTASSIUM SERPL-SCNC: 4.2 MMOL/L (ref 3.5–5.3)
PROT UR STRIP-MCNC: NEGATIVE MG/DL
RBC # BLD AUTO: 4.6 MILLION/UL (ref 3.88–5.62)
RH BLD: POSITIVE
RSV RNA RESP QL NAA+PROBE: NEGATIVE
SARS-COV-2 RNA RESP QL NAA+PROBE: NEGATIVE
SODIUM SERPL-SCNC: 141 MMOL/L (ref 135–147)
SP GR UR STRIP.AUTO: 1.01 (ref 1–1.03)
SPECIMEN EXPIRATION DATE: NORMAL
UROBILINOGEN UR STRIP-ACNC: <2 MG/DL
WBC # BLD AUTO: 14.3 THOUSAND/UL (ref 4.31–10.16)

## 2025-03-05 PROCEDURE — 99285 EMERGENCY DEPT VISIT HI MDM: CPT

## 2025-03-05 PROCEDURE — 71045 X-RAY EXAM CHEST 1 VIEW: CPT

## 2025-03-05 PROCEDURE — 86900 BLOOD TYPING SEROLOGIC ABO: CPT | Performed by: EMERGENCY MEDICINE

## 2025-03-05 PROCEDURE — 70450 CT HEAD/BRAIN W/O DYE: CPT

## 2025-03-05 PROCEDURE — 36415 COLL VENOUS BLD VENIPUNCTURE: CPT | Performed by: EMERGENCY MEDICINE

## 2025-03-05 PROCEDURE — 70486 CT MAXILLOFACIAL W/O DYE: CPT

## 2025-03-05 PROCEDURE — 96361 HYDRATE IV INFUSION ADD-ON: CPT

## 2025-03-05 PROCEDURE — 96374 THER/PROPH/DIAG INJ IV PUSH: CPT

## 2025-03-05 PROCEDURE — 86901 BLOOD TYPING SEROLOGIC RH(D): CPT | Performed by: EMERGENCY MEDICINE

## 2025-03-05 PROCEDURE — 93005 ELECTROCARDIOGRAM TRACING: CPT

## 2025-03-05 PROCEDURE — 72125 CT NECK SPINE W/O DYE: CPT

## 2025-03-05 PROCEDURE — 80048 BASIC METABOLIC PNL TOTAL CA: CPT | Performed by: EMERGENCY MEDICINE

## 2025-03-05 PROCEDURE — 86850 RBC ANTIBODY SCREEN: CPT | Performed by: EMERGENCY MEDICINE

## 2025-03-05 PROCEDURE — 99285 EMERGENCY DEPT VISIT HI MDM: CPT | Performed by: EMERGENCY MEDICINE

## 2025-03-05 PROCEDURE — 81003 URINALYSIS AUTO W/O SCOPE: CPT | Performed by: EMERGENCY MEDICINE

## 2025-03-05 PROCEDURE — 0241U HB NFCT DS VIR RESP RNA 4 TRGT: CPT | Performed by: EMERGENCY MEDICINE

## 2025-03-05 PROCEDURE — 74176 CT ABD & PELVIS W/O CONTRAST: CPT

## 2025-03-05 PROCEDURE — 85025 COMPLETE CBC W/AUTO DIFF WBC: CPT | Performed by: EMERGENCY MEDICINE

## 2025-03-05 RX ORDER — AMLODIPINE BESYLATE 5 MG/1
5 TABLET ORAL ONCE
Status: COMPLETED | OUTPATIENT
Start: 2025-03-05 | End: 2025-03-05

## 2025-03-05 RX ORDER — HYDRALAZINE HYDROCHLORIDE 25 MG/1
50 TABLET, FILM COATED ORAL ONCE
Status: DISCONTINUED | OUTPATIENT
Start: 2025-03-05 | End: 2025-03-06 | Stop reason: HOSPADM

## 2025-03-05 RX ORDER — ATORVASTATIN CALCIUM 80 MG/1
80 TABLET, FILM COATED ORAL
Qty: 30 TABLET | Refills: 5 | Status: SHIPPED | OUTPATIENT
Start: 2025-03-05

## 2025-03-05 RX ORDER — FENTANYL CITRATE 50 UG/ML
50 INJECTION, SOLUTION INTRAMUSCULAR; INTRAVENOUS ONCE
Refills: 0 | Status: COMPLETED | OUTPATIENT
Start: 2025-03-05 | End: 2025-03-05

## 2025-03-05 RX ADMIN — FENTANYL CITRATE 50 MCG: 50 INJECTION INTRAMUSCULAR; INTRAVENOUS at 22:34

## 2025-03-05 RX ADMIN — AMLODIPINE BESYLATE 5 MG: 5 TABLET ORAL at 22:30

## 2025-03-05 RX ADMIN — SODIUM CHLORIDE 1000 ML: 0.9 INJECTION, SOLUTION INTRAVENOUS at 22:33

## 2025-03-05 NOTE — ED PROVIDER NOTES
Emergency Department Trauma Note  Jimmy Mello 64 y.o. male MRN: [id number]  Unit/Bed#: ED 06/ED 06 Encounter: [id number]      Trauma Alert: Trauma Acuity: Trauma Evaluation  Model of Arrival: Mode of Arrival: Direct from scene via    Trauma Team: Current Providers  Attending Provider: Bryan Gray DO  Attending Provider: Bryan Gray DO  Registered Nurse: Madhavi Tyler RN  Registered Nurse: Joey Montoya RN  Registered Nurse: Vannesa Sahu RN  Registered Nurse: Amy Donis RN  Consultants:     None      History of Present Illness     Chief Complaint:   Chief Complaint   Patient presents with    Fall     Pt tripped due to left sided weakness from a previous stroke. Pt fell onto concrete, has a lac to the right side of his head. Pt denies any LOC, pt takes aspirin daily     HPI:  Jimmy Mello is a 64 y.o. male who presents with facial injury with after trip and fall with right eyebrow laceration.  Mechanism:Details of Incident: Fall landed on right side Injury Date: 03/05/25 Injury Time: 1630      Fall on aspirin trauma evaluation called with head injury      History provided by:  Patient and spouse  Medical Problem  Location:  Facial  Quality:  Laceration  Severity:  Moderate  Onset quality:  Sudden  Duration:  15 minutes  Timing:  Intermittent  Chronicity:  New  Context:  Fall with facial injury    Review of Systems   Skin:  Positive for wound.   All other systems reviewed and are negative.      Historical Information     Immunizations:   Immunization History   Administered Date(s) Administered    COVID-19 MODERNA VACC 0.5 ML IM 01/21/2021, 03/01/2021, 11/29/2021    Tdap 06/19/2024    Zoster Vaccine Recombinant 07/11/2024, 12/23/2024       Past Medical History:   Diagnosis Date    Adjustment disorder with depressed mood 03/01/2023    BRAULIO (acute kidney injury) (HCC)     B12 deficiency     Celiac artery stenosis (HCC)     Cerebellar infarct (HCC) 02/25/2023    CKD (chronic kidney disease)  stage 2, GFR 60-89 ml/min     Essential hypertension     Impaired fasting glucose     Iron deficiency anemia     Neurogenic bowel     Stenosis of left vertebral artery     Stroke (HCC)     Vertebral artery dissection (HCC)        Family History   Problem Relation Age of Onset    Hypertension Brother     Hypertension Brother      Past Surgical History:   Procedure Laterality Date    ARTERY SURGERY      vertebral artery stent/revascularization    IR STROKE ALERT  2023     Social History     Tobacco Use    Smoking status: Former     Current packs/day: 0.00     Average packs/day: 1 pack/day for 5.0 years (5.0 ttl pk-yrs)     Types: Cigarettes     Start date: 1978     Quit date: 1983     Years since quittin.9    Smokeless tobacco: Never   Vaping Use    Vaping status: Never Used   Substance Use Topics    Alcohol use: Not Currently    Drug use: Never     E-Cigarette/Vaping    E-Cigarette Use Never User      E-Cigarette/Vaping Substances    Nicotine No     THC No     CBD No     Flavoring No     Other No     Unknown No        Family History: non-contributory    Meds/Allergies   Prior to Admission Medications   Prescriptions Last Dose Informant Patient Reported? Taking?   DULoxetine (CYMBALTA) 60 mg delayed release capsule   No No   Sig: TAKE 1 CAPSULE BY MOUTH EVERY DAY   OXcarbazepine (TRILEPTAL) 150 mg tablet   No No   Sig: TAKE 3 TABLETS (450 MG TOTAL) BY MOUTH 2 (TWO) TIMES A DAY   OXcarbazepine (TRILEPTAL) 600 mg tablet   No No   Sig: TAKE 1 TABLET BY MOUTH TWICE A DAY   amLODIPine (NORVASC) 2.5 mg tablet   No No   Sig: TAKE 1 TABLET BY MOUTH EVERY DAY   aspirin 325 mg tablet   Yes No   Sig: Take 325 mg by mouth daily   atorvastatin (LIPITOR) 80 mg tablet   No No   Sig: TAKE 1 TABLET BY MOUTH DAILY WITH DINNER   baclofen 10 mg tablet   No No   Sig: TAKE 1 TABLET BY MOUTH IN THE MORNING, 1 TABLET MIDDAY, AND 1.5 TABLETS AT NIGHT   carvedilol (COREG) 25 mg tablet   No No   Sig: TAKE 1 TABLET BY MOUTH  TWICE A DAY WITH FOOD   cyanocobalamin ([employer] Vitamin B-12) 1000 MCG tablet   No No   Sig: Take 1 tablet (1,000 mcg total) by mouth daily   ferrous sulfate 325 (65 Fe) mg tablet   No No   Sig: TAKE 1 TABLET BY MOUTH EVERY DAY WITH BREAKFAST   hydrALAZINE (APRESOLINE) 50 mg tablet   No No   Sig: TAKE 1 TABLET BY MOUTH EVERY 12 HOURS   losartan (COZAAR) 100 MG tablet   No No   Sig: TAKE 1 TABLET BY MOUTH EVERY DAY   naltrexone (REVIA) 50 mg tablet   No No   Sig: Please compound 4mg tablets to take by mouth QHS   pregabalin (LYRICA) 100 mg capsule   No No   Sig: TAKE 1 CAPSULE BY MOUTH 2 TIMES A DAY IN ADDITION TO 50MG IN THE AFTERNOON   pregabalin (LYRICA) 50 mg capsule   No No   Sig: TAKE 1 CAPSULE BY MOUTH IN THE AFTERNOON IN ADDITION TO THE 100MG TWICE DAILY AS DIRECTED      Facility-Administered Medications: None       No Known Allergies    PHYSICAL EXAM    PE limited by: Nothing    Objective   Vitals:   First set: Temperature: 98.2 °F (36.8 °C) (03/05/25 1900)  Pulse: 72 (03/05/25 1858)  Respirations: 20 (03/05/25 1858)  Blood Pressure: (!) 232/100 (03/05/25 1858)  SpO2: 97 % (03/05/25 1858)    Primary Survey:   (A) Airway: Patent  (B) Breathing: Equal bilateral  (C) Circulation: Pulses:   normal  (D) Disabliity:  GCS Total:  15  (E) Expose:  Completed    Secondary Survey: (Click on Physical Exam tab above)  Physical Exam  Vitals and nursing note reviewed.   Constitutional:       General: He is not in acute distress.  HENT:      Head:      Comments: 2 cm laceration right eyebrow with some contusion     Right Ear: Tympanic membrane, ear canal and external ear normal.   Eyes:      Extraocular Movements: Extraocular movements intact.      Pupils: Pupils are equal, round, and reactive to light.   Cardiovascular:      Rate and Rhythm: Normal rate and regular rhythm.      Pulses: Normal pulses.      Heart sounds: No murmur heard.     No friction rub. No gallop.   Pulmonary:      Effort: No respiratory distress.       Breath sounds: No stridor. No wheezing, rhonchi or rales.   Abdominal:      General: There is no distension.      Palpations: Abdomen is soft.      Tenderness: There is no abdominal tenderness. There is no guarding or rebound.   Musculoskeletal:         General: No swelling, tenderness, deformity or signs of injury.      Cervical back: Normal range of motion and neck supple. No rigidity or tenderness.      Right lower leg: No edema.      Left lower leg: No edema.   Skin:     Findings: Lesion present.      Comments: Right eyebrow laceration   Neurological:      Mental Status: He is alert and oriented to person, place, and time.      Sensory: No sensory deficit.      Motor: Weakness present.      Coordination: Coordination normal.      Comments: Right-sided weakness from prior stroke   Psychiatric:         Mood and Affect: Mood normal.         Behavior: Behavior normal.         Thought Content: Thought content normal.         Cervical spine cleared by clinical criteria? No (imaging required)      Invasive Devices       None                   Lab Results:   Results Reviewed       Procedure Component Value Units Date/Time    FLU/RSV/COVID - if FLU/RSV clinically relevant (2hr TAT) [[id number]]  (Normal) Collected: 03/05/25 2238    Lab Status: Final result Specimen: Nares from Nose Updated: 03/05/25 2324     SARS-CoV-2 Negative     INFLUENZA A PCR Negative     INFLUENZA B PCR Negative     RSV PCR Negative    Narrative:      This test has been performed using the CoV-2/Flu/RSV plus assay on the [employer] GeneXpert platform. This test has been validated by the  and verified by the performing laboratory.     This test is designed to amplify and detect the following: nucleocapsid (N), envelope (E), and RNA-dependent RNA polymerase (RdRP) genes of the SARS-CoV-2 genome; matrix (M), basic polymerase (PB2), and acidic protein (PA) segments of the influenza A genome; matrix (M) and non-structural protein (NS) segments  of the influenza B genome, and the nucleocapsid genes of RSV A and RSV B.     Positive results are indicative of the presence of Flu A, Flu B, RSV, and/or SARS-CoV-2 RNA. Positive results for SARS-CoV-2 or suspected novel influenza should be reported to state, local, or federal health departments according to local reporting requirements.      All results should be assessed in conjunction with clinical presentation and other laboratory markers for clinical management.     FOR PEDIATRIC PATIENTS - copy/paste COVID Guidelines URL to browser: [email or web address]       UA w Reflex to Microscopic w Reflex to Culture [[id number]] Collected: 03/05/25 2239    Lab Status: Final result Specimen: Urine, Clean Catch Updated: 03/05/25 2256     Color, UA Light Yellow     Clarity, UA Clear     Specific Gravity, UA 1.015     pH, UA 5.0     Leukocytes, UA Negative     Nitrite, UA Negative     Protein, UA Negative mg/dl      Glucose, UA Negative mg/dl      Ketones, UA Negative mg/dl      Urobilinogen, UA <2.0 mg/dl      Bilirubin, UA Negative     Occult Blood, UA Negative    Basic metabolic panel [[id number]]  (Abnormal) Collected: 03/05/25 1916    Lab Status: Final result Specimen: Blood from Arm, Left Updated: 03/05/25 1942     Sodium 141 mmol/L      Potassium 4.2 mmol/L      Chloride 109 mmol/L      CO2 23 mmol/L      ANION GAP 9 mmol/L      BUN 47 mg/dL      Creatinine 2.19 mg/dL      Glucose 96 mg/dL      Calcium 8.6 mg/dL      eGFR 30 ml/min/1.73sq m     Narrative:      National Kidney Disease Foundation guidelines for Chronic Kidney Disease (CKD):     Stage 1 with normal or high GFR (GFR > 90 mL/min/1.73 square meters)    Stage 2 Mild CKD (GFR = 60-89 mL/min/1.73 square meters)    Stage 3A Moderate CKD (GFR = 45-59 mL/min/1.73 square meters)    Stage 3B Moderate CKD (GFR = 30-44 mL/min/1.73 square meters)    Stage 4 Severe CKD (GFR = 15-29 mL/min/1.73 square meters)    Stage  5 End Stage CKD (GFR <15 mL/min/1.73 square meters)  Note: GFR calculation is accurate only with a steady state creatinine    CBC and differential [[id number]]  (Abnormal) Collected: 03/05/25 1916    Lab Status: Final result Specimen: Blood from Arm, Left Updated: 03/05/25 1922     WBC 14.30 Thousand/uL      RBC 4.60 Million/uL      Hemoglobin 12.8 g/dL      Hematocrit 40.0 %      MCV 87 fL      MCH 27.8 pg      MCHC 32.0 g/dL      RDW 13.3 %      MPV 9.8 fL      Platelets 236 Thousands/uL      nRBC 0 /100 WBCs      Segmented % 87 %      Immature Grans % 1 %      Lymphocytes % 7 %      Monocytes % 4 %      Eosinophils Relative 1 %      Basophils Relative 0 %      Absolute Neutrophils 12.45 Thousands/µL      Absolute Immature Grans 0.11 Thousand/uL      Absolute Lymphocytes 0.95 Thousands/µL      Absolute Monocytes 0.57 Thousand/µL      Eosinophils Absolute 0.16 Thousand/µL      Basophils Absolute 0.06 Thousands/µL                    Imaging Studies:   Direct to CT: Yes  XR chest 1 view portable   ED Interpretation by Bryan Gray DO (03/06 0027)   No acute infiltrate      CT abdomen pelvis wo contrast   Final Result by Champ Galvez DO (03/05 2330)      Bilateral lower lobe pneumonia.      Workstation performed: [id number]         TRAUMA - CT head wo contrast   Final Result by Tono Cifuentes MD (03/05 1950)      No acute intracranial abnormality.      The study was marked in EPIC for immediate notification.                  Workstation performed: [id number]         TRAUMA - CT spine cervical wo contrast   Final Result by Tono Cifuentes MD (03/05 1950)      No cervical spine fracture or traumatic malalignment.      The study was marked in EPIC for immediate notification.                  Workstation performed: [id number]         TRAUMA - CT facial bones wo contrast   Final Result by Tono Cifuentes MD (03/05 1947)      1. No facial fracture seen.   2. Right periorbital soft tissue swelling.  There is no retrobulbar hematoma.      The study was marked in EPIC for immediate notification.               Workstation performed: [id number]               Procedures  ECG 12 Lead Documentation Only    Date/Time: 3/5/2025 10:06 PM    Performed by: Bryan Gray DO  Authorized by: Bryan Gray DO    ECG reviewed by me, the ED Provider: yes    Patient location:  ED  Rate:     ECG rate:  68  Rhythm:     Rhythm: sinus rhythm    Conduction:     Conduction: normal    T waves:     T waves: normal             ED Course  ED Course as of 03/06/25 0118   Wed Mar 05, 2025   1943 Breath sounds equal. No crepitus or chest wall tenderness with compression over the chest. No pain or instability with compression over the pelvis. No bedside imaging required. Patient is stable to proceed to CT scan.    2200 Patient does not wish to be admitted to the hospital           Medical Decision Making  Fall on aspirin trauma evaluation called    Amount and/or Complexity of Data Reviewed  Labs: ordered.  Radiology: ordered and independent interpretation performed.    Risk  Prescription drug management.  Decision regarding hospitalization.                Disposition  [facility] Transfer: No  Final diagnoses:   Head injury   Pneumonia     Time reflects when diagnosis was documented in both MDM as applicable and the Disposition within this note       Time User Action Codes Description Comment    3/6/2025 12:31 AM Bryan Gray [S09.90XA] Head injury     3/6/2025 12:31 AM Bryan Gray [J18.9] Pneumonia           ED Disposition       ED Disposition   Discharge    Condition   Stable    Date/Time   Thu Mar 6, 2025 12:31 AM    Comment   Jimmy Mello discharge to home/self care.                   Follow-up Information       Follow up With Specialties Details Why Contact Info Additional Information    Lupis Meza DO Family Medicine   [address] [place] [id number]  [phone]        [facility]  Emergency Department Emergency Medicine  As needed, If symptoms worsen [address] [facility] [address] [phone] [facility] Emergency Department, [address] [place], [place] [address]          Discharge Medication List as of 3/6/2025 12:35 AM        START taking these medications    Details   amoxicillin-clavulanate (AUGMENTIN) 875-125 mg per tablet Take 1 tablet by mouth 2 (two) times a day for 7 days Do not start before March 7, 2025., Starting Fri 3/7/2025, Until Fri 3/14/2025, Normal      azithromycin (ZITHROMAX) 250 mg tablet Take 1 tablet daily for the next 4 days Do not start before March 7, 2025., Normal           CONTINUE these medications which have NOT CHANGED    Details   naltrexone (REVIA) 50 mg tablet Please compound 4mg tablets to take by mouth QHS, Normal      amLODIPine (NORVASC) 2.5 mg tablet TAKE 1 TABLET BY MOUTH EVERY DAY, Starting Mon 12/9/2024, Normal      aspirin 325 mg tablet Take 325 mg by mouth daily, Historical Med      atorvastatin (LIPITOR) 80 mg tablet TAKE 1 TABLET BY MOUTH DAILY WITH DINNER, Starting Wed 3/5/2025, Normal      baclofen 10 mg tablet TAKE 1 TABLET BY MOUTH IN THE MORNING, 1 TABLET MIDDAY, AND 1.5 TABLETS AT NIGHT, Normal      carvedilol (COREG) 25 mg tablet TAKE 1 TABLET BY MOUTH TWICE A DAY WITH FOOD, Starting Mon 12/9/2024, Normal      cyanocobalamin ([employer] Vitamin B-12) 1000 MCG tablet Take 1 tablet (1,000 mcg total) by mouth daily, Starting Fri 10/18/2024, Normal      DULoxetine (CYMBALTA) 60 mg delayed release capsule TAKE 1 CAPSULE BY MOUTH EVERY DAY, Starting Thu 2/13/2025, Normal      ferrous sulfate 325 (65 Fe) mg tablet TAKE 1 TABLET BY MOUTH EVERY DAY WITH BREAKFAST, Starting Thu 1/23/2025, Normal      hydrALAZINE (APRESOLINE) 50 mg tablet TAKE 1 TABLET BY MOUTH EVERY 12 HOURS, Starting Mon 12/9/2024, Normal      losartan (COZAAR) 100 MG tablet TAKE 1 TABLET BY MOUTH EVERY DAY, Starting Mon  12/9/2024, Normal      !! OXcarbazepine (TRILEPTAL) 150 mg tablet TAKE 3 TABLETS (450 MG TOTAL) BY MOUTH 2 (TWO) TIMES A DAY, Normal      !! OXcarbazepine (TRILEPTAL) 600 mg tablet TAKE 1 TABLET BY MOUTH TWICE A DAY, Starting Mon 12/9/2024, Normal      !! pregabalin (LYRICA) 100 mg capsule TAKE 1 CAPSULE BY MOUTH 2 TIMES A DAY IN ADDITION TO 50MG IN THE AFTERNOON, Normal      !! pregabalin (LYRICA) 50 mg capsule TAKE 1 CAPSULE BY MOUTH IN THE AFTERNOON IN ADDITION TO THE 100MG TWICE DAILY AS DIRECTED, Normal       !! - Potential duplicate medications found. Please discuss with provider.        No discharge procedures on file.    PDMP Review         Value Time User    PDMP Reviewed  Yes 1/24/2025  7:59 PM Trey Del Real MD            ED Provider  Electronically Signed by           Bryan Gray DO  03/06/25 0118

## 2025-03-05 NOTE — Clinical Note
Case was discussed with mike and the patient's admission status was agreed to be Admission Status: observation status to the service of Dr. Leggett .

## 2025-03-06 VITALS
SYSTOLIC BLOOD PRESSURE: 158 MMHG | RESPIRATION RATE: 18 BRPM | HEIGHT: 69 IN | OXYGEN SATURATION: 95 % | TEMPERATURE: 98.2 F | WEIGHT: 200 LBS | DIASTOLIC BLOOD PRESSURE: 74 MMHG | HEART RATE: 64 BPM | BODY MASS INDEX: 29.62 KG/M2

## 2025-03-06 LAB
ATRIAL RATE: 68 BPM
P AXIS: 55 DEGREES
PR INTERVAL: 200 MS
QRS AXIS: -28 DEGREES
QRSD INTERVAL: 96 MS
QT INTERVAL: 406 MS
QTC INTERVAL: 431 MS
T WAVE AXIS: 28 DEGREES
VENTRICULAR RATE: 68 BPM

## 2025-03-06 PROCEDURE — 93010 ELECTROCARDIOGRAM REPORT: CPT | Performed by: INTERNAL MEDICINE

## 2025-03-06 RX ORDER — AZITHROMYCIN 250 MG/1
TABLET, FILM COATED ORAL
Qty: 4 TABLET | Refills: 0 | Status: SHIPPED | OUTPATIENT
Start: 2025-03-07 | End: 2025-03-10

## 2025-03-06 RX ORDER — AZITHROMYCIN 500 MG/1
500 TABLET, FILM COATED ORAL ONCE
Status: COMPLETED | OUTPATIENT
Start: 2025-03-06 | End: 2025-03-06

## 2025-03-06 RX ADMIN — AZITHROMYCIN DIHYDRATE 500 MG: 500 TABLET ORAL at 00:46

## 2025-03-06 RX ADMIN — AMOXICILLIN AND CLAVULANATE POTASSIUM 1 TABLET: 875; 125 TABLET, COATED ORAL at 00:46

## 2025-03-20 ENCOUNTER — APPOINTMENT (EMERGENCY)
Dept: CT IMAGING | Facility: HOSPITAL | Age: 65
DRG: 871 | End: 2025-03-20
Attending: EMERGENCY MEDICINE
Payer: COMMERCIAL

## 2025-03-20 ENCOUNTER — APPOINTMENT (EMERGENCY)
Dept: RADIOLOGY | Facility: HOSPITAL | Age: 65
DRG: 871 | End: 2025-03-20
Payer: COMMERCIAL

## 2025-03-20 ENCOUNTER — APPOINTMENT (INPATIENT)
Dept: NON INVASIVE DIAGNOSTICS | Facility: HOSPITAL | Age: 65
DRG: 871 | End: 2025-03-20
Payer: COMMERCIAL

## 2025-03-20 ENCOUNTER — HOSPITAL ENCOUNTER (INPATIENT)
Facility: HOSPITAL | Age: 65
LOS: 4 days | Discharge: HOME | DRG: 871 | End: 2025-03-24
Attending: EMERGENCY MEDICINE | Admitting: STUDENT IN AN ORGANIZED HEALTH CARE EDUCATION/TRAINING PROGRAM
Payer: COMMERCIAL

## 2025-03-20 DIAGNOSIS — J18.9 PNEUMONIA: Primary | ICD-10-CM

## 2025-03-20 DIAGNOSIS — R26.2 AMBULATORY DYSFUNCTION: ICD-10-CM

## 2025-03-20 DIAGNOSIS — R09.02 HYPOXIA: ICD-10-CM

## 2025-03-20 DIAGNOSIS — K59.00 CONSTIPATION: ICD-10-CM

## 2025-03-20 DIAGNOSIS — N18.32 STAGE 3B CHRONIC KIDNEY DISEASE (HCC): ICD-10-CM

## 2025-03-20 DIAGNOSIS — R51.9 RIGHT FACIAL PAIN: ICD-10-CM

## 2025-03-20 PROBLEM — J96.00 ACUTE RESPIRATORY FAILURE (HCC): Status: ACTIVE | Noted: 2025-03-20

## 2025-03-20 PROBLEM — N18.30 STAGE 3 CHRONIC KIDNEY DISEASE (HCC): Status: ACTIVE | Noted: 2023-03-01

## 2025-03-20 PROBLEM — E83.42 HYPOMAGNESEMIA: Status: ACTIVE | Noted: 2025-03-20

## 2025-03-20 PROBLEM — A41.9 SEPSIS (HCC): Status: ACTIVE | Noted: 2025-03-20

## 2025-03-20 LAB
2HR DELTA HS TROPONIN: 2 NG/L
ALBUMIN SERPL BCG-MCNC: 3.5 G/DL (ref 3.5–5)
ALP SERPL-CCNC: 91 U/L (ref 34–104)
ALT SERPL W P-5'-P-CCNC: 18 U/L (ref 7–52)
ANION GAP SERPL CALCULATED.3IONS-SCNC: 7 MMOL/L (ref 4–13)
AST SERPL W P-5'-P-CCNC: 17 U/L (ref 13–39)
ATRIAL RATE: 85 BPM
BASOPHILS # BLD AUTO: 0.04 THOUSANDS/ÂΜL (ref 0–0.1)
BASOPHILS NFR BLD AUTO: 0 % (ref 0–1)
BILIRUB SERPL-MCNC: 0.26 MG/DL (ref 0.2–1)
BNP SERPL-MCNC: 60 PG/ML (ref 0–100)
BUN SERPL-MCNC: 46 MG/DL (ref 5–25)
CA-I BLD-SCNC: 1.03 MMOL/L (ref 1.12–1.32)
CALCIUM SERPL-MCNC: 7.6 MG/DL (ref 8.4–10.2)
CARDIAC TROPONIN I PNL SERPL HS: 15 NG/L (ref ?–50)
CARDIAC TROPONIN I PNL SERPL HS: 17 NG/L (ref ?–50)
CHLORIDE SERPL-SCNC: 107 MMOL/L (ref 96–108)
CO2 SERPL-SCNC: 26 MMOL/L (ref 21–32)
CREAT SERPL-MCNC: 1.98 MG/DL (ref 0.6–1.3)
D DIMER PPP FEU-MCNC: 1.06 UG/ML FEU
EOSINOPHIL # BLD AUTO: 0.08 THOUSAND/ÂΜL (ref 0–0.61)
EOSINOPHIL NFR BLD AUTO: 1 % (ref 0–6)
ERYTHROCYTE [DISTWIDTH] IN BLOOD BY AUTOMATED COUNT: 13.3 % (ref 11.6–15.1)
FLUAV RNA RESP QL NAA+PROBE: NEGATIVE
FLUBV RNA RESP QL NAA+PROBE: NEGATIVE
GFR SERPL CREATININE-BSD FRML MDRD: 34 ML/MIN/1.73SQ M
GLUCOSE SERPL-MCNC: 125 MG/DL (ref 65–140)
HCT VFR BLD AUTO: 34 % (ref 36.5–49.3)
HGB BLD-MCNC: 11.1 G/DL (ref 12–17)
IMM GRANULOCYTES # BLD AUTO: 0.09 THOUSAND/UL (ref 0–0.2)
IMM GRANULOCYTES NFR BLD AUTO: 1 % (ref 0–2)
LACTATE SERPL-SCNC: 1 MMOL/L (ref 0.5–2)
LYMPHOCYTES # BLD AUTO: 0.42 THOUSANDS/ÂΜL (ref 0.6–4.47)
LYMPHOCYTES NFR BLD AUTO: 3 % (ref 14–44)
MAGNESIUM SERPL-MCNC: 1.6 MG/DL (ref 1.9–2.7)
MCH RBC QN AUTO: 28.1 PG (ref 26.8–34.3)
MCHC RBC AUTO-ENTMCNC: 32.6 G/DL (ref 31.4–37.4)
MCV RBC AUTO: 86 FL (ref 82–98)
MONOCYTES # BLD AUTO: 0.46 THOUSAND/ÂΜL (ref 0.17–1.22)
MONOCYTES NFR BLD AUTO: 4 % (ref 4–12)
NEUTROPHILS # BLD AUTO: 11.16 THOUSANDS/ÂΜL (ref 1.85–7.62)
NEUTS SEG NFR BLD AUTO: 91 % (ref 43–75)
NRBC BLD AUTO-RTO: 0 /100 WBCS
P AXIS: 73 DEGREES
PLATELET # BLD AUTO: 176 THOUSANDS/UL (ref 149–390)
PLATELET # BLD AUTO: 197 THOUSANDS/UL (ref 149–390)
PLATELET BLD QL SMEAR: ADEQUATE
PMV BLD AUTO: 9.7 FL (ref 8.9–12.7)
PMV BLD AUTO: 9.8 FL (ref 8.9–12.7)
POTASSIUM SERPL-SCNC: 4.4 MMOL/L (ref 3.5–5.3)
PR INTERVAL: 182 MS
PROCALCITONIN SERPL-MCNC: 0.16 NG/ML
PROT SERPL-MCNC: 5.8 G/DL (ref 6.4–8.4)
QRS AXIS: -34 DEGREES
QRSD INTERVAL: 84 MS
QT INTERVAL: 364 MS
QTC INTERVAL: 433 MS
RBC # BLD AUTO: 3.95 MILLION/UL (ref 3.88–5.62)
RBC MORPH BLD: NORMAL
RSV RNA RESP QL NAA+PROBE: NEGATIVE
SARS-COV-2 RNA RESP QL NAA+PROBE: NEGATIVE
SODIUM SERPL-SCNC: 140 MMOL/L (ref 135–147)
T WAVE AXIS: 54 DEGREES
VENTRICULAR RATE: 85 BPM
WBC # BLD AUTO: 12.25 THOUSAND/UL (ref 4.31–10.16)

## 2025-03-20 PROCEDURE — 83735 ASSAY OF MAGNESIUM: CPT

## 2025-03-20 PROCEDURE — 93010 ELECTROCARDIOGRAM REPORT: CPT | Performed by: INTERNAL MEDICINE

## 2025-03-20 PROCEDURE — 82330 ASSAY OF CALCIUM: CPT

## 2025-03-20 PROCEDURE — 99285 EMERGENCY DEPT VISIT HI MDM: CPT

## 2025-03-20 PROCEDURE — 80053 COMPREHEN METABOLIC PANEL: CPT

## 2025-03-20 PROCEDURE — 83605 ASSAY OF LACTIC ACID: CPT | Performed by: PHYSICIAN ASSISTANT

## 2025-03-20 PROCEDURE — 85025 COMPLETE CBC W/AUTO DIFF WBC: CPT

## 2025-03-20 PROCEDURE — 84484 ASSAY OF TROPONIN QUANT: CPT

## 2025-03-20 PROCEDURE — 87040 BLOOD CULTURE FOR BACTERIA: CPT | Performed by: PHYSICIAN ASSISTANT

## 2025-03-20 PROCEDURE — 99284 EMERGENCY DEPT VISIT MOD MDM: CPT

## 2025-03-20 PROCEDURE — 84145 PROCALCITONIN (PCT): CPT

## 2025-03-20 PROCEDURE — 93970 EXTREMITY STUDY: CPT | Performed by: SURGERY

## 2025-03-20 PROCEDURE — 93970 EXTREMITY STUDY: CPT

## 2025-03-20 PROCEDURE — 71046 X-RAY EXAM CHEST 2 VIEWS: CPT

## 2025-03-20 PROCEDURE — 96365 THER/PROPH/DIAG IV INF INIT: CPT

## 2025-03-20 PROCEDURE — 99223 1ST HOSP IP/OBS HIGH 75: CPT | Performed by: STUDENT IN AN ORGANIZED HEALTH CARE EDUCATION/TRAINING PROGRAM

## 2025-03-20 PROCEDURE — 94760 N-INVAS EAR/PLS OXIMETRY 1: CPT

## 2025-03-20 PROCEDURE — 83880 ASSAY OF NATRIURETIC PEPTIDE: CPT

## 2025-03-20 PROCEDURE — 85379 FIBRIN DEGRADATION QUANT: CPT

## 2025-03-20 PROCEDURE — 36415 COLL VENOUS BLD VENIPUNCTURE: CPT

## 2025-03-20 PROCEDURE — 0241U HB NFCT DS VIR RESP RNA 4 TRGT: CPT

## 2025-03-20 PROCEDURE — 96367 TX/PROPH/DG ADDL SEQ IV INF: CPT

## 2025-03-20 PROCEDURE — 85049 AUTOMATED PLATELET COUNT: CPT | Performed by: PHYSICIAN ASSISTANT

## 2025-03-20 PROCEDURE — 93005 ELECTROCARDIOGRAM TRACING: CPT

## 2025-03-20 PROCEDURE — 71275 CT ANGIOGRAPHY CHEST: CPT

## 2025-03-20 PROCEDURE — 94664 DEMO&/EVAL PT USE INHALER: CPT

## 2025-03-20 RX ORDER — BACLOFEN 10 MG/1
10 TABLET ORAL 3 TIMES DAILY
Status: DISCONTINUED | OUTPATIENT
Start: 2025-03-20 | End: 2025-03-21

## 2025-03-20 RX ORDER — MAGNESIUM SULFATE HEPTAHYDRATE 40 MG/ML
2 INJECTION, SOLUTION INTRAVENOUS ONCE
Status: COMPLETED | OUTPATIENT
Start: 2025-03-20 | End: 2025-03-20

## 2025-03-20 RX ORDER — DULOXETIN HYDROCHLORIDE 30 MG/1
60 CAPSULE, DELAYED RELEASE ORAL DAILY
Status: DISCONTINUED | OUTPATIENT
Start: 2025-03-21 | End: 2025-03-24 | Stop reason: HOSPADM

## 2025-03-20 RX ORDER — PREGABALIN 25 MG/1
50 CAPSULE ORAL DAILY
Status: DISCONTINUED | OUTPATIENT
Start: 2025-03-21 | End: 2025-03-24 | Stop reason: HOSPADM

## 2025-03-20 RX ORDER — OXCARBAZEPINE 300 MG/1
600 TABLET, FILM COATED ORAL 2 TIMES DAILY
Status: DISCONTINUED | OUTPATIENT
Start: 2025-03-20 | End: 2025-03-24 | Stop reason: HOSPADM

## 2025-03-20 RX ORDER — ACETAMINOPHEN 325 MG/1
650 TABLET ORAL EVERY 6 HOURS PRN
Status: DISCONTINUED | OUTPATIENT
Start: 2025-03-20 | End: 2025-03-24 | Stop reason: HOSPADM

## 2025-03-20 RX ORDER — ATORVASTATIN CALCIUM 40 MG/1
80 TABLET, FILM COATED ORAL
Status: DISCONTINUED | OUTPATIENT
Start: 2025-03-20 | End: 2025-03-24 | Stop reason: HOSPADM

## 2025-03-20 RX ORDER — CEFEPIME HYDROCHLORIDE 2 G/50ML
2000 INJECTION, SOLUTION INTRAVENOUS EVERY 12 HOURS
Status: DISCONTINUED | OUTPATIENT
Start: 2025-03-20 | End: 2025-03-22

## 2025-03-20 RX ORDER — PREGABALIN 100 MG/1
100 CAPSULE ORAL 2 TIMES DAILY
Status: DISCONTINUED | OUTPATIENT
Start: 2025-03-20 | End: 2025-03-24 | Stop reason: HOSPADM

## 2025-03-20 RX ORDER — BACLOFEN 10 MG/1
5 TABLET ORAL
Status: DISCONTINUED | OUTPATIENT
Start: 2025-03-20 | End: 2025-03-21

## 2025-03-20 RX ORDER — HEPARIN SODIUM 5000 [USP'U]/ML
5000 INJECTION, SOLUTION INTRAVENOUS; SUBCUTANEOUS EVERY 8 HOURS SCHEDULED
Status: DISCONTINUED | OUTPATIENT
Start: 2025-03-20 | End: 2025-03-24 | Stop reason: HOSPADM

## 2025-03-20 RX ORDER — ALBUMIN HUMAN 50 G/1000ML
12.5 SOLUTION INTRAVENOUS ONCE
Status: COMPLETED | OUTPATIENT
Start: 2025-03-20 | End: 2025-03-20

## 2025-03-20 RX ORDER — AMLODIPINE BESYLATE 2.5 MG/1
2.5 TABLET ORAL DAILY
Status: DISCONTINUED | OUTPATIENT
Start: 2025-03-21 | End: 2025-03-24 | Stop reason: HOSPADM

## 2025-03-20 RX ORDER — GUAIFENESIN 600 MG/1
600 TABLET, EXTENDED RELEASE ORAL EVERY 12 HOURS SCHEDULED
Status: DISCONTINUED | OUTPATIENT
Start: 2025-03-20 | End: 2025-03-24 | Stop reason: HOSPADM

## 2025-03-20 RX ORDER — ACETAMINOPHEN 325 MG/1
650 TABLET ORAL ONCE
Status: COMPLETED | OUTPATIENT
Start: 2025-03-20 | End: 2025-03-20

## 2025-03-20 RX ORDER — ALBUTEROL SULFATE 0.83 MG/ML
2.5 SOLUTION RESPIRATORY (INHALATION) EVERY 6 HOURS PRN
Status: DISCONTINUED | OUTPATIENT
Start: 2025-03-20 | End: 2025-03-24 | Stop reason: HOSPADM

## 2025-03-20 RX ORDER — HYDRALAZINE HYDROCHLORIDE 25 MG/1
50 TABLET, FILM COATED ORAL EVERY 12 HOURS
Status: DISCONTINUED | OUTPATIENT
Start: 2025-03-20 | End: 2025-03-24 | Stop reason: HOSPADM

## 2025-03-20 RX ORDER — CARVEDILOL 25 MG/1
25 TABLET ORAL 2 TIMES DAILY WITH MEALS
Status: DISCONTINUED | OUTPATIENT
Start: 2025-03-20 | End: 2025-03-24 | Stop reason: HOSPADM

## 2025-03-20 RX ORDER — ASPIRIN 325 MG
325 TABLET ORAL DAILY
Status: DISCONTINUED | OUTPATIENT
Start: 2025-03-21 | End: 2025-03-24 | Stop reason: HOSPADM

## 2025-03-20 RX ORDER — CEFTRIAXONE 1 G/50ML
1000 INJECTION, SOLUTION INTRAVENOUS ONCE
Status: DISCONTINUED | OUTPATIENT
Start: 2025-03-20 | End: 2025-03-20

## 2025-03-20 RX ORDER — MAGNESIUM SULFATE HEPTAHYDRATE 40 MG/ML
2 INJECTION, SOLUTION INTRAVENOUS ONCE
Status: COMPLETED | OUTPATIENT
Start: 2025-03-20 | End: 2025-03-21

## 2025-03-20 RX ORDER — VANCOMYCIN HYDROCHLORIDE 1 G/200ML
1000 INJECTION, SOLUTION INTRAVENOUS DAILY PRN
Status: DISCONTINUED | OUTPATIENT
Start: 2025-03-21 | End: 2025-03-22

## 2025-03-20 RX ORDER — LOSARTAN POTASSIUM 50 MG/1
100 TABLET ORAL DAILY
Status: DISCONTINUED | OUTPATIENT
Start: 2025-03-21 | End: 2025-03-24 | Stop reason: HOSPADM

## 2025-03-20 RX ORDER — FERROUS SULFATE 325(65) MG
325 TABLET ORAL
Status: DISCONTINUED | OUTPATIENT
Start: 2025-03-21 | End: 2025-03-24 | Stop reason: HOSPADM

## 2025-03-20 RX ORDER — BENZONATATE 100 MG/1
100 CAPSULE ORAL 3 TIMES DAILY PRN
Status: DISCONTINUED | OUTPATIENT
Start: 2025-03-20 | End: 2025-03-24 | Stop reason: HOSPADM

## 2025-03-20 RX ORDER — CEFTRIAXONE 1 G/50ML
1000 INJECTION, SOLUTION INTRAVENOUS EVERY 24 HOURS
Status: DISCONTINUED | OUTPATIENT
Start: 2025-03-20 | End: 2025-03-20

## 2025-03-20 RX ORDER — SODIUM CHLORIDE, SODIUM GLUCONATE, SODIUM ACETATE, POTASSIUM CHLORIDE, MAGNESIUM CHLORIDE, SODIUM PHOSPHATE, DIBASIC, AND POTASSIUM PHOSPHATE .53; .5; .37; .037; .03; .012; .00082 G/100ML; G/100ML; G/100ML; G/100ML; G/100ML; G/100ML; G/100ML
50 INJECTION, SOLUTION INTRAVENOUS CONTINUOUS
Status: DISPENSED | OUTPATIENT
Start: 2025-03-20 | End: 2025-03-21

## 2025-03-20 RX ORDER — CALCIUM GLUCONATE 20 MG/ML
1 INJECTION, SOLUTION INTRAVENOUS ONCE
Status: COMPLETED | OUTPATIENT
Start: 2025-03-20 | End: 2025-03-20

## 2025-03-20 RX ADMIN — BACLOFEN 5 MG: 10 TABLET ORAL at 21:55

## 2025-03-20 RX ADMIN — HEPARIN SODIUM 5000 UNITS: 5000 INJECTION INTRAVENOUS; SUBCUTANEOUS at 21:47

## 2025-03-20 RX ADMIN — CARVEDILOL 25 MG: 25 TABLET, FILM COATED ORAL at 17:55

## 2025-03-20 RX ADMIN — MAGNESIUM SULFATE HEPTAHYDRATE 2 G: 40 INJECTION, SOLUTION INTRAVENOUS at 11:34

## 2025-03-20 RX ADMIN — GUAIFENESIN 600 MG: 600 TABLET ORAL at 17:55

## 2025-03-20 RX ADMIN — SODIUM CHLORIDE, SODIUM GLUCONATE, SODIUM ACETATE, POTASSIUM CHLORIDE, MAGNESIUM CHLORIDE, SODIUM PHOSPHATE, DIBASIC, AND POTASSIUM PHOSPHATE 50 ML/HR: .53; .5; .37; .037; .03; .012; .00082 INJECTION, SOLUTION INTRAVENOUS at 14:47

## 2025-03-20 RX ADMIN — BACLOFEN 10 MG: 10 TABLET ORAL at 17:55

## 2025-03-20 RX ADMIN — HYDRALAZINE HYDROCHLORIDE 50 MG: 25 TABLET ORAL at 21:51

## 2025-03-20 RX ADMIN — ACETAMINOPHEN 650 MG: 325 TABLET, FILM COATED ORAL at 10:40

## 2025-03-20 RX ADMIN — AZITHROMYCIN MONOHYDRATE 500 MG: 500 INJECTION, POWDER, LYOPHILIZED, FOR SOLUTION INTRAVENOUS at 21:47

## 2025-03-20 RX ADMIN — PREGABALIN 100 MG: 100 CAPSULE ORAL at 17:55

## 2025-03-20 RX ADMIN — ATORVASTATIN CALCIUM 80 MG: 40 TABLET, FILM COATED ORAL at 17:55

## 2025-03-20 RX ADMIN — BACLOFEN 10 MG: 10 TABLET ORAL at 21:52

## 2025-03-20 RX ADMIN — CALCIUM GLUCONATE 1 G: 20 INJECTION, SOLUTION INTRAVENOUS at 12:51

## 2025-03-20 RX ADMIN — OXCARBAZEPINE 600 MG: 300 TABLET, FILM COATED ORAL at 17:58

## 2025-03-20 RX ADMIN — VANCOMYCIN HYDROCHLORIDE 2000 MG: 1 INJECTION, POWDER, LYOPHILIZED, FOR SOLUTION INTRAVENOUS at 17:45

## 2025-03-20 RX ADMIN — IOHEXOL 85 ML: 350 INJECTION, SOLUTION INTRAVENOUS at 12:17

## 2025-03-20 RX ADMIN — ALBUMIN (HUMAN) 12.5 G: 12.5 INJECTION, SOLUTION INTRAVENOUS at 15:46

## 2025-03-20 RX ADMIN — CEFEPIME HYDROCHLORIDE 2000 MG: 2 INJECTION, SOLUTION INTRAVENOUS at 14:47

## 2025-03-20 NOTE — ED PROVIDER NOTES
Time reflects when diagnosis was documented in both MDM as applicable and the Disposition within this note       Time User Action Codes Description Comment    3/20/2025  1:53 PM Victoria Wheatley Add [J18.9] Pneumonia     3/20/2025  1:53 PM Victoria Wheatley Add [R09.02] Hypoxia           ED Disposition       ED Disposition   Admit    Condition   Stable    Date/Time   Thu Mar 20, 2025  1:53 PM    Comment   Case was discussed with Dr. Villegas and the patient's admission status was agreed to be Admission Status: inpatient status to the service of Dr. Villegas .               Assessment & Plan       Medical Decision Making  This patient is a 64-year-old male with a chief complaint of shortness of breath and cough that started this morning in the setting of recently diagnosed and treated pneumonia. Differential diagnosis includes, but is not limited to, viral etiology such as COVID, flu, RSV unresolved pneumonia, worsening pneumonia, pleural effusion, dysrhythmias, bronchitis, and unlikely for PE, etc.    WBC 12.25. Procalcitonin 0.16, troponin 15, EKG reviewed. Mild hypomagnesemia, replenished in the ED. D-dimer elevated and CTA: extensive patchy multifocal solid and groundglass opacities in the right lung, concerning for multifocal pneumonia which appears increased compared to 3/5/2025, with no central pulmonary embolism.  Other results as noted below.      Patient resting SpO2 at 93% on RA, ambulatory SpO2 drops to 90%. Will admit to internal medicine secondary to multifocal pneumonia and failure of outpatient therapy.    Amount and/or Complexity of Data Reviewed  Labs: ordered. Decision-making details documented in ED Course.  Radiology: ordered. Decision-making details documented in ED Course.    Risk  OTC drugs.  Prescription drug management.  Decision regarding hospitalization.        ED Course as of 03/21/25 0802   Thu Mar 20, 2025   1059 WBC(!): 12.25   1116 hs TnI 0hr: 15  Will delta   1116 MAGNESIUM(!): 1.6  Will  replenish   1117 Calcium(!): 7.6  EKG reviewed, Qtc/Qtc 406/431. Will get calcium ionized +/- calcium gluconate    1123 BUN(!): 46  BUN and creatinine appear to be at patient's baseline.   1220 D-Dimer, Quant(!): 1.06  CTA ordered   1300 CTA chest pe study     IMPRESSION:        1. No central pulmonary embolism. Evaluation of the segmental and subsegmental branches is limited secondary to incomplete contrast opacification.     2.  Extensive patchy multifocal solid and groundglass opacities in the right lung, concerning for multifocal pneumonia. Overall, this appears increased compared to 3/5/2025. A repeat chest CT in 4 to 6 weeks may be helpful to ensure resolution of these   findings.     3. Multiple enlarged paratracheal, subcarinal, and hilar lymph nodes, likely representing reactive lymphadenopathy. Indeterminate enlarged right lower paraesophageal lymph node, for which attention on follow-up imaging is recommended.     4. Additional findings as detailed in the body of the report.   1306 Calcium, Ionized(!): 1.03  Calcium gluconate ordered   1324 Waiting for ambulatory spo2   1341 Ambulatory SpO2 90%       Medications   multi-electrolyte (Plasmalyte-A/Isolyte-S PH 7.4/Normosol-R) IV solution (has no administration in time range)   azithromycin (ZITHROMAX) 500 mg in sodium chloride 0.9% 250mL IVPB 500 mg (has no administration in time range)   guaiFENesin (MUCINEX) 12 hr tablet 600 mg (has no administration in time range)   benzonatate (TESSALON PERLES) capsule 100 mg (has no administration in time range)   cefepime (MAXIPIME) IVPB (premix in dextrose) 2,000 mg 50 mL (has no administration in time range)   vancomycin (VANCOCIN) IVPB (premix in dextrose) 1,000 mg 200 mL (has no administration in time range)   acetaminophen (TYLENOL) tablet 650 mg (650 mg Oral Given 3/20/25 1040)   magnesium sulfate 2 g/50 mL IVPB (premix) 2 g (0 g Intravenous Stopped 3/20/25 1247)   calcium gluconate 1 g in sodium chloride 0.9%  50 mL (premix) (0 g Intravenous Stopped 3/20/25 1321)   iohexol (OMNIPAQUE) 350 MG/ML injection (MULTI-DOSE) 85 mL (85 mL Intravenous Given 3/20/25 1217)   albumin human (FLEXBUMIN) 5 % injection 12.5 g (has no administration in time range)   vancomycin (VANCOCIN) 2000 mg in sodium chloride 0.9% 500 mL IVPB (has no administration in time range)   magnesium sulfate 2 g/50 mL IVPB (premix) 2 g (has no administration in time range)       ED Risk Strat Scores   HEART Risk Score      Flowsheet Row Most Recent Value   Heart Score Risk Calculator    History 0 Filed at: 03/21/2025 0801   ECG 1 Filed at: 03/21/2025 0801   Age 2 Filed at: 03/21/2025 0801   Risk Factors 2 Filed at: 03/21/2025 0801   Troponin 1 Filed at: 03/21/2025 0801   HEART Score 6 Filed at: 03/21/2025 0801          HEART Risk Score      Flowsheet Row Most Recent Value   Heart Score Risk Calculator    History 0 Filed at: 03/21/2025 0801   ECG 1 Filed at: 03/21/2025 0801   Age 2 Filed at: 03/21/2025 0801   Risk Factors 2 Filed at: 03/21/2025 0801   Troponin 1 Filed at: 03/21/2025 0801   HEART Score 6 Filed at: 03/21/2025 0801                              SBIRT 22yo+      Flowsheet Row Most Recent Value   Initial Alcohol Screen: US AUDIT-C     1. How often do you have a drink containing alcohol? 0 Filed at: 03/20/2025 0942   2. How many drinks containing alcohol do you have on a typical day you are drinking?  0 Filed at: 03/20/2025 0942   3a. Male UNDER 65: How often do you have five or more drinks on one occasion? 0 Filed at: 03/20/2025 0942   3b. FEMALE Any Age, or MALE 65+: How often do you have 4 or more drinks on one occassion? 0 Filed at: 03/20/2025 0942   Audit-C Score 0 Filed at: 03/20/2025 0942   LORRI: How many times in the past year have you...    Used an illegal drug or used a prescription medication for non-medical reasons? Never Filed at: 03/20/2025 0942                            History of Present Illness       Chief Complaint   Patient  "presents with    Nasal Congestion     Recently treated for pneumonia. States that he finished his antibiotic and was feeling better, but today started again with cough and states he has fever 100.2 today.       Past Medical History:   Diagnosis Date    Adjustment disorder with depressed mood 2023    BRAULIO (acute kidney injury) (HCC)     B12 deficiency     Celiac artery stenosis (HCC)     Cerebellar infarct (HCC) 2023    CKD (chronic kidney disease) stage 2, GFR 60-89 ml/min     Essential hypertension     Impaired fasting glucose     Iron deficiency anemia     Neurogenic bowel     Stenosis of left vertebral artery     Stroke (HCC)     Vertebral artery dissection (HCC)       Past Surgical History:   Procedure Laterality Date    ARTERY SURGERY      vertebral artery stent/revascularization    IR STROKE ALERT  2023      Family History   Problem Relation Age of Onset    Hypertension Brother     Hypertension Brother       Social History     Tobacco Use    Smoking status: Former     Current packs/day: 0.00     Average packs/day: 1 pack/day for 5.0 years (5.0 ttl pk-yrs)     Types: Cigarettes     Start date: 1978     Quit date: 1983     Years since quittin.9    Smokeless tobacco: Never   Vaping Use    Vaping status: Never Used   Substance Use Topics    Alcohol use: Not Currently    Drug use: Never      E-Cigarette/Vaping    E-Cigarette Use Never User       E-Cigarette/Vaping Substances    Nicotine No     THC No     CBD No     Flavoring No     Other No     Unknown No       I have reviewed and agree with the history as documented.     The patient is a 64-year-old male with a past medical history of previous vertebral artery dissection and stroke, CKD, hypertension, amongst other medical conditions listed below, presenting to the emergency department for evaluation of shortness of breath that woke him out of sleep approximately 6 hours ago.  Patient describes a sensation as \"being unable to breathe\" " which he reports has since somewhat improved. He denies any current chest pain, shortness of breath. Of note, patient was evaluated in the emergency department on 03/05/2025 following a mechanical fall due to left-sided weakness from previous stroke, during which time CT A/P showed bilateral lower lobe pneumonia, and patient was discharged with Augmentin and azithromycin, with initial improvement in symptoms.          Review of Systems   Constitutional:  Positive for fever. Negative for chills.   HENT:  Negative for ear pain and sore throat.    Eyes:  Negative for visual disturbance.   Respiratory:  Positive for cough and shortness of breath.    Cardiovascular:  Negative for chest pain and palpitations.   Gastrointestinal:  Negative for abdominal pain and vomiting.   Genitourinary:  Negative for dysuria and hematuria.   Musculoskeletal:  Negative for arthralgias and back pain.   Skin:  Negative for color change and rash.   Neurological:  Negative for seizures, syncope and weakness.   All other systems reviewed and are negative.          Objective       ED Triage Vitals [03/20/25 0940]   Temperature Pulse Blood Pressure Respirations SpO2 Patient Position - Orthostatic VS   100.2 °F (37.9 °C) 85 134/90 20 95 % Sitting      Temp Source Heart Rate Source BP Location FiO2 (%) Pain Score    Oral Monitor Right arm -- No Pain      Vitals      Date and Time Temp Pulse SpO2 Resp BP Pain Score FACES Pain Rating User   03/21/25 0751 98.7 °F (37.1 °C) -- -- -- -- -- -- KK   03/21/25 0751 -- 64 91 % -- 142/63 -- -- DII   03/21/25 0100 -- -- 93 % -- -- No Pain --    03/20/25 2200 -- 63 96 % -- 160/78 -- -- MARANDA   03/20/25 2151 97.7 °F (36.5 °C) 63 -- -- 160/78 -- -- MARANDA   03/20/25 1930 -- -- -- -- -- No Pain -- MARANDA   03/20/25 1704 -- 66 95 % 18 -- -- -- DH   03/20/25 1446 -- -- -- 18 -- -- --    03/20/25 1446 97.9 °F (36.6 °C) 63 91 % -- 126/59 -- -- DII   03/20/25 1409 -- 65 93 % 16 108/56 -- -- KP   03/20/25 1400 -- 66 93 % 13  -- -- -- CC   03/20/25 1200 -- 69 93 % 16 90/53 -- -- KP   03/20/25 1100 -- 75 93 % 16 100/58 -- --    03/20/25 0940 100.2 °F (37.9 °C) 85 95 % 20 134/90 No Pain -- LJF            Physical Exam  Vitals and nursing note reviewed.   Constitutional:       General: He is not in acute distress.     Appearance: Normal appearance. He is well-developed. He is not ill-appearing.   HENT:      Head: Normocephalic and atraumatic.   Eyes:      Conjunctiva/sclera: Conjunctivae normal.   Cardiovascular:      Rate and Rhythm: Normal rate and regular rhythm.      Heart sounds: No murmur heard.  Pulmonary:      Effort: Pulmonary effort is normal. No respiratory distress.      Breath sounds: Normal breath sounds. No wheezing.      Comments: No tachypnea, use of accessory muscles and not requiring supplemental oxygen at time of initial ED evaluation.  Abdominal:      General: Abdomen is flat.      Palpations: Abdomen is soft.      Tenderness: There is no abdominal tenderness.   Musculoskeletal:         General: No swelling.      Cervical back: Neck supple.      Right lower leg: No edema.      Left lower leg: No edema.   Skin:     General: Skin is warm and dry.      Capillary Refill: Capillary refill takes less than 2 seconds.   Neurological:      Mental Status: He is alert and oriented to person, place, and time. Mental status is at baseline.   Psychiatric:         Mood and Affect: Mood normal.         Results Reviewed       Procedure Component Value Units Date/Time    Strep Pneumoniae, Urine [[id number]]  (Normal) Collected: 03/21/25 0047    Lab Status: Final result Specimen: Urine Updated: 03/21/25 0734     Strep pneumoniae antigen, urine Negative    Legionella antigen, urine [[id number]]  (Normal) Collected: 03/21/25 0047    Lab Status: Final result Specimen: Urine Updated: 03/21/25 0734     Legionella Urinary Antigen Negative    MRSA culture [[id number]] Collected: 03/21/25 0652    Lab Status: In process Specimen: Nares from Nose  Updated: 03/21/25 0659    Procalcitonin [[id number]]  (Abnormal) Collected: 03/21/25 0443    Lab Status: Final result Specimen: Blood from Arm, Left Updated: 03/21/25 0552     Procalcitonin 0.54 ng/ml     Sputum culture and Gram stain [[id number]] Updated: 03/21/25 0036    Lab Status: No result Specimen: Sputum     HS Troponin I 2hr [[id number]]  (Normal) Collected: 03/20/25 1250    Lab Status: Final result Specimen: Blood from Arm, Left Updated: 03/20/25 1321     hs TnI 2hr 17 ng/L      Delta 2hr hsTnI 2 ng/L     D-dimer, quantitative [[id number]]  (Abnormal) Collected: 03/20/25 1039    Lab Status: Final result Specimen: Blood from Arm, Left Updated: 03/20/25 1150     D-Dimer, Quant 1.06 ug/ml FEU     Narrative:      In the evaluation for possible pulmonary embolism, in the appropriate (Well's Score of 4 or less) patient, the age adjusted d-dimer cutoff for this patient can be calculated as:    Age x 0.01 (in ug/mL) for Age-adjusted D-dimer exclusion threshold for a patient over 50 years.    Calcium, ionized [[id number]]  (Abnormal) Collected: 03/20/25 1134    Lab Status: Final result Specimen: Blood from Arm, Left Updated: 03/20/25 1145     Calcium, Ionized 1.03 mmol/L     CBC and differential [[id number]]  (Abnormal) Collected: 03/20/25 1039    Lab Status: Final result Specimen: Blood from Arm, Left Updated: 03/20/25 1137     WBC 12.25 Thousand/uL      RBC 3.95 Million/uL      Hemoglobin 11.1 g/dL      Hematocrit 34.0 %      MCV 86 fL      MCH 28.1 pg      MCHC 32.6 g/dL      RDW 13.3 %      MPV 9.8 fL      Platelets 176 Thousands/uL      nRBC 0 /100 WBCs      Segmented % 91 %      Immature Grans % 1 %      Lymphocytes % 3 %      Monocytes % 4 %      Eosinophils Relative 1 %      Basophils Relative 0 %      Absolute Neutrophils 11.16 Thousands/µL      Absolute Immature Grans 0.09 Thousand/uL      Absolute Lymphocytes 0.42 Thousands/µL      Absolute Monocytes 0.46 Thousand/µL      Eosinophils Absolute 0.08 Thousand/µL       Basophils Absolute 0.04 Thousands/µL     Narrative:      This is an appended report.  These results have been appended to a previously verified report.    Smear Review(Phlebs Do Not Order) [[id number]] Collected: 03/20/25 1039    Lab Status: Final result Specimen: Blood from Arm, Left Updated: 03/20/25 1137     RBC Morphology Normal     Platelet Estimate Adequate    FLU/RSV/COVID - if FLU/RSV clinically relevant (2hr TAT) [[id number]]  (Normal) Collected: 03/20/25 1039    Lab Status: Final result Specimen: Nares from Nose Updated: 03/20/25 1128     SARS-CoV-2 Negative     INFLUENZA A PCR Negative     INFLUENZA B PCR Negative     RSV PCR Negative    Narrative:      This test has been performed using the CoV-2/Flu/RSV plus assay on the [employer] platform. This test has been validated by the  and verified by the performing laboratory.     This test is designed to amplify and detect the following: nucleocapsid (N), envelope (E), and RNA-dependent RNA polymerase (RdRP) genes of the SARS-CoV-2 genome; matrix (M), basic polymerase (PB2), and acidic protein (PA) segments of the influenza A genome; matrix (M) and non-structural protein (NS) segments of the influenza B genome, and the nucleocapsid genes of RSV A and RSV B.     Positive results are indicative of the presence of Flu A, Flu B, RSV, and/or SARS-CoV-2 RNA. Positive results for SARS-CoV-2 or suspected novel influenza should be reported to state, local, or federal health departments according to local reporting requirements.      All results should be assessed in conjunction with clinical presentation and other laboratory markers for clinical management.     FOR PEDIATRIC PATIENTS - copy/paste COVID Guidelines URL to browser: [email or web address]       Procalcitonin [[id number]]  (Normal) Collected: 03/20/25 1039    Lab Status: Final result Specimen: Blood from Arm, Left Updated: 03/20/25  1117     Procalcitonin 0.16 ng/ml     Comprehensive metabolic panel [[id number]]  (Abnormal) Collected: 03/20/25 1039    Lab Status: Final result Specimen: Blood from Arm, Left Updated: 03/20/25 1116     Sodium 140 mmol/L      Potassium 4.4 mmol/L      Chloride 107 mmol/L      CO2 26 mmol/L      ANION GAP 7 mmol/L      BUN 46 mg/dL      Creatinine 1.98 mg/dL      Glucose 125 mg/dL      Calcium 7.6 mg/dL      AST 17 U/L      ALT 18 U/L      Alkaline Phosphatase 91 U/L      Total Protein 5.8 g/dL      Albumin 3.5 g/dL      Total Bilirubin 0.26 mg/dL      eGFR 34 ml/min/1.73sq m     Narrative:      National Kid[employer] Foundation guidelines for Chronic Kidney Disease (CKD):     Stage 1 with normal or high GFR (GFR > 90 mL/min/1.73 square meters)    Stage 2 Mild CKD (GFR = 60-89 mL/min/1.73 square meters)    Stage 3A Moderate CKD (GFR = 45-59 mL/min/1.73 square meters)    Stage 3B Moderate CKD (GFR = 30-44 mL/min/1.73 square meters)    Stage 4 Severe CKD (GFR = 15-29 mL/min/1.73 square meters)    Stage 5 End Stage CKD (GFR <15 mL/min/1.73 square meters)  Note: GFR calculation is accurate only with a steady state creatinine    Magnesium [[id number]]  (Abnormal) Collected: 03/20/25 1039    Lab Status: Final result Specimen: Blood from Arm, Left Updated: 03/20/25 1116     Magnesium 1.6 mg/dL     HS Troponin 0hr (reflex protocol) [[id number]]  (Normal) Collected: 03/20/25 1039    Lab Status: Final result Specimen: Blood from Arm, Left Updated: 03/20/25 1113     hs TnI 0hr 15 ng/L     B-Type Natriuretic Peptide(BNP) [[id number]]  (Normal) Collected: 03/20/25 1039    Lab Status: Final result Specimen: Blood from Arm, Left Updated: 03/20/25 1112     BNP 60 pg/mL              VAS VENOUS DUPLEX - LOWER LIMB BILATERAL   Final Interpretation by Maribel Sepulveda MD (03/20 2009)      CTA chest pe study   Final Interpretation by Sadia Moreira MD (03/20 1253)         1. No central pulmonary embolism. Evaluation of the segmental and  subsegmental branches is limited secondary to incomplete contrast opacification.      2.  Extensive patchy multifocal solid and groundglass opacities in the right lung, concerning for multifocal pneumonia. Overall, this appears increased compared to 3/5/2025. A repeat chest CT in 4 to 6 weeks may be helpful to ensure resolution of these    findings.      3. Multiple enlarged paratracheal, subcarinal, and hilar lymph nodes, likely representing reactive lymphadenopathy. Indeterminate enlarged right lower paraesophageal lymph node, for which attention on follow-up imaging is recommended.      4. Additional findings as detailed in the body of the report.      The study was marked in EPIC for immediate notification.                        Workstation performed: [id number]         XR chest 2 views   Final Interpretation by Chiquita Larios MD (03/20 1546)      Multifocal right pneumonia, better shown on CT. See subsequent chest CT.            Workstation performed: [id number]             ECG 12 Lead Documentation Only    Date/Time: 3/20/2025 9:44 AM    Performed by: Victoria Wheatley PA-C  Authorized by: Victoria Wheatley PA-C    Indications / Diagnosis:  Sob, cough  ECG reviewed by me, the ED Provider: yes (louie)    Patient location:  ED  Rate:     ECG rate:  85    ECG rate assessment: normal    Rhythm:     Rhythm: sinus rhythm        ED Medication and Procedure Management   Prior to Admission Medications   Prescriptions Last Dose Informant Patient Reported? Taking?   DULoxetine (CYMBALTA) 60 mg delayed release capsule 3/20/2025 Morning  No Yes   Sig: TAKE 1 CAPSULE BY MOUTH EVERY DAY   OXcarbazepine (TRILEPTAL) 150 mg tablet Not Taking  No No   Sig: TAKE 3 TABLETS (450 MG TOTAL) BY MOUTH 2 (TWO) TIMES A DAY   Patient not taking: Reported on 3/20/2025   OXcarbazepine (TRILEPTAL) 600 mg tablet 3/20/2025 Morning  No Yes   Sig: TAKE 1 TABLET BY MOUTH TWICE A DAY   amLODIPine (NORVASC) 2.5 mg tablet 3/19/2025 Bedtime  No Yes    Sig: TAKE 1 TABLET BY MOUTH EVERY DAY   aspirin 325 mg tablet 3/20/2025 Morning  Yes Yes   Sig: Take 325 mg by mouth daily   atorvastatin (LIPITOR) 80 mg tablet 3/19/2025 Evening  No Yes   Sig: TAKE 1 TABLET BY MOUTH DAILY WITH DINNER   baclofen 10 mg tablet 3/20/2025 Noon  No Yes   Sig: TAKE 1 TABLET BY MOUTH IN THE MORNING, 1 TABLET MIDDAY, AND 1.5 TABLETS AT NIGHT   carvedilol (COREG) 25 mg tablet 3/20/2025  No Yes   Sig: TAKE 1 TABLET BY MOUTH TWICE A DAY WITH FOOD   cyanocobalamin ([employer] Vitamin B-12) 1000 MCG tablet 3/20/2025 Morning  No Yes   Sig: Take 1 tablet (1,000 mcg total) by mouth daily   ferrous sulfate 325 (65 Fe) mg tablet 3/20/2025 Morning  No Yes   Sig: TAKE 1 TABLET BY MOUTH EVERY DAY WITH BREAKFAST   hydrALAZINE (APRESOLINE) 50 mg tablet 3/20/2025 Morning  No Yes   Sig: TAKE 1 TABLET BY MOUTH EVERY 12 HOURS   losartan (COZAAR) 100 MG tablet 3/20/2025 Morning  No Yes   Sig: TAKE 1 TABLET BY MOUTH EVERY DAY   naltrexone (REVIA) 50 mg tablet Not Taking  No No   Sig: Please compound 4mg tablets to take by mouth QHS   Patient not taking: Reported on 3/20/2025   pregabalin (LYRICA) 100 mg capsule 3/20/2025 Morning  No Yes   Sig: TAKE 1 CAPSULE BY MOUTH 2 TIMES A DAY IN ADDITION TO 50MG IN THE AFTERNOON   pregabalin (LYRICA) 50 mg capsule 3/20/2025 Noon  No Yes   Sig: TAKE 1 CAPSULE BY MOUTH IN THE AFTERNOON IN ADDITION TO THE 100MG TWICE DAILY AS DIRECTED      Facility-Administered Medications: None     Current Discharge Medication List        CONTINUE these medications which have NOT CHANGED    Details   amLODIPine (NORVASC) 2.5 mg tablet TAKE 1 TABLET BY MOUTH EVERY DAY  Qty: 90 tablet, Refills: 1    Associated Diagnoses: Primary hypertension      aspirin 325 mg tablet Take 325 mg by mouth daily      atorvastatin (LIPITOR) 80 mg tablet TAKE 1 TABLET BY MOUTH DAILY WITH DINNER  Qty: 30 tablet, Refills: 5    Associated Diagnoses: Stenosis of left vertebral artery; Vertebral artery dissection (HCC);  Cerebrovascular accident (CVA) (HCC); Celiac artery stenosis (HCC)      baclofen 10 mg tablet TAKE 1 TABLET BY MOUTH IN THE MORNING, 1 TABLET MIDDAY, AND 1.5 TABLETS AT NIGHT  Qty: 360 tablet, Refills: 3    Associated Diagnoses: Spasticity      carvedilol (COREG) 25 mg tablet TAKE 1 TABLET BY MOUTH TWICE A DAY WITH FOOD  Qty: 180 tablet, Refills: 1    Associated Diagnoses: Primary hypertension      cyanocobalamin ([employer] Vitamin B-12) 1000 MCG tablet Take 1 tablet (1,000 mcg total) by mouth daily  Qty: 90 tablet, Refills: 1    Associated Diagnoses: Anemia, unspecified type      DULoxetine (CYMBALTA) 60 mg delayed release capsule TAKE 1 CAPSULE BY MOUTH EVERY DAY  Qty: 90 capsule, Refills: 0    Associated Diagnoses: Central pain syndrome; Adjustment disorder with depressed mood; 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      ferrous sulfate 325 (65 Fe) mg tablet TAKE 1 TABLET BY MOUTH EVERY DAY WITH BREAKFAST  Qty: 90 tablet, Refills: 0    Associated Diagnoses: Anemia, unspecified type      hydrALAZINE (APRESOLINE) 50 mg tablet TAKE 1 TABLET BY MOUTH EVERY 12 HOURS  Qty: 60 tablet, Refills: 5    Associated Diagnoses: Primary hypertension      losartan (COZAAR) 100 MG tablet TAKE 1 TABLET BY MOUTH EVERY DAY  Qty: 90 tablet, Refills: 1    Associated Diagnoses: Primary hypertension      naltrexone (REVIA) 50 mg tablet Please compound 4mg tablets to take by mouth QHS  Qty: 90 tablet, Refills: 0    Associated Diagnoses: 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 OXcarbazepine (TRILEPTAL) 600 mg tablet TAKE 1 TABLET BY MOUTH TWICE A DAY  Qty: 180 tablet, Refills: 1    Associated Diagnoses: Trigeminal neuralgia      !! pregabalin (LYRICA) 100 mg capsule TAKE 1 CAPSULE BY MOUTH 2 TIMES A DAY IN ADDITION TO 50MG IN THE AFTERNOON  Qty: 60 capsule, Refills: 2    Comments: Not to exceed 3 additional fills before 03/30/2025  Associated Diagnoses: Trigeminal neuralgia of right side of face      !! pregabalin (LYRICA) 50 mg capsule TAKE 1 CAPSULE BY  MOUTH IN THE AFTERNOON IN ADDITION TO THE 100MG TWICE DAILY AS DIRECTED  Qty: 30 capsule, Refills: 2    Comments: Not to exceed 4 additional fills before 03/30/2025  Associated Diagnoses: Trigeminal neuralgia of right side of face      !! OXcarbazepine (TRILEPTAL) 150 mg tablet TAKE 3 TABLETS (450 MG TOTAL) BY MOUTH 2 (TWO) TIMES A DAY  Qty: 540 tablet, Refills: 1    Associated Diagnoses: Trigeminal neuralgia of right side of face       !! - Potential duplicate medications found. Please discuss with provider.        No discharge procedures on file.  ED SEPSIS DOCUMENTATION   Time reflects when diagnosis was documented in both MDM as applicable and the Disposition within this note       Time User Action Codes Description Comment    3/20/2025  1:53 PM Victoria Wheatley [J18.9] Pneumonia     3/20/2025  1:53 PM Victoria Wheatley [R09.02] Hypoxia                  Victoria Wheatley PA-C  03/21/25 0802

## 2025-03-20 NOTE — RESPIRATORY THERAPY NOTE
RT Protocol Note  Jimmy Mello 64 y.o. male MRN: [id number]  Unit/Bed#: -01 Encounter: [id number]    Assessment    Principal Problem:    Sepsis due to multifocal pneumonia of right lung (HCC)  Active Problems:    History of stroke    Primary hypertension    Spastic hemiparesis of right dominant side as late effect of cerebral infarction (HCC)    Hypomagnesemia    Acute respiratory failure (HCC)      Home Pulmonary Medications:  None     Past Medical History:   Diagnosis Date    Adjustment disorder with depressed mood 2023    BRAULIO (acute kidney injury) (HCC)     B12 deficiency     Celiac artery stenosis (HCC)     Cerebellar infarct (HCC) 2023    CKD (chronic kidney disease) stage 2, GFR 60-89 ml/min     Essential hypertension     Impaired fasting glucose     Iron deficiency anemia     Neurogenic bowel     Stenosis of left vertebral artery     Stroke (HCC)     Vertebral artery dissection (HCC)      Social History     Socioeconomic History    Marital status: /Civil Union     Spouse name: None    Number of children: None    Years of education: None    Highest education level: None   Occupational History    None   Tobacco Use    Smoking status: Former     Current packs/day: 0.00     Average packs/day: 1 pack/day for 5.0 years (5.0 ttl pk-yrs)     Types: Cigarettes     Start date: 1978     Quit date: 1983     Years since quittin.9    Smokeless tobacco: Never   Vaping Use    Vaping status: Never Used   Substance and Sexual Activity    Alcohol use: Not Currently    Drug use: Never    Sexual activity: Not Currently     Partners: Female   Other Topics Concern    None   Social History Narrative        2 children    Works as an  (self employed)     Social Drivers of Health     Financial Resource Strain: Not on file   Food Insecurity: Not on file   Transportation Needs: Not on file   Physical Activity: Not on file   Stress: Not on file   Social Connections: Not on file    Intimate Partner Violence: Not on file   Housing Stability: Not on file       Subjective         Objective    Physical Exam:   Assessment Type: Assess only  General Appearance: Alert, Awake  Respiratory Pattern: Normal  Chest Assessment: Chest expansion symmetrical  Bilateral Breath Sounds: Clear, Diminished  Cough: None  O2 Device: 2L    Vitals:  Blood pressure 126/59, pulse 66, temperature 97.9 °F (36.6 °C), temperature source Temporal, resp. rate 18, weight 90.7 kg (199 lb 15.3 oz), SpO2 95%.          Imaging and other studies: Results Review Statement: I reviewed radiology reports from this admission including: chest xray.    O2 Device: 2L     Plan    Respiratory Plan: No distress/Pulmonary history  Airway Clearance Plan: Incentive Spirometer     Resp Comments: Order Q6PRN 2.5mg Albuterol for SOB and wheezing        03/20/25 1704   Incentive Spirometry Tx   IS level of assistance Independent;Needs encouragement   Frequency q1hr W/A   Respiratory Effort Normal   Treatment Tolerance Tolerated well   Incentive Spirometry Goal (mL) 907 mL  (90.7kg x10ml)   Incentive Spirometry Achieved (mL) 1500 mL  (performed with good efforts)

## 2025-03-20 NOTE — H&P
H&P - Hospitalist   Name: Jimmy Mello 64 y.o. male I MRN: [id number]  Unit/Bed#: -01 I Date of Admission: 3/20/2025   Date of Service: 3/20/2025 I Hospital Day: 0     Assessment & Plan  Sepsis due to multifocal pneumonia of right lung (HCC)  Chief complaint of shortness of breath, cough.  Found to have multifocal pneumonia on CT imaging, failed outpatient therapy with Augmentin and azithromycin.  SIRS criteria: Leukocytosis, tachypnea  Suspected source: Pneumonia  Lactic acid: Not obtained in ER, will obtain now  End organ damage: none identified  IV Fluids: Start Isolyte overnight  IV antibiotics: IV cefepime and azithromycin  Obtain sputum culture, strep and Legionella urinary antigens  Draw blood cultures x2 now  Monitor vital signs, laboratory studies.  Acute respiratory failure (HCC)  Acutely requiring 2 L/min of oxygen secondary to pneumonia  Upon my entry into the room patient's oxygen fell out of his nose and he is saturating at 87%  Continue 2 L/min and wean oxygen as tolerated  See plan above  Spastic hemiparesis of right dominant side as late effect of cerebral infarction (HCC)  Noted history  Outpatient follow-up with neurology  Continue baclofen and AEDs  History of stroke  Continue outpatient follow-up with neurology  Resume home aspirin  Primary hypertension  Blood pressure 126/59  Resume home antihypertensive regimen and monitor BP  Hypomagnesemia  1.6 upon admission, repleted  Recheck tomorrow    VTE Pharmacologic Prophylaxis: VTE Score: 6 High Risk (Score >/= 5) - Pharmacological DVT Prophylaxis Ordered: heparin. Sequential Compression Devices Ordered.  Code Status: Level 1 - Full Code     Anticipated Length of Stay: Patient will be admitted on an inpatient basis with an anticipated length of stay of greater than 2 midnights secondary to multifocal pna on IV abx, resp failure on O2.    Total Time for Visit, including Counseling / Coordination of Care: 75 Minutes. Greater than 50% of this  total time spent on direct patient counseling and coordination of care.    Chief Complaint: SOB, cough    History of Present Illness:  Jimmy Mello is a 64 y.o. male presenting with worsening shortness of breath and cough.  Recently prescribed Augmentin and azithromycin for suspected pneumonia.  Patient has failed to improve and presents with worsening shortness of breath as well as hypoxia requiring 2 L/min of oxygen today    Review of Systems:  Review of Systems   Constitutional:  Negative for activity change, appetite change, chills, fatigue and fever.   HENT:  Negative for congestion, rhinorrhea, sinus pressure and sore throat.    Eyes:  Negative for photophobia, pain and visual disturbance.   Respiratory:  Positive for cough and shortness of breath. Negative for wheezing.    Cardiovascular:  Negative for chest pain, palpitations and leg swelling.   Gastrointestinal:  Negative for abdominal distention, abdominal pain, constipation, diarrhea, nausea and vomiting.   Endocrine: Negative for cold intolerance, heat intolerance, polydipsia and polyuria.   Genitourinary:  Negative for difficulty urinating, dysuria, flank pain, frequency and hematuria.   Musculoskeletal:  Negative for arthralgias, back pain and joint swelling.   Skin:  Negative for color change, pallor and rash.   Allergic/Immunologic: Negative.    Neurological:  Negative for dizziness, syncope, weakness, light-headedness and headaches.   Hematological: Negative.    Psychiatric/Behavioral: Negative.         Past Medical and Surgical History:   Past Medical History:   Diagnosis Date    Adjustment disorder with depressed mood 03/01/2023    BRAULIO (acute kidney injury) (HCC)     B12 deficiency     Celiac artery stenosis (HCC)     Cerebellar infarct (HCC) 02/25/2023    CKD (chronic kidney disease) stage 2, GFR 60-89 ml/min     Essential hypertension     Impaired fasting glucose     Iron deficiency anemia     Neurogenic bowel     Stenosis of left vertebral  artery     Stroke (HCC)     Vertebral artery dissection (HCC)        Past Surgical History:   Procedure Laterality Date    ARTERY SURGERY      vertebral artery stent/revascularization    IR STROKE ALERT  2/26/2023       Meds/Allergies:  Prior to Admission medications    Medication Sig Start Date End Date Taking? Authorizing Provider   amLODIPine (NORVASC) 2.5 mg tablet TAKE 1 TABLET BY MOUTH EVERY DAY 12/9/24  Yes Lupis Meza DO   aspirin 325 mg tablet Take 325 mg by mouth daily   Yes Historical Provider, MD   atorvastatin (LIPITOR) 80 mg tablet TAKE 1 TABLET BY MOUTH DAILY WITH DINNER 3/5/25  Yes Trey Del Real MD   baclofen 10 mg tablet TAKE 1 TABLET BY MOUTH IN THE MORNING, 1 TABLET MIDDAY, AND 1.5 TABLETS AT NIGHT 7/17/24  Yes Ashley Depadua, MD   carvedilol (COREG) 25 mg tablet TAKE 1 TABLET BY MOUTH TWICE A DAY WITH FOOD 12/9/24  Yes Lupis Meza DO   cyanocobalamin ([employer] Vitamin B-12) 1000 MCG tablet Take 1 tablet (1,000 mcg total) by mouth daily 10/18/24  Yes Lupis Meaz DO   DULoxetine (CYMBALTA) 60 mg delayed release capsule TAKE 1 CAPSULE BY MOUTH EVERY DAY 2/13/25  Yes Trey Del Real MD   ferrous sulfate 325 (65 Fe) mg tablet TAKE 1 TABLET BY MOUTH EVERY DAY WITH BREAKFAST 1/23/25  Yes Lupis Meza DO   hydrALAZINE (APRESOLINE) 50 mg tablet TAKE 1 TABLET BY MOUTH EVERY 12 HOURS 12/9/24  Yes Lupis Meza DO   losartan (COZAAR) 100 MG tablet TAKE 1 TABLET BY MOUTH EVERY DAY 12/9/24  Yes Lupis Meza DO   naltrexone (REVIA) 50 mg tablet Please compound 4mg tablets to take by mouth QHS  Patient not taking: Reported on 3/20/2025 2/19/25   Ashley Depadua, MD   OXcarbazepine (TRILEPTAL) 600 mg tablet TAKE 1 TABLET BY MOUTH TWICE A DAY 12/9/24  Yes Trey Del Real MD   pregabalin (LYRICA) 100 mg capsule TAKE 1 CAPSULE BY MOUTH 2 TIMES A DAY IN ADDITION TO 50MG IN THE AFTERNOON 1/6/25  Yes Tery Del Real MD   pregabalin (LYRICA) 50 mg capsule TAKE 1 CAPSULE BY MOUTH IN THE  AFTERNOON IN ADDITION TO THE 100MG TWICE DAILY AS DIRECTED 25  Yes Trey Del Real MD   OXcarbazepine (TRILEPTAL) 150 mg tablet TAKE 3 TABLETS (450 MG TOTAL) BY MOUTH 2 (TWO) TIMES A DAY  Patient not taking: Reported on 3/20/2025 12/18/24   Trey Del Real MD       All medications reviewed.    Allergies: No Known Allergies    Social History:  Marital Status: /Civil Union     Substance Use History:   Social History     Substance and Sexual Activity   Alcohol Use Not Currently     Social History     Tobacco Use   Smoking Status Former    Current packs/day: 0.00    Average packs/day: 1 pack/day for 5.0 years (5.0 ttl pk-yrs)    Types: Cigarettes    Start date: 1978    Quit date: 1983    Years since quittin.9   Smokeless Tobacco Never     Social History     Substance and Sexual Activity   Drug Use Never       Family History:  Non-contributory    Physical Exam:     Vitals:   Blood Pressure: 126/59 (25 1446)  Pulse: 63 (25 1446)  Temperature: 97.9 °F (36.6 °C) (25 1446)  Temp Source: Temporal (25 1446)  Respirations: 18 (25 1446)  Weight - Scale: 90.7 kg (199 lb 15.3 oz) (25 0940)  SpO2: 91 % (25 1446)    Physical Exam  Vitals and nursing note reviewed.   Constitutional:       General: He is not in acute distress.     Appearance: Normal appearance. He is not ill-appearing.      Interventions: Nasal cannula in place.   HENT:      Head: Normocephalic and atraumatic.   Eyes:      General: No scleral icterus.        Right eye: No discharge.         Left eye: No discharge.      Pupils: Pupils are equal, round, and reactive to light.   Cardiovascular:      Rate and Rhythm: Normal rate and regular rhythm.      Pulses: Normal pulses.      Heart sounds: No murmur heard.     No friction rub. No gallop.   Pulmonary:      Effort: Pulmonary effort is normal. No respiratory distress.      Breath sounds: Normal breath sounds. No wheezing, rhonchi or rales.   Abdominal:       General: Bowel sounds are normal. There is no distension.      Palpations: Abdomen is soft.      Tenderness: There is no abdominal tenderness. There is no guarding or rebound.   Musculoskeletal:         General: No swelling or deformity.      Cervical back: Neck supple.      Right lower leg: No edema.      Left lower leg: No edema.   Skin:     General: Skin is warm and dry.      Capillary Refill: Capillary refill takes less than 2 seconds.      Coloration: Skin is not jaundiced or pale.      Findings: No erythema or rash.   Neurological:      General: No focal deficit present.      Mental Status: He is alert and oriented to person, place, and time. Mental status is at baseline.      Cranial Nerves: No cranial nerve deficit.      Sensory: No sensory deficit.      Motor: No weakness.   Psychiatric:         Mood and Affect: Mood normal.         Behavior: Behavior normal.          Additional Data:     Lab Results:  Results from last 7 days   Lab Units 03/20/25  1039   WBC Thousand/uL 12.25*   HEMOGLOBIN g/dL 11.1*   HEMATOCRIT % 34.0*   PLATELETS Thousands/uL 176   SEGS PCT % 91*   LYMPHO PCT % 3*   MONO PCT % 4   EOS PCT % 1     Results from last 7 days   Lab Units 03/20/25  1039   SODIUM mmol/L 140   POTASSIUM mmol/L 4.4   CHLORIDE mmol/L 107   CO2 mmol/L 26   BUN mg/dL 46*   CREATININE mg/dL 1.98*   ANION GAP mmol/L 7   CALCIUM mg/dL 7.6*   ALBUMIN g/dL 3.5   TOTAL BILIRUBIN mg/dL 0.26   ALK PHOS U/L 91   ALT U/L 18   AST U/L 17   GLUCOSE RANDOM mg/dL 125                 Results from last 7 days   Lab Units 03/20/25  1039   PROCALCITONIN ng/ml 0.16       Imaging: All pertinent imaging reviewed.  CTA chest pe study   Final Result by Sadia Moreira MD (03/20 1674)         1. No central pulmonary embolism. Evaluation of the segmental and subsegmental branches is limited secondary to incomplete contrast opacification.      2.  Extensive patchy multifocal solid and groundglass opacities in the right lung, concerning  for multifocal pneumonia. Overall, this appears increased compared to 3/5/2025. A repeat chest CT in 4 to 6 weeks may be helpful to ensure resolution of these    findings.      3. Multiple enlarged paratracheal, subcarinal, and hilar lymph nodes, likely representing reactive lymphadenopathy. Indeterminate enlarged right lower paraesophageal lymph node, for which attention on follow-up imaging is recommended.      4. Additional findings as detailed in the body of the report.      The study was marked in EPIC for immediate notification.                        Workstation performed: [id number]         XR chest 2 views   Final Result by Chiquita Larios MD (03/20 1546)      Multifocal right pneumonia, better shown on CT. See subsequent chest CT.            Workstation performed: [id number]          VAS VENOUS DUPLEX - LOWER LIMB BILATERAL    (Results Pending)       EKG and Other Studies Reviewed on Admission:   Prior pertinent studies and records reviewed in [facility]/Care Everywhere.    ** Please Note: This note has been constructed using a voice recognition system. **

## 2025-03-20 NOTE — ASSESSMENT & PLAN NOTE
Acutely requiring 2 L/min of oxygen secondary to pneumonia  Upon my entry into the room patient's oxygen fell out of his nose and he is saturating at 87%  Continue 2 L/min and wean oxygen as tolerated  See plan above

## 2025-03-20 NOTE — ASSESSMENT & PLAN NOTE
Chief complaint of shortness of breath, cough.  Found to have multifocal pneumonia on CT imaging, failed outpatient therapy with Augmentin and azithromycin.  SIRS criteria: Leukocytosis, tachypnea  Suspected source: Pneumonia  Lactic acid: Not obtained in ER, will obtain now  End organ damage: none identified  IV Fluids: Start Isolyte overnight  IV antibiotics: IV cefepime and azithromycin  Obtain sputum culture, strep and Legionella urinary antigens  Draw blood cultures x2 now  Monitor vital signs, laboratory studies.

## 2025-03-21 PROBLEM — E83.42 HYPOMAGNESEMIA: Status: RESOLVED | Noted: 2025-03-20 | Resolved: 2025-03-21

## 2025-03-21 PROBLEM — R06.89 ACUTE RESPIRATORY INSUFFICIENCY: Status: ACTIVE | Noted: 2025-03-20

## 2025-03-21 PROBLEM — N18.32 STAGE 3B CHRONIC KIDNEY DISEASE (HCC): Status: ACTIVE | Noted: 2023-03-01

## 2025-03-21 LAB
ANION GAP SERPL CALCULATED.3IONS-SCNC: 6 MMOL/L (ref 4–13)
BASOPHILS # BLD AUTO: 0.05 THOUSANDS/ÂΜL (ref 0–0.1)
BASOPHILS NFR BLD AUTO: 1 % (ref 0–1)
BUN SERPL-MCNC: 37 MG/DL (ref 5–25)
CALCIUM SERPL-MCNC: 7.8 MG/DL (ref 8.4–10.2)
CHLORIDE SERPL-SCNC: 108 MMOL/L (ref 96–108)
CO2 SERPL-SCNC: 26 MMOL/L (ref 21–32)
CREAT SERPL-MCNC: 1.63 MG/DL (ref 0.6–1.3)
EOSINOPHIL # BLD AUTO: 0.23 THOUSAND/ÂΜL (ref 0–0.61)
EOSINOPHIL NFR BLD AUTO: 3 % (ref 0–6)
ERYTHROCYTE [DISTWIDTH] IN BLOOD BY AUTOMATED COUNT: 13.4 % (ref 11.6–15.1)
GFR SERPL CREATININE-BSD FRML MDRD: 43 ML/MIN/1.73SQ M
GLUCOSE SERPL-MCNC: 96 MG/DL (ref 65–140)
HCT VFR BLD AUTO: 34.4 % (ref 36.5–49.3)
HGB BLD-MCNC: 10.9 G/DL (ref 12–17)
IMM GRANULOCYTES # BLD AUTO: 0.08 THOUSAND/UL (ref 0–0.2)
IMM GRANULOCYTES NFR BLD AUTO: 1 % (ref 0–2)
L PNEUMO1 AG UR QL IA.RAPID: NEGATIVE
LYMPHOCYTES # BLD AUTO: 1.18 THOUSANDS/ÂΜL (ref 0.6–4.47)
LYMPHOCYTES NFR BLD AUTO: 13 % (ref 14–44)
MAGNESIUM SERPL-MCNC: 2.8 MG/DL (ref 1.9–2.7)
MCH RBC QN AUTO: 27.8 PG (ref 26.8–34.3)
MCHC RBC AUTO-ENTMCNC: 31.7 G/DL (ref 31.4–37.4)
MCV RBC AUTO: 88 FL (ref 82–98)
MONOCYTES # BLD AUTO: 0.46 THOUSAND/ÂΜL (ref 0.17–1.22)
MONOCYTES NFR BLD AUTO: 5 % (ref 4–12)
NEUTROPHILS # BLD AUTO: 6.88 THOUSANDS/ÂΜL (ref 1.85–7.62)
NEUTS SEG NFR BLD AUTO: 77 % (ref 43–75)
NRBC BLD AUTO-RTO: 0 /100 WBCS
PLATELET # BLD AUTO: 178 THOUSANDS/UL (ref 149–390)
PMV BLD AUTO: 10.2 FL (ref 8.9–12.7)
POTASSIUM SERPL-SCNC: 4.1 MMOL/L (ref 3.5–5.3)
PROCALCITONIN SERPL-MCNC: 0.54 NG/ML
RBC # BLD AUTO: 3.92 MILLION/UL (ref 3.88–5.62)
S PNEUM AG UR QL: NEGATIVE
SODIUM SERPL-SCNC: 140 MMOL/L (ref 135–147)
VANCOMYCIN SERPL-MCNC: 18.8 UG/ML (ref 10–20)
WBC # BLD AUTO: 8.88 THOUSAND/UL (ref 4.31–10.16)

## 2025-03-21 PROCEDURE — 80048 BASIC METABOLIC PNL TOTAL CA: CPT | Performed by: PHYSICIAN ASSISTANT

## 2025-03-21 PROCEDURE — 80202 ASSAY OF VANCOMYCIN: CPT | Performed by: STUDENT IN AN ORGANIZED HEALTH CARE EDUCATION/TRAINING PROGRAM

## 2025-03-21 PROCEDURE — 83735 ASSAY OF MAGNESIUM: CPT | Performed by: PHYSICIAN ASSISTANT

## 2025-03-21 PROCEDURE — 87081 CULTURE SCREEN ONLY: CPT | Performed by: PHYSICIAN ASSISTANT

## 2025-03-21 PROCEDURE — 99232 SBSQ HOSP IP/OBS MODERATE 35: CPT

## 2025-03-21 PROCEDURE — 84145 PROCALCITONIN (PCT): CPT | Performed by: PHYSICIAN ASSISTANT

## 2025-03-21 PROCEDURE — 87449 NOS EACH ORGANISM AG IA: CPT | Performed by: PHYSICIAN ASSISTANT

## 2025-03-21 PROCEDURE — 85025 COMPLETE CBC W/AUTO DIFF WBC: CPT | Performed by: PHYSICIAN ASSISTANT

## 2025-03-21 RX ORDER — BACLOFEN 10 MG/1
10 TABLET ORAL 3 TIMES DAILY
Status: DISCONTINUED | OUTPATIENT
Start: 2025-03-21 | End: 2025-03-24 | Stop reason: HOSPADM

## 2025-03-21 RX ORDER — BACLOFEN 10 MG/1
5 TABLET ORAL
Status: DISCONTINUED | OUTPATIENT
Start: 2025-03-21 | End: 2025-03-22

## 2025-03-21 RX ADMIN — AZITHROMYCIN MONOHYDRATE 500 MG: 500 INJECTION, POWDER, LYOPHILIZED, FOR SOLUTION INTRAVENOUS at 14:53

## 2025-03-21 RX ADMIN — PREGABALIN 50 MG: 100 CAPSULE ORAL at 14:20

## 2025-03-21 RX ADMIN — BACLOFEN 10 MG: 10 TABLET ORAL at 08:16

## 2025-03-21 RX ADMIN — HEPARIN SODIUM 5000 UNITS: 5000 INJECTION INTRAVENOUS; SUBCUTANEOUS at 06:46

## 2025-03-21 RX ADMIN — HYDRALAZINE HYDROCHLORIDE 50 MG: 25 TABLET ORAL at 12:22

## 2025-03-21 RX ADMIN — DULOXETINE HYDROCHLORIDE 60 MG: 30 CAPSULE, DELAYED RELEASE ORAL at 08:16

## 2025-03-21 RX ADMIN — CEFEPIME HYDROCHLORIDE 2000 MG: 2 INJECTION, SOLUTION INTRAVENOUS at 14:14

## 2025-03-21 RX ADMIN — CEFEPIME HYDROCHLORIDE 2000 MG: 2 INJECTION, SOLUTION INTRAVENOUS at 03:19

## 2025-03-21 RX ADMIN — BACLOFEN 10 MG: 10 TABLET ORAL at 16:49

## 2025-03-21 RX ADMIN — ASPIRIN 325 MG ORAL TABLET 325 MG: 325 PILL ORAL at 08:16

## 2025-03-21 RX ADMIN — LOSARTAN POTASSIUM 100 MG: 50 TABLET, FILM COATED ORAL at 08:17

## 2025-03-21 RX ADMIN — OXCARBAZEPINE 600 MG: 300 TABLET, FILM COATED ORAL at 08:17

## 2025-03-21 RX ADMIN — CARVEDILOL 25 MG: 25 TABLET, FILM COATED ORAL at 16:49

## 2025-03-21 RX ADMIN — HYDRALAZINE HYDROCHLORIDE 50 MG: 25 TABLET ORAL at 21:11

## 2025-03-21 RX ADMIN — AMLODIPINE BESYLATE 2.5 MG: 2.5 TABLET ORAL at 08:16

## 2025-03-21 RX ADMIN — CARVEDILOL 25 MG: 25 TABLET, FILM COATED ORAL at 08:16

## 2025-03-21 RX ADMIN — FERROUS SULFATE TAB 325 MG (65 MG ELEMENTAL FE) 325 MG: 325 (65 FE) TAB at 08:17

## 2025-03-21 RX ADMIN — ATORVASTATIN CALCIUM 80 MG: 40 TABLET, FILM COATED ORAL at 16:49

## 2025-03-21 RX ADMIN — GUAIFENESIN 600 MG: 600 TABLET ORAL at 16:49

## 2025-03-21 RX ADMIN — PREGABALIN 100 MG: 100 CAPSULE ORAL at 16:49

## 2025-03-21 RX ADMIN — PREGABALIN 100 MG: 100 CAPSULE ORAL at 08:17

## 2025-03-21 RX ADMIN — GUAIFENESIN 600 MG: 600 TABLET ORAL at 06:46

## 2025-03-21 RX ADMIN — OXCARBAZEPINE 600 MG: 300 TABLET, FILM COATED ORAL at 16:54

## 2025-03-21 RX ADMIN — BACLOFEN 10 MG: 10 TABLET ORAL at 21:11

## 2025-03-21 RX ADMIN — HEPARIN SODIUM 5000 UNITS: 5000 INJECTION INTRAVENOUS; SUBCUTANEOUS at 14:22

## 2025-03-21 RX ADMIN — MAGNESIUM SULFATE HEPTAHYDRATE 2 G: 40 INJECTION, SOLUTION INTRAVENOUS at 00:09

## 2025-03-21 RX ADMIN — HEPARIN SODIUM 5000 UNITS: 5000 INJECTION INTRAVENOUS; SUBCUTANEOUS at 21:10

## 2025-03-21 RX ADMIN — BACLOFEN 5 MG: 10 TABLET ORAL at 21:11

## 2025-03-21 NOTE — PROGRESS NOTES
Jimmy Mello is a 64 y.o. male who is currently ordered Vancomycin IV with management by the Pharmacy Consult service.  Relevant clinical data and objective / subjective history reviewed.  Vancomycin Assessment:  Indication and Goal AUC/Trough: Pneumonia (goal -600, trough >10), -600, trough >10  Clinical Status: stable  Micro:   Pending  Renal Function:  SCr: 1.63 mg/dL  CrCl: 51.1 mL/min  Renal replacement: Not on dialysis  Days of Therapy: 2  Current Dose: 1000mg daily PRN if level </=15  Vancomycin Plan: random level resulted 18.8. Since the level is >15, no dose will be given today.   New Dosing: no change  Next Level: 3/22/25 at 0500  Renal Function Monitoring: Daily BMP and UOP  Pharmacy will continue to follow closely for s/sx of nephrotoxicity, infusion reactions and appropriateness of therapy.  BMP and CBC will be ordered per protocol. We will continue to follow the patient’s culture results and clinical progress daily. Also, cefepime will be adjusted if necessary.     Bharat Andrew, Pharmacist, PharmD, BCPS

## 2025-03-21 NOTE — PLAN OF CARE
Problem: PAIN - ADULT  Goal: Verbalizes/displays adequate comfort level or baseline comfort level  Description: Interventions:- Encourage patient to monitor pain and request assistance- Assess pain using appropriate pain scale- Administer analgesics based on type and severity of pain and evaluate response- Implement non-pharmacological measures as appropriate and evaluate response- Consider cultural and social influences on pain and pain management- Notify physician/advanced practitioner if interventions unsuccessful or patient reports new pain  Outcome: Progressing     Problem: INFECTION - ADULT  Goal: Absence or prevention of progression during hospitalization  Description: INTERVENTIONS:- Assess and monitor for signs and symptoms of infection- Monitor lab/diagnostic results- Monitor all insertion sites, i.e. indwelling lines, tubes, and drains- Monitor endotracheal if appropriate and nasal secretions for changes in amount and color- [place] appropriate cooling/warming therapies per order- Administer medications as ordered- Instruct and encourage patient and family to use good hand hygiene technique- Identify and instruct in appropriate isolation precautions for identified infection/condition  Outcome: Progressing

## 2025-03-21 NOTE — CASE MANAGEMENT
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     Central pain syndrome 2023     Right facial pain 2023     Spastic hemiparesis of right dominant side as late effect of cerebral infarction (HCC) 2023     Celiac artery stenosis (HCC) 2023     Stage 3 chronic kidney disease (HCC) 2023     History of stroke 2023     Right Vertebral artery dissection (HCC) 2023     Stenosis of left vertebral artery 2023     Primary hypertension 2023       LOS (days): 1  Geometric Mean LOS (GMLOS) (days):   Days to GMLOS:     OBJECTIVE:    Risk of Unplanned Readmission Score: 20.86         Current admission status: Inpatient       Preferred Pharmacy:   [facility][id number] - [place] - [address]  [address]  [place] [address]  Phone: [phone] Fax: [phone]    [facility] [place] - [address]  [address]  [place] [address]  Phone: [phone] Fax: [phone]    [facility]  [address]  [place] [address]  Phone: [phone] Fax: [phone]    [facility] - [place] - [address]  [address]  [place] [address]  Phone: [phone] Fax: [phone]    Primary Care Provider: Lupis Meza DO    Primary Insurance: BLUE CROSS  Secondary Insurance: MEDICARE    ASSESSMENT:  Active Health Care Proxies    There are no active  Health Care Proxies on file.       Advance Directives  Does patient have a Health Care POA?: No  Was patient offered paperwork?: Yes (declined)  Does patient currently have a Health Care decision maker?: Yes, please see Health Care Proxy section  Does patient have Advance Directives?: No  Was patient offered paperwork?: Yes (declined)  Primary Contact: Ailin Mello, spouse         Readmission Root Cause  30 Day Readmission: No    Patient Information  Admitted from:: Home  Mental Status: Alert  During Assessment patient was accompanied by: Spouse  Assessment information provided by:: Patient  Primary Caregiver: Self  Support Systems: Spouse/significant other, Children, Self  County of Residence: [place]  What [facility] do you live in?: [facility]  Home entry access options. Select all that apply.: Ramp  Type of Current Residence: 2 story home  Upon entering residence, is there a bedroom on the main floor (no further steps)?: Yes  Upon entering residence, is there a bathroom on the main floor (no further steps)?: Yes  Living Arrangements: Lives w/ Spouse/significant other    Activities of Daily Living Prior to Admission  Functional Status: Independent  Completes ADLs independently?: Yes  Ambulates independently?: Yes  Does patient use assisted devices?: No  Does patient currently own DME?: Yes  What DME does the patient currently own?: Bedside Commode, Straight Cane, Wheelchair  Does patient have a history of Outpatient Therapy (PT/OT)?: Yes  Does the patient have a history of Short-Term Rehab?: Yes ([facility])  Does patient have a history of HHC?: No  Does patient currently have HHC?: No         Patient Information Continued  Income Source: Pension/[employer]  Does patient have prescription coverage?: Yes  Can the patient afford their medications and any related supplies (such as glucometers or test strips)?: Yes  Does patient receive dialysis treatments?: No  Does patient have a history of substance abuse?: No  Does patient  have a history of Mental Health Diagnosis?: No         Means of Transportation  Means of Transport to [facility]:: Drives Self          DISCHARGE DETAILS:    Discharge planning discussed with:: Patient and spouse  Freedom of Choice: Yes     CM contacted family/caregiver?: Yes  Were Treatment Team discharge recommendations reviewed with patient/caregiver?: Yes  Did patient/caregiver verbalize understanding of patient care needs?: Yes  Were patient/caregiver advised of the risks associated with not following Treatment Team discharge recommendations?: Yes    Contacts  Patient Contacts: Ailin Mello, spouse  Relationship to Patient:: Family  Contact Method: In Person  Reason/Outcome: Emergency Contact    Requested Home Health Care         Is the patient interested in HHC at discharge?: No    DME Referral Provided  Referral made for DME?: No              Treatment Team Recommendation: Home  Discharge Destination Plan:: Home  Transport at Discharge : Family                             IMM Given (Date):: 03/21/25 (903am)  IMM Given to:: Patient     Additional Comments: CM met with pt and his spouse to discuss role of CM and any needs prior to discharge. Pt resides w/ spouse in 2SH w/ ramp to enter and 1st flr setup. Indp PTA. Owns SPC, WC, BSC. Pt is retired and drives. Pt prefers to use [facility] pharmacy. Hx STR through [facility] and OP PT. No hx HH/DA/MH. Spouse will transport on discharge.

## 2025-03-21 NOTE — ASSESSMENT & PLAN NOTE
Lab Results   Component Value Date    EGFR 43 03/21/2025    EGFR 34 03/20/2025    EGFR 30 03/05/2025    CREATININE 1.63 (H) 03/21/2025    CREATININE 1.98 (H) 03/20/2025    CREATININE 2.19 (H) 03/05/2025     Per chart review, baseline creatinine appears to be 1.5-2.0, but noted elevations recently.  On admission, patient with creatinine 1.98.   Cr improving.   Continue to monitor via daily BMP.

## 2025-03-21 NOTE — ASSESSMENT & PLAN NOTE
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   Continue outpatient follow-up with neurology.    Will continue home regimen of baclofen and AEDs.

## 2025-03-21 NOTE — ASSESSMENT & PLAN NOTE
On admission, patient acutely requiring 2 L of supplemental oxygen due to symptoms of increased shortness of breath and slight tachypnea likely in the setting of multifocal pneumonia.    Patient continues to require supplemental oxygenation, but now only requiring 1 L.    Continue to wean oxygen, as tolerated.    Maintain oxygen saturation > 90%.

## 2025-03-21 NOTE — UTILIZATION REVIEW
NOTIFICATION OF INPATIENT ADMISSION   AUTHORIZATION REQUEST   SERVICING FACILITY:   [place] [address]  Tax ID: [id number]  NPI: [id number] ATTENDING PROVIDER:  Attending Name and NPI#: Corinne Villegas Md [[id number]]  Address: [address] [place] [address]  Phone: [phone]   ADMISSION INFORMATION:  Place of Service: Inpatient [facility]  Place of Service Code: 21  Inpatient Admission Date/Time: 3/20/25  1:54 PM  Discharge Date/Time: No discharge date for patient encounter.  Admitting Diagnosis Code/Description:  Pneumonia [J18.9]  Hypoxia [R09.02]  Nasal congestion [R09.81]     UTILIZATION REVIEW CONTACT:  Yojana Taveras Utilization   Network Utilization Review Department  Phone: [phone]  Fax: [phone]  Email: [email or web address]  Contact for approvals/pending authorizations, clinical reviews, and discharge.     PHYSICIAN ADVISORY SERVICES:  Medical Necessity Denial & Qsgp-gd-Uien Review  Phone: [phone]  Fax: [phone]  Email: [email or web address]     DISCHARGE SUPPORT TEAM:  For Patients Discharge Needs & Updates  Phone: [phone] opt. 2 Fax: [phone]  Email: [email or web address]

## 2025-03-21 NOTE — PROGRESS NOTES
Jimmy Mello is a 64 y.o. male who is currently ordered Vancomycin IV with management by the Pharmacy Consult service.  Relevant clinical data and objective / subjective history reviewed.  Vancomycin Assessment:  Indication and Goal AUC/Trough: Pneumonia (goal -600, trough >10), -600, trough >10  Clinical Status:  New start   Micro:     Renal Function:  SCr: 1.98 mg/dL  CrCl: 41.3 mL/min  Renal replacement: Not on dialysis  Days of Therapy: 1  Current Dose: 1250mg Iv Q24H  Vancomycin Plan:  New Dosinmg (25 mg/kg) IV once then 1000mg IV daily prn when random level </= 15  Next Level: With AM labs at 0600 on 3/21/25  Renal Function Monitoring: Daily BMP and UOP  Pharmacy will continue to follow closely for s/sx of nephrotoxicity, infusion reactions and appropriateness of therapy.  BMP and CBC will be ordered per protocol. We will continue to follow the patient’s culture results and clinical progress daily.    Aristides Castillo, Pharmacist

## 2025-03-21 NOTE — ASSESSMENT & PLAN NOTE
Patient presented with chief complaint of shortness of breath and cough.  Patient was on outpatient therapy for multifocal pneumonia with PO Augmentin/azithromycin.    CTA chest pe study (3/20):  1. No central pulmonary embolism. Evaluation of the segmental and subsegmental branches is limited secondary to incomplete contrast opacification.  2.  Extensive patchy multifocal solid and groundglass opacities in the right lung, concerning for multifocal pneumonia. Overall, this appears increased compared to 3/5/2025. A repeat chest CT in 4 to 6 weeks may be helpful to ensure resolution of these   findings.  3. Multiple enlarged paratracheal, subcarinal, and hilar lymph nodes, likely representing reactive lymphadenopathy. Indeterminate enlarged right lower paraesophageal lymph node, for which attention on follow-up imaging is recommended.  SIRS criteria: leukocytosis, tachypnea  Suspected SOI: multifocal pneumonia  LA : 1.0  Procalcitonin: 0.54  Patient received IV fluid hydration with Isolyte at 50 mL/h.  Patient was started on IV cefepime and azithromycin.    Continue IV cefepime.    Will discontinue IV azithromycin in the setting of negative strep/legionella.    BC collected and pending   Will need to continue to follow culture.  Continue to monitor vital signs, per unit protocol.

## 2025-03-21 NOTE — PROGRESS NOTES
Progress Note - Hospitalist   Name: Jimmy Mello 64 y.o. male I MRN: [id number]  Unit/Bed#: -01 I Date of Admission: 3/20/2025   Date of Service: 3/21/2025 I Hospital Day: 1    Assessment & Plan  Sepsis due to multifocal pneumonia of right lung (HCC)  Patient presented with chief complaint of shortness of breath and cough.  Patient was on outpatient therapy for multifocal pneumonia with PO Augmentin/azithromycin.    CTA chest pe study (3/20):  1. No central pulmonary embolism. Evaluation of the segmental and subsegmental branches is limited secondary to incomplete contrast opacification.  2.  Extensive patchy multifocal solid and groundglass opacities in the right lung, concerning for multifocal pneumonia. Overall, this appears increased compared to 3/5/2025. A repeat chest CT in 4 to 6 weeks may be helpful to ensure resolution of these   findings.  3. Multiple enlarged paratracheal, subcarinal, and hilar lymph nodes, likely representing reactive lymphadenopathy. Indeterminate enlarged right lower paraesophageal lymph node, for which attention on follow-up imaging is recommended.  SIRS criteria: leukocytosis, tachypnea  Suspected SOI: multifocal pneumonia  LA : 1.0  Procalcitonin: 0.54  Patient received IV fluid hydration with Isolyte at 50 mL/h.  Patient was started on IV cefepime and azithromycin.    Continue IV cefepime.    Will discontinue IV azithromycin in the setting of negative strep/legionella.    BC collected and pending   Will need to continue to follow culture.  Continue to monitor vital signs, per unit protocol.    Acute respiratory insufficiency  On admission, patient acutely requiring 2 L of supplemental oxygen due to symptoms of increased shortness of breath and slight tachypnea likely in the setting of multifocal pneumonia.    Patient continues to require supplemental oxygenation, but now only requiring 1 L.    Continue to wean oxygen, as tolerated.    Maintain oxygen saturation > 90%.    Spastic hemiparesis of right dominant side as late effect of cerebral infarction (HCC)  Patient has noted history of spastic hemiparesis of right side due to CVA.   Continue outpatient follow-up with neurology.    Will continue home regimen of baclofen and AEDs.   Stage 3b chronic kidney disease (HCC)  Lab Results   Component Value Date    EGFR 43 03/21/2025    EGFR 34 03/20/2025    EGFR 30 03/05/2025    CREATININE 1.63 (H) 03/21/2025    CREATININE 1.98 (H) 03/20/2025    CREATININE 2.19 (H) 03/05/2025     Per chart review, baseline creatinine appears to be 1.5-2.0, but noted elevations recently.  On admission, patient with creatinine 1.98.   Cr improving.   Continue to monitor via daily BMP.  History of stroke  Continue outpatient follow-up with neurology  Continue home aspirin 81 mg, daily.    Primary hypertension  Continue home antihypertensive regimen.    Continue to monitor blood pressure, per unit protocol.    Hypomagnesemia (Resolved: 3/21/2025)  On admission, patient with noted at 1.6 and s/p repletion.  Now, noted at 2.8 mg/dL.     VTE Pharmacologic Prophylaxis: VTE Score: 6 High Risk (Score >/= 5) - Pharmacological DVT Prophylaxis Ordered: heparin. Sequential Compression Devices Ordered.    Mobility:   Basic Mobility Inpatient Raw Score: 15  JH-HLM Goal: 4: Move to chair/commode  JH-HLM Achieved: 2: Bed activities/Dependent transfer  JH-HLM Goal NOT achieved. Continue with multidisciplinary rounding and encourage appropriate mobility to improve upon JH-HLM goals.    Patient Centered Rounds: I performed bedside rounds with nursing staff today.   Discussions with Specialists or Other Care Team Provider: None    Education and Discussions with Family / Patient: Updated  (wife) at bedside.    Current Length of Stay: 1 day(s)  Current Patient Status: Inpatient   Certification Statement: The patient will continue to require additional inpatient hospital stay due to improved oxygenation status, IV  antibiotics, and clinical improvement.  Discharge Plan: Anticipate discharge in 24-48 hrs to home.    Code Status: Level 1 - Full Code    Subjective   The patient was seen and examined at the bedside this morning.  The patient expressing that he has somewhat improved in his oxygenation, noting that he has now down to 1 L nasal cannula. Patient explains that he was feeling better in the outpatient setting, but then suddenly developed worsening symptoms.  It was encouraged for the patient to continue using the IS at the bedside.  The patient is eating and drinking appropriately.    Per nursing, no acute events overnight.    Objective :  Temp:  [97.7 °F (36.5 °C)-98.7 °F (37.1 °C)] 98.7 °F (37.1 °C)  HR:  [55-66] 60  BP: (126-160)/(58-78) 134/58  Resp:  [18] 18  SpO2:  [91 %-97 %] 96 %  O2 Device: Nasal cannula  Nasal Cannula O2 Flow Rate (L/min):  [1 L/min-2 L/min] 1 L/min    Body mass index is 30.6 kg/m².     Input and Output Summary (last 24 hours):     Intake/Output Summary (Last 24 hours) at 3/21/2025 1409  Last data filed at 3/21/2025 1030  Gross per 24 hour   Intake 570 ml   Output 2650 ml   Net -2080 ml       Physical Exam  Vitals and nursing note reviewed.   Constitutional:       General: He is awake. He is not in acute distress.  HENT:      Head: Normocephalic and atraumatic.   Eyes:      General: No scleral icterus.     Conjunctiva/sclera: Conjunctivae normal.   Cardiovascular:      Rate and Rhythm: Normal rate and regular rhythm.      Heart sounds: Normal heart sounds, S1 normal and S2 normal. No murmur heard.  Pulmonary:      Effort: Pulmonary effort is normal.      Breath sounds: Decreased breath sounds present. No wheezing, rhonchi or rales.   Abdominal:      General: There is no distension.      Palpations: Abdomen is soft.      Tenderness: There is no abdominal tenderness.   Musculoskeletal:      Right lower leg: No edema.      Left lower leg: No edema.   Skin:     Comments: On exam, no open  lesions/wounds on exposed skin.   Neurological:      Mental Status: He is alert and oriented to person, place, and time. Mental status is at baseline.      Motor: Weakness (R sided) and atrophy (R sided) present.       Lines/Drains:              Lab Results: I have reviewed the following results:   Results from last 7 days   Lab Units 03/21/25  0443   WBC Thousand/uL 8.88   HEMOGLOBIN g/dL 10.9*   HEMATOCRIT % 34.4*   PLATELETS Thousands/uL 178   SEGS PCT % 77*   LYMPHO PCT % 13*   MONO PCT % 5   EOS PCT % 3     Results from last 7 days   Lab Units 03/21/25  0443 03/20/25  1039   SODIUM mmol/L 140 140   POTASSIUM mmol/L 4.1 4.4   CHLORIDE mmol/L 108 107   CO2 mmol/L 26 26   BUN mg/dL 37* 46*   CREATININE mg/dL 1.63* 1.98*   ANION GAP mmol/L 6 7   CALCIUM mg/dL 7.8* 7.6*   ALBUMIN g/dL  --  3.5   TOTAL BILIRUBIN mg/dL  --  0.26   ALK PHOS U/L  --  91   ALT U/L  --  18   AST U/L  --  17   GLUCOSE RANDOM mg/dL 96 125                 Results from last 7 days   Lab Units 03/21/25  0443 03/20/25  1718 03/20/25  1039   LACTIC ACID mmol/L  --  1.0  --    PROCALCITONIN ng/ml 0.54*  --  0.16       Recent Cultures (last 7 days):   Results from last 7 days   Lab Units 03/21/25  0047 03/20/25  1716   BLOOD CULTURE   --  Received in Microbiology Lab. Culture in Progress.  Received in Microbiology Lab. Culture in Progress.   LEGIONELLA URINARY ANTIGEN  Negative  --        Imaging Results Review: I reviewed radiology reports from this admission including: VAS venous duplex-lower limb bilateral and CTA chest pe study.  Other Study Results Review: EKG was reviewed.     Last 24 Hours Medication List:     Current Facility-Administered Medications:     acetaminophen (TYLENOL) tablet 650 mg, Q6H PRN    albuterol inhalation solution 2.5 mg, Q6H PRN    amLODIPine (NORVASC) tablet 2.5 mg, Daily    aspirin tablet 325 mg, Daily    atorvastatin (LIPITOR) tablet 80 mg, Daily With Dinner    azithromycin (ZITHROMAX) 500 mg in sodium chloride  0.9% 250mL IVPB 500 mg, Q24H, Last Rate: 500 mg (03/20/25 2147)    baclofen tablet 10 mg, TID **AND** baclofen tablet 5 mg, HS    benzonatate (TESSALON PERLES) capsule 100 mg, TID PRN    carvedilol (COREG) tablet 25 mg, BID With Meals    cefepime (MAXIPIME) IVPB (premix in dextrose) 2,000 mg 50 mL, Q12H, Last Rate: 2,000 mg (03/21/25 0319)    DULoxetine (CYMBALTA) delayed release capsule 60 mg, Daily    ferrous sulfate tablet 325 mg, Daily With Breakfast    guaiFENesin (MUCINEX) 12 hr tablet 600 mg, Q12H KATIE    heparin (porcine) subcutaneous injection 5,000 Units, Q8H KATIE **AND** [COMPLETED] Platelet count, Once    hydrALAZINE (APRESOLINE) tablet 50 mg, Q12H    losartan (COZAAR) tablet 100 mg, Daily    multi-electrolyte (Plasmalyte-A/Isolyte-S PH 7.4/Normosol-R) IV solution, Continuous, Last Rate: 50 mL/hr (03/20/25 1447)    OXcarbazepine (TRILEPTAL) tablet 600 mg, BID    pregabalin (LYRICA) capsule 100 mg, BID    pregabalin (LYRICA) capsule 50 mg, Daily    vancomycin (VANCOCIN) IVPB (premix in dextrose) 1,000 mg 200 mL, Daily PRN    Administrative Statements   Today, Patient Was Seen By: YURI Alvarez      **Please Note: This note may have been constructed using a voice recognition system.**

## 2025-03-21 NOTE — UTILIZATION REVIEW
Initial Clinical Review    Admission: Date/Time/Statement:   Admission Orders (From admission, onward)       Ordered        03/20/25 1354  INPATIENT ADMISSION  Once                          Orders Placed This Encounter   Procedures    INPATIENT ADMISSION     Standing Status:   Standing     Number of Occurrences:   1     Level of Care:   Med Surg [16]     Estimated length of stay:   More than 2 Midnights     Certification:   I certify that inpatient services are medically necessary for this patient for a duration of greater than two midnights. See H&P and MD Progress Notes for additional information about the patient's course of treatment.     ED Arrival Information       Expected   -    Arrival   3/20/2025 09:30    Acuity   Urgent              Means of arrival   Walk-In    Escorted by   Spouse    Service   Hospitalist    Admission type   Emergency              Arrival complaint   Congestion             Chief Complaint   Patient presents with    Nasal Congestion     Recently treated for pneumonia. States that he finished his antibiotic and was feeling better, but today started again with cough and states he has fever 100.2 today.       Initial Presentation: 64 y.o. male who presented w/ spouse to [facility] ED. Admitted as Inpatient for evaluation and treatment of Sepsis due to  Multifocal pneumonia R lung. Acute resp failure.     PMHx:  has a past medical history of Adjustment disorder with depressed mood (03/01/2023), BRAULIO (acute kidney injury) (HCC), B12 deficiency, Celiac artery stenosis (HCC), Cerebellar infarct (HCC) (02/25/2023), CKD (chronic kidney disease) stage 2, GFR 60-89 ml/min, Essential hypertension, Impaired fasting glucose, Iron deficiency anemia, Neurogenic bowel, Stenosis of left vertebral artery, Stroke (HCC), and Vertebral artery dissection (HCC).    Presented w/ worsening shortness of breath and cough. Recently prescribed Augmentin and azithromycin for suspected pneumonia. Patient has  failed to improve and presents with worsening shortness of breath as well as hypoxia requiring 2 L/min of oxygen today. On exam, Lungs clear, O2 fell out of his nose saturating 87% RA, placed back on O2 2L @ 95%. Abnormal Labs Bun/creat 46/1.98, Mg 1.6, WBC 12.25. D-dimer 1.06. CTA chest: Neg PE. Extensive patchy multifocal solid and groundglass opacities in the right lung, concerning for multifocal pneumonia. Overall, this appears increased compared to 3/5/2025. In the ED, pt received Mg IV x1, Tylenol x1 and Calcium gluconate IV x1.     Plan: IV Cefepime and Azithromycin, Obtain sputum culture, Strep, Legionella urinary antigens, Blood cultures x2, IV fluids Isolyte overnight. Albumin x1, Continue 2L NC and wean as tolerated. Duplex LE, Lactic acid. F/U labs in am.    Anticipated Length of Stay/Certification Statement: Patient will be admitted on an inpatient basis with an anticipated length of stay of greater than 2 midnights secondary to multifocal pna on IV abx, resp failure on O2.     Date: 3/20/25   Day 2:   The patient expressing that he has somewhat improved in his oxygenation, noting that he has now down to 1 L nasal cannula. Abnormal labs: Bun/Creat 37/1.63, Mg 2.8, Procal 0.54. H/H 10.9/34.4. Plan: Continue IV Cefepime, DC Azithromycin in setting of neg Strep/legionella.  F/U cultures.     Certification Statement: The patient will continue to require additional inpatient hospital stay due to improved oxygenation status, IV antibiotics, and clinical improvement.     ED Treatment-Medication Administration from 03/20/2025 0930 to 03/20/2025 1433         Date/Time Order Dose Route Action     03/20/2025 1040 acetaminophen (TYLENOL) tablet 650 mg 650 mg Oral Given     03/20/2025 1134 magnesium sulfate 2 g/50 mL IVPB (premix) 2 g 2 g Intravenous New Bag     03/20/2025 1251 calcium gluconate 1 g in sodium chloride 0.9% 50 mL (premix) 1 g Intravenous New Bag     03/20/2025 1217 iohexol (OMNIPAQUE) 350 MG/ML  injection (MULTI-DOSE) 85 mL 85 mL Intravenous Given            Scheduled Medications:  amLODIPine, 2.5 mg, Oral, Daily  aspirin, 325 mg, Oral, Daily  atorvastatin, 80 mg, Oral, Daily With Dinner  azithromycin, 500 mg, Intravenous, Q24H  baclofen, 10 mg, Oral, TID   And  baclofen, 5 mg, Oral, HS  carvedilol, 25 mg, Oral, BID With Meals  cefepime, 2,000 mg, Intravenous, Q12H  DULoxetine, 60 mg, Oral, Daily  ferrous sulfate, 325 mg, Oral, Daily With Breakfast  guaiFENesin, 600 mg, Oral, Q12H KATIE  heparin (porcine), 5,000 Units, Subcutaneous, Q8H KATIE  hydrALAZINE, 50 mg, Oral, Q12H  losartan, 100 mg, Oral, Daily  OXcarbazepine, 600 mg, Oral, BID  pregabalin, 100 mg, Oral, BID  pregabalin, 50 mg, Oral, Daily  albumin human (FLEXBUMIN) 5 % injection 12.5 g  Dose: 12.5 g  Freq: Once Route: IV  Last Dose: Stopped (03/20/25 1751)  Start: 03/20/25 1415 End: 03/20/25 1751   vancomycin (VANCOCIN) 2000 mg in sodium chloride 0.9% 500 mL IVPB  Dose: 2,000 mg  Freq: Once Route: IV  Last Dose: 2,000 mg (03/20/25 1745)  Start: 03/20/25 1430 End: 03/20/25 1945  magnesium sulfate 2 g/50 mL IVPB (premix) 2 g  Dose: 2 g  Freq: Once Route: IV  Last Dose: 2 g (03/21/25 0009)  Start: 03/20/25 1415 End: 03/21/25 0109     Continuous IV Infusions:  multi-electrolyte (Plasmalyte-A/Isolyte-S PH 7.4/Normosol-R) IV solution  Rate: 50 mL/hr Dose: 50 mL/hr  Freq: Continuous Route: IV  Last Dose: 50 mL/hr (03/20/25 1447)  Start: 03/20/25 1400 End: 03/21/25 1446      PRN Meds:  acetaminophen, 650 mg, Oral, Q6H PRN  albuterol, 2.5 mg, Nebulization, Q6H PRN  benzonatate, 100 mg, Oral, TID PRN  vancomycin, 1,000 mg, Intravenous, Daily PRN      ED Triage Vitals [03/20/25 0940]   Temperature Pulse Respirations Blood Pressure SpO2 Pain Score   100.2 °F (37.9 °C) 85 20 134/90 95 % No Pain     Weight (last 2 days)       Date/Time Weight    03/20/25 2300 94 (207.23)    03/20/25 0940 90.7 (199.96)            Vital Signs (last 3 days)       Date/Time Temp Pulse  Resp BP MAP (mmHg) SpO2 Calculated FIO2 (%) - Nasal Cannula Nasal Cannula O2 Flow Rate (L/min) O2 Device Patient Position - Orthostatic VS Pain    03/21/25 0820 -- -- -- -- -- -- -- -- -- -- No Pain    03/21/25 07:51:45 98.7 °F (37.1 °C) 64 -- 142/63 89 91 % -- -- -- Lying --    03/21/25 0100 -- -- -- -- -- 93 % 28 2 L/min Nasal cannula -- No Pain    03/20/25 2200 -- 63 -- 160/78 105 96 % -- -- -- Lying --    03/20/25 2151 97.7 °F (36.5 °C) 63 -- 160/78 -- -- -- -- -- -- --    03/20/25 1930 -- -- -- -- -- -- -- -- -- -- No Pain    03/20/25 1704 -- 66 18 -- -- 95 % 28 2 L/min Nasal cannula -- --    03/20/25 1458 -- -- -- -- -- -- 28 2 L/min Nasal cannula -- --    03/20/25 14:46:57 97.9 °F (36.6 °C) 63 18 126/59 81 91 % -- -- None (Room air) Lying --    03/20/25 1409 -- 65 16 108/56 -- 93 % -- -- None (Room air) Lying --    03/20/25 1400 -- 66 13 -- -- 93 % -- -- -- -- --    03/20/25 1200 -- 69 16 90/53 66 93 % -- -- None (Room air) Lying --    03/20/25 1100 -- 75 16 100/58 73 93 % -- -- None (Room air) Sitting --    03/20/25 0940 100.2 °F (37.9 °C) 85 20 134/90 -- 95 % -- -- None (Room air) Sitting No Pain              Pertinent Labs/Diagnostic Test Results:   Radiology:   VAS VENOUS DUPLEX - LOWER LIMB BILATERAL   Final Interpretation by Maribel Sepulveda MD (03/20 2009)      CTA chest pe study   Final Interpretation by Sadia Moreira MD (03/20 9325)         1. No central pulmonary embolism. Evaluation of the segmental and subsegmental branches is limited secondary to incomplete contrast opacification.      2.  Extensive patchy multifocal solid and groundglass opacities in the right lung, concerning for multifocal pneumonia. Overall, this appears increased compared to 3/5/2025. A repeat chest CT in 4 to 6 weeks may be helpful to ensure resolution of these    findings.      3. Multiple enlarged paratracheal, subcarinal, and hilar lymph nodes, likely representing reactive lymphadenopathy. Indeterminate enlarged right  lower paraesophageal lymph node, for which attention on follow-up imaging is recommended.      4. Additional findings as detailed in the body of the report.      The study was marked in EPIC for immediate notification.                        Workstation performed: [id number]         XR chest 2 views   Final Interpretation by Chiquita Larios MD (03/20 1546)      Multifocal right pneumonia, better shown on CT. See subsequent chest CT.            Workstation performed: [id number]           Cardiology:  ECG 12 lead   Final Result by Fernando Nichols MD (03/20 1007)   Normal sinus rhythm   Left axis deviation   Abnormal ECG   When compared with ECG of 05-Mar-2025 19:04,   No significant change was found   Confirmed by Fernando Nichols ([id number]) on 3/20/2025 10:07:45 AM        Results from last 7 days   Lab Units 03/20/25  1039   SARS-COV-2  Negative     Results from last 7 days   Lab Units 03/21/25  0443 03/20/25  1718 03/20/25  1039   WBC Thousand/uL 8.88  --  12.25*   HEMOGLOBIN g/dL 10.9*  --  11.1*   HEMATOCRIT % 34.4*  --  34.0*   PLATELETS Thousands/uL 178 197 176   TOTAL NEUT ABS Thousands/µL 6.88  --  11.16*         Results from last 7 days   Lab Units 03/21/25  0443 03/20/25  1134 03/20/25  1039   SODIUM mmol/L 140  --  140   POTASSIUM mmol/L 4.1  --  4.4   CHLORIDE mmol/L 108  --  107   CO2 mmol/L 26  --  26   ANION GAP mmol/L 6  --  7   BUN mg/dL 37*  --  46*   CREATININE mg/dL 1.63*  --  1.98*   EGFR ml/min/1.73sq m 43  --  34   CALCIUM mg/dL 7.8*  --  7.6*   CALCIUM, IONIZED mmol/L  --  1.03*  --    MAGNESIUM mg/dL 2.8*  --  1.6*     Results from last 7 days   Lab Units 03/20/25  1039   AST U/L 17   ALT U/L 18   ALK PHOS U/L 91   TOTAL PROTEIN g/dL 5.8*   ALBUMIN g/dL 3.5   TOTAL BILIRUBIN mg/dL 0.26         Results from last 7 days   Lab Units 03/21/25  0443 03/20/25  1039   GLUCOSE RANDOM mg/dL 96 125         Results from last 7 days   Lab Units 03/20/25  1250 03/20/25  1039   HS TNI 0HR ng/L  --  15   HS TNI  2HR ng/L 17  --    HSTNI D2 ng/L 2  --      Results from last 7 days   Lab Units 03/20/25  1039   D-DIMER QUANTITATIVE ug/ml FEU 1.06*             Results from last 7 days   Lab Units 03/21/25  0443 03/20/25  1039   PROCALCITONIN ng/ml 0.54* 0.16     Results from last 7 days   Lab Units 03/20/25  1718   LACTIC ACID mmol/L 1.0             Results from last 7 days   Lab Units 03/20/25  1039   BNP pg/mL 60         Results from last 7 days   Lab Units 03/21/25  0047 03/20/25  1039   STREP PNEUMONIAE ANTIGEN, URINE  Negative  --    LEGIONELLA URINARY ANTIGEN  Negative  --    INFLUENZA A PCR   --  Negative   INFLUENZA B PCR   --  Negative   RSV PCR   --  Negative     Results from last 7 days   Lab Units 03/20/25  1716   BLOOD CULTURE  Received in Microbiology Lab. Culture in Progress.  Received in Microbiology Lab. Culture in Progress.             Results from last 7 days   Lab Units 03/21/25  0443   VANCOMYCIN RM ug/mL 18.8       Past Medical History:   Diagnosis Date    Adjustment disorder with depressed mood 03/01/2023    BRAULIO (acute kidney injury) (HCC)     B12 deficiency     Celiac artery stenosis (HCC)     Cerebellar infarct (HCC) 02/25/2023    CKD (chronic kidney disease) stage 2, GFR 60-89 ml/min     Essential hypertension     Impaired fasting glucose     Iron deficiency anemia     Neurogenic bowel     Stenosis of left vertebral artery     Stroke (HCC)     Vertebral artery dissection (HCC)      Present on Admission:   Primary hypertension      Admitting Diagnosis: Pneumonia [J18.9]  Hypoxia [R09.02]  Nasal congestion [R09.81]  Age/Sex: 64 y.o. male    Network Utilization Review Department  ATTENTION: Please call with any questions or concerns to [phone] and carefully listen to the prompts so that you are directed to the right person. All voicemails are confidential.   For Discharge needs, contact Care Management DC Support Team at [phone] opt. [phone]  Send all requests for admission clinical reviews, approved  or denied determinations and any other requests to dedicated fax number below belonging to the campus where the patient is receiving treatment. List of dedicated fax numbers for the Facilities:  FACILITY NAME UR FAX NUMBER   ADMISSION DENIALS (Administrative/Medical Necessity) [phone]   DISCHARGE SUPPORT TEAM (NETWORK) [phone]   PARENT CHILD HEALTH (Maternity/NICU/Pediatrics) [phone]   [facility] [phone]   [facility] [phone]   [facility] [phone]   [facility] [phone]   [facility] [phone]   [facility] [phone]   [facility] [phone]   [facility] [phone]   [facility] [phone]   Critical access hospital [phone]   [facility] [phone]   [facility] [phone]

## 2025-03-21 NOTE — ASSESSMENT & PLAN NOTE
Continue home antihypertensive regimen.    Continue to monitor blood pressure, per unit protocol.

## 2025-03-21 NOTE — PLAN OF CARE
Problem: Potential for Falls  Goal: Patient will remain free of falls  Description: INTERVENTIONS:  - Educate patient/family on patient safety including physical limitations  - Instruct patient to call for assistance with activity   - Consult OT/PT to assist with strengthening/mobility   - Keep Call bell within reach  - Keep bed low and locked with side rails adjusted as appropriate  - Keep care items and personal belongings within reach  - Initiate and maintain comfort rounds  - Make Fall Risk Sign visible to staff  - Offer Toileting every 2 Hours, in advance of need  - Initiate/Maintain alarm  - Obtain necessary fall risk management equipment  - Apply yellow socks and bracelet for high fall risk patients  - Consider moving patient to room near nurses station  INTERVENTIONS:- Educate patient/family on patient safety including physical limitations- Instruct patient to call for assistance with activity - Consult OT/PT to assist with strengthening/mobility - Keep Call bell within reach- Keep bed low and locked with side rails adjusted as appropriate- Keep care items and personal belongings within reach- Initiate and maintain comfort rounds- Make Fall Risk Sign visible to staff- Offer Toileting every 2 Hours, in advance of need- Initiate/Maintain alarm- Obtain necessary fall risk management equipment: - Apply yellow socks and bracelet for high fall risk patients- Consider moving patient to room near nurses station  Outcome: Progressing     Problem: PAIN - ADULT  Goal: Verbalizes/displays adequate comfort level or baseline comfort level  Description: Interventions:- Encourage patient to monitor pain and request assistance- Assess pain using appropriate pain scale- Administer analgesics based on type and severity of pain and evaluate response- Implement non-pharmacological measures as appropriate and evaluate response- Consider cultural and social influences on pain and pain management- Notify physician/advanced  practitioner if interventions unsuccessful or patient reports new pain  Outcome: Progressing     Problem: INFECTION - ADULT  Goal: Absence or prevention of progression during hospitalization  Description: INTERVENTIONS:- Assess and monitor for signs and symptoms of infection- Monitor lab/diagnostic results- Monitor all insertion sites, i.e. indwelling lines, tubes, and drains- Monitor endotracheal if appropriate and nasal secretions for changes in amount and color- Archer appropriate cooling/warming therapies per order- Administer medications as ordered- Instruct and encourage patient and family to use good hand hygiene technique- Identify and instruct in appropriate isolation precautions for identified infection/condition  Outcome: Progressing  Goal: Absence of fever/infection during neutropenic period  Description: INTERVENTIONS:- Monitor WBC  Outcome: Progressing     Problem: SAFETY ADULT  Goal: Patient will remain free of falls  Description: INTERVENTIONS:  - Educate patient/family on patient safety including physical limitations  - Instruct patient to call for assistance with activity   - Consult OT/PT to assist with strengthening/mobility   - Keep Call bell within reach  - Keep bed low and locked with side rails adjusted as appropriate  - Keep care items and personal belongings within reach  - Initiate and maintain comfort rounds  - Make Fall Risk Sign visible to staff  - Offer Toileting every 2 Hours, in advance of need  - Initiate/Maintain alarm  - Obtain necessary fall risk management equipment  - Apply yellow socks and bracelet for high fall risk patients  - Consider moving patient to room near nurses station  INTERVENTIONS:- Educate patient/family on patient safety including physical limitations- Instruct patient to call for assistance with activity - Consult OT/PT to assist with strengthening/mobility - Keep Call bell within reach- Keep bed low and locked with side rails adjusted as appropriate- Keep  care items and personal belongings within reach- Initiate and maintain comfort rounds- Make Fall Risk Sign visible to staff- Offer Toileting every 2 Hours, in advance of need- Initiate/Maintain alarm- Obtain necessary fall risk management equipment: - Apply yellow socks and bracelet for high fall risk patients- Consider moving patient to room near nurses station  Outcome: Progressing  Goal: Maintain or return to baseline ADL function  Description: INTERVENTIONS:-  Assess patient's ability to carry out ADLs; assess patient's baseline for ADL function and identify physical deficits which impact ability to perform ADLs (bathing, care of mouth/teeth, toileting, grooming, dressing, etc.)- Assess/evaluate cause of self-care deficits - Assess range of motion- Assess patient's mobility; develop plan if impaired- Assess patient's need for assistive devices and provide as appropriate- Encourage maximum independence but intervene and supervise when necessary- Involve family in performance of ADLs- Assess for home care needs following discharge - Consider OT consult to assist with ADL evaluation and planning for discharge- Provide patient education as appropriate  Outcome: Progressing  Goal: Maintains/Returns to pre admission functional level  Description: INTERVENTIONS:- Perform AM-PAC 6 Click Basic Mobility/ Daily Activity assessment daily.- Set and communicate daily mobility goal to care team and patient/family/caregiver. - Collaborate with rehabilitation services on mobility goals if consulted- Perform Range of Motion 3 times a day.- Reposition patient every 2 hours.- Dangle patient 2 times a day- Stand patient 2 times a day- Ambulate patient 2 times a day- Out of bed to chair 2 times a day - Out of bed for meals 3 times a day- Out of bed for toileting- Record patient progress and toleration of activity level   Outcome: Progressing

## 2025-03-22 PROBLEM — K59.00 CONSTIPATION: Status: ACTIVE | Noted: 2025-03-22

## 2025-03-22 LAB
ANION GAP SERPL CALCULATED.3IONS-SCNC: 6 MMOL/L (ref 4–13)
BASOPHILS # BLD AUTO: 0.04 THOUSANDS/ÂΜL (ref 0–0.1)
BASOPHILS NFR BLD AUTO: 1 % (ref 0–1)
BUN SERPL-MCNC: 35 MG/DL (ref 5–25)
CALCIUM SERPL-MCNC: 7.8 MG/DL (ref 8.4–10.2)
CHLORIDE SERPL-SCNC: 108 MMOL/L (ref 96–108)
CO2 SERPL-SCNC: 28 MMOL/L (ref 21–32)
CREAT SERPL-MCNC: 1.45 MG/DL (ref 0.6–1.3)
EOSINOPHIL # BLD AUTO: 0.3 THOUSAND/ÂΜL (ref 0–0.61)
EOSINOPHIL NFR BLD AUTO: 5 % (ref 0–6)
ERYTHROCYTE [DISTWIDTH] IN BLOOD BY AUTOMATED COUNT: 13.7 % (ref 11.6–15.1)
GFR SERPL CREATININE-BSD FRML MDRD: 50 ML/MIN/1.73SQ M
GLUCOSE SERPL-MCNC: 102 MG/DL (ref 65–140)
HCT VFR BLD AUTO: 30.7 % (ref 36.5–49.3)
HGB BLD-MCNC: 9.9 G/DL (ref 12–17)
IMM GRANULOCYTES # BLD AUTO: 0.02 THOUSAND/UL (ref 0–0.2)
IMM GRANULOCYTES NFR BLD AUTO: 0 % (ref 0–2)
LYMPHOCYTES # BLD AUTO: 0.89 THOUSANDS/ÂΜL (ref 0.6–4.47)
LYMPHOCYTES NFR BLD AUTO: 14 % (ref 14–44)
MAGNESIUM SERPL-MCNC: 2.2 MG/DL (ref 1.9–2.7)
MCH RBC QN AUTO: 28.3 PG (ref 26.8–34.3)
MCHC RBC AUTO-ENTMCNC: 32.2 G/DL (ref 31.4–37.4)
MCV RBC AUTO: 88 FL (ref 82–98)
MONOCYTES # BLD AUTO: 0.43 THOUSAND/ÂΜL (ref 0.17–1.22)
MONOCYTES NFR BLD AUTO: 7 % (ref 4–12)
MRSA NOSE QL CULT: NORMAL
NEUTROPHILS # BLD AUTO: 4.5 THOUSANDS/ÂΜL (ref 1.85–7.62)
NEUTS SEG NFR BLD AUTO: 73 % (ref 43–75)
NRBC BLD AUTO-RTO: 0 /100 WBCS
PLATELET # BLD AUTO: 181 THOUSANDS/UL (ref 149–390)
PMV BLD AUTO: 9.9 FL (ref 8.9–12.7)
POTASSIUM SERPL-SCNC: 4.1 MMOL/L (ref 3.5–5.3)
RBC # BLD AUTO: 3.5 MILLION/UL (ref 3.88–5.62)
SODIUM SERPL-SCNC: 142 MMOL/L (ref 135–147)
VANCOMYCIN SERPL-MCNC: 8.6 UG/ML (ref 10–20)
WBC # BLD AUTO: 6.18 THOUSAND/UL (ref 4.31–10.16)

## 2025-03-22 PROCEDURE — 80202 ASSAY OF VANCOMYCIN: CPT | Performed by: STUDENT IN AN ORGANIZED HEALTH CARE EDUCATION/TRAINING PROGRAM

## 2025-03-22 PROCEDURE — 85025 COMPLETE CBC W/AUTO DIFF WBC: CPT | Performed by: PHYSICIAN ASSISTANT

## 2025-03-22 PROCEDURE — 80048 BASIC METABOLIC PNL TOTAL CA: CPT | Performed by: PHYSICIAN ASSISTANT

## 2025-03-22 PROCEDURE — 99232 SBSQ HOSP IP/OBS MODERATE 35: CPT

## 2025-03-22 PROCEDURE — 83735 ASSAY OF MAGNESIUM: CPT

## 2025-03-22 RX ORDER — CEFTRIAXONE 1 G/50ML
1000 INJECTION, SOLUTION INTRAVENOUS EVERY 24 HOURS
Status: DISCONTINUED | OUTPATIENT
Start: 2025-03-22 | End: 2025-03-24 | Stop reason: HOSPADM

## 2025-03-22 RX ORDER — CEFTRIAXONE 1 G/50ML
1000 INJECTION, SOLUTION INTRAVENOUS EVERY 24 HOURS
Status: DISCONTINUED | OUTPATIENT
Start: 2025-03-22 | End: 2025-03-22

## 2025-03-22 RX ORDER — VANCOMYCIN HYDROCHLORIDE 1 G/200ML
1000 INJECTION, SOLUTION INTRAVENOUS ONCE
Status: COMPLETED | OUTPATIENT
Start: 2025-03-22 | End: 2025-03-22

## 2025-03-22 RX ORDER — DOCUSATE SODIUM 100 MG/1
100 CAPSULE, LIQUID FILLED ORAL 2 TIMES DAILY
Status: DISCONTINUED | OUTPATIENT
Start: 2025-03-22 | End: 2025-03-24 | Stop reason: HOSPADM

## 2025-03-22 RX ADMIN — ATORVASTATIN CALCIUM 80 MG: 40 TABLET, FILM COATED ORAL at 17:09

## 2025-03-22 RX ADMIN — HEPARIN SODIUM 5000 UNITS: 5000 INJECTION INTRAVENOUS; SUBCUTANEOUS at 13:44

## 2025-03-22 RX ADMIN — PREGABALIN 100 MG: 100 CAPSULE ORAL at 17:09

## 2025-03-22 RX ADMIN — HYDRALAZINE HYDROCHLORIDE 50 MG: 25 TABLET ORAL at 22:05

## 2025-03-22 RX ADMIN — HEPARIN SODIUM 5000 UNITS: 5000 INJECTION INTRAVENOUS; SUBCUTANEOUS at 05:40

## 2025-03-22 RX ADMIN — CEFTRIAXONE 1000 MG: 1 INJECTION, SOLUTION INTRAVENOUS at 13:45

## 2025-03-22 RX ADMIN — FERROUS SULFATE TAB 325 MG (65 MG ELEMENTAL FE) 325 MG: 325 (65 FE) TAB at 08:03

## 2025-03-22 RX ADMIN — GUAIFENESIN 600 MG: 600 TABLET ORAL at 17:09

## 2025-03-22 RX ADMIN — CEFEPIME HYDROCHLORIDE 2000 MG: 2 INJECTION, SOLUTION INTRAVENOUS at 02:19

## 2025-03-22 RX ADMIN — OXCARBAZEPINE 600 MG: 300 TABLET, FILM COATED ORAL at 17:09

## 2025-03-22 RX ADMIN — GUAIFENESIN 600 MG: 600 TABLET ORAL at 05:41

## 2025-03-22 RX ADMIN — OXCARBAZEPINE 600 MG: 300 TABLET, FILM COATED ORAL at 08:09

## 2025-03-22 RX ADMIN — DULOXETINE HYDROCHLORIDE 60 MG: 30 CAPSULE, DELAYED RELEASE ORAL at 08:03

## 2025-03-22 RX ADMIN — ASPIRIN 325 MG ORAL TABLET 325 MG: 325 PILL ORAL at 08:04

## 2025-03-22 RX ADMIN — DOCUSATE SODIUM 100 MG: 100 CAPSULE, LIQUID FILLED ORAL at 22:07

## 2025-03-22 RX ADMIN — BACLOFEN 10 MG: 10 TABLET ORAL at 13:44

## 2025-03-22 RX ADMIN — CARVEDILOL 25 MG: 25 TABLET, FILM COATED ORAL at 17:09

## 2025-03-22 RX ADMIN — HYDRALAZINE HYDROCHLORIDE 50 MG: 25 TABLET ORAL at 10:00

## 2025-03-22 RX ADMIN — HEPARIN SODIUM 5000 UNITS: 5000 INJECTION INTRAVENOUS; SUBCUTANEOUS at 22:03

## 2025-03-22 RX ADMIN — PREGABALIN 50 MG: 100 CAPSULE ORAL at 13:44

## 2025-03-22 RX ADMIN — LOSARTAN POTASSIUM 100 MG: 50 TABLET, FILM COATED ORAL at 08:04

## 2025-03-22 RX ADMIN — CARVEDILOL 25 MG: 25 TABLET, FILM COATED ORAL at 08:03

## 2025-03-22 RX ADMIN — DOCUSATE SODIUM 100 MG: 100 CAPSULE, LIQUID FILLED ORAL at 13:45

## 2025-03-22 RX ADMIN — PREGABALIN 100 MG: 100 CAPSULE ORAL at 08:03

## 2025-03-22 RX ADMIN — AMLODIPINE BESYLATE 2.5 MG: 2.5 TABLET ORAL at 08:04

## 2025-03-22 RX ADMIN — BACLOFEN 10 MG: 10 TABLET ORAL at 08:04

## 2025-03-22 RX ADMIN — VANCOMYCIN HYDROCHLORIDE 1000 MG: 1 INJECTION, SOLUTION INTRAVENOUS at 05:43

## 2025-03-22 RX ADMIN — BACLOFEN 10 MG: 10 TABLET ORAL at 22:05

## 2025-03-22 NOTE — ASSESSMENT & PLAN NOTE
The patient expresses that he has not had a bowel movement since admission.  The patient denying any nausea, vomiting, or abdominal distention.   Will order colace 100 mg, twice daily.  Patient was encouraged to increase ambulation, as tolerated.

## 2025-03-22 NOTE — ASSESSMENT & PLAN NOTE
Lab Results   Component Value Date    EGFR 50 03/22/2025    EGFR 43 03/21/2025    EGFR 34 03/20/2025    CREATININE 1.45 (H) 03/22/2025    CREATININE 1.63 (H) 03/21/2025    CREATININE 1.98 (H) 03/20/2025     Per chart review, baseline creatinine appears to be 1.5-2.0, but noted elevations recently.  On admission, patient with creatinine 1.98.   Cr improved to baseline.   Continue to monitor via daily BMP.  Continue to avoid and/or limit nephrotoxins.   Patient would like outpatient referral to nephrology.  Will need to order ambulatory referral on discharge.

## 2025-03-22 NOTE — PLAN OF CARE
Problem: Potential for Falls  Goal: Patient will remain free of falls  Description: INTERVENTIONS:  - Educate patient/family on patient safety including physical limitations  - Instruct patient to call for assistance with activity   - Consult OT/PT to assist with strengthening/mobility   - Keep Call bell within reach  - Keep bed low and locked with side rails adjusted as appropriate  - Keep care items and personal belongings within reach  - Initiate and maintain comfort rounds  - Make Fall Risk Sign visible to staff  - Offer Toileting every 2 Hours, in advance of need  - Initiate/Maintain alarm  - Obtain necessary fall risk management equipment  - Apply yellow socks and bracelet for high fall risk patients  - Consider moving patient to room near nurses station  INTERVENTIONS:- Educate patient/family on patient safety including physical limitations- Instruct patient to call for assistance with activity - Consult OT/PT to assist with strengthening/mobility - Keep Call bell within reach- Keep bed low and locked with side rails adjusted as appropriate- Keep care items and personal belongings within reach- Initiate and maintain comfort rounds- Make Fall Risk Sign visible to staff- Offer Toileting every 2 Hours, in advance of need- Initiate/Maintain alarm- Obtain necessary fall risk management equipment: - Apply yellow socks and bracelet for high fall risk patients- Consider moving patient to room near nurses station  Outcome: Progressing     Problem: PAIN - ADULT  Goal: Verbalizes/displays adequate comfort level or baseline comfort level  Description: Interventions:- Encourage patient to monitor pain and request assistance- Assess pain using appropriate pain scale- Administer analgesics based on type and severity of pain and evaluate response- Implement non-pharmacological measures as appropriate and evaluate response- Consider cultural and social influences on pain and pain management- Notify physician/advanced  practitioner if interventions unsuccessful or patient reports new pain  Outcome: Progressing     Problem: INFECTION - ADULT  Goal: Absence or prevention of progression during hospitalization  Description: INTERVENTIONS:- Assess and monitor for signs and symptoms of infection- Monitor lab/diagnostic results- Monitor all insertion sites, i.e. indwelling lines, tubes, and drains- Monitor endotracheal if appropriate and nasal secretions for changes in amount and color- [place] appropriate cooling/warming therapies per order- Administer medications as ordered- Instruct and encourage patient and family to use good hand hygiene technique- Identify and instruct in appropriate isolation precautions for identified infection/condition  Outcome: Progressing  Goal: Absence of fever/infection during neutropenic period  Description: INTERVENTIONS:- Monitor WBC  Outcome: Progressing     Problem: SAFETY ADULT  Goal: Patient will remain free of falls  Description: INTERVENTIONS:  - Educate patient/family on patient safety including physical limitations  - Instruct patient to call for assistance with activity   - Consult OT/PT to assist with strengthening/mobility   - Keep Call bell within reach  - Keep bed low and locked with side rails adjusted as appropriate  - Keep care items and personal belongings within reach  - Initiate and maintain comfort rounds  - Make Fall Risk Sign visible to staff  - Offer Toileting every 2 Hours, in advance of need  - Initiate/Maintain alarm  - Obtain necessary fall risk management equipment  - Apply yellow socks and bracelet for high fall risk patients  - Consider moving patient to room near nurses station  INTERVENTIONS:- Educate patient/family on patient safety including physical limitations- Instruct patient to call for assistance with activity - Consult OT/PT to assist with strengthening/mobility - Keep Call bell within reach- Keep bed low and locked with side rails adjusted as appropriate- Keep  care items and personal belongings within reach- Initiate and maintain comfort rounds- Make Fall Risk Sign visible to staff- Offer Toileting every 2 Hours, in advance of need- Initiate/Maintain alarm- Obtain necessary fall risk management equipment: - Apply yellow socks and bracelet for high fall risk patients- Consider moving patient to room near nurses station  Outcome: Progressing  Goal: Maintain or return to baseline ADL function  Description: INTERVENTIONS:-  Assess patient's ability to carry out ADLs; assess patient's baseline for ADL function and identify physical deficits which impact ability to perform ADLs (bathing, care of mouth/teeth, toileting, grooming, dressing, etc.)- Assess/evaluate cause of self-care deficits - Assess range of motion- Assess patient's mobility; develop plan if impaired- Assess patient's need for assistive devices and provide as appropriate- Encourage maximum independence but intervene and supervise when necessary- Involve family in performance of ADLs- Assess for home care needs following discharge - Consider OT consult to assist with ADL evaluation and planning for discharge- Provide patient education as appropriate  Outcome: Progressing  Goal: Maintains/Returns to pre admission functional level  Description: INTERVENTIONS:- Perform AM-PAC 6 Click Basic Mobility/ Daily Activity assessment daily.- Set and communicate daily mobility goal to care team and patient/family/caregiver. - Collaborate with rehabilitation services on mobility goals if consulted- Perform Range of Motion 3 times a day.- Reposition patient every 2 hours.- Dangle patient 2 times a day- Stand patient 2 times a day- Ambulate patient 2 times a day- Out of bed to chair 2 times a day - Out of bed for meals 3 times a day- Out of bed for toileting- Record patient progress and toleration of activity level   Outcome: Progressing

## 2025-03-22 NOTE — ASSESSMENT & PLAN NOTE
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   Continue outpatient follow-up with neurology.    Will continue home regimen of baclofen and AEDs.   Per patient, he does not take baclofen 15 mg, nightly anymore only takes 10 mg at night.  Will adjust to home dosing.

## 2025-03-22 NOTE — ASSESSMENT & PLAN NOTE
On admission, patient acutely requiring 2 L of supplemental oxygen due to symptoms of increased shortness of breath and slight tachypnea likely in the setting of multifocal pneumonia.    Patient initially needed supplemental oxygenation, but now on room air.    Will complete ambulatory pulse oximetry.  Continue to wean oxygen, as tolerated.    Maintain oxygen saturation > 90%.

## 2025-03-22 NOTE — ASSESSMENT & PLAN NOTE
Patient presented with chief complaint of shortness of breath and cough.  Patient was on outpatient therapy for multifocal pneumonia with PO Augmentin/azithromycin.    CTA chest pe study (3/20):  1. No central pulmonary embolism. Evaluation of the segmental and subsegmental branches is limited secondary to incomplete contrast opacification.  2.  Extensive patchy multifocal solid and groundglass opacities in the right lung, concerning for multifocal pneumonia. Overall, this appears increased compared to 3/5/2025. A repeat chest CT in 4 to 6 weeks may be helpful to ensure resolution of these   findings.  3. Multiple enlarged paratracheal, subcarinal, and hilar lymph nodes, likely representing reactive lymphadenopathy. Indeterminate enlarged right lower paraesophageal lymph node, for which attention on follow-up imaging is recommended.  SIRS criteria: leukocytosis, tachypnea  Suspected SOI: multifocal pneumonia  LA : 1.0  Procalcitonin: 0.54  Patient received IV fluid hydration with Isolyte at 50 mL/ - now discontinued.  Patient was started on IV cefepime, vancomycin, and azithromycin.    Will switch to IV ceftriaxone 1 g, daily.    Will continue IV vancomycin.  MRSA culture - pending  Will discontinue IV azithromycin in the setting of negative strep/legionella.    BC collected -negative x 24 hours.  Will need to continue to follow culture.  Continue to monitor vital signs, per unit protocol.

## 2025-03-22 NOTE — PROGRESS NOTES
Jimmy Mello is a 64 y.o. male who is currently ordered Vancomycin IV with management by the Pharmacy Consult service.  Relevant clinical data and objective / subjective history reviewed.  Vancomycin Assessment:  Indication and Goal AUC/Trough: Pneumonia (goal -600, trough >10), -600, trough >10  Clinical Status: stable  Micro:     Renal Function:  SCr: 1.45 mg/dL  CrCl: 57.5 mL/min  Renal replacement: Not on dialysis  Days of Therapy: 3  Current Dose: 1000mg daily PRN if level </=15  Vancomycin Plan: random level resulted 8.6. Since the level is <15, a dose of 1000mg will be given today.   New Dosing: no change  Next Level: 3/23/25 at 0600  Renal Function Monitoring: Daily BMP and UOP  Pharmacy will continue to follow closely for s/sx of nephrotoxicity, infusion reactions and appropriateness of therapy.  BMP and CBC will be ordered per protocol. We will continue to follow the patient’s culture results and clinical progress daily. Also, cefepime will be adjusted if necessary.    Bharat Andrew, Pharmacist, PharmD, BCPS

## 2025-03-22 NOTE — PLAN OF CARE
Problem: Potential for Falls  Goal: Patient will remain free of falls  Description: INTERVENTIONS:  - Educate patient/family on patient safety including physical limitations  - Instruct patient to call for assistance with activity   - Consult OT/PT to assist with strengthening/mobility   - Keep Call bell within reach  - Keep bed low and locked with side rails adjusted as appropriate  - Keep care items and personal belongings within reach  - Initiate and maintain comfort rounds  - Make Fall Risk Sign visible to staff  - Offer Toileting every 2 Hours, in advance of need  - Initiate/Maintain alarm  - Obtain necessary fall risk management equipment  - Apply yellow socks and bracelet for high fall risk patients  - Consider moving patient to room near nurses station  INTERVENTIONS:- Educate patient/family on patient safety including physical limitations- Instruct patient to call for assistance with activity - Consult OT/PT to assist with strengthening/mobility - Keep Call bell within reach- Keep bed low and locked with side rails adjusted as appropriate- Keep care items and personal belongings within reach- Initiate and maintain comfort rounds- Make Fall Risk Sign visible to staff- Offer Toileting every 2 Hours, in advance of need- Initiate/Maintain alarm- Obtain necessary fall risk management equipment: - Apply yellow socks and bracelet for high fall risk patients- Consider moving patient to room near nurses station  Outcome: Progressing     Problem: PAIN - ADULT  Goal: Verbalizes/displays adequate comfort level or baseline comfort level  Description: Interventions:- Encourage patient to monitor pain and request assistance- Assess pain using appropriate pain scale- Administer analgesics based on type and severity of pain and evaluate response- Implement non-pharmacological measures as appropriate and evaluate response- Consider cultural and social influences on pain and pain management- Notify physician/advanced  practitioner if interventions unsuccessful or patient reports new pain  Outcome: Progressing     Problem: INFECTION - ADULT  Goal: Absence or prevention of progression during hospitalization  Description: INTERVENTIONS:- Assess and monitor for signs and symptoms of infection- Monitor lab/diagnostic results- Monitor all insertion sites, i.e. indwelling lines, tubes, and drains- Monitor endotracheal if appropriate and nasal secretions for changes in amount and color- [place] appropriate cooling/warming therapies per order- Administer medications as ordered- Instruct and encourage patient and family to use good hand hygiene technique- Identify and instruct in appropriate isolation precautions for identified infection/condition  Outcome: Progressing  Goal: Absence of fever/infection during neutropenic period  Description: INTERVENTIONS:- Monitor WBC  Outcome: Progressing     Problem: SAFETY ADULT  Goal: Patient will remain free of falls  Description: INTERVENTIONS:  - Educate patient/family on patient safety including physical limitations  - Instruct patient to call for assistance with activity   - Consult OT/PT to assist with strengthening/mobility   - Keep Call bell within reach  - Keep bed low and locked with side rails adjusted as appropriate  - Keep care items and personal belongings within reach  - Initiate and maintain comfort rounds  - Make Fall Risk Sign visible to staff  - Offer Toileting every 2 Hours, in advance of need  - Initiate/Maintain alarm  - Obtain necessary fall risk management equipment  - Apply yellow socks and bracelet for high fall risk patients  - Consider moving patient to room near nurses station  INTERVENTIONS:- Educate patient/family on patient safety including physical limitations- Instruct patient to call for assistance with activity - Consult OT/PT to assist with strengthening/mobility - Keep Call bell within reach- Keep bed low and locked with side rails adjusted as appropriate- Keep  care items and personal belongings within reach- Initiate and maintain comfort rounds- Make Fall Risk Sign visible to staff- Offer Toileting every 2 Hours, in advance of need- Initiate/Maintain alarm- Obtain necessary fall risk management equipment: - Apply yellow socks and bracelet for high fall risk patients- Consider moving patient to room near nurses station  Outcome: Progressing  Goal: Maintain or return to baseline ADL function  Description: INTERVENTIONS:-  Assess patient's ability to carry out ADLs; assess patient's baseline for ADL function and identify physical deficits which impact ability to perform ADLs (bathing, care of mouth/teeth, toileting, grooming, dressing, etc.)- Assess/evaluate cause of self-care deficits - Assess range of motion- Assess patient's mobility; develop plan if impaired- Assess patient's need for assistive devices and provide as appropriate- Encourage maximum independence but intervene and supervise when necessary- Involve family in performance of ADLs- Assess for home care needs following discharge - Consider OT consult to assist with ADL evaluation and planning for discharge- Provide patient education as appropriate  Outcome: Progressing  Goal: Maintains/Returns to pre admission functional level  Description: INTERVENTIONS:- Perform AM-PAC 6 Click Basic Mobility/ Daily Activity assessment daily.- Set and communicate daily mobility goal to care team and patient/family/caregiver. - Collaborate with rehabilitation services on mobility goals if consulted- Perform Range of Motion 3 times a day.- Reposition patient every 2 hours.- Dangle patient 2 times a day- Stand patient 2 times a day- Ambulate patient 2 times a day- Out of bed to chair 2 times a day - Out of bed for meals 3 times a day- Out of bed for toileting- Record patient progress and toleration of activity level   Outcome: Progressing

## 2025-03-22 NOTE — PROGRESS NOTES
Progress Note - Hospitalist   Name: Jimmy Mello 64 y.o. male I MRN: [id number]  Unit/Bed#: -01 I Date of Admission: 3/20/2025   Date of Service: 3/22/2025 I Hospital Day: 2    Assessment & Plan  Sepsis due to multifocal pneumonia of right lung (HCC)  Patient presented with chief complaint of shortness of breath and cough.  Patient was on outpatient therapy for multifocal pneumonia with PO Augmentin/azithromycin.    CTA chest pe study (3/20):  1. No central pulmonary embolism. Evaluation of the segmental and subsegmental branches is limited secondary to incomplete contrast opacification.  2.  Extensive patchy multifocal solid and groundglass opacities in the right lung, concerning for multifocal pneumonia. Overall, this appears increased compared to 3/5/2025. A repeat chest CT in 4 to 6 weeks may be helpful to ensure resolution of these   findings.  3. Multiple enlarged paratracheal, subcarinal, and hilar lymph nodes, likely representing reactive lymphadenopathy. Indeterminate enlarged right lower paraesophageal lymph node, for which attention on follow-up imaging is recommended.  SIRS criteria: leukocytosis, tachypnea  Suspected SOI: multifocal pneumonia  LA : 1.0  Procalcitonin: 0.54  Patient received IV fluid hydration with Isolyte at 50 mL/ - now discontinued.  Patient was started on IV cefepime, vancomycin, and azithromycin.    Will switch to IV ceftriaxone 1 g, daily.    Will continue IV vancomycin.  MRSA culture - pending  Will discontinue IV azithromycin in the setting of negative strep/legionella.    BC collected -negative x 24 hours.  Will need to continue to follow culture.  Continue to monitor vital signs, per unit protocol.    Acute respiratory insufficiency  On admission, patient acutely requiring 2 L of supplemental oxygen due to symptoms of increased shortness of breath and slight tachypnea likely in the setting of multifocal pneumonia.    Patient initially needed supplemental oxygenation, but  now on room air.    Will complete ambulatory pulse oximetry.  Continue to wean oxygen, as tolerated.    Maintain oxygen saturation > 90%.   Spastic hemiparesis of right dominant side as late effect of cerebral infarction (HCC)  Patient has noted history of spastic hemiparesis of right side due to CVA.   Continue outpatient follow-up with neurology.    Will continue home regimen of baclofen and AEDs.   Per patient, he does not take baclofen 15 mg, nightly anymore only takes 10 mg at night.  Will adjust to home dosing.  Stage 3b chronic kidney disease (HCC)  Lab Results   Component Value Date    EGFR 50 03/22/2025    EGFR 43 03/21/2025    EGFR 34 03/20/2025    CREATININE 1.45 (H) 03/22/2025    CREATININE 1.63 (H) 03/21/2025    CREATININE 1.98 (H) 03/20/2025     Per chart review, baseline creatinine appears to be 1.5-2.0, but noted elevations recently.  On admission, patient with creatinine 1.98.   Cr improved to baseline.   Continue to monitor via daily BMP.  Continue to avoid and/or limit nephrotoxins.   Patient would like outpatient referral to nephrology.  Will need to order ambulatory referral on discharge.  Constipation  The patient expresses that he has not had a bowel movement since admission.  The patient denying any nausea, vomiting, or abdominal distention.   Will order colace 100 mg, twice daily.  Patient was encouraged to increase ambulation, as tolerated.  History of stroke  Continue outpatient follow-up with neurology  Continue home aspirin 81 mg, daily.    Primary hypertension  Continue home antihypertensive regimen.    Continue to monitor blood pressure, per unit protocol.      VTE Pharmacologic Prophylaxis: VTE Score: 6 High Risk (Score >/= 5) - Pharmacological DVT Prophylaxis Ordered: heparin. Sequential Compression Devices Ordered.    Mobility:   Basic Mobility Inpatient Raw Score: 15  JH-HLM Goal: 4: Move to chair/commode  JH-HLM Achieved: 4: Move to chair/commode  JH-HLM Goal achieved. Continue to  encourage appropriate mobility.    Patient Centered Rounds: I performed bedside rounds with nursing staff today.   Discussions with Specialists or Other Care Team Provider: PT/OT    Education and Discussions with Family / Patient: Updated  (wife) at bedside.    Current Length of Stay: 2 day(s)  Current Patient Status: Inpatient   Certification Statement: The patient will continue to require additional inpatient hospital stay due to pending PT/OT, pending cultures, IV antibiotics.  Discharge Plan: Anticipate discharge in 24-48 hrs to discharge location to be determined pending rehab evaluations.    Code Status: Level 1 - Full Code    Subjective   The patient was seen and examined at the bedside this morning.  The patient expresses that he is clinically feeling better.  However, patient explaining that he has not had a bowel movement.  The patient states that he was able to get off of the supplemental oxygenation yesterday.  Patient denying any increased shortness of breath symptoms.  The patient and family requesting ambulatory referral to outpatient nephrology, as noted elevation in renal function and originally mentioned by PCP to have referral.    Per nursing, no acute events overnight.    Objective :  Temp:  [97.3 °F (36.3 °C)] 97.3 °F (36.3 °C)  HR:  [58-64] 60  BP: (130-154)/(64-84) 154/84  SpO2:  [94 %-96 %] 95 %  O2 Device: None (Room air)  Nasal Cannula O2 Flow Rate (L/min):  [1 L/min] 1 L/min    Body mass index is 30.6 kg/m².     Input and Output Summary (last 24 hours):     Intake/Output Summary (Last 24 hours) at 3/22/2025 1251  Last data filed at 3/22/2025 0801  Gross per 24 hour   Intake 1360 ml   Output 980 ml   Net 380 ml       Physical Exam  Vitals and nursing note reviewed.   Constitutional:       General: He is awake. He is not in acute distress.  HENT:      Head: Normocephalic and atraumatic.   Eyes:      General: No scleral icterus.     Conjunctiva/sclera: Conjunctivae normal.    Cardiovascular:      Rate and Rhythm: Normal rate and regular rhythm.      Heart sounds: Normal heart sounds, S1 normal and S2 normal. No murmur heard.  Pulmonary:      Effort: Pulmonary effort is normal.      Breath sounds: Decreased breath sounds present. No wheezing, rhonchi or rales.   Abdominal:      General: There is no distension.      Palpations: Abdomen is soft.      Tenderness: There is no abdominal tenderness.   Musculoskeletal:      Right lower leg: No edema.      Left lower leg: No edema.   Skin:     Comments: On exam, no open lesions/wounds on exposed skin.   Neurological:      Mental Status: He is alert and oriented to person, place, and time. Mental status is at baseline.      Motor: Weakness (R sided) and atrophy (R sided) present.       Lines/Drains:        Lab Results: I have reviewed the following results:   Results from last 7 days   Lab Units 03/22/25  0453   WBC Thousand/uL 6.18   HEMOGLOBIN g/dL 9.9*   HEMATOCRIT % 30.7*   PLATELETS Thousands/uL 181   SEGS PCT % 73   LYMPHO PCT % 14   MONO PCT % 7   EOS PCT % 5     Results from last 7 days   Lab Units 03/22/25  0453 03/21/25  0443 03/20/25  1039   SODIUM mmol/L 142   < > 140   POTASSIUM mmol/L 4.1   < > 4.4   CHLORIDE mmol/L 108   < > 107   CO2 mmol/L 28   < > 26   BUN mg/dL 35*   < > 46*   CREATININE mg/dL 1.45*   < > 1.98*   ANION GAP mmol/L 6   < > 7   CALCIUM mg/dL 7.8*   < > 7.6*   ALBUMIN g/dL  --   --  3.5   TOTAL BILIRUBIN mg/dL  --   --  0.26   ALK PHOS U/L  --   --  91   ALT U/L  --   --  18   AST U/L  --   --  17   GLUCOSE RANDOM mg/dL 102   < > 125    < > = values in this interval not displayed.                 Results from last 7 days   Lab Units 03/21/25  0443 03/20/25  1718 03/20/25  1039   LACTIC ACID mmol/L  --  1.0  --    PROCALCITONIN ng/ml 0.54*  --  0.16       Recent Cultures (last 7 days):   Results from last 7 days   Lab Units 03/21/25  0047 03/20/25  1716   BLOOD CULTURE   --  No Growth at 24 hrs.  No Growth at 24  hrs.   LEGIONELLA URINARY ANTIGEN  Negative  --              Last 24 Hours Medication List:     Current Facility-Administered Medications:     acetaminophen (TYLENOL) tablet 650 mg, Q6H PRN    albuterol inhalation solution 2.5 mg, Q6H PRN    amLODIPine (NORVASC) tablet 2.5 mg, Daily    aspirin tablet 325 mg, Daily    atorvastatin (LIPITOR) tablet 80 mg, Daily With Dinner    baclofen tablet 10 mg, TID **AND** [DISCONTINUED] baclofen tablet 5 mg, HS    benzonatate (TESSALON PERLES) capsule 100 mg, TID PRN    carvedilol (COREG) tablet 25 mg, BID With Meals    cefepime (MAXIPIME) IVPB (premix in dextrose) 2,000 mg 50 mL, Q12H, Last Rate: 2,000 mg (03/22/25 0219)    docusate sodium (COLACE) capsule 100 mg, BID    DULoxetine (CYMBALTA) delayed release capsule 60 mg, Daily    ferrous sulfate tablet 325 mg, Daily With Breakfast    guaiFENesin (MUCINEX) 12 hr tablet 600 mg, Q12H KATIE    heparin (porcine) subcutaneous injection 5,000 Units, Q8H KATIE **AND** [COMPLETED] Platelet count, Once    hydrALAZINE (APRESOLINE) tablet 50 mg, Q12H    losartan (COZAAR) tablet 100 mg, Daily    OXcarbazepine (TRILEPTAL) tablet 600 mg, BID    pregabalin (LYRICA) capsule 100 mg, BID    pregabalin (LYRICA) capsule 50 mg, Daily    vancomycin (VANCOCIN) IVPB (premix in dextrose) 1,000 mg 200 mL, Daily PRN    Administrative Statements   Today, Patient Was Seen By: YURI Alvarez      **Please Note: This note may have been constructed using a voice recognition system.**

## 2025-03-22 NOTE — PLAN OF CARE
Problem: Potential for Falls  Goal: Patient will remain free of falls  Description: INTERVENTIONS:  - Educate patient/family on patient safety including physical limitations  - Instruct patient to call for assistance with activity   - Consult OT/PT to assist with strengthening/mobility   - Keep Call bell within reach  - Keep bed low and locked with side rails adjusted as appropriate  - Keep care items and personal belongings within reach  - Initiate and maintain comfort rounds  - Make Fall Risk Sign visible to staff  - Offer Toileting every 2 Hours, in advance of need  - Initiate/Maintain alarm  - Obtain necessary fall risk management equipment  - Apply yellow socks and bracelet for high fall risk patients  - Consider moving patient to room near nurses station  INTERVENTIONS:- Educate patient/family on patient safety including physical limitations- Instruct patient to call for assistance with activity - Consult OT/PT to assist with strengthening/mobility - Keep Call bell within reach- Keep bed low and locked with side rails adjusted as appropriate- Keep care items and personal belongings within reach- Initiate and maintain comfort rounds- Make Fall Risk Sign visible to staff- Offer Toileting every 2 Hours, in advance of need- Initiate/Maintain alarm- Obtain necessary fall risk management equipment: - Apply yellow socks and bracelet for high fall risk patients- Consider moving patient to room near nurses station  Outcome: Progressing     Problem: PAIN - ADULT  Goal: Verbalizes/displays adequate comfort level or baseline comfort level  Description: Interventions:- Encourage patient to monitor pain and request assistance- Assess pain using appropriate pain scale- Administer analgesics based on type and severity of pain and evaluate response- Implement non-pharmacological measures as appropriate and evaluate response- Consider cultural and social influences on pain and pain management- Notify physician/advanced  practitioner if interventions unsuccessful or patient reports new pain  Outcome: Progressing     Problem: INFECTION - ADULT  Goal: Absence or prevention of progression during hospitalization  Description: INTERVENTIONS:- Assess and monitor for signs and symptoms of infection- Monitor lab/diagnostic results- Monitor all insertion sites, i.e. indwelling lines, tubes, and drains- Monitor endotracheal if appropriate and nasal secretions for changes in amount and color- [place] appropriate cooling/warming therapies per order- Administer medications as ordered- Instruct and encourage patient and family to use good hand hygiene technique- Identify and instruct in appropriate isolation precautions for identified infection/condition  Outcome: Progressing  Goal: Absence of fever/infection during neutropenic period  Description: INTERVENTIONS:- Monitor WBC  Outcome: Progressing     Problem: SAFETY ADULT  Goal: Patient will remain free of falls  Description: INTERVENTIONS:  - Educate patient/family on patient safety including physical limitations  - Instruct patient to call for assistance with activity   - Consult OT/PT to assist with strengthening/mobility   - Keep Call bell within reach  - Keep bed low and locked with side rails adjusted as appropriate  - Keep care items and personal belongings within reach  - Initiate and maintain comfort rounds  - Make Fall Risk Sign visible to staff  - Offer Toileting every 2 Hours, in advance of need  - Initiate/Maintain alarm  - Obtain necessary fall risk management equipment  - Apply yellow socks and bracelet for high fall risk patients  - Consider moving patient to room near nurses station  INTERVENTIONS:- Educate patient/family on patient safety including physical limitations- Instruct patient to call for assistance with activity - Consult OT/PT to assist with strengthening/mobility - Keep Call bell within reach- Keep bed low and locked with side rails adjusted as appropriate- Keep  care items and personal belongings within reach- Initiate and maintain comfort rounds- Make Fall Risk Sign visible to staff- Offer Toileting every 2 Hours, in advance of need- Initiate/Maintain alarm- Obtain necessary fall risk management equipment: - Apply yellow socks and bracelet for high fall risk patients- Consider moving patient to room near nurses station  Outcome: Progressing  Goal: Maintain or return to baseline ADL function  Description: INTERVENTIONS:-  Assess patient's ability to carry out ADLs; assess patient's baseline for ADL function and identify physical deficits which impact ability to perform ADLs (bathing, care of mouth/teeth, toileting, grooming, dressing, etc.)- Assess/evaluate cause of self-care deficits - Assess range of motion- Assess patient's mobility; develop plan if impaired- Assess patient's need for assistive devices and provide as appropriate- Encourage maximum independence but intervene and supervise when necessary- Involve family in performance of ADLs- Assess for home care needs following discharge - Consider OT consult to assist with ADL evaluation and planning for discharge- Provide patient education as appropriate  Outcome: Progressing  Goal: Maintains/Returns to pre admission functional level  Description: INTERVENTIONS:- Perform AM-PAC 6 Click Basic Mobility/ Daily Activity assessment daily.- Set and communicate daily mobility goal to care team and patient/family/caregiver. - Collaborate with rehabilitation services on mobility goals if consulted- Perform Range of Motion 3 times a day.- Reposition patient every 2 hours.- Dangle patient 2 times a day- Stand patient 2 times a day- Ambulate patient 2 times a day- Out of bed to chair 2 times a day - Out of bed for meals 3 times a day- Out of bed for toileting- Record patient progress and toleration of activity level   Outcome: Progressing

## 2025-03-23 PROBLEM — R26.2 AMBULATORY DYSFUNCTION: Status: ACTIVE | Noted: 2025-03-23

## 2025-03-23 LAB
ANION GAP SERPL CALCULATED.3IONS-SCNC: 6 MMOL/L (ref 4–13)
BASOPHILS # BLD AUTO: 0.06 THOUSANDS/ÂΜL (ref 0–0.1)
BASOPHILS NFR BLD AUTO: 1 % (ref 0–1)
BUN SERPL-MCNC: 34 MG/DL (ref 5–25)
CALCIUM SERPL-MCNC: 8.2 MG/DL (ref 8.4–10.2)
CHLORIDE SERPL-SCNC: 108 MMOL/L (ref 96–108)
CO2 SERPL-SCNC: 29 MMOL/L (ref 21–32)
CREAT SERPL-MCNC: 1.52 MG/DL (ref 0.6–1.3)
EOSINOPHIL # BLD AUTO: 0.34 THOUSAND/ÂΜL (ref 0–0.61)
EOSINOPHIL NFR BLD AUTO: 6 % (ref 0–6)
ERYTHROCYTE [DISTWIDTH] IN BLOOD BY AUTOMATED COUNT: 13.4 % (ref 11.6–15.1)
GFR SERPL CREATININE-BSD FRML MDRD: 47 ML/MIN/1.73SQ M
GLUCOSE SERPL-MCNC: 101 MG/DL (ref 65–140)
HCT VFR BLD AUTO: 32.9 % (ref 36.5–49.3)
HGB BLD-MCNC: 10.3 G/DL (ref 12–17)
IMM GRANULOCYTES # BLD AUTO: 0.02 THOUSAND/UL (ref 0–0.2)
IMM GRANULOCYTES NFR BLD AUTO: 0 % (ref 0–2)
LYMPHOCYTES # BLD AUTO: 1.27 THOUSANDS/ÂΜL (ref 0.6–4.47)
LYMPHOCYTES NFR BLD AUTO: 22 % (ref 14–44)
MCH RBC QN AUTO: 27.8 PG (ref 26.8–34.3)
MCHC RBC AUTO-ENTMCNC: 31.3 G/DL (ref 31.4–37.4)
MCV RBC AUTO: 89 FL (ref 82–98)
MONOCYTES # BLD AUTO: 0.51 THOUSAND/ÂΜL (ref 0.17–1.22)
MONOCYTES NFR BLD AUTO: 9 % (ref 4–12)
NEUTROPHILS # BLD AUTO: 3.7 THOUSANDS/ÂΜL (ref 1.85–7.62)
NEUTS SEG NFR BLD AUTO: 62 % (ref 43–75)
NRBC BLD AUTO-RTO: 0 /100 WBCS
PLATELET # BLD AUTO: 205 THOUSANDS/UL (ref 149–390)
PMV BLD AUTO: 10.4 FL (ref 8.9–12.7)
POTASSIUM SERPL-SCNC: 4 MMOL/L (ref 3.5–5.3)
RBC # BLD AUTO: 3.7 MILLION/UL (ref 3.88–5.62)
SODIUM SERPL-SCNC: 143 MMOL/L (ref 135–147)
WBC # BLD AUTO: 5.9 THOUSAND/UL (ref 4.31–10.16)

## 2025-03-23 PROCEDURE — 99232 SBSQ HOSP IP/OBS MODERATE 35: CPT

## 2025-03-23 PROCEDURE — 85025 COMPLETE CBC W/AUTO DIFF WBC: CPT | Performed by: PHYSICIAN ASSISTANT

## 2025-03-23 PROCEDURE — 80048 BASIC METABOLIC PNL TOTAL CA: CPT | Performed by: PHYSICIAN ASSISTANT

## 2025-03-23 RX ADMIN — ASPIRIN 325 MG ORAL TABLET 325 MG: 325 PILL ORAL at 07:36

## 2025-03-23 RX ADMIN — OXCARBAZEPINE 600 MG: 300 TABLET, FILM COATED ORAL at 16:54

## 2025-03-23 RX ADMIN — GUAIFENESIN 600 MG: 600 TABLET ORAL at 16:54

## 2025-03-23 RX ADMIN — CEFTRIAXONE 1000 MG: 1 INJECTION, SOLUTION INTRAVENOUS at 15:05

## 2025-03-23 RX ADMIN — HYDRALAZINE HYDROCHLORIDE 50 MG: 25 TABLET ORAL at 21:23

## 2025-03-23 RX ADMIN — LOSARTAN POTASSIUM 100 MG: 50 TABLET, FILM COATED ORAL at 07:36

## 2025-03-23 RX ADMIN — DOCUSATE SODIUM 100 MG: 100 CAPSULE, LIQUID FILLED ORAL at 07:36

## 2025-03-23 RX ADMIN — PREGABALIN 100 MG: 100 CAPSULE ORAL at 07:36

## 2025-03-23 RX ADMIN — BACLOFEN 10 MG: 10 TABLET ORAL at 21:23

## 2025-03-23 RX ADMIN — HEPARIN SODIUM 5000 UNITS: 5000 INJECTION INTRAVENOUS; SUBCUTANEOUS at 15:05

## 2025-03-23 RX ADMIN — FERROUS SULFATE TAB 325 MG (65 MG ELEMENTAL FE) 325 MG: 325 (65 FE) TAB at 07:36

## 2025-03-23 RX ADMIN — CARVEDILOL 25 MG: 25 TABLET, FILM COATED ORAL at 16:54

## 2025-03-23 RX ADMIN — HEPARIN SODIUM 5000 UNITS: 5000 INJECTION INTRAVENOUS; SUBCUTANEOUS at 06:04

## 2025-03-23 RX ADMIN — ATORVASTATIN CALCIUM 80 MG: 40 TABLET, FILM COATED ORAL at 16:55

## 2025-03-23 RX ADMIN — PREGABALIN 100 MG: 100 CAPSULE ORAL at 16:55

## 2025-03-23 RX ADMIN — HYDRALAZINE HYDROCHLORIDE 50 MG: 25 TABLET ORAL at 10:10

## 2025-03-23 RX ADMIN — HEPARIN SODIUM 5000 UNITS: 5000 INJECTION INTRAVENOUS; SUBCUTANEOUS at 21:23

## 2025-03-23 RX ADMIN — PREGABALIN 50 MG: 100 CAPSULE ORAL at 16:54

## 2025-03-23 RX ADMIN — DOCUSATE SODIUM 100 MG: 100 CAPSULE, LIQUID FILLED ORAL at 16:54

## 2025-03-23 RX ADMIN — AMLODIPINE BESYLATE 2.5 MG: 2.5 TABLET ORAL at 07:36

## 2025-03-23 RX ADMIN — BACLOFEN 10 MG: 10 TABLET ORAL at 15:05

## 2025-03-23 RX ADMIN — OXCARBAZEPINE 600 MG: 300 TABLET, FILM COATED ORAL at 07:36

## 2025-03-23 RX ADMIN — GUAIFENESIN 600 MG: 600 TABLET ORAL at 06:04

## 2025-03-23 RX ADMIN — BACLOFEN 10 MG: 10 TABLET ORAL at 10:10

## 2025-03-23 RX ADMIN — DULOXETINE HYDROCHLORIDE 60 MG: 30 CAPSULE, DELAYED RELEASE ORAL at 07:36

## 2025-03-23 RX ADMIN — CARVEDILOL 25 MG: 25 TABLET, FILM COATED ORAL at 07:36

## 2025-03-23 NOTE — PROGRESS NOTES
Progress Note - Hospitalist   Name: Jimmy Mello 64 y.o. male I MRN: [id number]  Unit/Bed#: -01 I Date of Admission: 3/20/2025   Date of Service: 3/23/2025 I Hospital Day: 3    Assessment & Plan  Sepsis due to multifocal pneumonia of right lung (HCC)  Patient presented with chief complaint of shortness of breath and cough.  Patient was on outpatient therapy for multifocal pneumonia with PO Augmentin/azithromycin.    CTA chest pe study (3/20):  1. No central pulmonary embolism. Evaluation of the segmental and subsegmental branches is limited secondary to incomplete contrast opacification.  2.  Extensive patchy multifocal solid and groundglass opacities in the right lung, concerning for multifocal pneumonia. Overall, this appears increased compared to 3/5/2025. A repeat chest CT in 4 to 6 weeks may be helpful to ensure resolution of these   findings.  3. Multiple enlarged paratracheal, subcarinal, and hilar lymph nodes, likely representing reactive lymphadenopathy. Indeterminate enlarged right lower paraesophageal lymph node, for which attention on follow-up imaging is recommended.  SIRS criteria: leukocytosis, tachypnea  Suspected SOI: multifocal pneumonia  LA : 1.0  Procalcitonin: 0.54  Patient received IV fluid hydration with Isolyte at 50 mL/ - now discontinued.  Patient was started on IV cefepime, vancomycin, and azithromycin.    Continue IV ceftriaxone 1 g, daily.    Will switch to cefpodoxime on discharge.  Will discontinue IV vancomycin.  MRSA culture - negative  Will discontinue IV azithromycin in the setting of negative strep/legionella.    BC collected - negative x 48 hours.  Will need to continue to follow culture.  Continue to monitor vital signs, per unit protocol.    Acute respiratory insufficiency  On admission, patient acutely requiring 2 L of supplemental oxygen due to symptoms of increased shortness of breath and slight tachypnea likely in the setting of multifocal pneumonia.    Patient  initially needed supplemental oxygenation, but now on room air.    Maintain oxygen saturation > 90%.   Spastic hemiparesis of right dominant side as late effect of cerebral infarction (HCC)  Patient has noted history of spastic hemiparesis of right side due to CVA.   Continue outpatient follow-up with neurology.    Will continue home regimen of baclofen and AEDs.   Per patient, he does not take baclofen 15 mg, nightly anymore only takes 10 mg at night.  Will continue adjusted home dosing.  Stage 3b chronic kidney disease (HCC)  Lab Results   Component Value Date    EGFR 47 03/23/2025    EGFR 50 03/22/2025    EGFR 43 03/21/2025    CREATININE 1.52 (H) 03/23/2025    CREATININE 1.45 (H) 03/22/2025    CREATININE 1.63 (H) 03/21/2025     Per chart review, baseline creatinine appears to be 1.5-2.0, but noted elevations recently.  On admission, patient with creatinine 1.98.   Cr improved to baseline.   Continue to monitor via daily BMP.  Continue to avoid and/or limit nephrotoxins.   Patient would like outpatient referral to nephrology.  Will need to order ambulatory referral on discharge.  Ambulatory dysfunction  Patient presented with multiple falls in the outpatient setting.  Per wife, patient has had about 2-3 falls in the past week prior to admission. Patient with significant history of right spastic hemiplegia s/p CVA.  Continue fall precautions.  Will consult PT/OT for further recommendations.    Constipation  The patient expresses that he has not had a bowel movement since admission.  The patient denying any nausea, vomiting, or abdominal distention.   Will order colace 100 mg, twice daily.  Patient was encouraged to increase ambulation, as tolerated.  History of stroke  Continue outpatient follow-up with neurology  Continue home aspirin 81 mg, daily.    Primary hypertension  Continue home antihypertensive regimen.    Continue to monitor blood pressure, per unit protocol.      VTE Pharmacologic Prophylaxis: VTE  Score: 6 High Risk (Score >/= 5) - Pharmacological DVT Prophylaxis Ordered: heparin. Sequential Compression Devices Ordered.    Mobility:   Basic Mobility Inpatient Raw Score: 15  JH-HLM Goal: 4: Move to chair/commode  JH-HLM Achieved: 4: Move to chair/commode  JH-HLM Goal achieved. Continue to encourage appropriate mobility.    Patient Centered Rounds: I performed bedside rounds with nursing staff today.   Discussions with Specialists or Other Care Team Provider: None     Education and Discussions with Family / Patient: Updated  (wife) at bedside.    Current Length of Stay: 3 day(s)  Current Patient Status: Inpatient   Certification Statement: The patient will continue to require additional inpatient hospital stay due to pending PT/OT consultation.  Discharge Plan: Anticipate discharge tomorrow to discharge location to be determined pending rehab evaluations.    Code Status: Level 1 - Full Code    Subjective   The patient was seen and examined at the bedside this morning.  The patient expressed that he is feeling much better this morning.  The patient expressed that he was able to ambulate out of the bed, but wife is expressing that patient is having multiple falls in the outpatient setting due to right hemiplegia s/p prior CVA.  The patient will need further evaluation by PT/OT for safe discharge.  The only other complaint is the patient expressed that he would like washed up this morning.  Patient denying fever, chills, nausea, vomiting, diarrhea.    Per nursing, no acute events overnight.      Objective :  Temp:  [98.2 °F (36.8 °C)] 98.2 °F (36.8 °C)  HR:  [67] 67  BP: (130-186)/(57-81) 151/75  SpO2:  [95 %] 95 %  O2 Device: None (Room air)    Body mass index is 30.6 kg/m².     Input and Output Summary (last 24 hours):     Intake/Output Summary (Last 24 hours) at 3/23/2025 1358  Last data filed at 3/23/2025 0901  Gross per 24 hour   Intake 120 ml   Output 200 ml   Net -80 ml       Physical  Exam  Vitals and nursing note reviewed.   Constitutional:       General: He is awake. He is not in acute distress.  HENT:      Head: Normocephalic and atraumatic.   Eyes:      General: No scleral icterus.     Conjunctiva/sclera: Conjunctivae normal.   Cardiovascular:      Rate and Rhythm: Normal rate and regular rhythm.      Heart sounds: Normal heart sounds, S1 normal and S2 normal. No murmur heard.  Pulmonary:      Effort: Pulmonary effort is normal.      Breath sounds: No decreased breath sounds, wheezing, rhonchi or rales.   Abdominal:      General: There is no distension.      Palpations: Abdomen is soft.      Tenderness: There is no abdominal tenderness.   Musculoskeletal:      Right lower leg: No edema.      Left lower leg: No edema.   Skin:     Comments: On exam, no open lesions/wounds on exposed skin.   Neurological:      Mental Status: He is alert and oriented to person, place, and time. Mental status is at baseline.      Motor: Weakness (R sided) and atrophy (R sided) present.   Psychiatric:         Attention and Perception: Attention normal.         Mood and Affect: Mood normal.         Speech: Speech normal.       Lines/Drains:              Lab Results: I have reviewed the following results:   Results from last 7 days   Lab Units 03/23/25  0501   WBC Thousand/uL 5.90   HEMOGLOBIN g/dL 10.3*   HEMATOCRIT % 32.9*   PLATELETS Thousands/uL 205   SEGS PCT % 62   LYMPHO PCT % 22   MONO PCT % 9   EOS PCT % 6     Results from last 7 days   Lab Units 03/23/25  0501 03/21/25  0443 03/20/25  1039   SODIUM mmol/L 143   < > 140   POTASSIUM mmol/L 4.0   < > 4.4   CHLORIDE mmol/L 108   < > 107   CO2 mmol/L 29   < > 26   BUN mg/dL 34*   < > 46*   CREATININE mg/dL 1.52*   < > 1.98*   ANION GAP mmol/L 6   < > 7   CALCIUM mg/dL 8.2*   < > 7.6*   ALBUMIN g/dL  --   --  3.5   TOTAL BILIRUBIN mg/dL  --   --  0.26   ALK PHOS U/L  --   --  91   ALT U/L  --   --  18   AST U/L  --   --  17   GLUCOSE RANDOM mg/dL 101   < > 125     < > = values in this interval not displayed.                 Results from last 7 days   Lab Units 03/21/25  0443 03/20/25  1718 03/20/25  1039   LACTIC ACID mmol/L  --  1.0  --    PROCALCITONIN ng/ml 0.54*  --  0.16       Recent Cultures (last 7 days):   Results from last 7 days   Lab Units 03/21/25  0047 03/20/25  1716   BLOOD CULTURE   --  No Growth at 48 hrs.  No Growth at 48 hrs.   LEGIONELLA URINARY ANTIGEN  Negative  --              Last 24 Hours Medication List:     Current Facility-Administered Medications:     acetaminophen (TYLENOL) tablet 650 mg, Q6H PRN    albuterol inhalation solution 2.5 mg, Q6H PRN    amLODIPine (NORVASC) tablet 2.5 mg, Daily    aspirin tablet 325 mg, Daily    atorvastatin (LIPITOR) tablet 80 mg, Daily With Dinner    baclofen tablet 10 mg, TID **AND** [DISCONTINUED] baclofen tablet 5 mg, HS    benzonatate (TESSALON PERLES) capsule 100 mg, TID PRN    carvedilol (COREG) tablet 25 mg, BID With Meals    cefTRIAXone (ROCEPHIN) IVPB (premix in dextrose) 1,000 mg 50 mL, Q24H, Last Rate: 1,000 mg (03/22/25 1345)    docusate sodium (COLACE) capsule 100 mg, BID    DULoxetine (CYMBALTA) delayed release capsule 60 mg, Daily    ferrous sulfate tablet 325 mg, Daily With Breakfast    guaiFENesin (MUCINEX) 12 hr tablet 600 mg, Q12H KATIE    heparin (porcine) subcutaneous injection 5,000 Units, Q8H KATIE **AND** [COMPLETED] Platelet count, Once    hydrALAZINE (APRESOLINE) tablet 50 mg, Q12H    losartan (COZAAR) tablet 100 mg, Daily    OXcarbazepine (TRILEPTAL) tablet 600 mg, BID    pregabalin (LYRICA) capsule 100 mg, BID    pregabalin (LYRICA) capsule 50 mg, Daily    Administrative Statements   Today, Patient Was Seen By: YURI Alvarez      **Please Note: This note may have been constructed using a voice recognition system.**

## 2025-03-23 NOTE — PLAN OF CARE
Problem: Potential for Falls  Goal: Patient will remain free of falls  Description: INTERVENTIONS:  - Educate patient/family on patient safety including physical limitations  - Instruct patient to call for assistance with activity   - Consult OT/PT to assist with strengthening/mobility   - Keep Call bell within reach  - Keep bed low and locked with side rails adjusted as appropriate  - Keep care items and personal belongings within reach  - Initiate and maintain comfort rounds  - Make Fall Risk Sign visible to staff  - Offer Toileting every 2 Hours, in advance of need  - Initiate/Maintain alarm  - Obtain necessary fall risk management equipment  - Apply yellow socks and bracelet for high fall risk patients  - Consider moving patient to room near nurses station  INTERVENTIONS:- Educate patient/family on patient safety including physical limitations- Instruct patient to call for assistance with activity - Consult OT/PT to assist with strengthening/mobility - Keep Call bell within reach- Keep bed low and locked with side rails adjusted as appropriate- Keep care items and personal belongings within reach- Initiate and maintain comfort rounds- Make Fall Risk Sign visible to staff- Offer Toileting every 2 Hours, in advance of need- Initiate/Maintain alarm- Obtain necessary fall risk management equipment: - Apply yellow socks and bracelet for high fall risk patients- Consider moving patient to room near nurses station  Outcome: Progressing     Problem: PAIN - ADULT  Goal: Verbalizes/displays adequate comfort level or baseline comfort level  Description: Interventions:- Encourage patient to monitor pain and request assistance- Assess pain using appropriate pain scale- Administer analgesics based on type and severity of pain and evaluate response- Implement non-pharmacological measures as appropriate and evaluate response- Consider cultural and social influences on pain and pain management- Notify physician/advanced  practitioner if interventions unsuccessful or patient reports new pain  Outcome: Progressing     Problem: INFECTION - ADULT  Goal: Absence or prevention of progression during hospitalization  Description: INTERVENTIONS:- Assess and monitor for signs and symptoms of infection- Monitor lab/diagnostic results- Monitor all insertion sites, i.e. indwelling lines, tubes, and drains- Monitor endotracheal if appropriate and nasal secretions for changes in amount and color- [place] appropriate cooling/warming therapies per order- Administer medications as ordered- Instruct and encourage patient and family to use good hand hygiene technique- Identify and instruct in appropriate isolation precautions for identified infection/condition  Outcome: Progressing  Goal: Absence of fever/infection during neutropenic period  Description: INTERVENTIONS:- Monitor WBC  Outcome: Progressing     Problem: SAFETY ADULT  Goal: Patient will remain free of falls  Description: INTERVENTIONS:  - Educate patient/family on patient safety including physical limitations  - Instruct patient to call for assistance with activity   - Consult OT/PT to assist with strengthening/mobility   - Keep Call bell within reach  - Keep bed low and locked with side rails adjusted as appropriate  - Keep care items and personal belongings within reach  - Initiate and maintain comfort rounds  - Make Fall Risk Sign visible to staff  - Offer Toileting every 2 Hours, in advance of need  - Initiate/Maintain alarm  - Obtain necessary fall risk management equipment  - Apply yellow socks and bracelet for high fall risk patients  - Consider moving patient to room near nurses station  INTERVENTIONS:- Educate patient/family on patient safety including physical limitations- Instruct patient to call for assistance with activity - Consult OT/PT to assist with strengthening/mobility - Keep Call bell within reach- Keep bed low and locked with side rails adjusted as appropriate- Keep  care items and personal belongings within reach- Initiate and maintain comfort rounds- Make Fall Risk Sign visible to staff- Offer Toileting every 2 Hours, in advance of need- Initiate/Maintain alarm- Obtain necessary fall risk management equipment: - Apply yellow socks and bracelet for high fall risk patients- Consider moving patient to room near nurses station  Outcome: Progressing  Goal: Maintain or return to baseline ADL function  Description: INTERVENTIONS:-  Assess patient's ability to carry out ADLs; assess patient's baseline for ADL function and identify physical deficits which impact ability to perform ADLs (bathing, care of mouth/teeth, toileting, grooming, dressing, etc.)- Assess/evaluate cause of self-care deficits - Assess range of motion- Assess patient's mobility; develop plan if impaired- Assess patient's need for assistive devices and provide as appropriate- Encourage maximum independence but intervene and supervise when necessary- Involve family in performance of ADLs- Assess for home care needs following discharge - Consider OT consult to assist with ADL evaluation and planning for discharge- Provide patient education as appropriate  Outcome: Progressing  Goal: Maintains/Returns to pre admission functional level  Description: INTERVENTIONS:- Perform AM-PAC 6 Click Basic Mobility/ Daily Activity assessment daily.- Set and communicate daily mobility goal to care team and patient/family/caregiver. - Collaborate with rehabilitation services on mobility goals if consulted- Perform Range of Motion 3 times a day.- Reposition patient every 2 hours.- Dangle patient 2 times a day- Stand patient 2 times a day- Ambulate patient 2 times a day- Out of bed to chair 2 times a day - Out of bed for meals 3 times a day- Out of bed for toileting- Record patient progress and toleration of activity level   Outcome: Progressing

## 2025-03-23 NOTE — ASSESSMENT & PLAN NOTE
Lab Results   Component Value Date    EGFR 47 03/23/2025    EGFR 50 03/22/2025    EGFR 43 03/21/2025    CREATININE 1.52 (H) 03/23/2025    CREATININE 1.45 (H) 03/22/2025    CREATININE 1.63 (H) 03/21/2025     Per chart review, baseline creatinine appears to be 1.5-2.0, but noted elevations recently.  On admission, patient with creatinine 1.98.   Cr improved to baseline.   Continue to monitor via daily BMP.  Continue to avoid and/or limit nephrotoxins.   Patient would like outpatient referral to nephrology.  Will need to order ambulatory referral on discharge.

## 2025-03-23 NOTE — ASSESSMENT & PLAN NOTE
Patient presented with chief complaint of shortness of breath and cough.  Patient was on outpatient therapy for multifocal pneumonia with PO Augmentin/azithromycin.    CTA chest pe study (3/20):  1. No central pulmonary embolism. Evaluation of the segmental and subsegmental branches is limited secondary to incomplete contrast opacification.  2.  Extensive patchy multifocal solid and groundglass opacities in the right lung, concerning for multifocal pneumonia. Overall, this appears increased compared to 3/5/2025. A repeat chest CT in 4 to 6 weeks may be helpful to ensure resolution of these   findings.  3. Multiple enlarged paratracheal, subcarinal, and hilar lymph nodes, likely representing reactive lymphadenopathy. Indeterminate enlarged right lower paraesophageal lymph node, for which attention on follow-up imaging is recommended.  SIRS criteria: leukocytosis, tachypnea  Suspected SOI: multifocal pneumonia  LA : 1.0  Procalcitonin: 0.54  Patient received IV fluid hydration with Isolyte at 50 mL/ - now discontinued.  Patient was started on IV cefepime, vancomycin, and azithromycin.    Continue IV ceftriaxone 1 g, daily.    Will switch to cefpodoxime on discharge.  Will discontinue IV vancomycin.  MRSA culture - negative  Will discontinue IV azithromycin in the setting of negative strep/legionella.    BC collected - negative x 48 hours.  Will need to continue to follow culture.  Continue to monitor vital signs, per unit protocol.

## 2025-03-23 NOTE — ASSESSMENT & PLAN NOTE
On admission, patient acutely requiring 2 L of supplemental oxygen due to symptoms of increased shortness of breath and slight tachypnea likely in the setting of multifocal pneumonia.    Patient initially needed supplemental oxygenation, but now on room air.    Maintain oxygen saturation > 90%.

## 2025-03-23 NOTE — ASSESSMENT & PLAN NOTE
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   Continue outpatient follow-up with neurology.    Will continue home regimen of baclofen and AEDs.   Per patient, he does not take baclofen 15 mg, nightly anymore only takes 10 mg at night.  Will continue adjusted home dosing.

## 2025-03-23 NOTE — ASSESSMENT & PLAN NOTE
Patient presented with multiple falls in the outpatient setting.  Per wife, patient has had about 2-3 falls in the past week prior to admission. Patient with significant history of right spastic hemiplegia s/p CVA.  Continue fall precautions.  Will consult PT/OT for further recommendations.

## 2025-03-24 ENCOUNTER — TRANSITIONAL CARE MANAGEMENT (OUTPATIENT)
Dept: FAMILY MEDICINE CLINIC | Facility: CLINIC | Age: 65
End: 2025-03-24

## 2025-03-24 VITALS
BODY MASS INDEX: 30.69 KG/M2 | DIASTOLIC BLOOD PRESSURE: 80 MMHG | HEART RATE: 65 BPM | TEMPERATURE: 97.7 F | OXYGEN SATURATION: 94 % | RESPIRATION RATE: 18 BRPM | HEIGHT: 69 IN | SYSTOLIC BLOOD PRESSURE: 155 MMHG | WEIGHT: 207.23 LBS

## 2025-03-24 LAB
ANION GAP SERPL CALCULATED.3IONS-SCNC: 6 MMOL/L (ref 4–13)
BUN SERPL-MCNC: 29 MG/DL (ref 5–25)
CALCIUM SERPL-MCNC: 8.5 MG/DL (ref 8.4–10.2)
CHLORIDE SERPL-SCNC: 108 MMOL/L (ref 96–108)
CO2 SERPL-SCNC: 27 MMOL/L (ref 21–32)
CREAT SERPL-MCNC: 1.29 MG/DL (ref 0.6–1.3)
ERYTHROCYTE [DISTWIDTH] IN BLOOD BY AUTOMATED COUNT: 13.4 % (ref 11.6–15.1)
GFR SERPL CREATININE-BSD FRML MDRD: 57 ML/MIN/1.73SQ M
GLUCOSE SERPL-MCNC: 90 MG/DL (ref 65–140)
HCT VFR BLD AUTO: 33.1 % (ref 36.5–49.3)
HGB BLD-MCNC: 10.7 G/DL (ref 12–17)
MCH RBC QN AUTO: 28.3 PG (ref 26.8–34.3)
MCHC RBC AUTO-ENTMCNC: 32.3 G/DL (ref 31.4–37.4)
MCV RBC AUTO: 88 FL (ref 82–98)
PLATELET # BLD AUTO: 224 THOUSANDS/UL (ref 149–390)
PMV BLD AUTO: 10.2 FL (ref 8.9–12.7)
POTASSIUM SERPL-SCNC: 4 MMOL/L (ref 3.5–5.3)
RBC # BLD AUTO: 3.78 MILLION/UL (ref 3.88–5.62)
SODIUM SERPL-SCNC: 141 MMOL/L (ref 135–147)
WBC # BLD AUTO: 6.66 THOUSAND/UL (ref 4.31–10.16)

## 2025-03-24 PROCEDURE — 97167 OT EVAL HIGH COMPLEX 60 MIN: CPT

## 2025-03-24 PROCEDURE — 97116 GAIT TRAINING THERAPY: CPT

## 2025-03-24 PROCEDURE — 97530 THERAPEUTIC ACTIVITIES: CPT

## 2025-03-24 PROCEDURE — 99239 HOSP IP/OBS DSCHRG MGMT >30: CPT

## 2025-03-24 PROCEDURE — 97163 PT EVAL HIGH COMPLEX 45 MIN: CPT

## 2025-03-24 PROCEDURE — 80048 BASIC METABOLIC PNL TOTAL CA: CPT

## 2025-03-24 PROCEDURE — 85027 COMPLETE CBC AUTOMATED: CPT

## 2025-03-24 RX ORDER — DOCUSATE SODIUM 100 MG/1
100 CAPSULE, LIQUID FILLED ORAL DAILY
Qty: 7 CAPSULE | Refills: 0 | Status: SHIPPED | OUTPATIENT
Start: 2025-03-24 | End: 2025-03-31

## 2025-03-24 RX ORDER — CEFPODOXIME PROXETIL 200 MG/1
200 TABLET, FILM COATED ORAL 2 TIMES DAILY
Qty: 6 TABLET | Refills: 0 | Status: SHIPPED | OUTPATIENT
Start: 2025-03-24 | End: 2025-03-27

## 2025-03-24 RX ORDER — GUAIFENESIN 600 MG/1
600 TABLET, EXTENDED RELEASE ORAL EVERY 12 HOURS SCHEDULED
Qty: 14 TABLET | Refills: 0 | Status: SHIPPED | OUTPATIENT
Start: 2025-03-24 | End: 2025-03-26

## 2025-03-24 RX ADMIN — FERROUS SULFATE TAB 325 MG (65 MG ELEMENTAL FE) 325 MG: 325 (65 FE) TAB at 07:50

## 2025-03-24 RX ADMIN — HYDRALAZINE HYDROCHLORIDE 50 MG: 25 TABLET ORAL at 11:06

## 2025-03-24 RX ADMIN — DOCUSATE SODIUM 100 MG: 100 CAPSULE, LIQUID FILLED ORAL at 07:50

## 2025-03-24 RX ADMIN — LOSARTAN POTASSIUM 100 MG: 50 TABLET, FILM COATED ORAL at 07:50

## 2025-03-24 RX ADMIN — OXCARBAZEPINE 600 MG: 300 TABLET, FILM COATED ORAL at 07:49

## 2025-03-24 RX ADMIN — CARVEDILOL 25 MG: 25 TABLET, FILM COATED ORAL at 07:50

## 2025-03-24 RX ADMIN — HEPARIN SODIUM 5000 UNITS: 5000 INJECTION INTRAVENOUS; SUBCUTANEOUS at 04:58

## 2025-03-24 RX ADMIN — GUAIFENESIN 600 MG: 600 TABLET ORAL at 04:58

## 2025-03-24 RX ADMIN — AMLODIPINE BESYLATE 2.5 MG: 2.5 TABLET ORAL at 07:50

## 2025-03-24 RX ADMIN — ASPIRIN 325 MG ORAL TABLET 325 MG: 325 PILL ORAL at 07:50

## 2025-03-24 RX ADMIN — BACLOFEN 10 MG: 10 TABLET ORAL at 07:50

## 2025-03-24 RX ADMIN — DULOXETINE HYDROCHLORIDE 60 MG: 30 CAPSULE, DELAYED RELEASE ORAL at 07:50

## 2025-03-24 RX ADMIN — PREGABALIN 100 MG: 100 CAPSULE ORAL at 07:50

## 2025-03-24 NOTE — DISCHARGE INSTR - AVS FIRST PAGE
Follow-Up:   Please follow-up with PCP in 1 week from discharge.   Please make appointment for outpatient nephrology visit.  Please follow-up with outpatient PT/OT services.    Medication Changes:  Please start taking cefpodoxime 200 mg,  twice daily x 3 days.  Please start taking Mucinex 600 mg, every 12 hours times x 7 days  Please start taking Colace 100 mg, daily, if needed for stool softening.    Repeat Imaging:   Please complete repeat imaging with CT chest in 4 to 6 weeks.  This test may be ordered by your PCP, would recommend CT chest and need for IV contrast to be determined by PCP.    Management:   During your hospital stay, you had some episodes of high blood pressure, would recommend logging BP, daily on outpatient basis and taking values to PCP on next visit.       It was a pleasure caring for you in the hospital!    Please continue to stay well,  YURI Santos  [facility] Internal Medicine

## 2025-03-24 NOTE — ASSESSMENT & PLAN NOTE
The patient expresses that he has not had a bowel movement since admission.  The patient denying any nausea, vomiting, or abdominal distention.   Will order colace 100 mg, twice daily on discharge.  Patient was encouraged to increase ambulation, as tolerated.

## 2025-03-24 NOTE — ASSESSMENT & PLAN NOTE
Patient presented with multiple falls in the outpatient setting.  Per wife, patient has had about 2-3 falls in the past week prior to admission. Patient with significant history of right spastic hemiplegia s/p CVA.  Continue fall precautions.  Appreciate PT/OT consultation and recommendations.   Patient had orders placed for ambulatory referral to outpatient PT/OT services.

## 2025-03-24 NOTE — OCCUPATIONAL THERAPY NOTE
Occupational Therapy Evaluation & Tx     Patient Name: Jimmy Mello  Today's Date: 3/24/2025  Problem List  Principal Problem:    Sepsis due to multifocal pneumonia of right lung (HCC)  Active Problems:    History of stroke    Primary hypertension    Stage 3b chronic kidney disease (HCC)    Spastic hemiparesis of right dominant side as late effect of cerebral infarction (HCC)    Acute respiratory insufficiency    Constipation    Ambulatory dysfunction    Past Medical History  Past Medical History:   Diagnosis Date    Adjustment disorder with depressed mood 03/01/2023    BRAULIO (acute kidney injury) (HCC)     B12 deficiency     Celiac artery stenosis (HCC)     Cerebellar infarct (HCC) 02/25/2023    CKD (chronic kidney disease) stage 2, GFR 60-89 ml/min     Essential hypertension     Impaired fasting glucose     Iron deficiency anemia     Neurogenic bowel     Stenosis of left vertebral artery     Stroke (HCC)     Vertebral artery dissection (HCC)      Past Surgical History  Past Surgical History:   Procedure Laterality Date    ARTERY SURGERY      vertebral artery stent/revascularization    IR STROKE ALERT  2/26/2023 03/24/25 1017   OT Last Visit   OT Visit Date 03/24/25   Note Type   Note type Evaluation   Pain Assessment   Pain Assessment Tool 0-10   Pain Score No Pain   Restrictions/Precautions   Weight Bearing Precautions Per Order No   Other Precautions Bed Alarm;Chair Alarm;Fall Risk   Home Living   Type of Home House   Home Layout Two level;Performs ADLs on one level;Able to live on main level with bedroom/bathroom;Ramped entrance;1/2 bath on main level  (Sleeps downstairs, showers upstairs)   Bathroom Shower/Tub Walk-in shower   Bathroom Toilet Standard   Bathroom Equipment Shower chair;Grab bars in shower   Home Equipment Walker;Cane;Wheelchair-manual;Quad cane   Additional Comments Typically uses SPC for mobility. States he has an AFO for R foot drop, though does not wear often. Most of DME is in  "storage   Prior Function   Level of [place] Independent with ADLs;Independent with functional mobility;Independent with IADLS   Lives With Spouse   Receives Help From Family   IADLs Independent with driving;Independent with medication management   Falls in the last 6 months 1 to 4  (2 falls in the last month - both in community.)   General   Additional Pertinent History H/o CVA 2 years ago with residual R sided weakness/spasticity   Family/Caregiver Present Yes   Additional General Comments Wife present   Subjective   Subjective \"I'm pretty spastic right now\"   ADL   Eating Assistance 6  Modified independent   Eating Deficit Increased time to complete   Grooming Assistance 6  Modified Independent   Grooming Deficit Increased time to complete   UB Bathing Assistance 5  Supervision/Setup   LB Bathing Assistance 5  Supervision/Setup   UB Dressing Assistance 5  Supervision/Setup   LB Dressing Assistance 5  Supervision/Setup   Toileting Assistance  5  Supervision/Setup   Bed Mobility   Supine to Sit 5  Supervision   Additional items HOB elevated   Transfers   Sit to Stand 5  Supervision   Additional items Armrests;Increased time required   Stand to Sit 5  Supervision   Additional items Armrests   Functional Mobility   Functional Mobility 5  Supervision   Additional Comments Greater than functional household distance through unit halls  (See tx session for additional mobility trial)   Additional items Rolling walker   Balance   Static Sitting Good   Dynamic Sitting Fair +   Static Standing Fair +   Dynamic Standing Fair   Ambulatory Fair   Activity Tolerance   Activity Tolerance Patient tolerated treatment well   Medical Staff Made Aware SPT Owen, PT Jeanie, NP Paulo   Nurse Made Aware MOISÉS Sethi   RUE Assessment   RUE Assessment X   RUE Overall AROM   R Shoulder Flexion WFL   R Shoulder Extension WFL   R Elbow Flexion WFL   R Elbow Extension WFL   R Mass Grasp WFL - 4/5 MMT   RUE Strength   RUE Overall Strength Within " "Functional Limits - able to perform ADL tasks with strength   RUE Tone   RUE Tone Other (Comment)  ((+) spasticity - pt reports getting botox injections, impaired GMC/FMC)   LUE Assessment   LUE Assessment WNL   Hand Function   Gross Motor Coordination Impaired  (RUE)   Fine Motor Coordination Impaired  (RUE)   Sensation   Additional Comments Pt reports sensation overall intact - reports R side is \"cold\" & L side is \"hot\"   Vision-Basic Assessment   Current Vision Wears glasses all the time   Cognition   Overall Cognitive Status WFL   Arousal/Participation Alert   Attention Within functional limits   Orientation Level Oriented X4   Memory Within functional limits   Following Commands Follows all commands and directions without difficulty   Assessment   Limitation Decreased ADL status;Decreased self-care trans;Decreased high-level ADLs;Decreased fine motor control   Prognosis Good   Assessment Pt is a 64 y.o. male seen for OT evaluation at [facility], admitted 3/20/2025 w/ Sepsis (HCC) & PNA. OT completed extensive review of pt's medical and social history. Comorbidities affecting pt's functional performance at time of assessment include: h/o CVA with R sided deficits/spasticity, HTN, acute respiratory insufficiency, ambulatory dysfunction, central pain syndrome. Personal factors affecting pt at time of IE include: difficulty performing ADLS and difficulty performing IADLS . Prior to admission, pt was living with his wife in a 2SH with a ramped entrance.  Pt was I w/  ADLS and w/ IADLS, (+) drove, & required use of cane PTA. Upon evaluation: Pt requires S for bed mobility, S for functional transfers, S for functional mobility, S for UB ADLs and S for LB ADLS 2* the following deficits impacting occupational performance: weakness, decreased strength, decreased balance, impaired GMC, impaired FMC, and abnormal tone. Full objective findings from OT assessment regarding body systems outlined above. Pt to " benefit from continued skilled OT tx while in the hospital to address deficits as defined above and maximize level of functional independence w/ ADL's and functional mobility. Occupational Performance areas to address include: bathing/shower, toilet hygiene, dressing, functional mobility, community mobility, and clothing management. Based on findings, pt is of high complexity. The patient's raw score on the -PAC Daily Activity inpatient short form is 20, standardized score is 42.03, greater than 39.4. Patients at this level are likely to benefit from DC to home. However, please refer to therapist recommendation for discharge planning given other factors that may influence destination. At this time, OT recommendations at time of discharge are DC with Level III - Low Rehab Resource Intensity.   Goals   Patient Goals Pt wants to go home   Plan   Treatment Interventions ADL retraining;Functional transfer training;UE strengthening/ROM;Patient/family training;Equipment evaluation/education;Compensatory technique education;Neuromuscular reeducation;Fine motor coordination activities   Goal Expiration Date 04/03/25   OT Treatment Day 0   OT Frequency 1-2x/wk   Discharge Recommendation   Rehab Resource Intensity Level, OT III (Minimum Resource Intensity)   AM-PAC Daily Activity Inpatient   Lower Body Dressing 3   Bathing 3   Toileting 3   Upper Body Dressing 3   Grooming 4   Eating 4   Daily Activity Raw Score 20   Daily Activity Standardized Score (Calc for Raw Score >=11) 42.03   AM-[facility] Applied Cognition Inpatient   Following a Speech/Presentation 4   Understanding Ordinary Conversation 4   Taking Medications 4   Remembering Where Things Are Placed or Put Away 4   Remembering List of 4-5 Errands 4   Taking Care of Complicated Tasks 4   Applied Cognition Raw Score 24   Applied Cognition Standardized Score 62.21   Additional Treatment Session   Start Time 1008   End Time 1017   Treatment Assessment Pt agreeable to  additional functional mobility trial with use of SPC. Pt performed sit > stand with S. Pt performed functional mobility with SPC & overall Min Ax1, varying between CGA & Min A. Pt with mutliple anterior LOB that he is able to self correct. Pt performed stand > sit with CGAx1. Recommended to pt he utilize RW for mobility. Pt & wife report undestanding & agreeable. Pt left OOB to recliner with all needs in reach, (+) chair alarm, RN aware.   End of Consult   Education Provided Yes;Family or social support of family present for education by provider   Patient Position at End of Consult Bedside chair;Bed/Chair alarm activated;All needs within reach   Nurse Communication Nurse aware of consult     Pt will achieve the following goals within 10 days.    *Pt will complete LB bathing and dressing with Mod I & AD PRN.    *Pt will complete toileting w/ Mod I w/ G hygiene/thoroughness using AD PRN    *Pt will verbalize and demonstrate good body mechanics and joint protection techniques during  ADLs/ IADLs with no verbal cues.    *Pt will perform functional transfers with on/off all surfaces with Mod I using DME as needed w/ G balance/safety.    *Pt will improve functional mobility during ADL/IADL/leisure tasks to Mod I using DME as needed w/ G balance/safety.    *Pt will complete item retrieval and light home management with Mod I & DME PRN while demonstrating good safety.     Katherine Grissom OTR/L

## 2025-03-24 NOTE — PHYSICAL THERAPY NOTE
PHYSICAL THERAPY NOTE          Patient Name: Jimmy Mello  Today's Date: 3/24/2025       03/24/25 0959   PT Last Visit   PT Visit Date 03/24/25   Note Type   Note type Evaluation   Pain Assessment   Pain Assessment Tool FLACC   Pain Rating: FLACC (Rest) - Face 0   Pain Rating: FLACC (Rest) - Legs 0   Pain Rating: FLACC (Rest) - Activity 0   Pain Rating: FLACC (Rest) - Cry 0   Pain Rating: FLACC (Rest) - Consolability 0   Score: FLACC (Rest) 0   Pain Rating: FLACC (Activity) - Face 0   Pain Rating: FLACC (Activity) - Legs 0   Pain Rating: FLACC (Activity) - Activity 0   Pain Rating: FLACC (Activity) - Cry 0   Pain Rating: FLACC (Activity) - Consolability 0   Score: FLACC (Activity) 0   Restrictions/Precautions   Weight Bearing Precautions Per Order No   Braces or Orthoses Other (Comment)  (AFO for RLE, does not wear at home)   Other Precautions Bed Alarm;Chair Alarm;Fall Risk;O2  (On room air during evaluation)   Home Living   Type of Home House   Home Layout Two level;Able to live on main level with bedroom/bathroom;Ramped entrance;Performs ADLs on one level;Bed/bath upstairs   Bathroom Shower/Tub Walk-in shower   Bathroom Toilet Standard   Bathroom Equipment Grab bars in shower;Shower chair;Commode;Grab bars around toilet   Home Equipment Walker;Cane;Quad cane;Other (Comment)  (WC; AFO for RLE)   Additional Comments Has bathroom on 1st floor, goes upstairs to shower   Prior Function   Level of [place] Independent with ADLs;Independent with IADLS;Independent with functional mobility   Lives With Spouse   Receives Help From Family;Outpatient therapy  (Last outpatient PT visit (08/24))   IADLs Independent with driving;Independent with meal prep;Independent with medication management   Falls in the last 6 months 1 to 4   Comments Does not use AD at baseline   General   Additional Pertinent History Hx of R cerebellar infarct (2/25/23)  "  Family/Caregiver Present Yes  (Wife)   Cognition   Overall Cognitive Status WFL   Arousal/Participation Alert   Orientation Level Oriented X4   Memory Within functional limits   Following Commands Follows all commands and directions without difficulty   Comments Pleasant and cooperative   Subjective   Subjective \"You think I can go back to work with [employer]?\"   RLE Assessment   RLE Assessment   (Pt reports some tingling/numbness in R foot that is not present in L; some increased tone noted in R plantarflexors during gait assessment)   Strength RLE   R Hip Flexion 4/5   R Knee Extension 4-/5   R Ankle Dorsiflexion 4+/5   Strength LLE   L Hip Flexion 5/5   L Knee Extension 5/5   L Ankle Dorsiflexion 5/5   Bed Mobility   Supine to Sit 5  Supervision   Additional items Assist x 1;HOB elevated;Increased time required   Transfers   Sit to Stand 5  Supervision   Additional items Assist x 1;Armrests;Increased time required   Stand to Sit 5  Supervision   Additional items Assist x 1;Armrests   Ambulation/Elevation   Gait pattern R Hemiparesis;Forward Flexion;Decreased foot clearance;Decreased heel strike;Step through pattern  (Decreased heel strike on R due to increased plantar flexion tone; Lean towards R; wide turns required to maintain balance)   Gait Assistance 5  Supervision  (Supervision with RW)   Additional items Assist x 1   Assistive Device Rolling walker   Distance 185 feet   Balance   Static Sitting Good   Dynamic Sitting Good   Static Standing Fair   Dynamic Standing Fair   Ambulatory Fair   Activity Tolerance   Activity Tolerance Patient tolerated treatment well   Medical Staff Made Aware EMORY Kelley   Nurse Made Aware MOISÉS Sethi   Assessment   Prognosis Good   Problem List Decreased strength;Decreased range of motion;Decreased endurance;Impaired balance;Decreased mobility;Decreased coordination;Impaired tone;Impaired sensation   Assessment Jimmy Mello is a 65 yo male presenting with sepsis due to " multifocal of right lung. PMH include R cerebellar infarct with R sided hemiparesis, htn., CKD, acute respiratory insufficiency, constipation, and ambulatory dysfunction. Upon evaluation, pt was on room air without supplemental oxygen. He required supervision assist for all bed mobility and transfers. He was supervision when walking with RW and CGAx1 when walking with SPC. He did not report any SOB or sx of lightheadedness during today's evaluation. He continues to present with decreased strength, endurance, and balance compared to baseline. He would benefit from being seen by acute PT to address impairments and work on goals. He would also benefit from level III therapy resources upon D/C to restore his functional mobility and return him to baseline.   Goals   Patient Goals to return home   STG Expiration Date 04/03/25   Short Term Goal #1 Pt will perform all bed mobility with mod I; pt will perform transfers from various surfaces with mod I; pt will ambulate 250 feet with RW and mod I; pt will improve standing balance by at least half stage   Plan   Treatment/Interventions Functional transfer training;LE strengthening/ROM;Therapeutic exercise;Elevations;Endurance training;Patient/family training;Equipment eval/education;Bed mobility;Gait training   PT Frequency 2-3x/wk   Discharge Recommendation   Rehab Resource Intensity Level, PT III (Minimum Resource Intensity)   Additional Comments Spoke with Dank about using RW when at home   AM-PAC Basic Mobility Inpatient   Turning in Flat Bed Without Bedrails 3   Lying on Back to Sitting on Edge of Flat Bed Without Bedrails 3   Moving Bed to Chair 3   Standing Up From Chair Using Arms 3   Walk in Room 3   Climb 3-5 Stairs With Railing 2   Basic Mobility Inpatient Raw Score 17   Basic Mobility Standardized Score 39.67   [facility] Highest Level Of Mobility   -HLM Goal 5: Stand one or more mins   -HLM Achieved 8: Walk 250 feet ot more   Additional Treatment Session    Start Time 1007   End Time 1017   Treatment Assessment After walking with RW and being seated in bedside chair, pt was provided with SPC (similar to AD at home) for gait training. Pt performed STS using the armests and supervision x1 and began walking to and within hallway with SPC. Compared to when he was using the RW, the pt's gait was much more unstable, demonstrating a more pronounced R lean and side-to-side wobbling gait, he demonstrated similar R plantarflexion spasticity that reduced R heel strike. Pt required CGAx1 with the SPC compared to supervision x1 with the RW. Pt walked a total of 75 feet with the SPC. Pt was returned to the bedside chair where he performed the stand to sit with supervision x1. He was left in the chair with all needs within reach, chair alarm turned on, and his wife in the room. SPT recommended that he use the RW when at home to reduce the risk of falls, pt was agreeable to this.   Equipment Use SPC   End of Consult   Patient Position at End of Consult Bedside chair;Bed/Chair alarm activated;All needs within reach;Other (comment)  (Left with spouse)     Jos Lepe, SPT

## 2025-03-24 NOTE — ASSESSMENT & PLAN NOTE
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   Continue outpatient follow-up with neurology.    Will continue home regimen of baclofen and AEDs on discharge.   Per patient, he does not take baclofen 15 mg, nightly anymore only takes 10 mg at night.

## 2025-03-24 NOTE — ASSESSMENT & PLAN NOTE
On admission, patient acutely requiring 2 L of supplemental oxygen due to symptoms of increased shortness of breath and slight tachypnea likely in the setting of multifocal pneumonia.    Patient initially needed supplemental oxygenation, but now on room air.

## 2025-03-24 NOTE — ASSESSMENT & PLAN NOTE
Lab Results   Component Value Date    EGFR 57 03/24/2025    EGFR 47 03/23/2025    EGFR 50 03/22/2025    CREATININE 1.29 03/24/2025    CREATININE 1.52 (H) 03/23/2025    CREATININE 1.45 (H) 03/22/2025     Per chart review, baseline creatinine appears to be 1.5-2.0, but noted elevations recently.  On admission, patient with creatinine 1.98.   Cr improved to baseline.   Continue to avoid and/or limit nephrotoxins.   Patient would like outpatient referral to nephrology.  Patient had orders placed for ambulatory referral to nephrology.

## 2025-03-24 NOTE — PLAN OF CARE
Problem: PHYSICAL THERAPY ADULT  Goal: Performs mobility at highest level of function for planned discharge setting.  See evaluation for individualized goals.  Description: Treatment/Interventions: Functional transfer training, LE strengthening/ROM, Therapeutic exercise, Elevations, Endurance training, Patient/family training, Equipment eval/education, Bed mobility, Gait training          See flowsheet documentation for full assessment, interventions and recommendations.  3/24/2025 1237 by Jeanie Patton, PT  Note: Prognosis: Good  Problem List: Decreased strength, Decreased range of motion, Decreased endurance, Impaired balance, Decreased mobility, Decreased coordination, Impaired tone, Impaired sensation  Assessment: Jimmy Mello is a 65 yo male presenting with sepsis due to multifocal of right lung. PMH include R cerebellar infarct with R sided hemiparesis, htn., CKD, acute respiratory insufficiency, constipation, and ambulatory dysfunction. Upon evaluation, pt was on room air without supplemental oxygen. He required supervision assist for all bed mobility and transfers. He was supervision when walking with RW and CGAx1 when walking with SPC. He did not report any SOB or sx of lightheadedness during today's evaluation. He continues to present with decreased strength, endurance, and balance compared to baseline. He would benefit from being seen by acute PT to address impairments and work on goals. He would also benefit from level III therapy resources upon D/C to restore his functional mobility and return him to baseline.        Rehab Resource Intensity Level, PT: III (Minimum Resource Intensity)    See flowsheet documentation for full assessment.

## 2025-03-24 NOTE — DISCHARGE SUMMARY
Discharge Summary - Hospitalist   Name: Jimmy Mello 64 y.o. male I MRN: [id number]  Unit/Bed#: -01 I Date of Admission: 3/20/2025   Date of Service: 3/24/2025 I Hospital Day: 4     Assessment & Plan  Sepsis due to multifocal pneumonia of right lung (HCC)  Patient presented with chief complaint of shortness of breath and cough.  Patient was on outpatient therapy for multifocal pneumonia with PO Augmentin/azithromycin.    CTA chest pe study (3/20):  1. No central pulmonary embolism. Evaluation of the segmental and subsegmental branches is limited secondary to incomplete contrast opacification.  2.  Extensive patchy multifocal solid and groundglass opacities in the right lung, concerning for multifocal pneumonia. Overall, this appears increased compared to 3/5/2025. A repeat chest CT in 4 to 6 weeks may be helpful to ensure resolution of these   findings.  3. Multiple enlarged paratracheal, subcarinal, and hilar lymph nodes, likely representing reactive lymphadenopathy. Indeterminate enlarged right lower paraesophageal lymph node, for which attention on follow-up imaging is recommended.  SIRS criteria: leukocytosis, tachypnea  Suspected SOI: multifocal pneumonia  LA : 1.0  Procalcitonin: 0.54  Patient received IV fluid hydration with Isolyte at 50 mL/ - now discontinued.  Patient was started on IV cefepime, vancomycin, and azithromycin.    Patient IV ABX changed to IV ceftriaxone 1 g, daily.    Will switch to cefpodoxime on discharge to complete 7-day course of antibiotic therapy..  Will discontinue IV vancomycin.  MRSA culture - negative  Will discontinue IV azithromycin in the setting of negative strep/legionella.    BC collected - negative x 72 hours.  Will need to continue to follow culture.  Patient had prescription sent for cefpodoxime 200 mg, every 12 hours x 3 days to preferred pharmacy.  Acute respiratory insufficiency  On admission, patient acutely requiring 2 L of supplemental oxygen due to symptoms  of increased shortness of breath and slight tachypnea likely in the setting of multifocal pneumonia.    Patient initially needed supplemental oxygenation, but now on room air.    Spastic hemiparesis of right dominant side as late effect of cerebral infarction (HCC)  Patient has noted history of spastic hemiparesis of right side due to CVA.   Continue outpatient follow-up with neurology.    Will continue home regimen of baclofen and AEDs on discharge.   Per patient, he does not take baclofen 15 mg, nightly anymore only takes 10 mg at night.  Stage 3b chronic kidney disease (HCC)  Lab Results   Component Value Date    EGFR 57 03/24/2025    EGFR 47 03/23/2025    EGFR 50 03/22/2025    CREATININE 1.29 03/24/2025    CREATININE 1.52 (H) 03/23/2025    CREATININE 1.45 (H) 03/22/2025     Per chart review, baseline creatinine appears to be 1.5-2.0, but noted elevations recently.  On admission, patient with creatinine 1.98.   Cr improved to baseline.   Continue to avoid and/or limit nephrotoxins.   Patient would like outpatient referral to nephrology.  Patient had orders placed for ambulatory referral to nephrology.    Ambulatory dysfunction  Patient presented with multiple falls in the outpatient setting.  Per wife, patient has had about 2-3 falls in the past week prior to admission. Patient with significant history of right spastic hemiplegia s/p CVA.  Continue fall precautions.  Appreciate PT/OT consultation and recommendations.   Patient had orders placed for ambulatory referral to outpatient PT/OT services.  Constipation  The patient expresses that he has not had a bowel movement since admission.  The patient denying any nausea, vomiting, or abdominal distention.   Will order colace 100 mg, twice daily on discharge.  Patient was encouraged to increase ambulation, as tolerated.  History of stroke  Continue outpatient follow-up with neurology  Continue home aspirin 81 mg, daily on discharge.    Primary hypertension  Continue  "home antihypertensive regimen.    Continue to monitor blood pressure at home.     Medical Problems       Resolved Problems  Date Reviewed: 2/11/2025          Resolved    Hypomagnesemia 3/21/2025     Resolved by  YURI Alvarez        Discharging Physician / Practitioner: YURI Alvarez  PCP: Lupis Meza DO  Admission Date:   Admission Orders (From admission, onward)       Ordered        03/20/25 1354  INPATIENT ADMISSION  Once                          Discharge Date: 03/24/25    Consultations During Hospital Stay:  PT/OT    Procedures Performed:   None    Significant Findings / Test Results:   XR chest pa and lateral (3/20): \"Multifocal right pneumonia, better shown on CT. See subsequent chest CT.\"  CTA chest pe study (3/20):   1. No central pulmonary embolism. Evaluation of the segmental and subsegmental branches is limited secondary to incomplete contrast opacification.  2.  Extensive patchy multifocal solid and groundglass opacities in the right lung, concerning for multifocal pneumonia. Overall, this appears increased compared to 3/5/2025. A repeat chest CT in 4 to 6 weeks may be helpful to ensure resolution of these   findings.  3. Multiple enlarged paratracheal, subcarinal, and hilar lymph nodes, likely representing reactive lymphadenopathy. Indeterminate enlarged right lower paraesophageal lymph node, for which attention on follow-up imaging is recommended.  4. Additional findings as detailed in the body of the report.  VAS venous duplex -lower limb bilateral (3/20):   RIGHT LOWER LIMB:  No evidence of acute or chronic deep vein thrombosis.  No evidence of superficial thrombophlebitis noted.  Doppler evaluation shows a normal response to augmentation maneuvers..  Popliteal, posterior tibial and anterior tibial arterial Doppler waveforms are triphasic.   LEFT LOWER LIMB:  No evidence of acute or chronic deep vein thrombosis.  No evidence of superficial thrombophlebitis noted.  Doppler " evaluation shows a normal response to augmentation maneuvers.  Popliteal, posterior tibial and anterior tibial arterial Doppler waveforms are triphasic.    Incidental Findings:   CTA chest pe study (3/20):   1. No central pulmonary embolism. Evaluation of the segmental and subsegmental branches is limited secondary to incomplete contrast opacification.  2.  Extensive patchy multifocal solid and groundglass opacities in the right lung, concerning for multifocal pneumonia. Overall, this appears increased compared to 3/5/2025. A repeat chest CT in 4 to 6 weeks may be helpful to ensure resolution of these   findings.  3. Multiple enlarged paratracheal, subcarinal, and hilar lymph nodes, likely representing reactive lymphadenopathy. Indeterminate enlarged right lower paraesophageal lymph node, for which attention on follow-up imaging is recommended.  4. Additional findings as detailed in the body of the report.     I reviewed the above mentioned incidental findings with the patient and/or family and they expressed understanding.    Test Results Pending at Discharge (will require follow up):   BC collected on 3/20.  Per chart review, blood cultures negative x 72 hours.     Outpatient Tests Requested:  I would recommend outpatient follow-up with PCP.  I would recommend outpatient follow-up with nephrology.  I would recommend outpatient follow-up PT/OT.  I would recommend outpatient follow-up for repeat CT chest in 4 to 6 weeks.    Complications: None    Reason for Admission: SOB     Hospital Course:   Jimmy Mello is a 64 y.o. male patient who originally presented to the hospital on 3/20/2025 due to increased shortness of breath.  The patient was found to have multifocal pneumonia on CT imaging and failed outpatient therapy with Augmentin/azithromycin.  Patient meeting sepsis criteria with leukocytosis and tachypnea.  Patient was started on IV cefepime and azithromycin.  In addition, the patient was having acute  "respiratory distress requiring the initiation of supplemental oxygenation.  The patient was switched from IV cefepime to IV ceftriaxone and clinically improving.  The patient was able to come off of supplemental oxygenation and tolerate room air.  The patient had BC collected and negative x 72 hours.  Strep/Legionella negative and MRSA negative.  The patient walked with PT/OT and recommending home PT/OT.  The patient was switched from IV antibiotics to oral cefpodoxime 200 mg, twice daily x 3 days on discharge.  The patient to follow-up for repeat imaging in 4 to 6 weeks, as recommended per radiology.  Patient was given warning signs for repeat hospitalization and evaluation in ED.      Please see above list of diagnoses and related plan for additional information.     Condition at Discharge: stable    Discharge Day Visit / Exam:   Subjective: The patient was seen and examined at the bedside this morning. The patient expressing that he feeling much improved today. The patient expressing that he is waiting for PT/OT to see him.  The patient denies any fever, chills, nausea, vomiting.    Per nursing, no acute events overnight.    Vitals: Blood Pressure: 155/80 (03/24/25 0435)  Pulse: 65 (03/24/25 0818)  Temperature: 97.7 °F (36.5 °C) (03/24/25 0818)  Temp Source: Temporal (03/24/25 0818)  Respirations: 18 (03/20/25 1704)  Height: 5' 9\" (175.3 cm) (03/20/25 2300)  Weight - Scale: 94 kg (207 lb 3.7 oz) (03/20/25 2300)  SpO2: 94 % (03/24/25 0818)    Physical Exam  Vitals and nursing note reviewed.   Constitutional:       General: He is awake. He is not in acute distress.  HENT:      Head: Normocephalic and atraumatic.   Eyes:      General: No scleral icterus.     Conjunctiva/sclera: Conjunctivae normal.   Cardiovascular:      Rate and Rhythm: Normal rate and regular rhythm.      Heart sounds: Normal heart sounds, S1 normal and S2 normal. No murmur heard.  Pulmonary:      Effort: Pulmonary effort is normal.      Breath " sounds: No decreased breath sounds, wheezing, rhonchi or rales.   Abdominal:      General: There is no distension.      Palpations: Abdomen is soft.      Tenderness: There is no abdominal tenderness.   Musculoskeletal:      Right lower leg: No edema.      Left lower leg: No edema.   Skin:     Comments: On exam, no open lesions/wounds on exposed skin.   Neurological:      Mental Status: He is alert and oriented to person, place, and time. Mental status is at baseline.      Motor: Weakness (R sided) and atrophy (R sided) present.   Psychiatric:         Attention and Perception: Attention normal.         Mood and Affect: Mood normal.         Speech: Speech normal.       Discussion with Family: Updated  (wife) at bedside.    Discharge instructions/Information to patient and family:   See after visit summary for information provided to patient and family.      Provisions for Follow-Up Care:  See after visit summary for information related to follow-up care and any pertinent home health orders.      Mobility at time of Discharge:   Basic Mobility Inpatient Raw Score: 15  JH-HLM Goal: 4: Move to chair/commode  JH-HLM Achieved: 4: Move to chair/commode  HLM Goal achieved. Continue to encourage appropriate mobility.     Disposition:   Home    Planned Readmission: No, no planned readmission.    Discharge Medications:  See after visit summary for reconciled discharge medications provided to patient and/or family.      Administrative Statements   Discharge Statement:  I have spent a total time of 65 minutes in caring for this patient on the day of the visit/encounter. >30 minutes of time was spent on: Diagnostic results, Risks and benefits of tx options, Instructions for management, Patient and family education, Counseling / Coordination of care, Documenting in the medical record, Reviewing / ordering tests, medicine, procedures  , and Communicating with other healthcare professionals .    **Please Note: This note  may have been constructed using a voice recognition system**

## 2025-03-24 NOTE — PLAN OF CARE
Problem: Potential for Falls  Goal: Patient will remain free of falls  Description: INTERVENTIONS:  - Educate patient/family on patient safety including physical limitations  - Instruct patient to call for assistance with activity   - Consult OT/PT to assist with strengthening/mobility   - Keep Call bell within reach  - Keep bed low and locked with side rails adjusted as appropriate  - Keep care items and personal belongings within reach  - Initiate and maintain comfort rounds  - Make Fall Risk Sign visible to staff  - Offer Toileting every 2 Hours, in advance of need  - Initiate/Maintain alarm  - Obtain necessary fall risk management equipment  - Apply yellow socks and bracelet for high fall risk patients  - Consider moving patient to room near nurses station  INTERVENTIONS:- Educate patient/family on patient safety including physical limitations- Instruct patient to call for assistance with activity - Consult OT/PT to assist with strengthening/mobility - Keep Call bell within reach- Keep bed low and locked with side rails adjusted as appropriate- Keep care items and personal belongings within reach- Initiate and maintain comfort rounds- Make Fall Risk Sign visible to staff- Offer Toileting every 2 Hours, in advance of need- Initiate/Maintain alarm- Obtain necessary fall risk management equipment: - Apply yellow socks and bracelet for high fall risk patients- Consider moving patient to room near nurses station  Outcome: Progressing     Problem: PAIN - ADULT  Goal: Verbalizes/displays adequate comfort level or baseline comfort level  Description: Interventions:- Encourage patient to monitor pain and request assistance- Assess pain using appropriate pain scale- Administer analgesics based on type and severity of pain and evaluate response- Implement non-pharmacological measures as appropriate and evaluate response- Consider cultural and social influences on pain and pain management- Notify physician/advanced  practitioner if interventions unsuccessful or patient reports new pain  Outcome: Progressing     Problem: INFECTION - ADULT  Goal: Absence or prevention of progression during hospitalization  Description: INTERVENTIONS:- Assess and monitor for signs and symptoms of infection- Monitor lab/diagnostic results- Monitor all insertion sites, i.e. indwelling lines, tubes, and drains- Monitor endotracheal if appropriate and nasal secretions for changes in amount and color- [place] appropriate cooling/warming therapies per order- Administer medications as ordered- Instruct and encourage patient and family to use good hand hygiene technique- Identify and instruct in appropriate isolation precautions for identified infection/condition  Outcome: Progressing  Goal: Absence of fever/infection during neutropenic period  Description: INTERVENTIONS:- Monitor WBC  Outcome: Progressing     Problem: SAFETY ADULT  Goal: Patient will remain free of falls  Description: INTERVENTIONS:  - Educate patient/family on patient safety including physical limitations  - Instruct patient to call for assistance with activity   - Consult OT/PT to assist with strengthening/mobility   - Keep Call bell within reach  - Keep bed low and locked with side rails adjusted as appropriate  - Keep care items and personal belongings within reach  - Initiate and maintain comfort rounds  - Make Fall Risk Sign visible to staff  - Offer Toileting every 2 Hours, in advance of need  - Initiate/Maintain alarm  - Obtain necessary fall risk management equipment  - Apply yellow socks and bracelet for high fall risk patients  - Consider moving patient to room near nurses station  INTERVENTIONS:- Educate patient/family on patient safety including physical limitations- Instruct patient to call for assistance with activity - Consult OT/PT to assist with strengthening/mobility - Keep Call bell within reach- Keep bed low and locked with side rails adjusted as appropriate- Keep  care items and personal belongings within reach- Initiate and maintain comfort rounds- Make Fall Risk Sign visible to staff- Offer Toileting every 2 Hours, in advance of need- Initiate/Maintain alarm- Obtain necessary fall risk management equipment: - Apply yellow socks and bracelet for high fall risk patients- Consider moving patient to room near nurses station  Outcome: Progressing  Goal: Maintain or return to baseline ADL function  Description: INTERVENTIONS:-  Assess patient's ability to carry out ADLs; assess patient's baseline for ADL function and identify physical deficits which impact ability to perform ADLs (bathing, care of mouth/teeth, toileting, grooming, dressing, etc.)- Assess/evaluate cause of self-care deficits - Assess range of motion- Assess patient's mobility; develop plan if impaired- Assess patient's need for assistive devices and provide as appropriate- Encourage maximum independence but intervene and supervise when necessary- Involve family in performance of ADLs- Assess for home care needs following discharge - Consider OT consult to assist with ADL evaluation and planning for discharge- Provide patient education as appropriate  Outcome: Progressing  Goal: Maintains/Returns to pre admission functional level  Description: INTERVENTIONS:- Perform AM-PAC 6 Click Basic Mobility/ Daily Activity assessment daily.- Set and communicate daily mobility goal to care team and patient/family/caregiver. - Collaborate with rehabilitation services on mobility goals if consulted- Perform Range of Motion 3 times a day.- Reposition patient every 2 hours.- Dangle patient 2 times a day- Stand patient 2 times a day- Ambulate patient 2 times a day- Out of bed to chair 2 times a day - Out of bed for meals 3 times a day- Out of bed for toileting- Record patient progress and toleration of activity level   Outcome: Progressing

## 2025-03-24 NOTE — INCIDENTAL FINDINGS
The following findings require follow up:  Radiographic finding    Finding: CTA chest pe study (3/20):   1. No central pulmonary embolism. Evaluation of the segmental and subsegmental branches is limited secondary to incomplete contrast opacification.     2.  Extensive patchy multifocal solid and groundglass opacities in the right lung, concerning for multifocal pneumonia. Overall, this appears increased compared to 3/5/2025. A repeat chest CT in 4 to 6 weeks may be helpful to ensure resolution of these   findings.     3. Multiple enlarged paratracheal, subcarinal, and hilar lymph nodes, likely representing reactive lymphadenopathy. Indeterminate enlarged right lower paraesophageal lymph node, for which attention on follow-up imaging is recommended.    Follow up required: CT chest    Follow up should be done within 4-6 week(s).    Please notify the following clinician to assist with the follow up:   Dr. Lupis Meza, DO       Incidental finding results were discussed with the Patient by YURI Alvarez on 03/24/25.   They expressed understanding and all questions answered.

## 2025-03-24 NOTE — PLAN OF CARE
Problem: Potential for Falls  Goal: Patient will remain free of falls  Description: INTERVENTIONS:  - Educate patient/family on patient safety including physical limitations  - Instruct patient to call for assistance with activity   - Consult OT/PT to assist with strengthening/mobility   - Keep Call bell within reach  - Keep bed low and locked with side rails adjusted as appropriate  - Keep care items and personal belongings within reach  - Initiate and maintain comfort rounds  - Make Fall Risk Sign visible to staff  - Offer Toileting every 2 Hours, in advance of need  - Initiate/Maintain alarm  - Obtain necessary fall risk management equipment  - Apply yellow socks and bracelet for high fall risk patients  - Consider moving patient to room near nurses station  INTERVENTIONS:- Educate patient/family on patient safety including physical limitations- Instruct patient to call for assistance with activity - Consult OT/PT to assist with strengthening/mobility - Keep Call bell within reach- Keep bed low and locked with side rails adjusted as appropriate- Keep care items and personal belongings within reach- Initiate and maintain comfort rounds- Make Fall Risk Sign visible to staff- Offer Toileting every 2 Hours, in advance of need- Initiate/Maintain alarm- Obtain necessary fall risk management equipment: - Apply yellow socks and bracelet for high fall risk patients- Consider moving patient to room near nurses station  Outcome: Progressing     Problem: PAIN - ADULT  Goal: Verbalizes/displays adequate comfort level or baseline comfort level  Description: Interventions:- Encourage patient to monitor pain and request assistance- Assess pain using appropriate pain scale- Administer analgesics based on type and severity of pain and evaluate response- Implement non-pharmacological measures as appropriate and evaluate response- Consider cultural and social influences on pain and pain management- Notify physician/advanced  practitioner if interventions unsuccessful or patient reports new pain  Outcome: Progressing     Problem: INFECTION - ADULT  Goal: Absence or prevention of progression during hospitalization  Description: INTERVENTIONS:- Assess and monitor for signs and symptoms of infection- Monitor lab/diagnostic results- Monitor all insertion sites, i.e. indwelling lines, tubes, and drains- Monitor endotracheal if appropriate and nasal secretions for changes in amount and color- [place] appropriate cooling/warming therapies per order- Administer medications as ordered- Instruct and encourage patient and family to use good hand hygiene technique- Identify and instruct in appropriate isolation precautions for identified infection/condition  Outcome: Progressing  Goal: Absence of fever/infection during neutropenic period  Description: INTERVENTIONS:- Monitor WBC  Outcome: Progressing     Problem: SAFETY ADULT  Goal: Patient will remain free of falls  Description: INTERVENTIONS:  - Educate patient/family on patient safety including physical limitations  - Instruct patient to call for assistance with activity   - Consult OT/PT to assist with strengthening/mobility   - Keep Call bell within reach  - Keep bed low and locked with side rails adjusted as appropriate  - Keep care items and personal belongings within reach  - Initiate and maintain comfort rounds  - Make Fall Risk Sign visible to staff  - Offer Toileting every 2 Hours, in advance of need  - Initiate/Maintain alarm  - Obtain necessary fall risk management equipment  - Apply yellow socks and bracelet for high fall risk patients  - Consider moving patient to room near nurses station  INTERVENTIONS:- Educate patient/family on patient safety including physical limitations- Instruct patient to call for assistance with activity - Consult OT/PT to assist with strengthening/mobility - Keep Call bell within reach- Keep bed low and locked with side rails adjusted as appropriate- Keep  care items and personal belongings within reach- Initiate and maintain comfort rounds- Make Fall Risk Sign visible to staff- Offer Toileting every 2 Hours, in advance of need- Initiate/Maintain alarm- Obtain necessary fall risk management equipment: - Apply yellow socks and bracelet for high fall risk patients- Consider moving patient to room near nurses station  Outcome: Progressing  Goal: Maintain or return to baseline ADL function  Description: INTERVENTIONS:-  Assess patient's ability to carry out ADLs; assess patient's baseline for ADL function and identify physical deficits which impact ability to perform ADLs (bathing, care of mouth/teeth, toileting, grooming, dressing, etc.)- Assess/evaluate cause of self-care deficits - Assess range of motion- Assess patient's mobility; develop plan if impaired- Assess patient's need for assistive devices and provide as appropriate- Encourage maximum independence but intervene and supervise when necessary- Involve family in performance of ADLs- Assess for home care needs following discharge - Consider OT consult to assist with ADL evaluation and planning for discharge- Provide patient education as appropriate  Outcome: Progressing  Goal: Maintains/Returns to pre admission functional level  Description: INTERVENTIONS:- Perform AM-PAC 6 Click Basic Mobility/ Daily Activity assessment daily.- Set and communicate daily mobility goal to care team and patient/family/caregiver. - Collaborate with rehabilitation services on mobility goals if consulted- Perform Range of Motion 3 times a day.- Reposition patient every 2 hours.- Dangle patient 2 times a day- Stand patient 2 times a day- Ambulate patient 2 times a day- Out of bed to chair 2 times a day - Out of bed for meals 3 times a day- Out of bed for toileting- Record patient progress and toleration of activity level   Outcome: Progressing

## 2025-03-24 NOTE — PLAN OF CARE
Problem: OCCUPATIONAL THERAPY ADULT  Goal: Performs self-care activities at highest level of function for planned discharge setting.  See evaluation for individualized goals.  Description: Treatment Interventions: ADL retraining, Functional transfer training, UE strengthening/ROM, Patient/family training, Equipment evaluation/education, Compensatory technique education, Neuromuscular reeducation, Fine motor coordination activities          See flowsheet documentation for full assessment, interventions and recommendations.   Note: Limitation: Decreased ADL status, Decreased self-care trans, Decreased high-level ADLs, Decreased fine motor control  Prognosis: Good  Assessment: Pt is a 64 y.o. male seen for OT evaluation at [facility], admitted 3/20/2025 w/ Sepsis (HCC) & PNA. OT completed extensive review of pt's medical and social history. Comorbidities affecting pt's functional performance at time of assessment include: h/o CVA with R sided deficits/spasticity, HTN, acute respiratory insufficiency, ambulatory dysfunction, central pain syndrome. Personal factors affecting pt at time of IE include: difficulty performing ADLS and difficulty performing IADLS . Prior to admission, pt was living with his wife in a H with a ramped entrance.  Pt was I w/  ADLS and w/ IADLS, (+) drove, & required use of cane PTA. Upon evaluation: Pt requires S for bed mobility, S for functional transfers, S for functional mobility, S for UB ADLs and S for LB ADLS 2* the following deficits impacting occupational performance: weakness, decreased strength, decreased balance, impaired GMC, impaired FMC, and abnormal tone. Full objective findings from OT assessment regarding body systems outlined above. Pt to benefit from continued skilled OT tx while in the hospital to address deficits as defined above and maximize level of functional independence w/ ADL's and functional mobility. Occupational Performance areas to address include:  bathing/shower, toilet hygiene, dressing, functional mobility, community mobility, and clothing management. Based on findings, pt is of high complexity. The patient's raw score on the AM-PAC Daily Activity inpatient short form is 20, standardized score is 42.03, greater than 39.4. Patients at this level are likely to benefit from DC to home. However, please refer to therapist recommendation for discharge planning given other factors that may influence destination. At this time, OT recommendations at time of discharge are DC with Level III - Low Rehab Resource Intensity.     Rehab Resource Intensity Level, OT: III (Minimum Resource Intensity)

## 2025-03-24 NOTE — ASSESSMENT & PLAN NOTE
Patient presented with chief complaint of shortness of breath and cough.  Patient was on outpatient therapy for multifocal pneumonia with PO Augmentin/azithromycin.    CTA chest pe study (3/20):  1. No central pulmonary embolism. Evaluation of the segmental and subsegmental branches is limited secondary to incomplete contrast opacification.  2.  Extensive patchy multifocal solid and groundglass opacities in the right lung, concerning for multifocal pneumonia. Overall, this appears increased compared to 3/5/2025. A repeat chest CT in 4 to 6 weeks may be helpful to ensure resolution of these   findings.  3. Multiple enlarged paratracheal, subcarinal, and hilar lymph nodes, likely representing reactive lymphadenopathy. Indeterminate enlarged right lower paraesophageal lymph node, for which attention on follow-up imaging is recommended.  SIRS criteria: leukocytosis, tachypnea  Suspected SOI: multifocal pneumonia  LA : 1.0  Procalcitonin: 0.54  Patient received IV fluid hydration with Isolyte at 50 mL/ - now discontinued.  Patient was started on IV cefepime, vancomycin, and azithromycin.    Patient IV ABX changed to IV ceftriaxone 1 g, daily.    Will switch to cefpodoxime on discharge to complete 7-day course of antibiotic therapy..  Will discontinue IV vancomycin.  MRSA culture - negative  Will discontinue IV azithromycin in the setting of negative strep/legionella.    BC collected - negative x 72 hours.  Will need to continue to follow culture.  Patient had prescription sent for cefpodoxime 200 mg, every 12 hours x 3 days to preferred pharmacy.

## 2025-03-25 LAB
BACTERIA BLD CULT: NORMAL
BACTERIA BLD CULT: NORMAL

## 2025-03-25 NOTE — UTILIZATION REVIEW
NOTIFICATION OF ADMISSION DISCHARGE   This is a Notification of Discharge from [facility]. Please be advised that this patient has been discharge from our facility. Below you will find the admission and discharge date and time including the patient’s disposition.   UTILIZATION REVIEW CONTACT:  Yojana Taveras  Utilization   Network Utilization Review Department  Phone: [phone] x carefully listen to the prompts. All voicemails are confidential.  Email: [email or web address]     ADMISSION INFORMATION  PRESENTATION DATE: 3/20/2025  9:35 AM  OBERVATION ADMISSION DATE: N/A  INPATIENT ADMISSION DATE: 3/20/25  1:54 PM   DISCHARGE DATE: 3/24/2025  1:39 PM   DISPOSITION:Home/Self Care    Network Utilization Review Department  ATTENTION: Please call with any questions or concerns to [phone] and carefully listen to the prompts so that you are directed to the right person. All voicemails are confidential.   For Discharge needs, contact Care Management DC Support Team at [phone] opt. 2  Send all requests for admission clinical reviews, approved or denied determinations and any other requests to dedicated fax number below belonging to the campus where the patient is receiving treatment. List of dedicated fax numbers for the Facilities:  FACILITY NAME UR FAX NUMBER   ADMISSION DENIALS (Administrative/Medical Necessity) [phone]   DISCHARGE SUPPORT TEAM ([facility]) [phone]   PARENT CHILD HEALTH (Maternity/NICU/Pediatrics) [phone]   [facility] [phone]   [facility] [phone]   [facility] [phone]   [facility] [phone]   [facility] [phone]   [facility] [phone]   [facility] [phone]   [facility] [phone]    [facility] [phone]   [facility] [phone]   [facility] [phone]   [facility] [phone]

## 2025-03-26 ENCOUNTER — OFFICE VISIT (OUTPATIENT)
Dept: FAMILY MEDICINE CLINIC | Facility: CLINIC | Age: 65
End: 2025-03-26
Payer: COMMERCIAL

## 2025-03-26 VITALS
HEART RATE: 61 BPM | SYSTOLIC BLOOD PRESSURE: 130 MMHG | BODY MASS INDEX: 29.06 KG/M2 | OXYGEN SATURATION: 96 % | WEIGHT: 196.2 LBS | DIASTOLIC BLOOD PRESSURE: 72 MMHG | HEIGHT: 69 IN

## 2025-03-26 DIAGNOSIS — D64.9 ANEMIA, UNSPECIFIED TYPE: ICD-10-CM

## 2025-03-26 DIAGNOSIS — A41.9 SEPSIS WITH ACUTE RESPIRATORY FAILURE WITHOUT SEPTIC SHOCK, DUE TO UNSPECIFIED ORGANISM, UNSPECIFIED WHETHER HYPOXIA OR HYPERCAPNIA PRESENT (HCC): Primary | ICD-10-CM

## 2025-03-26 DIAGNOSIS — J18.9 MULTIFOCAL PNEUMONIA: ICD-10-CM

## 2025-03-26 DIAGNOSIS — R26.2 AMBULATORY DYSFUNCTION: ICD-10-CM

## 2025-03-26 DIAGNOSIS — R65.20 SEPSIS WITH ACUTE RESPIRATORY FAILURE WITHOUT SEPTIC SHOCK, DUE TO UNSPECIFIED ORGANISM, UNSPECIFIED WHETHER HYPOXIA OR HYPERCAPNIA PRESENT (HCC): Primary | ICD-10-CM

## 2025-03-26 DIAGNOSIS — J96.00 SEPSIS WITH ACUTE RESPIRATORY FAILURE WITHOUT SEPTIC SHOCK, DUE TO UNSPECIFIED ORGANISM, UNSPECIFIED WHETHER HYPOXIA OR HYPERCAPNIA PRESENT (HCC): Primary | ICD-10-CM

## 2025-03-26 DIAGNOSIS — N18.32 STAGE 3B CHRONIC KIDNEY DISEASE (HCC): ICD-10-CM

## 2025-03-26 DIAGNOSIS — Z86.73 HISTORY OF STROKE: ICD-10-CM

## 2025-03-26 DIAGNOSIS — I10 PRIMARY HYPERTENSION: ICD-10-CM

## 2025-03-26 DIAGNOSIS — R59.0 HILAR LYMPHADENOPATHY: ICD-10-CM

## 2025-03-26 PROBLEM — R06.89 ACUTE RESPIRATORY INSUFFICIENCY: Status: RESOLVED | Noted: 2025-03-20 | Resolved: 2025-03-26

## 2025-03-26 PROCEDURE — 99214 OFFICE O/P EST MOD 30 MIN: CPT | Performed by: FAMILY MEDICINE

## 2025-03-26 NOTE — PROGRESS NOTES
Name: Jimmy Mello      : 1960      MRN: [id number]  Encounter Provider: Lupis Meza DO  Encounter Date: 3/26/2025   Encounter department: [facility]    Assessment & Plan  Sepsis with acute respiratory failure without septic shock, due to unspecified organism, unspecified whether hypoxia or hypercapnia present (HCC)  Resolved.          Multifocal pneumonia  Reviewed discharge summary and CT scans.   Patient on cefpodoxine and feeling much better. No cough. No shortness of breath.   Was recommended that he have f/u ct in 4-6 weeks. CT chest ordered for 4 weeks. Will call with results.     Orders:    CT chest wo contrast; Future    History of stroke  With resultant right sided spastic hemiparesis and facial pain.   Following with neurology and has appt scheduled       Ambulatory dysfunction  Ongoing since his stroke.   Did receive some PT during recent hospitalization and is scheduled to do outpatient PT/OT as well.        Stage 3b chronic kidney disease (HCC)  Lab Results   Component Value Date    EGFR 57 2025    EGFR 47 2025    EGFR 50 2025    CREATININE 1.29 2025    CREATININE 1.52 (H) 2025    CREATININE 1.45 (H) 2025   Baseline GFR in 50's. Had bump in creatinine during admission that has resolved.   Has referral to nephrology and wondering if they need to go. Did encourage to go for consultation.  Meanwhile avoid nephrotoxic agents such as ibuprofen, aleve  Will continue his losartan pending evaluation by nephrology but would be more concerned about uncontrolled bp causing worsening renal function      Orders:    Basic metabolic panel; Future    Primary hypertension  BP at goal on current medications            History of Present Illness     HPI    Patient is a 64 year old male with hypertension, history of stroke with resultant spastic right sided hemiparesis, central pain syndrome and CKD3 who is being seen today for hospital f/u  visit.    He was hospitalized from 3/20-3/24/25 for sepsis.     Presented to ED with complaint of shortness of breath and cough. Had been taking antibiotics (augmentin and azithromycin) in outpatient setting for bilateral lobe pneumonia that was discovered at time of his visit to ED on 3/5/25 on CT scan of chest, abdomen and pelvis.     CT of chest done on 3/20/25 showed:  1. No central pulmonary embolism. Evaluation of the segmental and subsegmental branches is limited secondary to incomplete contrast opacification.   2.  Extensive patchy multifocal solid and groundglass opacities in the right lung, concerning for multifocal pneumonia. Overall, this appears increased compared to 3/5/2025. A repeat chest CT in 4 to 6 weeks may be helpful to ensure resolution of these   findings.   3. Multiple enlarged paratracheal, subcarinal, and hilar lymph nodes, likely representing reactive lymphadenopathy. Indeterminate enlarged right lower paraesophageal lymph node, for which attention on follow-up imaging is recommended    He was treated with IV cefepime, vancomycin, and azithromycin.    Initially required O2 at 2 liters  Discharged with rx for 7 day course of cefpodoxime and recommended repeat imaging in 4-6 weeks.     Was advised to do home PT/OT after discharge as well.     He reports that he is feeling well and has no fever, chills. No cough and no shortness of breath at all. Still taking antibiotic as prescribed  No diarrhea or abdominal pain  Appetite is good.   No dizziness. No headaches  Still with facial pain, unchanged. Was able to cut down on oxcarbazepine. Made him too groggy.   He does have f/u scheduled with the neurologist.       Review of Systems  Past Medical History:   Diagnosis Date    Adjustment disorder with depressed mood 03/01/2023    BRAULIO (acute kidney injury) (HCC)     B12 deficiency     Celiac artery stenosis (HCC)     Cerebellar infarct (HCC) 02/25/2023    CKD (chronic kidney disease) stage 2, GFR  60-89 ml/min     Essential hypertension     Impaired fasting glucose     Iron deficiency anemia     Neurogenic bowel     Stenosis of left vertebral artery     Stroke (HCC)     Vertebral artery dissection (HCC)      Past Surgical History:   Procedure Laterality Date    ARTERY SURGERY      vertebral artery stent/revascularization    IR STROKE ALERT  2023     Family History   Problem Relation Age of Onset    Hypertension Brother     Hypertension Brother      Social History     Tobacco Use    Smoking status: Former     Current packs/day: 0.00     Average packs/day: 1 pack/day for 5.0 years (5.0 ttl pk-yrs)     Types: Cigarettes     Start date: 1978     Quit date: 1983     Years since quittin.0    Smokeless tobacco: Never   Vaping Use    Vaping status: Never Used   Substance and Sexual Activity    Alcohol use: Not Currently    Drug use: Never    Sexual activity: Not Currently     Partners: Female     Current Outpatient Medications on File Prior to Visit   Medication Sig    amLODIPine (NORVASC) 2.5 mg tablet TAKE 1 TABLET BY MOUTH EVERY DAY    aspirin 325 mg tablet Take 325 mg by mouth daily    atorvastatin (LIPITOR) 80 mg tablet TAKE 1 TABLET BY MOUTH DAILY WITH DINNER    baclofen 10 mg tablet TAKE 1 TABLET BY MOUTH IN THE MORNING, 1 TABLET MIDDAY, AND 1.5 TABLETS AT NIGHT    carvedilol (COREG) 25 mg tablet TAKE 1 TABLET BY MOUTH TWICE A DAY WITH FOOD    cefpodoxime (VANTIN) 200 mg tablet Take 1 tablet (200 mg total) by mouth 2 (two) times a day for 3 days    cyanocobalamin ([employer] Vitamin B-12) 1000 MCG tablet Take 1 tablet (1,000 mcg total) by mouth daily    docusate sodium (COLACE) 100 mg capsule Take 1 capsule (100 mg total) by mouth in the morning for 7 days    DULoxetine (CYMBALTA) 60 mg delayed release capsule TAKE 1 CAPSULE BY MOUTH EVERY DAY    ferrous sulfate 325 (65 Fe) mg tablet TAKE 1 TABLET BY MOUTH EVERY DAY WITH BREAKFAST    guaiFENesin (MUCINEX) 600 mg 12 hr tablet Take 1 tablet (600 mg  total) by mouth every 12 (twelve) hours for 7 days    hydrALAZINE (APRESOLINE) 50 mg tablet TAKE 1 TABLET BY MOUTH EVERY 12 HOURS    losartan (COZAAR) 100 MG tablet TAKE 1 TABLET BY MOUTH EVERY DAY    OXcarbazepine (TRILEPTAL) 600 mg tablet TAKE 1 TABLET BY MOUTH TWICE A DAY    pregabalin (LYRICA) 100 mg capsule TAKE 1 CAPSULE BY MOUTH 2 TIMES A DAY IN ADDITION TO 50MG IN THE AFTERNOON    pregabalin (LYRICA) 50 mg capsule TAKE 1 CAPSULE BY MOUTH IN THE AFTERNOON IN ADDITION TO THE 100MG TWICE DAILY AS DIRECTED     No Known Allergies  Immunization History   Administered Date(s) Administered    COVID-19 MODERNA VACC 0.5 ML IM 01/21/2021, 03/01/2021, 11/29/2021    Tdap 06/19/2024    Zoster Vaccine Recombinant 07/11/2024, 12/23/2024     Objective   There were no vitals taken for this visit.    Physical Exam  Vitals and nursing note reviewed.   Constitutional:       General: He is not in acute distress.     Appearance: Normal appearance. He is not ill-appearing, toxic-appearing or diaphoretic.      Comments: Ambulates with cane   HENT:      Head: Normocephalic and atraumatic.   Eyes:      Extraocular Movements: Extraocular movements intact.      Conjunctiva/sclera: Conjunctivae normal.      Pupils: Pupils are equal, round, and reactive to light.   Cardiovascular:      Rate and Rhythm: Normal rate and regular rhythm.      Heart sounds: No murmur heard.  Pulmonary:      Effort: Pulmonary effort is normal.      Breath sounds: Normal breath sounds. No wheezing, rhonchi or rales.   Abdominal:      General: Abdomen is flat. Bowel sounds are normal.      Palpations: Abdomen is soft.   Musculoskeletal:      Cervical back: Normal range of motion and neck supple.      Right lower leg: No edema.      Left lower leg: No edema.   Lymphadenopathy:      Cervical: No cervical adenopathy.   Skin:     Findings: No rash.   Neurological:      General: No focal deficit present.      Mental Status: He is alert and oriented to person, place,  and time.   Psychiatric:         Mood and Affect: Mood normal.

## 2025-03-26 NOTE — ASSESSMENT & PLAN NOTE
Ongoing since his stroke.   Did receive some PT during recent hospitalization and is scheduled to do outpatient PT/OT as well.

## 2025-03-26 NOTE — ASSESSMENT & PLAN NOTE
Lab Results   Component Value Date    EGFR 57 03/24/2025    EGFR 47 03/23/2025    EGFR 50 03/22/2025    CREATININE 1.29 03/24/2025    CREATININE 1.52 (H) 03/23/2025    CREATININE 1.45 (H) 03/22/2025   Baseline GFR in 50's. Had bump in creatinine during admission that has resolved.   Has referral to nephrology and wondering if they need to go. Did encourage to go for consultation.  Meanwhile avoid nephrotoxic agents such as ibuprofen, aleve  Will continue his losartan pending evaluation by nephrology but would be more concerned about uncontrolled bp causing worsening renal function      Orders:    Basic metabolic panel; Future

## 2025-03-26 NOTE — ASSESSMENT & PLAN NOTE
With resultant right sided spastic hemiparesis and facial pain.   Following with neurology and has appt scheduled

## 2025-04-01 ENCOUNTER — OFFICE VISIT (OUTPATIENT)
Dept: NEUROLOGY | Facility: CLINIC | Age: 65
End: 2025-04-01
Payer: COMMERCIAL

## 2025-04-01 VITALS
HEIGHT: 69 IN | OXYGEN SATURATION: 97 % | HEART RATE: 57 BPM | WEIGHT: 201 LBS | BODY MASS INDEX: 29.77 KG/M2 | DIASTOLIC BLOOD PRESSURE: 78 MMHG | SYSTOLIC BLOOD PRESSURE: 144 MMHG

## 2025-04-01 DIAGNOSIS — I10 PRIMARY HYPERTENSION: ICD-10-CM

## 2025-04-01 DIAGNOSIS — G89.0 CENTRAL PAIN SYNDROME: ICD-10-CM

## 2025-04-01 DIAGNOSIS — R51.9 RIGHT FACIAL PAIN: ICD-10-CM

## 2025-04-01 DIAGNOSIS — Z86.73 HISTORY OF STROKE: Primary | ICD-10-CM

## 2025-04-01 PROCEDURE — 99214 OFFICE O/P EST MOD 30 MIN: CPT | Performed by: PSYCHIATRY & NEUROLOGY

## 2025-04-01 NOTE — PROGRESS NOTES
"Outpatient Neurology  Follow Up Office Visit    HPI  Mr. Mello is a 63 yo male with a history of stroke. He reports no episodes of unusual bleeding or bruising with taking aspirin. Patient has stable right sided residual weakness/julieta-spasticity as a result of his stroke. He denies any recent signs and symptoms of stroke including painless vision loss, difficulty speaking or swallowing, new weakness/numbness to one side of the face or body. He repots a recent hospitalization 2 weeks ago for pneumonia is has recovered fully.     Regarding his atypical facial pain he no longer has shooting pain, but has extreme sensitivity when the episodes do occur. The episodes happen at random times throughout the day and night. Patient reports his oxcarbazepine causing him to feel \"spacey\" but has notice a decrease in this after reducing his dose from 700mg to 600mg bid. He is interested in continued tapering of the oxcarbazepine and possibly increasing his dose of pregabalin in the near future. Overall he feel his facial pain has not worsened but could be improved with medication adjustment.    Patient reports concerns about starting OTC seasonal allergy medication, which he had taken prior to the stroke.     Physical Exam  MSK  Strength  -biceps/triceps: 5/5 bilaterally  -hip flexors: 5/5 bilaterally  -knee extensors: 5/5 L, 3/5 R (limited by brace)  -knee flexors: 5/5 L. 3/5 R (limited by brace)  -dorsiflexors: 5/5 L, 3/5 R (limited by brace)  Tone  -RUE: normal, smooth movement  -LUE: increased spasticity    Assessment/Plan  Mr. Mello is a 63 yo male with a remote history of stroke with residual right sided weakness and julieta-spasticity, presents for a 6 month follow up for atypical facial pain and stroke management. Patient overall is progressing in the right direction, but requires medication adjustment.    History of Stroke  -continue stroke preventative measure including statin medication, glycemic and BP (goal: <130/80) " control  -bloodwork: lipid panel (last done 5/2024)   -goal: LDL <70mg/dL  -continue to monitor for signs of stroke (painless vision loss, difficulty speaking or swallowing, new weakness/numbness to one side of the face or body). If patient experiences any of these symptoms, he should report to the ER for further evaluation and workup.    Atypical Facial Pain  -decrease oxcarbazepine to 300mg qam and 600mg qhs x 2 weeks. If no side effects or worsening of facial pain decrease total dose to 300mg  bid  -continue pregabalin 100mg qam, 50mg qd, 100mg qhs  -continue duloxetine 80mg daily    Follow Up  -call MD with update in 1 month  -RTC in 6 months

## 2025-04-01 NOTE — PROGRESS NOTES
Name: Jimmy Mello      : 1960      MRN: [id number]  Encounter Provider: Trey Del Real MD  Encounter Date: 2025   Encounter department: NEUROLOGY [facility]  :  Assessment & Plan  History of stroke  Patient Instructions   Stroke: Dank presents for a follow-up evaluation with regard to his prior stroke and spastic hemiparesis along with atypical facial pain.  At this point, his stroke symptoms are reasonably stable.  He continues to work with the physical medicine and rehab group with regard to control of his Emiliano spasticity.  We will continue to work with him in terms of stroke risk factors.  -For the time being he should continue his current combination of aspirin, statin, and appropriate blood pressure and glycemic control  -For up to a week I would suggest checking blood pressure perhaps once per day.  The numbers can be reported to his primary care team.  We would recommend targeting a blood pressure less than 130/80 most of the time  -I will give him a prescription to have his cholesterol checked.  Ideally we would recommend an LDL cholesterol of less than 70.  He should also maintain a hemoglobin A1c of less than 7%  -I would like for him to stay physically active on a regular basis and is much as he feels capable of doing so  -With regard to his facial pain we will plan to continue Lyrica and Cymbalta at his currently prescribed dose.  He should reduce the morning dose of oxcarbazepine to 1/2 tablet and maintain 1 full tablet at night for 10 days to 2 weeks.  If he notes no change in his pain level he can then reduce further to 1/2 tablet twice per day for 10 days to 2 weeks.  -Depending on how he does with the reduced dose of oxcarbazepine we will consider further reductions of the dose versus stabilization.  -Once his regimen is overall stable consideration could be given to transitioning to longer acting Lyrica    I will plan for him to return to the office in 5 months time  "but I would be happy to work with him in the interval.  I would like to hear from him in no more than 1 month to let me know how things are going on the lower dose of oxcarbazepine.  If he were to have new stroke symptoms such as sudden painless loss of vision or double vision, difficulty speaking or swallowing, vertigo/room spinning that does not quickly resolve, or weakness/numbness/loss of coordination affecting 1 side of the face or body he should proceed by ambulance to the nearest emergency room immediately.    Orders:  •  Lipid Panel with Direct LDL reflex; Future    Primary hypertension         Central pain syndrome         Right facial pain               History of Present Illness   HPI   I reviewed, personally confirmed, and agree with the history as documented by the medical student.  Documentation reflects my recommended edits      Mr. Mello is a 63 yo male with a history of stroke. He reports no episodes of unusual bleeding or bruising with taking aspirin. Patient has stable right sided residual weakness/julieta-spasticity as a result of his stroke. He denies any recent signs and symptoms of stroke including painless vision loss, difficulty speaking or swallowing, new weakness/numbness to one side of the face or body. He repots a recent hospitalization 2 weeks ago for pneumonia is has recovered fully.      Regarding his atypical facial pain he no longer has shooting pain, but has extreme sensitivity when the episodes do occur. The episodes happen at random times throughout the day and night. Patient reports his oxcarbazepine causing him to feel \"spacey\" but has notice a decrease in this after reducing his dose from 700mg to 600mg bid. He is interested in continued tapering of the oxcarbazepine and possibly increasing his dose of pregabalin in the near future. Overall he feel his facial pain has not worsened but could be improved with medication adjustment.     He asked about resuming over-the-counter " "allergy medications.  He was told to avoid medications such as pseudoephedrine, but that otherwise yes he could take allergy medicine if need be.       Review of Systems   Constitutional:  Negative for appetite change, fatigue and fever.   HENT: Negative.  Negative for hearing loss, tinnitus, trouble swallowing and voice change.    Eyes: Negative.  Negative for photophobia, pain and visual disturbance.   Respiratory: Negative.  Negative for shortness of breath.    Cardiovascular: Negative.  Negative for palpitations.   Gastrointestinal: Negative.  Negative for nausea and vomiting.   Endocrine: Negative.  Negative for cold intolerance.   Genitourinary: Negative.  Negative for dysuria, frequency and urgency.   Musculoskeletal:  Negative for back pain, gait problem, myalgias, neck pain and neck stiffness.   Skin: Negative.  Negative for rash.   Allergic/Immunologic: Negative.    Neurological:  Negative for dizziness, tremors, seizures, syncope, facial asymmetry, speech difficulty, weakness, light-headedness, numbness and headaches.        Facial Pain continues   Hematological: Negative.  Does not bruise/bleed easily.   Psychiatric/Behavioral: Negative.  Negative for confusion, hallucinations and sleep disturbance.     I have personally reviewed the MA's review of systems and made changes as necessary.         Objective   /78 (BP Location: Left arm, Patient Position: Sitting, Cuff Size: Large)   Pulse 57   Ht 5' 9\" (1.753 m)   Wt 91.2 kg (201 lb)   SpO2 97%   BMI 29.68 kg/m²     Physical Exam  Neurological Exam    I was present for, directly observed, and agree with the exam as documented by the medical student.  Documentation below reflects my recommended edits.    Physical Exam  At the time of our evaluation he was awake and alert.  He was in no obvious distress.  There was minimal if any dysarthria.  His face was slightly asymmetric.  Affect was  mildly flat    Strength  -biceps/triceps: 5/5 " bilaterally  -hip flexors: 5/5 bilaterally  -knee extensors: 5/5 L, 3/5 R (limited by brace)  -knee flexors: 5/5 L. 3/5 R (limited by brace)  -dorsiflexors: 5/5 L, 3/5 R (limited by brace)  Tone  -RUE: normal, smooth movement  -LUE: increased spasticity

## 2025-04-01 NOTE — PATIENT INSTRUCTIONS
Stroke: Dank presents for a follow-up evaluation with regard to his prior stroke and spastic hemiparesis along with atypical facial pain.  At this point, his stroke symptoms are reasonably stable.  He continues to work with the physical medicine and rehab group with regard to control of his Emiliano spasticity.  We will continue to work with him in terms of stroke risk factors.  -For the time being he should continue his current combination of aspirin, statin, and appropriate blood pressure and glycemic control  -For up to a week I would suggest checking blood pressure perhaps once per day.  The numbers can be reported to his primary care team.  We would recommend targeting a blood pressure less than 130/80 most of the time  -I will give him a prescription to have his cholesterol checked.  Ideally we would recommend an LDL cholesterol of less than 70.  He should also maintain a hemoglobin A1c of less than 7%  -I would like for him to stay physically active on a regular basis and is much as he feels capable of doing so  -With regard to his facial pain we will plan to continue Lyrica and Cymbalta at his currently prescribed dose.  He should reduce the morning dose of oxcarbazepine to 1/2 tablet and maintain 1 full tablet at night for 10 days to 2 weeks.  If he notes no change in his pain level he can then reduce further to 1/2 tablet twice per day for 10 days to 2 weeks.  -Depending on how he does with the reduced dose of oxcarbazepine we will consider further reductions of the dose versus stabilization.  -Once his regimen is overall stable consideration could be given to transitioning to longer acting Lyrica    I will plan for him to return to the office in 5 months time but I would be happy to work with him in the interval.  I would like to hear from him in no more than 1 month to let me know how things are going on the lower dose of oxcarbazepine.  If he were to have new stroke symptoms such as sudden painless loss of  vision or double vision, difficulty speaking or swallowing, vertigo/room spinning that does not quickly resolve, or weakness/numbness/loss of coordination affecting 1 side of the face or body he should proceed by ambulance to the nearest emergency room immediately.

## 2025-04-02 NOTE — ASSESSMENT & PLAN NOTE
Patient Instructions   Stroke: Dank presents for a follow-up evaluation with regard to his prior stroke and spastic hemiparesis along with atypical facial pain.  At this point, his stroke symptoms are reasonably stable.  He continues to work with the physical medicine and rehab group with regard to control of his Emiliano spasticity.  We will continue to work with him in terms of stroke risk factors.  -For the time being he should continue his current combination of aspirin, statin, and appropriate blood pressure and glycemic control  -For up to a week I would suggest checking blood pressure perhaps once per day.  The numbers can be reported to his primary care team.  We would recommend targeting a blood pressure less than 130/80 most of the time  -I will give him a prescription to have his cholesterol checked.  Ideally we would recommend an LDL cholesterol of less than 70.  He should also maintain a hemoglobin A1c of less than 7%  -I would like for him to stay physically active on a regular basis and is much as he feels capable of doing so  -With regard to his facial pain we will plan to continue Lyrica and Cymbalta at his currently prescribed dose.  He should reduce the morning dose of oxcarbazepine to 1/2 tablet and maintain 1 full tablet at night for 10 days to 2 weeks.  If he notes no change in his pain level he can then reduce further to 1/2 tablet twice per day for 10 days to 2 weeks.  -Depending on how he does with the reduced dose of oxcarbazepine we will consider further reductions of the dose versus stabilization.  -Once his regimen is overall stable consideration could be given to transitioning to longer acting Lyrica    I will plan for him to return to the office in 5 months time but I would be happy to work with him in the interval.  I would like to hear from him in no more than 1 month to let me know how things are going on the lower dose of oxcarbazepine.  If he were to have new stroke symptoms such as  sudden painless loss of vision or double vision, difficulty speaking or swallowing, vertigo/room spinning that does not quickly resolve, or weakness/numbness/loss of coordination affecting 1 side of the face or body he should proceed by ambulance to the nearest emergency room immediately.    Orders:  •  Lipid Panel with Direct LDL reflex; Future

## 2025-04-04 ENCOUNTER — TELEPHONE (OUTPATIENT)
Dept: NEUROLOGY | Facility: CLINIC | Age: 65
End: 2025-04-04

## 2025-04-04 DIAGNOSIS — G50.0 TRIGEMINAL NEURALGIA OF RIGHT SIDE OF FACE: ICD-10-CM

## 2025-04-04 NOTE — TELEPHONE ENCOUNTER
Received new prescription request, via [employer] from [facility], forms have been scanned into the patients chart.  
5

## 2025-04-04 NOTE — TELEPHONE ENCOUNTER
Received new prescription request from [facility] via [employer], forms have been scanned into the patients chart.

## 2025-04-07 DIAGNOSIS — I65.02 STENOSIS OF LEFT VERTEBRAL ARTERY: ICD-10-CM

## 2025-04-07 DIAGNOSIS — I77.4 CELIAC ARTERY STENOSIS (HCC): ICD-10-CM

## 2025-04-07 DIAGNOSIS — G89.29 CENTRAL SENSITIZATION TO PAIN: ICD-10-CM

## 2025-04-07 DIAGNOSIS — I77.74 VERTEBRAL ARTERY DISSECTION (HCC): ICD-10-CM

## 2025-04-07 DIAGNOSIS — F43.21 ADJUSTMENT DISORDER WITH DEPRESSED MOOD: ICD-10-CM

## 2025-04-07 DIAGNOSIS — G50.0 TRIGEMINAL NEURALGIA OF RIGHT SIDE OF FACE: ICD-10-CM

## 2025-04-07 DIAGNOSIS — I63.9 CEREBROVASCULAR ACCIDENT (CVA) (HCC): ICD-10-CM

## 2025-04-07 DIAGNOSIS — R25.2 SPASTICITY: ICD-10-CM

## 2025-04-07 DIAGNOSIS — G89.0 CENTRAL PAIN SYNDROME: ICD-10-CM

## 2025-04-07 NOTE — TELEPHONE ENCOUNTER
Refill must be reviewed and completed by the office or provider. The refill is unable to be approved or denied by the medication management team.      Patient Id Prescription # Filled Written Drug Label Qty Days Strength MME** Prescriber Pharmacy Payment REFILL #/Auth State Detail   1 [id number] 04/02/2025 01/24/2025 Pregabalin (Capsule) 30.0 30 50 MG NA [facility] Commercial Insurance 2 / 2 PA    1 [id number] 03/04/2025 01/06/2025 Pregabalin (Capsule) 60.0 30 100 MG NA [facility] Commercial Insurance 2 / 2 PA    1 [id number] 02/28/2025 01/24/2025 Pregabalin (Capsule) 30.0 30 50 MG NA [facility] Private Pay 1 / 2 PA    1 [id number] 02/02/2025 01/06/2025 Pregabalin (Capsule) 60.0 30 100 MG RABIA ALEJO [facility] Commercial Insurance 1 / 2 PA

## 2025-04-08 ENCOUNTER — TELEPHONE (OUTPATIENT)
Dept: NEUROLOGY | Facility: CLINIC | Age: 65
End: 2025-04-08

## 2025-04-08 DIAGNOSIS — I77.4 CELIAC ARTERY STENOSIS (HCC): ICD-10-CM

## 2025-04-08 DIAGNOSIS — I63.9 CEREBROVASCULAR ACCIDENT (CVA) (HCC): ICD-10-CM

## 2025-04-08 DIAGNOSIS — R25.2 SPASTICITY: ICD-10-CM

## 2025-04-08 DIAGNOSIS — G89.0 CENTRAL PAIN SYNDROME: ICD-10-CM

## 2025-04-08 DIAGNOSIS — I77.74 VERTEBRAL ARTERY DISSECTION (HCC): ICD-10-CM

## 2025-04-08 DIAGNOSIS — I65.02 STENOSIS OF LEFT VERTEBRAL ARTERY: ICD-10-CM

## 2025-04-08 DIAGNOSIS — F43.21 ADJUSTMENT DISORDER WITH DEPRESSED MOOD: ICD-10-CM

## 2025-04-08 DIAGNOSIS — G89.29 CENTRAL SENSITIZATION TO PAIN: ICD-10-CM

## 2025-04-08 RX ORDER — DULOXETIN HYDROCHLORIDE 60 MG/1
60 CAPSULE, DELAYED RELEASE ORAL DAILY
Qty: 90 CAPSULE | Refills: 1 | Status: SHIPPED | OUTPATIENT
Start: 2025-04-08 | End: 2025-04-10 | Stop reason: SDUPTHER

## 2025-04-08 RX ORDER — PREGABALIN 100 MG/1
CAPSULE ORAL
Qty: 60 CAPSULE | Refills: 2 | Status: SHIPPED | OUTPATIENT
Start: 2025-04-08

## 2025-04-08 RX ORDER — ATORVASTATIN CALCIUM 80 MG/1
80 TABLET, FILM COATED ORAL
Qty: 90 TABLET | Refills: 1 | Status: SHIPPED | OUTPATIENT
Start: 2025-04-08

## 2025-04-08 RX ORDER — ATORVASTATIN CALCIUM 80 MG/1
80 TABLET, FILM COATED ORAL
Qty: 90 TABLET | Refills: 1 | OUTPATIENT
Start: 2025-04-08

## 2025-04-08 NOTE — TELEPHONE ENCOUNTER
Patient Id Prescription # Filled Written Drug Label Qty Days Strength MME** Prescriber Pharmacy Payment REFILL #/Auth State Detail  1 [id number] 04/02/2025 01/24/2025 [facility]reg[facility]alin (Capsule) 30.0 30 50 [facility] Commercial Insurance 2 / 2 PA   1 [id number] 03/04/2025 01/06/2025 Pregabalin (Capsule) 60.0 30 100 [facility] Commercial Insurance 2 / 2 PA   1 [id number] 02/28/2025 01/24/2025 Pregabalin (Capsule) 30.0 30 50 MG [facility] Private Pay 1 / 2 PA   1 [id number] 02/02/2025 01/06/2025 Pregabalin (Capsule) 60.0 30 100 MG [facility] Commercial Insurance 1 / 2 PA   1 [id number] 01/24/2025 01/24/2025 Pregabalin (Capsule) 30.0 30 50 MG [facility] Commercial Insurance 0 / 2 PA   1 [id number] 01/06/2025 01/06/2025 Pregabalin (Capsule) 60.0 30 100 MG [facility] Commercial Insurance 0 / 2 PA   1 [id number] 12/18/2024 12/18/2024 Pregabalin (Capsule) 30.0 30 50 MG [facility] Commercial Insurance 0 / 1 PA   1 [id number] 12/06/2024 10/01/2024 Pregabalin (Capsule) 60.0 30 100 MG NA [facility] Commercial Insurance 2 / 2 PA   1 [id number] 11/16/2024 10/01/2024 Pregabalin (Capsule) 30.0 30 50 MG [facility] Commercial Insurance 1 / 1 PA   1 [id number] 11/03/2024 10/01/2024 Pregabalin (Capsule) 60.0 30 100 MG NA LARRY ALEJO [facility] Commercial Insurance 1 / 2 PA

## 2025-04-08 NOTE — TELEPHONE ENCOUNTER
Received fax in [facility] for Medication Refill Baclofen, Duloxetine, & Atorvastatin

## 2025-04-09 RX ORDER — BACLOFEN 10 MG/1
10 TABLET ORAL 3 TIMES DAILY
Qty: 30 TABLET | Refills: 1 | Status: SHIPPED | OUTPATIENT
Start: 2025-04-09

## 2025-04-09 RX ORDER — DULOXETIN HYDROCHLORIDE 60 MG/1
60 CAPSULE, DELAYED RELEASE ORAL DAILY
Qty: 90 CAPSULE | Refills: 1 | OUTPATIENT
Start: 2025-04-09

## 2025-04-09 RX ORDER — BACLOFEN 10 MG/1
10 TABLET ORAL 3 TIMES DAILY
Qty: 20 TABLET | Refills: 1 | Status: SHIPPED | OUTPATIENT
Start: 2025-04-09

## 2025-04-09 RX ORDER — PREGABALIN 50 MG/1
CAPSULE ORAL
Qty: 30 CAPSULE | Refills: 0 | Status: SHIPPED | OUTPATIENT
Start: 2025-04-09

## 2025-04-10 ENCOUNTER — TELEPHONE (OUTPATIENT)
Dept: NEUROLOGY | Facility: CLINIC | Age: 65
End: 2025-04-10

## 2025-04-10 DIAGNOSIS — I77.4 CELIAC ARTERY STENOSIS (HCC): ICD-10-CM

## 2025-04-10 DIAGNOSIS — G89.29 CENTRAL SENSITIZATION TO PAIN: ICD-10-CM

## 2025-04-10 DIAGNOSIS — I63.9 CEREBROVASCULAR ACCIDENT (CVA) (HCC): ICD-10-CM

## 2025-04-10 DIAGNOSIS — F43.21 ADJUSTMENT DISORDER WITH DEPRESSED MOOD: ICD-10-CM

## 2025-04-10 DIAGNOSIS — I65.02 STENOSIS OF LEFT VERTEBRAL ARTERY: ICD-10-CM

## 2025-04-10 DIAGNOSIS — G50.0 TRIGEMINAL NEURALGIA: ICD-10-CM

## 2025-04-10 DIAGNOSIS — G89.0 CENTRAL PAIN SYNDROME: ICD-10-CM

## 2025-04-10 DIAGNOSIS — I77.74 VERTEBRAL ARTERY DISSECTION (HCC): ICD-10-CM

## 2025-04-10 RX ORDER — ATORVASTATIN CALCIUM 80 MG/1
80 TABLET, FILM COATED ORAL
Qty: 90 TABLET | Refills: 1 | Status: CANCELLED | OUTPATIENT
Start: 2025-04-10

## 2025-04-10 NOTE — TELEPHONE ENCOUNTER
Received via [facility] from [facility] refill request for Oxcarbazepine 600 mg tablet.  Also received on this fax were refills for Atorvastatin 80 mg tablet and Duloxetine 60 mg capsule delayed release, which appear to be being address is separate encounters..

## 2025-04-11 RX ORDER — DULOXETIN HYDROCHLORIDE 60 MG/1
60 CAPSULE, DELAYED RELEASE ORAL DAILY
Qty: 90 CAPSULE | Refills: 1 | Status: SHIPPED | OUTPATIENT
Start: 2025-04-11

## 2025-04-11 RX ORDER — OXCARBAZEPINE 600 MG/1
600 TABLET, FILM COATED ORAL 2 TIMES DAILY
Qty: 180 TABLET | Refills: 1 | Status: SHIPPED | OUTPATIENT
Start: 2025-04-11

## 2025-04-15 NOTE — TELEPHONE ENCOUNTER
Received via [employer] from [facility] Clarification needed regarding Baclofen.  Request scanned into [employer].

## 2025-04-16 DIAGNOSIS — R25.2 SPASTICITY: ICD-10-CM

## 2025-04-16 RX ORDER — BACLOFEN 10 MG/1
10 TABLET ORAL 3 TIMES DAILY
Qty: 30 TABLET | Refills: 0 | OUTPATIENT
Start: 2025-04-16

## 2025-04-16 NOTE — TELEPHONE ENCOUNTER
Reason for call:   [x] Refill   [] Prior Auth  [] Other:     Office:   [] PCP/Provider -   [x] Specialty/Provider - neuro    Medication: baclofen 10 mg tablet     Dose/Frequency: lindsay 1 tablet (10 mg total) by mouth 3 (three) times a day Taking 1/2 tab (5 mg ) three times a day,     Quantity: 30    Pharmacy: [facility] - [address] [facility]   Does the patient have enough for 3 days?   [] Yes   [] No - Send as HP to POD    Mail Away Pharmacy   Does the patient have enough for 10 days?   [] Yes   [] No - Send as HP to POD

## 2025-04-17 ENCOUNTER — PATIENT MESSAGE (OUTPATIENT)
Dept: NEUROLOGY | Facility: CLINIC | Age: 65
End: 2025-04-17

## 2025-04-19 PROBLEM — A41.9 SEPSIS (HCC): Status: RESOLVED | Noted: 2025-03-20 | Resolved: 2025-04-19

## 2025-04-19 LAB
BASOPHILS # BLD AUTO: 0.1 X10E3/UL (ref 0–0.2)
BASOPHILS NFR BLD AUTO: 1 %
BUN SERPL-MCNC: 31 MG/DL (ref 8–27)
BUN/CREAT SERPL: 22 (ref 10–24)
CALCIUM SERPL-MCNC: 8.6 MG/DL (ref 8.6–10.2)
CHLORIDE SERPL-SCNC: 108 MMOL/L (ref 96–106)
CHOLEST SERPL-MCNC: 155 MG/DL (ref 100–199)
CO2 SERPL-SCNC: 20 MMOL/L (ref 20–29)
CREAT SERPL-MCNC: 1.39 MG/DL (ref 0.76–1.27)
EGFR: 56 ML/MIN/1.73
EOSINOPHIL # BLD AUTO: 0.4 X10E3/UL (ref 0–0.4)
EOSINOPHIL NFR BLD AUTO: 6 %
ERYTHROCYTE [DISTWIDTH] IN BLOOD BY AUTOMATED COUNT: 12.9 % (ref 11.6–15.4)
GLUCOSE SERPL-MCNC: 103 MG/DL (ref 70–99)
HCT VFR BLD AUTO: 37.5 % (ref 37.5–51)
HDLC SERPL-MCNC: 34 MG/DL
HGB BLD-MCNC: 11.9 G/DL (ref 13–17.7)
IMM GRANULOCYTES # BLD: 0 X10E3/UL (ref 0–0.1)
IMM GRANULOCYTES NFR BLD: 1 %
IRON SERPL-MCNC: 67 UG/DL (ref 38–169)
LDLC SERPL CALC-MCNC: 103 MG/DL (ref 0–99)
LDLC/HDLC SERPL: 3 RATIO (ref 0–3.6)
LYMPHOCYTES # BLD AUTO: 1.1 X10E3/UL (ref 0.7–3.1)
LYMPHOCYTES NFR BLD AUTO: 18 %
MCH RBC QN AUTO: 27.1 PG (ref 26.6–33)
MCHC RBC AUTO-ENTMCNC: 31.7 G/DL (ref 31.5–35.7)
MCV RBC AUTO: 85 FL (ref 79–97)
MONOCYTES # BLD AUTO: 0.5 X10E3/UL (ref 0.1–0.9)
MONOCYTES NFR BLD AUTO: 8 %
NEUTROPHILS # BLD AUTO: 4 X10E3/UL (ref 1.4–7)
NEUTROPHILS NFR BLD AUTO: 66 %
PLATELET # BLD AUTO: 201 X10E3/UL (ref 150–450)
POTASSIUM SERPL-SCNC: 5 MMOL/L (ref 3.5–5.2)
RBC # BLD AUTO: 4.39 X10E6/UL (ref 4.14–5.8)
SL AMB VLDL CHOLESTEROL CALC: 18 MG/DL (ref 5–40)
SODIUM SERPL-SCNC: 145 MMOL/L (ref 134–144)
TRIGL SERPL-MCNC: 94 MG/DL (ref 0–149)
WBC # BLD AUTO: 6 X10E3/UL (ref 3.4–10.8)

## 2025-04-21 ENCOUNTER — RESULTS FOLLOW-UP (OUTPATIENT)
Dept: FAMILY MEDICINE CLINIC | Facility: CLINIC | Age: 65
End: 2025-04-21

## 2025-04-24 DIAGNOSIS — G50.0 TRIGEMINAL NEURALGIA OF RIGHT SIDE OF FACE: ICD-10-CM

## 2025-04-24 RX ORDER — PREGABALIN 50 MG/1
CAPSULE ORAL
Qty: 30 CAPSULE | Refills: 4 | Status: SHIPPED | OUTPATIENT
Start: 2025-04-24 | End: 2025-04-24

## 2025-04-24 RX ORDER — PREGABALIN 100 MG/1
100 CAPSULE ORAL 3 TIMES DAILY
Qty: 90 CAPSULE | Refills: 4 | Status: SHIPPED | OUTPATIENT
Start: 2025-04-24

## 2025-04-24 RX ORDER — PREGABALIN 100 MG/1
CAPSULE ORAL
Qty: 60 CAPSULE | Refills: 4 | Status: SHIPPED | OUTPATIENT
Start: 2025-04-24 | End: 2025-04-24

## 2025-05-02 ENCOUNTER — HOSPITAL ENCOUNTER (OUTPATIENT)
Dept: CT IMAGING | Facility: HOSPITAL | Age: 65
Discharge: HOME | End: 2025-05-02
Attending: FAMILY MEDICINE
Payer: COMMERCIAL

## 2025-05-02 DIAGNOSIS — J18.9 MULTIFOCAL PNEUMONIA: ICD-10-CM

## 2025-05-02 DIAGNOSIS — R59.0 HILAR LYMPHADENOPATHY: ICD-10-CM

## 2025-05-02 PROCEDURE — 71250 CT THORAX DX C-: CPT

## 2025-05-06 DIAGNOSIS — R25.2 SPASTICITY: ICD-10-CM

## 2025-05-08 RX ORDER — BACLOFEN 10 MG/1
10 TABLET ORAL 3 TIMES DAILY
Qty: 90 TABLET | Refills: 1 | Status: SHIPPED | OUTPATIENT
Start: 2025-05-08

## 2025-05-08 NOTE — TELEPHONE ENCOUNTER
Trey Del Real MD to Neurology Neurovascular Team 4       5/7/25 11:32 PM  2 different sets of directions in this rx, please check what Dank is actually taking and how helpful it is, any side effects.  thanks  ______________________________________________    Spoke with pt's wife, Ailin. Okay to speak with Ailin per most recent Medical Communication Consent form. Ailin states that pt takes 1 tab TID. He is doing well on the medication and has no side effects. Script adjusted.

## 2025-05-09 ENCOUNTER — TELEPHONE (OUTPATIENT)
Age: 65
End: 2025-05-09

## 2025-05-13 ENCOUNTER — PROCEDURE VISIT (OUTPATIENT)
Age: 65
End: 2025-05-13

## 2025-05-13 VITALS — DIASTOLIC BLOOD PRESSURE: 78 MMHG | SYSTOLIC BLOOD PRESSURE: 142 MMHG | TEMPERATURE: 98.2 F | HEART RATE: 58 BPM

## 2025-05-13 DIAGNOSIS — I69.351 SPASTIC HEMIPARESIS OF RIGHT DOMINANT SIDE AS LATE EFFECT OF CEREBRAL INFARCTION (HCC): Primary | ICD-10-CM

## 2025-05-13 DIAGNOSIS — G89.29 CENTRAL SENSITIZATION TO PAIN: ICD-10-CM

## 2025-05-13 NOTE — PROGRESS NOTES
Review of Systems   Constitutional: Negative.    HENT: Negative.     Respiratory: Negative.     Cardiovascular: Negative.    Gastrointestinal: Negative.    Genitourinary: Negative.    Neurological: Negative.

## 2025-05-13 NOTE — PATIENT INSTRUCTIONS
You have received botulinum toxin injections today.     The skin around the site of injections should be monitored for a couple of days. If redness or swelling occur, the skin should be examined by a health care professional to rule out infection. You can call my office if this occurs.    Please contact our office via [employer] in 2 weeks to report on the effects of the injections or any time with any questions or concerns.     Please note that your next injections should not be scheduled less than 90 days from today.    If your health insurance changes before the next injections, please contact our office as soon as possible so we can submit for a new prior authorization if needed. Please note that we may not be able to perform the injections if your insurance changes and the treatment is not pre-authorized.      Interpolation Flap Text: A decision was made to reconstruct the defect utilizing an interpolation axial flap and a staged reconstruction.  A telfa template was made of the defect.  This telfa template was then used to outline the interpolation flap.  The donor area for the pedicle flap was then injected with anesthesia.  The flap was excised through the skin and subcutaneous tissue down to the layer of the underlying musculature.  The interpolation flap was carefully excised within this deep plane to maintain its blood supply.  The edges of the donor site were undermined.   The donor site was closed in a primary fashion.  The pedicle was then rotated into position and sutured.  Once the tube was sutured into place, adequate blood supply was confirmed with blanching and refill.  The pedicle was then wrapped with xeroform gauze and dressed appropriately with a telfa and gauze bandage to ensure continued blood supply and protect the attached pedicle.

## 2025-05-13 NOTE — ASSESSMENT & PLAN NOTE
Mr. Mello is a very pleasant 64yo M with medical history of cerebellar CVA with R > L stroke burden with additional hypodensities on DWI appreciated in L midbrain, pontine area, and R lower medullary/spinal cord junction. He has had remarkably recovery in regards to strength on his R side, but remains with spasticity resulting in a tendency to plantarflexion, pronator tightness, shoulder tightness, and a dystonic kind of locking that involves both his FDS and EDC.     He did find the last round of toxin particularly helpful for his R fingers. His shoulder has good active ROM still, and he is ok with redistributing some of that toxin to other areas. I increased the dose to his pronator teres (consideration for pronator quadratus in the future). I also increased the dose to his plantarflexors to hopefully help with his tendency to catch his toe.      He is now off Baclofen, and while I think he would benefit, he would like to see how he does off of it.      Oral antispasticity medication: He has taken himself off valium and Baclofen, and would like to see how he does with just botox and activity.   Schedule 500 units. Transfers independently.  PT/OT held until his insurance renews in the new year  Follow-up on 8/12 at 1:45pm for repeat botulinum toxin injections.

## 2025-05-13 NOTE — PROGRESS NOTES
Name: Jimmy Mello      : 1960      MRN: [id number]  Encounter Provider: Ashley L De Padua, MD  Encounter Date: 2025   Encounter department: [facility] [place]  :  Assessment & Plan  Spastic hemiparesis of right dominant side as late effect of cerebral infarction (HCC)       Mr. Mello is a very pleasant 62yo M with medical history of cerebellar CVA with R > L stroke burden with additional hypodensities on DWI appreciated in L midbrain, pontine area, and R lower medullary/spinal cord junction. He has had remarkably recovery in regards to strength on his R side, but remains with spasticity resulting in a tendency to plantarflexion, pronator tightness, shoulder tightness, and a dystonic kind of locking that involves both his FDS and EDC.     He did find the last round of toxin particularly helpful for his R fingers. His shoulder has good active ROM still, and he is ok with redistributing some of that toxin to other areas. I increased the dose to his pronator teres (consideration for pronator quadratus in the future). I also increased the dose to his plantarflexors to hopefully help with his tendency to catch his toe.      He is now off Baclofen, and while I think he would benefit, he would like to see how he does off of it.      Oral antispasticity medication: He has taken himself off valium and Baclofen, and would like to see how he does with just botox and activity.   Schedule 500 units. Transfers independently.  PT/OT held until his insurance renews in the new year  Follow-up on  at 1:45pm for repeat botulinum toxin injections.   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 Has been doing much better.     Assessment & Plan  1. Right spastic hemiparesis.  The patient reports that the previous Botox injections provided relief for approximately 11 weeks. He experienced increased tightness in the right side over the past week or two.  Physical examination revealed a modified Aguilar score of 2 in shoulder abduction, 1+ in elbow flexion, 1 in elbow extension, 2 in pronation, L2 and finger flexion at his FDS, 01 in his wrist flexor, 2 in his knee flexor, and 2 in his plantar flexor and 1+ in his knee extensor. Gait is stiff and hemiparetic, decreased arm swing on the right, decreased heel strike knee stays a little bit flexed throughout. The current plan involves maintaining the same Botox dosage for the ankle muscles and slightly increasing the dose for the shoulder muscles to potentially extend the duration of relief. He will continue taking baclofen 30 mg three times a day to manage muscle tone. If the Achilles tendon pain persists, a referral to a podiatrist or orthopedist will be considered.    Follow-up  The patient will follow up on 08/2025 for repeat Botox injections.    PROCEDURE  Botox injections were administered today for right spastic hemiparesis.      History of Present Illness   History of Present Illness  The patient is a 64-year-old male who presents for repeat Botox injections for right spastic hemiparesis. Here with wife, Ailin.    The primary reason for this visit is the recurrence of muscle tightness over the past 1 to 2 weeks following previous Botox treatment. He describes his gait as choppy, with difficulty achieving a proper heel strike. The Botox treatment previously improved the locking of his fingers, although they remain stiff. Significant discomfort in his arm and shoulder was alleviated by the Botox treatment. He continues to take baclofen 10 mg 3 times daily and is currently on Lyrica and Cymbalta, which he tolerates well. He reports no issues with driving (though limits his driving), swallowing, or appetite. He is not undergoing therapy and does not require assistance with dressing, bathing, or showering. No new weakness is reported.     He has experienced two falls, one resulting in an emergency room visit due  to a backward fall down the stairs, and another in 03/2025 due to a missed step while walking backwards. He was hospitalized for pneumonia following the second fall. He reports no fevers or chills since his hospitalization. Breathing is back to normal and he is now on room air.     Facial pain better controlled on Lyrica/Cymbalta. Overall 6/10. Tolerable. They have stopped taking low dose naltrexone.     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     Last seen for chemodenervation: 2/11/2025  Any changes to care since last seen: patient went to the ER on 3/5 for fall with head injury. CTH without acute findings and was discharged home. He was admitted to the hospital fro m3/20-3/24 with a pneumonia and sepsis and was placed on abx. He did require O2, during this hospitalization, had an BRAULIO. This all resolved. He was discharged with outpatient PT/OT. He remains on Lyrica and Cymbalta after seeing Neuro on 4/1. They reduced his oxcarbazepine and are working on weaning it yesy.  Safe at home: Yes  Any oral meds: Baclofen 10mg TID  On anticoagulation/blood thinners:  daily  Current functional status:  Independent with mobility, ADLs, and IADLs.       Review of Systems       Objective   /78 (BP Location: Right arm, Patient Position: Sitting, Cuff Size: Standard)   Pulse 58   Temp 98.2 °F (36.8 °C) (Temporal)     Gen: No acute distress, Well-nourished, well-appearing.  HEENT: Moist mucus membranes, Normocephalic/Atraumatic  Cardiovascular: istal pulses palpable  Heme/Extr: No edema  Pulmonary: Non-labored breathing.   : No barnett  GI: Soft, non-tender, non-distended.  MSK: Able to get just about full range of motion with shoulder abduction on the R. Has functional ROM throughout on the R.   Integumentary: Skin is warm, dry.  Psych: Normal mood and affect.   Neuro: AAOx3, Speech is intact. Appropriate to questioning.   MMT:    Strength:   Right  Left  Site  Right  Left  Site    5 5  S Ab: Shoulder Abductors  5  5  HF: Hip Flexors    5 5  EF: Elbow Flexors  5  5 KF: Knee Flexors    5  5  EE: Elbow Extensors  5  5  KE: Knee Extensors    5  5  WE: Wrist Extensors  5  5  DR: Dorsi Flexors    5  5  FF: Finger Flexors  4- 5  PF: Plantar Flexors    4 5  HI: Hand Intrinsics  NT  NT  EHL: Extensor Hallucis Longus     MAS:  Right  Left  Site  Right  Left  Site    2 0  Shoulder 0 0  Hip Adductors   1+ 0  EF: Elbow Flexors  2 0 KF: Knee Flexors    1 0  EE: Elbow Extensors  1+  0  KE: Knee Extensors    1/0 0/0  WF/WE 0  0  DR: Dorsi Flexors    2  0  FF: Finger Flexors  2 0  PF: Plantar Flexors    0  0  HI: Hand Intrinsics  0/0  0/0  Toe Flex/Ex   2 pronator      MAS  0 - no increased tone  1 - slight increase in tone at the end of the ROM  1+ increase in tone at 1/2 the ROM  2 - increase in tone through most the ROM  3 - moderate increase in muscle tone - passive movement difficult  4 - affected parts ridid in flexion or extension    SPASMS observed:   RUE: no   LUE: no  RLE: no   LLE: no        Results  Imaging   - CT head: No acute injuries   - CT cervical spine: No acute injuries   - CT facial bones: No acute osseous injuries   - CT abdomen and pelvis: Unremarkable   - Doppler of LE: Negative for clots        Botulinum Toxin Injection Procedure    Pre-operative diagnosis: Spasticity    Post-operative diagnosis: Same    Procedure: Chemodenervation    After risks and benefits were explained including bleeding, infection, worsening of pain, damage to the areas being injected, weakness of muscles, loss of muscle control, dysphagia if injecting the head or neck, facial droop if injecting the facial area, painful injection, allergic or other reaction to the medications being injected, and the failure of the procedure to help the problem, a signed consent was obtained on 05/13/2025     The patient was placed in a seated position and the sites to be  treated were identified. A time out was called and performed. The area to be treated was prepped three times with alcohol and the alcohol allowed to dry. Next, a 25 gauge, 50mm disposable electrode needle was used to inject the medication in the area to be treated.    Guidance: EMG  Area(s) injected:    Muscle Dose (units) # Sites Technique       EMG   R Pectoralis 75  3 EMG   R FDS 40  2 EMG   R Pronator Teres 50  2 EMG   R Med Gastroc 100  4 EMG   R Lat Gastroc 100  4 EMG   R Soleus 75  3 EMG       EMG         EMG         EMG         EMG    Waste 60   EMG         EMG        Medications used: 500 units of botox diluted in 5 mL of preservative free saline    See MAR for Lot/Exp    The patient did tolerate the procedure well. There were no complications.    The following portions of the patient's history were reviewed and updated as appropriate: allergies, current medications, past family history, past medical history, past social history, past surgical history and problem list.

## 2025-05-29 ENCOUNTER — RESULTS FOLLOW-UP (OUTPATIENT)
Dept: NEUROLOGY | Facility: CLINIC | Age: 65
End: 2025-05-29

## 2025-05-29 DIAGNOSIS — I10 PRIMARY HYPERTENSION: ICD-10-CM

## 2025-05-29 DIAGNOSIS — E78.5 DYSLIPIDEMIA: Primary | ICD-10-CM

## 2025-05-30 RX ORDER — LOSARTAN POTASSIUM 100 MG/1
100 TABLET ORAL DAILY
Qty: 90 TABLET | Refills: 1 | Status: SHIPPED | OUTPATIENT
Start: 2025-05-30 | End: 2025-06-08

## 2025-05-30 RX ORDER — HYDRALAZINE HYDROCHLORIDE 50 MG/1
50 TABLET, FILM COATED ORAL 2 TIMES DAILY
Qty: 180 TABLET | Refills: 1 | Status: SHIPPED | OUTPATIENT
Start: 2025-05-30

## 2025-05-30 NOTE — RESULT ENCOUNTER NOTE
Martinez Weems,    I ordered a lipid panel and metabolic panel for you in a month from now to see where the cholesterol is.  Thanks    Dr QUIGLEY

## 2025-05-31 DIAGNOSIS — D64.9 ANEMIA, UNSPECIFIED TYPE: ICD-10-CM

## 2025-06-01 RX ORDER — ACETAMINOPHEN/DIPHENHYDRAMINE 500MG-25MG
1000 TABLET ORAL DAILY
Qty: 90 TABLET | Refills: 1 | Status: SHIPPED | OUTPATIENT
Start: 2025-06-01

## 2025-06-06 ENCOUNTER — HOSPITAL ENCOUNTER (INPATIENT)
Facility: HOSPITAL | Age: 65
LOS: 1 days | Discharge: HOME | End: 2025-06-08
Attending: EMERGENCY MEDICINE | Admitting: FAMILY MEDICINE
Payer: COMMERCIAL

## 2025-06-06 DIAGNOSIS — N17.9 ACUTE KIDNEY INJURY SUPERIMPOSED ON CHRONIC KIDNEY DISEASE  (HCC): ICD-10-CM

## 2025-06-06 DIAGNOSIS — R55 SYNCOPE AND COLLAPSE: Primary | ICD-10-CM

## 2025-06-06 DIAGNOSIS — N18.9 ACUTE KIDNEY INJURY SUPERIMPOSED ON CHRONIC KIDNEY DISEASE  (HCC): ICD-10-CM

## 2025-06-06 LAB
ALBUMIN SERPL BCG-MCNC: 3.9 G/DL (ref 3.5–5)
ALP SERPL-CCNC: 104 U/L (ref 34–104)
ALT SERPL W P-5'-P-CCNC: 27 U/L (ref 7–52)
ANION GAP SERPL CALCULATED.3IONS-SCNC: 7 MMOL/L (ref 4–13)
AST SERPL W P-5'-P-CCNC: 24 U/L (ref 13–39)
ATRIAL RATE: 63 BPM
BASOPHILS # BLD AUTO: 0.04 THOUSANDS/ÂΜL (ref 0–0.1)
BASOPHILS NFR BLD AUTO: 1 % (ref 0–1)
BILIRUB SERPL-MCNC: 0.39 MG/DL (ref 0.2–1)
BUN SERPL-MCNC: 42 MG/DL (ref 5–25)
CALCIUM SERPL-MCNC: 8 MG/DL (ref 8.4–10.2)
CARDIAC TROPONIN I PNL SERPL HS: 12 NG/L (ref ?–50)
CHLORIDE SERPL-SCNC: 107 MMOL/L (ref 96–108)
CO2 SERPL-SCNC: 26 MMOL/L (ref 21–32)
CREAT SERPL-MCNC: 2.34 MG/DL (ref 0.6–1.3)
EOSINOPHIL # BLD AUTO: 0.29 THOUSAND/ÂΜL (ref 0–0.61)
EOSINOPHIL NFR BLD AUTO: 4 % (ref 0–6)
ERYTHROCYTE [DISTWIDTH] IN BLOOD BY AUTOMATED COUNT: 13 % (ref 11.6–15.1)
GFR SERPL CREATININE-BSD FRML MDRD: 28 ML/MIN/1.73SQ M
GLUCOSE SERPL-MCNC: 101 MG/DL (ref 65–140)
HCT VFR BLD AUTO: 35.2 % (ref 36.5–49.3)
HGB BLD-MCNC: 11.2 G/DL (ref 12–17)
IMM GRANULOCYTES # BLD AUTO: 0.02 THOUSAND/UL (ref 0–0.2)
IMM GRANULOCYTES NFR BLD AUTO: 0 % (ref 0–2)
LYMPHOCYTES # BLD AUTO: 1.06 THOUSANDS/ÂΜL (ref 0.6–4.47)
LYMPHOCYTES NFR BLD AUTO: 16 % (ref 14–44)
MCH RBC QN AUTO: 28.1 PG (ref 26.8–34.3)
MCHC RBC AUTO-ENTMCNC: 31.8 G/DL (ref 31.4–37.4)
MCV RBC AUTO: 88 FL (ref 82–98)
MONOCYTES # BLD AUTO: 0.43 THOUSAND/ÂΜL (ref 0.17–1.22)
MONOCYTES NFR BLD AUTO: 6 % (ref 4–12)
NEUTROPHILS # BLD AUTO: 4.86 THOUSANDS/ÂΜL (ref 1.85–7.62)
NEUTS SEG NFR BLD AUTO: 73 % (ref 43–75)
NRBC BLD AUTO-RTO: 0 /100 WBCS
P AXIS: 32 DEGREES
PLATELET # BLD AUTO: 206 THOUSANDS/UL (ref 149–390)
PMV BLD AUTO: 9.4 FL (ref 8.9–12.7)
POTASSIUM SERPL-SCNC: 4.5 MMOL/L (ref 3.5–5.3)
PR INTERVAL: 210 MS
PROT SERPL-MCNC: 6.3 G/DL (ref 6.4–8.4)
QRS AXIS: -22 DEGREES
QRSD INTERVAL: 90 MS
QT INTERVAL: 428 MS
QTC INTERVAL: 437 MS
RBC # BLD AUTO: 3.98 MILLION/UL (ref 3.88–5.62)
SODIUM SERPL-SCNC: 140 MMOL/L (ref 135–147)
T WAVE AXIS: 24 DEGREES
TSH SERPL DL<=0.05 MIU/L-ACNC: 0.82 UIU/ML (ref 0.45–4.5)
VENTRICULAR RATE: 63 BPM
WBC # BLD AUTO: 6.7 THOUSAND/UL (ref 4.31–10.16)

## 2025-06-06 PROCEDURE — 93005 ELECTROCARDIOGRAM TRACING: CPT

## 2025-06-06 PROCEDURE — 84443 ASSAY THYROID STIM HORMONE: CPT | Performed by: EMERGENCY MEDICINE

## 2025-06-06 PROCEDURE — 85025 COMPLETE CBC W/AUTO DIFF WBC: CPT | Performed by: EMERGENCY MEDICINE

## 2025-06-06 PROCEDURE — 93010 ELECTROCARDIOGRAM REPORT: CPT | Performed by: INTERNAL MEDICINE

## 2025-06-06 PROCEDURE — 36415 COLL VENOUS BLD VENIPUNCTURE: CPT | Performed by: EMERGENCY MEDICINE

## 2025-06-06 PROCEDURE — 99284 EMERGENCY DEPT VISIT MOD MDM: CPT

## 2025-06-06 PROCEDURE — 96360 HYDRATION IV INFUSION INIT: CPT

## 2025-06-06 PROCEDURE — 84484 ASSAY OF TROPONIN QUANT: CPT | Performed by: EMERGENCY MEDICINE

## 2025-06-06 PROCEDURE — 80053 COMPREHEN METABOLIC PANEL: CPT | Performed by: EMERGENCY MEDICINE

## 2025-06-06 PROCEDURE — 99285 EMERGENCY DEPT VISIT HI MDM: CPT | Performed by: EMERGENCY MEDICINE

## 2025-06-06 RX ADMIN — SODIUM CHLORIDE 1000 ML: 0.9 INJECTION, SOLUTION INTRAVENOUS at 22:46

## 2025-06-07 PROBLEM — N18.9 ACUTE KIDNEY INJURY SUPERIMPOSED ON CHRONIC KIDNEY DISEASE  (HCC): Status: ACTIVE | Noted: 2023-02-25

## 2025-06-07 LAB
2HR DELTA HS TROPONIN: 0 NG/L
ANION GAP SERPL CALCULATED.3IONS-SCNC: 8 MMOL/L (ref 4–13)
BASOPHILS # BLD AUTO: 0.04 THOUSANDS/ÂΜL (ref 0–0.1)
BASOPHILS NFR BLD AUTO: 1 % (ref 0–1)
BUN SERPL-MCNC: 36 MG/DL (ref 5–25)
CALCIUM SERPL-MCNC: 8.3 MG/DL (ref 8.4–10.2)
CARDIAC TROPONIN I PNL SERPL HS: 12 NG/L (ref ?–50)
CHLORIDE SERPL-SCNC: 109 MMOL/L (ref 96–108)
CO2 SERPL-SCNC: 25 MMOL/L (ref 21–32)
CREAT SERPL-MCNC: 1.6 MG/DL (ref 0.6–1.3)
EOSINOPHIL # BLD AUTO: 0.25 THOUSAND/ÂΜL (ref 0–0.61)
EOSINOPHIL NFR BLD AUTO: 4 % (ref 0–6)
ERYTHROCYTE [DISTWIDTH] IN BLOOD BY AUTOMATED COUNT: 12.9 % (ref 11.6–15.1)
GFR SERPL CREATININE-BSD FRML MDRD: 44 ML/MIN/1.73SQ M
GLUCOSE SERPL-MCNC: 99 MG/DL (ref 65–140)
HCT VFR BLD AUTO: 38.8 % (ref 36.5–49.3)
HGB BLD-MCNC: 12.2 G/DL (ref 12–17)
IMM GRANULOCYTES # BLD AUTO: 0.04 THOUSAND/UL (ref 0–0.2)
IMM GRANULOCYTES NFR BLD AUTO: 1 % (ref 0–2)
LYMPHOCYTES # BLD AUTO: 1.31 THOUSANDS/ÂΜL (ref 0.6–4.47)
LYMPHOCYTES NFR BLD AUTO: 19 % (ref 14–44)
MAGNESIUM SERPL-MCNC: 2.6 MG/DL (ref 1.9–2.7)
MCH RBC QN AUTO: 27.9 PG (ref 26.8–34.3)
MCHC RBC AUTO-ENTMCNC: 31.4 G/DL (ref 31.4–37.4)
MCV RBC AUTO: 89 FL (ref 82–98)
MONOCYTES # BLD AUTO: 0.54 THOUSAND/ÂΜL (ref 0.17–1.22)
MONOCYTES NFR BLD AUTO: 8 % (ref 4–12)
NEUTROPHILS # BLD AUTO: 4.92 THOUSANDS/ÂΜL (ref 1.85–7.62)
NEUTS SEG NFR BLD AUTO: 67 % (ref 43–75)
NRBC BLD AUTO-RTO: 0 /100 WBCS
PLATELET # BLD AUTO: 195 THOUSANDS/UL (ref 149–390)
PMV BLD AUTO: 9.6 FL (ref 8.9–12.7)
POTASSIUM SERPL-SCNC: 4 MMOL/L (ref 3.5–5.3)
RBC # BLD AUTO: 4.38 MILLION/UL (ref 3.88–5.62)
SODIUM SERPL-SCNC: 142 MMOL/L (ref 135–147)
WBC # BLD AUTO: 7.1 THOUSAND/UL (ref 4.31–10.16)

## 2025-06-07 PROCEDURE — 83735 ASSAY OF MAGNESIUM: CPT | Performed by: INTERNAL MEDICINE

## 2025-06-07 PROCEDURE — 85025 COMPLETE CBC W/AUTO DIFF WBC: CPT | Performed by: INTERNAL MEDICINE

## 2025-06-07 PROCEDURE — 80048 BASIC METABOLIC PNL TOTAL CA: CPT | Performed by: INTERNAL MEDICINE

## 2025-06-07 PROCEDURE — 99222 1ST HOSP IP/OBS MODERATE 55: CPT | Performed by: INTERNAL MEDICINE

## 2025-06-07 PROCEDURE — 84484 ASSAY OF TROPONIN QUANT: CPT | Performed by: EMERGENCY MEDICINE

## 2025-06-07 PROCEDURE — 99204 OFFICE O/P NEW MOD 45 MIN: CPT | Performed by: INTERNAL MEDICINE

## 2025-06-07 RX ORDER — ASPIRIN 325 MG
325 TABLET ORAL DAILY
Status: CANCELLED | OUTPATIENT
Start: 2025-06-07

## 2025-06-07 RX ORDER — ATORVASTATIN CALCIUM 40 MG/1
80 TABLET, FILM COATED ORAL
Status: CANCELLED | OUTPATIENT
Start: 2025-06-07

## 2025-06-07 RX ORDER — DULOXETIN HYDROCHLORIDE 30 MG/1
60 CAPSULE, DELAYED RELEASE ORAL DAILY
Status: CANCELLED | OUTPATIENT
Start: 2025-06-07

## 2025-06-07 RX ORDER — AMLODIPINE BESYLATE 2.5 MG/1
2.5 TABLET ORAL DAILY
Status: DISCONTINUED | OUTPATIENT
Start: 2025-06-07 | End: 2025-06-07

## 2025-06-07 RX ORDER — SODIUM CHLORIDE 9 MG/ML
75 INJECTION, SOLUTION INTRAVENOUS CONTINUOUS
Status: DISCONTINUED | OUTPATIENT
Start: 2025-06-07 | End: 2025-06-08

## 2025-06-07 RX ORDER — HEPARIN SODIUM 5000 [USP'U]/ML
5000 INJECTION, SOLUTION INTRAVENOUS; SUBCUTANEOUS EVERY 8 HOURS SCHEDULED
Status: DISCONTINUED | OUTPATIENT
Start: 2025-06-07 | End: 2025-06-08 | Stop reason: HOSPADM

## 2025-06-07 RX ORDER — CARVEDILOL 12.5 MG/1
25 TABLET ORAL 2 TIMES DAILY WITH MEALS
Status: CANCELLED | OUTPATIENT
Start: 2025-06-07

## 2025-06-07 RX ORDER — AMLODIPINE BESYLATE 2.5 MG/1
2.5 TABLET ORAL DAILY
Status: CANCELLED | OUTPATIENT
Start: 2025-06-07

## 2025-06-07 RX ORDER — ACETAMINOPHEN 325 MG/1
650 TABLET ORAL EVERY 6 HOURS PRN
Status: DISCONTINUED | OUTPATIENT
Start: 2025-06-07 | End: 2025-06-08 | Stop reason: HOSPADM

## 2025-06-07 RX ORDER — AMLODIPINE BESYLATE 2.5 MG/1
2.5 TABLET ORAL ONCE
Status: COMPLETED | OUTPATIENT
Start: 2025-06-07 | End: 2025-06-07

## 2025-06-07 RX ORDER — FERROUS SULFATE 325(65) MG
325 TABLET ORAL
Status: CANCELLED | OUTPATIENT
Start: 2025-06-07

## 2025-06-07 RX ORDER — HYDRALAZINE HYDROCHLORIDE 20 MG/ML
10 INJECTION INTRAMUSCULAR; INTRAVENOUS EVERY 6 HOURS PRN
Status: DISCONTINUED | OUTPATIENT
Start: 2025-06-07 | End: 2025-06-08

## 2025-06-07 RX ORDER — ASPIRIN 325 MG
325 TABLET ORAL DAILY
Status: DISCONTINUED | OUTPATIENT
Start: 2025-06-07 | End: 2025-06-08 | Stop reason: HOSPADM

## 2025-06-07 RX ORDER — OXCARBAZEPINE 300 MG/1
600 TABLET, FILM COATED ORAL
Status: DISCONTINUED | OUTPATIENT
Start: 2025-06-07 | End: 2025-06-08 | Stop reason: HOSPADM

## 2025-06-07 RX ORDER — PREGABALIN 100 MG/1
100 CAPSULE ORAL 3 TIMES DAILY
Status: DISCONTINUED | OUTPATIENT
Start: 2025-06-07 | End: 2025-06-08 | Stop reason: HOSPADM

## 2025-06-07 RX ORDER — ATORVASTATIN CALCIUM 40 MG/1
80 TABLET, FILM COATED ORAL
Status: DISCONTINUED | OUTPATIENT
Start: 2025-06-07 | End: 2025-06-08 | Stop reason: HOSPADM

## 2025-06-07 RX ORDER — BACLOFEN 10 MG/1
10 TABLET ORAL 3 TIMES DAILY
Status: CANCELLED | OUTPATIENT
Start: 2025-06-07

## 2025-06-07 RX ORDER — CARVEDILOL 12.5 MG/1
25 TABLET ORAL 2 TIMES DAILY WITH MEALS
Status: DISCONTINUED | OUTPATIENT
Start: 2025-06-07 | End: 2025-06-08 | Stop reason: HOSPADM

## 2025-06-07 RX ORDER — BACLOFEN 10 MG/1
10 TABLET ORAL 3 TIMES DAILY
Status: DISCONTINUED | OUTPATIENT
Start: 2025-06-07 | End: 2025-06-08 | Stop reason: HOSPADM

## 2025-06-07 RX ORDER — HYDRALAZINE HYDROCHLORIDE 25 MG/1
50 TABLET, FILM COATED ORAL 2 TIMES DAILY
Status: DISCONTINUED | OUTPATIENT
Start: 2025-06-07 | End: 2025-06-08 | Stop reason: HOSPADM

## 2025-06-07 RX ORDER — AMLODIPINE BESYLATE 5 MG/1
5 TABLET ORAL DAILY
Status: DISCONTINUED | OUTPATIENT
Start: 2025-06-08 | End: 2025-06-08 | Stop reason: HOSPADM

## 2025-06-07 RX ORDER — DULOXETIN HYDROCHLORIDE 30 MG/1
60 CAPSULE, DELAYED RELEASE ORAL DAILY
Status: DISCONTINUED | OUTPATIENT
Start: 2025-06-07 | End: 2025-06-08 | Stop reason: HOSPADM

## 2025-06-07 RX ORDER — LOSARTAN POTASSIUM 50 MG/1
100 TABLET ORAL DAILY
Status: DISCONTINUED | OUTPATIENT
Start: 2025-06-08 | End: 2025-06-08

## 2025-06-07 RX ORDER — OXCARBAZEPINE 300 MG/1
300 TABLET, FILM COATED ORAL DAILY
Status: DISCONTINUED | OUTPATIENT
Start: 2025-06-07 | End: 2025-06-08 | Stop reason: HOSPADM

## 2025-06-07 RX ORDER — FERROUS SULFATE 325(65) MG
325 TABLET ORAL
Status: DISCONTINUED | OUTPATIENT
Start: 2025-06-07 | End: 2025-06-08 | Stop reason: HOSPADM

## 2025-06-07 RX ADMIN — DULOXETINE HYDROCHLORIDE 60 MG: 30 CAPSULE, DELAYED RELEASE ORAL at 08:16

## 2025-06-07 RX ADMIN — SODIUM CHLORIDE 75 ML/HR: 0.9 INJECTION, SOLUTION INTRAVENOUS at 02:10

## 2025-06-07 RX ADMIN — BACLOFEN 10 MG: 10 TABLET ORAL at 02:04

## 2025-06-07 RX ADMIN — HEPARIN SODIUM 5000 UNITS: 5000 INJECTION INTRAVENOUS; SUBCUTANEOUS at 21:32

## 2025-06-07 RX ADMIN — ATORVASTATIN CALCIUM 80 MG: 40 TABLET, FILM COATED ORAL at 15:42

## 2025-06-07 RX ADMIN — HYDRALAZINE HYDROCHLORIDE 10 MG: 20 INJECTION INTRAMUSCULAR; INTRAVENOUS at 15:42

## 2025-06-07 RX ADMIN — AMLODIPINE BESYLATE 2.5 MG: 2.5 TABLET ORAL at 02:04

## 2025-06-07 RX ADMIN — AMLODIPINE BESYLATE 2.5 MG: 2.5 TABLET ORAL at 13:07

## 2025-06-07 RX ADMIN — PREGABALIN 100 MG: 100 CAPSULE ORAL at 08:15

## 2025-06-07 RX ADMIN — HEPARIN SODIUM 5000 UNITS: 5000 INJECTION INTRAVENOUS; SUBCUTANEOUS at 05:57

## 2025-06-07 RX ADMIN — CARVEDILOL 25 MG: 12.5 TABLET, FILM COATED ORAL at 15:42

## 2025-06-07 RX ADMIN — PREGABALIN 100 MG: 100 CAPSULE ORAL at 15:01

## 2025-06-07 RX ADMIN — ASPIRIN 325 MG ORAL TABLET 325 MG: 325 PILL ORAL at 08:16

## 2025-06-07 RX ADMIN — BACLOFEN 10 MG: 10 TABLET ORAL at 15:01

## 2025-06-07 RX ADMIN — HYDRALAZINE HYDROCHLORIDE 50 MG: 25 TABLET ORAL at 08:16

## 2025-06-07 RX ADMIN — HEPARIN SODIUM 5000 UNITS: 5000 INJECTION INTRAVENOUS; SUBCUTANEOUS at 15:01

## 2025-06-07 RX ADMIN — AMLODIPINE BESYLATE 2.5 MG: 2.5 TABLET ORAL at 08:22

## 2025-06-07 RX ADMIN — HYDRALAZINE HYDROCHLORIDE 50 MG: 25 TABLET ORAL at 21:31

## 2025-06-07 RX ADMIN — BACLOFEN 10 MG: 10 TABLET ORAL at 08:22

## 2025-06-07 RX ADMIN — BACLOFEN 10 MG: 10 TABLET ORAL at 21:31

## 2025-06-07 RX ADMIN — PREGABALIN 100 MG: 100 CAPSULE ORAL at 21:31

## 2025-06-07 RX ADMIN — OXCARBAZEPINE 300 MG: 300 TABLET, FILM COATED ORAL at 08:21

## 2025-06-07 RX ADMIN — OXCARBAZEPINE 600 MG: 300 TABLET, FILM COATED ORAL at 21:31

## 2025-06-07 RX ADMIN — FERROUS SULFATE TAB 325 MG (65 MG ELEMENTAL FE) 325 MG: 325 (65 FE) TAB at 08:16

## 2025-06-07 RX ADMIN — SODIUM CHLORIDE 75 ML/HR: 0.9 INJECTION, SOLUTION INTRAVENOUS at 15:31

## 2025-06-07 NOTE — ASSESSMENT & PLAN NOTE
Lab Results   Component Value Date    EGFR 28 06/06/2025    EGFR 56 (L) 04/18/2025    EGFR 57 03/24/2025    CREATININE 2.34 (H) 06/06/2025    CREATININE 1.39 (H) 04/18/2025    CREATININE 1.29 03/24/2025   Creatinine 2.34  Recent baseline appears to be about 1.5  Avoid nephrotoxic agents  IV fluids  Continue to monitor

## 2025-06-07 NOTE — PLAN OF CARE
Problem: Potential for Falls  Goal: Patient will remain free of falls  Description: INTERVENTIONS:  - Educate patient/family on patient safety including physical limitations  - Instruct patient to call for assistance with activity   - Consider consulting OT/PT to assist with strengthening/mobility based on AM PAC & JH-HLM score  - Consult OT/PT to assist with strengthening/mobility   - Keep Call bell within reach  - Keep bed low and locked with side rails adjusted as appropriate  - Keep care items and personal belongings within reach  - Initiate and maintain comfort rounds  - Make Fall Risk Sign visible to staff  - Offer Toileting every 2 Hours, in advance of need  - Initiate/Maintain bed alarm  - Obtain necessary fall risk management equipment: socks  - Apply yellow socks and bracelet for high fall risk patients  - Consider moving patient to room near nurses station  Outcome: Progressing     Problem: PAIN - ADULT  Goal: Verbalizes/displays adequate comfort level or baseline comfort level  Description: Interventions:  - Encourage patient to monitor pain and request assistance  - Assess pain using appropriate pain scale  - Administer analgesics as ordered based on type and severity of pain and evaluate response  - Implement non-pharmacological measures as appropriate and evaluate response  - Consider cultural and social influences on pain and pain management  - Notify physician/advanced practitioner if interventions unsuccessful or patient reports new pain  - Educate patient/family on pain management process including their role and importance of  reporting pain   - Provide non-pharmacologic/complimentary pain relief interventions  Outcome: Progressing     Problem: INFECTION - ADULT  Goal: Absence or prevention of progression during hospitalization  Description: INTERVENTIONS:  - Assess and monitor for signs and symptoms of infection  - Monitor lab/diagnostic results  - Monitor all insertion sites, i.e. indwelling  lines, tubes, and drains  - Administer medications as ordered  - Instruct and encourage patient and family to use good hand hygiene technique  - Identify and instruct in appropriate isolation precautions for identified infection/condition  Outcome: Progressing     Problem: SAFETY ADULT  Goal: Patient will remain free of falls  Description: INTERVENTIONS:  - Educate patient/family on patient safety including physical limitations  - Instruct patient to call for assistance with activity   - Consider consulting OT/PT to assist with strengthening/mobility based on AM PAC & -HLM score  - Consult OT/PT to assist with strengthening/mobility   - Keep Call bell within reach  - Keep bed low and locked with side rails adjusted as appropriate  - Keep care items and personal belongings within reach  - Initiate and maintain comfort rounds  - Make Fall Risk Sign visible to staff  - Offer Toileting every 2 Hours, in advance of need  - Initiate/Maintain bed alarm  - Obtain necessary fall risk management equipment: socks  - Apply yellow socks and bracelet for high fall risk patients  - Consider moving patient to room near nurses station  Outcome: Progressing  Goal: Maintain or return to baseline ADL function  Description: INTERVENTIONS:  -  Assess patient's ability to carry out ADLs; assess patient's baseline for ADL function and identify physical deficits which impact ability to perform ADLs (bathing, care of mouth/teeth, toileting, grooming, dressing, etc.)  - Assess/evaluate cause of self-care deficits   - Assess range of motion  - Assess patient's mobility; develop plan if impaired  - Assess patient's need for assistive devices and provide as appropriate  - Encourage maximum independence but intervene and supervise when necessary  - Involve family in performance of ADLs  - Assess for home care needs following discharge   - Consider OT consult to assist with ADL evaluation and planning for discharge  - Provide patient education  as appropriate  - Monitor functional capacity and physical performance, use of AM PAC & JH-HLM   - Monitor gait, balance and fatigue with ambulation    Outcome: Progressing  Goal: Maintains/Returns to pre admission functional level  Description: INTERVENTIONS:  - Perform AM-PAC 6 Click Basic Mobility/ Daily Activity assessment daily.  - Set and communicate daily mobility goal to care team and patient/family/caregiver.   - Collaborate with rehabilitation services on mobility goals if consulted  - Perform Range of Motion 3 times a day.  - Reposition patient every 2 hours.  - Dangle patient 2 times a day  - Stand patient 2 times a day  - Ambulate patient 2 times a day  - Out of bed to chair 2 times a day   - Out of bed for meals 2 times a day  - Out of bed for toileting  - Record patient progress and toleration of activity level   Outcome: Progressing     Problem: DISCHARGE PLANNING  Goal: Discharge to home or other facility with appropriate resources  Description: INTERVENTIONS:  - Identify barriers to discharge w/patient and caregiver  - Arrange for needed discharge resources and transportation as appropriate  - Identify discharge learning needs (meds, wound care, etc.)  - Arrange for interpretive services to assist at discharge as needed  - Refer to Case Management Department for coordinating discharge planning if the patient needs post-hospital services based on physician/advanced practitioner order or complex needs related to functional status, cognitive ability, or social support system  Outcome: Progressing     Problem: Knowledge Deficit  Goal: Patient/family/caregiver demonstrates understanding of disease process, treatment plan, medications, and discharge instructions  Description: Complete learning assessment and assess knowledge base.  Interventions:  - Provide teaching at level of understanding  - Provide teaching via preferred learning methods  Outcome: Progressing

## 2025-06-07 NOTE — H&P
H&P - Hospitalist   Name: Jimmy Mello 64 y.o. male I MRN: [id number]  Unit/Bed#: -01 I Date of Admission: 6/6/2025   Date of Service: 6/7/2025 I Hospital Day: 0     Assessment & Plan  Syncope and collapse  Presents from home due to syncopal event.  Received atropine via EMS prior to arrival.  In the ER back to baseline.   Syncopal episode occurred while at the dinner table sitting.  Did not hit his head or have fall to the ground.  Witnessed by family.   EKG: Sinus rhythm with first-degree AV block, 63 bpm  Found to have BRAULIO.   Patient reports extended period outside in the heat over the past 3 days  Denies recent medication changes  Troponin 12, 12  Telemetry  Consult cardiology  Acute kidney injury superimposed on chronic kidney disease  (HCC)  Lab Results   Component Value Date    EGFR 28 06/06/2025    EGFR 56 (L) 04/18/2025    EGFR 57 03/24/2025    CREATININE 2.34 (H) 06/06/2025    CREATININE 1.39 (H) 04/18/2025    CREATININE 1.29 03/24/2025   Creatinine 2.34  Recent baseline appears to be about 1.5  Avoid nephrotoxic agents  IV fluids  Continue to monitor  Primary hypertension  Home regimen:  Amlodipine 2.5 mg at bedtime  Coreg 25 mg twice daily  Hold due to bradycardia noted by EMS  Hydralazine 50 mg twice daily  Losartan 100 mg daily  Hold due to BRAULIO      VTE Pharmacologic Prophylaxis: VTE Score: 3 Moderate Risk (Score 3-4) - Pharmacological DVT Prophylaxis Ordered: heparin.  Code Status: Level 1 - Full Code   Discussion with family: Updated  (wife) at bedside.    Anticipated Length of Stay: observation    History of Present Illness   Chief Complaint: passed out    Jimmy Mello is a 64 y.o. male with a PMH of hypertension, trigeminal neuralgia, CKD 3 who presents from home after a syncopal event.  Per patient he has been spending an extended period of time outside over the past couple of days due to work.  Felt a little bit dehydrated due to the heat.  Did not increase his water intake  with being outside and overall has poor water intake at baseline.  Denies recent illness, nausea, vomiting or diarrhea.  No recent medication changes.  Tonight while sitting at the dinner table started to feel lightheaded, had spots come across his vision and then he passed out. Witness by wife. Wife called EMS. EMS gave patient atropine. In the ER back to baseline with HR in the 60s and normotensive. Alert and oriented x 3.     Review of Systems   Constitutional:  Negative for fatigue and fever.   HENT:  Negative for sore throat.    Respiratory:  Negative for cough, chest tightness and shortness of breath.    Cardiovascular:  Negative for chest pain.   Gastrointestinal:  Negative for abdominal distention, abdominal pain, diarrhea, nausea and vomiting.   Genitourinary:  Negative for difficulty urinating.   Musculoskeletal:  Negative for arthralgias.   Neurological:  Positive for syncope. Negative for weakness and headaches.   Psychiatric/Behavioral:  Negative for agitation and behavioral problems.    All other systems reviewed and are negative.      Historical Information   Past Medical History[1]  Past Surgical History[2]  Social History[3]  E-Cigarette/Vaping    E-Cigarette Use Never User      E-Cigarette/Vaping Substances    Nicotine No     THC No     CBD No     Flavoring No     Other No     Unknown No      Family History[4]  Social History:  Marital Status: /Civil Union   Occupation: Unknown  Patient Pre-hospital Living Situation: Home  Patient Pre-hospital Level of Mobility: walks  Patient Pre-hospital Diet Restrictions: Regular    Meds/Allergies   I have reviewed home medications with patient personally.  Prior to Admission medications    Medication Sig Start Date End Date Taking? Authorizing Provider   amLODIPine (NORVASC) 2.5 mg tablet TAKE 1 TABLET BY MOUTH EVERY DAY 12/9/24  Yes Lupis Meza, DO   aspirin 325 mg tablet Take 325 mg by mouth in the morning.   Yes Historical Provider, MD    atorvastatin (LIPITOR) 80 mg tablet Take 1 tablet (80 mg total) by mouth daily with dinner 4/8/25  Yes Trey Del Real MD   baclofen 10 mg tablet Take 1 tablet (10 mg total) by mouth 3 (three) times a day 5/8/25  Yes Trey Del Real MD   carvedilol (COREG) 25 mg tablet TAKE 1 TABLET BY MOUTH TWICE A DAY WITH FOOD 12/9/24  Yes Lupis Meza DO   [facility] Vitamin B12 1000 MCG tablet TAKE 1 TABLET BY MOUTH EVERY DAY 6/1/25  Yes Lupis Meza DO   DULoxetine (CYMBALTA) 60 mg delayed release capsule Take 1 capsule (60 mg total) by mouth daily 4/11/25  Yes Trey Del Real MD   ferrous sulfate 325 (65 Fe) mg tablet TAKE 1 TABLET BY MOUTH EVERY DAY WITH BREAKFAST 1/23/25  Yes Lupis Meza DO   hydrALAZINE (APRESOLINE) 50 mg tablet TAKE 1 TABLET BY MOUTH EVERY 12 HOURS 5/30/25  Yes Lupis Meza DO   losartan (COZAAR) 100 MG tablet TAKE 1 TABLET BY MOUTH EVERY DAY 5/30/25  Yes Lupis Meza DO   OXcarbazepine (TRILEPTAL) 600 mg tablet Take 1 tablet (600 mg total) by mouth 2 (two) times a day 4/11/25  Yes Trey Del Real MD   pregabalin (LYRICA) 100 mg capsule Take 1 capsule (100 mg total) by mouth 3 (three) times a day 4/24/25  Yes Trey Del Real MD   docusate sodium (COLACE) 100 mg capsule Take 1 capsule (100 mg total) by mouth in the morning for 7 days  Patient not taking: Reported on 4/1/2025 3/24/25 3/31/25  YURI Alvarez     No Known Allergies    Objective :  Temp:  [96.8 °F (36 °C)-97.8 °F (36.6 °C)] 97.2 °F (36.2 °C)  HR:  [60-64] 62  BP: (144-158)/(64-74) 144/72  Resp:  [18-20] 20  SpO2:  [94 %-96 %] 94 %  O2 Device: None (Room air)    Physical Exam  Vitals and nursing note reviewed.   Constitutional:       Appearance: Normal appearance.   HENT:      Head: Normocephalic.     Eyes:      Extraocular Movements: Extraocular movements intact.      Pupils: Pupils are equal, round, and reactive to light.       Cardiovascular:      Rate and Rhythm: Normal rate and regular rhythm.      Heart  sounds: No murmur heard.     No gallop.   Pulmonary:      Effort: No respiratory distress.      Breath sounds: Normal breath sounds. No wheezing.   Abdominal:      General: Bowel sounds are normal. There is no distension.      Tenderness: There is no abdominal tenderness.     Musculoskeletal:         General: Normal range of motion.      Cervical back: Normal range of motion.      Right lower leg: No edema.      Left lower leg: No edema.     Skin:     General: Skin is warm.     Neurological:      General: No focal deficit present.      Mental Status: He is alert and oriented to person, place, and time. Mental status is at baseline.     Psychiatric:         Mood and Affect: Mood normal.         Behavior: Behavior normal.         Thought Content: Thought content normal.          Lines/Drains:            Lab Results: I have reviewed the following results:  Results from last 7 days   Lab Units 06/06/25  2212   WBC Thousand/uL 6.70   HEMOGLOBIN g/dL 11.2*   HEMATOCRIT % 35.2*   PLATELETS Thousands/uL 206   SEGS PCT % 73   LYMPHO PCT % 16   MONO PCT % 6   EOS PCT % 4     Results from last 7 days   Lab Units 06/06/25  2212   SODIUM mmol/L 140   POTASSIUM mmol/L 4.5   CHLORIDE mmol/L 107   CO2 mmol/L 26   BUN mg/dL 42*   CREATININE mg/dL 2.34*   ANION GAP mmol/L 7   CALCIUM mg/dL 8.0*   ALBUMIN g/dL 3.9   TOTAL BILIRUBIN mg/dL 0.39   ALK PHOS U/L 104   ALT U/L 27   AST U/L 24   GLUCOSE RANDOM mg/dL 101             Lab Results   Component Value Date    HGBA1C 5.7 (H) 02/25/2023           Imaging Results Review: No pertinent imaging studies reviewed.  Other Study Results Review: EKG was reviewed.     Administrative Statements   I have spent a total time of 50 minutes in caring for this patient on the day of the visit/encounter including Risks and benefits of tx options, Instructions for management, Counseling / Coordination of care, and Documenting in the medical record.    ** Please Note: This note has been constructed using  a voice recognition system. **         [1]   Past Medical History:  Diagnosis Date    Acute respiratory insufficiency 2025    Adjustment disorder with depressed mood 2023    BRAULIO (acute kidney injury) (HCC)     B12 deficiency     Celiac artery stenosis (HCC)     Cerebellar infarct (HCC) 2023    CKD (chronic kidney disease) stage 2, GFR 60-89 ml/min     Essential hypertension     Impaired fasting glucose     Iron deficiency anemia     Neurogenic bowel     Stenosis of left vertebral artery     Stroke (HCC)     Vertebral artery dissection (HCC)    [2]   Past Surgical History:  Procedure Laterality Date    ARTERY SURGERY      vertebral artery stent/revascularization    IR STROKE ALERT  2023   [3]   Social History  Tobacco Use    Smoking status: Former     Current packs/day: 0.00     Average packs/day: 1 pack/day for 5.0 years (5.0 ttl pk-yrs)     Types: Cigarettes     Start date: 1978     Quit date: 1983     Years since quittin.2    Smokeless tobacco: Never   Vaping Use    Vaping status: Never Used   Substance and Sexual Activity    Alcohol use: Not Currently    Drug use: Never    Sexual activity: Not Currently     Partners: Female   [4]   Family History  Problem Relation Name Age of Onset    Hypertension Brother      Hypertension Brother

## 2025-06-07 NOTE — ASSESSMENT & PLAN NOTE
Home regimen:  Amlodipine 2.5 mg at bedtime  Coreg 25 mg twice daily  Hold due to bradycardia noted by EMS  Hydralazine 50 mg twice daily  Losartan 100 mg daily  Hold due to BRAULIO

## 2025-06-07 NOTE — CONSULTS
Consultation - Cardiology   Jimmy Mello 64 y.o. male MRN: [id number]  Unit/Bed#: -01 Encounter: [id number]    Assessment & Plan  Syncope and collapse  After a few days out in the heat with less than adequate fluid intake, it was sitting at a table conversing with family when he syncopized, he was kept in an upright position in the seat by his family, thus not regaining consciousness for at least 5 minutes  Reportedly got atropine by EMS for a heart rate in the 50s, and he was noted to be hypotensive  BRAULIO, resolving quickly today with a creatinine down to 1.6 with IV fluids, very dry tongue on exam all suggest this is hypovolemic syncope.  Acute kidney injury superimposed on chronic kidney disease  (HCC)  Lab Results   Component Value Date    EGFR 44 06/07/2025    EGFR 28 06/06/2025    EGFR 56 (L) 04/18/2025    CREATININE 1.60 (H) 06/07/2025    CREATININE 2.34 (H) 06/06/2025    CREATININE 1.39 (H) 04/18/2025   Already improving with IV fluids, creatinine today down to 1.6  This occurred in the context of outdoor heat exposure for the last few days, on an antihypertensive regimen of losartan, hydralazine, carvedilol, amlodipine  Primary hypertension  Currently severely elevated, losartan and carvedilol both on hold  Would favor a lower reintroductory dose of losartan at possibly 25 or 50 mg daily starting tomorrow if creatinine continues to improve  Would increase amlodipine to 5 mg daily, give additional 2.5 mg x 1 now  I have low suspicion for true bradycardia, telemetry has been unremarkable, can reinstitute carvedilol  If further antihypertensive therapy is needed, consider doxazosin 2 mg nightly        Plan:   Would not pursue any further testing from my standpoint  Med changes as noted above  Will defer to hospitalist service as to discharge plan, consider keeping another 24 hours possibly to continue to trend creatinine    History of Present Illness   Physician Requesting Consult: Gaurav Tubbs,  MD  Reason for Consult / Principal Problem: Syncope  HPI: Jimmy Mello is a 64 y.o. year old male who presents with longstanding hypertension, mild LVH, EKGs with mild LVH and a normal echo with only mild LVH in 2023, spent 3 days out in the heat, drinking minimally, and in the context of a 4 drug antihypertensive regimen, came in with syncope while sitting at the counter, lasting 5 minutes but probably because bystanders and family kept him in the seated position the entire time until EMS arrived.  EMS reportedly gave him atropine for heart rate of 50, he was also noted to be hypotensive on initial blood pressure.  He is now hypertensive, receiving normal saline, with a very dry tongue on examination.  Carvedilol and losartan have been held.    Review of Systems   Constitutional:  Negative for appetite change, diaphoresis, fatigue and fever.   Respiratory:  Negative for chest tightness, shortness of breath and wheezing.    Cardiovascular:  Negative for chest pain, palpitations and leg swelling.   Gastrointestinal:  Negative for abdominal pain and blood in stool.   Musculoskeletal:  Negative for arthralgias and joint swelling.   Skin:  Negative for rash.   Neurological:  Positive for syncope. Negative for dizziness and light-headedness.       Historical Information   Past Medical History[1]  Past Surgical History[2]  Social History     Substance and Sexual Activity   Alcohol Use Not Currently     Social History     Substance and Sexual Activity   Drug Use Never     Tobacco Use History[3]  Family History[4]    Allergies:  Allergies[5]    Medications:   Scheduled Meds:  Current Facility-Administered Medications   Medication Dose Route Frequency Provider Last Rate    acetaminophen  650 mg Oral Q6H PRN Marcella Barry PA-C      amLODIPine  2.5 mg Oral Daily Marcella Barry PA-C      aspirin  325 mg Oral Daily Marcella Barry PA-C      atorvastatin  80 mg Oral Daily With Dinner Marcella Barry PA-C      baclofen  10 mg Oral TID Marcella  ELVI Barry      [Held by provider] carvedilol  25 mg Oral BID With Meals Marcella Barry PA-C      DULoxetine  60 mg Oral Daily Marcella Barry PA-C      ferrous sulfate  325 mg Oral Daily With Breakfast Marcella Barry PA-C      heparin (porcine)  5,000 Units Subcutaneous Q8H [facility] Marcella Barry PA-C      hydrALAZINE  50 mg Oral BID Marcella Barry PA-C      [Held by provider] losartan  100 mg Oral Daily Marcella Barry PA-C      OXcarbazepine  300 mg Oral Daily Marcella Barry PA-C      OXcarbazepine  600 mg Oral HS Marcella Barry PA-C      pregabalin  100 mg Oral TID Marcella Barry PA-C      sodium chloride  75 mL/hr Intravenous Continuous Marcella Barry PA-C 75 mL/hr (25)     Continuous Infusions:sodium chloride, 75 mL/hr, Last Rate: 75 mL/hr (25)      PRN Meds:  acetaminophen, 650 mg, Q6H PRN        Objective:   Vitals:   Vitals:    25   BP: (!) 176/90   Pulse:    Resp:    Temp:    SpO2:      Body mass index is 29.06 kg/m².  Orthostatic Blood Pressures      Flowsheet Row Most Recent Value   Blood Pressure 176/90 filed at 2025 0918   Patient Position - Orthostatic VS Standing - Orthostatic VS filed at 2025 0918          Systolic (24hrs), Av , Min:120 , Max:194     Diastolic (24hrs), Av, Min:64, Max:98      Intake/Output Summary (Last 24 hours) at 2025 1116  Last data filed at 2025 0900  Gross per 24 hour   Intake 1480 ml   Output 1972 ml   Net -492 ml     Weight (last 2 days)       Date/Time Weight    25 0055 89.3 (196.8)    25 2330 89.3 (196.8)    25 2205 93.2 (205.47)          Invasive Devices       Peripheral Intravenous Line  Duration             Peripheral IV 25 Left Antecubital <1 day    Peripheral IV 25 Dorsal (posterior);Left Hand <1 day                    Physical Exam  Constitutional:       General: He is not in acute distress.     Appearance: Normal appearance. He is well-developed. He is not diaphoretic.   HENT:      Head: Normocephalic and  atraumatic.      Mouth/Throat:      Mouth: Mucous membranes are dry.     Eyes:      General: No scleral icterus.     Conjunctiva/sclera: Conjunctivae normal.      Pupils: Pupils are equal, round, and reactive to light.     Neck:      Thyroid: No thyromegaly.      Vascular: No JVD.     Cardiovascular:      Rate and Rhythm: Normal rate and regular rhythm.      Heart sounds: Normal heart sounds. No murmur heard.     No friction rub. No gallop.   Pulmonary:      Effort: Pulmonary effort is normal. No respiratory distress.      Breath sounds: Normal breath sounds. No wheezing or rales.   Abdominal:      General: Bowel sounds are normal. There is no distension.      Palpations: Abdomen is soft.      Tenderness: There is no abdominal tenderness.     Musculoskeletal:         General: No deformity. Normal range of motion.      Cervical back: Normal range of motion and neck supple.      Right lower leg: No edema.      Left lower leg: No edema.     Skin:     General: Skin is warm and dry.      Findings: No erythema or rash.     Neurological:      General: No focal deficit present.      Mental Status: He is alert and oriented to person, place, and time.      Cranial Nerves: No cranial nerve deficit.      Motor: No weakness.         Labs:       CBC with diff:   Results from last 7 days   Lab Units 06/07/25  0552 06/06/25  2212   WBC Thousand/uL 7.10 6.70   HEMOGLOBIN g/dL 12.2 11.2*   HEMATOCRIT % 38.8 35.2*   MCV fL 89 88   PLATELETS Thousands/uL 195 206   RBC Million/uL 4.38 3.98   MCH pg 27.9 28.1   MCHC g/dL 31.4 31.8   RDW % 12.9 13.0   MPV fL 9.6 9.4   NRBC AUTO /100 WBCs 0 0     CMP:  Results from last 7 days   Lab Units 06/07/25  0552 06/06/25  2212   SODIUM mmol/L 142 140   POTASSIUM mmol/L 4.0 4.5   CHLORIDE mmol/L 109* 107   CO2 mmol/L 25 26   BUN mg/dL 36* 42*   CREATININE mg/dL 1.60* 2.34*   CALCIUM mg/dL 8.3* 8.0*   AST U/L  --  24   ALT U/L  --  27   ALK PHOS U/L  --  104   EGFR ml/min/1.73sq m 44 28  "    NT-proBNP: No results found for: \"NTBNP\"   Magnesium:  Results from last 7 days   Lab Units 06/07/25  0552   MAGNESIUM mg/dL 2.6     Coags:    TSH:   Results from last 7 days   Lab Units 06/06/25  2212   TSH 3RD GENERATON uIU/mL 0.824     Hemoglobin A1C:    Lipid Profile:   No results found for: \"CHOL\"  Lab Results   Component Value Date    HDL 34 (L) 04/18/2025    HDL 34 (L) 05/29/2024    HDL 39 (L) 10/12/2023     Lab Results   Component Value Date    LDLCALC 103 (H) 04/18/2025    LDLCALC 93 05/29/2024    LDLCALC 98 10/12/2023     Lab Results   Component Value Date    TRIG 94 04/18/2025    TRIG 142 05/29/2024    TRIG 133 10/12/2023       Imaging & Testing   Cardiac testing:   EKG reviewed personally: Sinus rhythm with LVH and mild QRS widening  Telemetry reviewed personally: Sinus rhythm  Echocardiogram: 2023-EF normal, mild LVH,  Stress test:  Cardiac catheterization:    Imaging:     No results found.    Tony Delacruz MD    Portions of the record may have been created with voice recognition software.  Occasional wrong word or \"sound a like\" substitutions may have occurred due to the inherent limitations of voice recognition software.  Read the chart carefully and recognize, using context, where substitutions have occurred.         [1]   Past Medical History:  Diagnosis Date    Acute respiratory insufficiency 03/20/2025    Adjustment disorder with depressed mood 03/01/2023    BRAULIO (acute kidney injury) (HCC)     B12 deficiency     Celiac artery stenosis (HCC)     Cerebellar infarct (HCC) 02/25/2023    CKD (chronic kidney disease) stage 2, GFR 60-89 ml/min     Essential hypertension     Impaired fasting glucose     Iron deficiency anemia     Neurogenic bowel     Stenosis of left vertebral artery     Stroke (HCC)     Vertebral artery dissection (HCC)    [2]   Past Surgical History:  Procedure Laterality Date    ARTERY SURGERY      vertebral artery stent/revascularization    IR STROKE ALERT  2/26/2023   [3]   Social " History  Tobacco Use   Smoking Status Former    Current packs/day: 0.00    Average packs/day: 1 pack/day for 5.0 years (5.0 ttl pk-yrs)    Types: Cigarettes    Start date: 1978    Quit date: 1983    Years since quittin.2   Smokeless Tobacco Never   [4]   Family History  Problem Relation Name Age of Onset    Hypertension Brother      Hypertension Brother     [5] No Known Allergies

## 2025-06-07 NOTE — ASSESSMENT & PLAN NOTE
Lab Results   Component Value Date    EGFR 44 06/07/2025    EGFR 28 06/06/2025    EGFR 56 (L) 04/18/2025    CREATININE 1.60 (H) 06/07/2025    CREATININE 2.34 (H) 06/06/2025    CREATININE 1.39 (H) 04/18/2025   Already improving with IV fluids, creatinine today down to 1.6  This occurred in the context of outdoor heat exposure for the last few days, on an antihypertensive regimen of losartan, hydralazine, carvedilol, amlodipine

## 2025-06-07 NOTE — ED PROVIDER NOTES
Time reflects when diagnosis was documented in both MDM as applicable and the Disposition within this note       Time User Action Codes Description Comment    6/6/2025 10:44 PM John June Add [R55] Syncope and collapse     6/6/2025 10:44 PM John June Add [N17.9,  N18.9] Acute kidney injury superimposed on chronic kidney disease  (HCC)           ED Disposition       ED Disposition   Admit    Condition   Stable    Date/Time   Fri Jun 6, 2025 11:40 PM    Comment   Case was discussed with [place] and the patient's admission status was agreed to be Admission Status: observation status to the service of Dr. Cotter.               Assessment & Plan       Medical Decision Making    64 y.o. male presenting for evaluation after a syncopal episode.  VSS, in NSR on telemetry on arrival  Will obtain labs to evaluate for anemia, electrolyte abnormality or BRAULIO.  Will obtain EKG and troponin to evaluate for arrhythmia, heart block or ACS.  Will treat with IV fluid bolus.    Repeat evaluation: VSS during ED course.  Reviewed labs with patient and family in detail, including labs compatible with BRAULIO.  Would benefit from admission for IV fluids and telemetry monitoring to exclude arrhythmia or heart block.    Impression: syncope in the setting of BRAULIO/CKD. Will admit for further evaluation and management.  Patient care discussed with SLIM who will assume care and admit patient to the hospital. I have discussed with the patient our recommendation of inpatient admission for further medical care. I have answered all of the patient's questions and concerns. The patient is in agreement with the plan to proceed with admission to the hospital.     Amount and/or Complexity of Data Reviewed  Labs: ordered.    Risk  Decision regarding hospitalization.        ED Course as of 06/06/25 2342   Fri Jun 06, 2025   2203 Procedure Note: EKG  Date/Time: 06/06/25 10:03 PM   Interpreted by: John June DO  Indications / Diagnosis: Syncope  ECG  reviewed by me, the ED Provider: yes   The EKG demonstrates:  Rhythm: normal sinus rhythm 63 BPM  Intervals: First degree AV block. Normal QT intervals  Axis: Normal axis  QRS/Blocks: Normal QRS  ST Changes: No acute ST/T waves changes. No SHANNON. No TWI.       Medications   sodium chloride 0.9 % bolus 1,000 mL (1,000 mL Intravenous New Bag 6/6/25 2246)       ED Risk Strat Scores   HEART Risk Score      Flowsheet Row Most Recent Value   Heart Score Risk Calculator    History 0 Filed at: 06/06/2025 2245   ECG 0 Filed at: 06/06/2025 2245   Age 2 Filed at: 06/06/2025 2245   Risk Factors 1 Filed at: 06/06/2025 2245   Troponin 0 Filed at: 06/06/2025 2245   HEART Score 3 Filed at: 06/06/2025 2245          HEART Risk Score      Flowsheet Row Most Recent Value   Heart Score Risk Calculator    History 0 Filed at: 06/06/2025 2245   ECG 0 Filed at: 06/06/2025 2245   Age 2 Filed at: 06/06/2025 2245   Risk Factors 1 Filed at: 06/06/2025 2245   Troponin 0 Filed at: 06/06/2025 2245   HEART Score 3 Filed at: 06/06/2025 2245                      (ISAR) Identification of Seniors at Risk  Before the illness or injury that brought you to the Emergency, did you need someone to help you on a regular basis?: 0  In the last 24 hours, have you needed more help than usual?: 0  Have you been hospitalized for one or more nights during the past 6 months?: 0  In general, do you see well?: 0  In general, do you have serious problems with your memory?: 0  Do you take more than three different medications every day?: 0  ISAR Score: 0            SBIRT 20yo+      Flowsheet Row Most Recent Value   Initial Alcohol Screen: US AUDIT-C     1. How often do you have a drink containing alcohol? 0 Filed at: 06/06/2025 2207   2. How many drinks containing alcohol do you have on a typical day you are drinking?  0 Filed at: 06/06/2025 2207   3a. Male UNDER 65: How often do you have five or more drinks on one occasion? 0 Filed at: 06/06/2025 2207   3b. FEMALE Any  Age, or MALE 65+: How often do you have 4 or more drinks on one occassion? 0 Filed at: 06/06/2025 2207   Audit-C Score 0 Filed at: 06/06/2025 2207   LORRI: How many times in the past year have you...    Used an illegal drug or used a prescription medication for non-medical reasons? Never Filed at: 06/06/2025 2207                            History of Present Illness       Chief Complaint   Patient presents with    Syncope     Pt came in via EMS after having a witnessed syncopal episode at the dinner table. NO fall, NO headstrike. Pt states he did not drink much water while at work today. Pt states he typically takes his BP med after he eats dinner but today he took it before dinner. Pt states he feels completely fine now       Past Medical History[1]   Past Surgical History[2]   Family History[3]   Social History[4]   E-Cigarette/Vaping    E-Cigarette Use Never User       E-Cigarette/Vaping Substances    Nicotine No     THC No     CBD No     Flavoring No     Other No     Unknown No       I have reviewed and agree with the history as documented.     Jimmy Mello is a 64 y.o. year old male with PMH of CVA, anemia, HTN, CKD presenting to the [facility] ED for evaluation after a syncopal episode. Patient reportedly sitting at table this evening when his speech became slowed and then passed out in front of family. No prodromal chest pain, dyspnea or palpitations. Family was able to support his fall preventing him from striking his head. No witnesses seizure activity per family. He returned to baseline mental status within about 5 minutes. He was reportedly in normal state of health earlier today. No recent vomiting/diarrhea. No dysuria/hematuria. He was working out in the heat the other day however reports normal fluid intake. En route to ED EMS noticed bradycardia with HR in 50s and low blood pressure for which patient received atropine from EMS PTA.      History provided by:  Patient, medical records and EMS  personnel   used: No        Review of Systems   Constitutional:  Negative for chills and fever.   Respiratory:  Negative for cough and shortness of breath.    Cardiovascular:  Negative for chest pain.   Gastrointestinal:  Negative for abdominal pain, diarrhea, nausea and vomiting.   Neurological:  Positive for syncope. Negative for seizures, weakness and numbness.   All other systems reviewed and are negative.          Objective       ED Triage Vitals   Temperature Pulse Blood Pressure Respirations SpO2 Patient Position - Orthostatic VS   06/06/25 2207 06/06/25 2203 06/06/25 2205 06/06/25 2203 06/06/25 2203 06/06/25 2203   97.8 °F (36.6 °C) 64 158/72 18 96 % Sitting      Temp Source Heart Rate Source BP Location FiO2 (%) Pain Score    06/06/25 2207 06/06/25 2203 06/06/25 2203 -- --    Oral Monitor Right arm        Vitals      Date and Time Temp Pulse SpO2 Resp BP Pain Score FACES Pain Rating User   06/06/25 2330 -- 63 95 % 18 155/74 -- --    06/06/25 2230 -- 60 96 % 18 147/64 -- --    06/06/25 2207 97.8 °F (36.6 °C) -- -- -- -- -- --    06/06/25 2206 -- -- 95 % -- -- -- --    06/06/25 2205 -- 62 95 % 18 158/72 -- --    06/06/25 2203 -- 64 96 % 18 -- -- -- AM            Physical Exam  Vitals and nursing note reviewed.   Constitutional:       General: He is not in acute distress.     Appearance: Normal appearance. He is well-developed. He is not ill-appearing, toxic-appearing or diaphoretic.   HENT:      Head: Normocephalic and atraumatic.      Nose: No congestion or rhinorrhea.      Mouth/Throat:      Mouth: Mucous membranes are dry.     Cardiovascular:      Rate and Rhythm: Normal rate and regular rhythm.   Pulmonary:      Effort: Pulmonary effort is normal. No respiratory distress.      Breath sounds: Normal breath sounds. No wheezing or rales.   Abdominal:      General: Bowel sounds are normal.      Palpations: Abdomen is soft.      Tenderness: There is no abdominal tenderness. There  is no guarding or rebound.     Musculoskeletal:      Cervical back: Normal range of motion. No rigidity.     Skin:     General: Skin is warm.      Capillary Refill: Capillary refill takes less than 2 seconds.     Neurological:      Mental Status: He is alert and oriented to person, place, and time.      GCS: GCS eye subscore is 4. GCS verbal subscore is 5. GCS motor subscore is 6.     Psychiatric:         Mood and Affect: Mood normal.         Behavior: Behavior normal.         Results Reviewed       Procedure Component Value Units Date/Time    TSH, 3rd generation with Free T4 reflex [[id number]]  (Normal) Collected: 06/06/25 2212    Lab Status: Final result Specimen: Blood from Arm, Left Updated: 06/06/25 2254     TSH 3RD GENERATON 0.824 uIU/mL     HS Troponin I 2hr [[id number]]     Lab Status: No result Specimen: Blood     HS Troponin 0hr (reflex protocol) [[id number]]  (Normal) Collected: 06/06/25 2212    Lab Status: Final result Specimen: Blood from Arm, Left Updated: 06/06/25 2243     hs TnI 0hr 12 ng/L     Comprehensive metabolic panel [[id number]]  (Abnormal) Collected: 06/06/25 2212    Lab Status: Final result Specimen: Blood from Arm, Left Updated: 06/06/25 2238     Sodium 140 mmol/L      Potassium 4.5 mmol/L      Chloride 107 mmol/L      CO2 26 mmol/L      ANION GAP 7 mmol/L      BUN 42 mg/dL      Creatinine 2.34 mg/dL      Glucose 101 mg/dL      Calcium 8.0 mg/dL      AST 24 U/L      ALT 27 U/L      Alkaline Phosphatase 104 U/L      Total Protein 6.3 g/dL      Albumin 3.9 g/dL      Total Bilirubin 0.39 mg/dL      eGFR 28 ml/min/1.73sq m     Narrative:      National Kidney Disease Foundation guidelines for Chronic Kidney Disease (CKD):     Stage 1 with normal or high GFR (GFR > 90 mL/min/1.73 square meters)    Stage 2 Mild CKD (GFR = 60-89 mL/min/1.73 square meters)    Stage 3A Moderate CKD (GFR = 45-59 mL/min/1.73 square meters)    Stage 3B Moderate CKD (GFR = 30-44 mL/min/1.73 square meters)    Stage 4 Severe  CKD (GFR = 15-29 mL/min/1.73 square meters)    Stage 5 End Stage CKD (GFR <15 mL/min/1.73 square meters)  Note: GFR calculation is accurate only with a steady state creatinine    CBC and differential [[id number]]  (Abnormal) Collected: 06/06/25 2212    Lab Status: Final result Specimen: Blood from Arm, Left Updated: 06/06/25 2220     WBC 6.70 Thousand/uL      RBC 3.98 Million/uL      Hemoglobin 11.2 g/dL      Hematocrit 35.2 %      MCV 88 fL      MCH 28.1 pg      MCHC 31.8 g/dL      RDW 13.0 %      MPV 9.4 fL      Platelets 206 Thousands/uL      nRBC 0 /100 WBCs      Segmented % 73 %      Immature Grans % 0 %      Lymphocytes % 16 %      Monocytes % 6 %      Eosinophils Relative 4 %      Basophils Relative 1 %      Absolute Neutrophils 4.86 Thousands/µL      Absolute Immature Grans 0.02 Thousand/uL      Absolute Lymphocytes 1.06 Thousands/µL      Absolute Monocytes 0.43 Thousand/µL      Eosinophils Absolute 0.29 Thousand/µL      Basophils Absolute 0.04 Thousands/µL             No orders to display       Procedures    ED Medication and Procedure Management   Prior to Admission Medications   Prescriptions Last Dose Informant Patient Reported? Taking?   [employer] Vitamin B12 1000 MCG tablet   No No   Sig: TAKE 1 TABLET BY MOUTH EVERY DAY   DULoxetine (CYMBALTA) 60 mg delayed release capsule   No No   Sig: Take 1 capsule (60 mg total) by mouth daily   OXcarbazepine (TRILEPTAL) 600 mg tablet   No No   Sig: Take 1 tablet (600 mg total) by mouth 2 (two) times a day   amLODIPine (NORVASC) 2.5 mg tablet   No No   Sig: TAKE 1 TABLET BY MOUTH EVERY DAY   aspirin 325 mg tablet   Yes No   Sig: Take 325 mg by mouth daily   atorvastatin (LIPITOR) 80 mg tablet   No No   Sig: Take 1 tablet (80 mg total) by mouth daily with dinner   baclofen 10 mg tablet   No No   Sig: Take 1 tablet (10 mg total) by mouth 3 (three) times a day   carvedilol (COREG) 25 mg tablet   No No   Sig: TAKE 1 TABLET BY MOUTH TWICE A DAY WITH FOOD   docusate sodium  (COLACE) 100 mg capsule   No No   Sig: Take 1 capsule (100 mg total) by mouth in the morning for 7 days   Patient not taking: Reported on 4/1/2025   ferrous sulfate 325 (65 Fe) mg tablet   No No   Sig: TAKE 1 TABLET BY MOUTH EVERY DAY WITH BREAKFAST   hydrALAZINE (APRESOLINE) 50 mg tablet   No No   Sig: TAKE 1 TABLET BY MOUTH EVERY 12 HOURS   losartan (COZAAR) 100 MG tablet   No No   Sig: TAKE 1 TABLET BY MOUTH EVERY DAY   pregabalin (LYRICA) 100 mg capsule   No No   Sig: Take 1 capsule (100 mg total) by mouth 3 (three) times a day      Facility-Administered Medications: None     Patient's Medications   Discharge Prescriptions    No medications on file     No discharge procedures on file.  ED SEPSIS DOCUMENTATION   Time reflects when diagnosis was documented in both MDM as applicable and the Disposition within this note       Time User Action Codes Description Comment    6/6/2025 10:44 PM John June [R55] Syncope and collapse     6/6/2025 10:44 PM John June [N17.9,  N18.9] Acute kidney injury superimposed on chronic kidney disease  (HCC)                      [1]   Past Medical History:  Diagnosis Date    Acute respiratory insufficiency 03/20/2025    Adjustment disorder with depressed mood 03/01/2023    BRAULIO (acute kidney injury) (HCC)     B12 deficiency     Celiac artery stenosis (HCC)     Cerebellar infarct (HCC) 02/25/2023    CKD (chronic kidney disease) stage 2, GFR 60-89 ml/min     Essential hypertension     Impaired fasting glucose     Iron deficiency anemia     Neurogenic bowel     Stenosis of left vertebral artery     Stroke (HCC)     Vertebral artery dissection (HCC)    [2]   Past Surgical History:  Procedure Laterality Date    ARTERY SURGERY      vertebral artery stent/revascularization    IR STROKE ALERT  2/26/2023   [3]   Family History  Problem Relation Name Age of Onset    Hypertension Brother      Hypertension Brother     [4]   Social History  Tobacco Use    Smoking status: Former      Current packs/day: 0.00     Average packs/day: 1 pack/day for 5.0 years (5.0 ttl pk-yrs)     Types: Cigarettes     Start date: 1978     Quit date: 1983     Years since quittin.2    Smokeless tobacco: Never   Vaping Use    Vaping status: Never Used   Substance Use Topics    Alcohol use: Not Currently    Drug use: Never        John June,   25 [id number]

## 2025-06-07 NOTE — ASSESSMENT & PLAN NOTE
Presents from home due to syncopal event.  Received atropine via EMS prior to arrival.  In the ER back to baseline.   Syncopal episode occurred while at the dinner table sitting.  Did not hit his head or have fall to the ground.  Witnessed by family.   EKG: Sinus rhythm with first-degree AV block, 63 bpm  Found to have BRAULIO.   Patient reports extended period outside in the heat over the past 3 days  Denies recent medication changes  Troponin 12, 12  Telemetry  Consult cardiology

## 2025-06-07 NOTE — ASSESSMENT & PLAN NOTE
Currently severely elevated, losartan and carvedilol both on hold  Would favor a lower reintroductory dose of losartan at possibly 25 or 50 mg daily starting tomorrow if creatinine continues to improve  Would increase amlodipine to 5 mg daily, give additional 2.5 mg x 1 now  I have low suspicion for true bradycardia, telemetry has been unremarkable, can reinstitute carvedilol  If further antihypertensive therapy is needed, consider doxazosin 2 mg nightly

## 2025-06-07 NOTE — ASSESSMENT & PLAN NOTE
After a few days out in the heat with less than adequate fluid intake, it was sitting at a table conversing with family when he syncopized, he was kept in an upright position in the seat by his family, thus not regaining consciousness for at least 5 minutes  Reportedly got atropine by EMS for a heart rate in the 50s, and he was noted to be hypotensive  BRAULIO, resolving quickly today with a creatinine down to 1.6 with IV fluids, very dry tongue on exam all suggest this is hypovolemic syncope.

## 2025-06-08 VITALS
DIASTOLIC BLOOD PRESSURE: 80 MMHG | OXYGEN SATURATION: 97 % | SYSTOLIC BLOOD PRESSURE: 160 MMHG | WEIGHT: 196.8 LBS | TEMPERATURE: 97.9 F | HEIGHT: 69 IN | RESPIRATION RATE: 17 BRPM | BODY MASS INDEX: 29.15 KG/M2 | HEART RATE: 71 BPM

## 2025-06-08 DIAGNOSIS — G50.0 TRIGEMINAL NEURALGIA OF RIGHT SIDE OF FACE: ICD-10-CM

## 2025-06-08 DIAGNOSIS — I10 PRIMARY HYPERTENSION: ICD-10-CM

## 2025-06-08 LAB
ANION GAP SERPL CALCULATED.3IONS-SCNC: 5 MMOL/L (ref 4–13)
BASOPHILS # BLD AUTO: 0.05 THOUSANDS/ÂΜL (ref 0–0.1)
BASOPHILS NFR BLD AUTO: 1 % (ref 0–1)
BUN SERPL-MCNC: 30 MG/DL (ref 5–25)
CALCIUM SERPL-MCNC: 7.9 MG/DL (ref 8.4–10.2)
CHLORIDE SERPL-SCNC: 110 MMOL/L (ref 96–108)
CO2 SERPL-SCNC: 23 MMOL/L (ref 21–32)
CREAT SERPL-MCNC: 1.32 MG/DL (ref 0.6–1.3)
EOSINOPHIL # BLD AUTO: 0.31 THOUSAND/ÂΜL (ref 0–0.61)
EOSINOPHIL NFR BLD AUTO: 4 % (ref 0–6)
ERYTHROCYTE [DISTWIDTH] IN BLOOD BY AUTOMATED COUNT: 13 % (ref 11.6–15.1)
GFR SERPL CREATININE-BSD FRML MDRD: 56 ML/MIN/1.73SQ M
GLUCOSE SERPL-MCNC: 89 MG/DL (ref 65–140)
HCT VFR BLD AUTO: 34.1 % (ref 36.5–49.3)
HGB BLD-MCNC: 10.8 G/DL (ref 12–17)
IMM GRANULOCYTES # BLD AUTO: 0.03 THOUSAND/UL (ref 0–0.2)
IMM GRANULOCYTES NFR BLD AUTO: 0 % (ref 0–2)
LYMPHOCYTES # BLD AUTO: 1.44 THOUSANDS/ÂΜL (ref 0.6–4.47)
LYMPHOCYTES NFR BLD AUTO: 20 % (ref 14–44)
MCH RBC QN AUTO: 28.4 PG (ref 26.8–34.3)
MCHC RBC AUTO-ENTMCNC: 31.7 G/DL (ref 31.4–37.4)
MCV RBC AUTO: 90 FL (ref 82–98)
MONOCYTES # BLD AUTO: 0.51 THOUSAND/ÂΜL (ref 0.17–1.22)
MONOCYTES NFR BLD AUTO: 7 % (ref 4–12)
NEUTROPHILS # BLD AUTO: 5.02 THOUSANDS/ÂΜL (ref 1.85–7.62)
NEUTS SEG NFR BLD AUTO: 68 % (ref 43–75)
NRBC BLD AUTO-RTO: 0 /100 WBCS
PLATELET # BLD AUTO: 175 THOUSANDS/UL (ref 149–390)
PMV BLD AUTO: 9.8 FL (ref 8.9–12.7)
POTASSIUM SERPL-SCNC: 4.1 MMOL/L (ref 3.5–5.3)
RBC # BLD AUTO: 3.8 MILLION/UL (ref 3.88–5.62)
SODIUM SERPL-SCNC: 138 MMOL/L (ref 135–147)
WBC # BLD AUTO: 7.36 THOUSAND/UL (ref 4.31–10.16)

## 2025-06-08 PROCEDURE — 99239 HOSP IP/OBS DSCHRG MGMT >30: CPT | Performed by: INTERNAL MEDICINE

## 2025-06-08 PROCEDURE — 80048 BASIC METABOLIC PNL TOTAL CA: CPT | Performed by: INTERNAL MEDICINE

## 2025-06-08 PROCEDURE — 85025 COMPLETE CBC W/AUTO DIFF WBC: CPT | Performed by: INTERNAL MEDICINE

## 2025-06-08 RX ORDER — LOSARTAN POTASSIUM 50 MG/1
50 TABLET ORAL DAILY
Status: DISCONTINUED | OUTPATIENT
Start: 2025-06-08 | End: 2025-06-08 | Stop reason: HOSPADM

## 2025-06-08 RX ORDER — LOSARTAN POTASSIUM 50 MG/1
50 TABLET ORAL DAILY
Status: DISCONTINUED | OUTPATIENT
Start: 2025-06-09 | End: 2025-06-08

## 2025-06-08 RX ORDER — HYDRALAZINE HYDROCHLORIDE 20 MG/ML
5 INJECTION INTRAMUSCULAR; INTRAVENOUS EVERY 6 HOURS PRN
Status: DISCONTINUED | OUTPATIENT
Start: 2025-06-08 | End: 2025-06-08 | Stop reason: HOSPADM

## 2025-06-08 RX ORDER — AMLODIPINE BESYLATE 5 MG/1
5 TABLET ORAL DAILY
Qty: 30 TABLET | Refills: 0 | Status: SHIPPED | OUTPATIENT
Start: 2025-06-09 | End: 2025-07-09

## 2025-06-08 RX ORDER — LOSARTAN POTASSIUM 25 MG/1
25 TABLET ORAL DAILY
Status: DISCONTINUED | OUTPATIENT
Start: 2025-06-09 | End: 2025-06-08

## 2025-06-08 RX ORDER — LOSARTAN POTASSIUM 50 MG/1
50 TABLET ORAL DAILY
Qty: 30 TABLET | Refills: 0 | Status: SHIPPED | OUTPATIENT
Start: 2025-06-09 | End: 2025-06-19

## 2025-06-08 RX ADMIN — HYDRALAZINE HYDROCHLORIDE 50 MG: 25 TABLET ORAL at 10:19

## 2025-06-08 RX ADMIN — OXCARBAZEPINE 300 MG: 300 TABLET, FILM COATED ORAL at 10:19

## 2025-06-08 RX ADMIN — AMLODIPINE BESYLATE 5 MG: 5 TABLET ORAL at 10:19

## 2025-06-08 RX ADMIN — PREGABALIN 100 MG: 100 CAPSULE ORAL at 10:19

## 2025-06-08 RX ADMIN — CARVEDILOL 25 MG: 12.5 TABLET, FILM COATED ORAL at 10:19

## 2025-06-08 RX ADMIN — ASPIRIN 325 MG ORAL TABLET 325 MG: 325 PILL ORAL at 10:19

## 2025-06-08 RX ADMIN — BACLOFEN 10 MG: 10 TABLET ORAL at 10:19

## 2025-06-08 RX ADMIN — SODIUM CHLORIDE 75 ML/HR: 0.9 INJECTION, SOLUTION INTRAVENOUS at 08:29

## 2025-06-08 RX ADMIN — LOSARTAN POTASSIUM 50 MG: 50 TABLET, FILM COATED ORAL at 11:50

## 2025-06-08 RX ADMIN — HEPARIN SODIUM 5000 UNITS: 5000 INJECTION INTRAVENOUS; SUBCUTANEOUS at 06:30

## 2025-06-08 RX ADMIN — FERROUS SULFATE TAB 325 MG (65 MG ELEMENTAL FE) 325 MG: 325 (65 FE) TAB at 10:19

## 2025-06-08 RX ADMIN — DULOXETINE HYDROCHLORIDE 60 MG: 30 CAPSULE, DELAYED RELEASE ORAL at 10:19

## 2025-06-08 NOTE — NURSING NOTE
D/C instructions reviewed with pt and pts wife. They verbalized understanding. IV removed. Pt has all belongings. Pt to main lobby for d/c home with wife.

## 2025-06-08 NOTE — PLAN OF CARE
Problem: Potential for Falls  Goal: Patient will remain free of falls  Description: INTERVENTIONS:  - Educate patient/family on patient safety including physical limitations  - Instruct patient to call for assistance with activity   - Consider consulting OT/PT to assist with strengthening/mobility based on AM PAC & JH-HLM score  - Consult OT/PT to assist with strengthening/mobility   - Keep Call bell within reach  - Keep bed low and locked with side rails adjusted as appropriate  - Keep care items and personal belongings within reach  - Initiate and maintain comfort rounds  - Make Fall Risk Sign visible to staff  - Offer Toileting every 2 Hours, in advance of need  - Initiate/Maintain bed alarm  - Obtain necessary fall risk management equipment: socks  - Apply yellow socks and bracelet for high fall risk patients  - Consider moving patient to room near nurses station  Outcome: Progressing     Problem: PAIN - ADULT  Goal: Verbalizes/displays adequate comfort level or baseline comfort level  Description: Interventions:  - Encourage patient to monitor pain and request assistance  - Assess pain using appropriate pain scale  - Administer analgesics as ordered based on type and severity of pain and evaluate response  - Implement non-pharmacological measures as appropriate and evaluate response  - Consider cultural and social influences on pain and pain management  - Notify physician/advanced practitioner if interventions unsuccessful or patient reports new pain  - Educate patient/family on pain management process including their role and importance of  reporting pain   - Provide non-pharmacologic/complimentary pain relief interventions  Outcome: Progressing     Problem: SAFETY ADULT  Goal: Patient will remain free of falls  Description: INTERVENTIONS:  - Educate patient/family on patient safety including physical limitations  - Instruct patient to call for assistance with activity   - Consider consulting OT/PT to assist  with strengthening/mobility based on AM PAC & JH-HLM score  - Consult OT/PT to assist with strengthening/mobility   - Keep Call bell within reach  - Keep bed low and locked with side rails adjusted as appropriate  - Keep care items and personal belongings within reach  - Initiate and maintain comfort rounds  - Make Fall Risk Sign visible to staff  - Offer Toileting every 2 Hours, in advance of need  - Initiate/Maintain bed alarm  - Obtain necessary fall risk management equipment: socks  - Apply yellow socks and bracelet for high fall risk patients  - Consider moving patient to room near nurses station  Outcome: Progressing

## 2025-06-08 NOTE — PLAN OF CARE
Problem: PAIN - ADULT  Goal: Verbalizes/displays adequate comfort level or baseline comfort level  Description: Interventions:  - Encourage patient to monitor pain and request assistance  - Assess pain using appropriate pain scale  - Administer analgesics as ordered based on type and severity of pain and evaluate response  - Implement non-pharmacological measures as appropriate and evaluate response  - Consider cultural and social influences on pain and pain management  - Notify physician/advanced practitioner if interventions unsuccessful or patient reports new pain  - Educate patient/family on pain management process including their role and importance of  reporting pain   - Provide non-pharmacologic/complimentary pain relief interventions  Outcome: Progressing     Problem: SAFETY ADULT  Goal: Patient will remain free of falls  Description: INTERVENTIONS:  - Educate patient/family on patient safety including physical limitations  - Instruct patient to call for assistance with activity   - Consider consulting OT/PT to assist with strengthening/mobility based on AM PAC & JH-HLM score  - Consult OT/PT to assist with strengthening/mobility   - Keep Call bell within reach  - Keep bed low and locked with side rails adjusted as appropriate  - Keep care items and personal belongings within reach  - Initiate and maintain comfort rounds  - Make Fall Risk Sign visible to staff  - Offer Toileting every 2 Hours, in advance of need  - Initiate/Maintain bed alarm  - Obtain necessary fall risk management equipment: socks  - Apply yellow socks and bracelet for high fall risk patients  - Consider moving patient to room near nurses station  Outcome: Progressing

## 2025-06-08 NOTE — ASSESSMENT & PLAN NOTE
Home regimen:  Amlodipine 2.5 mg at bedtime  Coreg 25 mg twice daily  Restarted by cardiology.  No evidence of bradycardia on telemetry  Hydralazine 50 mg twice daily  Cardiology increase Norvasc to 5 mg daily  Cozaar decreased to 50 mg daily at discharge

## 2025-06-08 NOTE — ASSESSMENT & PLAN NOTE
Presents from home due to syncopal event.  Received atropine via EMS prior to arrival.  In the ER back to baseline.   Syncopal episode occurred while at the dinner table sitting.  Did not hit his head or have fall to the ground.  Witnessed by family.   EKG: Sinus rhythm with first-degree AV block, 63 bpm  Found to have BRAULIO.   Patient reports extended period outside in the heat over the past 3 days  Denies recent medication changes  Troponin 12, 12  Telemetry  Cardiology consult appreciated  As per cardiology looks like hypovolemic syncope  Patient received IV fluids and is feeling back to baseline  No chest pain, or any other complaints.  Patient is cleared for discharge with outpatient follow-up with PCP and cardiology  BMP in 1 week with PCP

## 2025-06-08 NOTE — DISCHARGE SUMMARY
Discharge Summary - Hospitalist   Name: Jimmy Mello 64 y.o. male I MRN: [id number]  Unit/Bed#: -01 I Date of Admission: 6/6/2025   Date of Service: 6/8/2025 I Hospital Day: 1     Assessment & Plan  Syncope and collapse  Presents from home due to syncopal event.  Received atropine via EMS prior to arrival.  In the ER back to baseline.   Syncopal episode occurred while at the dinner table sitting.  Did not hit his head or have fall to the ground.  Witnessed by family.   EKG: Sinus rhythm with first-degree AV block, 63 bpm  Found to have BRAULIO.   Patient reports extended period outside in the heat over the past 3 days  Denies recent medication changes  Troponin 12, 12  Telemetry  Cardiology consult appreciated  As per cardiology looks like hypovolemic syncope  Patient received IV fluids and is feeling back to baseline  No chest pain, or any other complaints.  Patient is cleared for discharge with outpatient follow-up with PCP and cardiology  BMP in 1 week with PCP  Acute kidney injury superimposed on chronic kidney disease  (HCC)  Lab Results   Component Value Date    EGFR 56 06/08/2025    EGFR 44 06/07/2025    EGFR 28 06/06/2025    CREATININE 1.32 (H) 06/08/2025    CREATININE 1.60 (H) 06/07/2025    CREATININE 2.34 (H) 06/06/2025   Creatinine 2.34  Recent baseline appears to be about 1.5  Avoid nephrotoxic agents  IV fluids  Creatinine is back to baseline  DC IV fluids  Primary hypertension  Home regimen:  Amlodipine 2.5 mg at bedtime  Coreg 25 mg twice daily  Restarted by cardiology.  No evidence of bradycardia on telemetry  Hydralazine 50 mg twice daily  Cardiology increase Norvasc to 5 mg daily  Cozaar decreased to 50 mg daily at discharge   Hospital Course:     Jimmy Mello is a 64 y.o. male patient who originally presented to the hospital on   Admission Orders (From admission, onward)       Ordered        06/07/25 1153  INPATIENT ADMISSION  Once            06/06/25 2340  Place in Observation  Once                          due to syncope.  Patient was admitted with BRAULIO and was found to have hypovolemia.  Patient was given IV fluids and kidney function is back to baseline.  Cardiology recommended no further workup and patient was restarted on Coreg.  Patient Norvasc was increased and Cozaar was decreased to 50 mg daily and continue hydralazine.  Patient is hemodynamically stable for discharge with outpatient follow-up  On Exam-  Chest-bilateral air entry, clear to auscultation  Abdomen-soft, nontender  Heart-S1 S2 regular  Extremities-no pedal edema or calf tenderness  Neuro-alert awake oriented x3.  No focal deficits    Please see above list of diagnoses and related plan for additional information.   Follow up with PCP and cardiology as outpatient  BMP in a week with PCP    CONSULTING PROVIDERS   IP CONSULT TO CARDIOLOGY  IP CONSULT TO CASE MANAGEMENT    PROCEDURES PERFORMED  * No surgery found *    RADIOLOGY RESULTS  No results found.    LABS  Results from last 7 days   Lab Units 06/08/25 0327 06/07/25  0552 06/06/25  2212   WBC Thousand/uL 7.36 7.10 6.70   HEMOGLOBIN g/dL 10.8* 12.2 11.2*   HEMATOCRIT % 34.1* 38.8 35.2*   MCV fL 90 89 88   PLATELETS Thousands/uL 175 195 206     Results from last 7 days   Lab Units 06/08/25 0327 06/07/25  0552 06/06/25  2212   SODIUM mmol/L 138 142 140   POTASSIUM mmol/L 4.1 4.0 4.5   CHLORIDE mmol/L 110* 109* 107   CO2 mmol/L 23 25 26   BUN mg/dL 30* 36* 42*   CREATININE mg/dL 1.32* 1.60* 2.34*   CALCIUM mg/dL 7.9* 8.3* 8.0*   ALBUMIN g/dL  --   --  3.9   TOTAL BILIRUBIN mg/dL  --   --  0.39   ALK PHOS U/L  --   --  104   ALT U/L  --   --  27   AST U/L  --   --  24   EGFR ml/min/1.73sq m 56 44 28   GLUCOSE RANDOM mg/dL 89 99 101     Results from last 7 days   Lab Units 06/07/25  0043 06/06/25  2212   HS TNI 0HR ng/L  --  12   HS TNI 2HR ng/L 12  --                       Results from last 7 days   Lab Units 06/06/25  2212   TSH 3RD GENERATON uIU/mL 0.824               Cultures:      "    Invalid input(s): \"URIBILINOGEN\"              Condition at Discharge:  good      Discharge instructions/Information to patient and family:   See after visit summary for information provided to patient and family.      Provisions for Follow-Up Care:  See after visit summary for information related to follow-up care and any pertinent home health orders.      Disposition:     Home       Discharge Statement:  I spent 40 minutes discharging the patient. This time was spent on the day of discharge. I had direct contact with the patient on the day of discharge. Greater than 50% of the total time was spent examining patient, answering all patient questions, arranging and discussing plan of care with patient as well as directly providing post-discharge instructions.  Additional time then spent on discharge activities.    Discharge Medications:  See after visit summary for reconciled discharge medications provided to patient and family.      ** Please Note: This note has been constructed using a voice recognition system **  "

## 2025-06-08 NOTE — ASSESSMENT & PLAN NOTE
Lab Results   Component Value Date    EGFR 56 06/08/2025    EGFR 44 06/07/2025    EGFR 28 06/06/2025    CREATININE 1.32 (H) 06/08/2025    CREATININE 1.60 (H) 06/07/2025    CREATININE 2.34 (H) 06/06/2025   Creatinine 2.34  Recent baseline appears to be about 1.5  Avoid nephrotoxic agents  IV fluids  Creatinine is back to baseline  DC IV fluids

## 2025-06-09 ENCOUNTER — TRANSITIONAL CARE MANAGEMENT (OUTPATIENT)
Dept: FAMILY MEDICINE CLINIC | Facility: CLINIC | Age: 65
End: 2025-06-09

## 2025-06-09 RX ORDER — AMLODIPINE BESYLATE 2.5 MG/1
2.5 TABLET ORAL DAILY
Qty: 90 TABLET | Refills: 2 | OUTPATIENT
Start: 2025-06-09

## 2025-06-09 RX ORDER — PREGABALIN 100 MG/1
100 CAPSULE ORAL 3 TIMES DAILY
Qty: 90 CAPSULE | Refills: 4 | Status: SHIPPED | OUTPATIENT
Start: 2025-06-09

## 2025-06-09 NOTE — TELEPHONE ENCOUNTER
Correct. Looks like during recent admission for syncope, Cardiology increase Norvasc to 5 mg daily   Refill request denied  Find out if he is in need of rx for the increased dose.

## 2025-06-09 NOTE — TELEPHONE ENCOUNTER
Medication:  pregabalin (LYRICA) 100 mg capsule    Dose/Frequency:   Take 1 capsule (100 mg total) by mouth 3 (three) times a day        Quantity: 90    Pharmacy: [facility]/pharmacy #[id number] - [place] - [address]     Office:   [x] PCP/Provider -   [] Speciality/Provider -     Does the patient have enough for 3 days?   [] Yes   [x] No - Send as HP to POD  Pt is out of medication but it was sent to mail order, he needs a week fill to hold him over....

## 2025-06-13 NOTE — UTILIZATION REVIEW
NOTIFICATION OF INPATIENT ADMISSION   AUTHORIZATION REQUEST   SERVICING FACILITY:   [place] [address]  Tax ID: [id number]  NPI: [id number] ATTENDING PROVIDER:  Attending Name and NPI#: Gaurav Tubbs Md [[id number]]  Address: [address] [place] [address]  Phone: [phone]   ADMISSION INFORMATION:  Place of Service: Inpatient [facility]  Place of Service Code: 21  Inpatient Admission Date/Time: 6/7/25 11:53 AM  Discharge Date/Time: 6/8/2025  3:01 PM  Admitting Diagnosis Code/Description:  Syncope and collapse [R55]  Acute kidney injury superimposed on chronic kidney disease  (HCC) [N17.9, N18.9]     UTILIZATION REVIEW CONTACT:  Yojana Taveras, Utilization   Network Utilization Review Department  Phone: [phone]  Fax: [phone]  Email: [email or web address]  Contact for approvals/pending authorizations, clinical reviews, and discharge.     PHYSICIAN ADVISORY SERVICES:  Medical Necessity Denial & Jiva-jz-Fugn Review  Phone: [phone]  Fax: [phone]  Email: [email or web address]     DISCHARGE SUPPORT TEAM:  For Patients Discharge Needs & Updates  Phone: [phone] opt. [phone] Fax: [phone]  Email: [email or web address]

## 2025-06-13 NOTE — UTILIZATION REVIEW
Initial Clinical Review    Pt initially admitted as Observation on 6/6/25 converted to Inpatient on 6/7/25.  Pt requiring continued stay due to Syncope and Collapse.     Admission: Date/Time/Statement:   Admission Orders (From admission, onward)       Ordered        06/07/25 1153  INPATIENT ADMISSION  Once            06/06/25 2340  Place in Observation  Once                          Orders Placed This Encounter   Procedures    INPATIENT ADMISSION     Standing Status:   Standing     Number of Occurrences:   1     Level of Care:   Med Surg [16]     Estimated length of stay:   More than 2 Midnights     Certification:   I certify that inpatient services are medically necessary for this patient for a duration of greater than two midnights. See H&P and MD Progress Notes for additional information about the patient's course of treatment.     ED Arrival Information       Expected   -    Arrival   6/6/2025 22:00    Acuity   Urgent              Means of arrival   Ambulance    [facility]    Admission type   Emergency              Arrival complaint   syncope             Chief Complaint   Patient presents with    Syncope     Pt came in via EMS after having a witnessed syncopal episode at the dinner table. NO fall, NO headstrike. Pt states he did not drink much water while at work today. Pt states he typically takes his BP med after he eats dinner but today he took it before dinner. Pt states he feels completely fine now       Initial Presentation: 64 y.o. male with PMHx: HTN, CKD 3, trigeminal neuralgia, who presented on 6/6/25 to ED initially admitted Observation status then converted to Inpatient due to Syncope and Collapse.  Presented from home after a syncopal event.  Per patient he has been spending an extended period of time outside over the past couple of days due to work.  Felt a little bit dehydrated due to the heat.  Did not increase his water intake with being outside and  overall has poor water intake at baseline.  On day of arrival, while sitting at the dinner table started to feel lightheaded, had spots come across his vision and then he passed out. Witness by wife. Wife called EMS. EMS gave patient atropine. In the ED back to baseline with HR in the 60s and normotensive. Alert and oriented x 3. Labs revealed hgb 11.2, hct 35.2, BUN 42, Cr 2.34, Ca 8.0. EKG: Sinus rhythm with first-degree AV block, 63 bpm. Given 1L IVF bolus in ED.     6/06/2025 Admit to Observation.  Plan:   med surg, telemetry, Cardiology consult. Monitor renal function, VS and labs, continue IVF. Hold home losartan due BRAULIO, hold home Coreg due to bradycardia.     6/07/2025 Inpatient Admission:  Per Cardiology Consult: Reportedly got atropine by EMS for a heart rate in the 50s, and he was noted to be hypotensive. BRAULIO, resolving quickly today with a creatinine down to 1.6 with IV fluids, very dry tongue on exam all suggest this is hypovolemic syncope. Low dose losartan starting tomorrow, continue to monitor Cr. I have low suspicion for true bradycardia, telemetry has been unremarkable, can reinstitute carvedilol. If further antihypertensive therapy is needed, consider doxazosin 2 mg nightly.    Date: 6/8   Day 2:  Per Discharge Note: Patient was admitted with BRAULIO and was found to have hypovolemia. Patient was given IV fluids and kidney function is back to baseline. Cardiology recommended no further workup and patient was restarted on Coreg. Patient Norvasc was increased and Cozaar was decreased to 50 mg daily and continue hydralazine. Patient is hemodynamically stable for discharge with outpatient follow-up.    ED Treatment-Medication Administration from 06/06/2025 2200 to 06/07/2025 0018         Date/Time Order Dose Route Action     06/06/2025 2246 sodium chloride 0.9 % bolus 1,000 mL 1,000 mL Intravenous New Bag          Medications 06/06 06/07 06/08   amLODIPine (NORVASC) tablet 2.5 mg  Dose: 2.5 mg  Freq: Once  Route: PO  Start: 06/07/25 1145 End: 06/07/25 1307   Admin Instructions:      Order specific questions:        1307         amLODIPine (NORVASC) tablet 2.5 mg  Dose: 2.5 mg  Freq: Daily Route: PO  Start: 06/07/25 0130 End: 06/07/25 1132   Admin Instructions:      Order specific questions:        0204 [C]     0822     1132-D/C'd       amLODIPine (NORVASC) tablet 5 mg  Dose: 5 mg  Freq: Daily Route: PO  Start: 06/08/25 0900 End: 06/08/25 1701   Admin Instructions:      Order specific questions:         1019     1701-D/C'd      aspirin tablet 325 mg  Dose: 325 mg  Freq: Daily Route: PO  Start: 06/07/25 0114 End: 06/08/25 1701     0816      1019     1701-D/C'd      atorvastatin (LIPITOR) tablet 80 mg  Dose: 80 mg  Freq: Daily with dinner Route: PO  Start: 06/07/25 0114 End: 06/08/25 1701     1542      1701-D/C'd      baclofen tablet 10 mg  Dose: 10 mg  Freq: 3 times daily Route: PO  Start: 06/07/25 0130 End: 06/08/25 1701     0204     0822     1501     2131      1019     1701-D/C'd      carvedilol (COREG) tablet 25 mg  Dose: 25 mg  Freq: 2 times daily with meals Route: PO  Start: 06/07/25 1630 End: 06/08/25 1701   Admin Instructions:      Order specific questions:        0116     1132     1542      1019     1701-D/C'd      DULoxetine (CYMBALTA) delayed release capsule 60 mg  Dose: 60 mg  Freq: Daily Route: PO  Start: 06/07/25 0114 End: 06/08/25 1701   Admin Instructions:        0816      1019     1701-D/C'd      ferrous sulfate tablet 325 mg  Dose: 325 mg  Freq: Daily with breakfast Route: PO  Start: 06/07/25 0114 End: 06/08/25 1701   Admin Instructions:        0816      1019     1701-D/C'd      heparin (porcine) subcutaneous injection 5,000 Units  Dose: 5,000 Units  Freq: Every 8 hours scheduled Route: SC  Start: 06/07/25 0600 End: 06/08/25 1701   Admin Instructions:        0557     1501     2132      0630     1400     1701-D/C'd      hydrALAZINE (APRESOLINE) tablet 50 mg  Dose: 50 mg  Freq: 2 times daily Route:  PO  Start: 06/07/25 0900 End: 06/08/25 1701   Admin Instructions:      Order specific questions:        0816     2131      1019     1701-D/C'd      losartan (COZAAR) tablet 100 mg  Dose: 100 mg  Freq: Daily Route: PO  Start: 06/08/25 0900 End: 06/08/25 1138   Order specific questions:        0116      0900     1138     1138-D/C'd      losartan (COZAAR) tablet 25 mg  Dose: 25 mg  Freq: Daily Route: PO  Start: 06/09/25 0900 End: 06/08/25 1140   Order specific questions:        0116      1140     1140-D/C'd      losartan (COZAAR) tablet 50 mg  Dose: 50 mg  Freq: Daily Route: PO  Start: 06/08/25 1145 End: 06/08/25 1701   Order specific questions:         1150     1701-D/C'd      losartan (COZAAR) tablet 50 mg  Dose: 50 mg  Freq: Daily Route: PO  Start: 06/09/25 0900 End: 06/08/25 1142   Order specific questions:         1142-D/C'd      OXcarbazepine (TRILEPTAL) tablet 300 mg  Dose: 300 mg  Freq: Daily Route: PO  Start: 06/07/25 0900 End: 06/08/25 1701   Admin Instructions:        0821      1019     1701-D/C'd      OXcarbazepine (TRILEPTAL) tablet 600 mg  Dose: 600 mg  Freq: Daily at bedtime Route: PO  Start: 06/07/25 2200 End: 06/08/25 1701   Admin Instructions:        2131      1701-D/C'd      pregabalin (LYRICA) capsule 100 mg  Dose: 100 mg  Freq: 3 times daily Route: PO  Start: 06/07/25 0900 End: 06/08/25 1701   Admin Instructions:        0815     1501     2131      1019     1701-D/C'd      sodium chloride 0.9 % bolus 1,000 mL  Dose: 1,000 mL  Freq: Once Route: IV  Last Dose: Stopped (06/06/25 2352)  Start: 06/06/25 2245 End: 06/06/25 2352 2246     2352          Legend:       [id number]/3005/3106/0106/0206/0306/0406/0506/0606/0706/08        Continuous Meds Sorted by Name  for Jimmy Mello as of 05/30/25 through 6/8/25  Legend:       Medications 06/06 06/07 06/08   sodium chloride 0.9 % infusion  Rate: 75 mL/hr Dose: 75 mL/hr  Freq: Continuous Route: IV  Last Dose: Stopped (06/08/25 1204)  Start: 06/07/25 0130  End: 06/08/25 1141     0210     1531     1755      0829     1141-D/C'd  1204 [C]                    PRN Meds Sorted by Name  for Jimmy Mello as of 05/30/25 through 6/8/25  Legend:       Medications 06/06 06/07 06/08   acetaminophen (TYLENOL) tablet 650 mg  Dose: 650 mg  Freq: Every 6 hours PRN Route: PO  PRN Reasons: mild pain,headaches,fever  Indications of Use: FEVER,HEADACHE,MILD PAIN  Start: 06/07/25 0115 End: 06/08/25 1701      1701-D/C'd      hydrALAZINE (APRESOLINE) injection 10 mg  Dose: 10 mg  Freq: Every 6 hours PRN Route: IV  PRN Reason: high blood pressure  PRN Comment: SBP>160  Start: 06/07/25 1537 End: 06/08/25 1254   Admin Instructions:      Order specific questions:        1542      1254-D/C'd      hydrALAZINE (APRESOLINE) injection 5 mg  Dose: 5 mg  Freq: Every 6 hours PRN Route: IV  PRN Reason: high blood pressure  PRN Comment: SBP>160  Start: 06/08/25 1254 End: 06/08/25 1701   Admin Instructions:      Order specific questions:         1701-D/C'd        ED Triage Vitals   Temperature Pulse Respirations Blood Pressure SpO2 Pain Score   06/06/25 2207 06/06/25 2203 06/06/25 2203 06/06/25 2205 06/06/25 2203 06/07/25 0023   97.8 °F (36.6 °C) 64 18 158/72 96 % No Pain     Weight (last 2 days) before discharge       Date/Time Weight    06/07/25 0055 89.3 (196.8)    06/06/25 2330 89.3 (196.8)    06/06/25 2205 93.2 (205.47)            Vital Signs (last 3 days) before discharge       Date/Time Temp Pulse Resp BP MAP (mmHg) SpO2 O2 Device Patient Position - Orthostatic VS [place] Coma Scale Score Pain    06/08/25 1307 -- -- -- 160/80 -- -- -- -- -- --    06/08/25 12:53:02 -- 71 -- 167/88 114 97 % -- -- -- --    06/08/25 11:39:22 -- 69 -- 179/87 118 97 % -- -- -- --    06/08/25 1020 -- -- -- -- -- 96 % None (Room air) -- 15 No Pain    06/08/25 10:18:13 -- 61 -- 167/85 112 96 % -- -- -- --    06/08/25 09:35:25 97.9 °F (36.6 °C) 62 -- 164/87 113 98 % -- -- -- --    06/08/25 03:23:47 97.6 °F (36.4 °C) 58 17  149/79 102 95 % None (Room air) Lying -- --    06/07/25 2136 98.7 °F (37.1 °C) -- -- -- -- 96 % None (Room air) -- 15 No Pain    06/07/25 21:18:46 98.7 °F (37.1 °C) 65 16 140/69 93 95 % None (Room air) Sitting -- --    06/07/25 17:12:55 -- 63 -- 114/64 81 95 % -- -- -- --    06/07/25 15:29:26 97.2 °F (36.2 °C) 71 16 192/102 132 96 % None (Room air) Sitting -- --    06/07/25 15:28:14 -- 70 -- 192/102 132 97 % -- -- -- --    06/07/25 0924 -- -- -- -- -- -- None (Room air) -- -- No Pain    06/07/25 09:18:10 -- -- -- 176/90 119 -- -- Standing - Orthostatic VS -- --    06/07/25 0918 -- -- 18 -- -- -- -- Standing - Orthostatic VS -- --    06/07/25 09:16:53 -- 68 -- 176/90 119 97 % -- -- -- --    06/07/25 09:15:24 -- 65 -- 194/98 130 97 % -- Sitting - Orthostatic VS -- --    06/07/25 09:13:13 -- 67 -- 177/89 118 98 % -- Lying - Orthostatic VS -- --    06/07/25 0828 -- -- -- -- -- -- -- Lying - Orthostatic VS -- --    06/07/25 07:11:48 97 °F (36.1 °C) 63 -- -- -- 98 % -- -- -- --    06/07/25 0611 97.6 °F (36.4 °C) -- 18 135/90 109 -- -- Lying -- --    06/07/25 02:05:04 -- 68 -- 120/66 84 94 % -- -- -- --    06/07/25 0116 97.2 °F (36.2 °C) 68 20 120/66 -- 94 % -- -- -- --    06/07/25 0055 97.2 °F (36.2 °C) 62 -- 144/72 -- -- -- -- -- --    06/07/25 00:53:58 97.2 °F (36.2 °C) 62 -- -- -- 94 % -- -- -- --    06/07/25 0046 -- -- -- -- -- -- -- -- -- No Pain    06/07/25 00:23:53 96.8 °F (36 °C) 62 20 144/72 96 96 % -- -- -- --    06/07/25 0023 -- -- -- -- -- 95 % None (Room air) -- 15 No Pain    06/06/25 2330 -- 63 18 155/74 106 95 % None (Room air) -- -- --    06/06/25 2300 -- 63 18 146/65 94 95 % None (Room air) -- -- --    06/06/25 2241 -- -- -- -- -- -- -- -- 15 --    06/06/25 2230 -- 60 18 147/64 92 96 % None (Room air) -- -- --    06/06/25 2207 97.8 °F (36.6 °C) -- -- -- -- -- -- -- -- --    06/06/25 2206 -- -- -- -- -- 95 % -- -- -- --    06/06/25 2205 -- 62 18 158/72 -- 95 % None (Room air) Sitting -- --    06/06/25 2203  -- 64 18 -- -- 96 % None (Room air) Sitting -- --              Pertinent Labs/Diagnostic Test Results:   Radiology:  No orders to display     Cardiology:  ECG 12 lead   Final Result by Kendrick Malin MD (06/06 0807)   Sinus rhythm with 1st degree A-V block   Minimal voltage criteria for LVH, may be normal variant   Borderline ECG   When compared with ECG of 20-Mar-2025 09:44,   No significant change was found   Confirmed by Kendrick Malin ([id number]) on 6/6/2025 11:09:54 PM        GI:  No orders to display           Results from last 7 days   Lab Units 06/08/25 0327 06/07/25  0552 06/06/25  2212   WBC Thousand/uL 7.36 7.10 6.70   HEMOGLOBIN g/dL 10.8* 12.2 11.2*   HEMATOCRIT % 34.1* 38.8 35.2*   PLATELETS Thousands/uL 175 195 206   TOTAL NEUT ABS Thousands/µL 5.02 4.92 4.86         Results from last 7 days   Lab Units 06/08/25 0327 06/07/25  0552 06/06/25  2212   SODIUM mmol/L 138 142 140   POTASSIUM mmol/L 4.1 4.0 4.5   CHLORIDE mmol/L 110* 109* 107   CO2 mmol/L 23 25 26   ANION GAP mmol/L 5 8 7   BUN mg/dL 30* 36* 42*   CREATININE mg/dL 1.32* 1.60* 2.34*   EGFR ml/min/1.73sq m 56 44 28   CALCIUM mg/dL 7.9* 8.3* 8.0*   MAGNESIUM mg/dL  --  2.6  --      Results from last 7 days   Lab Units 06/06/25  2212   AST U/L 24   ALT U/L 27   ALK PHOS U/L 104   TOTAL PROTEIN g/dL 6.3*   ALBUMIN g/dL 3.9   TOTAL BILIRUBIN mg/dL 0.39         Results from last 7 days   Lab Units 06/08/25 0327 06/07/25  0552 06/06/25  2212   GLUCOSE RANDOM mg/dL 89 99 101     Results from last 7 days   Lab Units 06/07/25  0043 06/06/25  2212   HS TNI 0HR ng/L  --  12   HS TNI 2HR ng/L 12  --    HSTNI D2 ng/L 0  --      Results from last 7 days   Lab Units 06/06/25  2212   TSH 3RD GENERATON uIU/mL 0.824     Past Medical History[1]  Present on Admission:   Syncope and collapse   Acute kidney injury superimposed on chronic kidney disease  (HCC)   Primary hypertension      Admitting Diagnosis: Syncope and collapse [R55]  Acute kidney injury  superimposed on chronic kidney disease  (HCC) [N17.9, N18.9]  Age/Sex: 64 y.o. male    Network Utilization Review Department  ATTENTION: Please call with any questions or concerns to [phone] and carefully listen to the prompts so that you are directed to the right person. All voicemails are confidential.   For Discharge needs, contact Care Management DC Support Team at [phone] opt. 2  Send all requests for admission clinical reviews, approved or denied determinations and any other requests to dedicated fax number below belonging to the campus where the patient is receiving treatment. List of dedicated fax numbers for the Facilities:  FACILITY NAME UR FAX NUMBER   ADMISSION DENIALS (Administrative/Medical Necessity) [phone]   DISCHARGE SUPPORT TEAM (NETWORK) [phone]   PARENT CHILD HEALTH (Maternity/NICU/Pediatrics) [phone]   [facility] [phone]   [facility] [phone]   Critical access hospital [phone]   [facility] [phone]   [facility] [phone]   [facility] [phone]   [facility] [phone]   [facility] [phone]   [facility] [phone]   [facility] [phone]   General acute hospital [phone]   [facility] [phone]              [1]   Past Medical History:  Diagnosis Date    Acute respiratory insufficiency 03/20/2025    Adjustment disorder with depressed mood 03/01/2023    BRAULIO (acute kidney injury) (HCC)     B12 deficiency     Celiac artery stenosis (HCC)     Cerebellar infarct (HCC) 02/25/2023    CKD (chronic kidney disease) stage 2, GFR 60-89 ml/min     Essential hypertension     Impaired fasting glucose     Iron deficiency anemia      Neurogenic bowel     Stenosis of left vertebral artery     Stroke (HCC)     Vertebral artery dissection (HCC)

## 2025-06-18 NOTE — ASSESSMENT & PLAN NOTE
Lab Results   Component Value Date    EGFR 56 06/08/2025    EGFR 44 06/07/2025    EGFR 28 06/06/2025    CREATININE 1.32 (H) 06/08/2025    CREATININE 1.60 (H) 06/07/2025    CREATININE 2.34 (H) 06/06/2025   Resolved  His kidney function had returned to baseline at time of hospital discharge.   Encouraged him to stay hydrated and avoid nephrotoxic agents such as advil, motrin, aleve etc.   Will recheck bmp  Orders:    Basic metabolic panel

## 2025-06-18 NOTE — PROGRESS NOTES
Name: Jimmy Mello      : 1960      MRN: [id number]  Encounter Provider: Lupis Meza DO  Encounter Date: 2025   Encounter department: [facility]    Assessment & Plan  Hospital discharge follow-up         Syncope and collapse  Reviewed hospital discharge summary, labs, EKG  Thought to be due to hypovolemia.   Currently feeling find. 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        Acute kidney injury superimposed on chronic kidney disease  (HCC)  Lab Results   Component Value Date    EGFR 56 2025    EGFR 44 2025    EGFR 28 2025    CREATININE 1.32 (H) 2025    CREATININE 1.60 (H) 2025    CREATININE 2.34 (H) 2025   Resolved  His kidney function had returned to baseline at time of hospital discharge.   Encouraged him to stay hydrated and avoid nephrotoxic agents such as advil, motrin, aleve etc.   Will recheck bmp  Orders:  •  Basic metabolic panel    Primary hypertension  While he is feeling well and denies any lightheadedness, bp running low today and had recent sycnopal event thought to be due to hypovolemia, will d/c the losartan and recheck bp when he returns for medicare wellness next month       History of stroke              History of Present Illness     HPI  Patient is a 64 year old male with hypertension, CKD3, history of stroke with resultant right sided spastic hemiparesis and ambulatory dysfunction and right sided facial pain who is being seen today for hospital f/u visit.     He was hospitalized at [facility] from -25 after a syncopal event at home. Occurred while at dinner table  EKG: Sinus rhythm with first-degree AV block, 63 bpm   Labs showed BRAULIO. Bun/cr were 42/2.34 at time of presentation. Baseline is 1.3-1.5. his creatinine at time of discharge on  was 1.32  Seen in consultation by cardiology who felt this was hypovolemic syncope.   His losartan  dose was decreased and amlodipine increased.   Advised f/u with PCP in 1 week.     He has been perfectly fine since he has been home. No orthostatic hypotension and denies feeling dizzy at all.   No swelling in legs with increase in the amlodipine.         Review of Systems  Past Medical History[1]  Past Surgical History[2]  Family History[3]  Social History[4]  Medications[5]  No Known Allergies  Immunization History   Administered Date(s) Administered   • COVID-19 MODERNA VACC 0.5 ML IM 01/21/2021, 03/01/2021, 11/29/2021   • Tdap 06/19/2024   • Zoster Vaccine Recombinant 07/11/2024, 12/23/2024     Objective   /62   Pulse 64   Temp 97.8 °F (36.6 °C) (Tympanic)   Wt 89.7 kg (197 lb 12.8 oz)   SpO2 96%   BMI 29.21 kg/m²     Physical Exam  Vitals and nursing note reviewed.   Constitutional:       General: He is not in acute distress.     Appearance: Normal appearance. He is not ill-appearing, toxic-appearing or diaphoretic.   HENT:      Head: Normocephalic and atraumatic.     Eyes:      Conjunctiva/sclera: Conjunctivae normal.     Neck:      Vascular: No carotid bruit.     Cardiovascular:      Rate and Rhythm: Normal rate and regular rhythm.      Heart sounds: No murmur heard.  Pulmonary:      Effort: Pulmonary effort is normal.      Breath sounds: Normal breath sounds.   Abdominal:      General: There is no distension.      Palpations: Abdomen is soft. There is no mass.      Tenderness: There is no abdominal tenderness.     Musculoskeletal:      Cervical back: Normal range of motion and neck supple.      Right lower leg: No edema.      Left lower leg: No edema.     Skin:     Findings: No rash.     Neurological:      Mental Status: He is alert.      Comments: Right sided spastic hemiplegia   Psychiatric:         Mood and Affect: Mood normal.              [1]  Past Medical History:  Diagnosis Date   • Acute respiratory insufficiency 03/20/2025   • Adjustment disorder with depressed mood 03/01/2023   • BRAULIO  (acute kidney injury) (HCC)    • B12 deficiency    • Celiac artery stenosis (HCC)    • Cerebellar infarct (HCC) 2023   • CKD (chronic kidney disease) stage 2, GFR 60-89 ml/min    • Essential hypertension    • Impaired fasting glucose    • Iron deficiency anemia    • Neurogenic bowel    • Stenosis of left vertebral artery    • Stroke (HCC)    • Vertebral artery dissection (HCC)    [2]  Past Surgical History:  Procedure Laterality Date   • ARTERY SURGERY      vertebral artery stent/revascularization   • IR STROKE ALERT  2023   [3]  Family History  Problem Relation Name Age of Onset   • Hypertension Brother     • Hypertension Brother     [4]  Social History  Tobacco Use   • Smoking status: Former     Current packs/day: 0.00     Average packs/day: 1 pack/day for 5.0 years (5.0 ttl pk-yrs)     Types: Cigarettes     Start date: 1978     Quit date: 1983     Years since quittin.2   • Smokeless tobacco: Never   Vaping Use   • Vaping status: Never Used   Substance and Sexual Activity   • Alcohol use: Not Currently   • Drug use: Never   • Sexual activity: Not Currently     Partners: Female   [5]  Current Outpatient Medications on File Prior to Visit   Medication Sig   • amLODIPine (NORVASC) 5 mg tablet Take 1 tablet (5 mg total) by mouth daily   • aspirin 325 mg tablet Take 325 mg by mouth in the morning.   • atorvastatin (LIPITOR) 80 mg tablet Take 1 tablet (80 mg total) by mouth daily with dinner   • baclofen 10 mg tablet Take 1 tablet (10 mg total) by mouth 3 (three) times a day   • carvedilol (COREG) 25 mg tablet TAKE 1 TABLET BY MOUTH TWICE A DAY WITH FOOD   • [facility] Vitamin B12 1000 MCG tablet TAKE 1 TABLET BY MOUTH EVERY DAY   • DULoxetine (CYMBALTA) 60 mg delayed release capsule Take 1 capsule (60 mg total) by mouth daily   • ferrous sulfate 325 (65 Fe) mg tablet TAKE 1 TABLET BY MOUTH EVERY DAY WITH BREAKFAST   • hydrALAZINE (APRESOLINE) 50 mg tablet TAKE 1 TABLET BY MOUTH EVERY 12 HOURS   •  OXcarbazepine (TRILEPTAL) 600 mg tablet Take 1 tablet (600 mg total) by mouth 2 (two) times a day   • pregabalin (LYRICA) 100 mg capsule Take 1 capsule (100 mg total) by mouth 3 (three) times a day   • [DISCONTINUED] losartan (COZAAR) 50 mg tablet Take 1 tablet (50 mg total) by mouth daily

## 2025-06-18 NOTE — ASSESSMENT & PLAN NOTE
Reviewed hospital discharge summary, labs, EKG  Thought to be due to hypovolemia.   Currently feeling find. 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

## 2025-06-19 ENCOUNTER — OFFICE VISIT (OUTPATIENT)
Dept: FAMILY MEDICINE CLINIC | Facility: CLINIC | Age: 65
End: 2025-06-19
Payer: COMMERCIAL

## 2025-06-19 VITALS
BODY MASS INDEX: 29.21 KG/M2 | TEMPERATURE: 97.8 F | SYSTOLIC BLOOD PRESSURE: 108 MMHG | WEIGHT: 197.8 LBS | DIASTOLIC BLOOD PRESSURE: 62 MMHG | HEART RATE: 64 BPM | OXYGEN SATURATION: 96 %

## 2025-06-19 DIAGNOSIS — R55 SYNCOPE AND COLLAPSE: ICD-10-CM

## 2025-06-19 DIAGNOSIS — N18.9 ACUTE KIDNEY INJURY SUPERIMPOSED ON CHRONIC KIDNEY DISEASE  (HCC): ICD-10-CM

## 2025-06-19 DIAGNOSIS — N17.9 ACUTE KIDNEY INJURY SUPERIMPOSED ON CHRONIC KIDNEY DISEASE  (HCC): ICD-10-CM

## 2025-06-19 DIAGNOSIS — Z09 HOSPITAL DISCHARGE FOLLOW-UP: Primary | ICD-10-CM

## 2025-06-19 DIAGNOSIS — Z86.73 HISTORY OF STROKE: ICD-10-CM

## 2025-06-19 DIAGNOSIS — I10 PRIMARY HYPERTENSION: ICD-10-CM

## 2025-06-19 PROCEDURE — 99495 TRANSJ CARE MGMT MOD F2F 14D: CPT | Performed by: FAMILY MEDICINE

## 2025-06-19 NOTE — ASSESSMENT & PLAN NOTE
While he is feeling well and denies any lightheadedness, bp running low today and had recent sycnopal event thought to be due to hypovolemia, will d/c the losartan and recheck bp when he returns for medicare wellness next month

## 2025-06-25 ENCOUNTER — CONSULT (OUTPATIENT)
Dept: NEPHROLOGY | Facility: HOSPITAL | Age: 65
End: 2025-06-25
Payer: COMMERCIAL

## 2025-06-25 VITALS
DIASTOLIC BLOOD PRESSURE: 72 MMHG | BODY MASS INDEX: 29.03 KG/M2 | SYSTOLIC BLOOD PRESSURE: 152 MMHG | HEIGHT: 69 IN | WEIGHT: 196 LBS

## 2025-06-25 DIAGNOSIS — N18.31 STAGE 3A CHRONIC KIDNEY DISEASE (HCC): ICD-10-CM

## 2025-06-25 DIAGNOSIS — R26.2 AMBULATORY DYSFUNCTION: ICD-10-CM

## 2025-06-25 DIAGNOSIS — N18.32 STAGE 3B CHRONIC KIDNEY DISEASE (HCC): ICD-10-CM

## 2025-06-25 DIAGNOSIS — N18.9 ACUTE KIDNEY INJURY SUPERIMPOSED ON CHRONIC KIDNEY DISEASE  (HCC): Primary | ICD-10-CM

## 2025-06-25 DIAGNOSIS — R35.1 NOCTURIA: ICD-10-CM

## 2025-06-25 DIAGNOSIS — N17.9 ACUTE KIDNEY INJURY SUPERIMPOSED ON CHRONIC KIDNEY DISEASE  (HCC): Primary | ICD-10-CM

## 2025-06-25 DIAGNOSIS — Z86.73 HISTORY OF STROKE: ICD-10-CM

## 2025-06-25 DIAGNOSIS — I10 PRIMARY HYPERTENSION: ICD-10-CM

## 2025-06-25 PROCEDURE — 99204 OFFICE O/P NEW MOD 45 MIN: CPT | Performed by: INTERNAL MEDICINE

## 2025-06-25 RX ORDER — DOXAZOSIN 1 MG/1
1 TABLET ORAL
Qty: 30 TABLET | Refills: 0 | Status: SHIPPED | OUTPATIENT
Start: 2025-06-25

## 2025-06-25 NOTE — PROGRESS NOTES
Name: Jimmy Mello      : 1960      MRN: [id number]  Encounter Provider: James Nance DO  Encounter Date: 2025   Encounter department: [facility] [place]  :  Assessment & Plan  Acute kidney injury superimposed on chronic kidney disease  (HCC)  Lab Results   Component Value Date    EGFR 56 2025    EGFR 44 2025    EGFR 28 2025    CREATININE 1.32 (H) 2025    CREATININE 1.60 (H) 2025    CREATININE 2.34 (H) 2025   He has had 3 BRAULIO episodes since   All appear to be volume mediated  Last hospitalization with hypovolemic syncope  Also takes NSAIDS twice weekly and was on an ARB  BP now above goal in the office and at home  Creatinine was back to baseline on discharge  Continue hydration of 64 oz daily  Avoid NSAIDS and ARB for now  Repeat blood work ordered         Primary hypertension  BP lowering medications include:   Carvedilol 25 mg po bid   Amlodipine 5 mg daily (10 mg gives him edema)   Hydralazine 50 mg 2 times daily  Add cardura 1 mg daily at bedtime for now and monitor   Check BP every am and pm and send log in two weeks  Bring cuff to office 1/year to correlate  For CV risk reduction and prevention goal bp is 120/80 will uptitrate slowly as needed     Orders:    doxazosin (CARDURA) 1 mg tablet; Take 1 tablet (1 mg total) by mouth daily at bedtime    Basic metabolic panel; Future    CBC and Platelet; Future    Albumin / creatinine urine ratio; Future    PTH, intact; Future    Phosphorus; Future    Uric acid; Future    Stage 3a chronic kidney disease (HCC)  Baseline appears to be around 1.3   GFR is 50 ml/min  Check a UA  Unremarkable kidneys on Renal US  Avoid NSAIDS and repeat blood work      Orders:    Basic metabolic panel; Future    CBC and Platelet; Future    Albumin / creatinine urine ratio; Future    PTH, intact; Future    Phosphorus; Future    Uric acid; Future    History of stroke  Continue neurology follow up  Continue risk  reduction  BP control as above       Nocturia  Will attempt to improve with doxazosin which will also help bp        Ambulatory dysfunction  Continue ongoing efforts with neurorehab after cva           Patient Instructions   Dank,    You are here for follow-up after a recent hospitalization with acute kidney injury.  You do have chronic kidney disease based on your most recent labs that is likely due to your longstanding history of high blood pressure and episodes of dehydration.  Your kidney function was back to baseline at the time of your discharge.  We do recommend the following at this time.    Please obtain the blood tests and urine tests that I have ordered today.  This is to ensure stability of your renal function  Your blood pressure is above goal at home.  Your goal blood pressure should be 125/75 in the setting of your stroke back in 2023 and for risk reduction measures  Continue carvedilol 25 mg 2 times daily, continue hydralazine 50 mg 2 times daily, continue amlodipine 5 mg daily  Please start doxazosin 1 mg nightly.  This will help lower your blood pressure and may also help with your increased urinary frequency  Check your blood pressures every morning and every evening at home in the manner we discussed in your office visit and send them to me in a [place] message in 2 weeks.  We will slowly uptitrate your medications as tolerated  If everything is stable we can follow-up again in about 6 months    If you have any additional questions or concerns please do not hesitate to contact me.    Sincerely,    [facility]    It was a pleasure evaluating your patient in the office today. Thank you for allowing our team to participate in the care of Jimmy Mello. Please do not hesitate to contact our team if further issues/questions shall arise in the interim.     History of Present Illness   HPI  Jimmy Mello is a 64 y.o. male who presents as a new patient consultation.  He is 64 years old  he has a history of a CVA in 2023, he has had episodes of acute kidney injury, most recently he was in the hospital after working and subsequently was outside all day, was taking NSAIDs and losartan, and was found to have bradycardia and hypotension.  Workup revealed after a syncopal episode that this was likely hypovolemic.  His renal function improved with intravenous fluids.  He is feeling much better.  He is now off losartan.  He still takes NSAIDs twice per week.  For trigeminal neuralgia.  He currently denies any chest pain fevers chills nausea vomiting diarrhea constipation  History obtained from: patient    Review of Systems   Constitutional:  Negative for chills and fever.   HENT:  Negative for ear pain and sore throat.    Eyes:  Negative for pain and visual disturbance.   Respiratory:  Negative for cough and shortness of breath.    Cardiovascular:  Negative for chest pain and palpitations.   Gastrointestinal:  Negative for abdominal pain and vomiting.   Genitourinary:  Negative for dysuria and hematuria.   Musculoskeletal:  Negative for arthralgias and back pain.   Skin:  Negative for color change and rash.   Neurological:  Negative for seizures and syncope.   All other systems reviewed and are negative.    Medical History Reviewed by provider this encounter:     .  Medical History Reviewed by provider this encounter:     .  Past Medical History   Past Medical History[1]  Past Surgical History[2]  Family History[3]   reports that he quit smoking about 42 years ago. His smoking use included cigarettes. He started smoking about 47 years ago. He has a 5 pack-year smoking history. He has never used smokeless tobacco. He reports that he does not currently use alcohol. He reports that he does not use drugs.  Current Outpatient Medications   Medication Instructions    amLODIPine (NORVASC) 5 mg, Oral, Daily    aspirin 325 mg, Daily    atorvastatin (LIPITOR) 80 mg, Oral, Daily with dinner    baclofen 10 mg, Oral, 3  "times daily    carvedilol (COREG) 25 mg, Oral, 2 times daily with meals    [facility] Vitamin B12 1,000 mcg, Oral, Daily    doxazosin (CARDURA) 1 mg, Oral, Daily at bedtime    DULoxetine (CYMBALTA) 60 mg, Oral, Daily    ferrous sulfate 325 mg, Oral, Daily with breakfast    hydrALAZINE (APRESOLINE) 50 mg, Oral, 2 times daily    OXcarbazepine (TRILEPTAL) 600 mg, Oral, 2 times daily    pregabalin (LYRICA) 100 mg, Oral, 3 times daily   Allergies[4]   Medications Ordered Prior to Encounter[5]   Social History[6]     Medications Ordered Prior to Encounter[7]  Objective   /72 (BP Location: Left arm, Patient Position: Sitting, Cuff Size: Standard)   Ht 5' 9\" (1.753 m)   Wt 88.9 kg (196 lb)   BMI 28.94 kg/m²      Physical Exam  Vitals and nursing note reviewed.   Constitutional:       General: He is not in acute distress.     Appearance: He is well-developed.   HENT:      Head: Normocephalic and atraumatic.     Eyes:      Conjunctiva/sclera: Conjunctivae normal.       Cardiovascular:      Rate and Rhythm: Normal rate and regular rhythm.      Heart sounds: No murmur heard.  Pulmonary:      Effort: Pulmonary effort is normal. No respiratory distress.      Breath sounds: Normal breath sounds.   Abdominal:      Palpations: Abdomen is soft.      Tenderness: There is no abdominal tenderness.     Musculoskeletal:         General: No swelling.      Cervical back: Neck supple.     Skin:     General: Skin is warm and dry.      Capillary Refill: Capillary refill takes less than 2 seconds.     Neurological:      Mental Status: He is alert.     Psychiatric:         Mood and Affect: Mood normal.           Laboratory Results:        Invalid input(s): \"ALBUMIN\"    Results for orders placed or performed during the hospital encounter of 06/06/25   CBC and differential   Result Value Ref Range    WBC 6.70 4.31 - 10.16 Thousand/uL    RBC 3.98 3.88 - 5.62 Million/uL    Hemoglobin 11.2 (L) 12.0 - 17.0 g/dL    Hematocrit 35.2 (L) 36.5 - 49.3 % " "   MCV 88 82 - 98 fL    MCH 28.1 26.8 - 34.3 pg    MCHC 31.8 31.4 - 37.4 g/dL    RDW 13.0 11.6 - 15.1 %    MPV 9.4 8.9 - 12.7 fL    Platelets 206 149 - 390 Thousands/uL    nRBC 0 /100 WBCs    Segmented % 73 43 - 75 %    Immature Grans % 0 0 - 2 %    Lymphocytes % 16 14 - 44 %    Monocytes % 6 4 - 12 %    Eosinophils Relative 4 0 - 6 %    Basophils Relative 1 0 - 1 %    Absolute Neutrophils 4.86 1.85 - 7.62 Thousands/µL    Absolute Immature Grans 0.02 0.00 - 0.20 Thousand/uL    Absolute Lymphocytes 1.06 0.60 - 4.47 Thousands/µL    Absolute Monocytes 0.43 0.17 - 1.22 Thousand/µL    Eosinophils Absolute 0.29 0.00 - 0.61 Thousand/µL    Basophils Absolute 0.04 0.00 - 0.10 Thousands/µL   Comprehensive metabolic panel   Result Value Ref Range    Sodium 140 135 - 147 mmol/L    Potassium 4.5 3.5 - 5.3 mmol/L    Chloride 107 96 - 108 mmol/L    CO2 26 21 - 32 mmol/L    ANION GAP 7 4 - 13 mmol/L    BUN 42 (H) 5 - 25 mg/dL    Creatinine 2.34 (H) 0.60 - 1.30 mg/dL    Glucose 101 65 - 140 mg/dL    Calcium 8.0 (L) 8.4 - 10.2 mg/dL    AST 24 13 - 39 U/L    ALT 27 7 - 52 U/L    Alkaline Phosphatase 104 34 - 104 U/L    Total Protein 6.3 (L) 6.4 - 8.4 g/dL    Albumin 3.9 3.5 - 5.0 g/dL    Total Bilirubin 0.39 0.20 - 1.00 mg/dL    eGFR 28 ml/min/1.73sq m   TSH, 3rd generation with Free T4 reflex   Result Value Ref Range    TSH 3RD GENERATION 0.824 0.450 - 4.500 uIU/mL   HS Troponin 0hr (reflex protocol)   Result Value Ref Range    hs TnI 0hr 12 \"Refer to ACS Flowchart\"- see link ng/L   HS Troponin I 2hr   Result Value Ref Range    hs TnI 2hr 12 \"Refer to ACS Flowchart\"- see link ng/L    Delta 2hr hsTnI 0 <20 ng/L   Basic metabolic panel   Result Value Ref Range    Sodium 142 135 - 147 mmol/L    Potassium 4.0 3.5 - 5.3 mmol/L    Chloride 109 (H) 96 - 108 mmol/L    CO2 25 21 - 32 mmol/L    ANION GAP 8 4 - 13 mmol/L    BUN 36 (H) 5 - 25 mg/dL    Creatinine 1.60 (H) 0.60 - 1.30 mg/dL    Glucose 99 65 - 140 mg/dL    Calcium 8.3 (L) 8.4 - " 10.2 mg/dL    eGFR 44 ml/min/1.73sq m   CBC and differential   Result Value Ref Range    WBC 7.10 4.31 - 10.16 Thousand/uL    RBC 4.38 3.88 - 5.62 Million/uL    Hemoglobin 12.2 12.0 - 17.0 g/dL    Hematocrit 38.8 36.5 - 49.3 %    MCV 89 82 - 98 fL    MCH 27.9 26.8 - 34.3 pg    MCHC 31.4 31.4 - 37.4 g/dL    RDW 12.9 11.6 - 15.1 %    MPV 9.6 8.9 - 12.7 fL    Platelets 195 149 - 390 Thousands/uL    nRBC 0 /100 WBCs    Segmented % 67 43 - 75 %    Immature Grans % 1 0 - 2 %    Lymphocytes % 19 14 - 44 %    Monocytes % 8 4 - 12 %    Eosinophils Relative 4 0 - 6 %    Basophils Relative 1 0 - 1 %    Absolute Neutrophils 4.92 1.85 - 7.62 Thousands/µL    Absolute Immature Grans 0.04 0.00 - 0.20 Thousand/uL    Absolute Lymphocytes 1.31 0.60 - 4.47 Thousands/µL    Absolute Monocytes 0.54 0.17 - 1.22 Thousand/µL    Eosinophils Absolute 0.25 0.00 - 0.61 Thousand/µL    Basophils Absolute 0.04 0.00 - 0.10 Thousands/µL   Magnesium   Result Value Ref Range    Magnesium 2.6 1.9 - 2.7 mg/dL   Basic metabolic panel   Result Value Ref Range    Sodium 138 135 - 147 mmol/L    Potassium 4.1 3.5 - 5.3 mmol/L    Chloride 110 (H) 96 - 108 mmol/L    CO2 23 21 - 32 mmol/L    ANION GAP 5 4 - 13 mmol/L    BUN 30 (H) 5 - 25 mg/dL    Creatinine 1.32 (H) 0.60 - 1.30 mg/dL    Glucose 89 65 - 140 mg/dL    Calcium 7.9 (L) 8.4 - 10.2 mg/dL    eGFR 56 ml/min/1.73sq m   CBC and differential   Result Value Ref Range    WBC 7.36 4.31 - 10.16 Thousand/uL    RBC 3.80 (L) 3.88 - 5.62 Million/uL    Hemoglobin 10.8 (L) 12.0 - 17.0 g/dL    Hematocrit 34.1 (L) 36.5 - 49.3 %    MCV 90 82 - 98 fL    MCH 28.4 26.8 - 34.3 pg    MCHC 31.7 31.4 - 37.4 g/dL    RDW 13.0 11.6 - 15.1 %    MPV 9.8 8.9 - 12.7 fL    Platelets 175 149 - 390 Thousands/uL    nRBC 0 /100 WBCs    Segmented % 68 43 - 75 %    Immature Grans % 0 0 - 2 %    Lymphocytes % 20 14 - 44 %    Monocytes % 7 4 - 12 %    Eosinophils Relative 4 0 - 6 %    Basophils Relative 1 0 - 1 %    Absolute Neutrophils 5.02  1.85 - 7.62 Thousands/µL    Absolute Immature Grans 0.03 0.00 - 0.20 Thousand/uL    Absolute Lymphocytes 1.44 0.60 - 4.47 Thousands/µL    Absolute Monocytes 0.51 0.17 - 1.22 Thousand/µL    Eosinophils Absolute 0.31 0.00 - 0.61 Thousand/µL    Basophils Absolute 0.05 0.00 - 0.10 Thousands/µL   ECG 12 lead   Result Value Ref Range    Ventricular Rate 63 BPM    Atrial Rate 63 BPM    WI Interval 210 ms    QRSD Interval 90 ms    QT Interval 428 ms    QTC Interval 437 ms    P [place] 32 degrees    QRS Axis -22 degrees    T Wave Axis 24 degrees              [1]   Past Medical History:  Diagnosis Date    Acute respiratory insufficiency 03/20/2025    Adjustment disorder with depressed mood 03/01/2023    BRAULIO (acute kidney injury) (HCC)     B12 deficiency     Celiac artery stenosis (HCC)     Cerebellar infarct (HCC) 02/25/2023    CKD (chronic kidney disease) stage 2, GFR 60-89 ml/min     Essential hypertension     Impaired fasting glucose     Iron deficiency anemia     Neurogenic bowel     Stenosis of left vertebral artery     Stroke (HCC)     Vertebral artery dissection (HCC)    [2]   Past Surgical History:  Procedure Laterality Date    ARTERY SURGERY      vertebral artery stent/revascularization    IR STROKE ALERT  2/26/2023   [3]   Family History  Problem Relation Name Age of Onset    Hypertension Brother      Hypertension Brother     [4] No Known Allergies  [5]   Current Outpatient Medications on File Prior to Visit   Medication Sig Dispense Refill    amLODIPine (NORVASC) 5 mg tablet Take 1 tablet (5 mg total) by mouth daily 30 tablet 0    aspirin 325 mg tablet Take 325 mg by mouth in the morning.      atorvastatin (LIPITOR) 80 mg tablet Take 1 tablet (80 mg total) by mouth daily with dinner 90 tablet 1    baclofen 10 mg tablet Take 1 tablet (10 mg total) by mouth 3 (three) times a day 90 tablet 1    carvedilol (COREG) 25 mg tablet TAKE 1 TABLET BY MOUTH TWICE A DAY WITH FOOD 180 tablet 1    [facility] Vitamin B12 1000 MCG tablet  TAKE 1 TABLET BY MOUTH EVERY DAY 90 tablet 1    DULoxetine (CYMBALTA) 60 mg delayed release capsule Take 1 capsule (60 mg total) by mouth daily 90 capsule 1    ferrous sulfate 325 (65 Fe) mg tablet TAKE 1 TABLET BY MOUTH EVERY DAY WITH BREAKFAST 90 tablet 0    hydrALAZINE (APRESOLINE) 50 mg tablet TAKE 1 TABLET BY MOUTH EVERY 12 HOURS 180 tablet 1    OXcarbazepine (TRILEPTAL) 600 mg tablet Take 1 tablet (600 mg total) by mouth 2 (two) times a day (Patient taking differently: Take 600 mg by mouth in the morning and 600 mg in the evening. 300 mg in the morning, 600 mg at night. .) 180 tablet 1    pregabalin (LYRICA) 100 mg capsule Take 1 capsule (100 mg total) by mouth 3 (three) times a day 90 capsule 4     No current facility-administered medications on file prior to visit.   [6]   Social History  Tobacco Use    Smoking status: Former     Current packs/day: 0.00     Average packs/day: 1 pack/day for 5.0 years (5.0 ttl pk-yrs)     Types: Cigarettes     Start date: 1978     Quit date: 1983     Years since quittin.2    Smokeless tobacco: Never   Vaping Use    Vaping status: Never Used   Substance and Sexual Activity    Alcohol use: Not Currently    Drug use: Never    Sexual activity: Not Currently     Partners: Female   [7]   Current Outpatient Medications on File Prior to Visit   Medication Sig Dispense Refill    amLODIPine (NORVASC) 5 mg tablet Take 1 tablet (5 mg total) by mouth daily 30 tablet 0    aspirin 325 mg tablet Take 325 mg by mouth in the morning.      atorvastatin (LIPITOR) 80 mg tablet Take 1 tablet (80 mg total) by mouth daily with dinner 90 tablet 1    baclofen 10 mg tablet Take 1 tablet (10 mg total) by mouth 3 (three) times a day 90 tablet 1    carvedilol (COREG) 25 mg tablet TAKE 1 TABLET BY MOUTH TWICE A DAY WITH FOOD 180 tablet 1    [facility]S Vitamin B12 1000 MCG tablet TAKE 1 TABLET BY MOUTH EVERY DAY 90 tablet 1    DULoxetine (CYMBALTA) 60 mg delayed release capsule Take 1 capsule (60 mg  total) by mouth daily 90 capsule 1    ferrous sulfate 325 (65 Fe) mg tablet TAKE 1 TABLET BY MOUTH EVERY DAY WITH BREAKFAST 90 tablet 0    hydrALAZINE (APRESOLINE) 50 mg tablet TAKE 1 TABLET BY MOUTH EVERY 12 HOURS 180 tablet 1    OXcarbazepine (TRILEPTAL) 600 mg tablet Take 1 tablet (600 mg total) by mouth 2 (two) times a day (Patient taking differently: Take 600 mg by mouth in the morning and 600 mg in the evening. 300 mg in the morning, 600 mg at night. .) 180 tablet 1    pregabalin (LYRICA) 100 mg capsule Take 1 capsule (100 mg total) by mouth 3 (three) times a day 90 capsule 4     No current facility-administered medications on file prior to visit.

## 2025-06-25 NOTE — PATIENT INSTRUCTIONS
Dank,    You are here for follow-up after a recent hospitalization with acute kidney injury.  You do have chronic kidney disease based on your most recent labs that is likely due to your longstanding history of high blood pressure and episodes of dehydration.  Your kidney function was back to baseline at the time of your discharge.  We do recommend the following at this time.    Please obtain the blood tests and urine tests that I have ordered today.  This is to ensure stability of your renal function  Your blood pressure is above goal at home.  Your goal blood pressure should be 125/75 in the setting of your stroke back in 2023 and for risk reduction measures  Continue carvedilol 25 mg 2 times daily, continue hydralazine 50 mg 2 times daily, continue amlodipine 5 mg daily  Please start doxazosin 1 mg nightly.  This will help lower your blood pressure and may also help with your increased urinary frequency  Check your blood pressures every morning and every evening at home in the manner we discussed in your office visit and send them to me in a [facility] message in 2 weeks.  We will slowly uptitrate your medications as tolerated  If everything is stable we can follow-up again in about 6 months    If you have any additional questions or concerns please do not hesitate to contact me.    Sincerely,    [facility]

## 2025-06-25 NOTE — ASSESSMENT & PLAN NOTE
Lab Results   Component Value Date    EGFR 56 06/08/2025    EGFR 44 06/07/2025    EGFR 28 06/06/2025    CREATININE 1.32 (H) 06/08/2025    CREATININE 1.60 (H) 06/07/2025    CREATININE 2.34 (H) 06/06/2025   He has had 3 BRAULIO episodes since 2023  All appear to be volume mediated  Last hospitalization with hypovolemic syncope  Also takes NSAIDS twice weekly and was on an ARB  BP now above goal in the office and at home  Creatinine was back to baseline on discharge  Continue hydration of 64 oz daily  Avoid NSAIDS and ARB for now  Repeat blood work ordered

## 2025-06-25 NOTE — ASSESSMENT & PLAN NOTE
Baseline appears to be around 1.3   GFR is 50 ml/min  Check a UA  Unremarkable kidneys on Renal US  Avoid NSAIDS and repeat blood work      Orders:    Basic metabolic panel; Future    CBC and Platelet; Future    Albumin / creatinine urine ratio; Future    PTH, intact; Future    Phosphorus; Future    Uric acid; Future

## 2025-06-25 NOTE — ASSESSMENT & PLAN NOTE
BP lowering medications include:   Carvedilol 25 mg po bid   Amlodipine 5 mg daily (10 mg gives him edema)   Hydralazine 50 mg 2 times daily  Add cardura 1 mg daily at bedtime for now and monitor   Check BP every am and pm and send log in two weeks  Bring cuff to office 1/year to correlate  For CV risk reduction and prevention goal bp is 120/80 will uptitrate slowly as needed     Orders:    doxazosin (CARDURA) 1 mg tablet; Take 1 tablet (1 mg total) by mouth daily at bedtime    Basic metabolic panel; Future    CBC and Platelet; Future    Albumin / creatinine urine ratio; Future    PTH, intact; Future    Phosphorus; Future    Uric acid; Future

## 2025-06-30 DIAGNOSIS — R25.2 SPASTICITY: ICD-10-CM

## 2025-07-01 RX ORDER — BACLOFEN 10 MG/1
10 TABLET ORAL 3 TIMES DAILY
Qty: 180 TABLET | Refills: 0 | Status: SHIPPED | OUTPATIENT
Start: 2025-07-01

## 2025-07-05 DIAGNOSIS — I10 PRIMARY HYPERTENSION: ICD-10-CM

## 2025-07-06 RX ORDER — CARVEDILOL 25 MG/1
25 TABLET ORAL 2 TIMES DAILY WITH MEALS
Qty: 180 TABLET | Refills: 1 | Status: SHIPPED | OUTPATIENT
Start: 2025-07-06

## 2025-07-15 DIAGNOSIS — R55 SYNCOPE AND COLLAPSE: ICD-10-CM

## 2025-07-15 DIAGNOSIS — I10 PRIMARY HYPERTENSION: ICD-10-CM

## 2025-07-16 RX ORDER — AMLODIPINE BESYLATE 5 MG/1
5 TABLET ORAL DAILY
Qty: 30 TABLET | Refills: 0 | Status: SHIPPED | OUTPATIENT
Start: 2025-07-16 | End: 2025-08-15

## 2025-07-16 RX ORDER — DOXAZOSIN 1 MG/1
1 TABLET ORAL
Qty: 30 TABLET | Refills: 5 | Status: SHIPPED | OUTPATIENT
Start: 2025-07-16

## 2025-07-16 NOTE — PROGRESS NOTES
Patient is a 64 year old male with hypertension, CKD3, history of stroke with spastic hemiparesis of right (dominant) side, central pain syndrome, and ambulatory dysfunction who is being seen today for annual medicare wellness exam    Colon cancer screening is overdue. Was given order for cologuard on 6/19/24. Not completed  Had PSA done in December  Due for pneumococcal vaccine    Was last seen here in June for a hospital f/u visit after an episode of syncope felt to be due to hypovolemia.     Saw nephrology in consultation  on 6/25/25 for his BRAULIO superimposed on CKD. Felt to be volume mediated. Was advised to avoid ARB and NSAIDs. His bp was elevated at time of nephrology visit. They added cardura 1 mg hs to his amlodipine, carvedilol,hydralazine.

## 2025-07-17 ENCOUNTER — OFFICE VISIT (OUTPATIENT)
Dept: FAMILY MEDICINE CLINIC | Facility: CLINIC | Age: 65
End: 2025-07-17
Payer: MEDICARE

## 2025-07-17 VITALS
WEIGHT: 201.4 LBS | DIASTOLIC BLOOD PRESSURE: 70 MMHG | BODY MASS INDEX: 29.74 KG/M2 | OXYGEN SATURATION: 97 % | SYSTOLIC BLOOD PRESSURE: 120 MMHG | HEART RATE: 58 BPM

## 2025-07-17 DIAGNOSIS — Z12.11 SCREENING FOR COLON CANCER: ICD-10-CM

## 2025-07-17 DIAGNOSIS — Z00.00 MEDICARE ANNUAL WELLNESS VISIT, SUBSEQUENT: Primary | ICD-10-CM

## 2025-07-17 DIAGNOSIS — N18.31 STAGE 3A CHRONIC KIDNEY DISEASE (HCC): ICD-10-CM

## 2025-07-17 DIAGNOSIS — I10 PRIMARY HYPERTENSION: ICD-10-CM

## 2025-07-17 DIAGNOSIS — Z23 ENCOUNTER FOR IMMUNIZATION: ICD-10-CM

## 2025-07-17 DIAGNOSIS — Z86.73 HISTORY OF STROKE: ICD-10-CM

## 2025-07-17 PROBLEM — R55 SYNCOPE AND COLLAPSE: Status: RESOLVED | Noted: 2023-03-09 | Resolved: 2025-07-17

## 2025-07-17 PROCEDURE — 90677 PCV20 VACCINE IM: CPT

## 2025-07-17 PROCEDURE — 90471 IMMUNIZATION ADMIN: CPT

## 2025-07-17 PROCEDURE — G0402 INITIAL PREVENTIVE EXAM: HCPCS | Performed by: FAMILY MEDICINE

## 2025-07-17 NOTE — ASSESSMENT & PLAN NOTE
Lab Results   Component Value Date    EGFR 56 06/08/2025    EGFR 44 06/07/2025    EGFR 28 06/06/2025    CREATININE 1.32 (H) 06/08/2025    CREATININE 1.60 (H) 06/07/2025    CREATININE 2.34 (H) 06/06/2025   Reviewed nephrology consultation  S/p BRAULIO superimposed on his CKD.   Stable  Has rx to have labs done. Will call with results.

## 2025-07-17 NOTE — ASSESSMENT & PLAN NOTE
Cerebellar infarct 2/25/23 (Multiple small age indeterminate infarctions within the right posterior cerebellum) in setting of right vertebral artery dissection (There is marked tapering and eventual occlusion of the distal V3 segment of the right vertebral artery suggestive of   Dissection). CTA at time of presentation also showed Severe focal stenosis at the origin of the left vertebral artery without calcific plaque.       s/p stenting on 2/26 with TICI 2b revascularization  Initially on triple therapy with ASA/brilinta/Eliquis and  Statin  Eliquis d/c'd 5/1/23.   Has resultant right sided hemiparesis and right sided facial pain  Ambulating with no assistance

## 2025-07-17 NOTE — PATIENT INSTRUCTIONS
Medicare Preventive Visit Patient Instructions  Thank you for completing your Welcome to Medicare Visit or Medicare Annual Wellness Visit today. Your next wellness visit will be due in one year (7/18/2026).  The screening/preventive services that you may require over the next 5-10 years are detailed below. Some tests may not apply to you based off risk factors and/or age. Screening tests ordered at today's visit but not completed yet may show as past due. Also, please note that scanned in results may not display below.  Preventive Screenings:  Service Recommendations Previous Testing/Comments   Colorectal Cancer Screening  Colonoscopy    Fecal Occult Blood Test (FOBT)/Fecal Immunochemical Test (FIT)  Fecal DNA/Cologuard Test  Flexible Sigmoidoscopy Age: 45-75 years old   Colonoscopy: every 10 years (May be performed more frequently if at higher risk)  OR  FOBT/FIT: every 1 year  OR  Cologuard: every 3 years  OR  Sigmoidoscopy: every 5 years  Screening may be recommended earlier than age 45 if at higher risk for colorectal cancer. Also, an individualized decision between you and your healthcare provider will decide whether screening between the ages of 76-85 would be appropriate. Colonoscopy: Not on file  FOBT/FIT: Not on file  Cologuard: Not on file  Sigmoidoscopy: Not on file          Prostate Cancer Screening Individualized decision between patient and health care provider in men between ages of 55-69   Medicare will cover every 12 months beginning on the day after your 50th birthday PSA: 2.98 ng/mL           Hepatitis C Screening Once for adults born between 1945 and 1965  More frequently in patients at high risk for Hepatitis C Hep C Antibody: Not on file        Diabetes Screening 1-2 times per year if you're at risk for diabetes or have pre-diabetes Fasting glucose: 107 mg/dL (3/13/2023)  A1C: 5.7 % (2/25/2023)      Cholesterol Screening Once every 5 years if you don't have a lipid disorder. May order more  often based on risk factors. Lipid panel: 04/18/2025         Other Preventive Screenings Covered by Medicare:  Abdominal Aortic Aneurysm (AAA) Screening: covered once if your at risk. You're considered to be at risk if you have a family history of AAA or a male between the age of 65-75 who smoking at least 100 cigarettes in your lifetime.  Lung Cancer Screening: covers low dose CT scan once per year if you meet all of the following conditions: (1) Age 55-77; (2) No signs or symptoms of lung cancer; (3) Current smoker or have quit smoking within the last 15 years; (4) You have a tobacco smoking history of at least 20 pack years (packs per day x number of years you smoked); (5) You get a written order from a healthcare provider.  Glaucoma Screening: covered annually if you're considered high risk: (1) You have diabetes OR (2) Family history of glaucoma OR (3)  aged 50 and older OR (4)  American aged 65 and older  Osteoporosis Screening: covered every 2 years if you meet one of the following conditions: (1) Have a vertebral abnormality; (2) On glucocorticoid therapy for more than 3 months; (3) Have primary hyperparathyroidism; (4) On osteoporosis medications and need to assess response to drug therapy.  HIV Screening: covered annually if you're between the age of 15-65. Also covered annually if you are younger than 15 and older than 65 with risk factors for HIV infection. For pregnant patients, it is covered up to 3 times per pregnancy.    Immunizations:  Immunization Recommendations   Influenza Vaccine Annual influenza vaccination during flu season is recommended for all persons aged >= 6 months who do not have contraindications   Pneumococcal Vaccine   * Pneumococcal conjugate vaccine = PCV13 (Prevnar 13), PCV15 (Vaxneuvance), PCV20 (Prevnar 20)  * Pneumococcal polysaccharide vaccine = PPSV23 (Pneumovax) Adults 19-65 yo with certain risk factors or if 65+ yo  If never received any pneumonia  vaccine: recommend Prevnar 20 (PCV20)  Give PCV20 if previously received 1 dose of PCV13 or PPSV23   Hepatitis B Vaccine 3 dose series if at intermediate or high risk (ex: diabetes, end stage renal disease, liver disease)   Respiratory syncytial virus (RSV) Vaccine - COVERED BY MEDICARE PART D  * RSVPreF3 (Arexvy) CDC recommends that adults 60 years of age and older may receive a single dose of RSV vaccine using shared clinical decision-making (SCDM)   Tetanus (Td) Vaccine - COST NOT COVERED BY MEDICARE PART B Following completion of primary series, a booster dose should be given every 10 years to maintain immunity against tetanus. Td may also be given as tetanus wound prophylaxis.   Tdap Vaccine - COST NOT COVERED BY MEDICARE PART B Recommended at least once for all adults. For pregnant patients, recommended with each pregnancy.   Shingles Vaccine (Shingrix) - COST NOT COVERED BY MEDICARE PART B  2 shot series recommended in those 19 years and older who have or will have weakened immune systems or those 50 years and older     Health Maintenance Due:      Topic Date Due   • Colorectal Cancer Screening  Never done   • HIV Screening  Completed   • Hepatitis C Screening  Completed     Immunizations Due:      Topic Date Due   • Pneumococcal Vaccine: 50+ Years (1 of 2 - PCV) Never done   • COVID-19 Vaccine (4 - 2024-25 season) 09/01/2024   • Influenza Vaccine (1) 09/01/2025     Advance Directives   What are advance directives?  Advance directives are legal documents that state your wishes and plans for medical care. These plans are made ahead of time in case you lose your ability to make decisions for yourself. Advance directives can apply to any medical decision, such as the treatments you want, and if you want to donate organs.   What are the types of advance directives?  There are many types of advance directives, and each state has rules about how to use them. You may choose a combination of any of the  following:  Living will:  This is a written record of the treatment you want. You can also choose which treatments you do not want, which to limit, and which to stop at a certain time. This includes surgery, medicine, IV fluid, and tube feedings.   Durable power of  for healthcare (DPAHC):  This is a written record that states who you want to make healthcare choices for you when you are unable to make them for yourself. This person, called a proxy, is usually a family member or a friend. You may choose more than 1 proxy.  Do not resuscitate (DNR) order:  A DNR order is used in case your heart stops beating or you stop breathing. It is a request not to have certain forms of treatment, such as CPR. A DNR order may be included in other types of advance directives.  Medical directive:  This covers the care that you want if you are in a coma, near death, or unable to make decisions for yourself. You can list the treatments you want for each condition. Treatment may include pain medicine, surgery, blood transfusions, dialysis, IV or tube feedings, and a ventilator (breathing machine).  Values history:  This document has questions about your views, beliefs, and how you feel and think about life. This information can help others choose the care that you would choose.  Why are advance directives important?  An advance directive helps you control your care. Although spoken wishes may be used, it is better to have your wishes written down. Spoken wishes can be misunderstood, or not followed. Treatments may be given even if you do not want them. An advance directive may make it easier for your family to make difficult choices about your care.   Weight Management   Why it is important to manage your weight:  Being overweight increases your risk of health conditions such as heart disease, high blood pressure, type 2 diabetes, and certain types of cancer. It can also increase your risk for osteoarthritis, sleep apnea, and  other respiratory problems. Aim for a slow, steady weight loss. Even a small amount of weight loss can lower your risk of health problems.  How to lose weight safely:  A safe and healthy way to lose weight is to eat fewer calories and get regular exercise. You can lose up about 1 pound a week by decreasing the number of calories you eat by 500 calories each day.   Healthy meal plan for weight management:  A healthy meal plan includes a variety of foods, contains fewer calories, and helps you stay healthy. A healthy meal plan includes the following:  Eat whole-grain foods more often.  A healthy meal plan should contain fiber. Fiber is the part of grains, fruits, and vegetables that is not broken down by your body. Whole-grain foods are healthy and provide extra fiber in your diet. Some examples of whole-grain foods are whole-wheat breads and pastas, oatmeal, brown rice, and bulgur.  Eat a variety of vegetables every day.  Include dark, leafy greens such as spinach, kale, thao greens, and mustard greens. Eat yellow and orange vegetables such as carrots, sweet potatoes, and winter squash.   Eat a variety of fruits every day.  Choose fresh or canned fruit (canned in its own juice or light syrup) instead of juice. Fruit juice has very little or no fiber.  Eat low-fat dairy foods.  Drink fat-free (skim) milk or 1% milk. Eat fat-free yogurt and low-fat cottage cheese. Try low-fat cheeses such as mozzarella and other reduced-fat cheeses.  Choose meat and other protein foods that are low in fat.  Choose beans or other legumes such as split peas or lentils. Choose fish, skinless poultry (chicken or turkey), or lean cuts of red meat (beef or pork). Before you cook meat or poultry, cut off any visible fat.   Use less fat and oil.  Try baking foods instead of frying them. Add less fat, such as margarine, sour cream, regular salad dressing and mayonnaise to foods. Eat fewer high-fat foods. Some examples of high-fat foods  include french fries, doughnuts, ice cream, and cakes.  Eat fewer sweets.  Limit foods and drinks that are high in sugar. This includes candy, cookies, regular soda, and sweetened drinks.  Exercise:  Exercise at least 30 minutes per day on most days of the week. Some examples of exercise include walking, biking, dancing, and swimming. You can also fit in more physical activity by taking the stairs instead of the elevator or parking farther away from stores. Ask your healthcare provider about the best exercise plan for you.    © Copyright [employer] 2018 Information is for End User's use only and may not be sold, redistributed or otherwise used for commercial purposes. All illustrations and images included in CareNotes® are the copyrighted property of [employer] or [employer]

## 2025-07-17 NOTE — PROGRESS NOTES
Name: Jimmy Mello      : 1960      MRN: [id number]  Encounter Provider: Lupis Meza DO  Encounter Date: 2025   Encounter department: [facility] PRIMARY CARE  :  Assessment & Plan  Medicare annual wellness visit, subsequent         Encounter for immunization    Orders:    Pneumococcal Conjugate Vaccine 20-valent (Pcv20)    Screening for colon cancer    Orders:    Cologuard    Primary hypertension  Bp at goal  Recently had addition of cardura 1 mg at hs (rx by nephrology) and he is tolerating well  Continue same.            History of stroke  Cerebellar infarct 23 (Multiple small age indeterminate infarctions within the right posterior cerebellum) in setting of right vertebral artery dissection (There is marked tapering and eventual occlusion of the distal V3 segment of the right vertebral artery suggestive of   Dissection). CTA at time of presentation also showed Severe focal stenosis at the origin of the left vertebral artery without calcific plaque.       s/p stenting on  with TICI 2b revascularization  Initially on triple therapy with ASA/brilinta/Eliquis and  Statin  Eliquis d/c'd 23.   Has resultant right sided hemiparesis and right sided facial pain  Ambulating with no assistance             Preventive health issues were discussed with patient, and age appropriate screening tests were ordered as noted in patient's After Visit Summary. Personalized health advice and appropriate referrals for health education or preventive services given if needed, as noted in patient's After Visit Summary.    History of Present Illness     HPI Patient is a 64 year old male with hypertension, CKD3, history of stroke with spastic hemiparesis of right (dominant) side, central pain syndrome, and ambulatory dysfunction who is being seen today for annual medicare wellness exam    Colon cancer screening is overdue. Was given order for cologuard on 24. Not completed. He lost it and  needs new rx.   Had PSA done in December  Due for pneumococcal vaccine    Was last seen here in June for a hospital f/u visit after an episode of syncope felt to be due to hypovolemia.     Saw nephrology in consultation  on 6/25/25 for his BRAULIO superimposed on CKD. Felt to be volume mediated. Was advised to avoid ARB and NSAIDs. His bp was elevated at time of nephrology visit. They added cardura 1 mg hs to his amlodipine, carvedilol,hydralazine. Is tolerating the cardura okay. Checking bp at home and brought readings.   The nephrologist had asked for copy of his readings.     No headaches. No chest pain or palpitations. No shortness of breath.     Patient Care Team:  Lupis Meza DO as PCP - General (Family Medicine)  James Nance DO (Nephrology)    Review of Systems  Medical History Reviewed by provider this encounter:  Tobacco  Allergies  Meds  Problems  Med Hx  Surg Hx  Fam Hx  Soc   Hx      Annual Wellness Visit Questionnaire   Jimmy is here for his Subsequent Wellness visit.     Health Risk Assessment:   Patient rates overall health as fair. Patient feels that their physical health rating is same. Patient is satisfied with their life. Eyesight was rated as same. Hearing was rated as same. Patient feels that their emotional and mental health rating is same. Patients states they are never, rarely angry. Patient states they are sometimes unusually tired/fatigued. Pain experienced in the last 7 days has been some. Patient's pain rating has been 7/10. Patient states that he has experienced no weight loss or gain in last 6 months.     Fall Risk Screening:   In the past year, patient has experienced: history of falling in past year    Number of falls: 2 or more  Injured during fall?: Yes    Feels unsteady when standing or walking?: No    Worried about falling?: No      Home Safety:  Patient does not have trouble with stairs inside or outside of their home. Patient has working smoke alarms and has working  carbon monoxide detector. Home safety hazards include: none.     Nutrition:   Current diet is Regular.     Medications:   Patient is not currently taking any over-the-counter supplements. Patient is able to manage medications.     Activities of Daily Living (ADLs)/Instrumental Activities of Daily Living (IADLs):   Walk and transfer into and out of bed and chair?: Yes  Dress and groom yourself?: Yes    Bathe or shower yourself?: Yes    Feed yourself? Yes  Do your laundry/housekeeping?: Yes  Manage your money, pay your bills and track your expenses?: Yes  Make your own meals?: Yes    Do your own shopping?: Yes    Previous Hospitalizations:   Any hospitalizations or ED visits within the last 12 months?: Yes    How many hospitalizations have you had in the last year?: 1-2    Hospitalization Comments: Hospitalized this summer for syncope. Felt to be related to volume depletion    Advance Care Planning:   Living will: No    Durable POA for healthcare: No    Advanced directive: No    Advanced directive counseling given: Yes      Cognitive Screening:   Provider or family/friend/caregiver concerned regarding cognition?: No    Preventive Screenings      Cardiovascular Screening:    General: Screening Current      Diabetes Screening:     General: Screening Current      Colorectal Cancer Screening:     General: Risks and Benefits Discussed    Due for: Cologuard      Prostate Cancer Screening:    General: Risks and Benefits Discussed    Due for: PSA      Osteoporosis Screening:    General: Screening Not Indicated      Abdominal Aortic Aneurysm (AAA) Screening:    Risk factors include: age between 65-76 yo and tobacco use        Lung Cancer Screening:     General: Screening Not Indicated      Hepatitis C Screening:    General: Screening Current    Immunizations:  - Immunizations due: Prevnar 20  - Risks/benefits immunizations discussed    - Immunizations given per orders      Screening, Brief Intervention, and Referral to  Treatment (SBIRT)     Screening  Typical number of drinks in a day: 0  Typical number of drinks in a week: 0  Interpretation: Low risk drinking behavior.    AUDIT-C Screenin) How often did you have a drink containing alcohol in the past year? never  2) How many drinks did you have on a typical day when you were drinking in the past year? 0  3) How often did you have 6 or more drinks on one occasion in the past year? never    AUDIT-C Score: 0  Interpretation: Score 0-3 (male): Negative screen for alcohol misuse    Single Item Drug Screening:  How often have you used an illegal drug (including marijuana) or a prescription medication for non-medical reasons in the past year? never    Single Item Drug Screen Score: 0  Interpretation: Negative screen for possible drug use disorder    Brief Intervention  Alcohol & drug use screenings were reviewed. No concerns regarding substance use disorder identified.     Social Drivers of Health     Food Insecurity: No Food Insecurity (2025)    Nursing - Inadequate Food Risk Classification     Worried About Running Out of Food in the Last Year: Never true     Ran Out of Food in the Last Year: Never true     Ran Out of Food in the Last Year: Never true   Transportation Needs: No Transportation Needs (2025)    PRAPARE - Transportation     Lack of Transportation (Medical): No     Lack of Transportation (Non-Medical): No   Housing Stability: Low Risk  (2025)    Housing Stability Vital Sign     Unable to Pay for Housing in the Last Year: No     Number of Times Moved in the Last Year: 0     Homeless in the Last Year: No   Utilities: Not At Risk (2025)    [facility] Utilities     Threatened with loss of utilities: No     No results found.    Objective   /70   Pulse 58   Wt 91.4 kg (201 lb 6.4 oz)   SpO2 97%   BMI 29.74 kg/m²     Physical Exam  Vitals and nursing note reviewed.   Constitutional:       General: He is not in acute distress.     Appearance: Normal  appearance. He is not ill-appearing, toxic-appearing or diaphoretic.   HENT:      Head: Normocephalic and atraumatic.      Right Ear: Tympanic membrane normal.      Left Ear: Tympanic membrane normal.      Nose: Nose normal. No rhinorrhea.      Mouth/Throat:      Mouth: Mucous membranes are dry.      Pharynx: No posterior oropharyngeal erythema.     Eyes:      Extraocular Movements: Extraocular movements intact.      Conjunctiva/sclera: Conjunctivae normal.      Pupils: Pupils are equal, round, and reactive to light.     Neck:      Vascular: No carotid bruit.     Cardiovascular:      Rate and Rhythm: Normal rate and regular rhythm.      Heart sounds: No murmur heard.  Pulmonary:      Effort: Pulmonary effort is normal.      Breath sounds: Normal breath sounds.   Abdominal:      General: There is no distension.      Palpations: Abdomen is soft. There is no mass.      Tenderness: There is no abdominal tenderness.     Musculoskeletal:      Cervical back: Normal range of motion and neck supple.      Right lower leg: No edema.      Left lower leg: No edema.   Lymphadenopathy:      Cervical: No cervical adenopathy.     Skin:     Findings: No rash.     Neurological:      Mental Status: He is alert.      Motor: Weakness present.      Gait: Gait abnormal.     Psychiatric:         Mood and Affect: Mood normal.

## 2025-07-17 NOTE — ASSESSMENT & PLAN NOTE
Bp at goal  Recently had addition of cardura 1 mg at hs (rx by nephrology) and he is tolerating well  Continue same.

## 2025-07-19 LAB
ALBUMIN SERPL-MCNC: 4.1 G/DL (ref 3.9–4.9)
ALBUMIN/CREAT UR: 20 MG/G CREAT (ref 0–29)
ALP SERPL-CCNC: 122 IU/L (ref 44–121)
ALT SERPL-CCNC: 26 IU/L (ref 0–44)
AST SERPL-CCNC: 22 IU/L (ref 0–40)
BILIRUB SERPL-MCNC: <0.2 MG/DL (ref 0–1.2)
BUN SERPL-MCNC: 19 MG/DL (ref 8–27)
BUN SERPL-MCNC: 20 MG/DL (ref 8–27)
BUN/CREAT SERPL: 16 (ref 10–24)
BUN/CREAT SERPL: 18 (ref 10–24)
CALCIUM SERPL-MCNC: 8.5 MG/DL (ref 8.6–10.2)
CALCIUM SERPL-MCNC: 8.8 MG/DL (ref 8.6–10.2)
CHLORIDE SERPL-SCNC: 107 MMOL/L (ref 96–106)
CHLORIDE SERPL-SCNC: 108 MMOL/L (ref 96–106)
CHOLEST SERPL-MCNC: 150 MG/DL (ref 100–199)
CO2 SERPL-SCNC: 22 MMOL/L (ref 20–29)
CO2 SERPL-SCNC: 22 MMOL/L (ref 20–29)
CREAT SERPL-MCNC: 1.07 MG/DL (ref 0.76–1.27)
CREAT SERPL-MCNC: 1.22 MG/DL (ref 0.76–1.27)
CREAT UR-MCNC: 28.6 MG/DL
EGFR: 66 ML/MIN/1.73
EGFR: 77 ML/MIN/1.73
ERYTHROCYTE [DISTWIDTH] IN BLOOD BY AUTOMATED COUNT: 12.7 % (ref 11.6–15.4)
GLOBULIN SER-MCNC: 1.9 G/DL (ref 1.5–4.5)
GLUCOSE SERPL-MCNC: 94 MG/DL (ref 70–99)
GLUCOSE SERPL-MCNC: 95 MG/DL (ref 70–99)
HCT VFR BLD AUTO: 37.3 % (ref 37.5–51)
HDLC SERPL-MCNC: 37 MG/DL
HGB BLD-MCNC: 12.2 G/DL (ref 13–17.7)
LDLC SERPL CALC-MCNC: 94 MG/DL (ref 0–99)
LDLC/HDLC SERPL: 2.5 RATIO (ref 0–3.6)
MCH RBC QN AUTO: 28.9 PG (ref 26.6–33)
MCHC RBC AUTO-ENTMCNC: 32.7 G/DL (ref 31.5–35.7)
MCV RBC AUTO: 88 FL (ref 79–97)
MICROALBUMIN UR-MCNC: 5.8 UG/ML
PHOSPHATE SERPL-MCNC: 3 MG/DL (ref 2.8–4.1)
PLATELET # BLD AUTO: 198 X10E3/UL (ref 150–450)
POTASSIUM SERPL-SCNC: 4.2 MMOL/L (ref 3.5–5.2)
POTASSIUM SERPL-SCNC: 4.3 MMOL/L (ref 3.5–5.2)
PROT SERPL-MCNC: 6 G/DL (ref 6–8.5)
PTH-INTACT SERPL-MCNC: 78 PG/ML (ref 15–65)
RBC # BLD AUTO: 4.22 X10E6/UL (ref 4.14–5.8)
SL AMB VLDL CHOLESTEROL CALC: 19 MG/DL (ref 5–40)
SODIUM SERPL-SCNC: 143 MMOL/L (ref 134–144)
SODIUM SERPL-SCNC: 143 MMOL/L (ref 134–144)
TRIGL SERPL-MCNC: 103 MG/DL (ref 0–149)
URATE SERPL-MCNC: 6.2 MG/DL (ref 3.8–8.4)
WBC # BLD AUTO: 5.7 X10E3/UL (ref 3.4–10.8)

## 2025-08-06 DIAGNOSIS — I63.9 CEREBROVASCULAR ACCIDENT (CVA) (HCC): ICD-10-CM

## 2025-08-06 DIAGNOSIS — I65.02 STENOSIS OF LEFT VERTEBRAL ARTERY: ICD-10-CM

## 2025-08-06 DIAGNOSIS — I77.4 CELIAC ARTERY STENOSIS (HCC): ICD-10-CM

## 2025-08-06 DIAGNOSIS — I77.74 VERTEBRAL ARTERY DISSECTION (HCC): ICD-10-CM

## 2025-08-07 RX ORDER — ATORVASTATIN CALCIUM 80 MG/1
TABLET, FILM COATED ORAL
Qty: 90 TABLET | Refills: 1 | Status: SHIPPED | OUTPATIENT
Start: 2025-08-07

## 2025-08-12 ENCOUNTER — PROCEDURE VISIT (OUTPATIENT)
Age: 65
End: 2025-08-12
Payer: COMMERCIAL

## 2025-08-16 DIAGNOSIS — D64.9 ANEMIA, UNSPECIFIED TYPE: ICD-10-CM

## 2025-08-18 ENCOUNTER — PATIENT MESSAGE (OUTPATIENT)
Dept: NEUROLOGY | Facility: CLINIC | Age: 65
End: 2025-08-18

## 2025-08-18 DIAGNOSIS — G89.0 CENTRAL PAIN SYNDROME: Primary | ICD-10-CM

## 2025-08-18 RX ORDER — FERROUS SULFATE 325(65) MG
1 TABLET ORAL
Qty: 90 TABLET | Refills: 0 | Status: SHIPPED | OUTPATIENT
Start: 2025-08-18

## 2025-08-19 RX ORDER — PREGABALIN 200 MG/1
200 CAPSULE ORAL 3 TIMES DAILY
Qty: 270 CAPSULE | Refills: 1 | Status: SHIPPED | OUTPATIENT
Start: 2025-08-19

## 2025-08-20 ENCOUNTER — TELEPHONE (OUTPATIENT)
Age: 65
End: 2025-08-20

## 2025-08-22 ENCOUNTER — OFFICE VISIT (OUTPATIENT)
Age: 65
End: 2025-08-22

## 2025-08-22 VITALS
TEMPERATURE: 96.7 F | BODY MASS INDEX: 30.01 KG/M2 | DIASTOLIC BLOOD PRESSURE: 78 MMHG | OXYGEN SATURATION: 96 % | HEIGHT: 69 IN | WEIGHT: 202.6 LBS | SYSTOLIC BLOOD PRESSURE: 150 MMHG | HEART RATE: 57 BPM | RESPIRATION RATE: 20 BRPM

## 2025-08-22 DIAGNOSIS — T15.01XA FOREIGN BODY IN CORNEA, RIGHT EYE, INITIAL ENCOUNTER: Primary | ICD-10-CM

## 2025-08-22 DIAGNOSIS — S05.01XA ABRASION OF RIGHT CORNEA, INITIAL ENCOUNTER: ICD-10-CM

## 2025-08-22 RX ORDER — KETOROLAC TROMETHAMINE 5 MG/ML
1 SOLUTION OPHTHALMIC 4 TIMES DAILY
Qty: 5 ML | Refills: 0 | Status: SHIPPED | OUTPATIENT
Start: 2025-08-22

## 2025-08-22 RX ORDER — OFLOXACIN 3 MG/ML
1 SOLUTION/ DROPS OPHTHALMIC 4 TIMES DAILY
Qty: 5 ML | Refills: 0 | Status: SHIPPED | OUTPATIENT
Start: 2025-08-22 | End: 2025-08-27